# Patient Record
Sex: MALE | Race: WHITE | NOT HISPANIC OR LATINO | Employment: OTHER | URBAN - METROPOLITAN AREA
[De-identification: names, ages, dates, MRNs, and addresses within clinical notes are randomized per-mention and may not be internally consistent; named-entity substitution may affect disease eponyms.]

---

## 2017-02-09 ENCOUNTER — TRANSCRIBE ORDERS (OUTPATIENT)
Dept: PHYSICAL THERAPY | Facility: HOSPITAL | Age: 47
End: 2017-02-09

## 2017-02-09 DIAGNOSIS — I89.0 LYMPHEDEMA: Primary | ICD-10-CM

## 2017-02-14 ENCOUNTER — HOSPITAL ENCOUNTER (OUTPATIENT)
Dept: OCCUPATIONAL THERAPY | Facility: HOSPITAL | Age: 47
Setting detail: THERAPIES SERIES
Discharge: HOME | End: 2017-02-14

## 2017-02-14 DIAGNOSIS — I89.0 LYMPHEDEMA OF BOTH LOWER EXTREMITIES: Primary | ICD-10-CM

## 2017-02-14 PROCEDURE — 97165 OT EVAL LOW COMPLEX 30 MIN: CPT

## 2017-02-14 PROCEDURE — 97140 MANUAL THERAPY 1/> REGIONS: CPT

## 2017-02-14 NOTE — PROGRESS NOTES
Outpatient Occupational Therapy Lymphedema Initial Evaluation  [facility]     Patient Name: Kameron Melendez  : 1970  MRN: [id number]  Today's Date: 2017      Visit Date: 2017    There is no problem list on file for this patient.       No past medical history on file.     No past surgical history on file.      Visit Dx:     ICD-10-CM ICD-9-CM   1. Lymphedema of both lower extremities I89.0 457.1             Patient History       17 1700          History    Chief Complaint Other 1 (comment)   bilateral LE edema  -RE      Date Current Problem(s) Began 10/14/16  -RE      Brief Description of Current Complaint Patient has a 4 year history of swelling in the left leg and approx. 6-9 mponths in the right.    -RE      Patient/Caregiver Goals Decrease swelling;Know what to do to help the symptoms  -RE      How has patient tried to help current problem? Ace wraps  -RE      Pain     Pain Location Leg   Wound area on right leg.  -RE      Pain at Present 2  -RE      Pain at Best 1  -RE      Pain at Worst 6   6-8  -RE      Difficulties with ADL's? Unable to reach feet.  -RE      Fall Risk Assessment    Any falls in the past year: No  -RE      Services    Are you currently receiving Home Health services No  -RE      Do you plan to receive Home Health services in the near future No  -RE      Daily Activities    Primary Language English  -RE      How does patient learn best? Reading;Listening  -RE      Teaching needs identified Management of Condition;Home Exercise Program  -RE      Does patient have problems with the following? None  -RE      Barriers to learning None  -RE      Functional Status dressing;mobility issues preventing performance of daily activities  -RE      Pt Participated in POC and Goals Yes  -RE      Safety    Are you being hurt, hit, or frightened by anyone at home or in your life? No  -RE      Are you being neglected by a caregiver No  -RE        User Key  (r) = Recorded By, (t) = Taken  By, (c) = Cosigned By    Initials Name Provider Type    RE Marjorie Flaherty OTR Occupational Therapist                Lymphedema       02/14/17 1700          Subjective Comments    Subjective Comments Patient is concerned about wound on r leg.  -RE      Lymphedema Assessment    Lymphedema Classification RLE:;LLE:;stage 2 (Spontaneously Irreversible)  -RE      Infections or Cellulitis? yes  -RE      Infection/Cellulitis Treatment Hospitalized in 2012.  -RE      Subjective Pain    Able to rate subjective pain? yes  -RE      Pre-Treatment Pain Level 2  -RE      Post-Treatment Pain Level 2  -RE      Lymphedema Edema Assessment    Ptting Edema Category By grade out of 4  -RE      Pitting Edema + 3/4  -RE      Skin Changes/Observations    Location/Assessment Lower Extremity  -RE      Lower Extremity Conditions right:;weeping;left:;intact  -RE      Lower Extremity Color/Pigment bilateral:;erythema;hyperpigmented  -RE      Lower Extremity Skin Details Superficial wound on the posterior aspect of the right lower leg.  No visible drainage.   -RE      Lymphedema Measurements    Measurement Type(s) Circumferential  -RE      Circumferential Areas Lower extremities  -RE      LLE Circumferential (cm)    Measurement Location 1 10cm above knee  -RE      Left 1 84 cm  -RE      Measurement Location 2 Knee  -RE      Left 2 77 cm  -RE      Measurement Location 3 10cm below knee  -RE      Left 3 72 cm  -RE      Measurement Location 4 20cm below knee  -RE      Left 4 74.5 cm  -RE      Measurement Location 5 30cm below knee  -RE      Left 5 64.5 cm  -RE      Measurement Location 6 Ankle  -RE      Left 6 36.5 cm  -RE      Measurement Location 7 Midfoot  -RE      Left 7 26.5 cm  -RE      Measurement Location 8 Total  -RE      Left 8 435 cm  -RE      RLE Circumferential (cm)    Measurement Location 1 10cm above knee  -RE      Right 1 84 cm  -RE      Measurement Location 2 Knee  -RE      Right 2 68 cm  -RE      Measurement Location 3 10cm below  knee  -RE      Right 3 82.5 cm  -RE      Measurement Location 4 20cm below knee  -RE      Right 4 74.5 cm  -RE      Measurement Location 5 30cm below knee  -RE      Right 5 60.5 cm  -RE      Measurement Location 6 Ankle  -RE      Right 6 45.5 cm  -RE      Measurement Location 7 Midfoot  -RE      Right 7 28.5 cm  -RE      Measurement Location 8 Total  -RE      Right 8 443.5 cm  -RE      Manual Lymphatic Drainage    Manual Lymphatic Drainage initial sequence;opened regional lymph nodes;extremity treatment  -RE      Initial Sequence short neck  -RE      Opened Regional Lymph Nodes inguinal  -RE      Inguinal right  -RE      Extremity Treatment MLD to proximal limb only   due to time constraints  -RE      Compression/Skin Care    Compression/Skin Care skin care;wrapping location;bandaging  -RE      Skin Care moisturizing lotion applied;topical anti-microbial applied  -RE      Wrapping Location lower extremity  -RE      Wrapping Location LE bilateral:;foot to knee  -RE      Wrapping Comments K1, 1-10cm Artiflex, 1-15cm Artiflex, 1-8cm, 1-10cm, 2-12cm  (3 on right) Rosidal K.   -RE      Bandage Layers cotton liner;padding/fluff layer;short-stretch bandages (comment size/quantity)  -RE      Bandaging Technique circumferential/spiral;light compression  -RE      Compression/Skin Care Comments Dressed wound on right with 2-abd pads and 1 Kerlix.  -RE        User Key  (r) = Recorded By, (t) = Taken By, (c) = Cosigned By    Initials Name Provider Type    ZULMA Elizabeth Occupational Therapist                        Therapy Education       02/14/17 3422    Therapy Education    Given Edema management;Bandaging/dressing change  -RE    Program New  -RE    How Provided Verbal;Demonstration;Written  -RE    Provided to Patient;Caregiver  -RE    Level of Understanding Teach back education performed;Verbalized  -RE      User Key  (r) = Recorded By, (t) = Taken By, (c) = Cosigned By    Initials Name Provider Type    TAYLOR Flaherty  OTR Occupational Therapist                        OT Goals       02/14/17 1700          OT Short Term Goals    STG Date to Achieve 02/28/17  -RE      STG 1 Patient independent and compliant with self-wrapping techniques of compression bandages with assistance of caregiver as needed for improved self-management of condition.  -RE      STG 1 Progress New  -RE      STG 2 Patient will demonstrate proper awareness of condition and precautions for improved prevention, management, care of symptoms.  -RE      STG 2 Progress New  -RE      STG 3 Patient will demonstrate decreased net edema of bilateral lower extremities >/= 15-30cm  for decrease in edema, symptoms, decreased risk of infection and improved skin care/transition to self-care of condition.   -RE      STG 3 Progress New  -RE      Long Term Goals    LTG Date to Achieve 03/14/17  -RE      LTG 1 Patient will demonstrate decreased net edema of bilateral lower extremities >/= 31-60cm  for decrease in edema, symptoms, decreased risk of infection and improved skin care/transition to self-care of condition.   -RE      LTG 1 Progress New  -RE      LTG 2 Patient independent and compliant with use and care of compression with assistance of a caregiver as needed to promote self-care independence.   -RE      LTG 2 Progress New  -RE      LTG 3 Patient will demonstrate healing of R LE wound.  -RE      LTG 3 Progress New  -RE      Time Calculation    OT Goal Re-Cert Due Date 03/14/17  -RE        User Key  (r) = Recorded By, (t) = Taken By, (c) = Cosigned By    Initials Name Provider Type    TAYLOR Flaherty OTR Occupational Therapist                OT Assessment/Plan       02/14/17 1732          OT Assessment    Functional Limitations Impaired gait;Performance in self-care ADL  -RE      Impairments Edema;Impaired lymphatic circulation;Integumentary integrity;Pain;Impaired venous circulation  -RE      Assessment Comments Patient was originally evaluated by PT for lymphedema but  due to patient volume he had not been scheduled for treament yet.  Patient was concerned about his wound and was insistent that he be scheduled.  Therefore he was rescheduled with OT since there was an opening for treatment.  Patient presents with severe lymphedema which extends from feet to groin with 3+ pitting.  He has a superficial wound on his right lower leg which has been very slow healing.  He could benefit from Complete Decongestive Therapy to decrease edema, decrease the risk of infection, heal his wound, decrease pain and learn self care strategies.    -RE      OT Diagnosis bilateral LE lymphedema  -RE      OT Rehab Potential Fair   Fair due to Obesity  -RE      Patient/caregiver participated in establishment of treatment plan and goals Yes  -RE      Patient would benefit from skilled therapy intervention Yes  -RE      OT Plan    OT Frequency 3x/week  -RE      Predicted Duration of Therapy Intervention (days/wks) 4-6 weeks  -RE      Planned CPT's? OT EVAL: 77805;OT SELF CARE/MGMT/TRAIN 15 MIN: 92092;OT MANUAL THERAPY EA 15 MIN: 09223  -RE      Planned Therapy Interventions (Optional Details) home exercise program;manual therapy techniques;patient/family education;wound care   compression bandaging  -RE        User Key  (r) = Recorded By, (t) = Taken By, (c) = Cosigned By    Initials Name Provider Type    RE UZLMA Jameson Occupational Therapist                Time Calculation:         Therapy Charges for Today     Code Description Service Date Service Provider Modifiers Qty    [id number]  OT EVAL LOW COMPLEXITY 2 2/14/2017 ZULMA Jameson GO 1    [id number]  SANDRA MANUAL THERAPY EA 15 MIN 2/14/2017 ZULMA Jameson GO 3                    ZULMA Jameson  2/14/2017

## 2017-02-16 ENCOUNTER — HOSPITAL ENCOUNTER (OUTPATIENT)
Dept: OCCUPATIONAL THERAPY | Facility: HOSPITAL | Age: 47
Setting detail: THERAPIES SERIES
Discharge: HOME | End: 2017-02-16

## 2017-02-16 DIAGNOSIS — I89.0 LYMPHEDEMA OF BOTH LOWER EXTREMITIES: Primary | ICD-10-CM

## 2017-02-16 PROCEDURE — 97140 MANUAL THERAPY 1/> REGIONS: CPT

## 2017-02-16 NOTE — PROGRESS NOTES
Outpatient Occupational Therapy Lymphedema Treatment Note  [facility]     Patient Name: Kameron Melendez  : 1970  MRN: [id number]  Today's Date: 2017      Visit Date: 2017    There is no problem list on file for this patient.       No past medical history on file.     No past surgical history on file.      Visit Dx:      ICD-10-CM ICD-9-CM   1. Lymphedema of both lower extremities I89.0 457.1             Lymphedema       17 1500          Subjective Comments    Subjective Comments Reports increased urination, bandages slipped down.  -RE      Subjective Pain    Able to rate subjective pain? yes  -RE      Pre-Treatment Pain Level 2  -RE      Post-Treatment Pain Level 2  -RE      Manual Lymphatic Drainage    Manual Lymphatic Drainage initial sequence;opened regional lymph nodes;extremity treatment  -RE      Initial Sequence short neck  -RE      Opened Regional Lymph Nodes inguinal  -RE      Inguinal right  -RE      Extremity Treatment MLD to full limb   right  -RE      Compression/Skin Care    Compression/Skin Care skin care;wrapping location;bandaging  -RE      Skin Care moisturizing lotion applied;topical anti-microbial applied  -RE      Wrapping Location lower extremity  -RE      Wrapping Location LE bilateral:;foot to knee  -RE      Wrapping Comments K1, 1-10and 1-15 cm Artiflex, 1-8cm, 2-10cm, 3-12 cm Rosidal K.  -RE      Bandage Layers cotton liner;padding/fluff layer;short-stretch bandages (comment size/quantity)  -RE      Bandaging Technique circumferential/spiral;moderate compression  -RE      Compression/Skin Care Comments dressed wound with ABD and Idealbinde.  -RE        User Key  (r) = Recorded By, (t) = Taken By, (c) = Cosigned By    Initials Name Provider Type    ZULMA Elizabeth Occupational Therapist                        OT Assessment/Plan       17 1552 17 1732       OT Assessment    Functional Limitations  Impaired gait;Performance in self-care ADL  -RE      Impairments  Edema;Impaired lymphatic circulation;Integumentary integrity;Pain;Impaired venous circulation  -RE     Assessment Comments Edema is slightly softer indicating a decrease.  Wound appears unchanged but is draining slightly.  Shape and size of patient's leg causes bandages to slide more easily.  Added bandages and increased compression to try to keep bandages inplace.  -RE Patient was originally evaluated by PT for lymphedema but due to patient volume he had not been scheduled for treament yet.  Patient was concerned about his wound and was insistent that he be scheduled.  Therefore he was rescheduled with OT since there was an opening for treatment.  Patient presents with severe lymphedema which extends from feet to groin with 3+ pitting.  He has a superficial wound on his right lower leg which has been very slow healing.  He could benefit from Complete Decongestive Therapy to decrease edema, decrease the risk of infection, heal his wound, decrease pain and learn self care strategies.    -RE     OT Diagnosis  bilateral LE lymphedema  -RE     OT Rehab Potential  Fair   Fair due to Obesity  -RE     Patient/caregiver participated in establishment of treatment plan and goals  Yes  -RE     Patient would benefit from skilled therapy intervention  Yes  -RE     OT Plan    OT Frequency  3x/week  -RE     Predicted Duration of Therapy Intervention (days/wks)  4-6 weeks  -RE     Planned CPT's?  OT EVAL: 01650;OT SELF CARE/MGMT/TRAIN 15 MIN: 37424;OT MANUAL THERAPY EA 15 MIN: 97101  -RE     Planned Therapy Interventions (Optional Details)  home exercise program;manual therapy techniques;patient/family education;wound care   compression bandaging  -RE     OT Plan Comments Continue  -RE        User Key  (r) = Recorded By, (t) = Taken By, (c) = Cosigned By    Initials Name Provider Type    RE Marjorie Flaherty OTR Occupational Therapist                            OT Goals       02/14/17 1700          OT Short Term Goals     STG Date to Achieve 02/28/17  -RE      STG 1 Patient independent and compliant with self-wrapping techniques of compression bandages with assistance of caregiver as needed for improved self-management of condition.  -RE      STG 1 Progress New  -RE      STG 2 Patient will demonstrate proper awareness of condition and precautions for improved prevention, management, care of symptoms.  -RE      STG 2 Progress New  -RE      STG 3 Patient will demonstrate decreased net edema of bilateral lower extremities >/= 15-30cm  for decrease in edema, symptoms, decreased risk of infection and improved skin care/transition to self-care of condition.   -RE      STG 3 Progress New  -RE      Long Term Goals    LTG Date to Achieve 03/14/17  -RE      LTG 1 Patient will demonstrate decreased net edema of bilateral lower extremities >/= 31-60cm  for decrease in edema, symptoms, decreased risk of infection and improved skin care/transition to self-care of condition.   -RE      LTG 1 Progress New  -RE      LTG 2 Patient independent and compliant with use and care of compression with assistance of a caregiver as needed to promote self-care independence.   -RE      LTG 2 Progress New  -RE      LTG 3 Patient will demonstrate healing of R LE wound.  -RE      LTG 3 Progress New  -RE      Time Calculation    OT Goal Re-Cert Due Date 03/14/17  -RE        User Key  (r) = Recorded By, (t) = Taken By, (c) = Cosigned By    Initials Name Provider Type    ZULMA Elizabeth Occupational Therapist                Therapy Education       02/16/17 1557    Therapy Education    Given Symptoms/condition management;Edema management;Bandaging/dressing change  -RE    Program Reinforced  -RE    How Provided Verbal  -RE    Provided to Patient  -RE    Level of Understanding Teach back education performed;Verbalized  -RE      02/14/17 1741    Therapy Education    Given Edema management;Bandaging/dressing change  -RE    Program New  -RE    How Provided  Verbal;Demonstration;Written  -RE    Provided to Patient;Caregiver  -RE    Level of Understanding Teach back education performed;Verbalized  -RE      User Key  (r) = Recorded By, (t) = Taken By, (c) = Cosigned By    Initials Name Provider Type    RE ZULMA Jameson Occupational Therapist                  Time Calculation:   OT Start Time: 0845  OT Stop Time: 0945  OT Time Calculation (min): 60 min       Therapy Charges for Today     Code Description Service Date Service Provider Modifiers Qty    [id number]  OT MANUAL THERAPY EA 15 MIN 2/16/2017 ZULMA Jameson GO 4                      ZULMA Jameson  2/16/2017

## 2017-02-17 ENCOUNTER — HOSPITAL ENCOUNTER (OUTPATIENT)
Dept: OCCUPATIONAL THERAPY | Facility: HOSPITAL | Age: 47
Setting detail: THERAPIES SERIES
Discharge: HOME | End: 2017-02-17

## 2017-02-17 DIAGNOSIS — I89.0 LYMPHEDEMA OF BOTH LOWER EXTREMITIES: Primary | ICD-10-CM

## 2017-02-17 PROCEDURE — 97140 MANUAL THERAPY 1/> REGIONS: CPT

## 2017-02-17 NOTE — PROGRESS NOTES
Outpatient Occupational Therapy Lymphedema Treatment Note  [facility]     Patient Name: Kameron Melendez  : 1970  MRN: [id number]  Today's Date: 2017      Visit Date: 2017    There is no problem list on file for this patient.       No past medical history on file.     No past surgical history on file.      Visit Dx:      ICD-10-CM ICD-9-CM   1. Lymphedema of both lower extremities I89.0 457.1             Lymphedema       17 1400 17 1500       Subjective Comments    Subjective Comments No new complaints.  Bandages stayed up.  -RE Reports increased urination, bandages slipped down.  -RE     Subjective Pain    Able to rate subjective pain? yes  -RE yes  -RE     Pre-Treatment Pain Level 2  -RE 2  -RE     Post-Treatment Pain Level 2  -RE 2  -RE     Manual Lymphatic Drainage    Manual Lymphatic Drainage initial sequence;opened regional lymph nodes;extremity treatment  -RE initial sequence;opened regional lymph nodes;extremity treatment  -RE     Initial Sequence short neck  -RE short neck  -RE     Opened Regional Lymph Nodes inguinal  -RE inguinal  -RE     Inguinal right  -RE right  -RE     Extremity Treatment MLD to full limb  -RE MLD to full limb   right  -RE     MLD to Full Limb --   right  -RE      Compression/Skin Care    Compression/Skin Care skin care;wrapping location;bandaging  -RE skin care;wrapping location;bandaging  -RE     Skin Care moisturizing lotion applied;topical anti-microbial applied  -RE moisturizing lotion applied;topical anti-microbial applied  -RE     Wrapping Location  lower extremity  -RE     Wrapping Location LE bilateral:;foot to knee  -RE bilateral:;foot to knee  -RE     Wrapping Comments K1, 1-10and 1-15 cm Artiflex, 1-8cm, 2-10cm, 3-12 cm Rosidal K.  -RE K1, 1-10and 1-15 cm Artiflex, 1-8cm, 2-10cm, 3-12 cm Rosidal K.  -RE     Bandage Layers cotton liner;padding/fluff layer;short-stretch bandages (comment size/quantity)  -RE cotton liner;padding/fluff  layer;short-stretch bandages (comment size/quantity)  -RE     Bandaging Technique circumferential/spiral;moderate compression  -RE circumferential/spiral;moderate compression  -RE     Compression/Skin Care Comments dressed wound with ABD and Idealbinde.  -RE dressed wound with ABD and Idealbinde.  -RE       User Key  (r) = Recorded By, (t) = Taken By, (c) = Cosigned By    Initials Name Provider Type    RE ZULMA Jameson Occupational Therapist                        OT Assessment/Plan       02/17/17 1427 02/16/17 1552 02/14/17 1732    OT Assessment    Functional Limitations   Impaired gait;Performance in self-care ADL  -RE    Impairments   Edema;Impaired lymphatic circulation;Integumentary integrity;Pain;Impaired venous circulation  -RE    Assessment Comments Wound area appears unchanged. Still  has slight drainage. Edema softened very slightly with MLD.  Tolerating bandages well.    -RE Edema is slightly softer indicating a decrease.  Wound appears unchanged but is draining slightly.  Shape and size of patient's leg causes bandages to slide more easily.  Added bandages and increased compression to try to keep bandages inplace.  -RE Patient was originally evaluated by PT for lymphedema but due to patient volume he had not been scheduled for treament yet.  Patient was concerned about his wound and was insistent that he be scheduled.  Therefore he was rescheduled with OT since there was an opening for treatment.  Patient presents with severe lymphedema which extends from feet to groin with 3+ pitting.  He has a superficial wound on his right lower leg which has been very slow healing.  He could benefit from Complete Decongestive Therapy to decrease edema, decrease the risk of infection, heal his wound, decrease pain and learn self care strategies.    -RE    OT Diagnosis   bilateral LE lymphedema  -RE    OT Rehab Potential   Fair   Fair due to Obesity  -RE    Patient/caregiver participated in establishment of  treatment plan and goals   Yes  -RE    Patient would benefit from skilled therapy intervention   Yes  -RE    OT Plan    OT Frequency   3x/week  -RE    Predicted Duration of Therapy Intervention (days/wks)   4-6 weeks  -RE    Planned CPT's?   OT EVAL: 45291;OT SELF CARE/MGMT/TRAIN 15 MIN: 15965;OT MANUAL THERAPY EA 15 MIN: 64876  -RE    Planned Therapy Interventions (Optional Details)   home exercise program;manual therapy techniques;patient/family education;wound care   compression bandaging  -RE    OT Plan Comments Pt and wife to bandage this weekend.  -RE Continue  -RE       User Key  (r) = Recorded By, (t) = Taken By, (c) = Cosigned By    Initials Name Provider Type    RE Marjorie Flaherty OTR Occupational Therapist                            OT Goals       02/14/17 1700          OT Short Term Goals    STG Date to Achieve 02/28/17  -RE      STG 1 Patient independent and compliant with self-wrapping techniques of compression bandages with assistance of caregiver as needed for improved self-management of condition.  -RE      STG 1 Progress New  -RE      STG 2 Patient will demonstrate proper awareness of condition and precautions for improved prevention, management, care of symptoms.  -RE      STG 2 Progress New  -RE      STG 3 Patient will demonstrate decreased net edema of bilateral lower extremities >/= 15-30cm  for decrease in edema, symptoms, decreased risk of infection and improved skin care/transition to self-care of condition.   -RE      STG 3 Progress New  -RE      Long Term Goals    LTG Date to Achieve 03/14/17  -RE      LTG 1 Patient will demonstrate decreased net edema of bilateral lower extremities >/= 31-60cm  for decrease in edema, symptoms, decreased risk of infection and improved skin care/transition to self-care of condition.   -RE      LTG 1 Progress New  -RE      LTG 2 Patient independent and compliant with use and care of compression with assistance of a caregiver as needed to promote self-care  independence.   -RE      LTG 2 Progress New  -RE      LTG 3 Patient will demonstrate healing of R LE wound.  -RE      LTG 3 Progress New  -RE      Time Calculation    OT Goal Re-Cert Due Date 03/14/17  -RE        User Key  (r) = Recorded By, (t) = Taken By, (c) = Cosigned By    Initials Name Provider Type    ZULMA Elizabeth Occupational Therapist                Therapy Education       02/17/17 1430    Therapy Education    Given Bandaging/dressing change   Change bandages daily.   -RE    Program Reinforced  -RE    How Provided Verbal  -RE    Provided to Patient  -RE    Level of Understanding Teach back education performed;Verbalized  -RE      02/16/17 1557    Therapy Education    Given Symptoms/condition management;Edema management;Bandaging/dressing change  -RE    Program Reinforced  -RE    How Provided Verbal  -RE    Provided to Patient  -RE    Level of Understanding Teach back education performed;Verbalized  -RE      02/14/17 1741    Therapy Education    Given Edema management;Bandaging/dressing change  -RE    Program New  -RE    How Provided Verbal;Demonstration;Written  -RE    Provided to Patient;Caregiver  -RE    Level of Understanding Teach back education performed;Verbalized  -RE      User Key  (r) = Recorded By, (t) = Taken By, (c) = Cosigned By    Initials Name Provider Type    ZULMA Elizabeth Occupational Therapist                  Time Calculation:   OT Start Time: 0845  OT Stop Time: 0945  OT Time Calculation (min): 60 min       Therapy Charges for Today     Code Description Service Date Service Provider Modifiers Qty    [id number]  OT MANUAL THERAPY EA 15 MIN 2/17/2017 ZULMA Jameson GO 4                      ZULMA Jameson  2/17/2017

## 2017-02-21 ENCOUNTER — HOSPITAL ENCOUNTER (OUTPATIENT)
Dept: OCCUPATIONAL THERAPY | Facility: HOSPITAL | Age: 47
Setting detail: THERAPIES SERIES
Discharge: HOME | End: 2017-02-21

## 2017-02-21 DIAGNOSIS — I89.0 LYMPHEDEMA OF BOTH LOWER EXTREMITIES: Primary | ICD-10-CM

## 2017-02-21 PROCEDURE — 97140 MANUAL THERAPY 1/> REGIONS: CPT

## 2017-02-23 ENCOUNTER — HOSPITAL ENCOUNTER (OUTPATIENT)
Dept: OCCUPATIONAL THERAPY | Facility: HOSPITAL | Age: 47
Setting detail: THERAPIES SERIES
Discharge: HOME | End: 2017-02-23

## 2017-02-23 DIAGNOSIS — I89.0 LYMPHEDEMA OF BOTH LOWER EXTREMITIES: Primary | ICD-10-CM

## 2017-02-23 PROCEDURE — 97140 MANUAL THERAPY 1/> REGIONS: CPT

## 2017-02-23 NOTE — PROGRESS NOTES
Outpatient Occupational Therapy Lymphedema Treatment Note  [facility]     Patient Name: Kameron Melendez  : 1970  MRN: [id number]  Today's Date: 2017      Visit Date: 2017    There is no problem list on file for this patient.       No past medical history on file.     No past surgical history on file.      Visit Dx:      ICD-10-CM ICD-9-CM   1. Lymphedema of both lower extremities I89.0 457.1             Lymphedema       17 1500          Subjective Comments    Subjective Comments No new complaints.  -RE      Subjective Pain    Able to rate subjective pain? yes  -RE      Pre-Treatment Pain Level 1  -RE      Post-Treatment Pain Level 1  -RE      Lymphedema Edema Assessment    Ptting Edema Category By grade out of 4  -RE      Pitting Edema + 3/4  -RE      Skin Changes/Observations    Location/Assessment Lower Extremity  -RE      Lower Extremity Conditions right:;inflamed  -RE      Lower Extremity Color/Pigment bilateral:;erythema  -RE      Lymphedema Measurements    Measurement Type(s) Circumferential  -RE      Circumferential Areas Lower extremities  -RE      LLE Circumferential (cm)    Measurement Location 1 10cm above knee  -RE      Left 1 85 cm  -RE      Measurement Location 2 Knee  -RE      Left 2 62 cm  -RE      Measurement Location 3 10cm below knee  -RE      Left 3 66 cm  -RE      Measurement Location 4 20cm below knee  -RE      Left 4 63 cm  -RE      Measurement Location 5 30cm below knee  -RE      Left 5 54 cm  -RE      Measurement Location 6 Ankle  -RE      Left 6 34 cm  -RE      Measurement Location 7 Midfoot  -RE      Left 7 26 cm  -RE      Measurement Location 8 Total  -RE      Left 8 390 cm  -RE      Measurement Location 9 Decrease since IE  -RE      Left 9 45 cm  -RE      RLE Circumferential (cm)    Measurement Location 1 10cm above knee  -RE      Right 1 83 cm  -RE      Measurement Location 2 Knee  -RE      Right 2 65 cm  -RE      Measurement Location 3 10cm below knee  -RE       Right 3 77.5 cm  -RE      Measurement Location 4 20cm below knee  -RE      Right 4 71.5 cm  -RE      Measurement Location 5 30cm below knee  -RE      Right 5 58 cm  -RE      Measurement Location 6 Ankle  -RE      Right 6 43.5 cm  -RE      Measurement Location 7 Midfoot  -RE      Right 7 27.5 cm  -RE      Measurement Location 8 Total  -RE      Right 8 426 cm  -RE      Measurement Location 9 Decrease since IE  -RE      Right 9 17.5 cm  -RE      Manual Lymphatic Drainage    Manual Lymphatic Drainage initial sequence;opened regional lymph nodes;extremity treatment  -RE      Initial Sequence short neck  -RE      Opened Regional Lymph Nodes inguinal  -RE      Inguinal right  -RE      Extremity Treatment MLD to full limb  -RE      Compression/Skin Care    Compression/Skin Care skin care;wrapping location;bandaging  -RE      Skin Care moisturizing lotion applied;topical anti-inflammatory applied;topical anti-microbial applied;other (comment)   zinc oxide  -RE      Wrapping Location lower extremity  -RE      Wrapping Location LE bilateral:;foot to knee  -RE      Wrapping Comments K1, 1-10and 1-15 cm Artiflex, 1-8cm, 2-10cm, 3-12 cm Rosidal K (4 -12 cm on right)  -RE      Bandage Layers cotton liner;padding/fluff layer;short-stretch bandages (comment size/quantity)  -RE      Bandaging Technique circumferential/spiral;moderate compression;strong compression  -RE      Compression/Skin Care Comments Dressed with abd pads and kerlix  -RE        User Key  (r) = Recorded By, (t) = Taken By, (c) = Cosigned By    Initials Name Provider Type    RE ZULMA Jameson Occupational Therapist                        OT Assessment/Plan       02/23/17 7593       OT Assessment    Assessment Comments Wound is stable. Edema has decreased bilaterally but significantly more on the left side.  Both legs are red but the color improves somewhat in supine.  Progressing.  -RE     OT Plan    OT Plan Comments Continue  -RE       User Key  (r) =  Recorded By, (t) = Taken By, (c) = Cosigned By    Initials Name Provider Type    ZULMA Elizabeth Occupational Therapist                                Therapy Education       02/23/17 0963          Therapy Education    Given Symptoms/condition management;Edema management;Bandaging/dressing change  -RE      Program Reinforced  -RE      How Provided Verbal  -RE      Provided to Patient  -RE      Level of Understanding Teach back education performed;Verbalized  -RE        User Key  (r) = Recorded By, (t) = Taken By, (c) = Cosigned By    Initials Name Provider Type    ZULMA Elizabeth Occupational Therapist                  Time Calculation:   OT Start Time: 0845  OT Stop Time: 0945  OT Time Calculation (min): 60 min       Therapy Charges for Today     Code Description Service Date Service Provider Modifiers Qty    [id number]  OT MANUAL THERAPY EA 15 MIN 2/23/2017 ZULMA Jameson GO 4                      ZULMA Jameson  2/23/2017

## 2017-02-24 ENCOUNTER — HOSPITAL ENCOUNTER (OUTPATIENT)
Dept: OCCUPATIONAL THERAPY | Facility: HOSPITAL | Age: 47
Setting detail: THERAPIES SERIES
Discharge: HOME | End: 2017-02-24

## 2017-02-24 DIAGNOSIS — I89.0 LYMPHEDEMA OF BOTH LOWER EXTREMITIES: Primary | ICD-10-CM

## 2017-02-24 PROCEDURE — 97140 MANUAL THERAPY 1/> REGIONS: CPT

## 2017-02-24 NOTE — PROGRESS NOTES
Outpatient Occupational Therapy Lymphedema Treatment Note  [facility]     Patient Name: Kameron Melendez  : 1970  MRN: [id number]  Today's Date: 2017      Visit Date: 2017    There is no problem list on file for this patient.       No past medical history on file.     No past surgical history on file.      Visit Dx:      ICD-10-CM ICD-9-CM   1. Lymphedema of both lower extremities I89.0 457.1             Lymphedema       17 1200 17 1500       Subjective Comments    Subjective Comments Says wound no longer is painful.  -RE No new complaints.  -RE     Subjective Pain    Able to rate subjective pain? yes  -RE yes  -RE     Pre-Treatment Pain Level 0  -RE 1  -RE     Post-Treatment Pain Level 0  -RE 1  -RE     Lymphedema Edema Assessment    Ptting Edema Category  By grade out of 4  -RE     Pitting Edema  + 3/4  -RE     Skin Changes/Observations    Location/Assessment  Lower Extremity  -RE     Lower Extremity Conditions  right:;inflamed  -RE     Lower Extremity Color/Pigment  bilateral:;erythema  -RE     Lymphedema Measurements    Measurement Type(s)  Circumferential  -RE     Circumferential Areas  Lower extremities  -RE     LLE Circumferential (cm)    Measurement Location 1  10cm above knee  -RE     Left 1  85 cm  -RE     Measurement Location 2  Knee  -RE     Left 2  62 cm  -RE     Measurement Location 3  10cm below knee  -RE     Left 3  66 cm  -RE     Measurement Location 4  20cm below knee  -RE     Left 4  63 cm  -RE     Measurement Location 5  30cm below knee  -RE     Left 5  54 cm  -RE     Measurement Location 6  Ankle  -RE     Left 6  34 cm  -RE     Measurement Location 7  Midfoot  -RE     Left 7  26 cm  -RE     Measurement Location 8  Total  -RE     Left 8  390 cm  -RE     Measurement Location 9  Decrease since IE  -RE     Left 9  45 cm  -RE     RLE Circumferential (cm)    Measurement Location 1  10cm above knee  -RE     Right 1  83 cm  -RE     Measurement Location 2  Knee  -RE      Right 2  65 cm  -RE     Measurement Location 3  10cm below knee  -RE     Right 3  77.5 cm  -RE     Measurement Location 4  20cm below knee  -RE     Right 4  71.5 cm  -RE     Measurement Location 5  30cm below knee  -RE     Right 5  58 cm  -RE     Measurement Location 6  Ankle  -RE     Right 6  43.5 cm  -RE     Measurement Location 7  Midfoot  -RE     Right 7  27.5 cm  -RE     Measurement Location 8  Total  -RE     Right 8  426 cm  -RE     Measurement Location 9  Decrease since IE  -RE     Right 9  17.5 cm  -RE     Manual Lymphatic Drainage    Manual Lymphatic Drainage initial sequence;opened regional lymph nodes;extremity treatment  -RE initial sequence;opened regional lymph nodes;extremity treatment  -RE     Initial Sequence short neck  -RE short neck  -RE     Opened Regional Lymph Nodes inguinal  -RE inguinal  -RE     Inguinal right  -RE right  -RE     Extremity Treatment MLD to full limb  -RE MLD to full limb  -RE     Compression/Skin Care    Compression/Skin Care skin care;wrapping location;bandaging  -RE skin care;wrapping location;bandaging  -RE     Skin Care moisturizing lotion applied;topical anti-microbial applied  -RE moisturizing lotion applied;topical anti-inflammatory applied;topical anti-microbial applied;other (comment)   zinc oxide  -RE     Wrapping Location lower extremity  -RE lower extremity  -RE     Wrapping Location LE bilateral:;foot to knee  -RE bilateral:;foot to knee  -RE     Wrapping Comments Changed to silver stockingette to decrease itching and irritation.   -RE K1, 1-10and 1-15 cm Artiflex, 1-8cm, 2-10cm, 3-12 cm Rosidal K (4 -12 cm on right)  -RE     Bandage Layers cotton liner;padding/fluff layer;short-stretch bandages (comment size/quantity)  -RE cotton liner;padding/fluff layer;short-stretch bandages (comment size/quantity)  -RE     Bandaging Technique circumferential/spiral;strong compression  -RE circumferential/spiral;moderate compression;strong compression  -RE      "Compression/Skin Care Comments Dressed wound area with an abd pad and Kerlix.   -RE Dressed with abd pads and kerlix  -RE     Bandaging Comments 6\" silver stockingette, 1-10and 1-15 cm Artiflex, 1-8cm, 2-10cm, 3-12 cm Rosidal K (4 -12 cm on right)  -RE        User Key  (r) = Recorded By, (t) = Taken By, (c) = Cosigned By    Initials Name Provider Type    ZULMA Elizabeth Occupational Therapist                        OT Assessment/Plan       02/24/17 1239       OT Assessment    Assessment Comments Wound is feeling better.  Skin color is normal today.  Good progress today.    -RE     OT Plan    OT Plan Comments Continue  -RE       User Key  (r) = Recorded By, (t) = Taken By, (c) = Cosigned By    Initials Name Provider Type    ZULMA Elizabeth Occupational Therapist                                Therapy Education       02/24/17 1240          Therapy Education    Given Edema management   Elevate legs when possible  -RE      Program Reinforced  -RE      How Provided Verbal  -RE      Provided to Patient  -RE      Level of Understanding Teach back education performed;Verbalized  -RE        User Key  (r) = Recorded By, (t) = Taken By, (c) = Cosigned By    Initials Name Provider Type    ZULMA Elizabeth Occupational Therapist                  Time Calculation:   OT Start Time: 0845  OT Stop Time: 0940  OT Time Calculation (min): 55 min       Therapy Charges for Today     Code Description Service Date Service Provider Modifiers Qty    [id number] HC OT MANUAL THERAPY EA 15 MIN 2/24/2017 ZULMA Jameson GO 4                      ZULMA Jameson  2/24/2017  "

## 2017-02-28 ENCOUNTER — HOSPITAL ENCOUNTER (OUTPATIENT)
Dept: OCCUPATIONAL THERAPY | Facility: HOSPITAL | Age: 47
Setting detail: THERAPIES SERIES
Discharge: HOME | End: 2017-02-28

## 2017-02-28 DIAGNOSIS — I89.0 LYMPHEDEMA OF BOTH LOWER EXTREMITIES: Primary | ICD-10-CM

## 2017-02-28 PROCEDURE — 97140 MANUAL THERAPY 1/> REGIONS: CPT

## 2017-03-02 ENCOUNTER — HOSPITAL ENCOUNTER (OUTPATIENT)
Dept: OCCUPATIONAL THERAPY | Facility: HOSPITAL | Age: 47
Setting detail: THERAPIES SERIES
Discharge: HOME | End: 2017-03-02

## 2017-03-02 DIAGNOSIS — I89.0 LYMPHEDEMA OF BOTH LOWER EXTREMITIES: Primary | ICD-10-CM

## 2017-03-02 PROCEDURE — 97140 MANUAL THERAPY 1/> REGIONS: CPT

## 2017-03-02 NOTE — PROGRESS NOTES
Outpatient Occupational Therapy Lymphedema Progress Note  [facility]     Patient Name: Kameron Melendez  : 1970  MRN: [id number]  Today's Date: 3/2/2017      Visit Date: 2017    There is no problem list on file for this patient.       No past medical history on file.     No past surgical history on file.      Visit Dx:      ICD-10-CM ICD-9-CM   1. Lymphedema of both lower extremities I89.0 457.1             Lymphedema       17 1600          Subjective Comments    Subjective Comments had some issues with bandages slipping and some itching.  Took bandages off  at  11pm.  -RE      Subjective Pain    Able to rate subjective pain? yes  -RE      Pre-Treatment Pain Level 0  -RE      Post-Treatment Pain Level 0  -RE      Skin Changes/Observations    Location/Assessment Lower Extremity  -RE      Lower Extremity Conditions left:;weeping;bilateral:;inflamed;other (comment)   scratches on both legs L>R.  -RE      Lymphedema Measurements    Measurement Type(s) Circumferential  -RE      Circumferential Areas Lower extremities  -RE      LLE Circumferential (cm)    Measurement Location 1 10cm above knee  -RE      Left 1 85 cm  -RE      Measurement Location 2 Knee  -RE      Left 2 59 cm  -RE      Measurement Location 3 10cm below knee  -RE      Left 3 66 cm  -RE      Measurement Location 4 20cm below knee  -RE      Left 4 62.5 cm  -RE      Measurement Location 5 30cm below knee  -RE      Left 5 53 cm  -RE      Measurement Location 6 Ankle  -RE      Left 6 33.5 cm  -RE      Measurement Location 7 Midfoot  -RE      Left 7 27 cm  -RE      Measurement Location 8 Total  -RE      Left 8 386 cm  -RE      Measurement Location 9 Decrease since IE  -RE      Left 9 49 cm  -RE      RLE Circumferential (cm)    Measurement Location 1 10cm above knee  -RE      Right 1 82 cm  -RE      Measurement Location 2 Knee  -RE      Right 2 63.5 cm  -RE      Measurement Location 3 10cm below knee  -RE      Right 3 75.5 cm  -RE       Measurement Location 4 20cm below knee  -RE      Right 4 69.5 cm  -RE      Measurement Location 5 30cm below knee  -RE      Right 5 58 cm  -RE      Measurement Location 6 Ankle  -RE      Right 6 42 cm  -RE      Measurement Location 7 Midfoot  -RE      Right 7 27.8 cm  -RE      Measurement Location 8 Total  -RE      Right 8 418.3 cm  -RE      Measurement Location 9 Decrease since IE  -RE      Right 9 25.2 cm  -RE      Manual Lymphatic Drainage    Manual Lymphatic Drainage initial sequence;opened regional lymph nodes;extremity treatment  -RE      Initial Sequence short neck  -RE      Opened Regional Lymph Nodes inguinal  -RE      Inguinal left  -RE      Extremity Treatment MLD to proximal limb only   left  -RE      Compression/Skin Care    Compression/Skin Care skin care;wrapping location;bandaging  -RE      Skin Care moisturizing lotion applied;topical anti-microbial applied   Zinc oxide for skin protection  -RE      Wrapping Location lower extremity  -RE      Wrapping Location LE bilateral:;foot to knee  -RE      Bandage Layers cotton liner;padding/fluff layer;short-stretch bandages (comment size/quantity)  -RE      Bandaging Technique circumferential/spiral;strong compression  -RE      Compression/Skin Care Comments Wound dressed with ABD pad and Kerlix.  -RE      Bandaging Comments K1 stockingette, 1-10and 1-15 cm Artiflex, 1-8cm, 2-10cm, 3-12 cm Rosidal K (4 -12 cm on right)   Did not bring a clean silver stockingette.  -RE        User Key  (r) = Recorded By, (t) = Taken By, (c) = Cosigned By    Initials Name Provider Type    RE ZULMA Jameson Occupational Therapist                        OT Assessment/Plan       03/02/17 1607       OT Assessment    Assessment Comments Measurements taken and goals updated.Progressing well despite some problems with bandaging at home.  Patient did sctratch his legs to the point of bleeding.      -RE     OT Plan    OT Plan Comments Continue  -RE       User Key  (r) = Recorded  By, (t) = Taken By, (c) = Cosigned By    Initials Name Provider Type    ZULMA Elizabeth Occupational Therapist                            OT Goals       03/02/17 1600       OT Short Term Goals    STG Date to Achieve 03/16/17  -RE     STG 1 Patient independent and compliant with self-wrapping techniques of compression bandages with assistance of caregiver as needed for improved self-management of condition.  -RE     STG 1 Progress Partially Met;Ongoing  -RE     STG 1 Progress Comments Wife is bandaging but is still having a few problems with technique.  -RE     STG 2 Patient will demonstrate proper awareness of condition and precautions for improved prevention, management, care of symptoms.  -RE     STG 2 Progress Met  -RE     STG 3 Patient will demonstrate decreased net edema of bilateral lower extremities >/= 26-60cm  for decrease in edema, symptoms, decreased risk of infection and improved skin care/transition to self-care of condition.   -RE     STG 3 Progress Met;Goal Revised  -RE     Long Term Goals    LTG Date to Achieve 04/02/17  -RE     LTG 1 Patient will demonstrate decreased net edema of bilateral lower extremities >/= 61-80cm  for decrease in edema, symptoms, decreased risk of infection and improved skin care/transition to self-care of condition.   -RE     LTG 1 Progress Partially Met;Goal Revised  -RE     LTG 2 Patient independent and compliant with use and care of compression with assistance of a caregiver as needed to promote self-care independence.   -RE     LTG 2 Progress Ongoing  -RE     LTG 3 Patient will demonstrate healing of R LE wound.  -RE     LTG 3 Progress Not Met;Ongoing  -RE     Time Calculation    OT Goal Re-Cert Due Date 03/14/17  -RE       User Key  (r) = Recorded By, (t) = Taken By, (c) = Cosigned By    Initials Name Provider Type    ZULMA Elizabeth Occupational Therapist                Therapy Education       03/02/17 1649          Therapy Education    Given  Bandaging/dressing change   Reviewed bandaging and gave patient tips that will help wife with bandaging at home.  -RE      Program Reinforced  -RE      How Provided Verbal;Demonstration  -RE      Provided to Patient  -RE      Level of Understanding Teach back education performed;Verbalized  -RE        User Key  (r) = Recorded By, (t) = Taken By, (c) = Cosigned By    Initials Name Provider Type    RE ZULMA Jameson Occupational Therapist                  Time Calculation:   OT Start Time: 0845  OT Stop Time: 0945  OT Time Calculation (min): 60 min       Therapy Charges for Today     Code Description Service Date Service Provider Modifiers Qty    [id number]  OT MANUAL THERAPY EA 15 MIN 3/2/2017 ZULMA Jameson GO 4                      ZULMA Jameson  3/2/2017

## 2017-03-03 ENCOUNTER — HOSPITAL ENCOUNTER (OUTPATIENT)
Dept: OCCUPATIONAL THERAPY | Facility: HOSPITAL | Age: 47
Setting detail: THERAPIES SERIES
Discharge: HOME | End: 2017-03-03

## 2017-03-03 DIAGNOSIS — I89.0 LYMPHEDEMA OF BOTH LOWER EXTREMITIES: Primary | ICD-10-CM

## 2017-03-03 PROCEDURE — 97535 SELF CARE MNGMENT TRAINING: CPT

## 2017-03-03 PROCEDURE — 97140 MANUAL THERAPY 1/> REGIONS: CPT

## 2017-03-03 NOTE — PROGRESS NOTES
Outpatient Occupational Therapy Lymphedema Treatment Note  [facility]     Patient Name: Kameron Melendez  : 1970  MRN: [id number]  Today's Date: 3/3/2017      Visit Date: 2017    There is no problem list on file for this patient.       No past medical history on file.     No past surgical history on file.      Visit Dx:      ICD-10-CM ICD-9-CM   1. Lymphedema of both lower extremities I89.0 457.1             Lymphedema       17 1400 17 1600       Subjective Comments    Subjective Comments Bandages are rubbing on medial popliteal crease  -RE had some issues with bandages slipping and some itching.  Took bandages off  at  11pm.  -RE     Subjective Pain    Able to rate subjective pain? yes  -RE yes  -RE     Pre-Treatment Pain Level 0  -RE 0  -RE     Post-Treatment Pain Level 0  -RE 0  -RE     Skin Changes/Observations    Location/Assessment  Lower Extremity  -RE     Lower Extremity Conditions  left:;weeping;bilateral:;inflamed;other (comment)   scratches on both legs L>R.  -RE     Lymphedema Measurements    Measurement Type(s)  Circumferential  -RE     Circumferential Areas  Lower extremities  -RE     LLE Circumferential (cm)    Measurement Location 1  10cm above knee  -RE     Left 1  85 cm  -RE     Measurement Location 2  Knee  -RE     Left 2  59 cm  -RE     Measurement Location 3  10cm below knee  -RE     Left 3  66 cm  -RE     Measurement Location 4  20cm below knee  -RE     Left 4  62.5 cm  -RE     Measurement Location 5  30cm below knee  -RE     Left 5  53 cm  -RE     Measurement Location 6  Ankle  -RE     Left 6  33.5 cm  -RE     Measurement Location 7  Midfoot  -RE     Left 7  27 cm  -RE     Measurement Location 8  Total  -RE     Left 8  386 cm  -RE     Measurement Location 9  Decrease since IE  -RE     Left 9  49 cm  -RE     RLE Circumferential (cm)    Measurement Location 1  10cm above knee  -RE     Right 1  82 cm  -RE     Measurement Location 2  Knee  -RE     Right 2  63.5 cm  -RE      Measurement Location 3  10cm below knee  -RE     Right 3  75.5 cm  -RE     Measurement Location 4  20cm below knee  -RE     Right 4  69.5 cm  -RE     Measurement Location 5  30cm below knee  -RE     Right 5  58 cm  -RE     Measurement Location 6  Ankle  -RE     Right 6  42 cm  -RE     Measurement Location 7  Midfoot  -RE     Right 7  27.8 cm  -RE     Measurement Location 8  Total  -RE     Right 8  418.3 cm  -RE     Measurement Location 9  Decrease since IE  -RE     Right 9  25.2 cm  -RE     Manual Lymphatic Drainage    Manual Lymphatic Drainage initial sequence;opened regional lymph nodes;extremity treatment  -RE initial sequence;opened regional lymph nodes;extremity treatment  -RE     Initial Sequence short neck  -RE short neck  -RE     Opened Regional Lymph Nodes inguinal  -RE inguinal  -RE     Inguinal left  -RE left  -RE     Extremity Treatment MLD to proximal limb only  -RE MLD to proximal limb only   left  -RE     MLD to Proximal Limb Only Addressing firm edema in medial thigh area.  -RE      Compression/Skin Care    Compression/Skin Care skin care;wrapping location;bandaging  -RE skin care;wrapping location;bandaging  -RE     Skin Care moisturizing lotion applied;topical anti-microbial applied   Zinc oxide for skin protection  -RE moisturizing lotion applied;topical anti-microbial applied   Zinc oxide for skin protection  -RE     Wrapping Location lower extremity  -RE lower extremity  -RE     Wrapping Location LE bilateral:;foot to knee  -RE bilateral:;foot to knee  -RE     Bandage Layers cotton liner;padding/fluff layer;short-stretch bandages (comment size/quantity)  -RE cotton liner;padding/fluff layer;short-stretch bandages (comment size/quantity)  -RE     Bandaging Technique circumferential/spiral;strong compression  -RE circumferential/spiral;strong compression  -RE     Compression/Skin Care Comments Wound dressed with ABD pad and Kerlix.  -RE Wound dressed with ABD pad and Kerlix.  -RE     Bandaging  "Comments 6\" silver stockingette, 1-10and 1-15 cm Artiflex, 1-8cm, 2-10cm, 3-12 cm Rosidal K (4 -12 cm on right)  -RE K1 stockingette, 1-10and 1-15 cm Artiflex, 1-8cm, 2-10cm, 3-12 cm Rosidal K (4 -12 cm on right)   Did not bring a clean silver stockingette.  -RE       User Key  (r) = Recorded By, (t) = Taken By, (c) = Cosigned By    Initials Name Provider Type    ZULMA Elizabeth Occupational Therapist                        OT Assessment/Plan       03/03/17 1504 03/02/17 1647    OT Assessment    Assessment Comments Noted some redness on the medial popliteal crease area. Bandages are rubbing.  Tried to fix the problem with extra padding. Noted some softening inthe left thigh with MLD indicating a decrease in edema.  -RE Measurements taken and goals updated.Progressing well despite some problems with bandaging at home.  Patient did sctratch his legs to the point of bleeding.      -RE    OT Plan    OT Plan Comments Continue  -RE Continue  -RE      User Key  (r) = Recorded By, (t) = Taken By, (c) = Cosigned By    Initials Name Provider Type    ZULMA Elizabeth Occupational Therapist                            OT Goals       03/02/17 1600       OT Short Term Goals    STG Date to Achieve 03/16/17  -RE     STG 1 Patient independent and compliant with self-wrapping techniques of compression bandages with assistance of caregiver as needed for improved self-management of condition.  -RE     STG 1 Progress Partially Met;Ongoing  -RE     STG 1 Progress Comments Wife is bandaging but is still having a few problems with technique.  -RE     STG 2 Patient will demonstrate proper awareness of condition and precautions for improved prevention, management, care of symptoms.  -RE     STG 2 Progress Met  -RE     STG 3 Patient will demonstrate decreased net edema of bilateral lower extremities >/= 26-60cm  for decrease in edema, symptoms, decreased risk of infection and improved skin care/transition to self-care of condition.   " -RE     STG 3 Progress Met;Goal Revised  -RE     Long Term Goals    LTG Date to Achieve 04/02/17  -RE     LTG 1 Patient will demonstrate decreased net edema of bilateral lower extremities >/= 61-80cm  for decrease in edema, symptoms, decreased risk of infection and improved skin care/transition to self-care of condition.   -RE     LTG 1 Progress Partially Met;Goal Revised  -RE     LTG 2 Patient independent and compliant with use and care of compression with assistance of a caregiver as needed to promote self-care independence.   -RE     LTG 2 Progress Ongoing  -RE     LTG 3 Patient will demonstrate healing of R LE wound.  -RE     LTG 3 Progress Not Met;Ongoing  -RE     Time Calculation    OT Goal Re-Cert Due Date 03/14/17  -RE       User Key  (r) = Recorded By, (t) = Taken By, (c) = Cosigned By    Initials Name Provider Type    ZULMA Elizabeth Occupational Therapist                Therapy Education       03/03/17 1506 03/02/17 1649       Therapy Education    Given Bandaging/dressing change   Reviewed bandaging withpatient.  Addresssed  knee area padding and spacing of bandages.  -RE Bandaging/dressing change   Reviewed bandaging and gave patient tips that will help wife with bandaging at home.  -RE     Program Reinforced  -RE Reinforced  -RE     How Provided Verbal;Demonstration  -RE Verbal;Demonstration  -RE     Provided to Patient  -RE Patient  -RE     Level of Understanding Teach back education performed;Verbalized  -RE Teach back education performed;Verbalized  -RE       User Key  (r) = Recorded By, (t) = Taken By, (c) = Cosigned By    Initials Name Provider Type    ZULMA Elizabeth Occupational Therapist                  Time Calculation:   OT Start Time: 0845  OT Stop Time: 1000  OT Time Calculation (min): 75 min       Therapy Charges for Today     Code Description Service Date Service Provider Modifiers Qty    64437310042  OT SELF CARE/MGMT/TRAIN EA 15 MIN 3/3/2017 ZULMA Jameson GO 1     [id number]  OT MANUAL THERAPY EA 15 MIN 3/3/2017 ZULMA Jameson GO 4                      ZULMA Jameson  3/3/2017

## 2017-03-07 ENCOUNTER — HOSPITAL ENCOUNTER (OUTPATIENT)
Dept: OCCUPATIONAL THERAPY | Facility: HOSPITAL | Age: 47
Setting detail: THERAPIES SERIES
Discharge: HOME | End: 2017-03-07

## 2017-03-07 DIAGNOSIS — I89.0 LYMPHEDEMA OF BOTH LOWER EXTREMITIES: Primary | ICD-10-CM

## 2017-03-07 PROCEDURE — 97140 MANUAL THERAPY 1/> REGIONS: CPT

## 2017-03-07 NOTE — PROGRESS NOTES
Outpatient Occupational Therapy Lymphedema Treatment Note  [facility]     Patient Name: Kameron Melendez  : 1970  MRN: [id number]  Today's Date: 3/7/2017      Visit Date: 2017    There is no problem list on file for this patient.       No past medical history on file.     No past surgical history on file.      Visit Dx:      ICD-10-CM ICD-9-CM   1. Lymphedema of both lower extremities I89.0 457.1             Lymphedema       17 1000          Subjective Comments    Subjective Comments No problems with bandaging. some itching around wound area.  -RE      Subjective Pain    Able to rate subjective pain? yes  -RE      Pre-Treatment Pain Level 0  -RE      Post-Treatment Pain Level 0  -RE      Manual Lymphatic Drainage    Manual Lymphatic Drainage initial sequence;opened regional lymph nodes;extremity treatment  -RE      Initial Sequence short neck  -RE      Opened Regional Lymph Nodes inguinal  -RE      Inguinal right  -RE      Extremity Treatment MLD to full limb  -RE      MLD to Full Limb right  -RE      Compression/Skin Care    Compression/Skin Care skin care;wrapping location;bandaging  -RE      Skin Care moisturizing lotion applied;topical anti-microbial applied  -RE      Wrapping Location lower extremity  -RE      Wrapping Location LE bilateral:;foot to knee  -RE      Wrapping Comments No silver stockingette. Patient is washing it.  -RE      Bandage Layers cotton liner;padding/fluff layer;short-stretch bandages (comment size/quantity)  -RE      Bandaging Technique circumferential/spiral;strong compression  -RE      Compression/Skin Care Comments Wound dressed with ABD pad and Kerlix.  -RE      Bandaging Comments 1 stockingette, 1-10and 1-15 cm Artiflex, 1-8cm, 2-10cm, 3-12 cm Rosidal K (4 -12 cm on right)  -RE        User Key  (r) = Recorded By, (t) = Taken By, (c) = Cosigned By    Initials Name Provider Type    ZULMA Elizabeth Occupational Therapist                        OT  Assessment/Plan       03/07/17 1039       OT Assessment    Assessment Comments Redness continues toimprove.  Wound is closing but very slowly.  R ankle is softer for the first time indicating a decrease in edema.  -RE     OT Plan    OT Plan Comments Continue  -RE       User Key  (r) = Recorded By, (t) = Taken By, (c) = Cosigned By    Initials Name Provider Type    ZULMA Eliazbeth Occupational Therapist                                Therapy Education       03/07/17 1040          Therapy Education    Given Bandaging/dressing change  -RE      Program Reinforced  -RE      How Provided Verbal  -RE      Provided to Patient  -RE      Level of Understanding Teach back education performed;Verbalized  -RE        User Key  (r) = Recorded By, (t) = Taken By, (c) = Cosigned By    Initials Name Provider Type    ZULMA Elizabeth Occupational Therapist                  Time Calculation:   OT Start Time: 0845  OT Stop Time: 0945  OT Time Calculation (min): 60 min       Therapy Charges for Today     Code Description Service Date Service Provider Modifiers Qty    46250140635 HC OT MANUAL THERAPY EA 15 MIN 3/7/2017 ZULMA Jameson GO 4                      ZULMA Jameson  3/7/2017

## 2017-03-09 ENCOUNTER — HOSPITAL ENCOUNTER (OUTPATIENT)
Dept: OCCUPATIONAL THERAPY | Facility: HOSPITAL | Age: 47
Setting detail: THERAPIES SERIES
Discharge: HOME | End: 2017-03-09

## 2017-03-09 DIAGNOSIS — I89.0 LYMPHEDEMA OF BOTH LOWER EXTREMITIES: Primary | ICD-10-CM

## 2017-03-09 PROCEDURE — 97140 MANUAL THERAPY 1/> REGIONS: CPT

## 2017-03-09 NOTE — PROGRESS NOTES
Outpatient Occupational Therapy Lymphedema Treatment Note  [facility]     Patient Name: Kameron Melendez  : 1970  MRN: [id number]  Today's Date: 3/9/2017      Visit Date: 2017    There is no problem list on file for this patient.       No past medical history on file.     No past surgical history on file.      Visit Dx:      ICD-10-CM ICD-9-CM   1. Lymphedema of both lower extremities I89.0 457.1             Lymphedema       17 1500          Subjective Comments    Subjective Comments No new complaints.  Did fall asleep with R leg off of the edge of the couch. He notes more swelling today.  -RE      Subjective Pain    Able to rate subjective pain? yes  -RE      Pre-Treatment Pain Level 0  -RE      Post-Treatment Pain Level 0  -RE      Lymphedema Measurements    Measurement Type(s) Circumferential  -RE      Circumferential Areas Lower extremities  -RE      LLE Circumferential (cm)    Measurement Location 1 10cm above knee  -RE      Left 1 85 cm  -RE      Measurement Location 2 Knee  -RE      Left 2 61 cm  -RE      Measurement Location 3 10cm below knee  -RE      Left 3 64 cm  -RE      Measurement Location 4 20cm below knee  -RE      Left 4 60.5 cm  -RE      Measurement Location 5 30cm below knee  -RE      Left 5 52.5 cm  -RE      Measurement Location 6 Ankle  -RE      Left 6 32.5 cm  -RE      Measurement Location 7 Midfoot  -RE      Left 7 25.5 cm  -RE      Measurement Location 8 Total  -RE      Left 8 381 cm  -RE      Measurement Location 9 Decrease since IE  -RE      Left 9 54 cm  -RE      RLE Circumferential (cm)    Measurement Location 1 10cm above knee  -RE      Right 1 83.5 cm  -RE      Measurement Location 2 Knee  -RE      Right 2 64.5 cm  -RE      Measurement Location 3 10cm below knee  -RE      Right 3 72.5 cm  -RE      Measurement Location 4 20cm below knee  -RE      Right 4 68 cm  -RE      Measurement Location 5 30cm below knee  -RE      Right 5 55 cm  -RE      Measurement Location 6  "Ankle  -RE      Right 6 38.8 cm  -RE      Measurement Location 7 Midfoot  -RE      Right 7 27.3 cm  -RE      Measurement Location 8 Total  -RE      Right 8 409.6 cm  -RE      Measurement Location 9 Decrease since IE  -RE      Right 9 33.9 cm  -RE      Manual Lymphatic Drainage    Manual Lymphatic Drainage initial sequence;opened regional lymph nodes;extremity treatment  -RE      Initial Sequence short neck  -RE      Opened Regional Lymph Nodes inguinal  -RE      Inguinal left  -RE      Extremity Treatment MLD to proximal limb only  -RE      Compression/Skin Care    Compression/Skin Care skin care;wrapping location;bandaging  -RE      Skin Care moisturizing lotion applied;topical anti-microbial applied  -RE      Wrapping Location lower extremity  -RE      Wrapping Location LE bilateral:;foot to knee  -RE      Bandage Layers cotton liner;padding/fluff layer;short-stretch bandages (comment size/quantity)  -RE      Bandaging Technique circumferential/spiral;strong compression  -RE      Compression/Skin Care Comments Wound dressed with ABD pad and Kerlix.  -RE      Bandaging Comments 6\" silver stockingette, 1-10and 1-15 cm Artiflex, 1-8cm, 2-10cm, 3-12 cm Rosidal K (4 -12 cm on right)  -RE        User Key  (r) = Recorded By, (t) = Taken By, (c) = Cosigned By    Initials Name Provider Type    ZULMA Elizabeth Occupational Therapist                        OT Assessment/Plan       03/09/17 1600       OT Assessment    Assessment Comments Further decrease in edema in both legs.  Wound on right is draining less each day and is smaller in circumference.  Progressing well.    -RE     OT Plan    OT Plan Comments Continue  -RE       User Key  (r) = Recorded By, (t) = Taken By, (c) = Cosigned By    Initials Name Provider Type    ZULMA Elizabeth Occupational Therapist                                Therapy Education       03/09/17 1602          Therapy Education    Given Other (comment)   progress and wound healing  -RE   "    Program New  -RE      How Provided Verbal  -RE      Provided to Patient  -RE      Level of Understanding Teach back education performed;Verbalized  -RE        User Key  (r) = Recorded By, (t) = Taken By, (c) = Cosigned By    Initials Name Provider Type    RE ZULMA Jameson Occupational Therapist                  Time Calculation:   OT Start Time: 0845  OT Stop Time: 0945  OT Time Calculation (min): 60 min       Therapy Charges for Today     Code Description Service Date Service Provider Modifiers Qty    [id number]  OT MANUAL THERAPY EA 15 MIN 3/9/2017 ZULMA Jameson GO 4                      ZULMA Jameson  3/9/2017

## 2017-03-10 ENCOUNTER — HOSPITAL ENCOUNTER (OUTPATIENT)
Dept: OCCUPATIONAL THERAPY | Facility: HOSPITAL | Age: 47
Setting detail: THERAPIES SERIES
Discharge: HOME | End: 2017-03-10

## 2017-03-10 DIAGNOSIS — I89.0 LYMPHEDEMA OF BOTH LOWER EXTREMITIES: Primary | ICD-10-CM

## 2017-03-10 PROCEDURE — 97140 MANUAL THERAPY 1/> REGIONS: CPT

## 2017-03-10 NOTE — PROGRESS NOTES
"Outpatient Occupational Therapy Lymphedema Treatment Note  [facility]     Patient Name: Kameron Melendez  : 1970  MRN: [id number]  Today's Date: 3/10/2017      Visit Date: 03/10/2017    There is no problem list on file for this patient.       No past medical history on file.     No past surgical history on file.      Visit Dx:      ICD-10-CM ICD-9-CM   1. Lymphedema of both lower extremities I89.0 457.1             Lymphedema       03/10/17 1500          Subjective Comments    Subjective Comments Some itching around R ankle and r foot pain with weight bearing.  -RE      Subjective Pain    Able to rate subjective pain? yes  -RE      Pre-Treatment Pain Level 0  -RE      Post-Treatment Pain Level 0  -RE      Subjective Pain Comment Does have pain in weight bearing when wearing his shoes.  -RE      Manual Lymphatic Drainage    Manual Lymphatic Drainage initial sequence;opened regional lymph nodes;extremity treatment  -RE      Initial Sequence short neck  -RE      Opened Regional Lymph Nodes inguinal  -RE      Inguinal left  -RE      Extremity Treatment MLD to proximal limb only  -RE      MLD to Proximal Limb Only left  -RE      Compression/Skin Care    Compression/Skin Care skin care;wrapping location;bandaging  -RE      Skin Care moisturizing lotion applied;topical anti-microbial applied;topical anti-inflammatory applied   Zinc oxide  -RE      Wrapping Location lower extremity  -RE      Wrapping Location LE bilateral:;foot to knee  -RE      Bandage Layers cotton liner;padding/fluff layer;short-stretch bandages (comment size/quantity)  -RE      Bandaging Technique circumferential/spiral;strong compression  -RE      Compression/Skin Care Comments Wound dressed with 4X4 and Kerlix.  -RE      Bandaging Comments 6\" silver stockingette, 1-10and 1-15 cm Artiflex, 1-8cm, 2-10cm, 3-12 cm Rosidal K (4 -12 cm on right)  -RE        User Key  (r) = Recorded By, (t) = Taken By, (c) = Cosigned By    Initials Name Provider " Type    RE ZULMA Jameson Occupational Therapist                        OT Assessment/Plan       03/10/17 1525 03/09/17 1600    OT Assessment    Assessment Comments Patient is having some foot pain which is possibly due to wearing shoes over his bandages.  Gave velcro shoes today to try to decrease pressure on foot.  Left thigh does soften with MLD indicating a decrease in edema.  Patient and wife plan to bandage thigh this weekend when he can be less active and hopefully keep the bandage in place.    -RE Further decrease in edema in both legs.  Wound on right is draining less each day and is smaller in circumference.  Progressing well.    -RE    OT Plan    OT Plan Comments Continue  -RE Continue  -RE      User Key  (r) = Recorded By, (t) = Taken By, (c) = Cosigned By    Initials Name Provider Type    ZULMA Elizabeth Occupational Therapist                                Therapy Education       03/10/17 1532 03/09/17 1602       Therapy Education    Given HEP   Hip flexion?marching and ankle pumps or heel raises.  -RE Other (comment)   progress and wound healing  -RE     Program New  -RE New  -RE     How Provided Verbal;Demonstration  -RE Verbal  -RE     Provided to Patient  -RE Patient  -RE     Level of Understanding Teach back education performed;Verbalized  -RE Teach back education performed;Verbalized  -RE       User Key  (r) = Recorded By, (t) = Taken By, (c) = Cosigned By    Initials Name Provider Type    RE ZULMA Jameson Occupational Therapist                  Time Calculation:   OT Start Time: 0845  OT Stop Time: 0945  OT Time Calculation (min): 60 min       Therapy Charges for Today     Code Description Service Date Service Provider Modifiers Qty    [id number]  OT MANUAL THERAPY EA 15 MIN 3/10/2017 ZULMA Jameson GO 4                      ZULMA Jameson  3/10/2017

## 2017-03-14 ENCOUNTER — HOSPITAL ENCOUNTER (OUTPATIENT)
Dept: OCCUPATIONAL THERAPY | Facility: HOSPITAL | Age: 47
Setting detail: THERAPIES SERIES
Discharge: HOME | End: 2017-03-14

## 2017-03-14 DIAGNOSIS — I89.0 LYMPHEDEMA OF BOTH LOWER EXTREMITIES: Primary | ICD-10-CM

## 2017-03-14 PROCEDURE — 97140 MANUAL THERAPY 1/> REGIONS: CPT

## 2017-03-14 NOTE — PROGRESS NOTES
"Outpatient Occupational Therapy Lymphedema Progress Note  [facility]     Patient Name: Kameron Melendez  : 1970  MRN: [id number]  Today's Date: 3/14/2017      Visit Date: 2017    There is no problem list on file for this patient.       No past medical history on file.     No past surgical history on file.      Visit Dx:      ICD-10-CM ICD-9-CM   1. Lymphedema of both lower extremities I89.0 457.1             Lymphedema       17 1300          Subjective Comments    Subjective Comments Wound continue to itch.  He said the dressing was only damp today.    -RE      Subjective Pain    Able to rate subjective pain? yes  -RE      Pre-Treatment Pain Level 0  -RE      Post-Treatment Pain Level 0  -RE      Manual Lymphatic Drainage    Manual Lymphatic Drainage initial sequence;opened regional lymph nodes;extremity treatment  -RE      Initial Sequence short neck  -RE      Opened Regional Lymph Nodes inguinal  -RE      Inguinal left  -RE      Extremity Treatment MLD to proximal limb only  -RE      MLD to Proximal Limb Only left  -RE      Compression/Skin Care    Compression/Skin Care skin care;wrapping location;bandaging  -RE      Skin Care moisturizing lotion applied;topical anti-microbial applied;topical anti-inflammatory applied   zinc oxide  -RE      Wrapping Location lower extremity  -RE      Wrapping Location LE bilateral:;foot to knee  -RE      Bandage Layers cotton liner;padding/fluff layer;short-stretch bandages (comment size/quantity)  -RE      Bandaging Technique circumferential/spiral;strong compression  -RE      Compression/Skin Care Comments Wound dressed with 4X4 and Kerlix.  -RE      Bandaging Comments 6\" silver stockingette, 1-10and 1-15 cm Artiflex, 1-8cm, 2-10cm, 3-12 cm Rosidal K (4 -12 cm on right).  Added 2-double length 12 cm Rosidal K bandages from L knee to mid-thigh.    -RE        User Key  (r) = Recorded By, (t) = Taken By, (c) = Cosigned By    Initials Name Provider Type    RE " ZULMA Jameson Occupational Therapist                        OT Assessment/Plan       03/14/17 1344       OT Assessment    Functional Limitations Impaired gait;Performance in self-care ADL  -RE     Impairments Edema;Impaired venous circulation;Integumentary integrity;Impaired lymphatic circulation  -RE     Assessment Comments Wound is healing but slowly.  Edema has decreased significantly but degree of decrease is slowing.  Started to bandage L thigh today.  The right leg is not decreasing as much or as quickily as expected.  I would recommend about 2 more weeks of treatment and then reassess.  Patient will not be able to be measured for or wear stockings until his wound heals.  The degree of edema in the right leg is affecting wound healing along with other factors of body habitus and lifestyle.    -RE     OT Diagnosis bilateral LE lymphedema  -RE     OT Rehab Potential Good  -RE     Patient/caregiver participated in establishment of treatment plan and goals Yes  -RE     Patient would benefit from skilled therapy intervention Yes  -RE     OT Plan    OT Frequency 3x/week  -RE     Predicted Duration of Therapy Intervention (days/wks) 2 weeks  -RE     Planned CPT's? OT THER ACT EA 15 MIN: [id number];OT THER PROC EA 15 MIN: [id number];OT SELF CARE/MGMT/TRAIN 15 MIN: 27019;OT MANUAL THERAPY EA 15 MIN: 85507  -RE     Planned Therapy Interventions (Optional Details) home exercise program;manual therapy techniques;patient/family education;wound care;other (see comments)   skin care and compression bandaging  -RE     OT Plan Comments continue  -RE       User Key  (r) = Recorded By, (t) = Taken By, (c) = Cosigned By    Initials Name Provider Type    RE Marjorie Flaherty OTR Occupational Therapist                            OT Goals       03/14/17 1300       OT Short Term Goals    STG Date to Achieve 03/28/17  -RE     STG 1 Patient independent and compliant with self-wrapping techniques of compression bandages with assistance of  caregiver as needed for improved self-management of condition.  -RE     STG 1 Progress Met  -RE     STG 1 Progress Comments Wife bandages each weekend.  -RE     STG 2 Patient will demonstrate proper awareness of condition and precautions for improved prevention, management, care of symptoms.  -RE     STG 2 Progress Met  -RE     STG 3 Patient will demonstrate decreased net edema of bilateral lower extremities >/= 26-60cm  for decrease in edema, symptoms, decreased risk of infection and improved skin care/transition to self-care of condition.   -RE     STG 3 Progress Met  -RE     Long Term Goals    LTG Date to Achieve 04/14/17  -RE     LTG 1 Patient will demonstrate decreased net edema of bilateral lower extremities >/= 61-80cm  for decrease in edema, symptoms, decreased risk of infection and improved skin care/transition to self-care of condition.   -RE     LTG 1 Progress Partially Met;Ongoing  -RE     LTG 2 Patient independent and compliant with use and care of compression with assistance of a caregiver as needed to promote self-care independence.   -RE     LTG 2 Progress Ongoing  -RE     LTG 3 Patient will demonstrate healing of R LE wound.  -RE     LTG 3 Progress Not Met;Ongoing  -RE     Time Calculation    OT Goal Re-Cert Due Date 04/14/17  -RE       User Key  (r) = Recorded By, (t) = Taken By, (c) = Cosigned By    Initials Name Provider Type    ZULMA Elizabeth Occupational Therapist                Therapy Education       03/14/17 1432          Therapy Education    Given Symptoms/condition management;Edema management;Bandaging/dressing change   thigh bandage instructions and method  -RE      Program Reinforced  -RE      How Provided Verbal;Demonstration  -RE      Provided to Patient  -RE      Level of Understanding Teach back education performed;Verbalized  -RE        User Key  (r) = Recorded By, (t) = Taken By, (c) = Cosigned By    Initials Name Provider Type    ZULMA Elizabeth Occupational Therapist                   Time Calculation:   OT Start Time: 0845  OT Stop Time: 0945  OT Time Calculation (min): 60 min       Therapy Charges for Today     Code Description Service Date Service Provider Modifiers Qty    [id number]  OT MANUAL THERAPY EA 15 MIN 3/14/2017 ZULMA Jameson GO 4                      ZULMA Jameson  3/14/2017

## 2017-03-16 ENCOUNTER — HOSPITAL ENCOUNTER (OUTPATIENT)
Dept: OCCUPATIONAL THERAPY | Facility: HOSPITAL | Age: 47
Setting detail: THERAPIES SERIES
Discharge: HOME | End: 2017-03-16

## 2017-03-16 DIAGNOSIS — I89.0 LYMPHEDEMA OF BOTH LOWER EXTREMITIES: Primary | ICD-10-CM

## 2017-03-16 PROCEDURE — 97140 MANUAL THERAPY 1/> REGIONS: CPT

## 2017-03-16 NOTE — PROGRESS NOTES
Outpatient Occupational Therapy Lymphedema Treatment Note  [facility]     Patient Name: Kameron Melendez  : 1970  MRN: [id number]  Today's Date: 3/16/2017      Visit Date: 2017    There is no problem list on file for this patient.       No past medical history on file.     No past surgical history on file.      Visit Dx:      ICD-10-CM ICD-9-CM   1. Lymphedema of both lower extremities I89.0 457.1             Lymphedema       17 1400          Subjective Comments    Subjective Comments No new complaints. Has fot pain intermittently  -RE      Subjective Pain    Able to rate subjective pain? yes  -RE      Pre-Treatment Pain Level 0  -RE      Post-Treatment Pain Level 0  -RE      Skin Changes/Observations    Location/Assessment Lower Extremity  -RE      Lower Extremity Conditions inflamed;right:   red on posterior aspect.  Wound but not draining.  -RE      Lower Extremity Color/Pigment right:;red;hyperpigmented  -RE      Lymphedema Measurements    Measurement Type(s) Circumferential  -RE      Circumferential Areas Lower extremities  -RE      LLE Circumferential (cm)    Measurement Location 1 10cm above knee  -RE      Left 1 84 cm  -RE      Measurement Location 2 Knee  -RE      Left 2 57 cm  -RE      Measurement Location 3 10cm below knee  -RE      Left 3 63 cm  -RE      Measurement Location 4 20cm below knee  -RE      Left 4 60.5 cm  -RE      Measurement Location 5 30cm below knee  -RE      Left 5 48.5 cm  -RE      Measurement Location 6 Ankle  -RE      Left 6 32 cm  -RE      Measurement Location 7 Midfoot  -RE      Left 7 27 cm  -RE      Measurement Location 8 Total  -RE      Left 8 372 cm  -RE      Measurement Location 9 Decrease since IE  -RE      Left 9 63 cm  -RE      RLE Circumferential (cm)    Right 1 83 cm  -RE      Right 2 61 cm  -RE      Right 3 71 cm  -RE      Right 4 64.6 cm  -RE      Right 5 52.5 cm  -RE      Right 6 35.5 cm  -RE      Right 7 28 cm  -RE      Right 8 395.6 cm  -RE       "Right 9 47.9 cm  -RE      Manual Lymphatic Drainage    Manual Lymphatic Drainage initial sequence;opened regional lymph nodes;extremity treatment  -RE      Initial Sequence short neck  -RE      Opened Regional Lymph Nodes inguinal  -RE      Inguinal right  -RE      Extremity Treatment MLD to full limb  -RE      MLD to Proximal Limb Only right  -RE      Compression/Skin Care    Compression/Skin Care skin care;wrapping location;bandaging  -RE      Skin Care moisturizing lotion applied;topical anti-inflammatory applied   zinc oxide  -RE      Wrapping Location lower extremity  -RE      Wrapping Location LE bilateral:;foot to knee  -RE      Bandage Layers cotton liner;padding/fluff layer;short-stretch bandages (comment size/quantity)  -RE      Bandaging Technique circumferential/spiral;strong compression  -RE      Compression/Skin Care Comments wound dressed with 4X4 and Kerlix  -RE      Bandaging Comments 6\" silver stockingette, 1-10and 1-15 cm Artiflex, 1-8cm, 2-10cm, 3-12 cm Rosidal K (4 -12 cm on right).  Added 2-double length 12 cm Rosidal K bandages from L knee to mid-thigh.    -RE        User Key  (r) = Recorded By, (t) = Taken By, (c) = Cosigned By    Initials Name Provider Type    ZULMA Elizabeth Occupational Therapist                        OT Assessment/Plan       03/16/17 1500       OT Assessment    Assessment Comments Good progress thisweek.  Edema decreased 14cm in the R leg and 9 cm in the left leg since last week.  Wound is still present but not draining.   -RE     OT Plan    OT Plan Comments Continue  -RE       User Key  (r) = Recorded By, (t) = Taken By, (c) = Cosigned By    Initials Name Provider Type    ZULMA Elizabeth Occupational Therapist                                Therapy Education       03/16/17 1504          Therapy Education    Given Bandaging/dressing change  -RE      Program Reinforced  -RE      How Provided Verbal;Demonstration  -RE      Provided to Patient  -RE      Level of " Understanding Teach back education performed;Verbalized  -RE        User Key  (r) = Recorded By, (t) = Taken By, (c) = Cosigned By    Initials Name Provider Type    RE ZULMA Jameson Occupational Therapist                  Time Calculation:   OT Start Time: 0845  OT Stop Time: 0945  OT Time Calculation (min): 60 min       Therapy Charges for Today     Code Description Service Date Service Provider Modifiers Qty    [id number]  OT MANUAL THERAPY EA 15 MIN 3/16/2017 ZULMA Jameson GO 4                      ZULMA Jameson  3/16/2017

## 2017-03-17 ENCOUNTER — HOSPITAL ENCOUNTER (OUTPATIENT)
Dept: OCCUPATIONAL THERAPY | Facility: HOSPITAL | Age: 47
Setting detail: THERAPIES SERIES
Discharge: HOME | End: 2017-03-17

## 2017-03-17 DIAGNOSIS — I89.0 LYMPHEDEMA OF BOTH LOWER EXTREMITIES: Primary | ICD-10-CM

## 2017-03-17 PROCEDURE — 97140 MANUAL THERAPY 1/> REGIONS: CPT

## 2017-03-17 NOTE — PROGRESS NOTES
"Outpatient Occupational Therapy Lymphedema Treatment Note  [facility]     Patient Name: Kameron Melendez  : 1970  MRN: [id number]  Today's Date: 3/17/2017      Visit Date: 2017    There is no problem list on file for this patient.       No past medical history on file.     No past surgical history on file.      Visit Dx:      ICD-10-CM ICD-9-CM   1. Lymphedema of both lower extremities I89.0 457.1             Lymphedema       17 1400          Subjective Comments    Subjective Comments No new complaints.  -RE      Subjective Pain    Able to rate subjective pain? yes  -RE      Pre-Treatment Pain Level 0  -RE      Post-Treatment Pain Level 0  -RE      Subjective Pain Comment 5/10 in standing (r foot)  -RE      Manual Lymphatic Drainage    Manual Lymphatic Drainage initial sequence;opened regional lymph nodes;extremity treatment  -RE      Initial Sequence short neck  -RE      Opened Regional Lymph Nodes inguinal  -RE      Inguinal right  -RE      Extremity Treatment extremity treatment focus on   wound area on calf  -RE      MLD to Proximal Limb Only right  -RE      Compression/Skin Care    Compression/Skin Care skin care;wrapping location;bandaging  -RE      Skin Care moisturizing lotion applied;topical anti-inflammatory applied   zinc oxide  -RE      Wrapping Location lower extremity  -RE      Wrapping Location LE bilateral:;foot to knee  -RE      Bandage Layers cotton liner;padding/fluff layer;short-stretch bandages (comment size/quantity)  -RE      Bandaging Technique circumferential/spiral;strong compression  -RE      Bandaging Comments 6\" silver stockingette, 1-10and 1-15 cm Artiflex, 1-8cm, 2-10cm, 3-12 cm Rosidal K (4 -12 cm on right).  Added 2-double length 12 cm Rosidal K bandages from L knee to mid-thigh.    -RE        User Key  (r) = Recorded By, (t) = Taken By, (c) = Cosigned By    Initials Name Provider Type    RE ZULMA Jameson Occupational Therapist                        OT " Assessment/Plan       03/17/17 1417 03/16/17 1500    OT Assessment    Assessment Comments Wound is almost healed. Edema in left thigh responded well to the bandge and is much softer today.    -RE Good progress thisweek.  Edema decreased 14cm in the R leg and 9 cm in the left leg since last week.  Wound is still present but not draining.   -RE    OT Plan    OT Plan Comments Continue  -RE Continue  -RE      User Key  (r) = Recorded By, (t) = Taken By, (c) = Cosigned By    Initials Name Provider Type    RE ZULMA Jameson Occupational Therapist                                Therapy Education       03/17/17 1418 03/16/17 1504       Therapy Education    Given Bandaging/dressing change  -RE Bandaging/dressing change  -RE     Program Reinforced  -RE Reinforced  -RE     How Provided Verbal;Demonstration  -RE Verbal;Demonstration  -RE     Provided to Patient  -RE Patient  -RE     Level of Understanding Teach back education performed;Verbalized  -RE Teach back education performed;Verbalized  -RE       User Key  (r) = Recorded By, (t) = Taken By, (c) = Cosigned By    Initials Name Provider Type    RE ZULMA Jameson Occupational Therapist                  Time Calculation:   OT Start Time: 0845  OT Stop Time: 0945  OT Time Calculation (min): 60 min       Therapy Charges for Today     Code Description Service Date Service Provider Modifiers Qty    [id number]  OT MANUAL THERAPY EA 15 MIN 3/17/2017 ZULMA Jameson GO 4                      ZULMA Jameson  3/17/2017

## 2017-03-21 ENCOUNTER — HOSPITAL ENCOUNTER (OUTPATIENT)
Dept: OCCUPATIONAL THERAPY | Facility: HOSPITAL | Age: 47
Setting detail: THERAPIES SERIES
Discharge: HOME | End: 2017-03-21

## 2017-03-21 DIAGNOSIS — I89.0 LYMPHEDEMA OF BOTH LOWER EXTREMITIES: Primary | ICD-10-CM

## 2017-03-21 PROCEDURE — 97140 MANUAL THERAPY 1/> REGIONS: CPT

## 2017-03-21 NOTE — PROGRESS NOTES
"Outpatient Occupational Therapy Lymphedema Treatment Note  [facility]     Patient Name: Kameron Melendez  : 1970  MRN: [id number]  Today's Date: 3/21/2017      Visit Date: 2017    There is no problem list on file for this patient.       No past medical history on file.     No past surgical history on file.      Visit Dx:      ICD-10-CM ICD-9-CM   1. Lymphedema of both lower extremities I89.0 457.1             Lymphedema       17 1600          Subjective Comments    Subjective Comments Did not bandage thigh this weekend.  -RE      Subjective Pain    Able to rate subjective pain? yes  -RE      Pre-Treatment Pain Level 0  -RE      Post-Treatment Pain Level 0  -RE      Manual Lymphatic Drainage    Manual Lymphatic Drainage initial sequence;opened regional lymph nodes;extremity treatment  -RE      Initial Sequence short neck  -RE      Opened Regional Lymph Nodes inguinal  -RE      Inguinal right  -RE      Extremity Treatment extremity treatment focus on   wound area on calf  -RE      MLD to Proximal Limb Only right  -RE      Compression/Skin Care    Compression/Skin Care skin care;wrapping location;bandaging  -RE      Skin Care moisturizing lotion applied;topical anti-microbial applied  -RE      Wrapping Location lower extremity  -RE      Wrapping Location LE bilateral:;foot to knee  -RE      Bandage Layers cotton liner;padding/fluff layer;short-stretch bandages (comment size/quantity)  -RE      Bandaging Technique circumferential/spiral;strong compression  -RE      Compression/Skin Care Comments Dressed wound with 4X4.  -RE      Bandaging Comments 6\" silver stockingette, 1-10and 1-15 cm Artiflex, 1-8cm, 2-10cm, 3-12 cm Rosidal K (4 -12 cm on right).  Added 2-double length 12 cm Rosidal K bandages from L knee to mid-thigh.  Added Rosidal Soft to R lower leg instead of Artifles to try to futher decrease edema.   -RE        User Key  (r) = Recorded By, (t) = Taken By, (c) = Cosigned By    Initials Name " Provider Type    RE ZULMA Jameson Occupational Therapist                        OT Assessment/Plan       03/21/17 1655       OT Assessment    Assessment Comments Wound continues to heal but is still present.  Edema on the R posterior calf arrea is very firm and softens very minimally.  R thigh is more firm today that is was last week.  -RE     OT Plan    OT Plan Comments Continue  -RE       User Key  (r) = Recorded By, (t) = Taken By, (c) = Cosigned By    Initials Name Provider Type    ZULMA Elizabeth Occupational Therapist                                Therapy Education       03/21/17 6577          Therapy Education    Given Edema management;Bandaging/dressing change  -RE      Program Reinforced  -RE      How Provided Verbal  -RE      Provided to Patient  -RE      Level of Understanding Teach back education performed;Verbalized  -RE        User Key  (r) = Recorded By, (t) = Taken By, (c) = Cosigned By    Initials Name Provider Type    ZULMA Elizabeth Occupational Therapist                  Time Calculation:   OT Start Time: 0845  OT Stop Time: 0945  OT Time Calculation (min): 60 min       Therapy Charges for Today     Code Description Service Date Service Provider Modifiers Qty    [id number]  OT MANUAL THERAPY EA 15 MIN 3/21/2017 ZULMA Jameson GO 4                      ZULMA Jameson  3/21/2017

## 2017-03-23 ENCOUNTER — HOSPITAL ENCOUNTER (OUTPATIENT)
Dept: OCCUPATIONAL THERAPY | Facility: HOSPITAL | Age: 47
Setting detail: THERAPIES SERIES
Discharge: HOME | End: 2017-03-23

## 2017-03-23 DIAGNOSIS — I89.0 LYMPHEDEMA OF BOTH LOWER EXTREMITIES: Primary | ICD-10-CM

## 2017-03-23 PROCEDURE — 97140 MANUAL THERAPY 1/> REGIONS: CPT

## 2017-03-23 NOTE — PROGRESS NOTES
Outpatient Occupational Therapy Lymphedema Treatment Note  [facility]     Patient Name: Kameron Melendez  : 1970  MRN: [id number]  Today's Date: 3/23/2017      Visit Date: 2017    There is no problem list on file for this patient.       No past medical history on file.     No past surgical history on file.      Visit Dx:      ICD-10-CM ICD-9-CM   1. Lymphedema of both lower extremities I89.0 457.1             Lymphedema       17 1800          Subjective Comments    Subjective Comments No new complaints.  -RE      Subjective Pain    Able to rate subjective pain? yes  -RE      Pre-Treatment Pain Level 0  -RE      Post-Treatment Pain Level 0  -RE      Lymphedema Measurements    Measurement Type(s) Circumferential  -RE      Circumferential Areas Lower extremities  -RE      LLE Circumferential (cm)    Measurement Location 1 10cm above knee  -RE      Left 1 83 cm  -RE      Measurement Location 2 Knee  -RE      Left 2 59 cm  -RE      Measurement Location 3 10cm below knee  -RE      Left 3 62.5 cm  -RE      Measurement Location 4 20cm below knee  -RE      Left 4 58 cm  -RE      Measurement Location 5 30cm below knee  -RE      Left 5 47.5 cm  -RE      Measurement Location 6 Ankle  -RE      Left 6 31.5 cm  -RE      Measurement Location 7 Midfoot  -RE      Left 7 25.8 cm  -RE      Measurement Location 8 Total  -RE      Left 8 367.3 cm  -RE      Measurement Location 9 Decrease since IE  -RE      Left 9 67.7 cm  -RE      RLE Circumferential (cm)    Measurement Location 1 10cm above knee  -RE      Right 1 82 cm  -RE      Measurement Location 2 Knee  -RE      Right 2 61 cm  -RE      Measurement Location 3 10cm below knee  -RE      Right 3 65.5 cm  -RE      Measurement Location 4 20cm below knee  -RE      Right 4 60.8 cm  -RE      Measurement Location 5 30cm below knee  -RE      Right 5 51.5 cm  -RE      Measurement Location 6 Ankle  -RE      Right 6 34 cm  -RE      Measurement Location 7 Midfoot  -RE      Right  "7 27 cm  -RE      Measurement Location 8 Total  -RE      Right 8 381.8 cm  -RE      Measurement Location 9 Decrease since IE  -RE      Right 9 61.7 cm  -RE      Manual Lymphatic Drainage    Manual Lymphatic Drainage initial sequence;opened regional lymph nodes;extremity treatment  -RE      Initial Sequence short neck  -RE      Opened Regional Lymph Nodes inguinal  -RE      Inguinal right  -RE      Extremity Treatment extremity treatment focus on   wound area on calf  -RE      MLD to Proximal Limb Only right  -RE      Compression/Skin Care    Compression/Skin Care skin care;wrapping location;bandaging  -RE      Skin Care moisturizing lotion applied  -RE      Wrapping Location lower extremity  -RE      Wrapping Location LE bilateral:;foot to knee  -RE      Bandage Layers cotton liner;padding/fluff layer;short-stretch bandages (comment size/quantity)  -RE      Bandaging Technique circumferential/spiral;strong compression  -RE      Bandaging Comments 6\" silver stockingette, 1-10and 1-15 cm Artiflex, 1-8cm, 2-10cm, 3-12 cm Rosidal K (4 -12 cm on right).  Added 2-double length 12 cm Rosidal K bandages from L knee to mid-thigh.  Added Rosidal Soft to R lower leg. i  -RE        User Key  (r) = Recorded By, (t) = Taken By, (c) = Cosigned By    Initials Name Provider Type    ZULMA Elizabeth Occupational Therapist                        OT Assessment/Plan       03/23/17 1822       OT Assessment    Assessment Comments Wound is not draining and is scabbed over.  The Right leg is continuing to decrease significantly.  Good progress.  -RE     OT Plan    OT Plan Comments continue  -RE       User Key  (r) = Recorded By, (t) = Taken By, (c) = Cosigned By    Initials Name Provider Type    ZULMA Elizabeth Occupational Therapist                                Therapy Education       03/23/17 1823          Therapy Education    Given Bandaging/dressing change  -RE      Program Reinforced  -RE      How Provided " Verbal;Demonstration  -RE      Provided to Patient  -RE      Level of Understanding Teach back education performed;Verbalized  -RE        User Key  (r) = Recorded By, (t) = Taken By, (c) = Cosigned By    Initials Name Provider Type    RE ZULMA Jameson Occupational Therapist                  Time Calculation:   OT Start Time: 0845  OT Stop Time: 0945  OT Time Calculation (min): 60 min       Therapy Charges for Today     Code Description Service Date Service Provider Modifiers Qty    [id number]  OT MANUAL THERAPY EA 15 MIN 3/23/2017 ZULMA Jameson GO 4                      ZULMA Jameson  3/23/2017

## 2017-03-24 ENCOUNTER — HOSPITAL ENCOUNTER (OUTPATIENT)
Dept: OCCUPATIONAL THERAPY | Facility: HOSPITAL | Age: 47
Setting detail: THERAPIES SERIES
Discharge: HOME | End: 2017-03-24

## 2017-03-24 DIAGNOSIS — I89.0 LYMPHEDEMA OF BOTH LOWER EXTREMITIES: Primary | ICD-10-CM

## 2017-03-24 PROCEDURE — 97140 MANUAL THERAPY 1/> REGIONS: CPT

## 2017-03-24 NOTE — PROGRESS NOTES
Outpatient Occupational Therapy Lymphedema Treatment Note  [facility]     Patient Name: Kameron Melendez  : 1970  MRN: [id number]  Today's Date: 3/24/2017      Visit Date: 2017    There is no problem list on file for this patient.       No past medical history on file.     No past surgical history on file.      Visit Dx:      ICD-10-CM ICD-9-CM   1. Lymphedema of both lower extremities I89.0 457.1             Lymphedema       17 1200 17 1800       Subjective Comments    Subjective Comments Thigh bandage slipped by evening.  -RE No new complaints.  -RE     Subjective Pain    Able to rate subjective pain? yes  -RE yes  -RE     Pre-Treatment Pain Level 0  -RE 0  -RE     Post-Treatment Pain Level 0  -RE 0  -RE     Lymphedema Measurements    Measurement Type(s)  Circumferential  -RE     Circumferential Areas  Lower extremities  -RE     LLE Circumferential (cm)    Measurement Location 1  10cm above knee  -RE     Left 1  83 cm  -RE     Measurement Location 2  Knee  -RE     Left 2  59 cm  -RE     Measurement Location 3  10cm below knee  -RE     Left 3  62.5 cm  -RE     Measurement Location 4  20cm below knee  -RE     Left 4  58 cm  -RE     Measurement Location 5  30cm below knee  -RE     Left 5  47.5 cm  -RE     Measurement Location 6  Ankle  -RE     Left 6  31.5 cm  -RE     Measurement Location 7  Midfoot  -RE     Left 7  25.8 cm  -RE     Measurement Location 8  Total  -RE     Left 8  367.3 cm  -RE     Measurement Location 9  Decrease since IE  -RE     Left 9  67.7 cm  -RE     RLE Circumferential (cm)    Measurement Location 1  10cm above knee  -RE     Right 1  82 cm  -RE     Measurement Location 2  Knee  -RE     Right 2  61 cm  -RE     Measurement Location 3  10cm below knee  -RE     Right 3  65.5 cm  -RE     Measurement Location 4  20cm below knee  -RE     Right 4  60.8 cm  -RE     Measurement Location 5  30cm below knee  -RE     Right 5  51.5 cm  -RE     Measurement Location 6  Ankle  -RE      "Right 6  34 cm  -RE     Measurement Location 7  Midfoot  -RE     Right 7  27 cm  -RE     Measurement Location 8  Total  -RE     Right 8  381.8 cm  -RE     Measurement Location 9  Decrease since IE  -RE     Right 9  61.7 cm  -RE     Manual Lymphatic Drainage    Manual Lymphatic Drainage initial sequence;opened regional lymph nodes;extremity treatment  -RE initial sequence;opened regional lymph nodes;extremity treatment  -RE     Initial Sequence short neck  -RE short neck  -RE     Opened Regional Lymph Nodes inguinal  -RE inguinal  -RE     Inguinal right  -RE right  -RE     Extremity Treatment MLD to full limb  -RE extremity treatment focus on   wound area on calf  -RE     MLD to Proximal Limb Only right  -RE right  -RE     Compression/Skin Care    Compression/Skin Care skin care;wrapping location;bandaging  -RE skin care;wrapping location;bandaging  -RE     Skin Care moisturizing lotion applied  -RE moisturizing lotion applied  -RE     Wrapping Location lower extremity  -RE lower extremity  -RE     Wrapping Location LE right:;foot to knee;left:;foot;ankle;calf;knee;thigh  -RE bilateral:;foot to knee  -RE     Bandage Layers cotton liner;padding/fluff layer;short-stretch bandages (comment size/quantity)  -RE cotton liner;padding/fluff layer;short-stretch bandages (comment size/quantity)  -RE     Bandaging Technique circumferential/spiral;strong compression  -RE circumferential/spiral;strong compression  -RE     Bandaging Comments 6\" silver stockingette, Artiflex to feet, Rosidal soft, 1-8cm, 2-10cm, 3-12 cm Rosidal K (4 -12 cm on right).   2-double length 12 cm Rosidal K bandages from L knee to mid-thigh.     -RE 6\" silver stockingette, 1-10and 1-15 cm Artiflex, 1-8cm, 2-10cm, 3-12 cm Rosidal K (4 -12 cm on right).  Added 2-double length 12 cm Rosidal K bandages from L knee to mid-thigh.  Added Rosidal Soft to R lower leg. i  -RE       User Key  (r) = Recorded By, (t) = Taken By, (c) = Cosigned By    Initials Name " Provider Type    RE ZULMA Jameson Occupational Therapist                        OT Assessment/Plan       03/24/17 1252 03/23/17 1822    OT Assessment    Assessment Comments Wound continues with a scab.  The skin color on the right leg is much less red on the posterior aspect. Edema continues to soften.    -RE Wound is not draining and is scabbed over.  The Right leg is continuing to decrease significantly.  Good progress.  -RE    OT Plan    OT Plan Comments Continue  -RE continue  -RE      User Key  (r) = Recorded By, (t) = Taken By, (c) = Cosigned By    Initials Name Provider Type    RE ZULMA Jameson Occupational Therapist                                Therapy Education       03/24/17 1257 03/23/17 1823       Therapy Education    Given Other (comment)   discussed comprression garments  -RE Bandaging/dressing change  -RE     Program Reinforced  -RE Reinforced  -RE     How Provided Verbal  -RE Verbal;Demonstration  -RE     Provided to Patient  -RE Patient  -RE     Level of Understanding Teach back education performed;Verbalized  -RE Teach back education performed;Verbalized  -RE       User Key  (r) = Recorded By, (t) = Taken By, (c) = Cosigned By    Initials Name Provider Type    RE ZULMA Jameson Occupational Therapist                  Time Calculation:   OT Start Time: 0845  OT Stop Time: 0945  OT Time Calculation (min): 60 min       Therapy Charges for Today     Code Description Service Date Service Provider Modifiers Qty    [id number]  OT MANUAL THERAPY EA 15 MIN 3/24/2017 ZULMA Jameson GO 4                      ZULMA Jameson  3/24/2017

## 2017-03-28 ENCOUNTER — HOSPITAL ENCOUNTER (OUTPATIENT)
Dept: OCCUPATIONAL THERAPY | Facility: HOSPITAL | Age: 47
Setting detail: THERAPIES SERIES
Discharge: HOME | End: 2017-03-28

## 2017-03-28 PROCEDURE — 97140 MANUAL THERAPY 1/> REGIONS: CPT

## 2017-03-28 NOTE — PROGRESS NOTES
"Outpatient Occupational Therapy Lymphedema Progress Note  [facility]     Patient Name: Kameron Melendez  : 1970  MRN: [id number]  Today's Date: 3/28/2017      Visit Date: 2017    There is no problem list on file for this patient.       No past medical history on file.     No past surgical history on file.      Visit Dx:    No diagnosis found.          Lymphedema       17 1100          Subjective Comments    Subjective Comments No new complaints.    -RE      Subjective Pain    Able to rate subjective pain? yes  -RE      Pre-Treatment Pain Level 0  -RE      Post-Treatment Pain Level 0  -RE      Manual Lymphatic Drainage    Manual Lymphatic Drainage initial sequence;opened regional lymph nodes;extremity treatment  -RE      Initial Sequence short neck  -RE      Opened Regional Lymph Nodes inguinal  -RE      Inguinal left  -RE      Extremity Treatment MLD to full limb  -RE      MLD to Full Limb left  -RE      Compression/Skin Care    Compression/Skin Care skin care;wrapping location;bandaging  -RE      Skin Care moisturizing lotion applied  -RE      Wrapping Location lower extremity  -RE      Wrapping Location LE right:;foot to knee;left:;foot;ankle;calf;knee;thigh  -RE      Wrapping Comments Added a nonslip liner under the thigh bandage on the left leg to try to keep it in place longer.    -RE      Bandage Layers cotton liner;padding/fluff layer;short-stretch bandages (comment size/quantity)  -RE      Bandaging Technique circumferential/spiral;strong compression  -RE      Bandaging Comments 6\" silver stockingette, Artiflex to feet, Rosidal soft, 1-8cm, 2-10cm, 3-12 cm Rosidal K (4 -12 cm on right).   2-double length 12 cm Rosidal K bandages from L knee to mid-thigh.     -RE        User Key  (r) = Recorded By, (t) = Taken By, (c) = Cosigned By    Initials Name Provider Type    RE ZULMA Jameson Occupational Therapist                        OT Assessment/Plan       17 1145       OT Assessment "    Assessment Comments Wound is healed with a small scab still present.  Left leg is soft with hanging skin folds.  Continues to progress as expected.  Pt will need compression garments soon but he has no insurance coverage for them.  Due to the size of his legs off the shelf garments are not an option.  He would need custom garments.  Ideally they would be knee highs and ulysses pants, so the lower legs and thighs can be addressed while still allowing for freedom of movement and toileting.     -RE     OT Plan    OT Plan Comments Continue.   -RE       User Key  (r) = Recorded By, (t) = Taken By, (c) = Cosigned By    Initials Name Provider Type    RE Marjorie Flaherty, OTR Occupational Therapist                            OT Goals       03/28/17 1100       OT Short Term Goals    STG Date to Achieve 04/11/17  -RE     STG 1 Patient independent and compliant with self-wrapping techniques of compression bandages with assistance of caregiver as needed for improved self-management of condition.  -RE     STG 1 Progress Met  -RE     STG 2 Patient will demonstrate proper awareness of condition and precautions for improved prevention, management, care of symptoms.  -RE     STG 2 Progress Met  -RE     STG 3 Patient will demonstrate decreased net edema of bilateral lower extremities >/= 26-60cm  for decrease in edema, symptoms, decreased risk of infection and improved skin care/transition to self-care of condition.   -RE     STG 3 Progress Met  -RE     Long Term Goals    LTG Date to Achieve 04/14/17  -RE     LTG 1 Patient will demonstrate decreased net edema of bilateral lower extremities >/= 61-80cm  for decrease in edema, symptoms, decreased risk of infection and improved skin care/transition to self-care of condition.   -RE     LTG 1 Progress Partially Met;Ongoing  -RE     LTG 1 Progress Comments Patient has lost over 60 cm in both legs.  -RE     LTG 2 Patient independent and compliant with use and care of compression with  assistance of a caregiver as needed to promote self-care independence.   -RE     LTG 2 Progress Met;Ongoing  -RE     LTG 3 Patient will demonstrate healing of R LE wound.  -RE     LTG 3 Progress Met  -RE     LTG 3 Progress Comments Wound is healed and scabbed over.  No drainage.  -RE     LTG 4 Patient independent and compliant with use and care of compression garments with assistance of a caregiver as needed to promote self-care independence.   -RE     LTG 4 Progress New  -RE     Time Calculation    OT Goal Re-Cert Due Date 04/14/17  -RE       User Key  (r) = Recorded By, (t) = Taken By, (c) = Cosigned By    Initials Name Provider Type    ZULMA Elizabeth Occupational Therapist                Therapy Education       03/28/17 1150          Therapy Education    Given Bandaging/dressing change  -RE      Program Progressed  -RE      How Provided Verbal;Demonstration  -RE      Provided to Patient  -RE      Level of Understanding Teach back education performed;Verbalized  -RE        User Key  (r) = Recorded By, (t) = Taken By, (c) = Cosigned By    Initials Name Provider Type    ZULMA Elizabeth Occupational Therapist                  Time Calculation:   OT Start Time: 0850  OT Stop Time: 0945  OT Time Calculation (min): 55 min       Therapy Charges for Today     Code Description Service Date Service Provider Modifiers Qty    [id number]  OT MANUAL THERAPY EA 15 MIN 3/28/2017 ZULMA Jameson GO 4                      ZULMA Jameson  3/28/2017

## 2017-03-30 ENCOUNTER — HOSPITAL ENCOUNTER (OUTPATIENT)
Dept: OCCUPATIONAL THERAPY | Facility: HOSPITAL | Age: 47
Setting detail: THERAPIES SERIES
Discharge: HOME | End: 2017-03-30

## 2017-03-30 DIAGNOSIS — I89.0 LYMPHEDEMA OF BOTH LOWER EXTREMITIES: Primary | ICD-10-CM

## 2017-03-30 PROCEDURE — 97140 MANUAL THERAPY 1/> REGIONS: CPT

## 2017-03-30 NOTE — PROGRESS NOTES
Outpatient Occupational Therapy Lymphedema Treatment Note  [facility]     Patient Name: Kameron Melendez  : 1970  MRN: [id number]  Today's Date: 3/30/2017      Visit Date: 2017    There is no problem list on file for this patient.       No past medical history on file.     No past surgical history on file.      Visit Dx:      ICD-10-CM ICD-9-CM   1. Lymphedema of both lower extremities I89.0 457.1             Lymphedema       17 1100          Subjective Comments    Subjective Comments c/o legs feeling restless and R leg cramping  -RE      Subjective Pain    Able to rate subjective pain? yes  -RE      Pre-Treatment Pain Level 0  -RE      Post-Treatment Pain Level 0  -RE      Skin Changes/Observations    Location/Assessment Lower Extremity  -RE      Lower Extremity Conditions left:;inflamed  -RE      Lower Extremity Color/Pigment left:;red   thigh is red  -RE      Lymphedema Measurements    Measurement Type(s) Circumferential  -RE      Circumferential Areas Lower extremities  -RE      LLE Circumferential (cm)    Measurement Location 1 10cm above knee  -RE      Left 1 79 cm  -RE      Measurement Location 2 Knee  -RE      Left 2 61 cm  -RE      Measurement Location 3 10cm below knee  -RE      Left 3 63 cm  -RE      Measurement Location 4 20cm below knee  -RE      Left 4 58 cm  -RE      Measurement Location 5 30cm below knee  -RE      Left 5 46.5 cm  -RE      Measurement Location 6 Ankle  -RE      Left 6 32 cm  -RE      Measurement Location 7 Midfoot  -RE      Left 7 26.2 cm  -RE      Measurement Location 8 Total  -RE      Left 8 365.7 cm  -RE      Measurement Location 9 Decrease since IE  -RE      Left 9 69.3 cm  -RE      RLE Circumferential (cm)    Measurement Location 1 10cm above knee  -RE      Right 1 80 cm  -RE      Measurement Location 2 Knee  -RE      Right 2 60 cm  -RE      Measurement Location 3 10cm below knee  -RE      Right 3 67 cm  -RE      Measurement Location 4 20cm below knee  -RE    "   Right 4 62 cm  -RE      Measurement Location 5 30cm below knee  -RE      Right 5 50 cm  -RE      Measurement Location 6 Ankle  -RE      Right 6 35 cm  -RE      Measurement Location 7 Midfoot  -RE      Right 7 27.5 cm  -RE      Measurement Location 8 Total  -RE      Right 8 381.5 cm  -RE      Measurement Location 9 Decrease since IE  -RE      Right 9 62 cm  -RE      Manual Lymphatic Drainage    Manual Lymphatic Drainage initial sequence;opened regional lymph nodes;extremity treatment  -RE      Initial Sequence short neck  -RE      Opened Regional Lymph Nodes inguinal  -RE      Inguinal right  -RE      Extremity Treatment MLD to full limb  -RE      MLD to Full Limb right  -RE      Compression/Skin Care    Compression/Skin Care skin care;wrapping location;bandaging  -RE      Skin Care moisturizing lotion applied  -RE      Wrapping Location lower extremity  -RE      Wrapping Location LE right:;foot to knee;left:;foot;ankle;calf;knee;thigh  -RE      Bandage Layers cotton liner;padding/fluff layer;short-stretch bandages (comment size/quantity)  -RE      Bandaging Technique circumferential/spiral;strong compression  -RE      Bandaging Comments 6\" silver stockingette, Artiflex to feet, Rosidal soft, 1-8cm, 2-10cm, 3-12 cm Rosidal K (4 -12 cm on right).   2-double length 12 cm Rosidal K bandages from L knee to mid-thigh.     -RE        User Key  (r) = Recorded By, (t) = Taken By, (c) = Cosigned By    Initials Name Provider Type    RE Marjorie Flaherty OTR Occupational Therapist                        OT Assessment/Plan       03/30/17 1154       OT Assessment    Assessment Comments Reductions are slowing. Pt is ready for maintanence compression.  His calf circumference is too great for over the counter garments.  Will need custom and velcro leggings for night use and/or for additional lower leg compression when wearing stockings.  -RE     OT Plan    OT Plan Comments Continue. Will need appeal letter to insurance. At this point " he has no coverage for compresion.     -RE       User Key  (r) = Recorded By, (t) = Taken By, (c) = Cosigned By    Initials Name Provider Type    ZULMA Elizabeth Occupational Therapist                                Therapy Education       03/30/17 1157          Therapy Education    Given Other (comment)   compression products  -RE      Program New  -RE      How Provided Verbal  -RE      Provided to Patient  -RE      Level of Understanding Teach back education performed;Verbalized  -RE        User Key  (r) = Recorded By, (t) = Taken By, (c) = Cosigned By    Initials Name Provider Type    ZULMA Elizabeth Occupational Therapist                  Time Calculation:   OT Start Time: 0845  OT Stop Time: 0952  OT Time Calculation (min): 67 min       Therapy Charges for Today     Code Description Service Date Service Provider Modifiers Qty    [id number]  OT MANUAL THERAPY EA 15 MIN 3/30/2017 ZULMA Jameson GO 4                      ZULMA Jameson  3/30/2017

## 2017-03-31 ENCOUNTER — HOSPITAL ENCOUNTER (OUTPATIENT)
Dept: OCCUPATIONAL THERAPY | Facility: HOSPITAL | Age: 47
Setting detail: THERAPIES SERIES
Discharge: HOME | End: 2017-03-31

## 2017-03-31 DIAGNOSIS — I89.0 LYMPHEDEMA OF BOTH LOWER EXTREMITIES: Primary | ICD-10-CM

## 2017-03-31 PROCEDURE — 97140 MANUAL THERAPY 1/> REGIONS: CPT

## 2017-06-23 ENCOUNTER — DOCUMENTATION (OUTPATIENT)
Dept: OCCUPATIONAL THERAPY | Facility: HOSPITAL | Age: 47
End: 2017-06-23

## 2017-06-23 DIAGNOSIS — I89.0 LYMPHEDEMA OF BOTH LOWER EXTREMITIES: Primary | ICD-10-CM

## 2017-06-23 NOTE — THERAPY DISCHARGE NOTE
Outpatient Occupational Therapy Lymphedema Treatment Note/Discharge Summary       Patient Name: Kameron Melendez  : 1970  MRN: [id number]  Today's Date: 2017      Visit Date: 2017    There is no problem list on file for this patient.       No past medical history on file.     No past surgical history on file.      Visit Dx:      ICD-10-CM ICD-9-CM   1. Lymphedema of both lower extremities I89.0 457.1                                         OT Goals       17 1100       OT Short Term Goals    STG 1 Patient independent and compliant with self-wrapping techniques of compression bandages with assistance of caregiver as needed for improved self-management of condition.  -RE     STG 1 Progress Met  -RE     STG 2 Patient will demonstrate proper awareness of condition and precautions for improved prevention, management, care of symptoms.  -RE     STG 2 Progress Met  -RE     STG 3 Patient will demonstrate decreased net edema of bilateral lower extremities >/= 26-60cm  for decrease in edema, symptoms, decreased risk of infection and improved skin care/transition to self-care of condition.   -RE     STG 3 Progress Met  -RE     Long Term Goals    LTG 1 Patient will demonstrate decreased net edema of bilateral lower extremities >/= 61-80cm  for decrease in edema, symptoms, decreased risk of infection and improved skin care/transition to self-care of condition.   -RE     LTG 1 Progress Met  -RE     LTG 2 Patient independent and compliant with use and care of compression with assistance of a caregiver as needed to promote self-care independence.   -RE     LTG 2 Progress Met  -RE     LTG 3 Patient will demonstrate healing of R LE wound.  -RE     LTG 3 Progress Met  -RE     LTG 4 Patient independent and compliant with use and care of compression garments with assistance of a caregiver as needed to promote self-care independence.   -RE     LTG 4 Progress Not Met  -RE     LTG 4 Progress Comments Due to financial  concerns (no insurance coverage) patient never purchased his garments.  -RE       User Key  (r) = Recorded By, (t) = Taken By, (c) = Cosigned By    Initials Name Provider Type    TAYLOR Flaherty OTR Occupational Therapist                      Time Calculation:                       OP OT Discharge Summary  Date of Discharge: 06/23/17  Reason for Discharge: Maximum functional potential achieved  Outcomes Achieved: Patient able to partially acheive established goals  Discharge Destination: Home with home program, Home with caregiver assist  Discharge Instructions: Advised pt of compression options.  All were somewhat costly.        ZULMA Jameson  6/23/2017

## 2017-07-17 ENCOUNTER — APPOINTMENT (OUTPATIENT)
Dept: CARDIOLOGY | Facility: HOSPITAL | Age: 47
End: 2017-07-17
Attending: INTERNAL MEDICINE

## 2017-07-17 ENCOUNTER — APPOINTMENT (OUTPATIENT)
Dept: CT IMAGING | Facility: HOSPITAL | Age: 47
End: 2017-07-17

## 2017-07-17 ENCOUNTER — APPOINTMENT (OUTPATIENT)
Dept: GENERAL RADIOLOGY | Facility: HOSPITAL | Age: 47
End: 2017-07-17

## 2017-07-17 ENCOUNTER — HOSPITAL ENCOUNTER (INPATIENT)
Facility: HOSPITAL | Age: 47
LOS: 13 days | Discharge: HOME | End: 2017-07-30
Attending: EMERGENCY MEDICINE | Admitting: INTERNAL MEDICINE

## 2017-07-17 DIAGNOSIS — I26.99 BILATERAL PULMONARY EMBOLISM (HCC): Primary | ICD-10-CM

## 2017-07-17 LAB
ALBUMIN SERPL-MCNC: 4.1 G/DL (ref 3.5–5.2)
ALBUMIN/GLOB SERPL: 1.2 G/DL
ALP SERPL-CCNC: 116 U/L (ref 39–117)
ALT SERPL W P-5'-P-CCNC: 30 U/L (ref 1–41)
ANION GAP SERPL CALCULATED.3IONS-SCNC: 14.7 MMOL/L
APTT PPP: 32.9 SECONDS (ref 22.7–35.4)
APTT PPP: 59.7 SECONDS (ref 22.7–35.4)
ASCENDING AORTA: 3.7 CM
AST SERPL-CCNC: 20 U/L (ref 1–40)
BASOPHILS # BLD AUTO: 0.04 10*3/MM3 (ref 0–0.2)
BASOPHILS NFR BLD AUTO: 0.3 % (ref 0–1.5)
BH CV ECHO MEAS - ACS: 2.6 CM
BH CV ECHO MEAS - AO MAX PG (FULL): 3.7 MMHG
BH CV ECHO MEAS - AO MAX PG: 8.2 MMHG
BH CV ECHO MEAS - AO MEAN PG (FULL): 3 MMHG
BH CV ECHO MEAS - AO MEAN PG: 5 MMHG
BH CV ECHO MEAS - AO ROOT AREA (BSA CORRECTED): 1
BH CV ECHO MEAS - AO ROOT AREA: 9.1 CM^2
BH CV ECHO MEAS - AO ROOT DIAM: 3.4 CM
BH CV ECHO MEAS - AO V2 MAX: 143 CM/SEC
BH CV ECHO MEAS - AO V2 MEAN: 99.6 CM/SEC
BH CV ECHO MEAS - AO V2 VTI: 28.7 CM
BH CV ECHO MEAS - ASC AORTA: 3.7 CM
BH CV ECHO MEAS - AVA(I,A): 3.2 CM^2
BH CV ECHO MEAS - AVA(I,D): 3.2 CM^2
BH CV ECHO MEAS - AVA(V,A): 3.4 CM^2
BH CV ECHO MEAS - AVA(V,D): 3.4 CM^2
BH CV ECHO MEAS - BSA(HAYCOCK): 3.7 M^2
BH CV ECHO MEAS - BSA: 3.3 M^2
BH CV ECHO MEAS - BZI_BMI: 70.1 KILOGRAMS/M^2
BH CV ECHO MEAS - BZI_METRIC_HEIGHT: 188 CM
BH CV ECHO MEAS - BZI_METRIC_WEIGHT: 247.7 KG
BH CV ECHO MEAS - CONTRAST EF (2CH): 63.5 ML/M^2
BH CV ECHO MEAS - CONTRAST EF 4CH: 68.6 ML/M^2
BH CV ECHO MEAS - EDV(CUBED): 132.7 ML
BH CV ECHO MEAS - EDV(MOD-SP2): 96 ML
BH CV ECHO MEAS - EDV(MOD-SP4): 102 ML
BH CV ECHO MEAS - EDV(TEICH): 123.8 ML
BH CV ECHO MEAS - EF(CUBED): 67.7 %
BH CV ECHO MEAS - EF(MOD-SP2): 63.5 %
BH CV ECHO MEAS - EF(MOD-SP4): 68.6 %
BH CV ECHO MEAS - EF(TEICH): 58.9 %
BH CV ECHO MEAS - ESV(CUBED): 42.9 ML
BH CV ECHO MEAS - ESV(MOD-SP2): 35 ML
BH CV ECHO MEAS - ESV(MOD-SP4): 32 ML
BH CV ECHO MEAS - ESV(TEICH): 50.9 ML
BH CV ECHO MEAS - FS: 31.4 %
BH CV ECHO MEAS - IVS/LVPW: 0.93
BH CV ECHO MEAS - IVSD: 1.3 CM
BH CV ECHO MEAS - LA DIMENSION(2D): 22 CM
BH CV ECHO MEAS - LA DIMENSION: 6 CM
BH CV ECHO MEAS - LAT PEAK E' VEL: 15 CM/SEC
BH CV ECHO MEAS - LV DIASTOLIC VOL/BSA (35-75): 30.6 ML/M^2
BH CV ECHO MEAS - LV MASS(C)D: 285.1 GRAMS
BH CV ECHO MEAS - LV MASS(C)DI: 85.6 GRAMS/M^2
BH CV ECHO MEAS - LV MAX PG: 4.5 MMHG
BH CV ECHO MEAS - LV MEAN PG: 2 MMHG
BH CV ECHO MEAS - LV SYSTOLIC VOL/BSA (12-30): 9.6 ML/M^2
BH CV ECHO MEAS - LV V1 MAX: 106 CM/SEC
BH CV ECHO MEAS - LV V1 MEAN: 62.9 CM/SEC
BH CV ECHO MEAS - LV V1 VTI: 20.1 CM
BH CV ECHO MEAS - LVIDD: 5.1 CM
BH CV ECHO MEAS - LVIDS: 3.5 CM
BH CV ECHO MEAS - LVLD AP2: 8.5 CM
BH CV ECHO MEAS - LVLD AP4: 8.8 CM
BH CV ECHO MEAS - LVLS AP2: 7.4 CM
BH CV ECHO MEAS - LVLS AP4: 6.8 CM
BH CV ECHO MEAS - LVOT AREA (M): 4.5 CM^2
BH CV ECHO MEAS - LVOT AREA: 4.5 CM^2
BH CV ECHO MEAS - LVOT DIAM: 2.4 CM
BH CV ECHO MEAS - LVPWD: 1.4 CM
BH CV ECHO MEAS - MED PEAK E' VEL: 11 CM/SEC
BH CV ECHO MEAS - MV A DUR: 0.13 SEC
BH CV ECHO MEAS - MV A MAX VEL: 91.2 CM/SEC
BH CV ECHO MEAS - MV DEC SLOPE: 352 CM/SEC^2
BH CV ECHO MEAS - MV DEC TIME: 0.19 SEC
BH CV ECHO MEAS - MV E MAX VEL: 49.5 CM/SEC
BH CV ECHO MEAS - MV E/A: 0.54
BH CV ECHO MEAS - MV MAX PG: 4 MMHG
BH CV ECHO MEAS - MV MEAN PG: 2 MMHG
BH CV ECHO MEAS - MV P1/2T MAX VEL: 68.8 CM/SEC
BH CV ECHO MEAS - MV P1/2T: 57.2 MSEC
BH CV ECHO MEAS - MV V2 MAX: 100 CM/SEC
BH CV ECHO MEAS - MV V2 MEAN: 60.3 CM/SEC
BH CV ECHO MEAS - MV V2 VTI: 16 CM
BH CV ECHO MEAS - MVA P1/2T LCG: 3.2 CM^2
BH CV ECHO MEAS - MVA(P1/2T): 3.8 CM^2
BH CV ECHO MEAS - MVA(VTI): 5.7 CM^2
BH CV ECHO MEAS - PA ACC TIME: 0.12 SEC
BH CV ECHO MEAS - PA MAX PG (FULL): 3.3 MMHG
BH CV ECHO MEAS - PA MAX PG: 5.6 MMHG
BH CV ECHO MEAS - PA PR(ACCEL): 26.8 MMHG
BH CV ECHO MEAS - PA V2 MAX: 118 CM/SEC
BH CV ECHO MEAS - PVA(V,A): 2.7 CM^2
BH CV ECHO MEAS - PVA(V,D): 2.7 CM^2
BH CV ECHO MEAS - QP/QS: 0.67
BH CV ECHO MEAS - RV MAX PG: 2.3 MMHG
BH CV ECHO MEAS - RV MEAN PG: 1 MMHG
BH CV ECHO MEAS - RV V1 MAX: 75.4 CM/SEC
BH CV ECHO MEAS - RV V1 MEAN: 41.8 CM/SEC
BH CV ECHO MEAS - RV V1 VTI: 14.6 CM
BH CV ECHO MEAS - RVOT AREA: 4.2 CM^2
BH CV ECHO MEAS - RVOT DIAM: 2.3 CM
BH CV ECHO MEAS - SI(AO): 78.2 ML/M^2
BH CV ECHO MEAS - SI(CUBED): 27 ML/M^2
BH CV ECHO MEAS - SI(LVOT): 27.3 ML/M^2
BH CV ECHO MEAS - SI(MOD-SP2): 18.3 ML/M^2
BH CV ECHO MEAS - SI(MOD-SP4): 21 ML/M^2
BH CV ECHO MEAS - SI(TEICH): 21.9 ML/M^2
BH CV ECHO MEAS - SV(AO): 260.6 ML
BH CV ECHO MEAS - SV(CUBED): 89.8 ML
BH CV ECHO MEAS - SV(LVOT): 90.9 ML
BH CV ECHO MEAS - SV(MOD-SP2): 61 ML
BH CV ECHO MEAS - SV(MOD-SP4): 70 ML
BH CV ECHO MEAS - SV(RVOT): 60.7 ML
BH CV ECHO MEAS - SV(TEICH): 72.9 ML
BH CV ECHO MEAS - TAPSE (>1.6): 2.6 CM2
BH CV ECHO MEAS - TR MAX VEL: 344 CM/SEC
BH CV VAS BP RIGHT ARM: NORMAL MMHG
BH CV XLRA - RV BASE: 5.2 CM
BH CV XLRA - RV LENGTH: 6.95 CM
BH CV XLRA - RV MID: 4.7 CM
BH CV XLRA - TDI S': 14 CM/SEC
BILIRUB SERPL-MCNC: 0.7 MG/DL (ref 0.1–1.2)
BUN BLD-MCNC: 12 MG/DL (ref 6–20)
BUN/CREAT SERPL: 15 (ref 7–25)
CALCIUM SPEC-SCNC: 9.8 MG/DL (ref 8.6–10.5)
CHLORIDE SERPL-SCNC: 104 MMOL/L (ref 98–107)
CO2 SERPL-SCNC: 21.3 MMOL/L (ref 22–29)
CREAT BLD-MCNC: 0.8 MG/DL (ref 0.76–1.27)
D DIMER PPP FEU-MCNC: 3.64 MCGFEU/ML (ref 0–0.49)
DEPRECATED RDW RBC AUTO: 45.9 FL (ref 37–54)
E/E' RATIO: 4
EOSINOPHIL # BLD AUTO: 0.09 10*3/MM3 (ref 0–0.7)
EOSINOPHIL NFR BLD AUTO: 0.8 % (ref 0.3–6.2)
ERYTHROCYTE [DISTWIDTH] IN BLOOD BY AUTOMATED COUNT: 13.4 % (ref 11.5–14.5)
GFR SERPL CREATININE-BSD FRML MDRD: 104 ML/MIN/1.73
GLOBULIN UR ELPH-MCNC: 3.3 GM/DL
GLUCOSE BLD-MCNC: 160 MG/DL (ref 65–99)
GLUCOSE BLDC GLUCOMTR-MCNC: 101 MG/DL (ref 70–130)
GLUCOSE BLDC GLUCOMTR-MCNC: 110 MG/DL (ref 70–130)
HCT VFR BLD AUTO: 47.5 % (ref 40.4–52.2)
HGB BLD-MCNC: 16.4 G/DL (ref 13.7–17.6)
HOLD SPECIMEN: NORMAL
HOLD SPECIMEN: NORMAL
IMM GRANULOCYTES # BLD: 0.05 10*3/MM3 (ref 0–0.03)
IMM GRANULOCYTES NFR BLD: 0.4 % (ref 0–0.5)
INR PPP: 0.98 (ref 0.9–1.1)
LEFT ATRIUM VOLUME INDEX: 21 ML/M2
LEFT ATRIUM VOLUME: 70 CM3
LYMPHOCYTES # BLD AUTO: 2.14 10*3/MM3 (ref 0.9–4.8)
LYMPHOCYTES NFR BLD AUTO: 18.7 % (ref 19.6–45.3)
MCH RBC QN AUTO: 32.6 PG (ref 27–32.7)
MCHC RBC AUTO-ENTMCNC: 34.5 G/DL (ref 32.6–36.4)
MCV RBC AUTO: 94.4 FL (ref 79.8–96.2)
MONOCYTES # BLD AUTO: 0.87 10*3/MM3 (ref 0.2–1.2)
MONOCYTES NFR BLD AUTO: 7.6 % (ref 5–12)
NEUTROPHILS # BLD AUTO: 8.25 10*3/MM3 (ref 1.9–8.1)
NEUTROPHILS NFR BLD AUTO: 72.2 % (ref 42.7–76)
NT-PROBNP SERPL-MCNC: 1237 PG/ML (ref 0–450)
PLATELET # BLD AUTO: 217 10*3/MM3 (ref 140–500)
PMV BLD AUTO: 10.6 FL (ref 6–12)
POTASSIUM BLD-SCNC: 4.3 MMOL/L (ref 3.5–5.2)
PROT SERPL-MCNC: 7.4 G/DL (ref 6–8.5)
PROTHROMBIN TIME: 12.6 SECONDS (ref 11.7–14.2)
RBC # BLD AUTO: 5.03 10*6/MM3 (ref 4.6–6)
SODIUM BLD-SCNC: 140 MMOL/L (ref 136–145)
TROPONIN T SERPL-MCNC: <0.01 NG/ML (ref 0–0.03)
WBC NRBC COR # BLD: 11.44 10*3/MM3 (ref 4.5–10.7)
WHOLE BLOOD HOLD SPECIMEN: NORMAL
WHOLE BLOOD HOLD SPECIMEN: NORMAL

## 2017-07-17 PROCEDURE — 99252 IP/OBS CONSLTJ NEW/EST SF 35: CPT | Performed by: INTERNAL MEDICINE

## 2017-07-17 PROCEDURE — 93010 ELECTROCARDIOGRAM REPORT: CPT | Performed by: INTERNAL MEDICINE

## 2017-07-17 PROCEDURE — 93005 ELECTROCARDIOGRAM TRACING: CPT

## 2017-07-17 PROCEDURE — 71275 CT ANGIOGRAPHY CHEST: CPT

## 2017-07-17 PROCEDURE — 93306 TTE W/DOPPLER COMPLETE: CPT

## 2017-07-17 PROCEDURE — 25010000002 HEPARIN (PORCINE) PER 1000 UNITS: Performed by: EMERGENCY MEDICINE

## 2017-07-17 PROCEDURE — 80053 COMPREHEN METABOLIC PANEL: CPT | Performed by: EMERGENCY MEDICINE

## 2017-07-17 PROCEDURE — 71020 HC CHEST PA AND LATERAL: CPT

## 2017-07-17 PROCEDURE — 0 IOPAMIDOL PER 1 ML: Performed by: EMERGENCY MEDICINE

## 2017-07-17 PROCEDURE — 85610 PROTHROMBIN TIME: CPT | Performed by: EMERGENCY MEDICINE

## 2017-07-17 PROCEDURE — 85025 COMPLETE CBC W/AUTO DIFF WBC: CPT | Performed by: EMERGENCY MEDICINE

## 2017-07-17 PROCEDURE — 85730 THROMBOPLASTIN TIME PARTIAL: CPT | Performed by: EMERGENCY MEDICINE

## 2017-07-17 PROCEDURE — 83880 ASSAY OF NATRIURETIC PEPTIDE: CPT | Performed by: EMERGENCY MEDICINE

## 2017-07-17 PROCEDURE — 99291 CRITICAL CARE FIRST HOUR: CPT

## 2017-07-17 PROCEDURE — 36415 COLL VENOUS BLD VENIPUNCTURE: CPT | Performed by: EMERGENCY MEDICINE

## 2017-07-17 PROCEDURE — 85730 THROMBOPLASTIN TIME PARTIAL: CPT | Performed by: INTERNAL MEDICINE

## 2017-07-17 PROCEDURE — 85379 FIBRIN DEGRADATION QUANT: CPT | Performed by: PHYSICIAN ASSISTANT

## 2017-07-17 PROCEDURE — 82962 GLUCOSE BLOOD TEST: CPT

## 2017-07-17 PROCEDURE — 93970 EXTREMITY STUDY: CPT

## 2017-07-17 PROCEDURE — 93306 TTE W/DOPPLER COMPLETE: CPT | Performed by: INTERNAL MEDICINE

## 2017-07-17 PROCEDURE — 84484 ASSAY OF TROPONIN QUANT: CPT | Performed by: EMERGENCY MEDICINE

## 2017-07-17 RX ORDER — FUROSEMIDE 40 MG/1
80 TABLET ORAL DAILY
COMMUNITY
Start: 2016-06-14 | End: 2017-07-17 | Stop reason: SDUPTHER

## 2017-07-17 RX ORDER — HEPARIN SODIUM 5000 [USP'U]/ML
62.9 INJECTION, SOLUTION INTRAVENOUS; SUBCUTANEOUS ONCE
Status: COMPLETED | OUTPATIENT
Start: 2017-07-17 | End: 2017-07-17

## 2017-07-17 RX ORDER — FUROSEMIDE 40 MG/1
40 TABLET ORAL 3 TIMES DAILY
COMMUNITY
End: 2017-07-30 | Stop reason: HOSPADM

## 2017-07-17 RX ORDER — POTASSIUM CHLORIDE 750 MG/1
10 TABLET, EXTENDED RELEASE ORAL 2 TIMES DAILY
COMMUNITY
Start: 2016-06-14 | End: 2017-07-17 | Stop reason: SDUPTHER

## 2017-07-17 RX ORDER — POTASSIUM CHLORIDE 750 MG/1
10 CAPSULE, EXTENDED RELEASE ORAL 2 TIMES DAILY
COMMUNITY
End: 2019-01-23 | Stop reason: SDDI

## 2017-07-17 RX ORDER — SODIUM CHLORIDE 0.9 % (FLUSH) 0.9 %
10 SYRINGE (ML) INJECTION AS NEEDED
Status: DISCONTINUED | OUTPATIENT
Start: 2017-07-17 | End: 2017-07-30 | Stop reason: HOSPADM

## 2017-07-17 RX ORDER — ATORVASTATIN CALCIUM 20 MG/1
10 TABLET, FILM COATED ORAL DAILY
COMMUNITY
Start: 2016-06-14 | End: 2017-07-17 | Stop reason: SDUPTHER

## 2017-07-17 RX ORDER — HEPARIN SODIUM 5000 [USP'U]/ML
40.3-62.9 INJECTION, SOLUTION INTRAVENOUS; SUBCUTANEOUS EVERY 6 HOURS PRN
Status: DISCONTINUED | OUTPATIENT
Start: 2017-07-17 | End: 2017-07-23

## 2017-07-17 RX ORDER — ALLOPURINOL 100 MG/1
100 TABLET ORAL DAILY
COMMUNITY
Start: 2016-06-14 | End: 2017-07-17 | Stop reason: SDUPTHER

## 2017-07-17 RX ORDER — ALLOPURINOL 100 MG/1
100 TABLET ORAL DAILY
COMMUNITY
End: 2017-07-30 | Stop reason: HOSPADM

## 2017-07-17 RX ORDER — FUROSEMIDE 40 MG/1
40 TABLET ORAL NIGHTLY
COMMUNITY
End: 2017-07-17 | Stop reason: SDUPTHER

## 2017-07-17 RX ORDER — ATORVASTATIN CALCIUM 20 MG/1
20 TABLET, FILM COATED ORAL DAILY
COMMUNITY
End: 2019-01-23 | Stop reason: SDDI

## 2017-07-17 RX ORDER — FUROSEMIDE 40 MG/1
40 TABLET ORAL DAILY
Status: DISCONTINUED | OUTPATIENT
Start: 2017-07-17 | End: 2017-07-18

## 2017-07-17 RX ORDER — SODIUM CHLORIDE 0.9 % (FLUSH) 0.9 %
10 SYRINGE (ML) INJECTION AS NEEDED
Status: DISCONTINUED | OUTPATIENT
Start: 2017-07-17 | End: 2017-07-20 | Stop reason: SDUPTHER

## 2017-07-17 RX ADMIN — HEPARIN SODIUM 10000 UNITS: 5000 INJECTION, SOLUTION INTRAVENOUS; SUBCUTANEOUS at 13:40

## 2017-07-17 RX ADMIN — HEPARIN SODIUM 9.43 UNITS/KG/HR: 10000 INJECTION, SOLUTION INTRAVENOUS at 13:40

## 2017-07-17 RX ADMIN — IOPAMIDOL 98 ML: 755 INJECTION, SOLUTION INTRAVENOUS at 13:28

## 2017-07-18 ENCOUNTER — RESEARCH ENCOUNTER (OUTPATIENT)
Dept: CARDIOLOGY | Facility: CLINIC | Age: 47
End: 2017-07-18

## 2017-07-18 LAB
ACT BLD: 164 SECONDS (ref 82–152)
APTT PPP: 105.2 SECONDS (ref 22.7–35.4)
APTT PPP: 130.8 SECONDS (ref 22.7–35.4)
APTT PPP: 47.7 SECONDS (ref 22.7–35.4)
BASOPHILS # BLD AUTO: 0.03 10*3/MM3 (ref 0–0.2)
BASOPHILS NFR BLD AUTO: 0.3 % (ref 0–1.5)
BH CV LOWER VASCULAR LEFT COMMON FEMORAL AUGMENT: NORMAL
BH CV LOWER VASCULAR LEFT COMMON FEMORAL COMPETENT: NORMAL
BH CV LOWER VASCULAR LEFT COMMON FEMORAL COMPRESS: NORMAL
BH CV LOWER VASCULAR LEFT COMMON FEMORAL PHASIC: NORMAL
BH CV LOWER VASCULAR LEFT COMMON FEMORAL SPONT: NORMAL
BH CV LOWER VASCULAR LEFT DISTAL FEMORAL COMPRESS: NORMAL
BH CV LOWER VASCULAR LEFT GASTRONEMIUS COMPRESS: NORMAL
BH CV LOWER VASCULAR LEFT GREATER SAPH AK COMPRESS: NORMAL
BH CV LOWER VASCULAR LEFT GREATER SAPH BK COMPRESS: NORMAL
BH CV LOWER VASCULAR LEFT MID FEMORAL AUGMENT: NORMAL
BH CV LOWER VASCULAR LEFT MID FEMORAL COMPETENT: NORMAL
BH CV LOWER VASCULAR LEFT MID FEMORAL COMPRESS: NORMAL
BH CV LOWER VASCULAR LEFT MID FEMORAL PHASIC: NORMAL
BH CV LOWER VASCULAR LEFT MID FEMORAL SPONT: NORMAL
BH CV LOWER VASCULAR LEFT PERONEAL COMPRESS: NORMAL
BH CV LOWER VASCULAR LEFT POPLITEAL AUGMENT: NORMAL
BH CV LOWER VASCULAR LEFT POPLITEAL COMPETENT: NORMAL
BH CV LOWER VASCULAR LEFT POPLITEAL COMPRESS: NORMAL
BH CV LOWER VASCULAR LEFT POPLITEAL PHASIC: NORMAL
BH CV LOWER VASCULAR LEFT POPLITEAL SPONT: NORMAL
BH CV LOWER VASCULAR LEFT POSTERIOR TIBIAL COMPRESS: NORMAL
BH CV LOWER VASCULAR LEFT PROXIMAL FEMORAL COMPRESS: NORMAL
BH CV LOWER VASCULAR LEFT SAPHENOFEMORAL JUNCTION COMPRESS: NORMAL
BH CV LOWER VASCULAR LEFT SAPHENOFEMORAL JUNCTION PHASIC: NORMAL
BH CV LOWER VASCULAR LEFT SAPHENOFEMORAL JUNCTION SPONT: NORMAL
BH CV LOWER VASCULAR RIGHT COMMON FEMORAL AUGMENT: NORMAL
BH CV LOWER VASCULAR RIGHT COMMON FEMORAL COMPETENT: NORMAL
BH CV LOWER VASCULAR RIGHT COMMON FEMORAL COMPRESS: NORMAL
BH CV LOWER VASCULAR RIGHT COMMON FEMORAL PHASIC: NORMAL
BH CV LOWER VASCULAR RIGHT COMMON FEMORAL SPONT: NORMAL
BH CV LOWER VASCULAR RIGHT DISTAL FEMORAL COMPRESS: NORMAL
BH CV LOWER VASCULAR RIGHT GASTRONEMIUS COMPRESS: NORMAL
BH CV LOWER VASCULAR RIGHT GREATER SAPH AK COMPRESS: NORMAL
BH CV LOWER VASCULAR RIGHT GREATER SAPH BK COMPRESS: NORMAL
BH CV LOWER VASCULAR RIGHT MID FEMORAL AUGMENT: NORMAL
BH CV LOWER VASCULAR RIGHT MID FEMORAL COMPETENT: NORMAL
BH CV LOWER VASCULAR RIGHT MID FEMORAL COMPRESS: NORMAL
BH CV LOWER VASCULAR RIGHT MID FEMORAL PHASIC: NORMAL
BH CV LOWER VASCULAR RIGHT MID FEMORAL SPONT: NORMAL
BH CV LOWER VASCULAR RIGHT PERONEAL COMPRESS: NORMAL
BH CV LOWER VASCULAR RIGHT POPLITEAL AUGMENT: NORMAL
BH CV LOWER VASCULAR RIGHT POPLITEAL COMPETENT: NORMAL
BH CV LOWER VASCULAR RIGHT POPLITEAL COMPRESS: NORMAL
BH CV LOWER VASCULAR RIGHT POPLITEAL PHASIC: NORMAL
BH CV LOWER VASCULAR RIGHT POPLITEAL SPONT: NORMAL
BH CV LOWER VASCULAR RIGHT POSTERIOR TIBIAL COMPRESS: NORMAL
BH CV LOWER VASCULAR RIGHT PROXIMAL FEMORAL COMPRESS: NORMAL
BH CV LOWER VASCULAR RIGHT SAPHENOFEMORAL JUNCTION COMPRESS: NORMAL
BH CV LOWER VASCULAR RIGHT SAPHENOFEMORAL JUNCTION PHASIC: NORMAL
BH CV LOWER VASCULAR RIGHT SAPHENOFEMORAL JUNCTION SPONT: NORMAL
DEPRECATED RDW RBC AUTO: 47 FL (ref 37–54)
EOSINOPHIL # BLD AUTO: 0.14 10*3/MM3 (ref 0–0.7)
EOSINOPHIL NFR BLD AUTO: 1.4 % (ref 0.3–6.2)
ERYTHROCYTE [DISTWIDTH] IN BLOOD BY AUTOMATED COUNT: 13.7 % (ref 11.5–14.5)
GLUCOSE BLDC GLUCOMTR-MCNC: 103 MG/DL (ref 70–130)
GLUCOSE BLDC GLUCOMTR-MCNC: 115 MG/DL (ref 70–130)
GLUCOSE BLDC GLUCOMTR-MCNC: 125 MG/DL (ref 70–130)
GLUCOSE BLDC GLUCOMTR-MCNC: 140 MG/DL (ref 70–130)
GLUCOSE BLDC GLUCOMTR-MCNC: 143 MG/DL (ref 70–130)
HCT VFR BLD AUTO: 43.8 % (ref 40.4–52.2)
HCT VFR BLDA CALC: 41 % (ref 38–51)
HGB BLD-MCNC: 14.9 G/DL (ref 13.7–17.6)
HGB BLDA-MCNC: 13.9 G/DL (ref 12–17)
IMM GRANULOCYTES # BLD: 0.05 10*3/MM3 (ref 0–0.03)
IMM GRANULOCYTES NFR BLD: 0.5 % (ref 0–0.5)
LYMPHOCYTES # BLD AUTO: 2.23 10*3/MM3 (ref 0.9–4.8)
LYMPHOCYTES NFR BLD AUTO: 22.8 % (ref 19.6–45.3)
MCH RBC QN AUTO: 32.5 PG (ref 27–32.7)
MCHC RBC AUTO-ENTMCNC: 34 G/DL (ref 32.6–36.4)
MCV RBC AUTO: 95.6 FL (ref 79.8–96.2)
MONOCYTES # BLD AUTO: 0.86 10*3/MM3 (ref 0.2–1.2)
MONOCYTES NFR BLD AUTO: 8.8 % (ref 5–12)
NEUTROPHILS # BLD AUTO: 6.48 10*3/MM3 (ref 1.9–8.1)
NEUTROPHILS NFR BLD AUTO: 66.2 % (ref 42.7–76)
PLATELET # BLD AUTO: 193 10*3/MM3 (ref 140–500)
PMV BLD AUTO: 10.8 FL (ref 6–12)
RBC # BLD AUTO: 4.58 10*6/MM3 (ref 4.6–6)
SAO2 % BLDA: 73 % (ref 95–98)
WBC NRBC COR # BLD: 9.79 10*3/MM3 (ref 4.5–10.7)

## 2017-07-18 PROCEDURE — B31T1ZZ FLUOROSCOPY OF LEFT PULMONARY ARTERY USING LOW OSMOLAR CONTRAST: ICD-10-PCS | Performed by: INTERNAL MEDICINE

## 2017-07-18 PROCEDURE — 99152 MOD SED SAME PHYS/QHP 5/>YRS: CPT | Performed by: INTERNAL MEDICINE

## 2017-07-18 PROCEDURE — B31S1ZZ FLUOROSCOPY OF RIGHT PULMONARY ARTERY USING LOW OSMOLAR CONTRAST: ICD-10-PCS | Performed by: INTERNAL MEDICINE

## 2017-07-18 PROCEDURE — C1894 INTRO/SHEATH, NON-LASER: HCPCS | Performed by: INTERNAL MEDICINE

## 2017-07-18 PROCEDURE — 25010000002 ALTEPLASE 2 MG RECONSTITUTED SOLUTION: Performed by: INTERNAL MEDICINE

## 2017-07-18 PROCEDURE — 93568 NJX CAR CTH NSLC P-ART ANGRP: CPT | Performed by: INTERNAL MEDICINE

## 2017-07-18 PROCEDURE — 85730 THROMBOPLASTIN TIME PARTIAL: CPT | Performed by: INTERNAL MEDICINE

## 2017-07-18 PROCEDURE — 37184 PRIM ART M-THRMBC 1ST VSL: CPT | Performed by: INTERNAL MEDICINE

## 2017-07-18 PROCEDURE — 25010000002 FENTANYL CITRATE (PF) 100 MCG/2ML SOLUTION: Performed by: INTERNAL MEDICINE

## 2017-07-18 PROCEDURE — 25010000002 MIDAZOLAM PER 1 MG: Performed by: INTERNAL MEDICINE

## 2017-07-18 PROCEDURE — 99232 SBSQ HOSP IP/OBS MODERATE 35: CPT | Performed by: INTERNAL MEDICINE

## 2017-07-18 PROCEDURE — C1757 CATH, THROMBECTOMY/EMBOLECT: HCPCS | Performed by: INTERNAL MEDICINE

## 2017-07-18 PROCEDURE — 85347 COAGULATION TIME ACTIVATED: CPT

## 2017-07-18 PROCEDURE — 0 IOPAMIDOL PER 1 ML: Performed by: INTERNAL MEDICINE

## 2017-07-18 PROCEDURE — 25010000002 HEPARIN (PORCINE) PER 1000 UNITS: Performed by: INTERNAL MEDICINE

## 2017-07-18 PROCEDURE — C1769 GUIDE WIRE: HCPCS | Performed by: INTERNAL MEDICINE

## 2017-07-18 PROCEDURE — 85014 HEMATOCRIT: CPT

## 2017-07-18 PROCEDURE — 82962 GLUCOSE BLOOD TEST: CPT

## 2017-07-18 PROCEDURE — 93451 RIGHT HEART CATH: CPT | Performed by: INTERNAL MEDICINE

## 2017-07-18 PROCEDURE — 85018 HEMOGLOBIN: CPT

## 2017-07-18 PROCEDURE — 85730 THROMBOPLASTIN TIME PARTIAL: CPT | Performed by: EMERGENCY MEDICINE

## 2017-07-18 PROCEDURE — 25010000002 HEPARIN (PORCINE) PER 1000 UNITS: Performed by: EMERGENCY MEDICINE

## 2017-07-18 PROCEDURE — 4A023N6 MEASUREMENT OF CARDIAC SAMPLING AND PRESSURE, RIGHT HEART, PERCUTANEOUS APPROACH: ICD-10-PCS | Performed by: INTERNAL MEDICINE

## 2017-07-18 PROCEDURE — 99153 MOD SED SAME PHYS/QHP EA: CPT | Performed by: INTERNAL MEDICINE

## 2017-07-18 PROCEDURE — C1893 INTRO/SHEATH, FIXED,NON-PEEL: HCPCS | Performed by: INTERNAL MEDICINE

## 2017-07-18 PROCEDURE — 3E06317 INTRODUCTION OF OTHER THROMBOLYTIC INTO CENTRAL ARTERY, PERCUTANEOUS APPROACH: ICD-10-PCS | Performed by: INTERNAL MEDICINE

## 2017-07-18 PROCEDURE — 37185 PRIM ART M-THRMBC SBSQ VSL: CPT | Performed by: INTERNAL MEDICINE

## 2017-07-18 PROCEDURE — 02CR3ZZ EXTIRPATION OF MATTER FROM LEFT PULMONARY ARTERY, PERCUTANEOUS APPROACH: ICD-10-PCS | Performed by: INTERNAL MEDICINE

## 2017-07-18 PROCEDURE — C1887 CATHETER, GUIDING: HCPCS | Performed by: INTERNAL MEDICINE

## 2017-07-18 PROCEDURE — 85025 COMPLETE CBC W/AUTO DIFF WBC: CPT | Performed by: EMERGENCY MEDICINE

## 2017-07-18 RX ORDER — HEPARIN SODIUM 1000 [USP'U]/ML
INJECTION, SOLUTION INTRAVENOUS; SUBCUTANEOUS AS NEEDED
Status: DISCONTINUED | OUTPATIENT
Start: 2017-07-18 | End: 2017-07-18 | Stop reason: HOSPADM

## 2017-07-18 RX ORDER — FUROSEMIDE 80 MG
80 TABLET ORAL DAILY
Status: DISCONTINUED | OUTPATIENT
Start: 2017-07-18 | End: 2017-07-30 | Stop reason: HOSPADM

## 2017-07-18 RX ORDER — SODIUM CHLORIDE 9 MG/ML
100 INJECTION, SOLUTION INTRAVENOUS CONTINUOUS
Status: ACTIVE | OUTPATIENT
Start: 2017-07-18 | End: 2017-07-19

## 2017-07-18 RX ORDER — FENTANYL CITRATE 50 UG/ML
INJECTION, SOLUTION INTRAMUSCULAR; INTRAVENOUS AS NEEDED
Status: DISCONTINUED | OUTPATIENT
Start: 2017-07-18 | End: 2017-07-18 | Stop reason: HOSPADM

## 2017-07-18 RX ORDER — SODIUM CHLORIDE 9 MG/ML
INJECTION, SOLUTION INTRAVENOUS CONTINUOUS PRN
Status: DISCONTINUED | OUTPATIENT
Start: 2017-07-18 | End: 2017-07-18 | Stop reason: HOSPADM

## 2017-07-18 RX ORDER — MIDAZOLAM HYDROCHLORIDE 1 MG/ML
INJECTION INTRAMUSCULAR; INTRAVENOUS AS NEEDED
Status: DISCONTINUED | OUTPATIENT
Start: 2017-07-18 | End: 2017-07-18 | Stop reason: HOSPADM

## 2017-07-18 RX ORDER — ECHINACEA PURPUREA EXTRACT 125 MG
1 TABLET ORAL AS NEEDED
Status: DISCONTINUED | OUTPATIENT
Start: 2017-07-18 | End: 2017-07-30 | Stop reason: HOSPADM

## 2017-07-18 RX ORDER — LIDOCAINE HYDROCHLORIDE 20 MG/ML
INJECTION, SOLUTION INFILTRATION; PERINEURAL AS NEEDED
Status: DISCONTINUED | OUTPATIENT
Start: 2017-07-18 | End: 2017-07-18 | Stop reason: HOSPADM

## 2017-07-18 RX ADMIN — FUROSEMIDE 80 MG: 80 TABLET ORAL at 08:40

## 2017-07-18 RX ADMIN — SODIUM CHLORIDE 100 ML/HR: 9 INJECTION, SOLUTION INTRAVENOUS at 18:35

## 2017-07-18 RX ADMIN — HEPARIN SODIUM 10000 UNITS: 5000 INJECTION, SOLUTION INTRAVENOUS; SUBCUTANEOUS at 05:28

## 2017-07-18 RX ADMIN — HEPARIN SODIUM 12.43 UNITS/KG/HR: 10000 INJECTION, SOLUTION INTRAVENOUS at 20:16

## 2017-07-18 RX ADMIN — HEPARIN SODIUM 15.43 UNITS/KG/HR: 10000 INJECTION, SOLUTION INTRAVENOUS at 05:24

## 2017-07-19 LAB
ANION GAP SERPL CALCULATED.3IONS-SCNC: 12 MMOL/L
APTT PPP: 46.1 SECONDS (ref 22.7–35.4)
APTT PPP: 74.7 SECONDS (ref 22.7–35.4)
BASOPHILS # BLD AUTO: 0.01 10*3/MM3 (ref 0–0.2)
BASOPHILS NFR BLD AUTO: 0.1 % (ref 0–1.5)
BUN BLD-MCNC: 8 MG/DL (ref 6–20)
BUN/CREAT SERPL: 11.3 (ref 7–25)
CALCIUM SPEC-SCNC: 8.8 MG/DL (ref 8.6–10.5)
CHLORIDE SERPL-SCNC: 101 MMOL/L (ref 98–107)
CO2 SERPL-SCNC: 25 MMOL/L (ref 22–29)
CREAT BLD-MCNC: 0.71 MG/DL (ref 0.76–1.27)
DEPRECATED RDW RBC AUTO: 46.2 FL (ref 37–54)
EOSINOPHIL # BLD AUTO: 0.18 10*3/MM3 (ref 0–0.7)
EOSINOPHIL NFR BLD AUTO: 2 % (ref 0.3–6.2)
ERYTHROCYTE [DISTWIDTH] IN BLOOD BY AUTOMATED COUNT: 13.5 % (ref 11.5–14.5)
GFR SERPL CREATININE-BSD FRML MDRD: 119 ML/MIN/1.73
GLUCOSE BLD-MCNC: 129 MG/DL (ref 65–99)
GLUCOSE BLDC GLUCOMTR-MCNC: 114 MG/DL (ref 70–130)
GLUCOSE BLDC GLUCOMTR-MCNC: 117 MG/DL (ref 70–130)
GLUCOSE BLDC GLUCOMTR-MCNC: 118 MG/DL (ref 70–130)
GLUCOSE BLDC GLUCOMTR-MCNC: 123 MG/DL (ref 70–130)
HCT VFR BLD AUTO: 40.6 % (ref 40.4–52.2)
HGB BLD-MCNC: 13.9 G/DL (ref 13.7–17.6)
IMM GRANULOCYTES # BLD: 0.04 10*3/MM3 (ref 0–0.03)
IMM GRANULOCYTES NFR BLD: 0.4 % (ref 0–0.5)
INR PPP: 1.05 (ref 0.9–1.1)
LYMPHOCYTES # BLD AUTO: 1.84 10*3/MM3 (ref 0.9–4.8)
LYMPHOCYTES NFR BLD AUTO: 20.7 % (ref 19.6–45.3)
MCH RBC QN AUTO: 32.2 PG (ref 27–32.7)
MCHC RBC AUTO-ENTMCNC: 34.2 G/DL (ref 32.6–36.4)
MCV RBC AUTO: 94 FL (ref 79.8–96.2)
MONOCYTES # BLD AUTO: 0.87 10*3/MM3 (ref 0.2–1.2)
MONOCYTES NFR BLD AUTO: 9.8 % (ref 5–12)
NEUTROPHILS # BLD AUTO: 5.97 10*3/MM3 (ref 1.9–8.1)
NEUTROPHILS NFR BLD AUTO: 67 % (ref 42.7–76)
PLATELET # BLD AUTO: 186 10*3/MM3 (ref 140–500)
PMV BLD AUTO: 10.5 FL (ref 6–12)
POTASSIUM BLD-SCNC: 3.9 MMOL/L (ref 3.5–5.2)
PROTHROMBIN TIME: 13.3 SECONDS (ref 11.7–14.2)
RBC # BLD AUTO: 4.32 10*6/MM3 (ref 4.6–6)
SODIUM BLD-SCNC: 138 MMOL/L (ref 136–145)
WBC NRBC COR # BLD: 8.91 10*3/MM3 (ref 4.5–10.7)

## 2017-07-19 PROCEDURE — 99232 SBSQ HOSP IP/OBS MODERATE 35: CPT | Performed by: INTERNAL MEDICINE

## 2017-07-19 PROCEDURE — 80048 BASIC METABOLIC PNL TOTAL CA: CPT | Performed by: INTERNAL MEDICINE

## 2017-07-19 PROCEDURE — 85025 COMPLETE CBC W/AUTO DIFF WBC: CPT | Performed by: EMERGENCY MEDICINE

## 2017-07-19 PROCEDURE — 85730 THROMBOPLASTIN TIME PARTIAL: CPT | Performed by: INTERNAL MEDICINE

## 2017-07-19 PROCEDURE — 85730 THROMBOPLASTIN TIME PARTIAL: CPT | Performed by: EMERGENCY MEDICINE

## 2017-07-19 PROCEDURE — 25010000002 HEPARIN (PORCINE) PER 1000 UNITS: Performed by: EMERGENCY MEDICINE

## 2017-07-19 PROCEDURE — 93005 ELECTROCARDIOGRAM TRACING: CPT | Performed by: INTERNAL MEDICINE

## 2017-07-19 PROCEDURE — 82962 GLUCOSE BLOOD TEST: CPT

## 2017-07-19 PROCEDURE — 85610 PROTHROMBIN TIME: CPT | Performed by: INTERNAL MEDICINE

## 2017-07-19 PROCEDURE — 93010 ELECTROCARDIOGRAM REPORT: CPT | Performed by: INTERNAL MEDICINE

## 2017-07-19 RX ORDER — WARFARIN SODIUM 5 MG/1
5 TABLET ORAL
Status: COMPLETED | OUTPATIENT
Start: 2017-07-19 | End: 2017-07-19

## 2017-07-19 RX ADMIN — WARFARIN SODIUM 5 MG: 5 TABLET ORAL at 17:18

## 2017-07-19 RX ADMIN — HEPARIN SODIUM 10.43 UNITS/KG/HR: 10000 INJECTION, SOLUTION INTRAVENOUS at 11:35

## 2017-07-19 RX ADMIN — FUROSEMIDE 80 MG: 80 TABLET ORAL at 09:48

## 2017-07-19 RX ADMIN — HEPARIN SODIUM 10000 UNITS: 5000 INJECTION, SOLUTION INTRAVENOUS; SUBCUTANEOUS at 11:39

## 2017-07-19 NOTE — RESEARCH
[facility]  [address]  [place] [address]  [phone]    Research Note:   Flare Study  Patient Information:  Kameron Melendez  :  1970  Study ID#:  [id number]  Study Initials:  SADIN SANCHEZ presented to the emergency department on 17 with exertional dyspnea and chest tightness.  Per assessment and testing by the ED, he was found to have a PE.  Dr. Coulter evaluated the patient and determined that he meets all of the inclusion criteria and none of the exclusion criteria for participation in the FLARE study.  Dr. Coulter explained the procedure to DANIEL and obtained informed consent.  I met with DANIEL this morning and reviewed the study schedule, including data collection, confidentiality, voluntary participation and his option to withdraw from the study at any time.  DANIEL verbalized understanding and had no questions at this time.  I provided him with a copy of his signed consent form for his records.  Procedural data was collected and recorded as per the study protocol and entered into the database.   We will follow up with DANIEL. at his 48 hour study visit.            Alina Hopson RN  Research RN Coordinator

## 2017-07-20 ENCOUNTER — APPOINTMENT (OUTPATIENT)
Dept: CT IMAGING | Facility: HOSPITAL | Age: 47
End: 2017-07-20

## 2017-07-20 ENCOUNTER — RESEARCH ENCOUNTER (OUTPATIENT)
Dept: CARDIOLOGY | Facility: CLINIC | Age: 47
End: 2017-07-20

## 2017-07-20 LAB
APTT PPP: 77.1 SECONDS (ref 22.7–35.4)
BASOPHILS # BLD AUTO: 0.04 10*3/MM3 (ref 0–0.2)
BASOPHILS NFR BLD AUTO: 0.4 % (ref 0–1.5)
DEPRECATED RDW RBC AUTO: 46.6 FL (ref 37–54)
EOSINOPHIL # BLD AUTO: 0.39 10*3/MM3 (ref 0–0.7)
EOSINOPHIL NFR BLD AUTO: 4 % (ref 0.3–6.2)
ERYTHROCYTE [DISTWIDTH] IN BLOOD BY AUTOMATED COUNT: 13.5 % (ref 11.5–14.5)
HCT VFR BLD AUTO: 40.5 % (ref 40.4–52.2)
HGB BLD-MCNC: 13.8 G/DL (ref 13.7–17.6)
IMM GRANULOCYTES # BLD: 0.09 10*3/MM3 (ref 0–0.03)
IMM GRANULOCYTES NFR BLD: 0.9 % (ref 0–0.5)
INR PPP: 1.06 (ref 0.9–1.1)
LYMPHOCYTES # BLD AUTO: 2.53 10*3/MM3 (ref 0.9–4.8)
LYMPHOCYTES NFR BLD AUTO: 25.7 % (ref 19.6–45.3)
MCH RBC QN AUTO: 32.6 PG (ref 27–32.7)
MCHC RBC AUTO-ENTMCNC: 34.1 G/DL (ref 32.6–36.4)
MCV RBC AUTO: 95.7 FL (ref 79.8–96.2)
MONOCYTES # BLD AUTO: 1.05 10*3/MM3 (ref 0.2–1.2)
MONOCYTES NFR BLD AUTO: 10.7 % (ref 5–12)
NEUTROPHILS # BLD AUTO: 5.74 10*3/MM3 (ref 1.9–8.1)
NEUTROPHILS NFR BLD AUTO: 58.3 % (ref 42.7–76)
PLATELET # BLD AUTO: 186 10*3/MM3 (ref 140–500)
PMV BLD AUTO: 10.8 FL (ref 6–12)
PROTHROMBIN TIME: 13.4 SECONDS (ref 11.7–14.2)
RBC # BLD AUTO: 4.23 10*6/MM3 (ref 4.6–6)
WBC NRBC COR # BLD: 9.84 10*3/MM3 (ref 4.5–10.7)

## 2017-07-20 PROCEDURE — 85730 THROMBOPLASTIN TIME PARTIAL: CPT | Performed by: EMERGENCY MEDICINE

## 2017-07-20 PROCEDURE — 99232 SBSQ HOSP IP/OBS MODERATE 35: CPT | Performed by: INTERNAL MEDICINE

## 2017-07-20 PROCEDURE — 71275 CT ANGIOGRAPHY CHEST: CPT

## 2017-07-20 PROCEDURE — 0 IOPAMIDOL PER 1 ML: Performed by: INTERNAL MEDICINE

## 2017-07-20 PROCEDURE — 85025 COMPLETE CBC W/AUTO DIFF WBC: CPT | Performed by: EMERGENCY MEDICINE

## 2017-07-20 PROCEDURE — 25010000002 HEPARIN (PORCINE) PER 1000 UNITS: Performed by: EMERGENCY MEDICINE

## 2017-07-20 PROCEDURE — 85610 PROTHROMBIN TIME: CPT | Performed by: INTERNAL MEDICINE

## 2017-07-20 RX ORDER — WARFARIN SODIUM 7.5 MG/1
7.5 TABLET ORAL
Status: COMPLETED | OUTPATIENT
Start: 2017-07-20 | End: 2017-07-20

## 2017-07-20 RX ADMIN — FUROSEMIDE 80 MG: 80 TABLET ORAL at 08:46

## 2017-07-20 RX ADMIN — HEPARIN SODIUM 14.43 UNITS/KG/HR: 10000 INJECTION, SOLUTION INTRAVENOUS at 12:33

## 2017-07-20 RX ADMIN — HEPARIN SODIUM 14.43 UNITS/KG/HR: 10000 INJECTION, SOLUTION INTRAVENOUS at 00:54

## 2017-07-20 RX ADMIN — WARFARIN SODIUM 7.5 MG: 7.5 TABLET ORAL at 17:54

## 2017-07-20 RX ADMIN — IOPAMIDOL 95 ML: 755 INJECTION, SOLUTION INTRAVENOUS at 16:00

## 2017-07-21 LAB
APTT PPP: 76.2 SECONDS (ref 22.7–35.4)
BASOPHILS # BLD AUTO: 0.04 10*3/MM3 (ref 0–0.2)
BASOPHILS NFR BLD AUTO: 0.5 % (ref 0–1.5)
DEPRECATED RDW RBC AUTO: 47.1 FL (ref 37–54)
EOSINOPHIL # BLD AUTO: 0.34 10*3/MM3 (ref 0–0.7)
EOSINOPHIL NFR BLD AUTO: 4.1 % (ref 0.3–6.2)
ERYTHROCYTE [DISTWIDTH] IN BLOOD BY AUTOMATED COUNT: 13.5 % (ref 11.5–14.5)
HCT VFR BLD AUTO: 39.9 % (ref 40.4–52.2)
HGB BLD-MCNC: 12.9 G/DL (ref 13.7–17.6)
IMM GRANULOCYTES # BLD: 0.07 10*3/MM3 (ref 0–0.03)
IMM GRANULOCYTES NFR BLD: 0.8 % (ref 0–0.5)
INR PPP: 1.03 (ref 0.9–1.1)
LYMPHOCYTES # BLD AUTO: 2.24 10*3/MM3 (ref 0.9–4.8)
LYMPHOCYTES NFR BLD AUTO: 27.1 % (ref 19.6–45.3)
MCH RBC QN AUTO: 31.2 PG (ref 27–32.7)
MCHC RBC AUTO-ENTMCNC: 32.3 G/DL (ref 32.6–36.4)
MCV RBC AUTO: 96.4 FL (ref 79.8–96.2)
MONOCYTES # BLD AUTO: 0.78 10*3/MM3 (ref 0.2–1.2)
MONOCYTES NFR BLD AUTO: 9.4 % (ref 5–12)
NEUTROPHILS # BLD AUTO: 4.79 10*3/MM3 (ref 1.9–8.1)
NEUTROPHILS NFR BLD AUTO: 58.1 % (ref 42.7–76)
PLATELET # BLD AUTO: 192 10*3/MM3 (ref 140–500)
PMV BLD AUTO: 10.5 FL (ref 6–12)
PROTHROMBIN TIME: 13.1 SECONDS (ref 11.7–14.2)
RBC # BLD AUTO: 4.14 10*6/MM3 (ref 4.6–6)
WBC NRBC COR # BLD: 8.26 10*3/MM3 (ref 4.5–10.7)

## 2017-07-21 PROCEDURE — 85025 COMPLETE CBC W/AUTO DIFF WBC: CPT | Performed by: EMERGENCY MEDICINE

## 2017-07-21 PROCEDURE — 25010000002 HEPARIN (PORCINE) PER 1000 UNITS: Performed by: EMERGENCY MEDICINE

## 2017-07-21 PROCEDURE — 85610 PROTHROMBIN TIME: CPT | Performed by: INTERNAL MEDICINE

## 2017-07-21 PROCEDURE — 99232 SBSQ HOSP IP/OBS MODERATE 35: CPT | Performed by: INTERNAL MEDICINE

## 2017-07-21 PROCEDURE — 85730 THROMBOPLASTIN TIME PARTIAL: CPT | Performed by: EMERGENCY MEDICINE

## 2017-07-21 PROCEDURE — 36415 COLL VENOUS BLD VENIPUNCTURE: CPT | Performed by: EMERGENCY MEDICINE

## 2017-07-21 RX ORDER — WARFARIN SODIUM 7.5 MG/1
7.5 TABLET ORAL
Status: DISCONTINUED | OUTPATIENT
Start: 2017-07-21 | End: 2017-07-22

## 2017-07-21 RX ADMIN — HEPARIN SODIUM 14.43 UNITS/KG/HR: 10000 INJECTION, SOLUTION INTRAVENOUS at 00:37

## 2017-07-21 RX ADMIN — HEPARIN SODIUM 14.43 UNITS/KG/HR: 10000 INJECTION, SOLUTION INTRAVENOUS at 13:20

## 2017-07-21 RX ADMIN — WARFARIN SODIUM 7.5 MG: 7.5 TABLET ORAL at 20:24

## 2017-07-21 RX ADMIN — FUROSEMIDE 80 MG: 80 TABLET ORAL at 08:22

## 2017-07-22 LAB
APTT PPP: 177.6 SECONDS (ref 22.7–35.4)
APTT PPP: 25.5 SECONDS (ref 22.7–35.4)
BASOPHILS # BLD AUTO: 0.03 10*3/MM3 (ref 0–0.2)
BASOPHILS NFR BLD AUTO: 0.4 % (ref 0–1.5)
DEPRECATED RDW RBC AUTO: 47 FL (ref 37–54)
EOSINOPHIL # BLD AUTO: 0.36 10*3/MM3 (ref 0–0.7)
EOSINOPHIL NFR BLD AUTO: 4.2 % (ref 0.3–6.2)
ERYTHROCYTE [DISTWIDTH] IN BLOOD BY AUTOMATED COUNT: 13.7 % (ref 11.5–14.5)
HCT VFR BLD AUTO: 39.7 % (ref 40.4–52.2)
HGB BLD-MCNC: 13.3 G/DL (ref 13.7–17.6)
IMM GRANULOCYTES # BLD: 0.08 10*3/MM3 (ref 0–0.03)
IMM GRANULOCYTES NFR BLD: 0.9 % (ref 0–0.5)
INR PPP: 0.95 (ref 0.9–1.1)
LYMPHOCYTES # BLD AUTO: 2.41 10*3/MM3 (ref 0.9–4.8)
LYMPHOCYTES NFR BLD AUTO: 28.3 % (ref 19.6–45.3)
MCH RBC QN AUTO: 32 PG (ref 27–32.7)
MCHC RBC AUTO-ENTMCNC: 33.5 G/DL (ref 32.6–36.4)
MCV RBC AUTO: 95.4 FL (ref 79.8–96.2)
MONOCYTES # BLD AUTO: 0.85 10*3/MM3 (ref 0.2–1.2)
MONOCYTES NFR BLD AUTO: 10 % (ref 5–12)
NEUTROPHILS # BLD AUTO: 4.79 10*3/MM3 (ref 1.9–8.1)
NEUTROPHILS NFR BLD AUTO: 56.2 % (ref 42.7–76)
PLATELET # BLD AUTO: 202 10*3/MM3 (ref 140–500)
PMV BLD AUTO: 11.1 FL (ref 6–12)
PROTHROMBIN TIME: 12.3 SECONDS (ref 11.7–14.2)
RBC # BLD AUTO: 4.16 10*6/MM3 (ref 4.6–6)
WBC NRBC COR # BLD: 8.52 10*3/MM3 (ref 4.5–10.7)

## 2017-07-22 PROCEDURE — 94799 UNLISTED PULMONARY SVC/PX: CPT

## 2017-07-22 PROCEDURE — 85730 THROMBOPLASTIN TIME PARTIAL: CPT | Performed by: EMERGENCY MEDICINE

## 2017-07-22 PROCEDURE — 85025 COMPLETE CBC W/AUTO DIFF WBC: CPT | Performed by: EMERGENCY MEDICINE

## 2017-07-22 PROCEDURE — 36415 COLL VENOUS BLD VENIPUNCTURE: CPT | Performed by: EMERGENCY MEDICINE

## 2017-07-22 PROCEDURE — 25010000002 HEPARIN (PORCINE) PER 1000 UNITS: Performed by: EMERGENCY MEDICINE

## 2017-07-22 PROCEDURE — 99232 SBSQ HOSP IP/OBS MODERATE 35: CPT | Performed by: INTERNAL MEDICINE

## 2017-07-22 PROCEDURE — 85610 PROTHROMBIN TIME: CPT | Performed by: INTERNAL MEDICINE

## 2017-07-22 PROCEDURE — 85730 THROMBOPLASTIN TIME PARTIAL: CPT | Performed by: INTERNAL MEDICINE

## 2017-07-22 RX ORDER — WARFARIN SODIUM 7.5 MG/1
7.5 TABLET ORAL
Status: DISCONTINUED | OUTPATIENT
Start: 2017-07-23 | End: 2017-07-23

## 2017-07-22 RX ORDER — WARFARIN SODIUM 7.5 MG/1
15 TABLET ORAL
Status: COMPLETED | OUTPATIENT
Start: 2017-07-22 | End: 2017-07-22

## 2017-07-22 RX ORDER — WARFARIN SODIUM 7.5 MG/1
15 TABLET ORAL
Status: DISCONTINUED | OUTPATIENT
Start: 2017-07-22 | End: 2017-07-22

## 2017-07-22 RX ADMIN — WARFARIN SODIUM 15 MG: 7.5 TABLET ORAL at 18:08

## 2017-07-22 RX ADMIN — FUROSEMIDE 80 MG: 80 TABLET ORAL at 09:36

## 2017-07-22 RX ADMIN — HEPARIN SODIUM 14.43 UNITS/KG/HR: 10000 INJECTION, SOLUTION INTRAVENOUS at 00:57

## 2017-07-22 RX ADMIN — HEPARIN SODIUM 15.43 UNITS/KG/HR: 10000 INJECTION, SOLUTION INTRAVENOUS at 23:17

## 2017-07-22 RX ADMIN — HEPARIN SODIUM 18.43 UNITS/KG/HR: 10000 INJECTION, SOLUTION INTRAVENOUS at 11:15

## 2017-07-22 RX ADMIN — HEPARIN SODIUM 10000 UNITS: 5000 INJECTION, SOLUTION INTRAVENOUS; SUBCUTANEOUS at 10:04

## 2017-07-23 LAB
APTT PPP: 64.6 SECONDS (ref 22.7–35.4)
APTT PPP: 83.3 SECONDS (ref 22.7–35.4)
APTT PPP: >200 SECONDS (ref 22.7–35.4)
BASOPHILS # BLD AUTO: 0.04 10*3/MM3 (ref 0–0.2)
BASOPHILS NFR BLD AUTO: 0.4 % (ref 0–1.5)
DEPRECATED RDW RBC AUTO: 47.3 FL (ref 37–54)
EOSINOPHIL # BLD AUTO: 0.43 10*3/MM3 (ref 0–0.7)
EOSINOPHIL NFR BLD AUTO: 4.4 % (ref 0.3–6.2)
ERYTHROCYTE [DISTWIDTH] IN BLOOD BY AUTOMATED COUNT: 13.9 % (ref 11.5–14.5)
HCT VFR BLD AUTO: 39.7 % (ref 40.4–52.2)
HGB BLD-MCNC: 13.7 G/DL (ref 13.7–17.6)
IMM GRANULOCYTES # BLD: 0.13 10*3/MM3 (ref 0–0.03)
IMM GRANULOCYTES NFR BLD: 1.3 % (ref 0–0.5)
INR PPP: 1.13 (ref 0.9–1.1)
LYMPHOCYTES # BLD AUTO: 2.52 10*3/MM3 (ref 0.9–4.8)
LYMPHOCYTES NFR BLD AUTO: 25.9 % (ref 19.6–45.3)
MCH RBC QN AUTO: 32.8 PG (ref 27–32.7)
MCHC RBC AUTO-ENTMCNC: 34.5 G/DL (ref 32.6–36.4)
MCV RBC AUTO: 95 FL (ref 79.8–96.2)
MONOCYTES # BLD AUTO: 0.81 10*3/MM3 (ref 0.2–1.2)
MONOCYTES NFR BLD AUTO: 8.3 % (ref 5–12)
NEUTROPHILS # BLD AUTO: 5.79 10*3/MM3 (ref 1.9–8.1)
NEUTROPHILS NFR BLD AUTO: 59.7 % (ref 42.7–76)
NRBC BLD MANUAL-RTO: 0 /100 WBC (ref 0–0)
PLATELET # BLD AUTO: 213 10*3/MM3 (ref 140–500)
PMV BLD AUTO: 10.5 FL (ref 6–12)
PROTHROMBIN TIME: 14 SECONDS (ref 11.7–14.2)
RBC # BLD AUTO: 4.18 10*6/MM3 (ref 4.6–6)
WBC NRBC COR # BLD: 9.72 10*3/MM3 (ref 4.5–10.7)

## 2017-07-23 PROCEDURE — 99232 SBSQ HOSP IP/OBS MODERATE 35: CPT | Performed by: INTERNAL MEDICINE

## 2017-07-23 PROCEDURE — 85730 THROMBOPLASTIN TIME PARTIAL: CPT | Performed by: INTERNAL MEDICINE

## 2017-07-23 PROCEDURE — 25010000002 HEPARIN (PORCINE) PER 1000 UNITS: Performed by: INTERNAL MEDICINE

## 2017-07-23 PROCEDURE — 85610 PROTHROMBIN TIME: CPT | Performed by: INTERNAL MEDICINE

## 2017-07-23 PROCEDURE — 25010000002 HEPARIN (PORCINE) PER 1000 UNITS: Performed by: EMERGENCY MEDICINE

## 2017-07-23 PROCEDURE — 85025 COMPLETE CBC W/AUTO DIFF WBC: CPT | Performed by: EMERGENCY MEDICINE

## 2017-07-23 RX ORDER — WARFARIN SODIUM 10 MG/1
10 TABLET ORAL
Status: DISCONTINUED | OUTPATIENT
Start: 2017-07-23 | End: 2017-07-24

## 2017-07-23 RX ORDER — HEPARIN SODIUM 5000 [USP'U]/ML
9600-10000 INJECTION, SOLUTION INTRAVENOUS; SUBCUTANEOUS EVERY 6 HOURS PRN
Status: DISCONTINUED | OUTPATIENT
Start: 2017-07-23 | End: 2017-07-30

## 2017-07-23 RX ADMIN — HEPARIN SODIUM 17.43 UNITS/KG/HR: 10000 INJECTION, SOLUTION INTRAVENOUS at 08:34

## 2017-07-23 RX ADMIN — HEPARIN SODIUM 14.43 UNITS/KG/HR: 10000 INJECTION, SOLUTION INTRAVENOUS at 10:26

## 2017-07-23 RX ADMIN — HEPARIN SODIUM 14.43 UNITS/KG/HR: 10000 INJECTION, SOLUTION INTRAVENOUS at 22:06

## 2017-07-23 RX ADMIN — HEPARIN SODIUM 9600 UNITS: 5000 INJECTION, SOLUTION INTRAVENOUS; SUBCUTANEOUS at 01:06

## 2017-07-23 RX ADMIN — FUROSEMIDE 80 MG: 80 TABLET ORAL at 08:55

## 2017-07-23 RX ADMIN — HEPARIN SODIUM 17.43 UNITS/KG/HR: 10000 INJECTION, SOLUTION INTRAVENOUS at 00:52

## 2017-07-23 RX ADMIN — WARFARIN SODIUM 10 MG: 10 TABLET ORAL at 18:13

## 2017-07-24 LAB
APTT PPP: 92.2 SECONDS (ref 22.7–35.4)
BASOPHILS # BLD AUTO: 0.05 10*3/MM3 (ref 0–0.2)
BASOPHILS NFR BLD AUTO: 0.5 % (ref 0–1.5)
DEPRECATED RDW RBC AUTO: 48.8 FL (ref 37–54)
EOSINOPHIL # BLD AUTO: 0.4 10*3/MM3 (ref 0–0.7)
EOSINOPHIL NFR BLD AUTO: 3.7 % (ref 0.3–6.2)
ERYTHROCYTE [DISTWIDTH] IN BLOOD BY AUTOMATED COUNT: 14 % (ref 11.5–14.5)
HCT VFR BLD AUTO: 44.5 % (ref 40.4–52.2)
HGB BLD-MCNC: 14.6 G/DL (ref 13.7–17.6)
IMM GRANULOCYTES # BLD: 0.18 10*3/MM3 (ref 0–0.03)
IMM GRANULOCYTES NFR BLD: 1.7 % (ref 0–0.5)
INR PPP: 1.21 (ref 0.9–1.1)
LYMPHOCYTES # BLD AUTO: 2.88 10*3/MM3 (ref 0.9–4.8)
LYMPHOCYTES NFR BLD AUTO: 27 % (ref 19.6–45.3)
MCH RBC QN AUTO: 31.5 PG (ref 27–32.7)
MCHC RBC AUTO-ENTMCNC: 32.8 G/DL (ref 32.6–36.4)
MCV RBC AUTO: 96.1 FL (ref 79.8–96.2)
MONOCYTES # BLD AUTO: 0.77 10*3/MM3 (ref 0.2–1.2)
MONOCYTES NFR BLD AUTO: 7.2 % (ref 5–12)
NEUTROPHILS # BLD AUTO: 6.4 10*3/MM3 (ref 1.9–8.1)
NEUTROPHILS NFR BLD AUTO: 59.9 % (ref 42.7–76)
PLATELET # BLD AUTO: 250 10*3/MM3 (ref 140–500)
PMV BLD AUTO: 10.9 FL (ref 6–12)
PROTHROMBIN TIME: 14.8 SECONDS (ref 11.7–14.2)
RBC # BLD AUTO: 4.63 10*6/MM3 (ref 4.6–6)
WBC NRBC COR # BLD: 10.68 10*3/MM3 (ref 4.5–10.7)

## 2017-07-24 PROCEDURE — 25010000002 HEPARIN (PORCINE) PER 1000 UNITS: Performed by: EMERGENCY MEDICINE

## 2017-07-24 PROCEDURE — 85025 COMPLETE CBC W/AUTO DIFF WBC: CPT | Performed by: EMERGENCY MEDICINE

## 2017-07-24 PROCEDURE — 85610 PROTHROMBIN TIME: CPT | Performed by: INTERNAL MEDICINE

## 2017-07-24 PROCEDURE — 97140 MANUAL THERAPY 1/> REGIONS: CPT

## 2017-07-24 PROCEDURE — 99232 SBSQ HOSP IP/OBS MODERATE 35: CPT | Performed by: INTERNAL MEDICINE

## 2017-07-24 PROCEDURE — 97165 OT EVAL LOW COMPLEX 30 MIN: CPT

## 2017-07-24 PROCEDURE — 85730 THROMBOPLASTIN TIME PARTIAL: CPT | Performed by: EMERGENCY MEDICINE

## 2017-07-24 RX ORDER — WARFARIN SODIUM 5 MG/1
5 TABLET ORAL
Status: DISCONTINUED | OUTPATIENT
Start: 2017-07-24 | End: 2017-07-24

## 2017-07-24 RX ORDER — WARFARIN SODIUM 10 MG/1
10 TABLET ORAL
Status: COMPLETED | OUTPATIENT
Start: 2017-07-24 | End: 2017-07-24

## 2017-07-24 RX ADMIN — WARFARIN SODIUM 10 MG: 10 TABLET ORAL at 22:06

## 2017-07-24 RX ADMIN — HEPARIN SODIUM 14.4 UNITS/KG/HR: 10000 INJECTION, SOLUTION INTRAVENOUS at 09:07

## 2017-07-24 RX ADMIN — FUROSEMIDE 80 MG: 80 TABLET ORAL at 09:07

## 2017-07-24 RX ADMIN — HEPARIN SODIUM 14.43 UNITS/KG/HR: 10000 INJECTION, SOLUTION INTRAVENOUS at 22:02

## 2017-07-25 LAB
ANION GAP SERPL CALCULATED.3IONS-SCNC: 18.8 MMOL/L
APTT PPP: 101.3 SECONDS (ref 22.7–35.4)
APTT PPP: 119.9 SECONDS (ref 22.7–35.4)
APTT PPP: 67.3 SECONDS (ref 22.7–35.4)
BASOPHILS # BLD AUTO: 0.04 10*3/MM3 (ref 0–0.2)
BASOPHILS NFR BLD AUTO: 0.4 % (ref 0–1.5)
BUN BLD-MCNC: 15 MG/DL (ref 6–20)
BUN/CREAT SERPL: 17.4 (ref 7–25)
CALCIUM SPEC-SCNC: 10 MG/DL (ref 8.6–10.5)
CHLORIDE SERPL-SCNC: 96 MMOL/L (ref 98–107)
CO2 SERPL-SCNC: 23.2 MMOL/L (ref 22–29)
CREAT BLD-MCNC: 0.86 MG/DL (ref 0.76–1.27)
DEPRECATED RDW RBC AUTO: 48.8 FL (ref 37–54)
EOSINOPHIL # BLD AUTO: 0.38 10*3/MM3 (ref 0–0.7)
EOSINOPHIL NFR BLD AUTO: 3.5 % (ref 0.3–6.2)
ERYTHROCYTE [DISTWIDTH] IN BLOOD BY AUTOMATED COUNT: 14.1 % (ref 11.5–14.5)
GFR SERPL CREATININE-BSD FRML MDRD: 95 ML/MIN/1.73
GLUCOSE BLD-MCNC: 214 MG/DL (ref 65–99)
HCT VFR BLD AUTO: 44 % (ref 40.4–52.2)
HGB BLD-MCNC: 14.8 G/DL (ref 13.7–17.6)
IMM GRANULOCYTES # BLD: 0.14 10*3/MM3 (ref 0–0.03)
IMM GRANULOCYTES NFR BLD: 1.3 % (ref 0–0.5)
INR PPP: 1.27 (ref 0.9–1.1)
LYMPHOCYTES # BLD AUTO: 3 10*3/MM3 (ref 0.9–4.8)
LYMPHOCYTES NFR BLD AUTO: 27.6 % (ref 19.6–45.3)
MCH RBC QN AUTO: 32.4 PG (ref 27–32.7)
MCHC RBC AUTO-ENTMCNC: 33.6 G/DL (ref 32.6–36.4)
MCV RBC AUTO: 96.3 FL (ref 79.8–96.2)
MONOCYTES # BLD AUTO: 0.9 10*3/MM3 (ref 0.2–1.2)
MONOCYTES NFR BLD AUTO: 8.3 % (ref 5–12)
NEUTROPHILS # BLD AUTO: 6.4 10*3/MM3 (ref 1.9–8.1)
NEUTROPHILS NFR BLD AUTO: 58.9 % (ref 42.7–76)
PLATELET # BLD AUTO: 255 10*3/MM3 (ref 140–500)
PMV BLD AUTO: 10.3 FL (ref 6–12)
POTASSIUM BLD-SCNC: 4.1 MMOL/L (ref 3.5–5.2)
PROTHROMBIN TIME: 15.4 SECONDS (ref 11.7–14.2)
RBC # BLD AUTO: 4.57 10*6/MM3 (ref 4.6–6)
SODIUM BLD-SCNC: 138 MMOL/L (ref 136–145)
WBC NRBC COR # BLD: 10.86 10*3/MM3 (ref 4.5–10.7)

## 2017-07-25 PROCEDURE — 85025 COMPLETE CBC W/AUTO DIFF WBC: CPT | Performed by: EMERGENCY MEDICINE

## 2017-07-25 PROCEDURE — 36415 COLL VENOUS BLD VENIPUNCTURE: CPT | Performed by: EMERGENCY MEDICINE

## 2017-07-25 PROCEDURE — 85610 PROTHROMBIN TIME: CPT | Performed by: INTERNAL MEDICINE

## 2017-07-25 PROCEDURE — 85730 THROMBOPLASTIN TIME PARTIAL: CPT | Performed by: INTERNAL MEDICINE

## 2017-07-25 PROCEDURE — 85730 THROMBOPLASTIN TIME PARTIAL: CPT | Performed by: EMERGENCY MEDICINE

## 2017-07-25 PROCEDURE — 25010000002 HEPARIN (PORCINE) PER 1000 UNITS: Performed by: EMERGENCY MEDICINE

## 2017-07-25 PROCEDURE — 94799 UNLISTED PULMONARY SVC/PX: CPT

## 2017-07-25 PROCEDURE — 99232 SBSQ HOSP IP/OBS MODERATE 35: CPT | Performed by: INTERNAL MEDICINE

## 2017-07-25 PROCEDURE — 80048 BASIC METABOLIC PNL TOTAL CA: CPT | Performed by: INTERNAL MEDICINE

## 2017-07-25 PROCEDURE — 25010000002 HEPARIN (PORCINE) PER 1000 UNITS: Performed by: INTERNAL MEDICINE

## 2017-07-25 RX ORDER — WARFARIN SODIUM 7.5 MG/1
15 TABLET ORAL
Status: DISCONTINUED | OUTPATIENT
Start: 2017-07-25 | End: 2017-07-28

## 2017-07-25 RX ADMIN — FUROSEMIDE 80 MG: 80 TABLET ORAL at 08:12

## 2017-07-25 RX ADMIN — WARFARIN SODIUM 15 MG: 7.5 TABLET ORAL at 18:06

## 2017-07-25 RX ADMIN — HEPARIN SODIUM 14.43 UNITS/KG/HR: 10000 INJECTION, SOLUTION INTRAVENOUS at 08:12

## 2017-07-25 RX ADMIN — HEPARIN SODIUM 14.43 UNITS/KG/HR: 10000 INJECTION, SOLUTION INTRAVENOUS at 21:32

## 2017-07-25 RX ADMIN — HEPARIN SODIUM 9600 UNITS: 5000 INJECTION, SOLUTION INTRAVENOUS; SUBCUTANEOUS at 16:15

## 2017-07-25 RX ADMIN — HEPARIN SODIUM 14.43 UNITS/KG/HR: 10000 INJECTION, SOLUTION INTRAVENOUS at 16:14

## 2017-07-26 LAB
APTT PPP: 123.4 SECONDS (ref 22.7–35.4)
APTT PPP: 75 SECONDS (ref 22.7–35.4)
BASOPHILS # BLD AUTO: 0.06 10*3/MM3 (ref 0–0.2)
BASOPHILS NFR BLD AUTO: 0.6 % (ref 0–1.5)
DEPRECATED RDW RBC AUTO: 48.9 FL (ref 37–54)
EOSINOPHIL # BLD AUTO: 0.34 10*3/MM3 (ref 0–0.7)
EOSINOPHIL NFR BLD AUTO: 3.6 % (ref 0.3–6.2)
ERYTHROCYTE [DISTWIDTH] IN BLOOD BY AUTOMATED COUNT: 13.9 % (ref 11.5–14.5)
HCT VFR BLD AUTO: 42.4 % (ref 40.4–52.2)
HGB BLD-MCNC: 13.9 G/DL (ref 13.7–17.6)
IMM GRANULOCYTES # BLD: 0.12 10*3/MM3 (ref 0–0.03)
IMM GRANULOCYTES NFR BLD: 1.3 % (ref 0–0.5)
INR PPP: 1.53 (ref 0.9–1.1)
LYMPHOCYTES # BLD AUTO: 2.48 10*3/MM3 (ref 0.9–4.8)
LYMPHOCYTES NFR BLD AUTO: 26.4 % (ref 19.6–45.3)
MCH RBC QN AUTO: 31.7 PG (ref 27–32.7)
MCHC RBC AUTO-ENTMCNC: 32.8 G/DL (ref 32.6–36.4)
MCV RBC AUTO: 96.8 FL (ref 79.8–96.2)
MONOCYTES # BLD AUTO: 0.86 10*3/MM3 (ref 0.2–1.2)
MONOCYTES NFR BLD AUTO: 9.1 % (ref 5–12)
NEUTROPHILS # BLD AUTO: 5.54 10*3/MM3 (ref 1.9–8.1)
NEUTROPHILS NFR BLD AUTO: 59 % (ref 42.7–76)
PLATELET # BLD AUTO: 219 10*3/MM3 (ref 140–500)
PMV BLD AUTO: 10.3 FL (ref 6–12)
PROTHROMBIN TIME: 17.9 SECONDS (ref 11.7–14.2)
RBC # BLD AUTO: 4.38 10*6/MM3 (ref 4.6–6)
WBC NRBC COR # BLD: 9.4 10*3/MM3 (ref 4.5–10.7)

## 2017-07-26 PROCEDURE — 85610 PROTHROMBIN TIME: CPT | Performed by: INTERNAL MEDICINE

## 2017-07-26 PROCEDURE — 85025 COMPLETE CBC W/AUTO DIFF WBC: CPT | Performed by: EMERGENCY MEDICINE

## 2017-07-26 PROCEDURE — 85730 THROMBOPLASTIN TIME PARTIAL: CPT | Performed by: INTERNAL MEDICINE

## 2017-07-26 PROCEDURE — 25010000002 HEPARIN (PORCINE) PER 1000 UNITS: Performed by: EMERGENCY MEDICINE

## 2017-07-26 RX ADMIN — HEPARIN SODIUM 11.43 UNITS/KG/HR: 10000 INJECTION, SOLUTION INTRAVENOUS at 23:22

## 2017-07-26 RX ADMIN — WARFARIN SODIUM 15 MG: 7.5 TABLET ORAL at 17:44

## 2017-07-26 RX ADMIN — FUROSEMIDE 80 MG: 80 TABLET ORAL at 09:49

## 2017-07-26 RX ADMIN — HEPARIN SODIUM 11.43 UNITS/KG/HR: 10000 INJECTION, SOLUTION INTRAVENOUS at 10:00

## 2017-07-27 LAB
APTT PPP: 84.9 SECONDS (ref 22.7–35.4)
BASOPHILS # BLD AUTO: 0.04 10*3/MM3 (ref 0–0.2)
BASOPHILS NFR BLD AUTO: 0.5 % (ref 0–1.5)
DEPRECATED RDW RBC AUTO: 49.7 FL (ref 37–54)
EOSINOPHIL # BLD AUTO: 0.28 10*3/MM3 (ref 0–0.7)
EOSINOPHIL NFR BLD AUTO: 3.4 % (ref 0.3–6.2)
ERYTHROCYTE [DISTWIDTH] IN BLOOD BY AUTOMATED COUNT: 14.1 % (ref 11.5–14.5)
HCT VFR BLD AUTO: 41.9 % (ref 40.4–52.2)
HGB BLD-MCNC: 13.9 G/DL (ref 13.7–17.6)
IMM GRANULOCYTES # BLD: 0.08 10*3/MM3 (ref 0–0.03)
IMM GRANULOCYTES NFR BLD: 1 % (ref 0–0.5)
INR PPP: 1.66 (ref 0.9–1.1)
LYMPHOCYTES # BLD AUTO: 2.55 10*3/MM3 (ref 0.9–4.8)
LYMPHOCYTES NFR BLD AUTO: 30.6 % (ref 19.6–45.3)
MCH RBC QN AUTO: 32.4 PG (ref 27–32.7)
MCHC RBC AUTO-ENTMCNC: 33.2 G/DL (ref 32.6–36.4)
MCV RBC AUTO: 97.7 FL (ref 79.8–96.2)
MONOCYTES # BLD AUTO: 0.77 10*3/MM3 (ref 0.2–1.2)
MONOCYTES NFR BLD AUTO: 9.2 % (ref 5–12)
NEUTROPHILS # BLD AUTO: 4.62 10*3/MM3 (ref 1.9–8.1)
NEUTROPHILS NFR BLD AUTO: 55.3 % (ref 42.7–76)
PLATELET # BLD AUTO: 221 10*3/MM3 (ref 140–500)
PMV BLD AUTO: 10.8 FL (ref 6–12)
PROTHROMBIN TIME: 19 SECONDS (ref 11.7–14.2)
RBC # BLD AUTO: 4.29 10*6/MM3 (ref 4.6–6)
WBC NRBC COR # BLD: 8.34 10*3/MM3 (ref 4.5–10.7)

## 2017-07-27 PROCEDURE — 85610 PROTHROMBIN TIME: CPT | Performed by: INTERNAL MEDICINE

## 2017-07-27 PROCEDURE — 85730 THROMBOPLASTIN TIME PARTIAL: CPT | Performed by: EMERGENCY MEDICINE

## 2017-07-27 PROCEDURE — 85025 COMPLETE CBC W/AUTO DIFF WBC: CPT | Performed by: EMERGENCY MEDICINE

## 2017-07-27 PROCEDURE — 25010000002 HEPARIN (PORCINE) PER 1000 UNITS: Performed by: EMERGENCY MEDICINE

## 2017-07-27 RX ADMIN — WARFARIN SODIUM 15 MG: 7.5 TABLET ORAL at 18:00

## 2017-07-27 RX ADMIN — HEPARIN SODIUM 11.43 UNITS/KG/HR: 10000 INJECTION, SOLUTION INTRAVENOUS at 14:40

## 2017-07-27 RX ADMIN — FUROSEMIDE 80 MG: 80 TABLET ORAL at 09:16

## 2017-07-28 LAB
APTT PPP: 73.4 SECONDS (ref 22.7–35.4)
BASOPHILS # BLD AUTO: 0.03 10*3/MM3 (ref 0–0.2)
BASOPHILS NFR BLD AUTO: 0.3 % (ref 0–1.5)
DEPRECATED RDW RBC AUTO: 49.5 FL (ref 37–54)
EOSINOPHIL # BLD AUTO: 0.23 10*3/MM3 (ref 0–0.7)
EOSINOPHIL NFR BLD AUTO: 2.7 % (ref 0.3–6.2)
ERYTHROCYTE [DISTWIDTH] IN BLOOD BY AUTOMATED COUNT: 13.9 % (ref 11.5–14.5)
HCT VFR BLD AUTO: 45 % (ref 40.4–52.2)
HGB BLD-MCNC: 14.8 G/DL (ref 13.7–17.6)
IMM GRANULOCYTES # BLD: 0.1 10*3/MM3 (ref 0–0.03)
IMM GRANULOCYTES NFR BLD: 1.2 % (ref 0–0.5)
INR PPP: 1.78 (ref 0.9–1.1)
LYMPHOCYTES # BLD AUTO: 2.74 10*3/MM3 (ref 0.9–4.8)
LYMPHOCYTES NFR BLD AUTO: 31.7 % (ref 19.6–45.3)
MCH RBC QN AUTO: 31.8 PG (ref 27–32.7)
MCHC RBC AUTO-ENTMCNC: 32.9 G/DL (ref 32.6–36.4)
MCV RBC AUTO: 96.8 FL (ref 79.8–96.2)
MONOCYTES # BLD AUTO: 0.79 10*3/MM3 (ref 0.2–1.2)
MONOCYTES NFR BLD AUTO: 9.1 % (ref 5–12)
NEUTROPHILS # BLD AUTO: 4.76 10*3/MM3 (ref 1.9–8.1)
NEUTROPHILS NFR BLD AUTO: 55 % (ref 42.7–76)
PLATELET # BLD AUTO: 267 10*3/MM3 (ref 140–500)
PMV BLD AUTO: 10.7 FL (ref 6–12)
PROTHROMBIN TIME: 20.1 SECONDS (ref 11.7–14.2)
RBC # BLD AUTO: 4.65 10*6/MM3 (ref 4.6–6)
WBC NRBC COR # BLD: 8.65 10*3/MM3 (ref 4.5–10.7)

## 2017-07-28 PROCEDURE — 85025 COMPLETE CBC W/AUTO DIFF WBC: CPT | Performed by: EMERGENCY MEDICINE

## 2017-07-28 PROCEDURE — 85610 PROTHROMBIN TIME: CPT | Performed by: INTERNAL MEDICINE

## 2017-07-28 PROCEDURE — 25010000002 HEPARIN (PORCINE) PER 1000 UNITS: Performed by: EMERGENCY MEDICINE

## 2017-07-28 PROCEDURE — 36415 COLL VENOUS BLD VENIPUNCTURE: CPT | Performed by: EMERGENCY MEDICINE

## 2017-07-28 PROCEDURE — 85730 THROMBOPLASTIN TIME PARTIAL: CPT | Performed by: EMERGENCY MEDICINE

## 2017-07-28 RX ORDER — WARFARIN SODIUM 10 MG/1
20 TABLET ORAL
Status: DISCONTINUED | OUTPATIENT
Start: 2017-07-28 | End: 2017-07-29

## 2017-07-28 RX ADMIN — WARFARIN SODIUM 20 MG: 10 TABLET ORAL at 18:01

## 2017-07-28 RX ADMIN — FUROSEMIDE 80 MG: 80 TABLET ORAL at 09:59

## 2017-07-28 RX ADMIN — HEPARIN SODIUM 11.43 UNITS/KG/HR: 10000 INJECTION, SOLUTION INTRAVENOUS at 03:30

## 2017-07-28 RX ADMIN — HEPARIN SODIUM 11.43 UNITS/KG/HR: 10000 INJECTION, SOLUTION INTRAVENOUS at 18:14

## 2017-07-29 LAB
APTT PPP: 105.8 SECONDS (ref 22.7–35.4)
APTT PPP: 70.6 SECONDS (ref 22.7–35.4)
BASOPHILS # BLD AUTO: 0.04 10*3/MM3 (ref 0–0.2)
BASOPHILS NFR BLD AUTO: 0.5 % (ref 0–1.5)
DEPRECATED RDW RBC AUTO: 48.5 FL (ref 37–54)
EOSINOPHIL # BLD AUTO: 0.2 10*3/MM3 (ref 0–0.7)
EOSINOPHIL NFR BLD AUTO: 2.4 % (ref 0.3–6.2)
ERYTHROCYTE [DISTWIDTH] IN BLOOD BY AUTOMATED COUNT: 13.8 % (ref 11.5–14.5)
HCT VFR BLD AUTO: 44.2 % (ref 40.4–52.2)
HGB BLD-MCNC: 14.6 G/DL (ref 13.7–17.6)
IMM GRANULOCYTES # BLD: 0.09 10*3/MM3 (ref 0–0.03)
IMM GRANULOCYTES NFR BLD: 1.1 % (ref 0–0.5)
INR PPP: 2.22 (ref 0.9–1.1)
INR PPP: 2.27 (ref 0.9–1.1)
LYMPHOCYTES # BLD AUTO: 2.39 10*3/MM3 (ref 0.9–4.8)
LYMPHOCYTES NFR BLD AUTO: 29 % (ref 19.6–45.3)
MCH RBC QN AUTO: 31.9 PG (ref 27–32.7)
MCHC RBC AUTO-ENTMCNC: 33 G/DL (ref 32.6–36.4)
MCV RBC AUTO: 96.7 FL (ref 79.8–96.2)
MONOCYTES # BLD AUTO: 0.72 10*3/MM3 (ref 0.2–1.2)
MONOCYTES NFR BLD AUTO: 8.7 % (ref 5–12)
NEUTROPHILS # BLD AUTO: 4.8 10*3/MM3 (ref 1.9–8.1)
NEUTROPHILS NFR BLD AUTO: 58.3 % (ref 42.7–76)
PLATELET # BLD AUTO: 246 10*3/MM3 (ref 140–500)
PMV BLD AUTO: 10.3 FL (ref 6–12)
PROTHROMBIN TIME: 23.9 SECONDS (ref 11.7–14.2)
PROTHROMBIN TIME: 24.3 SECONDS (ref 11.7–14.2)
RBC # BLD AUTO: 4.57 10*6/MM3 (ref 4.6–6)
WBC NRBC COR # BLD: 8.24 10*3/MM3 (ref 4.5–10.7)

## 2017-07-29 PROCEDURE — 85730 THROMBOPLASTIN TIME PARTIAL: CPT | Performed by: EMERGENCY MEDICINE

## 2017-07-29 PROCEDURE — 85730 THROMBOPLASTIN TIME PARTIAL: CPT | Performed by: INTERNAL MEDICINE

## 2017-07-29 PROCEDURE — 85610 PROTHROMBIN TIME: CPT | Performed by: INTERNAL MEDICINE

## 2017-07-29 PROCEDURE — 85025 COMPLETE CBC W/AUTO DIFF WBC: CPT | Performed by: EMERGENCY MEDICINE

## 2017-07-29 PROCEDURE — 25010000002 HEPARIN (PORCINE) PER 1000 UNITS: Performed by: EMERGENCY MEDICINE

## 2017-07-29 RX ORDER — WARFARIN SODIUM 7.5 MG/1
15 TABLET ORAL
Status: DISCONTINUED | OUTPATIENT
Start: 2017-07-29 | End: 2017-07-30 | Stop reason: HOSPADM

## 2017-07-29 RX ADMIN — FUROSEMIDE 80 MG: 80 TABLET ORAL at 10:11

## 2017-07-29 RX ADMIN — HEPARIN SODIUM 11.43 UNITS/KG/HR: 10000 INJECTION, SOLUTION INTRAVENOUS at 05:54

## 2017-07-29 RX ADMIN — WARFARIN SODIUM 15 MG: 7.5 TABLET ORAL at 18:09

## 2017-07-30 VITALS
WEIGHT: 315 LBS | HEART RATE: 83 BPM | OXYGEN SATURATION: 93 % | TEMPERATURE: 97.5 F | RESPIRATION RATE: 16 BRPM | SYSTOLIC BLOOD PRESSURE: 145 MMHG | HEIGHT: 74 IN | DIASTOLIC BLOOD PRESSURE: 68 MMHG | BODY MASS INDEX: 40.43 KG/M2

## 2017-07-30 LAB
BASOPHILS # BLD AUTO: 0.03 10*3/MM3 (ref 0–0.2)
BASOPHILS NFR BLD AUTO: 0.3 % (ref 0–1.5)
DEPRECATED RDW RBC AUTO: 49.4 FL (ref 37–54)
EOSINOPHIL # BLD AUTO: 0.21 10*3/MM3 (ref 0–0.7)
EOSINOPHIL NFR BLD AUTO: 2.3 % (ref 0.3–6.2)
ERYTHROCYTE [DISTWIDTH] IN BLOOD BY AUTOMATED COUNT: 13.9 % (ref 11.5–14.5)
HCT VFR BLD AUTO: 44.7 % (ref 40.4–52.2)
HGB BLD-MCNC: 14.7 G/DL (ref 13.7–17.6)
IMM GRANULOCYTES # BLD: 0.11 10*3/MM3 (ref 0–0.03)
IMM GRANULOCYTES NFR BLD: 1.2 % (ref 0–0.5)
INR PPP: 2.53 (ref 0.9–1.1)
LYMPHOCYTES # BLD AUTO: 2.57 10*3/MM3 (ref 0.9–4.8)
LYMPHOCYTES NFR BLD AUTO: 28.6 % (ref 19.6–45.3)
MCH RBC QN AUTO: 31.9 PG (ref 27–32.7)
MCHC RBC AUTO-ENTMCNC: 32.9 G/DL (ref 32.6–36.4)
MCV RBC AUTO: 97 FL (ref 79.8–96.2)
MONOCYTES # BLD AUTO: 0.84 10*3/MM3 (ref 0.2–1.2)
MONOCYTES NFR BLD AUTO: 9.4 % (ref 5–12)
NEUTROPHILS # BLD AUTO: 5.22 10*3/MM3 (ref 1.9–8.1)
NEUTROPHILS NFR BLD AUTO: 58.2 % (ref 42.7–76)
PLATELET # BLD AUTO: 246 10*3/MM3 (ref 140–500)
PMV BLD AUTO: 10.4 FL (ref 6–12)
PROTHROMBIN TIME: 26.4 SECONDS (ref 11.7–14.2)
RBC # BLD AUTO: 4.61 10*6/MM3 (ref 4.6–6)
WBC NRBC COR # BLD: 8.98 10*3/MM3 (ref 4.5–10.7)

## 2017-07-30 PROCEDURE — 85610 PROTHROMBIN TIME: CPT | Performed by: INTERNAL MEDICINE

## 2017-07-30 PROCEDURE — 85025 COMPLETE CBC W/AUTO DIFF WBC: CPT | Performed by: EMERGENCY MEDICINE

## 2017-07-30 RX ORDER — FUROSEMIDE 80 MG
80 TABLET ORAL DAILY
Qty: 30 TABLET | Refills: 0 | Status: SHIPPED | OUTPATIENT
Start: 2017-07-30 | End: 2019-01-23

## 2017-07-30 RX ORDER — WARFARIN SODIUM 5 MG/1
15 TABLET ORAL EVERY OTHER DAY
Qty: 90 TABLET | Refills: 1 | Status: SHIPPED | OUTPATIENT
Start: 2017-07-30 | End: 2017-10-03 | Stop reason: SDUPTHER

## 2017-07-30 RX ORDER — WARFARIN SODIUM 10 MG/1
20 TABLET ORAL EVERY OTHER DAY
Qty: 60 TABLET | Refills: 1 | Status: SHIPPED | OUTPATIENT
Start: 2017-07-30 | End: 2017-10-03 | Stop reason: SDUPTHER

## 2017-07-30 RX ADMIN — FUROSEMIDE 80 MG: 80 TABLET ORAL at 09:46

## 2017-08-01 ENCOUNTER — HOSPITAL ENCOUNTER (OUTPATIENT)
Dept: CARDIOLOGY | Facility: HOSPITAL | Age: 47
Setting detail: RECURRING SERIES
Discharge: HOME | End: 2017-08-01

## 2017-08-01 ENCOUNTER — TELEPHONE (OUTPATIENT)
Dept: CARDIOLOGY | Facility: CLINIC | Age: 47
End: 2017-08-01

## 2017-08-01 PROCEDURE — 85610 PROTHROMBIN TIME: CPT

## 2017-08-01 PROCEDURE — 36416 COLLJ CAPILLARY BLOOD SPEC: CPT

## 2017-08-01 NOTE — TELEPHONE ENCOUNTER
----- Message from Jackson Moffett MD sent at 7/30/2017 11:36 AM EDT -----  Pt needs four week f/u with Dr. Baeza at main office.    marcelle herbert

## 2017-08-08 ENCOUNTER — HOSPITAL ENCOUNTER (OUTPATIENT)
Dept: CARDIOLOGY | Facility: HOSPITAL | Age: 47
Setting detail: RECURRING SERIES
Discharge: HOME | End: 2017-08-08

## 2017-08-08 PROCEDURE — 36416 COLLJ CAPILLARY BLOOD SPEC: CPT

## 2017-08-08 PROCEDURE — 85610 PROTHROMBIN TIME: CPT

## 2017-08-15 ENCOUNTER — HOSPITAL ENCOUNTER (OUTPATIENT)
Dept: CARDIOLOGY | Facility: HOSPITAL | Age: 47
Setting detail: RECURRING SERIES
Discharge: HOME | End: 2017-08-15

## 2017-08-15 PROCEDURE — 85610 PROTHROMBIN TIME: CPT

## 2017-08-15 PROCEDURE — 36416 COLLJ CAPILLARY BLOOD SPEC: CPT

## 2017-08-16 ENCOUNTER — OFFICE VISIT (OUTPATIENT)
Dept: CARDIOLOGY | Facility: CLINIC | Age: 47
End: 2017-08-16

## 2017-08-16 ENCOUNTER — RESEARCH ENCOUNTER (OUTPATIENT)
Dept: CARDIOLOGY | Facility: CLINIC | Age: 47
End: 2017-08-16

## 2017-08-16 VITALS
HEART RATE: 69 BPM | SYSTOLIC BLOOD PRESSURE: 148 MMHG | BODY MASS INDEX: 40.43 KG/M2 | WEIGHT: 315 LBS | DIASTOLIC BLOOD PRESSURE: 84 MMHG | HEIGHT: 74 IN | OXYGEN SATURATION: 98 %

## 2017-08-16 DIAGNOSIS — E66.01 MORBID OBESITY DUE TO EXCESS CALORIES (HCC): ICD-10-CM

## 2017-08-16 DIAGNOSIS — I26.99 BILATERAL PULMONARY EMBOLISM (HCC): Primary | ICD-10-CM

## 2017-08-16 PROCEDURE — 99213 OFFICE O/P EST LOW 20 MIN: CPT | Performed by: INTERNAL MEDICINE

## 2017-08-16 RX ORDER — ALLOPURINOL 100 MG/1
100 TABLET ORAL DAILY
COMMUNITY
End: 2019-01-23 | Stop reason: SDDI

## 2017-08-16 NOTE — RESEARCH
[facility]  [address]  [place] [address]  [phone]    Research Note:   Flare Study  Patient Information:  Kameron Melendez  :  1970  Study ID#:  [id number]  Study Initials:  S-S        S-S was in the office for his scheduled appointment with Dr. Coulter.  This visit included his 30-day follow-up for the FLARE study. Per Dr. Coulter, there have been no events to report in follow-up. His vital signs were as follows:  /83; HR 69; O2 Sat 98% on room air. I collected the required information for the study and recorded it in his CRF. At this time, all study activities have been completed per protocol and  SBhavaniS is considered existed from the study. He had no additional questions or concerns at this time.  S-S will follow-up in the office as instructed per Dr. Coulter.          Alina Hopson, RN  Research RN Coordinator

## 2017-08-20 PROCEDURE — 93000 ELECTROCARDIOGRAM COMPLETE: CPT | Performed by: INTERNAL MEDICINE

## 2017-08-20 NOTE — PROGRESS NOTES
Subjective:     Encounter Date:08/16/2017      Patient ID: Kameron Melendez is a 47 y.o. male.    Chief Complaint: bilateral PE, morbid obesity.    History of Present Illness    Dear Dr. Tran,     I had the pleasure of seeing your patient in cardiac followup today.  As you well know, he is a maria g 47-year-old man with history of morbid obesity.  He was diagnosed with bilateral pulmonary emboli with right heart strain.  He underwent pulmonary thrombectomy as part of the FLARE study.  He had adjunctive thrombolytic infusion due to residual thrombus.      He returns for his 30 day followup.  Since I have last seen him, he reports doing great.  He is interested in getting back into exercise.  He has only mild residual dyspnea.  His oxygen saturation on room air is 98%.        Review of Systems   All other systems reviewed and are negative.        ECG 12 Lead  Date/Time: 8/20/2017 6:45 PM  Performed by: SHAUN GUTIERREZ  Authorized by: SHAUN GUTIERREZ   Comparison: compared with previous ECG   Similar to previous ECG  Rhythm: sinus rhythm  BPM: 69  Conduction: 1st degree  QRS axis: left                 Objective:     Physical Exam   Constitutional: He is oriented to person, place, and time. He appears well-developed and well-nourished.   HENT:   Head: Normocephalic and atraumatic.   Neck: Normal range of motion. Neck supple.   Cardiovascular: Normal rate, regular rhythm and normal heart sounds.    Pulmonary/Chest: Effort normal and breath sounds normal.   Abdominal: Soft. Bowel sounds are normal.   Musculoskeletal: Normal range of motion.   Neurological: He is alert and oriented to person, place, and time.   Skin: Skin is warm and dry.   Psychiatric: He has a normal mood and affect. His behavior is normal. Thought content normal.   Vitals reviewed.      Lab Review:       Assessment:          Diagnosis Plan   1. Bilateral pulmonary embolism     2. Morbid obesity due to excess calories            Plan:       It was a pleasure  to see your patient in cardiac follow-up today.  He is doing great after his treatment for submassive pulmonary embolism.  He remains on warfarin therapy, which I think is best given his large body habitus.      He is very motivated to lose weight.  He has been able to lose quite a bit of weight already and will continue to do so.  In three months, he will return for an echocardiogram and lower extremity Doppler to assess for resolution of his right heart strain and deep venous thrombosis.

## 2017-08-22 ENCOUNTER — HOSPITAL ENCOUNTER (OUTPATIENT)
Dept: CARDIOLOGY | Facility: HOSPITAL | Age: 47
Setting detail: RECURRING SERIES
Discharge: HOME | End: 2017-08-22

## 2017-08-22 PROCEDURE — 36416 COLLJ CAPILLARY BLOOD SPEC: CPT

## 2017-08-22 PROCEDURE — 85610 PROTHROMBIN TIME: CPT

## 2017-08-29 ENCOUNTER — HOSPITAL ENCOUNTER (OUTPATIENT)
Dept: CARDIOLOGY | Facility: HOSPITAL | Age: 47
Setting detail: RECURRING SERIES
Discharge: HOME | End: 2017-08-29

## 2017-08-29 ENCOUNTER — OFFICE VISIT (OUTPATIENT)
Dept: CARDIOLOGY | Facility: CLINIC | Age: 47
End: 2017-08-29

## 2017-08-29 VITALS
HEIGHT: 74 IN | SYSTOLIC BLOOD PRESSURE: 120 MMHG | DIASTOLIC BLOOD PRESSURE: 82 MMHG | HEART RATE: 58 BPM | BODY MASS INDEX: 40.43 KG/M2 | WEIGHT: 315 LBS

## 2017-08-29 DIAGNOSIS — I26.99 BILATERAL PULMONARY EMBOLISM (HCC): Primary | ICD-10-CM

## 2017-08-29 DIAGNOSIS — I89.0 LYMPHEDEMA OF BOTH LOWER EXTREMITIES: ICD-10-CM

## 2017-08-29 DIAGNOSIS — I51.7 RIGHT VENTRICULAR ENLARGEMENT: ICD-10-CM

## 2017-08-29 DIAGNOSIS — I27.20 PULMONARY HYPERTENSION (HCC): ICD-10-CM

## 2017-08-29 PROBLEM — E66.01 MORBID OBESITY DUE TO EXCESS CALORIES (HCC): Status: ACTIVE | Noted: 2017-08-29

## 2017-08-29 PROCEDURE — 99212 OFFICE O/P EST SF 10 MIN: CPT | Performed by: INTERNAL MEDICINE

## 2017-08-29 PROCEDURE — 36416 COLLJ CAPILLARY BLOOD SPEC: CPT

## 2017-08-29 PROCEDURE — 85610 PROTHROMBIN TIME: CPT

## 2017-08-29 PROCEDURE — 93000 ELECTROCARDIOGRAM COMPLETE: CPT | Performed by: INTERNAL MEDICINE

## 2017-08-29 NOTE — PROGRESS NOTES
Subjective:     Encounter Date:08/29/2017      Patient ID: Kameron Melendez is a 47 y.o. male.    Chief Complaint:  History of Present Illness    This is a 47-year-old male with a history of chronic lymphedema, dyslipidemia, morbid obesity, recent bilateral pulmonary emboli with right heart strain status post pulmonary thrombectomy and localized thrombolytic infusion who presents for follow-up.  As the patient initially when he was admitted on 7/17/2017 following complaints of exertional dyspnea and chest tightness.  In the emergency room he underwent a CT angiogram that showed bilateral pulmonary emboli and right ventricular enlargement.  His BNP was elevated although his troponin was negative.  He did have an echocardiogram that confirmed a severely enlarged right ventricle with decreased function and mild to moderate pulmonary hypertension.  He was started on a heparin drip on admission.  Based on the evidence of right heart strain and the significant thrombus burden I discussed proceeding with catheter-based therapy.  Decided to proceed with thrombectomy the following day.  This was performed by Dr. Coulter on 7/18/2017.  He underwent left pulmonary artery thrombectomy and local infusion of 20 mg of TPA.  His pre-thrombectomy hemodynamics showed a pulmonary artery systolic pressure of 71 mmHg.  His post-thrombectomy pulmonary artery systolic pressure was 46 mmHg.  A repeat CT scan was performed on 7/20/2017 showing removal of saddle embolus on the right, diminished clot in the right inferior pulmonary artery, with residual clot noted in the left side.  His right ventricular to left ventricular ratio also had improved.  Following the thrombectomy the patient was kept in the hospital while awaiting for him to be therapeutic on warfarin therapy.  Warfarin was chosen due to the patient's body habitus.    Following his discharge she followed up with Dr. Coulter on 8/16/2017 at which time he was feeling well overall.  He was  cleared to go back to exercising.  Today percent for follow-up with me.  He denies any chest pain, PND or orthopnea, presyncope or syncope, or palpitations.  He is back to doing some walking and does report some shortness of breath but feels that this is due to some deconditioning.  This has been improving as he continues to exercise.  He is getting his warfarin followed through our anticoagulation clinic.      Review of Systems   Constitution: Negative for weakness and malaise/fatigue.   HENT: Negative for headaches, hearing loss, hoarse voice, nosebleeds and sore throat.    Eyes: Negative for pain.   Cardiovascular: Positive for dyspnea on exertion and leg swelling. Negative for chest pain, claudication, cyanosis, irregular heartbeat, near-syncope, orthopnea, palpitations, paroxysmal nocturnal dyspnea and syncope.   Respiratory: Negative for shortness of breath and snoring.    Endocrine: Negative for cold intolerance, heat intolerance, polydipsia, polyphagia and polyuria.   Skin: Negative for itching and rash.   Musculoskeletal: Negative for arthritis, falls, joint pain, joint swelling, muscle cramps, muscle weakness and myalgias.   Gastrointestinal: Negative for constipation, diarrhea, dysphagia, heartburn, hematemesis, hematochezia, melena, nausea and vomiting.   Genitourinary: Negative for frequency, hematuria and hesitancy.   Neurological: Negative for excessive daytime sleepiness, dizziness, light-headedness and numbness.   Psychiatric/Behavioral: Negative for depression. The patient is not nervous/anxious.           Current Outpatient Prescriptions:   •  allopurinol (ZYLOPRIM) 100 MG tablet, Take 100 mg by mouth Daily., Disp: , Rfl:   •  atorvastatin (LIPITOR) 20 MG tablet, Take 20 mg by mouth Daily., Disp: , Rfl:   •  furosemide (LASIX) 80 MG tablet, Take 1 tablet by mouth Daily. (Patient taking differently: TAKES 80MG IN AM & 40MG IN PM), Disp: 30 tablet, Rfl: 0  •  potassium chloride (MICRO-K) 10 MEQ CR  "capsule, Take 10 mEq by mouth 2 (Two) Times a Day., Disp: , Rfl:   •  warfarin (COUMADIN) 10 MG tablet, Take 2 tablets by mouth Every Other Day. Alternate with the 15mg dose, Disp: 60 tablet, Rfl: 1  •  warfarin (COUMADIN) 5 MG tablet, Take 3 tablets by mouth Every Other Day. Alternate with the 20mg dose, Disp: 90 tablet, Rfl: 1    Past Medical History:   Diagnosis Date   • Lymphedema    • Lymphedema of left lower extremity    • Obesity    • ARNOLDO (obstructive sleep apnea)    • Pulmonary embolism    • Pulmonary hypertension    • Right ventricular enlargement      Past Surgical History:   Procedure Laterality Date   • CARDIAC CATHETERIZATION Bilateral 7/18/2017    Procedure: Pulmonary angiography- In[facility] ;  Surgeon: Cedric Coulter MD;  Location:  [facility];  Service:    • CARDIAC CATHETERIZATION N/A 7/18/2017    Procedure: Right Heart Cath;  Surgeon: Cedric Coulter MD;  Location:  [facility];  Service:      History reviewed. No pertinent family history.  Social History   Substance Use Topics   • Smoking status: Never Smoker   • Smokeless tobacco: None   • Alcohol use No           ECG 12 Lead  Date/Time: 8/29/2017 4:46 PM  Performed by: DIMITRY SCHNEIDER  Authorized by: DIMITRY SCHNEIDER   Comparison: compared with previous ECG   Rhythm: sinus rhythm  Conduction: 1st degree  QRS axis: left                 Objective:         Visit Vitals   • /82 (BP Location: Left arm, Patient Position: Sitting)   • Pulse 58   • Ht 74\" (188 cm)   • Wt (!) 525 lb (238 kg)   • BMI 67.41 kg/m2          Physical Exam   Constitutional: He is oriented to person, place, and time. He appears well-developed and well-nourished.   HENT:   Head: Normocephalic and atraumatic.   Eyes: Conjunctivae, EOM and lids are normal. Pupils are equal, round, and reactive to light.   Neck: Normal range of motion and full passive range of motion without pain. Neck supple. No JVD present. Carotid bruit is not present.   Cardiovascular: " Normal rate, regular rhythm, S1 normal and S2 normal.  Exam reveals no gallop.    No murmur heard.  Pulses:       Radial pulses are 2+ on the right side, and 2+ on the left side.   Chronic bilateral lower extremity edema   Pulmonary/Chest: Effort normal and breath sounds normal.   Abdominal: Soft. Normal appearance.   Lymphadenopathy:     He has no cervical adenopathy.   Neurological: He is alert and oriented to person, place, and time.   Skin: Skin is warm, dry and intact.   Psychiatric: He has a normal mood and affect.       Lab Review:       Assessment:          Diagnosis Plan   1. Bilateral pulmonary embolism     2. Right ventricular enlargement     3. Pulmonary hypertension     4. Lymphedema of both lower extremities            Plan:         1.  Bilateral pulmonary emboli.  A status post pulmonary artery thrombectomy.  He continues to improve from this standpoint.  He remains on warfarin therapy which is followed through our anticoagulation clinic.  He already has follow-up scheduled with Dr. Coulter with a repeat echocardiogram in November.  2.  Pulmonary hypertension.  We'll follow-up on this and his repeat echocardiogram.  3.  Right ventricular enlargement. Due to #1.  4.  Bilateral lower extremity lymphedema    Will follow up as per Dr. Coulter's recommendations after their next follow up.

## 2017-09-05 ENCOUNTER — HOSPITAL ENCOUNTER (OUTPATIENT)
Dept: CARDIOLOGY | Facility: HOSPITAL | Age: 47
Setting detail: RECURRING SERIES
Discharge: HOME | End: 2017-09-05

## 2017-09-05 PROCEDURE — 85610 PROTHROMBIN TIME: CPT

## 2017-09-05 PROCEDURE — 36416 COLLJ CAPILLARY BLOOD SPEC: CPT

## 2017-09-19 ENCOUNTER — HOSPITAL ENCOUNTER (OUTPATIENT)
Dept: CARDIOLOGY | Facility: HOSPITAL | Age: 47
Setting detail: RECURRING SERIES
Discharge: HOME | End: 2017-09-19

## 2017-09-19 PROCEDURE — 85610 PROTHROMBIN TIME: CPT

## 2017-09-19 PROCEDURE — 36416 COLLJ CAPILLARY BLOOD SPEC: CPT

## 2017-10-03 ENCOUNTER — HOSPITAL ENCOUNTER (OUTPATIENT)
Dept: CARDIOLOGY | Facility: HOSPITAL | Age: 47
Setting detail: RECURRING SERIES
Discharge: HOME | End: 2017-10-03

## 2017-10-03 PROCEDURE — 36416 COLLJ CAPILLARY BLOOD SPEC: CPT

## 2017-10-03 PROCEDURE — 85610 PROTHROMBIN TIME: CPT

## 2017-10-03 RX ORDER — WARFARIN SODIUM 5 MG/1
15 TABLET ORAL EVERY OTHER DAY
Qty: 240 TABLET | Refills: 0 | Status: SHIPPED | OUTPATIENT
Start: 2017-10-03 | End: 2018-07-22 | Stop reason: SDUPTHER

## 2017-10-03 RX ORDER — WARFARIN SODIUM 10 MG/1
20 TABLET ORAL EVERY OTHER DAY
Qty: 180 TABLET | Refills: 0 | Status: SHIPPED | OUTPATIENT
Start: 2017-10-03 | End: 2018-04-21 | Stop reason: SDUPTHER

## 2017-10-31 ENCOUNTER — HOSPITAL ENCOUNTER (OUTPATIENT)
Dept: CARDIOLOGY | Facility: HOSPITAL | Age: 47
Setting detail: RECURRING SERIES
Discharge: HOME | End: 2017-10-31

## 2017-10-31 PROCEDURE — 85610 PROTHROMBIN TIME: CPT

## 2017-10-31 PROCEDURE — 36416 COLLJ CAPILLARY BLOOD SPEC: CPT

## 2017-11-06 ENCOUNTER — OFFICE VISIT (OUTPATIENT)
Dept: CARDIOLOGY | Facility: CLINIC | Age: 47
End: 2017-11-06

## 2017-11-06 VITALS
SYSTOLIC BLOOD PRESSURE: 142 MMHG | HEART RATE: 67 BPM | BODY MASS INDEX: 40.43 KG/M2 | DIASTOLIC BLOOD PRESSURE: 80 MMHG | HEIGHT: 74 IN | WEIGHT: 315 LBS

## 2017-11-06 DIAGNOSIS — I27.20 PULMONARY HYPERTENSION (HCC): ICD-10-CM

## 2017-11-06 DIAGNOSIS — E66.01 MORBID OBESITY DUE TO EXCESS CALORIES (HCC): ICD-10-CM

## 2017-11-06 DIAGNOSIS — I26.99 BILATERAL PULMONARY EMBOLISM (HCC): Primary | ICD-10-CM

## 2017-11-06 DIAGNOSIS — I82.419 DVT OF DEEP FEMORAL VEIN, UNSPECIFIED LATERALITY (HCC): ICD-10-CM

## 2017-11-06 PROCEDURE — 99213 OFFICE O/P EST LOW 20 MIN: CPT | Performed by: INTERNAL MEDICINE

## 2017-11-06 PROCEDURE — 93000 ELECTROCARDIOGRAM COMPLETE: CPT | Performed by: INTERNAL MEDICINE

## 2017-11-06 NOTE — PROGRESS NOTES
Subjective:     Encounter Date:11/06/2017      Patient ID: Kameron Melendez is a 47 y.o. male.    Chief Complaint: PE, DVT    History of Present Illness    Dear Dr. Tran:    I had the pleasure of seeing the patient in cardiac follow-up today.  As you well know, he is a maria g, 47-year-old man with history of morbid obesity.  He presented to the hospital with bilateral pulmonary emboli with right heart strain after a trip to [place].      He was treated with pulmonary thrombectomy as well as adjunctive thrombolysis.  He was placed on warfarin therapy due to large body size.     Since I have last seen him three months ago, he reports doing much better.  He has no complaints of dyspnea.  He is able to get around without much limitation.  He is planning to go back to [place] as well as [place] by car.        Review of Systems   All other systems reviewed and are negative.        ECG 12 Lead  Date/Time: 11/6/2017 12:44 PM  Performed by: SHAUN GUTIERREZ  Authorized by: SHAUN GUTIERREZ   Comparison: compared with previous ECG   Similar to previous ECG  Rhythm: sinus rhythm  BPM: 67  Conduction: 1st degree  QRS axis: left  Other findings: PRWP               Objective:     Physical Exam   Constitutional: He is oriented to person, place, and time. He appears well-developed and well-nourished.   HENT:   Head: Normocephalic and atraumatic.   Neck: Normal range of motion. Neck supple.   Cardiovascular: Normal rate, regular rhythm and normal heart sounds.    Pulmonary/Chest: Effort normal and breath sounds normal.   Abdominal: Soft. Bowel sounds are normal.   Musculoskeletal: Normal range of motion.   Neurological: He is alert and oriented to person, place, and time.   Skin: Skin is warm and dry.   Psychiatric: He has a normal mood and affect. His behavior is normal. Thought content normal.   Vitals reviewed.      Lab Review:       Assessment:          Diagnosis Plan   1. Bilateral pulmonary embolism  Adult Transthoracic Echo  Complete W/ Cont if Necessary Per Protocol   2. Pulmonary hypertension  Adult Transthoracic Echo Complete W/ Cont if Necessary Per Protocol   3. Morbid obesity due to excess calories  Adult Transthoracic Echo Complete W/ Cont if Necessary Per Protocol   4. DVT of deep femoral vein, unspecified laterality  Duplex Venous Lower Extremity - Bilateral CAR          Plan:       It was a pleasure to see your patient in cardiac follow-up today.  He is doing well from the cardiac standpoint without any complaints of dyspnea.  I will check an echocardiogram to assess recovery of his right ventricular function after treatment.  He will get lower extremity Dopplers to assess for resolution of his deep venous thrombosis.    If his symptoms have resolved completely and there is no residual clot, I would consider stopping his anticoagulation after a total of one year of therapy.  If there is any residual clot or suspicion that he has continued risk then we can recommend lifelong therapy.    I will see him again in six months or sooner if symptoms warrant.

## 2017-11-14 ENCOUNTER — HOSPITAL ENCOUNTER (OUTPATIENT)
Dept: CARDIOLOGY | Facility: HOSPITAL | Age: 47
Discharge: HOME | End: 2017-11-14
Attending: INTERNAL MEDICINE

## 2017-11-14 ENCOUNTER — HOSPITAL ENCOUNTER (OUTPATIENT)
Dept: CARDIOLOGY | Facility: HOSPITAL | Age: 47
Discharge: HOME | End: 2017-11-14
Attending: INTERNAL MEDICINE | Admitting: INTERNAL MEDICINE

## 2017-11-14 VITALS — WEIGHT: 315 LBS | HEIGHT: 74 IN | BODY MASS INDEX: 40.43 KG/M2

## 2017-11-14 DIAGNOSIS — E66.01 MORBID OBESITY DUE TO EXCESS CALORIES (HCC): ICD-10-CM

## 2017-11-14 DIAGNOSIS — I82.419 DVT OF DEEP FEMORAL VEIN, UNSPECIFIED LATERALITY (HCC): ICD-10-CM

## 2017-11-14 DIAGNOSIS — I27.20 PULMONARY HYPERTENSION (HCC): ICD-10-CM

## 2017-11-14 DIAGNOSIS — I26.99 BILATERAL PULMONARY EMBOLISM (HCC): ICD-10-CM

## 2017-11-14 LAB
BH CV ECHO MEAS - ACS: 2.4 CM
BH CV ECHO MEAS - AO MAX PG (FULL): 2.8 MMHG
BH CV ECHO MEAS - AO MAX PG: 6.9 MMHG
BH CV ECHO MEAS - AO MEAN PG (FULL): 2.5 MMHG
BH CV ECHO MEAS - AO MEAN PG: 4.9 MMHG
BH CV ECHO MEAS - AO ROOT AREA (BSA CORRECTED): 1.1
BH CV ECHO MEAS - AO ROOT AREA: 10.2 CM^2
BH CV ECHO MEAS - AO ROOT DIAM: 3.6 CM
BH CV ECHO MEAS - AO V2 MAX: 131.7 CM/SEC
BH CV ECHO MEAS - AO V2 MEAN: 105.2 CM/SEC
BH CV ECHO MEAS - AO V2 VTI: 29.6 CM
BH CV ECHO MEAS - AVA(I,A): 3.4 CM^2
BH CV ECHO MEAS - AVA(I,D): 3.4 CM^2
BH CV ECHO MEAS - AVA(V,A): 3.7 CM^2
BH CV ECHO MEAS - AVA(V,D): 3.7 CM^2
BH CV ECHO MEAS - BSA(HAYCOCK): 3.7 M^2
BH CV ECHO MEAS - BSA: 3.3 M^2
BH CV ECHO MEAS - BZI_BMI: 67.4 KILOGRAMS/M^2
BH CV ECHO MEAS - BZI_METRIC_HEIGHT: 188 CM
BH CV ECHO MEAS - BZI_METRIC_WEIGHT: 238.1 KG
BH CV ECHO MEAS - CONTRAST EF (2CH): 67 ML/M^2
BH CV ECHO MEAS - CONTRAST EF 4CH: 66.4 ML/M^2
BH CV ECHO MEAS - EDV(MOD-SP2): 115 ML
BH CV ECHO MEAS - EDV(MOD-SP4): 113 ML
BH CV ECHO MEAS - EDV(TEICH): 259.1 ML
BH CV ECHO MEAS - EF(CUBED): 75.3 %
BH CV ECHO MEAS - EF(MOD-SP2): 67 %
BH CV ECHO MEAS - EF(MOD-SP4): 66.4 %
BH CV ECHO MEAS - EF(TEICH): 65.8 %
BH CV ECHO MEAS - ESV(MOD-SP2): 38 ML
BH CV ECHO MEAS - ESV(MOD-SP4): 38 ML
BH CV ECHO MEAS - ESV(TEICH): 88.7 ML
BH CV ECHO MEAS - FS: 37.2 %
BH CV ECHO MEAS - IVS/LVPW: 1
BH CV ECHO MEAS - IVSD: 1.3 CM
BH CV ECHO MEAS - LAT PEAK E' VEL: 11 CM/SEC
BH CV ECHO MEAS - LV DIASTOLIC VOL/BSA (35-75): 34.5 ML/M^2
BH CV ECHO MEAS - LV MASS(C)D: 450.3 GRAMS
BH CV ECHO MEAS - LV MASS(C)DI: 137.5 GRAMS/M^2
BH CV ECHO MEAS - LV MAX PG: 4.1 MMHG
BH CV ECHO MEAS - LV MEAN PG: 2.4 MMHG
BH CV ECHO MEAS - LV SYSTOLIC VOL/BSA (12-30): 11.6 ML/M^2
BH CV ECHO MEAS - LV V1 MAX: 101.4 CM/SEC
BH CV ECHO MEAS - LV V1 MEAN: 71.9 CM/SEC
BH CV ECHO MEAS - LV V1 VTI: 21 CM
BH CV ECHO MEAS - LVIDD: 7 CM
BH CV ECHO MEAS - LVIDS: 4.4 CM
BH CV ECHO MEAS - LVLD AP2: 8.6 CM
BH CV ECHO MEAS - LVLD AP4: 8 CM
BH CV ECHO MEAS - LVLS AP2: 6.4 CM
BH CV ECHO MEAS - LVLS AP4: 6.3 CM
BH CV ECHO MEAS - LVOT AREA (M): 4.9 CM^2
BH CV ECHO MEAS - LVOT AREA: 4.8 CM^2
BH CV ECHO MEAS - LVOT DIAM: 2.5 CM
BH CV ECHO MEAS - LVPWD: 1.3 CM
BH CV ECHO MEAS - MED PEAK E' VEL: 8 CM/SEC
BH CV ECHO MEAS - MV A DUR: 0.14 SEC
BH CV ECHO MEAS - MV A MAX VEL: 95.1 CM/SEC
BH CV ECHO MEAS - MV DEC SLOPE: 233.7 CM/SEC^2
BH CV ECHO MEAS - MV DEC TIME: 0.31 SEC
BH CV ECHO MEAS - MV E MAX VEL: 73.7 CM/SEC
BH CV ECHO MEAS - MV E/A: 0.78
BH CV ECHO MEAS - MV MAX PG: 3.3 MMHG
BH CV ECHO MEAS - MV MEAN PG: 2.1 MMHG
BH CV ECHO MEAS - MV P1/2T MAX VEL: 73.7 CM/SEC
BH CV ECHO MEAS - MV P1/2T: 92.4 MSEC
BH CV ECHO MEAS - MV V2 MAX: 91.2 CM/SEC
BH CV ECHO MEAS - MV V2 MEAN: 70.1 CM/SEC
BH CV ECHO MEAS - MV V2 VTI: 26.9 CM
BH CV ECHO MEAS - MVA P1/2T LCG: 3 CM^2
BH CV ECHO MEAS - MVA(P1/2T): 2.4 CM^2
BH CV ECHO MEAS - MVA(VTI): 3.8 CM^2
BH CV ECHO MEAS - PA MAX PG (FULL): 6.2 MMHG
BH CV ECHO MEAS - PA MAX PG: 10.6 MMHG
BH CV ECHO MEAS - PA V2 MAX: 162.9 CM/SEC
BH CV ECHO MEAS - PULM A REVS DUR: 0.11 SEC
BH CV ECHO MEAS - PULM A REVS VEL: 30.1 CM/SEC
BH CV ECHO MEAS - PULM DIAS VEL: 48.2 CM/SEC
BH CV ECHO MEAS - PULM S/D: 1.3
BH CV ECHO MEAS - PULM SYS VEL: 63.9 CM/SEC
BH CV ECHO MEAS - PVA(V,A): 2.5 CM^2
BH CV ECHO MEAS - PVA(V,D): 2.5 CM^2
BH CV ECHO MEAS - QP/QS: 0.68
BH CV ECHO MEAS - RV MAX PG: 4.4 MMHG
BH CV ECHO MEAS - RV MEAN PG: 2.1 MMHG
BH CV ECHO MEAS - RV V1 MAX: 104.8 CM/SEC
BH CV ECHO MEAS - RV V1 MEAN: 65.2 CM/SEC
BH CV ECHO MEAS - RV V1 VTI: 17.7 CM
BH CV ECHO MEAS - RVOT AREA: 3.9 CM^2
BH CV ECHO MEAS - RVOT DIAM: 2.2 CM
BH CV ECHO MEAS - SI(AO): 91.9 ML/M^2
BH CV ECHO MEAS - SI(CUBED): 80.3 ML/M^2
BH CV ECHO MEAS - SI(LVOT): 30.9 ML/M^2
BH CV ECHO MEAS - SI(MOD-SP2): 23.5 ML/M^2
BH CV ECHO MEAS - SI(MOD-SP4): 22.9 ML/M^2
BH CV ECHO MEAS - SI(TEICH): 52 ML/M^2
BH CV ECHO MEAS - SUP REN AO DIAM: 2 CM
BH CV ECHO MEAS - SV(AO): 300.8 ML
BH CV ECHO MEAS - SV(CUBED): 263.1 ML
BH CV ECHO MEAS - SV(LVOT): 101.2 ML
BH CV ECHO MEAS - SV(MOD-SP2): 77 ML
BH CV ECHO MEAS - SV(MOD-SP4): 75 ML
BH CV ECHO MEAS - SV(RVOT): 68.4 ML
BH CV ECHO MEAS - SV(TEICH): 170.4 ML
BH CV ECHO MEAS - TAPSE (>1.6): 3.2 CM2
BH CV LOWER VASCULAR LEFT COMMON FEMORAL AUGMENT: NORMAL
BH CV LOWER VASCULAR LEFT COMMON FEMORAL COMPETENT: NORMAL
BH CV LOWER VASCULAR LEFT COMMON FEMORAL COMPRESS: NORMAL
BH CV LOWER VASCULAR LEFT COMMON FEMORAL PHASIC: NORMAL
BH CV LOWER VASCULAR LEFT COMMON FEMORAL SPONT: NORMAL
BH CV LOWER VASCULAR LEFT MID FEMORAL AUGMENT: NORMAL
BH CV LOWER VASCULAR LEFT MID FEMORAL COMPETENT: NORMAL
BH CV LOWER VASCULAR LEFT MID FEMORAL COMPRESS: NORMAL
BH CV LOWER VASCULAR LEFT MID FEMORAL PHASIC: NORMAL
BH CV LOWER VASCULAR LEFT MID FEMORAL SPONT: NORMAL
BH CV LOWER VASCULAR LEFT POPLITEAL AUGMENT: NORMAL
BH CV LOWER VASCULAR LEFT POPLITEAL COMPETENT: NORMAL
BH CV LOWER VASCULAR LEFT POPLITEAL COMPRESS: NORMAL
BH CV LOWER VASCULAR LEFT POPLITEAL PHASIC: NORMAL
BH CV LOWER VASCULAR LEFT POPLITEAL SPONT: NORMAL
BH CV LOWER VASCULAR LEFT POSTERIOR TIBIAL COMPRESS: NORMAL
BH CV LOWER VASCULAR LEFT POSTERIOR TIBIAL PHASIC: NORMAL
BH CV LOWER VASCULAR LEFT POSTERIOR TIBIAL SPONT: NORMAL
BH CV LOWER VASCULAR LEFT PROFUNDA FEMORAL AUGMENT: NORMAL
BH CV LOWER VASCULAR LEFT PROFUNDA FEMORAL COMPETENT: NORMAL
BH CV LOWER VASCULAR LEFT PROFUNDA FEMORAL COMPRESS: NORMAL
BH CV LOWER VASCULAR LEFT PROFUNDA FEMORAL PHASIC: NORMAL
BH CV LOWER VASCULAR LEFT PROFUNDA FEMORAL SPONT: NORMAL
BH CV LOWER VASCULAR LEFT PROXIMAL FEMORAL AUGMENT: NORMAL
BH CV LOWER VASCULAR LEFT PROXIMAL FEMORAL COMPETENT: NORMAL
BH CV LOWER VASCULAR LEFT PROXIMAL FEMORAL COMPRESS: NORMAL
BH CV LOWER VASCULAR LEFT PROXIMAL FEMORAL PHASIC: NORMAL
BH CV LOWER VASCULAR LEFT PROXIMAL FEMORAL SPONT: NORMAL
BH CV LOWER VASCULAR LEFT SAPHENOFEMORAL JUNCTION AUGMENT: NORMAL
BH CV LOWER VASCULAR LEFT SAPHENOFEMORAL JUNCTION COMPETENT: NORMAL
BH CV LOWER VASCULAR LEFT SAPHENOFEMORAL JUNCTION COMPRESS: NORMAL
BH CV LOWER VASCULAR LEFT SAPHENOFEMORAL JUNCTION PHASIC: NORMAL
BH CV LOWER VASCULAR LEFT SAPHENOFEMORAL JUNCTION SPONT: NORMAL
BH CV LOWER VASCULAR RIGHT COMMON FEMORAL AUGMENT: NORMAL
BH CV LOWER VASCULAR RIGHT COMMON FEMORAL COMPETENT: NORMAL
BH CV LOWER VASCULAR RIGHT COMMON FEMORAL COMPRESS: NORMAL
BH CV LOWER VASCULAR RIGHT COMMON FEMORAL PHASIC: NORMAL
BH CV LOWER VASCULAR RIGHT COMMON FEMORAL SPONT: NORMAL
BH CV LOWER VASCULAR RIGHT MID FEMORAL AUGMENT: NORMAL
BH CV LOWER VASCULAR RIGHT MID FEMORAL COMPETENT: NORMAL
BH CV LOWER VASCULAR RIGHT MID FEMORAL COMPRESS: NORMAL
BH CV LOWER VASCULAR RIGHT MID FEMORAL PHASIC: NORMAL
BH CV LOWER VASCULAR RIGHT MID FEMORAL SPONT: NORMAL
BH CV LOWER VASCULAR RIGHT POPLITEAL AUGMENT: NORMAL
BH CV LOWER VASCULAR RIGHT POPLITEAL COMPETENT: NORMAL
BH CV LOWER VASCULAR RIGHT POPLITEAL COMPRESS: NORMAL
BH CV LOWER VASCULAR RIGHT POPLITEAL PHASIC: NORMAL
BH CV LOWER VASCULAR RIGHT POPLITEAL SPONT: NORMAL
BH CV LOWER VASCULAR RIGHT POSTERIOR TIBIAL COMPRESS: NORMAL
BH CV LOWER VASCULAR RIGHT POSTERIOR TIBIAL PHASIC: NORMAL
BH CV LOWER VASCULAR RIGHT POSTERIOR TIBIAL SPONT: NORMAL
BH CV LOWER VASCULAR RIGHT PROFUNDA FEMORAL COMPRESS: NORMAL
BH CV LOWER VASCULAR RIGHT PROXIMAL FEMORAL AUGMENT: NORMAL
BH CV LOWER VASCULAR RIGHT PROXIMAL FEMORAL COMPETENT: NORMAL
BH CV LOWER VASCULAR RIGHT PROXIMAL FEMORAL COMPRESS: NORMAL
BH CV LOWER VASCULAR RIGHT PROXIMAL FEMORAL PHASIC: NORMAL
BH CV LOWER VASCULAR RIGHT PROXIMAL FEMORAL SPONT: NORMAL
BH CV LOWER VASCULAR RIGHT SAPHENOFEMORAL JUNCTION AUGMENT: NORMAL
BH CV LOWER VASCULAR RIGHT SAPHENOFEMORAL JUNCTION COMPETENT: NORMAL
BH CV LOWER VASCULAR RIGHT SAPHENOFEMORAL JUNCTION COMPRESS: NORMAL
BH CV LOWER VASCULAR RIGHT SAPHENOFEMORAL JUNCTION PHASIC: NORMAL
BH CV LOWER VASCULAR RIGHT SAPHENOFEMORAL JUNCTION SPONT: NORMAL
BH CV XLRA - RV BASE: 3.6 CM
BH CV XLRA - TDI S': 18 CM/SEC
E/E' RATIO: 8
LEFT ATRIUM VOLUME INDEX: 20 ML/M2
LEFT ATRIUM VOLUME: 67 CM3
MAXIMAL PREDICTED HEART RATE: 173 BPM
STRESS TARGET HR: 147 BPM

## 2017-11-14 PROCEDURE — 93306 TTE W/DOPPLER COMPLETE: CPT | Performed by: INTERNAL MEDICINE

## 2017-11-14 PROCEDURE — 93306 TTE W/DOPPLER COMPLETE: CPT

## 2017-11-14 PROCEDURE — 93970 EXTREMITY STUDY: CPT

## 2017-11-14 PROCEDURE — 93970 EXTREMITY STUDY: CPT | Performed by: INTERNAL MEDICINE

## 2017-11-28 ENCOUNTER — HOSPITAL ENCOUNTER (OUTPATIENT)
Dept: CARDIOLOGY | Facility: HOSPITAL | Age: 47
Setting detail: RECURRING SERIES
Discharge: HOME | End: 2017-11-28

## 2017-11-28 PROCEDURE — 85610 PROTHROMBIN TIME: CPT

## 2017-11-28 PROCEDURE — 36416 COLLJ CAPILLARY BLOOD SPEC: CPT

## 2018-01-03 ENCOUNTER — HOSPITAL ENCOUNTER (OUTPATIENT)
Dept: CARDIOLOGY | Facility: HOSPITAL | Age: 48
Setting detail: RECURRING SERIES
Discharge: HOME | End: 2018-01-03

## 2018-01-03 PROCEDURE — 36416 COLLJ CAPILLARY BLOOD SPEC: CPT

## 2018-01-03 PROCEDURE — 85610 PROTHROMBIN TIME: CPT

## 2018-01-30 ENCOUNTER — HOSPITAL ENCOUNTER (OUTPATIENT)
Dept: CARDIOLOGY | Facility: HOSPITAL | Age: 48
Setting detail: RECURRING SERIES
Discharge: HOME | End: 2018-01-30

## 2018-01-30 PROCEDURE — 36416 COLLJ CAPILLARY BLOOD SPEC: CPT

## 2018-01-30 PROCEDURE — 85610 PROTHROMBIN TIME: CPT

## 2018-02-27 ENCOUNTER — HOSPITAL ENCOUNTER (OUTPATIENT)
Dept: CARDIOLOGY | Facility: HOSPITAL | Age: 48
Setting detail: RECURRING SERIES
Discharge: HOME | End: 2018-02-27

## 2018-02-27 PROCEDURE — 85610 PROTHROMBIN TIME: CPT

## 2018-02-27 PROCEDURE — 36416 COLLJ CAPILLARY BLOOD SPEC: CPT

## 2018-03-27 ENCOUNTER — HOSPITAL ENCOUNTER (OUTPATIENT)
Dept: CARDIOLOGY | Facility: HOSPITAL | Age: 48
Setting detail: RECURRING SERIES
Discharge: HOME | End: 2018-03-27

## 2018-03-27 PROCEDURE — 36416 COLLJ CAPILLARY BLOOD SPEC: CPT

## 2018-03-27 PROCEDURE — 85610 PROTHROMBIN TIME: CPT

## 2018-04-24 RX ORDER — WARFARIN SODIUM 10 MG/1
20 TABLET ORAL EVERY OTHER DAY
Qty: 180 TABLET | Refills: 0 | Status: SHIPPED | OUTPATIENT
Start: 2018-04-24 | End: 2018-07-22 | Stop reason: SDUPTHER

## 2018-07-23 RX ORDER — WARFARIN SODIUM 5 MG/1
15 TABLET ORAL EVERY OTHER DAY
Qty: 240 TABLET | Refills: 0 | Status: SHIPPED | OUTPATIENT
Start: 2018-07-23 | End: 2018-10-30

## 2018-07-23 RX ORDER — WARFARIN SODIUM 10 MG/1
20 TABLET ORAL EVERY OTHER DAY
Qty: 180 TABLET | Refills: 0 | Status: SHIPPED | OUTPATIENT
Start: 2018-07-23 | End: 2018-10-30

## 2018-08-13 ENCOUNTER — OFFICE VISIT (OUTPATIENT)
Dept: CARDIOLOGY | Facility: CLINIC | Age: 48
End: 2018-08-13

## 2018-08-13 VITALS
DIASTOLIC BLOOD PRESSURE: 100 MMHG | WEIGHT: 315 LBS | BODY MASS INDEX: 40.43 KG/M2 | OXYGEN SATURATION: 99 % | HEIGHT: 74 IN | HEART RATE: 86 BPM | SYSTOLIC BLOOD PRESSURE: 148 MMHG

## 2018-08-13 DIAGNOSIS — I26.99 BILATERAL PULMONARY EMBOLISM (HCC): Primary | ICD-10-CM

## 2018-08-13 PROBLEM — E79.0 HYPERURICEMIA: Status: ACTIVE | Noted: 2018-06-10

## 2018-08-13 PROBLEM — E78.5 DYSLIPIDEMIA: Status: ACTIVE | Noted: 2018-06-10

## 2018-08-13 PROCEDURE — 93000 ELECTROCARDIOGRAM COMPLETE: CPT | Performed by: PHYSICIAN ASSISTANT

## 2018-08-13 PROCEDURE — 99213 OFFICE O/P EST LOW 20 MIN: CPT | Performed by: PHYSICIAN ASSISTANT

## 2018-08-13 RX ORDER — FUROSEMIDE 40 MG/1
40 TABLET ORAL
COMMUNITY
Start: 2017-08-10 | End: 2019-01-23 | Stop reason: SDUPTHER

## 2018-08-13 RX ORDER — POTASSIUM CHLORIDE 750 MG/1
10 TABLET, EXTENDED RELEASE ORAL
COMMUNITY
Start: 2017-08-10 | End: 2019-01-23 | Stop reason: SDUPTHER

## 2018-08-13 NOTE — PROGRESS NOTES
Date of Office Visit: 2018  Encounter Provider: JESSIE Gerard  Place of Service: [facility] CARDIOLOGY  Patient Name: Kameron Melendez  :1970    Chief Complaint   Patient presents with   • Pulmonary Embolism     6 month follow up   :     HPI: Kameron Melendez is a 48 y.o. male, new to me, who presents today for follow-up.  Old records have been obtained and reviewed by me.  He is a patient of Dr. Baeza's with a past cardiac history significant for bilateral pulmonary emboli after a trip to [place].  He was treated with pulmonary thrombectomy as well as adjunctive thrombolysis.  He was placed on warfarin therapy.  He was last in our office to see Dr. Coulter on 2017.  At that visit he was doing well.  Dr. Coulter checked an echocardiogram as well as a bilateral lower extremity venous Doppler.  This showed no residual clot in either of his lower extremities.  His echocardiogram showed a moderately dilated LV, mildly dilated RV, normal LV function with an EF of 66%, and no significant valvular abnormalities.  It was felt that if there is no evidence of residual clot, he would remain on warfarin for a total of 1 year from the time of his embolectomy.  He's here today for follow-up.   Since he was last in our office he's been doing well.  He feels like his breathing is back to baseline.  He does not have any pain in his legs, he does have swelling from his chronic lymphedema.  He denies any chest pain, palpitations, dizziness, syncope, orthopnea, or PND.  Evidently he was instructed by the pulmonary service at the time of his PE to be compliant with bi-Pap, he has chosen not to use one.  When asked how his activity level was, he states that he is currently looking for a new job so this has him pretty active.    Past Medical History:   Diagnosis Date   • Lymphedema    • Lymphedema of left lower extremity    • Obesity    • ARNOLDO (obstructive sleep apnea)    • Pulmonary embolism  (CMS/[facility])    • Pulmonary hypertension    • Right ventricular enlargement        Past Surgical History:   Procedure Laterality Date   • CARDIAC CATHETERIZATION Bilateral 7/18/2017    Procedure: Pulmonary angiography- Inari ;  Surgeon: Cedric Coulter MD;  Location:  [facility];  Service:    • CARDIAC CATHETERIZATION N/A 7/18/2017    Procedure: Right Heart Cath;  Surgeon: Cedric Coulter MD;  Location:  [facility];  Service:        Social History     Social History   • Marital status:      Spouse name: N/A   • Number of children: N/A   • Years of education: N/A     Occupational History   • Not on file.     Social History Main Topics   • Smoking status: Never Smoker   • Smokeless tobacco: Never Used   • Alcohol use No   • Drug use: No   • Sexual activity: Defer     Other Topics Concern   • Not on file     Social History Narrative   • No narrative on file       Family History   Problem Relation Age of Onset   • Heart disease Mother    • Heart failure Mother    • Bradycardia Mother    • No Known Problems Father    • No Known Problems Maternal Grandmother    • No Known Problems Maternal Grandfather    • No Known Problems Paternal Grandmother    • No Known Problems Paternal Grandfather        Review of Systems   Constitution: Negative for chills, fever and malaise/fatigue.   Cardiovascular: Positive for leg swelling. Negative for chest pain, dyspnea on exertion, near-syncope, orthopnea, palpitations, paroxysmal nocturnal dyspnea and syncope.   Respiratory: Negative for cough and shortness of breath.    Musculoskeletal: Negative for joint pain, joint swelling and myalgias.   Gastrointestinal: Negative for abdominal pain, diarrhea, melena, nausea and vomiting.   Genitourinary: Negative for frequency and hematuria.   Neurological: Negative for light-headedness, numbness, paresthesias and seizures.   Allergic/Immunologic: Negative.    All other systems reviewed and are negative.      No Known  "Allergies      Current Outpatient Prescriptions:   •  allopurinol (ZYLOPRIM) 100 MG tablet, Take 100 mg by mouth Daily., Disp: , Rfl:   •  atorvastatin (LIPITOR) 20 MG tablet, Take 20 mg by mouth Daily., Disp: , Rfl:   •  furosemide (LASIX) 40 MG tablet, Take 40 mg by mouth., Disp: , Rfl:   •  furosemide (LASIX) 80 MG tablet, Take 1 tablet by mouth Daily. (Patient taking differently: TAKES 80MG IN AM & 40MG IN PM), Disp: 30 tablet, Rfl: 0  •  potassium chloride (K-DUR,KLOR-CON) 10 MEQ CR tablet, Take 10 mEq by mouth., Disp: , Rfl:   •  potassium chloride (MICRO-K) 10 MEQ CR capsule, Take 10 mEq by mouth 2 (Two) Times a Day., Disp: , Rfl:   •  warfarin (COUMADIN) 10 MG tablet, TAKE 2 TABLETS BY MOUTH EVERY OTHER DAY. ALTERNATE WITH THE 15MG DOSE, Disp: 180 tablet, Rfl: 0  •  warfarin (COUMADIN) 5 MG tablet, TAKE 3 TABLETS BY MOUTH EVERY OTHER DAY. ALTERNATE WITH THE 20MG DOSE, Disp: 240 tablet, Rfl: 0      Objective:     Vitals:    08/13/18 1455 08/13/18 1459   BP: 138/88 148/100   BP Location: Right arm Left arm   Pulse: 86    SpO2: 99%    Weight: (!) 245 kg (540 lb)    Height: 188 cm (74\")      Body mass index is 69.33 kg/m².    PHYSICAL EXAM:    Physical Exam   Constitutional: He is oriented to person, place, and time. He appears well-developed and well-nourished. No distress.   HENT:   Head: Normocephalic and atraumatic.   Mouth/Throat: Dental caries present.   Eyes: Pupils are equal, round, and reactive to light.   Neck: No JVD present. No thyromegaly present.   Cardiovascular: Normal rate, regular rhythm, normal heart sounds and intact distal pulses.    No murmur heard.  Pulmonary/Chest: Effort normal and breath sounds normal. No respiratory distress.   Abdominal: Soft. Bowel sounds are normal. He exhibits no distension. There is no splenomegaly or hepatomegaly. There is no tenderness.   Musculoskeletal: Normal range of motion. He exhibits no edema.   Neurological: He is alert and oriented to person, place, and " time.   Skin: Skin is warm and dry. He is not diaphoretic. No erythema.   Psychiatric: He has a normal mood and affect. His behavior is normal. Judgment normal.         ECG 12 Lead  Date/Time: 8/13/2018 3:15 PM  Performed by: EFE ORDONEZ  Authorized by: EFE ORDONEZ   Comparison: compared with previous ECG from 11/6/2017  Similar to previous ECG  Rhythm: sinus rhythm  BPM: 87  Conduction: 1st degree  Clinical impression: abnormal ECG  Comments: Indication: Pulmonary embolus              Assessment:       Diagnosis Plan   1. Bilateral pulmonary embolism (CMS/HCC)  ECG 12 Lead     Orders Placed This Encounter   Procedures   • ECG 12 Lead     This order was created via procedure documentation          Plan:       Overall I think he's stable.  He is morbidly obese.  I do think that this, coupled with his lower extremity lymphedema, does place him a little bit higher risk of developing another blood clot and subsequent PE.  However, it has been one year from his provoked pulmonary embolus, and technically he can discontinue the warfarin.  I've advised him that weight loss, being compliant with his BiPAP, and increased physical activity is going to be good for him for a multitude of reasons.  Ultimately I lifted up to him as to whether or not he wanted to discontinue the warfarin.  He has chosen to discontinue this.  He will follow-up with Dr. Baeza in 6 months or sooner if needed.    As always, it has been a pleasure to participate in your patient's care.      Sincerely,         Efe Ordonez PA-C

## 2018-10-30 ENCOUNTER — HOSPITAL ENCOUNTER (EMERGENCY)
Facility: HOSPITAL | Age: 48
Discharge: HOME | End: 2018-10-30
Attending: EMERGENCY MEDICINE | Admitting: EMERGENCY MEDICINE

## 2018-10-30 VITALS
SYSTOLIC BLOOD PRESSURE: 175 MMHG | TEMPERATURE: 98.6 F | HEART RATE: 95 BPM | HEIGHT: 74 IN | RESPIRATION RATE: 22 BRPM | OXYGEN SATURATION: 94 % | DIASTOLIC BLOOD PRESSURE: 101 MMHG

## 2018-10-30 DIAGNOSIS — M54.50 LOW BACK PAIN RADIATING TO LEFT LEG: Primary | ICD-10-CM

## 2018-10-30 DIAGNOSIS — M79.605 LOW BACK PAIN RADIATING TO LEFT LEG: Primary | ICD-10-CM

## 2018-10-30 PROCEDURE — 25010000002 KETOROLAC TROMETHAMINE PER 15 MG: Performed by: PHYSICIAN ASSISTANT

## 2018-10-30 PROCEDURE — 99282 EMERGENCY DEPT VISIT SF MDM: CPT

## 2018-10-30 PROCEDURE — 96372 THER/PROPH/DIAG INJ SC/IM: CPT

## 2018-10-30 RX ORDER — KETOROLAC TROMETHAMINE 30 MG/ML
60 INJECTION, SOLUTION INTRAMUSCULAR; INTRAVENOUS ONCE
Status: COMPLETED | OUTPATIENT
Start: 2018-10-30 | End: 2018-10-30

## 2018-10-30 RX ORDER — PREDNISONE 20 MG/1
40 TABLET ORAL DAILY
Qty: 10 TABLET | Refills: 0 | Status: SHIPPED | OUTPATIENT
Start: 2018-10-30 | End: 2018-11-04

## 2018-10-30 RX ORDER — CYCLOBENZAPRINE HCL 10 MG
10 TABLET ORAL 3 TIMES DAILY
Qty: 21 TABLET | Refills: 0 | Status: SHIPPED | OUTPATIENT
Start: 2018-10-30 | End: 2019-01-23

## 2018-10-30 RX ORDER — DICLOFENAC SODIUM 75 MG/1
75 TABLET, DELAYED RELEASE ORAL 2 TIMES DAILY
Qty: 20 TABLET | Refills: 0 | Status: SHIPPED | OUTPATIENT
Start: 2018-10-30 | End: 2019-01-23

## 2018-10-30 RX ADMIN — KETOROLAC TROMETHAMINE 60 MG: 30 INJECTION, SOLUTION INTRAMUSCULAR at 11:06

## 2019-01-23 ENCOUNTER — APPOINTMENT (OUTPATIENT)
Dept: CT IMAGING | Facility: HOSPITAL | Age: 49
End: 2019-01-23

## 2019-01-23 ENCOUNTER — HOSPITAL ENCOUNTER (INPATIENT)
Facility: HOSPITAL | Age: 49
LOS: 9 days | Discharge: HOME | End: 2019-02-01
Attending: EMERGENCY MEDICINE | Admitting: INTERNAL MEDICINE

## 2019-01-23 DIAGNOSIS — I26.99 OTHER ACUTE PULMONARY EMBOLISM WITHOUT ACUTE COR PULMONALE (HCC): ICD-10-CM

## 2019-01-23 DIAGNOSIS — I26.09 OTHER ACUTE PULMONARY EMBOLISM WITH ACUTE COR PULMONALE (HCC): Primary | ICD-10-CM

## 2019-01-23 LAB
ALBUMIN SERPL-MCNC: 3.9 G/DL (ref 3.5–5.2)
ALBUMIN/GLOB SERPL: 1.3 G/DL
ALP SERPL-CCNC: 119 U/L (ref 39–117)
ALT SERPL W P-5'-P-CCNC: 22 U/L (ref 1–41)
ANION GAP SERPL CALCULATED.3IONS-SCNC: 20.1 MMOL/L
APTT PPP: 32.1 SECONDS (ref 22.7–35.4)
AST SERPL-CCNC: 20 U/L (ref 1–40)
BASOPHILS # BLD AUTO: 0.03 10*3/MM3 (ref 0–0.2)
BASOPHILS NFR BLD AUTO: 0.2 % (ref 0–1.5)
BILIRUB SERPL-MCNC: 0.8 MG/DL (ref 0.1–1.2)
BUN BLD-MCNC: 13 MG/DL (ref 6–20)
BUN/CREAT SERPL: 17.1 (ref 7–25)
CALCIUM SPEC-SCNC: 9.4 MG/DL (ref 8.6–10.5)
CHLORIDE SERPL-SCNC: 103 MMOL/L (ref 98–107)
CO2 SERPL-SCNC: 16.9 MMOL/L (ref 22–29)
CREAT BLD-MCNC: 0.76 MG/DL (ref 0.76–1.27)
DEPRECATED RDW RBC AUTO: 44.7 FL (ref 37–54)
EOSINOPHIL # BLD AUTO: 0.05 10*3/MM3 (ref 0–0.7)
EOSINOPHIL NFR BLD AUTO: 0.4 % (ref 0.3–6.2)
ERYTHROCYTE [DISTWIDTH] IN BLOOD BY AUTOMATED COUNT: 13.4 % (ref 11.5–14.5)
GFR SERPL CREATININE-BSD FRML MDRD: 109 ML/MIN/1.73
GLOBULIN UR ELPH-MCNC: 3.1 GM/DL
GLUCOSE BLD-MCNC: 111 MG/DL (ref 65–99)
GLUCOSE BLDC GLUCOMTR-MCNC: 110 MG/DL (ref 70–130)
HCT VFR BLD AUTO: 47.9 % (ref 40.4–52.2)
HGB BLD-MCNC: 16.6 G/DL (ref 13.7–17.6)
IMM GRANULOCYTES # BLD AUTO: 0.06 10*3/MM3 (ref 0–0.03)
IMM GRANULOCYTES NFR BLD AUTO: 0.5 % (ref 0–0.5)
INR PPP: 1.02 (ref 0.9–1.1)
LYMPHOCYTES # BLD AUTO: 1.7 10*3/MM3 (ref 0.9–4.8)
LYMPHOCYTES NFR BLD AUTO: 13.7 % (ref 19.6–45.3)
MCH RBC QN AUTO: 31.9 PG (ref 27–32.7)
MCHC RBC AUTO-ENTMCNC: 34.7 G/DL (ref 32.6–36.4)
MCV RBC AUTO: 92.1 FL (ref 79.8–96.2)
MONOCYTES # BLD AUTO: 1.01 10*3/MM3 (ref 0.2–1.2)
MONOCYTES NFR BLD AUTO: 8.1 % (ref 5–12)
NEUTROPHILS # BLD AUTO: 9.66 10*3/MM3 (ref 1.9–8.1)
NEUTROPHILS NFR BLD AUTO: 77.6 % (ref 42.7–76)
NT-PROBNP SERPL-MCNC: 1692 PG/ML (ref 5–450)
PLATELET # BLD AUTO: 226 10*3/MM3 (ref 140–500)
PMV BLD AUTO: 10.7 FL (ref 6–12)
POTASSIUM BLD-SCNC: 4.3 MMOL/L (ref 3.5–5.2)
PROT SERPL-MCNC: 7 G/DL (ref 6–8.5)
PROTHROMBIN TIME: 13.2 SECONDS (ref 11.7–14.2)
RBC # BLD AUTO: 5.2 10*6/MM3 (ref 4.6–6)
SODIUM BLD-SCNC: 140 MMOL/L (ref 136–145)
TROPONIN T SERPL-MCNC: <0.01 NG/ML (ref 0–0.03)
WBC NRBC COR # BLD: 12.45 10*3/MM3 (ref 4.5–10.7)

## 2019-01-23 PROCEDURE — 93005 ELECTROCARDIOGRAM TRACING: CPT | Performed by: EMERGENCY MEDICINE

## 2019-01-23 PROCEDURE — 83520 IMMUNOASSAY QUANT NOS NONAB: CPT | Performed by: INTERNAL MEDICINE

## 2019-01-23 PROCEDURE — 85025 COMPLETE CBC W/AUTO DIFF WBC: CPT | Performed by: EMERGENCY MEDICINE

## 2019-01-23 PROCEDURE — 81241 F5 GENE: CPT | Performed by: INTERNAL MEDICINE

## 2019-01-23 PROCEDURE — 85730 THROMBOPLASTIN TIME PARTIAL: CPT | Performed by: EMERGENCY MEDICINE

## 2019-01-23 PROCEDURE — 85302 CLOT INHIBIT PROT C ANTIGEN: CPT | Performed by: INTERNAL MEDICINE

## 2019-01-23 PROCEDURE — 82962 GLUCOSE BLOOD TEST: CPT

## 2019-01-23 PROCEDURE — 81240 F2 GENE: CPT | Performed by: INTERNAL MEDICINE

## 2019-01-23 PROCEDURE — 25010000002 HEPARIN (PORCINE) PER 1000 UNITS: Performed by: EMERGENCY MEDICINE

## 2019-01-23 PROCEDURE — 0 IOPAMIDOL PER 1 ML: Performed by: EMERGENCY MEDICINE

## 2019-01-23 PROCEDURE — 36415 COLL VENOUS BLD VENIPUNCTURE: CPT | Performed by: EMERGENCY MEDICINE

## 2019-01-23 PROCEDURE — 83090 ASSAY OF HOMOCYSTEINE: CPT | Performed by: INTERNAL MEDICINE

## 2019-01-23 PROCEDURE — 84484 ASSAY OF TROPONIN QUANT: CPT | Performed by: EMERGENCY MEDICINE

## 2019-01-23 PROCEDURE — 99285 EMERGENCY DEPT VISIT HI MDM: CPT

## 2019-01-23 PROCEDURE — 86147 CARDIOLIPIN ANTIBODY EA IG: CPT | Performed by: INTERNAL MEDICINE

## 2019-01-23 PROCEDURE — 85306 CLOT INHIBIT PROT S FREE: CPT | Performed by: INTERNAL MEDICINE

## 2019-01-23 PROCEDURE — 80048 BASIC METABOLIC PNL TOTAL CA: CPT | Performed by: INTERNAL MEDICINE

## 2019-01-23 PROCEDURE — 83880 ASSAY OF NATRIURETIC PEPTIDE: CPT | Performed by: EMERGENCY MEDICINE

## 2019-01-23 PROCEDURE — 85300 ANTITHROMBIN III ACTIVITY: CPT | Performed by: INTERNAL MEDICINE

## 2019-01-23 PROCEDURE — 93010 ELECTROCARDIOGRAM REPORT: CPT | Performed by: INTERNAL MEDICINE

## 2019-01-23 PROCEDURE — 80053 COMPREHEN METABOLIC PANEL: CPT | Performed by: EMERGENCY MEDICINE

## 2019-01-23 PROCEDURE — 71275 CT ANGIOGRAPHY CHEST: CPT

## 2019-01-23 PROCEDURE — 85610 PROTHROMBIN TIME: CPT | Performed by: EMERGENCY MEDICINE

## 2019-01-23 PROCEDURE — 85025 COMPLETE CBC W/AUTO DIFF WBC: CPT | Performed by: INTERNAL MEDICINE

## 2019-01-23 PROCEDURE — 86038 ANTINUCLEAR ANTIBODIES: CPT | Performed by: INTERNAL MEDICINE

## 2019-01-23 RX ORDER — HEPARIN SODIUM 5000 [USP'U]/ML
10000 INJECTION, SOLUTION INTRAVENOUS; SUBCUTANEOUS ONCE
Status: COMPLETED | OUTPATIENT
Start: 2019-01-23 | End: 2019-01-23

## 2019-01-23 RX ORDER — SODIUM CHLORIDE 0.9 % (FLUSH) 0.9 %
3 SYRINGE (ML) INJECTION EVERY 12 HOURS SCHEDULED
Status: DISCONTINUED | OUTPATIENT
Start: 2019-01-23 | End: 2019-02-01 | Stop reason: HOSPADM

## 2019-01-23 RX ORDER — HEPARIN SODIUM 10000 [USP'U]/100ML
6.05 INJECTION, SOLUTION INTRAVENOUS
Status: DISCONTINUED | OUTPATIENT
Start: 2019-01-23 | End: 2019-02-01 | Stop reason: HOSPADM

## 2019-01-23 RX ORDER — SODIUM CHLORIDE 0.9 % (FLUSH) 0.9 %
3-10 SYRINGE (ML) INJECTION AS NEEDED
Status: DISCONTINUED | OUTPATIENT
Start: 2019-01-23 | End: 2019-02-01 | Stop reason: HOSPADM

## 2019-01-23 RX ORDER — HEPARIN SODIUM 5000 [USP'U]/ML
9900-10000 INJECTION, SOLUTION INTRAVENOUS; SUBCUTANEOUS EVERY 6 HOURS PRN
Status: DISCONTINUED | OUTPATIENT
Start: 2019-01-23 | End: 2019-02-01 | Stop reason: HOSPADM

## 2019-01-23 RX ORDER — ACETAMINOPHEN 500 MG
500 TABLET ORAL EVERY 6 HOURS PRN
COMMUNITY

## 2019-01-23 RX ORDER — SODIUM CHLORIDE, SODIUM LACTATE, POTASSIUM CHLORIDE, CALCIUM CHLORIDE 600; 310; 30; 20 MG/100ML; MG/100ML; MG/100ML; MG/100ML
100 INJECTION, SOLUTION INTRAVENOUS CONTINUOUS
Status: DISCONTINUED | OUTPATIENT
Start: 2019-01-23 | End: 2019-01-26

## 2019-01-23 RX ORDER — ACETAMINOPHEN 325 MG/1
650 TABLET ORAL EVERY 4 HOURS PRN
Status: DISCONTINUED | OUTPATIENT
Start: 2019-01-23 | End: 2019-02-01 | Stop reason: HOSPADM

## 2019-01-23 RX ORDER — ACETAMINOPHEN 650 MG/1
650 SUPPOSITORY RECTAL EVERY 4 HOURS PRN
Status: DISCONTINUED | OUTPATIENT
Start: 2019-01-23 | End: 2019-02-01 | Stop reason: HOSPADM

## 2019-01-23 RX ORDER — IBUPROFEN 200 MG
400 TABLET ORAL EVERY 6 HOURS PRN
COMMUNITY
End: 2019-02-01 | Stop reason: HOSPADM

## 2019-01-23 RX ADMIN — HEPARIN SODIUM 6.05 UNITS/KG/HR: 10000 INJECTION, SOLUTION INTRAVENOUS at 19:52

## 2019-01-23 RX ADMIN — HEPARIN SODIUM 10000 UNITS: 5000 INJECTION INTRAVENOUS; SUBCUTANEOUS at 19:51

## 2019-01-23 RX ADMIN — HEPARIN SODIUM 10000 UNITS: 5000 INJECTION INTRAVENOUS; SUBCUTANEOUS at 23:11

## 2019-01-23 RX ADMIN — SODIUM CHLORIDE, POTASSIUM CHLORIDE, SODIUM LACTATE AND CALCIUM CHLORIDE 100 ML/HR: 600; 310; 30; 20 INJECTION, SOLUTION INTRAVENOUS at 20:58

## 2019-01-23 RX ADMIN — SODIUM CHLORIDE, PRESERVATIVE FREE 3 ML: 5 INJECTION INTRAVENOUS at 20:58

## 2019-01-23 RX ADMIN — IOPAMIDOL 95 ML: 755 INJECTION, SOLUTION INTRAVENOUS at 18:31

## 2019-01-24 ENCOUNTER — APPOINTMENT (OUTPATIENT)
Dept: CARDIOLOGY | Facility: HOSPITAL | Age: 49
End: 2019-01-24
Attending: INTERNAL MEDICINE

## 2019-01-24 LAB
ANION GAP SERPL CALCULATED.3IONS-SCNC: 15.3 MMOL/L
ANION GAP SERPL CALCULATED.3IONS-SCNC: 16.3 MMOL/L
AORTIC DIMENSIONLESS INDEX: 0.8 (DI)
APTT PPP: 42.4 SECONDS (ref 22.7–35.4)
APTT PPP: >200 SECONDS (ref 22.7–35.4)
APTT PPP: >200 SECONDS (ref 22.7–35.4)
BASOPHILS # BLD AUTO: 0.02 10*3/MM3 (ref 0–0.2)
BASOPHILS # BLD AUTO: 0.03 10*3/MM3 (ref 0–0.2)
BASOPHILS NFR BLD AUTO: 0.1 % (ref 0–1.5)
BASOPHILS NFR BLD AUTO: 0.2 % (ref 0–1.5)
BH CV ECHO MEAS - ACS: 2.4 CM
BH CV ECHO MEAS - AO MAX PG (FULL): 4.4 MMHG
BH CV ECHO MEAS - AO MAX PG: 11.3 MMHG
BH CV ECHO MEAS - AO MEAN PG (FULL): 3 MMHG
BH CV ECHO MEAS - AO MEAN PG: 6 MMHG
BH CV ECHO MEAS - AO ROOT AREA (BSA CORRECTED): 1.2
BH CV ECHO MEAS - AO ROOT AREA: 12.6 CM^2
BH CV ECHO MEAS - AO ROOT DIAM: 4 CM
BH CV ECHO MEAS - AO V2 MAX: 168 CM/SEC
BH CV ECHO MEAS - AO V2 MEAN: 112 CM/SEC
BH CV ECHO MEAS - AO V2 VTI: 29.2 CM
BH CV ECHO MEAS - AVA(I,A): 3.5 CM^2
BH CV ECHO MEAS - AVA(I,D): 3.5 CM^2
BH CV ECHO MEAS - AVA(V,A): 3.5 CM^2
BH CV ECHO MEAS - AVA(V,D): 3.5 CM^2
BH CV ECHO MEAS - BSA(HAYCOCK): 3.9 M^2
BH CV ECHO MEAS - BSA: 3.5 M^2
BH CV ECHO MEAS - BZI_BMI: 76.4 KILOGRAMS/M^2
BH CV ECHO MEAS - BZI_METRIC_HEIGHT: 188 CM
BH CV ECHO MEAS - BZI_METRIC_WEIGHT: 269.9 KG
BH CV ECHO MEAS - EDV(CUBED): 185.2 ML
BH CV ECHO MEAS - EDV(MOD-SP2): 128 ML
BH CV ECHO MEAS - EDV(MOD-SP4): 101 ML
BH CV ECHO MEAS - EDV(TEICH): 160 ML
BH CV ECHO MEAS - EF(CUBED): 70.4 %
BH CV ECHO MEAS - EF(MOD-BP): 60 %
BH CV ECHO MEAS - EF(MOD-SP2): 61.7 %
BH CV ECHO MEAS - EF(MOD-SP4): 55.4 %
BH CV ECHO MEAS - EF(TEICH): 61.3 %
BH CV ECHO MEAS - ESV(CUBED): 54.9 ML
BH CV ECHO MEAS - ESV(MOD-SP2): 49 ML
BH CV ECHO MEAS - ESV(MOD-SP4): 45 ML
BH CV ECHO MEAS - ESV(TEICH): 62 ML
BH CV ECHO MEAS - FS: 33.3 %
BH CV ECHO MEAS - IVS/LVPW: 1
BH CV ECHO MEAS - IVSD: 1.2 CM
BH CV ECHO MEAS - LAT PEAK E' VEL: 16 CM/SEC
BH CV ECHO MEAS - LV DIASTOLIC VOL/BSA (35-75): 29.2 ML/M^2
BH CV ECHO MEAS - LV MASS(C)D: 288.7 GRAMS
BH CV ECHO MEAS - LV MASS(C)DI: 83.6 GRAMS/M^2
BH CV ECHO MEAS - LV MAX PG: 6.9 MMHG
BH CV ECHO MEAS - LV MEAN PG: 3 MMHG
BH CV ECHO MEAS - LV SYSTOLIC VOL/BSA (12-30): 13 ML/M^2
BH CV ECHO MEAS - LV V1 MAX: 131 CM/SEC
BH CV ECHO MEAS - LV V1 MEAN: 83.2 CM/SEC
BH CV ECHO MEAS - LV V1 VTI: 22.5 CM
BH CV ECHO MEAS - LVIDD: 5.7 CM
BH CV ECHO MEAS - LVIDS: 3.8 CM
BH CV ECHO MEAS - LVLD AP2: 10.1 CM
BH CV ECHO MEAS - LVLD AP4: 9.6 CM
BH CV ECHO MEAS - LVLS AP2: 8.3 CM
BH CV ECHO MEAS - LVLS AP4: 8.1 CM
BH CV ECHO MEAS - LVOT AREA (M): 4.5 CM^2
BH CV ECHO MEAS - LVOT AREA: 4.5 CM^2
BH CV ECHO MEAS - LVOT DIAM: 2.4 CM
BH CV ECHO MEAS - LVPWD: 1.2 CM
BH CV ECHO MEAS - MED PEAK E' VEL: 11 CM/SEC
BH CV ECHO MEAS - MV A DUR: 0.22 SEC
BH CV ECHO MEAS - MV A MAX VEL: 123 CM/SEC
BH CV ECHO MEAS - MV DEC SLOPE: 573 CM/SEC^2
BH CV ECHO MEAS - MV DEC TIME: 0.19 SEC
BH CV ECHO MEAS - MV E MAX VEL: 85.9 CM/SEC
BH CV ECHO MEAS - MV E/A: 0.7
BH CV ECHO MEAS - MV MAX PG: 6.2 MMHG
BH CV ECHO MEAS - MV MEAN PG: 2 MMHG
BH CV ECHO MEAS - MV P1/2T MAX VEL: 83.3 CM/SEC
BH CV ECHO MEAS - MV P1/2T: 42.6 MSEC
BH CV ECHO MEAS - MV V2 MAX: 124 CM/SEC
BH CV ECHO MEAS - MV V2 MEAN: 69.4 CM/SEC
BH CV ECHO MEAS - MV V2 VTI: 27.1 CM
BH CV ECHO MEAS - MVA P1/2T LCG: 2.6 CM^2
BH CV ECHO MEAS - MVA(P1/2T): 5.2 CM^2
BH CV ECHO MEAS - MVA(VTI): 3.8 CM^2
BH CV ECHO MEAS - PA ACC TIME: 0.07 SEC
BH CV ECHO MEAS - PA MAX PG (FULL): 5.3 MMHG
BH CV ECHO MEAS - PA MAX PG: 6.3 MMHG
BH CV ECHO MEAS - PA PR(ACCEL): 48.9 MMHG
BH CV ECHO MEAS - PA V2 MAX: 125 CM/SEC
BH CV ECHO MEAS - PULM A REVS DUR: 0.16 SEC
BH CV ECHO MEAS - PULM A REVS VEL: 39 CM/SEC
BH CV ECHO MEAS - PULM DIAS VEL: 52.7 CM/SEC
BH CV ECHO MEAS - PULM S/D: 1.5
BH CV ECHO MEAS - PULM SYS VEL: 81 CM/SEC
BH CV ECHO MEAS - PVA(V,A): 1.6 CM^2
BH CV ECHO MEAS - PVA(V,D): 1.6 CM^2
BH CV ECHO MEAS - QP/QS: 0.29
BH CV ECHO MEAS - RAP SYSTOLE: 8 MMHG
BH CV ECHO MEAS - RV MAX PG: 0.92 MMHG
BH CV ECHO MEAS - RV MEAN PG: 0 MMHG
BH CV ECHO MEAS - RV V1 MAX: 48 CM/SEC
BH CV ECHO MEAS - RV V1 MEAN: 31.3 CM/SEC
BH CV ECHO MEAS - RV V1 VTI: 7 CM
BH CV ECHO MEAS - RVOT AREA: 4.2 CM^2
BH CV ECHO MEAS - RVOT DIAM: 2.3 CM
BH CV ECHO MEAS - RVSP: 48.7 MMHG
BH CV ECHO MEAS - SI(AO): 106.2 ML/M^2
BH CV ECHO MEAS - SI(CUBED): 37.7 ML/M^2
BH CV ECHO MEAS - SI(LVOT): 29.5 ML/M^2
BH CV ECHO MEAS - SI(MOD-SP2): 22.9 ML/M^2
BH CV ECHO MEAS - SI(MOD-SP4): 16.2 ML/M^2
BH CV ECHO MEAS - SI(TEICH): 28.4 ML/M^2
BH CV ECHO MEAS - SV(AO): 366.9 ML
BH CV ECHO MEAS - SV(CUBED): 130.3 ML
BH CV ECHO MEAS - SV(LVOT): 101.8 ML
BH CV ECHO MEAS - SV(MOD-SP2): 79 ML
BH CV ECHO MEAS - SV(MOD-SP4): 56 ML
BH CV ECHO MEAS - SV(RVOT): 29 ML
BH CV ECHO MEAS - SV(TEICH): 98.1 ML
BH CV ECHO MEAS - TAPSE (>1.6): 3.2 CM2
BH CV ECHO MEAS - TR MAX VEL: 319 CM/SEC
BH CV ECHO MEASUREMENTS AVERAGE E/E' RATIO: 6.36
BH CV LOW VAS RIGHT GASTRONEMIUS VESSEL: 1
BH CV LOW VAS RIGHT GREATER SAPH AK VESSEL: 1
BH CV LOW VAS RIGHT LESSER SAPH VESSEL: 1
BH CV LOW VAS RIGHT POPLITEAL SPONT: 1
BH CV LOWER VASCULAR LEFT COMMON FEMORAL AUGMENT: NORMAL
BH CV LOWER VASCULAR LEFT COMMON FEMORAL COMPETENT: NORMAL
BH CV LOWER VASCULAR LEFT COMMON FEMORAL COMPRESS: NORMAL
BH CV LOWER VASCULAR LEFT COMMON FEMORAL PHASIC: NORMAL
BH CV LOWER VASCULAR LEFT COMMON FEMORAL SPONT: NORMAL
BH CV LOWER VASCULAR LEFT DISTAL FEMORAL COMPRESS: NORMAL
BH CV LOWER VASCULAR LEFT GASTRONEMIUS COMPRESS: NORMAL
BH CV LOWER VASCULAR LEFT GREATER SAPH AK COMPRESS: NORMAL
BH CV LOWER VASCULAR LEFT GREATER SAPH BK COMPRESS: NORMAL
BH CV LOWER VASCULAR LEFT LESSER SAPH COMPRESS: NORMAL
BH CV LOWER VASCULAR LEFT MID FEMORAL AUGMENT: NORMAL
BH CV LOWER VASCULAR LEFT MID FEMORAL COMPETENT: NORMAL
BH CV LOWER VASCULAR LEFT MID FEMORAL COMPRESS: NORMAL
BH CV LOWER VASCULAR LEFT MID FEMORAL PHASIC: NORMAL
BH CV LOWER VASCULAR LEFT MID FEMORAL SPONT: NORMAL
BH CV LOWER VASCULAR LEFT PERONEAL COMPRESS: NORMAL
BH CV LOWER VASCULAR LEFT POPLITEAL AUGMENT: NORMAL
BH CV LOWER VASCULAR LEFT POPLITEAL COMPETENT: NORMAL
BH CV LOWER VASCULAR LEFT POPLITEAL COMPRESS: NORMAL
BH CV LOWER VASCULAR LEFT POPLITEAL PHASIC: NORMAL
BH CV LOWER VASCULAR LEFT POPLITEAL SPONT: NORMAL
BH CV LOWER VASCULAR LEFT POSTERIOR TIBIAL COMPRESS: NORMAL
BH CV LOWER VASCULAR LEFT PROXIMAL FEMORAL COMPRESS: NORMAL
BH CV LOWER VASCULAR LEFT SAPHENOFEMORAL JUNCTION COMPRESS: NORMAL
BH CV LOWER VASCULAR LEFT SAPHENOFEMORAL JUNCTION PHASIC: NORMAL
BH CV LOWER VASCULAR LEFT SAPHENOFEMORAL JUNCTION SPONT: NORMAL
BH CV LOWER VASCULAR RIGHT COMMON FEMORAL AUGMENT: NORMAL
BH CV LOWER VASCULAR RIGHT COMMON FEMORAL COMPETENT: NORMAL
BH CV LOWER VASCULAR RIGHT COMMON FEMORAL COMPRESS: NORMAL
BH CV LOWER VASCULAR RIGHT COMMON FEMORAL PHASIC: NORMAL
BH CV LOWER VASCULAR RIGHT COMMON FEMORAL SPONT: NORMAL
BH CV LOWER VASCULAR RIGHT DISTAL FEMORAL COMPRESS: NORMAL
BH CV LOWER VASCULAR RIGHT GASTRONEMIUS COMPRESS: NORMAL
BH CV LOWER VASCULAR RIGHT GASTRONEMIUS THROMBUS: NORMAL
BH CV LOWER VASCULAR RIGHT GREATER SAPH AK COMPRESS: NORMAL
BH CV LOWER VASCULAR RIGHT GREATER SAPH AK THROMBUS: NORMAL
BH CV LOWER VASCULAR RIGHT GREATER SAPH BK COMPRESS: NORMAL
BH CV LOWER VASCULAR RIGHT LESSER SAPH COMPRESS: NORMAL
BH CV LOWER VASCULAR RIGHT LESSER SAPH THROMBUS: NORMAL
BH CV LOWER VASCULAR RIGHT MID FEMORAL AUGMENT: NORMAL
BH CV LOWER VASCULAR RIGHT MID FEMORAL COMPETENT: NORMAL
BH CV LOWER VASCULAR RIGHT MID FEMORAL COMPRESS: NORMAL
BH CV LOWER VASCULAR RIGHT MID FEMORAL PHASIC: NORMAL
BH CV LOWER VASCULAR RIGHT MID FEMORAL SPONT: NORMAL
BH CV LOWER VASCULAR RIGHT PERONEAL COMPRESS: NORMAL
BH CV LOWER VASCULAR RIGHT POPLITEAL AUGMENT: NORMAL
BH CV LOWER VASCULAR RIGHT POPLITEAL COMPETENT: NORMAL
BH CV LOWER VASCULAR RIGHT POPLITEAL COMPRESS: NORMAL
BH CV LOWER VASCULAR RIGHT POPLITEAL PHASIC: NORMAL
BH CV LOWER VASCULAR RIGHT POPLITEAL SPONT: NORMAL
BH CV LOWER VASCULAR RIGHT POPLITEAL THROMBUS: NORMAL
BH CV LOWER VASCULAR RIGHT POSTERIOR TIBIAL COMPRESS: NORMAL
BH CV LOWER VASCULAR RIGHT PROXIMAL FEMORAL COMPRESS: NORMAL
BH CV LOWER VASCULAR RIGHT SAPHENOFEMORAL JUNCTION COMPRESS: NORMAL
BH CV LOWER VASCULAR RIGHT SAPHENOFEMORAL JUNCTION PHASIC: NORMAL
BH CV LOWER VASCULAR RIGHT SAPHENOFEMORAL JUNCTION SPONT: NORMAL
BH CV VAS BP RIGHT ARM: NORMAL MMHG
BH CV XLRA - RV BASE: 5.2 CM
BH CV XLRA - TDI S': 19 CM/SEC
BUN BLD-MCNC: 11 MG/DL (ref 6–20)
BUN BLD-MCNC: 11 MG/DL (ref 6–20)
BUN/CREAT SERPL: 15.1 (ref 7–25)
BUN/CREAT SERPL: 15.5 (ref 7–25)
CALCIUM SPEC-SCNC: 9 MG/DL (ref 8.6–10.5)
CALCIUM SPEC-SCNC: 9 MG/DL (ref 8.6–10.5)
CHLORIDE SERPL-SCNC: 100 MMOL/L (ref 98–107)
CHLORIDE SERPL-SCNC: 102 MMOL/L (ref 98–107)
CO2 SERPL-SCNC: 20.7 MMOL/L (ref 22–29)
CO2 SERPL-SCNC: 21.7 MMOL/L (ref 22–29)
CREAT BLD-MCNC: 0.71 MG/DL (ref 0.76–1.27)
CREAT BLD-MCNC: 0.73 MG/DL (ref 0.76–1.27)
DEPRECATED RDW RBC AUTO: 44.2 FL (ref 37–54)
DEPRECATED RDW RBC AUTO: 45.5 FL (ref 37–54)
EOSINOPHIL # BLD AUTO: 0.04 10*3/MM3 (ref 0–0.7)
EOSINOPHIL # BLD AUTO: 0.07 10*3/MM3 (ref 0–0.7)
EOSINOPHIL NFR BLD AUTO: 0.3 % (ref 0.3–6.2)
EOSINOPHIL NFR BLD AUTO: 0.5 % (ref 0.3–6.2)
ERYTHROCYTE [DISTWIDTH] IN BLOOD BY AUTOMATED COUNT: 13.3 % (ref 11.5–14.5)
ERYTHROCYTE [DISTWIDTH] IN BLOOD BY AUTOMATED COUNT: 13.5 % (ref 11.5–14.5)
F5 GENE MUT ANL BLD/T: ABNORMAL
FACTOR II, DNA ANALYSIS: NORMAL
GFR SERPL CREATININE-BSD FRML MDRD: 114 ML/MIN/1.73
GFR SERPL CREATININE-BSD FRML MDRD: 118 ML/MIN/1.73
GLUCOSE BLD-MCNC: 122 MG/DL (ref 65–99)
GLUCOSE BLD-MCNC: 132 MG/DL (ref 65–99)
GLUCOSE BLDC GLUCOMTR-MCNC: 107 MG/DL (ref 70–130)
GLUCOSE BLDC GLUCOMTR-MCNC: 130 MG/DL (ref 70–130)
HCT VFR BLD AUTO: 44 % (ref 40.4–52.2)
HCT VFR BLD AUTO: 44.1 % (ref 40.4–52.2)
HGB BLD-MCNC: 14.7 G/DL (ref 13.7–17.6)
HGB BLD-MCNC: 15.4 G/DL (ref 13.7–17.6)
IMM GRANULOCYTES # BLD AUTO: 0.04 10*3/MM3 (ref 0–0.03)
IMM GRANULOCYTES # BLD AUTO: 0.04 10*3/MM3 (ref 0–0.03)
IMM GRANULOCYTES NFR BLD AUTO: 0.3 % (ref 0–0.5)
IMM GRANULOCYTES NFR BLD AUTO: 0.3 % (ref 0–0.5)
LEFT ATRIUM VOLUME INDEX: 20 ML/M2
LEFT ATRIUM VOLUME: 63 CM3
LV EF 2D ECHO EST: 60 %
LYMPHOCYTES # BLD AUTO: 1.65 10*3/MM3 (ref 0.9–4.8)
LYMPHOCYTES # BLD AUTO: 2 10*3/MM3 (ref 0.9–4.8)
LYMPHOCYTES NFR BLD AUTO: 11.5 % (ref 19.6–45.3)
LYMPHOCYTES NFR BLD AUTO: 14.3 % (ref 19.6–45.3)
MAXIMAL PREDICTED HEART RATE: 171 BPM
MCH RBC QN AUTO: 31.5 PG (ref 27–32.7)
MCH RBC QN AUTO: 32 PG (ref 27–32.7)
MCHC RBC AUTO-ENTMCNC: 33.4 G/DL (ref 32.6–36.4)
MCHC RBC AUTO-ENTMCNC: 34.9 G/DL (ref 32.6–36.4)
MCV RBC AUTO: 91.5 FL (ref 79.8–96.2)
MCV RBC AUTO: 94.2 FL (ref 79.8–96.2)
MONOCYTES # BLD AUTO: 1.08 10*3/MM3 (ref 0.2–1.2)
MONOCYTES # BLD AUTO: 1.28 10*3/MM3 (ref 0.2–1.2)
MONOCYTES NFR BLD AUTO: 7.7 % (ref 5–12)
MONOCYTES NFR BLD AUTO: 8.9 % (ref 5–12)
NEUTROPHILS # BLD AUTO: 10.82 10*3/MM3 (ref 1.9–8.1)
NEUTROPHILS # BLD AUTO: 11.36 10*3/MM3 (ref 1.9–8.1)
NEUTROPHILS NFR BLD AUTO: 77.3 % (ref 42.7–76)
NEUTROPHILS NFR BLD AUTO: 78.9 % (ref 42.7–76)
PLATELET # BLD AUTO: 199 10*3/MM3 (ref 140–500)
PLATELET # BLD AUTO: 214 10*3/MM3 (ref 140–500)
PMV BLD AUTO: 10.8 FL (ref 6–12)
PMV BLD AUTO: 10.9 FL (ref 6–12)
POTASSIUM BLD-SCNC: 3.9 MMOL/L (ref 3.5–5.2)
POTASSIUM BLD-SCNC: 3.9 MMOL/L (ref 3.5–5.2)
RBC # BLD AUTO: 4.67 10*6/MM3 (ref 4.6–6)
RBC # BLD AUTO: 4.82 10*6/MM3 (ref 4.6–6)
SODIUM BLD-SCNC: 137 MMOL/L (ref 136–145)
SODIUM BLD-SCNC: 139 MMOL/L (ref 136–145)
STRESS TARGET HR: 145 BPM
TSH SERPL DL<=0.05 MIU/L-ACNC: 2.48 MIU/ML (ref 0.27–4.2)
WBC NRBC COR # BLD: 14 10*3/MM3 (ref 4.5–10.7)
WBC NRBC COR # BLD: 14.39 10*3/MM3 (ref 4.5–10.7)

## 2019-01-24 PROCEDURE — 84443 ASSAY THYROID STIM HORMONE: CPT | Performed by: INTERNAL MEDICINE

## 2019-01-24 PROCEDURE — 85670 THROMBIN TIME PLASMA: CPT | Performed by: INTERNAL MEDICINE

## 2019-01-24 PROCEDURE — 93306 TTE W/DOPPLER COMPLETE: CPT | Performed by: INTERNAL MEDICINE

## 2019-01-24 PROCEDURE — 82962 GLUCOSE BLOOD TEST: CPT

## 2019-01-24 PROCEDURE — 25010000002 HEPARIN (PORCINE) PER 1000 UNITS: Performed by: EMERGENCY MEDICINE

## 2019-01-24 PROCEDURE — 85730 THROMBOPLASTIN TIME PARTIAL: CPT | Performed by: INTERNAL MEDICINE

## 2019-01-24 PROCEDURE — 99254 IP/OBS CNSLTJ NEW/EST MOD 60: CPT | Performed by: INTERNAL MEDICINE

## 2019-01-24 PROCEDURE — 85613 RUSSELL VIPER VENOM DILUTED: CPT | Performed by: INTERNAL MEDICINE

## 2019-01-24 PROCEDURE — 93970 EXTREMITY STUDY: CPT

## 2019-01-24 PROCEDURE — 93306 TTE W/DOPPLER COMPLETE: CPT

## 2019-01-24 PROCEDURE — 85705 THROMBOPLASTIN INHIBITION: CPT | Performed by: INTERNAL MEDICINE

## 2019-01-24 PROCEDURE — 80048 BASIC METABOLIC PNL TOTAL CA: CPT | Performed by: INTERNAL MEDICINE

## 2019-01-24 PROCEDURE — 85732 THROMBOPLASTIN TIME PARTIAL: CPT | Performed by: INTERNAL MEDICINE

## 2019-01-24 PROCEDURE — 85598 HEXAGNAL PHOSPH PLTLT NEUTRL: CPT | Performed by: INTERNAL MEDICINE

## 2019-01-24 PROCEDURE — 85025 COMPLETE CBC W/AUTO DIFF WBC: CPT | Performed by: EMERGENCY MEDICINE

## 2019-01-24 RX ORDER — CODEINE PHOSPHATE AND GUAIFENESIN 10; 100 MG/5ML; MG/5ML
5 SOLUTION ORAL EVERY 6 HOURS PRN
Status: DISCONTINUED | OUTPATIENT
Start: 2019-01-24 | End: 2019-02-01 | Stop reason: HOSPADM

## 2019-01-24 RX ADMIN — SODIUM CHLORIDE, POTASSIUM CHLORIDE, SODIUM LACTATE AND CALCIUM CHLORIDE 100 ML/HR: 600; 310; 30; 20 INJECTION, SOLUTION INTRAVENOUS at 18:14

## 2019-01-24 RX ADMIN — Medication 5 ML: at 21:06

## 2019-01-24 RX ADMIN — SODIUM CHLORIDE, POTASSIUM CHLORIDE, SODIUM LACTATE AND CALCIUM CHLORIDE 100 ML/HR: 600; 310; 30; 20 INJECTION, SOLUTION INTRAVENOUS at 06:37

## 2019-01-24 RX ADMIN — SODIUM CHLORIDE, PRESERVATIVE FREE 3 ML: 5 INJECTION INTRAVENOUS at 09:12

## 2019-01-24 RX ADMIN — HEPARIN SODIUM 14.05 UNITS/KG/HR: 10000 INJECTION, SOLUTION INTRAVENOUS at 07:20

## 2019-01-24 RX ADMIN — SODIUM CHLORIDE, PRESERVATIVE FREE 3 ML: 5 INJECTION INTRAVENOUS at 20:49

## 2019-01-24 RX ADMIN — HEPARIN SODIUM 10000 UNITS: 5000 INJECTION INTRAVENOUS; SUBCUTANEOUS at 06:36

## 2019-01-25 LAB
ANA SER QL: NEGATIVE
ANION GAP SERPL CALCULATED.3IONS-SCNC: 11.4 MMOL/L
APTT PPP: 124.5 SECONDS (ref 22.7–35.4)
APTT PPP: 55.1 SECONDS (ref 22.7–35.4)
AT III PPP CHRO-ACNC: 102 % (ref 90–134)
BASOPHILS # BLD AUTO: 0.03 10*3/MM3 (ref 0–0.2)
BASOPHILS NFR BLD AUTO: 0.4 % (ref 0–1.5)
BUN BLD-MCNC: 11 MG/DL (ref 6–20)
BUN/CREAT SERPL: 14.5 (ref 7–25)
CALCIUM SPEC-SCNC: 8.7 MG/DL (ref 8.6–10.5)
CARDIOLIPIN IGG SER IA-ACNC: <9 GPL U/ML (ref 0–14)
CARDIOLIPIN IGM SER IA-ACNC: <9 MPL U/ML (ref 0–12)
CHLORIDE SERPL-SCNC: 102 MMOL/L (ref 98–107)
CO2 SERPL-SCNC: 23.6 MMOL/L (ref 22–29)
CREAT BLD-MCNC: 0.76 MG/DL (ref 0.76–1.27)
DEPRECATED RDW RBC AUTO: 46.7 FL (ref 37–54)
EOSINOPHIL # BLD AUTO: 0.26 10*3/MM3 (ref 0–0.7)
EOSINOPHIL NFR BLD AUTO: 3.1 % (ref 0.3–6.2)
ERYTHROCYTE [DISTWIDTH] IN BLOOD BY AUTOMATED COUNT: 13.5 % (ref 11.5–14.5)
GFR SERPL CREATININE-BSD FRML MDRD: 109 ML/MIN/1.73
GLUCOSE BLD-MCNC: 121 MG/DL (ref 65–99)
HCT VFR BLD AUTO: 42 % (ref 40.4–52.2)
HCYS SERPL-SCNC: 14.1 UMOL/L (ref 0–15)
HGB BLD-MCNC: 13.7 G/DL (ref 13.7–17.6)
IMM GRANULOCYTES # BLD AUTO: 0.04 10*3/MM3 (ref 0–0.03)
IMM GRANULOCYTES NFR BLD AUTO: 0.5 % (ref 0–0.5)
LYMPHOCYTES # BLD AUTO: 2.29 10*3/MM3 (ref 0.9–4.8)
LYMPHOCYTES NFR BLD AUTO: 27.3 % (ref 19.6–45.3)
MCH RBC QN AUTO: 31.2 PG (ref 27–32.7)
MCHC RBC AUTO-ENTMCNC: 32.6 G/DL (ref 32.6–36.4)
MCV RBC AUTO: 95.7 FL (ref 79.8–96.2)
MONOCYTES # BLD AUTO: 0.99 10*3/MM3 (ref 0.2–1.2)
MONOCYTES NFR BLD AUTO: 11.8 % (ref 5–12)
NEUTROPHILS # BLD AUTO: 4.77 10*3/MM3 (ref 1.9–8.1)
NEUTROPHILS NFR BLD AUTO: 56.9 % (ref 42.7–76)
PLATELET # BLD AUTO: 180 10*3/MM3 (ref 140–500)
PMV BLD AUTO: 10.5 FL (ref 6–12)
POTASSIUM BLD-SCNC: 4.2 MMOL/L (ref 3.5–5.2)
PROT S FREE PPP-ACNC: 142 % (ref 49–138)
RBC # BLD AUTO: 4.39 10*6/MM3 (ref 4.6–6)
SODIUM BLD-SCNC: 137 MMOL/L (ref 136–145)
WBC NRBC COR # BLD: 8.38 10*3/MM3 (ref 4.5–10.7)

## 2019-01-25 PROCEDURE — 85025 COMPLETE CBC W/AUTO DIFF WBC: CPT | Performed by: EMERGENCY MEDICINE

## 2019-01-25 PROCEDURE — 99232 SBSQ HOSP IP/OBS MODERATE 35: CPT | Performed by: INTERNAL MEDICINE

## 2019-01-25 PROCEDURE — 85730 THROMBOPLASTIN TIME PARTIAL: CPT | Performed by: INTERNAL MEDICINE

## 2019-01-25 PROCEDURE — 25010000002 HEPARIN (PORCINE) PER 1000 UNITS: Performed by: EMERGENCY MEDICINE

## 2019-01-25 PROCEDURE — 85303 CLOT INHIBIT PROT C ACTIVITY: CPT | Performed by: INTERNAL MEDICINE

## 2019-01-25 PROCEDURE — 99254 IP/OBS CNSLTJ NEW/EST MOD 60: CPT | Performed by: INTERNAL MEDICINE

## 2019-01-25 PROCEDURE — 85306 CLOT INHIBIT PROT S FREE: CPT | Performed by: INTERNAL MEDICINE

## 2019-01-25 PROCEDURE — 86146 BETA-2 GLYCOPROTEIN ANTIBODY: CPT | Performed by: INTERNAL MEDICINE

## 2019-01-25 PROCEDURE — 80048 BASIC METABOLIC PNL TOTAL CA: CPT | Performed by: INTERNAL MEDICINE

## 2019-01-25 PROCEDURE — 36415 COLL VENOUS BLD VENIPUNCTURE: CPT | Performed by: INTERNAL MEDICINE

## 2019-01-25 RX ORDER — WARFARIN SODIUM 10 MG/1
10 TABLET ORAL
Status: COMPLETED | OUTPATIENT
Start: 2019-01-25 | End: 2019-01-25

## 2019-01-25 RX ORDER — ECHINACEA PURPUREA EXTRACT 125 MG
2 TABLET ORAL AS NEEDED
Status: DISCONTINUED | OUTPATIENT
Start: 2019-01-25 | End: 2019-02-01 | Stop reason: HOSPADM

## 2019-01-25 RX ADMIN — HEPARIN SODIUM 8.05 UNITS/KG/HR: 10000 INJECTION, SOLUTION INTRAVENOUS at 05:04

## 2019-01-25 RX ADMIN — HEPARIN SODIUM 9.05 UNITS/KG/HR: 10000 INJECTION, SOLUTION INTRAVENOUS at 22:56

## 2019-01-25 RX ADMIN — WARFARIN SODIUM 10 MG: 10 TABLET ORAL at 17:33

## 2019-01-25 RX ADMIN — SALINE NASAL SPRAY 2 SPRAY: 1.5 SOLUTION NASAL at 22:56

## 2019-01-25 RX ADMIN — SODIUM CHLORIDE, POTASSIUM CHLORIDE, SODIUM LACTATE AND CALCIUM CHLORIDE 100 ML/HR: 600; 310; 30; 20 INJECTION, SOLUTION INTRAVENOUS at 22:56

## 2019-01-25 RX ADMIN — SODIUM CHLORIDE, POTASSIUM CHLORIDE, SODIUM LACTATE AND CALCIUM CHLORIDE 100 ML/HR: 600; 310; 30; 20 INJECTION, SOLUTION INTRAVENOUS at 14:03

## 2019-01-25 RX ADMIN — HEPARIN SODIUM 5.05 UNITS/KG/HR: 10000 INJECTION, SOLUTION INTRAVENOUS at 07:50

## 2019-01-25 RX ADMIN — HEPARIN SODIUM 6100 UNITS: 5000 INJECTION INTRAVENOUS; SUBCUTANEOUS at 17:31

## 2019-01-25 RX ADMIN — Medication 5 ML: at 22:56

## 2019-01-25 RX ADMIN — SODIUM CHLORIDE, POTASSIUM CHLORIDE, SODIUM LACTATE AND CALCIUM CHLORIDE 100 ML/HR: 600; 310; 30; 20 INJECTION, SOLUTION INTRAVENOUS at 05:04

## 2019-01-25 RX ADMIN — SODIUM CHLORIDE, PRESERVATIVE FREE 3 ML: 5 INJECTION INTRAVENOUS at 22:10

## 2019-01-25 RX ADMIN — SODIUM CHLORIDE, PRESERVATIVE FREE 3 ML: 5 INJECTION INTRAVENOUS at 09:00

## 2019-01-26 LAB
ANION GAP SERPL CALCULATED.3IONS-SCNC: 12.5 MMOL/L
APTT PPP: 102.7 SECONDS (ref 22.7–35.4)
APTT PPP: 110.6 SECONDS (ref 22.7–35.4)
APTT PPP: 61.9 SECONDS (ref 22.7–35.4)
BASOPHILS # BLD AUTO: 0.03 10*3/MM3 (ref 0–0.2)
BASOPHILS NFR BLD AUTO: 0.4 % (ref 0–1.5)
BUN BLD-MCNC: 11 MG/DL (ref 6–20)
BUN/CREAT SERPL: 16.7 (ref 7–25)
CALCIUM SPEC-SCNC: 8.8 MG/DL (ref 8.6–10.5)
CHLORIDE SERPL-SCNC: 101 MMOL/L (ref 98–107)
CO2 SERPL-SCNC: 23.5 MMOL/L (ref 22–29)
CREAT BLD-MCNC: 0.66 MG/DL (ref 0.76–1.27)
DEPRECATED RDW RBC AUTO: 45.1 FL (ref 37–54)
EOSINOPHIL # BLD AUTO: 0.3 10*3/MM3 (ref 0–0.7)
EOSINOPHIL NFR BLD AUTO: 3.6 % (ref 0.3–6.2)
ERYTHROCYTE [DISTWIDTH] IN BLOOD BY AUTOMATED COUNT: 13.1 % (ref 11.5–14.5)
GFR SERPL CREATININE-BSD FRML MDRD: 128 ML/MIN/1.73
GLUCOSE BLD-MCNC: 114 MG/DL (ref 65–99)
HCT VFR BLD AUTO: 42.1 % (ref 40.4–52.2)
HGB BLD-MCNC: 13.9 G/DL (ref 13.7–17.6)
IMM GRANULOCYTES # BLD AUTO: 0.04 10*3/MM3 (ref 0–0.03)
IMM GRANULOCYTES NFR BLD AUTO: 0.5 % (ref 0–0.5)
INR PPP: 1.05 (ref 0.9–1.1)
LYMPHOCYTES # BLD AUTO: 2.26 10*3/MM3 (ref 0.9–4.8)
LYMPHOCYTES NFR BLD AUTO: 26.7 % (ref 19.6–45.3)
MCH RBC QN AUTO: 31.2 PG (ref 27–32.7)
MCHC RBC AUTO-ENTMCNC: 33 G/DL (ref 32.6–36.4)
MCV RBC AUTO: 94.6 FL (ref 79.8–96.2)
MONOCYTES # BLD AUTO: 0.93 10*3/MM3 (ref 0.2–1.2)
MONOCYTES NFR BLD AUTO: 11 % (ref 5–12)
NEUTROPHILS # BLD AUTO: 4.89 10*3/MM3 (ref 1.9–8.1)
NEUTROPHILS NFR BLD AUTO: 57.8 % (ref 42.7–76)
PLATELET # BLD AUTO: 192 10*3/MM3 (ref 140–500)
PMV BLD AUTO: 10.4 FL (ref 6–12)
POTASSIUM BLD-SCNC: 4.1 MMOL/L (ref 3.5–5.2)
PROT C AG PPP IA-ACNC: 97 % (ref 60–150)
PROTHROMBIN TIME: 13.5 SECONDS (ref 11.7–14.2)
RBC # BLD AUTO: 4.45 10*6/MM3 (ref 4.6–6)
SODIUM BLD-SCNC: 137 MMOL/L (ref 136–145)
WBC NRBC COR # BLD: 8.45 10*3/MM3 (ref 4.5–10.7)

## 2019-01-26 PROCEDURE — 85730 THROMBOPLASTIN TIME PARTIAL: CPT | Performed by: INTERNAL MEDICINE

## 2019-01-26 PROCEDURE — 25010000002 HEPARIN (PORCINE) PER 1000 UNITS: Performed by: EMERGENCY MEDICINE

## 2019-01-26 PROCEDURE — 85025 COMPLETE CBC W/AUTO DIFF WBC: CPT | Performed by: EMERGENCY MEDICINE

## 2019-01-26 PROCEDURE — 99232 SBSQ HOSP IP/OBS MODERATE 35: CPT | Performed by: NURSE PRACTITIONER

## 2019-01-26 PROCEDURE — 94799 UNLISTED PULMONARY SVC/PX: CPT

## 2019-01-26 PROCEDURE — 99231 SBSQ HOSP IP/OBS SF/LOW 25: CPT | Performed by: INTERNAL MEDICINE

## 2019-01-26 PROCEDURE — 36415 COLL VENOUS BLD VENIPUNCTURE: CPT | Performed by: INTERNAL MEDICINE

## 2019-01-26 PROCEDURE — 85610 PROTHROMBIN TIME: CPT | Performed by: INTERNAL MEDICINE

## 2019-01-26 PROCEDURE — 80048 BASIC METABOLIC PNL TOTAL CA: CPT | Performed by: INTERNAL MEDICINE

## 2019-01-26 RX ORDER — WARFARIN SODIUM 10 MG/1
10 TABLET ORAL
Status: DISCONTINUED | OUTPATIENT
Start: 2019-01-26 | End: 2019-01-27 | Stop reason: SDUPTHER

## 2019-01-26 RX ADMIN — SODIUM CHLORIDE, POTASSIUM CHLORIDE, SODIUM LACTATE AND CALCIUM CHLORIDE 100 ML/HR: 600; 310; 30; 20 INJECTION, SOLUTION INTRAVENOUS at 09:06

## 2019-01-26 RX ADMIN — WARFARIN SODIUM 10 MG: 10 TABLET ORAL at 20:24

## 2019-01-26 RX ADMIN — SODIUM CHLORIDE, PRESERVATIVE FREE 3 ML: 5 INJECTION INTRAVENOUS at 12:28

## 2019-01-26 RX ADMIN — HEPARIN SODIUM 10000 UNITS: 5000 INJECTION INTRAVENOUS; SUBCUTANEOUS at 09:06

## 2019-01-26 RX ADMIN — SODIUM CHLORIDE, PRESERVATIVE FREE 3 ML: 5 INJECTION INTRAVENOUS at 20:24

## 2019-01-26 RX ADMIN — HEPARIN SODIUM 7.05 UNITS/KG/HR: 10000 INJECTION, SOLUTION INTRAVENOUS at 01:05

## 2019-01-26 RX ADMIN — HEPARIN SODIUM 9.05 UNITS/KG/HR: 10000 INJECTION, SOLUTION INTRAVENOUS at 12:23

## 2019-01-26 RX ADMIN — Medication 5 ML: at 22:02

## 2019-01-27 LAB
ANION GAP SERPL CALCULATED.3IONS-SCNC: 11.1 MMOL/L
APTT PPP: 120.8 SECONDS (ref 22.7–35.4)
APTT PPP: 51.8 SECONDS (ref 22.7–35.4)
APTT PPP: 58.4 SECONDS (ref 22.7–35.4)
APTT PPP: 92.8 SECONDS (ref 22.7–35.4)
BASOPHILS # BLD AUTO: 0.03 10*3/MM3 (ref 0–0.2)
BASOPHILS NFR BLD AUTO: 0.4 % (ref 0–1.5)
BUN BLD-MCNC: 10 MG/DL (ref 6–20)
BUN/CREAT SERPL: 12.7 (ref 7–25)
CALCIUM SPEC-SCNC: 8.9 MG/DL (ref 8.6–10.5)
CHLORIDE SERPL-SCNC: 102 MMOL/L (ref 98–107)
CO2 SERPL-SCNC: 22.9 MMOL/L (ref 22–29)
CREAT BLD-MCNC: 0.79 MG/DL (ref 0.76–1.27)
DEPRECATED RDW RBC AUTO: 43.8 FL (ref 37–54)
EOSINOPHIL # BLD AUTO: 0.33 10*3/MM3 (ref 0–0.7)
EOSINOPHIL NFR BLD AUTO: 4 % (ref 0.3–6.2)
ERYTHROCYTE [DISTWIDTH] IN BLOOD BY AUTOMATED COUNT: 13.3 % (ref 11.5–14.5)
GFR SERPL CREATININE-BSD FRML MDRD: 104 ML/MIN/1.73
GLUCOSE BLD-MCNC: 109 MG/DL (ref 65–99)
HCT VFR BLD AUTO: 39.9 % (ref 40.4–52.2)
HGB BLD-MCNC: 13.7 G/DL (ref 13.7–17.6)
IMM GRANULOCYTES # BLD AUTO: 0.04 10*3/MM3 (ref 0–0.03)
IMM GRANULOCYTES NFR BLD AUTO: 0.5 % (ref 0–0.5)
INR PPP: 1.08 (ref 0.9–1.1)
LYMPHOCYTES # BLD AUTO: 2.51 10*3/MM3 (ref 0.9–4.8)
LYMPHOCYTES NFR BLD AUTO: 30.6 % (ref 19.6–45.3)
MCH RBC QN AUTO: 31.4 PG (ref 27–32.7)
MCHC RBC AUTO-ENTMCNC: 34.3 G/DL (ref 32.6–36.4)
MCV RBC AUTO: 91.5 FL (ref 79.8–96.2)
MONOCYTES # BLD AUTO: 0.73 10*3/MM3 (ref 0.2–1.2)
MONOCYTES NFR BLD AUTO: 8.9 % (ref 5–12)
NEUTROPHILS # BLD AUTO: 4.59 10*3/MM3 (ref 1.9–8.1)
NEUTROPHILS NFR BLD AUTO: 56.1 % (ref 42.7–76)
NRBC BLD AUTO-RTO: 0 /100 WBC (ref 0–0)
PLATELET # BLD AUTO: 201 10*3/MM3 (ref 140–500)
PMV BLD AUTO: 10.5 FL (ref 6–12)
POTASSIUM BLD-SCNC: 4.1 MMOL/L (ref 3.5–5.2)
PROTHROMBIN TIME: 13.8 SECONDS (ref 11.7–14.2)
RBC # BLD AUTO: 4.36 10*6/MM3 (ref 4.6–6)
SODIUM BLD-SCNC: 136 MMOL/L (ref 136–145)
WBC NRBC COR # BLD: 8.19 10*3/MM3 (ref 4.5–10.7)

## 2019-01-27 PROCEDURE — 85025 COMPLETE CBC W/AUTO DIFF WBC: CPT | Performed by: EMERGENCY MEDICINE

## 2019-01-27 PROCEDURE — 85730 THROMBOPLASTIN TIME PARTIAL: CPT | Performed by: INTERNAL MEDICINE

## 2019-01-27 PROCEDURE — 36415 COLL VENOUS BLD VENIPUNCTURE: CPT | Performed by: INTERNAL MEDICINE

## 2019-01-27 PROCEDURE — 99232 SBSQ HOSP IP/OBS MODERATE 35: CPT | Performed by: NURSE PRACTITIONER

## 2019-01-27 PROCEDURE — 25010000002 HEPARIN (PORCINE) PER 1000 UNITS: Performed by: EMERGENCY MEDICINE

## 2019-01-27 PROCEDURE — 85610 PROTHROMBIN TIME: CPT | Performed by: INTERNAL MEDICINE

## 2019-01-27 PROCEDURE — 80048 BASIC METABOLIC PNL TOTAL CA: CPT | Performed by: INTERNAL MEDICINE

## 2019-01-27 RX ORDER — BENZONATATE 100 MG/1
200 CAPSULE ORAL 3 TIMES DAILY PRN
Status: DISCONTINUED | OUTPATIENT
Start: 2019-01-27 | End: 2019-02-01 | Stop reason: HOSPADM

## 2019-01-27 RX ORDER — WARFARIN SODIUM 5 MG/1
5 TABLET ORAL
Status: DISCONTINUED | OUTPATIENT
Start: 2019-01-27 | End: 2019-01-27 | Stop reason: SDUPTHER

## 2019-01-27 RX ORDER — WARFARIN SODIUM 10 MG/1
10 TABLET ORAL
Status: DISCONTINUED | OUTPATIENT
Start: 2019-01-27 | End: 2019-01-28

## 2019-01-27 RX ORDER — WARFARIN SODIUM 5 MG/1
5 TABLET ORAL
Status: DISCONTINUED | OUTPATIENT
Start: 2019-01-29 | End: 2019-01-28

## 2019-01-27 RX ADMIN — SODIUM CHLORIDE, PRESERVATIVE FREE 3 ML: 5 INJECTION INTRAVENOUS at 20:48

## 2019-01-27 RX ADMIN — WARFARIN SODIUM 10 MG: 10 TABLET ORAL at 19:30

## 2019-01-27 RX ADMIN — SODIUM CHLORIDE, PRESERVATIVE FREE 3 ML: 5 INJECTION INTRAVENOUS at 08:10

## 2019-01-27 RX ADMIN — WARFARIN SODIUM 10 MG: 10 TABLET ORAL at 19:20

## 2019-01-27 RX ADMIN — WARFARIN SODIUM 5 MG: 5 TABLET ORAL at 17:57

## 2019-01-27 RX ADMIN — BENZONATATE 200 MG: 100 CAPSULE ORAL at 20:50

## 2019-01-27 RX ADMIN — HEPARIN SODIUM 10000 UNITS: 5000 INJECTION INTRAVENOUS; SUBCUTANEOUS at 01:15

## 2019-01-27 RX ADMIN — HEPARIN SODIUM 11.05 UNITS/KG/HR: 10000 INJECTION, SOLUTION INTRAVENOUS at 02:22

## 2019-01-27 RX ADMIN — HEPARIN SODIUM 11.05 UNITS/KG/HR: 10000 INJECTION, SOLUTION INTRAVENOUS at 12:19

## 2019-01-28 LAB
ANION GAP SERPL CALCULATED.3IONS-SCNC: 13.1 MMOL/L
APTT HEX PL PPP: 11 SEC (ref 0–11)
APTT PPP: 137.2 SECONDS (ref 22.7–35.4)
APTT PPP: 61.4 SECONDS (ref 22.7–35.4)
BASOPHILS # BLD AUTO: 0.04 10*3/MM3 (ref 0–0.2)
BASOPHILS NFR BLD AUTO: 0.5 % (ref 0–1.5)
BUN BLD-MCNC: 9 MG/DL (ref 6–20)
BUN/CREAT SERPL: 11.1 (ref 7–25)
CALCIUM SPEC-SCNC: 9.4 MG/DL (ref 8.6–10.5)
CHLORIDE SERPL-SCNC: 102 MMOL/L (ref 98–107)
CO2 SERPL-SCNC: 22.9 MMOL/L (ref 22–29)
CREAT BLD-MCNC: 0.81 MG/DL (ref 0.76–1.27)
DEPRECATED RDW RBC AUTO: 44 FL (ref 37–54)
EOSINOPHIL # BLD AUTO: 0.32 10*3/MM3 (ref 0–0.7)
EOSINOPHIL NFR BLD AUTO: 4.1 % (ref 0.3–6.2)
ERYTHROCYTE [DISTWIDTH] IN BLOOD BY AUTOMATED COUNT: 13.5 % (ref 11.5–14.5)
GFR SERPL CREATININE-BSD FRML MDRD: 101 ML/MIN/1.73
GLUCOSE BLD-MCNC: 109 MG/DL (ref 65–99)
HCT VFR BLD AUTO: 40.7 % (ref 40.4–52.2)
HGB BLD-MCNC: 14.2 G/DL (ref 13.7–17.6)
IMM GRANULOCYTES # BLD AUTO: 0.05 10*3/MM3 (ref 0–0.03)
IMM GRANULOCYTES NFR BLD AUTO: 0.6 % (ref 0–0.5)
INR PPP: 1.11 (ref 0.9–1.1)
LA NT DPL PPP: 47.3 SEC (ref 0–55)
LA NT DPL/LA NT HPL PPP-RTO: 0.82 RATIO (ref 0–1.4)
LA NT PLATELET PPP: 76.9 SEC (ref 0–51.9)
LUPUS ANTICOAGULANT REFLEX: ABNORMAL
LYMPHOCYTES # BLD AUTO: 2.75 10*3/MM3 (ref 0.9–4.8)
LYMPHOCYTES NFR BLD AUTO: 35.3 % (ref 19.6–45.3)
MCH RBC QN AUTO: 31.6 PG (ref 27–32.7)
MCHC RBC AUTO-ENTMCNC: 34.9 G/DL (ref 32.6–36.4)
MCV RBC AUTO: 90.6 FL (ref 79.8–96.2)
MONOCYTES # BLD AUTO: 0.71 10*3/MM3 (ref 0.2–1.2)
MONOCYTES NFR BLD AUTO: 9.1 % (ref 5–12)
NEUTROPHILS # BLD AUTO: 3.97 10*3/MM3 (ref 1.9–8.1)
NEUTROPHILS NFR BLD AUTO: 51 % (ref 42.7–76)
PLATELET # BLD AUTO: 228 10*3/MM3 (ref 140–500)
PMV BLD AUTO: 10.9 FL (ref 6–12)
POTASSIUM BLD-SCNC: 4.3 MMOL/L (ref 3.5–5.2)
PROTHROMBIN TIME: 14.1 SECONDS (ref 11.7–14.2)
PTT LA MIX: 67.2 SEC (ref 0–48.9)
RBC # BLD AUTO: 4.49 10*6/MM3 (ref 4.6–6)
SCREEN DRVVT: 41.7 SEC (ref 0–47)
SODIUM BLD-SCNC: 138 MMOL/L (ref 136–145)
THROMBIN TIME: 20.5 SEC (ref 0–23)
WBC NRBC COR # BLD: 7.79 10*3/MM3 (ref 4.5–10.7)

## 2019-01-28 PROCEDURE — 25010000002 HEPARIN (PORCINE) PER 1000 UNITS: Performed by: EMERGENCY MEDICINE

## 2019-01-28 PROCEDURE — 99231 SBSQ HOSP IP/OBS SF/LOW 25: CPT | Performed by: INTERNAL MEDICINE

## 2019-01-28 PROCEDURE — 36415 COLL VENOUS BLD VENIPUNCTURE: CPT | Performed by: INTERNAL MEDICINE

## 2019-01-28 PROCEDURE — 80048 BASIC METABOLIC PNL TOTAL CA: CPT | Performed by: INTERNAL MEDICINE

## 2019-01-28 PROCEDURE — 94799 UNLISTED PULMONARY SVC/PX: CPT

## 2019-01-28 PROCEDURE — 85025 COMPLETE CBC W/AUTO DIFF WBC: CPT | Performed by: EMERGENCY MEDICINE

## 2019-01-28 PROCEDURE — 85730 THROMBOPLASTIN TIME PARTIAL: CPT | Performed by: INTERNAL MEDICINE

## 2019-01-28 PROCEDURE — 99232 SBSQ HOSP IP/OBS MODERATE 35: CPT | Performed by: INTERNAL MEDICINE

## 2019-01-28 PROCEDURE — 85610 PROTHROMBIN TIME: CPT | Performed by: INTERNAL MEDICINE

## 2019-01-28 RX ORDER — WARFARIN SODIUM 7.5 MG/1
15 TABLET ORAL
Status: DISCONTINUED | OUTPATIENT
Start: 2019-01-28 | End: 2019-02-01 | Stop reason: HOSPADM

## 2019-01-28 RX ORDER — WARFARIN SODIUM 7.5 MG/1
15 TABLET ORAL
Status: DISCONTINUED | OUTPATIENT
Start: 2019-01-29 | End: 2019-01-28

## 2019-01-28 RX ADMIN — HEPARIN SODIUM 9.05 UNITS/KG/HR: 10000 INJECTION, SOLUTION INTRAVENOUS at 10:57

## 2019-01-28 RX ADMIN — HEPARIN SODIUM 8.05 UNITS/KG/HR: 10000 INJECTION, SOLUTION INTRAVENOUS at 00:58

## 2019-01-28 RX ADMIN — BENZONATATE 200 MG: 100 CAPSULE ORAL at 22:10

## 2019-01-28 RX ADMIN — SODIUM CHLORIDE, PRESERVATIVE FREE 3 ML: 5 INJECTION INTRAVENOUS at 22:11

## 2019-01-28 RX ADMIN — BENZONATATE 200 MG: 100 CAPSULE ORAL at 04:53

## 2019-01-28 RX ADMIN — BENZONATATE 200 MG: 100 CAPSULE ORAL at 12:29

## 2019-01-28 RX ADMIN — HEPARIN SODIUM 11.05 UNITS/KG/HR: 10000 INJECTION, SOLUTION INTRAVENOUS at 22:09

## 2019-01-28 RX ADMIN — WARFARIN SODIUM 15 MG: 7.5 TABLET ORAL at 19:05

## 2019-01-29 LAB
ANION GAP SERPL CALCULATED.3IONS-SCNC: 12.6 MMOL/L
APTT PPP: 112.4 SECONDS (ref 22.7–35.4)
APTT PPP: 89.2 SECONDS (ref 22.7–35.4)
APTT PPP: 93.8 SECONDS (ref 22.7–35.4)
B2 GLYCOPROT1 IGA SER-ACNC: <9 GPI IGA UNITS (ref 0–25)
B2 GLYCOPROT1 IGG SER-ACNC: <9 GPI IGG UNITS (ref 0–20)
B2 GLYCOPROT1 IGM SER-ACNC: <9 GPI IGM UNITS (ref 0–32)
BASOPHILS # BLD AUTO: 0.04 10*3/MM3 (ref 0–0.2)
BASOPHILS NFR BLD AUTO: 0.5 % (ref 0–1.5)
BUN BLD-MCNC: 12 MG/DL (ref 6–20)
BUN/CREAT SERPL: 16.7 (ref 7–25)
CALCIUM SPEC-SCNC: 9.1 MG/DL (ref 8.6–10.5)
CHLORIDE SERPL-SCNC: 102 MMOL/L (ref 98–107)
CO2 SERPL-SCNC: 23.4 MMOL/L (ref 22–29)
CREAT BLD-MCNC: 0.72 MG/DL (ref 0.76–1.27)
DEPRECATED RDW RBC AUTO: 45 FL (ref 37–54)
EOSINOPHIL # BLD AUTO: 0.33 10*3/MM3 (ref 0–0.7)
EOSINOPHIL NFR BLD AUTO: 4.2 % (ref 0.3–6.2)
ERYTHROCYTE [DISTWIDTH] IN BLOOD BY AUTOMATED COUNT: 13.6 % (ref 11.5–14.5)
GFR SERPL CREATININE-BSD FRML MDRD: 116 ML/MIN/1.73
GLUCOSE BLD-MCNC: 105 MG/DL (ref 65–99)
HCT VFR BLD AUTO: 40.7 % (ref 40.4–52.2)
HGB BLD-MCNC: 14.1 G/DL (ref 13.7–17.6)
IMM GRANULOCYTES # BLD AUTO: 0.08 10*3/MM3 (ref 0–0.03)
IMM GRANULOCYTES NFR BLD AUTO: 1 % (ref 0–0.5)
INR PPP: 1.32 (ref 0.9–1.1)
LYMPHOCYTES # BLD AUTO: 2.75 10*3/MM3 (ref 0.9–4.8)
LYMPHOCYTES NFR BLD AUTO: 35.3 % (ref 19.6–45.3)
MCH RBC QN AUTO: 31.8 PG (ref 27–32.7)
MCHC RBC AUTO-ENTMCNC: 34.6 G/DL (ref 32.6–36.4)
MCV RBC AUTO: 91.7 FL (ref 79.8–96.2)
MONOCYTES # BLD AUTO: 0.58 10*3/MM3 (ref 0.2–1.2)
MONOCYTES NFR BLD AUTO: 7.4 % (ref 5–12)
NEUTROPHILS # BLD AUTO: 4.09 10*3/MM3 (ref 1.9–8.1)
NEUTROPHILS NFR BLD AUTO: 52.6 % (ref 42.7–76)
PLATELET # BLD AUTO: 214 10*3/MM3 (ref 140–500)
PMV BLD AUTO: 10.8 FL (ref 6–12)
POTASSIUM BLD-SCNC: 4.3 MMOL/L (ref 3.5–5.2)
PROT C ACT/NOR PPP: 101 % (ref 86–163)
PROT S ACT/NOR PPP: 110 % (ref 70–127)
PROTHROMBIN TIME: 16.2 SECONDS (ref 11.7–14.2)
RBC # BLD AUTO: 4.44 10*6/MM3 (ref 4.6–6)
SODIUM BLD-SCNC: 138 MMOL/L (ref 136–145)
WBC NRBC COR # BLD: 7.79 10*3/MM3 (ref 4.5–10.7)

## 2019-01-29 PROCEDURE — 99232 SBSQ HOSP IP/OBS MODERATE 35: CPT | Performed by: INTERNAL MEDICINE

## 2019-01-29 PROCEDURE — 85730 THROMBOPLASTIN TIME PARTIAL: CPT | Performed by: INTERNAL MEDICINE

## 2019-01-29 PROCEDURE — 85025 COMPLETE CBC W/AUTO DIFF WBC: CPT | Performed by: EMERGENCY MEDICINE

## 2019-01-29 PROCEDURE — 80048 BASIC METABOLIC PNL TOTAL CA: CPT | Performed by: INTERNAL MEDICINE

## 2019-01-29 PROCEDURE — 25010000002 HEPARIN (PORCINE) PER 1000 UNITS: Performed by: EMERGENCY MEDICINE

## 2019-01-29 PROCEDURE — 85610 PROTHROMBIN TIME: CPT | Performed by: INTERNAL MEDICINE

## 2019-01-29 PROCEDURE — 99231 SBSQ HOSP IP/OBS SF/LOW 25: CPT | Performed by: INTERNAL MEDICINE

## 2019-01-29 RX ADMIN — HEPARIN SODIUM 9.05 UNITS/KG/HR: 10000 INJECTION, SOLUTION INTRAVENOUS at 07:50

## 2019-01-29 RX ADMIN — BENZONATATE 200 MG: 100 CAPSULE ORAL at 23:52

## 2019-01-29 RX ADMIN — HEPARIN SODIUM 9.05 UNITS/KG/HR: 10000 INJECTION, SOLUTION INTRAVENOUS at 19:13

## 2019-01-29 RX ADMIN — SODIUM CHLORIDE, PRESERVATIVE FREE 3 ML: 5 INJECTION INTRAVENOUS at 11:43

## 2019-01-29 RX ADMIN — BENZONATATE 200 MG: 100 CAPSULE ORAL at 16:00

## 2019-01-29 RX ADMIN — WARFARIN SODIUM 15 MG: 7.5 TABLET ORAL at 18:16

## 2019-01-29 RX ADMIN — SODIUM CHLORIDE, PRESERVATIVE FREE 3 ML: 5 INJECTION INTRAVENOUS at 20:47

## 2019-01-30 LAB
ANION GAP SERPL CALCULATED.3IONS-SCNC: 12.8 MMOL/L
APTT PPP: 106.7 SECONDS (ref 22.7–35.4)
APTT PPP: 147.6 SECONDS (ref 22.7–35.4)
APTT PPP: 60.1 SECONDS (ref 22.7–35.4)
BASOPHILS # BLD AUTO: 0.06 10*3/MM3 (ref 0–0.2)
BASOPHILS NFR BLD AUTO: 0.7 % (ref 0–1.5)
BUN BLD-MCNC: 14 MG/DL (ref 6–20)
BUN/CREAT SERPL: 17.5 (ref 7–25)
CALCIUM SPEC-SCNC: 9.2 MG/DL (ref 8.6–10.5)
CHLORIDE SERPL-SCNC: 101 MMOL/L (ref 98–107)
CO2 SERPL-SCNC: 23.2 MMOL/L (ref 22–29)
CREAT BLD-MCNC: 0.8 MG/DL (ref 0.76–1.27)
DEPRECATED RDW RBC AUTO: 47.2 FL (ref 37–54)
EOSINOPHIL # BLD AUTO: 0.33 10*3/MM3 (ref 0–0.7)
EOSINOPHIL NFR BLD AUTO: 4 % (ref 0.3–6.2)
ERYTHROCYTE [DISTWIDTH] IN BLOOD BY AUTOMATED COUNT: 13.7 % (ref 11.5–14.5)
GFR SERPL CREATININE-BSD FRML MDRD: 103 ML/MIN/1.73
GLUCOSE BLD-MCNC: 114 MG/DL (ref 65–99)
HCT VFR BLD AUTO: 42.2 % (ref 40.4–52.2)
HGB BLD-MCNC: 13.7 G/DL (ref 13.7–17.6)
IMM GRANULOCYTES # BLD AUTO: 0.12 10*3/MM3 (ref 0–0.03)
IMM GRANULOCYTES NFR BLD AUTO: 1.5 % (ref 0–0.5)
INR PPP: 1.47 (ref 0.9–1.1)
LYMPHOCYTES # BLD AUTO: 2.74 10*3/MM3 (ref 0.9–4.8)
LYMPHOCYTES NFR BLD AUTO: 33.2 % (ref 19.6–45.3)
MCH RBC QN AUTO: 30.9 PG (ref 27–32.7)
MCHC RBC AUTO-ENTMCNC: 32.5 G/DL (ref 32.6–36.4)
MCV RBC AUTO: 95 FL (ref 79.8–96.2)
MONOCYTES # BLD AUTO: 0.59 10*3/MM3 (ref 0.2–1.2)
MONOCYTES NFR BLD AUTO: 7.2 % (ref 5–12)
NEUTROPHILS # BLD AUTO: 4.41 10*3/MM3 (ref 1.9–8.1)
NEUTROPHILS NFR BLD AUTO: 53.4 % (ref 42.7–76)
PLATELET # BLD AUTO: 201 10*3/MM3 (ref 140–500)
PMV BLD AUTO: 10.2 FL (ref 6–12)
POTASSIUM BLD-SCNC: 4.3 MMOL/L (ref 3.5–5.2)
PROTHROMBIN TIME: 17.6 SECONDS (ref 11.7–14.2)
RBC # BLD AUTO: 4.44 10*6/MM3 (ref 4.6–6)
SODIUM BLD-SCNC: 137 MMOL/L (ref 136–145)
WBC NRBC COR # BLD: 8.25 10*3/MM3 (ref 4.5–10.7)

## 2019-01-30 PROCEDURE — 80048 BASIC METABOLIC PNL TOTAL CA: CPT | Performed by: INTERNAL MEDICINE

## 2019-01-30 PROCEDURE — 85610 PROTHROMBIN TIME: CPT | Performed by: INTERNAL MEDICINE

## 2019-01-30 PROCEDURE — 25010000002 HEPARIN (PORCINE) PER 1000 UNITS: Performed by: EMERGENCY MEDICINE

## 2019-01-30 PROCEDURE — 85025 COMPLETE CBC W/AUTO DIFF WBC: CPT | Performed by: EMERGENCY MEDICINE

## 2019-01-30 PROCEDURE — 85730 THROMBOPLASTIN TIME PARTIAL: CPT | Performed by: INTERNAL MEDICINE

## 2019-01-30 PROCEDURE — 36415 COLL VENOUS BLD VENIPUNCTURE: CPT | Performed by: INTERNAL MEDICINE

## 2019-01-30 PROCEDURE — 99232 SBSQ HOSP IP/OBS MODERATE 35: CPT | Performed by: INTERNAL MEDICINE

## 2019-01-30 RX ORDER — WARFARIN SODIUM 5 MG/1
5 TABLET ORAL
Status: COMPLETED | OUTPATIENT
Start: 2019-01-30 | End: 2019-01-30

## 2019-01-30 RX ADMIN — WARFARIN SODIUM 15 MG: 7.5 TABLET ORAL at 17:58

## 2019-01-30 RX ADMIN — HEPARIN SODIUM 9.05 UNITS/KG/HR: 10000 INJECTION, SOLUTION INTRAVENOUS at 19:12

## 2019-01-30 RX ADMIN — WARFARIN SODIUM 5 MG: 5 TABLET ORAL at 17:58

## 2019-01-30 RX ADMIN — SODIUM CHLORIDE, PRESERVATIVE FREE 10 ML: 5 INJECTION INTRAVENOUS at 14:24

## 2019-01-30 RX ADMIN — BENZONATATE 200 MG: 100 CAPSULE ORAL at 21:12

## 2019-01-30 RX ADMIN — SODIUM CHLORIDE, PRESERVATIVE FREE 3 ML: 5 INJECTION INTRAVENOUS at 08:44

## 2019-01-30 RX ADMIN — SODIUM CHLORIDE, PRESERVATIVE FREE 3 ML: 5 INJECTION INTRAVENOUS at 21:13

## 2019-01-30 RX ADMIN — HEPARIN SODIUM 10000 UNITS: 5000 INJECTION INTRAVENOUS; SUBCUTANEOUS at 14:23

## 2019-01-30 RX ADMIN — BENZONATATE 200 MG: 100 CAPSULE ORAL at 12:18

## 2019-01-31 LAB
APTT PPP: 63.3 SECONDS (ref 22.7–35.4)
APTT PPP: 83.6 SECONDS (ref 22.7–35.4)
APTT PPP: 95.8 SECONDS (ref 22.7–35.4)
BASOPHILS # BLD AUTO: 0.05 10*3/MM3 (ref 0–0.2)
BASOPHILS NFR BLD AUTO: 0.6 % (ref 0–1.5)
DEPRECATED RDW RBC AUTO: 47.6 FL (ref 37–54)
EOSINOPHIL # BLD AUTO: 0.32 10*3/MM3 (ref 0–0.7)
EOSINOPHIL NFR BLD AUTO: 3.6 % (ref 0.3–6.2)
ERYTHROCYTE [DISTWIDTH] IN BLOOD BY AUTOMATED COUNT: 13.9 % (ref 11.5–14.5)
HCT VFR BLD AUTO: 43 % (ref 40.4–52.2)
HGB BLD-MCNC: 14.1 G/DL (ref 13.7–17.6)
IMM GRANULOCYTES # BLD AUTO: 0.1 10*3/MM3 (ref 0–0.03)
IMM GRANULOCYTES NFR BLD AUTO: 1.1 % (ref 0–0.5)
INR PPP: 1.78 (ref 0.9–1.1)
LYMPHOCYTES # BLD AUTO: 2.79 10*3/MM3 (ref 0.9–4.8)
LYMPHOCYTES NFR BLD AUTO: 31.7 % (ref 19.6–45.3)
MCH RBC QN AUTO: 31.3 PG (ref 27–32.7)
MCHC RBC AUTO-ENTMCNC: 32.8 G/DL (ref 32.6–36.4)
MCV RBC AUTO: 95.6 FL (ref 79.8–96.2)
MONOCYTES # BLD AUTO: 0.77 10*3/MM3 (ref 0.2–1.2)
MONOCYTES NFR BLD AUTO: 8.8 % (ref 5–12)
NEUTROPHILS # BLD AUTO: 4.77 10*3/MM3 (ref 1.9–8.1)
NEUTROPHILS NFR BLD AUTO: 54.2 % (ref 42.7–76)
PLATELET # BLD AUTO: 227 10*3/MM3 (ref 140–500)
PMV BLD AUTO: 10.8 FL (ref 6–12)
PROTHROMBIN TIME: 20.4 SECONDS (ref 11.7–14.2)
RBC # BLD AUTO: 4.5 10*6/MM3 (ref 4.6–6)
WBC NRBC COR # BLD: 8.8 10*3/MM3 (ref 4.5–10.7)

## 2019-01-31 PROCEDURE — 85730 THROMBOPLASTIN TIME PARTIAL: CPT | Performed by: INTERNAL MEDICINE

## 2019-01-31 PROCEDURE — 99232 SBSQ HOSP IP/OBS MODERATE 35: CPT | Performed by: INTERNAL MEDICINE

## 2019-01-31 PROCEDURE — 90661 CCIIV3 VAC ABX FR 0.5 ML IM: CPT | Performed by: INTERNAL MEDICINE

## 2019-01-31 PROCEDURE — 85610 PROTHROMBIN TIME: CPT | Performed by: INTERNAL MEDICINE

## 2019-01-31 PROCEDURE — 25010000002 HEPARIN (PORCINE) PER 1000 UNITS: Performed by: EMERGENCY MEDICINE

## 2019-01-31 PROCEDURE — G0008 ADMIN INFLUENZA VIRUS VAC: HCPCS | Performed by: INTERNAL MEDICINE

## 2019-01-31 PROCEDURE — 25010000002 INFLUENZA VAC SUBUNIT QUAD 0.5 ML SUSPENSION PREFILLED SYRINGE: Performed by: INTERNAL MEDICINE

## 2019-01-31 PROCEDURE — 85025 COMPLETE CBC W/AUTO DIFF WBC: CPT | Performed by: EMERGENCY MEDICINE

## 2019-01-31 RX ORDER — WARFARIN SODIUM 5 MG/1
5 TABLET ORAL
Status: COMPLETED | OUTPATIENT
Start: 2019-01-31 | End: 2019-01-31

## 2019-01-31 RX ADMIN — SODIUM CHLORIDE, PRESERVATIVE FREE 3 ML: 5 INJECTION INTRAVENOUS at 09:43

## 2019-01-31 RX ADMIN — WARFARIN SODIUM 5 MG: 5 TABLET ORAL at 18:36

## 2019-01-31 RX ADMIN — HEPARIN SODIUM 8.05 UNITS/KG/HR: 10000 INJECTION, SOLUTION INTRAVENOUS at 11:00

## 2019-01-31 RX ADMIN — WARFARIN SODIUM 15 MG: 7.5 TABLET ORAL at 18:36

## 2019-01-31 RX ADMIN — INFLUENZA A VIRUS A/SINGAPORE/GP1908/2015 IVR-180 (H1N1) ANTIGEN (MDCK CELL DERIVED, PROPIOLACTONE INACTIVATED), INFLUENZA A VIRUS A/NORTH CAROLINA/04/2016 (H3N2) HEMAGGLUTININ ANTIGEN (MDCK CELL DERIVED, PROPIOLACTONE INACTIVATED), INFLUENZA B VIRUS B/IOWA/06/2017 HEMAGGLUTININ ANTIGEN (MDCK CELL DERIVED, PROPIOLACTONE INACTIVATED), INFLUENZA B VIRUS B/SINGAPORE/INFTT-16-0610/2016 HEMAGGLUTININ ANTIGEN (MDCK CELL DERIVED, PROPIOLACTONE INACTIVATED) 0.5 ML: 15; 15; 15; 15 INJECTION, SUSPENSION INTRAMUSCULAR at 12:48

## 2019-01-31 RX ADMIN — BENZONATATE 200 MG: 100 CAPSULE ORAL at 15:38

## 2019-01-31 RX ADMIN — SODIUM CHLORIDE, PRESERVATIVE FREE 3 ML: 5 INJECTION INTRAVENOUS at 20:18

## 2019-01-31 RX ADMIN — HEPARIN SODIUM 8.05 UNITS/KG/HR: 10000 INJECTION, SOLUTION INTRAVENOUS at 07:37

## 2019-02-01 VITALS
TEMPERATURE: 98 F | OXYGEN SATURATION: 94 % | WEIGHT: 315 LBS | HEIGHT: 74 IN | DIASTOLIC BLOOD PRESSURE: 87 MMHG | SYSTOLIC BLOOD PRESSURE: 112 MMHG | HEART RATE: 74 BPM | BODY MASS INDEX: 40.43 KG/M2 | RESPIRATION RATE: 18 BRPM

## 2019-02-01 PROBLEM — I26.99 OTHER ACUTE PULMONARY EMBOLISM WITHOUT ACUTE COR PULMONALE: Status: ACTIVE | Noted: 2019-02-01

## 2019-02-01 LAB
ANTI-PHOSPHATIDIC ACID: NORMAL
ANTI-PHOSPHATIDIC,IGA: 7.1 U/ML
ANTI-PHOSPHATIDIC,IGG: 3.9 U/ML
ANTI-PHOSPHATIDIC,IGM: 2.6 U/ML
ANTI-PHOSPHATIDYL GLYCEROL, IGA: 4.8 U/ML
ANTI-PHOSPHATIDYL GLYCEROL, IGG: 2.1 U/ML
ANTI-PHOSPHATIDYL GLYCEROL, IGM: 3.2 U/ML
ANTI-PHOSPHATIDYL GLYCEROL: NORMAL
ANTI-PHOSPHATIDYL INOSITOL: NORMAL
ANTI-PHOSPHATIDYL,IGA: 6.1 U/ML
ANTI-PHOSPHATIDYL,IGG: 2.9 U/ML
ANTI-PHOSPHATIDYL,IGM: 2.9 U/ML
ANTI-PHOSPHATIDYLETHANOLAMINE, IGA: 5.5 U/ML
ANTI-PHOSPHATIDYLETHANOLAMINE, IGG: 1.8 U/ML
ANTI-PHOSPHATIDYLETHANOLAMINE, IGM: 8.8 U/ML
ANTI-PHOSPHATIDYLETHANOLAMINE: NORMAL
APTT PPP: 106 SECONDS (ref 22.7–35.4)
BASOPHILS # BLD AUTO: 0.03 10*3/MM3 (ref 0–0.2)
BASOPHILS NFR BLD AUTO: 0.4 % (ref 0–1.5)
DEPRECATED RDW RBC AUTO: 49.2 FL (ref 37–54)
EOSINOPHIL # BLD AUTO: 0.27 10*3/MM3 (ref 0–0.7)
EOSINOPHIL NFR BLD AUTO: 3.5 % (ref 0.3–6.2)
ERYTHROCYTE [DISTWIDTH] IN BLOOD BY AUTOMATED COUNT: 14.1 % (ref 11.5–14.5)
HCT VFR BLD AUTO: 42.4 % (ref 40.4–52.2)
HGB BLD-MCNC: 14 G/DL (ref 13.7–17.6)
IMM GRANULOCYTES # BLD AUTO: 0.09 10*3/MM3 (ref 0–0.03)
IMM GRANULOCYTES NFR BLD AUTO: 1.2 % (ref 0–0.5)
INR PPP: 2.23 (ref 0.9–1.1)
LYMPHOCYTES # BLD AUTO: 2.52 10*3/MM3 (ref 0.9–4.8)
LYMPHOCYTES NFR BLD AUTO: 32.2 % (ref 19.6–45.3)
MCH RBC QN AUTO: 31.7 PG (ref 27–32.7)
MCHC RBC AUTO-ENTMCNC: 33 G/DL (ref 32.6–36.4)
MCV RBC AUTO: 96.1 FL (ref 79.8–96.2)
MONOCYTES # BLD AUTO: 0.71 10*3/MM3 (ref 0.2–1.2)
MONOCYTES NFR BLD AUTO: 9.1 % (ref 5–12)
NEUTROPHILS # BLD AUTO: 4.2 10*3/MM3 (ref 1.9–8.1)
NEUTROPHILS NFR BLD AUTO: 53.6 % (ref 42.7–76)
PLATELET # BLD AUTO: 209 10*3/MM3 (ref 140–500)
PMV BLD AUTO: 10.2 FL (ref 6–12)
PROTHROMBIN TIME: 24.3 SECONDS (ref 11.7–14.2)
RBC # BLD AUTO: 4.41 10*6/MM3 (ref 4.6–6)
WBC NRBC COR # BLD: 7.82 10*3/MM3 (ref 4.5–10.7)

## 2019-02-01 PROCEDURE — 85610 PROTHROMBIN TIME: CPT | Performed by: INTERNAL MEDICINE

## 2019-02-01 PROCEDURE — 25010000002 HEPARIN (PORCINE) PER 1000 UNITS: Performed by: EMERGENCY MEDICINE

## 2019-02-01 PROCEDURE — 85730 THROMBOPLASTIN TIME PARTIAL: CPT | Performed by: INTERNAL MEDICINE

## 2019-02-01 PROCEDURE — 85025 COMPLETE CBC W/AUTO DIFF WBC: CPT | Performed by: EMERGENCY MEDICINE

## 2019-02-01 PROCEDURE — 99232 SBSQ HOSP IP/OBS MODERATE 35: CPT | Performed by: NURSE PRACTITIONER

## 2019-02-01 RX ORDER — WARFARIN SODIUM 7.5 MG/1
TABLET ORAL
Qty: 30 TABLET | Refills: 3 | Status: SHIPPED | OUTPATIENT
Start: 2019-02-01 | End: 2019-05-22 | Stop reason: SDUPTHER

## 2019-02-01 RX ADMIN — HEPARIN SODIUM 8.05 UNITS/KG/HR: 10000 INJECTION, SOLUTION INTRAVENOUS at 00:37

## 2019-02-01 RX ADMIN — WARFARIN SODIUM 15 MG: 7.5 TABLET ORAL at 15:53

## 2019-02-01 RX ADMIN — SODIUM CHLORIDE, PRESERVATIVE FREE 10 ML: 5 INJECTION INTRAVENOUS at 15:53

## 2019-02-01 NOTE — DISCHARGE SUMMARY
Patient Identification:  Name: Kameron Melendez  Age: 49 y.o.  Sex: male  :  1970  MRN: [id number]  Primary Care Physician: Timi Tran MD    Admit date: 2019  Discharge date and time: 19   Discharged Condition: good    Discharge Diagnoses:  Pulmonary embolus (CMS/HCC)  Obstructive sleep apnea  Nocturnal hypoxemia  Acute hypoxia, resolved  Morbid obesity complicating all aspects of care  Witnessed nocturnal apnea and hypoxia.        Hospital Course: Kameron Melendez presented to [facility]  with shortness of breath, hypoxia and was diagnosed with pulmonary embolism.  He received intravenous heparin.  We started Coumadin because of his morbid obesity.  He is not a good candidate for novel oral anticoagulant agents for pulmonary embolism.  He had to remain in the hospital for several days until his INR became therapeutic on Coumadin.  He has been weaned off oxygen during daytime.      The patient was witnessed to have apneic spells during sleep at night as well as nocturnal hypoxemia.  He was advised to follow-up with us in the office for a formal sleep evaluation.  He was diagnosed with obstructive sleep apnea many years ago but was intolerant to CPAP.  He wants to resume CPAP.  He will see one of our sleep practitioners in the office.    His INR today is therapeutic and I discussed the case with cardiology.  They will follow him up in the office for Coumadin monitoring.  He says he wants to change his primary care physician because he finds it very difficult to obtain an appointment with them whenever he has a problem.      Consults:   IP CONSULT TO INTERVENTIONAL CARDIOLOGY  IP CONSULT TO PULMONOLOGY  IP CONSULT TO CARDIOLOGY  IP CONSULT TO HEMATOLOGY AND ONCOLOGY  IP CONSULT TO NUTRITION SERVICES    Significant Diagnostic Studies:   Imaging Results (most recent)     Procedure Component Value Units Date/Time    CT Angiogram Chest With Contrast [[id number]] Collected:   01/23/19 2058     Updated:  01/24/19 0735    Narrative:       EXAMINATION: CT ANGIOGRAM OF THE CHEST WITH CONTRAST     HISTORY: 49-year-old male with a history of acute chest pain and  suspected pulmonary emboli     TECHNIQUE: Contiguous axial images were obtained through the chest  following bolus intravenous administration of nonionic contrast. Three-D  reformatted images were produced and presented for interpretation.     COMPARISON: CT angiogram of the chest with contrast, 07/20/2017     FINDINGS: There are large filling defects seen within the right lower  lobe, right middle lobe and left lower lobe segmental and subsegmental  branches of the pulmonary arteries. No evidence for a filling defect  within the right upper lobe or left upper lobe branches is appreciated.  The right ventricular to left ventricular ratio is assessed as 1.3. This  is consistent with evidence of right heart strain. No evidence for  mediastinal, hilar or axillary adenopathy is appreciated. There is a 1.3  x 0.5 cm nonobstructing calyceal calculus at the superior pole of the  right kidney. The lungs are clear consolidation. Moderate multilevel  osteophytic change is seen within the thoracic spine.       Impression:       1. There are acute pulmonary emboli within the bilateral lower lobe and  right middle lobe proximal segmental pulmonary arterial branches as well  as subsegmental branches. Evidence of right ventricular strain is also  noted.     This was communicated to the ER physician caring for the patient, Dr. Dominic June, on 01/23/2019 at 6:45 PM.           Radiation dose reduction techniques were utilized, including automated  exposure control and exposure modulation based on body size.     This report was finalized on 1/24/2019 7:32 AM by Dr. Bigg Gamez M.D.                   TEST  RESULTS PENDING AT DISCHARGE   Order Current Status    Antiphosphotidyl Antibodies Panel II In process          Discharge Exam:  Alert and  oriented x 4, in NAD  Supple neck, midline trach  RRR, no m/r/g, no edema  Clear bilaterally, no wheezing, nonlabored  No clubbing or cyanosis     Disposition:  Home    Patient Instructions:      Discharge Medications      New Medications      Instructions Start Date   warfarin 7.5 MG tablet  Commonly known as:  COUMADIN   Take one daily. INR needs to stay within 2 and 3         Continue These Medications      Instructions Start Date   acetaminophen 500 MG tablet  Commonly known as:  TYLENOL   500 mg, Oral, Every 6 Hours PRN         Stop These Medications    ibuprofen 200 MG tablet  Commonly known as:  ADVIL,MOTRIN             Your medication list      START taking these medications      Instructions Last Dose Given Next Dose Due   warfarin 7.5 MG tablet  Commonly known as:  COUMADIN      Take one daily. INR needs to stay within 2 and 3          CONTINUE taking these medications      Instructions Last Dose Given Next Dose Due   acetaminophen 500 MG tablet  Commonly known as:  TYLENOL      Take 500 mg by mouth Every 6 (Six) Hours As Needed for Mild Pain .          STOP taking these medications    ibuprofen 200 MG tablet  Commonly known as:  ADVIL,MOTRIN              Where to Get Your Medications      These medications were sent to [facility] [id number] [facility] [employer] - [address] [phone] [facility] [phone] [address] [address]    Phone:  [phone]   · warfarin 7.5 MG tablet             Medication Reconciliation: Please see electronically completed Med Rec.    Total time spent discharging patient including evaluation, medication reconciliation, arranging follow up, and post hospitalization instructions and education to patient and family total time exceeds 30 minutes.    Signed:  Omi Sharpe MD  2/1/2019  10:32 AM

## 2019-02-01 NOTE — PLAN OF CARE
Problem: Patient Care Overview  Goal: Plan of Care Review  Outcome: Ongoing (interventions implemented as appropriate)   02/01/19 0418   Coping/Psychosocial   Plan of Care Reviewed With patient   Plan of Care Review   Progress improving   OTHER   Outcome Summary Patient continue Heparin drip. Last PTT was 83.6 at 1930. No rate change. Due repeat this morning. Denies any pain. Resting at this time.   Coping/Psychosocial   Patient Agreement with Plan of Care agrees     Goal: Individualization and Mutuality  Outcome: Ongoing (interventions implemented as appropriate)    Goal: Interprofessional Rounds/Family Conf  Outcome: Ongoing (interventions implemented as appropriate)      Problem: Skin Injury Risk (Adult)  Goal: Skin Health and Integrity  Outcome: Ongoing (interventions implemented as appropriate)      Problem: VTE, DVT and PE (Adult)  Goal: Signs and Symptoms of Listed Potential Problems Will be Absent, Minimized or Managed (VTE, DVT and PE)  Outcome: Ongoing (interventions implemented as appropriate)

## 2019-02-01 NOTE — PROGRESS NOTES
"    Patient Name: Kameron Melendez  :1970  49 y.o.      Patient Care Team:  Timi Tran MD as PCP - General (Family Medicine)    Chief Complaint: follow up pulmonary embolus     Interval History: He is sitting up in the chair and feels great. He had 6 sec pause overnight while asleep. HR also noted to drop into the 30's , also while asleep.        Objective   Vital Signs  Temp:  [97.9 °F (36.6 °C)-98.1 °F (36.7 °C)] 98 °F (36.7 °C)  Heart Rate:  [68-83] 68  Resp:  [18-20] 18  BP: (126-149)/(71-99) 126/81    Intake/Output Summary (Last 24 hours) at 2019 1235  Last data filed at 2019 0744  Gross per 24 hour   Intake 360 ml   Output --   Net 360 ml     Flowsheet Rows      First Filed Value   Admission Height  188 cm (74\") Documented at 2019 1400   Admission Weight  248 kg (546 lb 1.6 oz)  (Abnormal)  Documented at 2019 1400          Physical Exam:   General Appearance:    Alert, cooperative, in no acute distress   Lungs:     Clear to auscultation.  Normal respiratory effort and rate.      Heart:    Regular rhythm and normal rate, normal S1 and S2, no murmurs, gallops or rubs.     Chest Wall:    No abnormalities observed   Abdomen:     Soft, nontender, positive bowel sounds.     Extremities:   no cyanosis, clubbing or edema.  No marked joint deformities.  Adequate musculoskeletal strength.       Results Review:    Results from last 7 days   Lab Units 19  0454   SODIUM mmol/L 137   POTASSIUM mmol/L 4.3   CHLORIDE mmol/L 101   CO2 mmol/L 23.2   BUN mg/dL 14   CREATININE mg/dL 0.80   GLUCOSE mg/dL 114*   CALCIUM mg/dL 9.2         Results from last 7 days   Lab Units 19  0513   WBC 10*3/mm3 7.82   HEMOGLOBIN g/dL 14.0   HEMATOCRIT % 42.4   PLATELETS 10*3/mm3 209     Results from last 7 days   Lab Units 19  0513 19  1925 19  1326 19  0427  19  0454   INR  2.23*  --   --  1.78*  --  1.47*   APTT seconds 106.0* 83.6* 95.8* 63.3*   < > 106.7*    < > = " values in this interval not displayed.                       Medication Review:     sodium chloride 3 mL Intravenous Q12H   warfarin 15 mg Oral Daily          heparin (porcine) 6.05 Units/kg/hr Last Rate: 8.05 Units/kg/hr (02/01/19 0037)         Assessment/Plan   1. Pulmonary embolism. Does not appear to have a large thrombus burden this time around.  Although RV strain was noted on CT scan his echocardiogram does not show any significant RV dilatation compared to baseline (appears to have baseline mild RV dilatation likely secondary to untreated ARNOLDO).  clinically improving  2. Right ventricular enlargement.  Has baseline mild right ventricular enlargement likely from untreated sleep apnea.  Does not seem much larger on echo this admission.   3. Right lower extremity acute DVT  4. Factor V Leiden heterozygosity. This in addition to morbid obesity are precipitating factor  5. Pulmonary hypertension.  Mild on echo this admit.  Likely due to pulmonary embolism and ARNOLDO.  6. Chronic lower extremity lymphedema  7. ARNOLDO.  Untreated.  Intolerant to CPAP.  8. Morbid obesity effecting all aspects of care    - Continue warfarin at 15mg daily. Repeat INR Tuesday. A referral has been sent to [facility] Anticoagulation management clinic.   - Pause and bradycardia - due to untreated sleep apnea. No further work up or treatment indicated. Discussed with FADY Milian  [facility]  02/01/19  12:35 PM

## 2019-02-02 ENCOUNTER — READMISSION MANAGEMENT (OUTPATIENT)
Dept: CALL CENTER | Facility: HOSPITAL | Age: 49
End: 2019-02-02

## 2019-02-03 ENCOUNTER — READMISSION MANAGEMENT (OUTPATIENT)
Dept: CALL CENTER | Facility: HOSPITAL | Age: 49
End: 2019-02-03

## 2019-02-03 NOTE — OUTREACH NOTE
Prep Survey      Responses   Facility patient discharged from?  [place]   Is patient eligible?  Yes   Discharge diagnosis  Pulmonary embolus   Does the patient have one of the following disease processes/diagnoses(primary or secondary)?  Other   Does the patient have Home health ordered?  No   Is there a DME ordered?  No   Prep survey completed?  Yes          Heidy Montez RN

## 2019-02-03 NOTE — OUTREACH NOTE
Medical Week 1 Survey      Responses   Facility patient discharged from?  [place]   Does the patient have one of the following disease processes/diagnoses(primary or secondary)?  Other   Is there a successful TCM telephone encounter documented?  No   Week 1 attempt successful?  Yes   Call start time  1446   Call end time  1449   Is patient permission given to speak with other caregiver?  No   Meds reviewed with patient/caregiver?  Yes   Is the patient having any side effects they believe may be caused by any medication additions or changes?  No   Does the patient have all medications ordered at discharge?  Yes   Is the patient taking all medications as directed (includes completed medication regime)?  Yes   Comments regarding appointments  will be calling adn getting his doctor made   Does the patient have a primary care provider?   Yes   Does the patient have an appointment with their PCP within 7 days of discharge?  N/A   Has the patient kept scheduled appointments due by today?  N/A   Has home health visited the patient within 72 hours of discharge?  N/A   Did the patient receive a copy of their discharge instructions?  Yes   Nursing interventions  Reviewed instructions with patient   What is the patient's perception of their health status since discharge?  Improving   Is the patient/caregiver able to teach back signs and symptoms related to disease process for when to call PCP?  Yes   Is the patient/caregiver able to teach back signs and symptoms related to disease process for when to call 911?  Yes   Is the patient/caregiver able to teach back the hierarchy of who to call/visit for symptoms/problems? PCP, Specialist, Home health nurse, Urgent Care, ED, 911  Yes   Week 1 call completed?  Yes   Wrap up additional comments  doing well and having no problems          Anne Steele RN

## 2019-02-04 ENCOUNTER — ANTICOAGULATION VISIT (OUTPATIENT)
Dept: PHARMACY | Facility: HOSPITAL | Age: 49
End: 2019-02-04

## 2019-02-04 DIAGNOSIS — I26.99 OTHER ACUTE PULMONARY EMBOLISM WITHOUT ACUTE COR PULMONALE (HCC): ICD-10-CM

## 2019-02-04 DIAGNOSIS — I26.99 BILATERAL PULMONARY EMBOLISM (HCC): ICD-10-CM

## 2019-02-04 LAB
INR PPP: 3 (ref 0.91–1.09)
PROTHROMBIN TIME: 35.4 SECONDS (ref 10–13.8)

## 2019-02-04 PROCEDURE — 36416 COLLJ CAPILLARY BLOOD SPEC: CPT

## 2019-02-04 PROCEDURE — G0463 HOSPITAL OUTPT CLINIC VISIT: HCPCS

## 2019-02-04 PROCEDURE — 85610 PROTHROMBIN TIME: CPT

## 2019-02-04 NOTE — PROGRESS NOTES
Anticoagulation Clinic Progress Note  Anticoagulation Summary  As of 2019    INR goal:   2.0-3.0   TTR:   --   INR used for dosing:   3.0 (2019)   Warfarin maintenance plan:   10 mg on Mon, Wed, Fri; 15 mg all other days   Weekly warfarin total:   90 mg   Plan last modified:   Abelardo Galvez RPH (2019)   Next INR check:   2019   Priority:   Maintenance   Target end date:       Indications    Other acute pulmonary embolism without acute cor pulmonale (CMS/HCC) [I26.99]             Anticoagulation Episode Summary     INR check location:       Preferred lab:       Send INR reminders to:   [facility]    Comments:         Anticoagulation Care Providers     Provider Role Specialty Phone number    Torri Colmenares, APRN Referring Cardiology [phone]          Clinic Interview:  Patient Findings     Negatives:   Signs/symptoms of thrombosis, Signs/symptoms of bleeding,   Laboratory test error suspected, Change in health, Change in alcohol use,   Change in activity, Upcoming invasive procedure, Emergency department   visit, Upcoming dental procedure, Missed doses, Extra doses, Change in   medications, Change in diet/appetite, Hospital admission, Bruising, Other   complaints      Clinical Outcomes     Negatives:   Major bleeding event, Thromboembolic event,   Anticoagulation-related hospital admission, Anticoagulation-related ED   visit, Anticoagulation-related fatality        Education:  Kameron Melendez is a new start in the Medication Management Clinic. We discussed the followin) Warfarin's indication, mechanism, and dosing  2) Enforced the importance of taking warfarin as instructed and at the same time every day, preferably in the evening so that we can make dose adjustments more easily following subsequent clinic visits  3) What he should do about a missed dose; pts can take missed doses within about 12 hours of their usual scheduled dose, but he was instructed on the  importance of not doubling up on doses unless told to do so by the Medication Management Clinic  4) Explained possible side effects of warfarin therapy, including increased risk of bleeding, s/sx of bleeding and s/sx of any additional clots/PE/CVA.   5) Discussed monitoring of warfarin, the INR, goal INR range, and the frequency of monitoring  6) Reviewed drug/food/tobacco/EtOH interactions and provided written information covering these topics in more detail, explaining that green, leafy vegetables interact most heavily with warfarin  7) Instructed the pt not to take or discontinue any medications without informing his physician/pharmacist and reminded him to inform us of any dietary changes, as well  8) Explained that he would be coming into the clinic more frequently in these first few weeks of therapy as we try to adjust his dose and achieve a therapeutic INR x 2 consecutive readings. Once that is achieved, patient will follow up in clinic every 4 weeks, on average.    He stated no problems with transportation or scheduling clinic appts in this manner. he expressed understanding of the information provided and has no additional questions at this time.    Kameron Melendez was presented with a copy of the Patients Rights and Responsibilities. he expressed verbal consent and agreement to receive care in the Medication Management Clinic under the current collaborative care agreement with [facility].       INR History:  Previous INR data from [facility] is recorded in [facility] and scanned into the patient's chart in [employer]. These results have been analyzed and reviewed.      Plan:  1. INR is Therapeutic today- see above in Anticoagulation Summary.   Will instruct Kameron Melendez to Change their warfarin regimen- see above in Anticoagulation Summary.  2. Follow up in 4 days  3. Patient declines warfarin refills.  4. Verbal and written information provided. Patient expresses understanding and has  no further questions at this time.    Abelardo Galvez [facility]

## 2019-02-04 NOTE — PROGRESS NOTES
Case Management Discharge Note    Final Note: Home    Destination      No service has been selected for the patient.      Durable Medical Equipment      No service has been selected for the patient.      Dialysis/Infusion      No service has been selected for the patient.      Home Medical Care      No service has been selected for the patient.      Community Resources      No service has been selected for the patient.             Final Discharge Disposition Code: 01 - home or self-care

## 2019-02-04 NOTE — PAYOR COMM NOTE
"DISCHARGED        MarciKameron (49 y.o. Male)     Date of Birth Social Security Number Address Home Phone MRN    1970  [id number]  [facility] [address] [phone] [id number]    Christian Marital Status          Shinto        Admission Date Admission Type Admitting Provider Attending Provider Department, Room/Bed    1/23/19 Emergency Myron Ramos MD  [address], [address]/1    Discharge Date Discharge Disposition Discharge Destination        2/1/2019 Home or Self Care              Attending Provider:  (none)   Allergies:  No Known Allergies    Isolation:  None   Infection:  None   Code Status:  Prior    Ht:  188 cm (74\")   Wt:  240 kg (528 lb 8 oz)    Admission Cmt:  None   Principal Problem:  None                Active Insurance as of 1/23/2019     Primary Coverage     Payor Plan Insurance Group Employer/Plan Group    [employer] 2[employer] BLUE [employer] PPO [id number]     Payor Plan Address Payor Plan Phone Number Payor Plan Fax Number Effective Dates    [address] [phone]  1/1/2019 - None Entered    [place] [address]       Subscriber Name Subscriber Birth Date Member ID       MARCIYAYA MARIUSZ 1970 [id number]                 Emergency Contacts      (Rel.) Home Phone Work Phone Mobile Phone    Yaya Melendez (Spouse) [phone] -- [phone]              "

## 2019-02-08 ENCOUNTER — ANTICOAGULATION VISIT (OUTPATIENT)
Dept: PHARMACY | Facility: HOSPITAL | Age: 49
End: 2019-02-08

## 2019-02-08 DIAGNOSIS — I26.99 OTHER ACUTE PULMONARY EMBOLISM WITHOUT ACUTE COR PULMONALE (HCC): ICD-10-CM

## 2019-02-08 DIAGNOSIS — I26.99 BILATERAL PULMONARY EMBOLISM (HCC): ICD-10-CM

## 2019-02-08 LAB
INR PPP: 3.3 (ref 0.91–1.09)
PROTHROMBIN TIME: 40 SECONDS (ref 10–13.8)

## 2019-02-08 PROCEDURE — G0463 HOSPITAL OUTPT CLINIC VISIT: HCPCS

## 2019-02-08 PROCEDURE — 85610 PROTHROMBIN TIME: CPT

## 2019-02-08 PROCEDURE — 36416 COLLJ CAPILLARY BLOOD SPEC: CPT

## 2019-02-08 NOTE — PROGRESS NOTES
Anticoagulation Clinic Progress Note    Anticoagulation Summary  As of 2/8/2019    INR goal:   2.0-3.0   TTR:   --   INR used for dosing:   3.3! (2/8/2019)   Warfarin maintenance plan:   15 mg on Sun, Tue, Thu; 10 mg all other days   Weekly warfarin total:   85 mg   Plan last modified:   Abelardo Galvez RPH (2/8/2019)   Next INR check:   2/15/2019   Priority:   Maintenance   Target end date:   Indefinite    Indications    Other acute pulmonary embolism without acute cor pulmonale (CMS/HCC) [I26.99]  Bilateral pulmonary embolism (CMS/HCC) [I26.99]             Anticoagulation Episode Summary     INR check location:       Preferred lab:       Send INR reminders to:   [facility]INICAL POOL    Comments:         Anticoagulation Care Providers     Provider Role Specialty Phone number    Torri Colmenares, FADY Referring Cardiology [phone]    Elsy Baeza MD Responsible Cardiology [phone]          Clinic Interview:  Patient Findings     Negatives:   Signs/symptoms of thrombosis, Signs/symptoms of bleeding,   Laboratory test error suspected, Change in health, Change in alcohol use,   Change in activity, Upcoming invasive procedure, Emergency department   visit, Upcoming dental procedure, Missed doses, Extra doses, Change in   medications, Change in diet/appetite, Hospital admission, Bruising, Other   complaints      Clinical Outcomes     Negatives:   Major bleeding event, Thromboembolic event,   Anticoagulation-related hospital admission, Anticoagulation-related ED   visit, Anticoagulation-related fatality        INR History:  Anticoagulation Monitoring 2/4/2019 2/8/2019   INR 3.0 3.3   INR Date 2/4/2019 2/8/2019   INR Goal 2.0-3.0 2.0-3.0   Trend - Down   Last Week Total 105 mg 95 mg   Next Week Total 90 mg 82.5 mg   Sun - 15 mg   Mon 10 mg 10 mg   Tue 15 mg 15 mg   Wed 10 mg 10 mg   Thu 15 mg 15 mg   Fri - 7.5 mg (2/8)   Sat - 10 mg   Visit Report - -       Plan:  1. INR is therapeutic today- see  above in Anticoagulation Summary.   Will instruct Kameron Melendez to continue their warfarin regimen- see above in Anticoagulation Summary.  2. Follow up in 1 weeks.  3. Patient declines warfarin refills.  4. Verbal and written information provided. Patient expresses understanding and has no further questions at this time.    Iris Gonzalez

## 2019-02-12 ENCOUNTER — READMISSION MANAGEMENT (OUTPATIENT)
Dept: CALL CENTER | Facility: HOSPITAL | Age: 49
End: 2019-02-12

## 2019-02-12 NOTE — OUTREACH NOTE
Medical Week 2 Survey      Responses   Facility patient discharged from?  [place]   Does the patient have one of the following disease processes/diagnoses(primary or secondary)?  Other   Week 2 attempt successful?  Yes   Call start time  1249   Discharge diagnosis  Pulmonary embolus   Call end time  1252   Meds reviewed with patient/caregiver?  Yes   Is the patient having any side effects they believe may be caused by any medication additions or changes?  No   Does the patient have all medications ordered at discharge?  Yes   Is the patient taking all medications as directed (includes completed medication regime)?  Yes   Does the patient have a primary care provider?   Yes   Does the patient have an appointment with their PCP within 7 days of discharge?  Greater than 7 days   What is preventing the patient from scheduling follow up appointments within 7 days of discharge?  Earlier appointment not available   Nursing Interventions  Verified appointment date/time/provider   Has the patient kept scheduled appointments due by today?  N/A   Has home health visited the patient within 72 hours of discharge?  N/A   Psychosocial issues?  No   Did the patient receive a copy of their discharge instructions?  Yes   Nursing interventions  Reviewed instructions with patient   What is the patient's perception of their health status since discharge?  Improving   Is the patient/caregiver able to teach back signs and symptoms related to disease process for when to call PCP?  Yes   Is the patient/caregiver able to teach back signs and symptoms related to disease process for when to call 911?  Yes   Is the patient/caregiver able to teach back the hierarchy of who to call/visit for symptoms/problems? PCP, Specialist, Home health nurse, Urgent Care, ED, 911  Yes   Week 2 Call Completed?  Yes          Ivy Thapa RN

## 2019-02-13 ENCOUNTER — OFFICE VISIT (OUTPATIENT)
Dept: CARDIOLOGY | Facility: CLINIC | Age: 49
End: 2019-02-13

## 2019-02-13 VITALS
DIASTOLIC BLOOD PRESSURE: 75 MMHG | WEIGHT: 315 LBS | SYSTOLIC BLOOD PRESSURE: 136 MMHG | BODY MASS INDEX: 40.43 KG/M2 | HEART RATE: 73 BPM | HEIGHT: 74 IN

## 2019-02-13 DIAGNOSIS — E66.01 MORBID OBESITY DUE TO EXCESS CALORIES (HCC): ICD-10-CM

## 2019-02-13 DIAGNOSIS — I26.99 BILATERAL PULMONARY EMBOLISM (HCC): Primary | ICD-10-CM

## 2019-02-13 DIAGNOSIS — I51.7 RIGHT VENTRICULAR ENLARGEMENT: ICD-10-CM

## 2019-02-13 DIAGNOSIS — G47.33 OSA (OBSTRUCTIVE SLEEP APNEA): ICD-10-CM

## 2019-02-13 PROCEDURE — 99213 OFFICE O/P EST LOW 20 MIN: CPT | Performed by: INTERNAL MEDICINE

## 2019-02-13 PROCEDURE — 93000 ELECTROCARDIOGRAM COMPLETE: CPT | Performed by: INTERNAL MEDICINE

## 2019-02-13 NOTE — PROGRESS NOTES
Subjective:     Encounter Date:02/13/2019      Patient ID: Kameron Melendez is a 49 y.o. male.    Chief Complaint:  History of Present Illness    This is a 49-year-old with a history of bilateral pulmonary embolism status post thrombectomy in 7/2017, pulmonary hypertension, obesity, hyperlipidemia, obstructive sleep apnea, recurrent pulmonary embolism in 1/2019, who presents for hospital follow-up.    I initially saw the patient in 7/2017 when he was admitted and 7/2017 with bilateral pulmonary embolism associated with significant right ventricular strain.  The patient reported that he had been on a long car trip prior to developing his symptoms.  His findings included a normal troponin and an elevated BNP.  His echocardiogram however showed a severely dilated right ventricle with severe dysfunction.  During that admission he was taken to the cardiac catheterization laboratory by Dr. Coulter and underwent successful left pulmonary artery thrombectomy and infusion of TPA.  Following the procedure he was placed on warfarin (due to his high BMI) with a heparin bridge.  And follow-up he was seen by Dr. Coulter on 11/6/2017 at which time he was doing well.  A repeat echocardiogram and bilateral lower extremity venous Dopplers were performed.  The lower extremity ultrasound showed no residual clot.  His echocardiogram showed a moderately dilated left ventricle with mildly dilated right ventricle, normal left ventricular function with an EF of 66%, and no significant valvular abnormalities.  At that time was suggested that since this was a provoked venous thromboembolism he could stop anticoagulation after a year.  He returned to the office for routine follow-up in 8/2018 with JESSIE Sanchez which time he was continuing to do well and was recommended that he could stop his anticoagulation.  He ended up stopping this in October.    He presented back to the hospital on 1/23/2019 with progressively worsening dyspnea.  His workup in  the emergency room room revealed an elevated BNP and a normal troponin.  A CT angiogram of the chest showed acute bilateral pulmonary embolism involving the lower lobes and the proximal branch of the right middle lobe with evidence of right ventricular strain.  He was started on heparin drip following his admission.  An echocardiogram was performed during that admission that showed normal left ventricular systolic function and wall motion, EF 60%, grade 1 diastolic dysfunction, mild tricuspid regurgitation, normal right ventricular size and function, and right ventricular systolic pressure of 48.7 mmHg.  A lower extremity in the venous Doppler ultrasound showed right lower extremity DVTs of the popliteal and gastrocnemius/soleal veins.  Due to the lack of right ventricular strain and his relatively low thrombus burden I recommended that we conservatively manage him at this time around with anticoagulation.  Deferred any invasive treatment as long as he continued to improve.  Fortunately the patient did so and we ended up starting him back on warfarin.  He was evaluated by hematology during this admission and his workup did reveal evidence of heterozygous mutation for factor V Leiden.  He was finally discharged on 2/1/2019 in good condition off of any oxygen during the day.  He did have evidence of obstructive sleep apnea and the patient was to follow-up with a sleep physician as an outpatient.    Today he presents for routine hospital follow-up.  He has felt well since his discharge.  He denies any chest pain, shortness of breath, PND orthopnea, near-syncope or syncope, palpitations.  He reports improvement in his chronic lower extremity edema which is been secondary to lymphedema in the past.  His protimes have been followed in the medication management clinic.    Review of Systems   Constitution: Negative for weakness and malaise/fatigue.   HENT: Negative for hearing loss, hoarse voice, nosebleeds and sore throat.     Eyes: Negative for pain.   Cardiovascular: Negative for chest pain, claudication, cyanosis, dyspnea on exertion, irregular heartbeat, leg swelling, near-syncope, orthopnea, palpitations, paroxysmal nocturnal dyspnea and syncope.   Respiratory: Negative for shortness of breath and snoring.    Endocrine: Negative for cold intolerance, heat intolerance, polydipsia, polyphagia and polyuria.   Skin: Negative for itching and rash.   Musculoskeletal: Negative for arthritis, falls, joint pain, joint swelling, muscle cramps, muscle weakness and myalgias.   Gastrointestinal: Negative for constipation, diarrhea, dysphagia, heartburn, hematemesis, hematochezia, melena, nausea and vomiting.   Genitourinary: Negative for frequency, hematuria and hesitancy.   Neurological: Negative for excessive daytime sleepiness, dizziness, headaches, light-headedness and numbness.   Psychiatric/Behavioral: Negative for depression. The patient is not nervous/anxious.           Current Outpatient Medications:   •  acetaminophen (TYLENOL) 500 MG tablet, Take 500 mg by mouth Every 6 (Six) Hours As Needed for Mild Pain ., Disp: , Rfl:   •  warfarin (COUMADIN) 7.5 MG tablet, Take one daily. INR needs to stay within 2 and 3, Disp: 30 tablet, Rfl: 3    Past Medical History:   Diagnosis Date   • Lymphedema    • Lymphedema of left lower extremity    • Obesity    • ARNOLDO (obstructive sleep apnea)    • Pulmonary embolism (CMS/HCC)    • Pulmonary hypertension (CMS/HCC)    • Right ventricular enlargement      Past Surgical History:   Procedure Laterality Date   • CARDIAC CATHETERIZATION Bilateral 7/18/2017    Procedure: Pulmonary angiography- [facility] ;  Surgeon: Cedric Coulter MD;  Location: [facility] CATH INVASIVE LOCATION;  Service:    • CARDIAC CATHETERIZATION N/A 7/18/2017    Procedure: Right Heart Cath;  Surgeon: Cedric Coulter MD;  Location:  [facility] CATH INVASIVE LOCATION;  Service:      Family History   Problem Relation Age of Onset   • Heart disease Mother    •  "Heart failure Mother    • Bradycardia Mother    • No Known Problems Father    • No Known Problems Maternal Grandmother    • No Known Problems Maternal Grandfather    • No Known Problems Paternal Grandmother    • No Known Problems Paternal Grandfather      Social History     Tobacco Use   • Smoking status: Never Smoker   • Smokeless tobacco: Never Used   Substance Use Topics   • Alcohol use: No   • Drug use: No             ECG 12 Lead  Date/Time: 2/13/2019 12:12 PM  Performed by: Elsy Baeza MD  Authorized by: Elsy Baeza MD   Comparison: compared with previous ECG   Similar to previous ECG  Rhythm: sinus rhythm  Conduction: left anterior fascicular block and 1st degree AV block               Objective:         Visit Vitals  /75   Pulse 73   Ht 188 cm (74\")   Wt (!) 239 kg (528 lb)   BMI 67.79 kg/m²          Physical Exam   Constitutional: He is oriented to person, place, and time. He appears well-developed and well-nourished.   HENT:   Head: Normocephalic and atraumatic.   Neck: No JVD present. Carotid bruit is not present.   Cardiovascular: Normal rate, regular rhythm, S1 normal and S2 normal. Exam reveals no gallop.   No murmur heard.  Pulses:       Radial pulses are 2+ on the right side, and 2+ on the left side.   No bilateral lower extremity edema   Pulmonary/Chest: Effort normal and breath sounds normal.   Abdominal: Soft. Normal appearance.   Neurological: He is alert and oriented to person, place, and time.   Skin: Skin is warm, dry and intact.   Psychiatric: He has a normal mood and affect.       Lab Review:       Assessment:          Diagnosis Plan   1. Bilateral pulmonary embolism (CMS/HCC)     2. Right ventricular enlargement     3. ARNOLDO (obstructive sleep apnea)     4. Morbid obesity due to excess calories (CMS/HCC)            Plan:       1.  Recurrent bilateral pulmonary embolism.  Continue treatment with warfarin.  He will remain on this indefinitely.  He is monitored in the medication " management clinic.  2.  Factor V Leiden heterozygosity.  3.  Obstructive sleep apnea  4.  Morbid obesity    We will plan on seeing the patient back again in 6 months.

## 2019-02-15 ENCOUNTER — ANTICOAGULATION VISIT (OUTPATIENT)
Dept: PHARMACY | Facility: HOSPITAL | Age: 49
End: 2019-02-15

## 2019-02-15 DIAGNOSIS — I26.99 BILATERAL PULMONARY EMBOLISM (HCC): ICD-10-CM

## 2019-02-15 DIAGNOSIS — I26.99 OTHER ACUTE PULMONARY EMBOLISM WITHOUT ACUTE COR PULMONALE (HCC): ICD-10-CM

## 2019-02-15 LAB
INR PPP: 2.8 (ref 0.91–1.09)
PROTHROMBIN TIME: 33.6 SECONDS (ref 10–13.8)

## 2019-02-15 PROCEDURE — 85610 PROTHROMBIN TIME: CPT

## 2019-02-15 PROCEDURE — 36416 COLLJ CAPILLARY BLOOD SPEC: CPT

## 2019-02-15 NOTE — PROGRESS NOTES
Anticoagulation Clinic Progress Note    Anticoagulation Summary  As of 2/15/2019    INR goal:   2.0-3.0   TTR:   64.1 % (4 d)   INR used for dosin.8 (2/15/2019)   Warfarin maintenance plan:   15 mg on Sun, Tue, Thu; 10 mg all other days   Weekly warfarin total:   85 mg   No change documented:   Iris Gonzalez   Plan last modified:   Abelardo Galvez RPH (2019)   Next INR check:   3/1/2019   Priority:   High   Target end date:   Indefinite    Indications    Other acute pulmonary embolism without acute cor pulmonale (CMS/HCC) [I26.99]  Bilateral pulmonary embolism (CMS/HCC) [I26.99]             Anticoagulation Episode Summary     INR check location:       Preferred lab:       Send INR reminders to:   [facility]    Comments:         Anticoagulation Care Providers     Provider Role Specialty Phone number    Torri Colmenares APRN Referring Cardiology [phone]    Elsy Baeza MD Responsible Cardiology [phone]          Clinic Interview:  Patient Findings     Negatives:   Signs/symptoms of thrombosis, Signs/symptoms of bleeding,   Laboratory test error suspected, Change in health, Change in alcohol use,   Change in activity, Upcoming invasive procedure, Emergency department   visit, Upcoming dental procedure, Missed doses, Extra doses, Change in   medications, Change in diet/appetite, Hospital admission, Bruising, Other   complaints      Clinical Outcomes     Negatives:   Major bleeding event, Thromboembolic event,   Anticoagulation-related hospital admission, Anticoagulation-related ED   visit, Anticoagulation-related fatality        INR History:  Anticoagulation Monitoring 2019 2019 2/15/2019   INR 3.0 3.3 2.8   INR Date 2019 2019 2/15/2019   INR Goal 2.0-3.0 2.0-3.0 2.0-3.0   Trend - Down Same   Last Week Total 105 mg 95 mg 82.5 mg   Next Week Total 90 mg 82.5 mg 85 mg   Sun - 15 mg 15 mg   Mon 10 mg 10 mg 10 mg   Tue 15 mg 15 mg 15 mg   Wed 10 mg 10 mg  10 mg   Thu 15 mg 15 mg 15 mg   Fri - 7.5 mg (2/8) 10 mg   Sat - 10 mg 10 mg   Visit Report - - -   Some recent data might be hidden       Plan:  1. INR is therapeutic today- see above in Anticoagulation Summary.   Will instruct Kameron Melendez to continue their warfarin regimen- see above in Anticoagulation Summary.  2. Follow up in 2 weeks.  3. Patient declines warfarin refills.  4. Verbal and written information provided. Patient expresses understanding and has no further questions at this time.    Iris Gonzalez

## 2019-02-19 ENCOUNTER — READMISSION MANAGEMENT (OUTPATIENT)
Dept: CALL CENTER | Facility: HOSPITAL | Age: 49
End: 2019-02-19

## 2019-02-19 NOTE — OUTREACH NOTE
Medical Week 3 Survey      Responses   Facility patient discharged from?  [facility]   Does the patient have one of the following disease processes/diagnoses(primary or secondary)?  Other   Week 3 attempt successful?  No   Unsuccessful attempts  Attempt 1          Comfort Hare RN

## 2019-02-22 ENCOUNTER — READMISSION MANAGEMENT (OUTPATIENT)
Dept: CALL CENTER | Facility: HOSPITAL | Age: 49
End: 2019-02-22

## 2019-02-22 NOTE — OUTREACH NOTE
Medical Week 4 Survey      Responses   Facility patient discharged from?  [facility]   Does the patient have one of the following disease processes/diagnoses(primary or secondary)?  Other   Week 4 attempt successful?  No          John Akins RN

## 2019-03-01 ENCOUNTER — ANTICOAGULATION VISIT (OUTPATIENT)
Dept: PHARMACY | Facility: HOSPITAL | Age: 49
End: 2019-03-01

## 2019-03-01 DIAGNOSIS — I26.99 BILATERAL PULMONARY EMBOLISM (HCC): ICD-10-CM

## 2019-03-01 DIAGNOSIS — I26.99 OTHER ACUTE PULMONARY EMBOLISM WITHOUT ACUTE COR PULMONALE (HCC): ICD-10-CM

## 2019-03-01 LAB
INR PPP: 3.1 (ref 0.91–1.09)
PROTHROMBIN TIME: 36.8 SECONDS (ref 10–13.8)

## 2019-03-01 PROCEDURE — G0463 HOSPITAL OUTPT CLINIC VISIT: HCPCS

## 2019-03-01 PROCEDURE — 36416 COLLJ CAPILLARY BLOOD SPEC: CPT

## 2019-03-01 PROCEDURE — 85610 PROTHROMBIN TIME: CPT

## 2019-03-01 NOTE — PROGRESS NOTES
Anticoagulation Clinic Progress Note    Anticoagulation Summary  As of 3/1/2019    INR goal:   2.0-3.0   TTR:   66.1 % (2.6 wk)   INR used for dosing:   3.1! (3/1/2019)   Warfarin maintenance plan:   15 mg on Sun, Thu; 10 mg all other days   Weekly warfarin total:   80 mg   Plan last modified:   Abelardo Galvez RPH (3/1/2019)   Next INR check:   3/15/2019   Priority:   High   Target end date:   Indefinite    Indications    Other acute pulmonary embolism without acute cor pulmonale (CMS/HCC) [I26.99]  Bilateral pulmonary embolism (CMS/HCC) [I26.99]             Anticoagulation Episode Summary     INR check location:       Preferred lab:       Send INR reminders to:   [facility]    Comments:         Anticoagulation Care Providers     Provider Role Specialty Phone number    Torri Colmenares, APRN Referring Cardiology [phone]    Elsy Baeza MD Responsible Cardiology [phone]          Clinic Interview:  Patient Findings     Negatives:   Signs/symptoms of thrombosis, Signs/symptoms of bleeding,   Laboratory test error suspected, Change in health, Change in alcohol use,   Change in activity, Upcoming invasive procedure, Emergency department   visit, Upcoming dental procedure, Missed doses, Extra doses, Change in   medications, Change in diet/appetite, Hospital admission, Bruising, Other   complaints      Clinical Outcomes     Negatives:   Major bleeding event, Thromboembolic event,   Anticoagulation-related hospital admission, Anticoagulation-related ED   visit, Anticoagulation-related fatality        INR History:  Anticoagulation Monitoring 2/8/2019 2/15/2019 3/1/2019   INR 3.3 2.8 3.1   INR Date 2/8/2019 2/15/2019 3/1/2019   INR Goal 2.0-3.0 2.0-3.0 2.0-3.0   Trend Down Same Down   Last Week Total 95 mg 82.5 mg 85 mg   Next Week Total 82.5 mg 85 mg 80 mg   Sun 15 mg 15 mg 15 mg   Mon 10 mg 10 mg 10 mg   Tue 15 mg 15 mg 10 mg   Wed 10 mg 10 mg 10 mg   Thu 15 mg 15 mg 15 mg   Fri 7.5 mg  (2/8) 10 mg 10 mg   Sat 10 mg 10 mg 10 mg   Visit Report - - -   Some recent data might be hidden       Plan:  1. INR is Supratherapeutic today- see above in Anticoagulation Summary.  Will instruct Kameron Melendez to Change their warfarin regimen- see above in Anticoagulation Summary.  2. Follow up in 2 weeks  3. Patient declines warfarin refills.  4. Verbal and written information provided. Patient expresses understanding and has no further questions at this time.    Abelardo Galvez [facility]

## 2019-03-07 ENCOUNTER — TELEPHONE (OUTPATIENT)
Dept: CARDIOLOGY | Facility: CLINIC | Age: 49
End: 2019-03-07

## 2019-03-07 NOTE — TELEPHONE ENCOUNTER
Natalya called stating pt insurance will only pay for [facility] if pt is checked every week, Is this ok? Please advise. Thanks, Sharon

## 2019-03-15 ENCOUNTER — ANTICOAGULATION VISIT (OUTPATIENT)
Dept: PHARMACY | Facility: HOSPITAL | Age: 49
End: 2019-03-15

## 2019-03-15 DIAGNOSIS — I26.99 OTHER ACUTE PULMONARY EMBOLISM WITHOUT ACUTE COR PULMONALE (HCC): ICD-10-CM

## 2019-03-15 DIAGNOSIS — I26.99 BILATERAL PULMONARY EMBOLISM (HCC): ICD-10-CM

## 2019-03-15 LAB
INR PPP: 1.9 (ref 0.91–1.09)
PROTHROMBIN TIME: 23.1 SECONDS (ref 10–13.8)

## 2019-03-15 PROCEDURE — 36416 COLLJ CAPILLARY BLOOD SPEC: CPT

## 2019-03-15 PROCEDURE — 85610 PROTHROMBIN TIME: CPT

## 2019-03-15 PROCEDURE — G0463 HOSPITAL OUTPT CLINIC VISIT: HCPCS

## 2019-03-15 NOTE — PROGRESS NOTES
Anticoagulation Clinic Progress Note    Anticoagulation Summary  As of 3/15/2019    INR goal:   2.0-3.0   TTR:   73.5 % (1.1 mo)   INR used for dosin.9! (3/15/2019)   Warfarin maintenance plan:   15 mg on Mon, Wed, Fri; 10 mg all other days   Weekly warfarin total:   85 mg   Plan last modified:   Ramo Morocho RPH (3/15/2019)   Next INR check:   3/29/2019   Priority:   High   Target end date:   Indefinite    Indications    Other acute pulmonary embolism without acute cor pulmonale (CMS/HCC) [I26.99]  Bilateral pulmonary embolism (CMS/HCC) [I26.99]             Anticoagulation Episode Summary     INR check location:       Preferred lab:       Send INR reminders to:   [facility]    Comments:         Anticoagulation Care Providers     Provider Role Specialty Phone number    Torri Colmenares, FADY Referring Cardiology [phone]    Elsy Baeza MD Responsible Cardiology [phone]          Clinic Interview:  Patient Findings     Negatives:   Signs/symptoms of thrombosis, Signs/symptoms of bleeding,   Laboratory test error suspected, Change in health, Change in alcohol use,   Change in activity, Upcoming invasive procedure, Emergency department   visit, Upcoming dental procedure, Missed doses, Extra doses, Change in   medications, Change in diet/appetite, Hospital admission, Bruising, Other   complaints      Clinical Outcomes     Negatives:   Major bleeding event, Thromboembolic event,   Anticoagulation-related hospital admission, Anticoagulation-related ED   visit, Anticoagulation-related fatality        INR History:  Anticoagulation Monitoring 2/15/2019 3/1/2019 3/15/2019   INR 2.8 3.1 1.9   INR Date 2/15/2019 3/1/2019 3/15/2019   INR Goal 2.0-3.0 2.0-3.0 2.0-3.0   Trend Same Down Up   Last Week Total 82.5 mg 85 mg 80 mg   Next Week Total 85 mg 80 mg 85 mg   Sun 15 mg 15 mg 10 mg   Mon 10 mg 10 mg 15 mg   Tue 15 mg 10 mg 10 mg   Wed 10 mg 10 mg 15 mg   Thu 15 mg 15 mg 10 mg   Fri 10 mg  10 mg 15 mg   Sat 10 mg 10 mg 10 mg   Visit Report - - -   Some recent data might be hidden       Plan:  1. INR is Subtherapeutic today- see above in Anticoagulation Summary.  Will instruct Kameron Rochamaria guadalupe to Increase their warfarin regimen- see above in Anticoagulation Summary.  2. Follow up in 2 weeks  3. Patient declines warfarin refills.  4. Verbal and written information provided. Patient expresses understanding and has no further questions at this time.    Ramo Morocho RP

## 2019-03-29 ENCOUNTER — APPOINTMENT (OUTPATIENT)
Dept: PHARMACY | Facility: HOSPITAL | Age: 49
End: 2019-03-29

## 2019-03-29 ENCOUNTER — ANTICOAGULATION VISIT (OUTPATIENT)
Dept: PHARMACY | Facility: HOSPITAL | Age: 49
End: 2019-03-29

## 2019-03-29 DIAGNOSIS — I26.99 BILATERAL PULMONARY EMBOLISM (HCC): ICD-10-CM

## 2019-03-29 DIAGNOSIS — I26.99 OTHER ACUTE PULMONARY EMBOLISM WITHOUT ACUTE COR PULMONALE (HCC): ICD-10-CM

## 2019-03-29 LAB — INR PPP: 2.2

## 2019-03-29 NOTE — PROGRESS NOTES
Anticoagulation Clinic Progress Note    Anticoagulation Summary  As of 3/29/2019    INR goal:   2.0-3.0   TTR:   71.5 % (1.5 mo)   INR used for dosin.20 (3/29/2019)   Warfarin maintenance plan:   15 mg every Mon, Wed, Fri; 10 mg all other days   Weekly warfarin total:   85 mg   Plan last modified:   Ramo Morocho RPH (3/15/2019)   Next INR check:   2019   Priority:   High   Target end date:   Indefinite    Indications    Other acute pulmonary embolism without acute cor pulmonale (CMS/HCC) [I26.99]  Bilateral pulmonary embolism (CMS/HCC) [I26.99]             Anticoagulation Episode Summary     INR check location:       Preferred lab:       Send INR reminders to:    [facility]    Comments:   new [employer] [facility] frankie 2019      Anticoagulation Care Providers     Provider Role Specialty Phone number    Torri Colmenares APRN Referring Cardiology [phone]    Elsy Baeza MD Responsible Cardiology [phone]          Clinic Interview:  No pertinent clinical findings have been reported.    INR History:  Anticoagulation Monitoring 3/1/2019 3/15/2019 3/29/2019   INR 3.1 1.9 2.20   INR Date 3/1/2019 3/15/2019 3/29/2019   INR Goal 2.0-3.0 2.0-3.0 2.0-3.0   Trend Down Up Same   Last Week Total 85 mg 80 mg 85 mg   Next Week Total 80 mg 85 mg 85 mg   Sun 15 mg 10 mg 10 mg   Mon 10 mg 15 mg 15 mg   Tue 10 mg 10 mg 10 mg   Wed 10 mg 15 mg 15 mg   Thu 15 mg 10 mg 10 mg   Fri 10 mg 15 mg 15 mg   Sat 10 mg 10 mg 10 mg   Visit Report - - -   Some recent data might be hidden       Plan:  1. INR is therapeutic today- see above in Anticoagulation Summary.    Kameron Melendez to continue their warfarin regimen- see above in Anticoagulation Summary.  2. Follow up in 1 week--per home testing contract for now.  3. Pt has agreed to only be called if INR out of range. They have been instructed to call if any changes in medications, doses, concerns, etc. Patient expresses understanding and has no further  questions at this time.  Called patient and discussed results today.    Kim Sotelo [facility]

## 2019-04-05 ENCOUNTER — ANTICOAGULATION VISIT (OUTPATIENT)
Dept: PHARMACY | Facility: HOSPITAL | Age: 49
End: 2019-04-05

## 2019-04-05 DIAGNOSIS — I26.99 BILATERAL PULMONARY EMBOLISM (HCC): ICD-10-CM

## 2019-04-05 DIAGNOSIS — I26.99 OTHER ACUTE PULMONARY EMBOLISM WITHOUT ACUTE COR PULMONALE (HCC): ICD-10-CM

## 2019-04-05 LAB — INR PPP: 2.2

## 2019-04-05 NOTE — PROGRESS NOTES
Anticoagulation Clinic Progress Note    Anticoagulation Summary  As of 2019    INR goal:   2.0-3.0   TTR:   75.3 % (1.7 mo)   INR used for dosin.20 (2019)   Warfarin maintenance plan:   15 mg every Mon, Wed, Fri; 10 mg all other days   Weekly warfarin total:   85 mg   No change documented:   Alexandra Elias   Plan last modified:   Ramo Morocho RPH (3/15/2019)   Next INR check:   2019   Priority:   High   Target end date:   Indefinite    Indications    Other acute pulmonary embolism without acute cor pulmonale (CMS/HCC) [I26.99]  Bilateral pulmonary embolism (CMS/HCC) [I26.99]             Anticoagulation Episode Summary     INR check location:       Preferred lab:       Send INR reminders to:    [facility]    Comments:   new [employer] home frankie 2019      Anticoagulation Care Providers     Provider Role Specialty Phone number    Torri Colmenares APRN Referring Cardiology [phone]    Elsy Baeza MD Responsible Cardiology [phone]          Clinic Interview:  No pertinent clinical findings have been reported.    INR History:  Anticoagulation Monitoring 3/15/2019 3/29/2019 2019   INR 1.9 2.20 2.20   INR Date 3/15/2019 3/29/2019 2019   INR Goal 2.0-3.0 2.0-3.0 2.0-3.0   Trend Up Same Same   Last Week Total 80 mg 85 mg 85 mg   Next Week Total 85 mg 85 mg 85 mg   Sun 10 mg 10 mg 10 mg   Mon 15 mg 15 mg 15 mg   Tue 10 mg 10 mg 10 mg   Wed 15 mg 15 mg 15 mg   Thu 10 mg 10 mg 10 mg   Fri 15 mg 15 mg 15 mg   Sat 10 mg 10 mg 10 mg   Visit Report - - -   Some recent data might be hidden       Plan:  1. INR is therapeutic today- see above in Anticoagulation Summary.    Kameron Melendez to continue their warfarin regimen- see above in Anticoagulation Summary.  2. Follow up in 2 weeks  3. Pt has agreed to only be called if INR out of range. They have been instructed to call if any changes in medications, doses, concerns, etc. Patient expresses understanding and has no  further questions at this time.    Alexandra Elias

## 2019-04-12 ENCOUNTER — ANTICOAGULATION VISIT (OUTPATIENT)
Dept: PHARMACY | Facility: HOSPITAL | Age: 49
End: 2019-04-12

## 2019-04-12 DIAGNOSIS — I26.99 BILATERAL PULMONARY EMBOLISM (HCC): ICD-10-CM

## 2019-04-12 DIAGNOSIS — I26.99 OTHER ACUTE PULMONARY EMBOLISM WITHOUT ACUTE COR PULMONALE (HCC): ICD-10-CM

## 2019-04-12 LAB — INR PPP: 2

## 2019-04-12 NOTE — PROGRESS NOTES
Anticoagulation Clinic Progress Note    Anticoagulation Summary  As of 2019    INR goal:   2.0-3.0   TTR:   78.1 % (2 mo)   INR used for dosin.00 (2019)   Warfarin maintenance plan:   15 mg every Mon, Wed, Fri; 10 mg all other days   Weekly warfarin total:   85 mg   Plan last modified:   Ramo Morocho RPH (3/15/2019)   Next INR check:   2019   Priority:   High   Target end date:   Indefinite    Indications    Other acute pulmonary embolism without acute cor pulmonale (CMS/HCC) [I26.99]  Bilateral pulmonary embolism (CMS/HCC) [I26.99]             Anticoagulation Episode Summary     INR check location:       Preferred lab:       Send INR reminders to:   [facility]    Comments:   new [employer] [facility] frankie 2019      Anticoagulation Care Providers     Provider Role Specialty Phone number    Torri Colmenares APRN Referring Cardiology [phone]    Elsy Baeza MD Responsible Cardiology [phone]          Clinic Interview:  No pertinent clinical findings have been reported.    INR History:  Anticoagulation Monitoring 3/29/2019 2019 2019   INR 2.20 2.20 2.00   INR Date 3/29/2019 2019 2019   INR Goal 2.0-3.0 2.0-3.0 2.0-3.0   Trend Same Same Same   Last Week Total 85 mg 85 mg 85 mg   Next Week Total 85 mg 85 mg 85 mg   Sun 10 mg 10 mg 10 mg   Mon 15 mg 15 mg 15 mg   Tue 10 mg 10 mg 10 mg   Wed 15 mg 15 mg 15 mg   Thu 10 mg 10 mg 10 mg   Fri 15 mg 15 mg 15 mg   Sat 10 mg 10 mg 10 mg   Visit Report - - -   Some recent data might be hidden       Plan:  1. INR is therapeutic today- see above in Anticoagulation Summary.    Kameron Melendez to continue their warfarin regimen- see above in Anticoagulation Summary.  2. Follow up in 1 week--left message today.  3. Pt has agreed to only be called if INR out of range. They have been instructed to call if any changes in medications, doses, concerns, etc. Patient expresses understanding and has no further questions at  this time.    Kim Sotelo RP

## 2019-04-19 ENCOUNTER — ANTICOAGULATION VISIT (OUTPATIENT)
Dept: PHARMACY | Facility: HOSPITAL | Age: 49
End: 2019-04-19

## 2019-04-19 DIAGNOSIS — I26.99 BILATERAL PULMONARY EMBOLISM (HCC): ICD-10-CM

## 2019-04-19 DIAGNOSIS — I26.99 OTHER ACUTE PULMONARY EMBOLISM WITHOUT ACUTE COR PULMONALE (HCC): ICD-10-CM

## 2019-04-19 LAB — INR PPP: 2

## 2019-04-19 NOTE — PROGRESS NOTES
Anticoagulation Clinic Progress Note    Anticoagulation Summary  As of 2019    INR goal:   2.0-3.0   TTR:   80.4 % (2.2 mo)   INR used for dosin.00 (2019)   Warfarin maintenance plan:   15 mg every Mon, Wed, Fri; 10 mg all other days   Weekly warfarin total:   85 mg   Plan last modified:   Ramo Morocho RPH (2019)   Next INR check:   2019   Priority:   High   Target end date:   Indefinite    Indications    Other acute pulmonary embolism without acute cor pulmonale (CMS/HCC) [I26.99]  Bilateral pulmonary embolism (CMS/HCC) [I26.99]             Anticoagulation Episode Summary     INR check location:       Preferred lab:       Send INR reminders to:    [facility]    Comments:   new [employer] home frankie 2019      Anticoagulation Care Providers     Provider Role Specialty Phone number    Torri Colmenares, APRN Referring Cardiology [phone]    Elsy Baeza MD Responsible Cardiology [phone]                INR History:  Anticoagulation Monitoring 2019   INR 2.20 2.00 2.00   INR Date 2019   INR Goal 2.0-3.0 2.0-3.0 2.0-3.0   Trend Same Same Same   Last Week Total 85 mg 85 mg 85 mg   Next Week Total 85 mg 85 mg 85 mg   Sun 10 mg 10 mg 10 mg   Mon 15 mg 15 mg 15 mg   Tue 10 mg 10 mg 10 mg   Wed 15 mg 15 mg 15 mg   Thu 10 mg 10 mg 10 mg   Fri 15 mg 15 mg 15 mg   Sat 10 mg 10 mg 10 mg ()   Visit Report - - -   Some recent data might be hidden       Plan:  1. INR is Therapeutic today- see above in Anticoagulation Summary.   Will instruct Kameron Melendez to Continue their warfarin regimen- see above in Anticoagulation Summary.  2. Follow up in 1 week  3. Pt voiced understanding and is agreeable with plan.    Ramo Morocho RPH

## 2019-04-26 ENCOUNTER — ANTICOAGULATION VISIT (OUTPATIENT)
Dept: PHARMACY | Facility: HOSPITAL | Age: 49
End: 2019-04-26

## 2019-04-26 DIAGNOSIS — I26.99 BILATERAL PULMONARY EMBOLISM (HCC): ICD-10-CM

## 2019-04-26 DIAGNOSIS — I26.99 OTHER ACUTE PULMONARY EMBOLISM WITHOUT ACUTE COR PULMONALE (HCC): ICD-10-CM

## 2019-04-26 LAB — INR PPP: 2.5

## 2019-04-26 NOTE — PROGRESS NOTES
Anticoagulation Clinic Progress Note    Anticoagulation Summary  As of 2019    INR goal:   2.0-3.0   TTR:   82.3 % (2.4 mo)   INR used for dosin.50 (2019)   Warfarin maintenance plan:   15 mg every Mon, Wed, Fri; 10 mg all other days   Weekly warfarin total:   85 mg   No change documented:   Catrachita Perez   Plan last modified:   Ramo Morocho RPH (2019)   Next INR check:   5/3/2019   Priority:   High   Target end date:   Indefinite    Indications    Other acute pulmonary embolism without acute cor pulmonale (CMS/HCC) [I26.99]  Bilateral pulmonary embolism (CMS/HCC) [I26.99]             Anticoagulation Episode Summary     INR check location:       Preferred lab:       Send INR reminders to:    [facility]  POOL    Comments:   new [employer] home frankie 2019      Anticoagulation Care Providers     Provider Role Specialty Phone number    Torri Colmenares APRN Referring Cardiology [phone]    Elsy Baeza MD Responsible Cardiology [phone]          Clinic Interview:  No pertinent clinical findings have been reported.    INR History:  Anticoagulation Monitoring 2019   INR 2.00 2.00 2.50   INR Date 2019   INR Goal 2.0-3.0 2.0-3.0 2.0-3.0   Trend Same Same Same   Last Week Total 85 mg 85 mg 85 mg   Next Week Total 85 mg 85 mg 85 mg   Sun 10 mg 10 mg 10 mg   Mon 15 mg 15 mg 15 mg   Tue 10 mg 10 mg 10 mg   Wed 15 mg 15 mg 15 mg   Thu 10 mg 10 mg 10 mg   Fri 15 mg 15 mg 15 mg   Sat 10 mg 10 mg () 10 mg   Visit Report - - -   Some recent data might be hidden       Plan:  1. INR is therapeutic today- see above in Anticoagulation Summary.    Kameron Melendez to continue their warfarin regimen- see above in Anticoagulation Summary.  2. Follow up in 1 week.  3. Pt has agreed to only be called if INR out of range. They have been instructed to call if any changes in medications, doses, concerns, etc. Patient expresses understanding  and has no further questions at this time.    Catrachita Perez

## 2019-05-06 ENCOUNTER — ANTICOAGULATION VISIT (OUTPATIENT)
Dept: PHARMACY | Facility: HOSPITAL | Age: 49
End: 2019-05-06

## 2019-05-06 DIAGNOSIS — I26.99 BILATERAL PULMONARY EMBOLISM (HCC): ICD-10-CM

## 2019-05-06 DIAGNOSIS — I26.99 OTHER ACUTE PULMONARY EMBOLISM WITHOUT ACUTE COR PULMONALE (HCC): ICD-10-CM

## 2019-05-06 LAB — INR PPP: 2.1

## 2019-05-06 NOTE — PROGRESS NOTES
Anticoagulation Clinic Progress Note    Anticoagulation Summary  As of 2019    INR goal:   2.0-3.0   TTR:   84.0 % (2.7 mo)   INR used for dosin.10 (2019)   Warfarin maintenance plan:   15 mg every Mon, Wed, Fri; 10 mg all other days   Weekly warfarin total:   85 mg   No change documented:   Alexandra Elias   Plan last modified:   Ramo Morocho [facility] (2019)   Next INR check:   2019   Priority:   High   Target end date:   Indefinite    Indications    Other acute pulmonary embolism without acute cor pulmonale (CMS/HCC) [I26.99]  Bilateral pulmonary embolism (CMS/HCC) [I26.99]             Anticoagulation Episode Summary     INR check location:       Preferred lab:       Send INR reminders to:    [facility]  POOL    Comments:   new [employer] home frankie 2019      Anticoagulation Care Providers     Provider Role Specialty Phone number    Torri Colmenares APRN Referring Cardiology [phone]    Elsy Baeza MD Responsible Cardiology [phone]          Clinic Interview:  No pertinent clinical findings have been reported.    INR History:  Anticoagulation Monitoring 2019   INR 2.00 2.50 2.10   INR Date 2019   INR Goal 2.0-3.0 2.0-3.0 2.0-3.0   Trend Same Same Same   Last Week Total 85 mg 85 mg 85 mg   Next Week Total 85 mg 85 mg 85 mg   Sun 10 mg 10 mg 10 mg   Mon 15 mg 15 mg 15 mg   Tue 10 mg 10 mg 10 mg   Wed 15 mg 15 mg 15 mg   Thu 10 mg 10 mg 10 mg   Fri 15 mg 15 mg 15 mg   Sat 10 mg () 10 mg 10 mg   Visit Report - - -   Some recent data might be hidden       Plan:  1. INR is therapeutic today- see above in Anticoagulation Summary.    Kameron Melendez to continue their warfarin regimen- see above in Anticoagulation Summary.  2. Follow up in 2 weeks  3. Pt has agreed to only be called if INR out of range. They have been instructed to call if any changes in medications, doses, concerns, etc. Patient expresses understanding  and has no further questions at this time.    Alexandra Elias

## 2019-05-06 NOTE — PATIENT INSTRUCTIONS
Advised extra 5 mg tomorrow (15 mg total; already due for 15 mg today) to offset missed dose yesterday following INR 2.1 2 days ago.

## 2019-05-11 LAB — INR PPP: 2.5

## 2019-05-13 ENCOUNTER — ANTICOAGULATION VISIT (OUTPATIENT)
Dept: PHARMACY | Facility: HOSPITAL | Age: 49
End: 2019-05-13

## 2019-05-13 DIAGNOSIS — I26.99 OTHER ACUTE PULMONARY EMBOLISM WITHOUT ACUTE COR PULMONALE (HCC): ICD-10-CM

## 2019-05-13 DIAGNOSIS — I26.99 BILATERAL PULMONARY EMBOLISM (HCC): ICD-10-CM

## 2019-05-13 NOTE — PROGRESS NOTES
Anticoagulation Clinic Progress Note    Anticoagulation Summary  As of 2019    INR goal:   2.0-3.0   TTR:   85.3 % (2.9 mo)   INR used for dosin.50 (2019)   Warfarin maintenance plan:   15 mg every Mon, Wed, Fri; 10 mg all other days   Weekly warfarin total:   85 mg   No change documented:   Ramo Morocho RPH   Plan last modified:   Ramo Morocho RPH (2019)   Next INR check:   2019   Priority:   High   Target end date:   Indefinite    Indications    Other acute pulmonary embolism without acute cor pulmonale (CMS/HCC) [I26.99]  Bilateral pulmonary embolism (CMS/HCC) [I26.99]             Anticoagulation Episode Summary     INR check location:       Preferred lab:       Send INR reminders to:    [facility]  POOL    Comments:   new [employer] home frankie 2019      Anticoagulation Care Providers     Provider Role Specialty Phone number    Torri Colmenares APRN Referring Cardiology [phone]    Elsy Baeza MD Responsible Cardiology [phone]          Clinic Interview:  No pertinent clinical findings have been reported.    INR History:  Anticoagulation Monitoring 2019   INR 2.50 2.10 2.50   INR Date 2019   INR Goal 2.0-3.0 2.0-3.0 2.0-3.0   Trend Same Same Same   Last Week Total 85 mg 75 mg 90 mg   Next Week Total 85 mg 90 mg 85 mg   Sun 10 mg - 10 mg   Mon 15 mg 15 mg 15 mg   Tue 10 mg 15 mg () 10 mg   Wed 15 mg 15 mg 15 mg   Thu 10 mg 10 mg 10 mg   Fri 15 mg - 15 mg   Sat 10 mg - 10 mg   Visit Report - - -   Some recent data might be hidden       Plan:  1. INR is therapeutic today- see above in Anticoagulation Summary.    Kameron Melendez to continue their warfarin regimen- see above in Anticoagulation Summary.  2. Follow up in 2 weeks  3. Pt has agreed to only be called if INR out of range. They have been instructed to call if any changes in medications, doses, concerns, etc. Patient expresses understanding and has no  further questions at this time.    Ramo Morocho RPH

## 2019-05-22 RX ORDER — WARFARIN SODIUM 5 MG/1
TABLET ORAL
Qty: 40 TABLET | Refills: 0 | Status: SHIPPED | OUTPATIENT
Start: 2019-05-22 | End: 2019-06-18 | Stop reason: SDUPTHER

## 2019-05-22 RX ORDER — WARFARIN SODIUM 10 MG/1
TABLET ORAL
Qty: 90 TABLET | Refills: 0 | Status: SHIPPED | OUTPATIENT
Start: 2019-05-22 | End: 2019-08-15 | Stop reason: SDUPTHER

## 2019-05-22 NOTE — TELEPHONE ENCOUNTER
Refill for warfarin 5 mg and 10 mg tablets sent to [facility] on [address] in [place], [place], per pt request.

## 2019-05-24 ENCOUNTER — ANTICOAGULATION VISIT (OUTPATIENT)
Dept: PHARMACY | Facility: HOSPITAL | Age: 49
End: 2019-05-24

## 2019-05-24 DIAGNOSIS — I26.99 OTHER ACUTE PULMONARY EMBOLISM WITHOUT ACUTE COR PULMONALE (HCC): ICD-10-CM

## 2019-05-24 DIAGNOSIS — I26.99 BILATERAL PULMONARY EMBOLISM (HCC): ICD-10-CM

## 2019-05-24 LAB — INR PPP: 2.6

## 2019-05-24 NOTE — PROGRESS NOTES
Anticoagulation Clinic Progress Note    Anticoagulation Summary  As of 2019    INR goal:   2.0-3.0   TTR:   87.1 % (3.4 mo)   INR used for dosin.60 (2019)   Warfarin maintenance plan:   15 mg every Mon, Wed, Fri; 10 mg all other days   Weekly warfarin total:   85 mg   No change documented:   Iris Gonzalez   Plan last modified:   Ramo Morocho RP (2019)   Next INR check:   2019   Priority:   High   Target end date:   Indefinite    Indications    Other acute pulmonary embolism without acute cor pulmonale (CMS/HCC) [I26.99]  Bilateral pulmonary embolism (CMS/HCC) [I26.99]             Anticoagulation Episode Summary     INR check location:       Preferred lab:       Send INR reminders to:    [facility]  POOL    Comments:   new [employer] home frankie 2019      Anticoagulation Care Providers     Provider Role Specialty Phone number    Torri Colmenares APRN Referring Cardiology [phone]    Elsy Baeza MD Responsible Cardiology [phone]          Clinic Interview:  No pertinent clinical findings have been reported.    INR History:  Anticoagulation Monitoring 2019   INR 2.10 2.50 2.60   INR Date 2019   INR Goal 2.0-3.0 2.0-3.0 2.0-3.0   Trend Same Same Same   Last Week Total 75 mg 90 mg 85 mg   Next Week Total 90 mg 85 mg 85 mg   Sun - 10 mg 10 mg   Mon 15 mg 15 mg 15 mg   Tue 15 mg () 10 mg 10 mg   Wed 15 mg 15 mg 15 mg   Thu 10 mg 10 mg 10 mg   Fri - 15 mg 15 mg   Sat - 10 mg 10 mg   Visit Report - - -   Some recent data might be hidden       Plan:  1. INR is therapeutic today- see above in Anticoagulation Summary.    Kameron Melendez to continue their warfarin regimen- see above in Anticoagulation Summary.  2. Follow up in 2 weeks  3. Pt has agreed to only be called if INR out of range. They have been instructed to call if any changes in medications, doses, concerns, etc. Patient expresses understanding and  has no further questions at this time.    Iris Gonzalez

## 2019-06-07 ENCOUNTER — ANTICOAGULATION VISIT (OUTPATIENT)
Dept: PHARMACY | Facility: HOSPITAL | Age: 49
End: 2019-06-07

## 2019-06-07 DIAGNOSIS — I26.99 BILATERAL PULMONARY EMBOLISM (HCC): ICD-10-CM

## 2019-06-07 DIAGNOSIS — I26.99 OTHER ACUTE PULMONARY EMBOLISM WITHOUT ACUTE COR PULMONALE (HCC): ICD-10-CM

## 2019-06-07 LAB — INR PPP: 2.2

## 2019-06-07 NOTE — PROGRESS NOTES
Anticoagulation Clinic Progress Note    Anticoagulation Summary  As of 2019    INR goal:   2.0-3.0   TTR:   88.7 % (3.9 mo)   INR used for dosin.20 (2019)   Warfarin maintenance plan:   15 mg every Mon, Wed, Fri; 10 mg all other days   Weekly warfarin total:   85 mg   No change documented:   Catrachita Perez   Plan last modified:   Ramo Morocho RPH (2019)   Next INR check:   2019   Priority:   High   Target end date:   Indefinite    Indications    Other acute pulmonary embolism without acute cor pulmonale (CMS/HCC) [I26.99]  Bilateral pulmonary embolism (CMS/HCC) [I26.99]             Anticoagulation Episode Summary     INR check location:       Preferred lab:       Send INR reminders to:    [facility]  POOL    Comments:   new [employer] home frankie 2019      Anticoagulation Care Providers     Provider Role Specialty Phone number    Torri Colmenares APRN Referring Cardiology [phone]    Elsy Baeza MD Responsible Cardiology [phone]          Clinic Interview:  No pertinent clinical findings have been reported.    INR History:  Anticoagulation Monitoring 2019   INR 2.50 2.60 2.20   INR Date 2019   INR Goal 2.0-3.0 2.0-3.0 2.0-3.0   Trend Same Same Same   Last Week Total 90 mg 85 mg 85 mg   Next Week Total 85 mg 85 mg 85 mg   Sun 10 mg 10 mg 10 mg   Mon 15 mg 15 mg 15 mg   Tue 10 mg 10 mg 10 mg   Wed 15 mg 15 mg 15 mg   Thu 10 mg 10 mg 10 mg   Fri 15 mg 15 mg 15 mg   Sat 10 mg 10 mg 10 mg   Visit Report - - -   Some recent data might be hidden       Plan:  1. INR is therapeutic today- see above in Anticoagulation Summary.    Kameron Melendez to continue their warfarin regimen- see above in Anticoagulation Summary.  2. Follow up in 2 weeks  3. Pt has agreed to only be called if INR out of range. They have been instructed to call if any changes in medications, doses, concerns, etc. Patient expresses understanding and has  no further questions at this time.    Catrachita Perez

## 2019-06-18 RX ORDER — WARFARIN SODIUM 5 MG/1
TABLET ORAL
Qty: 40 TABLET | Refills: 0 | Status: SHIPPED | OUTPATIENT
Start: 2019-06-18 | End: 2019-07-24 | Stop reason: SDUPTHER

## 2019-06-21 ENCOUNTER — ANTICOAGULATION VISIT (OUTPATIENT)
Dept: PHARMACY | Facility: HOSPITAL | Age: 49
End: 2019-06-21

## 2019-06-21 DIAGNOSIS — I26.99 OTHER ACUTE PULMONARY EMBOLISM WITHOUT ACUTE COR PULMONALE (HCC): ICD-10-CM

## 2019-06-21 DIAGNOSIS — I26.99 BILATERAL PULMONARY EMBOLISM (HCC): ICD-10-CM

## 2019-06-21 LAB — INR PPP: 2.3

## 2019-06-21 NOTE — PROGRESS NOTES
Anticoagulation Clinic Progress Note    Anticoagulation Summary  As of 2019    INR goal:   2.0-3.0   TTR:   89.9 % (4.3 mo)   INR used for dosin.30 (2019)   Warfarin maintenance plan:   15 mg every Mon, Wed, Fri; 10 mg all other days   Weekly warfarin total:   85 mg   Plan last modified:   Ramo Morocho [facility] (2019)   Next INR check:   2019   Priority:   High   Target end date:   Indefinite    Indications    Other acute pulmonary embolism without acute cor pulmonale (CMS/HCC) [I26.99]  Bilateral pulmonary embolism (CMS/HCC) [I26.99]             Anticoagulation Episode Summary     INR check location:       Preferred lab:       Send INR reminders to:    [facility]  POOL    Comments:   new [employer] home frankie 2019      Anticoagulation Care Providers     Provider Role Specialty Phone number    Torri Colmenares APRN Referring Cardiology [phone]    Elsy Baeza MD Responsible Cardiology [phone]          Clinic Interview:  No pertinent clinical findings have been reported.    INR History:  Anticoagulation Monitoring 2019   INR 2.60 2.20 2.30   INR Date 2019   INR Goal 2.0-3.0 2.0-3.0 2.0-3.0   Trend Same Same Same   Last Week Total 85 mg 85 mg 85 mg   Next Week Total 85 mg 85 mg 85 mg   Sun 10 mg 10 mg 10 mg   Mon 15 mg 15 mg 15 mg   Tue 10 mg 10 mg 10 mg   Wed 15 mg 15 mg 15 mg   Thu 10 mg 10 mg 10 mg   Fri 15 mg 15 mg 15 mg   Sat 10 mg 10 mg 10 mg   Visit Report - - -   Some recent data might be hidden       Plan:  1. INR is therapeutic today- see above in Anticoagulation Summary. Left HIPPA appropriate message for Kameron Melendez to continue their warfarin regimen- see above in Anticoagulation Summary.  2. Follow up in 2 weeks  3. Pt has agreed to only be called if INR out of range. They have been instructed to call if any changes in medications, doses, concerns, etc. .    Alem Oscar [facility]

## 2019-07-05 ENCOUNTER — ANTICOAGULATION VISIT (OUTPATIENT)
Dept: PHARMACY | Facility: HOSPITAL | Age: 49
End: 2019-07-05

## 2019-07-05 DIAGNOSIS — I26.99 OTHER ACUTE PULMONARY EMBOLISM WITHOUT ACUTE COR PULMONALE (HCC): ICD-10-CM

## 2019-07-05 DIAGNOSIS — I26.99 BILATERAL PULMONARY EMBOLISM (HCC): ICD-10-CM

## 2019-07-05 LAB — INR PPP: 2.1

## 2019-07-05 NOTE — PROGRESS NOTES
Anticoagulation Clinic Progress Note    Anticoagulation Summary  As of 2019    INR goal:   2.0-3.0   TTR:   90.9 % (4.8 mo)   INR used for dosin.10 (2019)   Warfarin maintenance plan:   15 mg every Mon, Wed, Fri; 10 mg all other days   Weekly warfarin total:   85 mg   No change documented:   Iris Gonzalez   Plan last modified:   Ramo Morocho RPH (2019)   Next INR check:   2019   Priority:   High   Target end date:   Indefinite    Indications    Other acute pulmonary embolism without acute cor pulmonale (CMS/HCC) [I26.99]  Bilateral pulmonary embolism (CMS/HCC) [I26.99]             Anticoagulation Episode Summary     INR check location:       Preferred lab:       Send INR reminders to:    [facility]  POOL    Comments:   new [employer] home frankie 2019      Anticoagulation Care Providers     Provider Role Specialty Phone number    Torri Colmenares APRN Referring Cardiology [phone]    Elsy Baeza MD Responsible Cardiology [phone]          Clinic Interview:  No pertinent clinical findings have been reported.    INR History:  Anticoagulation Monitoring 2019   INR 2.20 2.30 2.10   INR Date 2019   INR Goal 2.0-3.0 2.0-3.0 2.0-3.0   Trend Same Same Same   Last Week Total 85 mg 85 mg 85 mg   Next Week Total 85 mg 85 mg 85 mg   Sun 10 mg 10 mg 10 mg   Mon 15 mg 15 mg 15 mg   Tue 10 mg 10 mg 10 mg   Wed 15 mg 15 mg 15 mg   Thu 10 mg 10 mg 10 mg   Fri 15 mg 15 mg 15 mg   Sat 10 mg 10 mg 10 mg   Visit Report - - -   Some recent data might be hidden       Plan:  1. INR is therapeutic today- see above in Anticoagulation Summary.    Kameron Melendez to continue their warfarin regimen- see above in Anticoagulation Summary.  2. Follow up in 2 weeks  3. Pt has agreed to only be called if INR out of range. They have been instructed to call if any changes in medications, doses, concerns, etc. Patient expresses understanding and  has no further questions at this time.    Iris Gonzalez

## 2019-07-19 ENCOUNTER — ANTICOAGULATION VISIT (OUTPATIENT)
Dept: PHARMACY | Facility: HOSPITAL | Age: 49
End: 2019-07-19

## 2019-07-19 DIAGNOSIS — I26.99 BILATERAL PULMONARY EMBOLISM (HCC): ICD-10-CM

## 2019-07-19 DIAGNOSIS — I26.99 OTHER ACUTE PULMONARY EMBOLISM WITHOUT ACUTE COR PULMONALE (HCC): ICD-10-CM

## 2019-07-19 LAB — INR PPP: 2.6

## 2019-07-19 NOTE — PROGRESS NOTES
Anticoagulation Clinic Progress Note    Anticoagulation Summary  As of 2019    INR goal:   2.0-3.0   TTR:   91.7 % (5.3 mo)   INR used for dosin.60 (2019)   Warfarin maintenance plan:   15 mg every Mon, Wed, Fri; 10 mg all other days   Weekly warfarin total:   85 mg   No change documented:   Alexandra Elias   Plan last modified:   Ramo Morocho [facility] (2019)   Next INR check:   2019   Priority:   High   Target end date:   Indefinite    Indications    Other acute pulmonary embolism without acute cor pulmonale (CMS/HCC) [I26.99]  Bilateral pulmonary embolism (CMS/HCC) [I26.99]             Anticoagulation Episode Summary     INR check location:       Preferred lab:       Send INR reminders to:    [facility]  POOL    Comments:   new [employer] [facility] frankie 2019      Anticoagulation Care Providers     Provider Role Specialty Phone number    Torri Colmenares APRN Referring Cardiology [phone]    Elsy Baeza MD Responsible Cardiology [phone]          Clinic Interview:  No pertinent clinical findings have been reported.    INR History:  Anticoagulation Monitoring 2019   INR 2.30 2.10 2.60   INR Date 2019   INR Goal 2.0-3.0 2.0-3.0 2.0-3.0   Trend Same Same Same   Last Week Total 85 mg 85 mg 85 mg   Next Week Total 85 mg 85 mg 85 mg   Sun 10 mg 10 mg 10 mg   Mon 15 mg 15 mg 15 mg   Tue 10 mg 10 mg 10 mg   Wed 15 mg 15 mg 15 mg   Thu 10 mg 10 mg 10 mg   Fri 15 mg 15 mg 15 mg   Sat 10 mg 10 mg 10 mg   Visit Report - - -   Some recent data might be hidden       Plan:  1. INR is therapeutic today- see above in Anticoagulation Summary.    Kameron Melendez to continue their warfarin regimen- see above in Anticoagulation Summary.  2. Follow up in 2 weeks  3. Pt has agreed to only be called if INR out of range. They have been instructed to call if any changes in medications, doses, concerns, etc. Patient expresses understanding and has  no further questions at this time.    Alexandra Elias

## 2019-07-25 RX ORDER — WARFARIN SODIUM 5 MG/1
TABLET ORAL
Qty: 40 TABLET | Refills: 0 | Status: SHIPPED | OUTPATIENT
Start: 2019-07-25 | End: 2019-08-15 | Stop reason: SDUPTHER

## 2019-07-27 LAB — INR PPP: 3.1

## 2019-07-29 ENCOUNTER — ANTICOAGULATION VISIT (OUTPATIENT)
Dept: PHARMACY | Facility: HOSPITAL | Age: 49
End: 2019-07-29

## 2019-07-29 DIAGNOSIS — I26.99 OTHER ACUTE PULMONARY EMBOLISM WITHOUT ACUTE COR PULMONALE (HCC): ICD-10-CM

## 2019-07-29 DIAGNOSIS — I26.99 BILATERAL PULMONARY EMBOLISM (HCC): ICD-10-CM

## 2019-07-29 NOTE — PROGRESS NOTES
Anticoagulation Clinic Progress Note    Anticoagulation Summary  As of 7/29/2019    INR goal:   2.0-3.0   TTR:   91.1 % (5.5 mo)   INR used for dosing:   3.10! (7/27/2019)   Warfarin maintenance plan:   15 mg every Mon, Wed, Fri; 10 mg all other days   Weekly warfarin total:   85 mg   No change documented:   Ramo Morocho RPH   Plan last modified:   Ramo Morocho RPH (4/26/2019)   Next INR check:   8/9/2019   Priority:   High   Target end date:   Indefinite    Indications    Other acute pulmonary embolism without acute cor pulmonale (CMS/HCC) [I26.99]  Bilateral pulmonary embolism (CMS/HCC) [I26.99]             Anticoagulation Episode Summary     INR check location:       Preferred lab:       Send INR reminders to:    [facility]  POOL    Comments:   new [employer] [facility] frankie March 2019      Anticoagulation Care Providers     Provider Role Specialty Phone number    Torri Colmenares APRN Referring Cardiology [phone]    Elsy Baeza MD Responsible Cardiology [phone]              INR History:  Anticoagulation Monitoring 7/5/2019 7/19/2019 7/29/2019   INR 2.10 2.60 3.10   INR Date 7/5/2019 7/19/2019 7/27/2019   INR Goal 2.0-3.0 2.0-3.0 2.0-3.0   Trend Same Same Same   Last Week Total 85 mg 85 mg 85 mg   Next Week Total 85 mg 85 mg 85 mg   Sun 10 mg 10 mg 10 mg   Mon 15 mg 15 mg 15 mg   Tue 10 mg 10 mg 10 mg   Wed 15 mg 15 mg 15 mg   Thu 10 mg 10 mg 10 mg   Fri 15 mg 15 mg 15 mg   Sat 10 mg 10 mg 10 mg   Visit Report - - -   Some recent data might be hidden       Plan:  1. INR is Supratherapeutic today- see above in Anticoagulation Summary.   Will instruct Kameron Melendez to Continue their warfarin regimen- see above in Anticoagulation Summary.  2. Follow up in 2 weeks  3. Unable to reach. Left secure voicemail with instructions and invitation to contact clinic with any questions, updates, or concerns.     Ramo Morocho RPH

## 2019-08-02 ENCOUNTER — ANTICOAGULATION VISIT (OUTPATIENT)
Dept: PHARMACY | Facility: HOSPITAL | Age: 49
End: 2019-08-02

## 2019-08-02 DIAGNOSIS — I26.99 OTHER ACUTE PULMONARY EMBOLISM WITHOUT ACUTE COR PULMONALE (HCC): ICD-10-CM

## 2019-08-02 DIAGNOSIS — I26.99 BILATERAL PULMONARY EMBOLISM (HCC): ICD-10-CM

## 2019-08-02 LAB — INR PPP: 2.6

## 2019-08-02 NOTE — PROGRESS NOTES
Anticoagulation Clinic Progress Note    Anticoagulation Summary  As of 2019    INR goal:   2.0-3.0   TTR:   90.7 % (5.7 mo)   INR used for dosin.60 (2019)   Warfarin maintenance plan:   15 mg every Mon, Wed, Fri; 10 mg all other days   Weekly warfarin total:   85 mg   No change documented:   Ramo Morocho RPH   Plan last modified:   Ramo Morocho RPH (2019)   Next INR check:   2019   Priority:   High   Target end date:   Indefinite    Indications    Other acute pulmonary embolism without acute cor pulmonale (CMS/HCC) [I26.99]  Bilateral pulmonary embolism (CMS/HCC) [I26.99]             Anticoagulation Episode Summary     INR check location:       Preferred lab:       Send INR reminders to:    [facility]  POOL    Comments:   new [employer] [facility] frankie 2019      Anticoagulation Care Providers     Provider Role Specialty Phone number    Torri Colmenares APRN Referring Cardiology [phone]    Elsy Baeza MD Responsible Cardiology [phone]          Clinic Interview:  No pertinent clinical findings have been reported.    INR History:  Anticoagulation Monitoring 2019   INR 2.60 3.10 2.60   INR Date 2019   INR Goal 2.0-3.0 2.0-3.0 2.0-3.0   Trend Same Same Same   Last Week Total 85 mg 85 mg 85 mg   Next Week Total 85 mg 85 mg 85 mg   Sun 10 mg 10 mg 10 mg   Mon 15 mg 15 mg 15 mg   Tue 10 mg 10 mg 10 mg   Wed 15 mg 15 mg 15 mg   Thu 10 mg 10 mg 10 mg   Fri 15 mg 15 mg 15 mg   Sat 10 mg 10 mg 10 mg   Visit Report - - -   Some recent data might be hidden       Plan:  1. INR is therapeutic today- see above in Anticoagulation Summary.    Kameron Melendez to continue their warfarin regimen- see above in Anticoagulation Summary.  2. Follow up in 1 week  3. Pt has agreed to only be called if INR out of range. They have been instructed to call if any changes in medications, doses, concerns, etc. Patient expresses understanding and has no  further questions at this time.    Ramo Morocho RPH

## 2019-08-09 ENCOUNTER — ANTICOAGULATION VISIT (OUTPATIENT)
Dept: PHARMACY | Facility: HOSPITAL | Age: 49
End: 2019-08-09

## 2019-08-09 DIAGNOSIS — I26.99 OTHER ACUTE PULMONARY EMBOLISM WITHOUT ACUTE COR PULMONALE (HCC): ICD-10-CM

## 2019-08-09 DIAGNOSIS — I26.99 BILATERAL PULMONARY EMBOLISM (HCC): ICD-10-CM

## 2019-08-09 LAB — INR PPP: 2.9

## 2019-08-09 NOTE — PROGRESS NOTES
Anticoagulation Clinic Progress Note    Anticoagulation Summary  As of 2019    INR goal:   2.0-3.0   TTR:   91.1 % (6 mo)   INR used for dosin.90 (2019)   Warfarin maintenance plan:   15 mg every Mon, Wed, Fri; 10 mg all other days   Weekly warfarin total:   85 mg   No change documented:   Alexandra Elias   Plan last modified:   Rmao Morocho RPH (2019)   Next INR check:   2019   Priority:   High   Target end date:   Indefinite    Indications    Other acute pulmonary embolism without acute cor pulmonale (CMS/HCC) [I26.99]  Bilateral pulmonary embolism (CMS/HCC) [I26.99]             Anticoagulation Episode Summary     INR check location:       Preferred lab:       [facility]    Comments:   new [employer] [facility] frankie 2019      Anticoagulation Care Providers     Provider Role Specialty Phone number    Torri Colmenraes APRN Referring Cardiology [phone]    Elsy Baeza MD Responsible Cardiology [phone]          Clinic Interview:  No pertinent clinical findings have been reported.    INR History:  Anticoagulation Monitoring 2019   INR 3.10 2.60 2.90   INR Date 2019   INR Goal 2.0-3.0 2.0-3.0 2.0-3.0   Trend Same Same Same   Last Week Total 85 mg 85 mg 85 mg   Next Week Total 85 mg 85 mg 85 mg   Sun 10 mg 10 mg 10 mg   Mon 15 mg 15 mg 15 mg   Tue 10 mg 10 mg 10 mg   Wed 15 mg 15 mg 15 mg   Thu 10 mg 10 mg 10 mg   Fri 15 mg 15 mg 15 mg   Sat 10 mg 10 mg 10 mg   Visit Report - - -   Some recent data might be hidden       Plan:  1. INR is therapeutic today- see above in Anticoagulation Summary.    Kameron Melendez to continue their warfarin regimen- see above in Anticoagulation Summary.  2. Follow up in 1 week  3. Pt has agreed to only be called if INR out of range. They have been instructed to call if any changes in medications, doses, concerns, etc. Patient expresses understanding and has no  further questions at this time.    Alexandra Elias

## 2019-08-15 ENCOUNTER — OFFICE VISIT (OUTPATIENT)
Dept: CARDIOLOGY | Facility: CLINIC | Age: 49
End: 2019-08-15

## 2019-08-15 VITALS
HEART RATE: 50 BPM | HEIGHT: 74 IN | DIASTOLIC BLOOD PRESSURE: 84 MMHG | BODY MASS INDEX: 40.43 KG/M2 | SYSTOLIC BLOOD PRESSURE: 162 MMHG | WEIGHT: 315 LBS

## 2019-08-15 DIAGNOSIS — I26.99 BILATERAL PULMONARY EMBOLISM (HCC): Primary | ICD-10-CM

## 2019-08-15 DIAGNOSIS — I51.7 RIGHT VENTRICULAR ENLARGEMENT: ICD-10-CM

## 2019-08-15 DIAGNOSIS — E66.01 MORBID OBESITY DUE TO EXCESS CALORIES (HCC): ICD-10-CM

## 2019-08-15 DIAGNOSIS — I27.20 PULMONARY HYPERTENSION (HCC): ICD-10-CM

## 2019-08-15 DIAGNOSIS — G47.33 OSA (OBSTRUCTIVE SLEEP APNEA): ICD-10-CM

## 2019-08-15 PROCEDURE — 99214 OFFICE O/P EST MOD 30 MIN: CPT | Performed by: INTERNAL MEDICINE

## 2019-08-15 PROCEDURE — 93000 ELECTROCARDIOGRAM COMPLETE: CPT | Performed by: INTERNAL MEDICINE

## 2019-08-15 RX ORDER — WARFARIN SODIUM 10 MG/1
TABLET ORAL
Qty: 90 TABLET | Refills: 0 | Status: SHIPPED | OUTPATIENT
Start: 2019-08-15 | End: 2019-10-28 | Stop reason: SDUPTHER

## 2019-08-15 RX ORDER — WARFARIN SODIUM 5 MG/1
TABLET ORAL
Qty: 40 TABLET | Refills: 0 | Status: SHIPPED | OUTPATIENT
Start: 2019-08-15 | End: 2019-10-29 | Stop reason: SDUPTHER

## 2019-08-15 NOTE — PROGRESS NOTES
Subjective:     Encounter Date:08/15/2019      Patient ID: Kameron Melendez is a 49 y.o. male.    Chief Complaint:  History of Present Illness    This is a 49-year-old with a history of bilateral pulmonary embolism status post thrombectomy in 7/2017, pulmonary hypertension, morbid obesity, hyperlipidemia, obstructive sleep apnea, recurrent pulmonary embolism in 1/2019, who presents for hospital follow-up.     I initially saw the patient in 7/2017 when he was admitted and 7/2017 with bilateral pulmonary embolism associated with significant right ventricular strain.  The patient reported that he had been on a long car trip prior to developing his symptoms.  His findings included a normal troponin and an elevated BNP.  His echocardiogram however showed a severely dilated right ventricle with severe dysfunction.  During that admission he was taken to the cardiac catheterization laboratory by Dr. Coulter and underwent successful left pulmonary artery thrombectomy and infusion of TPA.  Following the procedure he was placed on warfarin (due to his high BMI) with a heparin bridge.  And follow-up he was seen by Dr. Coulter on 11/6/2017 at which time he was doing well.  A repeat echocardiogram and bilateral lower extremity venous Dopplers were performed.  The lower extremity ultrasound showed no residual clot.  His echocardiogram showed a moderately dilated left ventricle with mildly dilated right ventricle, normal left ventricular function with an EF of 66%, and no significant valvular abnormalities.  At that time was suggested that since this was a provoked venous thromboembolism he could stop anticoagulation after a year.  He returned to the office for routine follow-up in 8/2018 with JESSIE Sanchez which time he was continuing to do well and was recommended that he could stop his anticoagulation.  He ended up stopping this in October.     He presented back to the hospital on 1/23/2019 with progressively worsening dyspnea.  His  workup in the emergency room room revealed an elevated BNP and a normal troponin.  A CT angiogram of the chest showed acute bilateral pulmonary embolism involving the lower lobes and the proximal branch of the right middle lobe with evidence of right ventricular strain.  He was started on heparin drip following his admission.  An echocardiogram was performed during that admission that showed normal left ventricular systolic function and wall motion, EF 60%, grade 1 diastolic dysfunction, mild tricuspid regurgitation, normal right ventricular size and function, and right ventricular systolic pressure of 48.7 mmHg.  A lower extremity in the venous Doppler ultrasound showed right lower extremity DVTs of the popliteal and gastrocnemius/soleal veins.  Due to the lack of right ventricular strain and his relatively low thrombus burden I recommended that we conservatively manage him at this time around with anticoagulation.  Deferred any invasive treatment as long as he continued to improve.  Fortunately the patient did so and we ended up starting him back on warfarin.  He was evaluated by hematology during this admission and his workup did reveal evidence of heterozygous mutation for factor V Leiden.  He was finally discharged on 2/1/2019 in good condition off of any oxygen during the day.  He did have evidence of obstructive sleep apnea and the patient was to follow-up with a sleep physician as an outpatient.     Today he presents for routine six-month follow-up.  Since I saw him last his main issue recently has been lower back pain primarily in the right lower back.  He has been trying to increase his activity but is been limited by this pain.  He is also trying a standing desk at work to see if this will help.  He has not had this pain evaluated since he currently is without a primary care physician.  He denies any dyspnea on exertion of the ordinary.  Denies any chest pain.  He has short episodes of intermittent  palpitations that is chronic and unchanged.  He has chronic lower extremity edema that is been stable.  He denies any PND or orthopnea, near-syncope or syncope.  He reports compliance with his medications including his warfarin.  He checks his INRs at home with his own monitor and is being managed by the medication management clinic.  He reports that his insurance company is now requiring him to have a checked once a week.  He showed me his INR is and they have been largely therapeutic.    Review of Systems   Constitution: Positive for malaise/fatigue. Negative for weakness.   HENT: Negative for hearing loss, hoarse voice, nosebleeds and sore throat.    Eyes: Negative for pain.   Cardiovascular: Positive for leg swelling and palpitations. Negative for chest pain, claudication, cyanosis, dyspnea on exertion, irregular heartbeat, near-syncope, orthopnea, paroxysmal nocturnal dyspnea and syncope.   Respiratory: Negative for shortness of breath and snoring.    Endocrine: Negative for cold intolerance, heat intolerance, polydipsia, polyphagia and polyuria.   Skin: Negative for itching and rash.   Musculoskeletal: Positive for back pain. Negative for arthritis, falls, joint pain, joint swelling, muscle cramps, muscle weakness and myalgias.   Gastrointestinal: Negative for constipation, diarrhea, dysphagia, heartburn, hematemesis, hematochezia, melena, nausea and vomiting.   Genitourinary: Negative for frequency, hematuria and hesitancy.   Neurological: Negative for excessive daytime sleepiness, dizziness, headaches, light-headedness and numbness.   Psychiatric/Behavioral: Negative for depression. The patient is not nervous/anxious.           Current Outpatient Medications:   •  acetaminophen (TYLENOL) 500 MG tablet, Take 500 mg by mouth Every 6 (Six) Hours As Needed for Mild Pain ., Disp: , Rfl:   •  warfarin (COUMADIN) 10 MG tablet, Take 1 tablet by mouth daily or as directed., Disp: 90 tablet, Rfl: 0  •  warfarin  "(COUMADIN) 5 MG tablet, TAKE 1 TABLET BY MOUTH BY MOUTH ALONG WITH 10MG TABLET ON MONDAY, WEDNESDAY AND FRIDAY OR AS DIRECTED BY MEDICATION MANAGEMENT CLINIC, Disp: 40 tablet, Rfl: 0    Past Medical History:   Diagnosis Date   • Lymphedema    • Lymphedema of left lower extremity    • Obesity    • ARNOLDO (obstructive sleep apnea)    • Pulmonary embolism (CMS/HCC)    • Pulmonary hypertension (CMS/HCC)    • Right ventricular enlargement      Past Surgical History:   Procedure Laterality Date   • CARDIAC CATHETERIZATION Bilateral 7/18/2017    Procedure: Pulmonary angiography- [facility] ;  Surgeon: Cedric Coulter MD;  Location:  [facility];  Service:    • CARDIAC CATHETERIZATION N/A 7/18/2017    Procedure: Right Heart Cath;  Surgeon: Cedric Coulter MD;  Location:  [facility];  Service:      Family History   Problem Relation Age of Onset   • Heart disease Mother    • Heart failure Mother    • Bradycardia Mother    • No Known Problems Father    • No Known Problems Maternal Grandmother    • No Known Problems Maternal Grandfather    • No Known Problems Paternal Grandmother    • No Known Problems Paternal Grandfather      Social History     Tobacco Use   • Smoking status: Never Smoker   • Smokeless tobacco: Never Used   Substance Use Topics   • Alcohol use: No   • Drug use: No           ECG 12 Lead  Date/Time: 8/15/2019 11:57 AM  Performed by: Elsy Baeza MD  Authorized by: Elsy Baeza MD   Comparison: compared with previous ECG   Similar to previous ECG  Rhythm: sinus rhythm  Conduction: 1st degree AV block               Objective:         Visit Vitals  /84   Pulse 50   Ht 188 cm (74\")   Wt (!) 239 kg (528 lb)   BMI 67.79 kg/m²          Physical Exam   Constitutional: He is oriented to person, place, and time. He appears well-developed and well-nourished.   HENT:   Head: Normocephalic and atraumatic.   Neck: No JVD present. Carotid bruit is not present.   Cardiovascular: Normal rate, " regular rhythm, S1 normal and S2 normal. Exam reveals no gallop.   No murmur heard.  Pulses:       Radial pulses are 2+ on the right side, and 2+ on the left side.   Chronic bilateral lower extremity edema   Pulmonary/Chest: Effort normal and breath sounds normal.   Abdominal: Soft. Normal appearance.   Neurological: He is alert and oriented to person, place, and time.   Skin: Skin is warm, dry and intact.   Psychiatric: He has a normal mood and affect.       Lab Review:       Assessment:          Diagnosis Plan   1. Bilateral pulmonary embolism (CMS/HCC)  Ambulatory Referral to Internal Medicine   2. Pulmonary hypertension (CMS/HCC)     3. Right ventricular enlargement     4. ARNOLDO (obstructive sleep apnea)  Ambulatory Referral to Internal Medicine   5. Morbid obesity due to excess calories (CMS/HCC)  Ambulatory Referral to Internal Medicine          Plan:       1.  Recurrent bilateral pulmonary embolism.  On warfarin which he checks himself with a home monitor and is managed by the medication management clinic.  He remains at risk for recurrent venous thromboembolisms and he will need to remain on anticoagulation indefinitely.  2.  Elevated blood pressures.  Normally well controlled.  I asked him to monitor his blood pressures intermittently at home and to notify me if they appear elevated.  3.  Factor V Leiden heterozygosity  4.  Obstructive sleep apnea  5.  Morbid obesity  6.  Right lower back pain.  Suspect sciatica.  Encourage the patient to continue with increasing his activity and have given a note for him to continue using a standing desk at work.  We will also make a referral to internal medicine because he will need to establish care with a primary care physician.    We will see the patient back again in 6 months.

## 2019-08-19 ENCOUNTER — ANTICOAGULATION VISIT (OUTPATIENT)
Dept: PHARMACY | Facility: HOSPITAL | Age: 49
End: 2019-08-19

## 2019-08-19 DIAGNOSIS — I26.99 BILATERAL PULMONARY EMBOLISM (HCC): ICD-10-CM

## 2019-08-19 DIAGNOSIS — I26.99 OTHER ACUTE PULMONARY EMBOLISM WITHOUT ACUTE COR PULMONALE (HCC): ICD-10-CM

## 2019-08-19 LAB — INR PPP: 2.7

## 2019-08-19 NOTE — PROGRESS NOTES
Anticoagulation Clinic Progress Note    Anticoagulation Summary  As of 2019    INR goal:   2.0-3.0   TTR:   91.4 % (6.2 mo)   INR used for dosin.70 (2019)   Warfarin maintenance plan:   15 mg every Mon, Wed, Fri; 10 mg all other days   Weekly warfarin total:   85 mg   No change documented:   Alexadnra Elias   Plan last modified:   Ramo Morocho [facility] (2019)   Next INR check:   2019   Priority:   High   Target end date:   Indefinite    Indications    Other acute pulmonary embolism without acute cor pulmonale (CMS/HCC) [I26.99]  History of bilateral pulmonary embolism (CMS/HCC) [I26.99]             Anticoagulation Episode Summary     INR check location:       Preferred lab:       Send INR reminders to:    [facility]    Comments:   new [employer] home frankie 2019      Anticoagulation Care Providers     Provider Role Specialty Phone number    Torri Colmenares APRN Referring Cardiology [phone]    Elsy Baeza MD Responsible Cardiology [phone]          Clinic Interview:  No pertinent clinical findings have been reported.    INR History:  Anticoagulation Monitoring 2019   INR 2.60 2.90 2.70   INR Date 2019   INR Goal 2.0-3.0 2.0-3.0 2.0-3.0   Trend Same Same Same   Last Week Total 85 mg 85 mg 85 mg   Next Week Total 85 mg 85 mg 85 mg   Sun 10 mg 10 mg -   Mon 15 mg 15 mg 15 mg   Tue 10 mg 10 mg 10 mg   Wed 15 mg 15 mg 15 mg   Thu 10 mg 10 mg 10 mg   Fri 15 mg 15 mg -   Sat 10 mg 10 mg -   Visit Report - - -   Some recent data might be hidden       Plan:  1. INR is therapeutic today- see above in Anticoagulation Summary.    Kameron Melendez to continue their warfarin regimen- see above in Anticoagulation Summary.  2. Follow up in 1 week  3. Pt has agreed to only be called if INR out of range. They have been instructed to call if any changes in medications, doses, concerns, etc. Patient expresses understanding and has no  further questions at this time.    Alexandra Elias

## 2019-08-23 ENCOUNTER — ANTICOAGULATION VISIT (OUTPATIENT)
Dept: PHARMACY | Facility: HOSPITAL | Age: 49
End: 2019-08-23

## 2019-08-23 DIAGNOSIS — I26.99 BILATERAL PULMONARY EMBOLISM (HCC): ICD-10-CM

## 2019-08-23 DIAGNOSIS — I26.99 OTHER ACUTE PULMONARY EMBOLISM WITHOUT ACUTE COR PULMONALE (HCC): ICD-10-CM

## 2019-08-23 LAB — INR PPP: 2.8

## 2019-08-30 ENCOUNTER — ANTICOAGULATION VISIT (OUTPATIENT)
Dept: PHARMACY | Facility: HOSPITAL | Age: 49
End: 2019-08-30

## 2019-08-30 DIAGNOSIS — I26.99 OTHER ACUTE PULMONARY EMBOLISM WITHOUT ACUTE COR PULMONALE (HCC): ICD-10-CM

## 2019-08-30 DIAGNOSIS — I26.99 BILATERAL PULMONARY EMBOLISM (HCC): ICD-10-CM

## 2019-08-30 LAB — INR PPP: 2.5

## 2019-08-30 NOTE — PROGRESS NOTES
Anticoagulation Clinic Progress Note    Anticoagulation Summary  As of 2019    INR goal:   2.0-3.0   TTR:   92.0 % (6.7 mo)   INR used for dosin.50 (2019)   Warfarin maintenance plan:   15 mg every Mon, Wed, Fri; 10 mg all other days   Weekly warfarin total:   85 mg   No change documented:   Alexandra Elias   Plan last modified:   Ramo Morocho [facility] (2019)   Next INR check:   2019   Priority:   High   Target end date:   Indefinite    Indications    Other acute pulmonary embolism without acute cor pulmonale (CMS/HCC) [I26.99]  History of bilateral pulmonary embolism (CMS/HCC) [I26.99]             Anticoagulation Episode Summary     INR check location:       Preferred lab:       Send INR reminders to:    [facility]  [facility]    Comments:   new [employer] home frankie 2019      Anticoagulation Care Providers     Provider Role Specialty Phone number    Torri Colmenares APRN Referring Cardiology [phone]    Elsy Baeza MD Responsible Cardiology [phone]          Clinic Interview:  No pertinent clinical findings have been reported.    INR History:  Anticoagulation Monitoring 2019   INR 2.70 2.80 2.50   INR Date 2019   INR Goal 2.0-3.0 2.0-3.0 2.0-3.0   Trend Same Same Same   Last Week Total 85 mg 85 mg 85 mg   Next Week Total 85 mg 85 mg 85 mg   Sun - 10 mg 10 mg   Mon 15 mg 15 mg 15 mg   Tue 10 mg 10 mg 10 mg   Wed 15 mg 15 mg 15 mg   Thu 10 mg 10 mg 10 mg   Fri - 15 mg 15 mg   Sat - 10 mg 10 mg   Visit Report - - -   Some recent data might be hidden       Plan:  1. INR is therapeutic today- see above in Anticoagulation Summary.    Kameron Melendez to continue their warfarin regimen- see above in Anticoagulation Summary.  2. Follow up in 1 week  3. Pt has agreed to only be called if INR out of range. They have been instructed to call if any changes in medications, doses, concerns, etc. Patient expresses understanding and has  no further questions at this time.    Alexandra Elias

## 2019-09-06 ENCOUNTER — ANTICOAGULATION VISIT (OUTPATIENT)
Dept: PHARMACY | Facility: HOSPITAL | Age: 49
End: 2019-09-06

## 2019-09-06 DIAGNOSIS — I26.99 OTHER ACUTE PULMONARY EMBOLISM WITHOUT ACUTE COR PULMONALE (HCC): ICD-10-CM

## 2019-09-06 DIAGNOSIS — I26.99 BILATERAL PULMONARY EMBOLISM (HCC): ICD-10-CM

## 2019-09-06 LAB — INR PPP: 2.6

## 2019-09-06 NOTE — PROGRESS NOTES
Anticoagulation Clinic Progress Note    Anticoagulation Summary  As of 2019    INR goal:   2.0-3.0   TTR:   92.3 % (6.9 mo)   INR used for dosin.60 (2019)   Warfarin maintenance plan:   15 mg every Mon, Wed, Fri; 10 mg all other days   Weekly warfarin total:   85 mg   No change documented:   Catrachita Perez   Plan last modified:   Ramo Morocho RPH (2019)   Next INR check:   2019   Priority:   High   Target end date:   Indefinite    Indications    Other acute pulmonary embolism without acute cor pulmonale (CMS/HCC) [I26.99]  History of bilateral pulmonary embolism (CMS/HCC) [I26.99]             Anticoagulation Episode Summary     INR check location:       Preferred lab:       Send INR reminders to:    [facility]    Comments:   new [employer] [facility] frankie 2019      Anticoagulation Care Providers     Provider Role Specialty Phone number    Torri Colmenares APRN Referring Cardiology [phone]    Elsy Baeza MD Responsible Cardiology [phone]          Clinic Interview:  No pertinent clinical findings have been reported.    INR History:  Anticoagulation Monitoring 2019   INR 2.80 2.50 2.60   INR Date 2019   INR Goal 2.0-3.0 2.0-3.0 2.0-3.0   Trend Same Same Same   Last Week Total 85 mg 85 mg 85 mg   Next Week Total 85 mg 85 mg 85 mg   Sun 10 mg 10 mg 10 mg   Mon 15 mg 15 mg 15 mg   Tue 10 mg 10 mg 10 mg   Wed 15 mg 15 mg 15 mg   Thu 10 mg 10 mg 10 mg   Fri 15 mg 15 mg 15 mg   Sat 10 mg 10 mg 10 mg   Visit Report - - -   Some recent data might be hidden       Plan:  1. INR is therapeutic today- see above in Anticoagulation Summary.    Kameron Melendez to continue their warfarin regimen- see above in Anticoagulation Summary.  2. Follow up in 1 week  3. Pt has agreed to only be called if INR out of range. They have been instructed to call if any changes in medications, doses, concerns, etc. Patient expresses understanding  and has no further questions at this time.    Catrachita Perez

## 2019-09-13 ENCOUNTER — ANTICOAGULATION VISIT (OUTPATIENT)
Dept: PHARMACY | Facility: HOSPITAL | Age: 49
End: 2019-09-13

## 2019-09-13 DIAGNOSIS — I26.99 BILATERAL PULMONARY EMBOLISM (HCC): ICD-10-CM

## 2019-09-13 DIAGNOSIS — I26.99 OTHER ACUTE PULMONARY EMBOLISM WITHOUT ACUTE COR PULMONALE (HCC): ICD-10-CM

## 2019-09-13 LAB — INR PPP: 2.4

## 2019-09-13 NOTE — PROGRESS NOTES
Anticoagulation Clinic Progress Note    Anticoagulation Summary  As of 2019    INR goal:   2.0-3.0   TTR:   92.6 % (7.1 mo)   INR used for dosin.40 (2019)   Warfarin maintenance plan:   15 mg every Mon, Wed, Fri; 10 mg all other days   Weekly warfarin total:   85 mg   No change documented:   Alexandra Elias   Plan last modified:   Ramo Morocho [facility] (2019)   Next INR check:   2019   Priority:   High   Target end date:   Indefinite    Indications    Other acute pulmonary embolism without acute cor pulmonale (CMS/HCC) [I26.99]  History of bilateral pulmonary embolism (CMS/HCC) [I26.99]             Anticoagulation Episode Summary     INR check location:       Preferred lab:       Send INR reminders to:    [facility]    Comments:   new [employer] home frankie 2019      Anticoagulation Care Providers     Provider Role Specialty Phone number    Torri Colmenares APRN Referring Cardiology [phone]    Elsy Baeza MD Responsible Cardiology [phone]          Clinic Interview:  No pertinent clinical findings have been reported.    INR History:  Anticoagulation Monitoring 2019   INR 2.50 2.60 2.40   INR Date 2019   INR Goal 2.0-3.0 2.0-3.0 2.0-3.0   Trend Same Same Same   Last Week Total 85 mg 85 mg 85 mg   Next Week Total 85 mg 85 mg 85 mg   Sun 10 mg 10 mg 10 mg   Mon 15 mg 15 mg 15 mg   Tue 10 mg 10 mg 10 mg   Wed 15 mg 15 mg 15 mg   Thu 10 mg 10 mg 10 mg   Fri 15 mg 15 mg 15 mg   Sat 10 mg 10 mg 10 mg   Visit Report - - -   Some recent data might be hidden       Plan:  1. INR is therapeutic today- see above in Anticoagulation Summary.    Kameron Melendez to continue their warfarin regimen- see above in Anticoagulation Summary.  2. Follow up in 2 weeks  3. Pt has agreed to only be called if INR out of range. They have been instructed to call if any changes in medications, doses, concerns, etc. Patient expresses  understanding and has no further questions at this time.    Alexandra Elias

## 2019-09-20 LAB — INR PPP: 3.3

## 2019-09-23 ENCOUNTER — ANTICOAGULATION VISIT (OUTPATIENT)
Dept: PHARMACY | Facility: HOSPITAL | Age: 49
End: 2019-09-23

## 2019-09-23 DIAGNOSIS — I26.99 OTHER ACUTE PULMONARY EMBOLISM WITHOUT ACUTE COR PULMONALE (HCC): ICD-10-CM

## 2019-09-23 DIAGNOSIS — I26.99 BILATERAL PULMONARY EMBOLISM (HCC): ICD-10-CM

## 2019-09-23 NOTE — PROGRESS NOTES
Anticoagulation Clinic Progress Note    Anticoagulation Summary  As of 9/23/2019    INR goal:   2.0-3.0   TTR:   91.7 % (7.4 mo)   INR used for dosing:   3.30! (9/20/2019)   Warfarin maintenance plan:   15 mg every Mon, Wed, Fri; 10 mg all other days   Weekly warfarin total:   85 mg   No change documented:   Kim Sotelo RPH   Plan last modified:   Ramo Morocho RPH (4/26/2019)   Next INR check:   9/27/2019   Priority:   High   Target end date:   Indefinite    Indications    Other acute pulmonary embolism without acute cor pulmonale (CMS/HCC) [I26.99]  History of bilateral pulmonary embolism (CMS/HCC) [I26.99]             Anticoagulation Episode Summary     INR check location:       Preferred lab:       Send INR reminders to:    [facility]    Comments:   new [employer] home frankie March 2019      Anticoagulation Care Providers     Provider Role Specialty Phone number    Torri Colmenares, APRN Referring Cardiology [phone]    Elsy Baeza MD Responsible Cardiology [phone]          Clinic Interview:      INR History:  Anticoagulation Monitoring 9/6/2019 9/13/2019 9/23/2019   INR 2.60 2.40 3.30   INR Date 9/6/2019 9/13/2019 9/20/2019   INR Goal 2.0-3.0 2.0-3.0 2.0-3.0   Trend Same Same Same   Last Week Total 85 mg 85 mg 85 mg   Next Week Total 85 mg 85 mg 85 mg   Sun 10 mg 10 mg -   Mon 15 mg 15 mg 15 mg   Tue 10 mg 10 mg 10 mg   Wed 15 mg 15 mg 15 mg   Thu 10 mg 10 mg 10 mg   Fri 15 mg 15 mg -   Sat 10 mg 10 mg -   Visit Report - - -   Some recent data might be hidden       Plan:  1. INR is Supratherapeutic from 9/20- see above in Anticoagulation Summary.  Will instruct Kameron Melendez to Continue their warfarin regimen- see above in Anticoagulation Summary.  2. Follow up in 1 week  3. Left VM with instructions.  Kim Sotelo RPH

## 2019-09-27 ENCOUNTER — ANTICOAGULATION VISIT (OUTPATIENT)
Dept: PHARMACY | Facility: HOSPITAL | Age: 49
End: 2019-09-27

## 2019-09-27 DIAGNOSIS — I26.99 OTHER ACUTE PULMONARY EMBOLISM WITHOUT ACUTE COR PULMONALE (HCC): ICD-10-CM

## 2019-09-27 DIAGNOSIS — I26.99 BILATERAL PULMONARY EMBOLISM (HCC): ICD-10-CM

## 2019-09-27 LAB — INR PPP: 2.6

## 2019-09-30 NOTE — PROGRESS NOTES
Anticoagulation Clinic Progress Note    Anticoagulation Summary  As of 2019    INR goal:   2.0-3.0   TTR:   90.7 % (7.6 mo)   INR used for dosin.60 (2019)   Warfarin maintenance plan:   15 mg every Mon, Wed, Fri; 10 mg all other days   Weekly warfarin total:   85 mg   Plan last modified:   Ramo Morocho RPH (2019)   Next INR check:      Priority:   High   Target end date:   Indefinite    Indications    Other acute pulmonary embolism without acute cor pulmonale (CMS/HCC) [I26.99]  History of bilateral pulmonary embolism (CMS/HCC) [I26.99]             Anticoagulation Episode Summary     INR check location:       Preferred lab:       Send INR reminders to:   [facility]    Comments:   new [employer] home frankie 2019      Anticoagulation Care Providers     Provider Role Specialty Phone number    Torri Colmenares APRN Referring Cardiology [phone]    Elsy Baeza MD Responsible Cardiology [phone]          Clinic Interview:  No pertinent clinical findings have been reported.    INR History:  Anticoagulation Monitoring 2019   INR 2.40 3.30 2.60   INR Date 2019   INR Goal 2.0-3.0 2.0-3.0 2.0-3.0   Trend Same Same Same   Last Week Total 85 mg 85 mg 85 mg   Next Week Total 85 mg 85 mg 85 mg   Sun 10 mg - 10 mg   Mon 15 mg 15 mg 15 mg   Tue 10 mg 10 mg 10 mg   Wed 15 mg 15 mg 15 mg   Thu 10 mg 10 mg 10 mg   Fri 15 mg - 15 mg   Sat 10 mg - 10 mg   Visit Report - - -   Some recent data might be hidden       Plan:  1. INR is therapeutic today- see above in Anticoagulation Summary.    Kameron Melendez to continue their warfarin regimen- see above in Anticoagulation Summary.  2. Follow up in 2 weeks  3. Pt has agreed to only be called if INR out of range. They have been instructed to call if any changes in medications, doses, concerns, etc. Patient expresses understanding and has no further questions at this time.    Gregg DELCID  Bao, RPH

## 2019-10-04 ENCOUNTER — ANTICOAGULATION VISIT (OUTPATIENT)
Dept: PHARMACY | Facility: HOSPITAL | Age: 49
End: 2019-10-04

## 2019-10-04 DIAGNOSIS — I26.99 OTHER ACUTE PULMONARY EMBOLISM WITHOUT ACUTE COR PULMONALE (HCC): ICD-10-CM

## 2019-10-04 DIAGNOSIS — I26.99 BILATERAL PULMONARY EMBOLISM (HCC): ICD-10-CM

## 2019-10-04 LAB — INR PPP: 2.2

## 2019-10-04 NOTE — PROGRESS NOTES
Anticoagulation Clinic Progress Note    Anticoagulation Summary  As of 10/4/2019    INR goal:   2.0-3.0   TTR:   91.0 % (7.8 mo)   INR used for dosin.20 (10/4/2019)   Warfarin maintenance plan:   15 mg every Mon, Wed, Fri; 10 mg all other days   Weekly warfarin total:   85 mg   No change documented:   Catrachita Perez   Plan last modified:   Ramo Morocho RPH (2019)   Next INR check:   10/11/2019   Priority:   High   Target end date:   Indefinite    Indications    Other acute pulmonary embolism without acute cor pulmonale (CMS/HCC) [I26.99]  History of bilateral pulmonary embolism (CMS/HCC) [I26.99]             Anticoagulation Episode Summary     INR check location:       Preferred lab:       Send INR reminders to:    [facility]  [facility]    Comments:   new [employer] [facility] frankie 2019      Anticoagulation Care Providers     Provider Role Specialty Phone number    Torri Colmenares APRN Referring Cardiology [phone]    Elsy Baeza MD Responsible Cardiology [phone]          Clinic Interview:  No pertinent clinical findings have been reported.    INR History:  Anticoagulation Monitoring 2019 2019 10/4/2019   INR 3.30 2.60 2.20   INR Date 2019 2019 10/4/2019   INR Goal 2.0-3.0 2.0-3.0 2.0-3.0   Trend Same Same Same   Last Week Total 85 mg 85 mg 85 mg   Next Week Total 85 mg 85 mg 85 mg   Sun - 10 mg 10 mg   Mon 15 mg 15 mg 15 mg   Tue 10 mg 10 mg 10 mg   Wed 15 mg 15 mg 15 mg   Thu 10 mg 10 mg 10 mg   Fri - 15 mg 15 mg   Sat - 10 mg 10 mg   Visit Report - - -   Some recent data might be hidden       Plan:  1. INR is therapeutic today- see above in Anticoagulation Summary.    Kameron Melendez to continue their warfarin regimen- see above in Anticoagulation Summary.  2. Follow up in 1 weeks  3. Pt has agreed to only be called if INR out of range. They have been instructed to call if any changes in medications, doses, concerns, etc. Patient expresses understanding and  has no further questions at this time.    Catrachita Perez

## 2019-10-11 ENCOUNTER — ANTICOAGULATION VISIT (OUTPATIENT)
Dept: PHARMACY | Facility: HOSPITAL | Age: 49
End: 2019-10-11

## 2019-10-11 DIAGNOSIS — I26.99 OTHER ACUTE PULMONARY EMBOLISM WITHOUT ACUTE COR PULMONALE (HCC): ICD-10-CM

## 2019-10-11 DIAGNOSIS — I26.99 BILATERAL PULMONARY EMBOLISM (HCC): ICD-10-CM

## 2019-10-11 LAB — INR PPP: 1.8

## 2019-10-11 NOTE — PROGRESS NOTES
Anticoagulation Clinic Progress Note    Anticoagulation Summary  As of 10/11/2019    INR goal:   2.0-3.0   TTR:   89.8 % (8.1 mo)   INR used for dosin.80! (10/11/2019)   Warfarin maintenance plan:   15 mg every Mon, Wed, Fri; 10 mg all other days   Weekly warfarin total:   85 mg   No change documented:   Ramo Morocho RPH   Plan last modified:   Ramo Morocho RPH (2019)   Next INR check:   10/18/2019   Priority:   High   Target end date:   Indefinite    Indications    Other acute pulmonary embolism without acute cor pulmonale (CMS/HCC) [I26.99]  History of bilateral pulmonary embolism (CMS/HCC) [I26.99]             Anticoagulation Episode Summary     INR check location:       Preferred lab:       Send INR reminders to:    SMOOTH RESTREPO  POOL    Comments:   new [employer] home frankie 2019      Anticoagulation Care Providers     Provider Role Specialty Phone number    Torri Colmenares, APRN Referring Cardiology [phone]    Elsy Baeza MD Responsible Cardiology [phone]            Clinic Interview:  Patient Findings     Positives:   Other complaints    Negatives:   Signs/symptoms of thrombosis, Signs/symptoms of bleeding,   Laboratory test error suspected, Change in health, Change in alcohol use,   Change in activity, Upcoming invasive procedure, Emergency department   visit, Upcoming dental procedure, Missed doses, Extra doses, Change in   medications, Change in diet/appetite, Hospital admission, Bruising    Comments:   Busy work week.       Clinical Outcomes     Negatives:   Major bleeding event, Thromboembolic event,   Anticoagulation-related hospital admission, Anticoagulation-related ED   visit, Anticoagulation-related fatality    Comments:   Busy work week.         INR History:  Anticoagulation Monitoring 2019 10/4/2019 10/11/2019   INR 2.60 2.20 1.80   INR Date 2019 10/4/2019 10/11/2019   INR Goal 2.0-3.0 2.0-3.0 2.0-3.0   Trend Same Same Same   Last Week Total 85  mg 85 mg 85 mg   Next Week Total 85 mg 85 mg 85 mg   Sun 10 mg 10 mg 10 mg   Mon 15 mg 15 mg 15 mg   Tue 10 mg 10 mg 10 mg   Wed 15 mg 15 mg 15 mg   Thu 10 mg 10 mg 10 mg   Fri 15 mg 15 mg 15 mg   Sat 10 mg 10 mg 10 mg   Visit Report - - -   Some recent data might be hidden       Plan:  1. INR is Subtherapeutic today- see above in Anticoagulation Summary.   Will instruct Kameron Al to Continue their warfarin regimen- see above in Anticoagulation Summary.  2. Follow up in 2 weeks  3. Pt has agreed to only be called if INR out of range. They have been instructed to call if any changes in medications, doses, concerns, etc. Patient expresses understanding and has no further questions at this time.    Ramo Morocho [facility]

## 2019-10-18 ENCOUNTER — ANTICOAGULATION VISIT (OUTPATIENT)
Dept: PHARMACY | Facility: HOSPITAL | Age: 49
End: 2019-10-18

## 2019-10-18 DIAGNOSIS — I26.99 OTHER ACUTE PULMONARY EMBOLISM WITHOUT ACUTE COR PULMONALE (HCC): ICD-10-CM

## 2019-10-18 DIAGNOSIS — I26.99 BILATERAL PULMONARY EMBOLISM (HCC): ICD-10-CM

## 2019-10-18 LAB — INR PPP: 2.6

## 2019-10-18 NOTE — PROGRESS NOTES
Anticoagulation Clinic Progress Note    Anticoagulation Summary  As of 10/18/2019    INR goal:   2.0-3.0   TTR:   89.4 % (8.3 mo)   INR used for dosin.60 (10/18/2019)   Warfarin maintenance plan:   15 mg every Mon, Wed, Fri; 10 mg all other days   Weekly warfarin total:   85 mg   Plan last modified:   Ramo Morocho RPH (2019)   Next INR check:      Priority:   High   Target end date:   Indefinite    Indications    Other acute pulmonary embolism without acute cor pulmonale (CMS/HCC) [I26.99]  History of bilateral pulmonary embolism (CMS/HCC) [I26.99]             Anticoagulation Episode Summary     INR check location:       Preferred lab:       Send INR reminders to:    S[employer] [employer]  POOL    Comments:   new [employer] home frankie 2019      Anticoagulation Care Providers     Provider Role Specialty Phone number    Torri Colmenares APRN Referring Cardiology [phone]    Elsy Baeza MD Responsible Cardiology [phone]          Drug interactions: has remained unchanged.  Diet: has remained unchanged.    Clinic Interview:      INR History:  Anticoagulation Monitoring 10/4/2019 10/11/2019 10/18/2019   INR 2.20 1.80 2.60   INR Date 10/4/2019 10/11/2019 10/18/2019   INR Goal 2.0-3.0 2.0-3.0 2.0-3.0   Trend Same Same Same   Last Week Total 85 mg 85 mg 85 mg   Next Week Total 85 mg 85 mg 85 mg   Sun 10 mg 10 mg 10 mg   Mon 15 mg 15 mg 15 mg   Tue 10 mg 10 mg 10 mg   Wed 15 mg 15 mg 15 mg   Thu 10 mg 10 mg 10 mg   Fri 15 mg 15 mg 15 mg   Sat 10 mg 10 mg 10 mg   Visit Report - - -   Some recent data might be hidden       Plan:  1. INR is Therapeutic today- see above in Anticoagulation Summary.   Will instruct Kameron Melendez to Continue their warfarin regimen- see above in Anticoagulation Summary.  Called and left detailed voicemail with dosing instructions.  2. Follow up in 2 weeks  3. Pt has agreed to only be called if INR out of range. They have been instructed to call if any changes in  medications, doses, concerns, etc. Patient expresses understanding and has no further questions at this time.    Zan Arreguin III, [facility]

## 2019-10-25 ENCOUNTER — ANTICOAGULATION VISIT (OUTPATIENT)
Dept: PHARMACY | Facility: HOSPITAL | Age: 49
End: 2019-10-25

## 2019-10-25 DIAGNOSIS — I26.99 BILATERAL PULMONARY EMBOLISM (HCC): ICD-10-CM

## 2019-10-25 DIAGNOSIS — I26.99 OTHER ACUTE PULMONARY EMBOLISM WITHOUT ACUTE COR PULMONALE (HCC): ICD-10-CM

## 2019-10-25 LAB — INR PPP: 2.6

## 2019-10-25 NOTE — PROGRESS NOTES
Anticoagulation Clinic Progress Note    Anticoagulation Summary  As of 10/25/2019    INR goal:   2.0-3.0   TTR:   89.7 % (8.5 mo)   INR used for dosin.60 (10/25/2019)   Warfarin maintenance plan:   15 mg every Mon, Wed, Fri; 10 mg all other days   Weekly warfarin total:   85 mg   Plan last modified:   Ramo Morocho RPH (2019)   Next INR check:   2019   Priority:   High   Target end date:   Indefinite    Indications    Other acute pulmonary embolism without acute cor pulmonale (CMS/HCC) [I26.99]  History of bilateral pulmonary embolism (CMS/HCC) [I26.99]             Anticoagulation Episode Summary     INR check location:       Preferred lab:       Send INR reminders to:    S[employer]  POOL    Comments:   new [employer] home frankie 2019      Anticoagulation Care Providers     Provider Role Specialty Phone number    Torri Colmenares APRN Referring Cardiology [phone]    Elsy Baeza MD Responsible Cardiology [phone]          Drug interactions: has remained unchanged.  Diet: has remained unchanged.    Clinic Interview:      INR History:  Anticoagulation Monitoring 10/11/2019 10/18/2019 10/25/2019   INR 1.80 2.60 2.60   INR Date 10/11/2019 10/18/2019 10/25/2019   INR Goal 2.0-3.0 2.0-3.0 2.0-3.0   Trend Same Same Same   Last Week Total 85 mg 85 mg 85 mg   Next Week Total 85 mg 85 mg 85 mg   Sun 10 mg 10 mg 10 mg   Mon 15 mg 15 mg 15 mg   Tue 10 mg 10 mg 10 mg   Wed 15 mg 15 mg 15 mg   Thu 10 mg 10 mg 10 mg   Fri 15 mg 15 mg 15 mg   Sat 10 mg 10 mg 10 mg   Visit Report - - -   Some recent data might be hidden       Plan:  1. INR is Therapeutic today- see above in Anticoagulation Summary.   Will instruct Kameron Rochamaria guadalupe to Continue their warfarin regimen- see above in Anticoagulation Summary.  2. Follow up in 2 weeks  3. Pt has agreed to only be called if INR out of range. They have been instructed to call if any changes in medications, doses, concerns, etc. Patient expresses  understanding and has no further questions at this time.    Osman Ascencio, PharmD

## 2019-10-29 RX ORDER — WARFARIN SODIUM 10 MG/1
TABLET ORAL
Qty: 110 TABLET | Refills: 0 | Status: SHIPPED | OUTPATIENT
Start: 2019-10-29 | End: 2020-02-21

## 2019-11-01 ENCOUNTER — ANTICOAGULATION VISIT (OUTPATIENT)
Dept: PHARMACY | Facility: HOSPITAL | Age: 49
End: 2019-11-01

## 2019-11-01 DIAGNOSIS — I26.99 OTHER ACUTE PULMONARY EMBOLISM WITHOUT ACUTE COR PULMONALE (HCC): ICD-10-CM

## 2019-11-01 DIAGNOSIS — I26.99 BILATERAL PULMONARY EMBOLISM (HCC): ICD-10-CM

## 2019-11-01 LAB — INR PPP: 1.8

## 2019-11-01 NOTE — PROGRESS NOTES
Anticoagulation Clinic Progress Note    Anticoagulation Summary  As of 2019    INR goal:   2.0-3.0   TTR:   89.3 % (8.8 mo)   INR used for dosin.80! (2019)   Warfarin maintenance plan:   15 mg every Mon, Wed, Fri; 10 mg all other days   Weekly warfarin total:   85 mg   No change documented:   Ramo Morocho RPH   Plan last modified:   Ramo Morocho RPH (2019)   Next INR check:   2019   Priority:   High   Target end date:   Indefinite    Indications    Other acute pulmonary embolism without acute cor pulmonale (CMS/HCC) [I26.99]  History of bilateral pulmonary embolism (CMS/HCC) [I26.99]             Anticoagulation Episode Summary     INR check location:       Preferred lab:       Send INR reminders to:    S[employer]RE[employer]  POOL    Comments:   new [employer] home frankie 2019      Anticoagulation Care Providers     Provider Role Specialty Phone number    Torri Colmenares, APRN Referring Cardiology [phone]    Elsy Baeza MD Responsible Cardiology [phone]            Clinic Interview:  Patient Findings     Positives:   Change in activity, Other complaints    Negatives:   Signs/symptoms of thrombosis, Signs/symptoms of bleeding,   Laboratory test error suspected, Change in health, Change in alcohol use,   Upcoming invasive procedure, Emergency department visit, Upcoming dental   procedure, Missed doses, Extra doses, Change in medications, Change in   diet/appetite, Hospital admission, Bruising    Comments:   Increased activity yesterday, decreased hydration.       Clinical Outcomes     Negatives:   Major bleeding event, Thromboembolic event,   Anticoagulation-related hospital admission, Anticoagulation-related ED   visit, Anticoagulation-related fatality    Comments:   Increased activity yesterday, decreased hydration.         INR History:  Anticoagulation Monitoring 10/18/2019 10/25/2019 2019   INR 2.60 2.60 1.80   INR Date 10/18/2019 10/25/2019 2019   INR Goal  2.0-3.0 2.0-3.0 2.0-3.0   Trend Same Same Same   Last Week Total 85 mg 85 mg 85 mg   Next Week Total 85 mg 85 mg 85 mg   Sun 10 mg 10 mg 10 mg   Mon 15 mg 15 mg 15 mg   Tue 10 mg 10 mg 10 mg   Wed 15 mg 15 mg 15 mg   Thu 10 mg 10 mg 10 mg   Fri 15 mg 15 mg 15 mg   Sat 10 mg 10 mg 10 mg   Visit Report - - -   Some recent data might be hidden       Plan:  1. INR is Subtherapeutic today- see above in Anticoagulation Summary.   Will instruct Kameron Rochamaria guadalupe to Continue their warfarin regimen- see above in Anticoagulation Summary.  2. Follow up in 1 week  3. They have been instructed to call if any changes in medications, doses, concerns, etc. Patient expresses understanding and has no further questions at this time.    Ramo Morocho [facility]

## 2019-11-08 ENCOUNTER — ANTICOAGULATION VISIT (OUTPATIENT)
Dept: PHARMACY | Facility: HOSPITAL | Age: 49
End: 2019-11-08

## 2019-11-08 DIAGNOSIS — I26.99 BILATERAL PULMONARY EMBOLISM (HCC): ICD-10-CM

## 2019-11-08 DIAGNOSIS — I26.99 OTHER ACUTE PULMONARY EMBOLISM WITHOUT ACUTE COR PULMONALE (HCC): ICD-10-CM

## 2019-11-08 LAB — INR PPP: 2.6

## 2019-11-08 NOTE — PROGRESS NOTES
Anticoagulation Clinic Progress Note    Anticoagulation Summary  As of 2019    INR goal:   2.0-3.0   TTR:   88.9 % (9 mo)   INR used for dosin.60 (2019)   Warfarin maintenance plan:   15 mg every Mon, Wed, Fri; 10 mg all other days   Weekly warfarin total:   85 mg   Plan last modified:   Ramo Morocho RPH (2019)   Next INR check:   11/15/2019   Priority:   High   Target end date:   Indefinite    Indications    Other acute pulmonary embolism without acute cor pulmonale (CMS/HCC) [I26.99]  History of bilateral pulmonary embolism (CMS/HCC) [I26.99]             Anticoagulation Episode Summary     INR check location:       Preferred lab:       Send INR reminders to:    SMOOTH RESTREPO  POOL    Comments:   new [employer] home frankie 2019      Anticoagulation Care Providers     Provider Role Specialty Phone number    Torri Colmenares APRN Referring Cardiology [phone]    Elsy Baeza MD Responsible Cardiology [phone]          Clinic Interview:  No pertinent clinical findings have been reported.    INR History:  Anticoagulation Monitoring 10/25/2019 2019 2019   INR 2.60 1.80 2.60   INR Date 10/25/2019 2019 2019   INR Goal 2.0-3.0 2.0-3.0 2.0-3.0   Trend Same Same Same   Last Week Total 85 mg 85 mg 85 mg   Next Week Total 85 mg 85 mg 85 mg   Sun 10 mg 10 mg 10 mg   Mon 15 mg 15 mg 15 mg   Tue 10 mg 10 mg 10 mg   Wed 15 mg 15 mg 15 mg   Thu 10 mg 10 mg 10 mg   Fri 15 mg 15 mg 15 mg   Sat 10 mg 10 mg 10 mg   Visit Report - - -   Some recent data might be hidden       Plan:  1. INR is therapeutic today- see above in Anticoagulation Summary.    Kameron Melendez to continue their warfarin regimen- see above in Anticoagulation Summary.  2. Follow up in 1 week  3. Pt has agreed to only be called if INR out of range. They have been instructed to call if any changes in medications, doses, concerns, etc. Patient expresses understanding and has no further questions at this  time.    Kim Sotelo [facility]

## 2019-11-11 ENCOUNTER — HOSPITAL ENCOUNTER (EMERGENCY)
Facility: HOSPITAL | Age: 49
Discharge: HOME | End: 2019-11-11
Attending: EMERGENCY MEDICINE | Admitting: EMERGENCY MEDICINE

## 2019-11-11 VITALS
SYSTOLIC BLOOD PRESSURE: 164 MMHG | HEIGHT: 74 IN | TEMPERATURE: 97.2 F | DIASTOLIC BLOOD PRESSURE: 93 MMHG | WEIGHT: 315 LBS | OXYGEN SATURATION: 96 % | RESPIRATION RATE: 18 BRPM | BODY MASS INDEX: 40.43 KG/M2 | HEART RATE: 82 BPM

## 2019-11-11 DIAGNOSIS — M54.32 LEFT SIDED SCIATICA: Primary | ICD-10-CM

## 2019-11-11 PROCEDURE — 63710000001 PREDNISONE PER 1 MG: Performed by: NURSE PRACTITIONER

## 2019-11-11 PROCEDURE — 99283 EMERGENCY DEPT VISIT LOW MDM: CPT

## 2019-11-11 RX ORDER — METHYLPREDNISOLONE 4 MG/1
TABLET ORAL
Qty: 21 EACH | Refills: 0 | Status: SHIPPED | OUTPATIENT
Start: 2019-11-11 | End: 2019-11-25

## 2019-11-11 RX ORDER — HYDROCODONE BITARTRATE AND ACETAMINOPHEN 5; 325 MG/1; MG/1
1 TABLET ORAL EVERY 4 HOURS PRN
Qty: 12 TABLET | Refills: 0 | Status: SHIPPED | OUTPATIENT
Start: 2019-11-11 | End: 2019-11-25

## 2019-11-11 RX ORDER — HYDROCODONE BITARTRATE AND ACETAMINOPHEN 7.5; 325 MG/1; MG/1
1 TABLET ORAL ONCE
Status: COMPLETED | OUTPATIENT
Start: 2019-11-11 | End: 2019-11-11

## 2019-11-11 RX ORDER — CYCLOBENZAPRINE HCL 10 MG
10 TABLET ORAL 3 TIMES DAILY PRN
Qty: 15 TABLET | Refills: 0 | Status: SHIPPED | OUTPATIENT
Start: 2019-11-11 | End: 2019-11-25

## 2019-11-11 RX ORDER — PREDNISONE 20 MG/1
60 TABLET ORAL ONCE
Status: COMPLETED | OUTPATIENT
Start: 2019-11-11 | End: 2019-11-11

## 2019-11-11 RX ORDER — CYCLOBENZAPRINE HCL 10 MG
10 TABLET ORAL ONCE
Status: COMPLETED | OUTPATIENT
Start: 2019-11-11 | End: 2019-11-11

## 2019-11-11 RX ADMIN — PREDNISONE 60 MG: 20 TABLET ORAL at 17:06

## 2019-11-11 RX ADMIN — HYDROCODONE BITARTRATE AND ACETAMINOPHEN 1 TABLET: 7.5; 325 TABLET ORAL at 17:06

## 2019-11-11 RX ADMIN — CYCLOBENZAPRINE 10 MG: 10 TABLET, FILM COATED ORAL at 17:06

## 2019-11-11 NOTE — ED TRIAGE NOTES
Patient states that he has back pain that started 2 days ago, that is now radiating down the left leg. Patient states that he did not fall. Patient denies bowel or bladder loss.

## 2019-11-11 NOTE — ED PROVIDER NOTES
EMERGENCY DEPARTMENT ENCOUNTER    CHIEF COMPLAINT  Chief Complaint: back pain  History given by: patient  History limited by: nothing  Time Seen: 1636  Room Number: 29/29  PMD: Timi Tran MD      HPI:  Pt is a 49 y.o. male who presents with left-sided lumbar back pain that began three days ago. The patient reports he developed radiation of the pain down his left lower extremity two days ago. The patient reports the back pain is not exacerbated by any particular position. Patient denies fever, urinary/fecal incontinence, numbness/tingling, or weakness. The patient reports he was seen at  earlier today for similar complaints at which time the provider expressed concern about the swelling of his bilateral lower extremities and referred the patient to the ER for further evaluation. However, the patient denies worsening of chronic pedal edema. Additionally, the patient reports he took Aleve prior to arrival without relief of his pain. The patient reports he had similar pain about one year ago. The patient reports he is currently anticoagulated with Coumadin d/t hx of pulmonary embolism/deep vein thrombosis. The patient denies hx of diabetes mellitus or hypertension. The patient reports he is not currently on any diuretics. There are no other complaints at this time. Pt's family at bedside.    Past Medical History of PE, pulmonary hypertension, and lymphedema of lower extremities    Duration: three days  Timing: constant  Location: left side of lumbar back  Radiation: left lower extremity  Quality: pain  Intensity/Severity: moderate  Progression: gradually worsened  Associated Symptoms: none specified  Aggravating Factors: The patient reports the back pain is not exacerbated by any particular position.  Alleviating Factors: none specified  Previous Episodes: The patient reports he had similar pain about one year ago.  Treatment before arrival: The patient reports he took Aleve prior to arrival without  relief of his pain. The patient reports he was seen at  earlier today for similar complaints at which time the provider expressed concern about the swelling of his bilateral lower extremities and referred the patient to the ER for further evaluation.     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   • Knee arthropathy 12/11/2015   • Morbid obesity with body mass index of 60.0-69.9 in adult (CMS/HCC) 12/11/2015   • ARNOLDO (obstructive sleep apnea) 12/11/2015   • Severe edema 12/11/2015   • Pulmonary embolus (CMS/HCC) 01/23/2019   • Other acute pulmonary embolism without acute cor pulmonale (CMS/HCC) 02/01/2019     Resolved Ambulatory Problems     Diagnosis Date Noted   • No Resolved Ambulatory Problems     Past Medical History:   Diagnosis Date   • Lymphedema    • Lymphedema of left lower extremity    • Obesity    • ARNOLDO (obstructive sleep apnea)    • Pulmonary embolism (CMS/HCC)    • Pulmonary hypertension (CMS/HCC)    • Right ventricular enlargement        PAST SURGICAL HISTORY  Past Surgical History:   Procedure Laterality Date   • CARDIAC CATHETERIZATION Bilateral 7/18/2017    Procedure: Pulmonary angiography- Inari ;  Surgeon: Cedric Coulter MD;  Location: [facility] INVASIVE LOCATION;  Service:    • CARDIAC CATHETERIZATION N/A 7/18/2017    Procedure: Right Heart Cath;  Surgeon: Cedric Coulter MD;  Location: [facility] INVASIVE LOCATION;  Service:        FAMILY HISTORY  Family History   Problem Relation Age of Onset   • Heart disease Mother    • Heart failure Mother    • Bradycardia Mother    • No Known Problems Father    • No Known  Problems Maternal Grandmother    • No Known Problems Maternal Grandfather    • No Known Problems Paternal Grandmother    • No Known Problems Paternal Grandfather        SOCIAL HISTORY  Social History     Socioeconomic History   • Marital status:      Spouse name: Not on file   • Number of children: Not on file   • Years of education: Not on file   • Highest education level: Not on file   Tobacco Use   • Smoking status: Never Smoker   • Smokeless tobacco: Never Used   Substance and Sexual Activity   • Alcohol use: No   • Drug use: No   • Sexual activity: Defer         ALLERGIES  Patient has no known allergies.    REVIEW OF SYSTEMS  Review of Systems   Constitutional: Negative for chills and fever.   HENT: Negative for sore throat.    Respiratory: Negative for shortness of breath.    Cardiovascular: Negative for chest pain and leg swelling (worsening of chronic).   Gastrointestinal: Negative for nausea and vomiting.        Negative for fecal incontinence   Genitourinary: Negative for dysuria.        Negative for urinary incontinence   Musculoskeletal: Positive for back pain (left-sided lumbar back pain with radiation down his left lower extremity).   Skin: Negative for rash.   Neurological: Negative for dizziness, weakness and numbness (or tingling).   Psychiatric/Behavioral: The patient is not nervous/anxious.        PHYSICAL EXAM  ED Triage Vitals   Temp Heart Rate Resp BP SpO2   11/11/19 1515 11/11/19 1515 11/11/19 1515 11/11/19 1612 11/11/19 1515   97.2 °F (36.2 °C) 90 18 (!) 154/102 96 %       Physical Exam   Constitutional: He is well-developed, well-nourished, and in no distress. No distress.   HENT:   Head: Atraumatic.   Mouth/Throat: Mucous membranes are normal.   Eyes: No scleral icterus.   Neck: Normal range of motion.   Cardiovascular: Normal rate, regular rhythm and normal heart sounds.   Pulses:       Dorsalis pedis pulses are 2+ on the right side, and 2+ on the left side.   Pulmonary/Chest:  Effort normal and breath sounds normal.   Abdominal:   He is morbidly obese.   Musculoskeletal: Normal range of motion.        Cervical back: He exhibits no bony tenderness.        Thoracic back: He exhibits no bony tenderness.        Lumbar back: He exhibits no bony tenderness.   There is tenderness over the left SI joint.   Neurological: He is alert. He has normal sensation and normal strength.   Skin: Skin is warm and dry.   Psychiatric: Mood and affect normal.   Nursing note and vitals reviewed.      PROGRESS AND CONSULTS  1644 Initial encounter. Patient is stable. BP- (!) 154/102 HR- 82 Temp- 97.2 °F (36.2 °C) (Tympanic) O2 sat- 96% on RA. Informed the patient that I believe his pain is related to the sciatic nerve and that imaging is not warranted at this time. Discussed the plan to discharge the patient home with a prescription for Norco, Flexeril, and a Medrol Garth and instructions to f/u with his PMD for further evaluation and management. Strict RTER warnings given. Pt understands and agrees with the plan, all questions answered.    1648 Reviewed pt's history and workup with Dr. Ontiveros.  At bedside evaluation, they agree with the plan of care.    1650 Ordered Norco for pain management, Flexeril as a muscle relaxer, and Prednisone to treat inflammation.    Reviewed implications of results, diagnosis, meds, responsibility to follow up, warning signs and symptoms of possible worsening, potential complications and reasons to return to ER with patient.  Discussed all results and noted any abnormalities with patient.  Discussed absolute need to recheck abnormalities with his PMD.    Discussed plan for discharge, as there is no emergent indication for admission.  Pt is agreeable and understands need for follow up and repeat testing.  Pt is aware that discharge does not mean that nothing is wrong but it indicates no emergency is present.  Pt is discharged with instructions to follow up with primary care doctor to  "have their blood pressure rechecked.       DIAGNOSIS  Final diagnoses:   Left sided sciatica       FOLLOW UP   Timi Tran MD  [address]  [place] [address]  [phone]    Schedule an appointment as soon as possible for a visit   As needed      RX     Medication List      New Prescriptions    cyclobenzaprine 10 MG tablet  Commonly known as:  FLEXERIL  Take 1 tablet by mouth 3 (Three) Times a Day As Needed for Muscle Spasms.     HYDROcodone-acetaminophen 5-325 MG per tablet  Commonly known as:  NORCO  Take 1 tablet by mouth Every 4 (Four) Hours As Needed for Moderate Pain .     methylPREDNISolone 4 MG tablet  Commonly known as:  MEDROL (STANISLAV)  Take as directed on package instructions.          Herson report [id number] reviewed.  Risks, benefits, alternatives discussed with patient.  Pt consents to treatment and agrees to follow up with PMD tomorrow for further care and any other prescriptions.       COURSE & MEDICAL DECISION MAKING  Results were reviewed/discussed with the patient and they were also made aware of online assess.     MEDICATIONS GIVEN IN ER  Medications   HYDROcodone-acetaminophen (NORCO) 7.5-325 MG per tablet 1 tablet (not administered)   cyclobenzaprine (FLEXERIL) tablet 10 mg (not administered)   predniSONE (DELTASONE) tablet 60 mg (not administered)       BP (!) 154/102 (BP Location: Right arm, Patient Position: Sitting)   Pulse 82   Temp 97.2 °F (36.2 °C) (Tympanic)   Resp 18   Ht 188 cm (74\")   Wt (!) 238 kg (525 lb)   SpO2 96%   BMI 67.41 kg/m²       I personally reviewed the past medical history, past surgical history, social history, family history, current medications and allergies as they appear in this chart.  The scribe's note accurately reflects the work and decisions made by me.     Documentation assistance provided by cecilio Pederson for ESTHER Holliday on 11/11/2019 at 4:34 PM. Information recorded by the scribe was done at my direction " and has been verified and validated by me.     Susan Pederson  11/11/19 1734       Leti Horvath APRN  11/11/19 [id number]

## 2019-11-11 NOTE — DISCHARGE INSTRUCTIONS
Medications as ordered  Heat or ice to back  Activity as tolerated  Gentle stretching  Follow up with pmd in 5-7 days if symptoms not improving  Return to er for numbness/tingling to legs, loss of bowel/bladder function, increased pain or any new or worsening symptoms

## 2019-11-12 NOTE — ED PROVIDER NOTES
MD ATTESTATION NOTE    The AMANDA and I have discussed this patient's history, physical exam, and treatment plan.  I have reviewed the documentation and personally had a face to face interaction with the patient. I affirm the documentation and agree with the treatment and plan.  The attached note describes my personal findings.      History  48-year-old male presents with left lower back pain that radiates to the left leg.  He has had similar symptoms in the past that resolved with conservative measures.  He reports no recent injury.  He denies significant weakness, numbness or incontinence.    Physical Exam  Vital Signs reviewed  Alert, Well Appearing in NAD  Strength, sensation and pulses are grossly intact bilateral lower extremities    Disposition  We will treat conservatively with pain medication, anti-inflammatories and steroids.  Will give primary care provider referral for follow-up.     Aguila Ontiveros MD  11/11/19 [id number]

## 2019-11-14 ENCOUNTER — ANTICOAGULATION VISIT (OUTPATIENT)
Dept: PHARMACY | Facility: HOSPITAL | Age: 49
End: 2019-11-14

## 2019-11-14 DIAGNOSIS — I26.99 OTHER ACUTE PULMONARY EMBOLISM WITHOUT ACUTE COR PULMONALE (HCC): ICD-10-CM

## 2019-11-14 DIAGNOSIS — I26.99 BILATERAL PULMONARY EMBOLISM (HCC): ICD-10-CM

## 2019-11-14 LAB — INR PPP: 2.2

## 2019-11-14 NOTE — PROGRESS NOTES
Anticoagulation Clinic Progress Note    Anticoagulation Summary  As of 2019    INR goal:   2.0-3.0   TTR:   89.1 % (9.2 mo)   INR used for dosin.20 (2019)   Warfarin maintenance plan:   15 mg every Mon, Wed, Fri; 10 mg all other days   Weekly warfarin total:   85 mg   No change documented:   Ramo Morocho RPH   Plan last modified:   Ramo Morocho RPH (2019)   Next INR check:   2019   Priority:   High   Target end date:   Indefinite    Indications    Other acute pulmonary embolism without acute cor pulmonale (CMS/HCC) [I26.99]  History of bilateral pulmonary embolism (CMS/HCC) [I26.99]             Anticoagulation Episode Summary     INR check location:       Preferred lab:       Send INR reminders to:    SMO[facility] RESTRE[facility]  POOL    Comments:   new [employer] home frankie 2019      Anticoagulation Care Providers     Provider Role Specialty Phone number    Torri Colmenares APRN Referring Cardiology [phone]    Elsy Baeza MD Responsible Cardiology [phone]            Clinic Interview:  Patient Findings     Positives:   Emergency department visit, Change in medications    Negatives:   Signs/symptoms of thrombosis, Signs/symptoms of bleeding,   Laboratory test error suspected, Change in health, Change in alcohol use,   Change in activity, Upcoming invasive procedure, Upcoming dental   procedure, Missed doses, Extra doses, Change in diet/appetite, Hospital   admission, Bruising, Other complaints    Comments:   ED visit  for pack pain radiating to leg; rx'd   hydrocodone/APAP, cyclobenzaprine, methylpred dose kathy.      Clinical Outcomes     Negatives:   Major bleeding event, Thromboembolic event,   Anticoagulation-related hospital admission, Anticoagulation-related ED   visit, Anticoagulation-related fatality    Comments:   ED visit  for pack pain radiating to leg; rx'd   hydrocodone/APAP, cyclobenzaprine, methylpred dose kathy.        INR History:  Anticoagulation  Monitoring 11/1/2019 11/8/2019 11/14/2019   INR 1.80 2.60 2.20   INR Date 11/1/2019 11/8/2019 11/14/2019   INR Goal 2.0-3.0 2.0-3.0 2.0-3.0   Trend Same Same Same   Last Week Total 85 mg 85 mg 85 mg   Next Week Total 85 mg 85 mg 85 mg   Sun 10 mg 10 mg 10 mg   Mon 15 mg 15 mg -   Tue 10 mg 10 mg -   Wed 15 mg 15 mg -   Thu 10 mg 10 mg 10 mg   Fri 15 mg 15 mg 15 mg   Sat 10 mg 10 mg 10 mg   Visit Report - - -   Some recent data might be hidden       Plan:  1. INR is Therapeutic today- see above in Anticoagulation Summary.   Will instruct Kameron Melendez to Continue their warfarin regimen- see above in Anticoagulation Summary.  2. Follow up in 4 days  3. They have been instructed to call if any changes in medications, doses, concerns, etc. Patient expresses understanding and has no further questions at this time.    Ramo Morocho [facility]

## 2019-11-18 ENCOUNTER — ANTICOAGULATION VISIT (OUTPATIENT)
Dept: PHARMACY | Facility: HOSPITAL | Age: 49
End: 2019-11-18

## 2019-11-18 DIAGNOSIS — I26.99 OTHER ACUTE PULMONARY EMBOLISM WITHOUT ACUTE COR PULMONALE (HCC): ICD-10-CM

## 2019-11-18 DIAGNOSIS — I26.99 BILATERAL PULMONARY EMBOLISM (HCC): ICD-10-CM

## 2019-11-18 LAB — INR PPP: 2.2

## 2019-11-19 ENCOUNTER — HOSPITAL ENCOUNTER (EMERGENCY)
Facility: HOSPITAL | Age: 49
Discharge: HOME | End: 2019-11-19
Attending: EMERGENCY MEDICINE | Admitting: EMERGENCY MEDICINE

## 2019-11-19 ENCOUNTER — APPOINTMENT (OUTPATIENT)
Dept: GENERAL RADIOLOGY | Facility: HOSPITAL | Age: 49
End: 2019-11-19

## 2019-11-19 VITALS
DIASTOLIC BLOOD PRESSURE: 96 MMHG | HEIGHT: 74 IN | SYSTOLIC BLOOD PRESSURE: 150 MMHG | RESPIRATION RATE: 18 BRPM | TEMPERATURE: 98.2 F | BODY MASS INDEX: 40.43 KG/M2 | HEART RATE: 68 BPM | WEIGHT: 315 LBS | OXYGEN SATURATION: 99 %

## 2019-11-19 DIAGNOSIS — G95.19 NEUROGENIC CLAUDICATION: ICD-10-CM

## 2019-11-19 DIAGNOSIS — S93.402A SPRAIN OF LEFT ANKLE, UNSPECIFIED LIGAMENT, INITIAL ENCOUNTER: Primary | ICD-10-CM

## 2019-11-19 PROCEDURE — 99283 EMERGENCY DEPT VISIT LOW MDM: CPT

## 2019-11-19 PROCEDURE — 73610 X-RAY EXAM OF ANKLE: CPT

## 2019-11-19 PROCEDURE — 73630 X-RAY EXAM OF FOOT: CPT

## 2019-11-19 PROCEDURE — 99282 EMERGENCY DEPT VISIT SF MDM: CPT

## 2019-11-19 NOTE — ED NOTES
Patient to er with c/o he fell while getting into his vehicle today. Patient c/o pain in left lower leg.      Trever Lopez RN  11/19/19 0163

## 2019-11-19 NOTE — PROGRESS NOTES
Anticoagulation Clinic Progress Note    Anticoagulation Summary  As of 2019    INR goal:   2.0-3.0   TTR:   89.3 % (9.3 mo)   INR used for dosin.20 (2019)   Warfarin maintenance plan:   15 mg every Mon, Wed, Fri; 10 mg all other days   Weekly warfarin total:   85 mg   No change documented:   Ramo Morocho RPH   Plan last modified:   Ramo Morocho RPH (2019)   Next INR check:   2019   Priority:   High   Target end date:   Indefinite    Indications    Other acute pulmonary embolism without acute cor pulmonale (CMS/HCC) [I26.99]  History of bilateral pulmonary embolism (CMS/HCC) [I26.99]             Anticoagulation Episode Summary     INR check location:       Preferred lab:       Send INR reminders to:    S[employer]  POOL    Comments:   new [employer] home frankie 2019      Anticoagulation Care Providers     Provider Role Specialty Phone number    Torri Colmenares, APRN Referring Cardiology [phone]    Elsy Baeza MD Responsible Cardiology [phone]            Clinic Interview:  Patient Findings     Positives:   Change in medications    Negatives:   Signs/symptoms of thrombosis, Signs/symptoms of bleeding,   Laboratory test error suspected, Change in health, Change in alcohol use,   Change in activity, Upcoming invasive procedure, Emergency department   visit, Upcoming dental procedure, Missed doses, Extra doses, Change in   diet/appetite, Hospital admission, Bruising, Other complaints    Comments:   Finished prednisone yesterday.      Clinical Outcomes     Negatives:   Major bleeding event, Thromboembolic event,   Anticoagulation-related hospital admission, Anticoagulation-related ED   visit, Anticoagulation-related fatality    Comments:   Finished prednisone yesterday.        INR History:  Anticoagulation Monitoring 2019   INR 2.60 2.20 2.20   INR Date 2019   INR Goal 2.0-3.0 2.0-3.0 2.0-3.0   Trend Same  Same Same   Last Week Total 85 mg 85 mg 85 mg   Next Week Total 85 mg 85 mg 85 mg   Sun 10 mg 10 mg 10 mg   Mon 15 mg - 15 mg   Tue 10 mg - 10 mg   Wed 15 mg - 15 mg   Thu 10 mg 10 mg 10 mg   Fri 15 mg 15 mg 15 mg   Sat 10 mg 10 mg 10 mg   Visit Report - - -   Some recent data might be hidden       Plan:  1. INR is Therapeutic today- see above in Anticoagulation Summary.   Will instruct Kameron Rochamaria guadalupe to Continue their warfarin regimen- see above in Anticoagulation Summary.  2. Follow up in 1 week  3. Pt has agreed to only be called if INR out of range. HIPAA-friendly  with instructions. They have been instructed to call if any changes in medications, doses, concerns, etc. Patient expresses understanding and has no further questions at this time.    Ramo Morocho [facility]

## 2019-11-19 NOTE — ED PROVIDER NOTES
" EMERGENCY DEPARTMENT ENCOUNTER    CHIEF COMPLAINT  Chief Complaint: L ankle and foot pain   History given by: patient   History limited by: nothing   Room Number: 06/06  PMD: Provider, No Known      HPI:  Pt is a 49 y.o. male who presents complaining of L ankle and foot pain that has been ongoing for the past few days. Pt states \"I can't control my left leg\". He states \"It just gives out on me\". Pt reports he was seen here in the ER last Monday and was dx with L-sided sciatica. Pt confirms tingling in LLE that began today. Pt denies groin pain or any urinary/fecal incontinence. Pt states he has been falling frequently over the past week. He states he fell Monday morning, Wednesday at work and at home, and Friday he fell on furniture at home. He states he has been using a walker for safety. Pt has been sleeping on the couch because he does not want to go up and down the stairs. He confirms falling twice today. Pt states \"the pain is tolerable\". Hx of lymphedema, pulmonary HTN, and pulmonary embolism. There are no other complaints at this time.     Duration: days   Onset: gradual  Timing: constant   Location: L ankle and foot  Radiation: none mentioned   Quality: \"I cant control my left leg\"   Intensity/Severity: moderate   Progression: unchanged   Associated Symptoms: pt confirms tingling that began today   Aggravating Factors: ambulating   Alleviating Factors: none mentioned   Previous Episodes: hx of lymphedema, pulmonary HTN, and pulmonary embolism  Treatment before arrival: none     PAST MEDICAL HISTORY  Active Ambulatory Problems     Diagnosis Date Noted   • History of bilateral pulmonary embolism (CMS/HCC) 07/17/2017   • Right ventricular enlargement 08/29/2017   • Pulmonary hypertension (CMS/HCC) 08/29/2017   • Lymphedema of both lower extremities 08/29/2017   • Morbid obesity due to excess calories (CMS/HCC) 08/29/2017   • Dyslipidemia 06/10/2018   • Hyperuricemia 06/10/2018   • Hypogonadal obesity " 03/11/2016   • Knee arthropathy 12/11/2015   • Morbid obesity with body mass index of 60.0-69.9 in adult (CMS/[facility]) 12/11/2015   • ARNOLDO (obstructive sleep apnea) 12/11/2015   • Severe edema 12/11/2015   • Pulmonary embolus (CMS/[facility]) 01/23/2019   • Other acute pulmonary embolism without acute cor pulmonale (CMS/[facility]) 02/01/2019     Resolved Ambulatory Problems     Diagnosis Date Noted   • No Resolved Ambulatory Problems     Past Medical History:   Diagnosis Date   • Lymphedema    • Lymphedema of left lower extremity    • Obesity    • ARNOLDO (obstructive sleep apnea)    • Pulmonary embolism (CMS/[facility])    • Pulmonary hypertension (CMS/[facility])    • Right ventricular enlargement        PAST SURGICAL HISTORY  Past Surgical History:   Procedure Laterality Date   • CARDIAC CATHETERIZATION Bilateral 7/18/2017    Procedure: Pulmonary angiography- Inari ;  Surgeon: Cedric Coulter MD;  [facility];  Service:    • CARDIAC CATHETERIZATION N/A 7/18/2017    Procedure: Right Heart Cath;  Surgeon: Cedric Coulter MD;  Location: [facility] CATH INVASIVE LOCATION;  Service:        FAMILY HISTORY  Family History   Problem Relation Age of Onset   • Heart disease Mother    • Heart failure Mother    • Bradycardia Mother    • No Known Problems Father    • No Known Problems Maternal Grandmother    • No Known Problems Maternal Grandfather    • No Known Problems Paternal Grandmother    • No Known Problems Paternal Grandfather        SOCIAL HISTORY  Social History     Socioeconomic History   • Marital status:      Spouse name: Not on file   • Number of children: Not on file   • Years of education: Not on file   • Highest education level: Not on file   Tobacco Use   • Smoking status: Never Smoker   • Smokeless tobacco: Never Used   Substance and Sexual Activity   • Alcohol use: No   • Drug use: No   • Sexual activity: Defer       ALLERGIES  Patient has no known allergies.    REVIEW OF SYSTEMS  Review of Systems   Constitutional:  Negative for activity change, appetite change and fever.   HENT: Negative for congestion and sore throat.    Eyes: Negative.    Respiratory: Negative for cough and shortness of breath.    Cardiovascular: Negative for chest pain and leg swelling.   Gastrointestinal: Negative for abdominal pain, diarrhea and vomiting.   Endocrine: Negative.    Genitourinary: Negative for decreased urine volume and dysuria.   Musculoskeletal: Positive for arthralgias (L ankle and foot). Negative for neck pain.   Skin: Negative for rash and wound.   Allergic/Immunologic: Negative.    Neurological: Negative for weakness, numbness and headaches.        Tingling in LLE   Hematological: Negative.    Psychiatric/Behavioral: Negative.    All other systems reviewed and are negative.      PHYSICAL EXAM  ED Triage Vitals [11/19/19 1719]   Temp Heart Rate Resp BP SpO2   -- 68 -- -- 99 %      Temp src Heart Rate Source Patient Position BP Location FiO2 (%)   -- -- -- -- --       Physical Exam   Constitutional: He is oriented to person, place, and time. No distress.   Pt is morbidly obese.   HENT:   Head: Normocephalic and atraumatic.   Eyes: EOM are normal. Pupils are equal, round, and reactive to light.   Neck: Normal range of motion. Neck supple.   Cardiovascular: Normal rate, regular rhythm and normal heart sounds.   Pulses are easily palpable bilaterally.   Pulmonary/Chest: Effort normal and breath sounds normal. No respiratory distress.   Abdominal: Soft. There is no tenderness. There is no rebound and no guarding.   Musculoskeletal: Normal range of motion. He exhibits no edema.        Left knee: No tenderness found.        Left ankle: Tenderness.   There is lymphedema bilaterally.  There is tenderness to L ankle and L foot, although it is stable.  No deformities.   Neurological: He is alert and oriented to person, place, and time. He has normal sensation and normal strength.   RLE is normal.  There is diminished sensation to LLE over medial  lower leg.  Weak hip flexion to the L when compared to the R.   Skin: Skin is warm and dry.   Psychiatric: Mood and affect normal.   Nursing note and vitals reviewed.        RADIOLOGY  XR Ankle 3+ View Left   Final Result       No acute fracture is identified in the left foot or ankle.   Follow-up/further evaluation can be obtained as indicated.       This report was finalized on 11/19/2019 6:19 PM by Dr. Fuad Kaiser M.D.          XR Foot 3+ View Left   Final Result       No acute fracture is identified in the left foot or ankle.   Follow-up/further evaluation can be obtained as indicated.       This report was finalized on 11/19/2019 6:19 PM by Dr. Fuad Kaiser M.D.               I ordered the above noted radiological studies. Interpreted by radiologist. Reviewed by me in PACS.       PROCEDURES  Procedures      PROGRESS AND CONSULTS       1733  Ordered L ankle XR and L foot XR for further evaluation.     1834  Rechecked pt. Pt is resting comfortably. Notified pt of XR results which revealed no acute fractures or dislocations. Informed pt I have ordered a large walker for [employer] to deliver. I referred pt to Dr. Lozano regarding neurogenic claudication. Advised pt to RTER if any new/worsening symptoms develop. Discussed the plan to discharge with walking boot for stabilty. Pt understands and agrees with the plan, all questions answered.      MEDICAL DECISION MAKING  Results were reviewed/discussed with the patient and they were also made aware of online access. Pt also made aware that some labs, such as cultures, will not be resulted during ER visit and follow up with PMD is necessary.     MDM  Number of Diagnoses or Management Options  Neurogenic claudication:   Sprain of left ankle, unspecified ligament, initial encounter:      Amount and/or Complexity of Data Reviewed  Tests in the radiology section of CPT®: reviewed and ordered (Negative imaging. No acute fractures or dislocations.)  Decide  to obtain previous medical records or to obtain history from someone other than the patient: yes  Review and summarize past medical records: yes (Pt's previous medical records show pt was seen here in the ER on 11/11/2019 and dx with L sided sciatica.)  Independent visualization of images, tracings, or specimens: yes           DIAGNOSIS  Final diagnoses:   Sprain of left ankle, unspecified ligament, initial encounter   Neurogenic claudication       DISPOSITION  DISCHARGE    Patient discharged in stable condition.    Reviewed implications of results, diagnosis, meds, responsibility to follow up, warning signs and symptoms of possible worsening, potential complications and reasons to return to ER.    Patient/Family voiced understanding of above instructions.    Discussed plan for discharge, as there is no emergent indication for admission. Patient referred to primary care provider for BP management due to today's BP. Pt/family is agreeable and understands need for follow up and repeat testing.  Pt is aware that discharge does not mean that nothing is wrong but it indicates no emergency is present that requires admission and they must continue care with follow-up as given below or physician of their choice.     FOLLOW-UP  Moo Lozano MD  [address] [id number] [phone]    Schedule an appointment as soon as possible for a visit   for your back    Maged Du MD  [address] [id number] [phone]    Schedule an appointment as soon as possible for a visit   for your ankle    [facility] Emergency Department  [address] [address] [phone]    As needed, If symptoms worsen         Medication List      No changes were made to your prescriptions during this visit.         Latest Documented Vital Signs:  As of 6:36 PM  BP- 150/96 HR- 68 Temp- 98.2 °F (36.8 °C) (Oral) O2 sat- 99%    --  Documentation assistance provided by  cecilio Reid for Dr. June.  Information recorded by the scribe was done at my direction and has been verified and validated by me.            Monica Reid  11/19/19 1844       Dominic June MD  11/19/19 2038

## 2019-11-20 NOTE — PROGRESS NOTES
Spoke with Dori at [facility]. Information faxed per request re bariatric walker to be delivered to home on 11-20-19. Face sheet verified. Charlotte Robb RN

## 2019-11-22 ENCOUNTER — ANTICOAGULATION VISIT (OUTPATIENT)
Dept: PHARMACY | Facility: HOSPITAL | Age: 49
End: 2019-11-22

## 2019-11-22 DIAGNOSIS — I26.99 OTHER ACUTE PULMONARY EMBOLISM WITHOUT ACUTE COR PULMONALE (HCC): ICD-10-CM

## 2019-11-22 DIAGNOSIS — I26.99 BILATERAL PULMONARY EMBOLISM (HCC): ICD-10-CM

## 2019-11-22 LAB — INR PPP: 2.8

## 2019-11-22 NOTE — PROGRESS NOTES
Anticoagulation Clinic Progress Note    Anticoagulation Summary  As of 2019    INR goal:   2.0-3.0   TTR:   89.4 % (9.5 mo)   INR used for dosin.80 (2019)   Warfarin maintenance plan:   15 mg every Mon, Wed, Fri; 10 mg all other days   Weekly warfarin total:   85 mg   Plan last modified:   Ramo Morocho RPH (2019)   Next INR check:   2019   Priority:   High   Target end date:   Indefinite    Indications    Other acute pulmonary embolism without acute cor pulmonale (CMS/HCC) [I26.99]  History of bilateral pulmonary embolism (CMS/HCC) [I26.99]             Anticoagulation Episode Summary     INR check location:       Preferred lab:       Send INR reminders to:    S[employer] RESTREPO  POOL    Comments:   new [employer] home frankie 2019      Anticoagulation Care Providers     Provider Role Specialty Phone number    Torri Colmenares APRN Referring Cardiology [phone]    Elsy Baeza MD Responsible Cardiology [phone]          Drug interactions: has remained unchanged.  Diet: has remained unchanged.    Clinic Interview:  Patient Findings     Negatives:   Signs/symptoms of thrombosis, Signs/symptoms of bleeding,   Laboratory test error suspected, Change in health, Change in alcohol use,   Change in activity, Upcoming invasive procedure, Emergency department   visit, Upcoming dental procedure, Missed doses, Extra doses, Change in   medications, Change in diet/appetite, Hospital admission, Bruising, Other   complaints      Clinical Outcomes     Negatives:   Major bleeding event, Thromboembolic event,   Anticoagulation-related hospital admission, Anticoagulation-related ED   visit, Anticoagulation-related fatality        INR History:  Anticoagulation Monitoring 2019   INR 2.20 2.20 2.80   INR Date 2019   INR Goal 2.0-3.0 2.0-3.0 2.0-3.0   Trend Same Same Same   Last Week Total 85 mg 85 mg 85 mg   Next Week Total 85 mg 85 mg  85 mg   Sun 10 mg 10 mg 10 mg   Mon - 15 mg 15 mg   Tue - 10 mg 10 mg   Wed - 15 mg 15 mg   Thu 10 mg 10 mg 10 mg   Fri 15 mg 15 mg 15 mg   Sat 10 mg 10 mg 10 mg   Visit Report - - -   Some recent data might be hidden       Plan:  1. INR is Therapeutic today- see above in Anticoagulation Summary.   Will instruct Kameron Melendez to Continue their warfarin regimen- see above in Anticoagulation Summary.  2. Follow up in 1 week  3. They have been instructed to call if any changes in medications, doses, concerns, etc. Patient expresses understanding and has no further questions at this time.    Sarah Coy, [facility]

## 2019-11-25 ENCOUNTER — HOSPITAL ENCOUNTER (OUTPATIENT)
Facility: HOSPITAL | Age: 49
Setting detail: OBSERVATION
Discharge: HOME | End: 2019-12-01
Attending: EMERGENCY MEDICINE | Admitting: HOSPITALIST

## 2019-11-25 ENCOUNTER — APPOINTMENT (OUTPATIENT)
Dept: CT IMAGING | Facility: HOSPITAL | Age: 49
End: 2019-11-25

## 2019-11-25 DIAGNOSIS — R29.6 FREQUENT FALLS: ICD-10-CM

## 2019-11-25 DIAGNOSIS — E66.01 MORBID OBESITY WITH BODY MASS INDEX OF 60.0-69.9 IN ADULT (HCC): ICD-10-CM

## 2019-11-25 DIAGNOSIS — M54.9 INTRACTABLE BACK PAIN: Primary | ICD-10-CM

## 2019-11-25 DIAGNOSIS — R29.898 LEFT LEG WEAKNESS: ICD-10-CM

## 2019-11-25 DIAGNOSIS — D68.51 HETEROZYGOUS FACTOR V LEIDEN MUTATION (HCC): ICD-10-CM

## 2019-11-25 DIAGNOSIS — G47.33 OSA (OBSTRUCTIVE SLEEP APNEA): ICD-10-CM

## 2019-11-25 PROBLEM — Z86.711 HISTORY OF PULMONARY EMBOLISM: Status: ACTIVE | Noted: 2019-01-23

## 2019-11-25 LAB
ALBUMIN SERPL-MCNC: 4 G/DL (ref 3.5–5.2)
ALBUMIN/GLOB SERPL: 1.4 G/DL
ALP SERPL-CCNC: 92 U/L (ref 39–117)
ALT SERPL W P-5'-P-CCNC: 21 U/L (ref 1–41)
ANION GAP SERPL CALCULATED.3IONS-SCNC: 12.5 MMOL/L (ref 5–15)
AST SERPL-CCNC: 14 U/L (ref 1–40)
BASOPHILS # BLD AUTO: 0.07 10*3/MM3 (ref 0–0.2)
BASOPHILS NFR BLD AUTO: 0.5 % (ref 0–1.5)
BILIRUB SERPL-MCNC: 0.6 MG/DL (ref 0.2–1.2)
BUN BLD-MCNC: 16 MG/DL (ref 6–20)
BUN/CREAT SERPL: 18.8 (ref 7–25)
CALCIUM SPEC-SCNC: 9.2 MG/DL (ref 8.6–10.5)
CHLORIDE SERPL-SCNC: 103 MMOL/L (ref 98–107)
CO2 SERPL-SCNC: 26.5 MMOL/L (ref 22–29)
CREAT BLD-MCNC: 0.85 MG/DL (ref 0.76–1.27)
DEPRECATED RDW RBC AUTO: 46.4 FL (ref 37–54)
EOSINOPHIL # BLD AUTO: 0.06 10*3/MM3 (ref 0–0.4)
EOSINOPHIL NFR BLD AUTO: 0.5 % (ref 0.3–6.2)
ERYTHROCYTE [DISTWIDTH] IN BLOOD BY AUTOMATED COUNT: 14 % (ref 12.3–15.4)
GFR SERPL CREATININE-BSD FRML MDRD: 96 ML/MIN/1.73
GLOBULIN UR ELPH-MCNC: 2.9 GM/DL
GLUCOSE BLD-MCNC: 100 MG/DL (ref 65–99)
HCT VFR BLD AUTO: 45.9 % (ref 37.5–51)
HGB BLD-MCNC: 15.2 G/DL (ref 13–17.7)
IMM GRANULOCYTES # BLD AUTO: 0.07 10*3/MM3 (ref 0–0.05)
IMM GRANULOCYTES NFR BLD AUTO: 0.5 % (ref 0–0.5)
INR PPP: 2.15 (ref 0.9–1.1)
LYMPHOCYTES # BLD AUTO: 2.01 10*3/MM3 (ref 0.7–3.1)
LYMPHOCYTES NFR BLD AUTO: 15.1 % (ref 19.6–45.3)
MCH RBC QN AUTO: 30 PG (ref 26.6–33)
MCHC RBC AUTO-ENTMCNC: 33.1 G/DL (ref 31.5–35.7)
MCV RBC AUTO: 90.5 FL (ref 79–97)
MONOCYTES # BLD AUTO: 0.89 10*3/MM3 (ref 0.1–0.9)
MONOCYTES NFR BLD AUTO: 6.7 % (ref 5–12)
NEUTROPHILS # BLD AUTO: 10.22 10*3/MM3 (ref 1.7–7)
NEUTROPHILS NFR BLD AUTO: 76.7 % (ref 42.7–76)
NRBC BLD AUTO-RTO: 0 /100 WBC (ref 0–0.2)
PLATELET # BLD AUTO: 249 10*3/MM3 (ref 140–450)
PMV BLD AUTO: 10.3 FL (ref 6–12)
POTASSIUM BLD-SCNC: 4.6 MMOL/L (ref 3.5–5.2)
PROT SERPL-MCNC: 6.9 G/DL (ref 6–8.5)
PROTHROMBIN TIME: 23.7 SECONDS (ref 11.7–14.2)
RBC # BLD AUTO: 5.07 10*6/MM3 (ref 4.14–5.8)
SODIUM BLD-SCNC: 142 MMOL/L (ref 136–145)
WBC NRBC COR # BLD: 13.32 10*3/MM3 (ref 3.4–10.8)

## 2019-11-25 PROCEDURE — 80053 COMPREHEN METABOLIC PANEL: CPT | Performed by: EMERGENCY MEDICINE

## 2019-11-25 PROCEDURE — G0378 HOSPITAL OBSERVATION PER HR: HCPCS

## 2019-11-25 PROCEDURE — 96376 TX/PRO/DX INJ SAME DRUG ADON: CPT

## 2019-11-25 PROCEDURE — 85025 COMPLETE CBC W/AUTO DIFF WBC: CPT | Performed by: EMERGENCY MEDICINE

## 2019-11-25 PROCEDURE — 96375 TX/PRO/DX INJ NEW DRUG ADDON: CPT

## 2019-11-25 PROCEDURE — 25010000002 ONDANSETRON PER 1 MG: Performed by: EMERGENCY MEDICINE

## 2019-11-25 PROCEDURE — 36415 COLL VENOUS BLD VENIPUNCTURE: CPT

## 2019-11-25 PROCEDURE — 25010000002 HYDROMORPHONE PER 4 MG: Performed by: EMERGENCY MEDICINE

## 2019-11-25 PROCEDURE — 72131 CT LUMBAR SPINE W/O DYE: CPT

## 2019-11-25 PROCEDURE — 85610 PROTHROMBIN TIME: CPT | Performed by: EMERGENCY MEDICINE

## 2019-11-25 PROCEDURE — 99284 EMERGENCY DEPT VISIT MOD MDM: CPT

## 2019-11-25 PROCEDURE — 25010000002 DEXAMETHASONE SODIUM PHOSPHATE 20 MG/5ML SOLUTION: Performed by: EMERGENCY MEDICINE

## 2019-11-25 RX ORDER — ACETAMINOPHEN 650 MG/1
650 SUPPOSITORY RECTAL EVERY 4 HOURS PRN
Status: DISCONTINUED | OUTPATIENT
Start: 2019-11-25 | End: 2019-12-01 | Stop reason: HOSPADM

## 2019-11-25 RX ORDER — DEXAMETHASONE SODIUM PHOSPHATE 4 MG/ML
4 INJECTION, SOLUTION INTRA-ARTICULAR; INTRALESIONAL; INTRAMUSCULAR; INTRAVENOUS; SOFT TISSUE EVERY 8 HOURS
Status: DISCONTINUED | OUTPATIENT
Start: 2019-11-26 | End: 2019-11-27

## 2019-11-25 RX ORDER — NITROGLYCERIN 0.4 MG/1
0.4 TABLET SUBLINGUAL
Status: DISCONTINUED | OUTPATIENT
Start: 2019-11-25 | End: 2019-12-01 | Stop reason: HOSPADM

## 2019-11-25 RX ORDER — ONDANSETRON 4 MG/1
4 TABLET, FILM COATED ORAL EVERY 6 HOURS PRN
Status: DISCONTINUED | OUTPATIENT
Start: 2019-11-25 | End: 2019-12-01 | Stop reason: HOSPADM

## 2019-11-25 RX ORDER — ACETAMINOPHEN 325 MG/1
650 TABLET ORAL EVERY 4 HOURS PRN
Status: DISCONTINUED | OUTPATIENT
Start: 2019-11-25 | End: 2019-12-01 | Stop reason: HOSPADM

## 2019-11-25 RX ORDER — HYDROMORPHONE HYDROCHLORIDE 1 MG/ML
0.5 INJECTION, SOLUTION INTRAMUSCULAR; INTRAVENOUS; SUBCUTANEOUS ONCE
Status: COMPLETED | OUTPATIENT
Start: 2019-11-25 | End: 2019-11-25

## 2019-11-25 RX ORDER — CHOLECALCIFEROL (VITAMIN D3) 125 MCG
5 CAPSULE ORAL NIGHTLY PRN
Status: DISCONTINUED | OUTPATIENT
Start: 2019-11-25 | End: 2019-12-01 | Stop reason: HOSPADM

## 2019-11-25 RX ORDER — SODIUM CHLORIDE 0.9 % (FLUSH) 0.9 %
10 SYRINGE (ML) INJECTION AS NEEDED
Status: DISCONTINUED | OUTPATIENT
Start: 2019-11-25 | End: 2019-12-01 | Stop reason: HOSPADM

## 2019-11-25 RX ORDER — FAMOTIDINE 40 MG/1
40 TABLET, FILM COATED ORAL
Status: DISCONTINUED | OUTPATIENT
Start: 2019-11-26 | End: 2019-12-01 | Stop reason: HOSPADM

## 2019-11-25 RX ORDER — ACETAMINOPHEN 160 MG/5ML
650 SOLUTION ORAL EVERY 4 HOURS PRN
Status: DISCONTINUED | OUTPATIENT
Start: 2019-11-25 | End: 2019-12-01 | Stop reason: HOSPADM

## 2019-11-25 RX ORDER — IBUPROFEN 200 MG
200 TABLET ORAL EVERY 6 HOURS PRN
COMMUNITY
End: 2019-12-01 | Stop reason: HOSPADM

## 2019-11-25 RX ORDER — ONDANSETRON 2 MG/ML
4 INJECTION INTRAMUSCULAR; INTRAVENOUS ONCE
Status: COMPLETED | OUTPATIENT
Start: 2019-11-25 | End: 2019-11-25

## 2019-11-25 RX ORDER — HYDROMORPHONE HYDROCHLORIDE 1 MG/ML
0.5 INJECTION, SOLUTION INTRAMUSCULAR; INTRAVENOUS; SUBCUTANEOUS
Status: DISCONTINUED | OUTPATIENT
Start: 2019-11-25 | End: 2019-11-27

## 2019-11-25 RX ORDER — BISACODYL 5 MG/1
5 TABLET, DELAYED RELEASE ORAL DAILY PRN
Status: DISCONTINUED | OUTPATIENT
Start: 2019-11-25 | End: 2019-12-01 | Stop reason: HOSPADM

## 2019-11-25 RX ORDER — SODIUM CHLORIDE 0.9 % (FLUSH) 0.9 %
10 SYRINGE (ML) INJECTION EVERY 12 HOURS SCHEDULED
Status: DISCONTINUED | OUTPATIENT
Start: 2019-11-25 | End: 2019-12-01 | Stop reason: HOSPADM

## 2019-11-25 RX ORDER — BACLOFEN 10 MG/1
10 TABLET ORAL 3 TIMES DAILY PRN
Status: DISCONTINUED | OUTPATIENT
Start: 2019-11-25 | End: 2019-12-01 | Stop reason: HOSPADM

## 2019-11-25 RX ORDER — ONDANSETRON 2 MG/ML
4 INJECTION INTRAMUSCULAR; INTRAVENOUS EVERY 6 HOURS PRN
Status: DISCONTINUED | OUTPATIENT
Start: 2019-11-25 | End: 2019-12-01 | Stop reason: HOSPADM

## 2019-11-25 RX ORDER — DEXAMETHASONE SODIUM PHOSPHATE 4 MG/ML
8 INJECTION, SOLUTION INTRA-ARTICULAR; INTRALESIONAL; INTRAMUSCULAR; INTRAVENOUS; SOFT TISSUE ONCE
Status: COMPLETED | OUTPATIENT
Start: 2019-11-25 | End: 2019-11-25

## 2019-11-25 RX ORDER — NALOXONE HCL 0.4 MG/ML
0.4 VIAL (ML) INJECTION
Status: DISCONTINUED | OUTPATIENT
Start: 2019-11-25 | End: 2019-12-01 | Stop reason: HOSPADM

## 2019-11-25 RX ORDER — CALCIUM CARBONATE 200(500)MG
2 TABLET,CHEWABLE ORAL 2 TIMES DAILY PRN
Status: DISCONTINUED | OUTPATIENT
Start: 2019-11-25 | End: 2019-12-01 | Stop reason: HOSPADM

## 2019-11-25 RX ORDER — HYDROCODONE BITARTRATE AND ACETAMINOPHEN 5; 325 MG/1; MG/1
1 TABLET ORAL EVERY 4 HOURS PRN
Status: DISCONTINUED | OUTPATIENT
Start: 2019-11-25 | End: 2019-11-27

## 2019-11-25 RX ADMIN — ONDANSETRON 4 MG: 2 INJECTION INTRAMUSCULAR; INTRAVENOUS at 17:04

## 2019-11-25 RX ADMIN — DEXAMETHASONE SODIUM PHOSPHATE 8 MG: 4 INJECTION, SOLUTION INTRA-ARTICULAR; INTRALESIONAL; INTRAMUSCULAR; INTRAVENOUS; SOFT TISSUE at 19:26

## 2019-11-25 RX ADMIN — ACETAMINOPHEN 650 MG: 325 TABLET, FILM COATED ORAL at 23:24

## 2019-11-25 RX ADMIN — HYDROMORPHONE HYDROCHLORIDE 0.5 MG: 1 INJECTION, SOLUTION INTRAMUSCULAR; INTRAVENOUS; SUBCUTANEOUS at 17:04

## 2019-11-25 RX ADMIN — HYDROMORPHONE HYDROCHLORIDE 0.5 MG: 1 INJECTION, SOLUTION INTRAMUSCULAR; INTRAVENOUS; SUBCUTANEOUS at 19:00

## 2019-11-26 ENCOUNTER — APPOINTMENT (OUTPATIENT)
Dept: CT IMAGING | Facility: HOSPITAL | Age: 49
End: 2019-11-26

## 2019-11-26 LAB
ANION GAP SERPL CALCULATED.3IONS-SCNC: 12 MMOL/L (ref 5–15)
ARTERIAL PATENCY WRIST A: POSITIVE
ATMOSPHERIC PRESS: 740.2 MMHG
BASE EXCESS BLDA CALC-SCNC: 0 MMOL/L (ref 0–2)
BASOPHILS # BLD AUTO: 0.02 10*3/MM3 (ref 0–0.2)
BASOPHILS NFR BLD AUTO: 0.3 % (ref 0–1.5)
BDY SITE: ABNORMAL
BUN BLD-MCNC: 14 MG/DL (ref 6–20)
BUN/CREAT SERPL: 17.7 (ref 7–25)
CALCIUM SPEC-SCNC: 9.2 MG/DL (ref 8.6–10.5)
CHLORIDE SERPL-SCNC: 102 MMOL/L (ref 98–107)
CO2 SERPL-SCNC: 23 MMOL/L (ref 22–29)
CREAT BLD-MCNC: 0.79 MG/DL (ref 0.76–1.27)
DEPRECATED RDW RBC AUTO: 44.4 FL (ref 37–54)
EOSINOPHIL # BLD AUTO: 0 10*3/MM3 (ref 0–0.4)
EOSINOPHIL NFR BLD AUTO: 0 % (ref 0.3–6.2)
ERYTHROCYTE [DISTWIDTH] IN BLOOD BY AUTOMATED COUNT: 13.5 % (ref 12.3–15.4)
GAS FLOW AIRWAY: 10 LPM
GFR SERPL CREATININE-BSD FRML MDRD: 104 ML/MIN/1.73
GLUCOSE BLD-MCNC: 170 MG/DL (ref 65–99)
HCO3 BLDA-SCNC: 24.7 MMOL/L (ref 22–28)
HCT VFR BLD AUTO: 45.3 % (ref 37.5–51)
HGB BLD-MCNC: 15.3 G/DL (ref 13–17.7)
IMM GRANULOCYTES # BLD AUTO: 0.03 10*3/MM3 (ref 0–0.05)
IMM GRANULOCYTES NFR BLD AUTO: 0.4 % (ref 0–0.5)
INR PPP: 2.15 (ref 0.9–1.1)
LYMPHOCYTES # BLD AUTO: 0.98 10*3/MM3 (ref 0.7–3.1)
LYMPHOCYTES NFR BLD AUTO: 12.3 % (ref 19.6–45.3)
MCH RBC QN AUTO: 29.9 PG (ref 26.6–33)
MCHC RBC AUTO-ENTMCNC: 33.8 G/DL (ref 31.5–35.7)
MCV RBC AUTO: 88.6 FL (ref 79–97)
MODALITY: ABNORMAL
MONOCYTES # BLD AUTO: 0.16 10*3/MM3 (ref 0.1–0.9)
MONOCYTES NFR BLD AUTO: 2 % (ref 5–12)
NEUTROPHILS # BLD AUTO: 6.8 10*3/MM3 (ref 1.7–7)
NEUTROPHILS NFR BLD AUTO: 85 % (ref 42.7–76)
NRBC BLD AUTO-RTO: 0 /100 WBC (ref 0–0.2)
PCO2 BLDA: 39.6 MM HG (ref 35–45)
PH BLDA: 7.4 PH UNITS (ref 7.35–7.45)
PLATELET # BLD AUTO: 249 10*3/MM3 (ref 140–450)
PMV BLD AUTO: 10.5 FL (ref 6–12)
PO2 BLDA: 110 MM HG (ref 80–100)
POTASSIUM BLD-SCNC: 4.5 MMOL/L (ref 3.5–5.2)
PROTHROMBIN TIME: 23.7 SECONDS (ref 11.7–14.2)
RBC # BLD AUTO: 5.11 10*6/MM3 (ref 4.14–5.8)
SAO2 % BLDCOA: 98.3 % (ref 92–99)
SODIUM BLD-SCNC: 137 MMOL/L (ref 136–145)
TOTAL RATE: 24 BREATHS/MINUTE
WBC NRBC COR # BLD: 7.99 10*3/MM3 (ref 3.4–10.8)

## 2019-11-26 PROCEDURE — 85025 COMPLETE CBC W/AUTO DIFF WBC: CPT | Performed by: INTERNAL MEDICINE

## 2019-11-26 PROCEDURE — 36600 WITHDRAWAL OF ARTERIAL BLOOD: CPT

## 2019-11-26 PROCEDURE — 74177 CT ABD & PELVIS W/CONTRAST: CPT

## 2019-11-26 PROCEDURE — G0378 HOSPITAL OBSERVATION PER HR: HCPCS

## 2019-11-26 PROCEDURE — 96376 TX/PRO/DX INJ SAME DRUG ADON: CPT

## 2019-11-26 PROCEDURE — 85610 PROTHROMBIN TIME: CPT | Performed by: INTERNAL MEDICINE

## 2019-11-26 PROCEDURE — 25010000002 IOPAMIDOL 61 % SOLUTION: Performed by: INTERNAL MEDICINE

## 2019-11-26 PROCEDURE — 25010000002 DEXAMETHASONE PER 1 MG: Performed by: INTERNAL MEDICINE

## 2019-11-26 PROCEDURE — 80048 BASIC METABOLIC PNL TOTAL CA: CPT | Performed by: INTERNAL MEDICINE

## 2019-11-26 PROCEDURE — 99244 OFF/OP CNSLTJ NEW/EST MOD 40: CPT | Performed by: NURSE PRACTITIONER

## 2019-11-26 PROCEDURE — 82803 BLOOD GASES ANY COMBINATION: CPT

## 2019-11-26 RX ORDER — WARFARIN SODIUM 10 MG/1
10 TABLET ORAL
Status: DISCONTINUED | OUTPATIENT
Start: 2019-11-28 | End: 2019-11-30

## 2019-11-26 RX ORDER — WARFARIN SODIUM 7.5 MG/1
15 TABLET ORAL
Status: DISCONTINUED | OUTPATIENT
Start: 2019-11-27 | End: 2019-11-30

## 2019-11-26 RX ORDER — NYSTATIN 100000 U/G
CREAM TOPICAL EVERY 12 HOURS SCHEDULED
Status: DISCONTINUED | OUTPATIENT
Start: 2019-11-26 | End: 2019-12-01 | Stop reason: HOSPADM

## 2019-11-26 RX ADMIN — FAMOTIDINE 40 MG: 40 TABLET, FILM COATED ORAL at 08:35

## 2019-11-26 RX ADMIN — HYDROCODONE BITARTRATE AND ACETAMINOPHEN 1 TABLET: 5; 325 TABLET ORAL at 21:08

## 2019-11-26 RX ADMIN — DEXAMETHASONE SODIUM PHOSPHATE 4 MG: 4 INJECTION, SOLUTION INTRAMUSCULAR; INTRAVENOUS at 08:35

## 2019-11-26 RX ADMIN — SODIUM CHLORIDE, PRESERVATIVE FREE 10 ML: 5 INJECTION INTRAVENOUS at 20:00

## 2019-11-26 RX ADMIN — NYSTATIN: 100000 CREAM TOPICAL at 22:22

## 2019-11-26 RX ADMIN — FAMOTIDINE 40 MG: 40 TABLET, FILM COATED ORAL at 16:07

## 2019-11-26 RX ADMIN — BACLOFEN 10 MG: 10 TABLET ORAL at 04:01

## 2019-11-26 RX ADMIN — HYDROCODONE BITARTRATE AND ACETAMINOPHEN 1 TABLET: 5; 325 TABLET ORAL at 04:01

## 2019-11-26 RX ADMIN — ACETAMINOPHEN 650 MG: 325 TABLET, FILM COATED ORAL at 16:08

## 2019-11-26 RX ADMIN — SODIUM CHLORIDE, PRESERVATIVE FREE 10 ML: 5 INJECTION INTRAVENOUS at 08:36

## 2019-11-26 RX ADMIN — IOPAMIDOL 100 ML: 612 INJECTION, SOLUTION INTRAVENOUS at 11:22

## 2019-11-26 RX ADMIN — DEXAMETHASONE SODIUM PHOSPHATE 4 MG: 4 INJECTION, SOLUTION INTRAMUSCULAR; INTRAVENOUS at 16:07

## 2019-11-27 LAB
ANION GAP SERPL CALCULATED.3IONS-SCNC: 11.8 MMOL/L (ref 5–15)
BASOPHILS # BLD AUTO: 0.02 10*3/MM3 (ref 0–0.2)
BASOPHILS NFR BLD AUTO: 0.2 % (ref 0–1.5)
BUN BLD-MCNC: 13 MG/DL (ref 6–20)
BUN/CREAT SERPL: 15.9 (ref 7–25)
CALCIUM SPEC-SCNC: 8.8 MG/DL (ref 8.6–10.5)
CHLORIDE SERPL-SCNC: 99 MMOL/L (ref 98–107)
CO2 SERPL-SCNC: 22.2 MMOL/L (ref 22–29)
CREAT BLD-MCNC: 0.82 MG/DL (ref 0.76–1.27)
DEPRECATED RDW RBC AUTO: 44.6 FL (ref 37–54)
EOSINOPHIL # BLD AUTO: 0 10*3/MM3 (ref 0–0.4)
EOSINOPHIL NFR BLD AUTO: 0 % (ref 0.3–6.2)
ERYTHROCYTE [DISTWIDTH] IN BLOOD BY AUTOMATED COUNT: 13.8 % (ref 12.3–15.4)
GFR SERPL CREATININE-BSD FRML MDRD: 100 ML/MIN/1.73
GLUCOSE BLD-MCNC: 172 MG/DL (ref 65–99)
HCT VFR BLD AUTO: 44.4 % (ref 37.5–51)
HGB BLD-MCNC: 14.9 G/DL (ref 13–17.7)
IMM GRANULOCYTES # BLD AUTO: 0.09 10*3/MM3 (ref 0–0.05)
IMM GRANULOCYTES NFR BLD AUTO: 0.9 % (ref 0–0.5)
INR PPP: 1.82 (ref 0.9–1.1)
LYMPHOCYTES # BLD AUTO: 1.18 10*3/MM3 (ref 0.7–3.1)
LYMPHOCYTES NFR BLD AUTO: 11.3 % (ref 19.6–45.3)
MCH RBC QN AUTO: 30 PG (ref 26.6–33)
MCHC RBC AUTO-ENTMCNC: 33.6 G/DL (ref 31.5–35.7)
MCV RBC AUTO: 89.5 FL (ref 79–97)
MONOCYTES # BLD AUTO: 0.47 10*3/MM3 (ref 0.1–0.9)
MONOCYTES NFR BLD AUTO: 4.5 % (ref 5–12)
NEUTROPHILS # BLD AUTO: 8.68 10*3/MM3 (ref 1.7–7)
NEUTROPHILS NFR BLD AUTO: 83.1 % (ref 42.7–76)
NRBC BLD AUTO-RTO: 0 /100 WBC (ref 0–0.2)
PLATELET # BLD AUTO: 268 10*3/MM3 (ref 140–450)
PMV BLD AUTO: 10.8 FL (ref 6–12)
POTASSIUM BLD-SCNC: 4.2 MMOL/L (ref 3.5–5.2)
PROTHROMBIN TIME: 20.7 SECONDS (ref 11.7–14.2)
RBC # BLD AUTO: 4.96 10*6/MM3 (ref 4.14–5.8)
SODIUM BLD-SCNC: 133 MMOL/L (ref 136–145)
WBC NRBC COR # BLD: 10.44 10*3/MM3 (ref 3.4–10.8)

## 2019-11-27 PROCEDURE — 97535 SELF CARE MNGMENT TRAINING: CPT | Performed by: OCCUPATIONAL THERAPIST

## 2019-11-27 PROCEDURE — 99213 OFFICE O/P EST LOW 20 MIN: CPT | Performed by: NURSE PRACTITIONER

## 2019-11-27 PROCEDURE — 97165 OT EVAL LOW COMPLEX 30 MIN: CPT | Performed by: OCCUPATIONAL THERAPIST

## 2019-11-27 PROCEDURE — 97110 THERAPEUTIC EXERCISES: CPT

## 2019-11-27 PROCEDURE — 85610 PROTHROMBIN TIME: CPT | Performed by: INTERNAL MEDICINE

## 2019-11-27 PROCEDURE — 25010000002 DEXAMETHASONE PER 1 MG: Performed by: INTERNAL MEDICINE

## 2019-11-27 PROCEDURE — 96376 TX/PRO/DX INJ SAME DRUG ADON: CPT

## 2019-11-27 PROCEDURE — G0378 HOSPITAL OBSERVATION PER HR: HCPCS

## 2019-11-27 PROCEDURE — 97162 PT EVAL MOD COMPLEX 30 MIN: CPT

## 2019-11-27 PROCEDURE — 80048 BASIC METABOLIC PNL TOTAL CA: CPT | Performed by: INTERNAL MEDICINE

## 2019-11-27 PROCEDURE — 97110 THERAPEUTIC EXERCISES: CPT | Performed by: OCCUPATIONAL THERAPIST

## 2019-11-27 PROCEDURE — 85025 COMPLETE CBC W/AUTO DIFF WBC: CPT | Performed by: INTERNAL MEDICINE

## 2019-11-27 PROCEDURE — 63710000001 METHYLPREDNISOLONE 4 MG TABLET THERAPY PACK 21 EACH DISP PACK: Performed by: NURSE PRACTITIONER

## 2019-11-27 RX ORDER — METHYLPREDNISOLONE 4 MG/1
4 TABLET ORAL
Status: COMPLETED | OUTPATIENT
Start: 2019-11-27 | End: 2019-11-27

## 2019-11-27 RX ORDER — METHYLPREDNISOLONE 4 MG/1
8 TABLET ORAL
Status: COMPLETED | OUTPATIENT
Start: 2019-11-27 | End: 2019-11-27

## 2019-11-27 RX ORDER — METHYLPREDNISOLONE 4 MG/1
4 TABLET ORAL
Status: DISCONTINUED | OUTPATIENT
Start: 2019-11-27 | End: 2019-11-27

## 2019-11-27 RX ORDER — HYDROCODONE BITARTRATE AND ACETAMINOPHEN 5; 325 MG/1; MG/1
1 TABLET ORAL EVERY 6 HOURS PRN
Status: DISCONTINUED | OUTPATIENT
Start: 2019-11-27 | End: 2019-12-01 | Stop reason: HOSPADM

## 2019-11-27 RX ORDER — METHYLPREDNISOLONE 4 MG/1
8 TABLET ORAL
Status: COMPLETED | OUTPATIENT
Start: 2019-11-28 | End: 2019-11-28

## 2019-11-27 RX ORDER — METHYLPREDNISOLONE 4 MG/1
4 TABLET ORAL
Status: COMPLETED | OUTPATIENT
Start: 2019-11-30 | End: 2019-11-30

## 2019-11-27 RX ORDER — WARFARIN SODIUM 5 MG/1
5 TABLET ORAL
Status: COMPLETED | OUTPATIENT
Start: 2019-11-27 | End: 2019-11-27

## 2019-11-27 RX ORDER — METHYLPREDNISOLONE 4 MG/1
8 TABLET ORAL
Status: DISCONTINUED | OUTPATIENT
Start: 2019-11-27 | End: 2019-11-27

## 2019-11-27 RX ORDER — METHYLPREDNISOLONE 4 MG/1
4 TABLET ORAL
Status: COMPLETED | OUTPATIENT
Start: 2019-12-01 | End: 2019-12-01

## 2019-11-27 RX ORDER — METHYLPREDNISOLONE 4 MG/1
4 TABLET ORAL
Status: DISCONTINUED | OUTPATIENT
Start: 2019-12-01 | End: 2019-12-01 | Stop reason: HOSPADM

## 2019-11-27 RX ORDER — METHYLPREDNISOLONE 4 MG/1
4 TABLET ORAL
Status: COMPLETED | OUTPATIENT
Start: 2019-11-29 | End: 2019-11-29

## 2019-11-27 RX ORDER — METHYLPREDNISOLONE 4 MG/1
4 TABLET ORAL
Status: COMPLETED | OUTPATIENT
Start: 2019-12-02 | End: 2019-11-28

## 2019-11-27 RX ORDER — METHYLPREDNISOLONE 4 MG/1
4 TABLET ORAL
Status: ACTIVE | OUTPATIENT
Start: 2019-11-28 | End: 2019-11-29

## 2019-11-27 RX ADMIN — DEXAMETHASONE SODIUM PHOSPHATE 4 MG: 4 INJECTION, SOLUTION INTRAMUSCULAR; INTRAVENOUS at 08:23

## 2019-11-27 RX ADMIN — SODIUM CHLORIDE, PRESERVATIVE FREE 10 ML: 5 INJECTION INTRAVENOUS at 21:13

## 2019-11-27 RX ADMIN — METHYLPREDNISOLONE 8 MG: 4 TABLET ORAL at 18:22

## 2019-11-27 RX ADMIN — METHYLPREDNISOLONE 4 MG: 4 TABLET ORAL at 18:22

## 2019-11-27 RX ADMIN — HYDROCODONE BITARTRATE AND ACETAMINOPHEN 1 TABLET: 5; 325 TABLET ORAL at 01:58

## 2019-11-27 RX ADMIN — WARFARIN SODIUM 5 MG: 5 TABLET ORAL at 18:21

## 2019-11-27 RX ADMIN — FAMOTIDINE 40 MG: 40 TABLET, FILM COATED ORAL at 18:20

## 2019-11-27 RX ADMIN — DEXAMETHASONE SODIUM PHOSPHATE 4 MG: 4 INJECTION, SOLUTION INTRAMUSCULAR; INTRAVENOUS at 00:04

## 2019-11-27 RX ADMIN — ACETAMINOPHEN 650 MG: 325 TABLET, FILM COATED ORAL at 06:13

## 2019-11-27 RX ADMIN — METHYLPREDNISOLONE 8 MG: 4 TABLET ORAL at 21:13

## 2019-11-27 RX ADMIN — NYSTATIN: 100000 CREAM TOPICAL at 08:24

## 2019-11-27 RX ADMIN — ACETAMINOPHEN 650 MG: 325 TABLET, FILM COATED ORAL at 23:13

## 2019-11-27 RX ADMIN — SODIUM CHLORIDE, PRESERVATIVE FREE 10 ML: 5 INJECTION INTRAVENOUS at 08:23

## 2019-11-27 RX ADMIN — FAMOTIDINE 40 MG: 40 TABLET, FILM COATED ORAL at 07:11

## 2019-11-27 RX ADMIN — WARFARIN SODIUM 15 MG: 7.5 TABLET ORAL at 18:20

## 2019-11-27 RX ADMIN — ACETAMINOPHEN 650 MG: 325 TABLET, FILM COATED ORAL at 13:55

## 2019-11-28 LAB
ANION GAP SERPL CALCULATED.3IONS-SCNC: 14 MMOL/L (ref 5–15)
APTT PPP: 79 SECONDS (ref 22.7–35.4)
BASOPHILS # BLD AUTO: 0.03 10*3/MM3 (ref 0–0.2)
BASOPHILS NFR BLD AUTO: 0.3 % (ref 0–1.5)
BUN BLD-MCNC: 15 MG/DL (ref 6–20)
BUN/CREAT SERPL: 20.5 (ref 7–25)
CALCIUM SPEC-SCNC: 8.9 MG/DL (ref 8.6–10.5)
CHLORIDE SERPL-SCNC: 101 MMOL/L (ref 98–107)
CO2 SERPL-SCNC: 23 MMOL/L (ref 22–29)
CREAT BLD-MCNC: 0.73 MG/DL (ref 0.76–1.27)
DEPRECATED RDW RBC AUTO: 43.1 FL (ref 37–54)
EOSINOPHIL # BLD AUTO: 0 10*3/MM3 (ref 0–0.4)
EOSINOPHIL NFR BLD AUTO: 0 % (ref 0.3–6.2)
ERYTHROCYTE [DISTWIDTH] IN BLOOD BY AUTOMATED COUNT: 13.2 % (ref 12.3–15.4)
GFR SERPL CREATININE-BSD FRML MDRD: 114 ML/MIN/1.73
GLUCOSE BLD-MCNC: 147 MG/DL (ref 65–99)
HCT VFR BLD AUTO: 43.7 % (ref 37.5–51)
HGB BLD-MCNC: 15.3 G/DL (ref 13–17.7)
IMM GRANULOCYTES # BLD AUTO: 0.07 10*3/MM3 (ref 0–0.05)
IMM GRANULOCYTES NFR BLD AUTO: 0.7 % (ref 0–0.5)
INR PPP: 1.45 (ref 0.9–1.1)
LYMPHOCYTES # BLD AUTO: 1.44 10*3/MM3 (ref 0.7–3.1)
LYMPHOCYTES NFR BLD AUTO: 14 % (ref 19.6–45.3)
MCH RBC QN AUTO: 31.4 PG (ref 26.6–33)
MCHC RBC AUTO-ENTMCNC: 35 G/DL (ref 31.5–35.7)
MCV RBC AUTO: 89.7 FL (ref 79–97)
MONOCYTES # BLD AUTO: 0.67 10*3/MM3 (ref 0.1–0.9)
MONOCYTES NFR BLD AUTO: 6.5 % (ref 5–12)
NEUTROPHILS # BLD AUTO: 8.07 10*3/MM3 (ref 1.7–7)
NEUTROPHILS NFR BLD AUTO: 78.5 % (ref 42.7–76)
NRBC BLD AUTO-RTO: 0 /100 WBC (ref 0–0.2)
PLATELET # BLD AUTO: 265 10*3/MM3 (ref 140–450)
PMV BLD AUTO: 10.6 FL (ref 6–12)
POTASSIUM BLD-SCNC: 4.5 MMOL/L (ref 3.5–5.2)
PROTHROMBIN TIME: 17.3 SECONDS (ref 11.7–14.2)
RBC # BLD AUTO: 4.87 10*6/MM3 (ref 4.14–5.8)
SODIUM BLD-SCNC: 138 MMOL/L (ref 136–145)
WBC NRBC COR # BLD: 10.28 10*3/MM3 (ref 3.4–10.8)

## 2019-11-28 PROCEDURE — G0378 HOSPITAL OBSERVATION PER HR: HCPCS

## 2019-11-28 PROCEDURE — 80048 BASIC METABOLIC PNL TOTAL CA: CPT | Performed by: INTERNAL MEDICINE

## 2019-11-28 PROCEDURE — 85730 THROMBOPLASTIN TIME PARTIAL: CPT | Performed by: HOSPITALIST

## 2019-11-28 PROCEDURE — 96365 THER/PROPH/DIAG IV INF INIT: CPT

## 2019-11-28 PROCEDURE — 96366 THER/PROPH/DIAG IV INF ADDON: CPT

## 2019-11-28 PROCEDURE — 99212 OFFICE O/P EST SF 10 MIN: CPT | Performed by: NEUROLOGICAL SURGERY

## 2019-11-28 PROCEDURE — 25010000002 HEPARIN (PORCINE) PER 1000 UNITS: Performed by: HOSPITALIST

## 2019-11-28 PROCEDURE — 85610 PROTHROMBIN TIME: CPT | Performed by: INTERNAL MEDICINE

## 2019-11-28 PROCEDURE — 96376 TX/PRO/DX INJ SAME DRUG ADON: CPT

## 2019-11-28 PROCEDURE — 63710000001 METHYLPREDNISOLONE 4 MG TABLET THERAPY PACK 21 EACH DISP PACK: Performed by: NURSE PRACTITIONER

## 2019-11-28 PROCEDURE — 63710000001 METHYLPREDNISOLONE 4 MG TABLET THERAPY PACK: Performed by: NURSE PRACTITIONER

## 2019-11-28 PROCEDURE — 85025 COMPLETE CBC W/AUTO DIFF WBC: CPT | Performed by: INTERNAL MEDICINE

## 2019-11-28 RX ORDER — HEPARIN SODIUM 5000 [USP'U]/ML
42.7 INJECTION, SOLUTION INTRAVENOUS; SUBCUTANEOUS ONCE
Status: COMPLETED | OUTPATIENT
Start: 2019-11-28 | End: 2019-11-28

## 2019-11-28 RX ORDER — HEPARIN SODIUM 10000 [USP'U]/100ML
7.7 INJECTION, SOLUTION INTRAVENOUS
Status: DISCONTINUED | OUTPATIENT
Start: 2019-11-28 | End: 2019-11-30

## 2019-11-28 RX ORDER — HEPARIN SODIUM 5000 [USP'U]/ML
40-42.7 INJECTION, SOLUTION INTRAVENOUS; SUBCUTANEOUS EVERY 6 HOURS PRN
Status: DISCONTINUED | OUTPATIENT
Start: 2019-11-28 | End: 2019-11-30

## 2019-11-28 RX ORDER — WARFARIN SODIUM 10 MG/1
10 TABLET ORAL
Status: COMPLETED | OUTPATIENT
Start: 2019-11-28 | End: 2019-11-28

## 2019-11-28 RX ADMIN — WARFARIN SODIUM 10 MG: 5 TABLET ORAL at 18:53

## 2019-11-28 RX ADMIN — FAMOTIDINE 40 MG: 40 TABLET, FILM COATED ORAL at 18:55

## 2019-11-28 RX ADMIN — ACETAMINOPHEN 650 MG: 325 TABLET, FILM COATED ORAL at 17:33

## 2019-11-28 RX ADMIN — SODIUM CHLORIDE, PRESERVATIVE FREE 10 ML: 5 INJECTION INTRAVENOUS at 11:44

## 2019-11-28 RX ADMIN — METHYLPREDNISOLONE 8 MG: 4 TABLET ORAL at 21:12

## 2019-11-28 RX ADMIN — SODIUM CHLORIDE, PRESERVATIVE FREE 10 ML: 5 INJECTION INTRAVENOUS at 21:05

## 2019-11-28 RX ADMIN — ACETAMINOPHEN 650 MG: 325 TABLET, FILM COATED ORAL at 08:38

## 2019-11-28 RX ADMIN — NYSTATIN: 100000 CREAM TOPICAL at 10:53

## 2019-11-28 RX ADMIN — WARFARIN SODIUM 10 MG: 10 TABLET ORAL at 18:56

## 2019-11-28 RX ADMIN — HEPARIN SODIUM 10000 UNITS: 5000 INJECTION INTRAVENOUS; SUBCUTANEOUS at 11:42

## 2019-11-28 RX ADMIN — METHYLPREDNISOLONE 4 MG: 4 TABLET ORAL at 11:50

## 2019-11-28 RX ADMIN — NYSTATIN: 100000 CREAM TOPICAL at 21:11

## 2019-11-28 RX ADMIN — HEPARIN SODIUM 7.7 UNITS/KG/HR: 10000 INJECTION, SOLUTION INTRAVENOUS at 11:45

## 2019-11-28 RX ADMIN — BACLOFEN 10 MG: 10 TABLET ORAL at 21:12

## 2019-11-28 RX ADMIN — FAMOTIDINE 40 MG: 40 TABLET, FILM COATED ORAL at 05:43

## 2019-11-28 RX ADMIN — HYDROCODONE BITARTRATE AND ACETAMINOPHEN 1 TABLET: 5; 325 TABLET ORAL at 03:50

## 2019-11-28 RX ADMIN — METHYLPREDNISOLONE 4 MG: 4 TABLET ORAL at 18:52

## 2019-11-28 RX ADMIN — METHYLPREDNISOLONE 4 MG: 4 TABLET ORAL at 05:43

## 2019-11-29 ENCOUNTER — APPOINTMENT (OUTPATIENT)
Dept: NUCLEAR MEDICINE | Facility: HOSPITAL | Age: 49
End: 2019-11-29

## 2019-11-29 LAB
APTT PPP: 173.8 SECONDS (ref 22.7–35.4)
APTT PPP: 40.6 SECONDS (ref 22.7–35.4)
APTT PPP: 62.4 SECONDS (ref 22.7–35.4)
APTT PPP: >200 SECONDS (ref 22.7–35.4)
BASOPHILS # BLD AUTO: 0.05 10*3/MM3 (ref 0–0.2)
BASOPHILS # BLD AUTO: 0.06 10*3/MM3 (ref 0–0.2)
BASOPHILS NFR BLD AUTO: 0.5 % (ref 0–1.5)
BASOPHILS NFR BLD AUTO: 0.6 % (ref 0–1.5)
DEPRECATED RDW RBC AUTO: 44 FL (ref 37–54)
DEPRECATED RDW RBC AUTO: 44.8 FL (ref 37–54)
EOSINOPHIL # BLD AUTO: 0.06 10*3/MM3 (ref 0–0.4)
EOSINOPHIL # BLD AUTO: 0.07 10*3/MM3 (ref 0–0.4)
EOSINOPHIL NFR BLD AUTO: 0.6 % (ref 0.3–6.2)
EOSINOPHIL NFR BLD AUTO: 0.7 % (ref 0.3–6.2)
ERYTHROCYTE [DISTWIDTH] IN BLOOD BY AUTOMATED COUNT: 13.6 % (ref 12.3–15.4)
ERYTHROCYTE [DISTWIDTH] IN BLOOD BY AUTOMATED COUNT: 13.8 % (ref 12.3–15.4)
HCT VFR BLD AUTO: 44.9 % (ref 37.5–51)
HCT VFR BLD AUTO: 45.4 % (ref 37.5–51)
HGB BLD-MCNC: 15.5 G/DL (ref 13–17.7)
HGB BLD-MCNC: 15.6 G/DL (ref 13–17.7)
IMM GRANULOCYTES # BLD AUTO: 0.1 10*3/MM3 (ref 0–0.05)
IMM GRANULOCYTES # BLD AUTO: 0.1 10*3/MM3 (ref 0–0.05)
IMM GRANULOCYTES NFR BLD AUTO: 1 % (ref 0–0.5)
IMM GRANULOCYTES NFR BLD AUTO: 1 % (ref 0–0.5)
INR PPP: 1.67 (ref 0.9–1.1)
LYMPHOCYTES # BLD AUTO: 2.28 10*3/MM3 (ref 0.7–3.1)
LYMPHOCYTES # BLD AUTO: 2.3 10*3/MM3 (ref 0.7–3.1)
LYMPHOCYTES NFR BLD AUTO: 23.2 % (ref 19.6–45.3)
LYMPHOCYTES NFR BLD AUTO: 23.6 % (ref 19.6–45.3)
MCH RBC QN AUTO: 30.9 PG (ref 26.6–33)
MCH RBC QN AUTO: 31 PG (ref 26.6–33)
MCHC RBC AUTO-ENTMCNC: 34.1 G/DL (ref 31.5–35.7)
MCHC RBC AUTO-ENTMCNC: 34.7 G/DL (ref 31.5–35.7)
MCV RBC AUTO: 88.9 FL (ref 79–97)
MCV RBC AUTO: 90.8 FL (ref 79–97)
MONOCYTES # BLD AUTO: 0.91 10*3/MM3 (ref 0.1–0.9)
MONOCYTES # BLD AUTO: 0.99 10*3/MM3 (ref 0.1–0.9)
MONOCYTES NFR BLD AUTO: 10.2 % (ref 5–12)
MONOCYTES NFR BLD AUTO: 9.3 % (ref 5–12)
NEUTROPHILS # BLD AUTO: 6.24 10*3/MM3 (ref 1.7–7)
NEUTROPHILS # BLD AUTO: 6.41 10*3/MM3 (ref 1.7–7)
NEUTROPHILS NFR BLD AUTO: 64 % (ref 42.7–76)
NEUTROPHILS NFR BLD AUTO: 65.3 % (ref 42.7–76)
NRBC BLD AUTO-RTO: 0 /100 WBC (ref 0–0.2)
NRBC BLD AUTO-RTO: 0 /100 WBC (ref 0–0.2)
PLATELET # BLD AUTO: 257 10*3/MM3 (ref 140–450)
PLATELET # BLD AUTO: 263 10*3/MM3 (ref 140–450)
PMV BLD AUTO: 10.1 FL (ref 6–12)
PMV BLD AUTO: 10.4 FL (ref 6–12)
PROTHROMBIN TIME: 19.4 SECONDS (ref 11.7–14.2)
RBC # BLD AUTO: 5 10*6/MM3 (ref 4.14–5.8)
RBC # BLD AUTO: 5.05 10*6/MM3 (ref 4.14–5.8)
WBC NRBC COR # BLD: 9.75 10*3/MM3 (ref 3.4–10.8)
WBC NRBC COR # BLD: 9.82 10*3/MM3 (ref 3.4–10.8)

## 2019-11-29 PROCEDURE — 97116 GAIT TRAINING THERAPY: CPT

## 2019-11-29 PROCEDURE — 96376 TX/PRO/DX INJ SAME DRUG ADON: CPT

## 2019-11-29 PROCEDURE — G0378 HOSPITAL OBSERVATION PER HR: HCPCS

## 2019-11-29 PROCEDURE — 97535 SELF CARE MNGMENT TRAINING: CPT

## 2019-11-29 PROCEDURE — 85025 COMPLETE CBC W/AUTO DIFF WBC: CPT | Performed by: HOSPITALIST

## 2019-11-29 PROCEDURE — 96366 THER/PROPH/DIAG IV INF ADDON: CPT

## 2019-11-29 PROCEDURE — 97110 THERAPEUTIC EXERCISES: CPT

## 2019-11-29 PROCEDURE — 25010000002 HEPARIN (PORCINE) PER 1000 UNITS: Performed by: HOSPITALIST

## 2019-11-29 PROCEDURE — 63710000001 METHYLPREDNISOLONE 4 MG TABLET THERAPY PACK 21 EACH DISP PACK: Performed by: NURSE PRACTITIONER

## 2019-11-29 PROCEDURE — 85610 PROTHROMBIN TIME: CPT | Performed by: INTERNAL MEDICINE

## 2019-11-29 PROCEDURE — 99213 OFFICE O/P EST LOW 20 MIN: CPT | Performed by: NURSE PRACTITIONER

## 2019-11-29 PROCEDURE — 85730 THROMBOPLASTIN TIME PARTIAL: CPT | Performed by: HOSPITALIST

## 2019-11-29 RX ORDER — LISINOPRIL 5 MG/1
5 TABLET ORAL
Status: DISCONTINUED | OUTPATIENT
Start: 2019-11-29 | End: 2019-12-01 | Stop reason: HOSPADM

## 2019-11-29 RX ADMIN — FAMOTIDINE 40 MG: 40 TABLET, FILM COATED ORAL at 06:40

## 2019-11-29 RX ADMIN — HEPARIN SODIUM 9360 UNITS: 5000 INJECTION INTRAVENOUS; SUBCUTANEOUS at 04:01

## 2019-11-29 RX ADMIN — METHYLPREDNISOLONE 4 MG: 4 TABLET ORAL at 16:59

## 2019-11-29 RX ADMIN — HYDROCODONE BITARTRATE AND ACETAMINOPHEN 1 TABLET: 5; 325 TABLET ORAL at 22:33

## 2019-11-29 RX ADMIN — METHYLPREDNISOLONE 4 MG: 4 TABLET ORAL at 06:40

## 2019-11-29 RX ADMIN — HEPARIN SODIUM 10000 UNITS: 5000 INJECTION INTRAVENOUS; SUBCUTANEOUS at 06:10

## 2019-11-29 RX ADMIN — WARFARIN SODIUM 15 MG: 7.5 TABLET ORAL at 16:58

## 2019-11-29 RX ADMIN — FAMOTIDINE 40 MG: 40 TABLET, FILM COATED ORAL at 16:58

## 2019-11-29 RX ADMIN — HEPARIN SODIUM 12.52 UNITS/KG/HR: 10000 INJECTION, SOLUTION INTRAVENOUS at 14:42

## 2019-11-29 RX ADMIN — HYDROCODONE BITARTRATE AND ACETAMINOPHEN 1 TABLET: 5; 325 TABLET ORAL at 10:31

## 2019-11-29 RX ADMIN — BACLOFEN 10 MG: 10 TABLET ORAL at 09:13

## 2019-11-29 RX ADMIN — HEPARIN SODIUM 8.55 UNITS/KG/HR: 10000 INJECTION, SOLUTION INTRAVENOUS at 03:47

## 2019-11-29 RX ADMIN — METHYLPREDNISOLONE 4 MG: 4 TABLET ORAL at 21:35

## 2019-11-29 RX ADMIN — METHYLPREDNISOLONE 4 MG: 4 TABLET ORAL at 14:38

## 2019-11-29 RX ADMIN — NYSTATIN: 100000 CREAM TOPICAL at 21:35

## 2019-11-29 RX ADMIN — HYDROCODONE BITARTRATE AND ACETAMINOPHEN 1 TABLET: 5; 325 TABLET ORAL at 04:48

## 2019-11-30 LAB
ANION GAP SERPL CALCULATED.3IONS-SCNC: 12 MMOL/L (ref 5–15)
APTT PPP: 68.9 SECONDS (ref 22.7–35.4)
APTT PPP: 79.6 SECONDS (ref 22.7–35.4)
BASOPHILS # BLD AUTO: 0.05 10*3/MM3 (ref 0–0.2)
BASOPHILS NFR BLD AUTO: 0.5 % (ref 0–1.5)
BUN BLD-MCNC: 16 MG/DL (ref 6–20)
BUN/CREAT SERPL: 24.2 (ref 7–25)
CALCIUM SPEC-SCNC: 8.8 MG/DL (ref 8.6–10.5)
CHLORIDE SERPL-SCNC: 102 MMOL/L (ref 98–107)
CO2 SERPL-SCNC: 24 MMOL/L (ref 22–29)
CREAT BLD-MCNC: 0.66 MG/DL (ref 0.76–1.27)
DEPRECATED RDW RBC AUTO: 46.1 FL (ref 37–54)
EOSINOPHIL # BLD AUTO: 0.09 10*3/MM3 (ref 0–0.4)
EOSINOPHIL NFR BLD AUTO: 0.9 % (ref 0.3–6.2)
ERYTHROCYTE [DISTWIDTH] IN BLOOD BY AUTOMATED COUNT: 14.1 % (ref 12.3–15.4)
GFR SERPL CREATININE-BSD FRML MDRD: 128 ML/MIN/1.73
GLUCOSE BLD-MCNC: 113 MG/DL (ref 65–99)
HCT VFR BLD AUTO: 44.5 % (ref 37.5–51)
HGB BLD-MCNC: 15.7 G/DL (ref 13–17.7)
IMM GRANULOCYTES # BLD AUTO: 0.09 10*3/MM3 (ref 0–0.05)
IMM GRANULOCYTES NFR BLD AUTO: 0.9 % (ref 0–0.5)
INR PPP: 2.15 (ref 0.9–1.1)
LYMPHOCYTES # BLD AUTO: 2.54 10*3/MM3 (ref 0.7–3.1)
LYMPHOCYTES NFR BLD AUTO: 26.2 % (ref 19.6–45.3)
MCH RBC QN AUTO: 31.5 PG (ref 26.6–33)
MCHC RBC AUTO-ENTMCNC: 35.3 G/DL (ref 31.5–35.7)
MCV RBC AUTO: 89.4 FL (ref 79–97)
MONOCYTES # BLD AUTO: 0.85 10*3/MM3 (ref 0.1–0.9)
MONOCYTES NFR BLD AUTO: 8.8 % (ref 5–12)
NEUTROPHILS # BLD AUTO: 6.06 10*3/MM3 (ref 1.7–7)
NEUTROPHILS NFR BLD AUTO: 62.7 % (ref 42.7–76)
NRBC BLD AUTO-RTO: 0 /100 WBC (ref 0–0.2)
PLATELET # BLD AUTO: 243 10*3/MM3 (ref 140–450)
PMV BLD AUTO: 9.9 FL (ref 6–12)
POTASSIUM BLD-SCNC: 4.7 MMOL/L (ref 3.5–5.2)
PROTHROMBIN TIME: 23.7 SECONDS (ref 11.7–14.2)
RBC # BLD AUTO: 4.98 10*6/MM3 (ref 4.14–5.8)
SODIUM BLD-SCNC: 138 MMOL/L (ref 136–145)
WBC NRBC COR # BLD: 9.68 10*3/MM3 (ref 3.4–10.8)

## 2019-11-30 PROCEDURE — 96366 THER/PROPH/DIAG IV INF ADDON: CPT

## 2019-11-30 PROCEDURE — 80048 BASIC METABOLIC PNL TOTAL CA: CPT | Performed by: HOSPITALIST

## 2019-11-30 PROCEDURE — 63710000001 METHYLPREDNISOLONE 4 MG TABLET THERAPY PACK 21 EACH DISP PACK: Performed by: NURSE PRACTITIONER

## 2019-11-30 PROCEDURE — G0378 HOSPITAL OBSERVATION PER HR: HCPCS

## 2019-11-30 PROCEDURE — 25010000002 HEPARIN (PORCINE) PER 1000 UNITS: Performed by: HOSPITALIST

## 2019-11-30 PROCEDURE — 97110 THERAPEUTIC EXERCISES: CPT

## 2019-11-30 PROCEDURE — 85730 THROMBOPLASTIN TIME PARTIAL: CPT | Performed by: HOSPITALIST

## 2019-11-30 PROCEDURE — 85025 COMPLETE CBC W/AUTO DIFF WBC: CPT | Performed by: HOSPITALIST

## 2019-11-30 PROCEDURE — 85610 PROTHROMBIN TIME: CPT | Performed by: INTERNAL MEDICINE

## 2019-11-30 RX ORDER — WARFARIN SODIUM 7.5 MG/1
15 TABLET ORAL
Status: DISCONTINUED | OUTPATIENT
Start: 2019-12-02 | End: 2019-12-01 | Stop reason: HOSPADM

## 2019-11-30 RX ORDER — WARFARIN SODIUM 10 MG/1
10 TABLET ORAL
Status: DISCONTINUED | OUTPATIENT
Start: 2019-12-01 | End: 2019-12-01 | Stop reason: HOSPADM

## 2019-11-30 RX ADMIN — NYSTATIN: 100000 CREAM TOPICAL at 08:27

## 2019-11-30 RX ADMIN — METHYLPREDNISOLONE 4 MG: 4 TABLET ORAL at 17:37

## 2019-11-30 RX ADMIN — ACETAMINOPHEN 650 MG: 325 TABLET, FILM COATED ORAL at 13:13

## 2019-11-30 RX ADMIN — WARFARIN SODIUM 9 MG: 6 TABLET ORAL at 17:38

## 2019-11-30 RX ADMIN — METHYLPREDNISOLONE 4 MG: 4 TABLET ORAL at 06:31

## 2019-11-30 RX ADMIN — FAMOTIDINE 40 MG: 40 TABLET, FILM COATED ORAL at 17:37

## 2019-11-30 RX ADMIN — SODIUM CHLORIDE, PRESERVATIVE FREE 10 ML: 5 INJECTION INTRAVENOUS at 20:03

## 2019-11-30 RX ADMIN — HEPARIN SODIUM 6.49 UNITS/KG/HR: 10000 INJECTION, SOLUTION INTRAVENOUS at 05:38

## 2019-11-30 RX ADMIN — NYSTATIN: 100000 CREAM TOPICAL at 20:03

## 2019-11-30 RX ADMIN — LISINOPRIL 5 MG: 5 TABLET ORAL at 08:27

## 2019-11-30 RX ADMIN — METHYLPREDNISOLONE 4 MG: 4 TABLET ORAL at 13:08

## 2019-11-30 RX ADMIN — FAMOTIDINE 40 MG: 40 TABLET, FILM COATED ORAL at 06:31

## 2019-12-01 VITALS
HEART RATE: 75 BPM | WEIGHT: 315 LBS | SYSTOLIC BLOOD PRESSURE: 131 MMHG | HEIGHT: 74 IN | DIASTOLIC BLOOD PRESSURE: 82 MMHG | RESPIRATION RATE: 18 BRPM | BODY MASS INDEX: 40.43 KG/M2 | OXYGEN SATURATION: 93 % | TEMPERATURE: 97.1 F

## 2019-12-01 LAB
APTT PPP: 36.9 SECONDS (ref 22.7–35.4)
INR PPP: 2 (ref 0.9–1.1)
PROTHROMBIN TIME: 22.4 SECONDS (ref 11.7–14.2)

## 2019-12-01 PROCEDURE — 85730 THROMBOPLASTIN TIME PARTIAL: CPT | Performed by: HOSPITALIST

## 2019-12-01 PROCEDURE — 63710000001 METHYLPREDNISOLONE 4 MG TABLET THERAPY PACK 21 EACH DISP PACK: Performed by: NURSE PRACTITIONER

## 2019-12-01 PROCEDURE — G0378 HOSPITAL OBSERVATION PER HR: HCPCS

## 2019-12-01 PROCEDURE — 85610 PROTHROMBIN TIME: CPT | Performed by: INTERNAL MEDICINE

## 2019-12-01 RX ORDER — NYSTATIN 100000 U/G
CREAM TOPICAL EVERY 12 HOURS SCHEDULED
Qty: 30 G | Refills: 0 | Status: SHIPPED | OUTPATIENT
Start: 2019-12-01 | End: 2019-12-04

## 2019-12-01 RX ORDER — BACLOFEN 10 MG/1
10 TABLET ORAL 3 TIMES DAILY PRN
Qty: 60 TABLET | Refills: 0 | Status: SHIPPED | OUTPATIENT
Start: 2019-12-01 | End: 2020-04-03

## 2019-12-01 RX ORDER — FAMOTIDINE 40 MG/1
40 TABLET, FILM COATED ORAL
Qty: 60 TABLET | Refills: 0 | Status: SHIPPED | OUTPATIENT
Start: 2019-12-01 | End: 2020-04-03

## 2019-12-01 RX ORDER — METHYLPREDNISOLONE 4 MG/1
TABLET ORAL
Start: 2019-12-01 | End: 2020-04-03

## 2019-12-01 RX ORDER — HYDROCODONE BITARTRATE AND ACETAMINOPHEN 5; 325 MG/1; MG/1
1 TABLET ORAL EVERY 6 HOURS PRN
Qty: 10 TABLET | Refills: 0 | Status: SHIPPED | OUTPATIENT
Start: 2019-12-01 | End: 2020-04-03

## 2019-12-01 RX ORDER — LISINOPRIL 5 MG/1
5 TABLET ORAL
Qty: 30 TABLET | Refills: 0 | Status: SHIPPED | OUTPATIENT
Start: 2019-12-02 | End: 2021-02-09

## 2019-12-01 RX ADMIN — NYSTATIN: 100000 CREAM TOPICAL at 08:22

## 2019-12-01 RX ADMIN — HYDROCODONE BITARTRATE AND ACETAMINOPHEN 1 TABLET: 5; 325 TABLET ORAL at 03:09

## 2019-12-01 RX ADMIN — SODIUM CHLORIDE, PRESERVATIVE FREE 10 ML: 5 INJECTION INTRAVENOUS at 08:23

## 2019-12-01 RX ADMIN — LISINOPRIL 5 MG: 5 TABLET ORAL at 08:22

## 2019-12-01 RX ADMIN — METHYLPREDNISOLONE 4 MG: 4 TABLET ORAL at 06:31

## 2019-12-01 RX ADMIN — FAMOTIDINE 40 MG: 40 TABLET, FILM COATED ORAL at 06:30

## 2019-12-01 NOTE — PROGRESS NOTES
Continued Stay Note  [facility]     Patient Name: Kameron Melendez  MRN: [id number]  Today's Date: 12/1/2019    Admit Date: 11/25/2019    Discharge Plan     Row Name 12/01/19 1052       Plan    Plan  Home with [facility]    Plan Comments  Discussed with Tonja/CCP Manager and she states if patient discharged today, CCP will follow up with [facility] for PCP establishment and then follow up with [facility] and patient tomorrow once PCP is established.  Informed Dr. Cruz and patient that CCP will be following up with [facility] for PCP establishment on Monday and [facility] and CCP will be in contact with patient on Monday(tomorrow).  Plan is home with wife and [facility] and [facility]... Nevin Hernandez RN,CCP          Discharge Codes    No documentation.       Expected Discharge Date and Time     Expected Discharge Date Expected Discharge Time    Dec 1, 2019             Nevin Hernandez RN

## 2019-12-01 NOTE — PROGRESS NOTES
Continued Stay Note  [facility]     Patient Name: Kameron Melendez  MRN: [id number]  Today's Date: 12/1/2019    Admit Date: 11/25/2019    Discharge Plan     Row Name 12/01/19 1221       Plan    Plan  Home with [facility] and [facility] for PCP    Plan Comments  DME order noted for continuous oxygen 2L. Spoke with MELANIE Baca and he states patient is on room air and oxygen sats are in mid 90's and he states patient is refusing oxygen as he knows he has ARNOLDO and denies wanting to do walking oximetry.... Plan is home with family and [facility] and [facility] for PCP- CCP will follow up [facility] tomorrow for name of PCP established at [facility] and call patient at home to inform him of name and [facility] will follow up with [facility] tomorrow also per Ivory.... Nevin Hernandez RN,CCP                               Discharge Codes    No documentation.       Expected Discharge Date and Time     Expected Discharge Date Expected Discharge Time    Dec 1, 2019             Nevin Hernandez RN

## 2019-12-01 NOTE — NURSING NOTE
Patient discharged home with wife via wheelchair. Patient and wife state understanding of all AVS material. Stable at DC.

## 2019-12-01 NOTE — PROGRESS NOTES
Pharmacy Consult: Warfarin Dosing/ Monitoring    Kameron Melendez is a 49 y.o. male, estimated creatinine clearance is 273.8 mL/min (A) (by C-G formula based on SCr of 0.66 mg/dL (L)). weighing (!) 234 kg (516 lb).     has a past medical history of DVT (deep venous thrombosis) (CMS/[facility]), Factor V Leiden (CMS/[facility]), Kidney stone, Lymphedema, Lymphedema of left lower extremity, Obesity, ARNOLDO (obstructive sleep apnea), Pulmonary embolism (CMS/HCC), Pulmonary hypertension (CMS/HCC), and Right ventricular enlargement.    Social History     Tobacco Use   • Smoking status: Light Tobacco Smoker     Types: Cigars   • Smokeless tobacco: Never Used   • Tobacco comment: occasional   Substance Use Topics   • Alcohol use: No   • Drug use: No       Results from last 7 days   Lab Units 12/01/19  0426 11/30/19  0851 11/30/19 0447 11/29/19  2252 11/29/19  1446 11/29/19  0547 11/29/19 0439 11/29/19  0000  11/28/19 0459 11/27/19 0439 11/26/19 0444 11/25/19  1704   INR  2.00*  --  2.15*  --   --   --  1.67*  --   --  1.45* 1.82* 2.15* 2.15*   APTT seconds 36.9* 79.6* 68.9* 173.8* >200.0*  --  40.6* 62.4*   < >  --   --   --   --    HEMOGLOBIN g/dL  --   --  15.7  --   --  15.6 15.5  --   --  15.3 14.9 15.3 15.2   HEMATOCRIT %  --   --  44.5  --   --  44.9 45.4  --   --  43.7 44.4 45.3 45.9   PLATELETS 10*3/mm3  --   --  243  --   --  257 263  --   --  265 268 249 249    < > = values in this interval not displayed.     Results from last 7 days   Lab Units 11/30/19 0447 11/28/19 0459 11/27/19 0439 11/25/19  1704   SODIUM mmol/L 138 138 133*   < > 142   POTASSIUM mmol/L 4.7 4.5 4.2   < > 4.6   CHLORIDE mmol/L 102 101 99   < > 103   CO2 mmol/L 24.0 23.0 22.2   < > 26.5   BUN mg/dL 16 15 13   < > 16   CREATININE mg/dL 0.66* 0.73* 0.82   < > 0.85   CALCIUM mg/dL 8.8 8.9 8.8   < > 9.2   BILIRUBIN mg/dL  --   --   --   --  0.6   ALK PHOS U/L  --   --   --   --  92   ALT (SGPT) U/L  --   --   --   --  21   AST (SGOT) U/L  --   --   --   --   14   GLUCOSE mg/dL 113* 147* 172*   < > 100*    < > = values in this interval not displayed.     Anticoagulation history: Chronic warfarin therapy for history of DVT/PE and Factor V leiden  Per [facility] clinic visit note on 11/22/19 - takes warfarin 15 mg on Mon/Wed/Fri and 10 mg on all other days.      Hospital Anticoagulation:  Consulting provider: Dr Cruz  Start date: 11/27/2019  Indication: History of PE/DVT and factor V leiden  Target INR: 2-3  Expected duration: Indefinite   Bridge Therapy: Heparin drip stopped on 11/30    Date 11/27 11/28 11/29 11/30 12/1        INR 1.82 1.45 1.67 2.15 2.00        Warfarin dose 15 mg 10 mg 15 mg 9 mg 10 mg          Potential drug interactions:   Medrol dose kathy (started 11/27, ending on 12/2) - may enhance anticoagulant effect of warfarin. Onset of changes in INR is variable and may be anticipated 3-10 days after initiating steroid. Adjustment of warfarin dose should be expected.     Relevant nutrition status: reg diet    Other: BMI 66    Education complete?/ Date: not yet    Assessment/Plan:  INR 2.00 - therapeutic today. Heparin drip discontinued yesterday. Will continue home warfarin dosing regimen.   Monitor INR daily  Follow up daily    Pharmacy will continue to follow until discharge or discontinuation of warfarin.     Kristi Bermudez, PharmD, BCPS  12/01/19

## 2019-12-01 NOTE — PLAN OF CARE
Problem: Patient Care Overview  Goal: Plan of Care Review   11/30/19 2129   Plan of Care Review   Progress improving   OTHER   Outcome Summary VSS. AXO. Pt. asleep with O2 d/t sleep apnea. No c/o pain at this time. Will continue to monitor.    Coping/Psychosocial   Plan of Care Reviewed With patient       Problem: Skin Injury Risk (Adult)  Goal: Identify Related Risk Factors and Signs and Symptoms   11/30/19 2129   Skin Injury Risk (Adult)   Related Risk Factors (Skin Injury Risk) body weight extremes;moisture;mobility impaired     Goal: Skin Health and Integrity   11/30/19 2129   Skin Injury Risk (Adult)   Skin Health and Integrity making progress toward outcome       Problem: Fall Risk (Adult)  Goal: Identify Related Risk Factors and Signs and Symptoms   11/30/19 2129   Fall Risk (Adult)   Related Risk Factors (Fall Risk) fatigue/slow reaction;gait/mobility problems;environment unfamiliar   Signs and Symptoms (Fall Risk) presence of risk factors     Goal: Absence of Fall   11/30/19 2129   Fall Risk (Adult)   Absence of Fall making progress toward outcome       Problem: Pain, Acute (Adult)  Goal: Identify Related Risk Factors and Signs and Symptoms   11/30/19 2129   Pain, Acute (Adult)   Related Risk Factors (Acute Pain) disease process;patient perception;persistent pain   Signs and Symptoms (Acute Pain) BADLs/IADLs reluctance/inability to perform;verbalization of pain descriptors     Goal: Acceptable Pain Control/Comfort Level   11/30/19 2129   Pain, Acute (Adult)   Acceptable Pain Control/Comfort Level making progress toward outcome       Problem: Breathing Pattern Ineffective (Adult)  Goal: Identify Related Risk Factors and Signs and Symptoms   11/30/19 2129   Breathing Pattern Ineffective (Adult)   Related Risk Factors (Breathing Pattern Ineffective) fatigue;obesity;underlying condition;pain   Signs and Symptoms (Breathing Pattern Ineffective) activity intolerance;breathing pattern altered;snoring     Goal:  Effective Oxygenation/Ventilation   11/30/19 2129   Breathing Pattern Ineffective (Adult)   Effective Oxygenation/Ventilation making progress toward outcome     Goal: Anxiety/Fear Reduction   11/30/19 2129   Breathing Pattern Ineffective (Adult)   Anxiety/Fear Reduction making progress toward outcome

## 2019-12-01 NOTE — DISCHARGE SUMMARY
[facility]    Date of Discharge:  12/1/2019    PCP: Provider, No Known    Discharge Diagnosis:   Active Hospital Problems    Diagnosis  POA   • Intractable back pain [M54.9]  Yes   • Heterozygous factor V Leiden mutation (CMS/[facility]) [D68.51]  Yes   • History of pulmonary embolism [Z86.711]  Yes   • Lymphedema of both lower extremities [I89.0]  Yes   • Morbid obesity with body mass index of 60.0-69.9 in adult (CMS/[facility]) [E66.01, Z68.44]  Not Applicable   • ARNOLDO (obstructive sleep apnea) [G47.33]  Yes      Resolved Hospital Problems   No resolved problems to display.      Consults     Date and Time Order Name Status Description    11/27/2019 1132 Inpatient Bariatric Surgery Consult      11/26/2019 2253 Inpatient Pulmonology Consult Completed     11/26/2019 0856 Inpatient Physical Medicine Rehab Consult Completed     11/25/2019 2134 Inpatient Neurosurgery Consult Completed     11/25/2019 1915 [facility] (on-call MD unless specified) Details Completed     11/25/2019 1856 Neurosurgery (on-call MD unless specified) Completed         Hospital Course  Please see history and physical for details. Patient is a 49 y.o. male with a Body mass index is 66.25 kg/m².  He also has a history of factor V Leiden mutation, DVT and PE on warfarin, obstructive sleep apnea admitted with lower back and hip pain.  He was seen in consultation by neurosurgery, rehab, pulmonology, bariatric surgery.  No imaging options were available due to his weight.  He was started on a steroid Dosepak, physical therapy, analgesia.  He seemed to be improving with these efforts and has been able to ambulate some with physical therapy.  He did not meet criteria for any acute inpatient rehab.  Options were discussed including home with home health versus skilled nursing facility and he states today he would like to go home.  I discussed with [facility] and they will make sure that a primary care physician is obtained for  him and home health will be set up tomorrow (today being Sunday).    As of yesterday he was able to sit to stand and ambulate with a rolling walker.  He increased his maximum distance from 30 feet to 90 feet.  He continues to require moderate to minimum assist for sit to supine.  Will clear with physical therapy if they feel he is safe to go home.    He will follow-up with bariatric surgery outpatient    He has untreated sleep apnea but refuses treatments in the past even after discussing risks with pulmonology at length.    He has enlarged left pelvic lymph nodes and will need follow-up CT in 3 months    He is on warfarin and INR is therapeutic today.  Should have an INR check tomorrow.    Results from last 7 days   Lab Units 12/01/19  0426 11/30/19  0447 11/29/19  0439   INR  2.00* 2.15* 1.67*     I discussed the patient's findings and my recommendations with patient and nursing staff and CCP.    Condition on Discharge: Stable.     Temp:  [97.1 °F (36.2 °C)-98.1 °F (36.7 °C)] 97.1 °F (36.2 °C)  Heart Rate:  [75-87] 75  Resp:  [18] 18  BP: (118-143)/(78-93) 131/82  Body mass index is 66.25 kg/m².    Physical Exam   Constitutional: He is oriented to person, place, and time. No distress.   extremely obese   Cardiovascular: Normal rate and regular rhythm.   Pulmonary/Chest: Effort normal and breath sounds normal. No respiratory distress.   Abdominal: Soft. Bowel sounds are normal. There is no tenderness. There is no rebound and no guarding.   Neurological: He is alert and oriented to person, place, and time.   Skin: Skin is warm and dry.   Psychiatric: He has a normal mood and affect. His behavior is normal.   Nursing note and vitals reviewed.       Discharge Medications      New Medications      Instructions Start Date   baclofen 10 MG tablet  Commonly known as:  LIORESAL   10 mg, Oral, 3 Times Daily PRN      famotidine 40 MG tablet  Commonly known as:  PEPCID   40 mg, Oral, 2 Times Daily Before Meals       HYDROcodone-acetaminophen 5-325 MG per tablet  Commonly known as:  NORCO   1 tablet, Oral, Every 6 Hours PRN      lisinopril 5 MG tablet  Commonly known as:  PRINIVIL,ZESTRIL   5 mg, Oral, Every 24 Hours Scheduled   Start Date:  12/2/2019     methylPREDNISolone 4 MG tablet  Commonly known as:  MEDROL (STANISLAV)   Take home dose pack and finish      nystatin 247752 UNIT/GM cream  Commonly known as:  MYCOSTATIN   Topical, Every 12 Hours Scheduled         Continue These Medications      Instructions Start Date   acetaminophen 500 MG tablet  Commonly known as:  TYLENOL   500 mg, Oral, Every 6 Hours PRN      warfarin 10 MG tablet  Commonly known as:  COUMADIN   TAKE 1.5 TABLETS (15 MG) BY MOUTH ON MONDAYS, WEDNESDAYS, AND FRIDAYS AND 1 TABLET (10 MG) ALL OTHER DAYS         Stop These Medications    ibuprofen 200 MG tablet  Commonly known as:  ADVIL,MOTRIN           Diet Instructions     Diet: Regular, Consistent Carbohydrate, Cardiac      Discharge Diet:   Regular  Consistent Carbohydrate  Cardiac            Activity Instructions     Activity as Tolerated           Additional Instructions for the Follow-ups that You Need to Schedule     Call MD for problems / concerns.   As directed      Protime-INR    Dec 02, 2019 (Approximate)      Is Patient on anti-coag:  Yes         CT Abdomen Pelvis With Contrast   Mar 01, 2020      Will Oral Contrast be needed for this procedure?:  No           Follow-up Information     Zack Jose Jr., MD Follow up.    Specialty:  Bariatrics  Contact information:  [address]  [place] [address]  [phone]             Kameron Owen MD Follow up.    Specialty:  Pulmonary Disease  Contact information:  [address]  [place] [address]  [phone]             Ese Cardoza, APRN Follow up.    Specialty:  Nurse Practitioner  Contact information:  [address]  [place] [address]  [phone]                  Herrera Cruz MD  12/01/19  11:21  AM    Discharge time spent greater than 30 minutes.

## 2019-12-02 ENCOUNTER — TELEPHONE (OUTPATIENT)
Dept: SOCIAL WORK | Facility: HOSPITAL | Age: 49
End: 2019-12-02

## 2019-12-02 ENCOUNTER — READMISSION MANAGEMENT (OUTPATIENT)
Dept: CALL CENTER | Facility: HOSPITAL | Age: 49
End: 2019-12-02

## 2019-12-02 PROCEDURE — 63710000001 METHYLPREDNISOLONE 4 MG TABLET THERAPY PACK 21 EACH DISP PACK: Performed by: NURSE PRACTITIONER

## 2019-12-02 NOTE — PROGRESS NOTES
Case Management Discharge Note      Final Note: Noted per Charlotte BALDERAS with CCP- [facility] to follow up with patient at home address on Wednesday 12/4/19.  Per Alana WISDOM- spoke with patient and he confirmed that he did already receive the call from [facility] re follow up on 12/4 - and she spoke with Marta with [facility] informed of [facility] appointment on 12/4- Marta will follow up with them after appointment for orders.  Antonella NAVARRO, CCP    Provided post acute provider list?: Yes  Post Acute Provider Lists: Inpatient Rehab  Post Acuite Provider List: Delivered  Delivered To: Patient  Method of Delivery: In person    Destination      No service has been selected for the patient.      Durable Medical Equipment      No service has been selected for the patient.      Dialysis/Infusion      No service has been selected for the patient.      Home Medical Care      No service has been selected for the patient.      Therapy      No service has been selected for the patient.      Community Resources      No service has been selected for the patient.             Final Discharge Disposition Code: 01 - home or self-care

## 2019-12-02 NOTE — PROGRESS NOTES
Continued Stay Note  [facility]     Patient Name: Kameron Melendez  MRN: [id number]  Today's Date: 12/2/2019    Admit Date: 11/25/2019    Discharge Plan     Row Name 12/02/19 0959       Plan    Plan Comments   left with [facility] at [phone] regarding PCP establishment. Awaiting return call.        Discharge Codes    No documentation.       Expected Discharge Date and Time     Expected Discharge Date Expected Discharge Time    Dec 1, 2019             Charlotte Albert RN

## 2019-12-02 NOTE — PROGRESS NOTES
Continued Stay Note  [facility]     Patient Name: Kameron Melendez  MRN: [id number]  Today's Date: 12/2/2019    Admit Date: 11/25/2019    Discharge Plan     Row Name 12/02/19 1026       Plan    Plan Comments  Received call from Essie with [facility] stating they have scheduled him an appointment with Kendra SHRESTHA on Wedensday 12/4. Requested DC summary to be faxed to [phone].. Completed.     Row Name 12/02/19 3458       Plan    Plan Comments  VM left with [facility] at [phone] regarding PCP establishment. Awaiting return call.        Discharge Codes    No documentation.       Expected Discharge Date and Time     Expected Discharge Date Expected Discharge Time    Dec 1, 2019             Charlotte Albert RN

## 2019-12-02 NOTE — OUTREACH NOTE
Prep Survey      Responses   Facility patient discharged from?  [place]   Is patient eligible?  Yes   Discharge diagnosis  intractable back pain   Does the patient have one of the following disease processes/diagnoses(primary or secondary)?  Other   Does the patient have Home health ordered?  Yes   What is the Home health agency?   [facility]   Is there a DME ordered?  Yes   What DME was ordered?  O2   Comments regarding appointments  call for apmt   Prep survey completed?  Yes          Bella Solitario RN

## 2019-12-02 NOTE — TELEPHONE ENCOUNTER
12/2 Call placed to pt to inform that CCP was notified by [facility] appointment for 12/4. Call placed to pt who states they had called him this morning to inform of appointment. Call to [facility] spoke with Deepthi to also inform of apt. She will follow up to obtain orders after seen.

## 2019-12-03 ENCOUNTER — ANTICOAGULATION VISIT (OUTPATIENT)
Dept: PHARMACY | Facility: HOSPITAL | Age: 49
End: 2019-12-03

## 2019-12-03 ENCOUNTER — READMISSION MANAGEMENT (OUTPATIENT)
Dept: CALL CENTER | Facility: HOSPITAL | Age: 49
End: 2019-12-03

## 2019-12-03 DIAGNOSIS — I26.99 OTHER ACUTE PULMONARY EMBOLISM WITHOUT ACUTE COR PULMONALE (HCC): ICD-10-CM

## 2019-12-03 DIAGNOSIS — I26.99 BILATERAL PULMONARY EMBOLISM (HCC): ICD-10-CM

## 2019-12-03 LAB — INR PPP: 2.6

## 2019-12-03 NOTE — PROGRESS NOTES
Anticoagulation Clinic Progress Note    Anticoagulation Summary  As of 12/3/2019    INR goal:   2.0-3.0   TTR:   88.7 % (9.8 mo)   INR used for dosin.60 (12/3/2019)   Warfarin maintenance plan:   15 mg every Mon, Wed, Fri; 10 mg all other days   Weekly warfarin total:   85 mg   Plan last modified:   Ramo Morocho RPH (2019)   Next INR check:   12/10/2019   Priority:   High   Target end date:   Indefinite    Indications    Other acute pulmonary embolism without acute cor pulmonale (CMS/HCC) [I26.99]  History of bilateral pulmonary embolism (CMS/HCC) [I26.99]             Anticoagulation Episode Summary     INR check location:       Preferred lab:       Send INR reminders to:   [facility] RESTREPO  POOL    Comments:   new [facility] home frankie 2019      Anticoagulation Care Providers     Provider Role Specialty Phone number    Torri Colmenares APRN Referring Cardiology [phone]    Elsy Baeza MD Responsible Cardiology [phone]          Clinic Interview:  Hospitalization -    INR History:  Anticoagulation Monitoring 2019 2019 12/3/2019   INR 2.20 2.80 2.60   INR Date 2019 2019 12/3/2019   INR Goal 2.0-3.0 2.0-3.0 2.0-3.0   Trend Same Same Same   Last Week Total 85 mg 85 mg 85 mg   Next Week Total 85 mg 85 mg 85 mg   Sun 10 mg 10 mg 10 mg   Mon 15 mg 15 mg 15 mg   Tue 10 mg 10 mg 10 mg   Wed 15 mg 15 mg 15 mg   Thu 10 mg 10 mg 10 mg   Fri 15 mg 15 mg 15 mg   Sat 10 mg 10 mg 10 mg   Visit Report - - -   Some recent data might be hidden       Plan:  1. INR is therapeutic today- see above in Anticoagulation Summary. Spoke with Kameron Melendez and instructed to resume his 85mg/week warfarin dosage regimen.  2. Follow up in 1 week  3. Pt has agreed to only be called if INR out of range. They have been instructed to call if any changes in medications, doses, concerns, etc. Patient expresses understanding and has no further questions at this time.    Alem HAILE  [place], [facility]

## 2019-12-03 NOTE — OUTREACH NOTE
Medical Week 1 Survey      Responses   Facility patient discharged from?  [place]   Does the patient have one of the following disease processes/diagnoses(primary or secondary)?  Other   Is there a successful TCM telephone encounter documented?  No   Week 1 attempt successful?  Yes   Call start time  0802   Call end time  0812   Is patient permission given to speak with other caregiver?  No   Medication alerts for this patient  has gotten his medications   Meds reviewed with patient/caregiver?  Yes   Is the patient having any side effects they believe may be caused by any medication additions or changes?  No   Does the patient have all medications ordered at discharge?  Yes   Is the patient taking all medications as directed (includes completed medication regime)?  Yes   Comments regarding appointments  has got his appointments scheduled   Does the patient have a primary care provider?   Yes   Does the patient have an appointment with their PCP within 7 days of discharge?  N/A   Has the patient kept scheduled appointments due by today?  Yes   What is the Home health agency?   [facility]   Has home health visited the patient within 72 hours of discharge?  N/A   Has all DME been delivered?  Yes   Did the patient receive a copy of their discharge instructions?  Yes   Nursing interventions  Reviewed instructions with patient   What is the patient's perception of their health status since discharge?  Improving   Is the patient/caregiver able to teach back signs and symptoms related to disease process for when to call PCP?  Yes   Is the patient/caregiver able to teach back signs and symptoms related to disease process for when to call 911?  Yes   Is the patient/caregiver able to teach back the hierarchy of who to call/visit for symptoms/problems? PCP, Specialist, Home health nurse, Urgent Care, ED, 911  Yes   Week 1 call completed?  Yes   Wrap up additional comments  patient feels he is doing well           Anne Steele RN

## 2019-12-10 ENCOUNTER — READMISSION MANAGEMENT (OUTPATIENT)
Dept: CALL CENTER | Facility: HOSPITAL | Age: 49
End: 2019-12-10

## 2019-12-10 NOTE — OUTREACH NOTE
Medical Week 2 Survey      Responses   Facility patient discharged from?  [place]   Does the patient have one of the following disease processes/diagnoses(primary or secondary)?  Other   Week 2 attempt successful?  Yes   Call start time  1423   Discharge diagnosis  intractable back pain   Rescheduled  Rescheduled-pt requested   Call end time  1424          Heidy Montez RN

## 2019-12-11 ENCOUNTER — ANTICOAGULATION VISIT (OUTPATIENT)
Dept: PHARMACY | Facility: HOSPITAL | Age: 49
End: 2019-12-11

## 2019-12-11 ENCOUNTER — READMISSION MANAGEMENT (OUTPATIENT)
Dept: CALL CENTER | Facility: HOSPITAL | Age: 49
End: 2019-12-11

## 2019-12-11 DIAGNOSIS — I26.99 OTHER ACUTE PULMONARY EMBOLISM WITHOUT ACUTE COR PULMONALE (HCC): ICD-10-CM

## 2019-12-11 DIAGNOSIS — I26.99 BILATERAL PULMONARY EMBOLISM (HCC): ICD-10-CM

## 2019-12-11 LAB — INR PPP: 3.5

## 2019-12-11 NOTE — PROGRESS NOTES
Anticoagulation Clinic Progress Note    Anticoagulation Summary  As of 12/11/2019    INR goal:   2.0-3.0   TTR:   87.6 % (10.1 mo)   INR used for dosing:   3.50! (12/11/2019)   Warfarin maintenance plan:   15 mg every Mon, Wed, Fri; 10 mg all other days   Weekly warfarin total:   85 mg   Plan last modified:   Ramo Morocho RPH (4/26/2019)   Next INR check:   12/18/2019   Priority:   High   Target end date:   Indefinite    Indications    Other acute pulmonary embolism without acute cor pulmonale (CMS/HCC) [I26.99]  History of bilateral pulmonary embolism (CMS/HCC) [I26.99]             Anticoagulation Episode Summary     INR check location:       Preferred lab:       Send INR reminders to:   [facility] RESTREPO  POOL    Comments:   new [facility] home frankie March 2019      Anticoagulation Care Providers     Provider Role Specialty Phone number    Torri Colmenares APRN Referring Cardiology [phone]    Elsy Baeza MD Responsible Cardiology [phone]            INR History:  Anticoagulation Monitoring 11/22/2019 12/3/2019 12/11/2019   INR 2.80 2.60 3.50   INR Date 11/22/2019 12/3/2019 12/11/2019   INR Goal 2.0-3.0 2.0-3.0 2.0-3.0   Trend Same Same Same   Last Week Total 85 mg 85 mg 85 mg   Next Week Total 85 mg 85 mg 80 mg   Sun 10 mg 10 mg 10 mg   Mon 15 mg 15 mg 15 mg   Tue 10 mg 10 mg 10 mg   Wed 15 mg 15 mg 10 mg (12/11)   Thu 10 mg 10 mg 10 mg   Fri 15 mg 15 mg 15 mg   Sat 10 mg 10 mg 10 mg   Visit Report - - -   Some recent data might be hidden       Plan:  1. INR is Supratherapeutic today- see above in Anticoagulation Summary.   Will instruct Kameron Melendez to decrease dose today to 10mg then continue their warfarin regimen- see above in Anticoagulation Summary.  2. Follow up in 1 week  3. Left VM with instructions per pt request. They have been instructed to call if any changes in medications, doses, concerns, etc. Patient expresses understanding and has no further questions at this time.    Kim  Deepak, [facility]

## 2019-12-11 NOTE — OUTREACH NOTE
Medical Week 2 Survey      Responses   Facility patient discharged from?  [place]   Does the patient have one of the following disease processes/diagnoses(primary or secondary)?  Other   Week 2 attempt successful?  Yes   Call start time  1527   Discharge diagnosis  intractable back pain   Call end time  1535   Is the patient having any side effects they believe may be caused by any medication additions or changes?  No   Is the patient taking all medications as directed (includes completed medication regime)?  Yes   Does the patient have a primary care provider?   Yes   Has the patient kept scheduled appointments due by today?  N/A   Comments  Neurology appt pushed out, no scheduled appts with others   What is the Home health agency?   [facility]   Has home health visited the patient within 72 hours of discharge?  No   Home health comments  Has been working since d/c to get HH squared away and PT started. To start tomorrow   What DME was ordered?  .   Psychosocial issues?  No   Nursing interventions  Reviewed instructions with patient   What is the patient's perception of their health status since discharge?  Same   Week 2 Call Completed?  Yes   Wrap up additional comments  Frustrated since PT has been delayed and really not feeling that much better, wants to get back to work.          Alem Choudhury RN

## 2019-12-18 ENCOUNTER — ANTICOAGULATION VISIT (OUTPATIENT)
Dept: PHARMACY | Facility: HOSPITAL | Age: 49
End: 2019-12-18

## 2019-12-18 DIAGNOSIS — I26.99 BILATERAL PULMONARY EMBOLISM (HCC): ICD-10-CM

## 2019-12-18 DIAGNOSIS — I26.99 OTHER ACUTE PULMONARY EMBOLISM WITHOUT ACUTE COR PULMONALE (HCC): ICD-10-CM

## 2019-12-18 LAB — INR PPP: 3.8

## 2019-12-18 NOTE — PROGRESS NOTES
Anticoagulation Clinic Progress Note    Anticoagulation Summary  As of 12/18/2019    INR goal:   2.0-3.0   TTR:   85.6 % (10.3 mo)   INR used for dosing:   3.80! (12/18/2019)   Warfarin maintenance plan:   15 mg every Mon, Wed, Fri; 10 mg all other days   Weekly warfarin total:   85 mg   Plan last modified:   Alem Oscar, [facility] (12/18/2019)   Next INR check:   12/23/2019   Priority:   High   Target end date:   Indefinite    Indications    Other acute pulmonary embolism without acute cor pulmonale (CMS/HCC) [I26.99]  History of bilateral pulmonary embolism (CMS/HCC) [I26.99]             Anticoagulation Episode Summary     INR check location:       Preferred lab:       Send INR reminders to:    S[facility]  POOL    Comments:   new [employer] home frankie March 2019      Anticoagulation Care Providers     Provider Role Specialty Phone number    Torri Colmenares APRN Referring Cardiology [phone]    Elsy Baeza MD Responsible Cardiology [phone]          Clinic Interview:  Reports pain in back and hip. Starting PT this week. Is taking acetaminophen for pain     INR History:  Anticoagulation Monitoring 12/3/2019 12/11/2019 12/18/2019   INR 2.60 3.50 3.80   INR Date 12/3/2019 12/11/2019 12/18/2019   INR Goal 2.0-3.0 2.0-3.0 2.0-3.0   Trend Same Same Same   Last Week Total 85 mg 85 mg 80 mg   Next Week Total 85 mg 80 mg 75 mg   Sun 10 mg 10 mg 10 mg   Mon 15 mg 15 mg -   Tue 10 mg 10 mg -   Wed 15 mg 10 mg (12/11) 10 mg (12/18)   Thu 10 mg 10 mg 10 mg   Fri 15 mg 15 mg 10 mg (12/20)   Sat 10 mg 10 mg 10 mg   Visit Report - - -   Some recent data might be hidden       Plan:  1. INR is supratherapeutic today- see above in Anticoagulation Summary. Spoke with  Kameron Melendez and instructed to decrease today and Friday to 10mg (11% weekly decrease).  2. Follow up 11/23  3. Pt has agreed to only be called if INR out of range. They have been instructed to call if any changes in medications, doses,  concerns, etc. Patient expresses understanding and has no further questions at this time.    Alem Oscar, [facility]

## 2019-12-19 ENCOUNTER — READMISSION MANAGEMENT (OUTPATIENT)
Dept: CALL CENTER | Facility: HOSPITAL | Age: 49
End: 2019-12-19

## 2019-12-19 NOTE — OUTREACH NOTE
Medical Week 3 Survey      Responses   Facility patient discharged from?  [facility]   Does the patient have one of the following disease processes/diagnoses(primary or secondary)?  Other   Week 3 attempt successful?  Yes   Call start time  1429   Call end time  1432   Discharge diagnosis  intractable back pain   Meds reviewed with patient/caregiver?  Yes   Is the patient taking all medications as directed (includes completed medication regime)?  Yes   Has the patient kept scheduled appointments due by today?  Yes   What is the Home health agency?   [facility]   Has home health visited the patient within 72 hours of discharge?  Yes   Home health comments  PT started   What is the patient's perception of their health status since discharge?  Improving   Week 3 Call Completed?  Yes   Wrap up additional comments  PT working with him. Pain is greatly improved. Pt is relieved. Still not back to work yet but making good progress.           Pati Marinelli RN

## 2019-12-23 ENCOUNTER — ANTICOAGULATION VISIT (OUTPATIENT)
Dept: PHARMACY | Facility: HOSPITAL | Age: 49
End: 2019-12-23

## 2019-12-23 DIAGNOSIS — I26.99 BILATERAL PULMONARY EMBOLISM (HCC): ICD-10-CM

## 2019-12-23 DIAGNOSIS — I26.99 OTHER ACUTE PULMONARY EMBOLISM WITHOUT ACUTE COR PULMONALE (HCC): ICD-10-CM

## 2019-12-23 LAB — INR PPP: 4.4

## 2019-12-23 NOTE — PROGRESS NOTES
"Anticoagulation Clinic Progress Note    Anticoagulation Summary  As of 2019    INR goal:   2.0-3.0   TTR:   84.2 % (10.5 mo)   INR used for dosin.40! (2019)   Warfarin maintenance plan:   10 mg every day   Weekly warfarin total:   70 mg   Plan last modified:   Ramo Morocho RPH (2019)   Next INR check:   2019   Priority:   High   Target end date:   Indefinite    Indications    Other acute pulmonary embolism without acute cor pulmonale (CMS/HCC) [I26.99]  History of bilateral pulmonary embolism (CMS/HCC) [I26.99]             Anticoagulation Episode Summary     INR check location:       Preferred lab:       Send INR reminders to:    SMOOTH RESTREPO  POOL    Comments:   new [employer] home frankie 2019      Anticoagulation Care Providers     Provider Role Specialty Phone number    Torri Colmenares, APRN Referring Cardiology [phone]    Elsy Baeza MD Responsible Cardiology [phone]            Clinic Interview:  Patient Findings     Positives:   Change in health, Change in medications    Negatives:   Signs/symptoms of thrombosis, Signs/symptoms of bleeding,   Laboratory test error suspected, Change in alcohol use, Change in   activity, Upcoming invasive procedure, Emergency department visit,   Upcoming dental procedure, Missed doses, Extra doses, Change in   diet/appetite, Hospital admission, Bruising, Other complaints    Comments:   Pt cut out APAP since  to ensure was not affecting INR.   Pain \"not as bad\".      Clinical Outcomes     Negatives:   Major bleeding event, Thromboembolic event,   Anticoagulation-related hospital admission, Anticoagulation-related ED   visit, Anticoagulation-related fatality    Comments:   Pt cut out APAP since  to ensure was not affecting INR.   Pain \"not as bad\".        INR History:  Anticoagulation Monitoring 2019   INR 3.50 3.80 4.40   INR Date 2019   INR Goal 2.0-3.0 " 2.0-3.0 2.0-3.0   Trend Same Same Down   Last Week Total 85 mg 80 mg 75 mg   Next Week Total 80 mg 75 mg 60 mg   Sun 10 mg 10 mg -   Mon 15 mg - Hold (12/23)   Tue 10 mg - 10 mg   Wed 10 mg (12/11) 10 mg (12/18) 10 mg   Thu 10 mg 10 mg -   Fri 15 mg 10 mg (12/20) -   Sat 10 mg 10 mg -   Visit Report - - -   Some recent data might be hidden       Plan:  1. INR is Supratherapeutic today- see above in Anticoagulation Summary.   Will instruct Kameron Melendez to Change their warfarin regimen- see above in Anticoagulation Summary.  2. Follow up in 3 days  3. They have been instructed to call if any changes in medications, doses, concerns, etc. Patient expresses understanding and has no further questions at this time.    Ramo Morocho [facility]

## 2019-12-26 ENCOUNTER — ANTICOAGULATION VISIT (OUTPATIENT)
Dept: PHARMACY | Facility: HOSPITAL | Age: 49
End: 2019-12-26

## 2019-12-26 DIAGNOSIS — I26.99 OTHER ACUTE PULMONARY EMBOLISM WITHOUT ACUTE COR PULMONALE (HCC): ICD-10-CM

## 2019-12-26 DIAGNOSIS — I26.99 BILATERAL PULMONARY EMBOLISM (HCC): ICD-10-CM

## 2019-12-26 LAB — INR PPP: 1.9

## 2019-12-26 NOTE — PROGRESS NOTES
Anticoagulation Clinic Progress Note    Anticoagulation Summary  As of 2019    INR goal:   2.0-3.0   TTR:   83.8 % (10.6 mo)   INR used for dosin.90! (2019)   Warfarin maintenance plan:   10 mg every day   Weekly warfarin total:   70 mg   No change documented:   Ramo Morocho RPH   Plan last modified:   Ramo Morocho RPH (2019)   Next INR check:   2019   Priority:   High   Target end date:   Indefinite    Indications    Other acute pulmonary embolism without acute cor pulmonale (CMS/HCC) [I26.99]  History of bilateral pulmonary embolism (CMS/HCC) [I26.99]             Anticoagulation Episode Summary     INR check location:       Preferred lab:       Send INR reminders to:   M[facility]REPO  POOL    Comments:   new [employer] home frankie 2019      Anticoagulation Care Providers     Provider Role Specialty Phone number    Torri Colmenares APRN Referring Cardiology [phone]    Elsy Baeza MD Responsible Cardiology [phone]            Clinic Interview:  Patient Findings     Negatives:   Signs/symptoms of thrombosis, Signs/symptoms of bleeding,   Laboratory test error suspected, Change in health, Change in alcohol use,   Change in activity, Upcoming invasive procedure, Emergency department   visit, Upcoming dental procedure, Missed doses, Extra doses, Change in   medications, Change in diet/appetite, Hospital admission, Bruising, Other   complaints      Clinical Outcomes     Negatives:   Major bleeding event, Thromboembolic event,   Anticoagulation-related hospital admission, Anticoagulation-related ED   visit, Anticoagulation-related fatality        INR History:  Anticoagulation Monitoring 2019   INR 3.80 4.40 1.90   INR Date 2019   INR Goal 2.0-3.0 2.0-3.0 2.0-3.0   Trend Same Down Same   Last Week Total 80 mg 75 mg 60 mg   Next Week Total 75 mg 60 mg 70 mg   Sun 10 mg - 10 mg   Mon -  () -   Tue - 10  mg -   Wed 10 mg (12/18) 10 mg -   Thu 10 mg - 10 mg   Fri 10 mg (12/20) - 10 mg   Sat 10 mg - 10 mg   Visit Report - - -   Some recent data might be hidden       Plan:  1. INR is Subtherapeutic today- see above in Anticoagulation Summary.   Will instruct Kameron Rochamaria guadalupe to change their warfarin regimen to 10 mg daily - see above in Anticoagulation Summary.  2. Follow up in 4 days  3. Pt has agreed to only be called if INR out of range. They have been instructed to call if any changes in medications, doses, concerns, etc. Patient expresses understanding and has no further questions at this time.    Ramo Morocho [facility]

## 2019-12-30 ENCOUNTER — ANTICOAGULATION VISIT (OUTPATIENT)
Dept: PHARMACY | Facility: HOSPITAL | Age: 49
End: 2019-12-30

## 2019-12-30 DIAGNOSIS — I26.99 OTHER ACUTE PULMONARY EMBOLISM WITHOUT ACUTE COR PULMONALE (HCC): ICD-10-CM

## 2019-12-30 DIAGNOSIS — I26.99 BILATERAL PULMONARY EMBOLISM (HCC): ICD-10-CM

## 2019-12-30 LAB — INR PPP: 1.9

## 2019-12-30 NOTE — PROGRESS NOTES
Anticoagulation Clinic Progress Note    Anticoagulation Summary  As of 2019    INR goal:   2.0-3.0   TTR:   82.8 % (10.7 mo)   INR used for dosin.90! (2019)   Warfarin maintenance plan:   15 mg every Mon; 10 mg all other days   Weekly warfarin total:   75 mg   Plan last modified:   Ramo Morocho RPH (2019)   Next INR check:   1/3/2020   Priority:   High   Target end date:   Indefinite    Indications    Other acute pulmonary embolism without acute cor pulmonale (CMS/HCC) [I26.99]  History of bilateral pulmonary embolism (CMS/HCC) [I26.99]             Anticoagulation Episode Summary     INR check location:       Preferred lab:       Send INR reminders to:    SMOOTH RESTREPO  POOL    Comments:   new [employer] home frankie 2019      Anticoagulation Care Providers     Provider Role Specialty Phone number    Torri Colmenares APRN Referring Cardiology [phone]    Elsy Baeza MD Responsible Cardiology [phone]            Clinic Interview:  Patient Findings     Positives:   Change in health    Negatives:   Signs/symptoms of thrombosis, Signs/symptoms of bleeding,   Laboratory test error suspected, Change in alcohol use, Change in   activity, Upcoming invasive procedure, Emergency department visit,   Upcoming dental procedure, Missed doses, Extra doses, Change in   medications, Change in diet/appetite, Hospital admission, Bruising, Other   complaints    Comments:   Pain improving.      Clinical Outcomes     Negatives:   Major bleeding event, Thromboembolic event,   Anticoagulation-related hospital admission, Anticoagulation-related ED   visit, Anticoagulation-related fatality    Comments:   Pain improving.        INR History:  Anticoagulation Monitoring 2019   INR 4.40 1.90 1.90   INR Date 2019   INR Goal 2.0-3.0 2.0-3.0 2.0-3.0   Trend Down Same Up   Last Week Total 75 mg 60 mg 60 mg   Next Week Total 60 mg 70 mg 75 mg    Sun - 10 mg -   Mon Hold (12/23) - 15 mg   Tue 10 mg - 10 mg   Wed 10 mg - 10 mg   Thu - 10 mg 10 mg   Fri - 10 mg -   Sat - 10 mg -   Visit Report - - -   Some recent data might be hidden       Plan:  1. INR is Subtherapeutic today- see above in Anticoagulation Summary.   Will instruct Kameron Rochamaria guadalupe to Increase their warfarin regimen- see above in Anticoagulation Summary.  2. Follow up in 4 days  3. They have been instructed to call if any changes in medications, doses, concerns, etc. Patient expresses understanding and has no further questions at this time.    Ramo Morocho [facility]

## 2020-01-03 ENCOUNTER — ANTICOAGULATION VISIT (OUTPATIENT)
Dept: PHARMACY | Facility: HOSPITAL | Age: 50
End: 2020-01-03

## 2020-01-03 DIAGNOSIS — I26.99 OTHER ACUTE PULMONARY EMBOLISM WITHOUT ACUTE COR PULMONALE (HCC): ICD-10-CM

## 2020-01-03 DIAGNOSIS — I26.99 BILATERAL PULMONARY EMBOLISM (HCC): ICD-10-CM

## 2020-01-03 LAB — INR PPP: 2.3

## 2020-01-03 NOTE — PROGRESS NOTES
Anticoagulation Clinic Progress Note    Anticoagulation Summary  As of 1/3/2020    INR goal:   2.0-3.0   TTR:   82.7 % (10.9 mo)   INR used for dosin.30 (1/3/2020)   Warfarin maintenance plan:   15 mg every Mon; 10 mg all other days   Weekly warfarin total:   75 mg   No change documented:   Ramo Morocho RPH   Plan last modified:   Ramo Morocho RPH (2019)   Next INR check:   1/10/2020   Priority:   High   Target end date:   Indefinite    Indications    Other acute pulmonary embolism without acute cor pulmonale (CMS/HCC) [I26.99]  History of bilateral pulmonary embolism (CMS/HCC) [I26.99]             Anticoagulation Episode Summary     INR check location:       Preferred lab:       Send INR reminders to:    S[facility]OTH [facility]  POOL    Comments:   new [employer] home frankie 2019      Anticoagulation Care Providers     Provider Role Specialty Phone number    Torri Colmenares APRN Referring Cardiology [phone]    Elsy Baeza MD Responsible Cardiology [phone]          Clinic Interview:  No pertinent clinical findings have been reported.    INR History:  Anticoagulation Monitoring 2019 2019 1/3/2020   INR 1.90 1.90 2.30   INR Date 2019 2019 1/3/2020   INR Goal 2.0-3.0 2.0-3.0 2.0-3.0   Trend Same Up Same   Last Week Total 60 mg 60 mg 75 mg   Next Week Total 70 mg 75 mg 75 mg   Sun 10 mg - 10 mg   Mon - 15 mg 15 mg   Tue - 10 mg 10 mg   Wed - 10 mg 10 mg   Thu 10 mg 10 mg 10 mg   Fri 10 mg - 10 mg   Sat 10 mg - 10 mg   Visit Report - - -   Some recent data might be hidden       Plan:  1. INR is therapeutic today- see above in Anticoagulation Summary.    Kameron Melendez to continue their warfarin regimen- see above in Anticoagulation Summary.  2. Follow up in 1 week  3. They have been instructed to call if any changes in medications, doses, concerns, etc. Patient expresses understanding and has no further questions at this time.    Ramo Morocho RPH

## 2020-01-11 LAB — INR PPP: 1.5

## 2020-01-13 ENCOUNTER — ANTICOAGULATION VISIT (OUTPATIENT)
Dept: PHARMACY | Facility: HOSPITAL | Age: 50
End: 2020-01-13

## 2020-01-13 DIAGNOSIS — I26.99 BILATERAL PULMONARY EMBOLISM (HCC): ICD-10-CM

## 2020-01-13 DIAGNOSIS — I26.99 OTHER ACUTE PULMONARY EMBOLISM WITHOUT ACUTE COR PULMONALE (HCC): ICD-10-CM

## 2020-01-13 NOTE — PROGRESS NOTES
Anticoagulation Clinic Progress Note    Anticoagulation Summary  As of 2020    INR goal:   2.0-3.0   TTR:   81.6 % (11.1 mo)   INR used for dosin.50! (2020)   Warfarin maintenance plan:   15 mg every Mon; 10 mg all other days   Weekly warfarin total:   75 mg   Plan last modified:   Ramo Morocho [facility] (2019)   Next INR check:   2020   Priority:   High   Target end date:   Indefinite    Indications    Other acute pulmonary embolism without acute cor pulmonale (CMS/HCC) [I26.99]  History of bilateral pulmonary embolism (CMS/HCC) [I26.99]             Anticoagulation Episode Summary     INR check location:       Preferred lab:       Send INR reminders to:    SMOOTH RESTREPO  POOL    Comments:   new [employer] home frankie 2019      Anticoagulation Care Providers     Provider Role Specialty Phone number    Torri Colmenares APRN Referring Cardiology [phone]    Elsy Baeza MD Responsible Cardiology [phone]          Drug interactions: has remained unchanged.  Diet: has remained unchanged.    Clinic Interview:      INR History:  Anticoagulation Monitoring 2019 1/3/2020 2020   INR 1.90 2.30 1.50   INR Date 2019 1/3/2020 2020   INR Goal 2.0-3.0 2.0-3.0 2.0-3.0   Trend Up Same Same   Last Week Total 60 mg 75 mg 80 mg   Next Week Total 75 mg 75 mg 80 mg   Sun - 10 mg 10 mg   Mon 15 mg 15 mg 15 mg   Tue 10 mg 10 mg 10 mg   Wed 10 mg 10 mg 15 mg (1/15)   Thu 10 mg 10 mg 10 mg   Fri - 10 mg 10 mg   Sat - 10 mg 10 mg   Visit Report - - -   Some recent data might be hidden       Plan:  1. INR is Subtherapeutic today- see above in Anticoagulation Summary.   Will instruct Kameron Melendez to Continue their warfarin regimen- see above in Anticoagulation Summary.  He took 15 mg on Saturday because his INR was low.  I will have him also take an extra 15 mg dose on Wednesday.  May need to consider adding an extra 15 mg day going forward.  2. Follow up on Friday  3. Pt  has agreed to only be called if INR out of range. They have been instructed to call if any changes in medications, doses, concerns, etc. Patient expresses understanding and has no further questions at this time.    Zan Arreguin III, [facility]

## 2020-01-17 ENCOUNTER — ANTICOAGULATION VISIT (OUTPATIENT)
Dept: PHARMACY | Facility: HOSPITAL | Age: 50
End: 2020-01-17

## 2020-01-17 DIAGNOSIS — I26.99 OTHER ACUTE PULMONARY EMBOLISM WITHOUT ACUTE COR PULMONALE (HCC): ICD-10-CM

## 2020-01-17 DIAGNOSIS — I26.99 BILATERAL PULMONARY EMBOLISM (HCC): ICD-10-CM

## 2020-01-17 LAB — INR PPP: 2.4

## 2020-01-17 NOTE — PROGRESS NOTES
Anticoagulation Clinic Progress Note    Anticoagulation Summary  As of 2020    INR goal:   2.0-3.0   TTR:   80.9 % (11.3 mo)   INR used for dosin.40 (2020)   Warfarin maintenance plan:   15 mg every Sun, Wed; 10 mg all other days   Weekly warfarin total:   80 mg   Plan last modified:   Ramo Morocho RPH (2020)   Next INR check:   2020   Priority:   High   Target end date:   Indefinite    Indications    Other acute pulmonary embolism without acute cor pulmonale (CMS/HCC) [I26.99]  History of bilateral pulmonary embolism (CMS/HCC) [I26.99]             Anticoagulation Episode Summary     INR check location:       Preferred lab:       Send INR reminders to:    S[facility]REPO  POOL    Comments:   new [employer] home frankie 2019      Anticoagulation Care Providers     Provider Role Specialty Phone number    Torri Colmenares APRN Referring Cardiology [phone]    Elsy Baeza MD Responsible Cardiology [phone]          Clinic Interview:  Patient Findings     Negatives:   Signs/symptoms of thrombosis, Signs/symptoms of bleeding,   Laboratory test error suspected, Change in health, Change in alcohol use,   Change in activity, Upcoming invasive procedure, Emergency department   visit, Upcoming dental procedure, Missed doses, Extra doses, Change in   medications, Change in diet/appetite, Hospital admission, Bruising, Other   complaints      Clinical Outcomes     Negatives:   Major bleeding event, Thromboembolic event,   Anticoagulation-related hospital admission, Anticoagulation-related ED   visit, Anticoagulation-related fatality        INR History:  Anticoagulation Monitoring 1/3/2020 2020 2020   INR 2.30 1.50 2.40   INR Date 1/3/2020 2020 2020   INR Goal 2.0-3.0 2.0-3.0 2.0-3.0   Trend Same Same Up   Last Week Total 75 mg 80 mg 85 mg   Next Week Total 75 mg 80 mg 80 mg   Sun 10 mg 10 mg 15 mg   Mon 15 mg 15 mg 10 mg   Tue 10 mg 10 mg 10 mg   Wed 10 mg 15  mg (1/15) 15 mg   Thu 10 mg 10 mg 10 mg   Fri 10 mg 10 mg 10 mg   Sat 10 mg 10 mg 10 mg   Visit Report - - -   Some recent data might be hidden       Plan:  1. INR is Therapeutic today- see above in Anticoagulation Summary.   Will instruct Kameron Melendez to Change their warfarin regimen- see above in Anticoagulation Summary.  2. Follow up in 1 week  3. Pt has agreed to only be called if INR out of range. They have been instructed to call if any changes in medications, doses, concerns, etc. Patient expresses understanding and has no further questions at this time.    Ramo Morocho, [facility]

## 2020-01-24 ENCOUNTER — ANTICOAGULATION VISIT (OUTPATIENT)
Dept: PHARMACY | Facility: HOSPITAL | Age: 50
End: 2020-01-24

## 2020-01-24 DIAGNOSIS — I26.99 BILATERAL PULMONARY EMBOLISM (HCC): ICD-10-CM

## 2020-01-24 DIAGNOSIS — I26.99 OTHER ACUTE PULMONARY EMBOLISM WITHOUT ACUTE COR PULMONALE (HCC): ICD-10-CM

## 2020-01-24 LAB — INR PPP: 1.8

## 2020-01-24 NOTE — PROGRESS NOTES
Anticoagulation Clinic Progress Note    Anticoagulation Summary  As of 2020    INR goal:   2.0-3.0   TTR:   80.7 % (11.6 mo)   INR used for dosin.80! (2020)   Warfarin maintenance plan:   15 mg every Sun, Wed, Fri; 10 mg all other days   Weekly warfarin total:   85 mg   Plan last modified:   Kim Sotelo RPH (2020)   Next INR check:   2020   Priority:   High   Target end date:   Indefinite    Indications    Other acute pulmonary embolism without acute cor pulmonale (CMS/HCC) [I26.99]  History of bilateral pulmonary embolism (CMS/HCC) [I26.99]             Anticoagulation Episode Summary     INR check location:       Preferred lab:       Send INR reminders to:   MIR RESTREPO  POOL    Comments:   new [employer] home frankie 2019      Anticoagulation Care Providers     Provider Role Specialty Phone number    Torri Colmenares APRN Referring Cardiology [phone]    Elsy Baeza MD Responsible Cardiology [phone]          Drug interactions: has remained unchanged.  Diet: has remained unchanged.    Clinic Interview:      INR History:  Anticoagulation Monitoring 2020   INR 1.50 2.40 1.80   INR Date 2020   INR Goal 2.0-3.0 2.0-3.0 2.0-3.0   Trend Same Up Up   Last Week Total 80 mg 85 mg 80 mg   Next Week Total 80 mg 80 mg 85 mg   Sun 10 mg 15 mg 15 mg   Mon 15 mg 10 mg 10 mg   Tue 10 mg 10 mg 10 mg   Wed 15 mg (1/15) 15 mg 15 mg   Thu 10 mg 10 mg 10 mg   Fri 10 mg 10 mg 15 mg   Sat 10 mg 10 mg 10 mg   Visit Report - - -   Some recent data might be hidden       Plan:  1. INR is Subtherapeutic today- see above in Anticoagulation Summary.   Will instruct Kameron Melendez to Increase their warfarin regimen- see above in Anticoagulation Summary.  2. Follow up in 1 week  3. Spoke with patient today. They have been instructed to call if any changes in medications, doses, concerns, etc. Patient expresses understanding and has no  further questions at this time.    Kim Sotelo RPH

## 2020-02-05 ENCOUNTER — ANTICOAGULATION VISIT (OUTPATIENT)
Dept: PHARMACY | Facility: HOSPITAL | Age: 50
End: 2020-02-05

## 2020-02-05 DIAGNOSIS — I26.99 OTHER ACUTE PULMONARY EMBOLISM WITHOUT ACUTE COR PULMONALE (HCC): ICD-10-CM

## 2020-02-05 DIAGNOSIS — I26.99 BILATERAL PULMONARY EMBOLISM (HCC): ICD-10-CM

## 2020-02-05 LAB — INR PPP: 1.8

## 2020-02-05 NOTE — PROGRESS NOTES
Anticoagulation Clinic Progress Note    Anticoagulation Summary  As of 2020    INR goal:   2.0-3.0   TTR:   78.0 % (12 mo)   INR used for dosin.80! (2020)   Warfarin maintenance plan:   15 mg every Sun, Wed, Fri; 10 mg all other days   Weekly warfarin total:   85 mg   Plan last modified:   Kim Sotelo RPH (2020)   Next INR check:   2020   Priority:   High   Target end date:   Indefinite    Indications    Other acute pulmonary embolism without acute cor pulmonale (CMS/HCC) [I26.99]  History of bilateral pulmonary embolism (CMS/HCC) [I26.99]             Anticoagulation Episode Summary     INR check location:       Preferred lab:       Send INR reminders to:    SMOOTH RESTREPO  POOL    Comments:   new [employer] home frankie 2019      Anticoagulation Care Providers     Provider Role Specialty Phone number    Torri Colmenares APRN Referring Cardiology [phone]    Elsy Baeza MD Responsible Cardiology [phone]            Clinic Interview:  Patient Findings     Negatives:   Signs/symptoms of thrombosis, Signs/symptoms of bleeding,   Laboratory test error suspected, Change in health, Change in alcohol use,   Change in activity, Upcoming invasive procedure, Emergency department   visit, Upcoming dental procedure, Missed doses, Extra doses, Change in   medications, Change in diet/appetite, Hospital admission, Bruising, Other   complaints    Comments:   Unable to reach after multiple attempts; pt called back and   left VM instructing us to leave instructions on his VM.       Clinical Outcomes     Negatives:   Major bleeding event, Thromboembolic event,   Anticoagulation-related hospital admission, Anticoagulation-related ED   visit, Anticoagulation-related fatality    Comments:   Unable to reach after multiple attempts; pt called back and   left VM instructing us to leave instructions on his VM.         INR History:  Anticoagulation Monitoring 2020    INR 2.40 1.80 1.80   INR Date 1/17/2020 1/24/2020 2/5/2020   INR Goal 2.0-3.0 2.0-3.0 2.0-3.0   Trend Up Up Same   Last Week Total 85 mg 80 mg 85 mg   Next Week Total 80 mg 85 mg 90 mg   Sun 15 mg 15 mg 15 mg   Mon 10 mg 10 mg 10 mg   Tue 10 mg 10 mg 10 mg   Wed 15 mg 15 mg 15 mg   Thu 10 mg 10 mg 15 mg (2/6); Otherwise 10 mg   Fri 10 mg 15 mg 15 mg   Sat 10 mg 10 mg 10 mg   Visit Report - - -   Some recent data might be hidden       Plan:  1. INR is Subtherapeutic today- see above in Anticoagulation Summary.   Will instruct Kameron Melendez to Change their warfarin regimen- see above in Anticoagulation Summary.  2. Follow up in 2 weeks  3. Unable to reach after multiple attempts. Left VM with instructions as above per pt's permission. Invited to contact clinic with any questions/concerns.     Ramo Morocho RP   - per PCP, Dr. Telles had extensive bilteral DVT was treated as unprovoked and started of eliquis 5mg Q12H, latest imaging shows resolution of previous clot, no active clot.   -If pt does not have risk factor for recurrent clot, then IVC likely not indicated as per IR per prior notes  -IR unwilling to do IVC filter unless active clot identified.   -Hold full dose a/c given SDH per neurosurgery - per PCP, Dr. Telles had extensive bilateral DVT was treated as unprovoked and was started of eliquis 5mg Q12H, latest imaging shows resolution of previous clot, no active clot.   -If pt does not have risk factor for recurrent clot, then IVC likely not indicated as per IR per prior notes  -IR unwilling to do IVC filter unless active clot identified.   -Hold full dose a/c given SDH per neurosurgery

## 2020-02-14 ENCOUNTER — ANTICOAGULATION VISIT (OUTPATIENT)
Dept: PHARMACY | Facility: HOSPITAL | Age: 50
End: 2020-02-14

## 2020-02-14 DIAGNOSIS — I26.99 OTHER ACUTE PULMONARY EMBOLISM WITHOUT ACUTE COR PULMONALE (HCC): ICD-10-CM

## 2020-02-14 DIAGNOSIS — I26.99 BILATERAL PULMONARY EMBOLISM (HCC): ICD-10-CM

## 2020-02-14 LAB — INR PPP: 2.7

## 2020-02-14 NOTE — PROGRESS NOTES
Anticoagulation Clinic Progress Note    Anticoagulation Summary  As of 2020    INR goal:   2.0-3.0   TTR:   78.0 % (1 y)   INR used for dosin.70 (2020)   Warfarin maintenance plan:   15 mg every Sun, Wed, Fri; 10 mg all other days   Weekly warfarin total:   85 mg   Plan last modified:   Kim Sotelo RPH (2020)   Next INR check:   2020   Priority:   High   Target end date:   Indefinite    Indications    Other acute pulmonary embolism without acute cor pulmonale (CMS/HCC) [I26.99]  History of bilateral pulmonary embolism (CMS/HCC) [I26.99]             Anticoagulation Episode Summary     INR check location:       Preferred lab:       Send INR reminders to:    [facility]  [facility]OL    Comments:   new [employer] home frankie 2019      Anticoagulation Care Providers     Provider Role Specialty Phone number    Torri Colmenares APRN Referring Cardiology [phone]    Elsy Baeza MD Responsible Cardiology [phone]          Drug interactions: has remained unchanged.  Diet: has remained unchanged.    Clinic Interview:      INR History:  Anticoagulation Monitoring 2020   INR 1.80 1.80 2.70   INR Date 2020   INR Goal 2.0-3.0 2.0-3.0 2.0-3.0   Trend Up Same Same   Last Week Total 80 mg 85 mg 85 mg   Next Week Total 85 mg 90 mg 85 mg   Sun 15 mg 15 mg 15 mg   Mon 10 mg 10 mg 10 mg   Tue 10 mg 10 mg 10 mg   Wed 15 mg 15 mg 15 mg   Thu 10 mg 15 mg (); Otherwise 10 mg 10 mg   Fri 15 mg 15 mg 15 mg   Sat 10 mg 10 mg 10 mg   Visit Report - - -   Some recent data might be hidden       Plan:  1. INR is Therapeutic today- see above in Anticoagulation Summary.   Will instruct Kameron Melendez to Continue their warfarin regimen- see above in Anticoagulation Summary.  Left message advising to continue current warfarin regimen.  2. Follow up in 2 weeks  3. Pt has agreed to only be called if INR out of range. They have been instructed to call if  any changes in medications, doses, concerns, etc. Patient expresses understanding and has no further questions at this time.    Zan Arreguin III, [facility]

## 2020-02-21 RX ORDER — WARFARIN SODIUM 5 MG/1
TABLET ORAL
Qty: 40 TABLET | Refills: 1 | Status: SHIPPED | OUTPATIENT
Start: 2020-02-21 | End: 2020-08-03

## 2020-02-21 RX ORDER — WARFARIN SODIUM 10 MG/1
TABLET ORAL
Qty: 90 TABLET | Refills: 1 | Status: SHIPPED | OUTPATIENT
Start: 2020-02-21 | End: 2020-08-03

## 2020-02-24 ENCOUNTER — ANTICOAGULATION VISIT (OUTPATIENT)
Dept: PHARMACY | Facility: HOSPITAL | Age: 50
End: 2020-02-24

## 2020-02-24 DIAGNOSIS — I26.99 OTHER ACUTE PULMONARY EMBOLISM WITHOUT ACUTE COR PULMONALE (HCC): ICD-10-CM

## 2020-02-24 DIAGNOSIS — I26.99 BILATERAL PULMONARY EMBOLISM (HCC): ICD-10-CM

## 2020-02-24 LAB — INR PPP: 2.4

## 2020-02-24 NOTE — PROGRESS NOTES
Anticoagulation Clinic Progress Note    Anticoagulation Summary  As of 2020    INR goal:   2.0-3.0   TTR:   78.5 % (1 y)   INR used for dosin.40 (2020)   Warfarin maintenance plan:   15 mg every Sun, Wed, Fri; 10 mg all other days   Weekly warfarin total:   85 mg   No change documented:   Ramo Morocho RPH   Plan last modified:   Kim Sotelo RPH (2020)   Next INR check:   3/9/2020   Priority:   High   Target end date:   Indefinite    Indications    Other acute pulmonary embolism without acute cor pulmonale (CMS/HCC) [I26.99]  History of bilateral pulmonary embolism (CMS/HCC) [I26.99]             Anticoagulation Episode Summary     INR check location:       Preferred lab:       Send INR reminders to:    [facility]  POOL    Comments:   new [employer] home frankie 2019      Anticoagulation Care Providers     Provider Role Specialty Phone number    Torri Colmenares APRN Referring Cardiology [phone]    Elsy Baeza MD Responsible Cardiology [phone]          Clinic Interview:  No pertinent clinical findings have been reported.    INR History:  Anticoagulation Monitoring 2020   INR 1.80 2.70 2.40   INR Date 2020   INR Goal 2.0-3.0 2.0-3.0 2.0-3.0   Trend Same Same Same   Last Week Total 85 mg 85 mg 85 mg   Next Week Total 90 mg 85 mg 85 mg   Sun 15 mg 15 mg 15 mg   Mon 10 mg 10 mg 10 mg   Tue 10 mg 10 mg 10 mg   Wed 15 mg 15 mg 15 mg   Thu 15 mg (); Otherwise 10 mg 10 mg 10 mg   Fri 15 mg 15 mg 15 mg   Sat 10 mg 10 mg 10 mg   Visit Report - - -   Some recent data might be hidden       Plan:  1. INR is therapeutic today- see above in Anticoagulation Summary.    Kameron Melendez to continue their warfarin regimen- see above in Anticoagulation Summary.  2. Follow up in 2 weeks  3. Pt has agreed to only be called if INR out of range. They have been instructed to call if any changes in medications, doses, concerns, etc. Patient  expresses understanding and has no further questions at this time.    Ramo Morocho, RP

## 2020-03-06 ENCOUNTER — ANTICOAGULATION VISIT (OUTPATIENT)
Dept: PHARMACY | Facility: HOSPITAL | Age: 50
End: 2020-03-06

## 2020-03-06 DIAGNOSIS — I26.99 OTHER ACUTE PULMONARY EMBOLISM WITHOUT ACUTE COR PULMONALE (HCC): ICD-10-CM

## 2020-03-06 DIAGNOSIS — I26.99 BILATERAL PULMONARY EMBOLISM (HCC): ICD-10-CM

## 2020-03-06 LAB — INR PPP: 1.2

## 2020-03-06 NOTE — PROGRESS NOTES
Anticoagulation Clinic Progress Note    Anticoagulation Summary  As of 3/6/2020    INR goal:   2.0-3.0   TTR:   77.3 % (1.1 y)   INR used for dosin.20! (3/6/2020)   Warfarin maintenance plan:   15 mg every Sun, Wed, Fri; 10 mg all other days   Weekly warfarin total:   85 mg   Plan last modified:   Kim Sotelo RPH (2020)   Next INR check:   3/10/2020   Priority:   High   Target end date:   Indefinite    Indications    Other acute pulmonary embolism without acute cor pulmonale (CMS/HCC) [I26.99]  History of bilateral pulmonary embolism (CMS/HCC) [I26.99]             Anticoagulation Episode Summary     INR check location:       Preferred lab:       Send INR reminders to:   [facility]  POOL    Comments:   new [employer] home frankie 2019      Anticoagulation Care Providers     Provider Role Specialty Phone number    Torri Colmenares APRN Referring Cardiology [phone]    Elsy Baeza MD Responsible Cardiology [phone]            Clinic Interview:  Patient Findings     Positives:   Change in health, Missed doses, Extra doses, Change in   medications, Change in diet/appetite    Negatives:   Signs/symptoms of thrombosis, Signs/symptoms of bleeding,   Laboratory test error suspected, Change in alcohol use, Change in   activity, Upcoming invasive procedure, Emergency department visit,   Upcoming dental procedure, Hospital admission, Bruising, Other complaints    Comments:   Feeling poorly this week - taking some guaifenesin, APAP prn,   and a lot of Ricola cough drops that contain herbs ([place], thyme, other   herbal products). Increased vit k last weekend (broccoli). Missed dose 2   days ago, took extra 5 mg yesterday.      Clinical Outcomes     Negatives:   Major bleeding event, Thromboembolic event,   Anticoagulation-related hospital admission, Anticoagulation-related ED   visit, Anticoagulation-related fatality    Comments:   Feeling poorly this week - taking some guaifenesin, APAP  prn,   and a lot of Ricola cough drops that contain herbs ([place], thyme, other   herbal products). Increased vit k last weekend (broccoli). Missed dose 2   days ago, took extra 5 mg yesterday.        INR History:  Anticoagulation Monitoring 2/14/2020 2/24/2020 3/6/2020   INR 2.70 2.40 1.20   INR Date 2/14/2020 2/24/2020 3/6/2020   INR Goal 2.0-3.0 2.0-3.0 2.0-3.0   Trend Same Same Same   Last Week Total 85 mg 85 mg 75 mg   Next Week Total 85 mg 85 mg 90 mg   Sun 15 mg 15 mg 15 mg   Mon 10 mg 10 mg 10 mg   Tue 10 mg 10 mg -   Wed 15 mg 15 mg -   Thu 10 mg 10 mg -   Fri 15 mg 15 mg 15 mg   Sat 10 mg 10 mg 15 mg (3/7)   Visit Report - - -   Some recent data might be hidden       Plan:  1. INR is Subtherapeutic today- see above in Anticoagulation Summary.   Will instruct Kameron Melendez to Change their warfarin regimen- see above in Anticoagulation Summary.  2. Follow up in 4 days  3. They have been instructed to call if any changes in medications, doses, concerns, etc. Patient expresses understanding and has no further questions at this time.    Ramo Morocho [facility]

## 2020-03-11 ENCOUNTER — ANTICOAGULATION VISIT (OUTPATIENT)
Dept: PHARMACY | Facility: HOSPITAL | Age: 50
End: 2020-03-11

## 2020-03-11 DIAGNOSIS — I26.99 OTHER ACUTE PULMONARY EMBOLISM WITHOUT ACUTE COR PULMONALE (HCC): ICD-10-CM

## 2020-03-11 DIAGNOSIS — I26.99 BILATERAL PULMONARY EMBOLISM (HCC): ICD-10-CM

## 2020-03-11 LAB — INR PPP: 1.9

## 2020-03-11 NOTE — PROGRESS NOTES
Anticoagulation Clinic Progress Note    Anticoagulation Summary  As of 3/11/2020    INR goal:   2.0-3.0   TTR:   76.3 % (1.1 y)   INR used for dosin.90! (3/11/2020)   Warfarin maintenance plan:   15 mg every Sun, Wed, Fri; 10 mg all other days   Weekly warfarin total:   85 mg   No change documented:   Ramo Morocho RPH   Plan last modified:   Kim Sotelo RPH (2020)   Next INR check:   3/18/2020   Priority:   High   Target end date:   Indefinite    Indications    Other acute pulmonary embolism without acute cor pulmonale (CMS/HCC) [I26.99]  History of bilateral pulmonary embolism (CMS/HCC) [I26.99]             Anticoagulation Episode Summary     INR check location:       Preferred lab:       Send INR reminders to:   [facility]  POOL    Comments:   new [employer] home frankie 2019      Anticoagulation Care Providers     Provider Role Specialty Phone number    Torri Colmenares, APRN Referring Cardiology [phone]    Elsy Baeza MD Responsible Cardiology [phone]            INR History:  Anticoagulation Monitoring 2020 3/6/2020 3/11/2020   INR 2.40 1.20 1.90   INR Date 2020 3/6/2020 3/11/2020   INR Goal 2.0-3.0 2.0-3.0 2.0-3.0   Trend Same Same Same   Last Week Total 85 mg 75 mg 80 mg   Next Week Total 85 mg 90 mg 85 mg   Sun 15 mg 15 mg 15 mg   Mon 10 mg 10 mg 10 mg   Tue 10 mg - 10 mg   Wed 15 mg - 15 mg   Thu 10 mg - 10 mg   Fri 15 mg 15 mg 15 mg   Sat 10 mg 15 mg (3/7) 10 mg   Visit Report - - -   Some recent data might be hidden       Plan:  1. INR is Subtherapeutic today- see above in Anticoagulation Summary.   Will instruct Kameron Melendez to Continue their warfarin regimen- see above in Anticoagulation Summary.  2. Follow up in 1 week  3. Left secure voicemail with instructions per pt permission. He has been instructed to call if any changes in medications, doses, concerns, etc.     Ramo Morocho RPH

## 2020-03-12 ENCOUNTER — TELEPHONE (OUTPATIENT)
Dept: CARDIOLOGY | Facility: CLINIC | Age: 50
End: 2020-03-12

## 2020-03-12 NOTE — TELEPHONE ENCOUNTER
Stp and asked COVID-19 prescreening questions. Pt is doing just fine as of today and will be here for his appointment.

## 2020-03-16 ENCOUNTER — TELEPHONE (OUTPATIENT)
Dept: CARDIOLOGY | Facility: CLINIC | Age: 50
End: 2020-03-16

## 2020-03-16 ENCOUNTER — OFFICE VISIT (OUTPATIENT)
Dept: CARDIOLOGY | Facility: CLINIC | Age: 50
End: 2020-03-16

## 2020-03-16 VITALS
DIASTOLIC BLOOD PRESSURE: 92 MMHG | HEART RATE: 73 BPM | HEIGHT: 74 IN | SYSTOLIC BLOOD PRESSURE: 144 MMHG | BODY MASS INDEX: 66.25 KG/M2

## 2020-03-16 DIAGNOSIS — I26.99 BILATERAL PULMONARY EMBOLISM (HCC): ICD-10-CM

## 2020-03-16 DIAGNOSIS — I51.7 RIGHT VENTRICULAR ENLARGEMENT: ICD-10-CM

## 2020-03-16 DIAGNOSIS — E66.01 MORBID OBESITY WITH BODY MASS INDEX OF 60.0-69.9 IN ADULT (HCC): ICD-10-CM

## 2020-03-16 DIAGNOSIS — G89.29 CHRONIC LOW BACK PAIN WITH SCIATICA, SCIATICA LATERALITY UNSPECIFIED, UNSPECIFIED BACK PAIN LATERALITY: ICD-10-CM

## 2020-03-16 DIAGNOSIS — E78.5 DYSLIPIDEMIA: ICD-10-CM

## 2020-03-16 DIAGNOSIS — I89.0 LYMPHEDEMA OF BOTH LOWER EXTREMITIES: ICD-10-CM

## 2020-03-16 DIAGNOSIS — M54.40 CHRONIC LOW BACK PAIN WITH SCIATICA, SCIATICA LATERALITY UNSPECIFIED, UNSPECIFIED BACK PAIN LATERALITY: ICD-10-CM

## 2020-03-16 DIAGNOSIS — D68.51 HETEROZYGOUS FACTOR V LEIDEN MUTATION (HCC): ICD-10-CM

## 2020-03-16 DIAGNOSIS — I27.20 PULMONARY HYPERTENSION (HCC): ICD-10-CM

## 2020-03-16 DIAGNOSIS — G47.33 OSA (OBSTRUCTIVE SLEEP APNEA): Primary | ICD-10-CM

## 2020-03-16 PROCEDURE — 99214 OFFICE O/P EST MOD 30 MIN: CPT | Performed by: NURSE PRACTITIONER

## 2020-03-16 PROCEDURE — 93000 ELECTROCARDIOGRAM COMPLETE: CPT | Performed by: NURSE PRACTITIONER

## 2020-03-16 NOTE — TELEPHONE ENCOUNTER
Before the pt left the office he asked if it would be possible for him to get a handicap sticker. Please advise sanna

## 2020-03-16 NOTE — PROGRESS NOTES
Date of Office Visit: 2020  Encounter Provider: FADY Cuellar  Primary Cardiologist: Dr. Baeza  Place of Service: [facility] CARDIOLOGY  Patient Name: Kameron Melendez  :1970      Subjective:     Chief Complaint:  6-month follow-up history of PE    History of Present Illness:  Kameron Melendez is a pleasant 50 y.o. male who is new to me .  Outside records have been obtained and reviewed by me.     This is a severely morbidly obese patient with a history of bilateral pulmonary embolism status post thrombectomy in 2017 with recurrent pulmonary embolism 2019, pulmonary hypertension, hyperlipidemia, obstructive sleep apnea, heterozygous mutation for factor V Leiden.      2017 patient was admitted with bilateral pulmonary embolism associated with significant right ventricular strain.  He had had a long car ride prior to that.  He had a normal troponin BNP as part of that work-up and underwent successful left pulmonary artery thrombectomy and infusion of TPA.  He was on warfarin due to his high BMI with heparin bridge.  He had no residual clot on follow-up venous Dopplers.  Repeat echo showed moderately dilated left ventricle with mildly dilated right ventricle with normal LV function with an EF 66% no significant valvular abnormalities.  Because this was felt to be a provoked venous thromboembolism it was noted he could stop his anticoagulation after a year.      2018 he saw JESSIE Sanchez in the office and was doing well and was recommended he stop his anticoagulation.    2019 with progressively worsening dyspnea.  In the ED he had an elevated BNP and normal troponin.  CT of the chest showed acute bilateral pulmonary embolism involving the lower lobes and proximal branch of the right middle lobe with evidence of RV strain.  He started on a heparin drip.  He had an echo that showed normal LV systolic function wall motion with an EF of 60%, grade 1 diastolic  dysfunction, mild TR, normal RV size and function and RVSP of 48.7 mmHg.  He had a lower extremity venous Doppler that showed right lower extremity DVTs of the popliteal gastrocnemius/soleal veins.  Due to the lack of RV strain on echo and relatively low thrombus burden it was recommended that they proceed with conservative management with anticoagulation and defer any invasive treatment.  Hematology evaluated him and hypercoagulable work-up revealed evidence of heterozygous mutation of factor V Leiden.    2/13/2019 patient was in the office to see Dr. Baeza.  He was doing well since discharge.  He had chronic lower extremity edema secondary to lymphedema edema.  He was being followed by the medication management with monitoring of his pro times.  It was recommended that he remain on warfarin indefinitely.  Dr. Baeza reported that would plan on seeing the patient back in 6 months.    8/15/2019 patient was last in the office to see Dr. Baeza.  His main issue is lower back pain primarily in the right lower back with possible sciatica.  His activity was limited by his pain.  Chronic lower extremity edema.  He reported compliance with medications.    Nov. 2019 began having issues with worsening back pain, numbness in legs and falling.  His left leg had pain and weakness causing it to give out and causing falls..  He was given steroids and muscle relaxers but continued to fall and was admitted and seen by neurology/neurosurgery.  Initially they were going to do a myelogram which they later held off.  He participated in physical therapy and started improving.  He is not had any falls since [place].  From a cardiac standpoint he denies chest pain, pressure, tightness, shortness of breath, palpitations.  He has chronic lower extremity edema secondary to lymphedema.  He denies any dizziness, lightheadedness, syncope, near syncope.  Unfortunately he does not tolerate CPAP machine for treatment of his obstructive sleep  apnea.  He periodically measures his blood pressure at home with a wrist cuff and gets readings 120s/80s-90s similar to what we got today.  He denies any abnormal bleeding issues and is compliant with warfarin which is managed by the medication management clinic.  When he was discharged from the hospital early December 2019 he was started on lisinopril for blood pressure which he did not continue taking is not sure why.  He does not have a primary care physician as he had some issues with his insurance when he was not able to work.  He would like assistance in finding a primary care physician.  He has been sleeping in his recliner for several months due to difficulty getting up to his bed which is upstairs.      Past Medical History:   Diagnosis Date   • DVT (deep venous thrombosis) (CMS/HCC)     RLE   • Factor V Leiden (CMS/HCC)    • Kidney stone    • Lymphedema    • Lymphedema of left lower extremity    • Obesity    • ARNOLDO (obstructive sleep apnea)    • Pulmonary embolism (CMS/HCC)     bilateral   • Pulmonary hypertension (CMS/HCC)    • Right ventricular enlargement      Past Surgical History:   Procedure Laterality Date   • CARDIAC CATHETERIZATION Bilateral 7/18/2017    Procedure: Pulmonary angiography- [place] ;  Surgeon: Cedric Coulter MD;  Location: [facility] CATH INVASIVE LOCATION;  Service:    • CARDIAC CATHETERIZATION N/A 7/18/2017    Procedure: Right Heart Cath;  Surgeon: Cedric Coulter MD;  Location:  [facility] CATH INVASIVE LOCATION;  Service:    • THROMBECTOMY       Outpatient Medications Prior to Visit   Medication Sig Dispense Refill   • acetaminophen (TYLENOL) 500 MG tablet Take 500 mg by mouth Every 6 (Six) Hours As Needed for Mild Pain .     • baclofen (LIORESAL) 10 MG tablet Take 1 tablet by mouth 3 (Three) Times a Day As Needed for Muscle Spasms. 60 tablet 0   • famotidine (PEPCID) 40 MG tablet Take 1 tablet by mouth 2 (Two) Times a Day Before Meals. 60 tablet 0   • HYDROcodone-acetaminophen (NORCO) 5-325 MG per  tablet Take 1 tablet by mouth Every 6 (Six) Hours As Needed for Moderate Pain  or Severe Pain . 10 tablet 0   • lisinopril (PRINIVIL,ZESTRIL) 5 MG tablet Take 1 tablet by mouth Daily. 30 tablet 0   • methylPREDNISolone (MEDROL, STANISLAV,) 4 MG tablet Take home dose pack and finish     • warfarin (COUMADIN) 10 MG tablet TAKE ONE 10MG TABLET BY MOUTH DAILY.  TAKES ONE 5MG TABLET WITH 10MG TABLET ON SUN, WED & FRI TO MAKE 15MG DOSE OR AS DIRECTED BY MED MANAGEMENT 90 tablet 1   • warfarin (COUMADIN) 5 MG tablet Take one 5mg tablet with one 10mg tablet to make 15mg dose on Sunday, Wednesday and Friday or as directed by Med Management Clinic 40 tablet 1     No facility-administered medications prior to visit.        Allergies as of 03/16/2020   • (No Known Allergies)     Social History     Socioeconomic History   • Marital status:      Spouse name: Not on file   • Number of children: Not on file   • Years of education: Not on file   • Highest education level: Not on file   Tobacco Use   • Smoking status: Light Tobacco Smoker     Types: Cigars   • Smokeless tobacco: Never Used   • Tobacco comment: occasional   Substance and Sexual Activity   • Alcohol use: No   • Drug use: No   • Sexual activity: Defer     Family History   Problem Relation Age of Onset   • Heart disease Mother    • Heart failure Mother    • Bradycardia Mother    • No Known Problems Father    • No Known Problems Maternal Grandmother    • No Known Problems Maternal Grandfather    • No Known Problems Paternal Grandmother    • No Known Problems Paternal Grandfather      Review of Systems   Constitution: Negative for chills, fever and malaise/fatigue.   HENT: Negative for ear pain, hearing loss, nosebleeds and sore throat.    Eyes: Negative for double vision, pain, vision loss in left eye and vision loss in right eye.   Cardiovascular: Positive for leg swelling. Negative for chest pain, claudication, dyspnea on exertion, irregular heartbeat, near-syncope,  "orthopnea, palpitations, paroxysmal nocturnal dyspnea and syncope.   Respiratory: Positive for cough and snoring. Negative for shortness of breath and wheezing.    Endocrine: Negative for cold intolerance and heat intolerance.   Hematologic/Lymphatic: Negative for bleeding problem.   Skin: Positive for itching. Negative for color change, rash and unusual hair distribution.   Musculoskeletal: Negative for joint pain and joint swelling.   Gastrointestinal: Negative for abdominal pain, diarrhea, hematochezia, melena, nausea and vomiting.   Genitourinary: Negative for decreased libido, frequency, hematuria, hesitancy and incomplete emptying.   Neurological: Negative for excessive daytime sleepiness, dizziness, headaches, light-headedness, loss of balance, numbness, paresthesias and seizures.   Psychiatric/Behavioral: Negative for depression.          Objective:     Vitals:    03/16/20 1509 03/16/20 1542   BP: 122/90 144/92   BP Location: Right arm Right arm   Patient Position: Sitting    Cuff Size: Adult    Pulse: 73    Height: 188 cm (74\")      Body mass index is 66.25 kg/m².    PHYSICAL EXAM:  Physical Exam   Constitutional: He is oriented to person, place, and time. He appears well-developed and well-nourished. No distress.   Severe morbid obesity, uses walker   HENT:   Head: Normocephalic and atraumatic.   Eyes: Pupils are equal, round, and reactive to light. Conjunctivae and EOM are normal.   Wearing glasses   Neck: No JVD present. Carotid bruit is not present.   Cardiovascular: Normal rate, regular rhythm and intact distal pulses. Exam reveals distant heart sounds.   No murmur heard.  Pulses:       Radial pulses are 2+ on the right side, and 2+ on the left side.   Severe lymphedema bilaterally   Pulmonary/Chest: Effort normal. No accessory muscle usage. No tachypnea. No respiratory distress. He has decreased breath sounds. He exhibits no tenderness.   Somewhat distant breath sounds but no obvious wheezes, rhonchi, " rales   Abdominal: Soft. Bowel sounds are normal. There is no tenderness.   Severely obese abdomen   Neurological: He is alert and oriented to person, place, and time.   Skin: Skin is warm, dry and intact. He is not diaphoretic. No erythema.   Psychiatric: He has a normal mood and affect. His speech is normal and behavior is normal. Judgment and thought content normal. Cognition and memory are normal.   Nursing note and vitals reviewed.        ECG 12 Lead  Date/Time: 3/16/2020 2:15 PM  Performed by: Martha Moscoso APRN  Authorized by: Martha Moscoso APRN   Comparison: compared with previous ECG   Rhythm: sinus rhythm  Rate: normal  BPM: 73  Conduction: 1st degree AV block  QRS axis: left  Other findings: poor R wave progression    Clinical impression: abnormal EKG  Comments: Indication:Hx of PEs            Assessment:       Diagnosis Plan   1. ARNOLDO (obstructive sleep apnea)  Ambulatory Referral to Internal Medicine   2. Chronic low back pain with sciatica, sciatica laterality unspecified, unspecified back pain laterality  Ambulatory Referral to Internal Medicine   3. History of bilateral pulmonary embolism (CMS/HCC)     4. Right ventricular enlargement     5. Pulmonary hypertension (CMS/HCC)     6. Morbid obesity with body mass index of 60.0-69.9 in adult (CMS/HCC)     7. Heterozygous factor V Leiden mutation (CMS/HCC)     8. Lymphedema of both lower extremities     9. Dyslipidemia         Plan:         1.  Recurrent bilateral pulmonary embolism.:  On warfarin which he checks himself with a home monitor and is managed by the medication management clinic.  He remains at risk for recurrent venous thromboembolisms and he will need to remain on anticoagulation indefinitely.  He denies any bleeding issues.  We did review safety and bleeding precautions especially given his leg weakness and falls a few months ago.  He is not had any falls since December and he will notify me if it starts becoming an issue again  2.   Elevated blood pressures: Noted to have elevated blood pressures in epic.  He reports they are actually well controlled at home but is not his cuff checked for accuracy.  We will plan to have him bring his cuff in several weeks once we are out of the range of COVID19 crisis to make sure his cuff is checked for accuracy will have him keep a detailed log to ensure he does not need to be back on lisinopril.  3.  Factor V Leiden heterozygosity  4.  Obstructive sleep apnea: Has been intolerant to treatment with CPAP in the past.  I extensively educated the patient on the risks of untreated sleep apnea.  He reports he is very aware and is not willing to go back to sleep medicine to had to have this treated at this time.  5.  Morbid obesity: Largely affected all aspects of care.  He has had a referral to bariatric surgery and has considered this but has deferred until he got his health insurance insurance situated.  6.    Lower back pain with left leg weakness: He is followed with neurology/neurosurgery.  He participated in PT which had some improvement in his symptoms.  He is using a walker.  He needs to establish care with a PCP and I have placed a referral.    No changes.  Will have him monitor his blood pressure at home and he was given parameters in a blood pressure log and was notified to call me with average readings outside of the parameters..  We will get him set up with the PCP by placing referral to internal medicine with assistance with the [facility] referral system.    Follow up with Dr. Baeza in 6 months, unless otherwise needed sooner.  I advised the patient to contact our office with any questions or concerns.       It has been a pleasure to participate in this patient's care. Please feel free to contact me with any questions or concerns.     Martha Moscoso, APRN  03/16/2020             Your medication list           Accurate as of March 16, 2020  4:28 PM. If you have any questions, ask your nurse or doctor.                 CONTINUE taking these medications      Instructions Last Dose Given Next Dose Due   acetaminophen 500 MG tablet  Commonly known as:  TYLENOL      Take 500 mg by mouth Every 6 (Six) Hours As Needed for Mild Pain .       baclofen 10 MG tablet  Commonly known as:  LIORESAL      Take 1 tablet by mouth 3 (Three) Times a Day As Needed for Muscle Spasms.       famotidine 40 MG tablet  Commonly known as:  PEPCID      Take 1 tablet by mouth 2 (Two) Times a Day Before Meals.       HYDROcodone-acetaminophen 5-325 MG per tablet  Commonly known as:  NORCO      Take 1 tablet by mouth Every 6 (Six) Hours As Needed for Moderate Pain  or Severe Pain .       lisinopril 5 MG tablet  Commonly known as:  PRINIVIL,ZESTRIL      Take 1 tablet by mouth Daily.       methylPREDNISolone 4 MG tablet  Commonly known as:  MEDROL (STANISLAV)      Take home dose pack and finish       warfarin 5 MG tablet  Commonly known as:  COUMADIN      Take one 5mg tablet with one 10mg tablet to make 15mg dose on Sunday, Wednesday and Friday or as directed by Med Management Clinic       warfarin 10 MG tablet  Commonly known as:  COUMADIN      TAKE ONE 10MG TABLET BY MOUTH DAILY.  TAKES ONE 5MG TABLET WITH 10MG TABLET ON SUN, WED & FRI TO MAKE 15MG DOSE OR AS DIRECTED BY MED MANAGEMENT              The above medication changes may not have been made by this provider.  Medication list was updated to reflect medications patient is currently taking including medication changes and discontinuations made by other healthcare providers.     Dictated utilizing Dragon Dictation System.

## 2020-03-18 ENCOUNTER — ANTICOAGULATION VISIT (OUTPATIENT)
Dept: PHARMACY | Facility: HOSPITAL | Age: 50
End: 2020-03-18

## 2020-03-18 DIAGNOSIS — I26.99 OTHER ACUTE PULMONARY EMBOLISM WITHOUT ACUTE COR PULMONALE (HCC): ICD-10-CM

## 2020-03-18 DIAGNOSIS — I26.99 BILATERAL PULMONARY EMBOLISM (HCC): ICD-10-CM

## 2020-03-18 LAB — INR PPP: 1.8

## 2020-03-18 NOTE — PROGRESS NOTES
Anticoagulation Clinic Progress Note    Anticoagulation Summary  As of 3/18/2020    INR goal:   2.0-3.0   TTR:   74.9 % (1.1 y)   INR used for dosin.80! (3/18/2020)   Warfarin maintenance plan:   10 mg every Tue, Thu, Sat; 15 mg all other days   Weekly warfarin total:   90 mg   Plan last modified:   Ramo Morocho RPH (3/18/2020)   Next INR check:   3/25/2020   Priority:   High   Target end date:   Indefinite    Indications    Other acute pulmonary embolism without acute cor pulmonale (CMS/HCC) [I26.99]  History of bilateral pulmonary embolism (CMS/HCC) [I26.99]             Anticoagulation Episode Summary     INR check location:       Preferred lab:       Send INR reminders to:    [facility]    Comments:   new [employer] home frankie 2019      Anticoagulation Care Providers     Provider Role Specialty Phone number    Torri Colmenares APRN Referring Cardiology [phone]    Elsy Baeza MD Responsible Cardiology [phone]              INR History:  Anticoagulation Monitoring 3/6/2020 3/11/2020 3/18/2020   INR 1.20 1.90 1.80   INR Date 3/6/2020 3/11/2020 3/18/2020   INR Goal 2.0-3.0 2.0-3.0 2.0-3.0   Trend Same Same Up   Last Week Total 75 mg 80 mg 85 mg   Next Week Total 90 mg 85 mg 90 mg   Sun 15 mg 15 mg 15 mg   Mon 10 mg 10 mg 15 mg   Tue - 10 mg 10 mg   Wed - 15 mg 15 mg   Thu - 10 mg 10 mg   Fri 15 mg 15 mg 15 mg   Sat 15 mg (3/7) 10 mg 10 mg   Visit Report - - -   Some recent data might be hidden       Plan:  1. INR is Subtherapeutic today- see above in Anticoagulation Summary.   Will instruct Kameron Melendez to Increase their warfarin regimen- see above in Anticoagulation Summary.  2. Follow up in 1 week  3. Unsuccessful reaching. Pt called and left a VM asking us to leave him a VM with instructions. Left VM with instructions and invitation to contact clinic if any changes, clarification, or concerns.     Ramo Morocho RPH

## 2020-03-25 NOTE — TELEPHONE ENCOUNTER
Patient called back today and stated that he would like for us to send his handicap parking paper.

## 2020-03-26 NOTE — TELEPHONE ENCOUNTER
I mailed this yesterday to the patient and I called left a VM stating that I was sending it out yesterday. This is already taken care of.

## 2020-03-26 NOTE — TELEPHONE ENCOUNTER
yuli can you please put this pts pw for his handicap sticker in the mail, it in my in box on my desk. Thank you so much jackg

## 2020-03-27 ENCOUNTER — ANTICOAGULATION VISIT (OUTPATIENT)
Dept: PHARMACY | Facility: HOSPITAL | Age: 50
End: 2020-03-27

## 2020-03-27 DIAGNOSIS — I26.99 BILATERAL PULMONARY EMBOLISM (HCC): ICD-10-CM

## 2020-03-27 DIAGNOSIS — I26.99 OTHER ACUTE PULMONARY EMBOLISM WITHOUT ACUTE COR PULMONALE (HCC): ICD-10-CM

## 2020-03-27 LAB — INR PPP: 2.5

## 2020-03-27 NOTE — PROGRESS NOTES
Anticoagulation Clinic Progress Note    Anticoagulation Summary  As of 3/27/2020    INR goal:   2.0-3.0   TTR:   74.9 % (1.1 y)   INR used for dosin.50 (3/27/2020)   Warfarin maintenance plan:   10 mg every Tue, Thu, Sat; 15 mg all other days   Weekly warfarin total:   90 mg   No change documented:   Abelardo Galvez RPH   Plan last modified:   Ramo Morocho RPH (3/18/2020)   Next INR check:   4/3/2020   Priority:   High   Target end date:   Indefinite    Indications    Other acute pulmonary embolism without acute cor pulmonale (CMS/HCC) [I26.99]  History of bilateral pulmonary embolism (CMS/HCC) [I26.99]             Anticoagulation Episode Summary     INR check location:       Preferred lab:       Send INR reminders to:   [facility]    Comments:   new [employer] home frankie 2019      Anticoagulation Care Providers     Provider Role Specialty Phone number    Torri Colmenares APRN Referring Cardiology [phone]    Elsy Baeza MD Responsible Cardiology [phone]          Drug interactions: has remained unchanged.  Diet: has remained unchanged.    Clinic Interview:      INR History:  Anticoagulation Monitoring 3/11/2020 3/18/2020 3/27/2020   INR 1.90 1.80 2.50   INR Date 3/11/2020 3/18/2020 3/27/2020   INR Goal 2.0-3.0 2.0-3.0 2.0-3.0   Trend Same Up Same   Last Week Total 80 mg 85 mg 90 mg   Next Week Total 85 mg 90 mg 90 mg   Sun 15 mg 15 mg 15 mg   Mon 10 mg 15 mg 15 mg   Tue 10 mg 10 mg 10 mg   Wed 15 mg 15 mg 15 mg   Thu 10 mg 10 mg 10 mg   Fri 15 mg 15 mg 15 mg   Sat 10 mg 10 mg 10 mg   Visit Report - - -   Some recent data might be hidden       Plan:  1. INR is Therapeutic today- see above in Anticoagulation Summary.   Will instruct Kameron Al to Continue their warfarin regimen- see above in Anticoagulation Summary.  2. Follow up in 1 week  3. Pt has agreed to only be called if INR out of range. They have been instructed to call if any changes in medications, doses,  concerns, etc. Patient expresses understanding and has no further questions at this time.  4. Voicemail left with instructions to check INR again next week    Abelardo Galvez RPH

## 2020-04-03 ENCOUNTER — ANTICOAGULATION VISIT (OUTPATIENT)
Dept: PHARMACY | Facility: HOSPITAL | Age: 50
End: 2020-04-03

## 2020-04-03 DIAGNOSIS — I26.99 OTHER ACUTE PULMONARY EMBOLISM WITHOUT ACUTE COR PULMONALE (HCC): ICD-10-CM

## 2020-04-03 DIAGNOSIS — I26.99 BILATERAL PULMONARY EMBOLISM (HCC): ICD-10-CM

## 2020-04-03 LAB — INR PPP: 3.7

## 2020-04-03 NOTE — PROGRESS NOTES
Anticoagulation Clinic Progress Note    Anticoagulation Summary  As of 4/3/2020    INR goal:   2.0-3.0   TTR:   74.3 % (1.1 y)   INR used for dosing:   3.70! (4/3/2020)   Warfarin maintenance plan:   15 mg every Sun, Wed, Fri; 10 mg all other days   Weekly warfarin total:   85 mg   Plan last modified:   Ramo Morocho RPH (4/3/2020)   Next INR check:   4/10/2020   Priority:   High   Target end date:   Indefinite    Indications    Other acute pulmonary embolism without acute cor pulmonale (CMS/HCC) [I26.99]  History of bilateral pulmonary embolism (CMS/HCC) [I26.99]             Anticoagulation Episode Summary     INR check location:       Preferred lab:       Send INR reminders to:   [facility]    Comments:   new [employer] home frankie March 2019      Anticoagulation Care Providers     Provider Role Specialty Phone number    Torri Colmenares APRN Referring Cardiology [phone]    Elsy Baeza MD Responsible Cardiology [phone]          Clinic Interview:  Patient Findings     Negatives:   Signs/symptoms of thrombosis, Signs/symptoms of bleeding,   Laboratory test error suspected, Change in health, Change in alcohol use,   Change in activity, Upcoming invasive procedure, Emergency department   visit, Upcoming dental procedure, Missed doses, Extra doses, Change in   medications, Change in diet/appetite, Hospital admission, Bruising, Other   complaints      Clinical Outcomes     Negatives:   Major bleeding event, Thromboembolic event,   Anticoagulation-related hospital admission, Anticoagulation-related ED   visit, Anticoagulation-related fatality        INR History:  Anticoagulation Monitoring 3/18/2020 3/27/2020 4/3/2020   INR 1.80 2.50 3.70   INR Date 3/18/2020 3/27/2020 4/3/2020   INR Goal 2.0-3.0 2.0-3.0 2.0-3.0   Trend Up Same Down   Last Week Total 85 mg 90 mg 90 mg   Next Week Total 90 mg 90 mg 80 mg   Sun 15 mg 15 mg 15 mg   Mon 15 mg 15 mg 10 mg   Tue 10 mg 10 mg 10 mg   Wed 15 mg 15  mg 15 mg   Thu 10 mg 10 mg 10 mg   Fri 15 mg 15 mg 10 mg (4/3)   Sat 10 mg 10 mg 10 mg   Visit Report - - -   Some recent data might be hidden       Plan:  1. INR is Supratherapeutic today- see above in Anticoagulation Summary.   Will instruct Kameron Rochamaria guadalupe to Change their warfarin regimen- see above in Anticoagulation Summary.  2. Follow up in 1 week  3. They have been instructed to call if any changes in medications, doses, concerns, etc. Patient expresses understanding and has no further questions at this time.    Ramo Morocho, [facility]

## 2020-04-10 ENCOUNTER — ANTICOAGULATION VISIT (OUTPATIENT)
Dept: PHARMACY | Facility: HOSPITAL | Age: 50
End: 2020-04-10

## 2020-04-10 DIAGNOSIS — I26.99 OTHER ACUTE PULMONARY EMBOLISM WITHOUT ACUTE COR PULMONALE (HCC): ICD-10-CM

## 2020-04-10 DIAGNOSIS — I26.99 BILATERAL PULMONARY EMBOLISM (HCC): ICD-10-CM

## 2020-04-10 LAB — INR PPP: 1.6

## 2020-04-10 NOTE — PROGRESS NOTES
Anticoagulation Clinic Progress Note    Anticoagulation Summary  As of 4/10/2020    INR goal:   2.0-3.0   TTR:   73.9 % (1.2 y)   INR used for dosin.60! (4/10/2020)   Warfarin maintenance plan:   15 mg every Sun, Wed, Fri; 10 mg all other days   Weekly warfarin total:   85 mg   No change documented:   Ramo Morocho RPH   Plan last modified:   Ramo Morocho RPH (4/3/2020)   Next INR check:   2020   Priority:   High   Target end date:   Indefinite    Indications    Other acute pulmonary embolism without acute cor pulmonale (CMS/HCC) [I26.99]  History of bilateral pulmonary embolism (CMS/HCC) [I26.99]             Anticoagulation Episode Summary     INR check location:       Preferred lab:       Send INR reminders to:    [facility]  POOL    Comments:   new [employer] home frankie 2019      Anticoagulation Care Providers     Provider Role Specialty Phone number    Torri Colmenares, APRN Referring Cardiology [phone]    Elsy Baeza MD Responsible Cardiology [phone]          Clinic Interview:  Patient Findings     Positives:   Change in medications    Negatives:   Signs/symptoms of thrombosis, Signs/symptoms of bleeding,   Laboratory test error suspected, Change in health, Change in alcohol use,   Change in activity, Upcoming invasive procedure, Emergency department   visit, Upcoming dental procedure, Missed doses, Extra doses, Change in   diet/appetite, Hospital admission, Bruising, Other complaints    Comments:   Starting cefdinir today x 5 days for respiratory illness, sx   have lasted since 2019      Clinical Outcomes     Negatives:   Major bleeding event, Thromboembolic event,   Anticoagulation-related hospital admission, Anticoagulation-related ED   visit, Anticoagulation-related fatality    Comments:   Starting cefdinir today x 5 days for respiratory illness, sx   have lasted since 2019        INR History:  Anticoagulation Monitoring 3/27/2020 4/3/2020 4/10/2020   INR 2.50  3.70 1.60   INR Date 3/27/2020 4/3/2020 4/10/2020   INR Goal 2.0-3.0 2.0-3.0 2.0-3.0   Trend Same Down Same   Last Week Total 90 mg 90 mg 80 mg   Next Week Total 90 mg 80 mg 85 mg   Sun 15 mg 15 mg 15 mg   Mon 15 mg 10 mg 10 mg   Tue 10 mg 10 mg 10 mg   Wed 15 mg 15 mg 15 mg   Thu 10 mg 10 mg 10 mg   Fri 15 mg 10 mg (4/3) 15 mg   Sat 10 mg 10 mg 10 mg   Visit Report - - -   Some recent data might be hidden       Plan:  1. INR is Subtherapeutic today- see above in Anticoagulation Summary.   Will instruct Kameron Rochamaria guadalupe to Continue their warfarin regimen (15 mg Sun/Wed/Fri, 10 mg rest of wk) - see above in Anticoagulation Summary.  2. Follow up in 1 week  3. They have been instructed to call if any changes in medications, doses, concerns, etc. Patient expresses understanding and has no further questions at this time.    Ramo Morocho [facility]

## 2020-04-13 ENCOUNTER — ANTICOAGULATION VISIT (OUTPATIENT)
Dept: PHARMACY | Facility: HOSPITAL | Age: 50
End: 2020-04-13

## 2020-04-13 DIAGNOSIS — I26.99 OTHER ACUTE PULMONARY EMBOLISM WITHOUT ACUTE COR PULMONALE (HCC): ICD-10-CM

## 2020-04-13 DIAGNOSIS — I26.99 BILATERAL PULMONARY EMBOLISM (HCC): ICD-10-CM

## 2020-04-13 LAB — INR PPP: 4.1

## 2020-04-13 NOTE — PROGRESS NOTES
Anticoagulation Clinic Progress Note    Anticoagulation Summary  As of 2020    INR goal:   2.0-3.0   TTR:   73.6 % (1.2 y)   INR used for dosin.10! (2020)   Warfarin maintenance plan:   15 mg every Sun, Wed, Fri; 10 mg all other days   Weekly warfarin total:   85 mg   Plan last modified:   Ramo Morocho RPH (4/3/2020)   Next INR check:   4/15/2020   Priority:   High   Target end date:   Indefinite    Indications    Other acute pulmonary embolism without acute cor pulmonale (CMS/HCC) [I26.99]  History of bilateral pulmonary embolism (CMS/HCC) [I26.99]             Anticoagulation Episode Summary     INR check location:       Preferred lab:       Send INR reminders to:    [facility]  POOL    Comments:   new [employer] home frankie 2019      Anticoagulation Care Providers     Provider Role Specialty Phone number    Torri Colmenares APRN Referring Cardiology [phone]    Elsy Baeza MD Responsible Cardiology [phone]          Clinic Interview:  Patient Findings     Positives:   Change in health, Change in medications, Change in   diet/appetite, Other complaints    Negatives:   Signs/symptoms of thrombosis, Signs/symptoms of bleeding,   Laboratory test error suspected, Change in alcohol use, Change in   activity, Upcoming invasive procedure, Emergency department visit,   Upcoming dental procedure, Missed doses, Extra doses, Hospital admission,   Bruising    Comments:   Diarrhea recently. Respiratory sx worsening despite cefdinir;   significant cough. Very little appetite; feeling weak and tired. Denies   pain in chest; does not report significant SOA.      Clinical Outcomes     Negatives:   Major bleeding event, Thromboembolic event,   Anticoagulation-related hospital admission, Anticoagulation-related ED   visit, Anticoagulation-related fatality    Comments:   Diarrhea recently. Respiratory sx worsening despite cefdinir;   significant cough. Very little appetite; feeling weak  and tired. Denies   pain in chest; does not report significant SOA.        INR History:  Anticoagulation Monitoring 4/3/2020 4/10/2020 4/13/2020   INR 3.70 1.60 4.10   INR Date 4/3/2020 4/10/2020 4/13/2020   INR Goal 2.0-3.0 2.0-3.0 2.0-3.0   Trend Down Same Same   Last Week Total 90 mg 80 mg 85 mg   Next Week Total 80 mg 85 mg 75 mg   Sun 15 mg 15 mg -   Mon 10 mg 10 mg Hold (4/13)   Tue 10 mg 10 mg 10 mg   Wed 15 mg 15 mg -   Thu 10 mg 10 mg -   Fri 10 mg (4/3) 15 mg -   Sat 10 mg 10 mg -   Visit Report - - -   Some recent data might be hidden       Plan:  1. Advised to visit ED for evaluation of worsening sx.   2. INR is Supratherapeutic today- see above in Anticoagulation Summary.  Will instruct Kameron Melendez to Change their warfarin regimen- see above in Anticoagulation Summary.  3. Follow up in 2 days  4. Patient declines warfarin refills.  5. Verbal and written information provided. Patient expresses understanding and has no further questions at this time.    Ramo Morocho [facility]

## 2020-04-15 ENCOUNTER — ANTICOAGULATION VISIT (OUTPATIENT)
Dept: PHARMACY | Facility: HOSPITAL | Age: 50
End: 2020-04-15

## 2020-04-15 DIAGNOSIS — I26.99 BILATERAL PULMONARY EMBOLISM (HCC): ICD-10-CM

## 2020-04-15 DIAGNOSIS — I26.99 OTHER ACUTE PULMONARY EMBOLISM WITHOUT ACUTE COR PULMONALE (HCC): ICD-10-CM

## 2020-04-15 LAB — INR PPP: 3.8

## 2020-04-15 NOTE — PROGRESS NOTES
Anticoagulation Clinic Progress Note    Anticoagulation Summary  As of 4/15/2020    INR goal:   2.0-3.0   TTR:   73.3 % (1.2 y)   INR used for dosing:   3.80! (4/15/2020)   Warfarin maintenance plan:   15 mg every Sun, Wed, Fri; 10 mg all other days   Weekly warfarin total:   85 mg   Plan last modified:   Ramo Morocho RPH (4/3/2020)   Next INR check:   4/20/2020   Priority:   High   Target end date:   Indefinite    Indications    Other acute pulmonary embolism without acute cor pulmonale (CMS/HCC) [I26.99]  History of bilateral pulmonary embolism (CMS/HCC) [I26.99]             Anticoagulation Episode Summary     INR check location:       Preferred lab:       Send INR reminders to:    [facility]  POOL    Comments:   new [employer] home frankie March 2019      Anticoagulation Care Providers     Provider Role Specialty Phone number    Torri Colmenares APRN Referring Cardiology [phone]    Elsy Baeza MD Responsible Cardiology [phone]          Clinic Interview:      INR History:  Anticoagulation Monitoring 4/10/2020 4/13/2020 4/15/2020   INR 1.60 4.10 3.80   INR Date 4/10/2020 4/13/2020 4/15/2020   INR Goal 2.0-3.0 2.0-3.0 2.0-3.0   Trend Same Same Same   Last Week Total 80 mg 85 mg 75 mg   Next Week Total 85 mg 75 mg 75 mg   Sun 15 mg - 15 mg   Mon 10 mg Hold (4/13) -   Tue 10 mg 10 mg -   Wed 15 mg - 5 mg (4/15)   Thu 10 mg - 10 mg   Fri 15 mg - 15 mg   Sat 10 mg - 10 mg   Visit Report - - -   Some recent data might be hidden       Plan:  1. INR is Supratherapeutic today- see above in Anticoagulation Summary.   Will instruct Kameron Melendez to reduce today's warfarin to 5mg then continue their warfarin regimen- see above in Anticoagulation Summary.  2. Follow up in 5 days  3. Spoke with pt today. They have been instructed to call if any changes in medications, doses, concerns, etc. Patient expresses understanding and has no further questions at this time.    Kim Sotelo [facility]

## 2020-04-20 ENCOUNTER — ANTICOAGULATION VISIT (OUTPATIENT)
Dept: PHARMACY | Facility: HOSPITAL | Age: 50
End: 2020-04-20

## 2020-04-20 DIAGNOSIS — I26.99 OTHER ACUTE PULMONARY EMBOLISM WITHOUT ACUTE COR PULMONALE (HCC): ICD-10-CM

## 2020-04-20 DIAGNOSIS — I26.99 BILATERAL PULMONARY EMBOLISM (HCC): ICD-10-CM

## 2020-04-20 LAB — INR PPP: 4.7

## 2020-04-20 NOTE — PROGRESS NOTES
Anticoagulation Clinic Progress Note    Anticoagulation Summary  As of 2020    INR goal:   2.0-3.0   TTR:   72.4 % (1.2 y)   INR used for dosin.70! (2020)   Warfarin maintenance plan:   15 mg every Sun, Wed, Fri; 10 mg all other days   Weekly warfarin total:   85 mg   Plan last modified:   Ramo Morocho RPH (4/3/2020)   Next INR check:   2020   Priority:   High   Target end date:   Indefinite    Indications    Other acute pulmonary embolism without acute cor pulmonale (CMS/HCC) [I26.99]  History of bilateral pulmonary embolism (CMS/HCC) [I26.99]             Anticoagulation Episode Summary     INR check location:       Preferred lab:       Send INR reminders to:   [facility]  POOL    Comments:   new [employer] home frankie 2019      Anticoagulation Care Providers     Provider Role Specialty Phone number    Torri Colmenares APRN Referring Cardiology [phone]    Elsy Baeza MD Responsible Cardiology [phone]          Clinic Interview:  Patient Findings     Negatives:   Signs/symptoms of thrombosis, Signs/symptoms of bleeding,   Laboratory test error suspected, Change in health, Change in alcohol use,   Change in activity, Upcoming invasive procedure, Emergency department   visit, Upcoming dental procedure, Missed doses, Extra doses, Change in   medications, Change in diet/appetite, Hospital admission, Bruising, Other   complaints      Clinical Outcomes     Negatives:   Major bleeding event, Thromboembolic event,   Anticoagulation-related hospital admission, Anticoagulation-related ED   visit, Anticoagulation-related fatality        INR History:  Anticoagulation Monitoring 2020 4/15/2020 2020   INR 4.10 3.80 4.70   INR Date 2020 4/15/2020 2020   INR Goal 2.0-3.0 2.0-3.0 2.0-3.0   Trend Same Same Same   Last Week Total 85 mg 75 mg 65 mg   Next Week Total 75 mg 75 mg 70 mg   Sun - 15 mg -   Mon Hold () - Hold ()   Tue 10 mg - 5 mg ()   Wed  - 5 mg (4/15) -   Thu - 10 mg -   Fri - 15 mg -   Sat - 10 mg -   Visit Report - - -   Some recent data might be hidden       Plan:  1. INR is Supratherapeutic today- see above in Anticoagulation Summary.   Will instruct Kameron Melendez to Change their warfarin regimen and hold the patient's warfarin today, and only give 5 mg on 4/21 (usually receives 10 mg on this day); pt may require a dose reduction again with next INR check- see above in Anticoagulation Summary.  2. Follow up in 2 days  3.  Pt has agreed to only be called if INR out of range. They have been instructed to call if any changes in medications, doses, concerns, etc. Patient expresses understanding and has no further questions at this time.    Gregg Gore [facility]

## 2020-04-22 ENCOUNTER — ANTICOAGULATION VISIT (OUTPATIENT)
Dept: PHARMACY | Facility: HOSPITAL | Age: 50
End: 2020-04-22

## 2020-04-22 DIAGNOSIS — I26.99 OTHER ACUTE PULMONARY EMBOLISM WITHOUT ACUTE COR PULMONALE (HCC): ICD-10-CM

## 2020-04-22 DIAGNOSIS — I26.99 BILATERAL PULMONARY EMBOLISM (HCC): ICD-10-CM

## 2020-04-22 LAB — INR PPP: 3.3

## 2020-04-22 NOTE — PROGRESS NOTES
Anticoagulation Clinic Progress Note    Anticoagulation Summary  As of 4/22/2020    INR goal:   2.0-3.0   TTR:   72.1 % (1.2 y)   INR used for dosing:   3.30! (4/22/2020)   Warfarin maintenance plan:   15 mg every Sun, Wed, Fri; 10 mg all other days   Weekly warfarin total:   85 mg   No change documented:   Joleen De Souza RPH   Plan last modified:   Ramo Morocho RPH (4/3/2020)   Next INR check:   4/27/2020   Priority:   High   Target end date:   Indefinite    Indications    Other acute pulmonary embolism without acute cor pulmonale (CMS/HCC) [I26.99]  History of bilateral pulmonary embolism (CMS/HCC) [I26.99]             Anticoagulation Episode Summary     INR check location:       Preferred lab:       Send INR reminders to:    [facility]  POOL    Comments:   new [employer] home frankie March 2019      Anticoagulation Care Providers     Provider Role Specialty Phone number    Torri Colmenares, FADY Referring Cardiology [phone]    Elsy Baeza MD Responsible Cardiology [phone]          Clinic Interview:  Patient Findings     Negatives:   Signs/symptoms of thrombosis, Signs/symptoms of bleeding,   Laboratory test error suspected, Change in health, Change in alcohol use,   Change in activity, Upcoming invasive procedure, Emergency department   visit, Upcoming dental procedure, Missed doses, Extra doses, Change in   medications, Change in diet/appetite, Hospital admission, Bruising, Other   complaints      Clinical Outcomes     Negatives:   Major bleeding event, Thromboembolic event,   Anticoagulation-related hospital admission, Anticoagulation-related ED   visit, Anticoagulation-related fatality        INR History:  Anticoagulation Monitoring 4/15/2020 4/20/2020 4/22/2020   INR 3.80 4.70 3.30   INR Date 4/15/2020 4/20/2020 4/22/2020   INR Goal 2.0-3.0 2.0-3.0 2.0-3.0   Trend Same Same Same   Last Week Total 75 mg 65 mg 60 mg   Next Week Total 75 mg 70 mg 85 mg   Sun 15 mg - 15 mg   Mon - Hold  (4/20) -   Tue - 5 mg (4/21) -   Wed 5 mg (4/15) - 15 mg   Thu 10 mg - 10 mg   Fri 15 mg - 15 mg   Sat 10 mg - 10 mg   Visit Report - - -   Some recent data might be hidden       Plan:  1. INR is Supratherapeutic today- see above in Anticoagulation Summary. Anticipate further drop in INR (history of 3.7->1.6 with 10mg decrease)  Will instruct Kameron Melendez to Continue their warfarin regimen- see above in Anticoagulation Summary.  2. Follow up in 5 days  3. They have been instructed to call if any changes in medications, doses, concerns, etc. Patient expresses understanding and has no further questions at this time.    Joleen De Souza RP

## 2020-04-27 ENCOUNTER — ANTICOAGULATION VISIT (OUTPATIENT)
Dept: PHARMACY | Facility: HOSPITAL | Age: 50
End: 2020-04-27

## 2020-04-27 DIAGNOSIS — I26.99 OTHER ACUTE PULMONARY EMBOLISM WITHOUT ACUTE COR PULMONALE (HCC): ICD-10-CM

## 2020-04-27 DIAGNOSIS — I26.99 BILATERAL PULMONARY EMBOLISM (HCC): ICD-10-CM

## 2020-04-27 LAB — INR PPP: 4.4

## 2020-04-27 NOTE — PROGRESS NOTES
Anticoagulation Clinic Progress Note    Anticoagulation Summary  As of 2020    INR goal:   2.0-3.0   TTR:   71.3 % (1.2 y)   INR used for dosin.40! (2020)   Warfarin maintenance plan:   15 mg every Sun, Fri; 10 mg all other days   Weekly warfarin total:   80 mg   Plan last modified:   Ramo Morocho RPH (2020)   Next INR check:   2020   Priority:   High   Target end date:   Indefinite    Indications    Other acute pulmonary embolism without acute cor pulmonale (CMS/HCC) [I26.99]  History of bilateral pulmonary embolism (CMS/HCC) [I26.99]             Anticoagulation Episode Summary     INR check location:       Preferred lab:       Send INR reminders to:    [facility]  POOL    Comments:   new [employer] home frankie 2019      Anticoagulation Care Providers     Provider Role Specialty Phone number    Torri Colmenares APRN Referring Cardiology [phone]    Elsy Baeza MD Responsible Cardiology [phone]          Clinic Interview:  Patient Findings     Negatives:   Signs/symptoms of thrombosis, Signs/symptoms of bleeding,   Laboratory test error suspected, Change in health, Change in alcohol use,   Change in activity, Upcoming invasive procedure, Emergency department   visit, Upcoming dental procedure, Missed doses, Extra doses, Change in   medications, Change in diet/appetite, Hospital admission, Bruising, Other   complaints      Clinical Outcomes     Negatives:   Major bleeding event, Thromboembolic event,   Anticoagulation-related hospital admission, Anticoagulation-related ED   visit, Anticoagulation-related fatality        INR History:  Anticoagulation Monitoring 2020   INR 4.70 3.30 4.40   INR Date 2020   INR Goal 2.0-3.0 2.0-3.0 2.0-3.0   Trend Same Same Down   Last Week Total 65 mg 60 mg 70 mg   Next Week Total 70 mg 85 mg 65 mg   Sun - 15 mg -   Mon Hold () - Hold ()   Tue 5 mg () - 5 mg ()    Wed - 15 mg -   Thu - 10 mg -   Fri - 15 mg -   Sat - 10 mg -   Visit Report - - -   Some recent data might be hidden       Plan:  1. INR is Supratherapeutic today- see above in Anticoagulation Summary.   Will instruct Kameron Melendez to Change their warfarin regimen- see above in Anticoagulation Summary.  2. Follow up in 2 days  3. They have been instructed to call if any changes in medications, doses, concerns, etc. Patient expresses understanding and has no further questions at this time.    Ramo Morocho [facility]

## 2020-04-29 ENCOUNTER — ANTICOAGULATION VISIT (OUTPATIENT)
Dept: PHARMACY | Facility: HOSPITAL | Age: 50
End: 2020-04-29

## 2020-04-29 DIAGNOSIS — I26.99 OTHER ACUTE PULMONARY EMBOLISM WITHOUT ACUTE COR PULMONALE (HCC): ICD-10-CM

## 2020-04-29 DIAGNOSIS — I26.99 BILATERAL PULMONARY EMBOLISM (HCC): ICD-10-CM

## 2020-04-29 LAB — INR PPP: 2.9

## 2020-04-30 NOTE — PROGRESS NOTES
Anticoagulation Clinic Progress Note    Anticoagulation Summary  As of 2020    INR goal:   2.0-3.0   TTR:   71.0 % (1.2 y)   INR used for dosin.90 (2020)   Warfarin maintenance plan:   15 mg every Sun, Fri; 10 mg all other days   Weekly warfarin total:   80 mg   Plan last modified:   Ramo Morocho RP (2020)   Next INR check:   2020   Priority:   High   Target end date:   Indefinite    Indications    Other acute pulmonary embolism without acute cor pulmonale (CMS/HCC) [I26.99]  History of bilateral pulmonary embolism (CMS/HCC) [I26.99]             Anticoagulation Episode Summary     INR check location:       Preferred lab:       Send INR reminders to:    [facility]  POOL    Comments:   new [employer] home frankie 2019      Anticoagulation Care Providers     Provider Role Specialty Phone number    Torri Colmenares APRN Referring Cardiology [phone]    Elsy Baeza MD Responsible Cardiology [phone]          Clinic Interview:      INR History:  Anticoagulation Monitoring 2020   INR 3.30 4.40 2.90   INR Date 2020   INR Goal 2.0-3.0 2.0-3.0 2.0-3.0   Trend Same Down Same   Last Week Total 60 mg 70 mg 70 mg   Next Week Total 85 mg 65 mg 80 mg   Sun 15 mg - 15 mg   Mon - Hold () -   Tue - 5 mg () -   Wed 15 mg - 10 mg   Thu 10 mg - 10 mg   Fri 15 mg - 15 mg   Sat 10 mg - 10 mg   Visit Report - - -   Some recent data might be hidden       Plan:  1. INR is Therapeutic today- see above in Anticoagulation Summary.   Will instruct Kameron Melendez to Continue their warfarin regimen- see above in Anticoagulation Summary.  2. Follow up in 1 weeks--early next week  3. Left secure VM but asked him to call us to confirm plan.  They have been instructed to call if any changes in medications, doses, concerns, etc. Patient expresses understanding and has no further questions at this time.    Kim Sotelo [facility]

## 2020-05-05 ENCOUNTER — ANTICOAGULATION VISIT (OUTPATIENT)
Dept: PHARMACY | Facility: HOSPITAL | Age: 50
End: 2020-05-05

## 2020-05-05 DIAGNOSIS — I26.99 BILATERAL PULMONARY EMBOLISM (HCC): ICD-10-CM

## 2020-05-05 DIAGNOSIS — I26.99 OTHER ACUTE PULMONARY EMBOLISM WITHOUT ACUTE COR PULMONALE (HCC): ICD-10-CM

## 2020-05-05 LAB — INR PPP: 3.8

## 2020-05-05 NOTE — PROGRESS NOTES
Anticoagulation Clinic Progress Note    Anticoagulation Summary  As of 5/5/2020    INR goal:   2.0-3.0   TTR:   70.2 % (1.2 y)   INR used for dosing:   3.80! (5/5/2020)   Warfarin maintenance plan:   15 mg every Sun, Fri; 10 mg all other days   Weekly warfarin total:   80 mg   Plan last modified:   Ramo Morocho RPH (4/27/2020)   Next INR check:   5/12/2020   Priority:   High   Target end date:   Indefinite    Indications    Other acute pulmonary embolism without acute cor pulmonale (CMS/HCC) [I26.99]  History of bilateral pulmonary embolism (CMS/HCC) [I26.99]             Anticoagulation Episode Summary     INR check location:       Preferred lab:       Send INR reminders to:    [facility]  POOL    Comments:   new [employer] home frankie March 2019      Anticoagulation Care Providers     Provider Role Specialty Phone number    Torri Colmenares APRN Referring Cardiology [phone]    Elsy Baeza MD Responsible Cardiology [phone]          Clinic Interview:  Patient Findings     Positives:   Extra doses, Other complaints    Negatives:   Signs/symptoms of thrombosis, Signs/symptoms of bleeding,   Laboratory test error suspected, Change in health, Change in alcohol use,   Change in activity, Upcoming invasive procedure, Emergency department   visit, Upcoming dental procedure, Missed doses, Change in medications,   Change in diet/appetite, Hospital admission, Bruising    Comments:   Pt cannot recall what dose he took last wk; appears a VM was   left for him w/ instructions, but he cannot recall if he received. Will   assume he took 15 mg 4/29.      Clinical Outcomes     Negatives:   Major bleeding event, Thromboembolic event,   Anticoagulation-related hospital admission, Anticoagulation-related ED   visit, Anticoagulation-related fatality    Comments:   Pt cannot recall what dose he took last wk; appears a VM was   left for him w/ instructions, but he cannot recall if he received. Will   assume he  took 15 mg 4/29.        INR History:  Anticoagulation Monitoring 4/27/2020 4/29/2020 5/5/2020   INR 4.40 2.90 3.80   INR Date 4/27/2020 4/29/2020 5/5/2020   INR Goal 2.0-3.0 2.0-3.0 2.0-3.0   Trend Down Same Same   Last Week Total 70 mg 70 mg 80 mg   Next Week Total 65 mg 80 mg 75 mg   Sun - 15 mg 15 mg   Mon Hold (4/27) - 10 mg   Tue 5 mg (4/28) - 5 mg (5/5)   Wed - 10 mg 10 mg   Thu - 10 mg 10 mg   Fri - 15 mg 15 mg   Sat - 10 mg 10 mg   Visit Report - - -   Some recent data might be hidden       Plan:  1. INR is Supratherapeutic today- see above in Anticoagulation Summary.   Will instruct Kameron Al to Change their warfarin regimen- see above in Anticoagulation Summary.  2. Follow up in 1 week  3. They have been instructed to call if any changes in medications, doses, concerns, etc. Patient expresses understanding and has no further questions at this time.    Ramo Morocho [facility]

## 2020-05-12 ENCOUNTER — ANTICOAGULATION VISIT (OUTPATIENT)
Dept: PHARMACY | Facility: HOSPITAL | Age: 50
End: 2020-05-12

## 2020-05-12 DIAGNOSIS — I26.99 OTHER ACUTE PULMONARY EMBOLISM WITHOUT ACUTE COR PULMONALE (HCC): ICD-10-CM

## 2020-05-12 DIAGNOSIS — I26.99 BILATERAL PULMONARY EMBOLISM (HCC): ICD-10-CM

## 2020-05-12 LAB — INR PPP: 3

## 2020-05-12 NOTE — PROGRESS NOTES
Anticoagulation Clinic Progress Note    Anticoagulation Summary  As of 5/12/2020    INR goal:   2.0-3.0   TTR:   69.1 % (1.2 y)   INR used for dosing:   3.00 (5/12/2020)   Warfarin maintenance plan:   15 mg every Sun, Fri; 10 mg all other days   Weekly warfarin total:   80 mg   No change documented:   Ramo Morocho RPH   Plan last modified:   Ramo Morocho RPH (4/27/2020)   Next INR check:   5/19/2020   Priority:   High   Target end date:   Indefinite    Indications    Other acute pulmonary embolism without acute cor pulmonale (CMS/HCC) [I26.99]  History of bilateral pulmonary embolism (CMS/HCC) [I26.99]             Anticoagulation Episode Summary     INR check location:       Preferred lab:       Send INR reminders to:    S[facility]  POOL    Comments:   new [employer] home tristen March 2019 **WEEKLY TRISTEN**      Anticoagulation Care Providers     Provider Role Specialty Phone number    Torri Colmenares APRN Referring Cardiology [phone]    Elsy Baeza MD Responsible Cardiology [phone]          Clinic Interview:  Patient Findings     Negatives:   Signs/symptoms of thrombosis, Signs/symptoms of bleeding,   Laboratory test error suspected, Change in health, Change in alcohol use,   Change in activity, Upcoming invasive procedure, Emergency department   visit, Upcoming dental procedure, Missed doses, Extra doses, Change in   medications, Change in diet/appetite, Hospital admission, Bruising, Other   complaints      Clinical Outcomes     Negatives:   Major bleeding event, Thromboembolic event,   Anticoagulation-related hospital admission, Anticoagulation-related ED   visit, Anticoagulation-related fatality        INR History:  Anticoagulation Monitoring 4/29/2020 5/5/2020 5/12/2020   INR 2.90 3.80 3.00   INR Date 4/29/2020 5/5/2020 5/12/2020   INR Goal 2.0-3.0 2.0-3.0 2.0-3.0   Trend Same Same Same   Last Week Total 70 mg 80 mg 75 mg   Next Week Total 80 mg 75 mg 80 mg   Sun 15 mg 15 mg 15 mg    Mon - 10 mg 10 mg   Tue - 5 mg (5/5) 10 mg   Wed 10 mg 10 mg 10 mg   Thu 10 mg 10 mg 10 mg   Fri 15 mg 15 mg 15 mg   Sat 10 mg 10 mg 10 mg   Visit Report - - -   Some recent data might be hidden       Plan:  1. INR is Therapeutic today- see above in Anticoagulation Summary.   Will instruct Kameron Melendez to Continue their warfarin regimen- see above in Anticoagulation Summary.  2. Follow up in 1 week  3. They have been instructed to call if any changes in medications, doses, concerns, etc. Patient expresses understanding and has no further questions at this time.    Ramo Morocho [facility]

## 2020-05-19 ENCOUNTER — ANTICOAGULATION VISIT (OUTPATIENT)
Dept: PHARMACY | Facility: HOSPITAL | Age: 50
End: 2020-05-19

## 2020-05-19 DIAGNOSIS — I26.99 BILATERAL PULMONARY EMBOLISM (HCC): ICD-10-CM

## 2020-05-19 DIAGNOSIS — I26.99 OTHER ACUTE PULMONARY EMBOLISM WITHOUT ACUTE COR PULMONALE (HCC): ICD-10-CM

## 2020-05-19 LAB — INR PPP: 2.7

## 2020-05-19 NOTE — PROGRESS NOTES
Anticoagulation Clinic Progress Note    Anticoagulation Summary  As of 2020    INR goal:   2.0-3.0   TTR:   69.6 % (1.3 y)   INR used for dosin.70 (2020)   Warfarin maintenance plan:   15 mg every Sun, Fri; 10 mg all other days   Weekly warfarin total:   80 mg   No change documented:   Iris Gonzalez   Plan last modified:   Ramo Morocho RP (2020)   Next INR check:   2020   Priority:   High   Target end date:   Indefinite    Indications    Other acute pulmonary embolism without acute cor pulmonale (CMS/HCC) [I26.99]  History of bilateral pulmonary embolism (CMS/HCC) [I26.99]             Anticoagulation Episode Summary     INR check location:       Preferred lab:       Send INR reminders to:   [facility]  POOL    Comments:   new [employer] home tristen 2019 **WEEKLY TRISTEN**      Anticoagulation Care Providers     Provider Role Specialty Phone number    Torri Colmenares APRN Referring Cardiology [phone]    Elsy Baeza MD Responsible Cardiology [phone]          Clinic Interview:  No pertinent clinical findings have been reported.    INR History:  Anticoagulation Monitoring 2020   INR 3.80 3.00 2.70   INR Date 2020   INR Goal 2.0-3.0 2.0-3.0 2.0-3.0   Trend Same Same Same   Last Week Total 80 mg 75 mg 80 mg   Next Week Total 75 mg 80 mg 80 mg   Sun 15 mg 15 mg 15 mg   Mon 10 mg 10 mg 10 mg   Tue 5 mg () 10 mg 10 mg   Wed 10 mg 10 mg 10 mg   Thu 10 mg 10 mg 10 mg   Fri 15 mg 15 mg 15 mg   Sat 10 mg 10 mg 10 mg   Visit Report - - -   Some recent data might be hidden       Plan:  1. INR is therapeutic today- see above in Anticoagulation Summary.    Kameron Melendez to continue their warfarin regimen- see above in Anticoagulation Summary.  2. Follow up in 1 week.  3. Pt has agreed to only be called if INR out of range. They have been instructed to call if any changes in medications, doses, concerns, etc.  Patient expresses understanding and has no further questions at this time.    Iris Gonzalez

## 2020-05-26 ENCOUNTER — ANTICOAGULATION VISIT (OUTPATIENT)
Dept: PHARMACY | Facility: HOSPITAL | Age: 50
End: 2020-05-26

## 2020-05-26 DIAGNOSIS — I26.99 OTHER ACUTE PULMONARY EMBOLISM WITHOUT ACUTE COR PULMONALE (HCC): ICD-10-CM

## 2020-05-26 DIAGNOSIS — I26.99 BILATERAL PULMONARY EMBOLISM (HCC): ICD-10-CM

## 2020-05-26 LAB — INR PPP: 1.8

## 2020-05-26 NOTE — PROGRESS NOTES
Anticoagulation Clinic Progress Note    Anticoagulation Summary  As of 2020    INR goal:   2.0-3.0   TTR:   69.7 % (1.3 y)   INR used for dosin.80! (2020)   Warfarin maintenance plan:   15 mg every Sun, Wed; 10 mg all other days   Weekly warfarin total:   80 mg   Plan last modified:   Ramo Morocho RPH (2020)   Next INR check:   2020   Priority:   High   Target end date:   Indefinite    Indications    Other acute pulmonary embolism without acute cor pulmonale (CMS/HCC) [I26.99]  History of bilateral pulmonary embolism (CMS/HCC) [I26.99]             Anticoagulation Episode Summary     INR check location:       Preferred lab:       Send INR reminders to:    [facility]  POOL    Comments:   new [employer] home tristen 2019 **WEEKLY TRISTEN**      Anticoagulation Care Providers     Provider Role Specialty Phone number    Torri Colmenares, FADY Referring Cardiology [phone]    Elsy Baeza MD Responsible Cardiology [phone]          Clinic Interview:  Patient Findings     Positives:   Other complaints    Negatives:   Signs/symptoms of thrombosis, Signs/symptoms of bleeding,   Laboratory test error suspected, Change in health, Change in alcohol use,   Change in activity, Upcoming invasive procedure, Emergency department   visit, Upcoming dental procedure, Missed doses, Extra doses, Change in   medications, Change in diet/appetite, Hospital admission, Bruising    Comments:   Reports 15 mg Sun/Wed rather than Sun/Fri. No other changes   reported.      Clinical Outcomes     Negatives:   Major bleeding event, Thromboembolic event,   Anticoagulation-related hospital admission, Anticoagulation-related ED   visit, Anticoagulation-related fatality    Comments:   Reports 15 mg Sun/Wed rather than Sun/Fri. No other changes   reported.        INR History:  Anticoagulation Monitoring 2020   INR 3.00 2.70 1.80   INR Date 2020   INR Goal  2.0-3.0 2.0-3.0 2.0-3.0   Trend Same Same Same   Last Week Total 75 mg 80 mg 80 mg   Next Week Total 80 mg 80 mg 85 mg   Sun 15 mg 15 mg 15 mg   Mon 10 mg 10 mg 10 mg   Tue 10 mg 10 mg 15 mg (5/26)   Wed 10 mg 10 mg 15 mg   Thu 10 mg 10 mg 10 mg   Fri 15 mg 15 mg 10 mg   Sat 10 mg 10 mg 10 mg   Visit Report - - -   Some recent data might be hidden       Plan:  1. INR is Subtherapeutic today- see above in Anticoagulation Summary.   Will instruct Kameron Melendez to Change their warfarin regimen- see above in Anticoagulation Summary.  2. Follow up in 1 week  3. They have been instructed to call if any changes in medications, doses, concerns, etc. Patient expresses understanding and has no further questions at this time.    Ramo Morocho [facility]

## 2020-06-03 ENCOUNTER — ANTICOAGULATION VISIT (OUTPATIENT)
Dept: PHARMACY | Facility: HOSPITAL | Age: 50
End: 2020-06-03

## 2020-06-03 DIAGNOSIS — I26.99 OTHER ACUTE PULMONARY EMBOLISM WITHOUT ACUTE COR PULMONALE (HCC): ICD-10-CM

## 2020-06-03 DIAGNOSIS — I26.99 BILATERAL PULMONARY EMBOLISM (HCC): ICD-10-CM

## 2020-06-03 LAB — INR PPP: 2

## 2020-06-10 ENCOUNTER — ANTICOAGULATION VISIT (OUTPATIENT)
Dept: PHARMACY | Facility: HOSPITAL | Age: 50
End: 2020-06-10

## 2020-06-10 DIAGNOSIS — I26.99 BILATERAL PULMONARY EMBOLISM (HCC): ICD-10-CM

## 2020-06-10 DIAGNOSIS — I26.99 OTHER ACUTE PULMONARY EMBOLISM WITHOUT ACUTE COR PULMONALE (HCC): ICD-10-CM

## 2020-06-10 LAB — INR PPP: 1.9

## 2020-06-10 NOTE — PROGRESS NOTES
Anticoagulation Clinic Progress Note    Anticoagulation Summary  As of 6/10/2020    INR goal:   2.0-3.0   TTR:   67.6 % (1.3 y)   INR used for dosin.90! (6/10/2020)   Warfarin maintenance plan:   15 mg every Sun, Wed, Fri; 10 mg all other days   Weekly warfarin total:   85 mg   No change documented:   Ramo Morocho RPH   Plan last modified:   Ramo Morocho RPH (6/3/2020)   Next INR check:   2020   Priority:   High   Target end date:   Indefinite    Indications    Other acute pulmonary embolism without acute cor pulmonale (CMS/HCC) [I26.99]  History of bilateral pulmonary embolism (CMS/HCC) [I26.99]             Anticoagulation Episode Summary     INR check location:       Preferred lab:       Send INR reminders to:    [facility]  POOL    Comments:   new [employer] home tristen 2019 **WEEKLY TRISTEN**      Anticoagulation Care Providers     Provider Role Specialty Phone number    Torri Colmenares, APRN Referring Cardiology [phone]    Elsy Baeza MD Responsible Cardiology [phone]          Clinic Interview:  Patient Findings     Positives:   Missed doses, Extra doses, Change in diet/appetite    Negatives:   Signs/symptoms of thrombosis, Signs/symptoms of bleeding,   Laboratory test error suspected, Change in health, Change in alcohol use,   Change in activity, Upcoming invasive procedure, Emergency department   visit, Upcoming dental procedure, Change in medications, Hospital   admission, Bruising, Other complaints    Comments:   Reports missed dose 6/6; took extra 5 mg 6/8. Started   drinking chocolate Premier protein drink daily for lunch ~1 wk ago (30 mcg   vit k).      Clinical Outcomes     Negatives:   Major bleeding event, Thromboembolic event,   Anticoagulation-related hospital admission, Anticoagulation-related ED   visit, Anticoagulation-related fatality    Comments:   Reports missed dose 6/6; took extra 5 mg 6/8. Started   drinking chocolate Premier protein drink daily  for lunch ~1 wk ago (30 mcg   vit k).        INR History:  Anticoagulation Monitoring 5/26/2020 6/3/2020 6/10/2020   INR 1.80 2.00 1.90   INR Date 5/26/2020 6/3/2020 6/10/2020   INR Goal 2.0-3.0 2.0-3.0 2.0-3.0   Trend Same Up Same   Last Week Total 80 mg 80 mg 80 mg   Next Week Total 85 mg 85 mg 85 mg   Sun 15 mg 15 mg 15 mg   Mon 10 mg 10 mg 10 mg   Tue 15 mg (5/26) 10 mg 10 mg   Wed 15 mg 15 mg 15 mg   Thu 10 mg 10 mg 10 mg   Fri 10 mg 15 mg 15 mg   Sat 10 mg 10 mg 10 mg   Visit Report - - -   Some recent data might be hidden       Plan:  1. INR is Subtherapeutic today- see above in Anticoagulation Summary.   Will instruct Kameron Melendez to Continue their warfarin regimen- see above in Anticoagulation Summary.  2. Follow up in 1 week  3. They have been instructed to call if any changes in medications, doses, concerns, etc. Patient expresses understanding and has no further questions at this time.    Ramo Morocho [facility]

## 2020-06-17 ENCOUNTER — ANTICOAGULATION VISIT (OUTPATIENT)
Dept: PHARMACY | Facility: HOSPITAL | Age: 50
End: 2020-06-17

## 2020-06-17 DIAGNOSIS — I26.99 BILATERAL PULMONARY EMBOLISM (HCC): ICD-10-CM

## 2020-06-17 DIAGNOSIS — I26.99 OTHER ACUTE PULMONARY EMBOLISM WITHOUT ACUTE COR PULMONALE (HCC): ICD-10-CM

## 2020-06-17 LAB — INR PPP: 2.1

## 2020-06-17 NOTE — PROGRESS NOTES
Anticoagulation Clinic Progress Note    Anticoagulation Summary  As of 2020    INR goal:   2.0-3.0   TTR:   67.3 % (1.3 y)   INR used for dosin.10 (2020)   Warfarin maintenance plan:   15 mg every Sun, Wed, Fri; 10 mg all other days   Weekly warfarin total:   85 mg   No change documented:   Ramo Morocho RPH   Plan last modified:   Ramo Morocho RPH (6/3/2020)   Next INR check:   2020   Priority:   High   Target end date:   Indefinite    Indications    Other acute pulmonary embolism without acute cor pulmonale (CMS/HCC) [I26.99]  History of bilateral pulmonary embolism (CMS/HCC) [I26.99]             Anticoagulation Episode Summary     INR check location:       Preferred lab:       Send INR reminders to:   [facility]  POOL    Comments:   new [employer] home tristen 2019 **WEEKLY TRISTEN**      Anticoagulation Care Providers     Provider Role Specialty Phone number    Torri Colmenares APRN Referring Cardiology [phone]    Elsy Baeza MD Responsible Cardiology [phone]          Clinic Interview:  No pertinent clinical findings have been reported.    INR History:  Anticoagulation Monitoring 6/3/2020 6/10/2020 2020   INR 2.00 1.90 2.10   INR Date 6/3/2020 6/10/2020 2020   INR Goal 2.0-3.0 2.0-3.0 2.0-3.0   Trend Up Same Same   Last Week Total 80 mg 80 mg 85 mg   Next Week Total 85 mg 85 mg 85 mg   Sun 15 mg 15 mg 15 mg   Mon 10 mg 10 mg 10 mg   Tue 10 mg 10 mg 10 mg   Wed 15 mg 15 mg 15 mg   Thu 10 mg 10 mg 10 mg   Fri 15 mg 15 mg 15 mg   Sat 10 mg 10 mg 10 mg   Visit Report - - -   Some recent data might be hidden       Plan:  1. INR is therapeutic today- see above in Anticoagulation Summary.    Kameron Melendez to continue their warfarin regimen- see above in Anticoagulation Summary.  2. Follow up in 1 week  3. Pt has agreed to only be called if INR out of range. Left secure VM w/ instructions. They have been instructed to call if any changes in medications,  doses, concerns, etc.     Ramo Morocho [facility]

## 2020-06-30 ENCOUNTER — ANTICOAGULATION VISIT (OUTPATIENT)
Dept: PHARMACY | Facility: HOSPITAL | Age: 50
End: 2020-06-30

## 2020-06-30 DIAGNOSIS — I26.99 BILATERAL PULMONARY EMBOLISM (HCC): ICD-10-CM

## 2020-06-30 DIAGNOSIS — I26.99 OTHER ACUTE PULMONARY EMBOLISM WITHOUT ACUTE COR PULMONALE (HCC): ICD-10-CM

## 2020-06-30 LAB — INR PPP: 2.1

## 2020-06-30 NOTE — PROGRESS NOTES
Anticoagulation Clinic Progress Note    Anticoagulation Summary  As of 2020    INR goal:   2.0-3.0   TTR:   68.1 % (1.4 y)   INR used for dosin.10 (2020)   Warfarin maintenance plan:   15 mg every Sun, Wed, Fri; 10 mg all other days   Weekly warfarin total:   85 mg   No change documented:   Alexandra Elias   Plan last modified:   Ramo Morocho RPH (6/3/2020)   Next INR check:   2020   Priority:   High   Target end date:   Indefinite    Indications    Other acute pulmonary embolism without acute cor pulmonale (CMS/HCC) [I26.99]  History of bilateral pulmonary embolism (CMS/HCC) [I26.99]             Anticoagulation Episode Summary     INR check location:       Preferred lab:       Send INR reminders to:    [facility] RESTREPO  POOL    Comments:   new [employer] home tristen 2019 **WEEKLY TRISTEN**      Anticoagulation Care Providers     Provider Role Specialty Phone number    Torri Colmenares APRN Referring Cardiology [phone]    Elsy Baeza MD Responsible Cardiology [phone]          Clinic Interview:  No pertinent clinical findings have been reported.    INR History:  Anticoagulation Monitoring 6/10/2020 2020 2020   INR 1.90 2.10 2.10   INR Date 6/10/2020 2020 2020   INR Goal 2.0-3.0 2.0-3.0 2.0-3.0   Trend Same Same Same   Last Week Total 80 mg 85 mg 85 mg   Next Week Total 85 mg 85 mg 85 mg   Sun 15 mg 15 mg 15 mg   Mon 10 mg 10 mg 10 mg   Tue 10 mg 10 mg 10 mg   Wed 15 mg 15 mg 15 mg   Thu 10 mg 10 mg 10 mg   Fri 15 mg 15 mg 15 mg   Sat 10 mg 10 mg 10 mg   Visit Report - - -   Some recent data might be hidden       Plan:  1. INR is therapeutic today- see above in Anticoagulation Summary.    Kameron Melendez to continue their warfarin regimen- see above in Anticoagulation Summary.  2. Follow up in 1 week  3. Pt has agreed to only be called if INR out of range. They have been instructed to call if any changes in medications, doses, concerns, etc. Patient  expresses understanding and has no further questions at this time.    Alexandra Elias

## 2020-07-08 ENCOUNTER — ANTICOAGULATION VISIT (OUTPATIENT)
Dept: PHARMACY | Facility: HOSPITAL | Age: 50
End: 2020-07-08

## 2020-07-08 DIAGNOSIS — I26.99 BILATERAL PULMONARY EMBOLISM (HCC): ICD-10-CM

## 2020-07-08 DIAGNOSIS — I26.99 OTHER ACUTE PULMONARY EMBOLISM WITHOUT ACUTE COR PULMONALE (HCC): ICD-10-CM

## 2020-07-08 LAB — INR PPP: 1.8

## 2020-07-08 NOTE — PROGRESS NOTES
Anticoagulation Clinic Progress Note    Anticoagulation Summary  As of 2020    INR goal:   2.0-3.0   TTR:   67.6 % (1.4 y)   INR used for dosin.80! (2020)   Warfarin maintenance plan:   10 mg every Tue, Thu, Sat; 15 mg all other days   Weekly warfarin total:   90 mg   Plan last modified:   Ramo Morocho RPH (2020)   Next INR check:   7/15/2020   Priority:   High   Target end date:   Indefinite    Indications    Other acute pulmonary embolism without acute cor pulmonale (CMS/HCC) [I26.99]  History of bilateral pulmonary embolism (CMS/HCC) [I26.99]             Anticoagulation Episode Summary     INR check location:       Preferred lab:       Send INR reminders to:    S[facility] RESTREPO  POOL    Comments:   new [employer] home tristen 2019 **WEEKLY TRISTEN**      Anticoagulation Care Providers     Provider Role Specialty Phone number    Torri Colmenares APRN Referring Cardiology [phone]    Elsy Baeza MD Responsible Cardiology [phone]          Clinic Interview:  Patient Findings     Negatives:   Signs/symptoms of thrombosis, Signs/symptoms of bleeding,   Laboratory test error suspected, Change in health, Change in alcohol use,   Change in activity, Upcoming invasive procedure, Emergency department   visit, Upcoming dental procedure, Missed doses, Extra doses, Change in   medications, Change in diet/appetite, Hospital admission, Bruising, Other   complaints      Clinical Outcomes     Negatives:   Major bleeding event, Thromboembolic event,   Anticoagulation-related hospital admission, Anticoagulation-related ED   visit, Anticoagulation-related fatality        INR History:  Anticoagulation Monitoring 2020   INR 2.10 2.10 1.80   INR Date 2020   INR Goal 2.0-3.0 2.0-3.0 2.0-3.0   Trend Same Same Up   Last Week Total 85 mg 85 mg 85 mg   Next Week Total 85 mg 85 mg 90 mg   Sun 15 mg 15 mg 15 mg   Mon 10 mg 10 mg 15 mg   Tue 10 mg 10 mg 10  mg   Wed 15 mg 15 mg 15 mg   Thu 10 mg 10 mg 10 mg   Fri 15 mg 15 mg 15 mg   Sat 10 mg 10 mg 10 mg   Visit Report - - -   Some recent data might be hidden       Plan:  1. INR is Subtherapeutic today- see above in Anticoagulation Summary.   Will instruct Kameron Melendez to Increase their warfarin regimen- see above in Anticoagulation Summary.  2. Follow up in 1 week  3. They have been instructed to call if any changes in medications, doses, concerns, etc. Patient expresses understanding and has no further questions at this time.    Ramo Morocho, [facility]

## 2020-07-17 ENCOUNTER — ANTICOAGULATION VISIT (OUTPATIENT)
Dept: PHARMACY | Facility: HOSPITAL | Age: 50
End: 2020-07-17

## 2020-07-17 DIAGNOSIS — I26.99 BILATERAL PULMONARY EMBOLISM (HCC): ICD-10-CM

## 2020-07-17 DIAGNOSIS — I26.99 OTHER ACUTE PULMONARY EMBOLISM WITHOUT ACUTE COR PULMONALE (HCC): ICD-10-CM

## 2020-07-17 LAB — INR PPP: 2.2

## 2020-07-17 NOTE — PROGRESS NOTES
Anticoagulation Clinic Progress Note    Anticoagulation Summary  As of 2020    INR goal:   2.0-3.0   TTR:   67.3 % (1.4 y)   INR used for dosin.20 (2020)   Warfarin maintenance plan:   10 mg every Tue, Thu, Sat; 15 mg all other days   Weekly warfarin total:   90 mg   No change documented:   Ramo Morocho RPH   Plan last modified:   Ramo Morocho RPH (2020)   Next INR check:   2020   Priority:   High   Target end date:   Indefinite    Indications    Other acute pulmonary embolism without acute cor pulmonale (CMS/HCC) [I26.99]  History of bilateral pulmonary embolism (CMS/HCC) [I26.99]             Anticoagulation Episode Summary     INR check location:       Preferred lab:       Send INR reminders to:   [facility]REPO  POOL    Comments:   new [employer] home tristen 2019 **WEEKLY TRISTEN**      Anticoagulation Care Providers     Provider Role Specialty Phone number    Torri Colmenares, APRN Referring Cardiology [phone]    Elsy Baeza MD Responsible Cardiology [phone]            INR History:  Anticoagulation Monitoring 2020   INR 2.10 1.80 2.20   INR Date 2020   INR Goal 2.0-3.0 2.0-3.0 2.0-3.0   Trend Same Up Same   Last Week Total 85 mg 85 mg 90 mg   Next Week Total 85 mg 90 mg 90 mg   Sun 15 mg 15 mg 15 mg   Mon 10 mg 15 mg 15 mg   Tue 10 mg 10 mg 10 mg   Wed 15 mg 15 mg 15 mg   Thu 10 mg 10 mg 10 mg   Fri 15 mg 15 mg 15 mg   Sat 10 mg 10 mg 10 mg   Visit Report - - -   Some recent data might be hidden       Plan:  1. INR is Therapeutic today- see above in Anticoagulation Summary.   Will instruct Kameron Melendez to Continue their warfarin regimen- see above in Anticoagulation Summary.  2. Follow up in 1 week  3. Pt has agreed to only be called if INR out of range. Left secure VM w/ instructions. They have been instructed to call if any changes in medications, doses, concerns, etc.     Ramo Morocho RPH

## 2020-07-24 ENCOUNTER — ANTICOAGULATION VISIT (OUTPATIENT)
Dept: PHARMACY | Facility: HOSPITAL | Age: 50
End: 2020-07-24

## 2020-07-24 DIAGNOSIS — I26.99 OTHER ACUTE PULMONARY EMBOLISM WITHOUT ACUTE COR PULMONALE (HCC): ICD-10-CM

## 2020-07-24 DIAGNOSIS — I26.99 BILATERAL PULMONARY EMBOLISM (HCC): ICD-10-CM

## 2020-07-24 LAB — INR PPP: 2.7

## 2020-07-24 NOTE — PROGRESS NOTES
Anticoagulation Clinic Progress Note    Anticoagulation Summary  As of 2020    INR goal:   2.0-3.0   TTR:   67.7 % (1.4 y)   INR used for dosin.70 (2020)   Warfarin maintenance plan:   10 mg every Tue, Thu, Sat; 15 mg all other days   Weekly warfarin total:   90 mg   No change documented:   Iris Gonzalez   Plan last modified:   Ramo Morocho RPH (2020)   Next INR check:   2020   Priority:   High   Target end date:   Indefinite    Indications    Other acute pulmonary embolism without acute cor pulmonale (CMS/HCC) [I26.99]  History of bilateral pulmonary embolism (CMS/HCC) [I26.99]             Anticoagulation Episode Summary     INR check location:       Preferred lab:       Send INR reminders to:    S[facility] RESTREPO  POOL    Comments:   new [employer] home tristen 2019 **WEEKLY TRISTEN**      Anticoagulation Care Providers     Provider Role Specialty Phone number    Torri Colmenares, APRN Referring Cardiology [phone]    Elsy Baeza MD Responsible Cardiology [phone]          Clinic Interview:  No pertinent clinical findings have been reported.    INR History:  Anticoagulation Monitoring 2020   INR 1.80 2.20 2.70   INR Date 2020   INR Goal 2.0-3.0 2.0-3.0 2.0-3.0   Trend Up Same Same   Last Week Total 85 mg 90 mg 90 mg   Next Week Total 90 mg 90 mg 90 mg   Sun 15 mg 15 mg 15 mg   Mon 15 mg 15 mg 15 mg   Tue 10 mg 10 mg 10 mg   Wed 15 mg 15 mg 15 mg   Thu 10 mg 10 mg 10 mg   Fri 15 mg 15 mg 15 mg   Sat 10 mg 10 mg 10 mg   Visit Report - - -   Some recent data might be hidden       Plan:  1. INR is therapeutic today- see above in Anticoagulation Summary.    Kameron Melendez to continue their warfarin regimen- see above in Anticoagulation Summary.  2. Follow up in 1 week.  3. Pt has agreed to only be called if INR out of range. They have been instructed to call if any changes in medications, doses, concerns, etc.  Patient expresses understanding and has no further questions at this time.    Iris Gonzalez

## 2020-07-31 ENCOUNTER — ANTICOAGULATION VISIT (OUTPATIENT)
Dept: PHARMACY | Facility: HOSPITAL | Age: 50
End: 2020-07-31

## 2020-07-31 DIAGNOSIS — I26.99 BILATERAL PULMONARY EMBOLISM (HCC): ICD-10-CM

## 2020-07-31 DIAGNOSIS — I26.99 OTHER ACUTE PULMONARY EMBOLISM WITHOUT ACUTE COR PULMONALE (HCC): ICD-10-CM

## 2020-07-31 LAB — INR PPP: 2

## 2020-07-31 NOTE — PROGRESS NOTES
Anticoagulation Clinic Progress Note    Anticoagulation Summary  As of 2020    INR goal:   2.0-3.0   TTR:   68.2 % (1.5 y)   INR used for dosin.00 (2020)   Warfarin maintenance plan:   10 mg every Tue, Thu, Sat; 15 mg all other days   Weekly warfarin total:   90 mg   No change documented:   Alexandra Elias   Plan last modified:   Ramo Morocho RP (2020)   Next INR check:   2020   Priority:   High   Target end date:   Indefinite    Indications    Other acute pulmonary embolism without acute cor pulmonale (CMS/HCC) [I26.99]  History of bilateral pulmonary embolism (CMS/HCC) [I26.99]             Anticoagulation Episode Summary     INR check location:       Preferred lab:       Send INR reminders to:    S[facility] RESTREPO  POOL    Comments:   new [employer] home tristen 2019 **WEEKLY TRISTEN**      Anticoagulation Care Providers     Provider Role Specialty Phone number    Torri Colmenares, APRN Referring Cardiology [phone]    Elsy Baeza MD Responsible Cardiology [phone]          Clinic Interview:  No pertinent clinical findings have been reported.    INR History:  Anticoagulation Monitoring 2020   INR 2.20 2.70 2.00   INR Date 2020   INR Goal 2.0-3.0 2.0-3.0 2.0-3.0   Trend Same Same Same   Last Week Total 90 mg 90 mg 90 mg   Next Week Total 90 mg 90 mg 90 mg   Sun 15 mg 15 mg 15 mg   Mon 15 mg 15 mg 15 mg   Tue 10 mg 10 mg 10 mg   Wed 15 mg 15 mg 15 mg   Thu 10 mg 10 mg 10 mg   Fri 15 mg 15 mg 15 mg   Sat 10 mg 10 mg 10 mg   Visit Report - - -   Some recent data might be hidden       Plan:  1. INR is therapeutic today- see above in Anticoagulation Summary.    Kameron Melendez to continue their warfarin regimen- see above in Anticoagulation Summary.  2. Follow up in 1 week  3. Pt has agreed to only be called if INR out of range. They have been instructed to call if any changes in medications, doses, concerns, etc. Patient  expresses understanding and has no further questions at this time.    Alexandra Elias

## 2020-08-03 RX ORDER — WARFARIN SODIUM 5 MG/1
TABLET ORAL
Qty: 55 TABLET | Refills: 1 | Status: SHIPPED | OUTPATIENT
Start: 2020-08-03 | End: 2021-01-07

## 2020-08-03 RX ORDER — WARFARIN SODIUM 10 MG/1
TABLET ORAL
Qty: 90 TABLET | Refills: 1 | Status: SHIPPED | OUTPATIENT
Start: 2020-08-03 | End: 2021-03-16

## 2020-08-07 ENCOUNTER — ANTICOAGULATION VISIT (OUTPATIENT)
Dept: PHARMACY | Facility: HOSPITAL | Age: 50
End: 2020-08-07

## 2020-08-07 DIAGNOSIS — I26.99 OTHER ACUTE PULMONARY EMBOLISM WITHOUT ACUTE COR PULMONALE (HCC): ICD-10-CM

## 2020-08-07 DIAGNOSIS — I26.99 BILATERAL PULMONARY EMBOLISM (HCC): ICD-10-CM

## 2020-08-07 LAB — INR PPP: 2.3

## 2020-08-07 NOTE — PROGRESS NOTES
Anticoagulation Clinic Progress Note    Anticoagulation Summary  As of 2020    INR goal:   2.0-3.0   TTR:   68.6 % (1.5 y)   INR used for dosin.30 (2020)   Warfarin maintenance plan:   10 mg every Tue, Thu, Sat; 15 mg all other days   Weekly warfarin total:   90 mg   No change documented:   Iris Gonzalez   Plan last modified:   Ramo Morocho RPH (2020)   Next INR check:   2020   Priority:   High   Target end date:   Indefinite    Indications    Other acute pulmonary embolism without acute cor pulmonale (CMS/HCC) [I26.99]  History of bilateral pulmonary embolism (CMS/HCC) [I26.99]             Anticoagulation Episode Summary     INR check location:       Preferred lab:       Send INR reminders to:    S[facility] RESTREPO  POOL    Comments:   new [employer] home tristen 2019 **WEEKLY TRISTEN**      Anticoagulation Care Providers     Provider Role Specialty Phone number    Torri Colmenares, APRN Referring Cardiology [phone]    Elsy Baeza MD Responsible Cardiology [phone]          Clinic Interview:  No pertinent clinical findings have been reported.    INR History:  Anticoagulation Monitoring 2020   INR 2.70 2.00 2.30   INR Date 2020   INR Goal 2.0-3.0 2.0-3.0 2.0-3.0   Trend Same Same Same   Last Week Total 90 mg 90 mg 90 mg   Next Week Total 90 mg 90 mg 90 mg   Sun 15 mg 15 mg 15 mg   Mon 15 mg 15 mg 15 mg   Tue 10 mg 10 mg 10 mg   Wed 15 mg 15 mg 15 mg   Thu 10 mg 10 mg 10 mg   Fri 15 mg 15 mg 15 mg   Sat 10 mg 10 mg 10 mg   Visit Report - - -   Some recent data might be hidden       Plan:  1. INR is therapeutic today- see above in Anticoagulation Summary.    Kameron Melendez to continue their warfarin regimen- see above in Anticoagulation Summary.  2. Follow up in 1 week.  3. Pt has agreed to only be called if INR out of range. They have been instructed to call if any changes in medications, doses, concerns, etc.  Patient expresses understanding and has no further questions at this time.    Iris Gonzalez

## 2020-08-18 ENCOUNTER — ANTICOAGULATION VISIT (OUTPATIENT)
Dept: PHARMACY | Facility: HOSPITAL | Age: 50
End: 2020-08-18

## 2020-08-18 DIAGNOSIS — I26.99 OTHER ACUTE PULMONARY EMBOLISM WITHOUT ACUTE COR PULMONALE (HCC): ICD-10-CM

## 2020-08-18 DIAGNOSIS — I26.99 BILATERAL PULMONARY EMBOLISM (HCC): ICD-10-CM

## 2020-08-18 LAB — INR PPP: 2.5

## 2020-08-18 NOTE — PROGRESS NOTES
Anticoagulation Clinic Progress Note    Anticoagulation Summary  As of 2020    INR goal:   2.0-3.0   TTR:   69.2 % (1.5 y)   INR used for dosin.50 (2020)   Warfarin maintenance plan:   10 mg every Tue, Thu, Sat; 15 mg all other days   Weekly warfarin total:   90 mg   No change documented:   Iris Gonzalez   Plan last modified:   Ramo Morocho RPH (2020)   Next INR check:   2020   Priority:   High   Target end date:   Indefinite    Indications    Other acute pulmonary embolism without acute cor pulmonale (CMS/HCC) [I26.99]  History of bilateral pulmonary embolism (CMS/HCC) [I26.99]             Anticoagulation Episode Summary     INR check location:       Preferred lab:       Send INR reminders to:    S[facility] RESTREPO  POOL    Comments:   new [employer] home tristen 2019 **WEEKLY TRISTEN**      Anticoagulation Care Providers     Provider Role Specialty Phone number    Torri Colmenares, APRN Referring Cardiology [phone]    Elsy Baeza MD Responsible Cardiology [phone]          Clinic Interview:  No pertinent clinical findings have been reported.    INR History:  Anticoagulation Monitoring 2020   INR 2.00 2.30 2.50   INR Date 2020   INR Goal 2.0-3.0 2.0-3.0 2.0-3.0   Trend Same Same Same   Last Week Total 90 mg 90 mg 90 mg   Next Week Total 90 mg 90 mg 90 mg   Sun 15 mg 15 mg 15 mg   Mon 15 mg 15 mg 15 mg   Tue 10 mg 10 mg 10 mg   Wed 15 mg 15 mg 15 mg   Thu 10 mg 10 mg 10 mg   Fri 15 mg 15 mg 15 mg   Sat 10 mg 10 mg 10 mg   Visit Report - - -   Some recent data might be hidden       Plan:  1. INR is therapeutic today- see above in Anticoagulation Summary.    Kameron Melendez to continue their warfarin regimen- see above in Anticoagulation Summary.  2. Follow up in 1 week.  3. Pt has agreed to only be called if INR out of range. They have been instructed to call if any changes in medications, doses, concerns, etc.  Patient expresses understanding and has no further questions at this time.    Iris Gonzalez

## 2020-08-28 ENCOUNTER — ANTICOAGULATION VISIT (OUTPATIENT)
Dept: PHARMACY | Facility: HOSPITAL | Age: 50
End: 2020-08-28

## 2020-08-28 DIAGNOSIS — I26.99 OTHER ACUTE PULMONARY EMBOLISM WITHOUT ACUTE COR PULMONALE (HCC): ICD-10-CM

## 2020-08-28 DIAGNOSIS — I26.99 BILATERAL PULMONARY EMBOLISM (HCC): ICD-10-CM

## 2020-08-28 LAB — INR PPP: 1.8

## 2020-08-28 NOTE — PROGRESS NOTES
Anticoagulation Clinic Progress Note    Anticoagulation Summary  As of 2020    INR goal:   2.0-3.0   TTR:   69.2 % (1.5 y)   INR used for dosin.80! (2020)   Warfarin maintenance plan:   10 mg every Tue, Thu, Sat; 15 mg all other days   Weekly warfarin total:   90 mg   No change documented:   Ramo Morocho RPH   Plan last modified:   Ramo Morocho RPH (2020)   Next INR check:   2020   Priority:   High   Target end date:   Indefinite    Indications    Other acute pulmonary embolism without acute cor pulmonale (CMS/HCC) [I26.99]  History of bilateral pulmonary embolism (CMS/HCC) [I26.99]             Anticoagulation Episode Summary     INR check location:       Preferred lab:       Send INR reminders to:   [facility]  POOL    Comments:   new [employer] home tristen 2019 **WEEKLY TRISTEN**      Anticoagulation Care Providers     Provider Role Specialty Phone number    Torri Colmenares, APRN Referring Cardiology [phone]    Elsy Baeza MD Responsible Cardiology [phone]          INR History:  Anticoagulation Monitoring 2020   INR 2.30 2.50 1.80   INR Date 2020   INR Goal 2.0-3.0 2.0-3.0 2.0-3.0   Trend Same Same Same   Last Week Total 90 mg 90 mg 90 mg   Next Week Total 90 mg 90 mg 90 mg   Sun 15 mg 15 mg 15 mg   Mon 15 mg 15 mg 15 mg   Tue 10 mg 10 mg 10 mg   Wed 15 mg 15 mg 15 mg   Thu 10 mg 10 mg 10 mg   Fri 15 mg 15 mg 15 mg   Sat 10 mg 10 mg 10 mg   Visit Report - - -   Some recent data might be hidden       Plan:  1. INR is Subtherapeutic today- see above in Anticoagulation Summary.   Will instruct Kameron Melendez to Continue their warfarin regimen (due for higher 15 mg dose today) - see above in Anticoagulation Summary.  2. Follow up in 1 week  3. Unable to reach. Left secure VM w/ instructions and request for return call. They have been instructed to call if any changes in medications, doses, concerns, etc.     Ramo  Rashawn, [facility]

## 2020-09-08 ENCOUNTER — ANTICOAGULATION VISIT (OUTPATIENT)
Dept: PHARMACY | Facility: HOSPITAL | Age: 50
End: 2020-09-08

## 2020-09-08 DIAGNOSIS — I26.99 OTHER ACUTE PULMONARY EMBOLISM WITHOUT ACUTE COR PULMONALE (HCC): ICD-10-CM

## 2020-09-08 DIAGNOSIS — I26.99 BILATERAL PULMONARY EMBOLISM (HCC): ICD-10-CM

## 2020-09-08 LAB — INR PPP: 2.6

## 2020-09-08 NOTE — PROGRESS NOTES
Anticoagulation Clinic Progress Note    Anticoagulation Summary  As of 2020    INR goal:   2.0-3.0   TTR:   69.3 % (1.6 y)   INR used for dosin.60 (2020)   Warfarin maintenance plan:   10 mg every Tue, Thu, Sat; 15 mg all other days   Weekly warfarin total:   90 mg   Plan last modified:   Ramo Morocho RPH (2020)   Next INR check:   9/15/2020   Priority:   High   Target end date:   Indefinite    Indications    Other acute pulmonary embolism without acute cor pulmonale (CMS/HCC) [I26.99]  History of bilateral pulmonary embolism (CMS/HCC) [I26.99]             Anticoagulation Episode Summary     INR check location:       Preferred lab:       Send INR reminders to:    [facility]  POOL    Comments:   new [employer] home tristen 2019 **WEEKLY TRISTEN**      Anticoagulation Care Providers     Provider Role Specialty Phone number    Torri Colmenares APRN Referring Cardiology [phone]    Elsy Baeza MD Responsible Cardiology [phone]          Clinic Interview:  No pertinent clinical findings have been reported.    INR History:  Anticoagulation Monitoring 2020   INR 2.50 1.80 2.60   INR Date 2020   INR Goal 2.0-3.0 2.0-3.0 2.0-3.0   Trend Same Same Same   Last Week Total 90 mg 90 mg 90 mg   Next Week Total 90 mg 90 mg 90 mg   Sun 15 mg 15 mg 15 mg   Mon 15 mg 15 mg 15 mg   Tue 10 mg 10 mg 10 mg   Wed 15 mg 15 mg 15 mg   Thu 10 mg 10 mg 10 mg   Fri 15 mg 15 mg 15 mg   Sat 10 mg 10 mg 10 mg   Visit Report - - -   Some recent data might be hidden       Plan:  1. INR is therapeutic today- see above in Anticoagulation Summary.    Kameron Melendez to continue their warfarin regimen- see above in Anticoagulation Summary.  2. Follow up in 1 week  3. Pt has agreed to only be called if INR out of range. They have been instructed to call if any changes in medications, doses, concerns, etc. Patient expresses understanding and has no further  questions at this time.    Roslyn Jett, [facility]

## 2020-09-18 ENCOUNTER — ANTICOAGULATION VISIT (OUTPATIENT)
Dept: PHARMACY | Facility: HOSPITAL | Age: 50
End: 2020-09-18

## 2020-09-18 DIAGNOSIS — I26.99 BILATERAL PULMONARY EMBOLISM (HCC): ICD-10-CM

## 2020-09-18 DIAGNOSIS — I26.99 OTHER ACUTE PULMONARY EMBOLISM WITHOUT ACUTE COR PULMONALE (HCC): ICD-10-CM

## 2020-09-18 LAB — INR PPP: 2.8

## 2020-09-18 NOTE — PROGRESS NOTES
Anticoagulation Clinic Progress Note    Anticoagulation Summary  As of 2020    INR goal:  2.0-3.0   TTR:  69.9 % (1.6 y)   INR used for dosin.80 (2020)   Warfarin maintenance plan:  10 mg every Tue, Thu, Sat; 15 mg all other days   Weekly warfarin total:  90 mg   No change documented:  Alexandra Elias   Plan last modified:  Ramo Morocho RP (2020)   Next INR check:  2020   Priority:  High   Target end date:  Indefinite    Indications    Other acute pulmonary embolism without acute cor pulmonale (CMS/HCC) [I26.99]  History of bilateral pulmonary embolism (CMS/HCC) [I26.99]             Anticoagulation Episode Summary     INR check location:      Preferred lab:      Send INR reminders to:   [facility]  POOL    Comments:  new [employer] home tristen 2019 **WEEKLY TRISTEN**      Anticoagulation Care Providers     Provider Role Specialty Phone number    Torri Colmenares, APRN Referring Cardiology [phone]    Elsy Baeza MD Responsible Cardiology [phone]          Clinic Interview:  No pertinent clinical findings have been reported.    INR History:  Anticoagulation Monitoring 2020   INR 1.80 2.60 2.80   INR Date 2020   INR Goal 2.0-3.0 2.0-3.0 2.0-3.0   Trend Same Same Same   Last Week Total 90 mg 90 mg 90 mg   Next Week Total 90 mg 90 mg 90 mg   Sun 15 mg 15 mg 15 mg   Mon 15 mg 15 mg 15 mg   Tue 10 mg 10 mg 10 mg   Wed 15 mg 15 mg 15 mg   Thu 10 mg 10 mg 10 mg   Fri 15 mg 15 mg 15 mg   Sat 10 mg 10 mg 10 mg   Visit Report - - -   Some recent data might be hidden       Plan:  1. INR is therapeutic today- see above in Anticoagulation Summary.    Kameron Melendez to continue their warfarin regimen- see above in Anticoagulation Summary.  2. Follow up in 1 week  3. Pt has agreed to only be called if INR out of range. They have been instructed to call if any changes in medications, doses, concerns, etc. Patient expresses  understanding and has no further questions at this time.    Alexandra Elias

## 2020-09-25 ENCOUNTER — ANTICOAGULATION VISIT (OUTPATIENT)
Dept: PHARMACY | Facility: HOSPITAL | Age: 50
End: 2020-09-25

## 2020-09-25 DIAGNOSIS — I26.99 BILATERAL PULMONARY EMBOLISM (HCC): ICD-10-CM

## 2020-09-25 DIAGNOSIS — I26.99 OTHER ACUTE PULMONARY EMBOLISM WITHOUT ACUTE COR PULMONALE (HCC): ICD-10-CM

## 2020-09-25 LAB — INR PPP: 2.2

## 2020-09-25 NOTE — PROGRESS NOTES
Anticoagulation Clinic Progress Note    Anticoagulation Summary  As of 2020    INR goal:  2.0-3.0   TTR:  70.2 % (1.6 y)   INR used for dosin.20 (2020)   Warfarin maintenance plan:  10 mg every Tue, Thu, Sat; 15 mg all other days   Weekly warfarin total:  90 mg   No change documented:  Alexandra Elias   Plan last modified:  Ramo Morocho RP (2020)   Next INR check:  10/2/2020   Priority:  High   Target end date:  Indefinite    Indications    Other acute pulmonary embolism without acute cor pulmonale (CMS/HCC) [I26.99]  History of bilateral pulmonary embolism (CMS/HCC) [I26.99]             Anticoagulation Episode Summary     INR check location:      Preferred lab:      Send INR reminders to:   [facility]  POOL    Comments:  new [employer] home tristen 2019 **WEEKLY TRISTEN**      Anticoagulation Care Providers     Provider Role Specialty Phone number    Torri Colmenares, APRN Referring Cardiology [phone]    Elsy Baeza MD Responsible Cardiology [phone]          Clinic Interview:  No pertinent clinical findings have been reported.    INR History:  Anticoagulation Monitoring 2020   INR 2.60 2.80 2.20   INR Date 2020   INR Goal 2.0-3.0 2.0-3.0 2.0-3.0   Trend Same Same Same   Last Week Total 90 mg 90 mg 90 mg   Next Week Total 90 mg 90 mg 90 mg   Sun 15 mg 15 mg 15 mg   Mon 15 mg 15 mg 15 mg   Tue 10 mg 10 mg 10 mg   Wed 15 mg 15 mg 15 mg   Thu 10 mg 10 mg 10 mg   Fri 15 mg 15 mg 15 mg   Sat 10 mg 10 mg 10 mg   Visit Report - - -   Some recent data might be hidden       Plan:  1. INR is therapeutic today- see above in Anticoagulation Summary.    Kameron Melendez to continue their warfarin regimen- see above in Anticoagulation Summary.  2. Follow up in 1 week  3. Pt has agreed to only be called if INR out of range. They have been instructed to call if any changes in medications, doses, concerns, etc. Patient expresses  understanding and has no further questions at this time.    Alexandra Elias

## 2020-10-02 ENCOUNTER — ANTICOAGULATION VISIT (OUTPATIENT)
Dept: PHARMACY | Facility: HOSPITAL | Age: 50
End: 2020-10-02

## 2020-10-02 DIAGNOSIS — I26.99 BILATERAL PULMONARY EMBOLISM (HCC): ICD-10-CM

## 2020-10-02 DIAGNOSIS — I26.99 OTHER ACUTE PULMONARY EMBOLISM WITHOUT ACUTE COR PULMONALE (HCC): ICD-10-CM

## 2020-10-02 LAB — INR PPP: 2.1

## 2020-10-02 NOTE — PROGRESS NOTES
Anticoagulation Clinic Progress Note    Anticoagulation Summary  As of 10/2/2020    INR goal:  2.0-3.0   TTR:  70.6 % (1.6 y)   INR used for dosin.10 (10/2/2020)   Warfarin maintenance plan:  10 mg every Tue, Thu, Sat; 15 mg all other days   Weekly warfarin total:  90 mg   No change documented:  Iris Gonzalez   Plan last modified:  Ramo Morocho RPH (2020)   Next INR check:  10/9/2020   Priority:  High   Target end date:  Indefinite    Indications    Other acute pulmonary embolism without acute cor pulmonale (CMS/HCC) [I26.99]  History of bilateral pulmonary embolism (CMS/HCC) [I26.99]             Anticoagulation Episode Summary     INR check location:      Preferred lab:      Send INR reminders to:   [facility]  POOL    Comments:  new [employer] home tristen 2019 **WEEKLY TRISTEN**      Anticoagulation Care Providers     Provider Role Specialty Phone number    Torri Colmenares, APRN Referring Cardiology [phone]    Elsy Baeza MD Responsible Cardiology [phone]          Clinic Interview:  No pertinent clinical findings have been reported.    INR History:  Anticoagulation Monitoring 2020 2020 10/2/2020   INR 2.80 2.20 2.10   INR Date 2020 2020 10/2/2020   INR Goal 2.0-3.0 2.0-3.0 2.0-3.0   Trend Same Same Same   Last Week Total 90 mg 90 mg 90 mg   Next Week Total 90 mg 90 mg 90 mg   Sun 15 mg 15 mg 15 mg   Mon 15 mg 15 mg 15 mg   Tue 10 mg 10 mg 10 mg   Wed 15 mg 15 mg 15 mg   Thu 10 mg 10 mg 10 mg   Fri 15 mg 15 mg 15 mg   Sat 10 mg 10 mg 10 mg   Visit Report - - -   Some recent data might be hidden       Plan:  1. INR is therapeutic today- see above in Anticoagulation Summary.    Kameron Melendez to continue their warfarin regimen- see above in Anticoagulation Summary.  2. Follow up in 1 week.  3. Pt has agreed to only be called if INR out of range. They have been instructed to call if any changes in medications, doses, concerns, etc. Patient  expresses understanding and has no further questions at this time.    Iris Gonzalez

## 2020-10-09 ENCOUNTER — ANTICOAGULATION VISIT (OUTPATIENT)
Dept: PHARMACY | Facility: HOSPITAL | Age: 50
End: 2020-10-09

## 2020-10-09 DIAGNOSIS — I26.99 BILATERAL PULMONARY EMBOLISM (HCC): ICD-10-CM

## 2020-10-09 DIAGNOSIS — I26.99 OTHER ACUTE PULMONARY EMBOLISM WITHOUT ACUTE COR PULMONALE (HCC): ICD-10-CM

## 2020-10-09 LAB — INR PPP: 2.7

## 2020-10-09 NOTE — PROGRESS NOTES
Anticoagulation Clinic Progress Note    Anticoagulation Summary  As of 10/9/2020    INR goal:  2.0-3.0   TTR:  70.9 % (1.7 y)   INR used for dosin.70 (10/9/2020)   Warfarin maintenance plan:  10 mg every Tue, Thu, Sat; 15 mg all other days   Weekly warfarin total:  90 mg   No change documented:  Catrachita Perez   Plan last modified:  Ramo Morocho RPH (2020)   Next INR check:  10/16/2020   Priority:  High   Target end date:  Indefinite    Indications    Other acute pulmonary embolism without acute cor pulmonale (CMS/HCC) [I26.99]  History of bilateral pulmonary embolism (CMS/HCC) [I26.99]             Anticoagulation Episode Summary     INR check location:      Preferred lab:      Send INR reminders to:   [facility]  POOL    Comments:  new [employer] home tristen 2019 **WEEKLY TRISTEN**      Anticoagulation Care Providers     Provider Role Specialty Phone number    Torri Colmenares APRN Referring Cardiology [phone]    Elsy Baeza MD Responsible Cardiology [phone]          Clinic Interview:  No pertinent clinical findings have been reported.    INR History:  Anticoagulation Monitoring 2020 10/2/2020 10/9/2020   INR 2.20 2.10 2.70   INR Date 2020 10/2/2020 10/9/2020   INR Goal 2.0-3.0 2.0-3.0 2.0-3.0   Trend Same Same Same   Last Week Total 90 mg 90 mg 90 mg   Next Week Total 90 mg 90 mg 90 mg   Sun 15 mg 15 mg 15 mg   Mon 15 mg 15 mg 15 mg   Tue 10 mg 10 mg 10 mg   Wed 15 mg 15 mg 15 mg   Thu 10 mg 10 mg 10 mg   Fri 15 mg 15 mg 15 mg   Sat 10 mg 10 mg 10 mg   Visit Report - - -   Some recent data might be hidden       Plan:  1. INR is therapeutic today- see above in Anticoagulation Summary.    Kameron Melendez to continue their warfarin regimen- see above in Anticoagulation Summary.  2. Follow up in 1 week  3. Pt has agreed to only be called if INR out of range. They have been instructed to call if any changes in medications, doses, concerns, etc. Patient expresses  understanding and has no further questions at this time.    Catrachita Perez

## 2020-10-23 ENCOUNTER — ANTICOAGULATION VISIT (OUTPATIENT)
Dept: PHARMACY | Facility: HOSPITAL | Age: 50
End: 2020-10-23

## 2020-10-23 DIAGNOSIS — I26.99 OTHER ACUTE PULMONARY EMBOLISM WITHOUT ACUTE COR PULMONALE (HCC): ICD-10-CM

## 2020-10-23 DIAGNOSIS — I26.99 BILATERAL PULMONARY EMBOLISM (HCC): ICD-10-CM

## 2020-10-23 LAB — INR PPP: 1.6

## 2020-10-23 NOTE — PROGRESS NOTES
Anticoagulation Clinic Progress Note    Anticoagulation Summary  As of 10/23/2020    INR goal:  2.0-3.0   TTR:  70.7 % (1.7 y)   INR used for dosin.60 (10/23/2020)   Warfarin maintenance plan:  10 mg every Tue, Thu, Sat; 15 mg all other days   Weekly warfarin total:  90 mg   Plan last modified:  Ramo Morocho RPH (2020)   Next INR check:  10/30/2020   Priority:  High   Target end date:  Indefinite    Indications    Other acute pulmonary embolism without acute cor pulmonale (CMS/HCC) [I26.99]  History of bilateral pulmonary embolism (CMS/HCC) [I26.99]             Anticoagulation Episode Summary     INR check location:      Preferred lab:      Send INR reminders to:   [facility]  POOL    Comments:  new [employer] home tristen 2019 **WEEKLY TRISTEN**      Anticoagulation Care Providers     Provider Role Specialty Phone number    Torri Colmenares APRN Referring Cardiology [phone]    Elsy Baeza MD Responsible Cardiology [phone]            INR History:  Anticoagulation Monitoring 10/2/2020 10/9/2020 10/23/2020   INR 2.10 2.70 1.60   INR Date 10/2/2020 10/9/2020 10/23/2020   INR Goal 2.0-3.0 2.0-3.0 2.0-3.0   Trend Same Same Same   Last Week Total 90 mg 90 mg 90 mg   Next Week Total 90 mg 90 mg 95 mg   Sun 15 mg 15 mg 15 mg   Mon 15 mg 15 mg 15 mg   Tue 10 mg 10 mg 10 mg   Wed 15 mg 15 mg 15 mg   Thu 10 mg 10 mg 10 mg   Fri 15 mg 15 mg 15 mg   Sat 10 mg 10 mg 15 mg (10/24)   Visit Report - - -   Some recent data might be hidden       Plan:  1. INR is Subtherapeutic today- see above in Anticoagulation Summary.   Will instruct Kameron Melendez to Change their warfarin regimen- see above in Anticoagulation Summary.  2. Follow up in 1 week  3. Left VM w/ instructions per pt's previous request. They have been instructed to call if any changes in medications, doses, concerns, etc, or if he has any explanation for why the INR is lower this week than what we have recently observed.     Ramo  Rashawn, [facility]

## 2020-10-30 ENCOUNTER — ANTICOAGULATION VISIT (OUTPATIENT)
Dept: PHARMACY | Facility: HOSPITAL | Age: 50
End: 2020-10-30

## 2020-10-30 DIAGNOSIS — I26.99 BILATERAL PULMONARY EMBOLISM (HCC): ICD-10-CM

## 2020-10-30 DIAGNOSIS — I26.99 OTHER ACUTE PULMONARY EMBOLISM WITHOUT ACUTE COR PULMONALE (HCC): ICD-10-CM

## 2020-10-30 LAB — INR PPP: 2.6

## 2020-10-30 NOTE — PROGRESS NOTES
Anticoagulation Clinic Progress Note    Anticoagulation Summary  As of 10/30/2020    INR goal:  2.0-3.0   TTR:  70.6 % (1.7 y)   INR used for dosin.60 (10/30/2020)   Warfarin maintenance plan:  10 mg every Tue, Thu, Sat; 15 mg all other days   Weekly warfarin total:  90 mg   No change documented:  Ramo Morocho RPH   Plan last modified:  Ramo Morocho RPH (2020)   Next INR check:  2020   Priority:  High   Target end date:  Indefinite    Indications    Other acute pulmonary embolism without acute cor pulmonale (CMS/HCC) [I26.99]  History of bilateral pulmonary embolism (CMS/HCC) [I26.99]             Anticoagulation Episode Summary     INR check location:      Preferred lab:      Send INR reminders to:   [facility]  POOL    Comments:  new [employer] home tristen 2019 **WEEKLY TRISTEN**      Anticoagulation Care Providers     Provider Role Specialty Phone number    Torri Colmenares, APRN Referring Cardiology [phone]    Elsy Baeza MD Responsible Cardiology [phone]          Clinic Interview:  Patient Findings     Negatives:  Signs/symptoms of thrombosis, Signs/symptoms of bleeding,   Laboratory test error suspected, Change in health, Change in alcohol use,   Change in activity, Upcoming invasive procedure, Emergency department   visit, Upcoming dental procedure, Missed doses, Extra doses, Change in   medications, Change in diet/appetite, Hospital admission, Bruising, Other   complaints      Clinical Outcomes     Negatives:  Major bleeding event, Thromboembolic event,   Anticoagulation-related hospital admission, Anticoagulation-related ED   visit, Anticoagulation-related fatality        INR History:  Anticoagulation Monitoring 10/9/2020 10/23/2020 10/30/2020   INR 2.70 1.60 2.60   INR Date 10/9/2020 10/23/2020 10/30/2020   INR Goal 2.0-3.0 2.0-3.0 2.0-3.0   Trend Same Same Same   Last Week Total 90 mg 90 mg 95 mg   Next Week Total 90 mg 95 mg 90 mg   Sun 15 mg 15 mg 15 mg   Mon  15 mg 15 mg 15 mg   Tue 10 mg 10 mg 10 mg   Wed 15 mg 15 mg 15 mg   Thu 10 mg 10 mg 10 mg   Fri 15 mg 15 mg 15 mg   Sat 10 mg 15 mg (10/24) 10 mg   Visit Report - - -   Some recent data might be hidden       Plan:  1. INR is Therapeutic today- see above in Anticoagulation Summary.   Will instruct Kameron Melendez to Continue their warfarin regimen- see above in Anticoagulation Summary.  2. Follow up in 1 week  3. They have been instructed to call if any changes in medications, doses, concerns, etc. Patient expresses understanding and has no further questions at this time.    Ramo Morocho [facility]

## 2020-11-10 ENCOUNTER — ANTICOAGULATION VISIT (OUTPATIENT)
Dept: PHARMACY | Facility: HOSPITAL | Age: 50
End: 2020-11-10

## 2020-11-10 DIAGNOSIS — I26.99 BILATERAL PULMONARY EMBOLISM (HCC): ICD-10-CM

## 2020-11-10 DIAGNOSIS — I26.99 OTHER ACUTE PULMONARY EMBOLISM WITHOUT ACUTE COR PULMONALE (HCC): ICD-10-CM

## 2020-11-10 LAB — INR PPP: 2.4

## 2020-11-10 NOTE — PROGRESS NOTES
Anticoagulation Clinic Progress Note    Anticoagulation Summary  As of 11/10/2020    INR goal:  2.0-3.0   TTR:  71.1 % (1.7 y)   INR used for dosin.40 (11/10/2020)   Warfarin maintenance plan:  10 mg every Tue, Thu, Sat; 15 mg all other days   Weekly warfarin total:  90 mg   No change documented:  Catrachita Perez   Plan last modified:  Ramo Morocho RP (2020)   Next INR check:  2020   Priority:  High   Target end date:  Indefinite    Indications    Other acute pulmonary embolism without acute cor pulmonale (CMS/HCC) [I26.99]  History of bilateral pulmonary embolism (CMS/HCC) [I26.99]             Anticoagulation Episode Summary     INR check location:      Preferred lab:      Send INR reminders to:   [facility]  POOL    Comments:  new [employer] home tristen 2019 **WEEKLY TRISTEN**      Anticoagulation Care Providers     Provider Role Specialty Phone number    Torri Colmenares APRN Referring Cardiology [phone]    Elsy Baeza MD Responsible Cardiology [phone]          Clinic Interview:  No pertinent clinical findings have been reported.    INR History:  Anticoagulation Monitoring 10/23/2020 10/30/2020 11/10/2020   INR 1.60 2.60 2.40   INR Date 10/23/2020 10/30/2020 11/10/2020   INR Goal 2.0-3.0 2.0-3.0 2.0-3.0   Trend Same Same Same   Last Week Total 90 mg 95 mg 90 mg   Next Week Total 95 mg 90 mg 90 mg   Sun 15 mg 15 mg 15 mg   Mon 15 mg 15 mg 15 mg   Tue 10 mg 10 mg 10 mg   Wed 15 mg 15 mg 15 mg   Thu 10 mg 10 mg 10 mg   Fri 15 mg 15 mg 15 mg   Sat 15 mg (10/24) 10 mg 10 mg   Visit Report - - -   Some recent data might be hidden       Plan:  1. INR is therapeutic today- see above in Anticoagulation Summary.    Kameron Melendez to continue their warfarin regimen- see above in Anticoagulation Summary.  2. Follow up in 1 week  3. Pt has agreed to only be called if INR out of range. They have been instructed to call if any changes in medications, doses, concerns, etc. Patient  expresses understanding and has no further questions at this time.    Catrachita Perez

## 2020-11-20 ENCOUNTER — ANTICOAGULATION VISIT (OUTPATIENT)
Dept: PHARMACY | Facility: HOSPITAL | Age: 50
End: 2020-11-20

## 2020-11-20 DIAGNOSIS — I26.99 BILATERAL PULMONARY EMBOLISM (HCC): ICD-10-CM

## 2020-11-20 DIAGNOSIS — I26.99 OTHER ACUTE PULMONARY EMBOLISM WITHOUT ACUTE COR PULMONALE (HCC): ICD-10-CM

## 2020-11-20 LAB — INR PPP: 2.4

## 2020-11-20 NOTE — PROGRESS NOTES
Anticoagulation Clinic Progress Note    Anticoagulation Summary  As of 2020    INR goal:  2.0-3.0   TTR:  71.6 % (1.8 y)   INR used for dosin.40 (2020)   Warfarin maintenance plan:  10 mg every Tue, Thu, Sat; 15 mg all other days   Weekly warfarin total:  90 mg   No change documented:  Alexandra Elias   Plan last modified:  Ramo Morocho [facility] (2020)   Next INR check:  2020   Priority:  High   Target end date:  Indefinite    Indications    Other acute pulmonary embolism without acute cor pulmonale (CMS/HCC) [I26.99]  History of bilateral pulmonary embolism (CMS/HCC) [I26.99]             Anticoagulation Episode Summary     INR check location:      Preferred lab:      Send INR reminders to:   [facility]  POOL    Comments:  new  tristen 2019 **WEEKLY TRISTEN**      Anticoagulation Care Providers     Provider Role Specialty Phone number    Torri Colmenares APRN Referring Cardiology [phone]    Elsy Baeza MD Responsible Cardiology [phone]          Clinic Interview:  No pertinent clinical findings have been reported.    INR History:  Anticoagulation Monitoring 10/30/2020 11/10/2020 2020   INR 2.60 2.40 2.40   INR Date 10/30/2020 11/10/2020 2020   INR Goal 2.0-3.0 2.0-3.0 2.0-3.0   Trend Same Same Same   Last Week Total 95 mg 90 mg 90 mg   Next Week Total 90 mg 90 mg 90 mg   Sun 15 mg 15 mg 15 mg   Mon 15 mg 15 mg 15 mg   Tue 10 mg 10 mg 10 mg   Wed 15 mg 15 mg 15 mg   Thu 10 mg 10 mg 10 mg   Fri 15 mg 15 mg 15 mg   Sat 10 mg 10 mg 10 mg   Visit Report - - -   Some recent data might be hidden       Plan:  1. INR is therapeutic today- see above in Anticoagulation Summary.    Kameron Melendez to continue their warfarin regimen- see above in Anticoagulation Summary.  2. Follow up in 1 week  3. Pt has agreed to only be called if INR out of range. They have been instructed to call if any changes in medications, doses, concerns, etc. Patient expresses  understanding and has no further questions at this time.    Alexandra Elias

## 2020-12-04 ENCOUNTER — ANTICOAGULATION VISIT (OUTPATIENT)
Dept: PHARMACY | Facility: HOSPITAL | Age: 50
End: 2020-12-04

## 2020-12-04 DIAGNOSIS — I26.99 OTHER ACUTE PULMONARY EMBOLISM WITHOUT ACUTE COR PULMONALE (HCC): ICD-10-CM

## 2020-12-04 DIAGNOSIS — I26.99 BILATERAL PULMONARY EMBOLISM (HCC): ICD-10-CM

## 2020-12-04 LAB — INR PPP: 2.4

## 2020-12-04 NOTE — PROGRESS NOTES
Anticoagulation Clinic Progress Note    Anticoagulation Summary  As of 2020    INR goal:  2.0-3.0   TTR:  72.2 % (1.8 y)   INR used for dosin.40 (2020)   Warfarin maintenance plan:  10 mg every Tue, Thu, Sat; 15 mg all other days   Weekly warfarin total:  90 mg   No change documented:  Catrachita Perez   Plan last modified:  Rmao Morocho RPH (2020)   Next INR check:  2020   Priority:  High   Target end date:  Indefinite    Indications    Other acute pulmonary embolism without acute cor pulmonale (CMS/HCC) [I26.99]  History of bilateral pulmonary embolism (CMS/HCC) [I26.99]             Anticoagulation Episode Summary     INR check location:      Preferred lab:      Send INR reminders to:   [facility]RE[facility]  POOL    Comments:  new  tristen 2019 **WEEKLY TRISTEN**      Anticoagulation Care Providers     Provider Role Specialty Phone number    Torri Colmenares APRN Referring Cardiology [phone]    Elsy Baeza MD Responsible Cardiology [phone]          Clinic Interview:  No pertinent clinical findings have been reported.    INR History:  Anticoagulation Monitoring 11/10/2020 2020 2020   INR 2.40 2.40 2.40   INR Date 11/10/2020 2020 2020   INR Goal 2.0-3.0 2.0-3.0 2.0-3.0   Trend Same Same Same   Last Week Total 90 mg 90 mg 90 mg   Next Week Total 90 mg 90 mg 90 mg   Sun 15 mg 15 mg 15 mg   Mon 15 mg 15 mg 15 mg   Tue 10 mg 10 mg 10 mg   Wed 15 mg 15 mg 15 mg   Thu 10 mg 10 mg 10 mg   Fri 15 mg 15 mg 15 mg   Sat 10 mg 10 mg 10 mg   Visit Report - - -   Some recent data might be hidden       Plan:  1. INR is therapeutic today- see above in Anticoagulation Summary.    Kameron Melendez to continue their warfarin regimen- see above in Anticoagulation Summary.  2. Follow up in 1 WEEK.  3. Pt has agreed to only be called if INR out of range. They have been instructed to call if any changes in medications, doses, concerns, etc. Patient expresses  understanding and has no further questions at this time.    Catrachita Perez

## 2020-12-11 ENCOUNTER — ANTICOAGULATION VISIT (OUTPATIENT)
Dept: PHARMACY | Facility: HOSPITAL | Age: 50
End: 2020-12-11

## 2020-12-11 DIAGNOSIS — I26.99 BILATERAL PULMONARY EMBOLISM (HCC): ICD-10-CM

## 2020-12-11 DIAGNOSIS — I26.99 OTHER ACUTE PULMONARY EMBOLISM WITHOUT ACUTE COR PULMONALE (HCC): ICD-10-CM

## 2020-12-11 LAB — INR PPP: 2.5

## 2020-12-11 NOTE — PROGRESS NOTES
Anticoagulation Clinic Progress Note    Anticoagulation Summary  As of 2020    INR goal:  2.0-3.0   TTR:  72.5 % (1.8 y)   INR used for dosin.50 (2020)   Warfarin maintenance plan:  10 mg every Tue, Thu, Sat; 15 mg all other days   Weekly warfarin total:  90 mg   No change documented:  Alexandra Elias   Plan last modified:  Ramo Morocho [facility] (2020)   Next INR check:  2020   Priority:  High   Target end date:  Indefinite    Indications    Other acute pulmonary embolism without acute cor pulmonale (CMS/HCC) [I26.99]  History of bilateral pulmonary embolism (CMS/HCC) [I26.99]             Anticoagulation Episode Summary     INR check location:      Preferred lab:      Send INR reminders to:   [facility]  [facility]    Comments:  new  tristen 2019 **WEEKLY TRISTEN**      Anticoagulation Care Providers     Provider Role Specialty Phone number    Torri Colmenares APRN Referring Cardiology [phone]    Elsy Baeza MD Responsible Cardiology [phone]          Clinic Interview:  No pertinent clinical findings have been reported.    INR History:  Anticoagulation Monitoring 2020   INR 2.40 2.40 2.50   INR Date 2020   INR Goal 2.0-3.0 2.0-3.0 2.0-3.0   Trend Same Same Same   Last Week Total 90 mg 90 mg 90 mg   Next Week Total 90 mg 90 mg 90 mg   Sun 15 mg 15 mg 15 mg   Mon 15 mg 15 mg 15 mg   Tue 10 mg 10 mg 10 mg   Wed 15 mg 15 mg 15 mg   Thu 10 mg 10 mg 10 mg   Fri 15 mg 15 mg 15 mg   Sat 10 mg 10 mg 10 mg   Visit Report - - -   Some recent data might be hidden       Plan:  1. INR is therapeutic today- see above in Anticoagulation Summary.    Kameron Melednez to continue their warfarin regimen- see above in Anticoagulation Summary.  2. Follow up in 1 week  3. Pt has agreed to only be called if INR out of range. They have been instructed to call if any changes in medications, doses, concerns, etc. Patient expresses  understanding and has no further questions at this time.    Alexandra Elias

## 2020-12-18 ENCOUNTER — ANTICOAGULATION VISIT (OUTPATIENT)
Dept: PHARMACY | Facility: HOSPITAL | Age: 50
End: 2020-12-18

## 2020-12-18 DIAGNOSIS — I26.99 OTHER ACUTE PULMONARY EMBOLISM WITHOUT ACUTE COR PULMONALE (HCC): ICD-10-CM

## 2020-12-18 DIAGNOSIS — I26.99 BILATERAL PULMONARY EMBOLISM (HCC): ICD-10-CM

## 2020-12-18 LAB — INR PPP: 2.5

## 2020-12-18 NOTE — PROGRESS NOTES
Anticoagulation Clinic Progress Note    Anticoagulation Summary  As of 2020    INR goal:  2.0-3.0   TTR:  72.8 % (1.9 y)   INR used for dosin.50 (2020)   Warfarin maintenance plan:  10 mg every Tue, Thu, Sat; 15 mg all other days   Weekly warfarin total:  90 mg   No change documented:  Iris Gonzalez   Plan last modified:  Ramo Morocho RPH (2020)   Next INR check:  2020   Priority:  High   Target end date:  Indefinite    Indications    Other acute pulmonary embolism without acute cor pulmonale (CMS/HCC) [I26.99]  History of bilateral pulmonary embolism (CMS/HCC) [I26.99]             Anticoagulation Episode Summary     INR check location:      Preferred lab:      Send INR reminders to:   [facility]  [facility]    Comments:  new  tristen 2019 **WEEKLY TRISTEN**      Anticoagulation Care Providers     Provider Role Specialty Phone number    Torri Colmenares APRN Referring Cardiology [phone]    Elsy Baeza MD Responsible Cardiology [phone]          Clinic Interview:  No pertinent clinical findings have been reported.    INR History:  Anticoagulation Monitoring 2020   INR 2.40 2.50 2.50   INR Date 2020   INR Goal 2.0-3.0 2.0-3.0 2.0-3.0   Trend Same Same Same   Last Week Total 90 mg 90 mg 90 mg   Next Week Total 90 mg 90 mg 90 mg   Sun 15 mg 15 mg 15 mg   Mon 15 mg 15 mg 15 mg   Tue 10 mg 10 mg 10 mg   Wed 15 mg 15 mg 15 mg   Thu 10 mg 10 mg 10 mg   Fri 15 mg 15 mg 15 mg   Sat 10 mg 10 mg 10 mg   Visit Report - - -   Some recent data might be hidden       Plan:  1. INR is therapeutic today- see above in Anticoagulation Summary.    Kameron Melendez to continue their warfarin regimen- see above in Anticoagulation Summary.  2. Follow up in 1 week.  3. Pt has agreed to only be called if INR out of range. They have been instructed to call if any changes in medications, doses, concerns, etc. Patient expresses  understanding and has no further questions at this time.    Iris Gonzalez

## 2020-12-31 ENCOUNTER — ANTICOAGULATION VISIT (OUTPATIENT)
Dept: PHARMACY | Facility: HOSPITAL | Age: 50
End: 2020-12-31

## 2020-12-31 DIAGNOSIS — I26.99 OTHER ACUTE PULMONARY EMBOLISM WITHOUT ACUTE COR PULMONALE (HCC): ICD-10-CM

## 2020-12-31 DIAGNOSIS — I26.99 BILATERAL PULMONARY EMBOLISM (HCC): ICD-10-CM

## 2020-12-31 LAB — INR PPP: 3.5

## 2020-12-31 NOTE — PROGRESS NOTES
Anticoagulation Clinic Progress Note    Anticoagulation Summary  As of 12/31/2020    INR goal:  2.0-3.0   TTR:  72.3 % (1.9 y)   INR used for dosing:  3.50 (12/31/2020)   Warfarin maintenance plan:  10 mg every Tue, Thu, Sat; 15 mg all other days   Weekly warfarin total:  90 mg   Plan last modified:  Ramo Morocho RP (7/8/2020)   Next INR check:  1/7/2021   Priority:  High   Target end date:  Indefinite    Indications    Other acute pulmonary embolism without acute cor pulmonale (CMS/HCC) [I26.99]  History of bilateral pulmonary embolism (CMS/HCC) [I26.99]             Anticoagulation Episode Summary     INR check location:      Preferred lab:      [facility]    Comments:  new  tristen March 2019 **WEEKLY TRISTEN**      Anticoagulation Care Providers     Provider Role Specialty Phone number    Torri Colmenares APRN Referring Cardiology [phone]    Elsy Baeza MD Responsible Cardiology [phone]          Clinic Interview:      INR History:  Anticoagulation Monitoring 12/11/2020 12/18/2020 12/31/2020   INR 2.50 2.50 3.50   INR Date 12/11/2020 12/18/2020 12/31/2020   INR Goal 2.0-3.0 2.0-3.0 2.0-3.0   Trend Same Same Same   Last Week Total 90 mg 90 mg 90 mg   Next Week Total 90 mg 90 mg 85 mg   Sun 15 mg 15 mg 15 mg   Mon 15 mg 15 mg 15 mg   Tue 10 mg 10 mg 10 mg   Wed 15 mg 15 mg 15 mg   Thu 10 mg 10 mg 5 mg (12/31)   Fri 15 mg 15 mg 15 mg   Sat 10 mg 10 mg 10 mg   Visit Report - - -   Some recent data might be hidden       Plan:  1. INR is Supratherapeutic today- see above in Anticoagulation Summary.  Will instruct Kameron Melendez to reduce one dose only then continue their warfarin regimen- see above in Anticoagulation Summary.  2. Follow up in 1 week  3. Left VM with instructions or to call us back with updates.    Kim Sotelo [facility]

## 2021-01-07 RX ORDER — WARFARIN SODIUM 5 MG/1
TABLET ORAL
Qty: 55 TABLET | Refills: 3 | Status: SHIPPED | OUTPATIENT
Start: 2021-01-07 | End: 2021-10-29

## 2021-01-11 ENCOUNTER — ANTICOAGULATION VISIT (OUTPATIENT)
Dept: PHARMACY | Facility: HOSPITAL | Age: 51
End: 2021-01-11

## 2021-01-11 DIAGNOSIS — I26.99 OTHER ACUTE PULMONARY EMBOLISM WITHOUT ACUTE COR PULMONALE (HCC): ICD-10-CM

## 2021-01-11 DIAGNOSIS — I26.99 BILATERAL PULMONARY EMBOLISM (HCC): ICD-10-CM

## 2021-01-11 LAB — INR PPP: 2.4

## 2021-01-11 NOTE — PROGRESS NOTES
Anticoagulation Clinic Progress Note    Anticoagulation Summary  As of 2021    INR goal:  2.0-3.0   TTR:  72.0 % (1.9 y)   INR used for dosin.40 (2021)   Warfarin maintenance plan:  10 mg every Tue, Thu, Sat; 15 mg all other days   Weekly warfarin total:  90 mg   No change documented:  [place] [facility]   Plan last modified:  Ramo Morocho [facility] (2020)   Next INR check:  2021   Priority:  High   Target end date:  Indefinite    Indications    Other acute pulmonary embolism without acute cor pulmonale (CMS/HCC) [I26.99]  History of bilateral pulmonary embolism (CMS/HCC) [I26.99]             Anticoagulation Episode Summary     INR check location:      Preferred lab:      Send INR reminders to:  [facility]  POOL    Comments:  new  tristen 2019 **WEEKLY TRISTEN**      Anticoagulation Care Providers     Provider Role Specialty Phone number    Torri Colmenares, APRN Referring Cardiology [phone]    Elsy Baeza MD Responsible Cardiology [phone]          Clinic Interview:  Patient Findings     Negatives:  Signs/symptoms of thrombosis, Signs/symptoms of bleeding,   Laboratory test error suspected, Change in health, Change in alcohol use,   Change in activity, Upcoming invasive procedure, Emergency department   visit, Upcoming dental procedure, Missed doses, Extra doses, Change in   medications, Change in diet/appetite, Hospital admission, Bruising, Other   complaints    Comments:  Took one 5mg  then resumed normal dose schedule.       Clinical Outcomes     Negatives:  Major bleeding event, Thromboembolic event,   Anticoagulation-related hospital admission, Anticoagulation-related ED   visit, Anticoagulation-related fatality    Comments:  Took one 5mg  then resumed normal dose schedule.         INR History:  Anticoagulation Monitoring 2020   INR 2.50 3.50 2.40   INR Date 2020   INR Goal 2.0-3.0 2.0-3.0  2.0-3.0   Trend Same Same Same   Last Week Total 90 mg 90 mg 90 mg   Next Week Total 90 mg 85 mg 90 mg   Sun 15 mg 15 mg 15 mg   Mon 15 mg 15 mg 15 mg   Tue 10 mg 10 mg 10 mg   Wed 15 mg 15 mg 15 mg   Thu 10 mg 5 mg (12/31) 10 mg   Fri 15 mg 15 mg 15 mg   Sat 10 mg 10 mg 10 mg   Visit Report - - -   Some recent data might be hidden       Plan:  1. INR is Therapeutic today- see above in Anticoagulation Summary.   Will instruct Kameron Melendez to Continue their warfarin regimen- see above in Anticoagulation Summary.  2. Follow up in 1 week  3. Pt has agreed to only be called if INR out of range. They have been instructed to call if any changes in medications, doses, concerns, etc. Patient expresses understanding and has no further questions at this time.    [place]

## 2021-01-15 ENCOUNTER — ANTICOAGULATION VISIT (OUTPATIENT)
Dept: PHARMACY | Facility: HOSPITAL | Age: 51
End: 2021-01-15

## 2021-01-15 DIAGNOSIS — I26.99 BILATERAL PULMONARY EMBOLISM (HCC): ICD-10-CM

## 2021-01-15 DIAGNOSIS — I26.99 OTHER ACUTE PULMONARY EMBOLISM WITHOUT ACUTE COR PULMONALE (HCC): ICD-10-CM

## 2021-01-15 LAB — INR PPP: 2.9

## 2021-01-15 NOTE — PROGRESS NOTES
Anticoagulation Clinic Progress Note    Anticoagulation Summary  As of 1/15/2021    INR goal:  2.0-3.0   TTR:  72.2 % (1.9 y)   INR used for dosin.90 (1/15/2021)   Warfarin maintenance plan:  10 mg every Tue, Thu, Sat; 15 mg all other days   Weekly warfarin total:  90 mg   No change documented:  Catrachita Perez   Plan last modified:  Ramo Morocho [facility] (2020)   Next INR check:  2021   Priority:  High   Target end date:  Indefinite    Indications    Other acute pulmonary embolism without acute cor pulmonale (CMS/HCC) [I26.99]  History of bilateral pulmonary embolism (CMS/HCC) [I26.99]             Anticoagulation Episode Summary     INR check location:      Preferred lab:      Send INR reminders to:   [facility]  [facility]    Comments:  new  tristen 2019 **WEEKLY TRISTEN**      Anticoagulation Care Providers     Provider Role Specialty Phone number    Torri Colmenares APRN Referring Cardiology [phone]    Elsy Baeza MD Responsible Cardiology [phone]          Clinic Interview:  No pertinent clinical findings have been reported.    INR History:  Anticoagulation Monitoring 2020 2021 1/15/2021   INR 3.50 2.40 2.90   INR Date 2020 2021 1/15/2021   INR Goal 2.0-3.0 2.0-3.0 2.0-3.0   Trend Same Same Same   Last Week Total 90 mg 90 mg 90 mg   Next Week Total 85 mg 90 mg 90 mg   Sun 15 mg 15 mg 15 mg   Mon 15 mg 15 mg 15 mg   Tue 10 mg 10 mg 10 mg   Wed 15 mg 15 mg 15 mg   Thu 5 mg () 10 mg 10 mg   Fri 15 mg 15 mg 15 mg   Sat 10 mg 10 mg 10 mg   Visit Report - - -   Some recent data might be hidden       Plan:  1. INR is therapeutic today- see above in Anticoagulation Summary.    Kameron Melendez to continue their warfarin regimen- see above in Anticoagulation Summary.  2. Follow up in 1 weeks  3. Pt has agreed to only be called if INR out of range. They have been instructed to call if any changes in medications, doses, concerns, etc. Patient expresses  understanding and has no further questions at this time.    Catrachita Perez

## 2021-01-29 ENCOUNTER — TELEPHONE (OUTPATIENT)
Dept: PHARMACY | Facility: HOSPITAL | Age: 51
End: 2021-01-29

## 2021-01-29 ENCOUNTER — ANTICOAGULATION VISIT (OUTPATIENT)
Dept: PHARMACY | Facility: HOSPITAL | Age: 51
End: 2021-01-29

## 2021-01-29 DIAGNOSIS — I26.99 OTHER ACUTE PULMONARY EMBOLISM WITHOUT ACUTE COR PULMONALE (HCC): ICD-10-CM

## 2021-01-29 DIAGNOSIS — I26.99 BILATERAL PULMONARY EMBOLISM (HCC): ICD-10-CM

## 2021-01-29 LAB — INR PPP: 2.6

## 2021-01-29 NOTE — PROGRESS NOTES
Anticoagulation Clinic Progress Note    Anticoagulation Summary  As of 2021    INR goal:  2.0-3.0   TTR:  72.7 % (2 y)   INR used for dosin.60 (2021)   Warfarin maintenance plan:  10 mg every Tue, Thu, Sat; 15 mg all other days   Weekly warfarin total:  90 mg   No change documented:  Alexandra Elias   Plan last modified:  Ramo Morocho [facility] (2020)   Next INR check:  2021   Priority:  High   Target end date:  Indefinite    Indications    Other acute pulmonary embolism without acute cor pulmonale (CMS/HCC) [I26.99]  History of bilateral pulmonary embolism (CMS/HCC) [I26.99]             Anticoagulation Episode Summary     INR check location:      Preferred lab:      Send INR reminders to:   [facility]  POOL    Comments:  new  tristen 2019 **WEEKLY TRISTEN**      Anticoagulation Care Providers     Provider Role Specialty Phone number    Torri Colmenares APRN Referring Cardiology [phone]    Elsy Baeza MD Responsible Cardiology [phone]          Clinic Interview:  No pertinent clinical findings have been reported.    INR History:  Anticoagulation Monitoring 2021 1/15/2021 2021   INR 2.40 2.90 2.60   INR Date 2021 1/15/2021 2021   INR Goal 2.0-3.0 2.0-3.0 2.0-3.0   Trend Same Same Same   Last Week Total 90 mg 90 mg 90 mg   Next Week Total 90 mg 90 mg 90 mg   Sun 15 mg 15 mg 15 mg   Mon 15 mg 15 mg 15 mg   Tue 10 mg 10 mg 10 mg   Wed 15 mg 15 mg 15 mg   Thu 10 mg 10 mg 10 mg   Fri 15 mg 15 mg 15 mg   Sat 10 mg 10 mg 10 mg   Visit Report - - -   Some recent data might be hidden       Plan:  1. INR is therapeutic today- see above in Anticoagulation Summary.    Kameron Melendez to continue their warfarin regimen- see above in Anticoagulation Summary.  2. Follow up in 1 week  3. Pt has agreed to only be called if INR out of range. They have been instructed to call if any changes in medications, doses, concerns, etc. Patient expresses understanding and  has no further questions at this time.    Alexandra Elias

## 2021-02-05 ENCOUNTER — ANTICOAGULATION VISIT (OUTPATIENT)
Dept: PHARMACY | Facility: HOSPITAL | Age: 51
End: 2021-02-05

## 2021-02-05 DIAGNOSIS — I26.99 BILATERAL PULMONARY EMBOLISM (HCC): ICD-10-CM

## 2021-02-05 DIAGNOSIS — I26.99 OTHER ACUTE PULMONARY EMBOLISM WITHOUT ACUTE COR PULMONALE (HCC): ICD-10-CM

## 2021-02-05 LAB — INR PPP: 2.8

## 2021-02-05 NOTE — PROGRESS NOTES
Anticoagulation Clinic Progress Note    Anticoagulation Summary  As of 2021    INR goal:  2.0-3.0   TTR:  73.0 % (2 y)   INR used for dosin.80 (2021)   Warfarin maintenance plan:  10 mg every Tue, Thu, Sat; 15 mg all other days   Weekly warfarin total:  90 mg   No change documented:  Iris Gonzalez   Plan last modified:  Ramo Morocho [facility] (2020)   Next INR check:  2021   Priority:  High   Target end date:  Indefinite    Indications    Other acute pulmonary embolism without acute cor pulmonale (CMS/HCC) [I26.99]  History of bilateral pulmonary embolism (CMS/HCC) [I26.99]             Anticoagulation Episode Summary     INR check location:      Preferred lab:      Send INR reminders to:   [facility]  [facility]    Comments:  new  tristen 2019 **WEEKLY TRISTEN**      Anticoagulation Care Providers     Provider Role Specialty Phone number    Torri Colmenares APRN Referring Cardiology [phone]    Elsy Baeza MD Responsible Cardiology [phone]          Clinic Interview:  No pertinent clinical findings have been reported.    INR History:  Anticoagulation Monitoring 1/15/2021 2021 2021   INR 2.90 2.60 2.80   INR Date 1/15/2021 2021 2021   INR Goal 2.0-3.0 2.0-3.0 2.0-3.0   Trend Same Same Same   Last Week Total 90 mg 90 mg 90 mg   Next Week Total 90 mg 90 mg 90 mg   Sun 15 mg 15 mg 15 mg   Mon 15 mg 15 mg 15 mg   Tue 10 mg 10 mg 10 mg   Wed 15 mg 15 mg 15 mg   Thu 10 mg 10 mg 10 mg   Fri 15 mg 15 mg 15 mg   Sat 10 mg 10 mg 10 mg   Visit Report - - -   Some recent data might be hidden       Plan:  1. INR is therapeutic today- see above in Anticoagulation Summary.    Kameron Melendez to continue their warfarin regimen- see above in Anticoagulation Summary.  2. Follow up in 2 weeks  3. Pt has agreed to only be called if INR out of range. They have been instructed to call if any changes in medications, doses, concerns, etc. Patient expresses understanding  and has no further questions at this time.    Iris Gonzalez

## 2021-02-09 ENCOUNTER — OFFICE VISIT (OUTPATIENT)
Dept: FAMILY MEDICINE CLINIC | Facility: CLINIC | Age: 51
End: 2021-02-09

## 2021-02-09 VITALS
HEART RATE: 77 BPM | SYSTOLIC BLOOD PRESSURE: 124 MMHG | OXYGEN SATURATION: 97 % | HEIGHT: 74 IN | BODY MASS INDEX: 66.25 KG/M2 | DIASTOLIC BLOOD PRESSURE: 68 MMHG

## 2021-02-09 DIAGNOSIS — I89.0 LYMPHEDEMA OF BOTH LOWER EXTREMITIES: ICD-10-CM

## 2021-02-09 DIAGNOSIS — I87.2 VENOUS STASIS DERMATITIS OF LEFT LOWER EXTREMITY: ICD-10-CM

## 2021-02-09 DIAGNOSIS — Z86.711 HISTORY OF PULMONARY EMBOLISM: ICD-10-CM

## 2021-02-09 DIAGNOSIS — E66.01 MORBID (SEVERE) OBESITY DUE TO EXCESS CALORIES (HCC): ICD-10-CM

## 2021-02-09 DIAGNOSIS — G47.33 OSA (OBSTRUCTIVE SLEEP APNEA): ICD-10-CM

## 2021-02-09 DIAGNOSIS — T14.8XXA NONHEALING NONSURGICAL WOUND: ICD-10-CM

## 2021-02-09 DIAGNOSIS — D68.51 HETEROZYGOUS FACTOR V LEIDEN MUTATION (HCC): Primary | ICD-10-CM

## 2021-02-09 PROCEDURE — 99204 OFFICE O/P NEW MOD 45 MIN: CPT | Performed by: FAMILY MEDICINE

## 2021-02-09 PROCEDURE — 29580 STRAPPING UNNA BOOT: CPT | Performed by: FAMILY MEDICINE

## 2021-02-09 RX ORDER — FUROSEMIDE 80 MG
80 TABLET ORAL DAILY
Qty: 30 TABLET | Refills: 2 | Status: SHIPPED | OUTPATIENT
Start: 2021-02-09 | End: 2021-05-13

## 2021-02-09 RX ORDER — POTASSIUM CHLORIDE 20 MEQ/1
20 TABLET, EXTENDED RELEASE ORAL DAILY
Qty: 30 TABLET | Refills: 2 | Status: SHIPPED | OUTPATIENT
Start: 2021-02-09 | End: 2022-05-12 | Stop reason: HOSPADM

## 2021-02-09 NOTE — PROGRESS NOTES
Subjective   Kameron Melendez is a 51 y.o. male. Presents today for   Chief Complaint   Patient presents with   • Leg Pain     left  - 1/11/21 - cellulitis - Needs FMLA Paperwork    • Wound Check     left leg   • Sleep Apnea   • Anticoagulation       New patient here to establish care    Leg Pain   The incident occurred more than 1 week ago (Had cellulitis Jan 11-13;  Saw telehealth that mgmt). The incident occurred at home. The quality of the pain is described as aching. Pertinent negatives include no numbness or tingling. Associated symptoms comments: Redness, swelling and pain improved after abx. Treatments tried: started abx by telehealth doc. The treatment provided moderate relief.   Wound Check  Treated in ED: Has had wound even prior to cellulitis and present since 10/2020. Previous treatment included oral antibiotics. There has been clear and colored (improved after abx and is smaller after oral abx) discharge from the wound. The redness has improved. The swelling has improved. The pain has improved. Difficulty Moving Extremity/Digit: has tried neosporin, silvadene, aquaphor with no relief.   Sleep Apnea  This is a chronic problem. Episode onset: last sleep study over 10 years ago. The problem occurs constantly. The problem has been unchanged. Associated symptoms include a rash. Pertinent negatives include no abdominal pain, chest pain, chills, fever, nausea, numbness or vomiting. Treatments tried: intolerant of cpap;     Anticoagulation  This is a chronic problem. The current episode started more than 1 year ago. Associated symptoms include a rash. Pertinent negatives include no abdominal pain, chest pain, chills, fever, nausea, numbness or vomiting. Treatments tried: Mgmt per Dr. [facility].       Review of Systems   Constitutional: Negative for chills and fever.   Respiratory: Negative for shortness of breath.    Cardiovascular: Positive for leg swelling. Negative for chest pain and  palpitations.   Gastrointestinal: Negative for abdominal pain, nausea and vomiting.   Skin: Positive for rash and wound.   Neurological: Negative for tingling and numbness.       Patient Active Problem List   Diagnosis   • History of bilateral pulmonary embolism (CMS/HCC)   • Pulmonary hypertension (CMS/HCC)   • Lymphedema of both lower extremities   • Morbid obesity due to excess calories (CMS/HCC)   • Dyslipidemia   • Hyperuricemia   • Hypogonadal obesity   • Knee arthropathy   • Morbid obesity with body mass index of 60.0-69.9 in adult (CMS/HCC)   • ARNOLDO (obstructive sleep apnea)   • Severe edema   • History of pulmonary embolism   • Other acute pulmonary embolism without acute cor pulmonale (CMS/HCC)   • Intractable back pain   • Heterozygous factor V Leiden mutation (CMS/HCC)     Past Medical History:   Diagnosis Date   • DVT (deep venous thrombosis) (CMS/HCC)     RLE   • Factor V Leiden (CMS/HCC)    • Hypertension    • Kidney stone    • Lymphedema    • Lymphedema of left lower extremity    • Obesity    • ARNOLDO (obstructive sleep apnea)    • Pulmonary embolism (CMS/HCC)     bilateral   • Pulmonary hypertension (CMS/HCC)    • Right ventricular enlargement      Past Surgical History:   Procedure Laterality Date   • CARDIAC CATHETERIZATION Bilateral 7/18/2017    Procedure: Pulmonary angiography- Inari ;  Surgeon: Cedric Coulter MD;  Location: [facility] INVASIVE LOCATION;  Service:    • CARDIAC CATHETERIZATION N/A 7/18/2017    Procedure: Right Heart Cath;  Surgeon: Cedric Coulter MD;  [facility];  Service:    • THROMBECTOMY       Family History   Problem Relation Age of Onset   • Heart disease Mother    • Heart failure Mother    • Bradycardia Mother    • No Known Problems Father    • No Known Problems Maternal Grandmother    • No Known Problems Maternal Grandfather    • No Known Problems Paternal Grandmother    • No Known Problems Paternal Grandfather      Social History     Socioeconomic  "History   • Marital status:      Spouse name: Not on file   • Number of children: Not on file   • Years of education: Not on file   • Highest education level: Not on file   Tobacco Use   • Smoking status: Light Tobacco Smoker     Types: Cigars, Pipe     Start date: 1/1/2006   • Smokeless tobacco: Never Used   • Tobacco comment: occasional - < 1 a month   Substance and Sexual Activity   • Alcohol use: No   • Drug use: No   • Sexual activity: Defer       No Known Allergies    Current Outpatient Medications on File Prior to Visit   Medication Sig Dispense Refill   • acetaminophen (TYLENOL) 500 MG tablet Take 500 mg by mouth Every 6 (Six) Hours As Needed for Mild Pain .     • warfarin (COUMADIN) 10 MG tablet Take one tablet by mouth daily or as directed. 90 tablet 1   • warfarin (COUMADIN) 5 MG tablet TAKE 1 TABLET ON SUNDAY, MONDAY, WEDNESDAY, AND FRIDAY OR AS DIRECTED. TAKE IN ADDITION TO ONE 10 MG TABLET FOR TOTAL DOSE OF 15 MG 55 tablet 3     No current facility-administered medications on file prior to visit.        Objective   Vitals:    02/09/21 1302   BP: 124/68   Pulse: 77   SpO2: 97%   Weight: Comment: pt exceeds scale weight   Height: 188 cm (74\")     Body mass index is 66.25 kg/m².  Physical Exam  Vitals signs and nursing note reviewed.   Constitutional:       Appearance: He is well-developed.   HENT:      Head: Normocephalic and atraumatic.   Neck:      Musculoskeletal: Neck supple.      Thyroid: No thyromegaly.      Vascular: No JVD.   Cardiovascular:      Rate and Rhythm: Normal rate and regular rhythm.      Heart sounds: Normal heart sounds. No murmur. No friction rub. No gallop.    Pulmonary:      Effort: Pulmonary effort is normal. No respiratory distress.      Breath sounds: Normal breath sounds. No wheezing or rales.   Abdominal:      General: Bowel sounds are normal. There is no distension.      Palpations: Abdomen is soft.      Tenderness: There is no abdominal tenderness. There is no " guarding or rebound.   Musculoskeletal:      Right lower leg: Edema present.      Left lower leg: Edema (elephantitis with thickening, skin breaking down, plaque formatoin) present.   Skin:     General: Skin is warm and dry.   Neurological:      Mental Status: He is alert.   Psychiatric:         Behavior: Behavior normal.       Patient Information    Patient Name   Kameron Melendez MRN   [id number] Sex   Male  (Age)   1970 (51 y.o.)   PACS Images     Show images for Adult Transthoracic Echo Complete W/ Cont if Necessary Per Protocol    Sedation Narrator Report    Sedation Narrator Report      Interpretation Summary    · Left ventricular wall thickness is consistent with mild concentric hypertrophy.  · Estimated EF = 60%.  · Left ventricular systolic function is normal.  · Left ventricular diastolic dysfunction (grade I) consistent with impaired relaxation.  · Mild tricuspid valve regurgitation is present.  · Calculated right ventricular systolic pressure from tricuspid regurgitation is 48.7 mmHg.     Clinical Indication 19    rule out DVT   Comments:    Interpretation Summary    · Normal left lower extremity venous duplex scan.  · Acute right lower extremity deep vein thrombosis noted in the popliteal and gastrocnemius/soleal.  · Acute right lower extremity superficial thrombophlebitis noted in the greater saphenous (above knee) and small saphenous.     Procedure:  Unna boot (compression wrap(s))  Dx  Lymphedema of both lower extremities  -venouous stasis dermatitis of left lower extremity   Nonhealing nonsurgical wound    Risks - circulation, increased pain; loosen if cyanotic toes or increased pain  Location: left lower ext Wrapped with Unna-Z, sterile kerlix and ace wrap in typical fashion  Patient tolerated well with no immediate complications.  Toe(s) cap ref post-wrapping < 3sec and sensation intact.      Assessment/Plan   Diagnoses and all orders for this visit:    1. Heterozygous factor V Leiden  mutation (CMS/[facility]) (Primary)    2. Lymphedema of both lower extremities  -     Comprehensive Metabolic Panel  -     CBC & Differential    3. Venous stasis dermatitis of left lower extremity  -     Comprehensive Metabolic Panel  -     CBC & Differential    4. ARNOLDO (obstructive sleep apnea)    5. Nonhealing nonsurgical wound  -     Comprehensive Metabolic Panel  -     CBC & Differential    6. Morbid (severe) obesity due to excess calories (CMS/[facility])  -     Lipid Panel    7. History of pulmonary embolism    Other orders  -     furosemide (Lasix) 80 MG tablet; Take 1 tablet by mouth Daily.  Dispense: 30 tablet; Refill: 2  -     potassium chloride (K-DUR,KLOR-CON) 20 MEQ CR tablet; Take 1 tablet by mouth Daily. Take with furosemide  Dispense: 30 tablet; Refill: 2      -highly recommend address sleep apnea given associated risks, but patient declines at this time.    -I discusse haylee patient very high risk for recurrent vte and would recommend anticoagulation, he decliens and will contemplate  -nonhealing leg wound - severe lymphedema;  Placed unna wrap and gave supplies to have partner do for him weekly until see back in 4 weeks;  Unwrap if too tight, pain or toes/leg goes numb;  Decline lymphedema clinic as seen in past;  Once heals, would be candidate for lympedema pump, will address at follow-up again  Will also initiate furosemide to see if can decerase swelling.  -counseled at length on diet and exercise;  D/w medication and will consider;       FMLA - out early 1/11/21,  Seen 1/13/21  via telehealth reviewed notes on patient phone, dx cellulitis left lower ext Rx keflex, tylenol for pain and out for 1/11/21 - 1/15/21, returned to work 1/18/21.      -Follow up: 4 weeks and prn

## 2021-02-10 LAB
ALBUMIN SERPL-MCNC: 3.9 G/DL (ref 3.8–4.9)
ALBUMIN/GLOB SERPL: 1.5 {RATIO} (ref 1.2–2.2)
ALP SERPL-CCNC: 111 IU/L (ref 39–117)
ALT SERPL-CCNC: 14 IU/L (ref 0–44)
AST SERPL-CCNC: 13 IU/L (ref 0–40)
BASOPHILS # BLD AUTO: 0.1 X10E3/UL (ref 0–0.2)
BASOPHILS NFR BLD AUTO: 1 %
BILIRUB SERPL-MCNC: 0.6 MG/DL (ref 0–1.2)
BUN SERPL-MCNC: 14 MG/DL (ref 6–24)
BUN/CREAT SERPL: 19 (ref 9–20)
CALCIUM SERPL-MCNC: 9 MG/DL (ref 8.7–10.2)
CHLORIDE SERPL-SCNC: 104 MMOL/L (ref 96–106)
CHOLEST SERPL-MCNC: 171 MG/DL (ref 100–199)
CO2 SERPL-SCNC: 20 MMOL/L (ref 20–29)
CREAT SERPL-MCNC: 0.75 MG/DL (ref 0.76–1.27)
EOSINOPHIL # BLD AUTO: 0.2 X10E3/UL (ref 0–0.4)
EOSINOPHIL NFR BLD AUTO: 3 %
ERYTHROCYTE [DISTWIDTH] IN BLOOD BY AUTOMATED COUNT: 13.9 % (ref 11.6–15.4)
GLOBULIN SER CALC-MCNC: 2.6 G/DL (ref 1.5–4.5)
GLUCOSE SERPL-MCNC: 97 MG/DL (ref 65–99)
HCT VFR BLD AUTO: 41.4 % (ref 37.5–51)
HDLC SERPL-MCNC: 41 MG/DL
HGB BLD-MCNC: 14 G/DL (ref 13–17.7)
IMM GRANULOCYTES # BLD AUTO: 0 X10E3/UL (ref 0–0.1)
IMM GRANULOCYTES NFR BLD AUTO: 0 %
LDLC SERPL CALC-MCNC: 111 MG/DL (ref 0–99)
LYMPHOCYTES # BLD AUTO: 2.2 X10E3/UL (ref 0.7–3.1)
LYMPHOCYTES NFR BLD AUTO: 30 %
MCH RBC QN AUTO: 29.9 PG (ref 26.6–33)
MCHC RBC AUTO-ENTMCNC: 33.8 G/DL (ref 31.5–35.7)
MCV RBC AUTO: 88 FL (ref 79–97)
MONOCYTES # BLD AUTO: 0.7 X10E3/UL (ref 0.1–0.9)
MONOCYTES NFR BLD AUTO: 10 %
NEUTROPHILS # BLD AUTO: 4.2 X10E3/UL (ref 1.4–7)
NEUTROPHILS NFR BLD AUTO: 56 %
PLATELET # BLD AUTO: 234 X10E3/UL (ref 150–450)
POTASSIUM SERPL-SCNC: 4.2 MMOL/L (ref 3.5–5.2)
PROT SERPL-MCNC: 6.5 G/DL (ref 6–8.5)
RBC # BLD AUTO: 4.69 X10E6/UL (ref 4.14–5.8)
SODIUM SERPL-SCNC: 140 MMOL/L (ref 134–144)
TRIGL SERPL-MCNC: 101 MG/DL (ref 0–149)
VLDLC SERPL CALC-MCNC: 19 MG/DL (ref 5–40)
WBC # BLD AUTO: 7.5 X10E3/UL (ref 3.4–10.8)

## 2021-02-12 ENCOUNTER — ANTICOAGULATION VISIT (OUTPATIENT)
Dept: PHARMACY | Facility: HOSPITAL | Age: 51
End: 2021-02-12

## 2021-02-12 DIAGNOSIS — I26.99 BILATERAL PULMONARY EMBOLISM (HCC): ICD-10-CM

## 2021-02-12 DIAGNOSIS — I26.99 OTHER ACUTE PULMONARY EMBOLISM WITHOUT ACUTE COR PULMONALE (HCC): ICD-10-CM

## 2021-02-12 LAB — INR PPP: 2.3

## 2021-02-12 NOTE — PROGRESS NOTES
Anticoagulation Clinic Progress Note    Anticoagulation Summary  As of 2021    INR goal:  2.0-3.0   TTR:  73.3 % (2 y)   INR used for dosin.30 (2021)   Warfarin maintenance plan:  10 mg every Tue, Thu, Sat; 15 mg all other days   Weekly warfarin total:  90 mg   No change documented:  Catrachita Perez   Plan last modified:  Ramo Morocho [facility] (2020)   Next INR check:  2021   Priority:  High   Target end date:  Indefinite    Indications    Other acute pulmonary embolism without acute cor pulmonale (CMS/HCC) [I26.99]  History of bilateral pulmonary embolism (CMS/HCC) [I26.99]             Anticoagulation Episode Summary     INR check location:      Preferred lab:      [facility]    Comments:  new  tristen 2019 **WEEKLY TRISTEN**      Anticoagulation Care Providers     Provider Role Specialty Phone number    Torri Colmenares APRN Referring Cardiology [phone]    Elsy Baeza MD Responsible Cardiology [phone]          Clinic Interview:  No pertinent clinical findings have been reported.    INR History:  Anticoagulation Monitoring 2021   INR 2.60 2.80 2.30   INR Date 2021   INR Goal 2.0-3.0 2.0-3.0 2.0-3.0   Trend Same Same Same   Last Week Total 90 mg 90 mg 90 mg   Next Week Total 90 mg 90 mg 90 mg   Sun 15 mg 15 mg 15 mg   Mon 15 mg 15 mg 15 mg   Tue 10 mg 10 mg 10 mg   Wed 15 mg 15 mg 15 mg   Thu 10 mg 10 mg 10 mg   Fri 15 mg 15 mg 15 mg   Sat 10 mg 10 mg 10 mg   Visit Report - - -   Some recent data might be hidden       Plan:  1. INR is therapeutic today- see above in Anticoagulation Summary.    Kameron Melendez to continue their warfarin regimen- see above in Anticoagulation Summary.  2. Follow up in 1 week  3. Pt has agreed to only be called if INR out of range. They have been instructed to call if any changes in medications, doses, concerns, etc. Patient expresses understanding and has  no further questions at this time.    Catrachita Perez

## 2021-02-18 ENCOUNTER — OFFICE VISIT (OUTPATIENT)
Dept: CARDIOLOGY | Facility: CLINIC | Age: 51
End: 2021-02-18

## 2021-02-18 VITALS
BODY MASS INDEX: 66.25 KG/M2 | HEIGHT: 74 IN | SYSTOLIC BLOOD PRESSURE: 128 MMHG | HEART RATE: 69 BPM | DIASTOLIC BLOOD PRESSURE: 88 MMHG

## 2021-02-18 DIAGNOSIS — E66.01 MORBID OBESITY DUE TO EXCESS CALORIES (HCC): ICD-10-CM

## 2021-02-18 DIAGNOSIS — Z86.711 HISTORY OF PULMONARY EMBOLISM: Primary | ICD-10-CM

## 2021-02-18 DIAGNOSIS — E78.5 DYSLIPIDEMIA: ICD-10-CM

## 2021-02-18 DIAGNOSIS — G47.33 OSA (OBSTRUCTIVE SLEEP APNEA): ICD-10-CM

## 2021-02-18 PROBLEM — I51.7 RIGHT VENTRICULAR ENLARGEMENT: Status: RESOLVED | Noted: 2017-08-29 | Resolved: 2021-02-18

## 2021-02-18 PROCEDURE — 93000 ELECTROCARDIOGRAM COMPLETE: CPT | Performed by: INTERNAL MEDICINE

## 2021-02-18 PROCEDURE — 99214 OFFICE O/P EST MOD 30 MIN: CPT | Performed by: INTERNAL MEDICINE

## 2021-02-18 NOTE — PROGRESS NOTES
Subjective:     Encounter Date:02/18/2021      Patient ID: Kameron Melendez is a 51 y.o. male.    Chief Complaint:  History of Present Illness    This is a 51-year-old with a history of bilateral pulmonary embolism status post thrombectomy in 7/2017, pulmonary hypertension, morbid obesity, hyperlipidemia, obstructive sleep apnea, recurrent pulmonary embolism in 1/2019, who presents for hospital follow-up.     He presents today for routine follow-up.  Fortunately since I saw him last establish care with Dr. Patel.  He saw Dr. Patel initially on 2/9/2021.  He was prescribed furosemide and a wrap for his legs for his chronic lower extremity edema and lymphedema.  It appears that he has not undergone a sleep evaluation and this was discussed with Dr. Patel again and the patient declined.  He continues to decline agreeable assessment of his sleep apnea since he had issues tolerating CPAP in the past.    Over the last year he feels like his weight has been fairly stable.  He is unable to weigh himself but does not feel like there is been significant change.  He denies any worsening shortness of breath.  Denies any chest pain, palpitations, orthopnea, near-syncope or syncope.  He has been compliant with his warfarin and which is managed by the medication management clinic.    Prior History:  I initially saw the patient in 7/2017 when he was admitted and 7/2017 with bilateral pulmonary embolism associated with significant right ventricular strain.  The patient reported that he had been on a long car trip prior to developing his symptoms.  His findings included a normal troponin and an elevated BNP.  His echocardiogram however showed a severely dilated right ventricle with severe dysfunction.  During that admission he was taken to the cardiac catheterization laboratory by Dr. Coulter and underwent successful left pulmonary artery thrombectomy and infusion of TPA.  Following the procedure he was placed on warfarin (due to  his high BMI) with a heparin bridge.  And follow-up he was seen by Dr. Coulter on 11/6/2017 at which time he was doing well.  A repeat echocardiogram and bilateral lower extremity venous Dopplers were performed.  The lower extremity ultrasound showed no residual clot.  His echocardiogram showed a moderately dilated left ventricle with mildly dilated right ventricle, normal left ventricular function with an EF of 66%, and no significant valvular abnormalities.  At that time was suggested that since this was a provoked venous thromboembolism he could stop anticoagulation after a year.  He returned to the office for routine follow-up in 8/2018 with JESSIE Sanchez which time he was continuing to do well and was recommended that he could stop his anticoagulation.  He ended up stopping this in October.     He presented back to the hospital on 1/23/2019 with progressively worsening dyspnea.  His workup in the emergency room room revealed an elevated BNP and a normal troponin.  A CT angiogram of the chest showed acute bilateral pulmonary embolism involving the lower lobes and the proximal branch of the right middle lobe with evidence of right ventricular strain.  He was started on heparin drip following his admission.  An echocardiogram was performed during that admission that showed normal left ventricular systolic function and wall motion, EF 60%, grade 1 diastolic dysfunction, mild tricuspid regurgitation, normal right ventricular size and function, and right ventricular systolic pressure of 48.7 mmHg.  A lower extremity in the venous Doppler ultrasound showed right lower extremity DVTs of the popliteal and gastrocnemius/soleal veins.  Due to the lack of right ventricular strain and his relatively low thrombus burden I recommended that we conservatively manage him at this time around with anticoagulation.  Deferred any invasive treatment as long as he continued to improve.  Fortunately the patient did so and we ended up  starting him back on warfarin.  He was evaluated by hematology during this admission and his workup did reveal evidence of heterozygous mutation for factor V Leiden.  He was finally discharged on 2/1/2019 in good condition off of any oxygen during the day.  He did have evidence of obstructive sleep apnea and the patient was to follow-up with a sleep physician as an outpatient.     His last seen in the office by FADY Quintana in 3/2020 for routine follow-up.  At that time he was not having any new significant issues.  It was recommended that he establish care with a primary care physician.    Review of Systems   Constitution: Negative for malaise/fatigue.   HENT: Negative for hearing loss, hoarse voice, nosebleeds and sore throat.    Eyes: Negative for pain.   Cardiovascular: Negative for chest pain, claudication, cyanosis, dyspnea on exertion, irregular heartbeat, leg swelling, near-syncope, orthopnea, palpitations, paroxysmal nocturnal dyspnea and syncope.   Respiratory: Negative for shortness of breath and snoring.    Endocrine: Negative for cold intolerance, heat intolerance, polydipsia, polyphagia and polyuria.   Skin: Negative for itching and rash.   Musculoskeletal: Negative for arthritis, falls, joint pain, joint swelling, muscle cramps, muscle weakness and myalgias.   Gastrointestinal: Negative for constipation, diarrhea, dysphagia, heartburn, hematemesis, hematochezia, melena, nausea and vomiting.   Genitourinary: Negative for frequency, hematuria and hesitancy.   Neurological: Negative for excessive daytime sleepiness, dizziness, headaches, light-headedness, numbness and weakness.   Psychiatric/Behavioral: Negative for depression. The patient is not nervous/anxious.          Current Outpatient Medications:   •  acetaminophen (TYLENOL) 500 MG tablet, Take 500 mg by mouth Every 6 (Six) Hours As Needed for Mild Pain ., Disp: , Rfl:   •  furosemide (Lasix) 80 MG tablet, Take 1 tablet by mouth Daily.,  Disp: 30 tablet, Rfl: 2  •  potassium chloride (K-DUR,KLOR-CON) 20 MEQ CR tablet, Take 1 tablet by mouth Daily. Take with furosemide, Disp: 30 tablet, Rfl: 2  •  warfarin (COUMADIN) 10 MG tablet, Take one tablet by mouth daily or as directed., Disp: 90 tablet, Rfl: 1  •  warfarin (COUMADIN) 5 MG tablet, TAKE 1 TABLET ON SUNDAY, MONDAY, WEDNESDAY, AND FRIDAY OR AS DIRECTED. TAKE IN ADDITION TO ONE 10 MG TABLET FOR TOTAL DOSE OF 15 MG, Disp: 55 tablet, Rfl: 3    Past Medical History:   Diagnosis Date   • DVT (deep venous thrombosis) (CMS/HCC)     RLE   • Factor V Leiden (CMS/HCC)    • Hypertension    • Kidney stone    • Lymphedema    • Lymphedema of left lower extremity    • Obesity    • ARNOLDO (obstructive sleep apnea)    • Pulmonary embolism (CMS/HCC)     bilateral   • Pulmonary hypertension (CMS/HCC)    • Right ventricular enlargement        Past Surgical History:   Procedure Laterality Date   • CARDIAC CATHETERIZATION Bilateral 7/18/2017    Procedure: Pulmonary angiography- Inari ;  Surgeon: Cedric Coulter MD;  Location:  [facility] CATH INVASIVE LOCATION;  Service:    • CARDIAC CATHETERIZATION N/A 7/18/2017    Procedure: Right Heart Cath;  Surgeon: Cedric Coulter MD;  Location:  [facility] CATH INVASIVE LOCATION;  Service:    • THROMBECTOMY         Family History   Problem Relation Age of Onset   • Heart disease Mother    • Heart failure Mother    • Bradycardia Mother    • No Known Problems Father    • No Known Problems Maternal Grandmother    • No Known Problems Maternal Grandfather    • No Known Problems Paternal Grandmother    • No Known Problems Paternal Grandfather        Social History     Tobacco Use   • Smoking status: Light Tobacco Smoker     Types: Cigars, Pipe     Start date: 1/1/2006   • Smokeless tobacco: Never Used   • Tobacco comment: occasional - < 1 a month   Substance Use Topics   • Alcohol use: No   • Drug use: No         ECG 12 Lead    Date/Time: 2/18/2021 9:01 AM  Performed by: Elsy Baeza MD  Authorized by:  "Elsy Baeza MD   Comparison: compared with previous ECG   Similar to previous ECG  Rhythm: sinus rhythm  Conduction: 1st degree AV block               Objective:     Visit Vitals  /88   Pulse 69   Ht 188 cm (74\")   BMI 66.25 kg/m²         Constitutional:       Appearance: Normal appearance. Well-developed.   HENT:      Head: Normocephalic and atraumatic.   Neck:      Vascular: No carotid bruit or JVD.   Pulmonary:      Effort: Pulmonary effort is normal.      Breath sounds: Normal breath sounds.   Cardiovascular:      Normal rate. Regular rhythm.      No gallop.   Pulses:     Radial: 2+ bilaterally.  Edema:     Peripheral edema absent.   Abdominal:      Palpations: Abdomen is soft.   Skin:     General: Skin is warm and dry.   Neurological:      Mental Status: Alert and oriented to person, place, and time.           Assessment:          Diagnosis Plan   1. History of pulmonary embolism     2. Dyslipidemia     3. ARNOLDO (obstructive sleep apnea)     4. Morbid obesity due to excess calories (CMS/HCC)            Plan:       1.  History of recurrent bilateral pulmonary embolism.  Likely due to azygous factor V Leiden mutation and morbid obesity.  On chronic anticoagulation with warfarin which she will continued indefinitely.  2.  Chronic bilateral lower extremity edema/lymphedema.  Agree with management per Dr. Patel.  3.  Obstructive sleep apnea.  The patient continues to decline further assessment.  He has been unable to tolerate CPAP in the past.  4.  Morbid obesity  5.  Hyperlipidemia    We will plan on seeing the patient back again in 1 year or sooner if further issues arise.         "

## 2021-02-20 NOTE — PROGRESS NOTES
Call and mail copy of results to patient.  Kidney and liver function normal  Blood counts normal  Cholesterol ok range  Blood sugar normal

## 2021-02-25 ENCOUNTER — OFFICE VISIT (OUTPATIENT)
Dept: FAMILY MEDICINE CLINIC | Facility: CLINIC | Age: 51
End: 2021-02-25

## 2021-02-25 VITALS
OXYGEN SATURATION: 97 % | HEIGHT: 74 IN | BODY MASS INDEX: 66.25 KG/M2 | SYSTOLIC BLOOD PRESSURE: 116 MMHG | HEART RATE: 109 BPM | DIASTOLIC BLOOD PRESSURE: 66 MMHG

## 2021-02-25 DIAGNOSIS — I89.0 LYMPHEDEMA: ICD-10-CM

## 2021-02-25 DIAGNOSIS — I87.2 VENOUS STASIS DERMATITIS OF LEFT LOWER EXTREMITY: ICD-10-CM

## 2021-02-25 DIAGNOSIS — L03.116 CELLULITIS OF LEFT LOWER EXTREMITY: Primary | ICD-10-CM

## 2021-02-25 PROCEDURE — 29580 STRAPPING UNNA BOOT: CPT | Performed by: FAMILY MEDICINE

## 2021-02-25 PROCEDURE — 99214 OFFICE O/P EST MOD 30 MIN: CPT | Performed by: FAMILY MEDICINE

## 2021-02-25 RX ORDER — DOXYCYCLINE HYCLATE 100 MG/1
100 CAPSULE ORAL 2 TIMES DAILY
Qty: 20 CAPSULE | Refills: 0 | Status: SHIPPED | OUTPATIENT
Start: 2021-02-25 | End: 2021-03-09

## 2021-02-26 ENCOUNTER — ANTICOAGULATION VISIT (OUTPATIENT)
Dept: PHARMACY | Facility: HOSPITAL | Age: 51
End: 2021-02-26

## 2021-02-26 DIAGNOSIS — I26.99 OTHER ACUTE PULMONARY EMBOLISM WITHOUT ACUTE COR PULMONALE (HCC): ICD-10-CM

## 2021-02-26 DIAGNOSIS — I26.99 BILATERAL PULMONARY EMBOLISM (HCC): ICD-10-CM

## 2021-02-26 LAB — INR PPP: 3.6

## 2021-02-26 NOTE — PROGRESS NOTES
Anticoagulation Clinic Progress Note    Anticoagulation Summary  As of 2/26/2021    INR goal:  2.0-3.0   TTR:  72.9 % (2 y)   INR used for dosing:  3.60 (2/26/2021)   Warfarin maintenance plan:  10 mg every Tue, Thu, Sat; 15 mg all other days   Weekly warfarin total:  90 mg   Plan last modified:  Ramo Morocho RPH (7/8/2020)   Next INR check:  3/1/2021   Priority:  High   Target end date:  Indefinite    Indications    Other acute pulmonary embolism without acute cor pulmonale (CMS/HCC) [I26.99]  History of bilateral pulmonary embolism (CMS/HCC) [I26.99]             Anticoagulation Episode Summary     INR check location:      Preferred lab:      Send INR reminders to:   [facility]  POOL    Comments:  new  tristen March 2019 **WEEKLY TRISTEN**      Anticoagulation Care Providers     Provider Role Specialty Phone number    Torri Colmenares APRN Referring Cardiology [phone]    Elsy Baeza MD Responsible Cardiology [phone]          Clinic Interview:  Patient Findings     Positives:  Change in health, Change in medications    Comments:  Doxycycline x 10 days to start today for cellulitis      INR History:  Anticoagulation Monitoring 2/5/2021 2/12/2021 2/26/2021   INR 2.80 2.30 3.60   INR Date 2/5/2021 2/12/2021 2/26/2021   INR Goal 2.0-3.0 2.0-3.0 2.0-3.0   Trend Same Same Same   Last Week Total 90 mg 90 mg 90 mg   Next Week Total 90 mg 90 mg 80 mg   Sun 15 mg 15 mg 15 mg   Mon 15 mg 15 mg -   Tue 10 mg 10 mg -   Wed 15 mg 15 mg -   Thu 10 mg 10 mg -   Fri 15 mg 15 mg 5 mg (2/26)   Sat 10 mg 10 mg 10 mg   Visit Report - - -   Some recent data might be hidden       Plan:  1. INR is Supratherapeutic today- see above in Anticoagulation Summary.   Will instruct Kameron Melendez to reduce warfarin dose today, then continue their warfarin regimen- see above in Anticoagulation Summary.  2. Follow up in 3 days since will be starting doxycycline today  3.Spoke with pt today. They have been instructed to  call if any changes in medications, doses, concerns, etc. Patient expresses understanding and has no further questions at this time.    Kim Sotelo [facility]

## 2021-03-05 ENCOUNTER — ANTICOAGULATION VISIT (OUTPATIENT)
Dept: PHARMACY | Facility: HOSPITAL | Age: 51
End: 2021-03-05

## 2021-03-05 DIAGNOSIS — I26.99 BILATERAL PULMONARY EMBOLISM (HCC): ICD-10-CM

## 2021-03-05 DIAGNOSIS — I26.99 OTHER ACUTE PULMONARY EMBOLISM WITHOUT ACUTE COR PULMONALE (HCC): ICD-10-CM

## 2021-03-05 LAB — INR PPP: 4

## 2021-03-05 NOTE — PROGRESS NOTES
Anticoagulation Clinic Progress Note    Anticoagulation Summary  As of 3/5/2021    INR goal:  2.0-3.0   TTR:  72.2 % (2.1 y)   INR used for dosin.00 (3/5/2021)   Warfarin maintenance plan:  10 mg every Tue, Thu, Sat; 15 mg all other days   Weekly warfarin total:  90 mg   Plan last modified:  Ramo Morocho RP (2020)   Next INR check:  3/12/2021   Priority:  High   Target end date:  Indefinite    Indications    Other acute pulmonary embolism without acute cor pulmonale (CMS/HCC) [I26.99]  History of bilateral pulmonary embolism (CMS/HCC) [I26.99]             Anticoagulation Episode Summary     INR check location:      Preferred lab:      Send INR reminders to:  [facility]  POOL    Comments:  new  tristen 2019 **WEEKLY TRISTEN**      Anticoagulation Care Providers     Provider Role Specialty Phone number    Torri Colmenares, FADY Referring Cardiology [phone]    Elsy Baeza MD Responsible Cardiology [phone]          INR History:  Anticoagulation Monitoring 2021 2021 3/5/2021   INR 2.30 3.60 4.00   INR Date 2021 2021 3/5/2021   INR Goal 2.0-3.0 2.0-3.0 2.0-3.0   Trend Same Same Same   Last Week Total 90 mg 90 mg 80 mg   Next Week Total 90 mg 80 mg 75 mg   Sun 15 mg 15 mg 15 mg   Mon 15 mg - 15 mg   Tue 10 mg - 10 mg   Wed 15 mg - 15 mg   Thu 10 mg - 10 mg   Fri 15 mg 5 mg () Hold (3/5)   Sat 10 mg 10 mg 10 mg   Visit Report - - -   Some recent data might be hidden       Plan:  1. INR is Supratherapeutic today- see above in Anticoagulation Summary.   Will instruct Kameron Melendez to HOLD warfarin today.  Continue their warfarin regimen- see above in Anticoagulation Summary.  2. Follow up in 1 week  3. Left VM to update us on anything new--should be on day 7/10 of doxycycline. They have been instructed to call if any changes in medications, doses, concerns, etc.   Kim Sotelo [facility]

## 2021-03-09 ENCOUNTER — OFFICE VISIT (OUTPATIENT)
Dept: FAMILY MEDICINE CLINIC | Facility: CLINIC | Age: 51
End: 2021-03-09

## 2021-03-09 VITALS
SYSTOLIC BLOOD PRESSURE: 136 MMHG | HEART RATE: 85 BPM | BODY MASS INDEX: 66.25 KG/M2 | OXYGEN SATURATION: 98 % | HEIGHT: 74 IN | DIASTOLIC BLOOD PRESSURE: 88 MMHG

## 2021-03-09 DIAGNOSIS — I87.2 VENOUS STASIS DERMATITIS OF BOTH LOWER EXTREMITIES: ICD-10-CM

## 2021-03-09 DIAGNOSIS — I89.0 LYMPHEDEMA OF BOTH LOWER EXTREMITIES: Primary | ICD-10-CM

## 2021-03-09 DIAGNOSIS — E66.01 MORBID (SEVERE) OBESITY DUE TO EXCESS CALORIES (HCC): ICD-10-CM

## 2021-03-09 DIAGNOSIS — R53.83 OTHER FATIGUE: ICD-10-CM

## 2021-03-09 PROCEDURE — 99214 OFFICE O/P EST MOD 30 MIN: CPT | Performed by: FAMILY MEDICINE

## 2021-03-09 PROCEDURE — 29580 STRAPPING UNNA BOOT: CPT | Performed by: FAMILY MEDICINE

## 2021-03-09 RX ORDER — PHENTERMINE HYDROCHLORIDE 37.5 MG/1
37.5 TABLET ORAL
Qty: 30 TABLET | Refills: 2 | Status: SHIPPED | OUTPATIENT
Start: 2021-03-09 | End: 2021-04-16 | Stop reason: SDUPTHER

## 2021-03-09 RX ORDER — TOPIRAMATE 25 MG/1
25 TABLET ORAL DAILY
Qty: 30 TABLET | Refills: 2 | Status: SHIPPED | OUTPATIENT
Start: 2021-03-09 | End: 2021-04-16 | Stop reason: SDUPTHER

## 2021-03-09 NOTE — PATIENT INSTRUCTIONS
Calorie Counting for Weight Loss  Calories are units of energy. Your body needs a certain amount of calories from food to keep you going throughout the day. When you eat more calories than your body needs, your body stores the extra calories as fat. When you eat fewer calories than your body needs, your body burns fat to get the energy it needs.  Calorie counting means keeping track of how many calories you eat and drink each day. Calorie counting can be helpful if you need to lose weight. If you make sure to eat fewer calories than your body needs, you should lose weight. Ask your health care provider what a healthy weight is for you.  For calorie counting to work, you will need to eat the right number of calories in a day in order to lose a healthy amount of weight per week. A dietitian can help you determine how many calories you need in a day and will give you suggestions on how to reach your calorie goal.  · A healthy amount of weight to lose per week is usually 1-2 lb (0.5-0.9 kg). This usually means that your daily calorie intake should be reduced by 500-750 calories.  · Eating 1,200 - 1,500 calories per day can help most women lose weight.  · Eating 1,500 - 1,800 calories per day can help most men lose weight.  What is my plan?  My goal is to have __________ calories per day.  If I have this many calories per day, I should lose around __________ pounds per week.  What do I need to know about calorie counting?  In order to meet your daily calorie goal, you will need to:  · Find out how many calories are in each food you would like to eat. Try to do this before you eat.  · Decide how much of the food you plan to eat.  · Write down what you ate and how many calories it had. Doing this is called keeping a food log.  To successfully lose weight, it is important to balance calorie counting with a healthy lifestyle that includes regular activity. Aim for 150 minutes of moderate exercise (such as walking) or 75  minutes of vigorous exercise (such as running) each week.  Where do I find calorie information?    The number of calories in a food can be found on a Nutrition Facts label. If a food does not have a Nutrition Facts label, try to look up the calories online or ask your dietitian for help.  Remember that calories are listed per serving. If you choose to have more than one serving of a food, you will have to multiply the calories per serving by the amount of servings you plan to eat. For example, the label on a package of bread might say that a serving size is 1 slice and that there are 90 calories in a serving. If you eat 1 slice, you will have eaten 90 calories. If you eat 2 slices, you will have eaten 180 calories.  How do I keep a food log?  Immediately after each meal, record the following information in your food log:  · What you ate. Don't forget to include toppings, sauces, and other extras on the food.  · How much you ate. This can be measured in cups, ounces, or number of items.  · How many calories each food and drink had.  · The total number of calories in the meal.  Keep your food log near you, such as in a small notebook in your pocket, or use a mobile sandra or website. Some programs will calculate calories for you and show you how many calories you have left for the day to meet your goal.  What are some calorie counting tips?    · Use your calories on foods and drinks that will fill you up and not leave you hungry:  ? Some examples of foods that fill you up are nuts and nut butters, vegetables, lean proteins, and high-fiber foods like whole grains. High-fiber foods are foods with more than 5 g fiber per serving.  ? Drinks such as sodas, specialty coffee drinks, alcohol, and juices have a lot of calories, yet do not fill you up.  · Eat nutritious foods and avoid empty calories. Empty calories are calories you get from foods or beverages that do not have many vitamins or protein, such as candy, sweets, and  "soda. It is better to have a nutritious high-calorie food (such as an avocado) than a food with few nutrients (such as a bag of chips).  · Know how many calories are in the foods you eat most often. This will help you calculate calorie counts faster.  · Pay attention to calories in drinks. Low-calorie drinks include water and unsweetened drinks.  · Pay attention to nutrition labels for \"low fat\" or \"fat free\" foods. These foods sometimes have the same amount of calories or more calories than the full fat versions. They also often have added sugar, starch, or salt, to make up for flavor that was removed with the fat.  · Find a way of tracking calories that works for you. Get creative. Try different apps or programs if writing down calories does not work for you.  What are some portion control tips?  · Know how many calories are in a serving. This will help you know how many servings of a certain food you can have.  · Use a measuring cup to measure serving sizes. You could also try weighing out portions on a kitchen scale. With time, you will be able to estimate serving sizes for some foods.  · Take some time to put servings of different foods on your favorite plates, bowls, and cups so you know what a serving looks like.  · Try not to eat straight from a bag or box. Doing this can lead to overeating. Put the amount you would like to eat in a cup or on a plate to make sure you are eating the right portion.  · Use smaller plates, glasses, and bowls to prevent overeating.  · Try not to multitask (for example, watch TV or use your computer) while eating. If it is time to eat, sit down at a table and enjoy your food. This will help you to know when you are full. It will also help you to be aware of what you are eating and how much you are eating.  What are tips for following this plan?  Reading food labels  · Check the calorie count compared to the serving size. The serving size may be smaller than what you are used to " "eating.  · Check the source of the calories. Make sure the food you are eating is high in vitamins and protein and low in saturated and trans fats.  Shopping  · Read nutrition labels while you shop. This will help you make healthy decisions before you decide to purchase your food.  · Make a grocery list and stick to it.  Cooking  · Try to cook your favorite foods in a healthier way. For example, try baking instead of frying.  · Use low-fat dairy products.  Meal planning  · Use more fruits and vegetables. Half of your plate should be fruits and vegetables.  · Include lean proteins like poultry and fish.  How do I count calories when eating out?  · Ask for smaller portion sizes.  · Consider sharing an entree and sides instead of getting your own entree.  · If you get your own entree, eat only half. Ask for a box at the beginning of your meal and put the rest of your entree in it so you are not tempted to eat it.  · If calories are listed on the menu, choose the lower calorie options.  · Choose dishes that include vegetables, fruits, whole grains, low-fat dairy products, and lean protein.  · Choose items that are boiled, broiled, grilled, or steamed. Stay away from items that are buttered, battered, fried, or served with cream sauce. Items labeled \"crispy\" are usually fried, unless stated otherwise.  · Choose water, low-fat milk, unsweetened iced tea, or other drinks without added sugar. If you want an alcoholic beverage, choose a lower calorie option such as a glass of wine or light beer.  · Ask for dressings, sauces, and syrups on the side. These are usually high in calories, so you should limit the amount you eat.  · If you want a salad, choose a garden salad and ask for grilled meats. Avoid extra toppings like jasso, cheese, or fried items. Ask for the dressing on the side, or ask for olive oil and vinegar or lemon to use as dressing.  · Estimate how many servings of a food you are given. For example, a serving of " cooked rice is ½ cup or about the size of half a baseball. Knowing serving sizes will help you be aware of how much food you are eating at restaurants. The list below tells you how big or small some common portion sizes are based on everyday objects:  ? 1 oz--4 stacked dice.  ? 3 oz--1 deck of cards.  ? 1 tsp--1 die.  ? 1 Tbsp--½ a ping-pong ball.  ? 2 Tbsp--1 ping-pong ball.  ? ½ cup--½ baseball.  ? 1 cup--1 baseball.  Summary  · Calorie counting means keeping track of how many calories you eat and drink each day. If you eat fewer calories than your body needs, you should lose weight.  · A healthy amount of weight to lose per week is usually 1-2 lb (0.5-0.9 kg). This usually means reducing your daily calorie intake by 500-750 calories.  · The number of calories in a food can be found on a Nutrition Facts label. If a food does not have a Nutrition Facts label, try to look up the calories online or ask your dietitian for help.  · Use your calories on foods and drinks that will fill you up, and not on foods and drinks that will leave you hungry.  · Use smaller plates, glasses, and bowls to prevent overeating.  This information is not intended to replace advice given to you by your health care provider. Make sure you discuss any questions you have with your health care provider.  Document Revised: 09/06/2019 Document Reviewed: 11/17/2017  [employer] Patient Education © 2020 [employer]      Exercising to Lose Weight  Exercise is structured, repetitive physical activity to improve fitness and health. Getting regular exercise is important for everyone. It is especially important if you are overweight. Being overweight increases your risk of heart disease, stroke, diabetes, high blood pressure, and several types of cancer. Reducing your calorie intake and exercising can help you lose weight.  Exercise is usually categorized as moderate or vigorous intensity. To lose weight, most people need to do a certain amount of  moderate-intensity or vigorous-intensity exercise each week.  Moderate-intensity exercise    Moderate-intensity exercise is any activity that gets you moving enough to burn at least three times more energy (calories) than if you were sitting.  Examples of moderate exercise include:  · Walking a mile in 15 minutes.  · Doing light yard work.  · Biking at an easy pace.  Most people should get at least 150 minutes (2 hours and 30 minutes) a week of moderate-intensity exercise to maintain their body weight.  Vigorous-intensity exercise  Vigorous-intensity exercise is any activity that gets you moving enough to burn at least six times more calories than if you were sitting. When you exercise at this intensity, you should be working hard enough that you are not able to carry on a conversation.  Examples of vigorous exercise include:  · Running.  · Playing a team sport, such as football, basketball, and soccer.  · Jumping rope.  Most people should get at least 75 minutes (1 hour and 15 minutes) a week of vigorous-intensity exercise to maintain their body weight.  How can exercise affect me?  When you exercise enough to burn more calories than you eat, you lose weight. Exercise also reduces body fat and builds muscle. The more muscle you have, the more calories you burn. Exercise also:  · Improves mood.  · Reduces stress and tension.  · Improves your overall fitness, flexibility, and endurance.  · Increases bone strength.  The amount of exercise you need to lose weight depends on:  · Your age.  · The type of exercise.  · Any health conditions you have.  · Your overall physical ability.  Talk to your health care provider about how much exercise you need and what types of activities are safe for you.  What actions can I take to lose weight?  Nutrition    · Make changes to your diet as told by your health care provider or diet and nutrition specialist (dietitian). This may include:  ? Eating fewer calories.  ? Eating more  protein.  ? Eating less unhealthy fats.  ? Eating a diet that includes fresh fruits and vegetables, whole grains, low-fat dairy products, and lean protein.  ? Avoiding foods with added fat, salt, and sugar.  · Drink plenty of water while you exercise to prevent dehydration or heat stroke.  Activity  · Choose an activity that you enjoy and set realistic goals. Your health care provider can help you make an exercise plan that works for you.  · Exercise at a moderate or vigorous intensity most days of the week.  ? The intensity of exercise may vary from person to person. You can tell how intense a workout is for you by paying attention to your breathing and heartbeat. Most people will notice their breathing and heartbeat get faster with more intense exercise.  · Do resistance training twice each week, such as:  ? Push-ups.  ? Sit-ups.  ? Lifting weights.  ? Using resistance bands.  · Getting short amounts of exercise can be just as helpful as long structured periods of exercise. If you have trouble finding time to exercise, try to include exercise in your daily routine.  ? Get up, stretch, and walk around every 30 minutes throughout the day.  ? Go for a walk during your lunch break.  ? Park your car farther away from your destination.  ? If you take public transportation, get off one stop early and walk the rest of the way.  ? Make phone calls while standing up and walking around.  ? Take the stairs instead of elevators or escalators.  · Wear comfortable clothes and shoes with good support.  · Do not exercise so much that you hurt yourself, feel dizzy, or get very short of breath.  Where to find more information  · [place] Department of Health and Human Services: www.hhs.gov  · Centers for Disease Control and Prevention (CDC): www.cdc.gov  Contact a health care provider:  · Before starting a new exercise program.  · If you have questions or concerns about your weight.  · If you have a medical problem that keeps you from  exercising.  Get help right away if you have any of the following while exercising:  · Injury.  · Dizziness.  · Difficulty breathing or shortness of breath that does not go away when you stop exercising.  · Chest pain.  · Rapid heartbeat.  Summary  · Being overweight increases your risk of heart disease, stroke, diabetes, high blood pressure, and several types of cancer.  · Losing weight happens when you burn more calories than you eat.  · Reducing the amount of calories you eat in addition to getting regular moderate or vigorous exercise each week helps you lose weight.  This information is not intended to replace advice given to you by your health care provider. Make sure you discuss any questions you have with your health care provider.  Document Revised: 12/31/2018 Document Reviewed: 12/31/2018  [employer] Patient Education © 2020 [employer]

## 2021-03-09 NOTE — PROGRESS NOTES
Subjective   Kameron Melendez is a 51 y.o. male. Presents today for   Chief Complaint   Patient presents with   • Edema   • Cellulitis       Cellulitis  This is a recurrent problem. The current episode started 1 to 4 weeks ago. The problem occurs constantly. The problem has been gradually improving. Associated symptoms include fatigue. Pertinent negatives include no chills or fever. Treatments tried: completed abx.   Obesity  This is a chronic problem. The current episode started more than 1 year ago. The problem occurs constantly. The problem has been gradually worsening. Associated symptoms include fatigue. Pertinent negatives include no chills or fever. Treatments tried: has had consults to bariatrics but leary still of surgery;  not tried medicatoins.   Leg Swelling  This is a chronic problem. The current episode started more than 1 year ago. The problem occurs constantly. Associated symptoms include fatigue. Pertinent negatives include no chills or fever. Treatments tried: here for unna boot as skin breakdown, no longer weeping and doing better;  has tried compression hose, wraps and has had lymphedema for years.         Review of Systems   Constitutional: Positive for fatigue. Negative for chills and fever.       Patient Active Problem List   Diagnosis   • History of bilateral pulmonary embolism (CMS/HCC)   • Pulmonary hypertension (CMS/HCC)   • Lymphedema of both lower extremities   • Morbid obesity due to excess calories (CMS/HCC)   • Dyslipidemia   • Hyperuricemia   • Hypogonadal obesity   • Knee arthropathy   • Morbid obesity with body mass index of 60.0-69.9 in adult (CMS/HCC)   • ARNOLDO (obstructive sleep apnea)   • Severe edema   • History of pulmonary embolism   • Other acute pulmonary embolism without acute cor pulmonale (CMS/HCC)   • Intractable back pain   • Heterozygous factor V Leiden mutation (CMS/HCC)       Social History     Socioeconomic History   • Marital status:      Spouse name: Not on  "file   • Number of children: Not on file   • Years of education: Not on file   • Highest education level: Not on file   Tobacco Use   • Smoking status: Light Tobacco Smoker     Types: Cigars, Pipe     Start date: 1/1/2006   • Smokeless tobacco: Never Used   • Tobacco comment: occasional - < 1 a month   Substance and Sexual Activity   • Alcohol use: No   • Drug use: No   • Sexual activity: Defer       No Known Allergies    Current Outpatient Medications on File Prior to Visit   Medication Sig Dispense Refill   • acetaminophen (TYLENOL) 500 MG tablet Take 500 mg by mouth Every 6 (Six) Hours As Needed for Mild Pain .     • furosemide (Lasix) 80 MG tablet Take 1 tablet by mouth Daily. 30 tablet 2   • potassium chloride (K-DUR,KLOR-CON) 20 MEQ CR tablet Take 1 tablet by mouth Daily. Take with furosemide 30 tablet 2   • warfarin (COUMADIN) 10 MG tablet Take one tablet by mouth daily or as directed. 90 tablet 1   • warfarin (COUMADIN) 5 MG tablet TAKE 1 TABLET ON SUNDAY, MONDAY, WEDNESDAY, AND FRIDAY OR AS DIRECTED. TAKE IN ADDITION TO ONE 10 MG TABLET FOR TOTAL DOSE OF 15 MG 55 tablet 3   • [DISCONTINUED] doxycycline (VIBRAMYCIN) 100 MG capsule Take 1 capsule by mouth 2 (Two) Times a Day. 20 capsule 0     No current facility-administered medications on file prior to visit.       Objective   Vitals:    03/09/21 1510   BP: 136/88   Pulse: 85   SpO2: 98%   Weight: Comment: pt exceeds scale limit   Height: 188 cm (74\")     Body mass index is 66.25 kg/m².    Physical Exam  Vitals and nursing note reviewed.   Constitutional:       Appearance: He is well-developed.   Musculoskeletal:      Cervical back: Neck supple.      Right lower leg: Edema (+severe lymphedema) present.      Left lower leg: Edema (severe lymphedema;  thickened skin, venous stasi dermatitis;  ulceration resolved;  erythema but warmth resolved) present.   Skin:     General: Skin is warm and dry.   Neurological:      Mental Status: He is alert.   Psychiatric:    "      Behavior: Behavior normal.       Procedure:  Unna boot (compression wrap(s))  Dx lymphedema, venous stasis dermatitis.    Risks - circulation, increased pain; loosen if cyanotic toes or increased pain  Location: left lower ext Wrapped with Unna-Z, sterile kerlix and ace wrap in typical fashion  Patient tolerated well with no immediate complications.  Toe(s) cap ref post-wrapping < 3sec and sensation intact.      Assessment/Plan   Diagnoses and all orders for this visit:    1. Lymphedema of both lower extremities (Primary)  -     Comprehensive Metabolic Panel    2. Other fatigue  -     Comprehensive Metabolic Panel  -     CBC & Differential  -     Vitamin B12  -     TSH    3. Venous stasis dermatitis of both lower extremities    4. Morbid (severe) obesity due to excess calories (CMS/HCC)  -     phentermine (ADIPEX-P) 37.5 MG tablet; Take 1 tablet by mouth Every Morning Before Breakfast.  Dispense: 30 tablet; Refill: 2  -     topiramate (TOPAMAX) 25 MG tablet; Take 1 tablet by mouth Daily.  Dispense: 30 tablet; Refill: 2    -counseled on diet and exercise along with weight loss.  Desires assistance with weight loss and discussed several medication optiosn;  Will try phentermine and trokendi, warned of side effects and risks;  Warned of addiction potential and cardiovascular risks of phentermine.  Denies glaucoma;  Remote hx of kidney stones;    -gave supplies 1 more week fo lymphedema wraps then continue ace wraps.    The patient has tried compression hose, exercise, weight loss, wraps and elevation for lymphedema that has been present for long time and not effective.  He would benefit from lymphedema pump and will order.         -Follow up: 4 to 6 weeks

## 2021-03-10 LAB
ALBUMIN SERPL-MCNC: 4 G/DL (ref 3.8–4.9)
ALBUMIN/GLOB SERPL: 1.5 {RATIO} (ref 1.2–2.2)
ALP SERPL-CCNC: 125 IU/L (ref 39–117)
ALT SERPL-CCNC: 17 IU/L (ref 0–44)
AST SERPL-CCNC: 27 IU/L (ref 0–40)
BASOPHILS # BLD AUTO: 0.1 X10E3/UL (ref 0–0.2)
BASOPHILS NFR BLD AUTO: 1 %
BILIRUB SERPL-MCNC: 0.3 MG/DL (ref 0–1.2)
BUN SERPL-MCNC: 20 MG/DL (ref 6–24)
BUN/CREAT SERPL: 27 (ref 9–20)
CALCIUM SERPL-MCNC: 9.5 MG/DL (ref 8.7–10.2)
CHLORIDE SERPL-SCNC: 105 MMOL/L (ref 96–106)
CO2 SERPL-SCNC: 17 MMOL/L (ref 20–29)
CREAT SERPL-MCNC: 0.73 MG/DL (ref 0.76–1.27)
EOSINOPHIL # BLD AUTO: 0.3 X10E3/UL (ref 0–0.4)
EOSINOPHIL NFR BLD AUTO: 3 %
ERYTHROCYTE [DISTWIDTH] IN BLOOD BY AUTOMATED COUNT: 14.1 % (ref 11.6–15.4)
GLOBULIN SER CALC-MCNC: 2.7 G/DL (ref 1.5–4.5)
GLUCOSE SERPL-MCNC: 107 MG/DL (ref 65–99)
HCT VFR BLD AUTO: 42.6 % (ref 37.5–51)
HGB BLD-MCNC: 14.3 G/DL (ref 13–17.7)
IMM GRANULOCYTES # BLD AUTO: 0.1 X10E3/UL (ref 0–0.1)
IMM GRANULOCYTES NFR BLD AUTO: 1 %
LYMPHOCYTES # BLD AUTO: 2.5 X10E3/UL (ref 0.7–3.1)
LYMPHOCYTES NFR BLD AUTO: 27 %
MCH RBC QN AUTO: 29.9 PG (ref 26.6–33)
MCHC RBC AUTO-ENTMCNC: 33.6 G/DL (ref 31.5–35.7)
MCV RBC AUTO: 89 FL (ref 79–97)
MONOCYTES # BLD AUTO: 0.8 X10E3/UL (ref 0.1–0.9)
MONOCYTES NFR BLD AUTO: 9 %
NEUTROPHILS # BLD AUTO: 5.4 X10E3/UL (ref 1.4–7)
NEUTROPHILS NFR BLD AUTO: 59 %
PLATELET # BLD AUTO: 261 X10E3/UL (ref 150–450)
POTASSIUM SERPL-SCNC: 4.7 MMOL/L (ref 3.5–5.2)
PROT SERPL-MCNC: 6.7 G/DL (ref 6–8.5)
RBC # BLD AUTO: 4.79 X10E6/UL (ref 4.14–5.8)
SODIUM SERPL-SCNC: 139 MMOL/L (ref 134–144)
TSH SERPL DL<=0.005 MIU/L-ACNC: 3.39 UIU/ML (ref 0.45–4.5)
VIT B12 SERPL-MCNC: 487 PG/ML (ref 232–1245)
WBC # BLD AUTO: 9.1 X10E3/UL (ref 3.4–10.8)

## 2021-03-12 ENCOUNTER — ANTICOAGULATION VISIT (OUTPATIENT)
Dept: PHARMACY | Facility: HOSPITAL | Age: 51
End: 2021-03-12

## 2021-03-12 DIAGNOSIS — I26.99 BILATERAL PULMONARY EMBOLISM (HCC): ICD-10-CM

## 2021-03-12 DIAGNOSIS — I26.99 OTHER ACUTE PULMONARY EMBOLISM WITHOUT ACUTE COR PULMONALE (HCC): ICD-10-CM

## 2021-03-12 LAB — INR PPP: 3

## 2021-03-12 NOTE — PROGRESS NOTES
Anticoagulation Clinic Progress Note    Anticoagulation Summary  As of 3/12/2021    INR goal:  2.0-3.0   TTR:  71.6 % (2.1 y)   INR used for dosing:  3.00 (3/12/2021)   Warfarin maintenance plan:  10 mg every Tue, Thu, Sat; 15 mg all other days   Weekly warfarin total:  90 mg   No change documented:  Ramo Morocho RPH   Plan last modified:  Ramo Morocho RPH (7/8/2020)   Next INR check:  3/19/2021   Priority:  High   Target end date:  Indefinite    Indications    Other acute pulmonary embolism without acute cor pulmonale (CMS/HCC) [I26.99]  History of bilateral pulmonary embolism (CMS/HCC) [I26.99]             Anticoagulation Episode Summary     INR check location:      Preferred lab:      Send INR reminders to:  [facility]  POOL    Comments:  new  tristen March 2019 **WEEKLY TRISTEN**      Anticoagulation Care Providers     Provider Role Specialty Phone number    Torri Colmenares APRN Referring Cardiology [phone]    Elsy Baeza MD Responsible Cardiology [phone]          Clinic Interview:  No pertinent clinical findings have been reported.    INR History:  Anticoagulation Monitoring 2/26/2021 3/5/2021 3/12/2021   INR 3.60 4.00 3.00   INR Date 2/26/2021 3/5/2021 3/12/2021   INR Goal 2.0-3.0 2.0-3.0 2.0-3.0   Trend Same Same Same   Last Week Total 90 mg 80 mg 75 mg   Next Week Total 80 mg 75 mg 90 mg   Sun 15 mg 15 mg 15 mg   Mon - 15 mg 15 mg   Tue - 10 mg 10 mg   Wed - 15 mg 15 mg   Thu - 10 mg 10 mg   Fri 5 mg (2/26) Hold (3/5) 15 mg   Sat 10 mg 10 mg 10 mg   Visit Report - - -   Some recent data might be hidden       Plan:  1. INR is therapeutic today- see above in Anticoagulation Summary.    Kameron Melendez to continue their warfarin regimen- see above in Anticoagulation Summary.  2. Follow up in 1 week  3. Pt has agreed to only be called if INR out of range. Left VM w/ instructions. They have been instructed to call if any changes in medications, doses, concerns, etc.   Ramo Morocho  RPH

## 2021-03-14 NOTE — PROGRESS NOTES
Call results to patient.  Kidney and liver function normal  Thyroid and b12 normal  Blood counts normal

## 2021-03-16 RX ORDER — WARFARIN SODIUM 10 MG/1
TABLET ORAL
Qty: 90 TABLET | Refills: 1 | Status: SHIPPED | OUTPATIENT
Start: 2021-03-16 | End: 2021-09-13

## 2021-03-19 ENCOUNTER — ANTICOAGULATION VISIT (OUTPATIENT)
Dept: PHARMACY | Facility: HOSPITAL | Age: 51
End: 2021-03-19

## 2021-03-19 DIAGNOSIS — I26.99 OTHER ACUTE PULMONARY EMBOLISM WITHOUT ACUTE COR PULMONALE (HCC): ICD-10-CM

## 2021-03-19 DIAGNOSIS — I26.99 BILATERAL PULMONARY EMBOLISM (HCC): ICD-10-CM

## 2021-03-19 LAB — INR PPP: 1.9

## 2021-03-19 NOTE — PROGRESS NOTES
Anticoagulation Clinic Progress Note    Anticoagulation Summary  As of 3/19/2021    INR goal:  2.0-3.0   TTR:  71.7 % (2.1 y)   INR used for dosin.90 (3/19/2021)   Warfarin maintenance plan:  10 mg every Tue, Thu, Sat; 15 mg all other days   Weekly warfarin total:  90 mg   Plan last modified:  Ramo Morocho [facility] (2020)   Next INR check:  3/25/2021   Priority:  High   Target end date:  Indefinite    Indications    Other acute pulmonary embolism without acute cor pulmonale (CMS/HCC) [I26.99]  History of bilateral pulmonary embolism (CMS/HCC) [I26.99]             Anticoagulation Episode Summary     INR check location:      Preferred lab:      [facility]    Comments:  new  tristen 2019 **WEEKLY TRISTEN**      Anticoagulation Care Providers     Provider Role Specialty Phone number    Torri Colmenares, APRN Referring Cardiology [phone]    Elsy Baeza MD Responsible Cardiology [phone]            INR History:  Anticoagulation Monitoring 3/5/2021 3/12/2021 3/19/2021   INR 4.00 3.00 1.90   INR Date 3/5/2021 3/12/2021 3/19/2021   INR Goal 2.0-3.0 2.0-3.0 2.0-3.0   Trend Same Same Same   Last Week Total 80 mg 75 mg 90 mg   Next Week Total 75 mg 90 mg 90 mg   Sun 15 mg 15 mg 15 mg   Mon 15 mg 15 mg 15 mg   Tue 10 mg 10 mg 10 mg   Wed 15 mg 15 mg 15 mg   Thu 10 mg 10 mg -   Fri Hold (3/5) 15 mg 15 mg   Sat 10 mg 10 mg 10 mg   Visit Report - - -   Some recent data might be hidden       Plan:  1. INR is Subtherapeutic. Left VM with instructions for Kameron Melendez to continue their warfarin regimen - see above in Anticoagulation Summary.  2. Follow up in 1 week  3. Patient has been instructed to call if any changes in medications, doses, concerns, etc.    Myron Tenorio III, PharmD  PGY1 Pharmacy Resident

## 2021-03-26 ENCOUNTER — BULK ORDERING (OUTPATIENT)
Dept: CASE MANAGEMENT | Facility: OTHER | Age: 51
End: 2021-03-26

## 2021-03-26 ENCOUNTER — ANTICOAGULATION VISIT (OUTPATIENT)
Dept: PHARMACY | Facility: HOSPITAL | Age: 51
End: 2021-03-26

## 2021-03-26 DIAGNOSIS — Z23 IMMUNIZATION DUE: ICD-10-CM

## 2021-03-26 DIAGNOSIS — I26.99 BILATERAL PULMONARY EMBOLISM (HCC): ICD-10-CM

## 2021-03-26 DIAGNOSIS — I26.99 OTHER ACUTE PULMONARY EMBOLISM WITHOUT ACUTE COR PULMONALE (HCC): ICD-10-CM

## 2021-03-26 LAB — INR PPP: 2.5

## 2021-03-26 NOTE — PROGRESS NOTES
Anticoagulation Clinic Progress Note    Anticoagulation Summary  As of 3/26/2021    INR goal:  2.0-3.0   TTR:  71.8 % (2.1 y)   INR used for dosin.50 (3/26/2021)   Warfarin maintenance plan:  10 mg every Tue, Thu, Sat; 15 mg all other days   Weekly warfarin total:  90 mg   No change documented:  Ramo Morocho RPH   Plan last modified:  Ramo Morocho RPH (2020)   Next INR check:  2021   Priority:  High   Target end date:  Indefinite    Indications    Other acute pulmonary embolism without acute cor pulmonale (CMS/HCC) [I26.99]  History of bilateral pulmonary embolism (CMS/HCC) [I26.99]             Anticoagulation Episode Summary     INR check location:      Preferred lab:      Send INR reminders to:  [facility]  POOL    Comments:  new  tristen 2019 **WEEKLY TRISTEN**      Anticoagulation Care Providers     Provider Role Specialty Phone number    Torri Colmenares APRN Referring Cardiology [phone]    Elsy Baeza MD Responsible Cardiology [phone]          Clinic Interview:  No pertinent clinical findings have been reported.    INR History:  Anticoagulation Monitoring 3/12/2021 3/19/2021 3/26/2021   INR 3.00 1.90 2.50   INR Date 3/12/2021 3/19/2021 3/26/2021   INR Goal 2.0-3.0 2.0-3.0 2.0-3.0   Trend Same Same Same   Last Week Total 75 mg 90 mg 90 mg   Next Week Total 90 mg 90 mg 90 mg   Sun 15 mg 15 mg 15 mg   Mon 15 mg 15 mg 15 mg   Tue 10 mg 10 mg 10 mg   Wed 15 mg 15 mg 15 mg   Thu 10 mg - 10 mg   Fri 15 mg 15 mg 15 mg   Sat 10 mg 10 mg 10 mg   Visit Report - - -   Some recent data might be hidden       Plan:  1. INR is therapeutic today- see above in Anticoagulation Summary.    Kameron Melendez to continue their warfarin regimen- see above in Anticoagulation Summary.  2. Follow up in 1 week  3. Left HIPAA-friendly VM w/ instructions. They have been instructed to call if any changes in medications, doses, concerns, etc.     Ramo Morocho RPH

## 2021-04-02 ENCOUNTER — IMMUNIZATION (OUTPATIENT)
Dept: VACCINE CLINIC | Facility: HOSPITAL | Age: 51
End: 2021-04-02

## 2021-04-02 ENCOUNTER — ANTICOAGULATION VISIT (OUTPATIENT)
Dept: PHARMACY | Facility: HOSPITAL | Age: 51
End: 2021-04-02

## 2021-04-02 DIAGNOSIS — I26.99 BILATERAL PULMONARY EMBOLISM (HCC): ICD-10-CM

## 2021-04-02 DIAGNOSIS — I26.99 OTHER ACUTE PULMONARY EMBOLISM WITHOUT ACUTE COR PULMONALE (HCC): ICD-10-CM

## 2021-04-02 LAB — INR PPP: 1.7

## 2021-04-02 PROCEDURE — 91300 HC SARSCOV02 VAC 30MCG/0.3ML IM: CPT | Performed by: INTERNAL MEDICINE

## 2021-04-02 PROCEDURE — 0001A: CPT | Performed by: INTERNAL MEDICINE

## 2021-04-02 NOTE — PROGRESS NOTES
Anticoagulation Clinic Progress Note    Anticoagulation Summary  As of 2021    INR goal:  2.0-3.0   TTR:  71.8 % (2.1 y)   INR used for dosin.70 (2021)   Warfarin maintenance plan:  10 mg every Tue, Thu, Sat; 15 mg all other days   Weekly warfarin total:  90 mg   Plan last modified:  Ramo Morocho RPH (2020)   Next INR check:  2021   Priority:  High   Target end date:  Indefinite    Indications    Other acute pulmonary embolism without acute cor pulmonale (CMS/HCC) [I26.99]  History of bilateral pulmonary embolism (CMS/HCC) [I26.99]             Anticoagulation Episode Summary     INR check location:      Preferred lab:      Send INR reminders to:  [facility]  POOL    Comments:  new  tristen 2019 **WEEKLY TRISTEN**      Anticoagulation Care Providers     Provider Role Specialty Phone number    Torri Colmenares, APRN Referring Cardiology [phone]    Elsy Baeza MD Responsible Cardiology [phone]          Clinic Interview:  Patient Findings     Positives:  Missed doses, Change in diet/appetite    Negatives:  Signs/symptoms of thrombosis, Signs/symptoms of bleeding,   Laboratory test error suspected, Change in health, Change in alcohol use,   Change in activity, Upcoming invasive procedure, Emergency department   visit, Upcoming dental procedure, Extra doses, Change in medications,   Hospital admission, Bruising, Other complaints    Comments:  Reports large serving broccoli on 3/30/21. Reports he missed   yesterday's dose.      Clinical Outcomes     Negatives:  Major bleeding event, Thromboembolic event,   Anticoagulation-related hospital admission, Anticoagulation-related ED   visit, Anticoagulation-related fatality    Comments:  Reports large serving broccoli on 3/30/21. Reports he missed   yesterday's dose.        INR History:  Anticoagulation Monitoring 3/19/2021 3/26/2021 2021   INR 1.90 2.50 1.70   INR Date 3/19/2021 3/26/2021 2021   INR Goal 2.0-3.0  2.0-3.0 2.0-3.0   Trend Same Same Same   Last Week Total 90 mg 90 mg 80 mg   Next Week Total 90 mg 90 mg 100 mg   Sun 15 mg 15 mg 15 mg   Mon 15 mg 15 mg 15 mg   Tue 10 mg 10 mg 10 mg   Wed 15 mg 15 mg 15 mg   Thu - 10 mg 10 mg   Fri 15 mg 15 mg 20 mg (4/2)   Sat 10 mg 10 mg 15 mg (4/3)   Visit Report - - -   Some recent data might be hidden       Plan:  1. INR is Subtherapeutic today- see above in Anticoagulation Summary.   Will instruct Kameron Melendez to Change their warfarin regimen- see above in Anticoagulation Summary.  2. Follow up in 1 week  3. They have been instructed to call if any changes in medications, doses, concerns, etc. Patient expresses understanding and has no further questions at this time.    Ramo Morocho [facility]

## 2021-04-09 ENCOUNTER — ANTICOAGULATION VISIT (OUTPATIENT)
Dept: PHARMACY | Facility: HOSPITAL | Age: 51
End: 2021-04-09

## 2021-04-09 DIAGNOSIS — I26.99 OTHER ACUTE PULMONARY EMBOLISM WITHOUT ACUTE COR PULMONALE (HCC): ICD-10-CM

## 2021-04-09 DIAGNOSIS — I26.99 BILATERAL PULMONARY EMBOLISM (HCC): ICD-10-CM

## 2021-04-09 LAB — INR PPP: 2.3

## 2021-04-09 NOTE — PROGRESS NOTES
Anticoagulation Clinic Progress Note    Anticoagulation Summary  As of 2021    INR goal:  2.0-3.0   TTR:  71.6 % (2.2 y)   INR used for dosin.30 (2021)   Warfarin maintenance plan:  10 mg every Tue, Thu, Sat; 15 mg all other days   Weekly warfarin total:  90 mg   No change documented:  Ramo Morocho RPH   Plan last modified:  Ramo Morocho RPH (2020)   Next INR check:  2021   Priority:  High   Target end date:  Indefinite    Indications    Other acute pulmonary embolism without acute cor pulmonale (CMS/HCC) [I26.99]  History of bilateral pulmonary embolism (CMS/HCC) [I26.99]             Anticoagulation Episode Summary     INR check location:      Preferred lab:      Send INR reminders to:  [facility]  POOL    Comments:  new  tristen 2019 **WEEKLY TRISTEN**      Anticoagulation Care Providers     Provider Role Specialty Phone number    Torri Colmenares APRN Referring Cardiology [phone]    Elsy Baeza MD Responsible Cardiology [phone]          Clinic Interview:  No pertinent clinical findings have been reported.    INR History:  Anticoagulation Monitoring 3/26/2021 2021 2021   INR 2.50 1.70 2.30   INR Date 3/26/2021 2021 2021   INR Goal 2.0-3.0 2.0-3.0 2.0-3.0   Trend Same Same Same   Last Week Total 90 mg 80 mg 100 mg   Next Week Total 90 mg 100 mg 90 mg   Sun 15 mg 15 mg 15 mg   Mon 15 mg 15 mg 15 mg   Tue 10 mg 10 mg 10 mg   Wed 15 mg 15 mg 15 mg   Thu 10 mg 10 mg 10 mg   Fri 15 mg 20 mg () 15 mg   Sat 10 mg 15 mg (4/3) 10 mg   Visit Report - - -   Some recent data might be hidden       Plan:  1. INR is therapeutic today- see above in Anticoagulation Summary.    Kameron Melendez to continue their warfarin regimen- see above in Anticoagulation Summary.  2. Follow up in 1 week  3. Pt has agreed to only be called if INR out of range. Left secure VM w/ instructions. They have been instructed to call if any changes in medications, doses,  concerns, etc.     Ramo Morocho, [facility]

## 2021-04-16 ENCOUNTER — OFFICE VISIT (OUTPATIENT)
Dept: FAMILY MEDICINE CLINIC | Facility: CLINIC | Age: 51
End: 2021-04-16

## 2021-04-16 ENCOUNTER — ANTICOAGULATION VISIT (OUTPATIENT)
Dept: PHARMACY | Facility: HOSPITAL | Age: 51
End: 2021-04-16

## 2021-04-16 VITALS
BODY MASS INDEX: 66.25 KG/M2 | SYSTOLIC BLOOD PRESSURE: 132 MMHG | OXYGEN SATURATION: 98 % | DIASTOLIC BLOOD PRESSURE: 82 MMHG | HEIGHT: 74 IN | HEART RATE: 91 BPM

## 2021-04-16 DIAGNOSIS — I89.0 LYMPHEDEMA: ICD-10-CM

## 2021-04-16 DIAGNOSIS — I26.99 BILATERAL PULMONARY EMBOLISM (HCC): ICD-10-CM

## 2021-04-16 DIAGNOSIS — E66.01 MORBID (SEVERE) OBESITY DUE TO EXCESS CALORIES (HCC): Primary | ICD-10-CM

## 2021-04-16 DIAGNOSIS — I87.2 VENOUS STASIS DERMATITIS OF BOTH LOWER EXTREMITIES: ICD-10-CM

## 2021-04-16 DIAGNOSIS — I26.99 OTHER ACUTE PULMONARY EMBOLISM WITHOUT ACUTE COR PULMONALE (HCC): Primary | ICD-10-CM

## 2021-04-16 DIAGNOSIS — I87.2 CHRONIC VENOUS INSUFFICIENCY: ICD-10-CM

## 2021-04-16 LAB — INR PPP: 3.4

## 2021-04-16 PROCEDURE — 99214 OFFICE O/P EST MOD 30 MIN: CPT | Performed by: FAMILY MEDICINE

## 2021-04-16 RX ORDER — TOPIRAMATE 25 MG/1
25 TABLET ORAL DAILY
Qty: 30 TABLET | Refills: 2 | Status: SHIPPED | OUTPATIENT
Start: 2021-04-16 | End: 2021-07-14

## 2021-04-16 RX ORDER — PHENTERMINE HYDROCHLORIDE 37.5 MG/1
37.5 TABLET ORAL
Qty: 30 TABLET | Refills: 2 | Status: SHIPPED | OUTPATIENT
Start: 2021-04-16 | End: 2021-06-03 | Stop reason: SDUPTHER

## 2021-04-16 NOTE — PROGRESS NOTES
Anticoagulation Clinic Progress Note    Anticoagulation Summary  As of 4/16/2021    INR goal:  2.0-3.0   TTR:  71.5 % (2.2 y)   INR used for dosing:  3.40 (4/16/2021)   Warfarin maintenance plan:  10 mg every Tue, Thu, Sat; 15 mg all other days   Weekly warfarin total:  90 mg   Plan last modified:  Ramo Morocho RPH (7/8/2020)   Next INR check:  4/23/2021   Priority:  High   Target end date:  Indefinite    Indications    Other acute pulmonary embolism without acute cor pulmonale (CMS/HCC) [I26.99]  History of bilateral pulmonary embolism (CMS/HCC) [I26.99]             Anticoagulation Episode Summary     INR check location:      Preferred lab:      [facility]    Comments:  new  tristen March 2019 **WEEKLY TRISTEN**      Anticoagulation Care Providers     Provider Role Specialty Phone number    Torri Colmenares, APRN Referring Cardiology [phone]    Elsy Baeza MD Responsible Cardiology [phone]          Clinic Interview:  Patient Findings     Positives:  Change in diet/appetite    Negatives:  Signs/symptoms of thrombosis, Signs/symptoms of bleeding,   Laboratory test error suspected, Change in health, Change in alcohol use,   Change in activity, Upcoming invasive procedure, Emergency department   visit, Upcoming dental procedure, Missed doses, Extra doses, Change in   medications, Hospital admission, Bruising, Other complaints    Comments:  Has not been eating greens consistently the past week. No   other acute changes noted.       Clinical Outcomes     Negatives:  Major bleeding event, Thromboembolic event,   Anticoagulation-related hospital admission, Anticoagulation-related ED   visit, Anticoagulation-related fatality    Comments:  Has not been eating greens consistently the past week. No   other acute changes noted.         INR History:  Anticoagulation Monitoring 4/2/2021 4/9/2021 4/16/2021   INR 1.70 2.30 3.40   INR Date 4/2/2021 4/9/2021 4/16/2021   INR  Goal 2.0-3.0 2.0-3.0 2.0-3.0   Trend Same Same Same   Last Week Total 80 mg 100 mg 90 mg   Next Week Total 100 mg 90 mg 80 mg   Sun 15 mg 15 mg 15 mg   Mon 15 mg 15 mg 15 mg   Tue 10 mg 10 mg 10 mg   Wed 15 mg 15 mg 15 mg   Thu 10 mg 10 mg 10 mg   Fri 20 mg (4/2) 15 mg 5 mg (4/16)   Sat 15 mg (4/3) 10 mg 10 mg   Visit Report - - -   Some recent data might be hidden       Plan:  1. INR is Supratherapeutic today- see above in Anticoagulation Summary.   Will instruct Kameron Melendez to Change their warfarin regimen- see above in Anticoagulation Summary. Partial dose of 5 mg today (typical dose of 15 mg on Friday) then resume current regimen thereafter.   2. Follow up in 1 week  3. Pt has agreed to only be called if INR out of range. They have been instructed to call if any changes in medications, doses, concerns, etc. Patient expresses understanding and has no further questions at this time.    Sarah Coy, PharmD

## 2021-04-16 NOTE — PROGRESS NOTES
Subjective   Kameron Melendez is a 51 y.o. male. Presents today for   Chief Complaint   Patient presents with   • Leg Swelling   • Obesity       Leg Swelling  This is a chronic problem. The current episode started more than 1 year ago. The problem occurs constantly. The problem has been unchanged. Pertinent negatives include no chest pain. Exacerbated by: severe lymphedmea. Treatments tried: wraps, cmpersion, exercise and elevation. The treatment provided no relief.   Obesity  This is a chronic problem. The current episode started more than 1 year ago. The problem occurs constantly. Progression since onset: lost 33 lbs based on his scale. Pertinent negatives include no chest pain. Associated symptoms comments: Still not interested in bariatric procedure.    Tolerating meds but dry mouth.  Drink more water.. Treatments tried: phentermine and topamax. The treatment provided moderate relief.     529 4/14/21;  552 on 2 weeks ago at home scale after starting phentermine, 33 lbs.    Review of Systems   Cardiovascular: Negative for chest pain.       Patient Active Problem List   Diagnosis   • History of bilateral pulmonary embolism (CMS/HCC)   • Pulmonary hypertension (CMS/HCC)   • Lymphedema of both lower extremities   • Morbid obesity due to excess calories (CMS/HCC)   • Dyslipidemia   • Hyperuricemia   • Hypogonadal obesity   • Knee arthropathy   • Morbid obesity with body mass index of 60.0-69.9 in adult (CMS/HCC)   • ARNOLDO (obstructive sleep apnea)   • Severe edema   • History of pulmonary embolism   • Other acute pulmonary embolism without acute cor pulmonale (CMS/HCC)   • Intractable back pain   • Heterozygous factor V Leiden mutation (CMS/HCC)       Social History     Socioeconomic History   • Marital status:      Spouse name: Not on file   • Number of children: Not on file   • Years of education: Not on file   • Highest education level: Not on file   Tobacco Use   • Smoking status: Light Tobacco Smoker      "Types: Cigars, Pipe     Start date: 1/1/2006   • Smokeless tobacco: Never Used   • Tobacco comment: occasional - < 1 a month   Substance and Sexual Activity   • Alcohol use: No   • Drug use: No   • Sexual activity: Defer       No Known Allergies    Current Outpatient Medications on File Prior to Visit   Medication Sig Dispense Refill   • acetaminophen (TYLENOL) 500 MG tablet Take 500 mg by mouth Every 6 (Six) Hours As Needed for Mild Pain .     • furosemide (Lasix) 80 MG tablet Take 1 tablet by mouth Daily. 30 tablet 2   • potassium chloride (K-DUR,KLOR-CON) 20 MEQ CR tablet Take 1 tablet by mouth Daily. Take with furosemide 30 tablet 2   • warfarin (COUMADIN) 10 MG tablet Take one tablet by mouth daily or as directed. 90 tablet 1   • warfarin (COUMADIN) 5 MG tablet TAKE 1 TABLET ON SUNDAY, MONDAY, WEDNESDAY, AND FRIDAY OR AS DIRECTED. TAKE IN ADDITION TO ONE 10 MG TABLET FOR TOTAL DOSE OF 15 MG 55 tablet 3   • [DISCONTINUED] phentermine (ADIPEX-P) 37.5 MG tablet Take 1 tablet by mouth Every Morning Before Breakfast. 30 tablet 2   • [DISCONTINUED] topiramate (TOPAMAX) 25 MG tablet Take 1 tablet by mouth Daily. 30 tablet 2     No current facility-administered medications on file prior to visit.       Objective   Vitals:    04/16/21 0849   BP: 132/82   Pulse: 91   SpO2: 98%   Weight: Comment: pt exceeds scale limit   Height: 188 cm (74\")     Body mass index is 66.25 kg/m².    Physical Exam  Vitals and nursing note reviewed.   Constitutional:       Appearance: He is well-developed.   Musculoskeletal:      Cervical back: Neck supple.      Right lower leg: Edema present.      Left lower leg: Edema present.   Skin:     General: Skin is warm and dry.      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   Neurological:      Mental Status: He is alert.   Psychiatric:         Behavior: Behavior normal.       Component      Latest Ref Rng & Units 3/9/2021   WBC      3.4 - 10.8 x10E3/uL 9.1   RBC      " 4.14 - 5.80 x10E6/uL 4.79   Hemoglobin      13.0 - 17.7 g/dL 14.3   Hematocrit      37.5 - 51.0 % 42.6   MCV      79 - 97 fL 89   MCH      26.6 - 33.0 pg 29.9   MCHC      31.5 - 35.7 g/dL 33.6   RDW      11.6 - 15.4 % 14.1   Platelets      150 - 450 x10E3/uL 261   Neutrophil Rel %      Not Estab. % 59   Lymphocyte Rel %      Not Estab. % 27   Monocyte Rel %      Not Estab. % 9   Eosinophil Rel %      Not Estab. % 3   Basophil Rel %      Not Estab. % 1   Neutrophils Absolute      1.4 - 7.0 x10E3/uL 5.4   Lymphocytes Absolute      0.7 - 3.1 x10E3/uL 2.5   Monocytes Absolute      0.1 - 0.9 x10E3/uL 0.8   Eosinophils Absolute      0.0 - 0.4 x10E3/uL 0.3   Basophils Absolute      0.0 - 0.2 x10E3/uL 0.1   Immature Granulocyte Rel %      Not Estab. % 1   Immature Grans, Absolute      0.0 - 0.1 x10E3/uL 0.1   Glucose      65 - 99 mg/dL 107 (H)   BUN      6 - 24 mg/dL 20   Creatinine      0.76 - 1.27 mg/dL 0.73 (L)   eGFR Non African Am      >59 mL/min/1.73 107   eGFR African Am      >59 mL/min/1.73 124   BUN/Creatinine Ratio      9 - 20 27 (H)   Sodium      134 - 144 mmol/L 139   Potassium      3.5 - 5.2 mmol/L 4.7   Chloride      96 - 106 mmol/L 105   CO2      20 - 29 mmol/L 17 (L)   Calcium      8.7 - 10.2 mg/dL 9.5   Total Protein      6.0 - 8.5 g/dL 6.7   Albumin      3.8 - 4.9 g/dL 4.0   Globulin      1.5 - 4.5 g/dL 2.7   A/G Ratio      1.2 - 2.2 1.5   Total Bilirubin      0.0 - 1.2 mg/dL 0.3   Alkaline Phosphatase      39 - 117 IU/L 125 (H)   AST (SGOT)      0 - 40 IU/L 27   ALT (SGPT)      0 - 44 IU/L 17   Vitamin B-12      232 - 1,245 pg/mL 487   TSH Baseline      0.450 - 4.500 uIU/mL 3.390     Assessment/Plan   Diagnoses and all orders for this visit:    1. Morbid (severe) obesity due to excess calories (CMS/HCC) (Primary)  -     phentermine (ADIPEX-P) 37.5 MG tablet; Take 1 tablet by mouth Every Morning Before Breakfast.  Dispense: 30 tablet; Refill: 2  -     topiramate (TOPAMAX) 25 MG tablet; Take 1 tablet by  mouth Daily.  Dispense: 30 tablet; Refill: 2    2. Lymphedema    3. Chronic venous insufficiency    4. Venous stasis dermatitis of both lower extremities        Face to Face for lymph edema pump - patient with severe lymph edema and painful with skin changes but no ulceration though at risk for;  Has failed conservative measures and compliant with therapy, compression hose and diuretics.    The pt has been utilizing compression, elevation, and exercise for more than 4 weeks and failed.  He would benefit from a lymphatic pump.  Will continue phentermine and topamax same dose;   Doing better;  Lost 33 lbs;          -Follow up: 6 to 8 weeks and prn      _____________________________________  R. Ramo Patel DO, MS

## 2021-04-23 ENCOUNTER — IMMUNIZATION (OUTPATIENT)
Dept: VACCINE CLINIC | Facility: HOSPITAL | Age: 51
End: 2021-04-23

## 2021-04-23 PROCEDURE — 0002A: CPT | Performed by: INTERNAL MEDICINE

## 2021-04-23 PROCEDURE — 91300 HC SARSCOV02 VAC 30MCG/0.3ML IM: CPT | Performed by: INTERNAL MEDICINE

## 2021-04-30 ENCOUNTER — ANTICOAGULATION VISIT (OUTPATIENT)
Dept: PHARMACY | Facility: HOSPITAL | Age: 51
End: 2021-04-30

## 2021-04-30 DIAGNOSIS — I26.99 OTHER ACUTE PULMONARY EMBOLISM WITHOUT ACUTE COR PULMONALE (HCC): Primary | ICD-10-CM

## 2021-04-30 DIAGNOSIS — I26.99 BILATERAL PULMONARY EMBOLISM (HCC): ICD-10-CM

## 2021-04-30 LAB — INR PPP: 3.4

## 2021-04-30 NOTE — PROGRESS NOTES
Anticoagulation Clinic Progress Note  Anticoagulation Summary  As of 4/30/2021    INR goal:  2.0-3.0   TTR:  70.3 % (2.2 y)   INR used for dosing:     Warfarin maintenance plan:  15 mg every Mon, Wed, Fri; 10 mg all other days   Weekly warfarin total:  85 mg   Plan last modified:  Jamar Bull, PharmD (4/30/2021)   Next INR check:  5/7/2021   Priority:  High   Target end date:  Indefinite    Indications    Other acute pulmonary embolism without acute cor pulmonale (CMS/HCC) [I26.99]  History of bilateral pulmonary embolism (CMS/HCC) [I26.99]             Anticoagulation Episode Summary     INR check location:      Preferred lab:      Send INR reminders to:   [facility]  POOL    Comments:  new  tristen March 2019 **WEEKLY TRISTEN**      Anticoagulation Care Providers     Provider Role Specialty Phone number    Torri Colmenares APRN Referring Cardiology [phone]    Elsy Baeza MD Responsible Cardiology [phone]        Clinic Interview:  Patient Findings     Negatives:  Signs/symptoms of thrombosis, Signs/symptoms of bleeding,   Laboratory test error suspected, Change in health, Change in alcohol use,   Change in activity, Upcoming invasive procedure, Emergency department   visit, Upcoming dental procedure, Missed doses, Extra doses, Change in   medications, Change in diet/appetite, Hospital admission, Bruising, Other   complaints    Comments:  Per wife Aneitha      Clinical Outcomes     Negatives:  Major bleeding event, Thromboembolic event,   Anticoagulation-related hospital admission, Anticoagulation-related ED   visit, Anticoagulation-related fatality    Comments:  Per wife Aneitha      INR History:  Anticoagulation Monitoring 4/9/2021 4/16/2021 4/30/2021   INR 2.30 3.40 -   INR Date 4/9/2021 4/16/2021 -   INR Goal 2.0-3.0 2.0-3.0 2.0-3.0   Trend Same Same Down   Last Week Total 100 mg 90 mg 90 mg   Next Week Total 90 mg 80 mg 80 mg   Sun 15 mg 15 mg 10 mg   Mon 15 mg 15 mg 15 mg   Tue  10 mg 10 mg 10 mg   Wed 15 mg 15 mg 15 mg   Thu 10 mg 10 mg 10 mg   Fri 15 mg 5 mg (4/16) 10 mg (4/30)   Sat 10 mg 10 mg 10 mg   Visit Report - - -   Some recent data might be hidden     Plan:  1. INR is Supratherapeutic today- see above in Anticoagulation Summary.   Will instruct Kameron Melendez via wife Yaya to Change their warfarin regimen- see above in Anticoagulation Summary. Adjust to 10mg TTSS and 15 mg MWF (5.6% reduction in weekly dosage) with a one time dose change today to 10mg.  Wife denies any bleeding for Mr. Melendez.  I instructed to call MD immediately if any bleeding, falls, or bumps to head  2. Follow up in 1 week  3. They have been instructed to call if any changes in medications, doses, concerns, etc. Patient expresses understanding and has no further questions at this time.    Jamar Bull, PharmD

## 2021-05-07 ENCOUNTER — ANTICOAGULATION VISIT (OUTPATIENT)
Dept: PHARMACY | Facility: HOSPITAL | Age: 51
End: 2021-05-07

## 2021-05-07 DIAGNOSIS — I26.99 OTHER ACUTE PULMONARY EMBOLISM WITHOUT ACUTE COR PULMONALE (HCC): Primary | ICD-10-CM

## 2021-05-07 DIAGNOSIS — I26.99 BILATERAL PULMONARY EMBOLISM (HCC): ICD-10-CM

## 2021-05-07 LAB — INR PPP: 2.5

## 2021-05-07 NOTE — PROGRESS NOTES
Anticoagulation Clinic Progress Note  Anticoagulation Summary  As of 2021    INR goal:  2.0-3.0   TTR:  70.1 % (2.2 y)   INR used for dosin.50 (2021)   Warfarin maintenance plan:  15 mg every Mon, Wed, Fri; 10 mg all other days   Weekly warfarin total:  85 mg   Plan last modified:  Jamar Bull, PharmD (2021)   Next INR check:  2021   Priority:  High   Target end date:  Indefinite    Indications    Other acute pulmonary embolism without acute cor pulmonale (CMS/HCC) [I26.99]  History of bilateral pulmonary embolism (CMS/HCC) [I26.99]             Anticoagulation Episode Summary     INR check location:      Preferred lab:      Send INR reminders to:  [facility]  POOL    Comments:  new  tristen 2019 **WEEKLY TRISTEN**      Anticoagulation Care Providers     Provider Role Specialty Phone number    Torri Colmenares APRN Referring Cardiology [phone]    Elsy Baeza MD Responsible Cardiology [phone]        Clinic Interview:  Patient Findings     Comments:  Patient hasn't answered calls from [facility] thus far today.  Left voicemail requesting callback this AM and tried to call thrice more today.      INR History:  Anticoagulation Monitoring 2021   INR 3.40 3.4 2.50   INR Date 2021   INR Goal 2.0-3.0 2.0-3.0 2.0-3.0   Trend Same Down Same   Last Week Total 90 mg 90 mg 80 mg   Next Week Total 80 mg 80 mg 85 mg   Sun 15 mg 10 mg 10 mg   Mon 15 mg 15 mg 15 mg   Tue 10 mg 10 mg 10 mg   Wed 15 mg 15 mg 15 mg   Thu 10 mg 10 mg 10 mg   Fri 5 mg () 10 mg () 15 mg   Sat 10 mg 10 mg 10 mg   Visit Report - - -   Some recent data might be hidden     Plan:  1. INR is Therapeutic today- see above in Anticoagulation Summary.   Will instruct Kameron Melendez to Continue their warfarin regimen- see above in Anticoagulation Summary.  2. Follow up in 1 week  3. Pt has agreed to only be called if INR out of range. Left HIPAA  appropriate secure VM w/ instructions. They have been instructed to call if any changes in medications, doses, concerns, etc.     Murray JuarezD

## 2021-05-13 RX ORDER — FUROSEMIDE 80 MG
80 TABLET ORAL DAILY
Qty: 30 TABLET | Refills: 5 | Status: SHIPPED | OUTPATIENT
Start: 2021-05-13

## 2021-05-14 ENCOUNTER — ANTICOAGULATION VISIT (OUTPATIENT)
Dept: PHARMACY | Facility: HOSPITAL | Age: 51
End: 2021-05-14

## 2021-05-14 DIAGNOSIS — I26.99 BILATERAL PULMONARY EMBOLISM (HCC): ICD-10-CM

## 2021-05-14 DIAGNOSIS — I26.99 OTHER ACUTE PULMONARY EMBOLISM WITHOUT ACUTE COR PULMONALE (HCC): Primary | ICD-10-CM

## 2021-05-14 LAB — INR PPP: 2.8

## 2021-05-14 NOTE — PROGRESS NOTES
Anticoagulation Clinic Progress Note    Anticoagulation Summary  As of 2021    INR goal:  2.0-3.0   TTR:  70.4 % (2.3 y)   INR used for dosin.80 (2021)   Warfarin maintenance plan:  15 mg every Mon, Wed, Fri; 10 mg all other days   Weekly warfarin total:  85 mg   No change documented:  Catrachita Perez   Plan last modified:  Jamar Bull, PharmD (2021)   Next INR check:  2021   Priority:  High   Target end date:  Indefinite    Indications    Other acute pulmonary embolism without acute cor pulmonale (CMS/HCC) [I26.99]  History of bilateral pulmonary embolism (CMS/HCC) [I26.99]             Anticoagulation Episode Summary     INR check location:      Preferred lab:      Send INR reminders to:   [facility]  PO[facility]    Comments:  new  tristen 2019 **WEEKLY TRISTEN**      Anticoagulation Care Providers     Provider Role Specialty Phone number    Torri Colmenares APRN Referring Cardiology [phone]    Elsy Baeza MD Responsible Cardiology [phone]          Clinic Interview:  No pertinent clinical findings have been reported.    INR History:  Anticoagulation Monitoring 2021   INR 3.4 2.50 2.80   INR Date 2021   INR Goal 2.0-3.0 2.0-3.0 2.0-3.0   Trend Down Same Same   Last Week Total 90 mg 80 mg 85 mg   Next Week Total 80 mg 85 mg 85 mg   Sun 10 mg 10 mg 10 mg   Mon 15 mg 15 mg 15 mg   Tue 10 mg 10 mg 10 mg   Wed 15 mg 15 mg 15 mg   Thu 10 mg 10 mg 10 mg   Fri 10 mg () 15 mg 15 mg   Sat 10 mg 10 mg 10 mg   Visit Report - - -   Some recent data might be hidden       Plan:  1. INR is therapeutic today- see above in Anticoagulation Summary.    Kameron Melendez to continue their warfarin regimen- see above in Anticoagulation Summary.  2. Follow up in 1 weeks  3. Pt has agreed to only be called if INR out of range. They have been instructed to call if any changes in medications, doses, concerns, etc. Patient expresses  understanding and has no further questions at this time.    Catrachita Perez

## 2021-05-21 ENCOUNTER — ANTICOAGULATION VISIT (OUTPATIENT)
Dept: PHARMACY | Facility: HOSPITAL | Age: 51
End: 2021-05-21

## 2021-05-21 DIAGNOSIS — I26.99 BILATERAL PULMONARY EMBOLISM (HCC): ICD-10-CM

## 2021-05-21 DIAGNOSIS — I26.99 OTHER ACUTE PULMONARY EMBOLISM WITHOUT ACUTE COR PULMONALE (HCC): Primary | ICD-10-CM

## 2021-05-21 LAB — INR PPP: 3

## 2021-05-21 NOTE — PROGRESS NOTES
Anticoagulation Clinic Progress Note    Anticoagulation Summary  As of 5/21/2021    INR goal:  2.0-3.0   TTR:  70.6 % (2.3 y)   INR used for dosing:  3.00 (5/21/2021)   Warfarin maintenance plan:  15 mg every Mon, Wed, Fri; 10 mg all other days   Weekly warfarin total:  85 mg   No change documented:  Catrachita Perez   Plan last modified:  Jamar Bull, PharmD (4/30/2021)   Next INR check:  5/28/2021   Priority:  High   Target end date:  Indefinite    Indications    Other acute pulmonary embolism without acute cor pulmonale (CMS/HCC) [I26.99]  History of bilateral pulmonary embolism (CMS/HCC) [I26.99]             Anticoagulation Episode Summary     INR check location:      Preferred lab:      Send INR reminders to:   [facility]  PO[facility]    Comments:  new  tristen March 2019 **WEEKLY TRISTEN**      Anticoagulation Care Providers     Provider Role Specialty Phone number    Torri Colmenares APRN Referring Cardiology [phone]    Elsy Baeza MD Responsible Cardiology [phone]          Clinic Interview:  No pertinent clinical findings have been reported.    INR History:  Anticoagulation Monitoring 5/7/2021 5/14/2021 5/21/2021   INR 2.50 2.80 3.00   INR Date 5/7/2021 5/14/2021 5/21/2021   INR Goal 2.0-3.0 2.0-3.0 2.0-3.0   Trend Same Same Same   Last Week Total 80 mg 85 mg 85 mg   Next Week Total 85 mg 85 mg 85 mg   Sun 10 mg 10 mg 10 mg   Mon 15 mg 15 mg 15 mg   Tue 10 mg 10 mg 10 mg   Wed 15 mg 15 mg 15 mg   Thu 10 mg 10 mg 10 mg   Fri 15 mg 15 mg 15 mg   Sat 10 mg 10 mg 10 mg   Visit Report - - -   Some recent data might be hidden       Plan:  1. INR is therapeutic today- see above in Anticoagulation Summary.    Kameron Melendez to continue their warfarin regimen- see above in Anticoagulation Summary.  2. Follow up in 1 weeks  3. Pt has agreed to only be called if INR out of range. They have been instructed to call if any changes in medications, doses, concerns, etc. Patient expresses  understanding and has no further questions at this time.    Catrachita Perez

## 2021-05-24 ENCOUNTER — TELEPHONE (OUTPATIENT)
Dept: FAMILY MEDICINE CLINIC | Facility: CLINIC | Age: 51
End: 2021-05-24

## 2021-05-24 RX ORDER — DOXYCYCLINE HYCLATE 100 MG/1
100 CAPSULE ORAL 2 TIMES DAILY
Qty: 20 CAPSULE | Refills: 0 | Status: SHIPPED | OUTPATIENT
Start: 2021-05-24 | End: 2021-10-29

## 2021-05-24 NOTE — TELEPHONE ENCOUNTER
Sure, I sent in abx.  Watch warfarin as INR likely rise on.  RRJ    Pt said he is getting cellulitis again on his left leg. It is warm to touch, red, swollen and hurts to walk on. Pt said it started yesterday and is worse this morning. He wants to know if you will send in meds for him.

## 2021-05-28 ENCOUNTER — ANTICOAGULATION VISIT (OUTPATIENT)
Dept: PHARMACY | Facility: HOSPITAL | Age: 51
End: 2021-05-28

## 2021-05-28 DIAGNOSIS — I26.99 OTHER ACUTE PULMONARY EMBOLISM WITHOUT ACUTE COR PULMONALE (HCC): Primary | ICD-10-CM

## 2021-05-28 DIAGNOSIS — I26.99 BILATERAL PULMONARY EMBOLISM (HCC): ICD-10-CM

## 2021-05-28 LAB — INR PPP: 2.9

## 2021-05-28 NOTE — PROGRESS NOTES
Anticoagulation Clinic Progress Note    Anticoagulation Summary  As of 2021    INR goal:  2.0-3.0   TTR:  70.9 % (2.3 y)   INR used for dosin.90 (2021)   Warfarin maintenance plan:  15 mg every Mon, Wed, Fri; 10 mg all other days   Weekly warfarin total:  85 mg   No change documented:  Catrachita Perez   Plan last modified:  Jamar Bull, PharmD (2021)   Next INR check:  2021   Priority:  High   Target end date:  Indefinite    Indications    Other acute pulmonary embolism without acute cor pulmonale (CMS/HCC) [I26.99]  History of bilateral pulmonary embolism (CMS/HCC) [I26.99]             Anticoagulation Episode Summary     INR check location:      Preferred lab:      Send INR reminders to:   [facility]  PO[facility]    Comments:  new  tristen 2019 **WEEKLY TRISTEN**      Anticoagulation Care Providers     Provider Role Specialty Phone number    Torri Colmenares APRN Referring Cardiology [phone]    Elsy Baeza MD Responsible Cardiology [phone]          Clinic Interview:  No pertinent clinical findings have been reported.    INR History:  Anticoagulation Monitoring 2021   INR 2.80 3.00 2.90   INR Date 2021   INR Goal 2.0-3.0 2.0-3.0 2.0-3.0   Trend Same Same Same   Last Week Total 85 mg 85 mg 85 mg   Next Week Total 85 mg 85 mg 85 mg   Sun 10 mg 10 mg 10 mg   Mon 15 mg 15 mg 15 mg   Tue 10 mg 10 mg 10 mg   Wed 15 mg 15 mg 15 mg   Thu 10 mg 10 mg 10 mg   Fri 15 mg 15 mg 15 mg   Sat 10 mg 10 mg 10 mg   Visit Report - - -   Some recent data might be hidden       Plan:  1. INR is therapeutic today- see above in Anticoagulation Summary.    Kameron Melendez to continue their warfarin regimen- see above in Anticoagulation Summary.  2. Follow up in 1 weeks  3. Pt has agreed to only be called if INR out of range. They have been instructed to call if any changes in medications, doses, concerns, etc. Patient expresses  understanding and has no further questions at this time.    Catrachita Perez

## 2021-06-03 ENCOUNTER — OFFICE VISIT (OUTPATIENT)
Dept: FAMILY MEDICINE CLINIC | Facility: CLINIC | Age: 51
End: 2021-06-03

## 2021-06-03 VITALS
HEART RATE: 72 BPM | BODY MASS INDEX: 66.25 KG/M2 | SYSTOLIC BLOOD PRESSURE: 122 MMHG | OXYGEN SATURATION: 98 % | HEIGHT: 74 IN | DIASTOLIC BLOOD PRESSURE: 62 MMHG

## 2021-06-03 DIAGNOSIS — E66.01 MORBID (SEVERE) OBESITY DUE TO EXCESS CALORIES (HCC): ICD-10-CM

## 2021-06-03 DIAGNOSIS — I89.0 LYMPHEDEMA: Primary | ICD-10-CM

## 2021-06-03 PROCEDURE — 99213 OFFICE O/P EST LOW 20 MIN: CPT | Performed by: FAMILY MEDICINE

## 2021-06-03 RX ORDER — PHENTERMINE HYDROCHLORIDE 37.5 MG/1
37.5 TABLET ORAL
Qty: 30 TABLET | Refills: 2 | Status: SHIPPED | OUTPATIENT
Start: 2021-06-03 | End: 2021-10-29

## 2021-06-03 NOTE — PATIENT INSTRUCTIONS
Calorie Counting for Weight Loss  Calories are units of energy. Your body needs a certain amount of calories from food to keep you going throughout the day. When you eat more calories than your body needs, your body stores the extra calories as fat. When you eat fewer calories than your body needs, your body burns fat to get the energy it needs.  Calorie counting means keeping track of how many calories you eat and drink each day. Calorie counting can be helpful if you need to lose weight. If you make sure to eat fewer calories than your body needs, you should lose weight. Ask your health care provider what a healthy weight is for you.  For calorie counting to work, you will need to eat the right number of calories in a day in order to lose a healthy amount of weight per week. A dietitian can help you determine how many calories you need in a day and will give you suggestions on how to reach your calorie goal.  · A healthy amount of weight to lose per week is usually 1-2 lb (0.5-0.9 kg). This usually means that your daily calorie intake should be reduced by 500-750 calories.  · Eating 1,200 - 1,500 calories per day can help most women lose weight.  · Eating 1,500 - 1,800 calories per day can help most men lose weight.  What is my plan?  My goal is to have __________ calories per day.  If I have this many calories per day, I should lose around __________ pounds per week.  What do I need to know about calorie counting?  In order to meet your daily calorie goal, you will need to:  · Find out how many calories are in each food you would like to eat. Try to do this before you eat.  · Decide how much of the food you plan to eat.  · Write down what you ate and how many calories it had. Doing this is called keeping a food log.  To successfully lose weight, it is important to balance calorie counting with a healthy lifestyle that includes regular activity. Aim for 150 minutes of moderate exercise (such as walking) or 75  minutes of vigorous exercise (such as running) each week.  Where do I find calorie information?    The number of calories in a food can be found on a Nutrition Facts label. If a food does not have a Nutrition Facts label, try to look up the calories online or ask your dietitian for help.  Remember that calories are listed per serving. If you choose to have more than one serving of a food, you will have to multiply the calories per serving by the amount of servings you plan to eat. For example, the label on a package of bread might say that a serving size is 1 slice and that there are 90 calories in a serving. If you eat 1 slice, you will have eaten 90 calories. If you eat 2 slices, you will have eaten 180 calories.  How do I keep a food log?  Immediately after each meal, record the following information in your food log:  · What you ate. Don't forget to include toppings, sauces, and other extras on the food.  · How much you ate. This can be measured in cups, ounces, or number of items.  · How many calories each food and drink had.  · The total number of calories in the meal.  Keep your food log near you, such as in a small notebook in your pocket, or use a mobile sandra or website. Some programs will calculate calories for you and show you how many calories you have left for the day to meet your goal.  What are some calorie counting tips?    · Use your calories on foods and drinks that will fill you up and not leave you hungry:  ? Some examples of foods that fill you up are nuts and nut butters, vegetables, lean proteins, and high-fiber foods like whole grains. High-fiber foods are foods with more than 5 g fiber per serving.  ? Drinks such as sodas, specialty coffee drinks, alcohol, and juices have a lot of calories, yet do not fill you up.  · Eat nutritious foods and avoid empty calories. Empty calories are calories you get from foods or beverages that do not have many vitamins or protein, such as candy, sweets, and  "soda. It is better to have a nutritious high-calorie food (such as an avocado) than a food with few nutrients (such as a bag of chips).  · Know how many calories are in the foods you eat most often. This will help you calculate calorie counts faster.  · Pay attention to calories in drinks. Low-calorie drinks include water and unsweetened drinks.  · Pay attention to nutrition labels for \"low fat\" or \"fat free\" foods. These foods sometimes have the same amount of calories or more calories than the full fat versions. They also often have added sugar, starch, or salt, to make up for flavor that was removed with the fat.  · Find a way of tracking calories that works for you. Get creative. Try different apps or programs if writing down calories does not work for you.  What are some portion control tips?  · Know how many calories are in a serving. This will help you know how many servings of a certain food you can have.  · Use a measuring cup to measure serving sizes. You could also try weighing out portions on a kitchen scale. With time, you will be able to estimate serving sizes for some foods.  · Take some time to put servings of different foods on your favorite plates, bowls, and cups so you know what a serving looks like.  · Try not to eat straight from a bag or box. Doing this can lead to overeating. Put the amount you would like to eat in a cup or on a plate to make sure you are eating the right portion.  · Use smaller plates, glasses, and bowls to prevent overeating.  · Try not to multitask (for example, watch TV or use your computer) while eating. If it is time to eat, sit down at a table and enjoy your food. This will help you to know when you are full. It will also help you to be aware of what you are eating and how much you are eating.  What are tips for following this plan?  Reading food labels  · Check the calorie count compared to the serving size. The serving size may be smaller than what you are used to " "eating.  · Check the source of the calories. Make sure the food you are eating is high in vitamins and protein and low in saturated and trans fats.  Shopping  · Read nutrition labels while you shop. This will help you make healthy decisions before you decide to purchase your food.  · Make a grocery list and stick to it.  Cooking  · Try to cook your favorite foods in a healthier way. For example, try baking instead of frying.  · Use low-fat dairy products.  Meal planning  · Use more fruits and vegetables. Half of your plate should be fruits and vegetables.  · Include lean proteins like poultry and fish.  How do I count calories when eating out?  · Ask for smaller portion sizes.  · Consider sharing an entree and sides instead of getting your own entree.  · If you get your own entree, eat only half. Ask for a box at the beginning of your meal and put the rest of your entree in it so you are not tempted to eat it.  · If calories are listed on the menu, choose the lower calorie options.  · Choose dishes that include vegetables, fruits, whole grains, low-fat dairy products, and lean protein.  · Choose items that are boiled, broiled, grilled, or steamed. Stay away from items that are buttered, battered, fried, or served with cream sauce. Items labeled \"crispy\" are usually fried, unless stated otherwise.  · Choose water, low-fat milk, unsweetened iced tea, or other drinks without added sugar. If you want an alcoholic beverage, choose a lower calorie option such as a glass of wine or light beer.  · Ask for dressings, sauces, and syrups on the side. These are usually high in calories, so you should limit the amount you eat.  · If you want a salad, choose a garden salad and ask for grilled meats. Avoid extra toppings like jasso, cheese, or fried items. Ask for the dressing on the side, or ask for olive oil and vinegar or lemon to use as dressing.  · Estimate how many servings of a food you are given. For example, a serving of " cooked rice is ½ cup or about the size of half a baseball. Knowing serving sizes will help you be aware of how much food you are eating at restaurants. The list below tells you how big or small some common portion sizes are based on everyday objects:  ? 1 oz--4 stacked dice.  ? 3 oz--1 deck of cards.  ? 1 tsp--1 die.  ? 1 Tbsp--½ a ping-pong ball.  ? 2 Tbsp--1 ping-pong ball.  ? ½ cup--½ baseball.  ? 1 cup--1 baseball.  Summary  · Calorie counting means keeping track of how many calories you eat and drink each day. If you eat fewer calories than your body needs, you should lose weight.  · A healthy amount of weight to lose per week is usually 1-2 lb (0.5-0.9 kg). This usually means reducing your daily calorie intake by 500-750 calories.  · The number of calories in a food can be found on a Nutrition Facts label. If a food does not have a Nutrition Facts label, try to look up the calories online or ask your dietitian for help.  · Use your calories on foods and drinks that will fill you up, and not on foods and drinks that will leave you hungry.  · Use smaller plates, glasses, and bowls to prevent overeating.  This information is not intended to replace advice given to you by your health care provider. Make sure you discuss any questions you have with your health care provider.  Document Revised: 09/06/2019 Document Reviewed: 11/17/2017  [employer] Patient Education © 2020 [employer]      Exercising to Lose Weight  Exercise is structured, repetitive physical activity to improve fitness and health. Getting regular exercise is important for everyone. It is especially important if you are overweight. Being overweight increases your risk of heart disease, stroke, diabetes, high blood pressure, and several types of cancer. Reducing your calorie intake and exercising can help you lose weight.  Exercise is usually categorized as moderate or vigorous intensity. To lose weight, most people need to do a certain amount of  moderate-intensity or vigorous-intensity exercise each week.  Moderate-intensity exercise    Moderate-intensity exercise is any activity that gets you moving enough to burn at least three times more energy (calories) than if you were sitting.  Examples of moderate exercise include:  · Walking a mile in 15 minutes.  · Doing light yard work.  · Biking at an easy pace.  Most people should get at least 150 minutes (2 hours and 30 minutes) a week of moderate-intensity exercise to maintain their body weight.  Vigorous-intensity exercise  Vigorous-intensity exercise is any activity that gets you moving enough to burn at least six times more calories than if you were sitting. When you exercise at this intensity, you should be working hard enough that you are not able to carry on a conversation.  Examples of vigorous exercise include:  · Running.  · Playing a team sport, such as football, basketball, and soccer.  · Jumping rope.  Most people should get at least 75 minutes (1 hour and 15 minutes) a week of vigorous-intensity exercise to maintain their body weight.  How can exercise affect me?  When you exercise enough to burn more calories than you eat, you lose weight. Exercise also reduces body fat and builds muscle. The more muscle you have, the more calories you burn. Exercise also:  · Improves mood.  · Reduces stress and tension.  · Improves your overall fitness, flexibility, and endurance.  · Increases bone strength.  The amount of exercise you need to lose weight depends on:  · Your age.  · The type of exercise.  · Any health conditions you have.  · Your overall physical ability.  Talk to your health care provider about how much exercise you need and what types of activities are safe for you.  What actions can I take to lose weight?  Nutrition    · Make changes to your diet as told by your health care provider or diet and nutrition specialist (dietitian). This may include:  ? Eating fewer calories.  ? Eating more  protein.  ? Eating less unhealthy fats.  ? Eating a diet that includes fresh fruits and vegetables, whole grains, low-fat dairy products, and lean protein.  ? Avoiding foods with added fat, salt, and sugar.  · Drink plenty of water while you exercise to prevent dehydration or heat stroke.  Activity  · Choose an activity that you enjoy and set realistic goals. Your health care provider can help you make an exercise plan that works for you.  · Exercise at a moderate or vigorous intensity most days of the week.  ? The intensity of exercise may vary from person to person. You can tell how intense a workout is for you by paying attention to your breathing and heartbeat. Most people will notice their breathing and heartbeat get faster with more intense exercise.  · Do resistance training twice each week, such as:  ? Push-ups.  ? Sit-ups.  ? Lifting weights.  ? Using resistance bands.  · Getting short amounts of exercise can be just as helpful as long structured periods of exercise. If you have trouble finding time to exercise, try to include exercise in your daily routine.  ? Get up, stretch, and walk around every 30 minutes throughout the day.  ? Go for a walk during your lunch break.  ? Park your car farther away from your destination.  ? If you take public transportation, get off one stop early and walk the rest of the way.  ? Make phone calls while standing up and walking around.  ? Take the stairs instead of elevators or escalators.  · Wear comfortable clothes and shoes with good support.  · Do not exercise so much that you hurt yourself, feel dizzy, or get very short of breath.  Where to find more information  · [place] Department of Health and Human Services: www.hhs.gov  · Centers for Disease Control and Prevention (CDC): www.cdc.gov  Contact a health care provider:  · Before starting a new exercise program.  · If you have questions or concerns about your weight.  · If you have a medical problem that keeps you from  exercising.  Get help right away if you have any of the following while exercising:  · Injury.  · Dizziness.  · Difficulty breathing or shortness of breath that does not go away when you stop exercising.  · Chest pain.  · Rapid heartbeat.  Summary  · Being overweight increases your risk of heart disease, stroke, diabetes, high blood pressure, and several types of cancer.  · Losing weight happens when you burn more calories than you eat.  · Reducing the amount of calories you eat in addition to getting regular moderate or vigorous exercise each week helps you lose weight.  This information is not intended to replace advice given to you by your health care provider. Make sure you discuss any questions you have with your health care provider.  Document Revised: 12/31/2018 Document Reviewed: 12/31/2018  [employer] Patient Education © 2021 [employer]

## 2021-06-03 NOTE — PROGRESS NOTES
Subjective   Kameron Melendez is a 51 y.o. male. Presents today for   Chief Complaint   Patient presents with   • Weight Check       Obesity  This is a chronic problem. The current episode started more than 1 year ago. The problem occurs constantly. Treatments tried: started phentermine and topamax. The treatment provided mild relief.   Leg Swelling  This is a chronic problem. The current episode started more than 1 year ago. Treatments tried: on furosemide, takes after work as cannot run to [place] frequently at work;   Did receive last week the lymphedema pumps, so hoping will help.     Had recurrent cellulitis, taking abx;      Review of Systems   Respiratory: Negative for shortness of breath.    Cardiovascular: Positive for leg swelling.       Patient Active Problem List   Diagnosis   • History of bilateral pulmonary embolism (CMS/HCC)   • Pulmonary hypertension (CMS/HCC)   • Lymphedema of both lower extremities   • Morbid obesity due to excess calories (CMS/HCC)   • Dyslipidemia   • Hyperuricemia   • Hypogonadal obesity   • Knee arthropathy   • Morbid obesity with body mass index of 60.0-69.9 in adult (CMS/HCC)   • ARNOLDO (obstructive sleep apnea)   • Severe edema   • History of pulmonary embolism   • Other acute pulmonary embolism without acute cor pulmonale (CMS/HCC)   • Intractable back pain   • Heterozygous factor V Leiden mutation (CMS/HCC)       Social History     Socioeconomic History   • Marital status:      Spouse name: Not on file   • Number of children: Not on file   • Years of education: Not on file   • Highest education level: Not on file   Tobacco Use   • Smoking status: Light Tobacco Smoker     Types: Cigars, Pipe     Start date: 1/1/2006   • Smokeless tobacco: Never Used   • Tobacco comment: occasional - < 1 a month   Substance and Sexual Activity   • Alcohol use: No   • Drug use: No   • Sexual activity: Defer       No Known Allergies    Current Outpatient Medications on File Prior to Visit  "  Medication Sig Dispense Refill   • acetaminophen (TYLENOL) 500 MG tablet Take 500 mg by mouth Every 6 (Six) Hours As Needed for Mild Pain .     • doxycycline (VIBRAMYCIN) 100 MG capsule Take 1 capsule by mouth 2 (Two) Times a Day. 20 capsule 0   • furosemide (LASIX) 80 MG tablet TAKE 1 TABLET BY MOUTH DAILY 30 tablet 5   • potassium chloride (K-DUR,KLOR-CON) 20 MEQ CR tablet Take 1 tablet by mouth Daily. Take with furosemide 30 tablet 2   • topiramate (TOPAMAX) 25 MG tablet Take 1 tablet by mouth Daily. 30 tablet 2   • warfarin (COUMADIN) 10 MG tablet Take one tablet by mouth daily or as directed. 90 tablet 1   • warfarin (COUMADIN) 5 MG tablet TAKE 1 TABLET ON SUNDAY, MONDAY, WEDNESDAY, AND FRIDAY OR AS DIRECTED. TAKE IN ADDITION TO ONE 10 MG TABLET FOR TOTAL DOSE OF 15 MG 55 tablet 3   • [DISCONTINUED] phentermine (ADIPEX-P) 37.5 MG tablet Take 1 tablet by mouth Every Morning Before Breakfast. 30 tablet 2     No current facility-administered medications on file prior to visit.       Objective   Vitals:    06/03/21 1032   BP: 122/62   Pulse: 72   SpO2: 98%   Weight: Comment: pt's weight exceeds scale limit   Height: 188 cm (74\")     Body mass index is 66.25 kg/m².    Physical Exam  Vitals and nursing note reviewed.   Constitutional:       Appearance: He is well-developed.   Musculoskeletal:      Cervical back: Neck supple.      Right lower leg: Edema present.      Left lower leg: Edema present.   Skin:     General: Skin is warm and dry.   Neurological:      Mental Status: He is alert.   Psychiatric:         Behavior: Behavior normal.         Assessment/Plan   Diagnoses and all orders for this visit:    1. Lymphedema (Primary)    2. Morbid (severe) obesity due to excess calories (CMS/[facility])  -     phentermine (ADIPEX-P) 37.5 MG tablet; Take 1 tablet by mouth Every Morning Before Breakfast.  Dispense: 30 tablet; Refill: 2    ok take furoxemide am and at noon take 1/2 on weekends;   Start lymphedema pump as directed, " try do 1 hour in am and 1 hour evening if can.  Continue phentermine for weight loss, helping;           -Follow up: 3 months and prn

## 2021-06-07 ENCOUNTER — ANTICOAGULATION VISIT (OUTPATIENT)
Dept: PHARMACY | Facility: HOSPITAL | Age: 51
End: 2021-06-07

## 2021-06-07 DIAGNOSIS — I26.99 BILATERAL PULMONARY EMBOLISM (HCC): ICD-10-CM

## 2021-06-07 DIAGNOSIS — I26.99 OTHER ACUTE PULMONARY EMBOLISM WITHOUT ACUTE COR PULMONALE (HCC): Primary | ICD-10-CM

## 2021-06-07 LAB — INR PPP: 3.2

## 2021-06-07 NOTE — PROGRESS NOTES
Anticoagulation Clinic Progress Note    Anticoagulation Summary  As of 6/7/2021    INR goal:  2.0-3.0   TTR:  70.4 % (2.3 y)   INR used for dosing:  3.20 (6/7/2021)   Warfarin maintenance plan:  15 mg every Mon, Wed, Fri; 10 mg all other days   Weekly warfarin total:  85 mg   Plan last modified:  Jamar Bull, PharmD (4/30/2021)   Next INR check:  6/14/2021   Priority:  High   Target end date:  Indefinite    Indications    Other acute pulmonary embolism without acute cor pulmonale (CMS/HCC) [I26.99]  History of bilateral pulmonary embolism (CMS/HCC) [I26.99]             Anticoagulation Episode Summary     INR check location:      Preferred lab:      Send INR reminders to:  [facility]  POOL    Comments:  new  tristen March 2019 **WEEKLY TRISTEN**      Anticoagulation Care Providers     Provider Role Specialty Phone number    Torri Colmenares APRN Referring Cardiology [phone]    Elsy Baeza MD Responsible Cardiology [phone]            INR History:  Anticoagulation Monitoring 5/21/2021 5/28/2021 6/7/2021   INR 3.00 2.90 3.20   INR Date 5/21/2021 5/28/2021 6/7/2021   INR Goal 2.0-3.0 2.0-3.0 2.0-3.0   Trend Same Same Same   Last Week Total 85 mg 85 mg 85 mg   Next Week Total 85 mg 85 mg 80 mg   Sun 10 mg 10 mg 10 mg   Mon 15 mg 15 mg 10 mg (6/7)   Tue 10 mg 10 mg 10 mg   Wed 15 mg 15 mg 15 mg   Thu 10 mg 10 mg 10 mg   Fri 15 mg 15 mg 15 mg   Sat 10 mg 10 mg 10 mg   Visit Report - - -   Some recent data might be hidden       Plan:  1. INR is Supratherapeutic today- see above in Anticoagulation Summary.   Will instruct Kameron Melendez to Change their warfarin regimen - see above in Anticoagulation Summary.  2. Follow up in 1 week  3. Left secure VM w/ instructions per pt request. They have been instructed to call if any changes in medications, doses, concerns, etc.     Ramo Morocho [facility]

## 2021-06-14 ENCOUNTER — ANTICOAGULATION VISIT (OUTPATIENT)
Dept: PHARMACY | Facility: HOSPITAL | Age: 51
End: 2021-06-14

## 2021-06-14 DIAGNOSIS — I26.99 BILATERAL PULMONARY EMBOLISM (HCC): ICD-10-CM

## 2021-06-14 DIAGNOSIS — I26.99 OTHER ACUTE PULMONARY EMBOLISM WITHOUT ACUTE COR PULMONALE (HCC): Primary | ICD-10-CM

## 2021-06-14 LAB — INR PPP: 2.3

## 2021-06-14 NOTE — PROGRESS NOTES
Anticoagulation Clinic Progress Note  Anticoagulation Summary  As of 2021    INR goal:  2.0-3.0   TTR:  70.5 % (2.3 y)   INR used for dosin.30 (2021)   Warfarin maintenance plan:  15 mg every Mon, Wed, Fri; 10 mg all other days   Weekly warfarin total:  85 mg   No change documented:  Jamar Bull PharmD   Plan last modified:  Jamar Bull PharmD (2021)   Next INR check:  2021   Priority:  High   Target end date:  Indefinite    Indications    Other acute pulmonary embolism without acute cor pulmonale (CMS/HCC) [I26.99]  History of bilateral pulmonary embolism (CMS/HCC) [I26.99]             Anticoagulation Episode Summary     INR check location:      Preferred lab:      Send INR reminders to:   [facility]  POOL    Comments:  new  tristen 2019 **WEEKLY TRISTEN**      Anticoagulation Care Providers     Provider Role Specialty Phone number    Torri Colmenares, APRN Referring Cardiology [phone]    Elsy Baeza MD Responsible Cardiology [phone]        INR History:  Anticoagulation Monitoring 2021   INR 2.90 3.20 2.30   INR Date 2021   INR Goal 2.0-3.0 2.0-3.0 2.0-3.0   Trend Same Same Same   Last Week Total 85 mg 85 mg 80 mg   Next Week Total 85 mg 80 mg 85 mg   Sun 10 mg 10 mg 10 mg   Mon 15 mg 10 mg () 15 mg   Tue 10 mg 10 mg 10 mg   Wed 15 mg 15 mg 15 mg   Thu 10 mg 10 mg 10 mg   Fri 15 mg 15 mg 15 mg   Sat 10 mg 10 mg 10 mg   Visit Report - - -   Some recent data might be hidden     Plan:  1. INR is Therapeutic today- see above in Anticoagulation Summary.   Will instruct Kameron Melendez to Continue their warfarin regimen- see above in Anticoagulation Summary.  2. Follow up in 1 week  3. Left secure VM w/ instructions per pt request. They have been instructed to call if any changes in medications, doses, concerns, etc.     Jamar Bull PharmD

## 2021-06-18 ENCOUNTER — ANTICOAGULATION VISIT (OUTPATIENT)
Dept: PHARMACY | Facility: HOSPITAL | Age: 51
End: 2021-06-18

## 2021-06-18 DIAGNOSIS — I26.99 OTHER ACUTE PULMONARY EMBOLISM WITHOUT ACUTE COR PULMONALE (HCC): Primary | ICD-10-CM

## 2021-06-18 DIAGNOSIS — I26.99 BILATERAL PULMONARY EMBOLISM (HCC): ICD-10-CM

## 2021-06-18 LAB — INR PPP: 2

## 2021-06-18 NOTE — PROGRESS NOTES
Anticoagulation Clinic Progress Note    Anticoagulation Summary  As of 2021    INR goal:  2.0-3.0   TTR:  70.6 % (2.4 y)   INR used for dosin.00 (2021)   Warfarin maintenance plan:  15 mg every Mon, Wed, Fri; 10 mg all other days   Weekly warfarin total:  85 mg   No change documented:  Iris Gonzalez   Plan last modified:  Jamar Bull, PharmD (2021)   Next INR check:  2021   Priority:  High   Target end date:  Indefinite    Indications    Other acute pulmonary embolism without acute cor pulmonale (CMS/HCC) [I26.99]  History of bilateral pulmonary embolism (CMS/HCC) [I26.99]             Anticoagulation Episode Summary     INR check location:      Preferred lab:      Send INR reminders to:   [facility]  POOL    Comments:  new  tristen 2019 **WEEKLY TRISTEN**      Anticoagulation Care Providers     Provider Role Specialty Phone number    Torri Colmenares, APRN Referring Cardiology [phone]    Elsy Baeza MD Responsible Cardiology [phone]          Clinic Interview:  No pertinent clinical findings have been reported.    INR History:  Anticoagulation Monitoring 2021   INR 3.20 2.30 2.00   INR Date 2021   INR Goal 2.0-3.0 2.0-3.0 2.0-3.0   Trend Same Same Same   Last Week Total 85 mg 80 mg 85 mg   Next Week Total 80 mg 85 mg 85 mg   Sun 10 mg 10 mg 10 mg   Mon 10 mg () 15 mg 15 mg   Tue 10 mg 10 mg 10 mg   Wed 15 mg 15 mg 15 mg   Thu 10 mg 10 mg 10 mg   Fri 15 mg 15 mg 15 mg   Sat 10 mg 10 mg 10 mg   Visit Report - - -   Some recent data might be hidden       Plan:  1. INR is therapeutic today- see above in Anticoagulation Summary.    Kameron BALDERAS Al to continue their warfarin regimen- see above in Anticoagulation Summary.  2. Follow up in 1 week.  3. Left secure VM, instructed Aureliano Melendez to cont same dose and to call if any changes in medications, doses, concerns, etc. Patient expresses understanding  and has no further questions at this time.    Iris Gonzalez

## 2021-06-25 ENCOUNTER — ANTICOAGULATION VISIT (OUTPATIENT)
Dept: PHARMACY | Facility: HOSPITAL | Age: 51
End: 2021-06-25

## 2021-06-25 DIAGNOSIS — I26.99 OTHER ACUTE PULMONARY EMBOLISM WITHOUT ACUTE COR PULMONALE (HCC): Primary | ICD-10-CM

## 2021-06-25 DIAGNOSIS — I26.99 BILATERAL PULMONARY EMBOLISM (HCC): ICD-10-CM

## 2021-06-25 LAB — INR PPP: 2

## 2021-06-25 NOTE — PROGRESS NOTES
Anticoagulation Clinic Progress Note    Anticoagulation Summary  As of 2021    INR goal:  2.0-3.0   TTR:  70.9 % (2.4 y)   INR used for dosin.00 (2021)   Warfarin maintenance plan:  15 mg every Mon, Wed, Fri; 10 mg all other days   Weekly warfarin total:  85 mg   No change documented:  Catrachita Perez   Plan last modified:  Jamar Bull, PharmD (2021)   Next INR check:  2021   Priority:  High   Target end date:  Indefinite    Indications    Other acute pulmonary embolism without acute cor pulmonale (CMS/HCC) [I26.99]  History of bilateral pulmonary embolism (CMS/HCC) [I26.99]             Anticoagulation Episode Summary     INR check location:      Preferred lab:      Send INR reminders to:   [facility]  POOL    Comments:  new  tristen 2019 **WEEKLY TRISTEN**      Anticoagulation Care Providers     Provider Role Specialty Phone number    Torri Colmenares APRN Referring Cardiology [phone]    Elsy Baeza MD Responsible Cardiology [phone]          Clinic Interview:  No pertinent clinical findings have been reported.    INR History:  Anticoagulation Monitoring 2021   INR 2.30 2.00 2.00   INR Date 2021   INR Goal 2.0-3.0 2.0-3.0 2.0-3.0   Trend Same Same Same   Last Week Total 80 mg 85 mg 85 mg   Next Week Total 85 mg 85 mg 85 mg   Sun 10 mg 10 mg 10 mg   Mon 15 mg 15 mg 15 mg   Tue 10 mg 10 mg 10 mg   Wed 15 mg 15 mg 15 mg   Thu 10 mg 10 mg 10 mg   Fri 15 mg 15 mg 15 mg   Sat 10 mg 10 mg 10 mg   Visit Report - - -   Some recent data might be hidden       Plan:  1. INR is therapeutic today- see above in Anticoagulation Summary.    Kameron Melendez to continue their warfarin regimen- see above in Anticoagulation Summary.  2. Follow up in 1 weeks  3. Pt has agreed to only be called if INR out of range. They have been instructed to call if any changes in medications, doses, concerns, etc. Patient expresses  understanding and has no further questions at this time.    Catrachita Perez

## 2021-07-02 ENCOUNTER — ANTICOAGULATION VISIT (OUTPATIENT)
Dept: PHARMACY | Facility: HOSPITAL | Age: 51
End: 2021-07-02

## 2021-07-02 DIAGNOSIS — I26.99 BILATERAL PULMONARY EMBOLISM (HCC): ICD-10-CM

## 2021-07-02 DIAGNOSIS — I26.99 OTHER ACUTE PULMONARY EMBOLISM WITHOUT ACUTE COR PULMONALE (HCC): Primary | ICD-10-CM

## 2021-07-02 LAB — INR PPP: 1.9

## 2021-07-02 NOTE — PROGRESS NOTES
Anticoagulation Clinic Progress Note    Anticoagulation Summary  As of 2021    INR goal:  2.0-3.0   TTR:  70.3 % (2.4 y)   INR used for dosin.90 (2021)   Warfarin maintenance plan:  10 mg every e, u, Sat; 15 mg all other days   Weekly warfarin total:  90 mg   Plan last modified:  Sarah Coy, PharmD (2021)   Next INR check:  2021   Priority:  High   Target end date:  Indefinite    Indications    Other acute pulmonary embolism without acute cor pulmonale (CMS/HCC) [I26.99]  History of bilateral pulmonary embolism (CMS/HCC) [I26.99]             Anticoagulation Episode Summary     INR check location:      Preferred lab:      Send INR reminders to:  [facility]  POOL    Comments:  new  tristen 2019 **WEEKLY TRISTEN**      Anticoagulation Care Providers     Provider Role Specialty Phone number    Torri Colmenares APRN Referring Cardiology [phone]    Elsy Baeza MD Responsible Cardiology [phone]          Clinic Interview:  Patient Findings     Positives:  Missed doses    Negatives:  Signs/symptoms of thrombosis, Signs/symptoms of bleeding,   Laboratory test error suspected, Change in health, Change in alcohol use,   Change in activity, Upcoming invasive procedure, Emergency department   visit, Upcoming dental procedure, Extra doses, Change in medications,   Change in diet/appetite, Hospital admission, Bruising, Other complaints    Comments:  Patient reports that he missed his dose of warfarin yesterday.   Of note, patient has been taking 15 mg every , Monday, Wednesday,   and Friday and 10 mg all other days of the week - his dosing regimen has   been updated to reflect this.       Clinical Outcomes     Negatives:  Major bleeding event, Thromboembolic event,   Anticoagulation-related hospital admission, Anticoagulation-related ED   visit, Anticoagulation-related fatality    Comments:  Patient reports that he missed his dose of warfarin yesterday.   Of note,  patient has been taking 15 mg every Sunday, Monday, Wednesday,   and Friday and 10 mg all other days of the week - his dosing regimen has   been updated to reflect this.         INR History:  Anticoagulation Monitoring 6/18/2021 6/25/2021 7/2/2021   INR 2.00 2.00 1.90   INR Date 6/18/2021 6/25/2021 7/2/2021   INR Goal 2.0-3.0 2.0-3.0 2.0-3.0   Trend Same Same Up   Last Week Total 85 mg 85 mg 80 mg   Next Week Total 85 mg 85 mg 95 mg   Sun 10 mg 10 mg 15 mg   Mon 15 mg 15 mg 15 mg   Tue 10 mg 10 mg -   Wed 15 mg 15 mg -   Thu 10 mg 10 mg -   Fri 15 mg 15 mg 15 mg   Sat 10 mg 10 mg 15 mg (7/3)   Visit Report - - -   Some recent data might be hidden       Plan:  1. INR is Subtherapeutic today- see above in Anticoagulation Summary.   Will instruct Kameron Melendez to Change their warfarin regimen- see above in Anticoagulation Summary. Take 15 mg daily until next INR check  2. Follow up in 4 days  3. Pt has agreed to only be called if INR out of range. They have been instructed to call if any changes in medications, doses, concerns, etc. Patient expresses understanding and has no further questions at this time.    Sarah Coy, PharmD

## 2021-07-06 ENCOUNTER — ANTICOAGULATION VISIT (OUTPATIENT)
Dept: PHARMACY | Facility: HOSPITAL | Age: 51
End: 2021-07-06

## 2021-07-06 DIAGNOSIS — I26.99 OTHER ACUTE PULMONARY EMBOLISM WITHOUT ACUTE COR PULMONALE (HCC): Primary | ICD-10-CM

## 2021-07-06 DIAGNOSIS — I26.99 BILATERAL PULMONARY EMBOLISM (HCC): ICD-10-CM

## 2021-07-06 LAB — INR PPP: 2.3

## 2021-07-06 NOTE — PROGRESS NOTES
Anticoagulation Clinic Progress Note    Anticoagulation Summary  As of 2021    INR goal:  2.0-3.0   TTR:  70.3 % (2.4 y)   INR used for dosin.30 (2021)   Warfarin maintenance plan:  10 mg every Tue, Thu, Sat; 15 mg all other days   Weekly warfarin total:  90 mg   Plan last modified:  Sarah Coy PharmD (2021)   Next INR check:     Priority:  High   Target end date:  Indefinite    Indications    Other acute pulmonary embolism without acute cor pulmonale (CMS/HCC) [I26.99]  History of bilateral pulmonary embolism (CMS/HCC) [I26.99]             Anticoagulation Episode Summary     INR check location:      Preferred lab:      Send INR reminders to:   [facility]  POOL    Comments:  new  tristen 2019 **WEEKLY TRISTEN**      Anticoagulation Care Providers     Provider Role Specialty Phone number    Torri Colmenares APRN Referring Cardiology [phone]    Elsy Baeza MD Responsible Cardiology [phone]          Clinic Interview:  No pertinent clinical findings have been reported.    INR History:  Anticoagulation Monitoring 2021   INR 2.00 1.90 2.30   INR Date 2021   INR Goal 2.0-3.0 2.0-3.0 2.0-3.0   Trend Same Up Same   Last Week Total 85 mg 80 mg 85 mg   Next Week Total 85 mg 95 mg 90 mg   Sun 10 mg 15 mg 15 mg   Mon 15 mg 15 mg 15 mg   Tue 10 mg - 10 mg   Wed 15 mg - 15 mg   Thu 10 mg - 10 mg   Fri 15 mg 15 mg 15 mg   Sat 10 mg 15 mg (7/3) 10 mg   Visit Report - - -   Some recent data might be hidden       Plan:  1. INR is therapeutic today- see above in Anticoagulation Summary.    Kameron Melendez to continue their warfarin regimen- see above in Anticoagulation Summary.  2. Follow up in 1 week  3. Pt has agreed to only be called if INR out of range. They have been instructed to call if any changes in medications, doses, concerns, etc.     Alem Oscar [facility]

## 2021-07-14 DIAGNOSIS — E66.01 MORBID (SEVERE) OBESITY DUE TO EXCESS CALORIES (HCC): ICD-10-CM

## 2021-07-14 RX ORDER — TOPIRAMATE 25 MG/1
25 TABLET ORAL DAILY
Qty: 30 TABLET | Refills: 2 | Status: SHIPPED | OUTPATIENT
Start: 2021-07-14 | End: 2021-10-29

## 2021-07-16 ENCOUNTER — ANTICOAGULATION VISIT (OUTPATIENT)
Dept: PHARMACY | Facility: HOSPITAL | Age: 51
End: 2021-07-16

## 2021-07-16 DIAGNOSIS — I26.99 BILATERAL PULMONARY EMBOLISM (HCC): ICD-10-CM

## 2021-07-16 DIAGNOSIS — I26.99 OTHER ACUTE PULMONARY EMBOLISM WITHOUT ACUTE COR PULMONALE (HCC): Primary | ICD-10-CM

## 2021-07-16 LAB — INR PPP: 2.7

## 2021-07-16 NOTE — PROGRESS NOTES
Anticoagulation Clinic Progress Note    Anticoagulation Summary  As of 2021    INR goal:  2.0-3.0   TTR:  70.7 % (2.4 y)   INR used for dosin.70 (2021)   Warfarin maintenance plan:  10 mg every Tue, Thu, Sat; 15 mg all other days   Weekly warfarin total:  90 mg   No change documented:  Catrachita Perez   Plan last modified:  Sarah Coy PharmD (2021)   Next INR check:  2021   Priority:  High   Target end date:  Indefinite    Indications    Other acute pulmonary embolism without acute cor pulmonale (CMS/HCC) [I26.99]  History of bilateral pulmonary embolism (CMS/HCC) [I26.99]             Anticoagulation Episode Summary     INR check location:      Preferred lab:      Send INR reminders to:   [facility]  POOL    Comments:  new  tristen 2019 **WEEKLY TRISTEN**      Anticoagulation Care Providers     Provider Role Specialty Phone number    Torri Colmenares, APRN Referring Cardiology [phone]    Elsy Baeza MD Responsible Cardiology [phone]          Clinic Interview:  No pertinent clinical findings have been reported.    INR History:  Anticoagulation Monitoring 2021   INR 1.90 2.30 2.70   INR Date 2021   INR Goal 2.0-3.0 2.0-3.0 2.0-3.0   Trend Up Same Same   Last Week Total 80 mg 85 mg 90 mg   Next Week Total 95 mg 90 mg 90 mg   Sun 15 mg 15 mg 15 mg   Mon 15 mg 15 mg 15 mg   Tue - 10 mg 10 mg   Wed - 15 mg 15 mg   Thu - 10 mg 10 mg   Fri 15 mg 15 mg 15 mg   Sat 15 mg (7/3) 10 mg 10 mg   Visit Report - - -   Some recent data might be hidden       Plan:  1. INR is therapeutic today- see above in Anticoagulation Summary.    Kameron Melendez to continue their warfarin regimen- see above in Anticoagulation Summary.  2. Follow up in 1 weeks  3. Pt has agreed to only be called if INR out of range. They have been instructed to call if any changes in medications, doses, concerns, etc. Patient expresses understanding and has  no further questions at this time.    Catrachita Perez

## 2021-07-23 ENCOUNTER — ANTICOAGULATION VISIT (OUTPATIENT)
Dept: PHARMACY | Facility: HOSPITAL | Age: 51
End: 2021-07-23

## 2021-07-23 DIAGNOSIS — I26.99 OTHER ACUTE PULMONARY EMBOLISM WITHOUT ACUTE COR PULMONALE (HCC): Primary | ICD-10-CM

## 2021-07-23 DIAGNOSIS — I26.99 BILATERAL PULMONARY EMBOLISM (HCC): ICD-10-CM

## 2021-07-23 LAB — INR PPP: 3.1

## 2021-07-23 NOTE — PROGRESS NOTES
Anticoagulation Clinic Progress Note    Anticoagulation Summary  As of 7/23/2021    INR goal:  2.0-3.0   TTR:  70.7 % (2.4 y)   INR used for dosing:  3.10 (7/23/2021)   Warfarin maintenance plan:  10 mg every Tue, Thu, Sat; 15 mg all other days   Weekly warfarin total:  90 mg   No change documented:  Tessie Fatima RPH   Plan last modified:  Sarah Coy PharmD (7/2/2021)   Next INR check:  7/30/2021   Priority:  High   Target end date:  Indefinite    Indications    Other acute pulmonary embolism without acute cor pulmonale (CMS/HCC) [I26.99]  History of bilateral pulmonary embolism (CMS/HCC) [I26.99]             Anticoagulation Episode Summary     INR check location:      Preferred lab:      Send INR reminders to:   [facility]  POOL    Comments:  new  frankie March 2019 **WEEKLY FRANKIE**      Anticoagulation Care Providers     Provider Role Specialty Phone number    Torri Colmenares, APRN Referring Cardiology [phone]    Elsy Baeza MD Responsible Cardiology [phone]          Clinic Interview:  Patient Findings     Negatives:  Signs/symptoms of thrombosis, Signs/symptoms of bleeding,   Laboratory test error suspected, Change in health, Change in alcohol use,   Change in activity, Upcoming invasive procedure, Emergency department   visit, Upcoming dental procedure, Missed doses, Extra doses, Change in   medications, Change in diet/appetite, Hospital admission, Bruising, Other   complaints      Clinical Outcomes     Negatives:  Major bleeding event, Thromboembolic event,   Anticoagulation-related hospital admission, Anticoagulation-related ED   visit, Anticoagulation-related fatality        INR History:  Anticoagulation Monitoring 7/6/2021 7/16/2021 7/23/2021   INR 2.30 2.70 3.10   INR Date 7/6/2021 7/16/2021 7/23/2021   INR Goal 2.0-3.0 2.0-3.0 2.0-3.0   Trend Same Same Same   Last Week Total 85 mg 90 mg 90 mg   Next Week Total 90 mg 90 mg 90 mg   Sun 15 mg 15 mg 15 mg   Mon 15 mg 15 mg  15 mg   Tue 10 mg 10 mg 10 mg   Wed 15 mg 15 mg 15 mg   Thu 10 mg 10 mg 10 mg   Fri 15 mg 15 mg 15 mg   Sat 10 mg 10 mg 10 mg   Visit Report - - -   Some recent data might be hidden       Plan:  1. INR is Supratherapeutic today- see above in Anticoagulation Summary.   Will instruct Kameron Melendez to Continue their warfarin regimen- see above in Anticoagulation Summary.  2. Follow up in 1 week  3. Pt has agreed to only be called if INR out of range. They have been instructed to call if any changes in medications, doses, concerns, etc. Patient expresses understanding and has no further questions at this time.    Tessie Fatima, [facility]

## 2021-07-30 ENCOUNTER — ANTICOAGULATION VISIT (OUTPATIENT)
Dept: PHARMACY | Facility: HOSPITAL | Age: 51
End: 2021-07-30

## 2021-07-30 DIAGNOSIS — I26.99 BILATERAL PULMONARY EMBOLISM (HCC): ICD-10-CM

## 2021-07-30 DIAGNOSIS — I26.99 OTHER ACUTE PULMONARY EMBOLISM WITHOUT ACUTE COR PULMONALE (HCC): Primary | ICD-10-CM

## 2021-07-30 LAB — INR PPP: 1.6

## 2021-07-30 NOTE — PROGRESS NOTES
Anticoagulation Clinic Progress Note  Anticoagulation Summary  As of 2021    INR goal:  2.0-3.0   TTR:  70.7 % (2.5 y)   INR used for dosin.60 (2021)   Warfarin maintenance plan:  10 mg every Tue, Thu, Sat; 15 mg all other days   Weekly warfarin total:  90 mg   Plan last modified:  Sarah Coy PharmD (2021)   Next INR check:  2021   Priority:  High   Target end date:  Indefinite    Indications    Other acute pulmonary embolism without acute cor pulmonale (CMS/HCC) [I26.99]  History of bilateral pulmonary embolism (CMS/HCC) [I26.99]             Anticoagulation Episode Summary     INR check location:      Preferred lab:      Send INR reminders to:  [facility]  POOL    Comments:  new  tristen 2019 **WEEKLY TRISTEN**      Anticoagulation Care Providers     Provider Role Specialty Phone number    Torri Colmenares APRN Referring Cardiology [phone]    Elsy Baeza MD Responsible Cardiology [phone]        Clinic Interview: Unable to make contact with patient today despite [facility] calling the various contact numbers on file 6 times throughout the day between 0800 and 1600.      INR History:  Anticoagulation Monitoring 2021   INR 2.70 3.10 1.60   INR Date 2021   INR Goal 2.0-3.0 2.0-3.0 2.0-3.0   Trend Same Same Same   Last Week Total 90 mg 90 mg 90 mg   Next Week Total 90 mg 90 mg 95 mg   Sun 15 mg 15 mg 15 mg   Mon 15 mg 15 mg -   Tue 10 mg 10 mg -   Wed 15 mg 15 mg -   Thu 10 mg 10 mg -   Fri 15 mg 15 mg 15 mg   Sat 10 mg 10 mg 15 mg ()   Visit Report - - -   Some recent data might be hidden     Plan:  1. INR is Subtherapeutic today- see above in Anticoagulation Summary.   Will instruct Kameron Melendez via HIPAA appropriate / patient previously approved voicemail to Change their warfarin regimen- see above in Anticoagulation Summary.  2. Follow up in 3 days if not sooner.    3. They have been instructed to call if  any changes in medications, doses, concerns, etc. Patient expresses understanding and has no further questions at this time.    Jamar Bull, PharmD

## 2021-08-06 ENCOUNTER — ANTICOAGULATION VISIT (OUTPATIENT)
Dept: PHARMACY | Facility: HOSPITAL | Age: 51
End: 2021-08-06

## 2021-08-06 DIAGNOSIS — I26.99 OTHER ACUTE PULMONARY EMBOLISM WITHOUT ACUTE COR PULMONALE (HCC): Primary | ICD-10-CM

## 2021-08-06 DIAGNOSIS — I26.99 BILATERAL PULMONARY EMBOLISM (HCC): ICD-10-CM

## 2021-08-06 LAB — INR PPP: 2.6

## 2021-08-06 NOTE — PROGRESS NOTES
Anticoagulation Clinic Progress Note    Anticoagulation Summary  As of 2021    INR goal:  2.0-3.0   TTR:  70.6 % (2.5 y)   INR used for dosin.60 (2021)   Warfarin maintenance plan:  10 mg every Tue, Thu, Sat; 15 mg all other days   Weekly warfarin total:  90 mg   No change documented:  Ramo Morocho PharmD   Plan last modified:  Sarah Coy PharmD (2021)   Next INR check:  2021   Priority:  High   Target end date:  Indefinite    Indications    Other acute pulmonary embolism without acute cor pulmonale (CMS/HCC) [I26.99]  History of bilateral pulmonary embolism (CMS/HCC) [I26.99]             Anticoagulation Episode Summary     INR check location:      Preferred lab:      Send INR reminders to:   [facility]    Comments:  new  tristen 2019 **WEEKLY TRISTEN**      Anticoagulation Care Providers     Provider Role Specialty Phone number    Torri Colmenares, APRN Referring Cardiology [phone]    Elsy Baeza MD Responsible Cardiology [phone]          Clinic Interview:  Patient Findings     Negatives:  Signs/symptoms of thrombosis, Signs/symptoms of bleeding,   Laboratory test error suspected, Change in health, Change in alcohol use,   Change in activity, Upcoming invasive procedure, Emergency department   visit, Upcoming dental procedure, Missed doses, Extra doses, Change in   medications, Change in diet/appetite, Hospital admission, Bruising, Other   complaints      Clinical Outcomes     Negatives:  Major bleeding event, Thromboembolic event,   Anticoagulation-related hospital admission, Anticoagulation-related ED   visit, Anticoagulation-related fatality        INR History:  Anticoagulation Monitoring 2021   INR 3.10 1.60 2.60   INR Date 2021   INR Goal 2.0-3.0 2.0-3.0 2.0-3.0   Trend Same Same Same   Last Week Total 90 mg 90 mg 95 mg   Next Week Total 90 mg 95 mg 90 mg   Sun 15 mg 15 mg 15 mg   Mon 15 mg - 15 mg    Tue 10 mg - 10 mg   Wed 15 mg - 15 mg   Thu 10 mg - 10 mg   Fri 15 mg 15 mg 15 mg   Sat 10 mg 15 mg (7/31) 10 mg   Visit Report - - -   Some recent data might be hidden       Plan:  1. INR is Therapeutic today- see above in Anticoagulation Summary.   Will instruct Kameron BALDERAS Al to Continue their warfarin regimen- see above in Anticoagulation Summary.  2. Follow up in 1 week  3. They have been instructed to call if any changes in medications, doses, concerns, etc. Patient expresses understanding and has no further questions at this time.    Ramo Morocho, PharmD

## 2021-08-13 ENCOUNTER — ANTICOAGULATION VISIT (OUTPATIENT)
Dept: PHARMACY | Facility: HOSPITAL | Age: 51
End: 2021-08-13

## 2021-08-13 DIAGNOSIS — I26.99 BILATERAL PULMONARY EMBOLISM (HCC): ICD-10-CM

## 2021-08-13 DIAGNOSIS — I26.99 OTHER ACUTE PULMONARY EMBOLISM WITHOUT ACUTE COR PULMONALE (HCC): Primary | ICD-10-CM

## 2021-08-13 LAB — INR PPP: 2.9

## 2021-08-13 NOTE — PROGRESS NOTES
Anticoagulation Clinic Progress Note    Anticoagulation Summary  As of 2021    INR goal:  2.0-3.0   TTR:  70.8 % (2.5 y)   INR used for dosin.90 (2021)   Warfarin maintenance plan:  10 mg every Tue, Thu, Sat; 15 mg all other days   Weekly warfarin total:  90 mg   No change documented:  Catrachita Perez   Plan last modified:  Sarah Coy PharmD (2021)   Next INR check:  2021   Priority:  High   Target end date:  Indefinite    Indications    Other acute pulmonary embolism without acute cor pulmonale (CMS/HCC) [I26.99]  History of bilateral pulmonary embolism (CMS/HCC) [I26.99]             Anticoagulation Episode Summary     INR check location:      Preferred lab:      Send INR reminders to:   [facility]    Comments:  new  tristen 2019 **WEEKLY TRISTEN**      Anticoagulation Care Providers     Provider Role Specialty Phone number    Torri Colmenares, APRN Referring Cardiology [phone]    Elsy Baeza MD Responsible Cardiology [phone]          Clinic Interview:  No pertinent clinical findings have been reported.    INR History:  Anticoagulation Monitoring 2021   INR 1.60 2.60 2.90   INR Date 2021   INR Goal 2.0-3.0 2.0-3.0 2.0-3.0   Trend Same Same Same   Last Week Total 90 mg 95 mg 90 mg   Next Week Total 95 mg 90 mg 90 mg   Sun 15 mg 15 mg 15 mg   Mon - 15 mg 15 mg   Tue - 10 mg 10 mg   Wed - 15 mg 15 mg   Thu - 10 mg 10 mg   Fri 15 mg 15 mg 15 mg   Sat 15 mg () 10 mg 10 mg   Visit Report - - -   Some recent data might be hidden       Plan:  1. INR is therapeutic today- see above in Anticoagulation Summary.    Kameron Melendez to continue their warfarin regimen- see above in Anticoagulation Summary.  2. Follow up in 1 weeks  3. Pt has agreed to only be called if INR out of range. They have been instructed to call if any changes in medications, doses, concerns, etc. Patient expresses understanding and has  no further questions at this time.    Catrachita Perez

## 2021-08-20 ENCOUNTER — ANTICOAGULATION VISIT (OUTPATIENT)
Dept: PHARMACY | Facility: HOSPITAL | Age: 51
End: 2021-08-20

## 2021-08-20 DIAGNOSIS — I26.99 BILATERAL PULMONARY EMBOLISM (HCC): ICD-10-CM

## 2021-08-20 DIAGNOSIS — I26.99 OTHER ACUTE PULMONARY EMBOLISM WITHOUT ACUTE COR PULMONALE (HCC): Primary | ICD-10-CM

## 2021-08-20 LAB — INR PPP: 2.4

## 2021-08-20 NOTE — PROGRESS NOTES
Anticoagulation Clinic Progress Note    Anticoagulation Summary  As of 2021    INR goal:  2.0-3.0   TTR:  71.0 % (2.5 y)   INR used for dosin.40 (2021)   Warfarin maintenance plan:  10 mg every Tue, Thu, Sat; 15 mg all other days   Weekly warfarin total:  90 mg   No change documented:  Roslyn Jett, [facility]   Plan last modified:  Sarah Coy PharmD (2021)   Next INR check:  2021   Priority:  High   Target end date:  Indefinite    Indications    Other acute pulmonary embolism without acute cor pulmonale (CMS/HCC) [I26.99]  History of bilateral pulmonary embolism (CMS/HCC) [I26.99]             Anticoagulation Episode Summary     INR check location:      Preferred lab:      Send INR reminders to:  [facility]  POOL    Comments:  new  tristen 2019 **WEEKLY TRISTEN**      Anticoagulation Care Providers     Provider Role Specialty Phone number    Torri Colmenares, APRN Referring Cardiology [phone]    Elsy Baeza MD Responsible Cardiology [phone]          Clinic Interview:  No pertinent clinical findings have been reported.    INR History:  Anticoagulation Monitoring 2021   INR 2.60 2.90 2.40   INR Date 2021   INR Goal 2.0-3.0 2.0-3.0 2.0-3.0   Trend Same Same Same   Last Week Total 95 mg 90 mg 90 mg   Next Week Total 90 mg 90 mg 90 mg   Sun 15 mg 15 mg 15 mg   Mon 15 mg 15 mg 15 mg   Tue 10 mg 10 mg 10 mg   Wed 15 mg 15 mg 15 mg   Thu 10 mg 10 mg 10 mg   Fri 15 mg 15 mg 15 mg   Sat 10 mg 10 mg 10 mg   Visit Report - - -   Some recent data might be hidden       Plan:  1. INR is therapeutic today- see above in Anticoagulation Summary.    Kameron Melendez to continue their warfarin regimen- see above in Anticoagulation Summary.  2. Follow up in 1 weeks  3. Pt has agreed to only be called if INR out of range. They have been instructed to call if any changes in medications, doses, concerns, etc. Patient expresses  understanding and has no further questions at this time.    Roslyn Jett, [facility]

## 2021-08-27 ENCOUNTER — ANTICOAGULATION VISIT (OUTPATIENT)
Dept: PHARMACY | Facility: HOSPITAL | Age: 51
End: 2021-08-27

## 2021-08-27 DIAGNOSIS — I26.99 BILATERAL PULMONARY EMBOLISM (HCC): ICD-10-CM

## 2021-08-27 DIAGNOSIS — I26.99 OTHER ACUTE PULMONARY EMBOLISM WITHOUT ACUTE COR PULMONALE (HCC): Primary | ICD-10-CM

## 2021-08-27 LAB — INR PPP: 2.7

## 2021-08-27 NOTE — PROGRESS NOTES
Anticoagulation Clinic Progress Note    Anticoagulation Summary  As of 2021    INR goal:  2.0-3.0   TTR:  71.2 % (2.5 y)   INR used for dosin.70 (2021)   Warfarin maintenance plan:  10 mg every Tue, Thu, Sat; 15 mg all other days   Weekly warfarin total:  90 mg   No change documented:  Alexandra Elias   Plan last modified:  Sarah Coy PharmD (2021)   Next INR check:  9/3/2021   Priority:  High   Target end date:  Indefinite    Indications    Other acute pulmonary embolism without acute cor pulmonale (CMS/HCC) [I26.99]  History of bilateral pulmonary embolism (CMS/HCC) [I26.99]             Anticoagulation Episode Summary     INR check location:      Preferred lab:      Send INR reminders to:   [facility]    Comments:  new  tristen 2019 **WEEKLY TRISTEN**      Anticoagulation Care Providers     Provider Role Specialty Phone number    Torri Colmenares, APRN Referring Cardiology [phone]    Elsy Baeza MD Responsible Cardiology [phone]          Clinic Interview:  No pertinent clinical findings have been reported.    INR History:  Anticoagulation Monitoring 2021   INR 2.90 2.40 2.70   INR Date 2021   INR Goal 2.0-3.0 2.0-3.0 2.0-3.0   Trend Same Same Same   Last Week Total 90 mg 90 mg 90 mg   Next Week Total 90 mg 90 mg 90 mg   Sun 15 mg 15 mg 15 mg   Mon 15 mg 15 mg 15 mg   Tue 10 mg 10 mg 10 mg   Wed 15 mg 15 mg 15 mg   Thu 10 mg 10 mg 10 mg   Fri 15 mg 15 mg 15 mg   Sat 10 mg 10 mg 10 mg   Visit Report - - -   Some recent data might be hidden       Plan:  1. INR is therapeutic today- see above in Anticoagulation Summary.    Kameron Melendez to continue their warfarin regimen- see above in Anticoagulation Summary.  2. Follow up in 1 week  3. Pt has agreed to only be called if INR out of range. They have been instructed to call if any changes in medications, doses, concerns, etc. Patient expresses  understanding and has no further questions at this time.    Alexandra Elias

## 2021-09-02 ENCOUNTER — OFFICE VISIT (OUTPATIENT)
Dept: FAMILY MEDICINE CLINIC | Facility: CLINIC | Age: 51
End: 2021-09-02

## 2021-09-02 VITALS
BODY MASS INDEX: 66.22 KG/M2 | OXYGEN SATURATION: 96 % | HEIGHT: 74 IN | DIASTOLIC BLOOD PRESSURE: 80 MMHG | HEART RATE: 76 BPM | SYSTOLIC BLOOD PRESSURE: 120 MMHG

## 2021-09-02 DIAGNOSIS — I87.2 VENOUS STASIS DERMATITIS OF BOTH LOWER EXTREMITIES: ICD-10-CM

## 2021-09-02 DIAGNOSIS — E66.01 MORBID (SEVERE) OBESITY DUE TO EXCESS CALORIES (HCC): ICD-10-CM

## 2021-09-02 DIAGNOSIS — I89.0 LYMPHEDEMA OF BOTH LOWER EXTREMITIES: Primary | ICD-10-CM

## 2021-09-02 DIAGNOSIS — R53.83 OTHER FATIGUE: ICD-10-CM

## 2021-09-02 PROCEDURE — 99214 OFFICE O/P EST MOD 30 MIN: CPT | Performed by: FAMILY MEDICINE

## 2021-09-02 RX ORDER — AMMONIUM LACTATE 12 G/100G
CREAM TOPICAL
Qty: 385 G | Refills: 12 | Status: ON HOLD | OUTPATIENT
Start: 2021-09-02 | End: 2021-11-03

## 2021-09-02 RX ORDER — CHLORHEXIDINE GLUCONATE 4 G/100ML
SOLUTION TOPICAL DAILY PRN
Qty: 946 ML | Refills: 12 | Status: ON HOLD | OUTPATIENT
Start: 2021-09-02 | End: 2021-11-03

## 2021-09-03 ENCOUNTER — ANTICOAGULATION VISIT (OUTPATIENT)
Dept: PHARMACY | Facility: HOSPITAL | Age: 51
End: 2021-09-03

## 2021-09-03 DIAGNOSIS — I26.99 BILATERAL PULMONARY EMBOLISM (HCC): ICD-10-CM

## 2021-09-03 DIAGNOSIS — I26.99 OTHER ACUTE PULMONARY EMBOLISM WITHOUT ACUTE COR PULMONALE (HCC): Primary | ICD-10-CM

## 2021-09-03 LAB — INR PPP: 2.7

## 2021-09-03 NOTE — PROGRESS NOTES
Anticoagulation Clinic Progress Note    Anticoagulation Summary  As of 9/3/2021    INR goal:  2.0-3.0   TTR:  71.5 % (2.6 y)   INR used for dosin.70 (9/3/2021)   Warfarin maintenance plan:  10 mg every Tue, Thu, Sat; 15 mg all other days   Weekly warfarin total:  90 mg   No change documented:  Iris Gonzalez   Plan last modified:  Sarah Coy PharmD (2021)   Next INR check:  9/10/2021   Priority:  High   Target end date:  Indefinite    Indications    Other acute pulmonary embolism without acute cor pulmonale (CMS/HCC) [I26.99]  History of bilateral pulmonary embolism (CMS/HCC) [I26.99]             Anticoagulation Episode Summary     INR check location:      Preferred lab:      Send INR reminders to:   [facility]  [facility]    Comments:  new  tristen 2019 **WEEKLY TRISTEN**      Anticoagulation Care Providers     Provider Role Specialty Phone number    Torri Colmenares, APRN Referring Cardiology [phone]    Elsy Baeza MD Responsible Cardiology [phone]          Clinic Interview:  No pertinent clinical findings have been reported.    INR History:  Anticoagulation Monitoring 2021 2021 9/3/2021   INR 2.40 2.70 2.70   INR Date 2021 2021 9/3/2021   INR Goal 2.0-3.0 2.0-3.0 2.0-3.0   Trend Same Same Same   Last Week Total 90 mg 90 mg 90 mg   Next Week Total 90 mg 90 mg 90 mg   Sun 15 mg 15 mg 15 mg   Mon 15 mg 15 mg 15 mg   Tue 10 mg 10 mg 10 mg   Wed 15 mg 15 mg 15 mg   Thu 10 mg 10 mg 10 mg   Fri 15 mg 15 mg 15 mg   Sat 10 mg 10 mg 10 mg   Visit Report - - -   Some recent data might be hidden       Plan:  1. INR is therapeutic today- see above in Anticoagulation Summary.    Kameron Melendez to continue their warfarin regimen- see above in Anticoagulation Summary.  2. Follow up in 1 week.  3. Pt has agreed to only be called if INR out of range. They have been instructed to call if any changes in medications, doses, concerns, etc. Patient expresses  understanding and has no further questions at this time.    Iris Gonzalez

## 2021-09-05 LAB
ALBUMIN SERPL-MCNC: 4.1 G/DL (ref 3.5–5.2)
ALBUMIN/GLOB SERPL: 1.8 G/DL
ALP SERPL-CCNC: 112 U/L (ref 39–117)
ALT SERPL-CCNC: 15 U/L (ref 1–41)
AST SERPL-CCNC: 15 U/L (ref 1–40)
BASOPHILS # BLD AUTO: 0.06 10*3/MM3 (ref 0–0.2)
BASOPHILS NFR BLD AUTO: 0.9 % (ref 0–1.5)
BILIRUB SERPL-MCNC: 0.4 MG/DL (ref 0–1.2)
BUN SERPL-MCNC: 13 MG/DL (ref 6–20)
BUN/CREAT SERPL: 15.3 (ref 7–25)
CALCIUM SERPL-MCNC: 9.2 MG/DL (ref 8.6–10.5)
CHLORIDE SERPL-SCNC: 105 MMOL/L (ref 98–107)
CO2 SERPL-SCNC: 24.4 MMOL/L (ref 22–29)
CREAT SERPL-MCNC: 0.85 MG/DL (ref 0.76–1.27)
EOSINOPHIL # BLD AUTO: 0.17 10*3/MM3 (ref 0–0.4)
EOSINOPHIL NFR BLD AUTO: 2.4 % (ref 0.3–6.2)
ERYTHROCYTE [DISTWIDTH] IN BLOOD BY AUTOMATED COUNT: 14.4 % (ref 12.3–15.4)
GLOBULIN SER CALC-MCNC: 2.3 GM/DL
GLUCOSE SERPL-MCNC: 106 MG/DL (ref 65–99)
HCT VFR BLD AUTO: 42.3 % (ref 37.5–51)
HGB BLD-MCNC: 14.1 G/DL (ref 13–17.7)
IMM GRANULOCYTES # BLD AUTO: 0.03 10*3/MM3 (ref 0–0.05)
IMM GRANULOCYTES NFR BLD AUTO: 0.4 % (ref 0–0.5)
LYMPHOCYTES # BLD AUTO: 2.12 10*3/MM3 (ref 0.7–3.1)
LYMPHOCYTES NFR BLD AUTO: 30.5 % (ref 19.6–45.3)
MCH RBC QN AUTO: 29.9 PG (ref 26.6–33)
MCHC RBC AUTO-ENTMCNC: 33.3 G/DL (ref 31.5–35.7)
MCV RBC AUTO: 89.6 FL (ref 79–97)
MONOCYTES # BLD AUTO: 0.64 10*3/MM3 (ref 0.1–0.9)
MONOCYTES NFR BLD AUTO: 9.2 % (ref 5–12)
NEUTROPHILS # BLD AUTO: 3.92 10*3/MM3 (ref 1.7–7)
NEUTROPHILS NFR BLD AUTO: 56.6 % (ref 42.7–76)
NRBC BLD AUTO-RTO: 0 /100 WBC (ref 0–0.2)
PLATELET # BLD AUTO: 230 10*3/MM3 (ref 140–450)
POTASSIUM SERPL-SCNC: 4.4 MMOL/L (ref 3.5–5.2)
PROT SERPL-MCNC: 6.4 G/DL (ref 6–8.5)
RBC # BLD AUTO: 4.72 10*6/MM3 (ref 4.14–5.8)
SODIUM SERPL-SCNC: 139 MMOL/L (ref 136–145)
TESTOST FREE SERPL-MCNC: 9.8 PG/ML (ref 7.2–24)
TESTOST SERPL-MCNC: 54 NG/DL (ref 264–916)
TSH SERPL DL<=0.005 MIU/L-ACNC: 2.56 UIU/ML (ref 0.27–4.2)
VIT B12 SERPL-MCNC: 563 PG/ML (ref 211–946)
WBC # BLD AUTO: 6.94 10*3/MM3 (ref 3.4–10.8)

## 2021-09-06 DIAGNOSIS — N40.0 BENIGN PROSTATIC HYPERPLASIA WITHOUT LOWER URINARY TRACT SYMPTOMS: ICD-10-CM

## 2021-09-06 DIAGNOSIS — E29.1 HYPOGONADISM IN MALE: Primary | ICD-10-CM

## 2021-09-06 NOTE — PROGRESS NOTES
Subjective   Kameron Melendez is a 51 y.o. male. Presents today for   Chief Complaint   Patient presents with   • Weight Loss     follow up   • Paperwork       History of Present Illness  Patient taking weigh loss meds, but struggling to lose weight;  Has severe lymphedema, still some skin thikening and cracking with lymph edema;  No wounds;  Has severe fatigue;  Has vannesa and on cpap;  No cp/soa;  No palpiations.    Review of Systems   Constitutional: Positive for fatigue.   Respiratory: Negative for shortness of breath.    Cardiovascular: Positive for leg swelling. Negative for chest pain and palpitations.       Patient Active Problem List   Diagnosis   • History of bilateral pulmonary embolism (CMS/[facility])   • Pulmonary hypertension (CMS/HCC)   • Lymphedema of both lower extremities   • Morbid obesity due to excess calories (CMS/HCC)   • Dyslipidemia   • Hyperuricemia   • Hypogonadal obesity   • Knee arthropathy   • Morbid obesity with body mass index of 60.0-69.9 in adult (CMS/[facility])   • VANNESA (obstructive sleep apnea)   • Severe edema   • History of pulmonary embolism   • Other acute pulmonary embolism without acute cor pulmonale (CMS/[facility])   • Intractable back pain   • Heterozygous factor V Leiden mutation (CMS/[facility])       Social History     Socioeconomic History   • Marital status:      Spouse name: Not on file   • Number of children: Not on file   • Years of education: Not on file   • Highest education level: Not on file   Tobacco Use   • Smoking status: Light Tobacco Smoker     Types: Cigars, Pipe     Start date: 1/1/2006   • Smokeless tobacco: Never Used   • Tobacco comment: occasional - < 1 a month   Substance and Sexual Activity   • Alcohol use: No   • Drug use: No   • Sexual activity: Defer       No Known Allergies    Current Outpatient Medications on File Prior to Visit   Medication Sig Dispense Refill   • acetaminophen (TYLENOL) 500 MG tablet Take 500 mg by mouth Every 6 (Six) Hours As Needed for Mild Pain  ".     • furosemide (LASIX) 80 MG tablet TAKE 1 TABLET BY MOUTH DAILY 30 tablet 5   • phentermine (ADIPEX-P) 37.5 MG tablet Take 1 tablet by mouth Every Morning Before Breakfast. 30 tablet 2   • potassium chloride (K-DUR,KLOR-CON) 20 MEQ CR tablet Take 1 tablet by mouth Daily. Take with furosemide 30 tablet 2   • topiramate (TOPAMAX) 25 MG tablet TAKE 1 TABLET BY MOUTH DAILY 30 tablet 2   • warfarin (COUMADIN) 10 MG tablet Take one tablet by mouth daily or as directed. 90 tablet 1   • warfarin (COUMADIN) 5 MG tablet TAKE 1 TABLET ON SUNDAY, MONDAY, WEDNESDAY, AND FRIDAY OR AS DIRECTED. TAKE IN ADDITION TO ONE 10 MG TABLET FOR TOTAL DOSE OF 15 MG 55 tablet 3   • doxycycline (VIBRAMYCIN) 100 MG capsule Take 1 capsule by mouth 2 (Two) Times a Day. 20 capsule 0     No current facility-administered medications on file prior to visit.       Objective   Vitals:    09/02/21 0943 09/02/21 1042   BP: 144/92 120/80   Pulse: 76    SpO2: 96%    Weight: Comment: Pt weight exceeds scale    Height: 188 cm (74.02\")      Body mass index is 66.22 kg/m².    Physical Exam  Vitals and nursing note reviewed.   Constitutional:       Appearance: He is well-developed.   HENT:      Head: Normocephalic and atraumatic.   Neck:      Thyroid: No thyromegaly.      Vascular: No JVD.   Cardiovascular:      Rate and Rhythm: Normal rate and regular rhythm.      Heart sounds: Normal heart sounds. No murmur heard.   No friction rub. No gallop.    Pulmonary:      Effort: Pulmonary effort is normal. No respiratory distress.      Breath sounds: Normal breath sounds. No wheezing or rales.   Abdominal:      General: Bowel sounds are normal. There is no distension.      Palpations: Abdomen is soft.      Tenderness: There is no abdominal tenderness. There is no guarding or rebound.   Musculoskeletal:      Cervical back: Neck supple.      Right lower leg: Edema present.      Left lower leg: Edema present.   Skin:     General: Skin is warm and dry. "   Neurological:      Mental Status: He is alert.   Psychiatric:         Behavior: Behavior normal.         Assessment/Plan   Diagnoses and all orders for this visit:    1. Lymphedema of both lower extremities (Primary)  -     chlorhexidine (HIBICLENS) 4 % external liquid; Apply  topically to the appropriate area as directed Daily As Needed for Wound Care (to both legs).  Dispense: 946 mL; Refill: 12  -     ammonium lactate (Lac-Hydrin) 12 % cream; Apply both legs daily for venous stasis dermitis and lymphedema skin thickening  Dispense: 385 g; Refill: 12    2. Venous stasis dermatitis of both lower extremities  -     chlorhexidine (HIBICLENS) 4 % external liquid; Apply  topically to the appropriate area as directed Daily As Needed for Wound Care (to both legs).  Dispense: 946 mL; Refill: 12  -     ammonium lactate (Lac-Hydrin) 12 % cream; Apply both legs daily for venous stasis dermitis and lymphedema skin thickening  Dispense: 385 g; Refill: 12    3. Other fatigue  -     Comprehensive Metabolic Panel  -     CBC & Differential  -     Testosterone, Free, Total  -     TSH  -     Vitamin B12    topicals as above  Ok check labs for fatigue  counsele don diet and exercise;             -Follow up: 3 months and prn

## 2021-09-06 NOTE — PROGRESS NOTES
Call results to patient.  Testosterone is low, return between 8 and 9 am to recheck.    Orders placed.

## 2021-09-13 ENCOUNTER — ANTICOAGULATION VISIT (OUTPATIENT)
Dept: PHARMACY | Facility: HOSPITAL | Age: 51
End: 2021-09-13

## 2021-09-13 DIAGNOSIS — I26.99 OTHER ACUTE PULMONARY EMBOLISM WITHOUT ACUTE COR PULMONALE (HCC): Primary | ICD-10-CM

## 2021-09-13 DIAGNOSIS — I26.99 BILATERAL PULMONARY EMBOLISM (HCC): ICD-10-CM

## 2021-09-13 LAB — INR PPP: 2.1

## 2021-09-13 RX ORDER — WARFARIN SODIUM 10 MG/1
TABLET ORAL
Qty: 120 TABLET | Refills: 1 | Status: SHIPPED | OUTPATIENT
Start: 2021-09-13 | End: 2022-02-22

## 2021-09-13 NOTE — PROGRESS NOTES
Anticoagulation Clinic Progress Note    Anticoagulation Summary  As of 2021    INR goal:  2.0-3.0   TTR:  71.8 % (2.6 y)   INR used for dosin.10 (2021)   Warfarin maintenance plan:  10 mg every Tue, Thu, Sat; 15 mg all other days   Weekly warfarin total:  90 mg   No change documented:  Catrachita Perez   Plan last modified:  Sarah Coy PharmD (2021)   Next INR check:  2021   Priority:  High   Target end date:  Indefinite    Indications    Other acute pulmonary embolism without acute cor pulmonale (CMS/HCC) [I26.99]  History of bilateral pulmonary embolism (CMS/HCC) [I26.99]             Anticoagulation Episode Summary     INR check location:      Preferred lab:      Send INR reminders to:   [facility]    Comments:  new  tristen 2019 **WEEKLY TRISTEN**      Anticoagulation Care Providers     Provider Role Specialty Phone number    Torri Colmenares, APRN Referring Cardiology [phone]    Elsy Baeza MD Responsible Cardiology [phone]          Clinic Interview:  No pertinent clinical findings have been reported.    INR History:  Anticoagulation Monitoring 2021 9/3/2021 2021   INR 2.70 2.70 2.10   INR Date 2021 9/3/2021 2021   INR Goal 2.0-3.0 2.0-3.0 2.0-3.0   Trend Same Same Same   Last Week Total 90 mg 90 mg 90 mg   Next Week Total 90 mg 90 mg 90 mg   Sun 15 mg 15 mg 15 mg   Mon 15 mg 15 mg 15 mg   Tue 10 mg 10 mg 10 mg   Wed 15 mg 15 mg 15 mg   Thu 10 mg 10 mg 10 mg   Fri 15 mg 15 mg 15 mg   Sat 10 mg 10 mg 10 mg   Visit Report - - -   Some recent data might be hidden       Plan:  1. INR is therapeutic today- see above in Anticoagulation Summary.    Kameron Melendez to continue their warfarin regimen- see above in Anticoagulation Summary.  2. Follow up in 1 weeks  3. Pt has agreed to only be called if INR out of range. They have been instructed to call if any changes in medications, doses, concerns, etc. Patient expresses understanding  and has no further questions at this time.    Catrachita Perez

## 2021-09-22 LAB
ESTROGEN SERPL-MCNC: 367 PG/ML (ref 56–213)
FSH SERPL-ACNC: 2.8 MIU/ML (ref 1.5–12.4)
LH SERPL-ACNC: 4.1 MIU/ML (ref 1.7–8.6)
PSA SERPL-MCNC: 0.41 NG/ML (ref 0–4)
SHBG SERPL-SCNC: 11.8 NMOL/L (ref 19.3–76.4)
TESTOST FREE SERPL-MCNC: 6.6 PG/ML (ref 7.2–24)
TESTOST SERPL-MCNC: 58 NG/DL (ref 264–916)

## 2021-09-24 ENCOUNTER — ANTICOAGULATION VISIT (OUTPATIENT)
Dept: PHARMACY | Facility: HOSPITAL | Age: 51
End: 2021-09-24

## 2021-09-24 DIAGNOSIS — I26.99 OTHER ACUTE PULMONARY EMBOLISM WITHOUT ACUTE COR PULMONALE (HCC): Primary | ICD-10-CM

## 2021-09-24 DIAGNOSIS — I26.99 BILATERAL PULMONARY EMBOLISM (HCC): ICD-10-CM

## 2021-09-24 LAB — INR PPP: 2.9

## 2021-09-24 NOTE — PROGRESS NOTES
Anticoagulation Clinic Progress Note    Anticoagulation Summary  As of 2021    INR goal:  2.0-3.0   TTR:  72.1 % (2.6 y)   INR used for dosin.90 (2021)   Warfarin maintenance plan:  10 mg every Tue, Thu, Sat; 15 mg all other days   Weekly warfarin total:  90 mg   No change documented:  Alexandra Elias   Plan last modified:  Sarah Coy PharmD (2021)   Next INR check:  10/1/2021   Priority:  High   Target end date:  Indefinite    Indications    Other acute pulmonary embolism without acute cor pulmonale (CMS/HCC) [I26.99]  History of bilateral pulmonary embolism (CMS/HCC) [I26.99]             Anticoagulation Episode Summary     INR check location:      Preferred lab:      Send INR reminders to:   [facility]    Comments:  new  tristen 2019 **WEEKLY TRISTEN**      Anticoagulation Care Providers     Provider Role Specialty Phone number    Torri Colmenares, APRN Referring Cardiology [phone]    Elsy Baeza MD Responsible Cardiology [phone]          Clinic Interview:  No pertinent clinical findings have been reported.    INR History:  Anticoagulation Monitoring 9/3/2021 2021 2021   INR 2.70 2.10 2.90   INR Date 9/3/2021 2021 2021   INR Goal 2.0-3.0 2.0-3.0 2.0-3.0   Trend Same Same Same   Last Week Total 90 mg 90 mg 90 mg   Next Week Total 90 mg 90 mg 90 mg   Sun 15 mg 15 mg 15 mg   Mon 15 mg 15 mg 15 mg   Tue 10 mg 10 mg 10 mg   Wed 15 mg 15 mg 15 mg   Thu 10 mg 10 mg 10 mg   Fri 15 mg 15 mg 15 mg   Sat 10 mg 10 mg 10 mg   Visit Report - - -   Some recent data might be hidden       Plan:  1. INR is therapeutic today- see above in Anticoagulation Summary.    Kameron Melendez to continue their warfarin regimen- see above in Anticoagulation Summary.  2. Follow up in 1 week  3. Pt has agreed to only be called if INR out of range. They have been instructed to call if any changes in medications, doses, concerns, etc. Patient expresses understanding  and has no further questions at this time.    Alexandra Elias

## 2021-09-26 NOTE — PROGRESS NOTES
Call results to patient.  Testosterone is very low and estrogen high - work on weight loss and would be candidate for testosterone replacement.  Would he like to start?

## 2021-10-01 ENCOUNTER — ANTICOAGULATION VISIT (OUTPATIENT)
Dept: PHARMACY | Facility: HOSPITAL | Age: 51
End: 2021-10-01

## 2021-10-01 DIAGNOSIS — I26.99 OTHER ACUTE PULMONARY EMBOLISM WITHOUT ACUTE COR PULMONALE (HCC): Primary | ICD-10-CM

## 2021-10-01 DIAGNOSIS — I26.99 BILATERAL PULMONARY EMBOLISM (HCC): ICD-10-CM

## 2021-10-01 LAB — INR PPP: 2

## 2021-10-01 NOTE — PROGRESS NOTES
Anticoagulation Clinic Progress Note    Anticoagulation Summary  As of 10/1/2021    INR goal:  2.0-3.0   TTR:  72.3 % (2.6 y)   INR used for dosin.00 (10/1/2021)   Warfarin maintenance plan:  10 mg every Tue, Thu, Sat; 15 mg all other days   Weekly warfarin total:  90 mg   No change documented:  Iris Gonzalez   Plan last modified:  Sarah Coy PharmD (2021)   Next INR check:  10/8/2021   Priority:  High   Target end date:  Indefinite    Indications    Other acute pulmonary embolism without acute cor pulmonale (HCC) [I26.99]  History of bilateral pulmonary embolism (CMS/HCC) [I26.99]             Anticoagulation Episode Summary     INR check location:      Preferred lab:      Send INR reminders to:   [facility]    Comments:  new  tristen 2019 **WEEKLY TRISTEN**      Anticoagulation Care Providers     Provider Role Specialty Phone number    Torri Colmenares, APRN Referring Cardiology [phone]    Elsy Baeza MD Responsible Cardiology [phone]          Clinic Interview:  No pertinent clinical findings have been reported.    INR History:  Anticoagulation Monitoring 2021 2021 10/1/2021   INR 2.10 2.90 2.00   INR Date 2021 2021 10/1/2021   INR Goal 2.0-3.0 2.0-3.0 2.0-3.0   Trend Same Same Same   Last Week Total 90 mg 90 mg 90 mg   Next Week Total 90 mg 90 mg 90 mg   Sun 15 mg 15 mg 15 mg   Mon 15 mg 15 mg 15 mg   Tue 10 mg 10 mg 10 mg   Wed 15 mg 15 mg 15 mg   Thu 10 mg 10 mg 10 mg   Fri 15 mg 15 mg 15 mg   Sat 10 mg 10 mg 10 mg   Visit Report - - -   Some recent data might be hidden       Plan:  1. INR is therapeutic today- see above in Anticoagulation Summary.    Kameron Melendez to continue their warfarin regimen- see above in Anticoagulation Summary.  2. Follow up in 1 week.  3. Pt has agreed to only be called if INR out of range. They have been instructed to call if any changes in medications, doses, concerns, etc. Patient expresses  understanding and has no further questions at this time.    Iris Gonzalez

## 2021-10-05 ENCOUNTER — PATIENT MESSAGE (OUTPATIENT)
Dept: FAMILY MEDICINE CLINIC | Facility: CLINIC | Age: 51
End: 2021-10-05

## 2021-10-08 ENCOUNTER — ANTICOAGULATION VISIT (OUTPATIENT)
Dept: PHARMACY | Facility: HOSPITAL | Age: 51
End: 2021-10-08

## 2021-10-08 DIAGNOSIS — I26.99 BILATERAL PULMONARY EMBOLISM (HCC): ICD-10-CM

## 2021-10-08 DIAGNOSIS — I26.99 OTHER ACUTE PULMONARY EMBOLISM WITHOUT ACUTE COR PULMONALE (HCC): Primary | ICD-10-CM

## 2021-10-08 LAB — INR PPP: 1.9

## 2021-10-08 NOTE — PROGRESS NOTES
Anticoagulation Clinic Progress Note    Anticoagulation Summary  As of 10/8/2021    INR goal:  2.0-3.0   TTR:  71.8 % (2.7 y)   INR used for dosin.90 (10/8/2021)   Warfarin maintenance plan:  10 mg every Tue, Thu, Sat; 15 mg all other days   Weekly warfarin total:  90 mg   Plan last modified:  Sarah Coy PharmD (2021)   Next INR check:  10/15/2021   Priority:  High   Target end date:  Indefinite    Indications    Other acute pulmonary embolism without acute cor pulmonale (HCC) [I26.99]  History of bilateral pulmonary embolism (CMS/HCC) [I26.99]             Anticoagulation Episode Summary     INR check location:      Preferred lab:      Send INR reminders to:  [facility]  POOL    Comments:  new  tristen 2019 **WEEKLY TRISTEN**      Anticoagulation Care Providers     Provider Role Specialty Phone number    Torri Colmenares APRN Referring Cardiology [phone]    Elsy Baeza MD Responsible Cardiology [phone]            INR History:  Anticoagulation Monitoring 2021 10/1/2021 10/8/2021   INR 2.90 2.00 1.90   INR Date 2021 10/1/2021 10/8/2021   INR Goal 2.0-3.0 2.0-3.0 2.0-3.0   Trend Same Same Same   Last Week Total 90 mg 90 mg 90 mg   Next Week Total 90 mg 90 mg 95 mg   Sun 15 mg 15 mg 15 mg   Mon 15 mg 15 mg 15 mg   Tue 10 mg 10 mg 10 mg   Wed 15 mg 15 mg 15 mg   Thu 10 mg 10 mg 10 mg   Fri 15 mg 15 mg 15 mg   Sat 10 mg 10 mg 15 mg (10/9)   Visit Report - - -   Some recent data might be hidden       Plan:  1. INR is Subtherapeutic today- see above in Anticoagulation Summary.   Will instruct Kameron Melendez to Change their warfarin regimen- see above in Anticoagulation Summary.  2. Follow up in 1 week  3. Unable to reach by phone after multiple attempts. Left secure VM w/ instructions per pt permission. They have been instructed to call if any changes in medications, doses, concerns, etc.     Ramo Morocho, PharmD

## 2021-10-22 ENCOUNTER — ANTICOAGULATION VISIT (OUTPATIENT)
Dept: PHARMACY | Facility: HOSPITAL | Age: 51
End: 2021-10-22

## 2021-10-22 DIAGNOSIS — I26.99 BILATERAL PULMONARY EMBOLISM (HCC): ICD-10-CM

## 2021-10-22 DIAGNOSIS — I26.99 OTHER ACUTE PULMONARY EMBOLISM WITHOUT ACUTE COR PULMONALE (HCC): Primary | ICD-10-CM

## 2021-10-22 LAB — INR PPP: 2.2

## 2021-10-22 NOTE — PROGRESS NOTES
Anticoagulation Clinic Progress Note    Anticoagulation Summary  As of 10/22/2021    INR goal:  2.0-3.0   TTR:  71.7 % (2.7 y)   INR used for dosin.20 (10/22/2021)   Warfarin maintenance plan:  10 mg every Tue, Thu, Sat; 15 mg all other days   Weekly warfarin total:  90 mg   No change documented:  Tessie Fatima RPH   Plan last modified:  Sarah Coy PharmD (2021)   Next INR check:  10/29/2021   Priority:  High   Target end date:  Indefinite    Indications    Other acute pulmonary embolism without acute cor pulmonale (HCC) [I26.99]  History of bilateral pulmonary embolism (CMS/HCC) [I26.99]             Anticoagulation Episode Summary     INR check location:      Preferred lab:      Send INR reminders to:   [facility]    Comments:  new  frankie 2019 **WEEKLY FRANKIE**      Anticoagulation Care Providers     Provider Role Specialty Phone number    Torri Colmenares, APRN Referring Cardiology [phone]    Elsy Baeza MD Responsible Cardiology [phone]          Clinic Interview:  Patient Findings     Negatives:  Signs/symptoms of thrombosis, Signs/symptoms of bleeding,   Laboratory test error suspected, Change in health, Change in alcohol use,   Change in activity, Upcoming invasive procedure, Emergency department   visit, Upcoming dental procedure, Missed doses, Extra doses, Change in   medications, Change in diet/appetite, Hospital admission, Bruising, Other   complaints      Clinical Outcomes     Negatives:  Major bleeding event, Thromboembolic event,   Anticoagulation-related hospital admission, Anticoagulation-related ED   visit, Anticoagulation-related fatality        INR History:  Anticoagulation Monitoring 10/1/2021 10/8/2021 10/22/2021   INR 2.00 1.90 2.20   INR Date 10/1/2021 10/8/2021 10/22/2021   INR Goal 2.0-3.0 2.0-3.0 2.0-3.0   Trend Same Same Same   Last Week Total 90 mg 90 mg 90 mg   Next Week Total 90 mg 95 mg 90 mg   Sun 15 mg 15 mg 15 mg   Mon 15 mg 15  mg 15 mg   Tue 10 mg 10 mg 10 mg   Wed 15 mg 15 mg 15 mg   Thu 10 mg 10 mg 10 mg   Fri 15 mg 15 mg 15 mg   Sat 10 mg 15 mg (10/9) 10 mg   Visit Report - - -   Some recent data might be hidden       Plan:  1. INR is Therapeutic today- see above in Anticoagulation Summary.   Will instruct Kameron Melendez to Continue their warfarin regimen- see above in Anticoagulation Summary.  2. Follow up in 1 week  3. Was unable to reach patient after multiple attempts. Left [facility] secure  to continue dose and recheck in 1 week. They have been instructed to call if any changes in medications, doses, concerns, etc. Patient expresses understanding and has no further questions at this time.    Tessie Fatima [facility]

## 2021-10-29 ENCOUNTER — HOSPITAL ENCOUNTER (EMERGENCY)
Facility: HOSPITAL | Age: 51
Discharge: HOME | End: 2021-10-29
Attending: EMERGENCY MEDICINE | Admitting: EMERGENCY MEDICINE

## 2021-10-29 ENCOUNTER — APPOINTMENT (OUTPATIENT)
Dept: CT IMAGING | Facility: HOSPITAL | Age: 51
End: 2021-10-29

## 2021-10-29 ENCOUNTER — ANTICOAGULATION VISIT (OUTPATIENT)
Dept: PHARMACY | Facility: HOSPITAL | Age: 51
End: 2021-10-29

## 2021-10-29 ENCOUNTER — APPOINTMENT (OUTPATIENT)
Dept: GENERAL RADIOLOGY | Facility: HOSPITAL | Age: 51
End: 2021-10-29

## 2021-10-29 VITALS
TEMPERATURE: 98 F | HEART RATE: 103 BPM | OXYGEN SATURATION: 98 % | RESPIRATION RATE: 18 BRPM | SYSTOLIC BLOOD PRESSURE: 119 MMHG | DIASTOLIC BLOOD PRESSURE: 63 MMHG

## 2021-10-29 DIAGNOSIS — R06.09 DYSPNEA ON EXERTION: Primary | ICD-10-CM

## 2021-10-29 DIAGNOSIS — I26.99 OTHER ACUTE PULMONARY EMBOLISM WITHOUT ACUTE COR PULMONALE (HCC): Primary | ICD-10-CM

## 2021-10-29 DIAGNOSIS — I26.99 BILATERAL PULMONARY EMBOLISM (HCC): ICD-10-CM

## 2021-10-29 LAB
ALBUMIN SERPL-MCNC: 3.5 G/DL (ref 3.5–5.2)
ALBUMIN/GLOB SERPL: 1.3 G/DL
ALP SERPL-CCNC: 84 U/L (ref 39–117)
ALT SERPL W P-5'-P-CCNC: 17 U/L (ref 1–41)
ANION GAP SERPL CALCULATED.3IONS-SCNC: 11.3 MMOL/L (ref 5–15)
AST SERPL-CCNC: 24 U/L (ref 1–40)
BACTERIA UR QL AUTO: ABNORMAL /HPF
BASOPHILS # BLD AUTO: 0.02 10*3/MM3 (ref 0–0.2)
BASOPHILS NFR BLD AUTO: 0.1 % (ref 0–1.5)
BILIRUB SERPL-MCNC: 0.9 MG/DL (ref 0–1.2)
BILIRUB UR QL STRIP: NEGATIVE
BUN SERPL-MCNC: 14 MG/DL (ref 6–20)
BUN/CREAT SERPL: 12.5 (ref 7–25)
CALCIUM SPEC-SCNC: 8.5 MG/DL (ref 8.6–10.5)
CHLORIDE SERPL-SCNC: 100 MMOL/L (ref 98–107)
CLARITY UR: ABNORMAL
CO2 SERPL-SCNC: 22.7 MMOL/L (ref 22–29)
COLOR UR: ABNORMAL
CREAT SERPL-MCNC: 1.12 MG/DL (ref 0.76–1.27)
DEPRECATED RDW RBC AUTO: 43.7 FL (ref 37–54)
EOSINOPHIL # BLD AUTO: 0 10*3/MM3 (ref 0–0.4)
EOSINOPHIL NFR BLD AUTO: 0 % (ref 0.3–6.2)
ERYTHROCYTE [DISTWIDTH] IN BLOOD BY AUTOMATED COUNT: 13.9 % (ref 12.3–15.4)
GFR SERPL CREATININE-BSD FRML MDRD: 69 ML/MIN/1.73
GLOBULIN UR ELPH-MCNC: 2.8 GM/DL
GLUCOSE SERPL-MCNC: 118 MG/DL (ref 65–99)
GLUCOSE UR STRIP-MCNC: NEGATIVE MG/DL
HCT VFR BLD AUTO: 39.7 % (ref 37.5–51)
HGB BLD-MCNC: 13.6 G/DL (ref 13–17.7)
HGB UR QL STRIP.AUTO: ABNORMAL
HOLD SPECIMEN: NORMAL
HYALINE CASTS UR QL AUTO: ABNORMAL /LPF
IMM GRANULOCYTES # BLD AUTO: 0.1 10*3/MM3 (ref 0–0.05)
IMM GRANULOCYTES NFR BLD AUTO: 0.7 % (ref 0–0.5)
INR PPP: 2.4 (ref 0.9–1.1)
INR PPP: 2.6
KETONES UR QL STRIP: ABNORMAL
LEUKOCYTE ESTERASE UR QL STRIP.AUTO: ABNORMAL
LYMPHOCYTES # BLD AUTO: 0.34 10*3/MM3 (ref 0.7–3.1)
LYMPHOCYTES NFR BLD AUTO: 2.3 % (ref 19.6–45.3)
MCH RBC QN AUTO: 30 PG (ref 26.6–33)
MCHC RBC AUTO-ENTMCNC: 34.3 G/DL (ref 31.5–35.7)
MCV RBC AUTO: 87.6 FL (ref 79–97)
MONOCYTES # BLD AUTO: 0.36 10*3/MM3 (ref 0.1–0.9)
MONOCYTES NFR BLD AUTO: 2.5 % (ref 5–12)
NEUTROPHILS NFR BLD AUTO: 13.75 10*3/MM3 (ref 1.7–7)
NEUTROPHILS NFR BLD AUTO: 94.4 % (ref 42.7–76)
NITRITE UR QL STRIP: NEGATIVE
NRBC BLD AUTO-RTO: 0.1 /100 WBC (ref 0–0.2)
NT-PROBNP SERPL-MCNC: 469 PG/ML (ref 0–900)
PH UR STRIP.AUTO: <=5 [PH] (ref 5–8)
PLATELET # BLD AUTO: 241 10*3/MM3 (ref 140–450)
PMV BLD AUTO: 10.3 FL (ref 6–12)
POTASSIUM SERPL-SCNC: 3.8 MMOL/L (ref 3.5–5.2)
PROT SERPL-MCNC: 6.3 G/DL (ref 6–8.5)
PROT UR QL STRIP: ABNORMAL
PROTHROMBIN TIME: 25.9 SECONDS (ref 11.7–14.2)
QT INTERVAL: 334 MS
RBC # BLD AUTO: 4.53 10*6/MM3 (ref 4.14–5.8)
RBC # UR: ABNORMAL /HPF
REF LAB TEST METHOD: ABNORMAL
SODIUM SERPL-SCNC: 134 MMOL/L (ref 136–145)
SP GR UR STRIP: >=1.03 (ref 1–1.03)
SQUAMOUS #/AREA URNS HPF: ABNORMAL /HPF
TROPONIN T SERPL-MCNC: <0.01 NG/ML (ref 0–0.03)
UROBILINOGEN UR QL STRIP: ABNORMAL
WBC # BLD AUTO: 14.57 10*3/MM3 (ref 3.4–10.8)
WBC UR QL AUTO: ABNORMAL /HPF
WHOLE BLOOD HOLD SPECIMEN: NORMAL
WHOLE BLOOD HOLD SPECIMEN: NORMAL

## 2021-10-29 PROCEDURE — 81001 URINALYSIS AUTO W/SCOPE: CPT | Performed by: EMERGENCY MEDICINE

## 2021-10-29 PROCEDURE — 99283 EMERGENCY DEPT VISIT LOW MDM: CPT

## 2021-10-29 PROCEDURE — 93010 ELECTROCARDIOGRAM REPORT: CPT | Performed by: INTERNAL MEDICINE

## 2021-10-29 PROCEDURE — 85610 PROTHROMBIN TIME: CPT | Performed by: EMERGENCY MEDICINE

## 2021-10-29 PROCEDURE — 80053 COMPREHEN METABOLIC PANEL: CPT | Performed by: EMERGENCY MEDICINE

## 2021-10-29 PROCEDURE — 71275 CT ANGIOGRAPHY CHEST: CPT

## 2021-10-29 PROCEDURE — 71045 X-RAY EXAM CHEST 1 VIEW: CPT

## 2021-10-29 PROCEDURE — 85025 COMPLETE CBC W/AUTO DIFF WBC: CPT | Performed by: EMERGENCY MEDICINE

## 2021-10-29 PROCEDURE — 83880 ASSAY OF NATRIURETIC PEPTIDE: CPT | Performed by: EMERGENCY MEDICINE

## 2021-10-29 PROCEDURE — 84484 ASSAY OF TROPONIN QUANT: CPT | Performed by: EMERGENCY MEDICINE

## 2021-10-29 PROCEDURE — 0 IOPAMIDOL PER 1 ML: Performed by: EMERGENCY MEDICINE

## 2021-10-29 PROCEDURE — 93005 ELECTROCARDIOGRAM TRACING: CPT

## 2021-10-29 RX ORDER — WARFARIN SODIUM 10 MG/1
15 TABLET ORAL SEE ADMIN INSTRUCTIONS
COMMUNITY
End: 2023-01-05 | Stop reason: SDUPTHER

## 2021-10-29 RX ORDER — SODIUM CHLORIDE 0.9 % (FLUSH) 0.9 %
10 SYRINGE (ML) INJECTION AS NEEDED
Status: DISCONTINUED | OUTPATIENT
Start: 2021-10-29 | End: 2021-10-29 | Stop reason: HOSPADM

## 2021-10-29 RX ADMIN — IOPAMIDOL 95 ML: 755 INJECTION, SOLUTION INTRAVENOUS at 10:27

## 2021-10-29 NOTE — PROGRESS NOTES
Anticoagulation Clinic Progress Note    Anticoagulation Summary  As of 10/29/2021    INR goal:  2.0-3.0   TTR:  71.9 % (2.7 y)   INR used for dosin.60 (10/29/2021)   Warfarin maintenance plan:  10 mg every Tue, Thu, Sat; 15 mg all other days   Weekly warfarin total:  90 mg   No change documented:  Ramo Morocho PharmD   Plan last modified:  Sarah Coy PharmD (2021)   Next INR check:  2021   Priority:  High   Target end date:  Indefinite    Indications    Other acute pulmonary embolism without acute cor pulmonale (HCC) [I26.99]  History of bilateral pulmonary embolism (CMS/HCC) [I26.99]             Anticoagulation Episode Summary     INR check location:      Preferred lab:      Send INR reminders to:   [facility]  [facility]    Comments:  new  tristen 2019 **WEEKLY TRISTEN**      Anticoagulation Care Providers     Provider Role Specialty Phone number    Torri Colmenares, APRN Referring Cardiology [phone]    Elsy Baeza MD Responsible Cardiology [phone]          Clinic Interview:  Patient Findings     Positives:  Emergency department visit    Negatives:  Signs/symptoms of thrombosis, Signs/symptoms of bleeding,   Laboratory test error suspected, Change in health, Change in alcohol use,   Change in activity, Upcoming invasive procedure, Upcoming dental   procedure, Missed doses, Extra doses, Change in medications, Change in   diet/appetite, Hospital admission, Bruising, Other complaints    Comments:  ED visit today r/t SOA and generalized weakness.       Clinical Outcomes     Negatives:  Major bleeding event, Thromboembolic event,   Anticoagulation-related hospital admission, Anticoagulation-related ED   visit, Anticoagulation-related fatality    Comments:  ED visit today r/t SOA and generalized weakness.        INR History:  Anticoagulation Monitoring 10/8/2021 10/22/2021 10/29/2021   INR 1.90 2.20 2.60   INR Date 10/8/2021 10/22/2021 10/29/2021   INR Goal 2.0-3.0 2.0-3.0  2.0-3.0   Trend Same Same Same   Last Week Total 90 mg 90 mg 90 mg   Next Week Total 95 mg 90 mg 90 mg   Sun 15 mg 15 mg 15 mg   Mon 15 mg 15 mg 15 mg   Tue 10 mg 10 mg 10 mg   Wed 15 mg 15 mg 15 mg   Thu 10 mg 10 mg 10 mg   Fri 15 mg 15 mg 15 mg   Sat 15 mg (10/9) 10 mg 10 mg   Visit Report - - -   Some recent data might be hidden       Plan:  1. INR is therapeutic today- see above in Anticoagulation Summary.    Kameron Melendez to continue their warfarin regimen- see above in Anticoagulation Summary.  2. Follow up in 1 week  3. They have been instructed to call if any changes in medications, doses, concerns, etc. Patient expresses understanding and has no further questions at this time.    Ramo Morocho, PharmD

## 2021-11-01 ENCOUNTER — HOSPITAL ENCOUNTER (INPATIENT)
Facility: HOSPITAL | Age: 51
LOS: 6 days | Discharge: HOME | End: 2021-11-08
Attending: EMERGENCY MEDICINE | Admitting: STUDENT IN AN ORGANIZED HEALTH CARE EDUCATION/TRAINING PROGRAM

## 2021-11-01 ENCOUNTER — TELEPHONE (OUTPATIENT)
Dept: FAMILY MEDICINE CLINIC | Facility: CLINIC | Age: 51
End: 2021-11-01

## 2021-11-01 DIAGNOSIS — I89.0 LYMPHEDEMA: ICD-10-CM

## 2021-11-01 DIAGNOSIS — E66.01 OBESITY, MORBID, BMI 50 OR HIGHER (HCC): ICD-10-CM

## 2021-11-01 DIAGNOSIS — L03.116 CELLULITIS OF LEFT LOWER EXTREMITY: ICD-10-CM

## 2021-11-01 DIAGNOSIS — A41.9 SEPSIS WITH CUTANEOUS MANIFESTATIONS (HCC): Primary | ICD-10-CM

## 2021-11-01 DIAGNOSIS — G47.33 OSA (OBSTRUCTIVE SLEEP APNEA): ICD-10-CM

## 2021-11-01 DIAGNOSIS — I48.0 PAROXYSMAL ATRIAL FIBRILLATION (HCC): ICD-10-CM

## 2021-11-01 PROCEDURE — 36415 COLL VENOUS BLD VENIPUNCTURE: CPT

## 2021-11-01 PROCEDURE — 99284 EMERGENCY DEPT VISIT MOD MDM: CPT

## 2021-11-01 RX ORDER — SODIUM CHLORIDE 0.9 % (FLUSH) 0.9 %
10 SYRINGE (ML) INJECTION AS NEEDED
Status: DISCONTINUED | OUTPATIENT
Start: 2021-11-01 | End: 2021-11-08 | Stop reason: HOSPADM

## 2021-11-01 RX ORDER — CEPHALEXIN 500 MG/1
500 CAPSULE ORAL 4 TIMES DAILY
Qty: 40 CAPSULE | Refills: 0 | Status: SHIPPED | OUTPATIENT
Start: 2021-11-01 | End: 2021-11-08 | Stop reason: HOSPADM

## 2021-11-01 RX ORDER — MORPHINE SULFATE 2 MG/ML
4 INJECTION, SOLUTION INTRAMUSCULAR; INTRAVENOUS ONCE
Status: COMPLETED | OUTPATIENT
Start: 2021-11-01 | End: 2021-11-02

## 2021-11-01 RX ORDER — ONDANSETRON 2 MG/ML
4 INJECTION INTRAMUSCULAR; INTRAVENOUS ONCE
Status: COMPLETED | OUTPATIENT
Start: 2021-11-01 | End: 2021-11-02

## 2021-11-01 NOTE — TELEPHONE ENCOUNTER
Cephalexin (keflex) should be 4x a day for cellulitis and it is one antibiotic that doesn't typically interact with warfarin.   I sent in more for him and his wife as well.  RRJ

## 2021-11-01 NOTE — TELEPHONE ENCOUNTER
PATIENT WENT TO ER OVER WEEKEND WITH WHAT HE THOUGHT WAS BLOOD CLOT, HE LATER FOUND OUT HE HAD A BAD OUTBREAK OF CELLULITIS, THEY HAD CALLED IN A SCRIPT FOR KEFLIX 500MG 4x A DAY AND THE PHARMACY WAS CLOSED SO WHEN HE CALLED BACK THE OTHER DOCTER PUT HIM ON KEFLIX 500MG 2x A DAY. HE HAS BEEN TAKING THE 4x A DAY LIKE THE FIRST ONE SAID. HE IS IN A LOT OF PAIN AND WAS WANTING TO KNOW WHICH ONE HE SHOULD BE TAKING. OR IF IT SHOULD BE SOMETHING ELSE. HE ALSO SAID THAT AT 4 x A DAY HE WILL RUN OUT CAUSE THEY GAVE HIM FOR 2 x A DAY FOR 7 DAYS. pLEASE ADVISE

## 2021-11-02 ENCOUNTER — APPOINTMENT (OUTPATIENT)
Dept: GENERAL RADIOLOGY | Facility: HOSPITAL | Age: 51
End: 2021-11-02

## 2021-11-02 PROBLEM — E87.6 HYPOKALEMIA: Status: ACTIVE | Noted: 2021-11-02

## 2021-11-02 PROBLEM — N17.9 AKI (ACUTE KIDNEY INJURY): Status: ACTIVE | Noted: 2021-11-02

## 2021-11-02 PROBLEM — R73.9 HYPERGLYCEMIA: Status: ACTIVE | Noted: 2021-11-02

## 2021-11-02 PROBLEM — L03.116 CELLULITIS OF LEFT LOWER EXTREMITY: Status: ACTIVE | Noted: 2021-11-02

## 2021-11-02 PROBLEM — A41.9 SEPSIS WITH CUTANEOUS MANIFESTATIONS: Status: ACTIVE | Noted: 2021-11-02

## 2021-11-02 PROBLEM — I48.0 PAROXYSMAL ATRIAL FIBRILLATION: Status: ACTIVE | Noted: 2021-11-02

## 2021-11-02 LAB
ALBUMIN SERPL-MCNC: 3.1 G/DL (ref 3.5–5.2)
ALBUMIN/GLOB SERPL: 0.8 G/DL
ALP SERPL-CCNC: 169 U/L (ref 39–117)
ALT SERPL W P-5'-P-CCNC: 18 U/L (ref 1–41)
ANION GAP SERPL CALCULATED.3IONS-SCNC: 13.1 MMOL/L (ref 5–15)
ANION GAP SERPL CALCULATED.3IONS-SCNC: 9.8 MMOL/L (ref 5–15)
APTT PPP: 70.4 SECONDS (ref 22.7–35.4)
AST SERPL-CCNC: 19 U/L (ref 1–40)
BASOPHILS # BLD AUTO: 0.09 10*3/MM3 (ref 0–0.2)
BASOPHILS NFR BLD AUTO: 0.5 % (ref 0–1.5)
BILIRUB SERPL-MCNC: 0.7 MG/DL (ref 0–1.2)
BUN SERPL-MCNC: 22 MG/DL (ref 6–20)
BUN SERPL-MCNC: 23 MG/DL (ref 6–20)
BUN/CREAT SERPL: 17.8 (ref 7–25)
BUN/CREAT SERPL: 24.7 (ref 7–25)
CALCIUM SPEC-SCNC: 8.3 MG/DL (ref 8.6–10.5)
CALCIUM SPEC-SCNC: 9 MG/DL (ref 8.6–10.5)
CHLORIDE SERPL-SCNC: 96 MMOL/L (ref 98–107)
CHLORIDE SERPL-SCNC: 99 MMOL/L (ref 98–107)
CO2 SERPL-SCNC: 24.9 MMOL/L (ref 22–29)
CO2 SERPL-SCNC: 25.2 MMOL/L (ref 22–29)
CREAT SERPL-MCNC: 0.89 MG/DL (ref 0.76–1.27)
CREAT SERPL-MCNC: 1.29 MG/DL (ref 0.76–1.27)
D-LACTATE SERPL-SCNC: 1.3 MMOL/L (ref 0.5–2)
D-LACTATE SERPL-SCNC: 2.2 MMOL/L (ref 0.5–2)
DEPRECATED RDW RBC AUTO: 44.6 FL (ref 37–54)
DEPRECATED RDW RBC AUTO: 45.5 FL (ref 37–54)
EOSINOPHIL # BLD AUTO: 0.18 10*3/MM3 (ref 0–0.4)
EOSINOPHIL NFR BLD AUTO: 0.9 % (ref 0.3–6.2)
ERYTHROCYTE [DISTWIDTH] IN BLOOD BY AUTOMATED COUNT: 14 % (ref 12.3–15.4)
ERYTHROCYTE [DISTWIDTH] IN BLOOD BY AUTOMATED COUNT: 14.3 % (ref 12.3–15.4)
GFR SERPL CREATININE-BSD FRML MDRD: 59 ML/MIN/1.73
GFR SERPL CREATININE-BSD FRML MDRD: 90 ML/MIN/1.73
GLOBULIN UR ELPH-MCNC: 3.7 GM/DL
GLUCOSE BLDC GLUCOMTR-MCNC: 150 MG/DL (ref 70–130)
GLUCOSE BLDC GLUCOMTR-MCNC: 155 MG/DL (ref 70–130)
GLUCOSE BLDC GLUCOMTR-MCNC: 166 MG/DL (ref 70–130)
GLUCOSE SERPL-MCNC: 131 MG/DL (ref 65–99)
GLUCOSE SERPL-MCNC: 132 MG/DL (ref 65–99)
HCT VFR BLD AUTO: 37 % (ref 37.5–51)
HCT VFR BLD AUTO: 41.1 % (ref 37.5–51)
HGB BLD-MCNC: 12.4 G/DL (ref 13–17.7)
HGB BLD-MCNC: 13.8 G/DL (ref 13–17.7)
IMM GRANULOCYTES # BLD AUTO: 0.36 10*3/MM3 (ref 0–0.05)
IMM GRANULOCYTES NFR BLD AUTO: 1.8 % (ref 0–0.5)
INR PPP: 2.55 (ref 0.9–1.1)
INR PPP: 2.99 (ref 0.9–1.1)
LYMPHOCYTES # BLD AUTO: 1.71 10*3/MM3 (ref 0.7–3.1)
LYMPHOCYTES NFR BLD AUTO: 8.6 % (ref 19.6–45.3)
MCH RBC QN AUTO: 29.2 PG (ref 26.6–33)
MCH RBC QN AUTO: 29.6 PG (ref 26.6–33)
MCHC RBC AUTO-ENTMCNC: 33.5 G/DL (ref 31.5–35.7)
MCHC RBC AUTO-ENTMCNC: 33.6 G/DL (ref 31.5–35.7)
MCV RBC AUTO: 87.3 FL (ref 79–97)
MCV RBC AUTO: 88 FL (ref 79–97)
MONOCYTES # BLD AUTO: 0.9 10*3/MM3 (ref 0.1–0.9)
MONOCYTES NFR BLD AUTO: 4.6 % (ref 5–12)
MRSA DNA SPEC QL NAA+PROBE: NORMAL
NEUTROPHILS NFR BLD AUTO: 16.54 10*3/MM3 (ref 1.7–7)
NEUTROPHILS NFR BLD AUTO: 83.6 % (ref 42.7–76)
NRBC BLD AUTO-RTO: 0 /100 WBC (ref 0–0.2)
PLATELET # BLD AUTO: 186 10*3/MM3 (ref 140–450)
PLATELET # BLD AUTO: 200 10*3/MM3 (ref 140–450)
PMV BLD AUTO: 10.2 FL (ref 6–12)
PMV BLD AUTO: 10.9 FL (ref 6–12)
POTASSIUM SERPL-SCNC: 3.3 MMOL/L (ref 3.5–5.2)
POTASSIUM SERPL-SCNC: 3.6 MMOL/L (ref 3.5–5.2)
PROT SERPL-MCNC: 6.8 G/DL (ref 6–8.5)
PROTHROMBIN TIME: 27.1 SECONDS (ref 11.7–14.2)
PROTHROMBIN TIME: 30.8 SECONDS (ref 11.7–14.2)
QT INTERVAL: 362 MS
RBC # BLD AUTO: 4.24 10*6/MM3 (ref 4.14–5.8)
RBC # BLD AUTO: 4.67 10*6/MM3 (ref 4.14–5.8)
SARS-COV-2 RNA RESP QL NAA+PROBE: NOT DETECTED
SODIUM SERPL-SCNC: 134 MMOL/L (ref 136–145)
SODIUM SERPL-SCNC: 134 MMOL/L (ref 136–145)
WBC # BLD AUTO: 17.21 10*3/MM3 (ref 3.4–10.8)
WBC # BLD AUTO: 19.78 10*3/MM3 (ref 3.4–10.8)

## 2021-11-02 PROCEDURE — 87040 BLOOD CULTURE FOR BACTERIA: CPT | Performed by: EMERGENCY MEDICINE

## 2021-11-02 PROCEDURE — 83605 ASSAY OF LACTIC ACID: CPT | Performed by: EMERGENCY MEDICINE

## 2021-11-02 PROCEDURE — 93010 ELECTROCARDIOGRAM REPORT: CPT | Performed by: INTERNAL MEDICINE

## 2021-11-02 PROCEDURE — 25010000002 VANCOMYCIN 10 G RECONSTITUTED SOLUTION: Performed by: NURSE PRACTITIONER

## 2021-11-02 PROCEDURE — 85610 PROTHROMBIN TIME: CPT | Performed by: EMERGENCY MEDICINE

## 2021-11-02 PROCEDURE — 87641 MR-STAPH DNA AMP PROBE: CPT | Performed by: NURSE PRACTITIONER

## 2021-11-02 PROCEDURE — U0003 INFECTIOUS AGENT DETECTION BY NUCLEIC ACID (DNA OR RNA); SEVERE ACUTE RESPIRATORY SYNDROME CORONAVIRUS 2 (SARS-COV-2) (CORONAVIRUS DISEASE [COVID-19]), AMPLIFIED PROBE TECHNIQUE, MAKING USE OF HIGH THROUGHPUT TECHNOLOGIES AS DESCRIBED BY CMS-2020-01-R: HCPCS | Performed by: EMERGENCY MEDICINE

## 2021-11-02 PROCEDURE — 25010000002 DIGOXIN PER 500 MCG: Performed by: NURSE PRACTITIONER

## 2021-11-02 PROCEDURE — 85610 PROTHROMBIN TIME: CPT | Performed by: NURSE PRACTITIONER

## 2021-11-02 PROCEDURE — 82962 GLUCOSE BLOOD TEST: CPT

## 2021-11-02 PROCEDURE — 36415 COLL VENOUS BLD VENIPUNCTURE: CPT | Performed by: NURSE PRACTITIONER

## 2021-11-02 PROCEDURE — 85027 COMPLETE CBC AUTOMATED: CPT | Performed by: NURSE PRACTITIONER

## 2021-11-02 PROCEDURE — 85730 THROMBOPLASTIN TIME PARTIAL: CPT | Performed by: EMERGENCY MEDICINE

## 2021-11-02 PROCEDURE — 63710000001 INSULIN LISPRO (HUMAN) PER 5 UNITS: Performed by: NURSE PRACTITIONER

## 2021-11-02 PROCEDURE — 25010000002 VANCOMYCIN 10 G RECONSTITUTED SOLUTION: Performed by: EMERGENCY MEDICINE

## 2021-11-02 PROCEDURE — 25010000002 ONDANSETRON PER 1 MG: Performed by: EMERGENCY MEDICINE

## 2021-11-02 PROCEDURE — 71045 X-RAY EXAM CHEST 1 VIEW: CPT

## 2021-11-02 PROCEDURE — 25010000002 CEFEPIME PER 500 MG: Performed by: EMERGENCY MEDICINE

## 2021-11-02 PROCEDURE — 99222 1ST HOSP IP/OBS MODERATE 55: CPT | Performed by: NURSE PRACTITIONER

## 2021-11-02 PROCEDURE — 93005 ELECTROCARDIOGRAM TRACING: CPT | Performed by: NURSE PRACTITIONER

## 2021-11-02 PROCEDURE — 25010000002 MORPHINE PER 10 MG: Performed by: EMERGENCY MEDICINE

## 2021-11-02 PROCEDURE — 85025 COMPLETE CBC W/AUTO DIFF WBC: CPT | Performed by: EMERGENCY MEDICINE

## 2021-11-02 PROCEDURE — 80053 COMPREHEN METABOLIC PANEL: CPT | Performed by: EMERGENCY MEDICINE

## 2021-11-02 PROCEDURE — 25010000002 CEFEPIME PER 500 MG: Performed by: NURSE PRACTITIONER

## 2021-11-02 PROCEDURE — 80048 BASIC METABOLIC PNL TOTAL CA: CPT | Performed by: NURSE PRACTITIONER

## 2021-11-02 RX ORDER — GUAIFENESIN 600 MG/1
600 TABLET, EXTENDED RELEASE ORAL EVERY 12 HOURS SCHEDULED
Status: DISCONTINUED | OUTPATIENT
Start: 2021-11-02 | End: 2021-11-08 | Stop reason: HOSPADM

## 2021-11-02 RX ORDER — SODIUM CHLORIDE 9 MG/ML
25 INJECTION, SOLUTION INTRAVENOUS CONTINUOUS
Status: DISCONTINUED | OUTPATIENT
Start: 2021-11-02 | End: 2021-11-03

## 2021-11-02 RX ORDER — WARFARIN SODIUM 7.5 MG/1
15 TABLET ORAL
Status: DISCONTINUED | OUTPATIENT
Start: 2021-11-03 | End: 2021-11-03

## 2021-11-02 RX ORDER — SODIUM CHLORIDE 0.9 % (FLUSH) 0.9 %
10 SYRINGE (ML) INJECTION AS NEEDED
Status: DISCONTINUED | OUTPATIENT
Start: 2021-11-02 | End: 2021-11-08 | Stop reason: HOSPADM

## 2021-11-02 RX ORDER — BENZONATATE 100 MG/1
100 CAPSULE ORAL 2 TIMES DAILY PRN
Status: DISCONTINUED | OUTPATIENT
Start: 2021-11-02 | End: 2021-11-08 | Stop reason: HOSPADM

## 2021-11-02 RX ORDER — METOPROLOL TARTRATE 50 MG/1
50 TABLET, FILM COATED ORAL EVERY 12 HOURS SCHEDULED
Status: DISCONTINUED | OUTPATIENT
Start: 2021-11-02 | End: 2021-11-03

## 2021-11-02 RX ORDER — DIGOXIN 0.25 MG/ML
500 INJECTION INTRAMUSCULAR; INTRAVENOUS ONCE
Status: COMPLETED | OUTPATIENT
Start: 2021-11-02 | End: 2021-11-02

## 2021-11-02 RX ORDER — ACETAMINOPHEN 325 MG/1
650 TABLET ORAL EVERY 4 HOURS PRN
Status: DISCONTINUED | OUTPATIENT
Start: 2021-11-02 | End: 2021-11-08 | Stop reason: HOSPADM

## 2021-11-02 RX ORDER — ONDANSETRON 4 MG/1
4 TABLET, FILM COATED ORAL EVERY 6 HOURS PRN
Status: DISCONTINUED | OUTPATIENT
Start: 2021-11-02 | End: 2021-11-08 | Stop reason: HOSPADM

## 2021-11-02 RX ORDER — HYDROCODONE BITARTRATE AND ACETAMINOPHEN 5; 325 MG/1; MG/1
1 TABLET ORAL EVERY 6 HOURS PRN
Status: DISCONTINUED | OUTPATIENT
Start: 2021-11-02 | End: 2021-11-03

## 2021-11-02 RX ORDER — NICOTINE POLACRILEX 4 MG
15 LOZENGE BUCCAL
Status: DISCONTINUED | OUTPATIENT
Start: 2021-11-02 | End: 2021-11-08 | Stop reason: HOSPADM

## 2021-11-02 RX ORDER — INSULIN LISPRO 100 [IU]/ML
0-7 INJECTION, SOLUTION INTRAVENOUS; SUBCUTANEOUS
Status: DISCONTINUED | OUTPATIENT
Start: 2021-11-02 | End: 2021-11-08 | Stop reason: HOSPADM

## 2021-11-02 RX ORDER — WARFARIN SODIUM 10 MG/1
10 TABLET ORAL
Status: DISCONTINUED | OUTPATIENT
Start: 2021-11-02 | End: 2021-11-03

## 2021-11-02 RX ORDER — ACETAMINOPHEN 650 MG/1
650 SUPPOSITORY RECTAL EVERY 4 HOURS PRN
Status: DISCONTINUED | OUTPATIENT
Start: 2021-11-02 | End: 2021-11-08 | Stop reason: HOSPADM

## 2021-11-02 RX ORDER — SODIUM CHLORIDE 0.9 % (FLUSH) 0.9 %
10 SYRINGE (ML) INJECTION EVERY 12 HOURS SCHEDULED
Status: DISCONTINUED | OUTPATIENT
Start: 2021-11-02 | End: 2021-11-08 | Stop reason: HOSPADM

## 2021-11-02 RX ORDER — DEXTROSE MONOHYDRATE 25 G/50ML
25 INJECTION, SOLUTION INTRAVENOUS
Status: DISCONTINUED | OUTPATIENT
Start: 2021-11-02 | End: 2021-11-08 | Stop reason: HOSPADM

## 2021-11-02 RX ORDER — ACETAMINOPHEN 160 MG/5ML
650 SOLUTION ORAL EVERY 4 HOURS PRN
Status: DISCONTINUED | OUTPATIENT
Start: 2021-11-02 | End: 2021-11-08 | Stop reason: HOSPADM

## 2021-11-02 RX ORDER — ONDANSETRON 2 MG/ML
4 INJECTION INTRAMUSCULAR; INTRAVENOUS EVERY 6 HOURS PRN
Status: DISCONTINUED | OUTPATIENT
Start: 2021-11-02 | End: 2021-11-08 | Stop reason: HOSPADM

## 2021-11-02 RX ORDER — CALCIUM CARBONATE 200(500)MG
2 TABLET,CHEWABLE ORAL 2 TIMES DAILY PRN
Status: DISCONTINUED | OUTPATIENT
Start: 2021-11-02 | End: 2021-11-08 | Stop reason: HOSPADM

## 2021-11-02 RX ADMIN — SODIUM CHLORIDE, PRESERVATIVE FREE 10 ML: 5 INJECTION INTRAVENOUS at 08:46

## 2021-11-02 RX ADMIN — CEFEPIME 1 G: 1 INJECTION, POWDER, FOR SOLUTION INTRAMUSCULAR; INTRAVENOUS at 03:12

## 2021-11-02 RX ADMIN — METOPROLOL TARTRATE 25 MG: 25 TABLET, FILM COATED ORAL at 13:18

## 2021-11-02 RX ADMIN — INSULIN LISPRO 2 UNITS: 100 INJECTION, SOLUTION INTRAVENOUS; SUBCUTANEOUS at 17:21

## 2021-11-02 RX ADMIN — MORPHINE SULFATE 4 MG: 2 INJECTION, SOLUTION INTRAMUSCULAR; INTRAVENOUS at 00:29

## 2021-11-02 RX ADMIN — GUAIFENESIN 600 MG: 600 TABLET, EXTENDED RELEASE ORAL at 19:42

## 2021-11-02 RX ADMIN — CEFEPIME HYDROCHLORIDE 2 G: 2 INJECTION, POWDER, FOR SOLUTION INTRAVENOUS at 11:27

## 2021-11-02 RX ADMIN — METOPROLOL TARTRATE 50 MG: 50 TABLET, FILM COATED ORAL at 19:42

## 2021-11-02 RX ADMIN — BENZONATATE 100 MG: 100 CAPSULE ORAL at 16:09

## 2021-11-02 RX ADMIN — SODIUM CHLORIDE 1000 ML: 9 INJECTION, SOLUTION INTRAVENOUS at 01:38

## 2021-11-02 RX ADMIN — VANCOMYCIN HYDROCHLORIDE 1250 MG: 10 INJECTION, POWDER, LYOPHILIZED, FOR SOLUTION INTRAVENOUS at 15:03

## 2021-11-02 RX ADMIN — WARFARIN 10 MG: 10 TABLET ORAL at 19:42

## 2021-11-02 RX ADMIN — SODIUM CHLORIDE, PRESERVATIVE FREE 10 ML: 5 INJECTION INTRAVENOUS at 19:42

## 2021-11-02 RX ADMIN — VANCOMYCIN HYDROCHLORIDE 2500 MG: 10 INJECTION, POWDER, LYOPHILIZED, FOR SOLUTION INTRAVENOUS at 00:34

## 2021-11-02 RX ADMIN — DIGOXIN 500 MCG: 0.25 INJECTION INTRAMUSCULAR; INTRAVENOUS at 18:17

## 2021-11-02 RX ADMIN — SODIUM CHLORIDE, PRESERVATIVE FREE 10 ML: 5 INJECTION INTRAVENOUS at 06:14

## 2021-11-02 RX ADMIN — SODIUM CHLORIDE 75 ML/HR: 9 INJECTION, SOLUTION INTRAVENOUS at 06:17

## 2021-11-02 RX ADMIN — GUAIFENESIN 600 MG: 600 TABLET, EXTENDED RELEASE ORAL at 16:08

## 2021-11-02 RX ADMIN — ONDANSETRON 4 MG: 2 INJECTION INTRAMUSCULAR; INTRAVENOUS at 00:30

## 2021-11-02 RX ADMIN — CEFEPIME HYDROCHLORIDE 2 G: 2 INJECTION, POWDER, FOR SOLUTION INTRAVENOUS at 17:21

## 2021-11-02 RX ADMIN — HYDROCODONE BITARTRATE AND ACETAMINOPHEN 1 TABLET: 5; 325 TABLET ORAL at 17:21

## 2021-11-02 NOTE — H&P
"    Patient Name:  Kameron Melendez  YOB: 1970  MRN:  [id number]  Admit Date:  11/1/2021  Patient Care Team:  Hansel Patel DO as PCP - General (Family Medicine)  Kim Sotelo RPH as Pharmacist      Subjective   History Present Illness     Chief Complaint   Patient presents with   • Cellulitis       Mr. Melendez is a 51 y.o. smoker with a history of DVT / PE, factor V Leiden on chronic anticoagulation, obesity, BLE lymphedema who presents to [facility] ER with chief complaint of cellulitis & admitted for cellulitis of left lower extremity.      Patient answering all questions appropriately appears to be reliable historian.  Reports chronic lymphedema followed by primary care provider with reported adherence to pneumatic (hip to foot) boots 1 hour daily; however, developed \"low-grade fever\" on Saturday and received Keflex 500 mg p.o. 4 times daily (reportedly completed 2.5 days course) via \"E-visit\" for suspected cellulitis after attempting to correct swelling with 3 cycles of pneumatic boots.  Noted no improvement of left lower extremity swelling and redness; therefore, sought ER evaluation.    AVSS on room air, serum lactate 2.2 improved 1.8 following IV fluid bolus, therapeutic INR 2.5, leukocytosis WBC count 19,000 improved to 17,000 following IV fluids and empiric antibiotic therapy--cefepime, vancomycin.    Recommendations pending hospital course.  Details below in assessment / plan.    History of Present Illness  Review of Systems   Constitutional: Positive for fever. Negative for chills.   HENT: Negative for congestion and rhinorrhea.    Respiratory: Negative for cough and shortness of breath.    Cardiovascular: Negative for chest pain and leg swelling.   Gastrointestinal: Negative for abdominal pain, constipation, diarrhea, nausea and vomiting.   Endocrine: Negative for polydipsia, polyphagia and polyuria.   Genitourinary: Negative for difficulty urinating and dysuria.   Musculoskeletal: " Positive for gait problem (chronic) and myalgias (LLE).   Skin: Positive for color change. Negative for wound.   Neurological: Positive for weakness (generalized weakness). Negative for dizziness.   Psychiatric/Behavioral: Negative for confusion and hallucinations.        Personal History     Past Medical History:   Diagnosis Date   • DVT (deep venous thrombosis) (HCC)     RLE   • Factor V Leiden (HCC)    • Hypertension    • Kidney stone    • Lymphedema    • Lymphedema of left lower extremity    • Obesity    • ARNOLDO (obstructive sleep apnea)    • Pulmonary embolism (HCC)     bilateral   • Pulmonary hypertension (HCC)    • Right ventricular enlargement      Past Surgical History:   Procedure Laterality Date   • CARDIAC CATHETERIZATION Bilateral 7/18/2017    Procedure: Pulmonary angiography- [place] ;  Surgeon: Cedric Coulter MD;  Location:  [facility];  Service:    • CARDIAC CATHETERIZATION N/A 7/18/2017    Procedure: Right Heart Cath;  Surgeon: Cedric Coulter MD;  Location:  [facility];  Service:    • THROMBECTOMY       Family History   Problem Relation Age of Onset   • Heart disease Mother    • Heart failure Mother    • Bradycardia Mother    • No Known Problems Father    • No Known Problems Maternal Grandmother    • No Known Problems Maternal Grandfather    • No Known Problems Paternal Grandmother    • No Known Problems Paternal Grandfather      Social History     Tobacco Use   • Smoking status: Light Tobacco Smoker     Types: Cigars, Pipe     Start date: 1/1/2006   • Smokeless tobacco: Never Used   • Tobacco comment: occasional - < 1 a month   Substance Use Topics   • Alcohol use: No   • Drug use: No     No current facility-administered medications on file prior to encounter.     Current Outpatient Medications on File Prior to Encounter   Medication Sig Dispense Refill   • acetaminophen (TYLENOL) 500 MG tablet Take 500 mg by mouth Every 6 (Six) Hours As Needed for Mild Pain .     • ammonium  "lactate (Lac-Hydrin) 12 % cream Apply both legs daily for venous stasis dermitis and lymphedema skin thickening (Patient taking differently: Apply 1 application topically to the appropriate area as directed As Needed. Apply both legs daily for venous stasis dermitis and lymphedema skin thickening) 385 g 12   • cephalexin (KEFLEX) 500 MG capsule Take 1 capsule by mouth 4 (Four) Times a Day. 40 capsule 0   • chlorhexidine (HIBICLENS) 4 % external liquid Apply  topically to the appropriate area as directed Daily As Needed for Wound Care (to both legs). (Patient taking differently: Apply 1 application topically to the appropriate area as directed Daily As Needed for Wound Care (to both legs).) 946 mL 12   • furosemide (LASIX) 80 MG tablet TAKE 1 TABLET BY MOUTH DAILY (Patient taking differently: Take 80 mg by mouth Daily. Pt. Only takes when swelling is \"excessive\" and he is going to be home) 30 tablet 5   • potassium chloride (K-DUR,KLOR-CON) 20 MEQ CR tablet Take 1 tablet by mouth Daily. Take with furosemide (Patient taking differently: Take 20 mEq by mouth Daily. Only takes with furosemide) 30 tablet 2   • warfarin (COUMADIN) 10 MG tablet TAKE ONE TABLET BY MOUTH ON TUE, THUR, SAT AND TAKE ONE AND ONE-HALF (15 MG) TABLETS ALL OTHER DAYS OR AS DIRECTED (Patient taking differently: Take 10 mg by mouth 3 (Three) Times a Week. Takes 10 mg daily on Tuesday, Thursday,and Saturday) 120 tablet 1   • warfarin (COUMADIN) 10 MG tablet Take 15 mg by mouth 4 (Four) Times a Week. Takes 1.5 tablets (15mg) Sunday, Monday, Wednesday, and Friday.       No Known Allergies    Objective    Objective     Vital Signs  Temp:  [97.8 °F (36.6 °C)-98.2 °F (36.8 °C)] 97.9 °F (36.6 °C)  Heart Rate:  [] 120  Resp:  [16-19] 18  BP: (108-142)/() 142/102  SpO2:  [92 %-99 %] 99 %  on  Flow (L/min):  [2] 2;   Device (Oxygen Therapy): nasal cannula  Body mass index is 72.04 kg/m².    Physical Exam  Constitutional:       General: He is not " in acute distress.     Appearance: He is obese. He is ill-appearing. He is not toxic-appearing.   HENT:      Head: Normocephalic and atraumatic.   Eyes:      Conjunctiva/sclera: Conjunctivae normal.   Cardiovascular:      Rate and Rhythm: Tachycardia present.      Heart sounds: Normal heart sounds.   Pulmonary:      Effort: Pulmonary effort is normal.      Comments: Diminished on expiration; shallow inspiration  Abdominal:      General: Bowel sounds are normal.      Palpations: Abdomen is soft.   Musculoskeletal:         General: Tenderness (LLE) present.      Cervical back: Normal range of motion and neck supple.      Right lower leg: Edema present.      Left lower leg: Edema present.   Skin:     General: Skin is warm and dry.      Findings: Erythema (LLE) present.   Neurological:      Mental Status: He is alert and oriented to person, place, and time.      Cranial Nerves: No cranial nerve deficit.      Motor: Weakness (generalized) present.   Psychiatric:         Behavior: Behavior normal.         Thought Content: Thought content normal.         Results Review:  I reviewed the patient's new clinical results.  I reviewed the patient's new imaging results and agree with the interpretation.  I reviewed the patient's other test results and agree with the interpretation  I personally viewed and interpreted the patient's EKG/Telemetry data  Discussed with ED provider.    Lab Results (last 24 hours)     Procedure Component Value Units Date/Time    COVID PRE-OP / PRE-PROCEDURE SCREENING ORDER (NO ISOLATION) - Swab, Nasopharynx [[id number]]  (Normal) Collected: 11/02/21 0011    Specimen: Swab from Nasopharynx Updated: 11/02/21 0144    Narrative:      The following orders were created for panel order COVID PRE-OP / PRE-PROCEDURE SCREENING ORDER (NO ISOLATION) - Swab, Nasopharynx.  Procedure                               Abnormality         Status                     ---------                               -----------          ------                     COVID-19,[facility]...[[id number]]  Normal              Final result                 Please view results for these tests on the individual orders.    COVID-19,[facility] CEPHEID/THANG NP SWAB IN TRANSPORT MEDIA 8-12 HR TAT - Swab, Nasopharynx [[id number]]  (Normal) Collected: 11/02/21 0011    Specimen: Swab from Nasopharynx Updated: 11/02/21 0144     COVID19 Not Detected    Narrative:      Fact sheet for providers: https://www.fda.gov/media/969898/download     Fact sheet for patients: https://www.fda.gov/media/939424/download    CBC & Differential [[id number]]  (Abnormal) Collected: 11/02/21 0022    Specimen: Blood Updated: 11/02/21 0044    Narrative:      The following orders were created for panel order CBC & Differential.  Procedure                               Abnormality         Status                     ---------                               -----------         ------                     CBC Auto Differential[[id number]]        Abnormal            Final result                 Please view results for these tests on the individual orders.    Comprehensive Metabolic Panel [[id number]]  (Abnormal) Collected: 11/02/21 0022    Specimen: Blood Updated: 11/02/21 0119     Glucose 131 mg/dL      BUN 23 mg/dL      Creatinine 1.29 mg/dL      Sodium 134 mmol/L      Potassium 3.3 mmol/L      Chloride 96 mmol/L      CO2 24.9 mmol/L      Calcium 9.0 mg/dL      Total Protein 6.8 g/dL      Albumin 3.10 g/dL      ALT (SGPT) 18 U/L      AST (SGOT) 19 U/L      Alkaline Phosphatase 169 U/L      Total Bilirubin 0.7 mg/dL      eGFR Non African Amer 59 mL/min/1.73      Globulin 3.7 gm/dL      A/G Ratio 0.8 g/dL      BUN/Creatinine Ratio 17.8     Anion Gap 13.1 mmol/L     Narrative:      GFR Normal >60  Chronic Kidney Disease <60  Kidney Failure <15      Protime-INR [[id number]]  (Abnormal) Collected: 11/02/21 0022    Specimen: Blood Updated: 11/02/21 0102     Protime 27.1 Seconds      INR 2.55    aPTT  [[id number]]  (Abnormal) Collected: 11/02/21 0022    Specimen: Blood Updated: 11/02/21 0102     PTT 70.4 seconds     Lactic Acid, Plasma [[id number]]  (Abnormal) Collected: 11/02/21 0022    Specimen: Blood Updated: 11/02/21 0116     Lactate 2.2 mmol/L     Blood Culture - Blood, Arm, Right [[id number]] Collected: 11/02/21 0022    Specimen: Blood from Arm, Right Updated: 11/02/21 0039    Blood Culture - Blood, Arm, Left [[id number]] Collected: 11/02/21 0022    Specimen: Blood from Arm, Left Updated: 11/02/21 0039    CBC Auto Differential [[id number]]  (Abnormal) Collected: 11/02/21 0022    Specimen: Blood Updated: 11/02/21 0044     WBC 19.78 10*3/mm3      RBC 4.67 10*6/mm3      Hemoglobin 13.8 g/dL      Hematocrit 41.1 %      MCV 88.0 fL      MCH 29.6 pg      MCHC 33.6 g/dL      RDW 14.3 %      RDW-SD 45.5 fl      MPV 10.9 fL      Platelets 200 10*3/mm3      Neutrophil % 83.6 %      Lymphocyte % 8.6 %      Monocyte % 4.6 %      Eosinophil % 0.9 %      Basophil % 0.5 %      Immature Grans % 1.8 %      Neutrophils, Absolute 16.54 10*3/mm3      Lymphocytes, Absolute 1.71 10*3/mm3      Monocytes, Absolute 0.90 10*3/mm3      Eosinophils, Absolute 0.18 10*3/mm3      Basophils, Absolute 0.09 10*3/mm3      Immature Grans, Absolute 0.36 10*3/mm3      nRBC 0.0 /100 WBC     STAT Lactic Acid, Reflex [[id number]]  (Normal) Collected: 11/02/21 0354    Specimen: Blood Updated: 11/02/21 0427     Lactate 1.3 mmol/L     Basic Metabolic Panel [[id number]]  (Abnormal) Collected: 11/02/21 0616    Specimen: Blood Updated: 11/02/21 0646     Glucose 132 mg/dL      BUN 22 mg/dL      Creatinine 0.89 mg/dL      Sodium 134 mmol/L      Potassium 3.6 mmol/L      Chloride 99 mmol/L      CO2 25.2 mmol/L      Calcium 8.3 mg/dL      eGFR Non African Amer 90 mL/min/1.73      BUN/Creatinine Ratio 24.7     Anion Gap 9.8 mmol/L     Narrative:      GFR Normal >60  Chronic Kidney Disease <60  Kidney Failure <15      CBC (No Diff) [[id number]]  (Abnormal) Collected:  11/02/21 0616    Specimen: Blood Updated: 11/02/21 0645     WBC 17.21 10*3/mm3      RBC 4.24 10*6/mm3      Hemoglobin 12.4 g/dL      Hematocrit 37.0 %      MCV 87.3 fL      MCH 29.2 pg      MCHC 33.5 g/dL      RDW 14.0 %      RDW-SD 44.6 fl      MPV 10.2 fL      Platelets 186 10*3/mm3     Protime-INR [[id number]]  (Abnormal) Collected: 11/02/21 1056    Specimen: Blood from Arm, Left Updated: 11/02/21 1119     Protime 30.8 Seconds      INR 2.99          Imaging Results (Last 24 Hours)     Procedure Component Value Units Date/Time    XR Chest 1 View [[id number]] Collected: 11/02/21 0029     Updated: 11/02/21 0033    Narrative:      SINGLE VIEW OF THE CHEST     HISTORY: Cough     COMPARISON: 10/29/2021     FINDINGS:  Cardiomegaly is present. There is no vascular congestion. No  pneumothorax, pleural effusion, or acute infiltrate is seen.       Impression:      No acute findings.     This report was finalized on 11/2/2021 12:30 AM by Dr. Kasey Galloway M.D.             Results for orders placed during the hospital encounter of 01/23/19    Adult Transthoracic Echo Complete W/ Cont if Necessary Per Protocol    Interpretation Summary  · Left ventricular wall thickness is consistent with mild concentric hypertrophy.  · Estimated EF = 60%.  · Left ventricular systolic function is normal.  · Left ventricular diastolic dysfunction (grade I) consistent with impaired relaxation.  · Mild tricuspid valve regurgitation is present.  · Calculated right ventricular systolic pressure from tricuspid regurgitation is 48.7 mmHg.      ECG 12 Lead   Preliminary Result   HEART RATE= 122  bpm   RR Interval= 493  ms   MA Interval=   ms   P Horizontal Axis=   deg   P Front Axis=   deg   QRSD Interval= 108  ms   QT Interval= 362  ms   QRS Axis= -28  deg   T Wave Axis= 140  deg   - ABNORMAL ECG -   Atrial fibrillation   Borderline left axis deviation   Abnormal lateral Q waves   Minimal ST depression, lateral leads   Prolonged QT interval    Electronically Signed By:    Date and Time of Study: 2021-11-02 12:08:11           Assessment/Plan     Active Hospital Problems    Diagnosis  POA   • **Cellulitis of left lower extremity [L03.116]  Yes   • Hypokalemia [E87.6]  Yes   • CAROL (acute kidney injury) (HCC) [N17.9]  Yes   • Sepsis with cutaneous manifestations (HCC) [A41.9]  Yes   • Hyperglycemia [R73.9]  Yes   • Paroxysmal atrial fibrillation (HCC) [I48.0]  Unknown   • Heterozygous factor V Leiden mutation (HCC) [D68.51]  Yes   • Dyslipidemia [E78.5]  Yes   • Lymphedema of both lower extremities [I89.0]  Yes   • Obesity, morbid, BMI 50 or higher (HCC) [E66.01]  Yes   • History of bilateral pulmonary embolism (CMS/HCC) [I26.99]  Yes   • ARNOLDO (obstructive sleep apnea) [G47.33]  Yes      Resolved Hospital Problems   No resolved problems to display.       Mr. Melendez is a 51 y.o. smoker with a history of DVT / PE, factor V Leiden on chronic anticoagulation, obesity who presents to [facility] ER with chief complaint of cellulitis & admitted for cellulitis of left lower extremity.        Cellulitis of left lower extremity / Lymphedema of both lower extremities  Cefepime, vancomycin for now pending BC x2 results  Symptom mgmt  Consider ID vs general surgery consultation vs both if no clinical improvement or progression of cellulitis develops      Sepsis with cutaneous manifestations (HCC)  Likely secondary to #1  Serum lactate 2.2 normalized following IV fluid bolus  Hemodynamically stable, continues IV fluids decreased given elevated DBP  WBC trend:  19-->17 (11/2)      Hypokalemia /  CAROL (acute kidney injury) (HCC)  Resolved  Serum Creatinine 1.2 increased from 0.8 returned to baseline following 1 L NS IV bolus  Avoid nephrotoxins      Atrial fibrillation  Heart rate consistently elevated above 100  Noted on monitor & confirmed on EKG  Consult cardiology  Appropriately anticoagulated / history of factor V Leiden  Provide metoprolol 25 mg P.O. once & defer to  cardiology moving forward      Hyperglycemia  Consistent elevated serum glucose and high probability for diabetes mellitus given BMI 72  A1c in a.m.  Low-dose lispro sliding scale for now  NCS diet      Obesity, morbid, BMI 50 or higher (HCC)  Complicating all problems      ARNOLDO (obstructive sleep apnea)  Requiring 2L NC at night      Heterozygous factor V Leiden mutation (HCC) /   History of bilateral pulmonary embolism (CMS/HCC)  Warfarin continued on admission per pharmacy dosing  Therapeutic INR       Dyslipidemia  Ordering lipid panel    · I discussed the patient's findings and my recommendations with Dr. Jung.    VTE Prophylaxis - warfarin  (home med)  Code Status - CPR    FADY Mehta  [facility]  11/02/21  12:22 EDT

## 2021-11-02 NOTE — ED PROVIDER NOTES
EMERGENCY DEPARTMENT ENCOUNTER    Room Number:  52/52  Date of encounter:  11/2/2021  PCP: Hansel Patel DO  Historian: Patient      HPI:  Chief Complaint: Left leg redness and swelling  A complete HPI/ROS/PMH/PSH/SH/FH are unobtainable due to: None    Context: Kameron Melendez is a 51 y.o. male who presents to the ED c/o worsening cellulitis to left lower extremity.  Patient was seen on 1029 for shortness of breath and ruled out for PE Covid and pneumonia.  Since then he has resolved redness and swelling to left lower extremity.  Patient placed on Keflex but states that pain swelling and redness got worse.  Patient is rather extensive lymphedema to bilateral lower extremities.  Patient is on warfarin for previous PEs.      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   • Knee arthropathy 12/11/2015   • Morbid obesity with body mass index of 60.0-69.9 in adult ([facility]) 12/11/2015   • ARNOLDO (obstructive sleep apnea) 12/11/2015   • Severe edema 12/11/2015   • History of pulmonary embolism 01/23/2019   • Other acute pulmonary embolism without acute cor pulmonale ([facility]) 02/01/2019   • Intractable back pain 11/25/2019   • Heterozygous factor V Leiden mutation ([facility]) 11/25/2019     Resolved Ambulatory Problems     Diagnosis Date Noted   • Right ventricular enlargement 08/29/2017     Past Medical History:   Diagnosis Date   • DVT (deep venous thrombosis) (CMS/HCC)    • Factor V Leiden (CMS/HCC)    • Hypertension    • Kidney stone    • Lymphedema    • Lymphedema of left lower extremity    • Obesity    • Pulmonary embolism (CMS/HCC)          PAST SURGICAL HISTORY  Past Surgical History:    Procedure Laterality Date   • CARDIAC CATHETERIZATION Bilateral 7/18/2017    Procedure: Pulmonary angiography- [facility] ;  Surgeon: Cedric Coulter MD;  Location:  [facility];  Service:    • CARDIAC CATHETERIZATION N/A 7/18/2017    Procedure: Right Heart Cath;  Surgeon: Cedric Coulter MD;  Location:  [facility];  Service:    • THROMBECTOMY           FAMILY HISTORY  Family History   Problem Relation Age of Onset   • Heart disease Mother    • Heart failure Mother    • Bradycardia Mother    • No Known Problems Father    • No Known Problems Maternal Grandmother    • No Known Problems Maternal Grandfather    • No Known Problems Paternal Grandmother    • No Known Problems Paternal Grandfather          SOCIAL HISTORY  Social History     Socioeconomic History   • Marital status:    Tobacco Use   • Smoking status: Light Tobacco Smoker     Types: Cigars, Pipe     Start date: 1/1/2006   • Smokeless tobacco: Never Used   • Tobacco comment: occasional - < 1 a month   Substance and Sexual Activity   • Alcohol use: No   • Drug use: No   • Sexual activity: Defer         ALLERGIES  Patient has no known allergies.        REVIEW OF SYSTEMS  Review of Systems     All systems reviewed and negative except for those discussed in HPI.       PHYSICAL EXAM    I have reviewed the triage vital signs and nursing notes.    ED Triage Vitals [11/01/21 2043]   Temp Heart Rate Resp BP SpO2   97.8 °F (36.6 °C) 69 16 128/60 96 %      Temp src Heart Rate Source Patient Position BP Location FiO2 (%)   Oral Monitor Sitting Right arm --       Physical Exam  GENERAL: Mild distressed  HENT: nares patent  EYES: no scleral icterus  CV: regular rhythm, regular rate, extensive lymphedema bilateral lower extremities  RESPIRATORY: normal effort  ABDOMEN: soft  MUSCULOSKELETAL: no deformity  NEURO: alert, moves all extremities, follows commands  SKIN: warm, dry, extensive lymphedema to bilateral lower extremities, left leg with  erythema induration no fluctuance to medial aspect left lower extremity extending down from thigh to leg -foot with strong DP pulse well-perfused distally        LAB RESULTS  Recent Results (from the past 24 hour(s))   COVID-19,[facility] CEPHEID/[facility]ANG NP SWAB IN TRANSPORT MEDIA 8-12 HR TAT - Swab, Nasopharynx    Collection Time: 11/02/21 12:11 AM    Specimen: Nasopharynx; Swab   Result Value Ref Range    COVID19 Not Detected Not Detected - Ref. Range   Comprehensive Metabolic Panel    Collection Time: 11/02/21 12:22 AM    Specimen: Blood   Result Value Ref Range    Glucose 131 (H) 65 - 99 mg/dL    BUN 23 (H) 6 - 20 mg/dL    Creatinine 1.29 (H) 0.76 - 1.27 mg/dL    Sodium 134 (L) 136 - 145 mmol/L    Potassium 3.3 (L) 3.5 - 5.2 mmol/L    Chloride 96 (L) 98 - 107 mmol/L    CO2 24.9 22.0 - 29.0 mmol/L    Calcium 9.0 8.6 - 10.5 mg/dL    Total Protein 6.8 6.0 - 8.5 g/dL    Albumin 3.10 (L) 3.50 - 5.20 g/dL    ALT (SGPT) 18 1 - 41 U/L    AST (SGOT) 19 1 - 40 U/L    Alkaline Phosphatase 169 (H) 39 - 117 U/L    Total Bilirubin 0.7 0.0 - 1.2 mg/dL    eGFR Non African Amer 59 (L) >60 mL/min/1.73    Globulin 3.7 gm/dL    A/G Ratio 0.8 g/dL    BUN/Creatinine Ratio 17.8 7.0 - 25.0    Anion Gap 13.1 5.0 - 15.0 mmol/L   Protime-INR    Collection Time: 11/02/21 12:22 AM    Specimen: Blood   Result Value Ref Range    Protime 27.1 (H) 11.7 - 14.2 Seconds    INR 2.55 (H) 0.90 - 1.10   aPTT    Collection Time: 11/02/21 12:22 AM    Specimen: Blood   Result Value Ref Range    PTT 70.4 (H) 22.7 - 35.4 seconds   Lactic Acid, Plasma    Collection Time: 11/02/21 12:22 AM    Specimen: Blood   Result Value Ref Range    Lactate 2.2 (C) 0.5 - 2.0 mmol/L   CBC Auto Differential    Collection Time: 11/02/21 12:22 AM    Specimen: Blood   Result Value Ref Range    WBC 19.78 (H) 3.40 - 10.80 10*3/mm3    RBC 4.67 4.14 - 5.80 10*6/mm3    Hemoglobin 13.8 13.0 - 17.7 g/dL    Hematocrit 41.1 37.5 - 51.0 %    MCV 88.0 79.0 - 97.0 fL    MCH 29.6 26.6 -  33.0 pg    MCHC 33.6 31.5 - 35.7 g/dL    RDW 14.3 12.3 - 15.4 %    RDW-SD 45.5 37.0 - 54.0 fl    MPV 10.9 6.0 - 12.0 fL    Platelets 200 140 - 450 10*3/mm3    Neutrophil % 83.6 (H) 42.7 - 76.0 %    Lymphocyte % 8.6 (L) 19.6 - 45.3 %    Monocyte % 4.6 (L) 5.0 - 12.0 %    Eosinophil % 0.9 0.3 - 6.2 %    Basophil % 0.5 0.0 - 1.5 %    Immature Grans % 1.8 (H) 0.0 - 0.5 %    Neutrophils, Absolute 16.54 (H) 1.70 - 7.00 10*3/mm3    Lymphocytes, Absolute 1.71 0.70 - 3.10 10*3/mm3    Monocytes, Absolute 0.90 0.10 - 0.90 10*3/mm3    Eosinophils, Absolute 0.18 0.00 - 0.40 10*3/mm3    Basophils, Absolute 0.09 0.00 - 0.20 10*3/mm3    Immature Grans, Absolute 0.36 (H) 0.00 - 0.05 10*3/mm3    nRBC 0.0 0.0 - 0.2 /100 WBC   STAT Lactic Acid, Reflex    Collection Time: 11/02/21  3:54 AM    Specimen: Blood   Result Value Ref Range    Lactate 1.3 0.5 - 2.0 mmol/L       Ordered the above labs and independently reviewed the results.        RADIOLOGY  XR Chest 1 View    Result Date: 11/2/2021  SINGLE VIEW OF THE CHEST  HISTORY: Cough  COMPARISON: 10/29/2021  FINDINGS: Cardiomegaly is present. There is no vascular congestion. No pneumothorax, pleural effusion, or acute infiltrate is seen.      No acute findings.  This report was finalized on 11/2/2021 12:30 AM by Dr. Kasey Galloway M.D.        I ordered the above noted radiological studies. Reviewed by me and discussed with radiologist.  See dictation for official radiology interpretation.      PROCEDURES    Procedures      MEDICATIONS GIVEN IN ER    Medications   sodium chloride 0.9 % flush 10 mL (has no administration in time range)   sodium chloride 0.9 % flush 10 mL (has no administration in time range)   sodium chloride 0.9 % flush 10 mL (has no administration in time range)   acetaminophen (TYLENOL) tablet 650 mg (has no administration in time range)     Or   acetaminophen (TYLENOL) 160 MG/5ML solution 650 mg (has no administration in time range)     Or   acetaminophen (TYLENOL)  suppository 650 mg (has no administration in time range)   ondansetron (ZOFRAN) tablet 4 mg (has no administration in time range)     Or   ondansetron (ZOFRAN) injection 4 mg (has no administration in time range)   calcium carbonate (TUMS) chewable tablet 500 mg (200 mg elemental) (has no administration in time range)   sodium chloride 0.9 % infusion (has no administration in time range)   Pharmacy to dose vancomycin (has no administration in time range)   Pharmacy Consult - Pharmacy to dose (has no administration in time range)   cefepime 2 gm IVPB in 100 ml NS (VTB) (has no administration in time range)   vancomycin 1250 mg/250 mL 0.9% NS IVPB ([facility]) (has no administration in time range)   morphine injection 4 mg (4 mg Intravenous Given 11/2/21 0029)   ondansetron (ZOFRAN) injection 4 mg (4 mg Intravenous Given 11/2/21 0030)   vancomycin 2500 mg/500 mL 0.9% NS IVPB ([facility]) (0 mg/kg × 234 kg (Order-Specific) Intravenous Stopped 11/2/21 0311)   cefepime (MAXIPIME) 1 g/100 mL 0.9% NS IVPB ([facility]) (0 g Intravenous Stopped 11/2/21 0351)   sodium chloride 0.9 % bolus 1,000 mL (0 mL Intravenous Stopped 11/2/21 0240)         PROGRESS, DATA ANALYSIS, CONSULTS, AND MEDICAL DECISION MAKING    All labs have been independently reviewed by me.  All radiology studies have been reviewed by me and discussed with radiologist dictating the report.   EKG's independently viewed and interpreted by me.  Discussion below represents my analysis of pertinent findings related to patient's condition, differential diagnosis, treatment plan and final disposition.                 PPE: The patient wore a surgical mask throughout the entire patient encounter. I wore an N95.    AS OF 05:55 EDT VITALS:    BP - 133/64  HR - 117  TEMP - 97.8 °F (36.6 °C) (Oral)  O2 SATS - 99%        DIAGNOSIS  Final diagnoses:   Cellulitis of left lower extremity   Lymphedema         DISPOSITION  Discharge           Cedric Garcia MD  11/02/21 9431

## 2021-11-02 NOTE — PAYOR COMM NOTE
"Kameron Covarrubias (51 y.o. Male)     ATTN: INITIAL REQUEST FOR INPATIENT AUTHORIZATION: [id number]    PLEASE REPLY TO UR DEPT: [phone],  [phone]                 Date of Birth Social Security Number Address Home Phone MRN    1970  [id number]  [facility] [phone] [id number]    Protestant Marital Status             Gnosticism        Admission Date Admission Type Admitting Provider Attending Provider Department, Room/Bed    11/1/21 Emergency Kay Jung MD Hetzman, Margaux M, MD [facility] Emergency Department, 52/52    Discharge Date Discharge Disposition Discharge Destination                         Attending Provider: Kay Jung MD    Allergies: No Known Allergies    Isolation: None   Infection: None   Code Status: CPR   Advance Care Planning Activity    Ht: 188 cm (74\")   Wt: 255 kg (561 lb 1.6 oz)    Admission Cmt: None   Principal Problem: Cellulitis of left lower extremity [L03.116]                 Active Insurance as of 11/1/2021     Primary Coverage     Payor Plan Insurance Group Employer/Plan Group    [employer]HEM BLUE CROSS ANTHEM BLUE CROSS BLUE SHIELD PPO [id number]     Payor Plan Address Payor Plan Phone Number Payor Plan Fax Number Effective Dates    [address] [phone]  5/1/2019 - None Entered    [place] [address]       Subscriber Name Subscriber Birth Date Member ID       KAMERON COVARRUBIAS 1970 [id number]                 Emergency Contacts      (Rel.) Home Phone Work Phone Mobile Phone    Yaya Covarrubias (Spouse) [phone] -- [phone]               History & Physical      Jackson Canchola APRN at 11/02/21 0920              Patient Name:  Kameron Covarrubias  YOB: 1970  MRN:  [id number]  Admit Date:  11/1/2021  Patient Care Team:  Hansel Patel DO as PCP - General (Family Medicine)  Kim Sotelo RPH as Pharmacist      Subjective   History Present Illness     Chief Complaint " "  Patient presents with   • Cellulitis       Mr. Melendez is a 51 y.o. smoker with a history of DVT / PE, factor V Leiden on chronic anticoagulation, obesity, BLE lymphedema who presents to [facility] ER with chief complaint of cellulitis & admitted for cellulitis of left lower extremity.      Patient answering all questions appropriately appears to be reliable historian.  Reports chronic lymphedema followed by primary care provider with reported adherence to pneumatic (hip to foot) boots 1 hour daily; however, developed \"low-grade fever\" on Saturday and received Keflex 500 mg p.o. 4 times daily (reportedly completed 2.5 days course) via \"E-visit\" for suspected cellulitis after attempting to correct swelling with 3 cycles of pneumatic boots.  Noted no improvement of left lower extremity swelling and redness; therefore, sought ER evaluation.    AVSS on room air, serum lactate 2.2 improved 1.8 following IV fluid bolus, therapeutic INR 2.5, leukocytosis WBC count 19,000 improved to 17,000 following IV fluids and empiric antibiotic therapy--cefepime, vancomycin.    Recommendations pending hospital course.  Details below in assessment / plan.    History of Present Illness  Review of Systems   Constitutional: Positive for fever. Negative for chills.   HENT: Negative for congestion and rhinorrhea.    Respiratory: Negative for cough and shortness of breath.    Cardiovascular: Negative for chest pain and leg swelling.   Gastrointestinal: Negative for abdominal pain, constipation, diarrhea, nausea and vomiting.   Endocrine: Negative for polydipsia, polyphagia and polyuria.   Genitourinary: Negative for difficulty urinating and dysuria.   Musculoskeletal: Positive for gait problem (chronic) and myalgias (LLE).   Skin: Positive for color change. Negative for wound.   Neurological: Positive for weakness (generalized weakness). Negative for dizziness.   Psychiatric/Behavioral: Negative for confusion and hallucinations.    "     Personal History     Past Medical History:   Diagnosis Date   • DVT (deep venous thrombosis) ([facility])     RLE   • Factor V Leiden (HCC)    • Hypertension    • Kidney stone    • Lymphedema    • Lymphedema of left lower extremity    • Obesity    • ARNOLDO (obstructive sleep apnea)    • Pulmonary embolism (HCC)     bilateral   • Pulmonary hypertension (HCC)    • Right ventricular enlargement      Past Surgical History:   Procedure Laterality Date   • CARDIAC CATHETERIZATION Bilateral 7/18/2017    Procedure: Pulmonary angiography- Inari ;  Surgeon: Cedric Coulter MD;  Location:  [facility];  Service:    • CARDIAC CATHETERIZATION N/A 7/18/2017    Procedure: Right Heart Cath;  Surgeon: Cedric Coulter MD;  Location:  [facility];  Service:    • THROMBECTOMY       Family History   Problem Relation Age of Onset   • Heart disease Mother    • Heart failure Mother    • Bradycardia Mother    • No Known Problems Father    • No Known Problems Maternal Grandmother    • No Known Problems Maternal Grandfather    • No Known Problems Paternal Grandmother    • No Known Problems Paternal Grandfather      Social History     Tobacco Use   • Smoking status: Light Tobacco Smoker     Types: Cigars, Pipe     Start date: 1/1/2006   • Smokeless tobacco: Never Used   • Tobacco comment: occasional - < 1 a month   Substance Use Topics   • Alcohol use: No   • Drug use: No     No current facility-administered medications on file prior to encounter.     Current Outpatient Medications on File Prior to Encounter   Medication Sig Dispense Refill   • acetaminophen (TYLENOL) 500 MG tablet Take 500 mg by mouth Every 6 (Six) Hours As Needed for Mild Pain .     • ammonium lactate (Lac-Hydrin) 12 % cream Apply both legs daily for venous stasis dermitis and lymphedema skin thickening (Patient taking differently: Apply 1 application topically to the appropriate area as directed As Needed. Apply both legs daily for venous stasis dermitis  "and lymphedema skin thickening) 385 g 12   • cephalexin (KEFLEX) 500 MG capsule Take 1 capsule by mouth 4 (Four) Times a Day. 40 capsule 0   • chlorhexidine (HIBICLENS) 4 % external liquid Apply  topically to the appropriate area as directed Daily As Needed for Wound Care (to both legs). (Patient taking differently: Apply 1 application topically to the appropriate area as directed Daily As Needed for Wound Care (to both legs).) 946 mL 12   • furosemide (LASIX) 80 MG tablet TAKE 1 TABLET BY MOUTH DAILY (Patient taking differently: Take 80 mg by mouth Daily. Pt. Only takes when swelling is \"excessive\" and he is going to be home) 30 tablet 5   • potassium chloride (K-DUR,KLOR-CON) 20 MEQ CR tablet Take 1 tablet by mouth Daily. Take with furosemide (Patient taking differently: Take 20 mEq by mouth Daily. Only takes with furosemide) 30 tablet 2   • warfarin (COUMADIN) 10 MG tablet TAKE ONE TABLET BY MOUTH ON TUE, THUR, SAT AND TAKE ONE AND ONE-HALF (15 MG) TABLETS ALL OTHER DAYS OR AS DIRECTED (Patient taking differently: Take 10 mg by mouth 3 (Three) Times a Week. Takes 10 mg daily on Tuesday, Thursday,and Saturday) 120 tablet 1   • warfarin (COUMADIN) 10 MG tablet Take 15 mg by mouth 4 (Four) Times a Week. Takes 1.5 tablets (15mg) Sunday, Monday, Wednesday, and Friday.       No Known Allergies    Objective    Objective     Vital Signs  Temp:  [97.8 °F (36.6 °C)-98.2 °F (36.8 °C)] 97.9 °F (36.6 °C)  Heart Rate:  [] 120  Resp:  [16-19] 18  BP: (108-142)/() 142/102  SpO2:  [92 %-99 %] 99 %  on  Flow (L/min):  [2] 2;   Device (Oxygen Therapy): nasal cannula  Body mass index is 72.04 kg/m².    Physical Exam  Constitutional:       General: He is not in acute distress.     Appearance: He is obese. He is ill-appearing. He is not toxic-appearing.   HENT:      Head: Normocephalic and atraumatic.   Eyes:      Conjunctiva/sclera: Conjunctivae normal.   Cardiovascular:      Rate and Rhythm: Tachycardia present.      " Heart sounds: Normal heart sounds.   Pulmonary:      Effort: Pulmonary effort is normal.      Comments: Diminished on expiration; shallow inspiration  Abdominal:      General: Bowel sounds are normal.      Palpations: Abdomen is soft.   Musculoskeletal:         General: Tenderness (LLE) present.      Cervical back: Normal range of motion and neck supple.      Right lower leg: Edema present.      Left lower leg: Edema present.   Skin:     General: Skin is warm and dry.      Findings: Erythema (LLE) present.   Neurological:      Mental Status: He is alert and oriented to person, place, and time.      Cranial Nerves: No cranial nerve deficit.      Motor: Weakness (generalized) present.   Psychiatric:         Behavior: Behavior normal.         Thought Content: Thought content normal.         Results Review:  I reviewed the patient's new clinical results.  I reviewed the patient's new imaging results and agree with the interpretation.  I reviewed the patient's other test results and agree with the interpretation  I personally viewed and interpreted the patient's EKG/Telemetry data  Discussed with ED provider.    Lab Results (last 24 hours)     Procedure Component Value Units Date/Time    COVID PRE-OP / PRE-PROCEDURE SCREENING ORDER (NO ISOLATION) - Swab, Nasopharynx [[id number]]  (Normal) Collected: 11/02/21 0011    Specimen: Swab from Nasopharynx Updated: 11/02/21 0144    Narrative:      The following orders were created for panel order COVID PRE-OP / PRE-PROCEDURE SCREENING ORDER (NO ISOLATION) - Swab, Nasopharynx.  Procedure                               Abnormality         Status                     ---------                               -----------         ------                     COVID-19,[facility]-HOUSE...[[id number]]  Normal              Final result                 Please view results for these tests on the individual orders.    COVID-19,[facility] CEPHEID/THANG NP SWAB IN TRANSPORT MEDIA 8-12 HR TAT -  Swab, Nasopharynx [[id number]]  (Normal) Collected: 11/02/21 0011    Specimen: Swab from Nasopharynx Updated: 11/02/21 0144     COVID19 Not Detected    Narrative:      Fact sheet for providers: https://www.fda.gov/media/369049/download     Fact sheet for patients: https://www.fda.gov/media/724198/download    CBC & Differential [[id number]]  (Abnormal) Collected: 11/02/21 0022    Specimen: Blood Updated: 11/02/21 0044    Narrative:      The following orders were created for panel order CBC & Differential.  Procedure                               Abnormality         Status                     ---------                               -----------         ------                     CBC Auto Differential[[id number]]        Abnormal            Final result                 Please view results for these tests on the individual orders.    Comprehensive Metabolic Panel [[id number]]  (Abnormal) Collected: 11/02/21 0022    Specimen: Blood Updated: 11/02/21 0119     Glucose 131 mg/dL      BUN 23 mg/dL      Creatinine 1.29 mg/dL      Sodium 134 mmol/L      Potassium 3.3 mmol/L      Chloride 96 mmol/L      CO2 24.9 mmol/L      Calcium 9.0 mg/dL      Total Protein 6.8 g/dL      Albumin 3.10 g/dL      ALT (SGPT) 18 U/L      AST (SGOT) 19 U/L      Alkaline Phosphatase 169 U/L      Total Bilirubin 0.7 mg/dL      eGFR Non African Amer 59 mL/min/1.73      Globulin 3.7 gm/dL      A/G Ratio 0.8 g/dL      BUN/Creatinine Ratio 17.8     Anion Gap 13.1 mmol/L     Narrative:      GFR Normal >60  Chronic Kidney Disease <60  Kidney Failure <15      Protime-INR [[id number]]  (Abnormal) Collected: 11/02/21 0022    Specimen: Blood Updated: 11/02/21 0102     Protime 27.1 Seconds      INR 2.55    aPTT [[id number]]  (Abnormal) Collected: 11/02/21 0022    Specimen: Blood Updated: 11/02/21 0102     PTT 70.4 seconds     Lactic Acid, Plasma [[id number]]  (Abnormal) Collected: 11/02/21 0022    Specimen: Blood Updated: 11/02/21 0116     Lactate 2.2 mmol/L     Blood  Culture - Blood, Arm, Right [[id number]] Collected: 11/02/21 0022    Specimen: Blood from Arm, Right Updated: 11/02/21 0039    Blood Culture - Blood, Arm, Left [[id number]] Collected: 11/02/21 0022    Specimen: Blood from Arm, Left Updated: 11/02/21 0039    CBC Auto Differential [[id number]]  (Abnormal) Collected: 11/02/21 0022    Specimen: Blood Updated: 11/02/21 0044     WBC 19.78 10*3/mm3      RBC 4.67 10*6/mm3      Hemoglobin 13.8 g/dL      Hematocrit 41.1 %      MCV 88.0 fL      MCH 29.6 pg      MCHC 33.6 g/dL      RDW 14.3 %      RDW-SD 45.5 fl      MPV 10.9 fL      Platelets 200 10*3/mm3      Neutrophil % 83.6 %      Lymphocyte % 8.6 %      Monocyte % 4.6 %      Eosinophil % 0.9 %      Basophil % 0.5 %      Immature Grans % 1.8 %      Neutrophils, Absolute 16.54 10*3/mm3      Lymphocytes, Absolute 1.71 10*3/mm3      Monocytes, Absolute 0.90 10*3/mm3      Eosinophils, Absolute 0.18 10*3/mm3      Basophils, Absolute 0.09 10*3/mm3      Immature Grans, Absolute 0.36 10*3/mm3      nRBC 0.0 /100 WBC     STAT Lactic Acid, Reflex [[id number]]  (Normal) Collected: 11/02/21 0354    Specimen: Blood Updated: 11/02/21 0427     Lactate 1.3 mmol/L     Basic Metabolic Panel [[id number]]  (Abnormal) Collected: 11/02/21 0616    Specimen: Blood Updated: 11/02/21 0646     Glucose 132 mg/dL      BUN 22 mg/dL      Creatinine 0.89 mg/dL      Sodium 134 mmol/L      Potassium 3.6 mmol/L      Chloride 99 mmol/L      CO2 25.2 mmol/L      Calcium 8.3 mg/dL      eGFR Non African Amer 90 mL/min/1.73      BUN/Creatinine Ratio 24.7     Anion Gap 9.8 mmol/L     Narrative:      GFR Normal >60  Chronic Kidney Disease <60  Kidney Failure <15      CBC (No Diff) [[id number]]  (Abnormal) Collected: 11/02/21 0616    Specimen: Blood Updated: 11/02/21 0645     WBC 17.21 10*3/mm3      RBC 4.24 10*6/mm3      Hemoglobin 12.4 g/dL      Hematocrit 37.0 %      MCV 87.3 fL      MCH 29.2 pg      MCHC 33.5 g/dL      RDW 14.0 %      RDW-SD 44.6 fl      MPV 10.2 fL       Platelets 186 10*3/mm3     Protime-INR [[id number]]  (Abnormal) Collected: 11/02/21 1056    Specimen: Blood from Arm, Left Updated: 11/02/21 1119     Protime 30.8 Seconds      INR 2.99          Imaging Results (Last 24 Hours)     Procedure Component Value Units Date/Time    XR Chest 1 View [[id number]] Collected: 11/02/21 0029     Updated: 11/02/21 0033    Narrative:      SINGLE VIEW OF THE CHEST     HISTORY: Cough     COMPARISON: 10/29/2021     FINDINGS:  Cardiomegaly is present. There is no vascular congestion. No  pneumothorax, pleural effusion, or acute infiltrate is seen.       Impression:      No acute findings.     This report was finalized on 11/2/2021 12:30 AM by Dr. Kasey Galloway M.D.             Results for orders placed during the hospital encounter of 01/23/19    Adult Transthoracic Echo Complete W/ Cont if Necessary Per Protocol    Interpretation Summary  · Left ventricular wall thickness is consistent with mild concentric hypertrophy.  · Estimated EF = 60%.  · Left ventricular systolic function is normal.  · Left ventricular diastolic dysfunction (grade I) consistent with impaired relaxation.  · Mild tricuspid valve regurgitation is present.  · Calculated right ventricular systolic pressure from tricuspid regurgitation is 48.7 mmHg.      ECG 12 Lead   Preliminary Result   HEART RATE= 122  bpm   RR Interval= 493  ms   AK Interval=   ms   P Horizontal Axis=   deg   P Front Axis=   deg   QRSD Interval= 108  ms   QT Interval= 362  ms   QRS Axis= -28  deg   T Wave Axis= 140  deg   - ABNORMAL ECG -   Atrial fibrillation   Borderline left axis deviation   Abnormal lateral Q waves   Minimal ST depression, lateral leads   Prolonged QT interval   Electronically Signed By:    Date and Time of Study: 2021-11-02 12:08:11           Assessment/Plan     Active Hospital Problems    Diagnosis  POA   • **Cellulitis of left lower extremity [L03.116]  Yes   • Hypokalemia [E87.6]  Yes   • CAROL (acute kidney injury)  (HCC) [N17.9]  Yes   • Sepsis with cutaneous manifestations (HCC) [A41.9]  Yes   • Hyperglycemia [R73.9]  Yes   • Paroxysmal atrial fibrillation (HCC) [I48.0]  Unknown   • Heterozygous factor V Leiden mutation (HCC) [D68.51]  Yes   • Dyslipidemia [E78.5]  Yes   • Lymphedema of both lower extremities [I89.0]  Yes   • Obesity, morbid, BMI 50 or higher (HCC) [E66.01]  Yes   • History of bilateral pulmonary embolism (CMS/HCC) [I26.99]  Yes   • ARNOLDO (obstructive sleep apnea) [G47.33]  Yes      Resolved Hospital Problems   No resolved problems to display.       Mr. Melendez is a 51 y.o. smoker with a history of DVT / PE, factor V Leiden on chronic anticoagulation, obesity who presents to [facility] ER with chief complaint of cellulitis & admitted for cellulitis of left lower extremity.        Cellulitis of left lower extremity / Lymphedema of both lower extremities  Cefepime, vancomycin for now pending BC x2 results  Symptom mgmt  Consider ID vs general surgery consultation vs both if no clinical improvement or progression of cellulitis develops      Sepsis with cutaneous manifestations (HCC)  Likely secondary to #1  Serum lactate 2.2 normalized following IV fluid bolus  Hemodynamically stable, continues IV fluids decreased given elevated DBP  WBC trend:  19-->17 (11/2)      Hypokalemia /  CAROL (acute kidney injury) (HCC)  Resolved  Serum Creatinine 1.2 increased from 0.8 returned to baseline following 1 L NS IV bolus  Avoid nephrotoxins      Atrial fibrillation  Heart rate consistently elevated above 100  Noted on monitor & confirmed on EKG  Consult cardiology  Appropriately anticoagulated / history of factor V Leiden  Provide metoprolol 25 mg P.O. once & defer to cardiology moving forward      Hyperglycemia  Consistent elevated serum glucose and high probability for diabetes mellitus given BMI 72  A1c in a.m.  Low-dose lispro sliding scale for now  NCS diet      Obesity, morbid, BMI 50 or higher (HCC)  Complicating all  problems      ARNOLDO (obstructive sleep apnea)  Requiring 2L NC at night      Heterozygous factor V Leiden mutation (HCC) /   History of bilateral pulmonary embolism (CMS/HCC)  Warfarin continued on admission per pharmacy dosing  Therapeutic INR       Dyslipidemia  Ordering lipid panel    · I discussed the patient's findings and my recommendations with Dr. Jung.    VTE Prophylaxis - warfarin  (home med)  Code Status - CPR    FADY Mehta  [facility]  11/02/21  12:22 EDT      Electronically signed by Jackson Canchola APRN at 11/02/21 1222          Emergency Department Notes      Alina Geiger, RN at 11/01/21 2039        Pt to triage from home with c/o left leg pain - pt seen here Friday for short of breath - was told white count elevated, but not put on antibiotics at that time.  Had tele health visit Saturday for leg pain, put on keflex, has taken doses on Sunday and today, but states there is no improvement in the cellulitis.  Pt wearing mask in triage.  Triage personnel wore appropriate PPE    Pt had covid test Friday, negative.     Alina Geiger RN  11/01/21 2045      Electronically signed by Alina Geiger RN at 11/01/21 2045     Edita Akins RN at 11/01/21 2349        This RN wore gloves, mask, eye protection and all other necessary PPE while performing pt care.     Edita Akins, RN  11/01/21 2349      Electronically signed by Edita Akins RN at 11/01/21 2349     Cedric Garcia MD at 11/01/21 2351           EMERGENCY DEPARTMENT ENCOUNTER    Room Number:  52/52  Date of encounter:  11/2/2021  PCP: Hansel Patel DO  Historian: Patient      HPI:  Chief Complaint: Left leg redness and swelling  A complete HPI/ROS/PMH/PSH/SH/FH are unobtainable due to: None    Context: Kameron Melendez is a 51 y.o. male who presents to the ED c/o worsening cellulitis to left lower extremity.  Patient was seen on 1029 for shortness of breath and ruled out for PE  Covid and pneumonia.  Since then he has resolved redness and swelling to left lower extremity.  Patient placed on Keflex but states that pain swelling and redness got worse.  Patient is rather extensive lymphedema to bilateral lower extremities.  Patient is on warfarin for previous PEs.      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   • Knee arthropathy 12/11/2015   • Morbid obesity with body mass index of 60.0-69.9 in adult ([facility]) 12/11/2015   • ARNOLDO (obstructive sleep apnea) 12/11/2015   • Severe edema 12/11/2015   • History of pulmonary embolism 01/23/2019   • Other acute pulmonary embolism without acute cor pulmonale ([facility]) 02/01/2019   • Intractable back pain 11/25/2019   • Heterozygous factor V Leiden mutation ([facility]) 11/25/2019     Resolved Ambulatory Problems     Diagnosis Date Noted   • Right ventricular enlargement 08/29/2017     Past Medical History:   Diagnosis Date   • DVT (deep venous thrombosis) (CMS/[facility])    • Factor V Leiden (CMS/[facility])    • Hypertension    • Kidney stone    • Lymphedema    • Lymphedema of left lower extremity    • Obesity    • Pulmonary embolism (CMS/[facility])          PAST SURGICAL HISTORY  Past Surgical History:   Procedure Laterality Date   • CARDIAC CATHETERIZATION Bilateral 7/18/2017    Procedure: Pulmonary angiography- Inari ;  Surgeon: Cedric Coulter MD;  Location:  [facility];  Service:    • CARDIAC CATHETERIZATION N/A 7/18/2017    Procedure: Right Heart Cath;  Surgeon: Cedric Coulter MD;  Location:  [facility];  Service:    • THROMBECTOMY           FAMILY HISTORY  Family History   Problem Relation Age of Onset   • Heart  disease Mother    • Heart failure Mother    • Bradycardia Mother    • No Known Problems Father    • No Known Problems Maternal Grandmother    • No Known Problems Maternal Grandfather    • No Known Problems Paternal Grandmother    • No Known Problems Paternal Grandfather          SOCIAL HISTORY  Social History     Socioeconomic History   • Marital status:    Tobacco Use   • Smoking status: Light Tobacco Smoker     Types: Cigars, Pipe     Start date: 1/1/2006   • Smokeless tobacco: Never Used   • Tobacco comment: occasional - < 1 a month   Substance and Sexual Activity   • Alcohol use: No   • Drug use: No   • Sexual activity: Defer         ALLERGIES  Patient has no known allergies.        REVIEW OF SYSTEMS  Review of Systems     All systems reviewed and negative except for those discussed in HPI.       PHYSICAL EXAM    I have reviewed the triage vital signs and nursing notes.    ED Triage Vitals [11/01/21 2043]   Temp Heart Rate Resp BP SpO2   97.8 °F (36.6 °C) 69 16 128/60 96 %      Temp src Heart Rate Source Patient Position BP Location FiO2 (%)   Oral Monitor Sitting Right arm --       Physical Exam  GENERAL: Mild distressed  HENT: nares patent  EYES: no scleral icterus  CV: regular rhythm, regular rate, extensive lymphedema bilateral lower extremities  RESPIRATORY: normal effort  ABDOMEN: soft  MUSCULOSKELETAL: no deformity  NEURO: alert, moves all extremities, follows commands  SKIN: warm, dry, extensive lymphedema to bilateral lower extremities, left leg with erythema induration no fluctuance to medial aspect left lower extremity extending down from thigh to leg -foot with strong DP pulse well-perfused distally        LAB RESULTS  Recent Results (from the past 24 hour(s))   COVID-19,[facility]-HOU[facility] CEPHEID/THANG NP SWAB IN TRANSPORT MEDIA 8-12 HR TAT - Swab, Nasopharynx    Collection Time: 11/02/21 12:11 AM    Specimen: Nasopharynx; Swab   Result Value Ref Range    COVID19 Not Detected Not Detected - Ref.  Range   Comprehensive Metabolic Panel    Collection Time: 11/02/21 12:22 AM    Specimen: Blood   Result Value Ref Range    Glucose 131 (H) 65 - 99 mg/dL    BUN 23 (H) 6 - 20 mg/dL    Creatinine 1.29 (H) 0.76 - 1.27 mg/dL    Sodium 134 (L) 136 - 145 mmol/L    Potassium 3.3 (L) 3.5 - 5.2 mmol/L    Chloride 96 (L) 98 - 107 mmol/L    CO2 24.9 22.0 - 29.0 mmol/L    Calcium 9.0 8.6 - 10.5 mg/dL    Total Protein 6.8 6.0 - 8.5 g/dL    Albumin 3.10 (L) 3.50 - 5.20 g/dL    ALT (SGPT) 18 1 - 41 U/L    AST (SGOT) 19 1 - 40 U/L    Alkaline Phosphatase 169 (H) 39 - 117 U/L    Total Bilirubin 0.7 0.0 - 1.2 mg/dL    eGFR Non African Amer 59 (L) >60 mL/min/1.73    Globulin 3.7 gm/dL    A/G Ratio 0.8 g/dL    BUN/Creatinine Ratio 17.8 7.0 - 25.0    Anion Gap 13.1 5.0 - 15.0 mmol/L   Protime-INR    Collection Time: 11/02/21 12:22 AM    Specimen: Blood   Result Value Ref Range    Protime 27.1 (H) 11.7 - 14.2 Seconds    INR 2.55 (H) 0.90 - 1.10   aPTT    Collection Time: 11/02/21 12:22 AM    Specimen: Blood   Result Value Ref Range    PTT 70.4 (H) 22.7 - 35.4 seconds   Lactic Acid, Plasma    Collection Time: 11/02/21 12:22 AM    Specimen: Blood   Result Value Ref Range    Lactate 2.2 (C) 0.5 - 2.0 mmol/L   CBC Auto Differential    Collection Time: 11/02/21 12:22 AM    Specimen: Blood   Result Value Ref Range    WBC 19.78 (H) 3.40 - 10.80 10*3/mm3    RBC 4.67 4.14 - 5.80 10*6/mm3    Hemoglobin 13.8 13.0 - 17.7 g/dL    Hematocrit 41.1 37.5 - 51.0 %    MCV 88.0 79.0 - 97.0 fL    MCH 29.6 26.6 - 33.0 pg    MCHC 33.6 31.5 - 35.7 g/dL    RDW 14.3 12.3 - 15.4 %    RDW-SD 45.5 37.0 - 54.0 fl    MPV 10.9 6.0 - 12.0 fL    Platelets 200 140 - 450 10*3/mm3    Neutrophil % 83.6 (H) 42.7 - 76.0 %    Lymphocyte % 8.6 (L) 19.6 - 45.3 %    Monocyte % 4.6 (L) 5.0 - 12.0 %    Eosinophil % 0.9 0.3 - 6.2 %    Basophil % 0.5 0.0 - 1.5 %    Immature Grans % 1.8 (H) 0.0 - 0.5 %    Neutrophils, Absolute 16.54 (H) 1.70 - 7.00 10*3/mm3    Lymphocytes, Absolute  1.71 0.70 - 3.10 10*3/mm3    Monocytes, Absolute 0.90 0.10 - 0.90 10*3/mm3    Eosinophils, Absolute 0.18 0.00 - 0.40 10*3/mm3    Basophils, Absolute 0.09 0.00 - 0.20 10*3/mm3    Immature Grans, Absolute 0.36 (H) 0.00 - 0.05 10*3/mm3    nRBC 0.0 0.0 - 0.2 /100 WBC   STAT Lactic Acid, Reflex    Collection Time: 11/02/21  3:54 AM    Specimen: Blood   Result Value Ref Range    Lactate 1.3 0.5 - 2.0 mmol/L       Ordered the above labs and independently reviewed the results.        RADIOLOGY  XR Chest 1 View    Result Date: 11/2/2021  SINGLE VIEW OF THE CHEST  HISTORY: Cough  COMPARISON: 10/29/2021  FINDINGS: Cardiomegaly is present. There is no vascular congestion. No pneumothorax, pleural effusion, or acute infiltrate is seen.      No acute findings.  This report was finalized on 11/2/2021 12:30 AM by Dr. Kasey Galloway M.D.        I ordered the above noted radiological studies. Reviewed by me and discussed with radiologist.  See dictation for official radiology interpretation.      PROCEDURES    Procedures      MEDICATIONS GIVEN IN ER    Medications   sodium chloride 0.9 % flush 10 mL (has no administration in time range)   sodium chloride 0.9 % flush 10 mL (has no administration in time range)   sodium chloride 0.9 % flush 10 mL (has no administration in time range)   acetaminophen (TYLENOL) tablet 650 mg (has no administration in time range)     Or   acetaminophen (TYLENOL) 160 MG/5ML solution 650 mg (has no administration in time range)     Or   acetaminophen (TYLENOL) suppository 650 mg (has no administration in time range)   ondansetron (ZOFRAN) tablet 4 mg (has no administration in time range)     Or   ondansetron (ZOFRAN) injection 4 mg (has no administration in time range)   calcium carbonate (TUMS) chewable tablet 500 mg (200 mg elemental) (has no administration in time range)   sodium chloride 0.9 % infusion (has no administration in time range)   Pharmacy to dose vancomycin (has no administration in time  range)   Pharmacy Consult - Pharmacy to dose (has no administration in time range)   cefepime 2 gm IVPB in 100 ml NS (VTB) (has no administration in time range)   vancomycin 1250 mg/250 mL 0.9% NS IVPB ([facility]) (has no administration in time range)   morphine injection 4 mg (4 mg Intravenous Given 11/2/21 0029)   ondansetron (ZOFRAN) injection 4 mg (4 mg Intravenous Given 11/2/21 0030)   vancomycin 2500 mg/500 mL 0.9% NS IVPB ([facility]) (0 mg/kg × 234 kg (Order-Specific) Intravenous Stopped 11/2/21 0311)   cefepime (MAXIPIME) 1 g/100 mL 0.9% NS IVPB (mbp) (0 g Intravenous Stopped 11/2/21 0351)   sodium chloride 0.9 % bolus 1,000 mL (0 mL Intravenous Stopped 11/2/21 0240)         PROGRESS, DATA ANALYSIS, CONSULTS, AND MEDICAL DECISION MAKING    All labs have been independently reviewed by me.  All radiology studies have been reviewed by me and discussed with radiologist dictating the report.   EKG's independently viewed and interpreted by me.  Discussion below represents my analysis of pertinent findings related to patient's condition, differential diagnosis, treatment plan and final disposition.                 PPE: The patient wore a surgical mask throughout the entire patient encounter. I wore an N95.    AS OF 05:55 EDT VITALS:    BP - 133/64  HR - 117  TEMP - 97.8 °F (36.6 °C) (Oral)  O2 SATS - 99%        DIAGNOSIS  Final diagnoses:   Cellulitis of left lower extremity   Lymphedema         DISPOSITION  Discharge           Cedric Garcia MD  11/02/21 0555      Electronically signed by Cedric Garcia MD at 11/02/21 0555       Vital Signs (last day)     Date/Time Temp Temp src Pulse Resp BP Patient Position SpO2    11/02/21 1318 -- -- 124 -- 165/92 -- --    11/02/21 0856 97.9 (36.6) Oral 120 18 142/102 Lying 99    11/02/21 05:55:14 98.2 (36.8) Tympanic 116 19 127/71 Lying 99    11/02/21 0358 -- -- 117 -- -- -- 99    11/02/21 0335 -- -- 90 -- -- -- 98    11/02/21 0331 -- -- 110 -- 133/64 -- 96    11/02/21  0231 -- -- -- -- 133/75 -- --    11/02/21 0147 -- -- 97 -- -- -- 97    11/02/21 0131 -- -- 96 -- 138/77 -- 99    11/02/21 0119 -- -- 115 -- -- -- 98    11/02/21 0101 -- -- 97 -- 139/59 -- 99    11/02/21 0037 -- -- 85 -- 108/52 -- 92    11/02/21 0022 -- -- 71 -- -- -- 95    11/02/21 0017 -- -- 93 -- -- -- 95    11/01/21 2043 97.8 (36.6) Oral 69 16 128/60 Sitting 96          Oxygen Therapy (last day)     Date/Time SpO2 Device (Oxygen Therapy) Flow (L/min) Oxygen Concentration (%) ETCO2 (mmHg)    11/02/21 0856 99 nasal cannula 2 -- --    11/02/21 05:55:14 99 nasal cannula 2 -- --    11/02/21 0358 99 -- -- -- --    11/02/21 0335 98 -- -- -- --    11/02/21 0331 96 -- -- -- --    11/02/21 0147 97 -- -- -- --    11/02/21 0131 99 -- -- -- --    11/02/21 0119 98 -- -- -- --    11/02/21 0101 99 -- -- -- --    11/02/21 00:54:42 -- nasal cannula 2 -- --    11/02/21 0037 92 -- -- -- --    11/02/21 0022 95 -- -- -- --    11/02/21 0017 95 -- -- -- --    11/01/21 2043 96 room air -- -- --          Intake & Output (last day)       11/01 0701 11/02 0700 11/02 0701 11/03 0700    IV Piggyback 1600     Total Intake(mL/kg) 1600 (6.3)     Net +1600           Urine Unmeasured Occurrence  1 x        Lines, Drains & Airways     Active LDAs     Name Placement date Placement time Site Days    Peripheral IV 11/02/21 0028 Right Antecubital 11/02/21 0028  Antecubital  less than 1                  Facility-Administered Medications as of 11/1/2021   Medication Dose Route Frequency Provider Last Rate Last Admin   • acetaminophen (TYLENOL) tablet 650 mg  650 mg Oral Q4H PRN Tonja Jones APRN        Or   • acetaminophen (TYLENOL) 160 MG/5ML solution 650 mg  650 mg Oral Q4H PRN Tonja Jones APRN        Or   • acetaminophen (TYLENOL) suppository 650 mg  650 mg Rectal Q4H PRN Tonja Jones APRN       • calcium carbonate (TUMS) chewable tablet 500 mg (200 mg elemental)  2 tablet Oral BID PRN Tonja Jones APRN       •  [COMPLETED] cefepime (MAXIPIME) 1 g/100 mL 0.9% NS IVPB (mbp)  1 g Intravenous Once Cedric Garcia MD   Stopped at 11/02/21 0351   • cefepime 2 gm IVPB in 100 ml NS (VTB)  2 g Intravenous Q8H Tonja Jones APRN   Currently Infusing at 11/02/21 1328   • dextrose (D50W) (25 g/50 mL) IV injection 25 g  25 g Intravenous Q15 Min PRN Jackson Canchola APRN       • dextrose (GLUTOSE) oral gel 15 g  15 g Oral Q15 Min PRN Jackson Canchola APRN       • glucagon (human recombinant) (GLUCAGEN DIAGNOSTIC) injection 1 mg  1 mg Subcutaneous Q15 Min PRN Jackson Canchola APRN       • insulin lispro (ADMELOG) injection 0-7 Units  0-7 Units Subcutaneous TID AC Jackson Canchola APRN       • [COMPLETED] metoprolol tartrate (LOPRESSOR) tablet 25 mg  25 mg Oral Once Jackson Canchola APRN   25 mg at 11/02/21 1318   • [COMPLETED] morphine injection 4 mg  4 mg Intravenous Once Cedric Garcia MD   4 mg at 11/02/21 0029   • [COMPLETED] ondansetron (ZOFRAN) injection 4 mg  4 mg Intravenous Once Cedric Garcia MD   4 mg at 11/02/21 0030   • ondansetron (ZOFRAN) tablet 4 mg  4 mg Oral Q6H PRN Tonja Jones APRN        Or   • ondansetron (ZOFRAN) injection 4 mg  4 mg Intravenous Q6H PRN Tonja Jones APRN       • Pharmacy to dose vancomycin   Does not apply Continuous PRN Tonja Jones APRN       • Pharmacy to dose warfarin   Does not apply Continuous PRN Jackson Canchola APRN       • [COMPLETED] sodium chloride 0.9 % bolus 1,000 mL  1,000 mL Intravenous Once Cedric Garcia MD   Stopped at 11/02/21 0240   • sodium chloride 0.9 % flush 10 mL  10 mL Intravenous PRN Cedric Garcia MD       • sodium chloride 0.9 % flush 10 mL  10 mL Intravenous Q12H Tonja Jones APRN   10 mL at 11/02/21 0846   • sodium chloride 0.9 % flush 10 mL  10 mL Intravenous PRN Tonja Jones APRN       • sodium chloride 0.9 % infusion  25 mL/hr Intravenous Continuous Jackson Canchola, APRN 25  mL/hr at 11/02/21 1210 25 mL/hr at 11/02/21 1210   • vancomycin 1250 mg/250 mL 0.9% NS IVPB ([facility])  5 mg/kg Intravenous Q12H Tonja Jones APRN       • [COMPLETED] vancomycin 2500 mg/500 mL 0.9% NS IVPB ([facility])  11 mg/kg (Order-Specific) Intravenous Once Cedric Garcia MD   Stopped at 11/02/21 0311   • warfarin (COUMADIN) tablet 10 mg  10 mg Oral Once per day on Tue Thu Sat Jackson Canchola APRN       • [START ON 11/3/2021] warfarin (COUMADIN) tablet 15 mg  15 mg Oral Once per day on Sun Mon Wed Fri Jackson Canchola APRN             Lab Results (last 24 hours)     Procedure Component Value Units Date/Time    POC Glucose Once [[id number]]  (Abnormal) Collected: 11/02/21 1313    Specimen: Blood Updated: 11/02/21 1315     Glucose 150 mg/dL      Comment: Meter: [id number] : [id number] Baltazar Thomas RN       Protime-INR [[id number]]  (Abnormal) Collected: 11/02/21 1056    Specimen: Blood from Arm, Left Updated: 11/02/21 1119     Protime 30.8 Seconds      INR 2.99    Basic Metabolic Panel [[id number]]  (Abnormal) Collected: 11/02/21 0616    Specimen: Blood Updated: 11/02/21 0646     Glucose 132 mg/dL      BUN 22 mg/dL      Creatinine 0.89 mg/dL      Sodium 134 mmol/L      Potassium 3.6 mmol/L      Chloride 99 mmol/L      CO2 25.2 mmol/L      Calcium 8.3 mg/dL      eGFR Non African Amer 90 mL/min/1.73      BUN/Creatinine Ratio 24.7     Anion Gap 9.8 mmol/L     Narrative:      GFR Normal >60  Chronic Kidney Disease <60  Kidney Failure <15      CBC (No Diff) [[id number]]  (Abnormal) Collected: 11/02/21 0616    Specimen: Blood Updated: 11/02/21 0645     WBC 17.21 10*3/mm3      RBC 4.24 10*6/mm3      Hemoglobin 12.4 g/dL      Hematocrit 37.0 %      MCV 87.3 fL      MCH 29.2 pg      MCHC 33.5 g/dL      RDW 14.0 %      RDW-SD 44.6 fl      MPV 10.2 fL      Platelets 186 10*3/mm3     STAT Lactic Acid, Reflex [[id number]]  (Normal) Collected: 11/02/21 0354    Specimen: Blood Updated: 11/02/21 0427     Lactate 1.3  mmol/L     COVID PRE-OP / PRE-PROCEDURE SCREENING ORDER (NO ISOLATION) - Swab, Nasopharynx [[id number]]  (Normal) Collected: 11/02/21 0011    Specimen: Swab from Nasopharynx Updated: 11/02/21 0144    Narrative:      The following orders were created for panel order COVID PRE-OP / PRE-PROCEDURE SCREENING ORDER (NO ISOLATION) - Swab, Nasopharynx.  Procedure                               Abnormality         Status                     ---------                               -----------         ------                     COVID-19,[facility]...[[id number]]  Normal              Final result                 Please view results for these tests on the individual orders.    COVID-19,[facility] CEPHEID/THANG NP SWAB IN TRANSPORT MEDIA 8-12 HR TAT - Swab, Nasopharynx [[id number]]  (Normal) Collected: 11/02/21 0011    Specimen: Swab from Nasopharynx Updated: 11/02/21 0144     COVID19 Not Detected    Narrative:      Fact sheet for providers: https://www.fda.gov/media/849020/download     Fact sheet for patients: https://www.fda.gov/media/842496/download    Comprehensive Metabolic Panel [[id number]]  (Abnormal) Collected: 11/02/21 0022    Specimen: Blood Updated: 11/02/21 0119     Glucose 131 mg/dL      BUN 23 mg/dL      Creatinine 1.29 mg/dL      Sodium 134 mmol/L      Potassium 3.3 mmol/L      Chloride 96 mmol/L      CO2 24.9 mmol/L      Calcium 9.0 mg/dL      Total Protein 6.8 g/dL      Albumin 3.10 g/dL      ALT (SGPT) 18 U/L      AST (SGOT) 19 U/L      Alkaline Phosphatase 169 U/L      Total Bilirubin 0.7 mg/dL      eGFR Non African Amer 59 mL/min/1.73      Globulin 3.7 gm/dL      A/G Ratio 0.8 g/dL      BUN/Creatinine Ratio 17.8     Anion Gap 13.1 mmol/L     Narrative:      GFR Normal >60  Chronic Kidney Disease <60  Kidney Failure <15      Lactic Acid, Plasma [[id number]]  (Abnormal) Collected: 11/02/21 0022    Specimen: Blood Updated: 11/02/21 0116     Lactate 2.2 mmol/L     aPTT [[id number]]  (Abnormal) Collected:  11/02/21 0022    Specimen: Blood Updated: 11/02/21 0102     PTT 70.4 seconds     Protime-INR [[id number]]  (Abnormal) Collected: 11/02/21 0022    Specimen: Blood Updated: 11/02/21 0102     Protime 27.1 Seconds      INR 2.55    CBC & Differential [[id number]]  (Abnormal) Collected: 11/02/21 0022    Specimen: Blood Updated: 11/02/21 0044    Narrative:      The following orders were created for panel order CBC & Differential.  Procedure                               Abnormality         Status                     ---------                               -----------         ------                     CBC Auto Differential[[id number]]        Abnormal            Final result                 Please view results for these tests on the individual orders.    CBC Auto Differential [[id number]]  (Abnormal) Collected: 11/02/21 0022    Specimen: Blood Updated: 11/02/21 0044     WBC 19.78 10*3/mm3      RBC 4.67 10*6/mm3      Hemoglobin 13.8 g/dL      Hematocrit 41.1 %      MCV 88.0 fL      MCH 29.6 pg      MCHC 33.6 g/dL      RDW 14.3 %      RDW-SD 45.5 fl      MPV 10.9 fL      Platelets 200 10*3/mm3      Neutrophil % 83.6 %      Lymphocyte % 8.6 %      Monocyte % 4.6 %      Eosinophil % 0.9 %      Basophil % 0.5 %      Immature Grans % 1.8 %      Neutrophils, Absolute 16.54 10*3/mm3      Lymphocytes, Absolute 1.71 10*3/mm3      Monocytes, Absolute 0.90 10*3/mm3      Eosinophils, Absolute 0.18 10*3/mm3      Basophils, Absolute 0.09 10*3/mm3      Immature Grans, Absolute 0.36 10*3/mm3      nRBC 0.0 /100 WBC     Blood Culture - Blood, Arm, Left [[id number]] Collected: 11/02/21 0022    Specimen: Blood from Arm, Left Updated: 11/02/21 0039    Blood Culture - Blood, Arm, Right [[id number]] Collected: 11/02/21 0022    Specimen: Blood from Arm, Right Updated: 11/02/21 0039        Imaging Results (Last 24 Hours)     Procedure Component Value Units Date/Time    XR Chest 1 View [[id number]] Collected: 11/02/21 0029     Updated: 11/02/21 0033     Narrative:      SINGLE VIEW OF THE CHEST     HISTORY: Cough     COMPARISON: 10/29/2021     FINDINGS:  Cardiomegaly is present. There is no vascular congestion. No  pneumothorax, pleural effusion, or acute infiltrate is seen.       Impression:      No acute findings.     This report was finalized on 11/2/2021 12:30 AM by Dr. Kasey Galloway M.D.           Orders (last 24 hrs)      Start     Ordered    11/03/21 2000  warfarin (COUMADIN) tablet 15 mg  Once per day on Sun Mon Wed Fri 11/02/21 0959    11/03/21 1130  Vancomycin, Trough Vancomycin dose due at 1200. Please make sure Vancomycin IV is not infusing before drawing the vancomycin level. Hold vancomycin dose if level is >21 mcg/mL.  Timed        Comments: Vancomycin dose due at 1200. Please make sure Vancomycin IV is not infusing before drawing the vancomycin level. Hold vancomycin dose if level is >21 mcg/mL.      11/02/21 0220    11/03/21 0600  Lipid Panel  Morning Draw         11/02/21 1216    11/03/21 0600  CBC (No Diff)  Morning Draw         11/02/21 1216    11/03/21 0600  Basic Metabolic Panel  Morning Draw         11/02/21 1216    11/03/21 0600  Hemoglobin A1c  Morning Draw         11/02/21 1217    11/02/21 2000  warfarin (COUMADIN) tablet 10 mg  Once per day on Tue Thu Sat         11/02/21 0959    11/02/21 1700  POC Glucose 4x Daily AC & at Bedtime  4 Times Daily Before Meals & at Bedtime      Comments: If bedtime blood glucose is greater than 350 mg/dl, call MD.      11/02/21 1217    11/02/21 1316  POC Glucose Once  PROCEDURE ONCE         11/02/21 1313    11/02/21 1224  metoprolol tartrate (LOPRESSOR) tablet 25 mg  Once         11/02/21 1222    11/02/21 1223  Inpatient Cardiology Consult  Once        Specialty:  Cardiology  Provider:  Elsy Baeza MD    11/02/21 1223    11/02/21 1219  insulin lispro (ADMELOG) injection 0-7 Units  3 Times Daily Before Meals         11/02/21 1217    11/02/21 1217  Do NOT Hold Basal or Correction Scale  Insulin When Patient is NPO, Hold Scheduled Mealtime (Bolus) Insulin if NPO  Continuous         11/02/21 1217 11/02/21 1217  Follow [facility] Hypoglycemia Standing Orders For Blood Glucose Less Than 70 mg/dL  Until Discontinued        Comments: ALERT PATIENT - NOT NPO & CAN SAFELY SWALLOW  Administer 4 oz Fruit Juice OR 4 oz Regular Soda OR 8 oz Milk OR 15-30 grams (1 tube) of Glucose Gel.  Recheck Blood Glucose Approximately 15 Minutes After Ingestion, Repeat Treatment & Continue to Recheck Blood Sugar Approximately Every 15 Minutes Until Blood Glucose is 70 or Higher.  Once Blood Glucose is 70 or Higher & if It Will Be More Than 60 Minutes Until Next Meal, Provide Appropriate Snack (Including Carbohydrate Food) Based on Meal Plan Order. Give Meal Tray As Soon As Possible.    PATIENT HAS IV ACCESS - UNRESPONSIVE, NPO OR UNABLE TO SAFELY SWALLOW  Administer 25g (50ml) D50W IV Push.  Recheck Blood Glucose Approximately 15 Minutes After Administration, if Blood Glucose Remains Less Than 70, Repeat Treatment   Recheck Blood Glucose Approximately 15 Minutes After 2nd Administration, if Blood Glucose Remains Less Than 70 After 2nd Dose of D50W, Contact Provider for Further Treatment Orders & Consider Adding IVF With D5W for Maintenance    PATIENT WITHOUT IV ACCESS - UNRESPONSIVE, NPO OR UNABLE TO SAFELY SWALLOW  Administer 1mg Glucagon SQ & Establish IV Access.  Turn Patient on Side - Nausea / Vomiting May Occur.  Recheck Blood Glucose Approximately 15 Minutes After Administration.  If Blood Glucose Remains Less Than 70, Administer 25g D50W IV Push (50ml).  Recheck Blood Glucose Approximately 15 Minutes After Administration of D50W, if Blood Glucose Remains Less Than 70, Contact Provider for Further Treatment Orders & Consider Adding IVF With D5 for Maintenance    Document Event & Patient Response to Interventions in EMR, Document Medications on MAR  Notify Provider if Hypoglycemia Treatment Needed    11/02/21 1217     "11/02/21 1216  dextrose (GLUTOSE) oral gel 15 g  Every 15 Minutes PRN         11/02/21 1217    11/02/21 1216  dextrose (D50W) (25 g/50 mL) IV injection 25 g  Every 15 Minutes PRN         11/02/21 1217    11/02/21 1216  glucagon (human recombinant) (GLUCAGEN DIAGNOSTIC) injection 1 mg  Every 15 Minutes PRN         11/02/21 1217    11/02/21 1200  vancomycin 1250 mg/250 mL 0.9% NS IVPB ([facility])  Every 12 Hours         11/02/21 0220    11/02/21 1037  ECG 12 Lead  Once         11/02/21 1036    11/02/21 1000  cefepime 2 gm IVPB in 100 ml NS (VTB)  Every 8 Hours         11/02/21 0214    11/02/21 0937  Protime-INR  Daily       11/02/21 0936 11/02/21 0936  Pharmacy to dose warfarin  Continuous PRN         11/02/21 0936 11/02/21 0936  Pharmacy to dose warfarin  Continuous PRN,   Status:  Discontinued         11/02/21 0936 11/02/21 0918  Diet Regular; Consistent Carbohydrate  Diet Effective Now         11/02/21 0917    11/02/21 0600  Basic Metabolic Panel  Morning Draw         11/02/21 0209    11/02/21 0600  CBC (No Diff)  Morning Draw         11/02/21 0209    11/02/21 0400  Vital Signs  Every 4 Hours       11/02/21 0209    11/02/21 0322  STAT Lactic Acid, Reflex  PROCEDURE ONCE         11/02/21 0116    11/02/21 0223  MRSA Screen, PCR (Inpatient) - Swab, Nares  Once         11/02/21 0222    11/02/21 0211  sodium chloride 0.9 % flush 10 mL  Every 12 Hours Scheduled         11/02/21 0209    11/02/21 0211  sodium chloride 0.9 % infusion  Continuous         11/02/21 0209 11/02/21 0209  Pharmacy Consult - Pharmacy to dose  Continuous PRN,   Status:  Discontinued         11/02/21 0209 11/02/21 0208  Pharmacy to dose vancomycin  Continuous PRN         11/02/21 0209    11/02/21 0207  calcium carbonate (TUMS) chewable tablet 500 mg (200 mg elemental)  2 Times Daily PRN         11/02/21 0209 11/02/21 0207  ondansetron (ZOFRAN) tablet 4 mg  Every 6 Hours PRN        \"Or\" Linked Group Details    11/02/21 0209 11/02/21 " "0207  ondansetron (ZOFRAN) injection 4 mg  Every 6 Hours PRN        \"Or\" Linked Group Details    11/02/21 0209    11/02/21 0207  acetaminophen (TYLENOL) tablet 650 mg  Every 4 Hours PRN        \"Or\" Linked Group Details    11/02/21 0209    11/02/21 0207  acetaminophen (TYLENOL) 160 MG/5ML solution 650 mg  Every 4 Hours PRN        \"Or\" Linked Group Details    11/02/21 0209    11/02/21 0207  acetaminophen (TYLENOL) suppository 650 mg  Every 4 Hours PRN        \"Or\" Linked Group Details    11/02/21 0209    11/02/21 0207  VTE Prophylaxis Not Indicated: Other: Patient Currently Anticoagulated / Receiving Prophylaxis  Once         11/02/21 0209 11/02/21 0207  Diet Regular  Diet Effective Now,   Status:  Canceled         11/02/21 0209 11/02/21 0206  Intake & Output  Every Shift       11/02/21 0209 11/02/21 0206  Weigh Patient  Once         11/02/21 0209 11/02/21 0206  Oxygen Therapy- Nasal Cannula; Titrate for SPO2: 90% - 95%  Continuous         11/02/21 0209 11/02/21 0206  Insert Peripheral IV  Once         11/02/21 0209 11/02/21 0206  Saline Lock & Maintain IV Access  Continuous         11/02/21 0209 11/02/21 0206  Code Status and Medical Interventions:  Continuous         11/02/21 0209 11/02/21 0205  sodium chloride 0.9 % flush 10 mL  As Needed         11/02/21 0209    11/02/21 0159  Inpatient Admission  Once         11/02/21 0159    11/02/21 0121  cefepime (MAXIPIME) 1 g/100 mL 0.9% NS IVPB (mbp)  Once         11/02/21 0119    11/02/21 0121  sodium chloride 0.9 % bolus 1,000 mL  Once         11/02/21 0119 11/02/21 0121  [facility] (on-call MD unless specified) Details  Once        Specialty:  Hospitalist  Provider:  (Not yet assigned)    11/02/21 0120 11/01/21 2353  morphine injection 4 mg  Once         11/01/21 2351 11/01/21 2353  ondansetron (ZOFRAN) injection 4 mg  Once         11/01/21 2351 11/01/21 2353  vancomycin 2500 mg/500 mL 0.9% NS IVPB ([facility])  Once         11/01/21 2351 11/01/21 " "2352  XR Chest 1 View  1 Time Imaging         11/01/21 2351    11/01/21 2351  Lactic Acid, Plasma  Once         11/01/21 2351    11/01/21 2351  Blood Culture - Blood, Arm, Right  Once         11/01/21 2351    11/01/21 2351  Blood Culture - Blood, Arm, Left  Once         11/01/21 2351    11/01/21 2351  Insert peripheral IV  Once        \"And\" Linked Group Details    11/01/21 2351    11/01/21 2351  Monitor Blood Pressure  Per Hospital Policy         11/01/21 2351    11/01/21 2351  Cardiac Monitoring  Once         11/01/21 2351    11/01/21 2351  Pulse Oximetry, Continuous  Continuous         11/01/21 2351    11/01/21 2350  sodium chloride 0.9 % flush 10 mL  As Needed        \"And\" Linked Group Details    11/01/21 2351    11/01/21 2350  CBC & Differential  Once         11/01/21 2351    11/01/21 2350  Comprehensive Metabolic Panel  Once         11/01/21 2351    11/01/21 2350  Protime-INR  Once         11/01/21 2351    11/01/21 2350  aPTT  Once         11/01/21 2351    11/01/21 2350  COVID PRE-OP / PRE-PROCEDURE SCREENING ORDER (NO ISOLATION) - Swab, Nasopharynx  Once         11/01/21 2351    11/01/21 2350  CBC Auto Differential  PROCEDURE ONCE         11/01/21 2351    11/01/21 2350  COVID-19,[facility] SMOOTH IN-HOUSE CEPHEID/THANG NP SWAB IN TRANSPORT MEDIA 8-12 HR TAT - Swab, Nasopharynx  PROCEDURE ONCE         11/01/21 2351                  "

## 2021-11-02 NOTE — PROGRESS NOTES
"[facility]  Clinical Pharmacy Department     Vancomycin Pharmacokinetic Initial Consult Note    Kameron Melendez is a 51 y.o. male who is on day 1 of pharmacy to dose vancomycin.    MRSA PCR performed: Ordered; Result:    Target: -600 mg/L.hr or Dose by Levels  Indication: SSTI  Culture/Source:    Previous Dose Given: 2500 mg @ 0034  Vancomycin Dose:   1250 mg (5 mg/kg) IV every  12  hours  Planned Duration of Therapy: 5 days  Consulting Provider: FADY Victoria  Other Antimicrobials: Cefepime    Vitals/Labs  Ht: 188 cm (74\"); Wt: (!) 255 kg (561 lb 1.6 oz)  Temp Readings from Last 3 Encounters:   11/01/21 97.8 °F (36.6 °C) (Oral)   10/29/21 98 °F (36.7 °C) (Tympanic)   12/01/19 97.1 °F (36.2 °C) (Oral)   Estimated Creatinine Clearance: 144.7 mL/min (A) (by C-G formula based on SCr of 1.29 mg/dL (H)).   Creatinine   Date Value Ref Range Status   11/02/2021 1.29 (H) 0.76 - 1.27 mg/dL Final   10/29/2021 1.12 0.76 - 1.27 mg/dL Final   09/02/2021 0.85 0.76 - 1.27 mg/dL Final   03/09/2021 0.73 (L) 0.76 - 1.27 mg/dL Final   02/09/2021 0.75 (L) 0.76 - 1.27 mg/dL Final   11/30/2019 0.66 (L) 0.76 - 1.27 mg/dL Final      Lab Results   Component Value Date    WBC 19.78 (H) 11/02/2021    WBC 14.57 (H) 10/29/2021    WBC 6.94 09/02/2021     Assessment/Plan:    1. Predictive AUC level for the dose ordered is 467 mg/L.hr, which is within the target of 400-600 mg/L.hr  2. MRSA has been ordered and will be followed to monitor for continued appropriateness of therapy  3. Vancomycin trough level has been ordered for 11/3/21 at 1130     Pharmacy will follow patient's kidney function and will adjust doses and obtain levels as necessary.Thank you for involving pharmacy in this patient's care. Please contact pharmacy with any questions or concerns.                           Luis Miguel Woo [facility]  Clinical Pharmacist  11/02/21       "

## 2021-11-02 NOTE — PLAN OF CARE
Pt. Slightly tachycardic in A-fib, metoprolol started, IV dig given, HR now improving.  VS otherwise wnl. Pt. Receiving IVFs and IV abx.  Pt. LLE with cellulitis, wound consulted.  Pt. On 3-4L NC, sats decreased when sleeping.  Pt. States has ARNOLDO, cannot tolerate Cpap.  Pt. To have echo.  Pt. Up with assist x1 with walker, adequate UOP.  Pt. Resting comfortably at present, will continue to monitor closely.      Problem: Adult Inpatient Plan of Care  Goal: Plan of Care Review  Outcome: Ongoing, Progressing  Flowsheets (Taken 11/2/2021 8013)  Progress: no change  Plan of Care Reviewed With: patient

## 2021-11-02 NOTE — PROGRESS NOTES
Pharmacy Consult: Warfarin Dosing/ Monitoring    Kameron Melendez is a 51 y.o. male, estimated creatinine clearance is 209.7 mL/min (by C-G formula based on SCr of 0.89 mg/dL). weighing (!) 255 kg (561 lb 1.6 oz).     has a past medical history of DVT (deep venous thrombosis) (HCC), Factor V Leiden (HCC), Hypertension, Kidney stone, Lymphedema, Lymphedema of left lower extremity, Obesity, ARNOLDO (obstructive sleep apnea), Pulmonary embolism (HCC), Pulmonary hypertension (HCC), and Right ventricular enlargement.    Social History     Tobacco Use    Smoking status: Light Tobacco Smoker     Types: Cigars, Pipe     Start date: 1/1/2006    Smokeless tobacco: Never Used    Tobacco comment: occasional - < 1 a month   Substance Use Topics    Alcohol use: No    Drug use: No       Results from last 7 days   Lab Units 11/02/21  0616 11/02/21 0022 10/29/21  0842 10/29/21  0000   INR   --  2.55* 2.40* 2.60   APTT seconds  --  70.4*  --   --    HEMOGLOBIN g/dL 12.4* 13.8 13.6  --    HEMATOCRIT % 37.0* 41.1 39.7  --    PLATELETS 10*3/mm3 186 200 241  --      Results from last 7 days   Lab Units 11/02/21  0616 11/02/21 0022 10/29/21  0842   SODIUM mmol/L 134* 134* 134*   POTASSIUM mmol/L 3.6 3.3* 3.8   CHLORIDE mmol/L 99 96* 100   CO2 mmol/L 25.2 24.9 22.7   BUN mg/dL 22* 23* 14   CREATININE mg/dL 0.89 1.29* 1.12   CALCIUM mg/dL 8.3* 9.0 8.5*   BILIRUBIN mg/dL  --  0.7 0.9   ALK PHOS U/L  --  169* 84   ALT (SGPT) U/L  --  18 17   AST (SGOT) U/L  --  19 24   GLUCOSE mg/dL 132* 131* 118*     Anticoagulation history: Patient followed by [facility] with regimen of 10 mg Tues/Thurs/Sat and 15 mg on Mon/Wed/Fri/Sun (90 mg weekly total) for acute pulmonary embolism treatment (without acute cor pulmonale)     Hospital Anticoagulation:  Consulting provider: Jackson Canchola APRN  Start date: 11/221  Indication: Full anticoagulation. Factor V Leiden  Target INR: 2-3  Expected duration: TBD  Bridge Therapy: No                Date 11/2            INR  2.55            Warfarin dose 10 mg              Potential drug interactions:   Cefepime: increased risk bleeding    Relevant nutrition status: TBD    Assessment/Plan:  Dose: Continue home regimen of 10 mg Tues/Thurs/Sat and 15 mg on Mon/Wed/Fri/Sun  Monitor: INR daily, PLT, Hgb, s/sx bleeding  Follow up: daily    Pharmacy will continue to follow until discharge or discontinuation of warfarin.     Ramon Velazquez, PharmD  Pharmacy Resident

## 2021-11-02 NOTE — PROGRESS NOTES
Pharmacy consult to dose Cefepime    Kameron Melendez is a 51 y.o. male (!) 255 kg (561 lb 1.6 oz).    MRN: [id number]  : 1970      Pharmacy consulted to dose per FADY Victoria  Indication: SSTI    Anti-Infectives (From admission, onward)      Ordered     Dose/Rate Route Frequency Start Stop    21 0214  cefepime 2 gm IVPB in 100 ml NS (VTB)        Ordering Provider: Tonja Jones APRN    2 g  over 4 Hours Intravenous Every 8 Hours 21 1000 21 0959    21 0209  Pharmacy to dose vancomycin        Ordering Provider: Tonja Jones APRN     Does not apply Continuous PRN 21 0208 21 0207    21 0119  cefepime (MAXIPIME) 1 g/100 mL 0.9% NS IVPB (mbp)        Ordering Provider: Cedric Garcia MD    1 g  200 mL/hr over 30 Minutes Intravenous Once 21 0121      21 2351  vancomycin 2500 mg/500 mL 0.9% NS IVPB ([facility])        Ordering Provider: Cedric Garcia MD    11 mg/kg × 234 kg (Order-Specific)  over 2.5 Hours Intravenous Once 21 2353             Results from last 7 days   Lab Units 21  0022 10/29/21  0842   CREATININE mg/dL 1.29* 1.12     Estimated Creatinine Clearance: 144.7 mL/min (A) (by C-G formula based on SCr of 1.29 mg/dL (H)).  Body mass index is 72.04 kg/m².    WBC   Date Value Ref Range Status   2021 19.78 (H) 3.40 - 10.80 10*3/mm3 Final   10/29/2021 14.57 (H) 3.40 - 10.80 10*3/mm3 Final   2021 6.94 3.40 - 10.80 10*3/mm3 Final   2021 9.1 3.4 - 10.8 x10E3/uL Final   2021 7.5 3.4 - 10.8 x10E3/uL Final         Assessment/Plan:    Will start Cefepime to 2 gm IV Q 8 hr infused over 4 hours.      Will monitor and adjust as needed.     Thank you,    Luis Miguel Woo [facility]  21 02:15 EDT

## 2021-11-02 NOTE — CONSULTS
Patient Name: Kameron Melendez  :1970  51 y.o.    Date of Admission: 2021  Date of Consultation:  21  Encounter Provider: FADY Rubalcava  Place of Service: [facility] CARDIOLOGY  Referring Provider: Randall Jordan MD  Patient Care Team:  Hansel Patel DO as PCP - General (Family Medicine)  Kim Sotelo RPH as Pharmacist      Chief complaint: cellulitis    Reason for Consult: new onset AF    History of Present Illness: Mr. Melendez is a 51 year old male who is followed by Dr. Baeza for history of recurrent bilateral pulmonary embolus and prior thrombectomy() - recurred in 2019 treated with heparin and placed back on warfarin. He was diagnosed with Factor V Leiden. He will remain on warfarin indefinitely. He has morbid obesity, hypertension, and untreated sleep apnea. He does not tolerate cpap.      He was seen in the ER on Friday 10/29 for shortness of breath. He ruled out for PE, COVID, and pna. His INr was therapeutic. He was in SR at the time. He was discharged home.     He presented back to the ED on  with redness and warmth of his lower extremity. He was diagnosed with cellulitis and admitted for IV antibiotics. He was noted to be in AF. This is new for him.     CXr completed upon arrival to the ED was negative acutely. Troponin negative. proBNP 469. Lactate 2.2/1.3.       Previous Cardiac Testing:    Echocardiogram 19    · Left ventricular wall thickness is consistent with mild concentric hypertrophy.  · Estimated EF = 60%.  · Left ventricular systolic function is normal.  · Left ventricular diastolic dysfunction (grade I) consistent with impaired relaxation.  · Mild tricuspid valve regurgitation is present.  · Calculated right ventricular systolic pressure from tricuspid regurgitation is 48.7 mmHg.      Cardiac Catheterization 17  FINDINGS:  1. HEMODYNAMICS: RA 18/18/14, RV 71/18, PA 71/33/42.  2. PULMONARY ANGIOGRAPHY: The  right main pulmonary artery was free of clot.  The truncus anterior had a minimal amount of nonocclusive clot that fed the upper lung.  The right lower lung appeared to be free of clot with good flow.  The left main PA has a small amount of proximal thrombus.  The distal main PA was totally occluded with thrombus.  Some of this clot extended into all main branches of the left lung.  3. INTERVENTIONAL COMMENTS: Successful left pulmonary artery thrombectomy was performed as described above.  Adjunctive tPA administration was performed due to residual thrombus burden.  4. POST THROMBECTOMY HEMODYNAMICS: PA 46/12/18.  SUMMARY: Bilateral pulmonary emboli. Successful left pulmonary artery thrombectomy/tPA administration.  RECOMMENDATIONS: Continue heparin. Change to oral AC in 1-2 days. Repeat CT scan in 48 hours as part of FLARE study.        Past Medical History:   Diagnosis Date   • DVT (deep venous thrombosis) (HCC)     RLE   • Factor V Leiden (HCC)    • Hypertension    • Kidney stone    • Lymphedema    • Lymphedema of left lower extremity    • Obesity    • ARNOLDO (obstructive sleep apnea)    • Pulmonary embolism (HCC)     bilateral   • Pulmonary hypertension (HCC)    • Right ventricular enlargement        Past Surgical History:   Procedure Laterality Date   • CARDIAC CATHETERIZATION Bilateral 7/18/2017    Procedure: Pulmonary angiography- [place] ;  Surgeon: Cedric Coulter MD;  Location: [facility] CATH INVASIVE LOCATION;  Service:    • CARDIAC CATHETERIZATION N/A 7/18/2017    Procedure: Right Heart Cath;  Surgeon: Cedric Coulter MD;  Location:  [facility] CATH INVASIVE LOCATION;  Service:    • THROMBECTOMY           Prior to Admission medications    Medication Sig Start Date End Date Taking? Authorizing Provider   acetaminophen (TYLENOL) 500 MG tablet Take 500 mg by mouth Every 6 (Six) Hours As Needed for Mild Pain .   Yes Provider, MD Lee Ann   ammonium lactate (Lac-Hydrin) 12 % cream Apply both legs daily for venous stasis dermitis  "and lymphedema skin thickening  Patient taking differently: Apply 1 application topically to the appropriate area as directed As Needed. Apply both legs daily for venous stasis dermitis and lymphedema skin thickening 9/2/21  Yes Hansel Patel DO   cephalexin (KEFLEX) 500 MG capsule Take 1 capsule by mouth 4 (Four) Times a Day. 11/1/21  Yes Hansel Patel DO   chlorhexidine (HIBICLENS) 4 % external liquid Apply  topically to the appropriate area as directed Daily As Needed for Wound Care (to both legs).  Patient taking differently: Apply 1 application topically to the appropriate area as directed Daily As Needed for Wound Care (to both legs). 9/2/21  Yes Hansel Patel DO   furosemide (LASIX) 80 MG tablet TAKE 1 TABLET BY MOUTH DAILY  Patient taking differently: Take 80 mg by mouth Daily. Pt. Only takes when swelling is \"excessive\" and he is going to be home 5/13/21  Yes Hansel Patel DO   potassium chloride (K-DUR,KLOR-CON) 20 MEQ CR tablet Take 1 tablet by mouth Daily. Take with furosemide  Patient taking differently: Take 20 mEq by mouth Daily. Only takes with furosemide 2/9/21  Yes Hansel Patel DO   warfarin (COUMADIN) 10 MG tablet TAKE ONE TABLET BY MOUTH ON TUE, THUR, SAT AND TAKE ONE AND ONE-HALF (15 MG) TABLETS ALL OTHER DAYS OR AS DIRECTED  Patient taking differently: Take 10 mg by mouth 3 (Three) Times a Week. Takes 10 mg daily on Tuesday, Thursday,and Saturday 9/13/21  Yes Elsy Baeza MD   warfarin (COUMADIN) 10 MG tablet Take 15 mg by mouth 4 (Four) Times a Week. Takes 1.5 tablets (15mg) Sunday, Monday, Wednesday, and Friday.   Yes ProviderLee Ann MD       No Known Allergies    Social History     Socioeconomic History   • Marital status:    Tobacco Use   • Smoking status: Light Tobacco Smoker     Types: Cigars, Pipe     Start date: 1/1/2006   • Smokeless tobacco: Never Used   • Tobacco comment: occasional - < 1 a month   Substance and Sexual Activity   • Alcohol " use: No   • Drug use: No   • Sexual activity: Defer       Family History   Problem Relation Age of Onset   • Heart disease Mother    • Heart failure Mother    • Bradycardia Mother    • No Known Problems Father    • No Known Problems Maternal Grandmother    • No Known Problems Maternal Grandfather    • No Known Problems Paternal Grandmother    • No Known Problems Paternal Grandfather        REVIEW OF SYSTEMS:   All systems reviewed.  Pertinent positives identified in HPI.  All other systems are negative.      Objective:     Vitals:    11/02/21 0358 11/02/21 0555 11/02/21 0856 11/02/21 1318   BP:  127/71 (!) 142/102 165/92   BP Location:  Left arm Left arm    Patient Position:  Lying Lying    Pulse: 117 116 120 (!) 124   Resp:  19 18    Temp:  98.2 °F (36.8 °C) 97.9 °F (36.6 °C)    TempSrc:  Tympanic Oral    SpO2: 99% 99% 99%    Weight:       Height:         Body mass index is 72.04 kg/m².    General Appearance:    Alert, cooperative, in no acute distress morbidly obese,   Head:    Normocephalic, without obvious abnormality, atraumatic   Eyes:            Lids and lashes normal, conjunctivae and sclerae normal, no icterus, no pallor, corneas clear, PERRLA   Ears:    Ears appear intact with no abnormalities noted   Throat:   No oral lesions, no thrush, oral mucosa moist   Neck:   No adenopathy, supple, trachea midline, no thyromegaly, no carotid bruit, no JVD   Back:     No kyphosis present, no scoliosis present, no skin lesions, erythema or scars, no tenderness to percussion or palpation, range of motion normal   Lungs:     Clear to auscultation, respirations regular, even and unlabored    Heart:    irregular rhythm and tachy rate, normal S1 and S2, no murmur, no gallop, no rub, no click   Chest Wall:    No abnormalities observed   Abdomen:     Normal bowel sounds, no masses, no organomegaly, soft, nontender, nondistended, no guarding, no rebound  tenderness   Extremities:   Moves all extremities well, no edema, no  cyanosis, no redness   Pulses:   Pulses palpable and equal bilaterally. Normal radial, carotid, femoral, dorsalis pedis and posterior tibial pulses bilaterally. Normal abdominal aorta   Skin:  Psychiatric:   No bleeding, bruising or rash    Alert and oriented x 3, normal mood and affect   Lab Review:     Results from last 7 days   Lab Units 11/02/21  0616 11/02/21  0022 11/02/21  0022   SODIUM mmol/L 134*   < > 134*   POTASSIUM mmol/L 3.6   < > 3.3*   CHLORIDE mmol/L 99   < > 96*   CO2 mmol/L 25.2   < > 24.9   BUN mg/dL 22*   < > 23*   CREATININE mg/dL 0.89   < > 1.29*   CALCIUM mg/dL 8.3*   < > 9.0   BILIRUBIN mg/dL  --   --  0.7   ALK PHOS U/L  --   --  169*   ALT (SGPT) U/L  --   --  18   AST (SGOT) U/L  --   --  19   GLUCOSE mg/dL 132*   < > 131*    < > = values in this interval not displayed.     Results from last 7 days   Lab Units 10/29/21  0842   TROPONIN T ng/mL <0.010     Results from last 7 days   Lab Units 11/02/21  0616   WBC 10*3/mm3 17.21*   HEMOGLOBIN g/dL 12.4*   HEMATOCRIT % 37.0*   PLATELETS 10*3/mm3 186     Results from last 7 days   Lab Units 11/02/21  1056 11/02/21  0022 10/29/21  0842   INR  2.99* 2.55* 2.40*   APTT seconds  --  70.4*  --                        EKG 11/2/21        Previous EKG 3/16/20          Assessment and Plan:       1. New onset atrial fibrillation - new - add beta blocker. Will give a dose of digoxin. Check echo. TSH normal in September. He is already anticoagulated due to factor V Leiden  2. Cellulitis of the left lower extremity/lymphedema - on IV antibiotics  3. History of recurrent PE/DVT (thrombectomy in 2017). He remains anticoagulated indefinitely.   4. Hypertension  5. Untreated sleep apnea - he has declined re evaluation in the past. He does not tolerate cpap.   6. Morbid obesity - effecting all aspects of care.     Torri Colmenares, APRN  11/02/21  16:49 EDT

## 2021-11-02 NOTE — ED NOTES
This RN wore gloves, mask, eye protection and all other necessary PPE while performing pt care.     Edita Akins, RN  11/01/21 9009

## 2021-11-02 NOTE — ED NOTES
Pt to triage from home with c/o left leg pain - pt seen here Friday for short of breath - was told white count elevated, but not put on antibiotics at that time.  Had tele health visit Saturday for leg pain, put on keflex, has taken doses on Sunday and today, but states there is no improvement in the cellulitis.  Pt wearing mask in triage.  Triage personnel wore appropriate PPE    Pt had covid test Friday, negative.     Alina Geiger, RN  11/01/21 2045

## 2021-11-03 ENCOUNTER — APPOINTMENT (OUTPATIENT)
Dept: CARDIOLOGY | Facility: HOSPITAL | Age: 51
End: 2021-11-03

## 2021-11-03 LAB
ANION GAP SERPL CALCULATED.3IONS-SCNC: 10.2 MMOL/L (ref 5–15)
BUN SERPL-MCNC: 13 MG/DL (ref 6–20)
BUN/CREAT SERPL: 20.6 (ref 7–25)
CALCIUM SPEC-SCNC: 8.4 MG/DL (ref 8.6–10.5)
CHLORIDE SERPL-SCNC: 98 MMOL/L (ref 98–107)
CHOLEST SERPL-MCNC: 112 MG/DL (ref 0–200)
CO2 SERPL-SCNC: 22.8 MMOL/L (ref 22–29)
CREAT SERPL-MCNC: 0.63 MG/DL (ref 0.76–1.27)
DEPRECATED RDW RBC AUTO: 47.6 FL (ref 37–54)
ERYTHROCYTE [DISTWIDTH] IN BLOOD BY AUTOMATED COUNT: 14.3 % (ref 12.3–15.4)
GFR SERPL CREATININE-BSD FRML MDRD: 134 ML/MIN/1.73
GLUCOSE BLDC GLUCOMTR-MCNC: 131 MG/DL (ref 70–130)
GLUCOSE BLDC GLUCOMTR-MCNC: 144 MG/DL (ref 70–130)
GLUCOSE BLDC GLUCOMTR-MCNC: 163 MG/DL (ref 70–130)
GLUCOSE BLDC GLUCOMTR-MCNC: 175 MG/DL (ref 70–130)
GLUCOSE SERPL-MCNC: 150 MG/DL (ref 65–99)
HBA1C MFR BLD: 6.44 % (ref 4.8–5.6)
HCT VFR BLD AUTO: 37.2 % (ref 37.5–51)
HDLC SERPL-MCNC: 30 MG/DL (ref 40–60)
HGB BLD-MCNC: 12.1 G/DL (ref 13–17.7)
INR PPP: 2.98 (ref 0.9–1.1)
LDLC SERPL CALC-MCNC: 64 MG/DL (ref 0–100)
LDLC/HDLC SERPL: 2.1 {RATIO}
MCH RBC QN AUTO: 29.2 PG (ref 26.6–33)
MCHC RBC AUTO-ENTMCNC: 32.5 G/DL (ref 31.5–35.7)
MCV RBC AUTO: 89.9 FL (ref 79–97)
PLATELET # BLD AUTO: 233 10*3/MM3 (ref 140–450)
PMV BLD AUTO: 10.1 FL (ref 6–12)
POTASSIUM SERPL-SCNC: 3.7 MMOL/L (ref 3.5–5.2)
PROTHROMBIN TIME: 30.7 SECONDS (ref 11.7–14.2)
RBC # BLD AUTO: 4.14 10*6/MM3 (ref 4.14–5.8)
SODIUM SERPL-SCNC: 131 MMOL/L (ref 136–145)
TRIGL SERPL-MCNC: 95 MG/DL (ref 0–150)
VLDLC SERPL-MCNC: 18 MG/DL (ref 5–40)
WBC # BLD AUTO: 14.9 10*3/MM3 (ref 3.4–10.8)

## 2021-11-03 PROCEDURE — 93306 TTE W/DOPPLER COMPLETE: CPT | Performed by: INTERNAL MEDICINE

## 2021-11-03 PROCEDURE — 99232 SBSQ HOSP IP/OBS MODERATE 35: CPT | Performed by: INTERNAL MEDICINE

## 2021-11-03 PROCEDURE — 93306 TTE W/DOPPLER COMPLETE: CPT

## 2021-11-03 PROCEDURE — 63710000001 INSULIN LISPRO (HUMAN) PER 5 UNITS: Performed by: NURSE PRACTITIONER

## 2021-11-03 PROCEDURE — 36415 COLL VENOUS BLD VENIPUNCTURE: CPT | Performed by: NURSE PRACTITIONER

## 2021-11-03 PROCEDURE — 80048 BASIC METABOLIC PNL TOTAL CA: CPT | Performed by: NURSE PRACTITIONER

## 2021-11-03 PROCEDURE — 25010000002 PERFLUTREN (DEFINITY) 8.476 MG IN SODIUM CHLORIDE (PF) 0.9 % 10 ML INJECTION: Performed by: NURSE PRACTITIONER

## 2021-11-03 PROCEDURE — 80061 LIPID PANEL: CPT | Performed by: NURSE PRACTITIONER

## 2021-11-03 PROCEDURE — 25010000002 CEFEPIME PER 500 MG: Performed by: NURSE PRACTITIONER

## 2021-11-03 PROCEDURE — 25010000002 VANCOMYCIN 10 G RECONSTITUTED SOLUTION: Performed by: NURSE PRACTITIONER

## 2021-11-03 PROCEDURE — 85610 PROTHROMBIN TIME: CPT | Performed by: NURSE PRACTITIONER

## 2021-11-03 PROCEDURE — 85027 COMPLETE CBC AUTOMATED: CPT | Performed by: NURSE PRACTITIONER

## 2021-11-03 PROCEDURE — 83036 HEMOGLOBIN GLYCOSYLATED A1C: CPT | Performed by: NURSE PRACTITIONER

## 2021-11-03 PROCEDURE — 82962 GLUCOSE BLOOD TEST: CPT

## 2021-11-03 RX ORDER — AMMONIUM LACTATE 120 MG/G
1 CREAM TOPICAL AS NEEDED
COMMUNITY
End: 2022-05-06

## 2021-11-03 RX ORDER — METOPROLOL TARTRATE 50 MG/1
100 TABLET, FILM COATED ORAL EVERY 12 HOURS SCHEDULED
Status: DISCONTINUED | OUTPATIENT
Start: 2021-11-03 | End: 2021-11-05

## 2021-11-03 RX ORDER — WARFARIN SODIUM 10 MG/1
10 TABLET ORAL
Status: COMPLETED | OUTPATIENT
Start: 2021-11-03 | End: 2021-11-03

## 2021-11-03 RX ORDER — METOPROLOL TARTRATE 50 MG/1
50 TABLET, FILM COATED ORAL ONCE
Status: COMPLETED | OUTPATIENT
Start: 2021-11-03 | End: 2021-11-03

## 2021-11-03 RX ORDER — CHLORHEXIDINE GLUCONATE 213 G/1000ML
1 SOLUTION TOPICAL DAILY PRN
COMMUNITY
End: 2022-05-06

## 2021-11-03 RX ORDER — HYDROCODONE BITARTRATE AND ACETAMINOPHEN 7.5; 325 MG/1; MG/1
1 TABLET ORAL EVERY 6 HOURS PRN
Status: DISCONTINUED | OUTPATIENT
Start: 2021-11-03 | End: 2021-11-08 | Stop reason: HOSPADM

## 2021-11-03 RX ADMIN — CEFEPIME HYDROCHLORIDE 2 G: 2 INJECTION, POWDER, FOR SOLUTION INTRAVENOUS at 18:17

## 2021-11-03 RX ADMIN — GUAIFENESIN 600 MG: 600 TABLET, EXTENDED RELEASE ORAL at 09:34

## 2021-11-03 RX ADMIN — METOPROLOL TARTRATE 100 MG: 50 TABLET, FILM COATED ORAL at 20:27

## 2021-11-03 RX ADMIN — PERFLUTREN 2 ML: 6.52 INJECTION, SUSPENSION INTRAVENOUS at 17:48

## 2021-11-03 RX ADMIN — CEFEPIME HYDROCHLORIDE 2 G: 2 INJECTION, POWDER, FOR SOLUTION INTRAVENOUS at 02:30

## 2021-11-03 RX ADMIN — WARFARIN 10 MG: 10 TABLET ORAL at 18:18

## 2021-11-03 RX ADMIN — VANCOMYCIN HYDROCHLORIDE 1250 MG: 10 INJECTION, POWDER, LYOPHILIZED, FOR SOLUTION INTRAVENOUS at 00:39

## 2021-11-03 RX ADMIN — GUAIFENESIN 600 MG: 600 TABLET, EXTENDED RELEASE ORAL at 20:27

## 2021-11-03 RX ADMIN — SODIUM CHLORIDE, PRESERVATIVE FREE 10 ML: 5 INJECTION INTRAVENOUS at 20:28

## 2021-11-03 RX ADMIN — METOPROLOL TARTRATE 50 MG: 50 TABLET, FILM COATED ORAL at 09:37

## 2021-11-03 RX ADMIN — HYDROCODONE BITARTRATE AND ACETAMINOPHEN 1 TABLET: 7.5; 325 TABLET ORAL at 20:28

## 2021-11-03 RX ADMIN — SODIUM CHLORIDE, PRESERVATIVE FREE 10 ML: 5 INJECTION INTRAVENOUS at 11:01

## 2021-11-03 RX ADMIN — HYDROCODONE BITARTRATE AND ACETAMINOPHEN 1 TABLET: 5; 325 TABLET ORAL at 09:34

## 2021-11-03 RX ADMIN — INSULIN LISPRO 2 UNITS: 100 INJECTION, SOLUTION INTRAVENOUS; SUBCUTANEOUS at 18:18

## 2021-11-03 RX ADMIN — METOPROLOL TARTRATE 50 MG: 50 TABLET, FILM COATED ORAL at 13:00

## 2021-11-03 RX ADMIN — CEFEPIME HYDROCHLORIDE 2 G: 2 INJECTION, POWDER, FOR SOLUTION INTRAVENOUS at 11:01

## 2021-11-03 NOTE — PROGRESS NOTES
Name: Kameron Melendez ADMIT: 2021   : 1970  PCP: Hansel Patel DO    MRN: [id number] LOS: 1 days   AGE/SEX: 51 y.o. male  ROOM: [facility]/     Subjective   Subjective   Patient appears chronically ill, morbidly obese, generally weak, relatively comfortable, no apparent distress.  Still with left lower extremity tenderness.  Denies bowel or bladder complaints.  Tolerating p.o.    Review of Systems   Constitutional: Negative for fever. Negative for chills.   HENT: Negative for congestion and rhinorrhea.    Respiratory: Negative for cough and shortness of breath.    Cardiovascular: Negative for chest pain and leg swelling.   Gastrointestinal: Negative for abdominal pain, constipation, diarrhea, nausea and vomiting.   Endocrine: Negative for polydipsia, polyphagia and polyuria.   Genitourinary: Negative for difficulty urinating and dysuria.   Musculoskeletal: Positive for gait problem (chronic) and myalgias (LLE).   Skin: Positive for color change. Negative for wound.   Neurological: Positive for weakness (generalized weakness). Negative for dizziness.   Psychiatric/Behavioral: Negative for confusion and hallucinations.      Objective   Objective   Vital Signs  Temp:  [97.8 °F (36.6 °C)-98.3 °F (36.8 °C)] 98.2 °F (36.8 °C)  Heart Rate:  [] 113  Resp:  [18-20] 20  BP: (101-165)/(57-92) 136/84  SpO2:  [91 %-96 %] 91 %  on  Flow (L/min):  [3-5] 4;   Device (Oxygen Therapy): nasal cannula  Body mass index is 72.04 kg/m².     Physical Exam   Constitutional:       General: He is not in acute distress.     Appearance: He is obese. He is ill-appearing. He is not toxic-appearing.   HENT:      Head: Normocephalic and atraumatic.   Eyes:      Conjunctiva/sclera: Conjunctivae normal.   Cardiovascular:      Rate and Rhythm: Tachycardia present.      Heart sounds: Normal heart sounds.   Pulmonary:      Effort: Pulmonary effort is normal.      Comments: Diminished on expiration; shallow inspiration  Abdominal:       General: Bowel sounds are normal.      Palpations: Abdomen is soft.   Musculoskeletal:         General: Tenderness (LLE) present.      Cervical back: Normal range of motion and neck supple.      Right lower leg: Edema present.      Left lower leg: Edema present.   Skin:     General: Skin is warm and dry.      Findings: Erythema (LLE) present.   Neurological:      Mental Status: He is alert and oriented to person, place, and time.      Cranial Nerves: No cranial nerve deficit.      Motor: Weakness (generalized) present.   Psychiatric:         Behavior: Behavior normal.         Thought Content: Thought content normal.     Results Review     I reviewed the patient's new clinical results.  Results from last 7 days   Lab Units 11/02/21  0616 11/02/21  0022 10/29/21  0842   WBC 10*3/mm3 17.21* 19.78* 14.57*   HEMOGLOBIN g/dL 12.4* 13.8 13.6   PLATELETS 10*3/mm3 186 200 241     Results from last 7 days   Lab Units 11/02/21  0616 11/02/21  0022 10/29/21  0842   SODIUM mmol/L 134* 134* 134*   POTASSIUM mmol/L 3.6 3.3* 3.8   CHLORIDE mmol/L 99 96* 100   CO2 mmol/L 25.2 24.9 22.7   BUN mg/dL 22* 23* 14   CREATININE mg/dL 0.89 1.29* 1.12   GLUCOSE mg/dL 132* 131* 118*   EGFR IF NONAFRICN AM mL/min/1.73 90 59* 69     Results from last 7 days   Lab Units 11/02/21  0022 10/29/21  0842   ALBUMIN g/dL 3.10* 3.50   BILIRUBIN mg/dL 0.7 0.9   ALK PHOS U/L 169* 84   AST (SGOT) U/L 19 24   ALT (SGPT) U/L 18 17     Results from last 7 days   Lab Units 11/02/21  0616 11/02/21  0022 10/29/21  0842   CALCIUM mg/dL 8.3* 9.0 8.5*   ALBUMIN g/dL  --  3.10* 3.50     Results from last 7 days   Lab Units 11/02/21  0354 11/02/21  0022   LACTATE mmol/L 1.3 2.2*     COVID19   Date Value Ref Range Status   11/02/2021 Not Detected Not Detected - Ref. Range Final     Glucose   Date/Time Value Ref Range Status   11/03/2021 0608 131 (H) 70 - 130 mg/dL Final     Comment:     Meter: [id number] : [id number] Moshe Gaitan   11/02/2021 1958 166  (H) 70 - 130 mg/dL Final     Comment:     Meter: [id number] : [id number] Cedric J[facility]   11/02/2021 1608 155 (H) 70 - 130 mg/dL Final     Comment:     Meter: [id number] : [id number] Baltazar Thomas RN   11/02/2021 1313 150 (H) 70 - 130 mg/dL Final     Comment:     Meter: [id number] : [id number] Baltazar Thomas RN       XR Chest 1 View  Narrative: SINGLE VIEW OF THE CHEST     HISTORY: Cough     COMPARISON: 10/29/2021     FINDINGS:  Cardiomegaly is present. There is no vascular congestion. No  pneumothorax, pleural effusion, or acute infiltrate is seen.     Impression: No acute findings.     This report was finalized on 11/2/2021 12:30 AM by Dr. Kasey Galloway M.D.       Scheduled Medications  cefepime, 2 g, Intravenous, Q8H  guaiFENesin, 600 mg, Oral, Q12H  insulin lispro, 0-7 Units, Subcutaneous, TID AC  metoprolol tartrate, 50 mg, Oral, Q12H  sodium chloride, 10 mL, Intravenous, Q12H  vancomycin, 5 mg/kg, Intravenous, Q12H  warfarin, 10 mg, Oral, Once per day on Tue Thu Sat  warfarin, 15 mg, Oral, Once per day on Sun Mon Wed Fri    Infusions  Pharmacy to dose vancomycin,   Pharmacy to dose warfarin,   sodium chloride, 25 mL/hr, Last Rate: 25 mL/hr (11/03/21 0732)    Diet  Diet Regular; Consistent Carbohydrate       Assessment/Plan     Active Hospital Problems    Diagnosis  POA   • **Cellulitis of left lower extremity [L03.116]  Yes   • Hypokalemia [E87.6]  Yes   • CAROL (acute kidney injury) ([facility]) [N17.9]  Yes   • Sepsis with cutaneous manifestations ([facility]) [A41.9]  Yes   • Hyperglycemia [R73.9]  Yes   • Paroxysmal atrial fibrillation (HCC) [I48.0]  Unknown   • Heterozygous factor V Leiden mutation ([facility]) [D68.51]  Yes   • Dyslipidemia [E78.5]  Yes   • Lymphedema of both lower extremities [I89.0]  Yes   • Obesity, morbid, BMI 50 or higher ([facility]) [E66.01]  Yes   • History of bilateral pulmonary embolism (CMS/HCC) [I26.99]  Yes   • ARNOLDO (obstructive sleep apnea) [G47.33]  Yes      Resolved  Hospital Problems   No resolved problems to display.       51 y.o. male admitted with Cellulitis of left lower extremity.      Cellulitis of left lower extremity / Lymphedema of both lower extremities  Cefepime for now pending BC x2 (NGTD)  S/p vancomycin discontinued given negative MRSA screen   Symptom mgmt  Consider ID consultation if no clinical improvement or progression of cellulitis develops       Sepsis with cutaneous manifestations (HCC)  Likely secondary to #1  Serum lactate 2.2 normalized following IV fluid bolus  Hemodynamically stable, DC IVF  WBC trend:  19-->17-->14 (11/3)       Hypokalemia /  CAROL (acute kidney injury) (HCC)  Resolved  Previous sCr 1.2 increased from 0.8 returned to baseline following 1 L NS IV bolus  Avoid nephrotoxins       Atrial fibrillation, new onset  Likely due to uncontrolled ARNOLDO in setting of morbid obesity   Cardiology following--metoprolol tartrate 100 mg every 12 hours  Appropriately anticoagulated (history of factor V Leiden )       Hyperglycemia  Acceptable glucose readings  A1c 6.4 (at goal without previous antidiabetic agents on board)  Low-dose lispro sliding scale for now  NCS diet       Obesity, morbid, BMI 50 or higher (HCC)  Complicating all problems       ARNOLDO (obstructive sleep apnea)  Requiring 2L NC at night       Heterozygous factor V Leiden mutation (HCC) /   History of bilateral pulmonary embolism (CMS/HCC)  Warfarin continued on admission per pharmacy dosing  Therapeutic INR:  2.98       Dyslipidemia  Unremarkable lipid panel    · Warfarin (home med) for DVT prophylaxis.  · CPR  · Discussed with Dr. Jung.  · Anticipate discharge pending clinical course / plan home       FADY Mehta  [facility]  11/03/21  10:04 EDT

## 2021-11-03 NOTE — PROGRESS NOTES
Pharmacy Consult: Warfarin Dosing/ Monitoring    Kameron Melendez is a 51 y.o. male, estimated creatinine clearance is 296.3 mL/min (A) (by C-G formula based on SCr of 0.63 mg/dL (L)). weighing (!) 255 kg (561 lb 1.6 oz).     has a past medical history of DVT (deep venous thrombosis) (HCC), Factor V Leiden (HCC), Hypertension, Kidney stone, Lymphedema, Lymphedema of left lower extremity, Obesity, ARNOLDO (obstructive sleep apnea), Pulmonary embolism (HCC), Pulmonary hypertension (HCC), and Right ventricular enlargement.    Social History     Tobacco Use    Smoking status: Light Tobacco Smoker     Types: Cigars, Pipe     Start date: 1/1/2006    Smokeless tobacco: Never Used    Tobacco comment: occasional - < 1 a month   Substance Use Topics    Alcohol use: No    Drug use: No       Results from last 7 days   Lab Units 11/03/21  1001 11/02/21  1056 11/02/21  0616 11/02/21  0022 10/29/21  0842 10/29/21  0000   INR  2.98* 2.99*  --  2.55* 2.40* 2.60   APTT seconds  --   --   --  70.4*  --   --    HEMOGLOBIN g/dL 12.1*  --  12.4* 13.8 13.6  --    HEMATOCRIT % 37.2*  --  37.0* 41.1 39.7  --    PLATELETS 10*3/mm3 233  --  186 200 241  --      Results from last 7 days   Lab Units 11/03/21  1001 11/02/21  0616 11/02/21  0022 10/29/21  0842 10/29/21  0842   SODIUM mmol/L 131* 134* 134*   < > 134*   POTASSIUM mmol/L 3.7 3.6 3.3*   < > 3.8   CHLORIDE mmol/L 98 99 96*   < > 100   CO2 mmol/L 22.8 25.2 24.9   < > 22.7   BUN mg/dL 13 22* 23*   < > 14   CREATININE mg/dL 0.63* 0.89 1.29*   < > 1.12   CALCIUM mg/dL 8.4* 8.3* 9.0   < > 8.5*   BILIRUBIN mg/dL  --   --  0.7  --  0.9   ALK PHOS U/L  --   --  169*  --  84   ALT (SGPT) U/L  --   --  18  --  17   AST (SGOT) U/L  --   --  19  --  24   GLUCOSE mg/dL 150* 132* 131*   < > 118*    < > = values in this interval not displayed.     Anticoagulation history: Patient followed by [facility] with regimen of 10 mg Tues/Thurs/Sat and 15 mg on Mon/Wed/Fri/Sun (90 mg weekly total) for acute pulmonary  "embolism treatment (without acute cor pulmonale)     Hospital Anticoagulation:  Consulting provider: Jackson Canchola APRN  Start date: 11/221  Indication: Full anticoagulation. Factor V Leiden  Target INR: 2-3  Expected duration: TBD  Bridge Therapy: No                Date 11/2 11/3           INR 2.55 2.98           Warfarin dose 10 mg 10 mg             Potential drug interactions:   Cefepime: may enhance the anticoagulant effect of warfarin    Relevant nutrition status: regular diet    Assessment/Plan:  INR is therapeutic but borderline elevated - will discontinue home regimen at this time given acute illness and receiving IV antibiotics and will dose 10 mg once today to sustain a therapeutic INR.  Monitor INR and follow up daily      Pharmacy will continue to follow until discharge or discontinuation of warfarin.     Myron \"Eduar\" Jona ZAVALETA, PharmD  Clinical Pharmacist      "

## 2021-11-03 NOTE — PROGRESS NOTES
"Clinical Pharmacy Services: Medication History    Kameron Melendez is a 51 y.o. male presenting to [facility] for Lymphedema [I89.0]  Cellulitis of left lower extremity [L03.116]    He  has a past medical history of DVT (deep venous thrombosis) ([facility]), Factor V Leiden ([facility]), Hypertension, Kidney stone, Lymphedema, Lymphedema of left lower extremity, Obesity, ARNOLDO (obstructive sleep apnea), Pulmonary embolism ([facility]), Pulmonary hypertension ([facility]), and Right ventricular enlargement.    Allergies as of 11/01/2021    (No Known Allergies)       Medication information was obtained from: Patient    Prior to Admission Medications       Prescriptions Last Dose Informant Patient Reported? Taking?    acetaminophen (TYLENOL) 500 MG tablet  Self Yes Yes    Take 500 mg by mouth Every 6 (Six) Hours As Needed for Mild Pain .    ammonium lactate (AMLACTIN) 12 % cream  Self Yes Yes    Apply 1 application topically to the appropriate area as directed As Needed for Dry Skin. Apply to both legs daily as needed for venous stasis dermatitis and lymphedema skin thickening    cephalexin (KEFLEX) 500 MG capsule 11/1/2021 Self No Yes    Take 1 capsule by mouth 4 (Four) Times a Day.    chlorhexidine (Hibiclens) 4 % external liquid  Self Yes Yes    Apply 1 application topically to the appropriate area as directed Daily As Needed for Wound Care.    furosemide (LASIX) 80 MG tablet Past Week Self No Yes    TAKE 1 TABLET BY MOUTH DAILY    Patient taking differently:  Take 80 mg by mouth Daily. Pt. Only takes when swelling is \"excessive\" and he is going to be home    potassium chloride (K-DUR,KLOR-CON) 20 MEQ CR tablet  Self No Yes    Take 1 tablet by mouth Daily. Take with furosemide    Patient taking differently:  Take 20 mEq by mouth Daily. Only takes with furosemide    warfarin (COUMADIN) 10 MG tablet  Self No Yes    TAKE ONE TABLET BY MOUTH ON TUE, THUR, SAT AND TAKE ONE AND ONE-HALF (15 MG) TABLETS ALL OTHER DAYS OR AS DIRECTED    " Patient taking differently:  Take 10 mg by mouth 3 (Three) Times a Week. Takes 10 mg daily on Tuesday, Thursday,and Saturday    warfarin (COUMADIN) 10 MG tablet 11/1/2021 Self Yes Yes    Take 15 mg by mouth 4 (Four) Times a Week. Takes 1.5 tablets (15mg) Sunday, Monday, Wednesday, and Friday.            This medication list is complete to the best of my knowledge as of 11/3/2021    Abelardo Galvez, PharmD  11/3/2021 12:00 EDT

## 2021-11-03 NOTE — PLAN OF CARE
Goal Outcome Evaluation:           Progress: no change  Outcome Summary: pt is alert and oriented, 3-4L o2 nasal cannula, afib, rate better, no falls, bed alarm on, IV fluids, IV antibiotics, continue to monitor

## 2021-11-03 NOTE — CASE MANAGEMENT/SOCIAL WORK
Discharge Planning Assessment  [facility]     Patient Name: Kameron Melendez  MRN: [id number]  Today's Date: 11/3/2021    Admit Date: 11/1/2021     Discharge Needs Assessment    No documentation.                Discharge Plan     Row Name 11/03/21 1500       Plan    Plan Undetermined at this time.    Plan Comments CCP called pt. in his room and introduced self and reviewed face sheet.  All information is correct.  Pt is still working and independent with all ADL’s/IADL’s.  He has never used Home Health or been to rehabilitation and only uses a rolling walker when needed.   Currently pt. is on IV antibiotics and unsure what the dc plan will be.  If pt. needs Home Health CCP will deliver paperwork with Home Health options.  His pharmacy is [facility] [address] and he normally has no problems with his prescriptions.  He is willing to use Meds to Beds if IV antibiotics are ordered for home pending private insurance cost.  His wife also works but will be available to assist.  Pt has other family members who have offered assistance upon discharge.  DC plan pending at this time.  CCP will continue to monitor pt. needs.  Thanks, MELANIE Rolon              Continued Care and Services - Admitted Since 11/1/2021    Coordination has not been started for this encounter.          Demographic Summary    No documentation.                Functional Status     Row Name 11/03/21 1501       Functional Status    Usual Activity Tolerance excellent    Current Activity Tolerance good    Functional Status Comments pt noirmally independent with ADL's/IADL's       Functional Status, IADL    Medications independent    Meal Preparation independent    Housekeeping independent    Laundry independent    Shopping independent       Mental Status Summary    Recent Changes in Mental Status/Cognitive Functioning ability to speak clearly; ability to understand       Employment/    Employment Status employed full-time     Shift Worked first shift               Psychosocial    No documentation.                Abuse/Neglect    No documentation.                Legal    No documentation.                Substance Abuse    No documentation.                Patient Forms    No documentation.                   Jo Kurtz

## 2021-11-03 NOTE — CASE MANAGEMENT/SOCIAL WORK
Continued Stay Note  [facility]     Patient Name: Kameron Melendez  MRN: [id number]  Today's Date: 11/3/2021    Admit Date: 11/1/2021     Discharge Plan     Row Name 11/03/21 1500       Plan    Plan Undetermined at this time.    Plan Comments CCP called pt. in his room and introduced self and reviewed face sheet.  All information is correct.  Pt is still working and independent with all ADL’s/IADL’s.  He has never used Home Health or been to rehabilitation and does not use DME.   Currently pt. is on IV antibiotics and unsure what the dc plan will be.  If pt. needs Home Health CCP will deliver paperwork with Home Health options.  His pharmacy is [facility] off [address] [facility] [address] [facility] and he normally has no problems with his prescriptions.  He is willing to use Meds to Beds if IV antibiotics are ordered for home pending private insurance cost.  His wife also works but will be available to assist.  Pt has other family members who have offered assistance upon discharge.  DC plan pending at this time.  CCP will continue to monitor pt. needs.  Thanks, MELANIE Rolon               Discharge Codes    No documentation.                     Jo Kurtz

## 2021-11-03 NOTE — NURSING NOTE
Wound/ostomy - spoke to patient's nurse and she reports that no significant wounds were noted to coccyx. We discussed risk for skin damage from shear/friction and appropriate use of barrier cream. Patient is on a bariatric low air loss mattress.     Per review of chart patient is here for cellulitis to LLE and has lymphedema to BLE with super morbid obesity. Cellulitis is being managed with antibiotics, there are no wounds noted so no need for local wound care.     If lymphedema requires management OT consult would be appropriate to initiate any treatment but patient would need to be agreeable to ongoing outpatient lymphedema treatment and learning to apply compression wraps at home.

## 2021-11-03 NOTE — PROGRESS NOTES
"Patient Care Team:  Hansel Patel DO as PCP - General (Family Medicine)  Kim Sotelo SHERRIE as Pharmacist    Chief Complaint: New onset atrial fibrillation with RVR.    Interval History:   Ventricular rate still mildly elevated.  No new complaints.    Objective   Vital Signs  Temp:  [97.8 °F (36.6 °C)-98.3 °F (36.8 °C)] 98.2 °F (36.8 °C)  Heart Rate:  [] 113  Resp:  [18-20] 20  BP: (101-165)/(57-92) 136/84    Intake/Output Summary (Last 24 hours) at 11/3/2021 1014  Last data filed at 11/3/2021 0517  Gross per 24 hour   Intake 600 ml   Output 450 ml   Net 150 ml     Flowsheet Rows      First Filed Value   Admission Height 188 cm (74\") Documented at 11/01/2021 2043   Admission Weight 255 kg (561 lb 1.6 oz) Documented at 11/02/2021 0006          Temp:  [97.8 °F (36.6 °C)-98.3 °F (36.8 °C)] 98.2 °F (36.8 °C)  Heart Rate:  [] 113  Resp:  [18-20] 20  BP: (101-165)/(57-92) 136/84    Intake/Output Summary (Last 24 hours) at 11/3/2021 1014  Last data filed at 11/3/2021 0517  Gross per 24 hour   Intake 600 ml   Output 450 ml   Net 150 ml     Flowsheet Rows      First Filed Value   Admission Height 188 cm (74\") Documented at 11/01/2021 2043   Admission Weight 255 kg (561 lb 1.6 oz) Documented at 11/02/2021 0006          General Appearance:   Morbidly obese   Head:    Normocephalic, without obvious abnormality, atraumatic       Neck/Lymph   No adenopathy, supple, no thyromegaly, no carotid bruit, no    JVD   Lungs:     Clear to auscultation bilaterally, no wheezes, rales, or     rhonchi    Cardiac:   Tachycardic, irregular rhythm, no murmur, no rub, no gallop   Chest Wall:    No abnormalities observed   GI:     Normal bowel sounds, soft, nontender, nondistended,            no rebound tenderness   Extremities:   No cyanosis, clubbing, 2-3+ bilateral lower extremity edema.   Circulatory/Peripheral Vascular :   Pulses palpable and equal bilaterally   Integumentary:   No bleeding or rash. Normal temperature.  Left " lower extremity erythema.       Neurologic:   Cranial nerves 2 - 12 grossly intact, sensation intact              cefepime, 2 g, Intravenous, Q8H  guaiFENesin, 600 mg, Oral, Q12H  insulin lispro, 0-7 Units, Subcutaneous, TID AC  metoprolol tartrate, 100 mg, Oral, Q12H  metoprolol tartrate, 50 mg, Oral, Once  sodium chloride, 10 mL, Intravenous, Q12H  vancomycin, 5 mg/kg, Intravenous, Q12H  warfarin, 10 mg, Oral, Once per day on Tue Thu Sat  warfarin, 15 mg, Oral, Once per day on Sun Mon Wed Fri        Pharmacy to dose vancomycin,   Pharmacy to dose warfarin,   sodium chloride, 25 mL/hr, Last Rate: 25 mL/hr (11/03/21 0732)        Results Review:    Results from last 7 days   Lab Units 11/02/21  0616   SODIUM mmol/L 134*   POTASSIUM mmol/L 3.6   CHLORIDE mmol/L 99   CO2 mmol/L 25.2   BUN mg/dL 22*   CREATININE mg/dL 0.89   GLUCOSE mg/dL 132*   CALCIUM mg/dL 8.3*     Results from last 7 days   Lab Units 10/29/21  0842   TROPONIN T ng/mL <0.010     Results from last 7 days   Lab Units 11/02/21  0616   WBC 10*3/mm3 17.21*   HEMOGLOBIN g/dL 12.4*   HEMATOCRIT % 37.0*   PLATELETS 10*3/mm3 186     Results from last 7 days   Lab Units 11/02/21  1056 11/02/21  0022 10/29/21  0842   INR  2.99* 2.55* 2.40*   APTT seconds  --  70.4*  --                  @LABRCNT(bnp)@  I reviewed the patient's new clinical results.  I personally viewed and interpreted the patient's EKG/Telemetry data            Assessment/Plan   1.  New onset atrial fibrillation with RVR  2.  Left lower extremity cellulitis/lymphedema  3.  History of recurrent PE and DVT  4.  Essential hypertension  5.  Untreated sleep apnea  6.  Morbid obesity    -Ventricular rate still slightly elevated.  Increase metoprolol tartrate to 100 mg every 12 hours.  Additional dose of 50 right now.  I would not recommend diltiazem or digoxin unless we have maximized our metoprolol dose.  -I do think we have any chance of keeping him in sinus with his morbid obesity and untreated  sleep apnea.  Recommend rate control approach  -Continue anticoagulant therapy.  Already on warfarin.

## 2021-11-03 NOTE — PLAN OF CARE
Goal Outcome Evaluation:  Plan of Care Reviewed With: patient        Progress: no change  Outcome Summary: No major issues over shift, pt transferred to bariatric bed, given norco for pain, meds adjusted, PT and nutrition consult placed, pt continues on IV abx, coumadin adjusted, will continue to monitor   supportive care pantoprazole levothyroxin improved lasix and potassium, f up with cardiologist monitor and f up with pcp cont current tt nebs

## 2021-11-03 NOTE — CONSULTS
"Adult Nutrition  Assessment/PES    Patient Name:  Kameron Melendez  YOB: 1970  MRN: [id number]  Admit Date:  11/1/2021    Assessment Date:  11/3/2021  Nutrition assessment triggered by calorie count order (x 1 day)  EMR reviewed. Admitted with LLE cellulitis, new onset Afib, morbid obesity.  Nutritional needs (based on IBW): 1547-7092 kcal, 82-98 gm protein.  Will follow with results.      Reason for Assessment     Row Name 11/03/21 1559          Reason for Assessment    Reason For Assessment calorie count order; nurse/nurse practitioner consult     Diagnosis cellulitis LLE, Afib, morbid obesity                Nutrition/Diet History     Row Name 11/03/21 1559          Nutrition/Diet History    Typical Food/Fluid Intake CC diet                Anthropometrics     Row Name 11/03/21 1559          Anthropometrics    Height 188 cm (74\")            Admit Weight    Admit Weight 255 kg (562 lb 2.8 oz)            Ideal Body Weight (IBW)    Ideal Body Weight (IBW) (kg) 87.66     % of Ideal Body Weight Assessment --  291%            Body Mass Index (BMI)    BMI Assessment BMI 40 or greater: obesity grade III  BMI-72                Labs/Tests/Procedures/Meds     Row Name 11/03/21 1600          Labs/Procedures/Meds    Lab Results Reviewed reviewed, pertinent     Lab Results Comments Glu, Na            Diagnostic Tests/Procedures    Diagnostic Test/Procedure Reviewed reviewed, pertinent            Medications    Pertinent Medications Reviewed reviewed, pertinent     Pertinent Medications Comments insulin, coumadin                Physical Findings     Row Name 11/03/21 1600          Physical Findings    Overall Physical Appearance obese     Skin --  lymphedema                Estimated/Assessed Needs     Row Name 11/03/21 1600 11/03/21 1559       Calculation Measurements    Weight Used For Calculations 255 kg (562 lb 2.8 oz) --    Height -- 188 cm (74\")       Estimated/Assessed Needs    Additional Documentation " KCAL/KG (Group); Protein Requirements (Group); Fluid Requirements (Group) --       KCAL/KG    KCAL/KG 14 Kcal/Kg (kcal) --    14 Kcal/Kg (kcal) 3570 --       Protein Requirements    Weight Used For Protein Calculations 255 kg (562 lb 2.8 oz) --    Est Protein Requirement Amount (gms/kg) 0.6 gm protein --    Estimated Protein Requirements (gms/day) 153 --       Fluid Requirements    Fluid Requirements (mL/day) 3500 --    RDA Method (mL) 3500 --               Nutrition Prescription Ordered     Row Name 11/03/21 1601          Nutrition Prescription PO    Common Modifiers Consistent Carbohydrate                Evaluation of Received Nutrient/Fluid Intake     Row Name 11/03/21 1601          PO Evaluation    Number of Meals 1     % PO Intake 100                     Problem/Interventions:   Problem 1     Row Name 11/03/21 1601          Nutrition Diagnoses Problem 1    Problem 1 Overweight/Obesity     Etiology (related to) MNT for Treatment/Condition     Signs/Symptoms (evidenced by) BMI     BMI Greater than 40                      Intervention Goal     Row Name 11/03/21 1602          Intervention Goal    General Maintain nutrition; Reduce/improve symptoms; Meet nutritional needs for age/condition; Disease management/therapy     PO Tolerate PO     Weight Appropriate weight loss                Nutrition Intervention     Row Name 11/03/21 1602          Nutrition Intervention    RD/Tech Action Care plan reviewd; Follow Tx progress                  Education/Evaluation     Row Name 11/03/21 1602          Education    Education Will Instruct as appropriate            Monitor/Evaluation    Monitor Per protocol                 Electronically signed by:  Lindsey El RD  11/03/21 16:02 EDT

## 2021-11-04 ENCOUNTER — APPOINTMENT (OUTPATIENT)
Dept: ULTRASOUND IMAGING | Facility: HOSPITAL | Age: 51
End: 2021-11-04

## 2021-11-04 LAB
ANION GAP SERPL CALCULATED.3IONS-SCNC: 9.4 MMOL/L (ref 5–15)
AORTIC DIMENSIONLESS INDEX: 0.6 (DI)
BH CV ECHO MEAS - AO MAX PG (FULL): 4.6 MMHG
BH CV ECHO MEAS - AO MAX PG: 7.6 MMHG
BH CV ECHO MEAS - AO MEAN PG (FULL): 2.2 MMHG
BH CV ECHO MEAS - AO MEAN PG: 4 MMHG
BH CV ECHO MEAS - AO V2 MAX: 138.1 CM/SEC
BH CV ECHO MEAS - AO V2 MEAN: 92.9 CM/SEC
BH CV ECHO MEAS - AO V2 VTI: 24.8 CM
BH CV ECHO MEAS - ASC AORTA: 3.5 CM
BH CV ECHO MEAS - AVA(I,A): 2.9 CM^2
BH CV ECHO MEAS - AVA(I,D): 2.9 CM^2
BH CV ECHO MEAS - AVA(V,A): 2.8 CM^2
BH CV ECHO MEAS - AVA(V,D): 2.8 CM^2
BH CV ECHO MEAS - BSA(HAYCOCK): 3.8 M^2
BH CV ECHO MEAS - BSA: 3.4 M^2
BH CV ECHO MEAS - BZI_BMI: 72.1 KILOGRAMS/M^2
BH CV ECHO MEAS - BZI_METRIC_HEIGHT: 188 CM
BH CV ECHO MEAS - BZI_METRIC_WEIGHT: 255 KG
BH CV ECHO MEAS - EDV(CUBED): 256.8 ML
BH CV ECHO MEAS - EDV(MOD-SP2): 223 ML
BH CV ECHO MEAS - EDV(MOD-SP4): 222 ML
BH CV ECHO MEAS - EDV(TEICH): 205.3 ML
BH CV ECHO MEAS - EF(MOD-BP): 59 %
BH CV ECHO MEAS - EF(MOD-SP2): 60.5 %
BH CV ECHO MEAS - EF(MOD-SP4): 58.1 %
BH CV ECHO MEAS - ESV(MOD-SP2): 88 ML
BH CV ECHO MEAS - ESV(MOD-SP4): 93 ML
BH CV ECHO MEAS - IVS/LVPW: 1.2
BH CV ECHO MEAS - IVSD: 1 CM
BH CV ECHO MEAS - LAT PEAK E' VEL: 18.8 CM/SEC
BH CV ECHO MEAS - LV DIASTOLIC VOL/BSA (35-75): 65.8 ML/M^2
BH CV ECHO MEAS - LV MASS(C)D: 240.7 GRAMS
BH CV ECHO MEAS - LV MASS(C)DI: 71.4 GRAMS/M^2
BH CV ECHO MEAS - LV MAX PG: 3 MMHG
BH CV ECHO MEAS - LV MEAN PG: 1.8 MMHG
BH CV ECHO MEAS - LV SYSTOLIC VOL/BSA (12-30): 27.6 ML/M^2
BH CV ECHO MEAS - LV V1 MAX: 86.5 CM/SEC
BH CV ECHO MEAS - LV V1 MEAN: 64.2 CM/SEC
BH CV ECHO MEAS - LV V1 VTI: 15.8 CM
BH CV ECHO MEAS - LVIDD: 6.4 CM
BH CV ECHO MEAS - LVLD AP2: 10.1 CM
BH CV ECHO MEAS - LVLD AP4: 9.9 CM
BH CV ECHO MEAS - LVLS AP2: 7.9 CM
BH CV ECHO MEAS - LVLS AP4: 8.4 CM
BH CV ECHO MEAS - LVOT AREA (M): 4.5 CM^2
BH CV ECHO MEAS - LVOT AREA: 4.5 CM^2
BH CV ECHO MEAS - LVOT DIAM: 2.4 CM
BH CV ECHO MEAS - LVPWD: 0.81 CM
BH CV ECHO MEAS - MED PEAK E' VEL: 11.2 CM/SEC
BH CV ECHO MEAS - MV A DUR: 132 SEC
BH CV ECHO MEAS - MV A MAX VEL: 58.5 CM/SEC
BH CV ECHO MEAS - MV DEC TIME: 166 SEC
BH CV ECHO MEAS - MV E MAX VEL: 111 CM/SEC
BH CV ECHO MEAS - MV E/A: 1.9
BH CV ECHO MEAS - PA ACC TIME: 0.09 SEC
BH CV ECHO MEAS - PA MAX PG (FULL): 1.2 MMHG
BH CV ECHO MEAS - PA MAX PG: 4.2 MMHG
BH CV ECHO MEAS - PA PR(ACCEL): 38.6 MMHG
BH CV ECHO MEAS - PA V2 MAX: 102.4 CM/SEC
BH CV ECHO MEAS - PVA(V,A): 7.2 CM^2
BH CV ECHO MEAS - PVA(V,D): 7.2 CM^2
BH CV ECHO MEAS - QP/QS: 1.9
BH CV ECHO MEAS - RV MAX PG: 3 MMHG
BH CV ECHO MEAS - RV MEAN PG: 1.4 MMHG
BH CV ECHO MEAS - RV V1 MAX: 87 CM/SEC
BH CV ECHO MEAS - RV V1 MEAN: 55.8 CM/SEC
BH CV ECHO MEAS - RV V1 VTI: 15.9 CM
BH CV ECHO MEAS - RVOT AREA: 8.4 CM^2
BH CV ECHO MEAS - RVOT DIAM: 3.3 CM
BH CV ECHO MEAS - SI(LVOT): 21.1 ML/M^2
BH CV ECHO MEAS - SI(MOD-SP2): 40 ML/M^2
BH CV ECHO MEAS - SI(MOD-SP4): 38.3 ML/M^2
BH CV ECHO MEAS - SV(LVOT): 71.2 ML
BH CV ECHO MEAS - SV(MOD-SP2): 135 ML
BH CV ECHO MEAS - SV(MOD-SP4): 129 ML
BH CV ECHO MEAS - SV(RVOT): 133.9 ML
BH CV ECHO MEASUREMENTS AVERAGE E/E' RATIO: 7.4
BUN SERPL-MCNC: 12 MG/DL (ref 6–20)
BUN/CREAT SERPL: 24.5 (ref 7–25)
CALCIUM SPEC-SCNC: 8.7 MG/DL (ref 8.6–10.5)
CHLORIDE SERPL-SCNC: 98 MMOL/L (ref 98–107)
CO2 SERPL-SCNC: 23.6 MMOL/L (ref 22–29)
CREAT SERPL-MCNC: 0.49 MG/DL (ref 0.76–1.27)
DEPRECATED RDW RBC AUTO: 44.9 FL (ref 37–54)
EOSINOPHIL # BLD MANUAL: 0.47 10*3/MM3 (ref 0–0.4)
EOSINOPHIL NFR BLD MANUAL: 3 % (ref 0.3–6.2)
ERYTHROCYTE [DISTWIDTH] IN BLOOD BY AUTOMATED COUNT: 14 % (ref 12.3–15.4)
GFR SERPL CREATININE-BSD FRML MDRD: >150 ML/MIN/1.73
GLUCOSE BLDC GLUCOMTR-MCNC: 136 MG/DL (ref 70–130)
GLUCOSE BLDC GLUCOMTR-MCNC: 143 MG/DL (ref 70–130)
GLUCOSE BLDC GLUCOMTR-MCNC: 155 MG/DL (ref 70–130)
GLUCOSE BLDC GLUCOMTR-MCNC: 165 MG/DL (ref 70–130)
GLUCOSE SERPL-MCNC: 133 MG/DL (ref 65–99)
HCT VFR BLD AUTO: 36.3 % (ref 37.5–51)
HGB BLD-MCNC: 12 G/DL (ref 13–17.7)
INR PPP: 2.83 (ref 0.9–1.1)
LEFT ATRIUM VOLUME INDEX: 22 ML/M2
LYMPHOCYTES # BLD MANUAL: 2.99 10*3/MM3 (ref 0.7–3.1)
LYMPHOCYTES NFR BLD MANUAL: 11 % (ref 5–12)
LYMPHOCYTES NFR BLD MANUAL: 19 % (ref 19.6–45.3)
MAGNESIUM SERPL-MCNC: 2 MG/DL (ref 1.6–2.6)
MAGNESIUM SERPL-MCNC: 2 MG/DL (ref 1.6–2.6)
MAXIMAL PREDICTED HEART RATE: 169 BPM
MCH RBC QN AUTO: 29.1 PG (ref 26.6–33)
MCHC RBC AUTO-ENTMCNC: 33.1 G/DL (ref 31.5–35.7)
MCV RBC AUTO: 88.1 FL (ref 79–97)
METAMYELOCYTES NFR BLD MANUAL: 1 % (ref 0–0)
MONOCYTES # BLD AUTO: 1.73 10*3/MM3 (ref 0.1–0.9)
MYELOCYTES NFR BLD MANUAL: 1 % (ref 0–0)
NEUTROPHILS # BLD AUTO: 10.23 10*3/MM3 (ref 1.7–7)
NEUTROPHILS NFR BLD MANUAL: 65 % (ref 42.7–76)
PHOSPHATE SERPL-MCNC: 2.5 MG/DL (ref 2.5–4.5)
PLAT MORPH BLD: NORMAL
PLATELET # BLD AUTO: 268 10*3/MM3 (ref 140–450)
PMV BLD AUTO: 9.9 FL (ref 6–12)
POTASSIUM SERPL-SCNC: 4 MMOL/L (ref 3.5–5.2)
POTASSIUM SERPL-SCNC: 4.1 MMOL/L (ref 3.5–5.2)
PROTHROMBIN TIME: 29.5 SECONDS (ref 11.7–14.2)
QT INTERVAL: 403 MS
RBC # BLD AUTO: 4.12 10*6/MM3 (ref 4.14–5.8)
RBC MORPH BLD: NORMAL
SODIUM SERPL-SCNC: 131 MMOL/L (ref 136–145)
STRESS TARGET HR: 144 BPM
WBC # BLD AUTO: 15.74 10*3/MM3 (ref 3.4–10.8)
WBC MORPH BLD: NORMAL

## 2021-11-04 PROCEDURE — 80048 BASIC METABOLIC PNL TOTAL CA: CPT | Performed by: STUDENT IN AN ORGANIZED HEALTH CARE EDUCATION/TRAINING PROGRAM

## 2021-11-04 PROCEDURE — 84132 ASSAY OF SERUM POTASSIUM: CPT | Performed by: STUDENT IN AN ORGANIZED HEALTH CARE EDUCATION/TRAINING PROGRAM

## 2021-11-04 PROCEDURE — 97110 THERAPEUTIC EXERCISES: CPT

## 2021-11-04 PROCEDURE — 25010000002 CEFEPIME PER 500 MG: Performed by: NURSE PRACTITIONER

## 2021-11-04 PROCEDURE — 85007 BL SMEAR W/DIFF WBC COUNT: CPT | Performed by: STUDENT IN AN ORGANIZED HEALTH CARE EDUCATION/TRAINING PROGRAM

## 2021-11-04 PROCEDURE — 93005 ELECTROCARDIOGRAM TRACING: CPT | Performed by: STUDENT IN AN ORGANIZED HEALTH CARE EDUCATION/TRAINING PROGRAM

## 2021-11-04 PROCEDURE — 85610 PROTHROMBIN TIME: CPT | Performed by: NURSE PRACTITIONER

## 2021-11-04 PROCEDURE — 63710000001 INSULIN LISPRO (HUMAN) PER 5 UNITS: Performed by: NURSE PRACTITIONER

## 2021-11-04 PROCEDURE — 76882 US LMTD JT/FCL EVL NVASC XTR: CPT

## 2021-11-04 PROCEDURE — 97162 PT EVAL MOD COMPLEX 30 MIN: CPT

## 2021-11-04 PROCEDURE — 84100 ASSAY OF PHOSPHORUS: CPT | Performed by: STUDENT IN AN ORGANIZED HEALTH CARE EDUCATION/TRAINING PROGRAM

## 2021-11-04 PROCEDURE — 99232 SBSQ HOSP IP/OBS MODERATE 35: CPT | Performed by: INTERNAL MEDICINE

## 2021-11-04 PROCEDURE — 25010000002 VANCOMYCIN 10 G RECONSTITUTED SOLUTION: Performed by: STUDENT IN AN ORGANIZED HEALTH CARE EDUCATION/TRAINING PROGRAM

## 2021-11-04 PROCEDURE — 82962 GLUCOSE BLOOD TEST: CPT

## 2021-11-04 PROCEDURE — 97116 GAIT TRAINING THERAPY: CPT

## 2021-11-04 PROCEDURE — 85025 COMPLETE CBC W/AUTO DIFF WBC: CPT | Performed by: STUDENT IN AN ORGANIZED HEALTH CARE EDUCATION/TRAINING PROGRAM

## 2021-11-04 PROCEDURE — 83735 ASSAY OF MAGNESIUM: CPT | Performed by: STUDENT IN AN ORGANIZED HEALTH CARE EDUCATION/TRAINING PROGRAM

## 2021-11-04 RX ORDER — WARFARIN SODIUM 10 MG/1
10 TABLET ORAL
Status: DISCONTINUED | OUTPATIENT
Start: 2021-11-04 | End: 2021-11-05

## 2021-11-04 RX ADMIN — WARFARIN 10 MG: 10 TABLET ORAL at 17:30

## 2021-11-04 RX ADMIN — VANCOMYCIN HYDROCHLORIDE 3000 MG: 10 INJECTION, POWDER, LYOPHILIZED, FOR SOLUTION INTRAVENOUS at 15:02

## 2021-11-04 RX ADMIN — CEFEPIME HYDROCHLORIDE 2 G: 2 INJECTION, POWDER, FOR SOLUTION INTRAVENOUS at 17:30

## 2021-11-04 RX ADMIN — GUAIFENESIN 600 MG: 600 TABLET, EXTENDED RELEASE ORAL at 20:41

## 2021-11-04 RX ADMIN — HYDROCODONE BITARTRATE AND ACETAMINOPHEN 1 TABLET: 7.5; 325 TABLET ORAL at 09:36

## 2021-11-04 RX ADMIN — CEFEPIME HYDROCHLORIDE 2 G: 2 INJECTION, POWDER, FOR SOLUTION INTRAVENOUS at 02:57

## 2021-11-04 RX ADMIN — GUAIFENESIN 600 MG: 600 TABLET, EXTENDED RELEASE ORAL at 09:35

## 2021-11-04 RX ADMIN — CEFEPIME HYDROCHLORIDE 2 G: 2 INJECTION, POWDER, FOR SOLUTION INTRAVENOUS at 09:35

## 2021-11-04 RX ADMIN — INSULIN LISPRO 2 UNITS: 100 INJECTION, SOLUTION INTRAVENOUS; SUBCUTANEOUS at 17:30

## 2021-11-04 RX ADMIN — SODIUM CHLORIDE, PRESERVATIVE FREE 10 ML: 5 INJECTION INTRAVENOUS at 09:37

## 2021-11-04 RX ADMIN — METOPROLOL TARTRATE 100 MG: 50 TABLET, FILM COATED ORAL at 09:37

## 2021-11-04 RX ADMIN — BENZONATATE 100 MG: 100 CAPSULE ORAL at 17:36

## 2021-11-04 RX ADMIN — SODIUM CHLORIDE, PRESERVATIVE FREE 10 ML: 5 INJECTION INTRAVENOUS at 20:42

## 2021-11-04 RX ADMIN — METOPROLOL TARTRATE 100 MG: 50 TABLET, FILM COATED ORAL at 20:41

## 2021-11-04 NOTE — PROGRESS NOTES
Name: Kameron Melendez ADMIT: 2021   : 1970  PCP: Hansel Patel DO    MRN: [id number] LOS: 2 days   AGE/SEX: 51 y.o. male  ROOM: E4/     Subjective   Subjective   Patient is resting in bed, he reports his pain is slightly improved, he was able to work with physical therapy and walk to the hallway and back.  He is still complaining of significant pain in his leg although better.  His wife has been admitted to hospital for IV antibiotics as well    Review of Systems   As above  Objective   Objective   Vital Signs  Temp:  [97.6 °F (36.4 °C)-98.4 °F (36.9 °C)] 98.4 °F (36.9 °C)  Heart Rate:  [] 77  Resp:  [18] 18  BP: (113-136)/(75-94) 136/76  SpO2:  [90 %-92 %] 90 %  on  Flow (L/min):  [4] 4;   Device (Oxygen Therapy): nasal cannula  Body mass index is 72.15 kg/m².  Physical Exam  Constitutional:       General: He is not in acute distress.     Appearance: He is obese. He is ill-appearing. He is not toxic-appearing.   HENT:      Head: Normocephalic and atraumatic.   Eyes:      Conjunctiva/sclera: Conjunctivae normal.   Cardiovascular:      Rate and Rhythm: Regular rate, irregular rhythm.      Heart sounds: Normal heart sounds.   Pulmonary:      Effort: Pulmonary effort is normal.      Comments: Diminished on expiration; shallow inspiration  Abdominal:      General: Bowel sounds are normal.      Palpations: Abdomen is soft.   Musculoskeletal:         General: Tenderness (LLE) present.      Cervical back: Normal range of motion and neck supple.      Right lower leg: Edema present.      Left lower leg: Edema present.  Possible area of fluctuance on the left posterior calf  Skin:     General: Skin is warm and dry.      Findings: Erythema (LLE) present.   Neurological:      Mental Status: He is alert and oriented to person, place, and time.      Cranial Nerves: No cranial nerve deficit.      Motor: Weakness (generalized) present.   Psychiatric:         Behavior: Behavior normal.         Thought  Content: Thought content normal. Results Review     I reviewed the patient's new clinical results.  Results from last 7 days   Lab Units 11/04/21  0614 11/03/21  1001 11/02/21 0616 11/02/21  0022   WBC 10*3/mm3 15.74* 14.90* 17.21* 19.78*   HEMOGLOBIN g/dL 12.0* 12.1* 12.4* 13.8   PLATELETS 10*3/mm3 268 233 186 200     Results from last 7 days   Lab Units 11/04/21  0614 11/03/21  1001 11/02/21 0616 11/02/21  0022   SODIUM mmol/L 131* 131* 134* 134*   POTASSIUM mmol/L 4.0 3.7 3.6 3.3*   CHLORIDE mmol/L 98 98 99 96*   CO2 mmol/L 23.6 22.8 25.2 24.9   BUN mg/dL 12 13 22* 23*   CREATININE mg/dL 0.49* 0.63* 0.89 1.29*   GLUCOSE mg/dL 133* 150* 132* 131*   Estimated Creatinine Clearance: 380.9 mL/min (A) (by C-G formula based on SCr of 0.49 mg/dL (L)).  Results from last 7 days   Lab Units 11/02/21  0022 10/29/21  0842   ALBUMIN g/dL 3.10* 3.50   BILIRUBIN mg/dL 0.7 0.9   ALK PHOS U/L 169* 84   AST (SGOT) U/L 19 24   ALT (SGPT) U/L 18 17     Results from last 7 days   Lab Units 11/04/21 0614 11/03/21  1001 11/02/21 0616 11/02/21  0022 10/29/21  0842 10/29/21  0842   CALCIUM mg/dL 8.7 8.4* 8.3* 9.0   < > 8.5*   ALBUMIN g/dL  --   --   --  3.10*  --  3.50   MAGNESIUM mg/dL 2.0  --   --   --   --   --    PHOSPHORUS mg/dL 2.5  --   --   --   --   --     < > = values in this interval not displayed.     Results from last 7 days   Lab Units 11/02/21  0354 11/02/21  0022   LACTATE mmol/L 1.3 2.2*     COVID19   Date Value Ref Range Status   11/02/2021 Not Detected Not Detected - Ref. Range Final     Hemoglobin A1C   Date/Time Value Ref Range Status   11/03/2021 1001 6.44 (H) 4.80 - 5.60 % Final     Glucose   Date/Time Value Ref Range Status   11/04/2021 1118 143 (H) 70 - 130 mg/dL Final     Comment:     Meter: [id number] : [id number] Chuy JACOB   11/04/2021 0610 136 (H) 70 - 130 mg/dL Final     Comment:     Meter: [id number] : [id number] Cecilia JACOB   11/03/2021 2007 163 (H) 70 - 130 mg/dL Final      Comment:     Meter: [id number] : [id number] Cecilia Taylor NA   11/03/2021 1629 175 (H) 70 - 130 mg/dL Final     Comment:     Meter: [id number] : [id number] Peggy Gaines NA   11/03/2021 1103 144 (H) 70 - 130 mg/dL Final     Comment:     Meter: [id number] : [id number] Peggy Gaines NA   11/03/2021 0608 131 (H) 70 - 130 mg/dL Final     Comment:     Meter: [id number] : [id number] Moshe Gaitan   11/02/2021 1958 166 (H) 70 - 130 mg/dL Final     Comment:     Meter: [id number] : [id number] Cedric JACKSON [facility]       Adult Transthoracic Echo Complete W/ Cont if Necessary Per Protocol  · The left ventricular cavity is mild to moderately dilated.  · Left ventricular ejection fraction appears to be 61 - 65%. Left   ventricular systolic function is normal.  · The right ventricular cavity is mildly dilated. Normal right ventricular   systolic function noted.  · The left atrial cavity is mild to moderately dilated.  · There is no evidence of pericardial effusion       Scheduled Medications  cefepime, 2 g, Intravenous, Q8H  guaiFENesin, 600 mg, Oral, Q12H  insulin lispro, 0-7 Units, Subcutaneous, TID AC  metoprolol tartrate, 100 mg, Oral, Q12H  sodium chloride, 10 mL, Intravenous, Q12H  warfarin, 10 mg, Oral, Daily    Infusions  Pharmacy to dose vancomycin,   Pharmacy to dose warfarin,     Diet  Diet Regular; Consistent Carbohydrate       Assessment/Plan     Active Hospital Problems    Diagnosis  POA   • **Cellulitis of left lower extremity [L03.116]  Yes   • Hypokalemia [E87.6]  Yes   • CAROL (acute kidney injury) ([facility]) [N17.9]  Yes   • Sepsis with cutaneous manifestations ([facility]) [A41.9]  Yes   • Hyperglycemia [R73.9]  Yes   • Paroxysmal atrial fibrillation (HCC) [I48.0]  Unknown   • Heterozygous factor V Leiden mutation ([facility]) [D68.51]  Yes   • Dyslipidemia [E78.5]  Yes   • Lymphedema of both lower extremities [I89.0]  Yes   • Obesity, morbid, BMI 50 or higher ([facility]) [E66.01]  Yes   • History of bilateral  pulmonary embolism (CMS/HCC) [I26.99]  Yes   • ARNOLDO (obstructive sleep apnea) [G47.33]  Yes      Resolved Hospital Problems   No resolved problems to display.       51 y.o. male admitted with Cellulitis of left lower extremity.    Cellulitis of left lower extremity / Lymphedema of both lower extremities  Initially started on Vanco/cefepime with significant improvement of leukocytosis, no purulence evident so I stopped vancomycin yesterday  -Leukocytosis did not continue to improve, I am concerned there is an area on his left calf that feels fluctuant/concerning for abscess underneath  -Continue cefepime, resume vancomycin; obtain left lower extremity ultrasound to evaluate for abscess  -Consider ID consultation if no clinical improvement or progression of cellulitis develops     Atrial fibrillation, new onset  Likely due to uncontrolled ARNOLDO in setting of morbid obesity   Cardiology following--metoprolol tartrate 100 mg every 12 hours  Already on anticoagulation (history of factor V Leiden )     Prediabetes  Acceptable glucose readings  A1c 6.4 (at goal without previous antidiabetic agents on board)  Low-dose lispro sliding scale for now  NCS diet     Obesity, morbid, BMI 50 or higher  Complicating all problems     Obstructive sleep apnea  Requiring 2L NC at night     Resolved/stable issues: Hypokalemia, CAROL, dyslipidemia, sepsis; Heterozygous factor V Leiden mutation/History of bilateral pulmonary embolism    · Warfarin (home med) for DVT prophylaxis.  · CPR  · Anticipate discharge pending clinical course / plan home     Kay Jung MD  [facility]  11/04/21  13:57 EDT    Patient was wearing facemask when I entered the room and throughout our encounter.  I wore protective equipment throughout this patient encounter including a face mask, gloves and protective eyewear.  Hand hygiene was performed before donning protective equipment and after removal when leaving the room.

## 2021-11-04 NOTE — PLAN OF CARE
Goal Outcome Evaluation:  Plan of Care Reviewed With: patient        Progress: no change  Outcome Summary: C/o left foot pain, Norco given x1. VSS on 4Lo2. Weight shifted on bariatric bed.IVB  given as ordeered. Currently resting in bed. Will continue to monitor.  Problem: Adult Inpatient Plan of Care  Goal: Plan of Care Review  Outcome: Ongoing, Progressing

## 2021-11-04 NOTE — PLAN OF CARE
Goal Outcome Evaluation:  Plan of Care Reviewed With: patient           Outcome Summary: 52yo morbidly obese white male admitted 11/1/21 due to L LE cellulitis. PMH includes sepsis, afib, high cholesterol, sleep apnea, hbp, PE. PLOF: Pt was living at home with spouse and used a r wx for mobility. 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 He is primarily limited at this tiem by generalized weakness as well as decreased activity tolerance that limit his functional mobility. Pt resting in bed in NAD - L LE with edema and redness, painful, and weeping. Small blisters on L lateral foot opened with bed mobility. He req max A of 2 for bed mobility, mod A of 2 for sit/stand. He was able to amb 25-30' with r wx and min A of 2 with very wide SRIDEVI (due to soft tissue approximation), shuffled steps. He would benefit from skilled PT for ther ex, gt/transfer training, bed mobility, balance, and endurance as tolerated to prepare for d/c.  Patient was intermittently wearing a face mask during this therapy encounter. Therapist used appropriate personal protective equipment including eye protection, mask, and gloves.  Mask used was standard procedure mask. Appropriate PPE was worn during the entire therapy session. Hand hygiene was completed before and after therapy session. Patient is not in enhanced droplet precautions.  PT Keenan Osorio

## 2021-11-04 NOTE — NURSING NOTE
Wound/ostomy - patient is being treated for cellulitis to LLE, there is some concern for abscess and hospitalist is following and may consult ID. Wound dept received a consult for a blister. There is a roofed, thin blister to L lateral ankle area, no current drainage, white in appearance, irregular in shape. Will manage any blistering (roofed and unroofed) with xeroform gauze and change as needed if they become soiled.

## 2021-11-04 NOTE — PLAN OF CARE
Goal Outcome Evaluation:  Plan of Care Reviewed With: patient           Outcome Summary: VSS. Pain treated with PO meds X1. Wound assessed the leg and blisteres have closed over, orders in if it reopens.   An US was ordered. IV vanc started.Called Cardiology following a 9.6 sec pause, no new orders. NAYANM

## 2021-11-04 NOTE — PROGRESS NOTES
[facility] Follow Up    Chief Complaint: Follow up atrial fibrillation    Interval History: No chest pain.  Breathing has improved.  Heart rate control has improved.    Objective:     Objective:  Temp:  [97.3 °F (36.3 °C)-98.3 °F (36.8 °C)] 97.6 °F (36.4 °C)  Heart Rate:  [] 75  Resp:  [18] 18  BP: (113-136)/(75-94) 134/76     Intake/Output Summary (Last 24 hours) at 11/4/2021 0832  Last data filed at 11/4/2021 0725  Gross per 24 hour   Intake 240 ml   Output 1475 ml   Net -1235 ml     Body mass index is 72.15 kg/m².      11/02/21  0006 11/03/21  1746   Weight: (!) 255 kg (561 lb 1.6 oz) (!) 255 kg (562 lb 2.8 oz)     Weight change:       Physical Exam:   General : Alert, cooperative, in no acute distress.  Neuro: Alert,cooperative and oriented.  Lungs: CTAB. Normal respiratory effort and rate.  CV: Irregularly, irregular, normal S1 and S2, no murmurs, gallops or rubs.  ABD: Soft, nontender, nondistended. Positive bowel sounds.  Extr: Chronic bilateral lower extremity edema, increased edema in the left lower extremity with erythema extending up to his inguinal area.    Lab Review:   Results from last 7 days   Lab Units 11/04/21  0614 11/03/21  1001 11/02/21  0616 11/02/21  0022 10/29/21  0842 10/29/21  0842   SODIUM mmol/L 131* 131*   < > 134*   < > 134*   POTASSIUM mmol/L 4.0 3.7   < > 3.3*   < > 3.8   CHLORIDE mmol/L 98 98   < > 96*   < > 100   CO2 mmol/L 23.6 22.8   < > 24.9   < > 22.7   BUN mg/dL 12 13   < > 23*   < > 14   CREATININE mg/dL 0.49* 0.63*   < > 1.29*   < > 1.12   GLUCOSE mg/dL 133* 150*   < > 131*   < > 118*   CALCIUM mg/dL 8.7 8.4*   < > 9.0   < > 8.5*   AST (SGOT) U/L  --   --   --  19  --  24   ALT (SGPT) U/L  --   --   --  18  --  17    < > = values in this interval not displayed.     Results from last 7 days   Lab Units 10/29/21  0842   TROPONIN T ng/mL <0.010     Results from last 7 days   Lab Units 11/04/21  0614 11/03/21  1001   WBC 10*3/mm3 15.74* 14.90*    HEMOGLOBIN g/dL 12.0* 12.1*   HEMATOCRIT % 36.3* 37.2*   PLATELETS 10*3/mm3 268 233     Results from last 7 days   Lab Units 11/04/21  0614 11/03/21  1001 11/02/21  1056 11/02/21  0022   INR  2.83* 2.98*   < > 2.55*   APTT seconds  --   --   --  70.4*    < > = values in this interval not displayed.     Results from last 7 days   Lab Units 11/04/21  0614   MAGNESIUM mg/dL 2.0     Results from last 7 days   Lab Units 11/03/21  1001   CHOLESTEROL mg/dL 112   TRIGLYCERIDES mg/dL 95   HDL CHOL mg/dL 30*     Results from last 7 days   Lab Units 10/29/21  0842   PROBNP pg/mL 469.0         I reviewed the patient's new clinical results.  I personally viewed and interpreted the patient's EKG  Current Medications:   Scheduled Meds:cefepime, 2 g, Intravenous, Q8H  guaiFENesin, 600 mg, Oral, Q12H  insulin lispro, 0-7 Units, Subcutaneous, TID AC  metoprolol tartrate, 100 mg, Oral, Q12H  sodium chloride, 10 mL, Intravenous, Q12H      Continuous Infusions:Pharmacy to dose warfarin,         Allergies:  No Known Allergies    Assessment/Plan:     1. Atrial fibrillation.  New onset.  On metoprolol with improved rate control.  Already anticoagulated with warfarin.   2. Left lower extremity cellulitis/lymphedema  3. History of recurrent PE and DVT  4. Factor V leiden   5. Hypertension.  Well-controlled.  6. Untreated ARNOLDO  7. Morbid obesity    -Continue current dose of metoprolol tartrate.  -Continue anticoagulation with warfarin.  -Wean oxygen as tolerates.    Elsy Baeza MD  11/04/21  08:32 EDT

## 2021-11-04 NOTE — PROGRESS NOTES
"[facility]  Clinical Pharmacy Department     Vancomycin Pharmacokinetic Initial Consult Note    Kameron Melendez is a 51 y.o. male who is on day 1 of pharmacy to dose vancomycin. Resuming vancomycin    MRSA PCR performed: 11/2; Result: negative  Target: -600 mg/L.hr   Indication: SSTI  Culture/Source: 11/2 Blood x2 -ngtd   Previous Dose Given: 1250 mg on 11/3 at 0039  Vancomycin Dose:     Planned Duration of Therapy: 7 days  Consulting Provider: Dr Jung  Other Antimicrobials: cefepime    Vitals/Labs  Ht: 188 cm (74.02\"); Wt: (!) 255 kg (562 lb 2.8 oz)  Temp Readings from Last 3 Encounters:   11/04/21 98.4 °F (36.9 °C) (Oral)   10/29/21 98 °F (36.7 °C) (Tympanic)   12/01/19 97.1 °F (36.2 °C) (Oral)   Estimated Creatinine Clearance: 380.9 mL/min (A) (by C-G formula based on SCr of 0.49 mg/dL (L)).   Creatinine   Date Value Ref Range Status   11/04/2021 0.49 (L) 0.76 - 1.27 mg/dL Final   11/03/2021 0.63 (L) 0.76 - 1.27 mg/dL Final   11/02/2021 0.89 0.76 - 1.27 mg/dL Final      Lab Results   Component Value Date    WBC 15.74 (H) 11/04/2021    WBC 14.90 (H) 11/03/2021    WBC 17.21 (H) 11/02/2021     Assessment/Plan:    Predictive AUC level for the dose ordered is 448 mg/L.hr, which is within the target of 400-600 mg/L.hr  Will reload with 3000 mg IV x1 since predicted level <10 mcg/mL at this time. Then 1250 mg IV q12h  Vancomycin trough level has been ordered for 11/5 at 1300. Checking sooner since BMI >70 will difficult to predict with standard models.     Pharmacy will follow patient's kidney function and will adjust doses and obtain levels as necessary.Thank you for involving pharmacy in this patient's care. Please contact pharmacy with any questions or concerns.                           Laci Zepeda [facility]  Clinical Pharmacist  11/04/21     "

## 2021-11-04 NOTE — THERAPY EVALUATION
Patient Name: Kameron Melendez  : 1970    MRN: [id number]                              Today's Date: 2021       Admit Date: 2021    Visit Dx:     ICD-10-CM ICD-9-CM   1. Cellulitis of left lower extremity  L03.116 682.6   2. Lymphedema  I89.0 457.1     Patient Active Problem List   Diagnosis   • History of bilateral pulmonary embolism (CMS/HCC)   • Pulmonary hypertension (HCC)   • Lymphedema of both lower extremities   • Obesity, morbid, BMI 50 or higher (HCC)   • Dyslipidemia   • Hyperuricemia   • Hypogonadal obesity   • Knee arthropathy   • Morbid obesity with body mass index of 60.0-69.9 in adult (HCC)   • ARNOLDO (obstructive sleep apnea)   • Severe edema   • History of pulmonary embolism   • Other acute pulmonary embolism without acute cor pulmonale (HCC)   • Intractable back pain   • Heterozygous factor V Leiden mutation (HCC)   • Cellulitis of left lower extremity   • Hypokalemia   • CAROL (acute kidney injury) (HCC)   • Sepsis with cutaneous manifestations (HCC)   • Hyperglycemia   • Paroxysmal atrial fibrillation (HCC)     Past Medical History:   Diagnosis Date   • DVT (deep venous thrombosis) (HCC)     RLE   • Factor V Leiden (HCC)    • Hypertension    • Kidney stone    • Lymphedema    • Lymphedema of left lower extremity    • Obesity    • ARNOLDO (obstructive sleep apnea)    • Pulmonary embolism (HCC)     bilateral   • Pulmonary hypertension (HCC)    • Right ventricular enlargement      Past Surgical History:   Procedure Laterality Date   • CARDIAC CATHETERIZATION Bilateral 2017    Procedure: Pulmonary angiography- [place] ;  Surgeon: Cedric Coulter MD;  Location: [facility] INVASIVE LOCATION;  Service:    • CARDIAC CATHETERIZATION N/A 2017    Procedure: Right Heart Cath;  Surgeon: Cedric Coulter MD;  Location: [facility] CATH INVASIVE LOCATION;  Service:    • THROMBECTOMY        General Information     Row Name 21 0950          Physical Therapy Time and Intention    Document Type evaluation   -DJ     Mode of Treatment individual therapy; physical therapy  -DJ     Row Name 11/04/21 0950          General Information    Patient Profile Reviewed yes  -DJ     Prior Level of Function min assist:; ADL's  pt was amb short distances with wx and driving and working  -DJ     Existing Precautions/Restrictions fall  -DJ     Barriers to Rehab medically complex; previous functional deficit  -DJ     Row Name 11/04/21 0950          Living Environment    Lives With spouse  -DJ     Row Name 11/04/21 0950          Cognition    Orientation Status (Cognition) oriented x 4  pleasant and cooperative, eager to attempt to move  -DJ     Row Name 11/04/21 0950          Safety Issues, Functional Mobility    Safety Issues Affecting Function (Mobility) positioning of assistive device  -DJ     Impairments Affecting Function (Mobility) endurance/activity tolerance; pain; strength  -DJ     Comment, Safety Issues/Impairments (Mobility) gt belt, crocs  -DJ           User Key  (r) = Recorded By, (t) = Taken By, (c) = Cosigned By    Initials Name Provider Type    Praveena Zambrano, PT Physical Therapist               Mobility     Row Name 11/04/21 0952          Bed Mobility    Bed Mobility supine-sit; sit-supine  -DJ     Supine-Sit [place] (Bed Mobility) maximum assist (25% patient effort); 2 person assist; verbal cues  -DJ     Sit-Supine [place] (Bed Mobility) maximum assist (25% patient effort); 2 person assist; verbal cues  -DJ     Assistive Device (Bed Mobility) bed rails; head of bed elevated  -DJ     Comment (Bed Mobility) able to help move himself; req most assist to move legs L>R  -DJ     Row Name 11/04/21 0952          Transfers    Comment (Transfers) sit/stand from EOB  -DJ     Row Name 11/04/21 0952          Sit-Stand Transfer    Sit-Stand [place] (Transfers) moderate assist (50% patient effort); 2 person assist; verbal cues  -DJ     Assistive Device (Sit-Stand Transfers) walker, front-wheeled  -DJ     Row Name  11/04/21 0952          Gait/Stairs (Locomotion)    [place] Level (Gait) minimum assist (75% patient effort); moderate assist (50% patient effort); 2 person assist; verbal cues  -DJ     Assistive Device (Gait) walker, front-wheeled  -DJ     Distance in Feet (Gait) 25-30' from bed to/from hallway  -DJ     Deviations/Abnormal Patterns (Gait) base of support, wide; festinating/shuffling; reg decreased; gait speed decreased; stride length decreased  -DJ     Bilateral Gait Deviations forward flexed posture; heel strike decreased  -DJ     [place] Level (Stairs) not tested  -DJ     Comment (Gait/Stairs) Pt amb 25-30' from bed to/from hallway with r wx with wide SRIDEVI due to soft tissue approximation, decreased step length, shuffled steps. Would benefit from eduar wx as well as eduar chair and BSC in room  -DJ           User Key  (r) = Recorded By, (t) = Taken By, (c) = Cosigned By    Initials Name Provider Type    Praveena Zambrano, PT Physical Therapist               Obj/Interventions     Row Name 11/04/21 0955          Range of Motion Comprehensive    General Range of Motion no range of motion deficits identified  -DJ     Comment, General Range of Motion LE limited due to soft tissue approximation  -DJ     Row Name 11/04/21 0955          Strength Comprehensive (MMT)    Comment, General Manual Muscle Testing (MMT) Assessment good UE strength, LEs <3/5  -DJ     Row Name 11/04/21 0955          Balance    Balance Assessment sitting static balance; standing static balance; standing dynamic balance  -DJ     Static Sitting Balance WFL; unsupported; sitting, edge of bed  -DJ     Static Standing Balance WFL; supported; standing  -DJ     Dynamic Standing Balance mild impairment; supported; standing  -DJ     Balance Interventions sitting; standing; sit to stand; supported; weight shifting activity  -DJ     Row Name 11/04/21 0955          Sensory Assessment (Somatosensory)    Sensory Assessment (Somatosensory) not tested  -DJ            User Key  (r) = Recorded By, (t) = Taken By, (c) = Cosigned By    Initials Name Provider Type    Praveena Zambrano, PT Physical Therapist               Goals/Plan     Row Name 11/04/21 1004          Bed Mobility Goal 1 (PT)    Activity/Assistive Device (Bed Mobility Goal 1, PT) sit to supine; supine to sit  -DJ     [place] Level/Cues Needed (Bed Mobility Goal 1, PT) minimum assist (75% or more patient effort); 2 person assist; verbal cues required  -DJ     Time Frame (Bed Mobility Goal 1, PT) 10 days  -DJ     Row Name 11/04/21 1004          Transfer Goal 1 (PT)    Activity/Assistive Device (Transfer Goal 1, PT) sit-to-stand/stand-to-sit; other (see comments)  eduar wx  -DJ     [place] Level/Cues Needed (Transfer Goal 1, PT) verbal cues required; contact guard assist  -DJ     Time Frame (Transfer Goal 1, PT) 10 days  -DJ     Row Name 11/04/21 1004          Gait Training Goal 1 (PT)    Activity/Assistive Device (Gait Training Goal 1, PT) gait (walking locomotion); assistive device use; improve balance and speed; increase endurance/gait distance; increase energy conservation; walker, rolling  -DJ     [place] Level (Gait Training Goal 1, PT) contact guard assist; verbal cues required  -DJ     Distance (Gait Training Goal 1, PT) 100'  -DJ     Time Frame (Gait Training Goal 1, PT) 10 days  -DJ     Row Name 11/04/21 1004          Patient Education Goal (PT)    Activity (Patient Education Goal, PT) HEP  -DJ     [place]/Cues/Accuracy (Memory Goal 2, PT) demonstrates adequately; verbalizes understanding  -DJ     Time Frame (Patient Education Goal, PT) short term goal (STG); 3 days  -DJ           User Key  (r) = Recorded By, (t) = Taken By, (c) = Cosigned By    Initials Name Provider Type    Praveena Zambrano, PT Physical Therapist               Clinical Impression     Row Name 11/04/21 0957          Pain    Additional Documentation Pain Scale: Numbers Pre/Post-Treatment (Group)  -DJ     Row Name  11/04/21 0957          Pain Scale: Numbers Pre/Post-Treatment    Pain Location - Side Left  -DJ     Pain Location - Orientation lower  -DJ     Pain Location extremity  -DJ     Pre/Posttreatment Pain Comment c/o pain L LE  -DJ     Pain Intervention(s) Repositioned; Rest  -DJ     Row Name 11/04/21 0957          Plan of Care Review    Plan of Care Reviewed With patient  -DJ     Outcome Summary 50yo morbidly obese white male admitted 11/1/21 due to L LE cellulitis. PMH includes sepsis, afib, high cholesterol, sleep apnea, hbp, PE. PLOF: Pt was living at home with spouse and used a r wx for mobility. 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 He is primarily limited at this [facility] by generalized weakness as well as decreased activity tolerance that limit his functional mobility. Pt resting in bed in NAD - L LE with edema and redness, painful, and weeping. Small blisters on L lateral foot opened with bed mobility. He req max A of 2 for bed mobility, mod A of 2 for sit/stand. He was able to amb 25-30' with r wx and min A of 2 with very wide SRIDEVI (due to soft tissue approximation), shuffled steps. He would benefit from skilled PT for ther ex, gt/transfer training, bed mobility, balance, and endurance as tolerated to prepare for d/c.  -DJ     Row Name 11/04/21 0957          Therapy Assessment/Plan (PT)    Rehab Potential (PT) good, to achieve stated therapy goals  -DJ     Criteria for Skilled Interventions Met (PT) yes; meets criteria; skilled treatment is necessary  -DJ     Row Name 11/04/21 0957          Vital Signs    Pre SpO2 (%) 95  -DJ     O2 Delivery Pre Treatment supplemental O2  3-4  -DJ     O2 Delivery Intra Treatment room air  -DJ     Post SpO2 (%) 93  -DJ     O2 Delivery Post Treatment supplemental O2  3-4L  -DJ     Pre Patient Position Supine  -DJ     Intra Patient Position Standing  -DJ     Post Patient Position Supine  -DJ     Row Name 11/04/21 0957          Positioning and Restraints     Pre-Treatment Position in bed  -DJ     Post Treatment Position bed  -DJ     In Bed notified nsg; supine; call light within reach; encouraged to call for assist; exit alarm on  -DJ           User Key  (r) = Recorded By, (t) = Taken By, (c) = Cosigned By    Initials Name Provider Type    Praveena Zambrano PT Physical Therapist               Outcome Measures     Row Name 11/04/21 1005          How much help from another person do you currently need...    Turning from your back to your side while in flat bed without using bedrails? 2  -DJ     Moving from lying on back to sitting on the side of a flat bed without bedrails? 2  -DJ     Moving to and from a bed to a chair (including a wheelchair)? 2  -DJ     Standing up from a chair using your arms (e.g., wheelchair, bedside chair)? 2  -DJ     Climbing 3-5 steps with a railing? 1  -DJ     To walk in hospital room? 3  -DJ     AM-PAC 6 Clicks Score (PT) 12  -DJ     Row Name 11/04/21 1005          Functional Assessment    Outcome Measure Options AM-PAC 6 Clicks Basic Mobility (PT)  -DJ           User Key  (r) = Recorded By, (t) = Taken By, (c) = Cosigned By    Initials Name Provider Type    Praveena Zambrano PT Physical Therapist                             Physical Therapy Education                 Title: PT OT SLP Therapies (In Progress)     Topic: Physical Therapy (In Progress)     Point: Mobility training (Done)     Learning Progress Summary           Patient Acceptance, E, VU,NR by ROMARIO at 11/4/2021 1006                   Point: Home exercise program (Not Started)     Learner Progress:  Not documented in this visit.          Point: Body mechanics (Done)     Learning Progress Summary           Patient Acceptance, E, VU,NR by ROMARIO at 11/4/2021 1006                   Point: Precautions (Done)     Learning Progress Summary           Patient Acceptance, E, VU,NR by ROMARIO at 11/4/2021 1006                               User Key     Initials Effective Dates Name Provider Type Discipline     ROMARIO 10/25/19 -  Praveena Burdick PT Physical Therapist PT              PT Recommendation and Plan  Planned Therapy Interventions (PT): balance training, bed mobility training, gait training, home exercise program, strengthening, postural re-education, patient/family education, transfer training  Plan of Care Reviewed With: patient  Outcome Summary: 52yo morbidly obese white male admitted 11/1/21 due to L LE cellulitis. PMH includes sepsis, afib, high cholesterol, sleep apnea, hbp, PE. PLOF: Pt was living at home with spouse and used a r wx for mobility. 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 He is primarily limited at this [facility] by generalized weakness as well as decreased activity tolerance that limit his functional mobility. Pt resting in bed in NAD - L LE with edema and redness, painful, and weeping. Small blisters on L lateral foot opened with bed mobility. He req max A of 2 for bed mobility, mod A of 2 for sit/stand. He was able to amb 25-30' with r wx and min A of 2 with very wide SRIDEVI (due to soft tissue approximation), shuffled steps. He would benefit from skilled PT for ther ex, gt/transfer training, bed mobility, balance, and endurance as tolerated to prepare for d/c.     Time Calculation:    PT Charges     Row Name 11/04/21 1007             Time Calculation    Start Time 0844  -DJ      Stop Time 0927  -DJ      Time Calculation (min) 43 min  -DJ      PT Non-Billable Time (min) 10 min  -DJ      PT Received On 11/04/21  -DJ      PT - Next Appointment 11/05/21  -DJ      PT Goal Re-Cert Due Date 11/14/21  -DJ            User Key  (r) = Recorded By, (t) = Taken By, (c) = Cosigned By    Initials Name Provider Type    Praveena Zambrano PT Physical Therapist              Therapy Charges for Today     Code Description Service Date Service Provider Modifiers Qty    86329067807 HC PT EVAL MOD COMPLEXITY 2 11/4/2021 Praveena Burdick PT GP 1    94367245670 HC GAIT TRAINING EA 15 MIN 11/4/2021 Gennaro  Praveena, PT GP 1    74269531491 HC PT THER PROC EA 15 MIN 11/4/2021 Praveena Burdick, PT GP 2    46392268414 HC PT THER SUPP EA 15 MIN 11/4/2021 Praveena Burdick, PT GP 2          PT G-Codes  Outcome Measure Options: AM-PAC 6 Clicks Basic Mobility (PT)  AM-PAC 6 Clicks Score (PT): 12    Praveena Burdick, PT  11/4/2021

## 2021-11-04 NOTE — PROGRESS NOTES
Pharmacy Consult: Warfarin Dosing/ Monitoring    Kameron Melendez is a 51 y.o. male, estimated creatinine clearance is 380.9 mL/min (A) (by C-G formula based on SCr of 0.49 mg/dL (L)). weighing (!) 255 kg (562 lb 2.8 oz).     has a past medical history of DVT (deep venous thrombosis) ([facility]), Factor V Leiden (HCC), Hypertension, Kidney stone, Lymphedema, Lymphedema of left lower extremity, Obesity, ARNOLDO (obstructive sleep apnea), Pulmonary embolism (HCC), Pulmonary hypertension (HCC), and Right ventricular enlargement.    Social History     Tobacco Use    Smoking status: Light Tobacco Smoker     Types: Cigars, Pipe     Start date: 1/1/2006    Smokeless tobacco: Never Used    Tobacco comment: occasional - < 1 a month   Substance Use Topics    Alcohol use: No    Drug use: No       Results from last 7 days   Lab Units 11/04/21  0614 11/03/21  1001 11/02/21  1056 11/02/21  0616 11/02/21  0022 10/29/21  0842 10/29/21  0000   INR  2.83* 2.98* 2.99*  --  2.55* 2.40* 2.60   APTT seconds  --   --   --   --  70.4*  --   --    HEMOGLOBIN g/dL 12.0* 12.1*  --  12.4* 13.8 13.6  --    HEMATOCRIT % 36.3* 37.2*  --  37.0* 41.1 39.7  --    PLATELETS 10*3/mm3 268 233  --  186 200 241  --      Results from last 7 days   Lab Units 11/04/21  0614 11/03/21  1001 11/02/21  0616 11/02/21  0022 11/02/21  0022 10/29/21  0842 10/29/21  0842   SODIUM mmol/L 131* 131* 134*   < > 134*   < > 134*   POTASSIUM mmol/L 4.0 3.7 3.6   < > 3.3*   < > 3.8   CHLORIDE mmol/L 98 98 99   < > 96*   < > 100   CO2 mmol/L 23.6 22.8 25.2   < > 24.9   < > 22.7   BUN mg/dL 12 13 22*   < > 23*   < > 14   CREATININE mg/dL 0.49* 0.63* 0.89   < > 1.29*   < > 1.12   CALCIUM mg/dL 8.7 8.4* 8.3*   < > 9.0   < > 8.5*   BILIRUBIN mg/dL  --   --   --   --  0.7  --  0.9   ALK PHOS U/L  --   --   --   --  169*  --  84   ALT (SGPT) U/L  --   --   --   --  18  --  17   AST (SGOT) U/L  --   --   --   --  19  --  24   GLUCOSE mg/dL 133* 150* 132*   < > 131*   < > 118*    < > = values  in this interval not displayed.     Anticoagulation history: Patient followed by [facility] with regimen of 10 mg Tues/Thurs/Sat and 15 mg on Mon/Wed/Fri/Sun (90 mg weekly total) for acute pulmonary embolism treatment (without acute cor pulmonale)     Hospital Anticoagulation:  Consulting provider: Jackson Canchola APRN  Start date: 11/221  Indication: Full anticoagulation. Factor V Leiden  Target INR: 2-3  Expected duration: TBD  Bridge Therapy: No                Date 11/2 11/3 11/4          INR 2.55 2.98 2.83          Warfarin dose 10 mg 10 mg             Potential drug interactions:   Cefepime: may enhance the anticoagulant effect of warfarin    Relevant nutrition status: regular diet    Assessment/Plan:  INR is therapeutic but was borderline elevated yesterday -  will dose 10 mg once daily, can resume home dosage once INR stabilizes  Monitor INR and follow up daily      Pharmacy will continue to follow until discharge or discontinuation of warfarin.     Laci Zepeda [facility]

## 2021-11-04 NOTE — PROGRESS NOTES
Adult Nutrition  Assessment/PES    Patient Name:  Kameron Melendez  YOB: 1970  MRN: [id number]  Admit Date:  11/1/2021    Assessment Date:  11/4/2021  Nutrition follow up/calorie count.  Po intake for dinner and breakfast: 1245 kcal (60% kcal needs) and 58 gm protein (70% protein needs) for weight loss.     Reason for Assessment     Row Name 11/04/21 1220          Reason for Assessment    Reason For Assessment follow-up protocol; calorie count order                Nutrition/Diet History     Row Name 11/04/21 1220          Nutrition/Diet History    Typical Food/Fluid Intake CC diet, 100% of meals                    Physical Findings     Row Name 11/04/21 1220          Physical Findings    Overall Physical Appearance obese     Skin poor skin integrity/turgor                  Nutrition Prescription Ordered     Row Name 11/04/21 1220          Nutrition Prescription PO    Common Modifiers Consistent Carbohydrate                Evaluation of Received Nutrient/Fluid Intake     Row Name 11/04/21 1220          Calories Evaluation    Oral Calories (kcal) 1245     % of Kcal Needs 60            Protein Evaluation    Oral Protein (gm) 58     % of Protein Needs 70               Problem/Interventions:     Intervention Goal     Row Name 11/04/21 1221          Intervention Goal    General Maintain nutrition; Meet nutritional needs for age/condition; Reduce/improve symptoms     PO Tolerate PO; Maintain intake     Weight Appropriate weight loss                Nutrition Intervention     Row Name 11/04/21 1221          Nutrition Intervention    RD/Tech Action Care plan reviewd; Follow Tx progress                  Education/Evaluation     Row Name 11/04/21 1221          Education    Education Will Instruct as appropriate            Monitor/Evaluation    Monitor Per protocol                 Electronically signed by:  Lindsey El RD  11/04/21 12:21 EDT

## 2021-11-05 LAB
ANION GAP SERPL CALCULATED.3IONS-SCNC: 5.9 MMOL/L (ref 5–15)
BUN SERPL-MCNC: 9 MG/DL (ref 6–20)
BUN/CREAT SERPL: 15.3 (ref 7–25)
CALCIUM SPEC-SCNC: 8.7 MG/DL (ref 8.6–10.5)
CHLORIDE SERPL-SCNC: 99 MMOL/L (ref 98–107)
CO2 SERPL-SCNC: 26.1 MMOL/L (ref 22–29)
CREAT SERPL-MCNC: 0.59 MG/DL (ref 0.76–1.27)
DEPRECATED RDW RBC AUTO: 47.8 FL (ref 37–54)
EOSINOPHIL # BLD MANUAL: 0.33 10*3/MM3 (ref 0–0.4)
EOSINOPHIL NFR BLD MANUAL: 2 % (ref 0.3–6.2)
ERYTHROCYTE [DISTWIDTH] IN BLOOD BY AUTOMATED COUNT: 14.2 % (ref 12.3–15.4)
GFR SERPL CREATININE-BSD FRML MDRD: 145 ML/MIN/1.73
GLUCOSE BLDC GLUCOMTR-MCNC: 146 MG/DL (ref 70–130)
GLUCOSE BLDC GLUCOMTR-MCNC: 151 MG/DL (ref 70–130)
GLUCOSE BLDC GLUCOMTR-MCNC: 158 MG/DL (ref 70–130)
GLUCOSE BLDC GLUCOMTR-MCNC: 169 MG/DL (ref 70–130)
GLUCOSE SERPL-MCNC: 148 MG/DL (ref 65–99)
HCT VFR BLD AUTO: 37.2 % (ref 37.5–51)
HGB BLD-MCNC: 11.8 G/DL (ref 13–17.7)
INR PPP: 3.2 (ref 0.9–1.1)
LYMPHOCYTES # BLD MANUAL: 1.97 10*3/MM3 (ref 0.7–3.1)
LYMPHOCYTES NFR BLD MANUAL: 12 % (ref 19.6–45.3)
LYMPHOCYTES NFR BLD MANUAL: 9 % (ref 5–12)
MAGNESIUM SERPL-MCNC: 2 MG/DL (ref 1.6–2.6)
MCH RBC QN AUTO: 29 PG (ref 26.6–33)
MCHC RBC AUTO-ENTMCNC: 31.7 G/DL (ref 31.5–35.7)
MCV RBC AUTO: 91.4 FL (ref 79–97)
MONOCYTES # BLD AUTO: 1.48 10*3/MM3 (ref 0.1–0.9)
NEUTROPHILS # BLD AUTO: 12.63 10*3/MM3 (ref 1.7–7)
NEUTROPHILS NFR BLD MANUAL: 77 % (ref 42.7–76)
PHOSPHATE SERPL-MCNC: 2.3 MG/DL (ref 2.5–4.5)
PLAT MORPH BLD: NORMAL
PLATELET # BLD AUTO: 312 10*3/MM3 (ref 140–450)
PMV BLD AUTO: 9.7 FL (ref 6–12)
POTASSIUM SERPL-SCNC: 4.4 MMOL/L (ref 3.5–5.2)
PROTHROMBIN TIME: 32.5 SECONDS (ref 11.7–14.2)
RBC # BLD AUTO: 4.07 10*6/MM3 (ref 4.14–5.8)
RBC MORPH BLD: NORMAL
SODIUM SERPL-SCNC: 131 MMOL/L (ref 136–145)
WBC # BLD AUTO: 16.4 10*3/MM3 (ref 3.4–10.8)
WBC MORPH BLD: NORMAL

## 2021-11-05 PROCEDURE — 82962 GLUCOSE BLOOD TEST: CPT

## 2021-11-05 PROCEDURE — 25010000002 CEFEPIME PER 500 MG: Performed by: NURSE PRACTITIONER

## 2021-11-05 PROCEDURE — 25010000002 VANCOMYCIN 10 G RECONSTITUTED SOLUTION: Performed by: STUDENT IN AN ORGANIZED HEALTH CARE EDUCATION/TRAINING PROGRAM

## 2021-11-05 PROCEDURE — 63710000001 INSULIN LISPRO (HUMAN) PER 5 UNITS: Performed by: NURSE PRACTITIONER

## 2021-11-05 PROCEDURE — 36415 COLL VENOUS BLD VENIPUNCTURE: CPT | Performed by: NURSE PRACTITIONER

## 2021-11-05 PROCEDURE — 80048 BASIC METABOLIC PNL TOTAL CA: CPT | Performed by: STUDENT IN AN ORGANIZED HEALTH CARE EDUCATION/TRAINING PROGRAM

## 2021-11-05 PROCEDURE — 85007 BL SMEAR W/DIFF WBC COUNT: CPT | Performed by: STUDENT IN AN ORGANIZED HEALTH CARE EDUCATION/TRAINING PROGRAM

## 2021-11-05 PROCEDURE — 99222 1ST HOSP IP/OBS MODERATE 55: CPT | Performed by: INTERNAL MEDICINE

## 2021-11-05 PROCEDURE — 85025 COMPLETE CBC W/AUTO DIFF WBC: CPT | Performed by: STUDENT IN AN ORGANIZED HEALTH CARE EDUCATION/TRAINING PROGRAM

## 2021-11-05 PROCEDURE — 83735 ASSAY OF MAGNESIUM: CPT | Performed by: STUDENT IN AN ORGANIZED HEALTH CARE EDUCATION/TRAINING PROGRAM

## 2021-11-05 PROCEDURE — 97110 THERAPEUTIC EXERCISES: CPT

## 2021-11-05 PROCEDURE — 85610 PROTHROMBIN TIME: CPT | Performed by: NURSE PRACTITIONER

## 2021-11-05 PROCEDURE — 99232 SBSQ HOSP IP/OBS MODERATE 35: CPT | Performed by: NURSE PRACTITIONER

## 2021-11-05 PROCEDURE — 84100 ASSAY OF PHOSPHORUS: CPT | Performed by: STUDENT IN AN ORGANIZED HEALTH CARE EDUCATION/TRAINING PROGRAM

## 2021-11-05 RX ORDER — FUROSEMIDE 80 MG
80 TABLET ORAL DAILY
Status: DISCONTINUED | OUTPATIENT
Start: 2021-11-05 | End: 2021-11-08 | Stop reason: HOSPADM

## 2021-11-05 RX ADMIN — GUAIFENESIN 600 MG: 600 TABLET, EXTENDED RELEASE ORAL at 21:10

## 2021-11-05 RX ADMIN — HYDROCODONE BITARTRATE AND ACETAMINOPHEN 1 TABLET: 7.5; 325 TABLET ORAL at 10:16

## 2021-11-05 RX ADMIN — BENZONATATE 100 MG: 100 CAPSULE ORAL at 21:12

## 2021-11-05 RX ADMIN — CEFEPIME HYDROCHLORIDE 2 G: 2 INJECTION, POWDER, FOR SOLUTION INTRAVENOUS at 02:29

## 2021-11-05 RX ADMIN — INSULIN LISPRO 2 UNITS: 100 INJECTION, SOLUTION INTRAVENOUS; SUBCUTANEOUS at 17:51

## 2021-11-05 RX ADMIN — GUAIFENESIN 600 MG: 600 TABLET, EXTENDED RELEASE ORAL at 10:15

## 2021-11-05 RX ADMIN — SODIUM CHLORIDE, PRESERVATIVE FREE 10 ML: 5 INJECTION INTRAVENOUS at 21:12

## 2021-11-05 RX ADMIN — CEFEPIME HYDROCHLORIDE 2 G: 2 INJECTION, POWDER, FOR SOLUTION INTRAVENOUS at 10:17

## 2021-11-05 RX ADMIN — SODIUM CHLORIDE, PRESERVATIVE FREE 10 ML: 5 INJECTION INTRAVENOUS at 10:16

## 2021-11-05 RX ADMIN — VANCOMYCIN HYDROCHLORIDE 1250 MG: 10 INJECTION, POWDER, LYOPHILIZED, FOR SOLUTION INTRAVENOUS at 16:52

## 2021-11-05 RX ADMIN — VANCOMYCIN HYDROCHLORIDE 1250 MG: 10 INJECTION, POWDER, LYOPHILIZED, FOR SOLUTION INTRAVENOUS at 02:29

## 2021-11-05 RX ADMIN — CEFEPIME HYDROCHLORIDE 2 G: 2 INJECTION, POWDER, FOR SOLUTION INTRAVENOUS at 17:49

## 2021-11-05 RX ADMIN — FUROSEMIDE 80 MG: 80 TABLET ORAL at 18:56

## 2021-11-05 RX ADMIN — METOPROLOL TARTRATE 100 MG: 50 TABLET, FILM COATED ORAL at 10:15

## 2021-11-05 NOTE — PAYOR COMM NOTE
"Christiana Covarrubias (51 y.o. Male)     U/D FOR [id number]    CONTACT TIKI FUCHS  P# [phone]  F# [phone]              Date of Birth Social Security Number Address Home Phone MRN    1970  [address]  [facility] [address] [phone] [id number]    Gnosticism Marital Status             Restorationism        Admission Date Admission Type Admitting Provider Attending Provider Department, Room/Bed    21 Emergency Kay Jung MD Reddy, Rahul Kandada, MD [address], [address]    Discharge Date Discharge Disposition Discharge Destination                         Attending Provider: Larry Wise MD    Allergies: No Known Allergies    Isolation: None   Infection: None   Code Status: CPR   Advance Care Planning Activity    Ht: 188 cm (74.02\")   Wt: 255 kg (562 lb 2.8 oz)    Admission Cmt: None   Principal Problem: Cellulitis of left lower extremity [L03.116]                 Active Insurance as of 2021     Primary Coverage     Payor Plan Insurance Group Employer/Plan Group    [employer]HE[employer] BLUE CROSS AN[employer]M BLUE CROSS BLUE SHIELD PPO [id number]     Payor Plan Address Payor Plan Phone Number Payor Plan Fax Number Effective Dates    [address] [phone]  2019 - None Entered    [place] [address]       Subscriber Name Subscriber Birth Date Member ID       CHRISTIANA COVARRUBIAS 1970 [id number]                 Emergency Contacts      (Rel.) Home Phone Work Phone Mobile Phone    Yaya Covarrubias (Spouse) [phone] -- [phone]               Physician Progress Notes (last 48 hours)      Kay Jung MD at 21 1356              Name: Christiana Covarrubias ADMIT: 2021   : 1970  PCP: Hansel Patel DO    MRN: [id number] LOS: 2 days   AGE/SEX: 51 y.o. male  ROOM: E4/     Subjective   Subjective   Patient is resting in bed, he reports his pain is slightly improved, he was able to work with physical therapy and walk to the " hallway and back.  He is still complaining of significant pain in his leg although better.  His wife has been admitted to hospital for IV antibiotics as well    Review of Systems  As above  Objective   Objective   Vital Signs  Temp:  [97.6 °F (36.4 °C)-98.4 °F (36.9 °C)] 98.4 °F (36.9 °C)  Heart Rate:  [] 77  Resp:  [18] 18  BP: (113-136)/(75-94) 136/76  SpO2:  [90 %-92 %] 90 %  on  Flow (L/min):  [4] 4;   Device (Oxygen Therapy): nasal cannula  Body mass index is 72.15 kg/m².  Physical Exam  Constitutional:       General: He is not in acute distress.     Appearance: He is obese. He is ill-appearing. He is not toxic-appearing.   HENT:      Head: Normocephalic and atraumatic.   Eyes:      Conjunctiva/sclera: Conjunctivae normal.   Cardiovascular:      Rate and Rhythm: Regular rate, irregular rhythm.      Heart sounds: Normal heart sounds.   Pulmonary:      Effort: Pulmonary effort is normal.      Comments: Diminished on expiration; shallow inspiration  Abdominal:      General: Bowel sounds are normal.      Palpations: Abdomen is soft.   Musculoskeletal:         General: Tenderness (LLE) present.      Cervical back: Normal range of motion and neck supple.      Right lower leg: Edema present.      Left lower leg: Edema present.  Possible area of fluctuance on the left posterior calf  Skin:     General: Skin is warm and dry.      Findings: Erythema (LLE) present.   Neurological:      Mental Status: He is alert and oriented to person, place, and time.      Cranial Nerves: No cranial nerve deficit.      Motor: Weakness (generalized) present.   Psychiatric:         Behavior: Behavior normal.         Thought Content: Thought content normal. Results Review     I reviewed the patient's new clinical results.  Results from last 7 days   Lab Units 11/04/21  0614 11/03/21  1001 11/02/21  0616 11/02/21  0022   WBC 10*3/mm3 15.74* 14.90* 17.21* 19.78*   HEMOGLOBIN g/dL 12.0* 12.1* 12.4* 13.8   PLATELETS 10*3/mm3 268 233 186  200     Results from last 7 days   Lab Units 11/04/21  0614 11/03/21  1001 11/02/21  0616 11/02/21  0022   SODIUM mmol/L 131* 131* 134* 134*   POTASSIUM mmol/L 4.0 3.7 3.6 3.3*   CHLORIDE mmol/L 98 98 99 96*   CO2 mmol/L 23.6 22.8 25.2 24.9   BUN mg/dL 12 13 22* 23*   CREATININE mg/dL 0.49* 0.63* 0.89 1.29*   GLUCOSE mg/dL 133* 150* 132* 131*   Estimated Creatinine Clearance: 380.9 mL/min (A) (by C-G formula based on SCr of 0.49 mg/dL (L)).  Results from last 7 days   Lab Units 11/02/21  0022 10/29/21  0842   ALBUMIN g/dL 3.10* 3.50   BILIRUBIN mg/dL 0.7 0.9   ALK PHOS U/L 169* 84   AST (SGOT) U/L 19 24   ALT (SGPT) U/L 18 17     Results from last 7 days   Lab Units 11/04/21  0614 11/03/21  1001 11/02/21  0616 11/02/21  0022 10/29/21  0842 10/29/21  0842   CALCIUM mg/dL 8.7 8.4* 8.3* 9.0   < > 8.5*   ALBUMIN g/dL  --   --   --  3.10*  --  3.50   MAGNESIUM mg/dL 2.0  --   --   --   --   --    PHOSPHORUS mg/dL 2.5  --   --   --   --   --     < > = values in this interval not displayed.     Results from last 7 days   Lab Units 11/02/21  0354 11/02/21  0022   LACTATE mmol/L 1.3 2.2*     COVID19   Date Value Ref Range Status   11/02/2021 Not Detected Not Detected - Ref. Range Final     Hemoglobin A1C   Date/Time Value Ref Range Status   11/03/2021 1001 6.44 (H) 4.80 - 5.60 % Final     Glucose   Date/Time Value Ref Range Status   11/04/2021 1118 143 (H) 70 - 130 mg/dL Final     Comment:     Meter: [id number] : [id number] Chuy JACOB   11/04/2021 0610 136 (H) 70 - 130 mg/dL Final     Comment:     Meter: [id number] : [id number] Cecilia Taylor    11/03/2021 2007 163 (H) 70 - 130 mg/dL Final     Comment:     Meter: [id number] : [id number] Cecilia Connoja    11/03/2021 1629 175 (H) 70 - 130 mg/dL Final     Comment:     Meter: [id number] : [id number] Peggy Gaines    11/03/2021 1103 144 (H) 70 - 130 mg/dL Final     Comment:     Meter: [id number] : [id number] [facility]   11/03/2021 0608 131  (H) 70 - 130 mg/dL Final     Comment:     Meter: [id number] : [id number] Moshe Gaitan   11/02/2021 1958 166 (H) 70 - 130 mg/dL Final     Comment:     Meter: [id number] : [id number] Cedric JACKSON [facility]       Adult Transthoracic Echo Complete W/ Cont if Necessary Per Protocol  · The left ventricular cavity is mild to moderately dilated.  · Left ventricular ejection fraction appears to be 61 - 65%. Left   ventricular systolic function is normal.  · The right ventricular cavity is mildly dilated. Normal right ventricular   systolic function noted.  · The left atrial cavity is mild to moderately dilated.  · There is no evidence of pericardial effusion       Scheduled Medications  cefepime, 2 g, Intravenous, Q8H  guaiFENesin, 600 mg, Oral, Q12H  insulin lispro, 0-7 Units, Subcutaneous, TID AC  metoprolol tartrate, 100 mg, Oral, Q12H  sodium chloride, 10 mL, Intravenous, Q12H  warfarin, 10 mg, Oral, Daily    Infusions  Pharmacy to dose vancomycin,   Pharmacy to dose warfarin,     Diet  Diet Regular; Consistent Carbohydrate      Assessment/Plan     Active Hospital Problems    Diagnosis  POA   • **Cellulitis of left lower extremity [L03.116]  Yes   • Hypokalemia [E87.6]  Yes   • CAROL (acute kidney injury) ([facility]) [N17.9]  Yes   • Sepsis with cutaneous manifestations ([facility]) [A41.9]  Yes   • Hyperglycemia [R73.9]  Yes   • Paroxysmal atrial fibrillation ([facility]) [I48.0]  Unknown   • Heterozygous factor V Leiden mutation ([facility]) [D68.51]  Yes   • Dyslipidemia [E78.5]  Yes   • Lymphedema of both lower extremities [I89.0]  Yes   • Obesity, morbid, BMI 50 or higher ([facility]) [E66.01]  Yes   • History of bilateral pulmonary embolism (CMS/HCC) [I26.99]  Yes   • ARNOLDO (obstructive sleep apnea) [G47.33]  Yes      Resolved Hospital Problems   No resolved problems to display.       51 y.o. male admitted with Cellulitis of left lower extremity.    Cellulitis of left lower extremity / Lymphedema of both lower extremities  Initially  started on Vanco/cefepime with significant improvement of leukocytosis, no purulence evident so I stopped vancomycin yesterday  -Leukocytosis did not continue to improve, I am concerned there is an area on his left calf that feels fluctuant/concerning for abscess underneath  -Continue cefepime, resume vancomycin; obtain left lower extremity ultrasound to evaluate for abscess  -Consider ID consultation if no clinical improvement or progression of cellulitis develops     Atrial fibrillation, new onset  Likely due to uncontrolled ARNOLDO in setting of morbid obesity   Cardiology following--metoprolol tartrate 100 mg every 12 hours  Already on anticoagulation (history of factor V Leiden )     Prediabetes  Acceptable glucose readings  A1c 6.4 (at goal without previous antidiabetic agents on board)  Low-dose lispro sliding scale for now  NCS diet     Obesity, morbid, BMI 50 or higher  Complicating all problems     Obstructive sleep apnea  Requiring 2L NC at night     Resolved/stable issues: Hypokalemia, CAROL, dyslipidemia, sepsis; Heterozygous factor V Leiden mutation/History of bilateral pulmonary embolism    · Warfarin (home med) for DVT prophylaxis.  · CPR  · Anticipate discharge pending clinical course / plan home     Kay Jung MD  [facility]  11/04/21  13:57 EDT    Patient was wearing facemask when I entered the room and throughout our encounter.  I wore protective equipment throughout this patient encounter including a face mask, gloves and protective eyewear.  Hand hygiene was performed before donning protective equipment and after removal when leaving the room.          Electronically signed by Kay Jung MD at 11/04/21 4505     Elsy Baeza MD at 11/04/21 5738            [facility] Follow Up    Chief Complaint: Follow up atrial fibrillation    Interval History: No chest pain.  Breathing has improved.  Heart rate control has improved.    Objective:      Objective:  Temp:  [97.3 °F (36.3 °C)-98.3 °F (36.8 °C)] 97.6 °F (36.4 °C)  Heart Rate:  [] 75  Resp:  [18] 18  BP: (113-136)/(75-94) 134/76     Intake/Output Summary (Last 24 hours) at 11/4/2021 0832  Last data filed at 11/4/2021 0725  Gross per 24 hour   Intake 240 ml   Output 1475 ml   Net -1235 ml     Body mass index is 72.15 kg/m².      11/02/21  0006 11/03/21  1746   Weight: (!) 255 kg (561 lb 1.6 oz) (!) 255 kg (562 lb 2.8 oz)     Weight change:       Physical Exam:   General : Alert, cooperative, in no acute distress.  Neuro: Alert,cooperative and oriented.  Lungs: CTAB. Normal respiratory effort and rate.  CV: Irregularly, irregular, normal S1 and S2, no murmurs, gallops or rubs.  ABD: Soft, nontender, nondistended. Positive bowel sounds.  Extr: Chronic bilateral lower extremity edema, increased edema in the left lower extremity with erythema extending up to his inguinal area.    Lab Review:   Results from last 7 days   Lab Units 11/04/21  0614 11/03/21  1001 11/02/21  0616 11/02/21  0022 10/29/21  0842 10/29/21  0842   SODIUM mmol/L 131* 131*   < > 134*   < > 134*   POTASSIUM mmol/L 4.0 3.7   < > 3.3*   < > 3.8   CHLORIDE mmol/L 98 98   < > 96*   < > 100   CO2 mmol/L 23.6 22.8   < > 24.9   < > 22.7   BUN mg/dL 12 13   < > 23*   < > 14   CREATININE mg/dL 0.49* 0.63*   < > 1.29*   < > 1.12   GLUCOSE mg/dL 133* 150*   < > 131*   < > 118*   CALCIUM mg/dL 8.7 8.4*   < > 9.0   < > 8.5*   AST (SGOT) U/L  --   --   --  19  --  24   ALT (SGPT) U/L  --   --   --  18  --  17    < > = values in this interval not displayed.     Results from last 7 days   Lab Units 10/29/21  0842   TROPONIN T ng/mL <0.010     Results from last 7 days   Lab Units 11/04/21  0614 11/03/21  1001   WBC 10*3/mm3 15.74* 14.90*   HEMOGLOBIN g/dL 12.0* 12.1*   HEMATOCRIT % 36.3* 37.2*   PLATELETS 10*3/mm3 268 233     Results from last 7 days   Lab Units 11/04/21  0614 11/03/21  1001 11/02/21  1056 11/02/21  0022   INR  2.83* 2.98*   <  > 2.55*   APTT seconds  --   --   --  70.4*    < > = values in this interval not displayed.     Results from last 7 days   Lab Units 11/04/21  0614   MAGNESIUM mg/dL 2.0     Results from last 7 days   Lab Units 11/03/21  1001   CHOLESTEROL mg/dL 112   TRIGLYCERIDES mg/dL 95   HDL CHOL mg/dL 30*     Results from last 7 days   Lab Units 10/29/21  0842   PROBNP pg/mL 469.0         I reviewed the patient's new clinical results.  I personally viewed and interpreted the patient's EKG  Current Medications:   Scheduled Meds:cefepime, 2 g, Intravenous, Q8H  guaiFENesin, 600 mg, Oral, Q12H  insulin lispro, 0-7 Units, Subcutaneous, TID AC  metoprolol tartrate, 100 mg, Oral, Q12H  sodium chloride, 10 mL, Intravenous, Q12H      Continuous Infusions:Pharmacy to dose warfarin,         Allergies:  No Known Allergies    Assessment/Plan:     1. Atrial fibrillation.  New onset.  On metoprolol with improved rate control.  Already anticoagulated with warfarin.   2. Left lower extremity cellulitis/lymphedema  3. History of recurrent PE and DVT  4. Factor V leiden   5. Hypertension.  Well-controlled.  6. Untreated ARNOLDO  7. Morbid obesity    -Continue current dose of metoprolol tartrate.  -Continue anticoagulation with warfarin.  -Wean oxygen as tolerates.    Elsy Baeza MD  11/04/21  08:32 EDT    Electronically signed by Elsy Baeza MD at 11/04/21 1026     Ge Call MD at 11/03/21 1014          Patient Care Team:  Hansel Patel DO as PCP - General (Family Medicine)  Kim Sotelo SHERRIE as Pharmacist    Chief Complaint: New onset atrial fibrillation with RVR.    Interval History:   Ventricular rate still mildly elevated.  No new complaints.    Objective   Vital Signs  Temp:  [97.8 °F (36.6 °C)-98.3 °F (36.8 °C)] 98.2 °F (36.8 °C)  Heart Rate:  [] 113  Resp:  [18-20] 20  BP: (101-165)/(57-92) 136/84    Intake/Output Summary (Last 24 hours) at 11/3/2021 1014  Last data filed at 11/3/2021 0517  Gross per 24 hour  "  Intake 600 ml   Output 450 ml   Net 150 ml     Flowsheet Rows      First Filed Value   Admission Height 188 cm (74\") Documented at 11/01/2021 2043   Admission Weight 255 kg (561 lb 1.6 oz) Documented at 11/02/2021 0006          Temp:  [97.8 °F (36.6 °C)-98.3 °F (36.8 °C)] 98.2 °F (36.8 °C)  Heart Rate:  [] 113  Resp:  [18-20] 20  BP: (101-165)/(57-92) 136/84    Intake/Output Summary (Last 24 hours) at 11/3/2021 1014  Last data filed at 11/3/2021 0517  Gross per 24 hour   Intake 600 ml   Output 450 ml   Net 150 ml     Flowsheet Rows      First Filed Value   Admission Height 188 cm (74\") Documented at 11/01/2021 2043   Admission Weight 255 kg (561 lb 1.6 oz) Documented at 11/02/2021 0006          General Appearance:   Morbidly obese   Head:    Normocephalic, without obvious abnormality, atraumatic       Neck/Lymph   No adenopathy, supple, no thyromegaly, no carotid bruit, no    JVD   Lungs:     Clear to auscultation bilaterally, no wheezes, rales, or     rhonchi    Cardiac:   Tachycardic, irregular rhythm, no murmur, no rub, no gallop   Chest Wall:    No abnormalities observed   GI:     Normal bowel sounds, soft, nontender, nondistended,            no rebound tenderness   Extremities:   No cyanosis, clubbing, 2-3+ bilateral lower extremity edema.   Circulatory/Peripheral Vascular :   Pulses palpable and equal bilaterally   Integumentary:   No bleeding or rash. Normal temperature.  Left lower extremity erythema.       Neurologic:   Cranial nerves 2 - 12 grossly intact, sensation intact              cefepime, 2 g, Intravenous, Q8H  guaiFENesin, 600 mg, Oral, Q12H  insulin lispro, 0-7 Units, Subcutaneous, TID AC  metoprolol tartrate, 100 mg, Oral, Q12H  metoprolol tartrate, 50 mg, Oral, Once  sodium chloride, 10 mL, Intravenous, Q12H  vancomycin, 5 mg/kg, Intravenous, Q12H  warfarin, 10 mg, Oral, Once per day on Tue Thu Sat  warfarin, 15 mg, Oral, Once per day on Sun Mon Wed Fri        Pharmacy to dose " vancomycin,   Pharmacy to dose warfarin,   sodium chloride, 25 mL/hr, Last Rate: 25 mL/hr (11/03/21 0732)        Results Review:    Results from last 7 days   Lab Units 11/02/21  0616   SODIUM mmol/L 134*   POTASSIUM mmol/L 3.6   CHLORIDE mmol/L 99   CO2 mmol/L 25.2   BUN mg/dL 22*   CREATININE mg/dL 0.89   GLUCOSE mg/dL 132*   CALCIUM mg/dL 8.3*     Results from last 7 days   Lab Units 10/29/21  0842   TROPONIN T ng/mL <0.010     Results from last 7 days   Lab Units 11/02/21  0616   WBC 10*3/mm3 17.21*   HEMOGLOBIN g/dL 12.4*   HEMATOCRIT % 37.0*   PLATELETS 10*3/mm3 186     Results from last 7 days   Lab Units 11/02/21  1056 11/02/21  0022 10/29/21  0842   INR  2.99* 2.55* 2.40*   APTT seconds  --  70.4*  --                  @LABNT(bnp)@  I reviewed the patient's new clinical results.  I personally viewed and interpreted the patient's EKG/Telemetry data            Assessment/Plan   1.  New onset atrial fibrillation with RVR  2.  Left lower extremity cellulitis/lymphedema  3.  History of recurrent PE and DVT  4.  Essential hypertension  5.  Untreated sleep apnea  6.  Morbid obesity    -Ventricular rate still slightly elevated.  Increase metoprolol tartrate to 100 mg every 12 hours.  Additional dose of 50 right now.  I would not recommend diltiazem or digoxin unless we have maximized our metoprolol dose.  -I do think we have any chance of keeping him in sinus with his morbid obesity and untreated sleep apnea.  Recommend rate control approach  -Continue anticoagulant therapy.  Already on warfarin.                  Electronically signed by Ge Call MD at 11/03/21 1016     Jackson Canchola APRN at 11/03/21 1004     Attestation signed by Kay Jung MD at 11/03/21 8691    I have reviewed the history and plan as obtained by Jackson SHRESTHA. I have personally examined the patient. I have reviewed available clinical results, imaging results, EKG/Telemetry results, and prior records. My exam  confirms their physical findings and I agree with the plan as listed above, with the addition of the following:    Patient reports minimal subjective improvement overnight, still having pain and swelling in his left lower extremity.  Leukocytosis greatly improved from 19-14.    Exam:  General A/Ox3 NAD  HEENT NCAT EOMI  CV RRR  Lungs CTAB, no increased WOB  Abdomen Soft, ND NT  Extremity bilateral lower extremity lymphedema, left lower extremity redness up to the thigh  Neuro CN II through XII grossly intact  Psych normal mood and affect    Plan:  New onset atrial fibrillation, appreciate cardiology's recommendations, they have increased his metoprolol to improve his rate control, he is already anticoagulated due to his factor V Leiden, per cardiology it will be difficult to control his rhythm in the setting of his morbid obesity and untreated sleep apnea so they will pursue rate control    Morbid obesity-patient requesting dietitian consult while inpatient    Cellulitis-we will DC Vanco as it is nonpurulent, continue cefepime, will ask wound care to evaluate per patient request; adjust pain meds                          Name: Kameron Melendez ADMIT: 2021   : 1970  PCP: Hansel Patel DO    MRN: [id number] LOS: 1 days   AGE/SEX: 51 y.o. male  ROOM: E458/1     Subjective   Subjective   Patient appears chronically ill, morbidly obese, generally weak, relatively comfortable, no apparent distress.  Still with left lower extremity tenderness.  Denies bowel or bladder complaints.  Tolerating p.o.    Review of Systems   Constitutional: Negative for fever. Negative for chills.   HENT: Negative for congestion and rhinorrhea.    Respiratory: Negative for cough and shortness of breath.    Cardiovascular: Negative for chest pain and leg swelling.   Gastrointestinal: Negative for abdominal pain, constipation, diarrhea, nausea and vomiting.   Endocrine: Negative for polydipsia, polyphagia and polyuria.    Genitourinary: Negative for difficulty urinating and dysuria.   Musculoskeletal: Positive for gait problem (chronic) and myalgias (LLE).   Skin: Positive for color change. Negative for wound.   Neurological: Positive for weakness (generalized weakness). Negative for dizziness.   Psychiatric/Behavioral: Negative for confusion and hallucinations.     Objective   Objective   Vital Signs  Temp:  [97.8 °F (36.6 °C)-98.3 °F (36.8 °C)] 98.2 °F (36.8 °C)  Heart Rate:  [] 113  Resp:  [18-20] 20  BP: (101-165)/(57-92) 136/84  SpO2:  [91 %-96 %] 91 %  on  Flow (L/min):  [3-5] 4;   Device (Oxygen Therapy): nasal cannula  Body mass index is 72.04 kg/m².     Physical Exam   Constitutional:       General: He is not in acute distress.     Appearance: He is obese. He is ill-appearing. He is not toxic-appearing.   HENT:      Head: Normocephalic and atraumatic.   Eyes:      Conjunctiva/sclera: Conjunctivae normal.   Cardiovascular:      Rate and Rhythm: Tachycardia present.      Heart sounds: Normal heart sounds.   Pulmonary:      Effort: Pulmonary effort is normal.      Comments: Diminished on expiration; shallow inspiration  Abdominal:      General: Bowel sounds are normal.      Palpations: Abdomen is soft.   Musculoskeletal:         General: Tenderness (LLE) present.      Cervical back: Normal range of motion and neck supple.      Right lower leg: Edema present.      Left lower leg: Edema present.   Skin:     General: Skin is warm and dry.      Findings: Erythema (LLE) present.   Neurological:      Mental Status: He is alert and oriented to person, place, and time.      Cranial Nerves: No cranial nerve deficit.      Motor: Weakness (generalized) present.   Psychiatric:         Behavior: Behavior normal.         Thought Content: Thought content normal.     Results Review     I reviewed the patient's new clinical results.  Results from last 7 days   Lab Units 11/02/21  0616 11/02/21  0022 10/29/21  0842   WBC 10*3/mm3 17.21*  19.78* 14.57*   HEMOGLOBIN g/dL 12.4* 13.8 13.6   PLATELETS 10*3/mm3 186 200 241     Results from last 7 days   Lab Units 11/02/21  0616 11/02/21  0022 10/29/21  0842   SODIUM mmol/L 134* 134* 134*   POTASSIUM mmol/L 3.6 3.3* 3.8   CHLORIDE mmol/L 99 96* 100   CO2 mmol/L 25.2 24.9 22.7   BUN mg/dL 22* 23* 14   CREATININE mg/dL 0.89 1.29* 1.12   GLUCOSE mg/dL 132* 131* 118*   EGFR IF NONAFRICN AM mL/min/1.73 90 59* 69     Results from last 7 days   Lab Units 11/02/21  0022 10/29/21  0842   ALBUMIN g/dL 3.10* 3.50   BILIRUBIN mg/dL 0.7 0.9   ALK PHOS U/L 169* 84   AST (SGOT) U/L 19 24   ALT (SGPT) U/L 18 17     Results from last 7 days   Lab Units 11/02/21  0616 11/02/21  0022 10/29/21  0842   CALCIUM mg/dL 8.3* 9.0 8.5*   ALBUMIN g/dL  --  3.10* 3.50     Results from last 7 days   Lab Units 11/02/21  0354 11/02/21  0022   LACTATE mmol/L 1.3 2.2*     COVID19   Date Value Ref Range Status   11/02/2021 Not Detected Not Detected - Ref. Range Final     Glucose   Date/Time Value Ref Range Status   11/03/2021 0608 131 (H) 70 - 130 mg/dL Final     Comment:     Meter: [id number] : [id number] Anatolydaniel Chacha Gaitan   11/02/2021 1958 166 (H) 70 - 130 mg/dL Final     Comment:     Meter: [id number] : [id number] Cedric JACKSON [facility]   11/02/2021 1608 155 (H) 70 - 130 mg/dL Final     Comment:     Meter: [id number] : [id number] Baltazar Thomas RN   11/02/2021 1313 150 (H) 70 - 130 mg/dL Final     Comment:     Meter: [id number] : [id number] Baltazar Thomas RN       XR Chest 1 View  Narrative: SINGLE VIEW OF THE CHEST     HISTORY: Cough     COMPARISON: 10/29/2021     FINDINGS:  Cardiomegaly is present. There is no vascular congestion. No  pneumothorax, pleural effusion, or acute infiltrate is seen.     Impression: No acute findings.     This report was finalized on 11/2/2021 12:30 AM by Dr. Kasey Galloway M.D.       Scheduled Medications  cefepime, 2 g, Intravenous, Q8H  guaiFENesin, 600 mg, Oral, Q12H  insulin  lispro, 0-7 Units, Subcutaneous, TID AC  metoprolol tartrate, 50 mg, Oral, Q12H  sodium chloride, 10 mL, Intravenous, Q12H  vancomycin, 5 mg/kg, Intravenous, Q12H  warfarin, 10 mg, Oral, Once per day on Tue Thu Sat  warfarin, 15 mg, Oral, Once per day on Sun Mon Wed Fri    Infusions  Pharmacy to dose vancomycin,   Pharmacy to dose warfarin,   sodium chloride, 25 mL/hr, Last Rate: 25 mL/hr (11/03/21 0732)    Diet  Diet Regular; Consistent Carbohydrate      Assessment/Plan     Active Hospital Problems    Diagnosis  POA   • **Cellulitis of left lower extremity [L03.116]  Yes   • Hypokalemia [E87.6]  Yes   • CAROL (acute kidney injury) (HCC) [N17.9]  Yes   • Sepsis with cutaneous manifestations (HCC) [A41.9]  Yes   • Hyperglycemia [R73.9]  Yes   • Paroxysmal atrial fibrillation (HCC) [I48.0]  Unknown   • Heterozygous factor V Leiden mutation (HCC) [D68.51]  Yes   • Dyslipidemia [E78.5]  Yes   • Lymphedema of both lower extremities [I89.0]  Yes   • Obesity, morbid, BMI 50 or higher (HCC) [E66.01]  Yes   • History of bilateral pulmonary embolism (CMS/HCC) [I26.99]  Yes   • ARNOLDO (obstructive sleep apnea) [G47.33]  Yes      Resolved Hospital Problems   No resolved problems to display.       51 y.o. male admitted with Cellulitis of left lower extremity.      Cellulitis of left lower extremity / Lymphedema of both lower extremities  Cefepime for now pending BC x2 (NGTD)  S/p vancomycin discontinued given negative MRSA screen   Symptom mgmt  Consider ID consultation if no clinical improvement or progression of cellulitis develops       Sepsis with cutaneous manifestations (HCC)  Likely secondary to #1  Serum lactate 2.2 normalized following IV fluid bolus  Hemodynamically stable, DC IVF  WBC trend:  19-->17-->14 (11/3)       Hypokalemia /  CAROL (acute kidney injury) (HCC)  Resolved  Previous sCr 1.2 increased from 0.8 returned to baseline following 1 L NS IV bolus  Avoid nephrotoxins       Atrial fibrillation, new onset  Likely  due to uncontrolled ARNOLDO in setting of morbid obesity   Cardiology following--metoprolol tartrate 100 mg every 12 hours  Appropriately anticoagulated (history of factor V Leiden )       Hyperglycemia  Acceptable glucose readings  A1c 6.4 (at goal without previous antidiabetic agents on board)  Low-dose lispro sliding scale for now  NCS diet       Obesity, morbid, BMI 50 or higher (HCC)  Complicating all problems       ARNOLDO (obstructive sleep apnea)  Requiring 2L NC at night       Heterozygous factor V Leiden mutation (HCC) /   History of bilateral pulmonary embolism (CMS/HCC)  Warfarin continued on admission per pharmacy dosing  Therapeutic INR:  2.98       Dyslipidemia  Unremarkable lipid panel    · Warfarin (home med) for DVT prophylaxis.  · CPR  · Discussed with Dr. Jung.  · Anticipate discharge pending clinical course / plan home       FADY Mehta  [facility]  11/03/21  10:04 EDT        Electronically signed by Kay Jung MD at 11/03/21 1539       Consult Notes (last 48 hours)  Notes from 11/03/21 0625 through 11/05/21 0625   No notes of this type exist for this encounter.                           Encounter Information    Encounter Information    Department Encounter #   11/1/2021 11:31 PM [address] [id number]   Cedric Johnson, RN   Registered Nurse   Orthopedics   Plan of Care   Signed   Date of Service:  11/05/21 0415   Creation Time:  11/05/21 0415              Signed              Goal Outcome Evaluation:  52 yo male admitted 11/1 with cellulitis LLE and new onset afib. IV vanc and cefepime. In aflutter, rate WNL. Purewick placed this shift due to difficulty voiding with urinal. VS stable, 4L O2 NC, A&Ox4.                        All medication doses during the admission are shown, including meds that are no longer on order.    Scheduled Meds Sorted by Name  for Kameron Melendez as of 11/3/21 through 11/5/21    1 Day 3 Days 7 Days 10 Days < Today >   Legend:                           Inactive     Active     Other Encounter     Linked                 Medications 11/03/21 11/04/21 11/05/21   cefepime (MAXIPIME) 1 g/100 mL 0.9% NS IVPB (mbp)  Dose: 1 g  Freq: Once Route: IV  Indications of Use: SEPSIS  Last Dose: Stopped (11/02/21 0351)  Start: 11/02/21 0121 End: 11/02/21 0351   Admin Instructions:   Break seal and mix to activate vial before use         cefepime 2 gm IVPB in 100 ml NS (VTB)  Dose: 2 g  Freq: Every 8 Hours Route: IV  Indications of Use: SKIN AND SOFT TISSUE INFECTION  Last Dose: 0 g (11/03/21 0645)  Start: 11/02/21 1000 End: 11/07/21 0959         0230   0442   0645     1101   1817            0257   0935   1730           0229   1000   1800        digoxin (LANOXIN) injection 500 mcg  Dose: 500 mcg  Freq: Once Route: IV  Start: 11/02/21 1651 End: 11/02/21 1817   Admin Instructions:    Check and record heart rate. Use filter needle to withdraw dose from ampule. Dose may be pushed over 5 minutes.         guaiFENesin (MUCINEX) 12 hr tablet 600 mg  Dose: 600 mg  Freq: Every 12 Hours Scheduled Route: PO  Start: 11/02/21 1443   Admin Instructions:   Caution: Look alike/sound alike drug alert               Do not crush, split, or chew.         0934   2027            0935   2041            0900   2100         insulin lispro (ADMELOG) injection 0-7 Units  Dose: 0-7 Units  Freq: 3 Times Daily Before Meals Route: SC  Start: 11/02/21 1219   Admin Instructions:   Correction - Low Dose.  Less than 40 units/day total insulin dose or lean, elderly, renal patients    Blood glucose 150-199 mg/dL - 2 units  Blood glucose 200-249 mg/dL - 3 units  Blood glucose 250-299 mg/dL - 4 units  Blood glucose 300-349 mg/dL - 5 units  Blood glucose 350-400 mg/dL - 6 units  Blood glucose greater than 400 mg/dL - 7 units and call provider   Caution: Look alike/sound alike drug alert         (0338) [phone]) [phone]   1731        metoprolol  tartrate (LOPRESSOR) tablet 100 mg  Dose: 100 mg  Freq: Every 12 Hours Scheduled Route: PO  Start: 11/03/21 2100 2027 0937   2041 0900 2100         metoprolol tartrate (LOPRESSOR) tablet 25 mg  Dose: 25 mg  Freq: Once Route: PO  Start: 11/02/21 1224 End: 11/02/21 1318         metoprolol tartrate (LOPRESSOR) tablet 50 mg  Dose: 50 mg  Freq: Once Route: PO  Start: 11/03/21 1100 End: 11/03/21 1300         1300            metoprolol tartrate (LOPRESSOR) tablet 50 mg  Dose: 50 mg  Freq: Every 12 Hours Scheduled Route: PO  Start: 11/02/21 2100 End: 11/03/21 1013         0937   1013-D/C'd         morphine injection 4 mg  Dose: 4 mg  Freq: Once Route: IV  Start: 11/01/21 2353 End: 11/02/21 0029   Admin Instructions:   If given for pain, use the following pain scale:  Mild Pain = Pain Score of 1-3, CPOT 1-2  Moderate Pain = Pain Score of 4-6, CPOT 3-4  Severe Pain = Pain Score of 7-10, CPOT 5-8         ondansetron (ZOFRAN) injection 4 mg  Dose: 4 mg  Freq: Once Route: IV  Start: 11/01/21 2353 End: 11/02/21 0030         perflutren (DEFINITY) 8.476 mg in Sodium Chloride (PF) 0.9 % 10 mL injection  Dose: 2 mL  Freq: Once in Imaging Route: IV  Start: 11/03/21 1730 End: 11/03/21 1748   Admin Instructions:   Mix 1.3 mL of mechanically activated Definity with 8.7 mL of normal saline. A total of 2 m of the resulting Definity solution was administered.         1748            sodium chloride 0.9 % bolus 1,000 mL  Dose: 1,000 mL  Freq: Once Route: IV  Last Dose: Stopped (11/02/21 0240)  Start: 11/02/21 0121 End: 11/02/21 0240         sodium chloride 0.9 % flush 10 mL  Dose: 10 mL  Freq: Every 12 Hours Scheduled Route: IV  Start: 11/02/21 0211 1101 2028 0937 2042 0900 2100         vancomycin 3000 mg/1000 mL 0.9% NS IVPB  Dose: 20 mg/kg  Weight Dosing Info: 255 kg  Freq: Once Route: IV  Indications of Use: SKIN AND SOFT TISSUE INFECTION  Start: 11/04/21 1515 End:  11/04/21 1902          1502           Followed by  vancomycin 1250 mg/250 mL 0.9% NS IVPB ([facility])  Dose: 1,250 mg  Freq: Every 12 Hours Route: IV  Indications of Use: SKIN AND SOFT TISSUE INFECTION  Start: 11/05/21 0315 End: 11/12/21 0314 0229   1515         vancomycin 1250 mg/250 mL 0.9% NS IVPB ([facility])  Dose: 5 mg/kg  Weight Dosing Info: 255 kg  Freq: Every 12 Hours Route: IV  Indications of Use: SKIN AND SOFT TISSUE INFECTION  Last Dose: Stopped (11/03/21 0303)  Start: 11/02/21 1200 End: 11/03/21 1127 0039   0303 [C]   1127-D/C'd        vancomycin 2500 mg/500 mL 0.9% NS IVPB ([facility])  Dose: 11 mg/kg  Weight Dosing Info: 234 kg (Order-Specific)  Freq: Once Route: IV  Indications of Use: SKIN AND SOFT TISSUE INFECTION  Last Dose: Stopped (11/02/21 0311)  Start: 11/01/21 2353 End: 11/02/21 0311         warfarin (COUMADIN) tablet 10 mg  Dose: 10 mg  Freq: Daily Warfarin Route: PO  Indications of Use: Other - full anticoagulation  Indications Comment: Factor V Leiden  Start: 11/04/21 1800   Admin Instructions:   Food-Drug Interaction  If INR Greater Than Target, Contact Pharmacy Prior to Giving Dose.  Acutely Hazardous. Waste BOTH Residual Medication and/or Empty Package. .   Order specific questions:   Target INR 2 - 3          1730             1800          warfarin (COUMADIN) tablet 10 mg  Dose: 10 mg  Freq: Once (Warfarin) Route: PO  Indications of Use: Other - full anticoagulation  Indications Comment: Factor V Leiden  Start: 11/03/21 1800 End: 11/03/21 1818   Admin Instructions:   Food-Drug Interaction  If INR Greater Than Target, Contact Pharmacy Prior to Giving Dose.  Acutely Hazardous. Waste BOTH Residual Medication and/or Empty Package. .   Order specific questions:   Target INR 2 - 3         1818            warfarin (COUMADIN) tablet 10 mg  Dose: 10 mg  Freq: Once per day on Tue Thu Sat Route: PO  Indications of Use: Other - full anticoagulation  Indications Comment: Factor V Leiden  Start:  11/02/21 2000 End: 11/03/21 1311   Admin Instructions:   Food-Drug Interaction  If INR Greater Than Target, Contact Pharmacy Prior to Giving Dose.  Acutely Hazardous. Waste BOTH Residual Medication and/or Empty Package. .   Order specific questions:   Target INR 2 - 3         1311-D/C'd          warfarin (COUMADIN) tablet 15 mg  Dose: 15 mg  Freq: Once per day on Sun Mon Wed Fri Route: PO  Indications of Use: Other - full anticoagulation  Indications Comment: Factor V Leiden  Start: 11/03/21 2000 End: 11/03/21 1311   Admin Instructions:   Food-Drug Interaction  If INR Greater Than Target, Contact Pharmacy Prior to Giving Dose.  Acutely Hazardous. Waste BOTH Residual Medication and/or Empty Package. .   Order specific questions:   Target INR 2 - 3         1311-D/C'd          Medications 11/03/21 11/04/21 11/05/21         Continuous Meds Sorted by Name  for Kameron Melendez as of 11/3/21 through 11/5/21  Legend:                          Inactive     Active     Other Encounter     Linked                 Medications 11/03/21 11/04/21 11/05/21   Pharmacy Consult - Pharmacy to dose  Freq: Continuous PRN Route: XX  PRN Reason: Consult  Start: 11/02/21 0209 End: 11/02/21 0910   Order specific questions:   Pharmacy to Dose: cefepime  Indication of Use skin and soft tissue infection         Pharmacy to dose vancomycin  Freq: Continuous PRN Route: XX  PRN Reason: Consult  Indications of Use: SKIN AND SOFT TISSUE INFECTION  Start: 11/04/21 1353 End: 11/11/21 1352         Pharmacy to dose vancomycin  Freq: Continuous PRN Route: XX  PRN Reason: Consult  Indications of Use: SKIN AND SOFT TISSUE INFECTION  Start: 11/02/21 0208 End: 11/03/21 1131         1131-D/C'd          Pharmacy to dose warfarin  Freq: Continuous PRN Route: XX  PRN Reason: Consult  Indications of Use: Other - full anticoagulation  Start: 11/02/21 0936 End: 11/02/21 0959   Admin Instructions:   If INR Greater Than Target, Contact Pharmacy Prior to Giving Dose.   Acutely Hazardous. Waste BOTH Residual Medication and/or Empty Package. .   Order specific questions:   Target INR 2 - 3         Pharmacy to dose warfarin  Freq: Continuous PRN Route: XX  PRN Reason: Consult  Indications of Use: Other - full anticoagulation  Indications Comment: Factor V Leiden  Start: 11/02/21 0936   Admin Instructions:   If INR Greater Than Target, Contact Pharmacy Prior to Giving Dose.  Acutely Hazardous. Waste BOTH Residual Medication and/or Empty Package. .   Order specific questions:   Target INR 2 - 3         sodium chloride 0.9 % infusion  Rate: 25 mL/hr Dose: 25 mL/hr  Freq: Continuous Route: IV  Last Dose: 25 mL/hr (11/03/21 0732)  Start: 11/02/21 0211 End: 11/03/21 1125         0032   0303   0519     0732   1125-D/C'd         Medications 11/03/21 11/04/21 11/05/21         PRN Meds Sorted by Name  for Kameron Melendez as of 11/3/21 through 11/5/21  Legend:                          Inactive     Active     Other Encounter     Linked                 Medications 11/03/21 11/04/21 11/05/21   acetaminophen (TYLENOL) tablet 650 mg  Dose: 650 mg  Freq: Every 4 Hours PRN Route: PO  PRN Reason: Mild Pain   Start: 11/02/21 0207   Admin Instructions:   Do not exceed 4 grams of acetaminophen in a 24 hr period. Max dose of 2gm for AST/ALT greater than 120 units/L      If given for pain, use the following pain scale:   Mild Pain = Pain Score of 1-3, CPOT 1-2  Moderate Pain = Pain Score of 4-6, CPOT 3-4  Severe Pain = Pain Score of 7-10, CPOT 5-8         Or  acetaminophen (TYLENOL) 160 MG/5ML solution 650 mg  Dose: 650 mg  Freq: Every 4 Hours PRN Route: PO  PRN Reason: Mild Pain   Start: 11/02/21 0207   Admin Instructions:   Do not exceed 4 grams of acetaminophen in a 24 hr period. Max dose of 2gm for AST/ALT greater than 120 units/L      If given for pain, use the following pain scale:   Mild Pain = Pain Score of 1-3, CPOT 1-2  Moderate Pain = Pain Score of 4-6, CPOT 3-4  Severe Pain = Pain Score of  7-10, CPOT 5-8         Or  acetaminophen (TYLENOL) suppository 650 mg  Dose: 650 mg  Freq: Every 4 Hours PRN Route: RE  PRN Reason: Mild Pain   Start: 11/02/21 0207   Admin Instructions:   Do not exceed 4 grams of acetaminophen in a 24 hr period. Max dose of 2gm for AST/ALT greater than 120 units/L      If given for pain, use the following pain scale:   Mild Pain = Pain Score of 1-3, CPOT 1-2  Moderate Pain = Pain Score of 4-6, CPOT 3-4  Severe Pain = Pain Score of 7-10, CPOT 5-8         benzonatate (TESSALON) capsule 100 mg  Dose: 100 mg  Freq: 2 Times Daily PRN Route: PO  PRN Reason: Cough  Start: 11/02/21 1441   Admin Instructions:   Swallow whole.  Do not crush, chew, or open capsule.          1736           calcium carbonate (TUMS) chewable tablet 500 mg (200 mg elemental)  Dose: 2 tablet  Freq: 2 Times Daily PRN Route: PO  PRN Reason: Heartburn  Start: 11/02/21 0207   Admin Instructions:   One tablet contains 200 mg elemental calcium.  Take with food.         dextrose (D50W) (25 g/50 mL) IV injection 25 g  Dose: 25 g  Freq: Every 15 Minutes PRN Route: IV  PRN Reason: Low Blood Sugar  PRN Comment: Blood Sugar Less Than 70  Start: 11/02/21 1216   Admin Instructions:   Blood sugar less than 70; patient has IV access - Unresponsive, NPO or Unable To Safely Swallow         dextrose (GLUTOSE) oral gel 15 g  Dose: 15 g  Freq: Every 15 Minutes PRN Route: PO  PRN Reason: Low Blood Sugar  PRN Comment: Blood sugar less than 70  Start: 11/02/21 1216   Admin Instructions:   BS<70, Patient Alert, Is not NPO, Can safely swallow.         glucagon (human recombinant) (GLUCAGEN DIAGNOSTIC) injection 1 mg  Dose: 1 mg  Freq: Every 15 Minutes PRN Route: SC  PRN Reason: Low Blood Sugar  PRN Comment: Blood Glucose Less Than 70  Start: 11/02/21 1216   Admin Instructions:   Blood Glucose Less Than 70 - Patient Without IV Access - Unresponsive, NPO or Unable To Safely Swallow         HYDROcodone-acetaminophen (NORCO) 5-325 MG per  tablet 1 tablet  Dose: 1 tablet  Freq: Every 6 Hours PRN Route: PO  PRN Reason: Moderate Pain   Start: 11/02/21 1440 End: 11/03/21 1432   Admin Instructions:   [QUINN]    Do not exceed 4 grams of acetaminophen in a 24 hr period. Max dose of 2gm for AST/ALT greater than 120 units/L        If given for pain, use the following pain scale:   Mild Pain = Pain Score of 1-3, CPOT 1-2  Moderate Pain = Pain Score of 4-6, CPOT 3-4  Severe Pain = Pain Score of 7-10, CPOT 5-8         0934   1432-D/C'd         HYDROcodone-acetaminophen (NORCO) 7.5-325 MG per tablet 1 tablet  Dose: 1 tablet  Freq: Every 6 Hours PRN Route: PO  PRN Reason: Moderate Pain   Start: 11/03/21 1432 End: 11/10/21 1431   Admin Instructions:   [QUINN]    Do not exceed 4 grams of acetaminophen in a 24 hr period. Max dose of 2gm for AST/ALT greater than 120 units/L        If given for pain, use the following pain scale:   Mild Pain = Pain Score of 1-3, CPOT 1-2  Moderate Pain = Pain Score of 4-6, CPOT 3-4  Severe Pain = Pain Score of 7-10, CPOT 5-8         2021             0913           ondansetron (ZOFRAN) tablet 4 mg  Dose: 4 mg  Freq: Every 6 Hours PRN Route: PO  PRN Reasons: Nausea,Vomiting  Start: 11/02/21 0207   Admin Instructions:   If BOTH ondansetron (ZOFRAN) and promethazine (PHENERGAN) are ordered use ondansetron first and THEN promethazine IF ondansetron is ineffective.         Or  ondansetron (ZOFRAN) injection 4 mg  Dose: 4 mg  Freq: Every 6 Hours PRN Route: IV  PRN Reasons: Nausea,Vomiting  Start: 11/02/21 0207   Admin Instructions:   If BOTH ondansetron (ZOFRAN) and promethazine (PHENERGAN) are ordered use ondansetron first and THEN promethazine IF ondansetron is ineffective.         Pharmacy Consult - Pharmacy to dose  Freq: Continuous PRN Route: XX  PRN Reason: Consult  Start: 11/02/21 0209 End: 11/02/21 0910   Order specific questions:   Pharmacy to Dose: cefepime  Indication of Use skin and soft tissue infection         Pharmacy to dose  vancomycin  Freq: Continuous PRN Route: XX  PRN Reason: Consult  Indications of Use: SKIN AND SOFT TISSUE INFECTION  Start: 11/04/21 1353 End: 11/11/21 1352         Pharmacy to dose vancomycin  Freq: Continuous PRN Route: XX  PRN Reason: Consult  Indications of Use: SKIN AND SOFT TISSUE INFECTION  Start: 11/02/21 0208 End: 11/03/21 1131         1131-D/C'd          Pharmacy to dose warfarin  Freq: Continuous PRN Route: XX  PRN Reason: Consult  Indications of Use: Other - full anticoagulation  Start: 11/02/21 0936 End: 11/02/21 0959   Admin Instructions:   If INR Greater Than Target, Contact Pharmacy Prior to Giving Dose.  Acutely Hazardous. Waste BOTH Residual Medication and/or Empty Package. .   Order specific questions:   Target INR 2 - 3         Pharmacy to dose warfarin  Freq: Continuous PRN Route: XX  PRN Reason: Consult  Indications of Use: Other - full anticoagulation  Indications Comment: Factor V Leiden  Start: 11/02/21 0936   Admin Instructions:   If INR Greater Than Target, Contact Pharmacy Prior to Giving Dose. Acutely Hazardous. Waste BOTH Residual Medication and/or Empty Package. .   Order specific questions:   Target INR 2 - 3         sodium chloride 0.9 % flush 10 mL  Dose: 10 mL  Freq: As Needed Route: IV  PRN Reason: Line Care  Start: 11/02/21 0205         sodium chloride 0.9 % flush 10 mL  Dose: 10 mL  Freq: As Needed Route: IV  PRN Reason: Line Care  Start: 11/01/21 2350         Medications 11/03/21 11/04/21 11/05/21

## 2021-11-05 NOTE — PLAN OF CARE
Goal Outcome Evaluation:     50 yo male admitted 11/1 with cellulitis LLE and new onset afib. IV vanc and cefepime. In aflutter, rate WNL. Purewick placed this shift due to difficulty voiding with urinal. VS stable, 4L O2 NC, A&Ox4.

## 2021-11-05 NOTE — PROGRESS NOTES
Name: Kameron Melendez ADMIT: 2021   : 1970  PCP: Hansel Patel DO    MRN: [id number] LOS: 3 days   AGE/SEX: 51 y.o. male  ROOM: E4/     Subjective   Subjective     Patient is lying in bed in no major distress.  No new events overnight.  Denies nausea, vomiting abdominal pain, chest pain.    Review of Systems   As above  Objective   Objective   Vital Signs  Temp:  [97.3 °F (36.3 °C)-98.4 °F (36.9 °C)] 98.1 °F (36.7 °C)  Heart Rate:  [72-85] 78  Resp:  [18] 18  BP: (133-147)/(74-92) 134/74  SpO2:  [91 %-92 %] 91 %  on  Flow (L/min):  [3-4] 3;   Device (Oxygen Therapy): nasal cannula; humidified  Body mass index is 72.15 kg/m².  Physical Exam  Constitutional:       General: He is not in acute distress.     Appearance: He is obese. He is ill-appearing. He is not toxic-appearing.   HENT:      Head: Normocephalic and atraumatic.   Eyes:      Conjunctiva/sclera: Conjunctivae normal.   Cardiovascular:      Rate and Rhythm: Regular rate, irregular rhythm.      Heart sounds: Normal heart sounds.   Pulmonary:      Effort: Pulmonary effort is normal.      Comments: Diminished on expiration; shallow inspiration  Abdominal:      General: Bowel sounds are normal.      Palpations: Abdomen is soft.   Musculoskeletal:         General: Tenderness (LLE) present.      Cervical back: Normal range of motion and neck supple.      Right lower leg: Edema present.      Left lower leg: Edema present.  Possible area of fluctuance on the left posterior calf  Skin:     General: Skin is warm and dry.      Findings: Erythema (LLE) present.   Neurological:      Mental Status: He is alert and oriented to person, place, and time.      Cranial Nerves: No cranial nerve deficit.      Motor: Weakness (generalized) present.   Psychiatric:         Behavior: Behavior normal.         Thought Content: Thought content normal. Results Review     I reviewed the patient's new clinical results.  Results from last 7 days   Lab Units  11/05/21  0531 11/04/21  0614 11/03/21  1001 11/02/21  0616   WBC 10*3/mm3 16.40* 15.74* 14.90* 17.21*   HEMOGLOBIN g/dL 11.8* 12.0* 12.1* 12.4*   PLATELETS 10*3/mm3 312 268 233 186     Results from last 7 days   Lab Units 11/05/21  0531 11/04/21  1601 11/04/21  0614 11/03/21  1001 11/02/21  0616 11/02/21  0616   SODIUM mmol/L 131*  --  131* 131*  --  134*   POTASSIUM mmol/L 4.4 4.1 4.0 3.7   < > 3.6   CHLORIDE mmol/L 99  --  98 98  --  99   CO2 mmol/L 26.1  --  23.6 22.8  --  25.2   BUN mg/dL 9  --  12 13  --  22*   CREATININE mg/dL 0.59*  --  0.49* 0.63*  --  0.89   GLUCOSE mg/dL 148*  --  133* 150*  --  132*    < > = values in this interval not displayed.   Estimated Creatinine Clearance: 316.4 mL/min (A) (by C-G formula based on SCr of 0.59 mg/dL (L)).  Results from last 7 days   Lab Units 11/02/21  0022   ALBUMIN g/dL 3.10*   BILIRUBIN mg/dL 0.7   ALK PHOS U/L 169*   AST (SGOT) U/L 19   ALT (SGPT) U/L 18     Results from last 7 days   Lab Units 11/05/21  0531 11/04/21  1601 11/04/21  0614 11/03/21  1001 11/02/21  0616 11/02/21  0022 11/02/21  0022   CALCIUM mg/dL 8.7  --  8.7 8.4* 8.3*   < > 9.0   ALBUMIN g/dL  --   --   --   --   --   --  3.10*   MAGNESIUM mg/dL 2.0 2.0 2.0  --   --   --   --    PHOSPHORUS mg/dL 2.3*  --  2.5  --   --   --   --     < > = values in this interval not displayed.     Results from last 7 days   Lab Units 11/02/21  0354 11/02/21  0022   LACTATE mmol/L 1.3 2.2*     COVID19   Date Value Ref Range Status   11/02/2021 Not Detected Not Detected - Ref. Range Final     Hemoglobin A1C   Date/Time Value Ref Range Status   11/03/2021 1001 6.44 (H) 4.80 - 5.60 % Final     Glucose   Date/Time Value Ref Range Status   11/05/2021 1129 151 (H) 70 - 130 mg/dL Final     Comment:     Meter: [id number] : [id number] Milan JACOB   11/05/2021 0602 146 (H) 70 - 130 mg/dL Final     Comment:     Meter: [id number] : [id number] Mayur JACOB   11/04/2021 2054 155 (H) 70 - 130 mg/dL Final      Comment:     Meter: [id number] : [id number] Mayur Jenkins NA   11/04/2021 1653 165 (H) 70 - 130 mg/dL Final     Comment:     Meter: [id number] : [id number] Eugenio Vásquez NA   11/04/2021 1118 143 (H) 70 - 130 mg/dL Final     Comment:     Meter: [id number] : [id number] Chuy Baeza NA   11/04/2021 0610 136 (H) 70 - 130 mg/dL Final     Comment:     Meter: [id number] : [id number] Cecilia Connoja NA   11/03/2021 2007 163 (H) 70 - 130 mg/dL Final     Comment:     Meter: [id number] : [id number] Cecilia Connoja NA       US Nonvascular Extremity Limited  US NONVASCULAR EXTREMITY LIMITED-     Clinical: Cellulitis left leg, Evaluate for catheter abscess     Ultrasound performed along the left calf at the area of interest.     FINDINGS: There is diffuse skin thickening. There is edema demonstrated  within the subcutaneous fat. There is a trace amount of fluid outlines  several of the lobules. There is no discrete focal fluid collection  typical for abscess. Only the soft tissues 2.5 cm below the skin surface  could be evaluated. Due to the patient's large body habitus the deeper  abscess not excluded by ultrasound. If indicated computed tomography as  follow-up.     CONCLUSION: There is skin thickening with diffuse subcutaneous fat  edema. This is compatible with cellulitis. The soft tissues deeper than  2.5 cm cannot be evaluated by ultrasound. Computed tomography if further  evaluation is warranted clinically.     This report was finalized on 11/4/2021 4:05 PM by Dr. Arnold Newton M.D.     Adult Transthoracic Echo Complete W/ Cont if Necessary Per Protocol  · The left ventricular cavity is mild to moderately dilated.  · Left ventricular ejection fraction appears to be 61 - 65%. Left   ventricular systolic function is normal.  · The right ventricular cavity is mildly dilated. Normal right ventricular   systolic function noted.  · The left atrial cavity is mild to moderately dilated.  · There is no  evidence of pericardial effusion       Scheduled Medications  cefepime, 2 g, Intravenous, Q8H  guaiFENesin, 600 mg, Oral, Q12H  insulin lispro, 0-7 Units, Subcutaneous, TID AC  sodium chloride, 10 mL, Intravenous, Q12H  vancomycin, 1,250 mg, Intravenous, Q12H  warfarin (COUMADIN) (dosing per levels), , Does not apply, Daily    Infusions  Pharmacy to dose vancomycin,   Pharmacy to dose warfarin,     Diet  Diet Regular; Consistent Carbohydrate       Assessment/Plan     Active Hospital Problems    Diagnosis  POA   • **Cellulitis of left lower extremity [L03.116]  Yes   • Hypokalemia [E87.6]  Yes   • CAROL (acute kidney injury) ([facility]) [N17.9]  Yes   • Sepsis with cutaneous manifestations ([facility]) [A41.9]  Yes   • Hyperglycemia [R73.9]  Yes   • Paroxysmal atrial fibrillation ([facility]) [I48.0]  Unknown   • Heterozygous factor V Leiden mutation ([facility]) [D68.51]  Yes   • Dyslipidemia [E78.5]  Yes   • Lymphedema of both lower extremities [I89.0]  Yes   • Obesity, morbid, BMI 50 or higher ([facility]) [E66.01]  Yes   • History of bilateral pulmonary embolism (CMS/[facility]) [I26.99]  Yes   • ARNOLDO (obstructive sleep apnea) [G47.33]  Yes      Resolved Hospital Problems   No resolved problems to display.       51 y.o. male admitted with Cellulitis of left lower extremity.    Cellulitis of left lower extremity / Lymphedema of both lower extremities    Continue with IV vancomycin and cefepime and WBC 20251 today. Left leg ultrasound was done which is not adequate steady.  Infectious disease consult has also been obtained.     Atrial fibrillation, new onset  Likely due to uncontrolled ARNOLDO in setting of morbid obesity    Continue with metoprolol and warfarin and monitor INR closely.  Patient also has underlying history of factor 5 laden deficiency.     Prediabetes  Hemoglobin A1c 6.4 and will continue with corrective dose insulin.  Counseled patient to lose weight as well.     Obesity, morbid, BMI 50 or higher  Complicating all problems     Obstructive sleep  apnea  Requiring 2L NC at night     Resolved/stable issues: Hypokalemia, CAROL, dyslipidemia, sepsis; Heterozygous factor V Leiden mutation/History of bilateral pulmonary embolism    · Warfarin (home med) for DVT prophylaxis.  · CPR    Larry Wise MD  [facility]  11/05/21  15:25 EDT    Patient was wearing facemask when I entered the room and throughout our encounter.  I wore protective equipment throughout this patient encounter including a face mask, gloves and protective eyewear.  Hand hygiene was performed before donning protective equipment and after removal when leaving the room.

## 2021-11-05 NOTE — PROGRESS NOTES
Pharmacy Consult: Warfarin Dosing/ Monitoring    Kameron Melendez is a 51 y.o. male, estimated creatinine clearance is 316.4 mL/min (A) (by C-G formula based on SCr of 0.59 mg/dL (L)). weighing (!) 255 kg (562 lb 2.8 oz).      Results from last 7 days   Lab Units 11/05/21  0531 11/04/21  0614 11/03/21  1001 11/02/21  1056 11/02/21  0616 11/02/21  0022   INR  3.20* 2.83* 2.98* 2.99*  --  2.55*   APTT seconds  --   --   --   --   --  70.4*   HEMOGLOBIN g/dL 11.8* 12.0* 12.1*  --  12.4* 13.8   HEMATOCRIT % 37.2* 36.3* 37.2*  --  37.0* 41.1   PLATELETS 10*3/mm3 312 268 233  --  186 200       Anticoagulation history: Patient followed by [facility] with regimen of 10 mg Tues/Thurs/Sat and 15 mg on Mon/Wed/Fri/Sun (90 mg weekly total) for acute pulmonary embolism treatment (without acute cor pulmonale)     Hospital Anticoagulation:  Consulting provider: Jackson Canchola APRN  Start date: 11/221  Indication: Full anticoagulation. Factor V Leiden  Target INR: 2-3  Expected duration: TBD  Bridge Therapy: No                Date 11/2 11/3 11/4 11/5         INR 2.55 2.98 2.83 3.2         Warfarin dose 10 mg 10 mg 10mg hold           Potential drug interactions:   · Cefepime: may enhance the anticoagulant effect of warfarin    Relevant nutrition status: regular diet    Assessment/Plan:  • INR is supratherapeutic - will hold warfarin today  • Monitor INR and follow up daily      Pharmacy will continue to follow until discharge or discontinuation of warfarin.     Adolfo Brian pharmD

## 2021-11-05 NOTE — CONSULTS
Electrophysiology Hospital Consult            Patient Name: Kameron Melendez  Age/Sex: 51 y.o. male  : 1970  MRN: [id number]    Date of Admission: 2021  Date of Encounter Visit: 21  Encounter Provider: Zack Santamaria MD  Referring Provider: Randall Jordan MD  Place of Service: [facility] CARDIOLOGY  Patient Care Team:  Hansel Patel DO as PCP - General (Family Medicine)  Kim Sotelo RPH as Pharmacist      Subjective:   EP Consultation for: Bradycardia    Chief Complaint: He has no complaints cardiac wise.    History of Present Illness:  Kameron Melendez is a 51 y.o. male who we were asked to see for bradycardic pauses.    The patient has no symptoms of bradycardia. He has never had near syncope or syncope. He told us the last time he was in the hospital the same thing happened where they told him his heart rate got low during sleep    He indeed does have up to 15-second pauses on his monitor but is asymptomatic. The rhythm strip today which showed a 15-second pause also showed an O2 saturation of 86%.    He has known sleep apnea and cannot tolerate a CPAP mask. Though he has not seen a pulmonologist in a long time.    He has no chest pain and at rest he has no dyspnea. He is here for cellulitis and lower extremity edema.        Past Medical History:  Past Medical History:   Diagnosis Date   • DVT (deep venous thrombosis) (HCC)     RLE   • Factor V Leiden (HCC)    • Hypertension    • Kidney stone    • Lymphedema    • Lymphedema of left lower extremity    • Obesity    • ARNOLDO (obstructive sleep apnea)    • Pulmonary embolism (HCC)     bilateral   • Pulmonary hypertension (HCC)    • Right ventricular enlargement        Past Surgical History:   Procedure Laterality Date   • CARDIAC CATHETERIZATION Bilateral 2017    Procedure: Pulmonary angiography- [place] ;  Surgeon: Cedric Coulter MD;  Location: [facility] INVASIVE LOCATION;  Service:    • CARDIAC  "CATHETERIZATION N/A 7/18/2017    Procedure: Right Heart Cath;  Surgeon: Cedric Coulter MD;  Location: [facility] CATH INVASIVE LOCATION;  Service:    • THROMBECTOMY         Home Medications:   Medications Prior to Admission   Medication Sig Dispense Refill Last Dose   • acetaminophen (TYLENOL) 500 MG tablet Take 500 mg by mouth Every 6 (Six) Hours As Needed for Mild Pain .      • ammonium lactate (AMLACTIN) 12 % cream Apply 1 application topically to the appropriate area as directed As Needed for Dry Skin. Apply to both legs daily as needed for venous stasis dermatitis and lymphedema skin thickening      • cephalexin (KEFLEX) 500 MG capsule Take 1 capsule by mouth 4 (Four) Times a Day. 40 capsule 0 11/1/2021 at Unknown time   • chlorhexidine (Hibiclens) 4 % external liquid Apply 1 application topically to the appropriate area as directed Daily As Needed for Wound Care.      • furosemide (LASIX) 80 MG tablet TAKE 1 TABLET BY MOUTH DAILY (Patient taking differently: Take 80 mg by mouth Daily. Pt. Only takes when swelling is \"excessive\" and he is going to be home) 30 tablet 5 Past Week at Unknown time   • potassium chloride (K-DUR,KLOR-CON) 20 MEQ CR tablet Take 1 tablet by mouth Daily. Take with furosemide (Patient taking differently: Take 20 mEq by mouth Daily. Only takes with furosemide) 30 tablet 2    • warfarin (COUMADIN) 10 MG tablet TAKE ONE TABLET BY MOUTH ON TUE, THUR, SAT AND TAKE ONE AND ONE-HALF (15 MG) TABLETS ALL OTHER DAYS OR AS DIRECTED (Patient taking differently: Take 10 mg by mouth 3 (Three) Times a Week. Takes 10 mg daily on Tuesday, Thursday,and Saturday) 120 tablet 1    • warfarin (COUMADIN) 10 MG tablet Take 15 mg by mouth 4 (Four) Times a Week. Takes 1.5 tablets (15mg) Sunday, Monday, Wednesday, and Friday.   11/1/2021 at Unknown time       Allergies:  No Known Allergies    Past Social History:  Social History     Socioeconomic History   • Marital status:    Tobacco Use   • Smoking status: Light " Tobacco Smoker     Types: Cigars, Pipe     Start date: 1/1/2006   • Smokeless tobacco: Never Used   • Tobacco comment: occasional - < 1 a month   Substance and Sexual Activity   • Alcohol use: No   • Drug use: No   • Sexual activity: Defer       Past Family History:  Family History   Problem Relation Age of Onset   • Heart disease Mother    • Heart failure Mother    • Bradycardia Mother    • No Known Problems Father    • No Known Problems Maternal Grandmother    • No Known Problems Maternal Grandfather    • No Known Problems Paternal Grandmother    • No Known Problems Paternal Grandfather        Review of Systems: All systems reviewed. Pertinent positives identified in HPI. All other systems are negative.     14 point ROS was performed and is negative except as outlined in HPI.     Objective:     Objective:  Vital Signs (last 24 hours)       11/04 0700  11/05 0659 11/05 0700  11/05 1733   Most Recent      Temp (°F) 97.6 -  98.4    97.3 -  98.1     98.1 (36.7) 11/05 1403    Heart Rate 72 -  80    78 -  85     78 11/05 1403    Resp   18      18     18 11/05 1403    /87 -  141/92    134/74 -  147/81     134/74 11/05 1403    SpO2 (%) 90 -  92       91 11/04 2244        Temp:  [97.3 °F (36.3 °C)-98.4 °F (36.9 °C)] 98.1 °F (36.7 °C)  Heart Rate:  [72-85] 78  Resp:  [18] 18  BP: (133-147)/(74-92) 134/74  Body mass index is 72.15 kg/m².        Physical Exam:   Constitutional:       Appearance: Morbidly obese.   Eyes:      General:         Right eye: No discharge.         Left eye: No discharge.   HENT:      Head: Normocephalic and atraumatic.   Neck:      Thyroid: No thyromegaly.      Vascular: No JVD.   Pulmonary:      Effort: Pulmonary effort is normal.      Breath sounds: Normal breath sounds. No rales.   Cardiovascular:      Normal rate. Irregularly irregular rhythm.      No gallop.   Edema:     Peripheral edema absent.   Abdominal:      General: Bowel sounds are normal.      Palpations: Abdomen is soft.       Tenderness: There is no abdominal tenderness.   Musculoskeletal: Normal range of motion.         General: No deformity. Skin:     General: Skin is warm and dry.      Findings: No erythema.   Neurological:      Mental Status: Alert and oriented to person, place, and time.      Motor: Normal muscle tone.   Psychiatric:         Behavior: Behavior normal.         Thought Content: Thought content normal.          Labs:   Lab Review:     Results from last 7 days   Lab Units 11/05/21  0531 11/04/21  1601 11/04/21  0614 11/03/21  1001 11/02/21  0616 11/02/21  0022   SODIUM mmol/L 131*  --  131* 131* 134* 134*   POTASSIUM mmol/L 4.4 4.1 4.0 3.7 3.6 3.3*   CHLORIDE mmol/L 99  --  98 98 99 96*   CO2 mmol/L 26.1  --  23.6 22.8 25.2 24.9   BUN mg/dL 9  --  12 13 22* 23*   CREATININE mg/dL 0.59*  --  0.49* 0.63* 0.89 1.29*   GLUCOSE mg/dL 148*  --  133* 150* 132* 131*   CALCIUM mg/dL 8.7  --  8.7 8.4* 8.3* 9.0   AST (SGOT) U/L  --   --   --   --   --  19   ALT (SGPT) U/L  --   --   --   --   --  18         Results from last 7 days   Lab Units 11/05/21  0531   WBC 10*3/mm3 16.40*   HEMOGLOBIN g/dL 11.8*   HEMATOCRIT % 37.2*   PLATELETS 10*3/mm3 312     Results from last 7 days   Lab Units 11/05/21  0531 11/04/21  0614 11/03/21  1001 11/02/21  1056 11/02/21  0022   INR  3.20* 2.83* 2.98*   < > 2.55*   APTT seconds  --   --   --   --  70.4*    < > = values in this interval not displayed.     Results from last 7 days   Lab Units 11/03/21  1001   CHOLESTEROL mg/dL 112     Results from last 7 days   Lab Units 11/05/21  0531   MAGNESIUM mg/dL 2.0     Results from last 7 days   Lab Units 11/03/21  1001   CHOLESTEROL mg/dL 112   TRIGLYCERIDES mg/dL 95   HDL CHOL mg/dL 30*   LDL CHOL mg/dL 64                       Imaging:     Imaging Results (Most Recent)     Procedure Component Value Units Date/Time    US Nonvascular Extremity Limited [[id number]] Collected: 11/04/21 1601     Updated: 11/04/21 1608    Narrative:      US NONVASCULAR  EXTREMITY LIMITED-     Clinical: Cellulitis left leg, Evaluate for catheter abscess     Ultrasound performed along the left calf at the area of interest.     FINDINGS: There is diffuse skin thickening. There is edema demonstrated  within the subcutaneous fat. There is a trace amount of fluid outlines  several of the lobules. There is no discrete focal fluid collection  typical for abscess. Only the soft tissues 2.5 cm below the skin surface  could be evaluated. Due to the patient's large body habitus the deeper  abscess not excluded by ultrasound. If indicated computed tomography as  follow-up.     CONCLUSION: There is skin thickening with diffuse subcutaneous fat  edema. This is compatible with cellulitis. The soft tissues deeper than  2.5 cm cannot be evaluated by ultrasound. Computed tomography if further  evaluation is warranted clinically.     This report was finalized on 11/4/2021 4:05 PM by Dr. Arnold Newton M.D.       XR Chest 1 View [[id number]] Collected: 11/02/21 0029     Updated: 11/02/21 0033    Narrative:      SINGLE VIEW OF THE CHEST     HISTORY: Cough     COMPARISON: 10/29/2021     FINDINGS:  Cardiomegaly is present. There is no vascular congestion. No  pneumothorax, pleural effusion, or acute infiltrate is seen.       Impression:      No acute findings.     This report was finalized on 11/2/2021 12:30 AM by Dr. Kasey Galloway M.D.               EKG:   Atrial fibrillation      I personally viewed and interpreted the patient's EKG/Telemetry data.    Assessment:       Cellulitis of left lower extremity    History of bilateral pulmonary embolism (CMS/HCC)    Lymphedema of both lower extremities    Obesity, morbid, BMI 50 or higher (HCC)    Dyslipidemia    ARNOLDO (obstructive sleep apnea)    Heterozygous factor V Leiden mutation (HCC)    Hypokalemia    CAROL (acute kidney injury) (HCC)    Sepsis with cutaneous manifestations (HCC)    Hyperglycemia    Paroxysmal atrial fibrillation (HCC)        Plan:       This is a complex patient with multiple medical problems mostly related to his obesity. I am certain that he has very severe sleep disordered breathing, apnea and these pauses are undoubtedly related to that.    He has never had symptomatic bradycardia. I would not recommend a pacemaker at this time as these are almost certainly due to apneic spells.    I discussed with him the importance of sleep apnea therapy and I am going to ask pulmonology to consult on this. I mean, it is so severe I wonder whether a tracheo ostomy would be beneficial.    His atrial fibrillation is also new. However he is currently on chronic anticoagulant therapy and his rate is fairly well controlled and I would not recommend any rhythm control therapy given his comorbidities.    Thank you for allowing me to participate in the care of Kameron BALDERAS Josefinamaria guadalupe. Feel free to contact me directly with any further questions or concerns.    Zack Santamaria MD  [facility]  11/05/21  17:33 EDT

## 2021-11-05 NOTE — CASE MANAGEMENT/SOCIAL WORK
Continued Stay Note  [facility]     Patient Name: Kameron Melendez  MRN: [id number]  Today's Date: 11/5/2021    Admit Date: 11/1/2021     Discharge Plan     Row Name 11/05/21 1408       Plan    Plan TBD    Provided Post Acute Provider List? Yes    Post Acute Provider List Home Health    Patient/Family in Agreement with Plan yes    Plan Comments Patient admitted with wounds to legs.  Bariatric patient.  Patients wife also admitted to hospital currently.  Patient typically lives at home and works full time.  Patient states he has had HH nursing in the past, but cannot remember which agency - also he was homebound at the time.  Patient agreeable to HH if it is available to him at discharge, but knows if he is working that HH will not be an option.  Patient provided with list of local HH agencies and states he will look it over.  CCP to follow for needs and make appropriate referrals.  Lisa               Discharge Codes    No documentation.                     Patrice Feldman

## 2021-11-05 NOTE — THERAPY TREATMENT NOTE
Patient Name: Kameron Melendez  : 1970    MRN: [id number]                              Today's Date: 2021       Admit Date: 2021    Visit Dx:     ICD-10-CM ICD-9-CM   1. Cellulitis of left lower extremity  L03.116 682.6   2. Lymphedema  I89.0 457.1     Patient Active Problem List   Diagnosis   • History of bilateral pulmonary embolism (CMS/HCC)   • Pulmonary hypertension (HCC)   • Lymphedema of both lower extremities   • Obesity, morbid, BMI 50 or higher (HCC)   • Dyslipidemia   • Hyperuricemia   • Hypogonadal obesity   • Knee arthropathy   • Morbid obesity with body mass index of 60.0-69.9 in adult (HCC)   • ARNOLDO (obstructive sleep apnea)   • Severe edema   • History of pulmonary embolism   • Other acute pulmonary embolism without acute cor pulmonale (HCC)   • Intractable back pain   • Heterozygous factor V Leiden mutation (HCC)   • Cellulitis of left lower extremity   • Hypokalemia   • CAROL (acute kidney injury) (HCC)   • Sepsis with cutaneous manifestations (HCC)   • Hyperglycemia   • Paroxysmal atrial fibrillation (HCC)     Past Medical History:   Diagnosis Date   • DVT (deep venous thrombosis) (HCC)     RLE   • Factor V Leiden (HCC)    • Hypertension    • Kidney stone    • Lymphedema    • Lymphedema of left lower extremity    • Obesity    • ARNOLDO (obstructive sleep apnea)    • Pulmonary embolism (HCC)     bilateral   • Pulmonary hypertension (HCC)    • Right ventricular enlargement      Past Surgical History:   Procedure Laterality Date   • CARDIAC CATHETERIZATION Bilateral 2017    Procedure: Pulmonary angiography- Inari ;  Surgeon: Cedric Coulter MD;  Location: [facility] INVASIVE LOCATION;  Service:    • CARDIAC CATHETERIZATION N/A 2017    Procedure: Right Heart Cath;  Surgeon: Cedric Coulter MD;  Location: [facility] CATH INVASIVE LOCATION;  Service:    • THROMBECTOMY        General Information     Row Name 21 1538          Physical Therapy Time and Intention    Document Type therapy note  (daily note)  -DJ     Mode of Treatment individual therapy; physical therapy  -DJ     Row Name 11/05/21 1538          General Information    Patient Profile Reviewed yes  -DJ     Existing Precautions/Restrictions fall  -DJ     Row Name 11/05/21 1538          Cognition    Orientation Status (Cognition) oriented x 4  pleasant and cooperative  -DJ     Row Name 11/05/21 1538          Safety Issues, Functional Mobility    Comment, Safety Issues/Impairments (Mobility) gt belt, crocs, eduar wx  -DJ           User Key  (r) = Recorded By, (t) = Taken By, (c) = Cosigned By    Initials Name Provider Type    Praveena Zambrano, PT Physical Therapist               Mobility     Row Name 11/05/21 1539          Bed Mobility    Bed Mobility supine-sit; sit-supine  -DJ     Supine-Sit [place] (Bed Mobility) moderate assist (50% patient effort); maximum assist (25% patient effort); 2 person assist; verbal cues  -DJ     Sit-Supine [place] (Bed Mobility) moderate assist (50% patient effort); maximum assist (25% patient effort); 2 person assist; verbal cues  -DJ     Assistive Device (Bed Mobility) bed rails; head of bed elevated  -DJ     Comment (Bed Mobility) A to move LEs but pt able to help reposition self in bed  -DJ     Row Name 11/05/21 1539          Transfers    Comment (Transfers) sit/stand from EOB  -DJ     Row Name 11/05/21 1539          Bed-Chair Transfer    Bed-Chair [place] (Transfers) not tested  -DJ     Row Name 11/05/21 1539          Sit-Stand Transfer    Sit-Stand [place] (Transfers) moderate assist (50% patient effort); 2 person assist; verbal cues  -DJ     Assistive Device (Sit-Stand Transfers) bariatric  -DJ     Row Name 11/05/21 1539          Gait/Stairs (Locomotion)    [place] Level (Gait) minimum assist (75% patient effort); moderate assist (50% patient effort); 2 person assist; verbal cues  -DJ     Assistive Device (Gait) bariatric equipment  -DJ     Distance in Feet (Gait) 4-5 side steps  toward HOB  -DJ     Deviations/Abnormal Patterns (Gait) base of support, wide; festinating/shuffling; reg decreased  -DJ     Bilateral Gait Deviations forward flexed posture; heel strike decreased  -DJ     [place] Level (Stairs) not tested  -DJ     Comment (Gait/Stairs) Pt not feeling like amb today but was able to take 4-5 smaall side steps toward HOB with min-mod A of 2  -DJ           User Key  (r) = Recorded By, (t) = Taken By, (c) = Cosigned By    Initials Name Provider Type    Praveena Zambrano, LUCIEN Physical Therapist               Obj/Interventions     Row Name 11/05/21 1542          Balance    Balance Assessment sitting static balance; standing static balance; standing dynamic balance  -DJ     Static Sitting Balance WFL; sitting, edge of bed; unsupported  -DJ     Static Standing Balance WFL; supported; standing  -DJ     Dynamic Standing Balance mild impairment; standing; supported  -DJ     Balance Interventions sitting; standing; sit to stand; supported; weight shifting activity  -DJ           User Key  (r) = Recorded By, (t) = Taken By, (c) = Cosigned By    Initials Name Provider Type    Praveena Zambrano, PT Physical Therapist               Goals/Plan    No documentation.                Clinical Impression     Row Name 11/05/21 1542          Pain    Additional Documentation Pain Scale: Numbers Pre/Post-Treatment (Group)  -DJ     Row Name 11/05/21 1542          Pain Scale: Numbers Pre/Post-Treatment    Pain Location - Side Left  -DJ     Pain Location - Orientation lower  -DJ     Pain Location extremity  -DJ     Pre/Posttreatment Pain Comment L LE discomfort better today  -DJ     Pain Intervention(s) Repositioned; Rest  -DJ     Row Name 11/05/21 6832          Plan of Care Review    Plan of Care Reviewed With patient  -DJ     Progress no change  -DJ     Outcome Summary Pt upset about newly identified heart issues and hsi wife being admitted to hospital, reluctant to participate in PT but with encouragement  agreed to do so. L LE looks better today with decreased redness. Pt req mod-max A of 2 for bed mobility, requiring most assist to move LEs. PT req mod A of 2 for sit/stand from EOB. He was able to take 4-5 shuffled lateral steps toward HOB but declined further amb. Bariatric r wx and eduar recliner chair now present in room. Cont PT to address functional deficits and progress as tolearted to prepare for d/c.  -DJ     Row Name 11/05/21 1542          Therapy Assessment/Plan (PT)    Criteria for Skilled Interventions Met (PT) skilled treatment is necessary  -DJ     Row Name 11/05/21 1542          Vital Signs    O2 Delivery Pre Treatment supplemental O2  3L  -DJ     O2 Delivery Intra Treatment room air  3L  -DJ     O2 Delivery Post Treatment supplemental O2  -DJ     Pre Patient Position Supine  -DJ     Intra Patient Position Standing  -DJ     Post Patient Position Supine  -DJ     Row Name 11/05/21 1546          Positioning and Restraints    Pre-Treatment Position in bed  -DJ     Post Treatment Position bed  -DJ     In Bed notified nsg; supine; call light within reach; encouraged to call for assist; exit alarm on  -DJ           User Key  (r) = Recorded By, (t) = Taken By, (c) = Cosigned By    Initials Name Provider Type    Praveena Zambrano, PT Physical Therapist               Outcome Measures     Row Name 11/05/21 3549          How much help from another person do you currently need...    Turning from your back to your side while in flat bed without using bedrails? 2  -DJ     Moving from lying on back to sitting on the side of a flat bed without bedrails? 2  -DJ     Moving to and from a bed to a chair (including a wheelchair)? 2  -DJ     Standing up from a chair using your arms (e.g., wheelchair, bedside chair)? 2  -DJ     Climbing 3-5 steps with a railing? 1  -DJ     To walk in hospital room? 2  -DJ     AM-PAC 6 Clicks Score (PT) 11  -DJ     Row Name 11/05/21 3594          Functional Assessment    Outcome Measure Options  AM-PAC 6 Clicks Basic Mobility (PT)  -           User Key  (r) = Recorded By, (t) = Taken By, (c) = Cosigned By    Initials Name Provider Type    Praveena Zambrano PT Physical Therapist                             Physical Therapy Education                 Title: PT OT SLP Therapies (In Progress)     Topic: Physical Therapy (In Progress)     Point: Mobility training (Done)     Learning Progress Summary           Patient Acceptance, E, VU by  at 11/5/2021 1547    Acceptance, E, VU,NR by ROMARIO at 11/4/2021 1006                   Point: Home exercise program (Not Started)     Learner Progress:  Not documented in this visit.          Point: Body mechanics (Done)     Learning Progress Summary           Patient Acceptance, E, VU by ROMARIO at 11/5/2021 1547    Acceptance, E, VU,NR by ROMARIO at 11/4/2021 1006                   Point: Precautions (Done)     Learning Progress Summary           Patient Acceptance, E, VU by ROMARIO at 11/5/2021 1547    Acceptance, E, VU,NR by  at 11/4/2021 1006                               User Key     Initials Effective Dates Name Provider Type Discipline     10/25/19 -  Praveena Burdick, LUCIEN Physical Therapist PT              PT Recommendation and Plan  Planned Therapy Interventions (PT): balance training, bed mobility training, gait training, home exercise program, strengthening, postural re-education, patient/family education, transfer training  Plan of Care Reviewed With: patient  Progress: no change  Outcome Summary: Pt upset about newly identified heart issues and hsi wife being admitted to hospital, reluctant to participate in PT but with encouragement agreed to do so. L LE looks better today with decreased redness. Pt req mod-max A of 2 for bed mobility, requiring most assist to move LEs. PT req mod A of 2 for sit/stand from EOB. He was able to take 4-5 shuffled lateral steps toward HOB but declined further amb. Bariatric r wx and eduar recliner chair now present in room. Cont PT to address  functional deficits and progress as tolearted to prepare for d/c.     Time Calculation:    PT Charges     Row Name 11/05/21 1548             Time Calculation    Start Time 1415  -DJ      Stop Time 1450  -DJ      Time Calculation (min) 35 min  -DJ      PT Non-Billable Time (min) 10 min  -DJ      PT Received On 11/05/21  -DJ      PT - Next Appointment 11/06/21  -DJ            User Key  (r) = Recorded By, (t) = Taken By, (c) = Cosigned By    Initials Name Provider Type    Praveena Zambrano, PT Physical Therapist              Therapy Charges for Today     Code Description Service Date Service Provider Modifiers Qty    37757201904 HC PT EVAL MOD COMPLEXITY 2 11/4/2021 Praveena Burdick, PT GP 1    99131611942 HC GAIT TRAINING EA 15 MIN 11/4/2021 Praveena Burdick, PT GP 1    91212083657 HC PT THER PROC EA 15 MIN 11/4/2021 Praveena Burdick, PT GP 2    22972476164 HC PT THER SUPP EA 15 MIN 11/4/2021 Praveena Burdick, PT GP 2    16667515294 HC PT THER PROC EA 15 MIN 11/5/2021 Praveena Burdick, PT GP 2    78287035611 HC PT THER SUPP EA 15 MIN 11/5/2021 Praveena Burdick, PT GP 2          PT G-Codes  Outcome Measure Options: AM-PAC 6 Clicks Basic Mobility (PT)  AM-PAC 6 Clicks Score (PT): 11    Praveena Burdick, PT  11/5/2021

## 2021-11-05 NOTE — PLAN OF CARE
Goal Outcome Evaluation:  Plan of Care Reviewed With: patient           Outcome Summary: Pain treated with PO meds X1. Patient had a 15 sec and a 10 sec pause today, NP notified. BB was DCd and EP was consulted. Patient has a PW and would like to keep it. I educated on the importance of maintaining mobility and optimal independence. O2 needs increase with sleep. IV vanc and cefidime continued. NAYANM

## 2021-11-05 NOTE — PROGRESS NOTES
Hospital Follow Up    LOS:  LOS: 3 days   Patient Name: Kameron Melendez  Age/Sex: 51 y.o. male  : 1970  MRN: [id number]    Day of Service: 21   Length of Stay: 3  Encounter Provider: FADY John  Place of Service: [facility] CARDIOLOGY  Patient Care Team:  Hansel Patel DO as PCP - General (Family Medicine)  Kim Sotelo RPH as Pharmacist    Subjective:     Chief Complaint: New onset A. fib, obstructive sleep apnea, left lower extremity cellulitis    Interval History: No complaints of palpitations or chest pain    Objective:     Objective:  Temp:  [97.3 °F (36.3 °C)-98.4 °F (36.9 °C)] 97.3 °F (36.3 °C)  Heart Rate:  [72-85] 85  Resp:  [18] 18  BP: (133-147)/(76-92) 147/81     Intake/Output Summary (Last 24 hours) at 2021 1158  Last data filed at 2021 1009  Gross per 24 hour   Intake 640 ml   Output 1200 ml   Net -560 ml     Body mass index is 72.15 kg/m².      21  0006 21  1746   Weight: (!) 255 kg (561 lb 1.6 oz) (!) 255 kg (562 lb 2.8 oz)     Weight change:     Physical Exam:   General Appearance:    Awake alert and oriented in no acute distress. Morbidly obese   Color:  Skin:  Neuro:  HEENT:    Lungs:     Pink  Warm and dry  No focal, motor or sensory deficits  Neck supple, pupils equal, round and reactive. No JVD, No Bruit  Clear to auscultation,respirations regular, even and                  unlabored    Heart:   Irregularly irregular rate and rhythm, S1 and S2, no murmur, no gallop, no rub. No edema, DP/PT pulses are 2+   Chest Wall:    No abnormalities observed   Abdomen:     Normal bowel sounds, no masses, no organomegaly, soft        non-tender, non-distended, no guarding, no ascites noted   Extremities:   Moves all extremities well, 2+ bilateral edema, no cyanosis, left lower extremity erythema       Lab Review:   Results from last 7 days   Lab Units 21  0531 21  1601 21  0614 21  0614  11/02/21  0616 11/02/21  0022   SODIUM mmol/L 131*  --   --  131*   < > 134*   POTASSIUM mmol/L 4.4 4.1   < > 4.0   < > 3.3*   CHLORIDE mmol/L 99  --   --  98   < > 96*   CO2 mmol/L 26.1  --   --  23.6   < > 24.9   BUN mg/dL 9  --   --  12   < > 23*   CREATININE mg/dL 0.59*  --   --  0.49*   < > 1.29*   GLUCOSE mg/dL 148*  --   --  133*   < > 131*   CALCIUM mg/dL 8.7  --   --  8.7   < > 9.0   AST (SGOT) U/L  --   --   --   --   --  19   ALT (SGPT) U/L  --   --   --   --   --  18    < > = values in this interval not displayed.         Results from last 7 days   Lab Units 11/05/21  0531 11/04/21  0614   WBC 10*3/mm3 16.40* 15.74*   HEMOGLOBIN g/dL 11.8* 12.0*   HEMATOCRIT % 37.2* 36.3*   PLATELETS 10*3/mm3 312 268     Results from last 7 days   Lab Units 11/05/21  0531 11/04/21  0614 11/02/21  1056 11/02/21  0022   INR  3.20* 2.83*   < > 2.55*   APTT seconds  --   --   --  70.4*    < > = values in this interval not displayed.     Results from last 7 days   Lab Units 11/05/21  0531 11/04/21  1601   MAGNESIUM mg/dL 2.0 2.0     Results from last 7 days   Lab Units 11/03/21  1001   CHOLESTEROL mg/dL 112   TRIGLYCERIDES mg/dL 95   HDL CHOL mg/dL 30*             I reviewed the patient's new clinical results.  I personally viewed and interpreted the patient's EKG  Current Medications:   Scheduled Meds:cefepime, 2 g, Intravenous, Q8H  guaiFENesin, 600 mg, Oral, Q12H  insulin lispro, 0-7 Units, Subcutaneous, TID AC  metoprolol tartrate, 100 mg, Oral, Q12H  sodium chloride, 10 mL, Intravenous, Q12H  vancomycin, 1,250 mg, Intravenous, Q12H  warfarin, 10 mg, Oral, Daily      Continuous Infusions:Pharmacy to dose vancomycin,   Pharmacy to dose warfarin,         Allergies:  No Known Allergies    Assessment:       Cellulitis of left lower extremity    History of bilateral pulmonary embolism (CMS/HCC)    Lymphedema of both lower extremities    Obesity, morbid, BMI 50 or higher (HCC)    Dyslipidemia    ARNOLDO (obstructive sleep apnea)     Heterozygous factor V Leiden mutation (HCC)    Hypokalemia    CAROL (acute kidney injury) (HCC)    Sepsis with cutaneous manifestations (HCC)    Hyperglycemia    Paroxysmal atrial fibrillation (HCC)    1. Atrial fibrillation.  New onset.  On metoprolol with improved rate control.  Already anticoagulated with warfarin.   2. Left lower extremity cellulitis/lymphedema  3. History of recurrent PE and DVT  4. Factor V leiden   5. Hypertension.  Well-controlled.  6. Untreated ARNOLDO  7. Morbid obesity    Plan:      Heart rate controlled . Anticoagulated with the Coumadin. Echo from the third shows his RV is mildly dilated as well as his LV but his EF is 61 to 65%. We will continue with right management.    FADY John  11/05/21  11:58 EDT  Electronically signed by AFDY John, 11/05/21, 11:58 AM EDT.

## 2021-11-05 NOTE — PROGRESS NOTES
"[facility]  Clinical Pharmacy Department     Vancomycin Pharmacokinetic Initial Consult Note    Kameron Melendez is a 51 y.o. male who is on day 1 of pharmacy to dose vancomycin. Resuming vancomycin    MRSA PCR performed: 11/2; Result: negative  Target: -600 mg/L.hr   Indication: SSTI  Culture/Source: 11/2 Blood x2 -ngtd   Previous Dose Given: 1250 mg IV q12h  Vancomycin Dose:     Planned Duration of Therapy: 7 days  Consulting Provider: Dr Jung  Other Antimicrobials: cefepime    Vitals/Labs  Ht: 188 cm (74.02\"); Wt: (!) 255 kg (562 lb 2.8 oz)  Temp Readings from Last 3 Encounters:   11/05/21 98.1 °F (36.7 °C) (Oral)   10/29/21 98 °F (36.7 °C) (Tympanic)   12/01/19 97.1 °F (36.2 °C) (Oral)   Estimated Creatinine Clearance: 316.4 mL/min (A) (by C-G formula based on SCr of 0.59 mg/dL (L)).   Creatinine   Date Value Ref Range Status   11/05/2021 0.59 (L) 0.76 - 1.27 mg/dL Final   11/04/2021 0.49 (L) 0.76 - 1.27 mg/dL Final   11/03/2021 0.63 (L) 0.76 - 1.27 mg/dL Final      Lab Results   Component Value Date    WBC 16.40 (H) 11/05/2021    WBC 15.74 (H) 11/04/2021    WBC 14.90 (H) 11/03/2021     Assessment/Plan:    Predictive AUC level for the dose ordered is 448 mg/L.hr, which is within the target of 400-600 mg/L.hr  Vancomycin trough scheduled today at 1300 was never collected by lab. Level will be re-timed to be drawn timed at 11/6 @ 0400 and subsequent doses will be adjusted accordingly.   Will continue to monitor for efficacy and safety.     Pharmacy will follow patient's kidney function and will adjust doses and obtain levels as necessary.Thank you for involving pharmacy in this patient's care. Please contact pharmacy with any questions or concerns.                           [facility], located within [facility]  Clinical Pharmacist  11/05/21       "

## 2021-11-06 LAB
ANION GAP SERPL CALCULATED.3IONS-SCNC: 10.2 MMOL/L (ref 5–15)
ANISOCYTOSIS BLD QL: ABNORMAL
BUN SERPL-MCNC: 9 MG/DL (ref 6–20)
BUN/CREAT SERPL: 16.4 (ref 7–25)
CALCIUM SPEC-SCNC: 8.7 MG/DL (ref 8.6–10.5)
CHLORIDE SERPL-SCNC: 97 MMOL/L (ref 98–107)
CO2 SERPL-SCNC: 26.8 MMOL/L (ref 22–29)
CREAT SERPL-MCNC: 0.55 MG/DL (ref 0.76–1.27)
DEPRECATED RDW RBC AUTO: 43.9 FL (ref 37–54)
EOSINOPHIL # BLD MANUAL: 0.83 10*3/MM3 (ref 0–0.4)
EOSINOPHIL NFR BLD MANUAL: 5.1 % (ref 0.3–6.2)
ERYTHROCYTE [DISTWIDTH] IN BLOOD BY AUTOMATED COUNT: 14 % (ref 12.3–15.4)
GFR SERPL CREATININE-BSD FRML MDRD: >150 ML/MIN/1.73
GLUCOSE BLDC GLUCOMTR-MCNC: 133 MG/DL (ref 70–130)
GLUCOSE BLDC GLUCOMTR-MCNC: 145 MG/DL (ref 70–130)
GLUCOSE BLDC GLUCOMTR-MCNC: 176 MG/DL (ref 70–130)
GLUCOSE BLDC GLUCOMTR-MCNC: 178 MG/DL (ref 70–130)
GLUCOSE SERPL-MCNC: 153 MG/DL (ref 65–99)
HCT VFR BLD AUTO: 35.5 % (ref 37.5–51)
HGB BLD-MCNC: 12 G/DL (ref 13–17.7)
INR PPP: 2.9 (ref 0.9–1.1)
LYMPHOCYTES # BLD MANUAL: 2.14 10*3/MM3 (ref 0.7–3.1)
LYMPHOCYTES NFR BLD MANUAL: 13.1 % (ref 19.6–45.3)
LYMPHOCYTES NFR BLD MANUAL: 9.1 % (ref 5–12)
MAGNESIUM SERPL-MCNC: 1.8 MG/DL (ref 1.6–2.6)
MCH RBC QN AUTO: 29.5 PG (ref 26.6–33)
MCHC RBC AUTO-ENTMCNC: 33.8 G/DL (ref 31.5–35.7)
MCV RBC AUTO: 87.2 FL (ref 79–97)
MONOCYTES # BLD AUTO: 1.49 10*3/MM3 (ref 0.1–0.9)
NEUTROPHILS # BLD AUTO: 11.87 10*3/MM3 (ref 1.7–7)
NEUTROPHILS NFR BLD MANUAL: 72.7 % (ref 42.7–76)
PHOSPHATE SERPL-MCNC: 2.8 MG/DL (ref 2.5–4.5)
PLAT MORPH BLD: NORMAL
PLATELET # BLD AUTO: 359 10*3/MM3 (ref 140–450)
PMV BLD AUTO: 9.5 FL (ref 6–12)
POLYCHROMASIA BLD QL SMEAR: ABNORMAL
POTASSIUM SERPL-SCNC: 4.1 MMOL/L (ref 3.5–5.2)
PROTHROMBIN TIME: 30.1 SECONDS (ref 11.7–14.2)
RBC # BLD AUTO: 4.07 10*6/MM3 (ref 4.14–5.8)
SODIUM SERPL-SCNC: 134 MMOL/L (ref 136–145)
VANCOMYCIN TROUGH SERPL-MCNC: 6.7 MCG/ML (ref 5–20)
WBC # BLD AUTO: 16.33 10*3/MM3 (ref 3.4–10.8)
WBC MORPH BLD: NORMAL

## 2021-11-06 PROCEDURE — 83735 ASSAY OF MAGNESIUM: CPT | Performed by: STUDENT IN AN ORGANIZED HEALTH CARE EDUCATION/TRAINING PROGRAM

## 2021-11-06 PROCEDURE — 25010000002 VANCOMYCIN 10 G RECONSTITUTED SOLUTION: Performed by: STUDENT IN AN ORGANIZED HEALTH CARE EDUCATION/TRAINING PROGRAM

## 2021-11-06 PROCEDURE — 85610 PROTHROMBIN TIME: CPT | Performed by: NURSE PRACTITIONER

## 2021-11-06 PROCEDURE — 80048 BASIC METABOLIC PNL TOTAL CA: CPT | Performed by: STUDENT IN AN ORGANIZED HEALTH CARE EDUCATION/TRAINING PROGRAM

## 2021-11-06 PROCEDURE — 63710000001 INSULIN LISPRO (HUMAN) PER 5 UNITS: Performed by: NURSE PRACTITIONER

## 2021-11-06 PROCEDURE — 85025 COMPLETE CBC W/AUTO DIFF WBC: CPT | Performed by: STUDENT IN AN ORGANIZED HEALTH CARE EDUCATION/TRAINING PROGRAM

## 2021-11-06 PROCEDURE — 84100 ASSAY OF PHOSPHORUS: CPT | Performed by: STUDENT IN AN ORGANIZED HEALTH CARE EDUCATION/TRAINING PROGRAM

## 2021-11-06 PROCEDURE — 80202 ASSAY OF VANCOMYCIN: CPT | Performed by: INTERNAL MEDICINE

## 2021-11-06 PROCEDURE — 36415 COLL VENOUS BLD VENIPUNCTURE: CPT | Performed by: NURSE PRACTITIONER

## 2021-11-06 PROCEDURE — 82962 GLUCOSE BLOOD TEST: CPT

## 2021-11-06 PROCEDURE — 85007 BL SMEAR W/DIFF WBC COUNT: CPT | Performed by: STUDENT IN AN ORGANIZED HEALTH CARE EDUCATION/TRAINING PROGRAM

## 2021-11-06 PROCEDURE — 97110 THERAPEUTIC EXERCISES: CPT

## 2021-11-06 PROCEDURE — 25010000002 CEFEPIME PER 500 MG: Performed by: NURSE PRACTITIONER

## 2021-11-06 PROCEDURE — 99231 SBSQ HOSP IP/OBS SF/LOW 25: CPT | Performed by: INTERNAL MEDICINE

## 2021-11-06 RX ORDER — WARFARIN SODIUM 2 MG/1
2 TABLET ORAL
Status: COMPLETED | OUTPATIENT
Start: 2021-11-06 | End: 2021-11-06

## 2021-11-06 RX ADMIN — FUROSEMIDE 80 MG: 80 TABLET ORAL at 09:24

## 2021-11-06 RX ADMIN — CEFEPIME HYDROCHLORIDE 2 G: 2 INJECTION, POWDER, FOR SOLUTION INTRAVENOUS at 03:19

## 2021-11-06 RX ADMIN — ACETAMINOPHEN 650 MG: 325 TABLET, FILM COATED ORAL at 07:23

## 2021-11-06 RX ADMIN — GUAIFENESIN 600 MG: 600 TABLET, EXTENDED RELEASE ORAL at 09:24

## 2021-11-06 RX ADMIN — WARFARIN 2 MG: 2 TABLET ORAL at 17:06

## 2021-11-06 RX ADMIN — VANCOMYCIN HYDROCHLORIDE 1250 MG: 10 INJECTION, POWDER, LYOPHILIZED, FOR SOLUTION INTRAVENOUS at 21:25

## 2021-11-06 RX ADMIN — VANCOMYCIN HYDROCHLORIDE 1250 MG: 10 INJECTION, POWDER, LYOPHILIZED, FOR SOLUTION INTRAVENOUS at 05:32

## 2021-11-06 RX ADMIN — SODIUM CHLORIDE, PRESERVATIVE FREE 10 ML: 5 INJECTION INTRAVENOUS at 21:25

## 2021-11-06 RX ADMIN — BENZONATATE 100 MG: 100 CAPSULE ORAL at 17:14

## 2021-11-06 RX ADMIN — INSULIN LISPRO 2 UNITS: 100 INJECTION, SOLUTION INTRAVENOUS; SUBCUTANEOUS at 17:06

## 2021-11-06 RX ADMIN — VANCOMYCIN HYDROCHLORIDE 1250 MG: 10 INJECTION, POWDER, LYOPHILIZED, FOR SOLUTION INTRAVENOUS at 12:38

## 2021-11-06 RX ADMIN — GUAIFENESIN 600 MG: 600 TABLET, EXTENDED RELEASE ORAL at 21:25

## 2021-11-06 RX ADMIN — CEFEPIME HYDROCHLORIDE 2 G: 2 INJECTION, POWDER, FOR SOLUTION INTRAVENOUS at 17:06

## 2021-11-06 RX ADMIN — CEFEPIME HYDROCHLORIDE 2 G: 2 INJECTION, POWDER, FOR SOLUTION INTRAVENOUS at 09:24

## 2021-11-06 NOTE — PROGRESS NOTES
LOS: 4 days   Patient Care Team:  Hansel Patel DO as PCP - General (Family Medicine)  Kim Sotelo RPH as Pharmacist    Chief Complaint:   Atrial Flutter, Pauses    Interval History:   No significant pauses noted on telemetry.  His heart has trended up slightly, but he has no awareness of tachycardia or palpitations.     Objective   Vital Signs  Temp:  [97.7 °F (36.5 °C)-98.1 °F (36.7 °C)] 98 °F (36.7 °C)  Heart Rate:  [] 104  Resp:  [18-22] 22  BP: (134-144)/(74-95) 144/95    Intake/Output Summary (Last 24 hours) at 11/6/2021 1357  Last data filed at 11/6/2021 1334  Gross per 24 hour   Intake 250 ml   Output 8150 ml   Net -7900 ml       Review of Systems   Constitutional: Negative.   Cardiovascular: Negative.    Respiratory: Negative.    Gastrointestinal: Negative.        Physical Exam  Vitals and nursing note reviewed.   Constitutional:       Appearance: Normal appearance.   HENT:      Head: Normocephalic.   Cardiovascular:      Rate and Rhythm: Normal rate and regular rhythm.   Skin:     General: Skin is warm.   Neurological:      General: No focal deficit present.      Mental Status: He is alert and oriented to person, place, and time.   Psychiatric:         Mood and Affect: Mood normal.         Behavior: Behavior normal.           Results Review:      Results from last 7 days   Lab Units 11/06/21  0427 11/05/21  0531 11/05/21  0531 11/04/21  1601 11/04/21  0614 11/04/21  0614   SODIUM mmol/L 134*  --  131*  --   --  131*   POTASSIUM mmol/L 4.1  --  4.4 4.1   < > 4.0   CHLORIDE mmol/L 97*  --  99  --   --  98   CO2 mmol/L 26.8  --  26.1  --   --  23.6   BUN mg/dL 9  --  9  --   --  12   CREATININE mg/dL 0.55*  --  0.59*  --   --  0.49*   GLUCOSE mg/dL 153*   < > 148*  --    < > 133*   CALCIUM mg/dL 8.7  --  8.7  --   --  8.7    < > = values in this interval not displayed.         Results from last 7 days   Lab Units 11/06/21  0427 11/05/21  0531 11/04/21  0614   WBC 10*3/mm3 16.33* 16.40* 15.74*    HEMOGLOBIN g/dL 12.0* 11.8* 12.0*   HEMATOCRIT % 35.5* 37.2* 36.3*   PLATELETS 10*3/mm3 359 312 268     Results from last 7 days   Lab Units 11/06/21  0427 11/05/21  0531 11/04/21  0614 11/02/21  1056 11/02/21  0022   INR  2.90* 3.20* 2.83*   < > 2.55*   APTT seconds  --   --   --   --  70.4*    < > = values in this interval not displayed.     Results from last 7 days   Lab Units 11/03/21  1001   CHOLESTEROL mg/dL 112     Results from last 7 days   Lab Units 11/06/21  0427   MAGNESIUM mg/dL 1.8     Results from last 7 days   Lab Units 11/03/21  1001   CHOLESTEROL mg/dL 112   TRIGLYCERIDES mg/dL 95   HDL CHOL mg/dL 30*   LDL CHOL mg/dL 64       I reviewed the patient's new clinical results.  I personally viewed and interpreted the patient's EKG/Telemetry data        Medication Review:   cefepime, 2 g, Intravenous, Q8H  furosemide, 80 mg, Oral, Daily  guaiFENesin, 600 mg, Oral, Q12H  insulin lispro, 0-7 Units, Subcutaneous, TID AC  sodium chloride, 10 mL, Intravenous, Q12H  vancomycin, 1,250 mg, Intravenous, Q8H  warfarin (COUMADIN) (dosing per levels), , Does not apply, Daily  warfarin, 2 mg, Oral, Once        Pharmacy to dose vancomycin,   Pharmacy to dose warfarin,         Assessment/Plan       Cellulitis of left lower extremity    History of bilateral pulmonary embolism (CMS/HCC)    Lymphedema of both lower extremities    Obesity, morbid, BMI 50 or higher (HCC)    Dyslipidemia    ARNOLDO (obstructive sleep apnea)    Heterozygous factor V Leiden mutation (HCC)    Hypokalemia    CAROL (acute kidney injury) (HCC)    Sepsis with cutaneous manifestations (HCC)    Hyperglycemia    Paroxysmal atrial fibrillation (HCC)    Atrial Flutter with reasonable rate control and anticoagulation with warfarin.  No new recommendations today.  Pauses appear less frequent.      Skyler Quintanilla MD  11/06/21  13:57 EDT

## 2021-11-06 NOTE — PLAN OF CARE
Problem: Adult Inpatient Plan of Care  Goal: Plan of Care Review  Outcome: Ongoing, Progressing  Flowsheets (Taken 11/6/2021 7797)  Progress: improving  Plan of Care Reviewed With: patient  Outcome Summary: Patient doing well. No major complaints. Tolerating IV antibiotics. Infectious disease consulted. Plan of care reviewed and education provided.   Goal Outcome Evaluation:  Plan of Care Reviewed With: patient     Progress: improving  Outcome Summary: Patient doing well. No major complaints. Tolerating IV antibiotics. Infectious disease consulted. Plan of care reviewed and education provided.

## 2021-11-06 NOTE — PROGRESS NOTES
Pharmacy Consult: Warfarin Dosing/ Monitoring    Kameron Melendez is a 51 y.o. male, estimated creatinine clearance is 316.4 mL/min (A) (by C-G formula based on SCr of 0.59 mg/dL (L)). weighing (!) 255 kg (562 lb 2.8 oz).      Results from last 7 days   Lab Units 11/06/21  0427 11/05/21  0531 11/04/21  0614 11/03/21  1001 11/02/21  1056 11/02/21  0616 11/02/21  0022   INR  2.90* 3.20* 2.83* 2.98* 2.99*  --  2.55*   APTT seconds  --   --   --   --   --   --  70.4*   HEMOGLOBIN g/dL 12.0* 11.8* 12.0* 12.1*  --  12.4* 13.8   HEMATOCRIT % 35.5* 37.2* 36.3* 37.2*  --  37.0* 41.1   PLATELETS 10*3/mm3 359 312 268 233  --  186 200       Anticoagulation history: Patient followed by [facility] with regimen of 10 mg Tues/Thurs/Sat and 15 mg on Mon/Wed/Fri/Sun (90 mg weekly total) for acute pulmonary embolism treatment (without acute cor pulmonale)     Hospital Anticoagulation:  Consulting provider: Jackson Canchola APRN  Start date: 11/221  Indication: Full anticoagulation. Factor V Leiden  Target INR: 2-3  Expected duration: TBD  Bridge Therapy: No                Date 11/2 11/3 11/4 11/5 11/6        INR 2.55 2.98 2.83 3.2  2.9        Warfarin dose 10 mg 10 mg 10mg hold  2 mg          Potential drug interactions:   Cefepime and Vancomycin: may enhance the anticoagulant effect of warfarin    Relevant nutrition status: regular diet    Assessment/Plan:  INR is therapeutic today but at higher end of goal, will give one time dose of 2mg today  Monitor INR and follow up daily      Pharmacy will continue to follow until discharge or discontinuation of warfarin.     Alem Acosta, [facility]

## 2021-11-06 NOTE — PLAN OF CARE
Goal Outcome Evaluation:              Outcome Summary: Pt. able to ambulate 12 feet, Min. assist x 2, with use of Rwx this date. Pt. requires Max. assist x 2 for bed mobility and Min. assist x 2 for sit <-> stand transfers.  Pt. performed sit <-> stand transfers x 3 reps for functional strength training.  Verbal/tactile cues given during ambulation for posture correction and Wx. guidance. Limited by pain and overall fatigue/weakness when upright.    Patient was wearing a face mask during this therapy encounter. Therapist used appropriate personal protective equipment including eye protection, mask, and gloves.  Mask used was standard procedure mask. Appropriate PPE was worn during the entire therapy session. Hand hygiene was completed before and after therapy session. Patient is not in enhanced droplet precautions.

## 2021-11-06 NOTE — PLAN OF CARE
Goal Outcome Evaluation:  Plan of Care Reviewed With: patient        Progress: improving  Outcome Summary: Pt has severe sleep apnea, which causes O2 sat ranges from 74%-96% and HR ranges from 30s-100s.Cardiology on board; will probably consult pulmonolgy for sleep apnea per cardiology's note. Pt had 10sec and 15 secs pauses the night before but not last night. VSS, cont to monitor.

## 2021-11-06 NOTE — PROGRESS NOTES
".[facility]  Clinical Pharmacy Department     Vancomycin Monitoring Note    Kameron Melendez is a 51 y.o. male who is on day #2 /#7 of pharmacy to dose vancomycin for SSTI.    Synopsis of Recommendations    Previous Vancomycin Dose:   1250 mg IV every  12  hours  Updated Vancomycin Dose: 1250 mg IV every  8  Hours  Updated Cultures and Sensitivities: 11/2 B/C x 2  NG x 4 days    Last Vanc Level Result: 6.7 mcg/mL collected on 11/6/21 at 0427 (estimated auc:366/ estimated trough:6.4)  Next Level Date and Time:  VT on  11/7/21 at 1300 prior to 4th dose of new regimen    Vitals/Labs  Ht: 188 cm (74.02\"); Wt: (!) 255 kg (562 lb 2.8 oz)  Temp Readings from Last 3 Encounters:   11/06/21 97.7 °F (36.5 °C) (Oral)   10/29/21 98 °F (36.7 °C) (Tympanic)   12/01/19 97.1 °F (36.2 °C) (Oral)   Estimated Creatinine Clearance: 339.4 mL/min (A) (by C-G formula based on SCr of 0.55 mg/dL (L)).   Creatinine   Date Value Ref Range Status   11/06/2021 0.55 (L) 0.76 - 1.27 mg/dL Final   11/05/2021 0.59 (L) 0.76 - 1.27 mg/dL Final   11/04/2021 0.49 (L) 0.76 - 1.27 mg/dL Final      Lab Results   Component Value Date    WBC 16.33 (H) 11/06/2021    WBC 16.40 (H) 11/05/2021    WBC 15.74 (H) 11/04/2021     Assessment/Plan  Vancomycin dose of 1250 mg every 8 hours provides a predicted  mg/L.hr or steady state trough of 13.5 mg/L  Plan to repeat levels as needed to further guide dosing   We will continue to monitor renal function     Thank you for involving pharmacy in this patient's care. Please contact pharmacy with any questions or concerns.       Catalina Acosta [facility]  Clinical Pharmacist  11/06/21         "

## 2021-11-06 NOTE — THERAPY TREATMENT NOTE
Patient Name: Kameron Melendez  : 1970    MRN: [id number]                              Today's Date: 2021       Admit Date: 2021    Visit Dx:     ICD-10-CM ICD-9-CM   1. Cellulitis of left lower extremity  L03.116 682.6   2. Lymphedema  I89.0 457.1     Patient Active Problem List   Diagnosis   • History of bilateral pulmonary embolism (CMS/HCC)   • Pulmonary hypertension (HCC)   • Lymphedema of both lower extremities   • Obesity, morbid, BMI 50 or higher (HCC)   • Dyslipidemia   • Hyperuricemia   • Hypogonadal obesity   • Knee arthropathy   • Morbid obesity with body mass index of 60.0-69.9 in adult (HCC)   • ARNOLDO (obstructive sleep apnea)   • Severe edema   • History of pulmonary embolism   • Other acute pulmonary embolism without acute cor pulmonale (HCC)   • Intractable back pain   • Heterozygous factor V Leiden mutation (HCC)   • Cellulitis of left lower extremity   • Hypokalemia   • CAROL (acute kidney injury) (HCC)   • Sepsis with cutaneous manifestations (HCC)   • Hyperglycemia   • Paroxysmal atrial fibrillation (HCC)     Past Medical History:   Diagnosis Date   • DVT (deep venous thrombosis) (HCC)     RLE   • Factor V Leiden (HCC)    • Hypertension    • Kidney stone    • Lymphedema    • Lymphedema of left lower extremity    • Obesity    • ARNOLDO (obstructive sleep apnea)    • Pulmonary embolism (HCC)     bilateral   • Pulmonary hypertension (HCC)    • Right ventricular enlargement      Past Surgical History:   Procedure Laterality Date   • CARDIAC CATHETERIZATION Bilateral 2017    Procedure: Pulmonary angiography- In[place] ;  Surgeon: Cedric Coulter MD;  Location: [facility] INVASIVE LOCATION;  Service:    • CARDIAC CATHETERIZATION N/A 2017    Procedure: Right Heart Cath;  Surgeon: Cedric Coulter MD;  Location: [facility] CATH INVASIVE LOCATION;  Service:    • THROMBECTOMY        General Information     Row Name 21 1126          Physical Therapy Time and Intention    Document Type therapy note  (daily note)  -MS     Mode of Treatment physical therapy; individual therapy  -MS     Row Name 11/06/21 1126          General Information    Patient Profile Reviewed yes  -MS     Existing Precautions/Restrictions fall   -MS     Barriers to Rehab physical barrier  -MS     Row Name 11/06/21 1126          Cognition    Orientation Status (Cognition) oriented x 3  -MS     Row Name 11/06/21 1126          Safety Issues, Functional Mobility    Comment, Safety Issues/Impairments (Mobility) Gait belt used for safety.  -MS           User Key  (r) = Recorded By, (t) = Taken By, (c) = Cosigned By    Initials Name Provider Type    Austin Andrew, PT Physical Therapist               Mobility     Row Name 11/06/21 1127          Bed Mobility    Supine-Sit [place] (Bed Mobility) maximum assist (25% patient effort); 2 person assist  -MS     Sit-Supine [place] (Bed Mobility) maximum assist (25% patient effort); 2 person assist  -MS     Row Name 11/06/21 1127          Transfers    Comment (Transfers) Pt. performed sit <-> stand transfers x 3 reps for functional strength training.  -MS     Row Name 11/06/21 1127          Sit-Stand Transfer    Sit-Stand [place] (Transfers) minimum assist (75% patient effort); 2 person assist  -MS     Assistive Device (Sit-Stand Transfers) walker, front-wheeled  -MS     Row Name 11/06/21 1127          Gait/Stairs (Locomotion)    [place] Level (Gait) minimum assist (75% patient effort); 2 person assist  -MS     Assistive Device (Gait) walker, front-wheeled  -MS     Distance in Feet (Gait) 12 feet  -MS     Deviations/Abnormal Patterns (Gait) reg decreased; base of support, wide  -MS     Bilateral Gait Deviations forward flexed posture  -MS     Comment (Gait/Stairs) Verbal/tactile cues for posture correction.  x 1 seated rest break  -MS           User Key  (r) = Recorded By, (t) = Taken By, (c) = Cosigned By    Initials Name Provider Type    Austin Andrew, PT Physical  Therapist               Obj/Interventions    No documentation.                Goals/Plan    No documentation.                Clinical Impression     Row Name 11/06/21 1128          Pain Scale: Numbers Pre/Post-Treatment    Pretreatment Pain Rating 4/10  -MS     Posttreatment Pain Rating 4/10  -MS     Pain Location - Side Left  -MS     Pain Location calf  -MS     Pain Intervention(s) Medication (See MAR); Elevated; Nursing Notified; Repositioned  -MS     Row Name 11/06/21 1128          Positioning and Restraints    Pre-Treatment Position in bed  -MS     Post Treatment Position bed  -MS     In Bed notified nsg; supine; call light within reach; encouraged to call for assist  All lines intact.  -MS           User Key  (r) = Recorded By, (t) = Taken By, (c) = Cosigned By    Initials Name Provider Type    Austin Andrew, PT Physical Therapist               Outcome Measures     Row Name 11/06/21 1129          How much help from another person do you currently need...    Turning from your back to your side while in flat bed without using bedrails? 2  -MS     Moving from lying on back to sitting on the side of a flat bed without bedrails? 2  -MS     Moving to and from a bed to a chair (including a wheelchair)? 2  -MS     Standing up from a chair using your arms (e.g., wheelchair, bedside chair)? 2  -MS     Climbing 3-5 steps with a railing? 1  -MS     To walk in hospital room? 2  -MS     AM-PAC 6 Clicks Score (PT) 11  -MS     Row Name 11/06/21 1129          Functional Assessment    Outcome Measure Options AM-PAC 6 Clicks Basic Mobility (PT)  -MS           User Key  (r) = Recorded By, (t) = Taken By, (c) = Cosigned By    Initials Name Provider Type    Austin Andrew, PT Physical Therapist                             Physical Therapy Education                 Title: PT OT SLP Therapies (In Progress)     Topic: Physical Therapy (Done)     Point: Mobility training (Done)     Learning Progress Summary            Patient Acceptance, E,D, VU,NR by MS at 11/6/2021 1129    Acceptance, E, VU by DJ at 11/5/2021 1547    Acceptance, E, VU,NR by DJ at 11/4/2021 1006                   Point: Home exercise program (Done)     Learning Progress Summary           Patient Acceptance, E,D, VU,NR by MS at 11/6/2021 1129                   Point: Body mechanics (Done)     Learning Progress Summary           Patient Acceptance, E,D, VU,NR by MS at 11/6/2021 1129    Acceptance, E, VU by DJ at 11/5/2021 1547    Acceptance, E, VU,NR by DJ at 11/4/2021 1006                   Point: Precautions (Done)     Learning Progress Summary           Patient Acceptance, E,D, VU,NR by MS at 11/6/2021 1129    Acceptance, E, VU by DJ at 11/5/2021 1547    Acceptance, E, VU,NR by DJ at 11/4/2021 1006                               User Key     Initials Effective Dates Name Provider Type Discipline    MS 06/16/21 -  Austin Austin, PT Physical Therapist PT     10/25/19 -  Praveena Burdick, LUCIEN Physical Therapist PT              PT Recommendation and Plan     Outcome Summary: Pt. able to ambulate 12 feet, Min. assist x 2, with use of Rwx this date. Pt. requires Max. assist x 2 for bed mobility and Min. assist x 2 for sit <-> stand transfers.  Pt. performed sit <-> stand transfers x 3 reps for functional strength training.  Verbal/tactile cues given during ambulation for posture correction and Wx. guidance. Limited by pain and overall fatigue/weakness when upright.     Time Calculation:    PT Charges     Row Name 11/06/21 1131             Time Calculation    Start Time 0845  -MS      Stop Time 0910  -MS      Time Calculation (min) 25 min  -MS      PT Received On 11/06/21  -MS      PT - Next Appointment 11/08/21  -MS              Time Calculation- PT    Total Timed Code Minutes- PT 24 minute(s)  -MS            User Key  (r) = Recorded By, (t) = Taken By, (c) = Cosigned By    Initials Name Provider Type    MS Austin Austin, PT Physical Therapist               Therapy Charges for Today     Code Description Service Date Service Provider Modifiers Qty    [id number]  PT THER PROC EA 15 MIN 11/6/2021 Austin Austin, PT GP 2    65204451305 HC PT THER SUPP EA 15 MIN 11/6/2021 Austin Austin, PT GP 2          PT G-Codes  Outcome Measure Options: AM-PAC 6 Clicks Basic Mobility (PT)  AM-PAC 6 Clicks Score (PT): 11    Austin Austin, PT  11/6/2021

## 2021-11-06 NOTE — PROGRESS NOTES
Name: Kameron Melendez ADMIT: 2021   : 1970  PCP: Hansel Patel DO    MRN: [id number] LOS: 4 days   AGE/SEX: 51 y.o. male  ROOM: E4/     Subjective   Subjective     Patient is lying in bed in no major distress.  No new events overnight.  Denies nausea, vomiting abdominal pain, chest pain.    Review of Systems   As above  Objective   Objective   Vital Signs  Temp:  [97.7 °F (36.5 °C)-98 °F (36.7 °C)] 98 °F (36.7 °C)  Heart Rate:  [] 123  Resp:  [18-22] 22  BP: (137-144)/(86-95) 144/95  SpO2:  [90 %-96 %] 92 %  on  Flow (L/min):  [3-4] 4;   Device (Oxygen Therapy): room air  Body mass index is 72.15 kg/m².  Physical Exam  Constitutional:       General: He is not in acute distress.     Appearance: He is obese. He is ill-appearing. He is not toxic-appearing.   HENT:      Head: Normocephalic and atraumatic.   Eyes:      Conjunctiva/sclera: Conjunctivae normal.   Cardiovascular:      Rate and Rhythm: Regular rate, irregular rhythm.      Heart sounds: Normal heart sounds.   Pulmonary:      Effort: Pulmonary effort is normal.      Comments: Diminished on expiration; shallow inspiration  Abdominal:      General: Bowel sounds are normal.      Palpations: Abdomen is soft.   Musculoskeletal:         General: Tenderness (LLE) present.      Cervical back: Normal range of motion and neck supple.      Right lower leg: Edema present.      Left lower leg: Edema present.  Possible area of fluctuance on the left posterior calf  Skin:     General: Skin is warm and dry.      Findings: Erythema (LLE) present.   Neurological:      Mental Status: He is alert and oriented to person, place, and time.      Cranial Nerves: No cranial nerve deficit.      Motor: Weakness (generalized) present.   Psychiatric:         Behavior: Behavior normal.         Thought Content: Thought content normal. Results Review     I reviewed the patient's new clinical results.  Results from last 7 days   Lab Units 21  1415  11/05/21 0531 11/04/21  0614 11/03/21  1001   WBC 10*3/mm3 16.33* 16.40* 15.74* 14.90*   HEMOGLOBIN g/dL 12.0* 11.8* 12.0* 12.1*   PLATELETS 10*3/mm3 359 312 268 233     Results from last 7 days   Lab Units 11/06/21 0427 11/05/21 0531 11/04/21  1601 11/04/21  0614 11/03/21  1001 11/03/21  1001   SODIUM mmol/L 134* 131*  --  131*  --  131*   POTASSIUM mmol/L 4.1 4.4 4.1 4.0   < > 3.7   CHLORIDE mmol/L 97* 99  --  98  --  98   CO2 mmol/L 26.8 26.1  --  23.6  --  22.8   BUN mg/dL 9 9  --  12  --  13   CREATININE mg/dL 0.55* 0.59*  --  0.49*  --  0.63*   GLUCOSE mg/dL 153* 148*  --  133*  --  150*    < > = values in this interval not displayed.   Estimated Creatinine Clearance: 339.4 mL/min (A) (by C-G formula based on SCr of 0.55 mg/dL (L)).  Results from last 7 days   Lab Units 11/02/21  0022   ALBUMIN g/dL 3.10*   BILIRUBIN mg/dL 0.7   ALK PHOS U/L 169*   AST (SGOT) U/L 19   ALT (SGPT) U/L 18     Results from last 7 days   Lab Units 11/06/21 0427 11/05/21 0531 11/04/21  1601 11/04/21  0614 11/03/21  1001 11/02/21  0616 11/02/21  0022   CALCIUM mg/dL 8.7 8.7  --  8.7 8.4*   < > 9.0   ALBUMIN g/dL  --   --   --   --   --   --  3.10*   MAGNESIUM mg/dL 1.8 2.0 2.0 2.0  --   --   --    PHOSPHORUS mg/dL 2.8 2.3*  --  2.5  --   --   --     < > = values in this interval not displayed.     Results from last 7 days   Lab Units 11/02/21  0354 11/02/21  0022   LACTATE mmol/L 1.3 2.2*     COVID19   Date Value Ref Range Status   11/02/2021 Not Detected Not Detected - Ref. Range Final     Glucose   Date/Time Value Ref Range Status   11/06/2021 1205 133 (H) 70 - 130 mg/dL Final     Comment:     Meter: [id number] : [id number] Gideon Jarvis NA   11/06/2021 0617 145 (H) 70 - 130 mg/dL Final     Comment:     Meter: [id number] : [id number] Mayur Jenkins NA   11/05/2021 2030 158 (H) 70 - 130 mg/dL Final     Comment:     Meter: [id number] : [id number] Mayur Alice NA   11/05/2021 1610 169 (H) 70 - 130 mg/dL Final     Comment:      Meter: [id number] : [id number] Milan Kurtz NA   11/05/2021 1129 151 (H) 70 - 130 mg/dL Final     Comment:     Meter: [id number] : [id number] Milan Kurtz NA   11/05/2021 0602 146 (H) 70 - 130 mg/dL Final     Comment:     Meter: [id number] : [id number] Mayur Jenkins NA   11/04/2021 2054 155 (H) 70 - 130 mg/dL Final     Comment:     Meter: [id number] : [id number] Mayur Jenkins NA       US Nonvascular Extremity Limited  US NONVASCULAR EXTREMITY LIMITED-     Clinical: Cellulitis left leg, Evaluate for catheter abscess     Ultrasound performed along the left calf at the area of interest.     FINDINGS: There is diffuse skin thickening. There is edema demonstrated  within the subcutaneous fat. There is a trace amount of fluid outlines  several of the lobules. There is no discrete focal fluid collection  typical for abscess. Only the soft tissues 2.5 cm below the skin surface  could be evaluated. Due to the patient's large body habitus the deeper  abscess not excluded by ultrasound. If indicated computed tomography as  follow-up.     CONCLUSION: There is skin thickening with diffuse subcutaneous fat  edema. This is compatible with cellulitis. The soft tissues deeper than  2.5 cm cannot be evaluated by ultrasound. Computed tomography if further  evaluation is warranted clinically.     This report was finalized on 11/4/2021 4:05 PM by Dr. Arnold Newton M.D.     Adult Transthoracic Echo Complete W/ Cont if Necessary Per Protocol  · The left ventricular cavity is mild to moderately dilated.  · Left ventricular ejection fraction appears to be 61 - 65%. Left   ventricular systolic function is normal.  · The right ventricular cavity is mildly dilated. Normal right ventricular   systolic function noted.  · The left atrial cavity is mild to moderately dilated.  · There is no evidence of pericardial effusion       Scheduled Medications  cefepime, 2 g, Intravenous, Q8H  furosemide, 80 mg, Oral,  Daily  guaiFENesin, 600 mg, Oral, Q12H  insulin lispro, 0-7 Units, Subcutaneous, TID AC  sodium chloride, 10 mL, Intravenous, Q12H  vancomycin, 1,250 mg, Intravenous, Q8H  warfarin (COUMADIN) (dosing per levels), , Does not apply, Daily  warfarin, 2 mg, Oral, Once    Infusions  Pharmacy to dose vancomycin,   Pharmacy to dose warfarin,     Diet  Diet Regular; Consistent Carbohydrate       Assessment/Plan     Active Hospital Problems    Diagnosis  POA   • **Cellulitis of left lower extremity [L03.116]  Yes   • Hypokalemia [E87.6]  Yes   • CAROL (acute kidney injury) ([facility]) [N17.9]  Yes   • Sepsis with cutaneous manifestations ([facility]) [A41.9]  Yes   • Hyperglycemia [R73.9]  Yes   • Paroxysmal atrial fibrillation ([facility]) [I48.0]  Unknown   • Heterozygous factor V Leiden mutation ([facility]) [D68.51]  Yes   • Dyslipidemia [E78.5]  Yes   • Lymphedema of both lower extremities [I89.0]  Yes   • Obesity, morbid, BMI 50 or higher ([facility]) [E66.01]  Yes   • History of bilateral pulmonary embolism (CMS/[facility]) [I26.99]  Yes   • ARNOLDO (obstructive sleep apnea) [G47.33]  Yes      Resolved Hospital Problems   No resolved problems to display.       51 y.o. male admitted with Cellulitis of left lower extremity.    Cellulitis of left lower extremity / Lymphedema of both lower extremities    Continue with IV vancomycin and cefepime and [place] [address] today. Left leg ultrasound was done which is not adequate steady.   Infectious disease consult has been obtained and await recommendations.     Atrial fibrillation, new onset  Likely due to uncontrolled ARNOLDO in setting of morbid obesity    Continue with metoprolol and warfarin and monitor INR closely.  Patient also has underlying history of factor 5 laden deficiency.     Prediabetes  Hemoglobin A1c 6.4 and will continue with corrective dose insulin.  Counseled patient to lose weight as well.     Obesity, morbid, BMI 50 or higher  Complicating all problems     Obstructive sleep apnea  Requiring 2L NC at  night     Resolved/stable issues: Hypokalemia, CAROL, dyslipidemia, sepsis; Heterozygous factor V Leiden mutation/History of bilateral pulmonary embolism    · Warfarin (home med) for DVT prophylaxis.  · CPR    Larry Wise MD  [facility]  11/06/21  16:41 EDT    Patient was wearing facemask when I entered the room and throughout our encounter.  I wore protective equipment throughout this patient encounter including a face mask, gloves and protective eyewear.  Hand hygiene was performed before donning protective equipment and after removal when leaving the room.

## 2021-11-07 LAB
ANION GAP SERPL CALCULATED.3IONS-SCNC: 9 MMOL/L (ref 5–15)
BACTERIA SPEC AEROBE CULT: NORMAL
BACTERIA SPEC AEROBE CULT: NORMAL
BUN SERPL-MCNC: 7 MG/DL (ref 6–20)
BUN/CREAT SERPL: 15.6 (ref 7–25)
CALCIUM SPEC-SCNC: 8.5 MG/DL (ref 8.6–10.5)
CHLORIDE SERPL-SCNC: 97 MMOL/L (ref 98–107)
CO2 SERPL-SCNC: 28 MMOL/L (ref 22–29)
CREAT SERPL-MCNC: 0.45 MG/DL (ref 0.76–1.27)
DEPRECATED RDW RBC AUTO: 43.1 FL (ref 37–54)
EOSINOPHIL # BLD MANUAL: 0.14 10*3/MM3 (ref 0–0.4)
EOSINOPHIL NFR BLD MANUAL: 1 % (ref 0.3–6.2)
ERYTHROCYTE [DISTWIDTH] IN BLOOD BY AUTOMATED COUNT: 13.8 % (ref 12.3–15.4)
GFR SERPL CREATININE-BSD FRML MDRD: >150 ML/MIN/1.73
GLUCOSE BLDC GLUCOMTR-MCNC: 150 MG/DL (ref 70–130)
GLUCOSE BLDC GLUCOMTR-MCNC: 160 MG/DL (ref 70–130)
GLUCOSE BLDC GLUCOMTR-MCNC: 163 MG/DL (ref 70–130)
GLUCOSE BLDC GLUCOMTR-MCNC: 166 MG/DL (ref 70–130)
GLUCOSE SERPL-MCNC: 148 MG/DL (ref 65–99)
HCT VFR BLD AUTO: 34.6 % (ref 37.5–51)
HGB BLD-MCNC: 11.8 G/DL (ref 13–17.7)
INR PPP: 2.2 (ref 0.9–1.1)
LYMPHOCYTES # BLD MANUAL: 1.74 10*3/MM3 (ref 0.7–3.1)
LYMPHOCYTES NFR BLD MANUAL: 12.4 % (ref 19.6–45.3)
LYMPHOCYTES NFR BLD MANUAL: 7.2 % (ref 5–12)
MAGNESIUM SERPL-MCNC: 1.7 MG/DL (ref 1.6–2.6)
MCH RBC QN AUTO: 29.4 PG (ref 26.6–33)
MCHC RBC AUTO-ENTMCNC: 34.1 G/DL (ref 31.5–35.7)
MCV RBC AUTO: 86.3 FL (ref 79–97)
METAMYELOCYTES NFR BLD MANUAL: 4.1 % (ref 0–0)
MONOCYTES # BLD AUTO: 1.01 10*3/MM3 (ref 0.1–0.9)
MYELOCYTES NFR BLD MANUAL: 6.2 % (ref 0–0)
NEUTROPHILS # BLD AUTO: 9.71 10*3/MM3 (ref 1.7–7)
NEUTROPHILS NFR BLD MANUAL: 69.1 % (ref 42.7–76)
PHOSPHATE SERPL-MCNC: 2.7 MG/DL (ref 2.5–4.5)
PLAT MORPH BLD: NORMAL
PLATELET # BLD AUTO: 395 10*3/MM3 (ref 140–450)
PMV BLD AUTO: 9.4 FL (ref 6–12)
POTASSIUM SERPL-SCNC: 3.9 MMOL/L (ref 3.5–5.2)
PROTHROMBIN TIME: 24.2 SECONDS (ref 11.7–14.2)
RBC # BLD AUTO: 4.01 10*6/MM3 (ref 4.14–5.8)
RBC MORPH BLD: NORMAL
SODIUM SERPL-SCNC: 134 MMOL/L (ref 136–145)
WBC # BLD AUTO: 14.05 10*3/MM3 (ref 3.4–10.8)
WBC MORPH BLD: NORMAL

## 2021-11-07 PROCEDURE — 85610 PROTHROMBIN TIME: CPT | Performed by: NURSE PRACTITIONER

## 2021-11-07 PROCEDURE — 84100 ASSAY OF PHOSPHORUS: CPT | Performed by: STUDENT IN AN ORGANIZED HEALTH CARE EDUCATION/TRAINING PROGRAM

## 2021-11-07 PROCEDURE — 80048 BASIC METABOLIC PNL TOTAL CA: CPT | Performed by: STUDENT IN AN ORGANIZED HEALTH CARE EDUCATION/TRAINING PROGRAM

## 2021-11-07 PROCEDURE — 82962 GLUCOSE BLOOD TEST: CPT

## 2021-11-07 PROCEDURE — 99231 SBSQ HOSP IP/OBS SF/LOW 25: CPT | Performed by: INTERNAL MEDICINE

## 2021-11-07 PROCEDURE — 83735 ASSAY OF MAGNESIUM: CPT | Performed by: STUDENT IN AN ORGANIZED HEALTH CARE EDUCATION/TRAINING PROGRAM

## 2021-11-07 PROCEDURE — 36415 COLL VENOUS BLD VENIPUNCTURE: CPT | Performed by: STUDENT IN AN ORGANIZED HEALTH CARE EDUCATION/TRAINING PROGRAM

## 2021-11-07 PROCEDURE — 63710000001 INSULIN LISPRO (HUMAN) PER 5 UNITS: Performed by: NURSE PRACTITIONER

## 2021-11-07 PROCEDURE — 85025 COMPLETE CBC W/AUTO DIFF WBC: CPT | Performed by: STUDENT IN AN ORGANIZED HEALTH CARE EDUCATION/TRAINING PROGRAM

## 2021-11-07 PROCEDURE — 99222 1ST HOSP IP/OBS MODERATE 55: CPT | Performed by: STUDENT IN AN ORGANIZED HEALTH CARE EDUCATION/TRAINING PROGRAM

## 2021-11-07 PROCEDURE — 85007 BL SMEAR W/DIFF WBC COUNT: CPT | Performed by: STUDENT IN AN ORGANIZED HEALTH CARE EDUCATION/TRAINING PROGRAM

## 2021-11-07 PROCEDURE — 25010000002 CEFEPIME PER 500 MG: Performed by: NURSE PRACTITIONER

## 2021-11-07 RX ORDER — WARFARIN SODIUM 5 MG/1
5 TABLET ORAL
Status: COMPLETED | OUTPATIENT
Start: 2021-11-07 | End: 2021-11-07

## 2021-11-07 RX ORDER — CEPHALEXIN 500 MG/1
1000 CAPSULE ORAL EVERY 6 HOURS SCHEDULED
Status: DISCONTINUED | OUTPATIENT
Start: 2021-11-07 | End: 2021-11-08 | Stop reason: HOSPADM

## 2021-11-07 RX ADMIN — INSULIN LISPRO 2 UNITS: 100 INJECTION, SOLUTION INTRAVENOUS; SUBCUTANEOUS at 12:04

## 2021-11-07 RX ADMIN — WARFARIN SODIUM 5 MG: 5 TABLET ORAL at 17:32

## 2021-11-07 RX ADMIN — FUROSEMIDE 80 MG: 80 TABLET ORAL at 08:19

## 2021-11-07 RX ADMIN — BENZONATATE 100 MG: 100 CAPSULE ORAL at 05:48

## 2021-11-07 RX ADMIN — BENZONATATE 100 MG: 100 CAPSULE ORAL at 17:34

## 2021-11-07 RX ADMIN — CEFEPIME HYDROCHLORIDE 2 G: 2 INJECTION, POWDER, FOR SOLUTION INTRAVENOUS at 01:54

## 2021-11-07 RX ADMIN — CEPHALEXIN 1000 MG: 500 CAPSULE ORAL at 12:04

## 2021-11-07 RX ADMIN — SODIUM CHLORIDE, PRESERVATIVE FREE 10 ML: 5 INJECTION INTRAVENOUS at 21:42

## 2021-11-07 RX ADMIN — HYDROCODONE BITARTRATE AND ACETAMINOPHEN 1 TABLET: 7.5; 325 TABLET ORAL at 18:13

## 2021-11-07 RX ADMIN — CEPHALEXIN 1000 MG: 500 CAPSULE ORAL at 17:33

## 2021-11-07 RX ADMIN — GUAIFENESIN 600 MG: 600 TABLET, EXTENDED RELEASE ORAL at 08:19

## 2021-11-07 RX ADMIN — GUAIFENESIN 600 MG: 600 TABLET, EXTENDED RELEASE ORAL at 21:42

## 2021-11-07 RX ADMIN — CEPHALEXIN 1000 MG: 500 CAPSULE ORAL at 23:15

## 2021-11-07 RX ADMIN — INSULIN LISPRO 2 UNITS: 100 INJECTION, SOLUTION INTRAVENOUS; SUBCUTANEOUS at 17:32

## 2021-11-07 NOTE — PROGRESS NOTES
Name: Kameron Melendez ADMIT: 2021   : 1970  PCP: Hansel Patel DO    MRN: [id number] LOS: 5 days   AGE/SEX: 51 y.o. male  ROOM: E4/     Subjective   Subjective     Patient is lying in bed in no major distress.  No new events overnight.  Denies nausea, vomiting abdominal pain, chest pain, shortness of breath.    Review of Systems   As above  Objective   Objective   Vital Signs  Temp:  [97.7 °F (36.5 °C)-98.3 °F (36.8 °C)] 98.3 °F (36.8 °C)  Heart Rate:  [] 100  Resp:  [18] 18  BP: (143-170)/(73-97) 170/73  SpO2:  [89 %-93 %] 89 %  on  Flow (L/min):  [4] 4;   Device (Oxygen Therapy): room air  Body mass index is 72.15 kg/m².  Physical Exam  Constitutional:       General: He is not in acute distress.     Appearance: He is obese. He is ill-appearing. He is not toxic-appearing.   HENT:      Head: Normocephalic and atraumatic.   Eyes:      Conjunctiva/sclera: Conjunctivae normal.   Cardiovascular:      Rate and Rhythm: Regular rate, irregular rhythm.      Heart sounds: Normal heart sounds.   Pulmonary:      Effort: Pulmonary effort is normal.      Comments: Diminished on expiration; shallow inspiration  Abdominal:      General: Bowel sounds are normal.      Palpations: Abdomen is soft.   Musculoskeletal:         General: Tenderness (LLE) present.      Cervical back: Normal range of motion and neck supple.      Right lower leg: Edema present.      Left lower leg: Edema present.  Possible area of fluctuance on the left posterior calf  Skin:     General: Skin is warm and dry.      Findings: Erythema (LLE) present.   Neurological:      Mental Status: He is alert and oriented to person, place, and time.      Cranial Nerves: No cranial nerve deficit.      Motor: Weakness (generalized) present.   Psychiatric:         Behavior: Behavior normal.         Thought Content: Thought content normal. Results Review     I reviewed the patient's new clinical results.  Results from last 7 days   Lab Units  11/07/21 0618 11/06/21 0427 11/05/21 0531 11/04/21 0614   WBC 10*3/mm3 14.05* 16.33* 16.40* 15.74*   HEMOGLOBIN g/dL 11.8* 12.0* 11.8* 12.0*   PLATELETS 10*3/mm3 395 359 312 268     Results from last 7 days   Lab Units 11/07/21 0618 11/06/21 0427 11/05/21 0531 11/04/21  1601 11/04/21 0614 11/04/21 0614   SODIUM mmol/L 134* 134* 131*  --   --  131*   POTASSIUM mmol/L 3.9 4.1 4.4 4.1   < > 4.0   CHLORIDE mmol/L 97* 97* 99  --   --  98   CO2 mmol/L 28.0 26.8 26.1  --   --  23.6   BUN mg/dL 7 9 9  --   --  12   CREATININE mg/dL 0.45* 0.55* 0.59*  --   --  0.49*   GLUCOSE mg/dL 148* 153* 148*  --   --  133*    < > = values in this interval not displayed.   Estimated Creatinine Clearance: 414.8 mL/min (A) (by C-G formula based on SCr of 0.45 mg/dL (L)).  Results from last 7 days   Lab Units 11/02/21  0022   ALBUMIN g/dL 3.10*   BILIRUBIN mg/dL 0.7   ALK PHOS U/L 169*   AST (SGOT) U/L 19   ALT (SGPT) U/L 18     Results from last 7 days   Lab Units 11/07/21 0618 11/06/21 0427 11/05/21 0531 11/04/21  1601 11/04/21  0614 11/04/21  0614 11/02/21  0616 11/02/21  0022   CALCIUM mg/dL 8.5* 8.7 8.7  --   --  8.7   < > 9.0   ALBUMIN g/dL  --   --   --   --   --   --   --  3.10*   MAGNESIUM mg/dL 1.7 1.8 2.0 2.0   < > 2.0  --   --    PHOSPHORUS mg/dL 2.7 2.8 2.3*  --   --  2.5  --   --     < > = values in this interval not displayed.     Results from last 7 days   Lab Units 11/02/21  0354 11/02/21  0022   LACTATE mmol/L 1.3 2.2*     COVID19   Date Value Ref Range Status   11/02/2021 Not Detected Not Detected - Ref. Range Final     Glucose   Date/Time Value Ref Range Status   11/07/2021 1059 163 (H) 70 - 130 mg/dL Final     Comment:     Meter: [id number] : [id number] Jono JACOB   11/07/2021 0600 150 (H) 70 - 130 mg/dL Final     Comment:     Meter: [id number] : [id number] Mayur JACOB   11/06/2021 2045 178 (H) 70 - 130 mg/dL Final     Comment:     Meter: [id number] : [id number] Mayur JACOB    11/06/2021 1656 176 (H) 70 - 130 mg/dL Final     Comment:     Meter: [id number] : [id number] Gideon JACOB   11/06/2021 1205 133 (H) 70 - 130 mg/dL Final     Comment:     Meter: [id number] : [id number] Gideon JACOB   11/06/2021 0617 145 (H) 70 - 130 mg/dL Final     Comment:     Meter: [id number] : [id number] Mayur Jenkins NA   11/05/2021 2030 158 (H) 70 - 130 mg/dL Final     Comment:     Meter: [id number] : [id number] Mayur Jenkins NA       US Nonvascular Extremity Limited  US NONVASCULAR EXTREMITY LIMITED-     Clinical: Cellulitis left leg, Evaluate for catheter abscess     Ultrasound performed along the left calf at the area of interest.     FINDINGS: There is diffuse skin thickening. There is edema demonstrated  within the subcutaneous fat. There is a trace amount of fluid outlines  several of the lobules. There is no discrete focal fluid collection  typical for abscess. Only the soft tissues 2.5 cm below the skin surface  could be evaluated. Due to the patient's large body habitus the deeper  abscess not excluded by ultrasound. If indicated computed tomography as  follow-up.     CONCLUSION: There is skin thickening with diffuse subcutaneous fat  edema. This is compatible with cellulitis. The soft tissues deeper than  2.5 cm cannot be evaluated by ultrasound. Computed tomography if further  evaluation is warranted clinically.     This report was finalized on 11/4/2021 4:05 PM by Dr. Arnold Newton M.D.     Adult Transthoracic Echo Complete W/ Cont if Necessary Per Protocol  · The left ventricular cavity is mild to moderately dilated.  · Left ventricular ejection fraction appears to be 61 - 65%. Left   ventricular systolic function is normal.  · The right ventricular cavity is mildly dilated. Normal right ventricular   systolic function noted.  · The left atrial cavity is mild to moderately dilated.  · There is no evidence of pericardial effusion       Scheduled Medications  cephalexin, 1,000 mg,  Oral, Q6H  furosemide, 80 mg, Oral, Daily  guaiFENesin, 600 mg, Oral, Q12H  insulin lispro, 0-7 Units, Subcutaneous, TID AC  sodium chloride, 10 mL, Intravenous, Q12H  warfarin (COUMADIN) (dosing per levels), , Does not apply, Daily  warfarin, 5 mg, Oral, Once    Infusions  Pharmacy to dose warfarin,     Diet  Diet Regular; Consistent Carbohydrate       Assessment/Plan     Active Hospital Problems    Diagnosis  POA   • **Cellulitis of left lower extremity [L03.116]  Yes   • Hypokalemia [E87.6]  Yes   • CAROL (acute kidney injury) ([facility]) [N17.9]  Yes   • Sepsis with cutaneous manifestations ([facility]) [A41.9]  Yes   • Hyperglycemia [R73.9]  Yes   • Paroxysmal atrial fibrillation ([facility]) [I48.0]  Unknown   • Heterozygous factor V Leiden mutation ([facility]) [D68.51]  Yes   • Dyslipidemia [E78.5]  Yes   • Lymphedema of both lower extremities [I89.0]  Yes   • Obesity, morbid, BMI 50 or higher ([facility]) [E66.01]  Yes   • History of bilateral pulmonary embolism (CMS/[facility]) [I26.99]  Yes   • ARNOLDO (obstructive sleep apnea) [G47.33]  Yes      Resolved Hospital Problems   No resolved problems to display.       51 y.o. male admitted with Cellulitis of left lower extremity.    Cellulitis of left lower extremity / Lymphedema of both lower extremities  Off IV vancomycin and cefepime and patient has been initiated on Keflex.  Left leg ultrasound was inadequate study however patient is improving clinically.  WBC is better at 14,000.  He is afebrile and hemodynamically stable.  Anticipate discharge home tomorrow.     Atrial fibrillation, new onset  Likely due to uncontrolled ARNOLDO in setting of morbid obesity    Continue with metoprolol and warfarin and monitor INR closely.  Patient also has underlying history of factor 5 laden deficiency.     Prediabetes  Hemoglobin A1c 6.4 and will continue with corrective dose insulin.  Counseled patient to lose weight as well.     Obesity, morbid, BMI 50 or higher  Complicating all problems     Obstructive sleep  apnea  Requiring 2L NC at night     Resolved/stable issues: Hypokalemia, CAROL, dyslipidemia, sepsis; Heterozygous factor V Leiden mutation/History of bilateral pulmonary embolism    · Warfarin (home med) for DVT prophylaxis.  · CPR    Larry Wise MD  [facility]  11/07/21  14:39 EST    Patient was wearing facemask when I entered the room and throughout our encounter.  I wore protective equipment throughout this patient encounter including a face mask, gloves and protective eyewear.  Hand hygiene was performed before donning protective equipment and after removal when leaving the room.

## 2021-11-07 NOTE — PLAN OF CARE
Goal Outcome Evaluation:  Plan of Care Reviewed With: patient        Progress: improving  Outcome Summary: HR and O2 sat more stable than the night before, no pauses occur. Stable on 2-4L for sleep apnea throughout the night. IV abx givne. VSS, cont to monitor.

## 2021-11-07 NOTE — PROGRESS NOTES
Patient will need in lab sleep study,  I have placed an order for such  He should follow up with [facility] sleep MD post discharge.

## 2021-11-07 NOTE — CONSULTS
Referring Provider: Randall Jordan MD  Reason for Consultation: Cellulitis  Chief Complaint   Patient presents with   • Cellulitis       Subjective   History of present illness: Patient is a 51-year-old male with morbid obesity, prediabetes and bilateral lower extremity lymphedema who presents with worsening redness and swelling of the left lower extremity and ID is consulted for cellulitis.    Patient was admitted on 11/2 and has been on broad-spectrum antibiotics with vancomycin and Zosyn for lower extremity cellulitis.  Ultrasound was obtained due to concern for possible abscess however did not identify any underlying abscess.  Patient had leukocytosis on admission which is fluctuated.      He reports being placed on oral Keflex outpatient with continued worsening and presented to the hospital.  Since being here in the hospital he reports marked improvement in the left lower extremity swelling and redness.  Reports no fevers or chills.  States he is feeling much better and markedly less tenderness of the left lower extremity.  Denies any nausea, vomiting, diarrhea.  Denies any shortness of breath.  Denies any other rash.    Past Medical History:   Diagnosis Date   • DVT (deep venous thrombosis) (HCC)     RLE   • Factor V Leiden (HCC)    • Hypertension    • Kidney stone    • Lymphedema    • Lymphedema of left lower extremity    • Obesity    • ARNOLDO (obstructive sleep apnea)    • Pulmonary embolism (HCC)     bilateral   • Pulmonary hypertension (HCC)    • Right ventricular enlargement        Past Surgical History:   Procedure Laterality Date   • CARDIAC CATHETERIZATION Bilateral 7/18/2017    Procedure: Pulmonary angiography- [facility] ;  Surgeon: Cedric Coulter MD;  Location:  INVASIVE LOCATION;  Service:    • CARDIAC CATHETERIZATION N/A 7/18/2017    Procedure: Right Heart Cath;  Surgeon: Cedric Coulter MD;  Location:  INVASIVE LOCATION;  Service:    • THROMBECTOMY         family history includes  "Bradycardia in his mother; Heart disease in his mother; Heart failure in his mother; No Known Problems in his father, maternal grandfather, maternal grandmother, paternal grandfather, and paternal grandmother.     reports that he has been smoking cigars and pipe. He started smoking about 15 years ago. He has never used smokeless tobacco. He reports that he does not drink alcohol and does not use drugs.     No Known Allergies    Medication:  Antibiotics:  Anti-Infectives (From admission, onward)    Ordered     Dose/Rate Route Frequency Start Stop    11/06/21 1000  vancomycin 1250 mg/250 mL 0.9% NS IVPB ([facility])        Ordering Provider: Kay Jung MD   \"Followed by\" Linked Group Details    1,250 mg  over 75 Minutes Intravenous Every 8 Hours 11/06/21 1330 11/11/21 1329    11/04/21 1420  vancomycin 3000 mg/1000 mL 0.9% NS IVPB        Ordering Provider: Kay Jung MD   \"Followed by\" Linked Group Details    20 mg/kg × 255 kg  over 240 Minutes Intravenous Once 11/04/21 1515 11/04/21 1902    11/04/21 1354  Pharmacy to dose vancomycin        Ordering Provider: Kay Jung MD     Does not apply Continuous PRN 11/04/21 1353 11/11/21 1352    11/02/21 0214  cefepime 2 gm IVPB in 100 ml NS ([facility])        Ordering Provider: Tonja Jones APRN    2 g  over 4 Hours Intravenous Every 8 Hours 11/02/21 1000 11/07/21 0554    11/02/21 0119  cefepime (MAXIPIME) 1 g/100 mL 0.9% NS IVPB ([facility])        Ordering Provider: Cedric Garcia MD    1 g  200 mL/hr over 30 Minutes Intravenous Once 11/02/21 0121 11/02/21 0351    11/01/21 2351  vancomycin 2500 mg/500 mL 0.9% NS IVPB ([facility])        Ordering Provider: Cedric Garcia MD    11 mg/kg × 234 kg (Order-Specific)  over 2.5 Hours Intravenous Once 11/01/21 2353 11/02/21 0311          Review of Systems  Pertinent items are noted in HPI, all other systems reviewed and negative    Objective     Physical Exam:   Vital Signs   Temp:  [97.7 °F (36.5 °C)-98 °F " (36.7 °C)] 97.8 °F (36.6 °C)  Heart Rate:  [] 80  Resp:  [18-22] 18  BP: (143-164)/(90-97) 164/97    GENERAL: Awake and alert, in no acute distress.  Morbidly obese  HEENT: Oropharynx is clear. Hearing is grossly normal.   EYES: PERRL. No conjunctival injection. No lid lag.   LYMPHATICS: No lymphadenopathy of the neck or inguinal regions.   HEART: Regular rate and regular rhythm. No peripheral edema.   LUNGS: Clear to auscultation anteriorly with normal respiratory effort.   GI: Soft, nontender, nondistended. No appreciable organomegaly.   SKIN: Bilateral lower extremities with chronic venous stasis dermatitis appearance.  Currently no overt signs of cellulitis in the lower extremities other than possible residual erythema in the creases within the skin.  No purulence.  PSYCHIATRIC: Appropriate mood, affect, insight, and judgment.       Results Review:   I reviewed the patient's new clinical results.  I reviewed the patient's new imaging results and agree with the interpretation.  I reviewed the patient's other test results and agree with the interpretation    Lab Results   Component Value Date    WBC 14.05 (H) 11/07/2021    HGB 11.8 (L) 11/07/2021    HCT 34.6 (L) 11/07/2021    MCV 86.3 11/07/2021     11/07/2021       Lab Results   Component Value Date    [facility]       Lab Results   Component Value Date    GLUCOSE 148 (H) 11/07/2021    BUN 7 11/07/2021    CREATININE 0.45 (L) 11/07/2021    EGFRIFNONA >150 11/07/2021    EGFRIFAFRI 115 09/02/2021    BCR 15.6 11/07/2021    CO2 28.0 11/07/2021    CALCIUM 8.5 (L) 11/07/2021    PROTENTOTREF 6.4 09/02/2021    ALBUMIN 3.10 (L) 11/02/2021    LABIL2 1.8 09/02/2021    AST 19 11/02/2021    ALT 18 11/02/2021         Estimated Creatinine Clearance: 414.8 mL/min (A) (by C-G formula based on SCr of 0.45 mg/dL (L)).      Microbiology:  11/2 Covid negative  11/2 blood cultures no growth today  11/2 MRSA nares screen negative    Radiology:  11/2 chest  x-ray reviewed by me without any signs of pulmonary infiltrate.  Cardiomegaly.    11/4 ultrasound of the left lower extremity with skin thickening and diffuse subcutaneous edema consistent with cellulitis.  Exam cannot evaluate deeper collections.    Assessment/Plan   #Left lower extremity cellulitis  #Morbid obesity  #Chronic lower extremity lymphedema and venous stasis     Given patient's reported marked improvement from admission on antibiotics believe his white blood cell count is largely due to the difficulty with penetration of fatty tissues with antibiotic therapy.  He is likely requiring a longer course given these limitations.  Ultrasound is limited due to body habitus but no discernible abscess was identified which is reassuring.    As he has received IV lead-in therapy I do believe it is reasonable to transition to oral cephalexin 1 g 4 times daily through 11/16, which would be a 14-day course in the setting of his morbid obesity I believe this would be the best course.      Thank you for allowing me to be involved in the care of this patient. Infectious diseases will sign off at this time with antibiotics plan in place, but please call me at [phone] if any further ID questions or new ID concerns.

## 2021-11-07 NOTE — PROGRESS NOTES
LOS: 5 days   Patient Care Team:  Hansel Patel DO as PCP - General (Family Medicine)  Kim Sotelo RPH as Pharmacist    Chief Complaint:   Atrial Flutter, Pauses    Interval History:   Atrial Flutter is stable.  He is open to outpatient evaluation for another attempt with CPAP therapy.  Feels that leg is improving.     Objective   Vital Signs  Temp:  [97.7 °F (36.5 °C)-98.3 °F (36.8 °C)] 98.3 °F (36.8 °C)  Heart Rate:  [] 100  Resp:  [18] 18  BP: (143-170)/(73-97) 170/73    Intake/Output Summary (Last 24 hours) at 11/7/2021 1349  Last data filed at 11/7/2021 1344  Gross per 24 hour   Intake 1590 ml   Output 2325 ml   Net -735 ml       Review of Systems   Constitutional: Negative.   Cardiovascular: Negative.    Respiratory: Negative.    Gastrointestinal: Negative.        Physical Exam  Vitals and nursing note reviewed.   Constitutional:       Appearance: Normal appearance. He is morbidly obese.   Cardiovascular:      Rate and Rhythm: Normal rate and regular rhythm.   Pulmonary:      Effort: Pulmonary effort is normal.   Skin:     General: Skin is warm.   Neurological:      General: No focal deficit present.      Mental Status: He is alert and oriented to person, place, and time.   Psychiatric:         Attention and Perception: Attention and perception normal.         Mood and Affect: Mood normal.         Behavior: Behavior normal.           Results Review:      Results from last 7 days   Lab Units 11/07/21 0618 11/06/21 0427 11/06/21  0427 11/05/21  0531 11/05/21  0531   SODIUM mmol/L 134*  --  134*  --  131*   POTASSIUM mmol/L 3.9  --  4.1  --  4.4   CHLORIDE mmol/L 97*  --  97*  --  99   CO2 mmol/L 28.0  --  26.8  --  26.1   BUN mg/dL 7  --  9  --  9   CREATININE mg/dL 0.45*  --  0.55*  --  0.59*   GLUCOSE mg/dL 148*   < > 153*   < > 148*   CALCIUM mg/dL 8.5*  --  8.7  --  8.7    < > = values in this interval not displayed.         Results from last 7 days   Lab Units 11/07/21 0618 11/06/21  0427  11/05/21  0531   WBC 10*3/mm3 14.05* 16.33* 16.40*   HEMOGLOBIN g/dL 11.8* 12.0* 11.8*   HEMATOCRIT % 34.6* 35.5* 37.2*   PLATELETS 10*3/mm3 395 359 312     Results from last 7 days   Lab Units 11/07/21  0618 11/06/21  0427 11/05/21  0531 11/02/21  1056 11/02/21  0022   INR  2.20* 2.90* 3.20*   < > 2.55*   APTT seconds  --   --   --   --  70.4*    < > = values in this interval not displayed.     Results from last 7 days   Lab Units 11/03/21  1001   CHOLESTEROL mg/dL 112     Results from last 7 days   Lab Units 11/07/21  0618   MAGNESIUM mg/dL 1.7     Results from last 7 days   Lab Units 11/03/21  1001   CHOLESTEROL mg/dL 112   TRIGLYCERIDES mg/dL 95   HDL CHOL mg/dL 30*   LDL CHOL mg/dL 64       I reviewed the patient's new clinical results.  I personally viewed and interpreted the patient's EKG/Telemetry data        Medication Review:   cephalexin, 1,000 mg, Oral, Q6H  furosemide, 80 mg, Oral, Daily  guaiFENesin, 600 mg, Oral, Q12H  insulin lispro, 0-7 Units, Subcutaneous, TID AC  sodium chloride, 10 mL, Intravenous, Q12H  warfarin (COUMADIN) (dosing per levels), , Does not apply, Daily  warfarin, 5 mg, Oral, Once        Pharmacy to dose warfarin,         Assessment/Plan       Cellulitis of left lower extremity    History of bilateral pulmonary embolism (CMS/HCC)    Lymphedema of both lower extremities    Obesity, morbid, BMI 50 or higher (HCC)    Dyslipidemia    ARNOLDO (obstructive sleep apnea)    Heterozygous factor V Leiden mutation (HCC)    Hypokalemia    CAROL (acute kidney injury) (HCC)    Sepsis with cutaneous manifestations (HCC)    Hyperglycemia    Paroxysmal atrial fibrillation (HCC)    Atrial Flutter is stable.  No further pauses.  He is on anticoagulation with warfarin.  He is not a candidate for cardioversion or ablation with his weight.  No further recommendations at this time.  Will sign off, please call with questions.      Skyler Quintanilla MD  11/07/21  13:49 EST

## 2021-11-07 NOTE — PROGRESS NOTES
Pharmacy Consult: Warfarin Dosing/ Monitoring    Kameron Melendez is a 51 y.o. male, estimated creatinine clearance is 316.4 mL/min (A) (by C-G formula based on SCr of 0.59 mg/dL (L)). weighing (!) 255 kg (562 lb 2.8 oz).      Results from last 7 days   Lab Units 11/07/21  0618 11/06/21  0427 11/05/21  0531 11/04/21  0614 11/03/21  1001 11/02/21  1056 11/02/21  0616 11/02/21  0022   INR  2.20* 2.90* 3.20* 2.83* 2.98* 2.99*  --  2.55*   APTT seconds  --   --   --   --   --   --   --  70.4*   HEMOGLOBIN g/dL 11.8* 12.0* 11.8* 12.0* 12.1*  --  12.4* 13.8   HEMATOCRIT % 34.6* 35.5* 37.2* 36.3* 37.2*  --  37.0* 41.1   PLATELETS 10*3/mm3 395 359 312 268 233  --  186 200       Anticoagulation history: Patient followed by [facility] with regimen of 10 mg Tues/Thurs/Sat and 15 mg on Mon/Wed/Fri/Sun (90 mg weekly total) for acute pulmonary embolism treatment (without acute cor pulmonale)     Hospital Anticoagulation:  Consulting provider: Jackson Canchola APRN  Start date: 11/221  Indication: Full anticoagulation. Factor V Leiden  Target INR: 2-3  Expected duration: TBD  Bridge Therapy: No                Date 11/2 11/3 11/4 11/5 11/6  11/7       INR 2.55 2.98 2.83 3.2  2.9  2.2       Warfarin dose 10 mg 10 mg 10mg hold  2 mg  5 mg         Potential drug interactions:   Cefepime and Vancomycin: may enhance the anticoagulant effect of warfarin    Relevant nutrition status: regular diet   11/7 B 75%    Assessment/Plan:  INR remains therapeutic today at 2.2 , will give one time dose of 5mg today/ watch for trend before starting back on home dose  Monitor INR and follow up daily      Pharmacy will continue to follow until discharge or discontinuation of warfarin.     Alem Acosta, [facility]

## 2021-11-08 ENCOUNTER — READMISSION MANAGEMENT (OUTPATIENT)
Dept: CALL CENTER | Facility: HOSPITAL | Age: 51
End: 2021-11-08

## 2021-11-08 VITALS
BODY MASS INDEX: 40.43 KG/M2 | RESPIRATION RATE: 18 BRPM | HEIGHT: 74 IN | HEART RATE: 99 BPM | OXYGEN SATURATION: 93 % | SYSTOLIC BLOOD PRESSURE: 138 MMHG | DIASTOLIC BLOOD PRESSURE: 89 MMHG | WEIGHT: 315 LBS | TEMPERATURE: 98.2 F

## 2021-11-08 LAB
ANION GAP SERPL CALCULATED.3IONS-SCNC: 8.7 MMOL/L (ref 5–15)
BASOPHILS # BLD AUTO: 0.06 10*3/MM3 (ref 0–0.2)
BASOPHILS NFR BLD AUTO: 0.5 % (ref 0–1.5)
BUN SERPL-MCNC: 8 MG/DL (ref 6–20)
BUN/CREAT SERPL: 14 (ref 7–25)
CALCIUM SPEC-SCNC: 8.4 MG/DL (ref 8.6–10.5)
CHLORIDE SERPL-SCNC: 94 MMOL/L (ref 98–107)
CO2 SERPL-SCNC: 29.3 MMOL/L (ref 22–29)
CREAT SERPL-MCNC: 0.57 MG/DL (ref 0.76–1.27)
DEPRECATED RDW RBC AUTO: 46 FL (ref 37–54)
EOSINOPHIL # BLD AUTO: 0.17 10*3/MM3 (ref 0–0.4)
EOSINOPHIL NFR BLD AUTO: 1.4 % (ref 0.3–6.2)
ERYTHROCYTE [DISTWIDTH] IN BLOOD BY AUTOMATED COUNT: 13.9 % (ref 12.3–15.4)
GFR SERPL CREATININE-BSD FRML MDRD: >150 ML/MIN/1.73
GLUCOSE BLDC GLUCOMTR-MCNC: 148 MG/DL (ref 70–130)
GLUCOSE BLDC GLUCOMTR-MCNC: 171 MG/DL (ref 70–130)
GLUCOSE SERPL-MCNC: 162 MG/DL (ref 65–99)
HCT VFR BLD AUTO: 37.4 % (ref 37.5–51)
HGB BLD-MCNC: 12 G/DL (ref 13–17.7)
IMM GRANULOCYTES # BLD AUTO: 0.54 10*3/MM3 (ref 0–0.05)
IMM GRANULOCYTES NFR BLD AUTO: 4.4 % (ref 0–0.5)
INR PPP: 1.72 (ref 0.9–1.1)
LYMPHOCYTES # BLD AUTO: 1.94 10*3/MM3 (ref 0.7–3.1)
LYMPHOCYTES NFR BLD AUTO: 16 % (ref 19.6–45.3)
MAGNESIUM SERPL-MCNC: 1.8 MG/DL (ref 1.6–2.6)
MCH RBC QN AUTO: 29.1 PG (ref 26.6–33)
MCHC RBC AUTO-ENTMCNC: 32.1 G/DL (ref 31.5–35.7)
MCV RBC AUTO: 90.6 FL (ref 79–97)
MONOCYTES # BLD AUTO: 0.92 10*3/MM3 (ref 0.1–0.9)
MONOCYTES NFR BLD AUTO: 7.6 % (ref 5–12)
NEUTROPHILS NFR BLD AUTO: 70.1 % (ref 42.7–76)
NEUTROPHILS NFR BLD AUTO: 8.53 10*3/MM3 (ref 1.7–7)
NRBC BLD AUTO-RTO: 0.1 /100 WBC (ref 0–0.2)
PHOSPHATE SERPL-MCNC: 3.2 MG/DL (ref 2.5–4.5)
PLATELET # BLD AUTO: 404 10*3/MM3 (ref 140–450)
PMV BLD AUTO: 9.3 FL (ref 6–12)
POTASSIUM SERPL-SCNC: 4.1 MMOL/L (ref 3.5–5.2)
PROTHROMBIN TIME: 19.9 SECONDS (ref 11.7–14.2)
RBC # BLD AUTO: 4.13 10*6/MM3 (ref 4.14–5.8)
SODIUM SERPL-SCNC: 132 MMOL/L (ref 136–145)
WBC # BLD AUTO: 12.16 10*3/MM3 (ref 3.4–10.8)

## 2021-11-08 PROCEDURE — 97110 THERAPEUTIC EXERCISES: CPT

## 2021-11-08 PROCEDURE — 84100 ASSAY OF PHOSPHORUS: CPT | Performed by: STUDENT IN AN ORGANIZED HEALTH CARE EDUCATION/TRAINING PROGRAM

## 2021-11-08 PROCEDURE — 80048 BASIC METABOLIC PNL TOTAL CA: CPT | Performed by: STUDENT IN AN ORGANIZED HEALTH CARE EDUCATION/TRAINING PROGRAM

## 2021-11-08 PROCEDURE — 85610 PROTHROMBIN TIME: CPT | Performed by: NURSE PRACTITIONER

## 2021-11-08 PROCEDURE — 85025 COMPLETE CBC W/AUTO DIFF WBC: CPT | Performed by: STUDENT IN AN ORGANIZED HEALTH CARE EDUCATION/TRAINING PROGRAM

## 2021-11-08 PROCEDURE — 82962 GLUCOSE BLOOD TEST: CPT

## 2021-11-08 PROCEDURE — 83735 ASSAY OF MAGNESIUM: CPT | Performed by: STUDENT IN AN ORGANIZED HEALTH CARE EDUCATION/TRAINING PROGRAM

## 2021-11-08 PROCEDURE — 63710000001 INSULIN LISPRO (HUMAN) PER 5 UNITS: Performed by: NURSE PRACTITIONER

## 2021-11-08 PROCEDURE — 94618 PULMONARY STRESS TESTING: CPT

## 2021-11-08 RX ORDER — WARFARIN SODIUM 10 MG/1
10 TABLET ORAL
Status: DISCONTINUED | OUTPATIENT
Start: 2021-11-09 | End: 2021-11-08 | Stop reason: HOSPADM

## 2021-11-08 RX ORDER — WARFARIN SODIUM 7.5 MG/1
15 TABLET ORAL
Status: DISCONTINUED | OUTPATIENT
Start: 2021-11-08 | End: 2021-11-08 | Stop reason: HOSPADM

## 2021-11-08 RX ORDER — CEPHALEXIN 500 MG/1
1000 CAPSULE ORAL EVERY 6 HOURS SCHEDULED
Qty: 64 CAPSULE | Refills: 0 | Status: SHIPPED | OUTPATIENT
Start: 2021-11-08 | End: 2021-11-16

## 2021-11-08 RX ADMIN — CEPHALEXIN 1000 MG: 500 CAPSULE ORAL at 11:37

## 2021-11-08 RX ADMIN — SODIUM CHLORIDE, PRESERVATIVE FREE 10 ML: 5 INJECTION INTRAVENOUS at 08:26

## 2021-11-08 RX ADMIN — INSULIN LISPRO 2 UNITS: 100 INJECTION, SOLUTION INTRAVENOUS; SUBCUTANEOUS at 11:37

## 2021-11-08 RX ADMIN — CEPHALEXIN 1000 MG: 500 CAPSULE ORAL at 05:28

## 2021-11-08 RX ADMIN — FUROSEMIDE 80 MG: 80 TABLET ORAL at 08:26

## 2021-11-08 RX ADMIN — GUAIFENESIN 600 MG: 600 TABLET, EXTENDED RELEASE ORAL at 08:26

## 2021-11-08 NOTE — PROGRESS NOTES
Pharmacy Consult: Warfarin Dosing/ Monitoring    Kameron Melendez is a 51 y.o. male, estimated creatinine clearance is 327.5 mL/min (A) (by C-G formula based on SCr of 0.57 mg/dL (L)). weighing (!) 255 kg (562 lb 2.8 oz).      Results from last 7 days   Lab Units 11/08/21  0420 11/07/21  0618 11/06/21  0427 11/05/21  0531 11/04/21  0614 11/03/21  1001 11/02/21  1056 11/02/21  0616 11/02/21  0022 11/02/21  0022   INR  1.72* 2.20* 2.90* 3.20* 2.83* 2.98* 2.99*  --    < > 2.55*   APTT seconds  --   --   --   --   --   --   --   --   --  70.4*   HEMOGLOBIN g/dL 12.0* 11.8* 12.0* 11.8* 12.0* 12.1*  --  12.4*   < > 13.8   HEMATOCRIT % 37.4* 34.6* 35.5* 37.2* 36.3* 37.2*  --  37.0*   < > 41.1   PLATELETS 10*3/mm3 404 395 359 312 268 233  --  186   < > 200    < > = values in this interval not displayed.       Anticoagulation history: Patient followed by [facility] with regimen of 10 mg Tues/Thurs/Sat and 15 mg on Mon/Wed/Fri/Sun (90 mg weekly total) for acute pulmonary embolism treatment (without acute cor pulmonale)     Hospital Anticoagulation:  Consulting provider: Jackson Canchola APRN  Start date: 11/221  Indication: Full anticoagulation. Factor V Leiden  Target INR: 2-3  Expected duration: TBD  Bridge Therapy: No                Date 11/2 11/3 11/4 11/5 11/6  11/7 11/8      INR 2.55 2.98 2.83 3.2  2.9  2.2 1.72      Warfarin dose 10 mg 10 mg 10mg hold  2 mg  5 mg 15mg        Potential drug interactions:   · Cefepime and Vancomycin: may enhance the anticoagulant effect of warfarin    Relevant nutrition status: regular diet   11/7 B 75%    Assessment/Plan:  • INR 1.72 sub therapeutic. However dose was held on 11/5 and then patient only rec'd small doses compare to normal dose outpatient. So this level is expected. Will resume home regimen    1. Warfarin 15mg Mon/Wed/Fri/Sun and 10mg Tues/Thurs/Sat  2. Daily INR      Pharmacy will continue to follow until discharge or discontinuation of warfarin.     Jamar Marx,  PharmD  11/8

## 2021-11-08 NOTE — PROGRESS NOTES
Discharge Planning Assessment  [facility]     Patient Name: Kameron Melendez  MRN: [id number]  Today's Date: 11/8/2021    Admit Date: 11/1/2021     Discharge Needs Assessment    No documentation.                Discharge Plan     Row Name 11/08/21 1347       Plan    Plan HOme    Final Discharge Disposition Code 01 - home or self-care    Final Note Home              Continued Care and Services - Admitted Since 11/1/2021    Coordination has not been started for this encounter.       Expected Discharge Date and Time     Expected Discharge Date Expected Discharge Time    Nov 8, 2021          Demographic Summary    No documentation.                Functional Status    No documentation.                Psychosocial    No documentation.                Abuse/Neglect    No documentation.                Legal    No documentation.                Substance Abuse    No documentation.                Patient Forms    No documentation.                   Nafisa Miles RN

## 2021-11-08 NOTE — THERAPY TREATMENT NOTE
Patient Name: Kameron Melendez  : 1970    MRN: [id number]                              Today's Date: 2021       Admit Date: 2021    Visit Dx:     ICD-10-CM ICD-9-CM   1. Sepsis with cutaneous manifestations (HCC)  A41.9 038.9     995.91   2. Cellulitis of left lower extremity  L03.116 682.6   3. Lymphedema  I89.0 457.1   4. Paroxysmal atrial fibrillation (HCC)  I48.0 427.31   5. ARNOLDO (obstructive sleep apnea)  G47.33 327.23   6. Obesity, morbid, BMI 50 or higher (HCC)  E66.01 278.01     Patient Active Problem List   Diagnosis   • History of bilateral pulmonary embolism (CMS/HCC)   • Pulmonary hypertension (HCC)   • Lymphedema of both lower extremities   • Obesity, morbid, BMI 50 or higher (HCC)   • Dyslipidemia   • Hyperuricemia   • Hypogonadal obesity   • Knee arthropathy   • Morbid obesity with body mass index of 60.0-69.9 in adult (HCC)   • ARNOLDO (obstructive sleep apnea)   • Severe edema   • History of pulmonary embolism   • Other acute pulmonary embolism without acute cor pulmonale (HCC)   • Intractable back pain   • Heterozygous factor V Leiden mutation (HCC)   • Cellulitis of left lower extremity   • Hypokalemia   • CAROL (acute kidney injury) (HCC)   • Sepsis with cutaneous manifestations (HCC)   • Hyperglycemia   • Paroxysmal atrial fibrillation (HCC)     Past Medical History:   Diagnosis Date   • DVT (deep venous thrombosis) (HCC)     RLE   • Factor V Leiden (HCC)    • Hypertension    • Kidney stone    • Lymphedema    • Lymphedema of left lower extremity    • Obesity    • ARNOLDO (obstructive sleep apnea)    • Pulmonary embolism (HCC)     bilateral   • Pulmonary hypertension (HCC)    • Right ventricular enlargement      Past Surgical History:   Procedure Laterality Date   • CARDIAC CATHETERIZATION Bilateral 2017    Procedure: Pulmonary angiography- Inari ;  Surgeon: Cedric Coulter MD;  Location: [facility] INVASIVE LOCATION;  Service:    • CARDIAC CATHETERIZATION N/A 2017    Procedure: Right  Heart Cath;  Surgeon: Cedric Coulter MD;  Location:  [facility] INVASIVE LOCATION;  Service:    • THROMBECTOMY        General Information     Row Name 11/08/21 1115          Physical Therapy Time and Intention    Document Type therapy note (daily note)  -MS     Mode of Treatment physical therapy; individual therapy  -MS     Row Name 11/08/21 1115          General Information    Patient Profile Reviewed yes  -MS     Existing Precautions/Restrictions fall   -MS     Row Name 11/08/21 1115          Cognition    Orientation Status (Cognition) oriented x 3  -MS     Row Name 11/08/21 1115          Safety Issues, Functional Mobility    Comment, Safety Issues/Impairments (Mobility) Gait belt used for safety.  -MS           User Key  (r) = Recorded By, (t) = Taken By, (c) = Cosigned By    Initials Name Provider Type    Austin Andrew, PT Physical Therapist               Mobility     Row Name 11/08/21 1115          Bed Mobility    Supine-Sit Shirley (Bed Mobility) moderate assist (50% patient effort); 2 person assist  -MS     Sit-Supine Shirley (Bed Mobility) moderate assist (50% patient effort); 2 person assist  -MS     Row Name 11/08/21 1115          Sit-Stand Transfer    Sit-Stand Shirley (Transfers) minimum assist (75% patient effort); 2 person assist  -MS     Assistive Device (Sit-Stand Transfers) walker, standard  -MS     Row Name 11/08/21 1115          Gait/Stairs (Locomotion)    Shirley Level (Gait) contact guard; 2 person assist  -MS     Assistive Device (Gait) walker, standard  -MS     Distance in Feet (Gait) 5 feet  -MS     Deviations/Abnormal Patterns (Gait) reg decreased; base of support, wide  -MS     Bilateral Gait Deviations forward flexed posture  -MS     Comment (Gait/Stairs) Verbal/tactile cues for posture correction.  Limited by overall fatigue.  -MS           User Key  (r) = Recorded By, (t) = Taken By, (c) = Cosigned By    Initials Name Provider Type    Austin Andrew, PT  Physical Therapist               Obj/Interventions    No documentation.                Goals/Plan    No documentation.                Clinical Impression     Row Name 11/08/21 1116          Pain Scale: Numbers Pre/Post-Treatment    Pretreatment Pain Rating 3/10  -MS     Posttreatment Pain Rating 3/10  -MS     Pain Location - Side Left  -MS     Pre/Posttreatment Pain Comment Left inner thigh (increased swelling)  -MS     Row Name 11/08/21 1116          Positioning and Restraints    Pre-Treatment Position in bed  -MS     Post Treatment Position bed  -MS     In Bed notified nsg; supine; call light within reach; encouraged to call for assist; with nsg  All lines intact. V.S.S.  -MS           User Key  (r) = Recorded By, (t) = Taken By, (c) = Cosigned By    Initials Name Provider Type    Austin Andrew, PT Physical Therapist               Outcome Measures     Row Name 11/08/21 1117          How much help from another person do you currently need...    Turning from your back to your side while in flat bed without using bedrails? 2  -MS     Moving from lying on back to sitting on the side of a flat bed without bedrails? 2  -MS     Moving to and from a bed to a chair (including a wheelchair)? 2  -MS     Standing up from a chair using your arms (e.g., wheelchair, bedside chair)? 2  -MS     Climbing 3-5 steps with a railing? 2  -MS     To walk in hospital room? 2  -MS     AM-PAC 6 Clicks Score (PT) 12  -MS     Row Name 11/08/21 1117          Functional Assessment    Outcome Measure Options AM-PAC 6 Clicks Basic Mobility (PT)  -MS           User Key  (r) = Recorded By, (t) = Taken By, (c) = Cosigned By    Initials Name Provider Type    Austin Andrew, PT Physical Therapist                             Physical Therapy Education                 Title: PT OT SLP Therapies (In Progress)     Topic: Physical Therapy (Done)     Point: Mobility training (Done)     Learning Progress Summary           Patient Acceptance,  E,D, VU,NR by MS at 11/8/2021 1117    Acceptance, E,D, VU,NR by MS at 11/6/2021 1129    Acceptance, E, VU by DJ at 11/5/2021 1547    Acceptance, E, VU,NR by DJ at 11/4/2021 1006                   Point: Home exercise program (Done)     Learning Progress Summary           Patient Acceptance, E,D, VU,NR by MS at 11/8/2021 1117    Acceptance, E,D, VU,NR by MS at 11/6/2021 1129                   Point: Body mechanics (Done)     Learning Progress Summary           Patient Acceptance, E,D, VU,NR by MS at 11/8/2021 1117    Acceptance, E,D, VU,NR by MS at 11/6/2021 1129    Acceptance, E, VU by DJ at 11/5/2021 1547    Acceptance, E, VU,NR by DJ at 11/4/2021 1006                   Point: Precautions (Done)     Learning Progress Summary           Patient Acceptance, E,D, VU,NR by MS at 11/8/2021 1117    Acceptance, E,D, VU,NR by MS at 11/6/2021 1129    Acceptance, E, VU by DJ at 11/5/2021 1547    Acceptance, E, VU,NR by DJ at 11/4/2021 1006                               User Key     Initials Effective Dates Name Provider Type Discipline    MS 06/16/21 -  Austin Austin PT Physical Therapist PT    DJ 10/25/19 -  Praveena Burdick PT Physical Therapist PT              PT Recommendation and Plan     Plan of Care Reviewed With: patient  Outcome Summary: Pt. able to ambulate 5 feet, CGA x 2, with use of Standard Wide Wx. this date.  Pt. requires Mod. assist x 2 for bed mobility and Min. assist x 2 for sit <-> stand transfers.  Verbal/tactile cues given throughout upright mobility for posture correction and Wx. guidance.  Limited in upright mobility due to fatigue.     Time Calculation:    PT Charges     Row Name 11/08/21 1120             Time Calculation    Start Time 0940  -MS      Stop Time 1000  -MS      Time Calculation (min) 20 min  -MS      PT Received On 11/08/21  -MS      PT - Next Appointment 11/09/21  -MS              Time Calculation- PT    Total Timed Code Minutes- PT 19 minute(s)  -MS            User Key  (r) =  Recorded By, (t) = Taken By, (c) = Cosigned By    Initials Name Provider Type    Austin Andrew, PT Physical Therapist              Therapy Charges for Today     Code Description Service Date Service Provider Modifiers Qty    83850944353 HC PT THER PROC EA 15 MIN 11/8/2021 Austin Austin, PT GP 1    13728319330 HC PT THER SUPP EA 15 MIN 11/8/2021 Austin Austin, PT GP 1          PT G-Codes  Outcome Measure Options: AM-PAC 6 Clicks Basic Mobility (PT)  AM-PAC 6 Clicks Score (PT): 12    Austin Austin, PT  11/8/2021

## 2021-11-08 NOTE — PLAN OF CARE
Goal Outcome Evaluation:  Plan of Care Reviewed With: patient           Outcome Summary: Pt. able to ambulate 5 feet, CGA x 2, with use of Standard Wide Wx. this date.  Pt. requires Mod. assist x 2 for bed mobility and Min. assist x 2 for sit <-> stand transfers.  Verbal/tactile cues given throughout upright mobility for posture correction and Wx. guidance.  Limited in upright mobility due to fatigue.    Patient was intermittently wearing a face mask during this therapy encounter. Therapist used appropriate personal protective equipment including eye protection, mask, and gloves.  Mask used was standard procedure mask. Appropriate PPE was worn during the entire therapy session. Hand hygiene was completed before and after therapy session. Patient is not in enhanced droplet precautions.

## 2021-11-08 NOTE — PROGRESS NOTES
Exercise Oximetry    Patient Name:Kameron Melendez   MRN: [id number]   Date: 11/08/21             ROOM AIR BASELINE   SpO2% 100   Heart Rate 95   Blood Pressure      EXERCISE ON ROOM AIR SpO2% EXERCISE ON O2 @  LPM SpO2%   1 MINUTE 99 1 MINUTE    2 MINUTES 100 2 MINUTES    3 MINUTES 99 3 MINUTES    4 MINUTES 96 4 MINUTES    5 MINUTES 97 5 MINUTES    6 MINUTES 99 6 MINUTES               Distance Walked   Distance Walked   Dyspnea (Radha Scale)   Dyspnea (Radha Scale)   Fatigue (Radha Scale)   Fatigue (Radha Scale)   SpO2% Post Exercise  100 SpO2% Post Exercise   HR Post Exercise  107 HR Post Exercise   Time to Recovery   Time to Recovery     Comments: PT was able to walk a total of 6 minutes making it from the bed to the door and back with walker and gait belt in place. PT had no complaints of SOA, dizziness or chest pain. PT only had complaints of pain and pressure of his legs. HR reach 152 but returned to 107 with rest. RN aware

## 2021-11-08 NOTE — PAYOR COMM NOTE
"Christiana Covarrubias (51 y.o. Male)     U/D FOR [id number]    CONTACT TIKI FUCHS  P# [phone]  F# [phone]            Date of Birth Social Security Number Address Home Phone MRN    1970  [address]  [facility] [address] [phone] [id number]    Sabianist Marital Status             Rastafari        Admission Date Admission Type Admitting Provider Attending Provider Department, Room/Bed    21 Emergency Kay Jung MD Reddy, Rahul Kandada, MD [address], [address]/    Discharge Date Discharge Disposition Discharge Destination           Home or Self Care              Attending Provider: Larry Wise MD    Allergies: No Known Allergies    Isolation: None   Infection: None   Code Status: CPR   Advance Care Planning Activity    Ht: 188 cm (74.02\")   Wt: 255 kg (562 lb 2.8 oz)    Admission Cmt: None   Principal Problem: Cellulitis of left lower extremity [L03.116]                 Active Insurance as of 2021     Primary Coverage     Payor Plan Insurance Group Employer/Plan Group    [employer]HE[employer] BLUE CROSS AN[employer]M BLUE CROSS BLUE SHIELD PPO [id number]     Payor Plan Address Payor Plan Phone Number Payor Plan Fax Number Effective Dates    [address] [phone]  2019 - None Entered    [place] [address]       Subscriber Name Subscriber Birth Date Member ID       CHRISTIANA COVARRUBIAS 1970 [id number]                 Emergency Contacts      (Rel.) Home Phone Work Phone Mobile Phone    Yaya Covarrubias (Spouse) [phone] -- [phone]            Operative/Procedure Notes (last 72 hours)  Notes from 21 1238 through 21 1238   No notes of this type exist for this encounter.            Physician Progress Notes (last 72 hours)      Larry Wise MD at 21 1439              Name: Christiana Covarrubias ADMIT: 2021   : 1970  PCP: Hansel Patel DO    MRN: [id number] LOS: 5 days   AGE/SEX: 51 y.o. male  ROOM: " E458/1     Subjective   Subjective     Patient is lying in bed in no major distress.  No new events overnight.  Denies nausea, vomiting abdominal pain, chest pain, shortness of breath.    Review of Systems  As above  Objective   Objective   Vital Signs  Temp:  [97.7 °F (36.5 °C)-98.3 °F (36.8 °C)] 98.3 °F (36.8 °C)  Heart Rate:  [] 100  Resp:  [18] 18  BP: (143-170)/(73-97) 170/73  SpO2:  [89 %-93 %] 89 %  on  Flow (L/min):  [4] 4;   Device (Oxygen Therapy): room air  Body mass index is 72.15 kg/m².  Physical Exam  Constitutional:       General: He is not in acute distress.     Appearance: He is obese. He is ill-appearing. He is not toxic-appearing.   HENT:      Head: Normocephalic and atraumatic.   Eyes:      Conjunctiva/sclera: Conjunctivae normal.   Cardiovascular:      Rate and Rhythm: Regular rate, irregular rhythm.      Heart sounds: Normal heart sounds.   Pulmonary:      Effort: Pulmonary effort is normal.      Comments: Diminished on expiration; shallow inspiration  Abdominal:      General: Bowel sounds are normal.      Palpations: Abdomen is soft.   Musculoskeletal:         General: Tenderness (LLE) present.      Cervical back: Normal range of motion and neck supple.      Right lower leg: Edema present.      Left lower leg: Edema present.  Possible area of fluctuance on the left posterior calf  Skin:     General: Skin is warm and dry.      Findings: Erythema (LLE) present.   Neurological:      Mental Status: He is alert and oriented to person, place, and time.      Cranial Nerves: No cranial nerve deficit.      Motor: Weakness (generalized) present.   Psychiatric:         Behavior: Behavior normal.         Thought Content: Thought content normal. Results Review     I reviewed the patient's new clinical results.  Results from last 7 days   Lab Units 11/07/21  0618 11/06/21  0427 11/05/21  0531 11/04/21  0614   WBC 10*3/mm3 14.05* 16.33* 16.40* 15.74*   HEMOGLOBIN g/dL 11.8* 12.0* 11.8* 12.0*    PLATELETS 10*3/mm3 395 915 984 464     Results from last 7 days   Lab Units 11/07/21  0618 11/06/21 0427 11/05/21  0531 11/04/21  1601 11/04/21  0614 11/04/21  0614   SODIUM mmol/L 134* 134* 131*  --   --  131*   POTASSIUM mmol/L 3.9 4.1 4.4 4.1   < > 4.0   CHLORIDE mmol/L 97* 97* 99  --   --  98   CO2 mmol/L 28.0 26.8 26.1  --   --  23.6   BUN mg/dL 7 9 9  --   --  12   CREATININE mg/dL 0.45* 0.55* 0.59*  --   --  0.49*   GLUCOSE mg/dL 148* 153* 148*  --   --  133*    < > = values in this interval not displayed.   Estimated Creatinine Clearance: 414.8 mL/min (A) (by C-G formula based on SCr of 0.45 mg/dL (L)).  Results from last 7 days   Lab Units 11/02/21  0022   ALBUMIN g/dL 3.10*   BILIRUBIN mg/dL 0.7   ALK PHOS U/L 169*   AST (SGOT) U/L 19   ALT (SGPT) U/L 18     Results from last 7 days   Lab Units 11/07/21 0618 11/06/21 0427 11/05/21 0531 11/04/21  1601 11/04/21  0614 11/04/21  0614 11/02/21  0616 11/02/21  0022   CALCIUM mg/dL 8.5* 8.7 8.7  --   --  8.7   < > 9.0   ALBUMIN g/dL  --   --   --   --   --   --   --  3.10*   MAGNESIUM mg/dL 1.7 1.8 2.0 2.0   < > 2.0  --   --    PHOSPHORUS mg/dL 2.7 2.8 2.3*  --   --  2.5  --   --     < > = values in this interval not displayed.     Results from last 7 days   Lab Units 11/02/21  0354 11/02/21  0022   LACTATE mmol/L 1.3 2.2*     COVID19   Date Value Ref Range Status   11/02/2021 Not Detected Not Detected - Ref. Range Final     Glucose   Date/Time Value Ref Range Status   11/07/2021 1059 163 (H) 70 - 130 mg/dL Final     Comment:     Meter: [id number] : [id number] Jnoo Aditi    11/07/2021 0600 150 (H) 70 - 130 mg/dL Final     Comment:     Meter: [id number] : [id number] Mayur Jenkins    11/06/2021 2045 178 (H) 70 - 130 mg/dL Final     Comment:     Meter: [id number] : [id number] [facility]   11/06/2021 1656 176 (H) 70 - 130 mg/dL Final     Comment:     Meter: [id number] : [id number] Gideon Jarvis    11/06/2021 1205 133 (H) 70 - 130 mg/dL  Final     Comment:     Meter: [id number] : [id number] Gideon Jarvis NA   11/06/2021 0617 145 (H) 70 - 130 mg/dL Final     Comment:     Meter: [id number] : [id number] Mayur Jenkins NA   11/05/2021 2030 158 (H) 70 - 130 mg/dL Final     Comment:     Meter: [id number] : [id number] Mayur JACOB       US Nonvascular Extremity Limited  US NONVASCULAR EXTREMITY LIMITED-     Clinical: Cellulitis left leg, Evaluate for catheter abscess     Ultrasound performed along the left calf at the area of interest.     FINDINGS: There is diffuse skin thickening. There is edema demonstrated  within the subcutaneous fat. There is a trace amount of fluid outlines  several of the lobules. There is no discrete focal fluid collection  typical for abscess. Only the soft tissues 2.5 cm below the skin surface  could be evaluated. Due to the patient's large body habitus the deeper  abscess not excluded by ultrasound. If indicated computed tomography as  follow-up.     CONCLUSION: There is skin thickening with diffuse subcutaneous fat  edema. This is compatible with cellulitis. The soft tissues deeper than  2.5 cm cannot be evaluated by ultrasound. Computed tomography if further  evaluation is warranted clinically.     This report was finalized on 11/4/2021 4:05 PM by Dr. Arnold Newton M.D.     Adult Transthoracic Echo Complete W/ Cont if Necessary Per Protocol  · The left ventricular cavity is mild to moderately dilated.  · Left ventricular ejection fraction appears to be 61 - 65%. Left   ventricular systolic function is normal.  · The right ventricular cavity is mildly dilated. Normal right ventricular   systolic function noted.  · The left atrial cavity is mild to moderately dilated.  · There is no evidence of pericardial effusion       Scheduled Medications  cephalexin, 1,000 mg, Oral, Q6H  furosemide, 80 mg, Oral, Daily  guaiFENesin, 600 mg, Oral, Q12H  insulin lispro, 0-7 Units, Subcutaneous, TID AC  sodium chloride, 10 mL,  Intravenous, Q12H  warfarin (COUMADIN) (dosing per levels), , Does not apply, Daily  warfarin, 5 mg, Oral, Once    Infusions  Pharmacy to dose warfarin,     Diet  Diet Regular; Consistent Carbohydrate      Assessment/Plan     Active Hospital Problems    Diagnosis  POA   • **Cellulitis of left lower extremity [L03.116]  Yes   • Hypokalemia [E87.6]  Yes   • CAROL (acute kidney injury) (HCC) [N17.9]  Yes   • Sepsis with cutaneous manifestations ([facility]) [A41.9]  Yes   • Hyperglycemia [R73.9]  Yes   • Paroxysmal atrial fibrillation (HCC) [I48.0]  Unknown   • Heterozygous factor V Leiden mutation (HCC) [D68.51]  Yes   • Dyslipidemia [E78.5]  Yes   • Lymphedema of both lower extremities [I89.0]  Yes   • Obesity, morbid, BMI 50 or higher ([facility]) [E66.01]  Yes   • History of bilateral pulmonary embolism (CMS/HCC) [I26.99]  Yes   • ARNOLDO (obstructive sleep apnea) [G47.33]  Yes      Resolved Hospital Problems   No resolved problems to display.       51 y.o. male admitted with Cellulitis of left lower extremity.    Cellulitis of left lower extremity / Lymphedema of both lower extremities  Off IV vancomycin and cefepime and patient has been initiated on Keflex.  Left leg ultrasound was inadequate study however patient is improving clinically.  WBC is better at 14,000.  He is afebrile and hemodynamically stable.  Anticipate discharge home tomorrow.     Atrial fibrillation, new onset  Likely due to uncontrolled ARNOLDO in setting of morbid obesity    Continue with metoprolol and warfarin and monitor INR closely.  Patient also has underlying history of factor 5 laden deficiency.     Prediabetes  Hemoglobin A1c 6.4 and will continue with corrective dose insulin.  Counseled patient to lose weight as well.     Obesity, morbid, BMI 50 or higher  Complicating all problems     Obstructive sleep apnea  Requiring 2L NC at night     Resolved/stable issues: Hypokalemia, CAROL, dyslipidemia, sepsis; Heterozygous factor V Leiden mutation/History of  bilateral pulmonary embolism    · Warfarin (home med) for DVT prophylaxis.  · CPR    Larry Wise MD  [facility]  11/07/21  14:39 EST    Patient was wearing facemask when I entered the room and throughout our encounter.  I wore protective equipment throughout this patient encounter including a face mask, gloves and protective eyewear.  Hand hygiene was performed before donning protective equipment and after removal when leaving the room.          Electronically signed by Larry Wise MD at 11/07/21 1440     Skyler Quintanilla MD at 11/07/21 1349             LOS: 5 days   Patient Care Team:  Hansel Patel DO as PCP - General (Family Medicine)  Kim Sotelo RPH as Pharmacist    Chief Complaint:   Atrial Flutter, Pauses    Interval History:   Atrial Flutter is stable.  He is open to outpatient evaluation for another attempt with CPAP therapy.  Feels that leg is improving.     Objective   Vital Signs  Temp:  [97.7 °F (36.5 °C)-98.3 °F (36.8 °C)] 98.3 °F (36.8 °C)  Heart Rate:  [] 100  Resp:  [18] 18  BP: (143-170)/(73-97) 170/73    Intake/Output Summary (Last 24 hours) at 11/7/2021 1349  Last data filed at 11/7/2021 1344  Gross per 24 hour   Intake 1590 ml   Output 2325 ml   Net -735 ml       Review of Systems   Constitutional: Negative.   Cardiovascular: Negative.    Respiratory: Negative.    Gastrointestinal: Negative.        Physical Exam  Vitals and nursing note reviewed.   Constitutional:       Appearance: Normal appearance. He is morbidly obese.   Cardiovascular:      Rate and Rhythm: Normal rate and regular rhythm.   Pulmonary:      Effort: Pulmonary effort is normal.   Skin:     General: Skin is warm.   Neurological:      General: No focal deficit present.      Mental Status: He is alert and oriented to person, place, and time.   Psychiatric:         Attention and Perception: Attention and perception normal.         Mood and Affect: Mood normal.          Behavior: Behavior normal.           Results Review:      Results from last 7 days   Lab Units 11/07/21 0618 11/06/21 0427 11/06/21 0427 11/05/21 0531 11/05/21 0531   SODIUM mmol/L 134*  --  134*  --  131*   POTASSIUM mmol/L 3.9  --  4.1  --  4.4   CHLORIDE mmol/L 97*  --  97*  --  99   CO2 mmol/L 28.0  --  26.8  --  26.1   BUN mg/dL 7  --  9  --  9   CREATININE mg/dL 0.45*  --  0.55*  --  0.59*   GLUCOSE mg/dL 148*   < > 153*   < > 148*   CALCIUM mg/dL 8.5*  --  8.7  --  8.7    < > = values in this interval not displayed.         Results from last 7 days   Lab Units 11/07/21 0618 11/06/21 0427 11/05/21 0531   WBC 10*3/mm3 14.05* 16.33* 16.40*   HEMOGLOBIN g/dL 11.8* 12.0* 11.8*   HEMATOCRIT % 34.6* 35.5* 37.2*   PLATELETS 10*3/mm3 395 359 312     Results from last 7 days   Lab Units 11/07/21 0618 11/06/21 0427 11/05/21 0531 11/02/21  1056 11/02/21  0022   INR  2.20* 2.90* 3.20*   < > 2.55*   APTT seconds  --   --   --   --  70.4*    < > = values in this interval not displayed.     Results from last 7 days   Lab Units 11/03/21  1001   CHOLESTEROL mg/dL 112     Results from last 7 days   Lab Units 11/07/21 0618   MAGNESIUM mg/dL 1.7     Results from last 7 days   Lab Units 11/03/21  1001   CHOLESTEROL mg/dL 112   TRIGLYCERIDES mg/dL 95   HDL CHOL mg/dL 30*   LDL CHOL mg/dL 64       I reviewed the patient's new clinical results.  I personally viewed and interpreted the patient's EKG/Telemetry data        Medication Review:   cephalexin, 1,000 mg, Oral, Q6H  furosemide, 80 mg, Oral, Daily  guaiFENesin, 600 mg, Oral, Q12H  insulin lispro, 0-7 Units, Subcutaneous, TID AC  sodium chloride, 10 mL, Intravenous, Q12H  warfarin (COUMADIN) (dosing per levels), , Does not apply, Daily  warfarin, 5 mg, Oral, Once        Pharmacy to dose warfarin,         Assessment/Plan       Cellulitis of left lower extremity    History of bilateral pulmonary embolism (CMS/HCC)    Lymphedema of both lower extremities    Obesity,  morbid, BMI 50 or higher (HCC)    Dyslipidemia    ARNOLDO (obstructive sleep apnea)    Heterozygous factor V Leiden mutation (HCC)    Hypokalemia    CAROL (acute kidney injury) (HCC)    Sepsis with cutaneous manifestations (HCC)    Hyperglycemia    Paroxysmal atrial fibrillation (HCC)    Atrial Flutter is stable.  No further pauses.  He is on anticoagulation with warfarin.  He is not a candidate for cardioversion or ablation with his weight.  No further recommendations at this time.  Will sign off, please call with questions.      Skyler Quintanilla MD  21  13:49 EST    Electronically signed by Skyler Quintanilla MD at 21 1353     Kameron Owen MD at 21 0809        Patient will need in lab sleep study,  I have placed an order for such  He should follow up with HANNAH sleep MD post discharge.    Electronically signed by Kameron Owen MD at 21 0810     Larry Wise MD at 21 1441              Name: Kameron Melendez ADMIT: 2021   : 1970  PCP: Hansel Patel DO    MRN: [id number] LOS: 4 days   AGE/SEX: 51 y.o. male  ROOM: E4/     Subjective   Subjective     Patient is lying in bed in no major distress.  No new events overnight.  Denies nausea, vomiting abdominal pain, chest pain.    Review of Systems  As above  Objective   Objective   Vital Signs  Temp:  [97.7 °F (36.5 °C)-98 °F (36.7 °C)] 98 °F (36.7 °C)  Heart Rate:  [] 123  Resp:  [18-22] 22  BP: (137-144)/(86-95) 144/95  SpO2:  [90 %-96 %] 92 %  on  Flow (L/min):  [3-4] 4;   Device (Oxygen Therapy): room air  Body mass index is 72.15 kg/m².  Physical Exam  Constitutional:       General: He is not in acute distress.     Appearance: He is obese. He is ill-appearing. He is not toxic-appearing.   HENT:      Head: Normocephalic and atraumatic.   Eyes:      Conjunctiva/sclera: Conjunctivae normal.   Cardiovascular:      Rate and Rhythm: Regular rate, irregular rhythm.      Heart sounds: Normal heart sounds.    Pulmonary:      Effort: Pulmonary effort is normal.      Comments: Diminished on expiration; shallow inspiration  Abdominal:      General: Bowel sounds are normal.      Palpations: Abdomen is soft.   Musculoskeletal:         General: Tenderness (LLE) present.      Cervical back: Normal range of motion and neck supple.      Right lower leg: Edema present.      Left lower leg: Edema present.  Possible area of fluctuance on the left posterior calf  Skin:     General: Skin is warm and dry.      Findings: Erythema (LLE) present.   Neurological:      Mental Status: He is alert and oriented to person, place, and time.      Cranial Nerves: No cranial nerve deficit.      Motor: Weakness (generalized) present.   Psychiatric:         Behavior: Behavior normal.         Thought Content: Thought content normal. Results Review     I reviewed the patient's new clinical results.  Results from last 7 days   Lab Units 11/06/21 0427 11/05/21 0531 11/04/21  0614 11/03/21  1001   WBC 10*3/mm3 16.33* 16.40* 15.74* 14.90*   HEMOGLOBIN g/dL 12.0* 11.8* 12.0* 12.1*   PLATELETS 10*3/mm3 359 312 268 233     Results from last 7 days   Lab Units 11/06/21 0427 11/05/21  0531 11/04/21  1601 11/04/21  0614 11/03/21  1001 11/03/21  1001   SODIUM mmol/L 134* 131*  --  131*  --  131*   POTASSIUM mmol/L 4.1 4.4 4.1 4.0   < > 3.7   CHLORIDE mmol/L 97* 99  --  98  --  98   CO2 mmol/L 26.8 26.1  --  23.6  --  22.8   BUN mg/dL 9 9  --  12  --  13   CREATININE mg/dL 0.55* 0.59*  --  0.49*  --  0.63*   GLUCOSE mg/dL 153* 148*  --  133*  --  150*    < > = values in this interval not displayed.   Estimated Creatinine Clearance: 339.4 mL/min (A) (by C-G formula based on SCr of 0.55 mg/dL (L)).  Results from last 7 days   Lab Units 11/02/21  0022   ALBUMIN g/dL 3.10*   BILIRUBIN mg/dL 0.7   ALK PHOS U/L 169*   AST (SGOT) U/L 19   ALT (SGPT) U/L 18     Results from last 7 days   Lab Units 11/06/21  0427 11/05/21  0531 11/04/21  1601 11/04/21  0614  11/03/21  1001 11/02/21  0616 11/02/21  0022   CALCIUM mg/dL 8.7 8.7  --  8.7 8.4*   < > 9.0   ALBUMIN g/dL  --   --   --   --   --   --  3.10*   MAGNESIUM mg/dL 1.8 2.0 2.0 2.0  --   --   --    PHOSPHORUS mg/dL 2.8 2.3*  --  2.5  --   --   --     < > = values in this interval not displayed.     Results from last 7 days   Lab Units 11/02/21  0354 11/02/21  0022   LACTATE mmol/L 1.3 2.2*     COVID19   Date Value Ref Range Status   11/02/2021 Not Detected Not Detected - Ref. Range Final     Glucose   Date/Time Value Ref Range Status   11/06/2021 1205 133 (H) 70 - 130 mg/dL Final     Comment:     Meter: [id number] : [id number] Gideon Jarvis    11/06/2021 0617 145 (H) 70 - 130 mg/dL Final     Comment:     Meter: [id number] : [id number] Mayur Jenkins NA   11/05/2021 2030 158 (H) 70 - 130 mg/dL Final     Comment:     Meter: [id number] : [id number] Mayur Murillou NA   11/05/2021 1610 169 (H) 70 - 130 mg/dL Final     Comment:     Meter: [id number] : [id number] Milan Kurtz    11/05/2021 1129 151 (H) 70 - 130 mg/dL Final     Comment:     Meter: [id number] : [id number] Milan Kurtz    11/05/2021 0602 146 (H) 70 - 130 mg/dL Final     Comment:     Meter: [id number] : [id number] Mayur Jenkins NA   11/04/2021 2054 155 (H) 70 - 130 mg/dL Final     Comment:     Meter: [id number] : [id number] Mayur Murillou NA       US Nonvascular Extremity Limited  US NONVASCULAR EXTREMITY LIMITED-     Clinical: Cellulitis left leg, Evaluate for catheter abscess     Ultrasound performed along the left calf at the area of interest.     FINDINGS: There is diffuse skin thickening. There is edema demonstrated  within the subcutaneous fat. There is a trace amount of fluid outlines  several of the lobules. There is no discrete focal fluid collection  typical for abscess. Only the soft tissues 2.5 cm below the skin surface  could be evaluated. Due to the patient's large body habitus the deeper  abscess not excluded by ultrasound.  If indicated computed tomography as  follow-up.     CONCLUSION: There is skin thickening with diffuse subcutaneous fat  edema. This is compatible with cellulitis. The soft tissues deeper than  2.5 cm cannot be evaluated by ultrasound. Computed tomography if further  evaluation is warranted clinically.     This report was finalized on 11/4/2021 4:05 PM by Dr. Arnold Newton M.D.     Adult Transthoracic Echo Complete W/ Cont if Necessary Per Protocol  · The left ventricular cavity is mild to moderately dilated.  · Left ventricular ejection fraction appears to be 61 - 65%. Left   ventricular systolic function is normal.  · The right ventricular cavity is mildly dilated. Normal right ventricular   systolic function noted.  · The left atrial cavity is mild to moderately dilated.  · There is no evidence of pericardial effusion       Scheduled Medications  cefepime, 2 g, Intravenous, Q8H  furosemide, 80 mg, Oral, Daily  guaiFENesin, 600 mg, Oral, Q12H  insulin lispro, 0-7 Units, Subcutaneous, TID AC  sodium chloride, 10 mL, Intravenous, Q12H  vancomycin, 1,250 mg, Intravenous, Q8H  warfarin (COUMADIN) (dosing per levels), , Does not apply, Daily  warfarin, 2 mg, Oral, Once    Infusions  Pharmacy to dose vancomycin,   Pharmacy to dose warfarin,     Diet  Diet Regular; Consistent Carbohydrate      Assessment/Plan     Active Hospital Problems    Diagnosis  POA   • **Cellulitis of left lower extremity [L03.116]  Yes   • Hypokalemia [E87.6]  Yes   • CAROL (acute kidney injury) ([facility]) [N17.9]  Yes   • Sepsis with cutaneous manifestations ([facility]) [A41.9]  Yes   • Hyperglycemia [R73.9]  Yes   • Paroxysmal atrial fibrillation ([facility]) [I48.0]  Unknown   • Heterozygous factor V Leiden mutation ([facility]) [D68.51]  Yes   • Dyslipidemia [E78.5]  Yes   • Lymphedema of both lower extremities [I89.0]  Yes   • Obesity, morbid, BMI 50 or higher ([facility]) [E66.01]  Yes   • History of bilateral pulmonary embolism (CMS/HCC) [I26.99]  Yes   • ARNOLDO (obstructive  sleep apnea) [G47.33]  Yes      Resolved Hospital Problems   No resolved problems to display.       51 y.o. male admitted with Cellulitis of left lower extremity.    Cellulitis of left lower extremity / Lymphedema of both lower extremities    Continue with IV vancomycin and cefepime and [place] still [place] [id number] today. Left leg ultrasound was done which is not adequate steady.   Infectious disease consult has been obtained and await recommendations.     Atrial fibrillation, new onset  Likely due to uncontrolled ARNOLDO in setting of morbid obesity    Continue with metoprolol and warfarin and monitor INR closely.  Patient also has underlying history of factor 5 laden deficiency.     Prediabetes  Hemoglobin A1c 6.4 and will continue with corrective dose insulin.  Counseled patient to lose weight as well.     Obesity, morbid, BMI 50 or higher  Complicating all problems     Obstructive sleep apnea  Requiring 2L NC at night     Resolved/stable issues: Hypokalemia, CAROL, dyslipidemia, sepsis; Heterozygous factor V Leiden mutation/History of bilateral pulmonary embolism    · Warfarin (home med) for DVT prophylaxis.  · CPR    Larry Wise MD  [facility]  11/06/21  16:41 EDT    Patient was wearing facemask when I entered the room and throughout our encounter.  I wore protective equipment throughout this patient encounter including a face mask, gloves and protective eyewear.  Hand hygiene was performed before donning protective equipment and after removal when leaving the room.          Electronically signed by Larry Wise MD at 11/06/21 1642     Skyler Quintanilla MD at 11/06/21 1356             LOS: 4 days   Patient Care Team:  Hansel Patel DO as PCP - General (Family Medicine)  Kim Sotelo RPH as Pharmacist    Chief Complaint:   Atrial Flutter, Pauses    Interval History:   No significant pauses noted on telemetry.  His heart has trended up slightly, but he has no awareness of  tachycardia or palpitations.     Objective   Vital Signs  Temp:  [97.7 °F (36.5 °C)-98.1 °F (36.7 °C)] 98 °F (36.7 °C)  Heart Rate:  [] 104  Resp:  [18-22] 22  BP: (134-144)/(74-95) 144/95    Intake/Output Summary (Last 24 hours) at 11/6/2021 1357  Last data filed at 11/6/2021 1334  Gross per 24 hour   Intake 250 ml   Output 8150 ml   Net -7900 ml       Review of Systems   Constitutional: Negative.   Cardiovascular: Negative.    Respiratory: Negative.    Gastrointestinal: Negative.        Physical Exam  Vitals and nursing note reviewed.   Constitutional:       Appearance: Normal appearance.   HENT:      Head: Normocephalic.   Cardiovascular:      Rate and Rhythm: Normal rate and regular rhythm.   Skin:     General: Skin is warm.   Neurological:      General: No focal deficit present.      Mental Status: He is alert and oriented to person, place, and time.   Psychiatric:         Mood and Affect: Mood normal.         Behavior: Behavior normal.           Results Review:      Results from last 7 days   Lab Units 11/06/21 0427 11/05/21 0531 11/05/21 0531 11/04/21  1601 11/04/21 0614 11/04/21 0614   SODIUM mmol/L 134*  --  131*  --   --  131*   POTASSIUM mmol/L 4.1  --  4.4 4.1   < > 4.0   CHLORIDE mmol/L 97*  --  99  --   --  98   CO2 mmol/L 26.8  --  26.1  --   --  23.6   BUN mg/dL 9  --  9  --   --  12   CREATININE mg/dL 0.55*  --  0.59*  --   --  0.49*   GLUCOSE mg/dL 153*   < > 148*  --    < > 133*   CALCIUM mg/dL 8.7  --  8.7  --   --  8.7    < > = values in this interval not displayed.         Results from last 7 days   Lab Units 11/06/21 0427 11/05/21 0531 11/04/21 0614   WBC 10*3/mm3 16.33* 16.40* 15.74*   HEMOGLOBIN g/dL 12.0* 11.8* 12.0*   HEMATOCRIT % 35.5* 37.2* 36.3*   PLATELETS 10*3/mm3 359 312 268     Results from last 7 days   Lab Units 11/06/21  0427 11/05/21  0531 11/04/21  0614 11/02/21  1056 11/02/21  0022   INR  2.90* 3.20* 2.83*   < > 2.55*   APTT seconds  --   --   --   --  70.4*    <  > = values in this interval not displayed.     Results from last 7 days   Lab Units 21  1001   CHOLESTEROL mg/dL 112     Results from last 7 days   Lab Units 21  0427   MAGNESIUM mg/dL 1.8     Results from last 7 days   Lab Units 21  1001   CHOLESTEROL mg/dL 112   TRIGLYCERIDES mg/dL 95   HDL CHOL mg/dL 30*   LDL CHOL mg/dL 64       I reviewed the patient's new clinical results.  I personally viewed and interpreted the patient's EKG/Telemetry data        Medication Review:   cefepime, 2 g, Intravenous, Q8H  furosemide, 80 mg, Oral, Daily  guaiFENesin, 600 mg, Oral, Q12H  insulin lispro, 0-7 Units, Subcutaneous, TID AC  sodium chloride, 10 mL, Intravenous, Q12H  vancomycin, 1,250 mg, Intravenous, Q8H  warfarin (COUMADIN) (dosing per levels), , Does not apply, Daily  warfarin, 2 mg, Oral, Once        Pharmacy to dose vancomycin,   Pharmacy to dose warfarin,         Assessment/Plan       Cellulitis of left lower extremity    History of bilateral pulmonary embolism (CMS/HCC)    Lymphedema of both lower extremities    Obesity, morbid, BMI 50 or higher (HCC)    Dyslipidemia    ARNOLDO (obstructive sleep apnea)    Heterozygous factor V Leiden mutation (HCC)    Hypokalemia    CAROL (acute kidney injury) (HCC)    Sepsis with cutaneous manifestations (HCC)    Hyperglycemia    Paroxysmal atrial fibrillation (HCC)    Atrial Flutter with reasonable rate control and anticoagulation with warfarin.  No new recommendations today.  Pauses appear less frequent.      Skyler Quintanilla MD  21  13:57 EDT    Electronically signed by Skyler Quintanilla MD at 21 1359     Larry Wise MD at 21 1525              Name: Kameron Melendez ADMIT: 2021   : 1970  PCP: Hansel Patel DO    MRN: [id number] LOS: 3 days   AGE/SEX: 51 y.o. male  ROOM: E4/     Subjective   Subjective     Patient is lying in bed in no major distress.  No new events overnight.  Denies nausea, vomiting abdominal  pain, chest pain.    Review of Systems  As above  Objective   Objective   Vital Signs  Temp:  [97.3 °F (36.3 °C)-98.4 °F (36.9 °C)] 98.1 °F (36.7 °C)  Heart Rate:  [72-85] 78  Resp:  [18] 18  BP: (133-147)/(74-92) 134/74  SpO2:  [91 %-92 %] 91 %  on  Flow (L/min):  [3-4] 3;   Device (Oxygen Therapy): nasal cannula; humidified  Body mass index is 72.15 kg/m².  Physical Exam  Constitutional:       General: He is not in acute distress.     Appearance: He is obese. He is ill-appearing. He is not toxic-appearing.   HENT:      Head: Normocephalic and atraumatic.   Eyes:      Conjunctiva/sclera: Conjunctivae normal.   Cardiovascular:      Rate and Rhythm: Regular rate, irregular rhythm.      Heart sounds: Normal heart sounds.   Pulmonary:      Effort: Pulmonary effort is normal.      Comments: Diminished on expiration; shallow inspiration  Abdominal:      General: Bowel sounds are normal.      Palpations: Abdomen is soft.   Musculoskeletal:         General: Tenderness (LLE) present.      Cervical back: Normal range of motion and neck supple.      Right lower leg: Edema present.      Left lower leg: Edema present.  Possible area of fluctuance on the left posterior calf  Skin:     General: Skin is warm and dry.      Findings: Erythema (LLE) present.   Neurological:      Mental Status: He is alert and oriented to person, place, and time.      Cranial Nerves: No cranial nerve deficit.      Motor: Weakness (generalized) present.   Psychiatric:         Behavior: Behavior normal.         Thought Content: Thought content normal. Results Review     I reviewed the patient's new clinical results.  Results from last 7 days   Lab Units 11/05/21 0531 11/04/21 0614 11/03/21  1001 11/02/21  0616   WBC 10*3/mm3 16.40* 15.74* 14.90* 17.21*   HEMOGLOBIN g/dL 11.8* 12.0* 12.1* 12.4*   PLATELETS 10*3/mm3 312 268 233 186     Results from last 7 days   Lab Units 11/05/21  0531 11/04/21  1601 11/04/21  0614 11/03/21  1001 11/02/21  0616  11/02/21  0616   SODIUM mmol/L 131*  --  131* 131*  --  134*   POTASSIUM mmol/L 4.4 4.1 4.0 3.7   < > 3.6   CHLORIDE mmol/L 99  --  98 98  --  99   CO2 mmol/L 26.1  --  23.6 22.8  --  25.2   BUN mg/dL 9  --  12 13  --  22*   CREATININE mg/dL 0.59*  --  0.49* 0.63*  --  0.89   GLUCOSE mg/dL 148*  --  133* 150*  --  132*    < > = values in this interval not displayed.   Estimated Creatinine Clearance: 316.4 mL/min (A) (by C-G formula based on SCr of 0.59 mg/dL (L)).  Results from last 7 days   Lab Units 11/02/21  0022   ALBUMIN g/dL 3.10*   BILIRUBIN mg/dL 0.7   ALK PHOS U/L 169*   AST (SGOT) U/L 19   ALT (SGPT) U/L 18     Results from last 7 days   Lab Units 11/05/21  0531 11/04/21  1601 11/04/21  0614 11/03/21  1001 11/02/21  0616 11/02/21  0022 11/02/21  0022   CALCIUM mg/dL 8.7  --  8.7 8.4* 8.3*   < > 9.0   ALBUMIN g/dL  --   --   --   --   --   --  3.10*   MAGNESIUM mg/dL 2.0 2.0 2.0  --   --   --   --    PHOSPHORUS mg/dL 2.3*  --  2.5  --   --   --   --     < > = values in this interval not displayed.     Results from last 7 days   Lab Units 11/02/21  0354 11/02/21  0022   LACTATE mmol/L 1.3 2.2*     COVID19   Date Value Ref Range Status   11/02/2021 Not Detected Not Detected - Ref. Range Final     Hemoglobin A1C   Date/Time Value Ref Range Status   11/03/2021 1001 6.44 (H) 4.80 - 5.60 % Final     Glucose   Date/Time Value Ref Range Status   11/05/2021 1129 151 (H) 70 - 130 mg/dL Final     Comment:     Meter: [id number] : [id number] Milan Kurtz    11/05/2021 0602 146 (H) 70 - 130 mg/dL Final     Comment:     Meter: [id number] : [id number] Mayur Jenkins    11/04/2021 2054 155 (H) 70 - 130 mg/dL Final     Comment:     Meter: [id number] : [id number] Mayur Jenkins    11/04/2021 1653 165 (H) 70 - 130 mg/dL Final     Comment:     Meter: [id number] : [id number] Satindervalery Vásquez    11/04/2021 1118 143 (H) 70 - 130 mg/dL Final     Comment:     Meter: [id number] : [id number] Chuy Baeza  CECIL   11/04/2021 0610 136 (H) 70 - 130 mg/dL Final     Comment:     Meter: [id number] : [id number] Cecilia Taylor CECIL   11/03/2021 2007 163 (H) 70 - 130 mg/dL Final     Comment:     Meter: [id number] : [id number] JACOB       US Nonvascular Extremity Limited  US NONVASCULAR EXTREMITY LIMITED-     Clinical: Cellulitis left leg, Evaluate for catheter abscess     Ultrasound performed along the left calf at the area of interest.     FINDINGS: There is diffuse skin thickening. There is edema demonstrated  within the subcutaneous fat. There is a trace amount of fluid outlines  several of the lobules. There is no discrete focal fluid collection  typical for abscess. Only the soft tissues 2.5 cm below the skin surface  could be evaluated. Due to the patient's large body habitus the deeper  abscess not excluded by ultrasound. If indicated computed tomography as  follow-up.     CONCLUSION: There is skin thickening with diffuse subcutaneous fat  edema. This is compatible with cellulitis. The soft tissues deeper than  2.5 cm cannot be evaluated by ultrasound. Computed tomography if further  evaluation is warranted clinically.     This report was finalized on 11/4/2021 4:05 PM by Dr. Arnold Newton M.D.     Adult Transthoracic Echo Complete W/ Cont if Necessary Per Protocol  · The left ventricular cavity is mild to moderately dilated.  · Left ventricular ejection fraction appears to be 61 - 65%. Left   ventricular systolic function is normal.  · The right ventricular cavity is mildly dilated. Normal right ventricular   systolic function noted.  · The left atrial cavity is mild to moderately dilated.  · There is no evidence of pericardial effusion       Scheduled Medications  cefepime, 2 g, Intravenous, Q8H  guaiFENesin, 600 mg, Oral, Q12H  insulin lispro, 0-7 Units, Subcutaneous, TID AC  sodium chloride, 10 mL, Intravenous, Q12H  vancomycin, 1,250 mg, Intravenous, Q12H  warfarin (COUMADIN) (dosing per levels), ,  Does not apply, Daily    Infusions  Pharmacy to dose vancomycin,   Pharmacy to dose warfarin,     Diet  Diet Regular; Consistent Carbohydrate      Assessment/Plan     Active Hospital Problems    Diagnosis  POA   • **Cellulitis of left lower extremity [L03.116]  Yes   • Hypokalemia [E87.6]  Yes   • CAROL (acute kidney injury) (HCC) [N17.9]  Yes   • Sepsis with cutaneous manifestations ([facility]) [A41.9]  Yes   • Hyperglycemia [R73.9]  Yes   • Paroxysmal atrial fibrillation ([facility]) [I48.0]  Unknown   • Heterozygous factor V Leiden mutation ([facility]) [D68.51]  Yes   • Dyslipidemia [E78.5]  Yes   • Lymphedema of both lower extremities [I89.0]  Yes   • Obesity, morbid, BMI 50 or higher ([facility]) [E66.01]  Yes   • History of bilateral pulmonary embolism (CMS/HCC) [I26.99]  Yes   • ARNOLDO (obstructive sleep apnea) [G47.33]  Yes      Resolved Hospital Problems   No resolved problems to display.       51 y.o. male admitted with Cellulitis of left lower extremity.    Cellulitis of left lower extremity / Lymphedema of both lower extremities    Continue with IV vancomycin and cefepime and WBC 35713 today. Left leg ultrasound was done which is not adequate steady.  Infectious disease consult has also been obtained.     Atrial fibrillation, new onset  Likely due to uncontrolled ARNOLDO in setting of morbid obesity    Continue with metoprolol and warfarin and monitor INR closely.  Patient also has underlying history of factor 5 laden deficiency.     Prediabetes  Hemoglobin A1c 6.4 and will continue with corrective dose insulin.  Counseled patient to lose weight as well.     Obesity, morbid, BMI 50 or higher  Complicating all problems     Obstructive sleep apnea  Requiring 2L NC at night     Resolved/stable issues: Hypokalemia, CAROL, dyslipidemia, sepsis; Heterozygous factor V Leiden mutation/History of bilateral pulmonary embolism    · Warfarin (home med) for DVT prophylaxis.  · CPR    Larry Wise MD  [facility]  11/05/21  15:25 EDT    Patient was wearing facemask when I entered the room and throughout our encounter.  I wore protective equipment throughout this patient encounter including a face mask, gloves and protective eyewear.  Hand hygiene was performed before donning protective equipment and after removal when leaving the room.          Electronically signed by Larry Wise MD at 11/05/21 1527          Consult Notes (last 72 hours)      Osman Tinoco DO at 11/07/21 0843      Consult Orders    1. Inpatient Infectious Diseases Consult [[id number]] ordered by Larry Wise MD at 11/06/21 1648               Referring Provider: Randall Jordan MD  Reason for Consultation: Cellulitis  Chief Complaint   Patient presents with   • Cellulitis       Subjective   History of present illness: Patient is a 51-year-old male with morbid obesity, prediabetes and bilateral lower extremity lymphedema who presents with worsening redness and swelling of the left lower extremity and ID is consulted for cellulitis.    Patient was admitted on 11/2 and has been on broad-spectrum antibiotics with vancomycin and Zosyn for lower extremity cellulitis.  Ultrasound was obtained due to concern for possible abscess however did not identify any underlying abscess.  Patient had leukocytosis on admission which is fluctuated.      He reports being placed on oral Keflex outpatient with continued worsening and presented to the hospital.  Since being here in the hospital he reports marked improvement in the left lower extremity swelling and redness.  Reports no fevers or chills.  States he is feeling much better and markedly less tenderness of the left lower extremity.  Denies any nausea, vomiting, diarrhea.  Denies any shortness of breath.  Denies any other rash.    Past Medical History:   Diagnosis Date   • DVT (deep venous thrombosis) (HCC)     RLE   • Factor V Leiden (HCC)    • Hypertension    • Kidney stone    •  "Lymphedema    • Lymphedema of left lower extremity    • Obesity    • ARNOLDO (obstructive sleep apnea)    • Pulmonary embolism (HCC)     bilateral   • Pulmonary hypertension (HCC)    • Right ventricular enlargement        Past Surgical History:   Procedure Laterality Date   • CARDIAC CATHETERIZATION Bilateral 7/18/2017    Procedure: Pulmonary angiography- [facility] ;  Surgeon: Cedric Coulter MD;  Location:  [facility];  Service:    • CARDIAC CATHETERIZATION N/A 7/18/2017    Procedure: Right Heart Cath;  Surgeon: Cedric Coulter MD;  Location:  [facility];  Service:    • THROMBECTOMY         family history includes Bradycardia in his mother; Heart disease in his mother; Heart failure in his mother; No Known Problems in his father, maternal grandfather, maternal grandmother, paternal grandfather, and paternal grandmother.     reports that he has been smoking cigars and pipe. He started smoking about 15 years ago. He has never used smokeless tobacco. He reports that he does not drink alcohol and does not use drugs.     No Known Allergies    Medication:  Antibiotics:  Anti-Infectives (From admission, onward)    Ordered     Dose/Rate Route Frequency Start Stop    11/06/21 1000  vancomycin 1250 mg/250 mL 0.9% NS IVPB ([facility])        Ordering Provider: Kay Jung MD   \"Followed by\" Linked Group Details    1,250 mg  over 75 Minutes Intravenous Every 8 Hours 11/06/21 1330 11/11/21 1329    11/04/21 1420  vancomycin 3000 mg/1000 mL 0.9% NS IVPB        Ordering Provider: Kay Jung MD   \"Followed by\" Linked Group Details    20 mg/kg × 255 kg  over 240 Minutes Intravenous Once 11/04/21 1515 11/04/21 1902    11/04/21 1354  Pharmacy to dose vancomycin        Ordering Provider: Kay Jung MD     Does not apply Continuous PRN 11/04/21 1353 11/11/21 1352    11/02/21 0214  cefepime 2 gm IVPB in 100 ml NS ([facility])        Ordering Provider: Tonja Jones APRN    2 g  over 4 Hours " Intravenous Every 8 Hours 11/02/21 1000 11/07/21 0554    11/02/21 0119  cefepime (MAXIPIME) 1 g/100 mL 0.9% NS IVPB ([facility])        Ordering Provider: Cedric Garcia MD    1 g  200 mL/hr over 30 Minutes Intravenous Once 11/02/21 0121 11/02/21 0351    11/01/21 2351  vancomycin 2500 mg/500 mL 0.9% NS IVPB ([facility])        Ordering Provider: Cedric Garcia MD    11 mg/kg × 234 kg (Order-Specific)  over 2.5 Hours Intravenous Once 11/01/21 2353 11/02/21 0311          Review of Systems  Pertinent items are noted in HPI, all other systems reviewed and negative    Objective     Physical Exam:   Vital Signs   Temp:  [97.7 °F (36.5 °C)-98 °F (36.7 °C)] 97.8 °F (36.6 °C)  Heart Rate:  [] 80  Resp:  [18-22] 18  BP: (143-164)/(90-97) 164/97    GENERAL: Awake and alert, in no acute distress.  Morbidly obese  HEENT: Oropharynx is clear. Hearing is grossly normal.   EYES: PERRL. No conjunctival injection. No lid lag.   LYMPHATICS: No lymphadenopathy of the neck or inguinal regions.   HEART: Regular rate and regular rhythm. No peripheral edema.   LUNGS: Clear to auscultation anteriorly with normal respiratory effort.   GI: Soft, nontender, nondistended. No appreciable organomegaly.   SKIN: Bilateral lower extremities with chronic venous stasis dermatitis appearance.  Currently no overt signs of cellulitis in the lower extremities other than possible residual erythema in the creases within the skin.  No purulence.  PSYCHIATRIC: Appropriate mood, affect, insight, and judgment.       Results Review:   I reviewed the patient's new clinical results.  I reviewed the patient's new imaging results and agree with the interpretation.  I reviewed the patient's other test results and agree with the interpretation    Lab Results   Component Value Date    WBC 14.05 (H) 11/07/2021    HGB 11.8 (L) 11/07/2021    HCT 34.6 (L) 11/07/2021    MCV 86.3 11/07/2021     11/07/2021       Lab Results   Component Value Date    [facility]  6.70 11/06/2021       Lab Results   Component Value Date    GLUCOSE 148 (H) 11/07/2021    BUN 7 11/07/2021    CREATININE 0.45 (L) 11/07/2021    EGFRIFNONA >150 11/07/2021    EGFRIFAFRI 115 09/02/2021    BCR 15.6 11/07/2021    CO2 28.0 11/07/2021    CALCIUM 8.5 (L) 11/07/2021    PROTENTOTREF 6.4 09/02/2021    ALBUMIN 3.10 (L) 11/02/2021    LABIL2 1.8 09/02/2021    AST 19 11/02/2021    ALT 18 11/02/2021         Estimated Creatinine Clearance: 414.8 mL/min (A) (by C-G formula based on SCr of 0.45 mg/dL (L)).      Microbiology:  11/2 Covid negative  11/2 blood cultures no growth today  11/2 MRSA nares screen negative    Radiology:  11/2 chest x-ray reviewed by me without any signs of pulmonary infiltrate.  Cardiomegaly.    11/4 ultrasound of the left lower extremity with skin thickening and diffuse subcutaneous edema consistent with cellulitis.  Exam cannot evaluate deeper collections.    Assessment/Plan   #Left lower extremity cellulitis  #Morbid obesity  #Chronic lower extremity lymphedema and venous stasis     Given patient's reported marked improvement from admission on antibiotics believe his white blood cell count is largely due to the difficulty with penetration of fatty tissues with antibiotic therapy.  He is likely requiring a longer course given these limitations.  Ultrasound is limited due to body habitus but no discernible abscess was identified which is reassuring.    As he has received IV lead-in therapy I do believe it is reasonable to transition to oral cephalexin 1 g 4 times daily through 11/16, which would be a 14-day course in the setting of his morbid obesity I believe this would be the best course.      Thank you for allowing me to be involved in the care of this patient. Infectious diseases will sign off at this time with antibiotics plan in place, but please call me at [phone] if any further ID questions or new ID concerns.      Electronically signed by Osman Tinoco DO at 11/07/21  0856     Henry Ashford MD at 11/06/21 2112      Consult Orders    1. Inpatient Pulmonology Consult [[id number]] ordered by Cherri Jones APRN at 11/05/21 1738                                Referring Provider: Dr. Santamaria  Reason for Consultation: Sleep apnea    Patient Care Team:  Hansel Patel DO as PCP - General (Family Medicine)  Kim Sotelo RPH as Pharmacist    Chief complaint:   Admitted for cellulitis.  We are consulted for sleep apnea    History of present illness:    Subjective   This is a 51-year-old male patient who was admitted on 11/2/2021 for cellulitis.    During he does mention, he developed atrial fibrillation/flutter and pauses.  We are consulted for sleep apnea assessment as this could be a contributor to his arrhythmia.    Patient reported diagnosis of sleep apnea about 12 years ago in [facility].  He was treated with CPAP which he tried for about a week from what he recalls and could not really tolerate despite using different masks but he did not really elaborate much on that issue since he does not recall well.  Couple of years later, he was asked to get reassessed and underwent a sleep study again at [facility] and again was placed on CPAP therapy but he could not tolerate and had to return the equipment.  I assessed him in 2017 when he was admitted for PE and he was noted to have significant nocturnal desaturation and offered sleep apnea testing and treatment but he declined.    He did endorse loud snoring but denies excessive daytime sleepiness.  He usually goes to bed around 10:30 PM and falls asleep quickly and wake up around 4 AM.  He wakes up without an alarm.  He feels okay upon awakening.  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  He wakes up once at night to urinate.  He has dry mouth occasionally.  He does not recall the severity of his sleep apnea and he thinks that his weight has increased since his initial diagnosis of ARNOLDO.    Labs reviewed:  On  presentation on 11/2/2021: Hemoglobin 12.4; bicarb 25    Review of Systems  Constitutional: No fever or chills.   ENMT: No sinus congestion  Cardiovascular: No chest pain, palpitation but legs swelling.    Respiratory: Mild dyspnea on activities.  No cough or wheezing  Gastrointestinal: No constipation, diarrhea or abdominal pain.  No nausea or vomiting.  Neurology: No headache, weakness, numbness or dizziness.   Musculoskeletal: No joints pain, stiffness or swelling.  Pain in his left leg.  Psychiatry: No depression.  Genitourinary: No dysuria or frequent urination  Endo: No weight changes. No cold or warm intolerance.  Lymphatic: No swollen glands.  Integumentary: No rash.    History  Past Medical History:   Diagnosis Date   • DVT (deep venous thrombosis) (HCC)     RLE   • Factor V Leiden (HCC)    • Hypertension    • Kidney stone    • Lymphedema    • Lymphedema of left lower extremity    • Obesity    • ARNOLDO (obstructive sleep apnea)    • Pulmonary embolism (HCC)     bilateral   • Pulmonary hypertension (HCC)    • Right ventricular enlargement    ,   Past Surgical History:   Procedure Laterality Date   • CARDIAC CATHETERIZATION Bilateral 7/18/2017    Procedure: Pulmonary angiography- [facility] ;  Surgeon: Cedric Coulter MD;  Location: [facility] CATH INVASIVE LOCATION;  Service:    • CARDIAC CATHETERIZATION N/A 7/18/2017    Procedure: Right Heart Cath;  Surgeon: Cedric Coulter MD;  Location: [facility] CATH INVASIVE LOCATION;  Service:    • THROMBECTOMY     ,   Family History   Problem Relation Age of Onset   • Heart disease Mother    • Heart failure Mother    • Bradycardia Mother    • No Known Problems Father    • No Known Problems Maternal Grandmother    • No Known Problems Maternal Grandfather    • No Known Problems Paternal Grandmother    • No Known Problems Paternal Grandfather    ,   Social History     Socioeconomic History   • Marital status:    Tobacco Use   • Smoking status: Light Tobacco Smoker     Types: Cigars, Pipe  "    Start date: 1/1/2006   • Smokeless tobacco: Never Used   • Tobacco comment: occasional - < 1 a month   Substance and Sexual Activity   • Alcohol use: No   • Drug use: No   • Sexual activity: Defer       ,   Medications Prior to Admission   Medication Sig Dispense Refill Last Dose   • acetaminophen (TYLENOL) 500 MG tablet Take 500 mg by mouth Every 6 (Six) Hours As Needed for Mild Pain .      • ammonium lactate (AMLACTIN) 12 % cream Apply 1 application topically to the appropriate area as directed As Needed for Dry Skin. Apply to both legs daily as needed for venous stasis dermatitis and lymphedema skin thickening      • cephalexin (KEFLEX) 500 MG capsule Take 1 capsule by mouth 4 (Four) Times a Day. 40 capsule 0 11/1/2021 at Unknown time   • chlorhexidine (Hibiclens) 4 % external liquid Apply 1 application topically to the appropriate area as directed Daily As Needed for Wound Care.      • furosemide (LASIX) 80 MG tablet TAKE 1 TABLET BY MOUTH DAILY (Patient taking differently: Take 80 mg by mouth Daily. Pt. Only takes when swelling is \"excessive\" and he is going to be home) 30 tablet 5 Past Week at Unknown time   • potassium chloride (K-DUR,KLOR-CON) 20 MEQ CR tablet Take 1 tablet by mouth Daily. Take with furosemide (Patient taking differently: Take 20 mEq by mouth Daily. Only takes with furosemide) 30 tablet 2    • warfarin (COUMADIN) 10 MG tablet TAKE ONE TABLET BY MOUTH ON TUE, THUR, SAT AND TAKE ONE AND ONE-HALF (15 MG) TABLETS ALL OTHER DAYS OR AS DIRECTED (Patient taking differently: Take 10 mg by mouth 3 (Three) Times a Week. Takes 10 mg daily on Tuesday, Thursday,and Saturday) 120 tablet 1    • warfarin (COUMADIN) 10 MG tablet Take 15 mg by mouth 4 (Four) Times a Week. Takes 1.5 tablets (15mg) Sunday, Monday, Wednesday, and Friday.   11/1/2021 at Unknown time   , Scheduled Meds:  cephalexin, 1,000 mg, Oral, Q6H  furosemide, 80 mg, Oral, Daily  guaiFENesin, 600 mg, Oral, Q12H  insulin lispro, 0-7 Units, " Subcutaneous, TID AC  sodium chloride, 10 mL, Intravenous, Q12H  warfarin (COUMADIN) (dosing per levels), , Does not apply, Daily    , Continuous Infusions:  Pharmacy to dose warfarin,      and Allergies:  Patient has no known allergies.    Objective     Vital Signs   Temp:  [97.7 °F (36.5 °C)-98.3 °F (36.8 °C)] 98.3 °F (36.8 °C)  Heart Rate:  [] 83  Resp:  [18] 18  BP: (143-170)/(73-97) 153/97    PPE used per hospital policy    Physical Exam:  Constitutional: Not in acute distress.  Eyes: Injected conjunctivae, EOMI. pupils equal reactive to light.  ENMT: Guerrero and Mallampati 3. No oral thrush.  Moist tongue  Neck: Large. Trachea midline. No thyromegaly  Heart: RRR, no murmur  Lungs: Good and equal air entry bilaterally.  Non labored breathing.   Abdomen: Obese. Soft. No tenderness or dullness. No HSM.  Extremities: No cyanosis, clubbing but bilateral pitting edema in legs up to the knees.  Warm extremities and well-perfused.  Neuro: Conscious, alert, oriented x3.  Strength 5/5 in arms.  Psych: Appropriate mood and affect.    Integumentary: Erythema left leg with scaling of the skin.  Lymphatic: No palpable cervical or supraclavicular lymph nodes.      Diagnostic imaging:  I personally and independently reviewed the following images:   CTA chest 10/29/2021: No PE.        Assessment   1. ARNOLDO  2. Super morbid obesity, BMI 72  3. Paroxysmal A. Fib      Recommendations:    Check split-night PSG.  Had long discussion with the patient regarding sleep apnea.  He is reluctant about CPAP therapy but said he would consider it.  I explained the treatment options which mostly include CPAP as he would not be a good candidate for oral appliance or advanced interventions such as hypoglossal nerve stimulation.  However, I explained to him that the PAP therapy has advanced and there are many ways to use to overcome the issues with intolerance including fitting with different kind of mask, using bilevel instead of CPAP and  also possibly using sleep aid to reduce awakening.  He said that he would consider that.  Order for split-night study was placed and hopefully patient will schedule that after discharge.    Counseled for weight loss    Discussed the importance of sleep apnea therapy in the setting of cardiac comorbidities including no A. fib and the relationship between sleep apnea and A. fib      Henry Ashford MD  21  19:12 EST              Electronically signed by Henry Ashford MD at 21 1940     Zack Santamaria MD at 21 [id number]                Electrophysiology Hospital Consult            Patient Name: Kameron Melendez  Age/Sex: 51 y.o. male  : 1970  MRN: [id number]    Date of Admission: 2021  Date of Encounter Visit: 21  Encounter Provider: Zack Santamaria MD  Referring Provider: Randall Jordan MD  Place of Service: [facility] CARDIOLOGY  Patient Care Team:  Hansel Patel DO as PCP - General (Family Medicine)  Kim Sotelo RPH as Pharmacist      Subjective:   EP Consultation for: Bradycardia    Chief Complaint: He has no complaints cardiac wise.    History of Present Illness:  Kameron Melendez is a 51 y.o. male who we were asked to see for bradycardic pauses.    The patient has no symptoms of bradycardia. He has never had near syncope or syncope. He told us the last time he was in the hospital the same thing happened where they told him his heart rate got low during sleep    He indeed does have up to 15-second pauses on his monitor but is asymptomatic. The rhythm strip today which showed a 15-second pause also showed an O2 saturation of 86%.    He has known sleep apnea and cannot tolerate a CPAP mask. Though he has not seen a pulmonologist in a long time.    He has no chest pain and at rest he has no dyspnea. He is here for cellulitis and lower extremity edema.        Past Medical History:  Past Medical History:   Diagnosis Date   • DVT (deep venous thrombosis)  "(HCC)     RLE   • Factor V Leiden (HCC)    • Hypertension    • Kidney stone    • Lymphedema    • Lymphedema of left lower extremity    • Obesity    • ARNOLDO (obstructive sleep apnea)    • Pulmonary embolism (HCC)     bilateral   • Pulmonary hypertension (HCC)    • Right ventricular enlargement        Past Surgical History:   Procedure Laterality Date   • CARDIAC CATHETERIZATION Bilateral 7/18/2017    Procedure: Pulmonary angiography- [place] ;  Surgeon: Cedric Coulter MD;  Location:  [facility];  Service:    • CARDIAC CATHETERIZATION N/A 7/18/2017    Procedure: Right Heart Cath;  Surgeon: Cedric Coulter MD;  Location:  [facility];  Service:    • THROMBECTOMY         Home Medications:   Medications Prior to Admission   Medication Sig Dispense Refill Last Dose   • acetaminophen (TYLENOL) 500 MG tablet Take 500 mg by mouth Every 6 (Six) Hours As Needed for Mild Pain .      • ammonium lactate (AMLACTIN) 12 % cream Apply 1 application topically to the appropriate area as directed As Needed for Dry Skin. Apply to both legs daily as needed for venous stasis dermatitis and lymphedema skin thickening      • cephalexin (KEFLEX) 500 MG capsule Take 1 capsule by mouth 4 (Four) Times a Day. 40 capsule 0 11/1/2021 at Unknown time   • chlorhexidine (Hibiclens) 4 % external liquid Apply 1 application topically to the appropriate area as directed Daily As Needed for Wound Care.      • furosemide (LASIX) 80 MG tablet TAKE 1 TABLET BY MOUTH DAILY (Patient taking differently: Take 80 mg by mouth Daily. Pt. Only takes when swelling is \"excessive\" and he is going to be home) 30 tablet 5 Past Week at Unknown time   • potassium chloride (K-DUR,KLOR-CON) 20 MEQ CR tablet Take 1 tablet by mouth Daily. Take with furosemide (Patient taking differently: Take 20 mEq by mouth Daily. Only takes with furosemide) 30 tablet 2    • warfarin (COUMADIN) 10 MG tablet TAKE ONE TABLET BY MOUTH ON TUE, THUR, SAT AND TAKE ONE AND " ONE-HALF (15 MG) TABLETS ALL OTHER DAYS OR AS DIRECTED (Patient taking differently: Take 10 mg by mouth 3 (Three) Times a Week. Takes 10 mg daily on Tuesday, Thursday,and Saturday) 120 tablet 1    • warfarin (COUMADIN) 10 MG tablet Take 15 mg by mouth 4 (Four) Times a Week. Takes 1.5 tablets (15mg) Sunday, Monday, Wednesday, and Friday.   11/1/2021 at Unknown time       Allergies:  No Known Allergies    Past Social History:  Social History     Socioeconomic History   • Marital status:    Tobacco Use   • Smoking status: Light Tobacco Smoker     Types: Cigars, Pipe     Start date: 1/1/2006   • Smokeless tobacco: Never Used   • Tobacco comment: occasional - < 1 a month   Substance and Sexual Activity   • Alcohol use: No   • Drug use: No   • Sexual activity: Defer       Past Family History:  Family History   Problem Relation Age of Onset   • Heart disease Mother    • Heart failure Mother    • Bradycardia Mother    • No Known Problems Father    • No Known Problems Maternal Grandmother    • No Known Problems Maternal Grandfather    • No Known Problems Paternal Grandmother    • No Known Problems Paternal Grandfather        Review of Systems: All systems reviewed. Pertinent positives identified in HPI. All other systems are negative.     14 point ROS was performed and is negative except as outlined in HPI.     Objective:     Objective:  Vital Signs (last 24 hours)       11/04 0700  11/05 0659 11/05 0700  11/05 1733   Most Recent      Temp (°F) 97.6 -  98.4    97.3 -  98.1     98.1 (36.7) 11/05 1403    Heart Rate 72 -  80    78 -  85     78 11/05 1403    Resp   18      18     18 11/05 1403    /87 -  141/92    134/74 -  147/81     134/74 11/05 1403    SpO2 (%) 90 -  92       91 11/04 2244        Temp:  [97.3 °F (36.3 °C)-98.4 °F (36.9 °C)] 98.1 °F (36.7 °C)  Heart Rate:  [72-85] 78  Resp:  [18] 18  BP: (133-147)/(74-92) 134/74  Body mass index is 72.15 kg/m².        Physical Exam:   Constitutional:        Appearance: Morbidly obese.   Eyes:      General:         Right eye: No discharge.         Left eye: No discharge.   HENT:      Head: Normocephalic and atraumatic.   Neck:      Thyroid: No thyromegaly.      Vascular: No JVD.   Pulmonary:      Effort: Pulmonary effort is normal.      Breath sounds: Normal breath sounds. No rales.   Cardiovascular:      Normal rate. Irregularly irregular rhythm.      No gallop.   Edema:     Peripheral edema absent.   Abdominal:      General: Bowel sounds are normal.      Palpations: Abdomen is soft.      Tenderness: There is no abdominal tenderness.   Musculoskeletal: Normal range of motion.         General: No deformity. Skin:     General: Skin is warm and dry.      Findings: No erythema.   Neurological:      Mental Status: Alert and oriented to person, place, and time.      Motor: Normal muscle tone.   Psychiatric:         Behavior: Behavior normal.         Thought Content: Thought content normal.          Labs:   Lab Review:     Results from last 7 days   Lab Units 11/05/21  0531 11/04/21  1601 11/04/21  0614 11/03/21  1001 11/02/21  0616 11/02/21  0022   SODIUM mmol/L 131*  --  131* 131* 134* 134*   POTASSIUM mmol/L 4.4 4.1 4.0 3.7 3.6 3.3*   CHLORIDE mmol/L 99  --  98 98 99 96*   CO2 mmol/L 26.1  --  23.6 22.8 25.2 24.9   BUN mg/dL 9  --  12 13 22* 23*   CREATININE mg/dL 0.59*  --  0.49* 0.63* 0.89 1.29*   GLUCOSE mg/dL 148*  --  133* 150* 132* 131*   CALCIUM mg/dL 8.7  --  8.7 8.4* 8.3* 9.0   AST (SGOT) U/L  --   --   --   --   --  19   ALT (SGPT) U/L  --   --   --   --   --  18         Results from last 7 days   Lab Units 11/05/21 0531   WBC 10*3/mm3 16.40*   HEMOGLOBIN g/dL 11.8*   HEMATOCRIT % 37.2*   PLATELETS 10*3/mm3 312     Results from last 7 days   Lab Units 11/05/21  0531 11/04/21  0614 11/03/21  1001 11/02/21  1056 11/02/21  0022   INR  3.20* 2.83* 2.98*   < > 2.55*   APTT seconds  --   --   --   --  70.4*    < > = values in this interval not displayed.     Results  from last 7 days   Lab Units 11/03/21  1001   CHOLESTEROL mg/dL 112     Results from last 7 days   Lab Units 11/05/21  0531   MAGNESIUM mg/dL 2.0     Results from last 7 days   Lab Units 11/03/21  1001   CHOLESTEROL mg/dL 112   TRIGLYCERIDES mg/dL 95   HDL CHOL mg/dL 30*   LDL CHOL mg/dL 64                       Imaging:     Imaging Results (Most Recent)     Procedure Component Value Units Date/Time    US Nonvascular Extremity Limited [[id number]] Collected: 11/04/21 1601     Updated: 11/04/21 1608    Narrative:      US NONVASCULAR EXTREMITY LIMITED-     Clinical: Cellulitis left leg, Evaluate for catheter abscess     Ultrasound performed along the left calf at the area of interest.     FINDINGS: There is diffuse skin thickening. There is edema demonstrated  within the subcutaneous fat. There is a trace amount of fluid outlines  several of the lobules. There is no discrete focal fluid collection  typical for abscess. Only the soft tissues 2.5 cm below the skin surface  could be evaluated. Due to the patient's large body habitus the deeper  abscess not excluded by ultrasound. If indicated computed tomography as  follow-up.     CONCLUSION: There is skin thickening with diffuse subcutaneous fat  edema. This is compatible with cellulitis. The soft tissues deeper than  2.5 cm cannot be evaluated by ultrasound. Computed tomography if further  evaluation is warranted clinically.     This report was finalized on 11/4/2021 4:05 PM by Dr. Arnold Newton M.D.       XR Chest 1 View [[id number]] Collected: 11/02/21 0029     Updated: 11/02/21 0033    Narrative:      SINGLE VIEW OF THE CHEST     HISTORY: Cough     COMPARISON: 10/29/2021     FINDINGS:  Cardiomegaly is present. There is no vascular congestion. No  pneumothorax, pleural effusion, or acute infiltrate is seen.       Impression:      No acute findings.     This report was finalized on 11/2/2021 12:30 AM by Dr. Kasey Galloway M.D.               EKG:   Atrial  fibrillation      I personally viewed and interpreted the patient's EKG/Telemetry data.    Assessment:       Cellulitis of left lower extremity    History of bilateral pulmonary embolism (CMS/HCC)    Lymphedema of both lower extremities    Obesity, morbid, BMI 50 or higher (HCC)    Dyslipidemia    ARNOLDO (obstructive sleep apnea)    Heterozygous factor V Leiden mutation (HCC)    Hypokalemia    CAROL (acute kidney injury) (HCC)    Sepsis with cutaneous manifestations (HCC)    Hyperglycemia    Paroxysmal atrial fibrillation (HCC)        Plan:      This is a complex patient with multiple medical problems mostly related to his obesity. I am certain that he has very severe sleep disordered breathing, apnea and these pauses are undoubtedly related to that.    He has never had symptomatic bradycardia. I would not recommend a pacemaker at this time as these are almost certainly due to apneic spells.    I discussed with him the importance of sleep apnea therapy and I am going to ask pulmonology to consult on this. I mean, it is so severe I wonder whether a tracheo ostomy would be beneficial.    His atrial fibrillation is also new. However he is currently on chronic anticoagulant therapy and his rate is fairly well controlled and I would not recommend any rhythm control therapy given his comorbidities.    Thank you for allowing me to participate in the care of Kameron Melendez. Feel free to contact me directly with any further questions or concerns.    Zack Santamaria MD  [facility]  11/05/21  17:33 EDT    Electronically signed by Zack Santamaria MD at 11/05/21 3072         All medication doses during the admission are shown, including meds that are no longer on order.    Scheduled Meds Sorted by Name  for Kameron Melendez as of 11/6/21 through 11/8/21    1 Day 3 Days 7 Days 10 Days < Today >   Legend:                          Inactive     Active     Other Encounter     Linked                 Medications 11/06/21  11/07/21 11/08/21   cefepime (MAXIPIME) 1 g/100 mL 0.9% NS IVPB (mbp)  Dose: 1 g  Freq: Once Route: IV  Indications of Use: SEPSIS  Last Dose: Stopped (11/02/21 0351)  Start: 11/02/21 0121 End: 11/02/21 0351   Admin Instructions:   Break seal and mix to activate vial before use         cefepime 2 gm IVPB in 100 ml NS (VTB)  Dose: 2 g  Freq: Every 8 Hours Route: IV  Indications of Use: SKIN AND SOFT TISSUE INFECTION  Last Dose: 0 g (11/03/21 0645)  Start: 11/02/21 1000 End: 11/07/21 0554         0319   0924   1706           0154 [C]             cephalexin (KEFLEX) capsule 1,000 mg  Dose: 1,000 mg  Freq: Every 6 Hours Scheduled Route: PO  Indications of Use: SKIN AND SOFT TISSUE INFECTION  Start: 11/07/21 1200 End: 11/16/21 1159   Admin Instructions:   Take with food if GI upset occurs.          1204   1733   2315           0528   1137   1800        digoxin (LANOXIN) injection 500 mcg  Dose: 500 mcg  Freq: Once Route: IV  Start: 11/02/21 1651 End: 11/02/21 1817   Admin Instructions:    Check and record heart rate. Use filter needle to withdraw dose from ampule. Dose may be pushed over 5 minutes.         furosemide (LASIX) tablet 80 mg  Dose: 80 mg  Freq: Daily Route: PO  Start: 11/05/21 1700         0924             0819             0826          guaiFENesin (MUCINEX) 12 hr tablet 600 mg  Dose: 600 mg  Freq: Every 12 Hours Scheduled Route: PO  Start: 11/02/21 1443   Admin Instructions:   Caution: Look alike/sound alike drug alert               Do not crush, split, or chew.         0924   2125            0819   2142            0826   2100         insulin lispro (ADMELOG) injection 0-7 Units  Dose: 0-7 Units  Freq: 3 Times Daily Before Meals Route: SC  Start: 11/02/21 1219   Admin Instructions:   Correction - Low Dose.  Less than 40 units/day total insulin dose or lean, elderly, renal patients    Blood glucose 150-199 mg/dL - 2 units  Blood glucose 200-249 mg/dL - 3 units  Blood glucose 250-299 mg/dL - 4 units  Blood  glucose 300-349 mg/dL - 5 units  Blood glucose 350-400 mg/dL - 6 units  Blood glucose greater than 400 mg/dL - 7 units and call provider   Caution: Look alike/sound alike drug alert         (0901)   [phone]        metoprolol tartrate (LOPRESSOR) tablet 100 mg  Dose: 100 mg  Freq: Every 12 Hours Scheduled Route: PO  Start: 11/03/21 2100 End: 11/05/21 1454         metoprolol tartrate (LOPRESSOR) tablet 25 mg  Dose: 25 mg  Freq: Once Route: PO  Start: 11/02/21 1224 End: 11/02/21 1318         metoprolol tartrate (LOPRESSOR) tablet 50 mg  Dose: 50 mg  Freq: Once Route: PO  Start: 11/03/21 1100 End: 11/03/21 1300         metoprolol tartrate (LOPRESSOR) tablet 50 mg  Dose: 50 mg  Freq: Every 12 Hours Scheduled Route: PO  Start: 11/02/21 2100 End: 11/03/21 1013         morphine injection 4 mg  Dose: 4 mg  Freq: Once Route: IV  Start: 11/01/21 2353 End: 11/02/21 0029   Admin Instructions:   If given for pain, use the following pain scale:  Mild Pain = Pain Score of 1-3, CPOT 1-2  Moderate Pain = Pain Score of 4-6, CPOT 3-4  Severe Pain = Pain Score of 7-10, CPOT 5-8         ondansetron (ZOFRAN) injection 4 mg  Dose: 4 mg  Freq: Once Route: IV  Start: 11/01/21 2353 End: 11/02/21 0030         perflutren (DEFINITY) 8.476 mg in Sodium Chloride (PF) 0.9 % 10 mL injection  Dose: 2 mL  Freq: Once in Imaging Route: IV  Start: 11/03/21 1730 End: 11/03/21 1748   Admin Instructions:   Mix 1.3 mL of mechanically activated Definity with 8.7 mL of normal saline. A total of 2 m of the resulting Definity solution was administered.         sodium chloride 0.9 % bolus 1,000 mL  Dose: 1,000 mL  Freq: Once Route: IV  Last Dose: Stopped (11/02/21 0240)  Start: 11/02/21 0121 End: 11/02/21 0240         sodium chloride 0.9 % flush 10 mL  Dose: 10 mL  Freq: Every 12 Hours Scheduled Route: IV  Start: 11/02/21 0211            2125               2142            0826   2100          vancomycin 1250 mg/250 mL 0.9% NS IVPB ([facility])  Dose: 1,250 mg  Freq: Every 8 Hours Route: IV  Indications of Use: SKIN AND SOFT TISSUE INFECTION  Start: 11/06/21 1330 End: 11/07/21 0858         1238   2125            (0887)   0858-D/C'd        vancomycin 3000 mg/1000 mL 0.9% NS IVPB  Dose: 20 mg/kg  Weight Dosing Info: 255 kg  Freq: Once Route: IV  Indications of Use: SKIN AND SOFT TISSUE INFECTION  Start: 11/04/21 1515 End: 11/04/21 1902         Followed by  vancomycin 1250 mg/250 mL 0.9% NS IVPB ([facility])  Dose: 1,250 mg  Freq: Every 12 Hours Route: IV  Indications of Use: SKIN AND SOFT TISSUE INFECTION  Start: 11/05/21 0315 End: 11/06/21 1000         0532   1000-D/C'd         vancomycin 1250 mg/250 mL 0.9% NS IVPB ([facility])  Dose: 5 mg/kg  Weight Dosing Info: 255 kg  Freq: Every 12 Hours Route: IV  Indications of Use: SKIN AND SOFT TISSUE INFECTION  Last Dose: Stopped (11/03/21 0303)  Start: 11/02/21 1200 End: 11/03/21 1127         vancomycin 2500 mg/500 mL 0.9% NS IVPB ([facility])  Dose: 11 mg/kg  Weight Dosing Info: 234 kg (Order-Specific)  Freq: Once Route: IV  Indications of Use: SKIN AND SOFT TISSUE INFECTION  Last Dose: Stopped (11/02/21 0311)  Start: 11/01/21 2353 End: 11/02/21 0311         warfarin (COUMADIN) (dosing per levels)  Freq: Daily Warfarin Route: XX  Start: 11/05/21 1800   Admin Instructions:   Pharmacy is dosing warfarin per INR  If INR Greater Than Target, Contact Pharmacy Prior to Giving Dose.  Acutely Hazardous. Waste BOTH Residual Medication and/or Empty Package. .   Order specific questions:   Target INR 2 - 3         (1703)             (7642)             1800          warfarin (COUMADIN) tablet 10 mg  Dose: 10 mg  Freq: Once per day on Tue Thu Sat Route: PO  Indications of Use: Other - full anticoagulation  Indications Comment: Factor V Leiden  Start: 11/09/21 1800   Admin Instructions:   Food-Drug Interaction  If INR Greater Than Target, Contact Pharmacy Prior to Giving Dose.  Acutely Hazardous.  Waste BOTH Residual Medication and/or Empty Package. .   Order specific questions:   Target INR 2 - 3         warfarin (COUMADIN) tablet 10 mg  Dose: 10 mg  Freq: Daily Warfarin Route: PO  Indications of Use: Other - full anticoagulation  Indications Comment: Factor V Leiden  Start: 11/04/21 1800 End: 11/05/21 1330   Admin Instructions:   Food-Drug Interaction  If INR Greater Than Target, Contact Pharmacy Prior to Giving Dose.  Acutely Hazardous. Waste BOTH Residual Medication and/or Empty Package. .   Order specific questions:   Target INR 2 - 3         warfarin (COUMADIN) tablet 10 mg  Dose: 10 mg  Freq: Once (Warfarin) Route: PO  Indications of Use: Other - full anticoagulation  Indications Comment: Factor V Leiden  Start: 11/03/21 1800 End: 11/03/21 1818   Admin Instructions:   Food-Drug Interaction  If INR Greater Than Target, Contact Pharmacy Prior to Giving Dose.  Acutely Hazardous. Waste BOTH Residual Medication and/or Empty Package. .   Order specific questions:   Target INR 2 - 3         warfarin (COUMADIN) tablet 10 mg  Dose: 10 mg  Freq: Once per day on Tue Thu Sat Route: PO  Indications of Use: Other - full anticoagulation  Indications Comment: Factor V Leiden  Start: 11/02/21 2000 End: 11/03/21 1311   Admin Instructions:   Food-Drug Interaction  If INR Greater Than Target, Contact Pharmacy Prior to Giving Dose.  Acutely Hazardous. Waste BOTH Residual Medication and/or Empty Package. .   Order specific questions:   Target INR 2 - 3         warfarin (COUMADIN) tablet 15 mg  Dose: 15 mg  Freq: Once per day on Sun Mon Wed Fri Route: PO  Indications of Use: Other - full anticoagulation  Indications Comment: factor B leiden  Start: 11/08/21 1800   Admin Instructions:   Food-Drug Interaction  If INR Greater Than Target, Contact Pharmacy Prior to Giving Dose.  Acutely Hazardous. Waste BOTH Residual Medication and/or Empty Package. .   Order specific questions:   Target INR 2 - 3           1800           warfarin (COUMADIN) tablet 15 mg  Dose: 15 mg  Freq: Once per day on Sun Mon Wed Fri Route: PO  Indications of Use: Other - full anticoagulation  Indications Comment: Factor V Leiden  Start: 11/03/21 2000 End: 11/03/21 1311   Admin Instructions:   Food-Drug Interaction  If INR Greater Than Target, Contact Pharmacy Prior to Giving Dose.  Acutely Hazardous. Waste BOTH Residual Medication and/or Empty Package. .   Order specific questions:   Target INR 2 - 3         warfarin (COUMADIN) tablet 2 mg  Dose: 2 mg  Freq: Once (Warfarin) Route: PO  Indications of Use: Other - full anticoagulation  Indications Comment: Factor V Leiden  Start: 11/06/21 1800 End: 11/06/21 1706   Admin Instructions:   Food-Drug Interaction  If INR Greater Than Target, Contact Pharmacy Prior to Giving Dose.  Acutely Hazardous. Waste BOTH Residual Medication and/or Empty Package. .   Order specific questions:   Target INR 2 - 3         1706            warfarin (COUMADIN) tablet 5 mg  Dose: 5 mg  Freq: Once (Warfarin) Route: PO  Indications of Use: Other - full anticoagulation  Indications Comment: Factor V Leiden  Start: 11/07/21 1800 End: 11/07/21 1732   Admin Instructions:   Food-Drug Interaction  If INR Greater Than Target, Contact Pharmacy Prior to Giving Dose.  Acutely Hazardous. Waste BOTH Residual Medication and/or Empty Package. .   Order specific questions:   Target INR 2 - 3          1732           Medications 11/06/21 11/07/21 11/08/21         Continuous Meds Sorted by Name  for Kameron Melendez as of 11/6/21 through 11/8/21  Legend:                          Inactive     Active     Other Encounter     Linked                 Medications 11/06/21 11/07/21 11/08/21   Pharmacy Consult - Pharmacy to dose  Freq: Continuous PRN Route: XX  PRN Reason: Consult  Start: 11/02/21 0209 End: 11/02/21 0910   Order specific questions:   Pharmacy to Dose: cefepime  Indication of Use skin and soft tissue infection         Pharmacy to dose  vancomycin  Freq: Continuous PRN Route: XX  PRN Reason: Consult  Indications of Use: SKIN AND SOFT TISSUE INFECTION  Start: 11/04/21 1353 End: 11/07/21 0916          0916-D/C'd         Pharmacy to dose vancomycin  Freq: Continuous PRN Route: XX  PRN Reason: Consult  Indications of Use: SKIN AND SOFT TISSUE INFECTION  Start: 11/02/21 0208 End: 11/03/21 1131         Pharmacy to dose warfarin  Freq: Continuous PRN Route: XX  PRN Reason: Consult  Indications of Use: Other - full anticoagulation  Start: 11/02/21 0936 End: 11/02/21 0959   Admin Instructions:   If INR Greater Than Target, Contact Pharmacy Prior to Giving Dose.  Acutely Hazardous. Waste BOTH Residual Medication and/or Empty Package. .   Order specific questions:   Target INR 2 - 3         Pharmacy to dose warfarin  Freq: Continuous PRN Route: XX  PRN Reason: Consult  Indications of Use: Other - full anticoagulation  Indications Comment: Factor V Leiden  Start: 11/02/21 0936   Admin Instructions:   If INR Greater Than Target, Contact Pharmacy Prior to Giving Dose.  Acutely Hazardous. Waste BOTH Residual Medication and/or Empty Package. .   Order specific questions:   Target INR 2 - 3         sodium chloride 0.9 % infusion  Rate: 25 mL/hr Dose: 25 mL/hr  Freq: Continuous Route: IV  Last Dose: 25 mL/hr (11/03/21 0732)  Start: 11/02/21 0211 End: 11/03/21 1125         Medications 11/06/21 11/07/21 11/08/21         PRN Meds Sorted by Name  for Kameron Melendez as of 11/6/21 through 11/8/21  Legend:                          Inactive     Active     Other Encounter     Linked                 Medications 11/06/21 11/07/21 11/08/21   acetaminophen (TYLENOL) tablet 650 mg  Dose: 650 mg  Freq: Every 4 Hours PRN Route: PO  PRN Reason: Mild Pain   Start: 11/02/21 0207   Admin Instructions:   Do not exceed 4 grams of acetaminophen in a 24 hr period. Max dose of 2gm for AST/ALT greater than 120 units/L      If given for pain, use the following pain scale:   Mild Pain = Pain  Score of 1-3, CPOT 1-2  Moderate Pain = Pain Score of 4-6, CPOT 3-4  Severe Pain = Pain Score of 7-10, CPOT 5-8         0723            Or  acetaminophen (TYLENOL) 160 MG/5ML solution 650 mg  Dose: 650 mg  Freq: Every 4 Hours PRN Route: PO  PRN Reason: Mild Pain   Start: 11/02/21 0207   Admin Instructions:   Do not exceed 4 grams of acetaminophen in a 24 hr period. Max dose of 2gm for AST/ALT greater than 120 units/L      If given for pain, use the following pain scale:   Mild Pain = Pain Score of 1-3, CPOT 1-2  Moderate Pain = Pain Score of 4-6, CPOT 3-4  Severe Pain = Pain Score of 7-10, CPOT 5-8                     Or  acetaminophen (TYLENOL) suppository 650 mg  Dose: 650 mg  Freq: Every 4 Hours PRN Route: RE  PRN Reason: Mild Pain   Start: 11/02/21 0207   Admin Instructions:   Do not exceed 4 grams of acetaminophen in a 24 hr period. Max dose of 2gm for AST/ALT greater than 120 units/L      If given for pain, use the following pain scale:   Mild Pain = Pain Score of 1-3, CPOT 1-2  Moderate Pain = Pain Score of 4-6, CPOT 3-4  Severe Pain = Pain Score of 7-10, CPOT 5-8                     benzonatate (TESSALON) capsule 100 mg  Dose: 100 mg  Freq: 2 Times Daily PRN Route: PO  PRN Reason: Cough  Start: 11/02/21 1441   Admin Instructions:   Swallow whole.  Do not crush, chew, or open capsule.         2740 (2622) [C]            [id number] [id number]          calcium carbonate (TUMS) chewable tablet 500 mg (200 mg elemental)  Dose: 2 tablet  Freq: 2 Times Daily PRN Route: PO  PRN Reason: Heartburn  Start: 11/02/21 0207   Admin Instructions:   One tablet contains 200 mg elemental calcium.  Take with food.         dextrose (D50W) (25 g/50 mL) IV injection 25 g  Dose: 25 g  Freq: Every 15 Minutes PRN Route: IV  PRN Reason: Low Blood Sugar  PRN Comment: Blood Sugar Less Than 70  Start: 11/02/21 1216   Admin Instructions:   Blood sugar less than 70; patient has IV access - Unresponsive, NPO or Unable To Safely Swallow          dextrose (GLUTOSE) oral gel 15 g  Dose: 15 g  Freq: Every 15 Minutes PRN Route: PO  PRN Reason: Low Blood Sugar  PRN Comment: Blood sugar less than 70  Start: 11/02/21 1216   Admin Instructions:   BS<70, Patient Alert, Is not NPO, Can safely swallow.         glucagon (human recombinant) (GLUCAGEN DIAGNOSTIC) injection 1 mg  Dose: 1 mg  Freq: Every 15 Minutes PRN Route: SC  PRN Reason: Low Blood Sugar  PRN Comment: Blood Glucose Less Than 70  Start: 11/02/21 1216   Admin Instructions:   Blood Glucose Less Than 70 - Patient Without IV Access - Unresponsive, NPO or Unable To Safely Swallow         HYDROcodone-acetaminophen (NORCO) 5-325 MG per tablet 1 tablet  Dose: 1 tablet  Freq: Every 6 Hours PRN Route: PO  PRN Reason: Moderate Pain   Start: 11/02/21 1440 End: 11/03/21 1432   Admin Instructions:   [QUINN]    Do not exceed 4 grams of acetaminophen in a 24 hr period. Max dose of 2gm for AST/ALT greater than 120 units/L        If given for pain, use the following pain scale:   Mild Pain = Pain Score of 1-3, CPOT 1-2  Moderate Pain = Pain Score of 4-6, CPOT 3-4  Severe Pain = Pain Score of 7-10, CPOT 5-8         HYDROcodone-acetaminophen (NORCO) 7.5-325 MG per tablet 1 tablet  Dose: 1 tablet  Freq: Every 6 Hours PRN Route: PO  PRN Reason: Moderate Pain   Start: 11/03/21 1432 End: 11/10/21 1431   Admin Instructions:   [QUINN]    Do not exceed 4 grams of acetaminophen in a 24 hr period. Max dose of 2gm for AST/ALT greater than 120 units/L        If given for pain, use the following pain scale:   Mild Pain = Pain Score of 1-3, CPOT 1-2  Moderate Pain = Pain Score of 4-6, CPOT 3-4  Severe Pain = Pain Score of 7-10, CPOT 5-8          1813           ondansetron (ZOFRAN) tablet 4 mg  Dose: 4 mg  Freq: Every 6 Hours PRN Route: PO  PRN Reasons: Nausea,Vomiting  Start: 11/02/21 0207   Admin Instructions:   If BOTH ondansetron (ZOFRAN) and promethazine (PHENERGAN) are ordered use ondansetron first and THEN promethazine IF  ondansetron is ineffective.         Or  ondansetron (ZOFRAN) injection 4 mg  Dose: 4 mg  Freq: Every 6 Hours PRN Route: IV  PRN Reasons: Nausea,Vomiting  Start: 11/02/21 0207   Admin Instructions:   If BOTH ondansetron (ZOFRAN) and promethazine (PHENERGAN) are ordered use ondansetron first and THEN promethazine IF ondansetron is ineffective.         Pharmacy Consult - Pharmacy to dose  Freq: Continuous PRN Route: XX  PRN Reason: Consult  Start: 11/02/21 0209 End: 11/02/21 0910   Order specific questions:   Pharmacy to Dose: cefepime  Indication of Use skin and soft tissue infection         Pharmacy to dose vancomycin  Freq: Continuous PRN Route: XX  PRN Reason: Consult  Indications of Use: SKIN AND SOFT TISSUE INFECTION  Start: 11/04/21 1353 End: 11/07/21 0916          0916-D/C'd         Pharmacy to dose vancomycin  Freq: Continuous PRN Route: XX  PRN Reason: Consult  Indications of Use: SKIN AND SOFT TISSUE INFECTION  Start: 11/02/21 0208 End: 11/03/21 1131         Pharmacy to dose warfarin  Freq: Continuous PRN Route: XX  PRN Reason: Consult  Indications of Use: Other - full anticoagulation  Start: 11/02/21 0936 End: 11/02/21 0959   Admin Instructions:   If INR Greater Than Target, Contact Pharmacy Prior to Giving Dose.  Acutely Hazardous. Waste BOTH Residual Medication and/or Empty Package. .   Order specific questions:   Target INR 2 - 3         Pharmacy to dose warfarin  Freq: Continuous PRN Route: XX  PRN Reason: Consult  Indications of Use: Other - full anticoagulation  Indications Comment: Factor V Leiden  Start: 11/02/21 0936   Admin Instructions:   If INR Greater Than Target, Contact Pharmacy Prior to Giving Dose. Acutely Hazardous. Waste BOTH Residual Medication and/or Empty Package. .   Order specific questions:   Target INR 2 - 3         sodium chloride 0.9 % flush 10 mL  Dose: 10 mL  Freq: As Needed Route: IV  PRN Reason: Line Care  Start: 11/02/21 0205         sodium chloride 0.9 % flush 10 mL  Dose:  10 mL  Freq: As Needed Route: IV  PRN Reason: Line Care  Start: 11/01/21 2350         Medications 11/06/21 11/07/21 11/08/21

## 2021-11-08 NOTE — PLAN OF CARE
Goal Outcome Evaluation:  Plan of Care Reviewed With: patient        Progress: improving  Outcome Summary: VSS, up with stand by assist with walker.  Tachycardia noted.  Abx changed from IV to PO today.  No new complaints.  Continue to monitor.

## 2021-11-08 NOTE — PLAN OF CARE
Problem: Adult Inpatient Plan of Care  Goal: Plan of Care Review  Outcome: Met   Goal Outcome Evaluation:   D/c home

## 2021-11-08 NOTE — CONSULTS
Referring Provider: Dr. Santamaria  Reason for Consultation: Sleep apnea    Patient Care Team:  Hansel Patel DO as PCP - General (Family Medicine)  Kim Sotelo RPH as Pharmacist    Chief complaint:   Admitted for cellulitis.  We are consulted for sleep apnea    History of present illness:    Subjective   This is a 51-year-old male patient who was admitted on 11/2/2021 for cellulitis.    During he does mention, he developed atrial fibrillation/flutter and pauses.  We are consulted for sleep apnea assessment as this could be a contributor to his arrhythmia.    Patient reported diagnosis of sleep apnea about 12 years ago in [facility] [place].  He was treated with CPAP which he tried for about a week from what he recalls and could not really tolerate despite using different masks but he did not really elaborate much on that issue since he does not recall well.  Couple of years later, he was asked to get reassessed and underwent a sleep study again at [facility] and again was placed on CPAP therapy but he could not tolerate and had to return the equipment.  I assessed him in 2017 when he was admitted for PE and he was noted to have significant nocturnal desaturation and offered sleep apnea testing and treatment but he declined.    He did endorse loud snoring but denies excessive daytime sleepiness.  He usually goes to bed around 10:30 PM and falls asleep quickly and wake up around 4 AM.  He wakes up without an alarm.  He feels okay upon awakening.  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  He wakes up once at night to urinate.  He has dry mouth occasionally.  He does not recall the severity of his sleep apnea and he thinks that his weight has increased since his initial diagnosis of ARNOLDO.    Labs reviewed:  On presentation on 11/2/2021: Hemoglobin 12.4; bicarb 25    Review of Systems  Constitutional: No fever or chills.   ENMT: No sinus congestion  Cardiovascular: No chest pain,  palpitation but legs swelling.    Respiratory: Mild dyspnea on activities.  No cough or wheezing  Gastrointestinal: No constipation, diarrhea or abdominal pain.  No nausea or vomiting.  Neurology: No headache, weakness, numbness or dizziness.   Musculoskeletal: No joints pain, stiffness or swelling.  Pain in his left leg.  Psychiatry: No depression.  Genitourinary: No dysuria or frequent urination  Endo: No weight changes. No cold or warm intolerance.  Lymphatic: No swollen glands.  Integumentary: No rash.    History  Past Medical History:   Diagnosis Date   • DVT (deep venous thrombosis) (HCC)     RLE   • Factor V Leiden (HCC)    • Hypertension    • Kidney stone    • Lymphedema    • Lymphedema of left lower extremity    • Obesity    • ARNOLDO (obstructive sleep apnea)    • Pulmonary embolism (HCC)     bilateral   • Pulmonary hypertension (HCC)    • Right ventricular enlargement    ,   Past Surgical History:   Procedure Laterality Date   • CARDIAC CATHETERIZATION Bilateral 7/18/2017    Procedure: Pulmonary angiography- [place] ;  Surgeon: Cedric Coulter MD;  Location: [facility] INVASIVE LOCATION;  Service:    • CARDIAC CATHETERIZATION N/A 7/18/2017    Procedure: Right Heart Cath;  Surgeon: Cedric Coulter MD;  Location: [facility] INVASIVE LOCATION;  Service:    • THROMBECTOMY     ,   Family History   Problem Relation Age of Onset   • Heart disease Mother    • Heart failure Mother    • Bradycardia Mother    • No Known Problems Father    • No Known Problems Maternal Grandmother    • No Known Problems Maternal Grandfather    • No Known Problems Paternal Grandmother    • No Known Problems Paternal Grandfather    ,   Social History     Socioeconomic History   • Marital status:    Tobacco Use   • Smoking status: Light Tobacco Smoker     Types: Cigars, Pipe     Start date: 1/1/2006   • Smokeless tobacco: Never Used   • Tobacco comment: occasional - < 1 a month   Substance and Sexual Activity   • Alcohol use: No   • Drug use:  "No   • Sexual activity: Defer       ,   Medications Prior to Admission   Medication Sig Dispense Refill Last Dose   • acetaminophen (TYLENOL) 500 MG tablet Take 500 mg by mouth Every 6 (Six) Hours As Needed for Mild Pain .      • ammonium lactate (AMLACTIN) 12 % cream Apply 1 application topically to the appropriate area as directed As Needed for Dry Skin. Apply to both legs daily as needed for venous stasis dermatitis and lymphedema skin thickening      • cephalexin (KEFLEX) 500 MG capsule Take 1 capsule by mouth 4 (Four) Times a Day. 40 capsule 0 11/1/2021 at Unknown time   • chlorhexidine (Hibiclens) 4 % external liquid Apply 1 application topically to the appropriate area as directed Daily As Needed for Wound Care.      • furosemide (LASIX) 80 MG tablet TAKE 1 TABLET BY MOUTH DAILY (Patient taking differently: Take 80 mg by mouth Daily. Pt. Only takes when swelling is \"excessive\" and he is going to be home) 30 tablet 5 Past Week at Unknown time   • potassium chloride (K-DUR,KLOR-CON) 20 MEQ CR tablet Take 1 tablet by mouth Daily. Take with furosemide (Patient taking differently: Take 20 mEq by mouth Daily. Only takes with furosemide) 30 tablet 2    • warfarin (COUMADIN) 10 MG tablet TAKE ONE TABLET BY MOUTH ON TUE, THUR, SAT AND TAKE ONE AND ONE-HALF (15 MG) TABLETS ALL OTHER DAYS OR AS DIRECTED (Patient taking differently: Take 10 mg by mouth 3 (Three) Times a Week. Takes 10 mg daily on Tuesday, Thursday,and Saturday) 120 tablet 1    • warfarin (COUMADIN) 10 MG tablet Take 15 mg by mouth 4 (Four) Times a Week. Takes 1.5 tablets (15mg) Sunday, Monday, Wednesday, and Friday.   11/1/2021 at Unknown time   , Scheduled Meds:  cephalexin, 1,000 mg, Oral, Q6H  furosemide, 80 mg, Oral, Daily  guaiFENesin, 600 mg, Oral, Q12H  insulin lispro, 0-7 Units, Subcutaneous, TID AC  sodium chloride, 10 mL, Intravenous, Q12H  warfarin (COUMADIN) (dosing per levels), , Does not apply, Daily    , Continuous Infusions:  Pharmacy to " dose warfarin,      and Allergies:  Patient has no known allergies.    Objective     Vital Signs   Temp:  [97.7 °F (36.5 °C)-98.3 °F (36.8 °C)] 98.3 °F (36.8 °C)  Heart Rate:  [] 83  Resp:  [18] 18  BP: (143-170)/(73-97) 153/97    PPE used per hospital policy    Physical Exam:  Constitutional: Not in acute distress.  Eyes: Injected conjunctivae, EOMI. pupils equal reactive to light.  ENMT: Guerrero and Mallampati 3. No oral thrush.  Moist tongue  Neck: Large. Trachea midline. No thyromegaly  Heart: RRR, no murmur  Lungs: Good and equal air entry bilaterally.  Non labored breathing.   Abdomen: Obese. Soft. No tenderness or dullness. No HSM.  Extremities: No cyanosis, clubbing but bilateral pitting edema in legs up to the knees.  Warm extremities and well-perfused.  Neuro: Conscious, alert, oriented x3.  Strength 5/5 in arms.  Psych: Appropriate mood and affect.    Integumentary: Erythema left leg with scaling of the skin.  Lymphatic: No palpable cervical or supraclavicular lymph nodes.      Diagnostic imaging:  I personally and independently reviewed the following images:   CTA chest 10/29/2021: No PE.        Assessment   1. ARNOLDO  2. Super morbid obesity, BMI 72  3. Paroxysmal A. Fib      Recommendations:    Check split-night PSG.  Had long discussion with the patient regarding sleep apnea.  He is reluctant about CPAP therapy but said he would consider it.  I explained the treatment options which mostly include CPAP as he would not be a good candidate for oral appliance or advanced interventions such as hypoglossal nerve stimulation.  However, I explained to him that the PAP therapy has advanced and there are many ways to use to overcome the issues with intolerance including fitting with different kind of mask, using bilevel instead of CPAP and also possibly using sleep aid to reduce awakening.  He said that he would consider that.  Order for split-night study was placed and hopefully patient will schedule that  after discharge.    Counseled for weight loss    Discussed the importance of sleep apnea therapy in the setting of cardiac comorbidities including no A. fib and the relationship between sleep apnea and A. fib      Henry Ashford MD  11/07/21  19:12 EST

## 2021-11-08 NOTE — PLAN OF CARE
Problem: Adult Inpatient Plan of Care  Goal: Plan of Care Review  Outcome: Ongoing, Progressing  Flowsheets (Taken 11/8/2021 0422)  Progress: improving  Plan of Care Reviewed With: patient  Outcome Summary: VSS. Pt on 4L of O2 per NC throughout night. Pt denies pain, N/V. PO antibiotics given. Pt stable and needs met at this time.

## 2021-11-08 NOTE — NURSING NOTE
Spoke with Dr. Wise, aware hr in 150's when up with RT for walking oximetry.MD states ok to d/c home.

## 2021-11-09 ENCOUNTER — TRANSITIONAL CARE MANAGEMENT TELEPHONE ENCOUNTER (OUTPATIENT)
Dept: CALL CENTER | Facility: HOSPITAL | Age: 51
End: 2021-11-09

## 2021-11-09 NOTE — OUTREACH NOTE
Call Center TCM Note      Responses   [facility] patient discharged from? [place]   Does the patient have one of the following disease processes/diagnoses(primary or secondary)? Other   TCM attempt successful? Yes   Call start time 1529   Call end time 1531   Discharge diagnosis Cellulitis of left lower extremity   Meds reviewed with patient/caregiver? Yes   Is the patient having any side effects they believe may be caused by any medication additions or changes? No   Does the patient have all medications ordered at discharge? Yes   Is the patient taking all medications as directed (includes completed medication regime)? Yes   Does the patient have a primary care provider?  Yes   Does the patient have an appointment with their PCP within 7 days of discharge? Yes   Comments regarding PCP 11/10/21 at 8:00am    Has the patient kept scheduled appointments due by today? N/A   Has home health visited the patient within 72 hours of discharge? N/A   Psychosocial issues? No   Did the patient receive a copy of their discharge instructions? Yes   Nursing interventions Reviewed instructions with patient   What is the patient's perception of their health status since discharge? Improving   Is the patient/caregiver able to teach back signs and symptoms related to disease process for when to call PCP? Yes   Is the patient/caregiver able to teach back signs and symptoms related to disease process for when to call 911? Yes   Is the patient/caregiver able to teach back the hierarchy of who to call/visit for symptoms/problems? PCP, Specialist, Home health nurse, Urgent Care, ED, 911 Yes   If the patient is a current smoker, are they able to teach back resources for cessation? Not a smoker   TCM call completed? Yes          Naa Thapa RN    11/9/2021, 15:32 EST

## 2021-11-09 NOTE — PAYOR COMM NOTE
"Kameron Covarrubias (51 y.o. Male)     PATIENT DC'D HOME 11/8  NO DC SUMMARY YET.                Date of Birth Social Security Number Address Home Phone MRN    1970  [id number]  [facility] [address] [phone] [id number]    Anglican Marital Status             Quaker        Admission Date Admission Type Admitting Provider Attending Provider Department, Room/Bed    11/1/21 Emergency Kay Jung MD  [address], [address]    Discharge Date Discharge Disposition Discharge Destination          11/8/2021 Home or Self Care              Attending Provider: (none)   Allergies: No Known Allergies    Isolation: None   Infection: None   Code Status: Prior   Advance Care Planning Activity    Ht: 188 cm (74.02\")   Wt: 255 kg (562 lb 2.8 oz)    Admission Cmt: None   Principal Problem: Cellulitis of left lower extremity [L03.116]                 Active Insurance as of 11/1/2021     Primary Coverage     Payor Plan Insurance Group Employer/Plan Group    [employer] PPO [id number]     Payor Plan Address Payor Plan Phone Number Payor Plan Fax Number Effective Dates    [address] [phone]  5/1/2019 - None Entered    [place] [address]       Subscriber Name Subscriber Birth Date Member ID       KMAERON COVARRUBIAS 1970 [id number]                 Emergency Contacts      (Rel.) Home Phone Work Phone Mobile Phone    JosefinaYaya lerma (Spouse) [phone] -- [phone]            Discharge Summary    No notes of this type exist for this encounter.                             Encounter Information    Encounter Information    Department Encounter #   11/1/2021 11:31 PM [address] [id number]   Estee Vazquez RN   Registered Nurse      Plan of Care   Signed   Date of Service:  11/08/21 1121   Creation Time:  11/08/21 1121              Signed                 Problem: Adult Inpatient Plan of Care  Goal: Plan of Care " Review  Outcome: Met   Goal Outcome Evaluation:   D/c home

## 2021-11-09 NOTE — OUTREACH NOTE
Prep Survey      Responses   [facility] facility patient discharged from? [place]   Is LACE score < 7 ? No   Emergency Room discharge w/ pulse ox? No   Eligibility [facility]   Date of Admission 11/01/21   Date of Discharge 11/08/21   Discharge Disposition Home or Self Care   Discharge diagnosis Cellulitis of left lower extremity   Does the patient have one of the following disease processes/diagnoses(primary or secondary)? Other   Does the patient have Home health ordered? No   Is there a DME ordered? No   Prep survey completed? Yes          Heidy Arreguin RN

## 2021-11-09 NOTE — OUTREACH NOTE
Call Center TCM Note      Responses   [facility] patient discharged from? [place]   Does the patient have one of the following disease processes/diagnoses(primary or secondary)? Other   TCM attempt successful? No  [Wife]   Unsuccessful attempts Attempt 1          Naa Thapa RN    11/9/2021, 10:15 EST

## 2021-11-10 ENCOUNTER — OFFICE VISIT (OUTPATIENT)
Dept: FAMILY MEDICINE CLINIC | Facility: CLINIC | Age: 51
End: 2021-11-10

## 2021-11-10 VITALS
OXYGEN SATURATION: 98 % | SYSTOLIC BLOOD PRESSURE: 118 MMHG | BODY MASS INDEX: 72.18 KG/M2 | DIASTOLIC BLOOD PRESSURE: 68 MMHG | HEIGHT: 74 IN | HEART RATE: 78 BPM

## 2021-11-10 DIAGNOSIS — I48.3 TYPICAL ATRIAL FLUTTER (HCC): ICD-10-CM

## 2021-11-10 DIAGNOSIS — G47.33 OSA (OBSTRUCTIVE SLEEP APNEA): ICD-10-CM

## 2021-11-10 DIAGNOSIS — I89.0 LYMPHEDEMA: Primary | ICD-10-CM

## 2021-11-10 DIAGNOSIS — I87.2 VENOUS STASIS DERMATITIS OF LEFT LOWER EXTREMITY: ICD-10-CM

## 2021-11-10 DIAGNOSIS — R26.9 ABNORMAL GAIT: ICD-10-CM

## 2021-11-10 DIAGNOSIS — E66.01 MORBID (SEVERE) OBESITY DUE TO EXCESS CALORIES (HCC): ICD-10-CM

## 2021-11-10 DIAGNOSIS — L03.116 CELLULITIS OF LEFT LOWER EXTREMITY: ICD-10-CM

## 2021-11-10 PROCEDURE — 99214 OFFICE O/P EST MOD 30 MIN: CPT | Performed by: FAMILY MEDICINE

## 2021-11-10 NOTE — PROGRESS NOTES
Subjective   Kameron Melendez is a 51 y.o. male. Presents today for   Chief Complaint   Patient presents with   • Cellulitis     [facility] Follow up        Patient her efor f/u for severe cellulitis with lymphedema;   He also had atrial flutter 4:1 block;  Taken off beta blocker in hospital.  CAROL with hypok on lasix;  Released on keflex 1g qid per ID;  No cp/soa;  No fevers or chills;  Still extensive redness;  Patient struggling to get around, had to have family help to get here today.     History of Present Illness    Review of Systems   Constitutional: Negative for chills and fever.   Respiratory: Negative for shortness of breath.    Cardiovascular: Positive for leg swelling. Negative for chest pain.   Gastrointestinal: Negative for abdominal pain, nausea and vomiting.       Patient Active Problem List   Diagnosis   • History of bilateral pulmonary embolism (CMS/HCC)   • Pulmonary hypertension (HCC)   • Lymphedema of both lower extremities   • Obesity, morbid, BMI 50 or higher (HCC)   • Dyslipidemia   • Hyperuricemia   • Hypogonadal obesity   • Knee arthropathy   • Morbid obesity with body mass index of 60.0-69.9 in adult ([facility])   • ARNOLDO (obstructive sleep apnea)   • Severe edema   • History of pulmonary embolism   • Other acute pulmonary embolism without acute cor pulmonale (HCC)   • Intractable back pain   • Heterozygous factor V Leiden mutation ([facility])   • Cellulitis of left lower extremity   • Hypokalemia   • CAROL (acute kidney injury) (HCC)   • Sepsis with cutaneous manifestations ([facility])   • Hyperglycemia   • Paroxysmal atrial fibrillation ([facility])       Social History     Socioeconomic History   • Marital status:    Tobacco Use   • Smoking status: Light Tobacco Smoker     Types: Cigars, Pipe     Start date: 1/1/2006   • Smokeless tobacco: Never Used   • Tobacco comment: occasional - < 1 a month   Substance and Sexual Activity   • Alcohol use: No   • Drug use: No   • Sexual activity: Defer       No Known  "Allergies    Current Outpatient Medications on File Prior to Visit   Medication Sig Dispense Refill   • acetaminophen (TYLENOL) 500 MG tablet Take 500 mg by mouth Every 6 (Six) Hours As Needed for Mild Pain .     • ammonium lactate (AMLACTIN) 12 % cream Apply 1 application topically to the appropriate area as directed As Needed for Dry Skin. Apply to both legs daily as needed for venous stasis dermatitis and lymphedema skin thickening     • cephalexin (KEFLEX) 500 MG capsule Take 2 capsules by mouth Every 6 (Six) Hours for 32 doses. Indications: Infection of the Skin and/or Soft Tissue 64 capsule 0   • chlorhexidine (Hibiclens) 4 % external liquid Apply 1 application topically to the appropriate area as directed Daily As Needed for Wound Care.     • furosemide (LASIX) 80 MG tablet TAKE 1 TABLET BY MOUTH DAILY (Patient taking differently: Take 80 mg by mouth Daily. Pt. Only takes when swelling is \"excessive\" and he is going to be home) 30 tablet 5   • potassium chloride (K-DUR,KLOR-CON) 20 MEQ CR tablet Take 1 tablet by mouth Daily. Take with furosemide (Patient taking differently: Take 20 mEq by mouth Daily. Only takes with furosemide) 30 tablet 2   • warfarin (COUMADIN) 10 MG tablet TAKE ONE TABLET BY MOUTH ON TUE, THUR, SAT AND TAKE ONE AND ONE-HALF (15 MG) TABLETS ALL OTHER DAYS OR AS DIRECTED (Patient taking differently: Take 10 mg by mouth 3 (Three) Times a Week. Takes 10 mg daily on Tuesday, Thursday,and Saturday) 120 tablet 1   • warfarin (COUMADIN) 10 MG tablet Take 15 mg by mouth 4 (Four) Times a Week. Takes 1.5 tablets (15mg) Sunday, Monday, Wednesday, and Friday.       No current facility-administered medications on file prior to visit.       Objective   Vitals:    11/10/21 0812   BP: 118/68   Pulse: 78   SpO2: 98%   Weight: Comment: pt exceeds scale limit   Height: 188 cm (74\")     Body mass index is 72.18 kg/m².    Physical Exam  Vitals and nursing note reviewed.   Constitutional:       Appearance: He " is well-developed.   Musculoskeletal:      Cervical back: Neck supple.      Right lower leg: Edema present.      Left lower leg: Edema present.   Lymphadenopathy:      Comments: Severe lymphedema;  lef tleg with elphantitis, cracking and weeping; left leg redness, warmth;  Very woody appearance.     Skin:     General: Skin is warm and dry.   Neurological:      Mental Status: He is alert.   Psychiatric:         Behavior: Behavior normal.       11/2/21 ECHO  Interpretation Summary    · The left ventricular cavity is mild to moderately dilated.  · Left ventricular ejection fraction appears to be 61 - 65%. Left ventricular systolic function is normal.  · The right ventricular cavity is mildly dilated. Normal right ventricular systolic function noted.  · The left atrial cavity is mild to moderately dilated.  · There is no evidence of pericardial effusion     Study Result    Narrative & Impression   US NONVASCULAR EXTREMITY LIMITED-     Clinical: Cellulitis left leg, Evaluate for catheter abscess     Ultrasound performed along the left calf at the area of interest.     FINDINGS: There is diffuse skin thickening. There is edema demonstrated  within the subcutaneous fat. There is a trace amount of fluid outlines  several of the lobules. There is no discrete focal fluid collection  typical for abscess. Only the soft tissues 2.5 cm below the skin surface  could be evaluated. Due to the patient's large body habitus the deeper  abscess not excluded by ultrasound. If indicated computed tomography as  follow-up.     CONCLUSION: There is skin thickening with diffuse subcutaneous fat  edema. This is compatible with cellulitis. The soft tissues deeper than  2.5 cm cannot be evaluated by ultrasound. Computed tomography if further  evaluation is warranted clinically.     This report was finalized on 11/4/2021 4:05 PM by Dr. Arnold Newton M.D.        Component      Latest Ref Rng & Units 11/8/2021   WBC      3.40 - 10.80 10*3/mm3  12.16 (H)   RBC      4.14 - 5.80 10*6/mm3 4.13 (L)   Hemoglobin      13.0 - 17.7 g/dL 12.0 (L)   Hematocrit      37.5 - 51.0 % 37.4 (L)   MCV      79.0 - 97.0 fL 90.6   MCH      26.6 - 33.0 pg 29.1   MCHC      31.5 - 35.7 g/dL 32.1   RDW      12.3 - 15.4 % 13.9   RDW-SD      37.0 - 54.0 fl 46.0   MPV      6.0 - 12.0 fL 9.3   Platelets      140 - 450 10*3/mm3 404   Neutrophil Rel %      42.7 - 76.0 % 70.1   Lymphocyte Rel %      19.6 - 45.3 % 16.0 (L)   Monocyte Rel %      5.0 - 12.0 % 7.6   Eosinophil Rel %      0.3 - 6.2 % 1.4   Basophil Rel %      0.0 - 1.5 % 0.5   Immature Granulocyte Rel %      0.0 - 0.5 % 4.4 (H)   Neutrophils Absolute      1.70 - 7.00 10*3/mm3 8.53 (H)   Lymphocytes Absolute      0.70 - 3.10 10*3/mm3 1.94   Monocytes Absolute      0.10 - 0.90 10*3/mm3 0.92 (H)   Eosinophils Absolute      0.00 - 0.40 10*3/mm3 0.17   Basophils Absolute      0.00 - 0.20 10*3/mm3 0.06   Immature Grans, Absolute      0.00 - 0.05 10*3/mm3 0.54 (H)   nRBC      0.0 - 0.2 /100 WBC 0.1   Glucose      65 - 99 mg/dL 162 (H)   BUN      6 - 20 mg/dL 8   Creatinine      0.76 - 1.27 mg/dL 0.57 (L)   Sodium      136 - 145 mmol/L 132 (L)   Potassium      3.5 - 5.2 mmol/L 4.1   Chloride      98 - 107 mmol/L 94 (L)   CO2      22.0 - 29.0 mmol/L 29.3 (H)   Calcium      8.6 - 10.5 mg/dL 8.4 (L)   eGFR Non African Am      >60 mL/min/1.73 >150   BUN/Creatinine Ratio      7.0 - 25.0 14.0   Anion Gap      5.0 - 15.0 mmol/L 8.7   Magnesium      1.6 - 2.6 mg/dL 1.8   Phosphorus      2.5 - 4.5 mg/dL 3.2       Assessment/Plan   Diagnoses and all orders for this visit:    1. Lymphedema (Primary)    2. Venous stasis dermatitis of left lower extremity    3. Cellulitis of left lower extremity  -     CBC & Differential  -     Comprehensive Metabolic Panel  -     Sedimentation Rate    4. Typical atrial flutter (HCC)    5. Abnormal gait    patient severe lymphedema with immobility;  He is home bound at moment.  He needs PT to help with  mobilization and lymphedema;   Would benefit from nursing monitor cardiac status;  Also wound care to address the severe cracking and peeling as nidus for infection which currently has.    Reports intolerant cpap and ddeclines and aware risks  D/w and really hitting critical weight, needs to lose;  Will refer to bariatrics and encouraged to go;  Will also set-up with nutritionist.   Atrial flutter  - already on anticoagulation for hypercoagulable state.        -Follow up: 6 weeks and prn

## 2021-11-11 ENCOUNTER — TELEPHONE (OUTPATIENT)
Dept: PHARMACY | Facility: HOSPITAL | Age: 51
End: 2021-11-11

## 2021-11-11 LAB
ALBUMIN SERPL-MCNC: 3 G/DL (ref 3.8–4.9)
ALBUMIN/GLOB SERPL: 0.7 {RATIO} (ref 1.2–2.2)
ALP SERPL-CCNC: 173 IU/L (ref 44–121)
ALT SERPL-CCNC: 27 IU/L (ref 0–44)
AST SERPL-CCNC: 22 IU/L (ref 0–40)
BASOPHILS # BLD AUTO: 0 X10E3/UL (ref 0–0.2)
BASOPHILS NFR BLD AUTO: 0 %
BILIRUB SERPL-MCNC: 0.6 MG/DL (ref 0–1.2)
BUN SERPL-MCNC: 9 MG/DL (ref 6–24)
BUN/CREAT SERPL: 15 (ref 9–20)
CALCIUM SERPL-MCNC: 8.5 MG/DL (ref 8.7–10.2)
CHLORIDE SERPL-SCNC: 94 MMOL/L (ref 96–106)
CO2 SERPL-SCNC: 25 MMOL/L (ref 20–29)
CREAT SERPL-MCNC: 0.61 MG/DL (ref 0.76–1.27)
EOSINOPHIL # BLD AUTO: 0.1 X10E3/UL (ref 0–0.4)
EOSINOPHIL NFR BLD AUTO: 1 %
ERYTHROCYTE [DISTWIDTH] IN BLOOD BY AUTOMATED COUNT: 13.6 % (ref 11.6–15.4)
ERYTHROCYTE [SEDIMENTATION RATE] IN BLOOD BY WESTERGREN METHOD: 119 MM/HR (ref 0–30)
GLOBULIN SER CALC-MCNC: 4.4 G/DL (ref 1.5–4.5)
GLUCOSE SERPL-MCNC: 159 MG/DL (ref 65–99)
HCT VFR BLD AUTO: 36.5 % (ref 37.5–51)
HGB BLD-MCNC: 12.2 G/DL (ref 13–17.7)
IMM GRANULOCYTES # BLD AUTO: 0.1 X10E3/UL (ref 0–0.1)
IMM GRANULOCYTES NFR BLD AUTO: 1 %
LYMPHOCYTES # BLD AUTO: 2.3 X10E3/UL (ref 0.7–3.1)
LYMPHOCYTES NFR BLD AUTO: 17 %
MCH RBC QN AUTO: 29 PG (ref 26.6–33)
MCHC RBC AUTO-ENTMCNC: 33.4 G/DL (ref 31.5–35.7)
MCV RBC AUTO: 87 FL (ref 79–97)
MONOCYTES # BLD AUTO: 1.1 X10E3/UL (ref 0.1–0.9)
MONOCYTES NFR BLD AUTO: 8 %
NEUTROPHILS # BLD AUTO: 9.7 X10E3/UL (ref 1.4–7)
NEUTROPHILS NFR BLD AUTO: 73 %
PLATELET # BLD AUTO: 443 X10E3/UL (ref 150–450)
POTASSIUM SERPL-SCNC: 4.4 MMOL/L (ref 3.5–5.2)
PROT SERPL-MCNC: 7.4 G/DL (ref 6–8.5)
RBC # BLD AUTO: 4.2 X10E6/UL (ref 4.14–5.8)
SODIUM SERPL-SCNC: 131 MMOL/L (ref 134–144)
WBC # BLD AUTO: 13.4 X10E3/UL (ref 3.4–10.8)

## 2021-11-11 NOTE — TELEPHONE ENCOUNTER
The medication management spoke with the patient on 11/10 requesting an INR test on 11/12 prior to the weekend.

## 2021-11-12 ENCOUNTER — ANTICOAGULATION VISIT (OUTPATIENT)
Dept: PHARMACY | Facility: HOSPITAL | Age: 51
End: 2021-11-12

## 2021-11-12 DIAGNOSIS — I26.99 BILATERAL PULMONARY EMBOLISM (HCC): ICD-10-CM

## 2021-11-12 DIAGNOSIS — I26.99 OTHER ACUTE PULMONARY EMBOLISM WITHOUT ACUTE COR PULMONALE (HCC): Primary | ICD-10-CM

## 2021-11-12 LAB — INR PPP: 2

## 2021-11-12 NOTE — PROGRESS NOTES
Anticoagulation Clinic Progress Note    Anticoagulation Summary  As of 2021    INR goal:  2.0-3.0   TTR:  72.0 % (2.7 y)   INR used for dosin.00 (2021)   Warfarin maintenance plan:  10 mg every Tue, Thu, Sat; 15 mg all other days   Weekly warfarin total:  90 mg   No change documented:  Tessie Fatima RPH   Plan last modified:  Sarah Coy, PharmD (2021)   Next INR check:  2021   Priority:  High   Target end date:  Indefinite    Indications    Other acute pulmonary embolism without acute cor pulmonale (HCC) [I26.99]  History of bilateral pulmonary embolism (CMS/HCC) [I26.99]             Anticoagulation Episode Summary     INR check location:      Preferred lab:      Send INR reminders to:   [facility]  POOL    Comments:  new  frankie 2019 **WEEKLY FRANKIE**      Anticoagulation Care Providers     Provider Role Specialty Phone number    Torri Colmenares, APRN Referring Cardiology [phone]    Elsy Baeza MD Responsible Cardiology [phone]          Clinic Interview:  Patient Findings     Positives:  Change in medications, Hospital admission    Negatives:  Signs/symptoms of thrombosis, Signs/symptoms of bleeding,   Laboratory test error suspected, Change in health, Change in alcohol use,   Change in activity, Upcoming invasive procedure, Emergency department   visit, Upcoming dental procedure, Missed doses, Extra doses, Change in   diet/appetite, Bruising, Other complaints    Comments:  Patient was in hospital for cellulitis, warfarin dosing was   changed for 3 day. Patient was started on keflex at home  for 8 days        Clinical Outcomes     Negatives:  Major bleeding event, Thromboembolic event,   Anticoagulation-related hospital admission, Anticoagulation-related ED   visit, Anticoagulation-related fatality    Comments:  Patient was in hospital for cellulitis, warfarin dosing was   changed for 3 day. Patient was started on keflex at home  for 8  days          INR History:  Anticoagulation Monitoring 10/22/2021 10/29/2021 11/12/2021   INR 2.20 2.60 2.00   INR Date 10/22/2021 10/29/2021 11/12/2021   INR Goal 2.0-3.0 2.0-3.0 2.0-3.0   Trend Same Same Same   Last Week Total 90 mg 90 mg 57.5 mg   Next Week Total 90 mg 90 mg 90 mg   Sun 15 mg 15 mg 15 mg   Mon 15 mg 15 mg 15 mg   Tue 10 mg 10 mg 10 mg   Wed 15 mg 15 mg 15 mg   Thu 10 mg 10 mg 10 mg   Fri 15 mg 15 mg 15 mg   Sat 10 mg 10 mg 10 mg   Visit Report - - -   Some recent data might be hidden       Plan:  1. INR is Therapeutic today- see above in Anticoagulation Summary.   Will instruct Kameron Melendez to Continue their warfarin regimen- see above in Anticoagulation Summary.  2. Follow up in 1 week  3. They have been instructed to call if any changes in medications, doses, concerns, etc. Patient expresses understanding and has no further questions at this time.    Tessie Fatima [facility]

## 2021-11-17 ENCOUNTER — READMISSION MANAGEMENT (OUTPATIENT)
Dept: CALL CENTER | Facility: HOSPITAL | Age: 51
End: 2021-11-17

## 2021-11-17 ENCOUNTER — TELEPHONE (OUTPATIENT)
Dept: FAMILY MEDICINE CLINIC | Facility: CLINIC | Age: 51
End: 2021-11-17

## 2021-11-17 NOTE — OUTREACH NOTE
Medical Week 2 Survey      Responses   [facility] [place] patient discharged from? [place]   Does the patient have one of the following disease processes/diagnoses(primary or secondary)? Other   Week 2 attempt successful? Yes   Call start time 1227   Discharge diagnosis Cellulitis of left lower extremity   Call end time 1229   Meds reviewed with patient/caregiver? Yes   Is the patient having any side effects they believe may be caused by any medication additions or changes? No   Does the patient have all medications ordered at discharge? Yes   Is the patient taking all medications as directed (includes completed medication regime)? Yes   Does the patient have a primary care provider?  Yes   Does the patient have an appointment with their PCP within 7 days of discharge? Yes   Comments regarding PCP Saw PCP   Has the patient kept scheduled appointments due by today? Yes   Has home health visited the patient within 72 hours of discharge? N/A   Home health comments Nurse was there yesterday  PT was there today.    Psychosocial issues? No   Did the patient receive a copy of their discharge instructions? Yes   Nursing interventions Reviewed instructions with patient   What is the patient's perception of their health status since discharge? Improving   Is the patient/caregiver able to teach back signs and symptoms related to disease process for when to call PCP? Yes   Is the patient/caregiver able to teach back signs and symptoms related to disease process for when to call 911? Yes   Is the patient/caregiver able to teach back the hierarchy of who to call/visit for symptoms/problems? PCP, Specialist, Home health nurse, Urgent Care, ED, 911 Yes   If the patient is a current smoker, are they able to teach back resources for cessation? Not a smoker   Week 2 Call Completed? Yes   Wrap up additional comments States he is still having some problems getting around but is hopeful PT will help with this. No needs identified.           Alem Dia RN

## 2021-11-17 NOTE — TELEPHONE ENCOUNTER
JEF FROM [facility] IS CALLING IN   WANTS TO KNOW IF IT IS OK TO PUT COMPRESSION DEVICE   OVER HIS LEGS. THEY ARE FROM THE LYMPHEDEMA  CLINIC. SHE IS WANTING TO MAKE SURE IT IS OK FOR HER TO ASSIST HIM IN GETTING THESE ON FOR HIM.    CAN LEAVE MESSAGE ON HER PHONE SHE IS WITH PATIENTS ALL DAY.    CALLBACK NUMBER IS  [phone]

## 2021-11-19 ENCOUNTER — ANTICOAGULATION VISIT (OUTPATIENT)
Dept: PHARMACY | Facility: HOSPITAL | Age: 51
End: 2021-11-19

## 2021-11-19 DIAGNOSIS — I26.99 OTHER ACUTE PULMONARY EMBOLISM WITHOUT ACUTE COR PULMONALE (HCC): Primary | ICD-10-CM

## 2021-11-19 DIAGNOSIS — I26.99 BILATERAL PULMONARY EMBOLISM (HCC): ICD-10-CM

## 2021-11-19 LAB — INR PPP: 3.5

## 2021-11-19 NOTE — PROGRESS NOTES
Anticoagulation Clinic Progress Note    Anticoagulation Summary  As of 11/19/2021    INR goal:  2.0-3.0   TTR:  72.0 % (2.7 y)   INR used for dosing:  3.50 (11/19/2021)   Warfarin maintenance plan:  10 mg every Tue, Thu, Sat; 15 mg all other days   Weekly warfarin total:  90 mg   Plan last modified:  Stjepic, Sarah, PharmD (7/2/2021)   Next INR check:  11/26/2021   Priority:  High   Target end date:  Indefinite    Indications    Other acute pulmonary embolism without acute cor pulmonale (HCC) [I26.99]  History of bilateral pulmonary embolism (CMS/HCC) [I26.99]             Anticoagulation Episode Summary     INR check location:      Preferred lab:      Send INR reminders to:  [facility]  POOL    Comments:  new  tristen March 2019 **WEEKLY TRISTEN**      Anticoagulation Care Providers     Provider Role Specialty Phone number    Torri Colmenares APRN Referring Cardiology [phone]    Elsy Baeza MD Responsible Cardiology [phone]          Clinic Interview:  Patient Findings     Positives:  Change in medications    Negatives:  Signs/symptoms of thrombosis, Signs/symptoms of bleeding,   Laboratory test error suspected, Change in health, Change in alcohol use,   Change in activity, Upcoming invasive procedure, Emergency department   visit, Upcoming dental procedure, Missed doses, Extra doses, Change in   diet/appetite, Hospital admission, Bruising, Other complaints    Comments:  Patient finished keflex on 11/15       Clinical Outcomes     Negatives:  Major bleeding event, Thromboembolic event,   Anticoagulation-related hospital admission, Anticoagulation-related ED   visit, Anticoagulation-related fatality    Comments:  Patient finished keflex on 11/15         INR History:  Anticoagulation Monitoring 10/29/2021 11/12/2021 11/19/2021   INR 2.60 2.00 3.50   INR Date 10/29/2021 11/12/2021 11/19/2021   INR Goal 2.0-3.0 2.0-3.0 2.0-3.0   Trend Same Same Same   Last Week Total 90 mg 57.5 mg 90 mg   Next  Week Total 90 mg 90 mg 80 mg   Sun 15 mg 15 mg 15 mg   Mon 15 mg 15 mg 15 mg   Tue 10 mg 10 mg 10 mg   Wed 15 mg 15 mg 15 mg   Thu 10 mg 10 mg 10 mg   Fri 15 mg 15 mg 5 mg (11/19)   Sat 10 mg 10 mg 10 mg   Visit Report - - -   Some recent data might be hidden       Plan:  1. INR is Supratherapeutic today- see above in Anticoagulation Summary.   Will instruct Kameron Melendez to Change their warfarin regimen- see above in Anticoagulation Summary.  2. Follow up in 1 week  3. They have been instructed to call if any changes in medications, doses, concerns, etc. Patient expresses understanding and has no further questions at this time.    Tessie Fatima [facility]

## 2021-11-23 ENCOUNTER — READMISSION MANAGEMENT (OUTPATIENT)
Dept: CALL CENTER | Facility: HOSPITAL | Age: 51
End: 2021-11-23

## 2021-11-23 NOTE — OUTREACH NOTE
Medical Week 3 Survey      Responses   [facility] patient discharged from? [place]   Does the patient have one of the following disease processes/diagnoses(primary or secondary)? Other   Week 3 attempt successful? Yes   Call start time 1524   Call end time 1525   Discharge diagnosis Cellulitis of left lower extremity   Meds reviewed with patient/caregiver? Yes   Is the patient taking all medications as directed (includes completed medication regime)? Yes   Has the patient kept scheduled appointments due by today? Yes   What is the patient's perception of their health status since discharge? Improving   Week 3 Call Completed? Yes   Graduated Yes   Is the patient interested in additional calls from an ambulatory ?  NOTE:  applies to high risk patients requiring additional follow-up. No   Did the patient feel the follow up calls were helpful during their recovery period? Yes   Was the number of calls appropriate? Yes   Graduated/Revoked comments Doing well, denies needs.          Charlotte Castle, RN

## 2021-11-26 LAB — INR PPP: 2.4

## 2021-11-29 ENCOUNTER — ANTICOAGULATION VISIT (OUTPATIENT)
Dept: PHARMACY | Facility: HOSPITAL | Age: 51
End: 2021-11-29

## 2021-11-29 DIAGNOSIS — I26.99 OTHER ACUTE PULMONARY EMBOLISM WITHOUT ACUTE COR PULMONALE (HCC): Primary | ICD-10-CM

## 2021-11-29 DIAGNOSIS — I26.99 BILATERAL PULMONARY EMBOLISM (HCC): ICD-10-CM

## 2021-11-29 NOTE — PROGRESS NOTES
Anticoagulation Clinic Progress Note    Anticoagulation Summary  As of 2021    INR goal:  2.0-3.0   TTR:  71.9 % (2.8 y)   INR used for dosin.40 (2021)   Warfarin maintenance plan:  10 mg every Tue, Thu, Sat; 15 mg all other days   Weekly warfarin total:  90 mg   No change documented:  Tessie Fatima RPH   Plan last modified:  Sarah Coy PharmD (2021)   Next INR check:  12/3/2021   Priority:  High   Target end date:  Indefinite    Indications    Other acute pulmonary embolism without acute cor pulmonale (HCC) [I26.99]  History of bilateral pulmonary embolism (CMS/HCC) [I26.99]             Anticoagulation Episode Summary     INR check location:      Preferred lab:      Send INR reminders to:   [facility]  POOL    Comments:  new  frankie 2019 **WEEKLY FRANKIE**      Anticoagulation Care Providers     Provider Role Specialty Phone number    Torri Colmenares, APRN Referring Cardiology [phone]    Elsy Baeza MD Responsible Cardiology [phone]          Clinic Interview:  No pertinent clinical findings have been reported.    INR History:  Anticoagulation Monitoring 2021   INR 2.00 3.50 2.40   INR Date 2021   INR Goal 2.0-3.0 2.0-3.0 2.0-3.0   Trend Same Same Same   Last Week Total 57.5 mg 90 mg 90 mg   Next Week Total 90 mg 80 mg 90 mg   Sun 15 mg 15 mg -   Mon 15 mg 15 mg 15 mg   Tue 10 mg 10 mg 10 mg   Wed 15 mg 15 mg 15 mg   Thu 10 mg 10 mg 10 mg   Fri 15 mg 5 mg () -   Sat 10 mg 10 mg -   Visit Report - - -   Some recent data might be hidden       Plan:  1. INR is therapeutic today- see above in Anticoagulation Summary.    Kameron Melendez to continue their warfarin regimen- see above in Anticoagulation Summary.  2. Follow up in 1 week  3. They have been instructed to call if any changes in medications, doses, concerns, etc. Patient expresses understanding and has no further questions at this  time.    Tessie Fatima, [facility]

## 2021-11-30 DIAGNOSIS — I89.0 LYMPHEDEMA: Primary | ICD-10-CM

## 2021-11-30 DIAGNOSIS — I87.2 VENOUS STASIS DERMATITIS OF LEFT LOWER EXTREMITY: ICD-10-CM

## 2021-12-03 ENCOUNTER — ANTICOAGULATION VISIT (OUTPATIENT)
Dept: PHARMACY | Facility: HOSPITAL | Age: 51
End: 2021-12-03

## 2021-12-03 DIAGNOSIS — I26.99 OTHER ACUTE PULMONARY EMBOLISM WITHOUT ACUTE COR PULMONALE (HCC): Primary | ICD-10-CM

## 2021-12-03 DIAGNOSIS — I26.99 BILATERAL PULMONARY EMBOLISM (HCC): ICD-10-CM

## 2021-12-03 LAB — INR PPP: 3.1

## 2021-12-03 NOTE — PROGRESS NOTES
Anticoagulation Clinic Progress Note    Anticoagulation Summary  As of 12/3/2021    INR goal:  2.0-3.0   TTR:  71.9 % (2.8 y)   INR used for dosing:  3.10 (12/3/2021)   Warfarin maintenance plan:  10 mg every Tue, Thu, Sat; 15 mg all other days   Weekly warfarin total:  90 mg   No change documented:  Ramo Morocho PharmD   Plan last modified:  Sarah Coy PharmD (7/2/2021)   Next INR check:  12/10/2021   Priority:  High   Target end date:  Indefinite    Indications    Other acute pulmonary embolism without acute cor pulmonale (HCC) [I26.99]  History of bilateral pulmonary embolism (CMS/HCC) [I26.99]             Anticoagulation Episode Summary     INR check location:      Preferred lab:      Send INR reminders to:   [facility]  POOL    Comments:  new  tristen March 2019 **WEEKLY TRISTEN**      Anticoagulation Care Providers     Provider Role Specialty Phone number    Torri Colmenares, APRN Referring Cardiology [phone]    Elsy Baeza MD Responsible Cardiology [phone]          Clinic Interview:  Patient Findings     Positives:  Missed doses, Change in diet/appetite    Negatives:  Signs/symptoms of thrombosis, Signs/symptoms of bleeding,   Laboratory test error suspected, Change in health, Change in alcohol use,   Change in activity, Upcoming invasive procedure, Emergency department   visit, Upcoming dental procedure, Extra doses, Change in medications,   Hospital admission, Bruising, Other complaints    Comments:  Reports lower appetite recently. Missed last night's dose of   warfarin.      Clinical Outcomes     Negatives:  Major bleeding event, Thromboembolic event,   Anticoagulation-related hospital admission, Anticoagulation-related ED   visit, Anticoagulation-related fatality    Comments:  Reports lower appetite recently. Missed last night's dose of   warfarin.        INR History:  Anticoagulation Monitoring 11/19/2021 11/29/2021 12/3/2021   INR 3.50 2.40 3.10   INR Date 11/19/2021  11/26/2021 12/3/2021   INR Goal 2.0-3.0 2.0-3.0 2.0-3.0   Trend Same Same Same   Last Week Total 90 mg 90 mg 80 mg   Next Week Total 80 mg 90 mg 90 mg   Sun 15 mg - 15 mg   Mon 15 mg 15 mg 15 mg   Tue 10 mg 10 mg 10 mg   Wed 15 mg 15 mg 15 mg   Thu 10 mg 10 mg 10 mg   Fri 5 mg (11/19) - 15 mg   Sat 10 mg - 10 mg   Visit Report - - -   Some recent data might be hidden       Plan:  1. INR is Supratherapeutic today- see above in Anticoagulation Summary. Expect his missed dose from last night to reduce INR over next 1-2 days.   Will instruct Kameron Melendez to Continue their warfarin regimen for now - see above in Anticoagulation Summary.  2. Follow up in 1 week to determine need for dose reduction.   3. They have been instructed to call if any changes in medications, doses, concerns, etc. Patient expresses understanding and has no further questions at this time.    Ramo Morocho, PharmD

## 2021-12-10 ENCOUNTER — ANTICOAGULATION VISIT (OUTPATIENT)
Dept: PHARMACY | Facility: HOSPITAL | Age: 51
End: 2021-12-10

## 2021-12-10 DIAGNOSIS — I26.99 BILATERAL PULMONARY EMBOLISM (HCC): ICD-10-CM

## 2021-12-10 DIAGNOSIS — I26.99 OTHER ACUTE PULMONARY EMBOLISM WITHOUT ACUTE COR PULMONALE (HCC): Primary | ICD-10-CM

## 2021-12-10 LAB — INR PPP: 3

## 2021-12-10 NOTE — PROGRESS NOTES
Anticoagulation Clinic Progress Note    Anticoagulation Summary  As of 12/10/2021    INR goal:  2.0-3.0   TTR:  71.5 % (2.8 y)   INR used for dosing:  3.00 (12/10/2021)   Warfarin maintenance plan:  10 mg every Tue, Thu, Sat; 15 mg all other days   Weekly warfarin total:  90 mg   No change documented:  Tessie Fatima RPH   Plan last modified:  Sarah Coy, Ariela (7/2/2021)   Next INR check:  12/17/2021   Priority:  High   Target end date:  Indefinite    Indications    Other acute pulmonary embolism without acute cor pulmonale (HCC) [I26.99]  History of bilateral pulmonary embolism (CMS/HCC) [I26.99]             Anticoagulation Episode Summary     INR check location:      Preferred lab:      Send INR reminders to:   [facility]  POOL    Comments:  new  frankie March 2019 **WEEKLY FRANKIE**      Anticoagulation Care Providers     Provider Role Specialty Phone number    Torri Colmenares, APRN Referring Cardiology [phone]    Elsy Baeza MD Responsible Cardiology [phone]          Clinic Interview:  Patient Findings     Negatives:  Signs/symptoms of thrombosis, Signs/symptoms of bleeding,   Laboratory test error suspected, Change in health, Change in alcohol use,   Change in activity, Upcoming invasive procedure, Emergency department   visit, Upcoming dental procedure, Missed doses, Extra doses, Change in   medications, Change in diet/appetite, Hospital admission, Bruising, Other   complaints      Clinical Outcomes     Negatives:  Major bleeding event, Thromboembolic event,   Anticoagulation-related hospital admission, Anticoagulation-related ED   visit, Anticoagulation-related fatality        INR History:  Anticoagulation Monitoring 11/29/2021 12/3/2021 12/10/2021   INR 2.40 3.10 3.00   INR Date 11/26/2021 12/3/2021 12/10/2021   INR Goal 2.0-3.0 2.0-3.0 2.0-3.0   Trend Same Same Same   Last Week Total 90 mg 80 mg 90 mg   Next Week Total 90 mg 90 mg 90 mg   Sun - 15 mg 15 mg   Mon 15 mg 15  mg 15 mg   Tue 10 mg 10 mg 10 mg   Wed 15 mg 15 mg 15 mg   Thu 10 mg 10 mg 10 mg   Fri - 15 mg 15 mg   Sat - 10 mg 10 mg   Visit Report - - -   Some recent data might be hidden       Plan:  1. INR is Therapeutic today- see above in Anticoagulation Summary.   Will instruct Kameron Melendez to Continue their warfarin regimen- see above in Anticoagulation Summary.  2. Follow up in 1 week   3. They have been instructed to call if any changes in medications, doses, concerns, etc. Patient expresses understanding and has no further questions at this time.    Tessie Fatima [facility]

## 2021-12-17 ENCOUNTER — ANTICOAGULATION VISIT (OUTPATIENT)
Dept: PHARMACY | Facility: HOSPITAL | Age: 51
End: 2021-12-17

## 2021-12-17 ENCOUNTER — OFFICE VISIT (OUTPATIENT)
Dept: FAMILY MEDICINE CLINIC | Facility: CLINIC | Age: 51
End: 2021-12-17

## 2021-12-17 VITALS
HEART RATE: 115 BPM | OXYGEN SATURATION: 99 % | SYSTOLIC BLOOD PRESSURE: 118 MMHG | HEIGHT: 74 IN | BODY MASS INDEX: 72.18 KG/M2 | DIASTOLIC BLOOD PRESSURE: 72 MMHG

## 2021-12-17 DIAGNOSIS — E66.01 MORBID (SEVERE) OBESITY DUE TO EXCESS CALORIES (HCC): ICD-10-CM

## 2021-12-17 DIAGNOSIS — Z12.11 ENCOUNTER FOR COLORECTAL CANCER SCREENING: ICD-10-CM

## 2021-12-17 DIAGNOSIS — I87.2 VENOUS STASIS DERMATITIS OF BOTH LOWER EXTREMITIES: ICD-10-CM

## 2021-12-17 DIAGNOSIS — Z12.12 ENCOUNTER FOR COLORECTAL CANCER SCREENING: ICD-10-CM

## 2021-12-17 DIAGNOSIS — I26.99 OTHER ACUTE PULMONARY EMBOLISM WITHOUT ACUTE COR PULMONALE (HCC): Primary | ICD-10-CM

## 2021-12-17 DIAGNOSIS — I26.99 BILATERAL PULMONARY EMBOLISM (HCC): ICD-10-CM

## 2021-12-17 DIAGNOSIS — I89.0 LYMPHEDEMA: ICD-10-CM

## 2021-12-17 DIAGNOSIS — L03.116 CELLULITIS OF LEFT LOWER EXTREMITY: Primary | ICD-10-CM

## 2021-12-17 PROBLEM — I25.10 ATHSCL HEART DISEASE OF NATIVE CORONARY ARTERY W/O ANG PCTRS: Status: ACTIVE | Noted: 2021-11-16

## 2021-12-17 PROBLEM — Z79.01 LONG TERM (CURRENT) USE OF ANTICOAGULANTS: Status: ACTIVE | Noted: 2021-11-16

## 2021-12-17 PROBLEM — Z86.718 PERSONAL HISTORY OF OTHER VENOUS THROMBOSIS AND EMBOLISM: Status: ACTIVE | Noted: 2021-11-16

## 2021-12-17 PROBLEM — F17.290 NICOTINE DEPENDENCE, OTHER TOBACCO PRODUCT, UNCOMPLICATED: Status: ACTIVE | Noted: 2021-11-16

## 2021-12-17 PROBLEM — I48.3 TYPICAL ATRIAL FLUTTER: Status: ACTIVE | Noted: 2021-11-16

## 2021-12-17 LAB — INR PPP: 2.7

## 2021-12-17 PROCEDURE — 99214 OFFICE O/P EST MOD 30 MIN: CPT | Performed by: FAMILY MEDICINE

## 2021-12-17 RX ORDER — SEMAGLUTIDE 0.5 MG/.5ML
0.5 INJECTION, SOLUTION SUBCUTANEOUS WEEKLY
Qty: 1 ML | Refills: 0 | Status: SHIPPED | OUTPATIENT
Start: 2021-12-17 | End: 2022-03-18

## 2021-12-17 RX ORDER — SEMAGLUTIDE 0.25 MG/.5ML
0.25 INJECTION, SOLUTION SUBCUTANEOUS WEEKLY
Qty: 1 ML | Refills: 0 | Status: SHIPPED | OUTPATIENT
Start: 2021-12-17 | End: 2022-03-18

## 2021-12-17 RX ORDER — SEMAGLUTIDE 1 MG/.5ML
1 INJECTION, SOLUTION SUBCUTANEOUS WEEKLY
Qty: 2 ML | Refills: 5 | Status: SHIPPED | OUTPATIENT
Start: 2021-12-17 | End: 2022-03-18

## 2021-12-17 NOTE — PROGRESS NOTES
Anticoagulation Clinic Progress Note    Anticoagulation Summary  As of 2021    INR goal:  2.0-3.0   TTR:  71.6 % (2.8 y)   INR used for dosin.7 (2021)   Warfarin maintenance plan:  10 mg every Tue, Thu, Sat; 15 mg all other days   Weekly warfarin total:  90 mg   No change documented:  Catrachita Perez   Plan last modified:  Sarah Coy PharmD (2021)   Next INR check:  2021   Priority:  High   Target end date:  Indefinite    Indications    Other acute pulmonary embolism without acute cor pulmonale (HCC) [I26.99]  History of bilateral pulmonary embolism (CMS/HCC) [I26.99]             Anticoagulation Episode Summary     INR check location:      Preferred lab:      Send INR reminders to:   [facility]  POOL    Comments:  new  tristen 2019 **WEEKLY TRISTEN**      Anticoagulation Care Providers     Provider Role Specialty Phone number    Torri Colmenares APRN Referring Cardiology [phone]    Elsy Baeza MD Responsible Cardiology [phone]          Clinic Interview:  No pertinent clinical findings have been reported.    INR History:  Anticoagulation Monitoring 12/3/2021 12/10/2021 2021   INR 3.10 3.00 2.7   INR Date 12/3/2021 12/10/2021 2021   INR Goal 2.0-3.0 2.0-3.0 2.0-3.0   Trend Same Same Same   Last Week Total 80 mg 90 mg 90 mg   Next Week Total 90 mg 90 mg 90 mg   Sun 15 mg 15 mg 15 mg   Mon 15 mg 15 mg 15 mg   Tue 10 mg 10 mg 10 mg   Wed 15 mg 15 mg 15 mg   Thu 10 mg 10 mg 10 mg   Fri 15 mg 15 mg 15 mg   Sat 10 mg 10 mg 10 mg   Visit Report - - -   Some recent data might be hidden       Plan:  1. INR is therapeutic today- see above in Anticoagulation Summary.    Kameron Melendez to continue their warfarin regimen- see above in Anticoagulation Summary.  2. Follow up in 1 week  3. Pt has agreed to only be called if INR out of range. They have been instructed to call if any changes in medications, doses, concerns, etc. Patient expresses  understanding and has no further questions at this time.    Catrachita ePrez

## 2021-12-17 NOTE — PATIENT INSTRUCTIONS
Calorie Counting for Weight Loss  Calories are units of energy. Your body needs a certain number of calories from food to keep going throughout the day. When you eat or drink more calories than your body needs, your body stores the extra calories mostly as fat. When you eat or drink fewer calories than your body needs, your body burns fat to get the energy it needs.  Calorie counting means keeping track of how many calories you eat and drink each day. Calorie counting can be helpful if you need to lose weight. If you eat fewer calories than your body needs, you should lose weight. Ask your health care provider what a healthy weight is for you.  For calorie counting to work, you will need to eat the right number of calories each day to lose a healthy amount of weight per week. A dietitian can help you figure out how many calories you need in a day and will suggest ways to reach your calorie goal.  · A healthy amount of weight to lose each week is usually 1-2 lb (0.5-0.9 kg). This usually means that your daily calorie intake should be reduced by 500-750 calories.  · Eating 1,200-1,500 calories a day can help most women lose weight.  · Eating 1,500-1,800 calories a day can help most men lose weight.  What do I need to know about calorie counting?  Work with your health care provider or dietitian to determine how many calories you should get each day. To meet your daily calorie goal, you will need to:  · Find out how many calories are in each food that you would like to eat. Try to do this before you eat.  · Decide how much of the food you plan to eat.  · Keep a food log. Do this by writing down what you ate and how many calories it had.  To successfully lose weight, it is important to balance calorie counting with a healthy lifestyle that includes regular activity.  Where do I find calorie information?    The number of calories in a food can be found on a Nutrition Facts label. If a food does not have a Nutrition Facts  label, try to look up the calories online or ask your dietitian for help.  Remember that calories are listed per serving. If you choose to have more than one serving of a food, you will have to multiply the calories per serving by the number of servings you plan to eat. For example, the label on a package of bread might say that a serving size is 1 slice and that there are 90 calories in a serving. If you eat 1 slice, you will have eaten 90 calories. If you eat 2 slices, you will have eaten 180 calories.  How do I keep a food log?  After each time that you eat, record the following in your food log as soon as possible:  · What you ate. Be sure to include toppings, sauces, and other extras on the food.  · How much you ate. This can be measured in cups, ounces, or number of items.  · How many calories were in each food and drink.  · The total number of calories in the food you ate.  Keep your food log near you, such as in a pocket-sized notebook or on an sandra or website on your mobile phone. Some programs will calculate calories for you and show you how many calories you have left to meet your daily goal.  What are some portion-control tips?  · Know how many calories are in a serving. This will help you know how many servings you can have of a certain food.  · Use a measuring cup to measure serving sizes. You could also try weighing out portions on a kitchen scale. With time, you will be able to estimate serving sizes for some foods.  · Take time to put servings of different foods on your favorite plates or in your favorite bowls and cups so you know what a serving looks like.  · Try not to eat straight from a food's packaging, such as from a bag or box. Eating straight from the package makes it hard to see how much you are eating and can lead to overeating. Put the amount you would like to eat in a cup or on a plate to make sure you are eating the right portion.  · Use smaller plates, glasses, and bowls for smaller  portions and to prevent overeating.  · Try not to multitask. For example, avoid watching TV or using your computer while eating. If it is time to eat, sit down at a table and enjoy your food. This will help you recognize when you are full. It will also help you be more mindful of what and how much you are eating.  What are tips for following this plan?  Reading food labels  · Check the calorie count compared with the serving size. The serving size may be smaller than what you are used to eating.  · Check the source of the calories. Try to choose foods that are high in protein, fiber, and vitamins, and low in saturated fat, trans fat, and sodium.  Shopping  · Read nutrition labels while you shop. This will help you make healthy decisions about which foods to buy.  · Pay attention to nutrition labels for low-fat or fat-free foods. These foods sometimes have the same number of calories or more calories than the full-fat versions. They also often have added sugar, starch, or salt to make up for flavor that was removed with the fat.  · Make a grocery list of lower-calorie foods and stick to it.  Cooking  · Try to cook your favorite foods in a healthier way. For example, try baking instead of frying.  · Use low-fat dairy products.  Meal planning  · Use more fruits and vegetables. One-half of your plate should be fruits and vegetables.  · Include lean proteins, such as chicken, turkey, and fish.  Lifestyle  Each week, aim to do one of the following:  · 150 minutes of moderate exercise, such as walking.  · 75 minutes of vigorous exercise, such as running.  General information  · Know how many calories are in the foods you eat most often. This will help you calculate calorie counts faster.  · Find a way of tracking calories that works for you. Get creative. Try different apps or programs if writing down calories does not work for you.  What foods should I eat?    · Eat nutritious foods. It is better to have a nutritious,  high-calorie food, such as an avocado, than a food with few nutrients, such as a bag of potato chips.  · Use your calories on foods and drinks that will fill you up and will not leave you hungry soon after eating.  ? Examples of foods that fill you up are nuts and nut butters, vegetables, lean proteins, and high-fiber foods such as whole grains. High-fiber foods are foods with more than 5 g of fiber per serving.  · Pay attention to calories in drinks. Low-calorie drinks include water and unsweetened drinks.  The items listed above may not be a complete list of foods and beverages you can eat. Contact a dietitian for more information.  What foods should I limit?  Limit foods or drinks that are not good sources of vitamins, minerals, or protein or that are high in unhealthy fats. These include:  · Candy.  · Other sweets.  · Sodas, specialty coffee drinks, alcohol, and juice.  The items listed above may not be a complete list of foods and beverages you should avoid. Contact a dietitian for more information.  How do I count calories when eating out?  · Pay attention to portions. Often, portions are much larger when eating out. Try these tips to keep portions smaller:  ? Consider sharing a meal instead of getting your own.  ? If you get your own meal, eat only half of it. Before you start eating, ask for a container and put half of your meal into it.  ? When available, consider ordering smaller portions from the menu instead of full portions.  · Pay attention to your food and drink choices. Knowing the way food is cooked and what is included with the meal can help you eat fewer calories.  ? If calories are listed on the menu, choose the lower-calorie options.  ? Choose dishes that include vegetables, fruits, whole grains, low-fat dairy products, and lean proteins.  ? Choose items that are boiled, broiled, grilled, or steamed. Avoid items that are buttered, battered, fried, or served with cream sauce. Items labeled as  crispy are usually fried, unless stated otherwise.  ? Choose water, low-fat milk, unsweetened iced tea, or other drinks without added sugar. If you want an alcoholic beverage, choose a lower-calorie option, such as a glass of wine or light beer.  ? Ask for dressings, sauces, and syrups on the side. These are usually high in calories, so you should limit the amount you eat.  ? If you want a salad, choose a garden salad and ask for grilled meats. Avoid extra toppings such as jasso, cheese, or fried items. Ask for the dressing on the side, or ask for olive oil and vinegar or lemon to use as dressing.  · Estimate how many servings of a food you are given. Knowing serving sizes will help you be aware of how much food you are eating at restaurants.  Where to find more information  · Centers for Disease Control and Prevention: www.cdc.gov  · [place] Department of Agriculture: [employer]  Summary  · Calorie counting means keeping track of how many calories you eat and drink each day. If you eat fewer calories than your body needs, you should lose weight.  · A healthy amount of weight to lose per week is usually 1-2 lb (0.5-0.9 kg). This usually means reducing your daily calorie intake by 500-750 calories.  · The number of calories in a food can be found on a Nutrition Facts label. If a food does not have a Nutrition Facts label, try to look up the calories online or ask your dietitian for help.  · Use smaller plates, glasses, and bowls for smaller portions and to prevent overeating.  · Use your calories on foods and drinks that will fill you up and not leave you hungry shortly after a meal.  This information is not intended to replace advice given to you by your health care provider. Make sure you discuss any questions you have with your health care provider.  Document Revised: 01/28/2021 Document Reviewed: 01/28/2021  Elsevier Patient Education © 2021 [employer]      Exercising to Lose Weight  Exercise is structured,  repetitive physical activity to improve fitness and health. Getting regular exercise is important for everyone. It is especially important if you are overweight. Being overweight increases your risk of heart disease, stroke, diabetes, high blood pressure, and several types of cancer. Reducing your calorie intake and exercising can help you lose weight.  Exercise is usually categorized as moderate or vigorous intensity. To lose weight, most people need to do a certain amount of moderate-intensity or vigorous-intensity exercise each week.  Moderate-intensity exercise    Moderate-intensity exercise is any activity that gets you moving enough to burn at least three times more energy (calories) than if you were sitting.  Examples of moderate exercise include:  · Walking a mile in 15 minutes.  · Doing light yard work.  · Biking at an easy pace.  Most people should get at least 150 minutes (2 hours and 30 minutes) a week of moderate-intensity exercise to maintain their body weight.  Vigorous-intensity exercise  Vigorous-intensity exercise is any activity that gets you moving enough to burn at least six times more calories than if you were sitting. When you exercise at this intensity, you should be working hard enough that you are not able to carry on a conversation.  Examples of vigorous exercise include:  · Running.  · Playing a team sport, such as football, basketball, and soccer.  · Jumping rope.  Most people should get at least 75 minutes (1 hour and 15 minutes) a week of vigorous-intensity exercise to maintain their body weight.  How can exercise affect me?  When you exercise enough to burn more calories than you eat, you lose weight. Exercise also reduces body fat and builds muscle. The more muscle you have, the more calories you burn. Exercise also:  · Improves mood.  · Reduces stress and tension.  · Improves your overall fitness, flexibility, and endurance.  · Increases bone strength.  The amount of exercise you  need to lose weight depends on:  · Your age.  · The type of exercise.  · Any health conditions you have.  · Your overall physical ability.  Talk to your health care provider about how much exercise you need and what types of activities are safe for you.  What actions can I take to lose weight?  Nutrition    · Make changes to your diet as told by your health care provider or diet and nutrition specialist (dietitian). This may include:  ? Eating fewer calories.  ? Eating more protein.  ? Eating less unhealthy fats.  ? Eating a diet that includes fresh fruits and vegetables, whole grains, low-fat dairy products, and lean protein.  ? Avoiding foods with added fat, salt, and sugar.  · Drink plenty of water while you exercise to prevent dehydration or heat stroke.    Activity  · Choose an activity that you enjoy and set realistic goals. Your health care provider can help you make an exercise plan that works for you.  · Exercise at a moderate or vigorous intensity most days of the week.  ? The intensity of exercise may vary from person to person. You can tell how intense a workout is for you by paying attention to your breathing and heartbeat. Most people will notice their breathing and heartbeat get faster with more intense exercise.  · Do resistance training twice each week, such as:  ? Push-ups.  ? Sit-ups.  ? Lifting weights.  ? Using resistance bands.  · Getting short amounts of exercise can be just as helpful as long structured periods of exercise. If you have trouble finding time to exercise, try to include exercise in your daily routine.  ? Get up, stretch, and walk around every 30 minutes throughout the day.  ? Go for a walk during your lunch break.  ? Park your car farther away from your destination.  ? If you take public transportation, get off one stop early and walk the rest of the way.  ? Make phone calls while standing up and walking around.  ? Take the stairs instead of elevators or escalators.  · Wear  comfortable clothes and shoes with good support.  · Do not exercise so much that you hurt yourself, feel dizzy, or get very short of breath.  Where to find more information  · [place] Department of Health and Human Services: www.hhs.gov  · Centers for Disease Control and Prevention (CDC): www.cdc.gov  Contact a health care provider:  · Before starting a new exercise program.  · If you have questions or concerns about your weight.  · If you have a medical problem that keeps you from exercising.  Get help right away if you have any of the following while exercising:  · Injury.  · Dizziness.  · Difficulty breathing or shortness of breath that does not go away when you stop exercising.  · Chest pain.  · Rapid heartbeat.  Summary  · Being overweight increases your risk of heart disease, stroke, diabetes, high blood pressure, and several types of cancer.  · Losing weight happens when you burn more calories than you eat.  · Reducing the amount of calories you eat in addition to getting regular moderate or vigorous exercise each week helps you lose weight.  This information is not intended to replace advice given to you by your health care provider. Make sure you discuss any questions you have with your health care provider.  Document Revised: 04/15/2021 Document Reviewed: 04/15/2021  Elsevier Patient Education © 2021 [employer]

## 2021-12-17 NOTE — PROGRESS NOTES
Subjective   Kameron Melendez is a 51 y.o. male. Presents today for   Chief Complaint   Patient presents with   • Follow-up     6 weeks   • Edema       Leg Swelling  This is a chronic problem. The current episode started more than 1 year ago. The problem occurs constantly. The problem has been waxing and waning. Pertinent negatives include no chest pain. Associated symptoms comments: Has severe lymphedema;  Recurrent cellulitis;  Has severe skin thikening c/w venous stasis dermatitis.  . Treatments tried: wraps, finished abx;   Was cleared by PT after exercises and for lymphedema. The treatment provided mild (no skin breakdown or wounds now.) relief.   Obesity  This is a chronic problem. The current episode started more than 1 year ago. The problem occurs constantly. The problem has been waxing and waning. Pertinent negatives include no chest pain. Treatments tried: referred to bariatrics, but reports insurance does not cover.    Has been discussing with dietician.       Review of Systems   Respiratory: Negative for shortness of breath.    Cardiovascular: Positive for leg swelling. Negative for chest pain.       Patient Active Problem List   Diagnosis   • History of bilateral pulmonary embolism (CMS/HCC)   • Pulmonary hypertension (HCC)   • Lymphedema of both lower extremities   • Obesity, morbid, BMI 50 or higher ([facility])   • Dyslipidemia   • Hyperuricemia   • Hypogonadal obesity   • Knee arthropathy   • Morbid obesity with body mass index of 60.0-69.9 in adult ([facility])   • ARNOLDO (obstructive sleep apnea)   • Severe edema   • History of pulmonary embolism   • Other acute pulmonary embolism without acute cor pulmonale ([facility])   • Intractable back pain   • Heterozygous factor V Leiden mutation ([facility])   • Cellulitis of left lower extremity   • Hypokalemia   • CAROL (acute kidney injury) ([facility])   • Sepsis with cutaneous manifestations ([facility])   • Hyperglycemia   • Paroxysmal atrial fibrillation ([facility])   • 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   • Long term (current) use of anticoagulants   • Lymphedema, not elsewhere classified   • Nicotine dependence, other tobacco product, uncomplicated   • Personal history of other venous thrombosis and embolism   • Typical atrial flutter (HCC)   • Venous insufficiency (chronic) (peripheral)       Social History     Socioeconomic History   • Marital status:    Tobacco Use   • Smoking status: Light Tobacco Smoker     Types: Cigars, Pipe     Start date: 1/1/2006   • Smokeless tobacco: Never Used   • Tobacco comment: occasional - < 1 a month   Substance and Sexual Activity   • Alcohol use: No   • Drug use: No   • Sexual activity: Defer       No Known Allergies    Current Outpatient Medications on File Prior to Visit   Medication Sig Dispense Refill   • acetaminophen (TYLENOL) 500 MG tablet Take 500 mg by mouth Every 6 (Six) Hours As Needed for Mild Pain .     • ammonium lactate (AMLACTIN) 12 % cream Apply 1 application topically to the appropriate area as directed As Needed for Dry Skin. Apply to both legs daily as needed for venous stasis dermatitis and lymphedema skin thickening     • chlorhexidine (Hibiclens) 4 % external liquid Apply 1 application topically to the appropriate area as directed Daily As Needed for Wound Care.     • furosemide (LASIX) 80 MG tablet TAKE 1 TABLET BY MOUTH DAILY (Patient taking differently: Take 80 mg by mouth Daily. Pt. Only takes when swelling is \"excessive\" and he is going to be home) 30 tablet 5   • potassium chloride (K-DUR,KLOR-CON) 20 MEQ CR tablet Take 1 tablet by mouth Daily. Take with furosemide (Patient taking differently: Take 20 mEq by mouth Daily. Only takes with furosemide) 30 tablet 2   • warfarin (COUMADIN) 10 MG tablet TAKE ONE TABLET BY MOUTH ON TUE, THUR, SAT AND TAKE ONE AND ONE-HALF (15 MG) TABLETS ALL OTHER DAYS OR AS DIRECTED (Patient taking differently: Take 10 mg by mouth 3 (Three) Times a Week. Takes 10 mg daily on Tuesday, " "Thursday,and Saturday) 120 tablet 1   • warfarin (COUMADIN) 10 MG tablet Take 15 mg by mouth 4 (Four) Times a Week. Takes 1.5 tablets (15mg) Sunday, Monday, Wednesday, and Friday.       No current facility-administered medications on file prior to visit.       Objective   Vitals:    12/17/21 1231   BP: 118/72   Pulse: 115   SpO2: 99%   Weight: Comment: weight exceeds scale   Height: 188 cm (74\")   PainSc: 0-No pain     Body mass index is 72.18 kg/m².    Physical Exam  Vitals and nursing note reviewed.   Constitutional:       Appearance: He is well-developed.   Musculoskeletal:      Cervical back: Neck supple.      Right lower leg: Edema present.      Left lower leg: Edema present.   Skin:     General: Skin is warm and dry.      Comments: Severe venous stasis dermatitis left > right   Neurological:      Mental Status: He is alert.   Psychiatric:         Behavior: Behavior normal.     Protime-INR  Order: [id number]   Status: Final result     Visible to patient: Yes (not seen)     Next appt: 03/03/2022 at 08:30 AM in Cardiology (Elsy Baeza MD)    Specimen Information: Blood         0 Result Notes    Component 7 d ago   INR 3.00         Component      Latest Ref Rng & Units 11/10/2021   WBC      3.4 - 10.8 x10E3/uL 13.4 (H)   RBC      4.14 - 5.80 x10E6/uL 4.20   Hemoglobin      13.0 - 17.7 g/dL 12.2 (L)   Hematocrit      37.5 - 51.0 % 36.5 (L)   MCV      79 - 97 fL 87   MCH      26.6 - 33.0 pg 29.0   MCHC      31.5 - 35.7 g/dL 33.4   RDW      11.6 - 15.4 % 13.6   Platelets      150 - 450 x10E3/uL 443   Neutrophil Rel %      Not Estab. % 73   Lymphocyte Rel %      Not Estab. % 17   Monocyte Rel %      Not Estab. % 8   Eosinophil Rel %      Not Estab. % 1   Basophil Rel %      Not Estab. % 0   Neutrophils Absolute      1.4 - 7.0 x10E3/uL 9.7 (H)   Lymphocytes Absolute      0.7 - 3.1 x10E3/uL 2.3   Monocytes Absolute      0.1 - 0.9 x10E3/uL 1.1 (H)   Eosinophils Absolute      0.0 - 0.4 x10E3/uL 0.1   Basophils " Absolute      0.0 - 0.2 x10E3/uL 0.0   Immature Granulocyte Rel %      Not Estab. % 1   Immature Grans, Absolute      0.0 - 0.1 x10E3/uL 0.1   Glucose      65 - 99 mg/dL 159 (H)   BUN      6 - 24 mg/dL 9   Creatinine      0.76 - 1.27 mg/dL 0.61 (L)   eGFR Non African Am      >59 mL/min/1.73 116   eGFR African Am      >59 mL/min/1.73 134   BUN/Creatinine Ratio      9 - 20 15   Sodium      134 - 144 mmol/L 131 (L)   Potassium      3.5 - 5.2 mmol/L 4.4   Chloride      96 - 106 mmol/L 94 (L)   CO2      20 - 29 mmol/L 25   Calcium      8.7 - 10.2 mg/dL 8.5 (L)   Total Protein      6.0 - 8.5 g/dL 7.4   Albumin      3.8 - 4.9 g/dL 3.0 (L)   Globulin      1.5 - 4.5 g/dL 4.4   A/G Ratio      1.2 - 2.2 0.7 (L)   Total Bilirubin      0.0 - 1.2 mg/dL 0.6   Alkaline Phosphatase      44 - 121 IU/L 173 (H)   AST (SGOT)      0 - 40 IU/L 22   ALT (SGPT)      0 - 44 IU/L 27   Sed Rate      0 - 30 mm/hr 119 (H)       Assessment/Plan   Diagnoses and all orders for this visit:    1. Cellulitis of left lower extremity (Primary)    2. Lymphedema    3. Venous stasis dermatitis of both lower extremities    4. Morbid (severe) obesity due to excess calories (HCC)  -     Semaglutide-Weight Management (semaglutide) 0.25 MG/0.5ML solution auto-injector; Inject 0.25 mg under the skin into the appropriate area as directed 1 (One) Time Per Week. X 2 weeks then go to 0.5mg  Dispense: 1 mL; Refill: 0  -     Semaglutide-Weight Management (Wegovy) 0.5 MG/0.5ML solution auto-injector; Inject 0.5 mg under the skin into the appropriate area as directed 1 (One) Time Per Week. X 2 weeks then go to 1mg  Dispense: 1 mL; Refill: 0  -     Semaglutide-Weight Management (Wegovy) 1 MG/0.5ML solution auto-injector; Inject 1 mg under the skin into the appropriate area as directed 1 (One) Time Per Week.  Dispense: 2 mL; Refill: 5    -bariatrics not covered by insurance pe rpatient;  Recommend try wegovy as safe and can help.  Go to website for discount  card  -counseled on diet and exercise  Continue diuretic, continue sequential compression device.  Will release back to work for 12/20/21 without restrictions (foot wear allowed).   Will need up date FMLA as will need see lymphedema clinic, but not able get in until mid-January         -Follow up: 3 [facility] andprn

## 2021-12-24 LAB — INR PPP: 2.7

## 2021-12-27 ENCOUNTER — ANTICOAGULATION VISIT (OUTPATIENT)
Dept: PHARMACY | Facility: HOSPITAL | Age: 51
End: 2021-12-27

## 2021-12-27 DIAGNOSIS — I26.99 BILATERAL PULMONARY EMBOLISM (HCC): ICD-10-CM

## 2021-12-27 DIAGNOSIS — I26.99 OTHER ACUTE PULMONARY EMBOLISM WITHOUT ACUTE COR PULMONALE (HCC): Primary | ICD-10-CM

## 2021-12-27 NOTE — PROGRESS NOTES
Anticoagulation Clinic Progress Note    Anticoagulation Summary  As of 2021    INR goal:  2.0-3.0   TTR:  71.8 % (2.8 y)   INR used for dosin.70 (2021)   Warfarin maintenance plan:  10 mg every Tue, Thu, Sat; 15 mg all other days   Weekly warfarin total:  90 mg   No change documented:  Iris Gonzalez   Plan last modified:  Sarah Coy PharmD (2021)   Next INR check:  2021   Priority:  High   Target end date:  Indefinite    Indications    Other acute pulmonary embolism without acute cor pulmonale (HCC) [I26.99]  History of bilateral pulmonary embolism (CMS/HCC) [I26.99]             Anticoagulation Episode Summary     INR check location:      Preferred lab:      Send INR reminders to:   [facility]  POOL    Comments:  new  tristen 2019 **WEEKLY TRISTEN**      Anticoagulation Care Providers     Provider Role Specialty Phone number    Torri Colmenares, APRN Referring Cardiology [phone]    Elsy Baeza MD Responsible Cardiology [phone]          Clinic Interview:  No pertinent clinical findings have been reported.    INR History:  Anticoagulation Monitoring 12/10/2021 2021 2021   INR 3.00 2.7 2.70   INR Date 12/10/2021 2021 2021   INR Goal 2.0-3.0 2.0-3.0 2.0-3.0   Trend Same Same Same   Last Week Total 90 mg 90 mg 90 mg   Next Week Total 90 mg 90 mg 90 mg   Sun 15 mg 15 mg -   Mon 15 mg 15 mg 15 mg   Tue 10 mg 10 mg 10 mg   Wed 15 mg 15 mg 15 mg   Thu 10 mg 10 mg 10 mg   Fri 15 mg 15 mg -   Sat 10 mg 10 mg -   Visit Report - - -   Some recent data might be hidden       Plan:  1. INR is therapeutic today- see above in Anticoagulation Summary.    Kameron Melendez to continue their warfarin regimen- see above in Anticoagulation Summary.  2. Follow up in 1 week  3. Pt has agreed to only be called if INR out of range. They have been instructed to call if any changes in medications, doses, concerns, etc. Patient expresses  understanding and has no further questions at this time.    Iris Gonzalez

## 2021-12-31 LAB — INR PPP: 3.8

## 2022-01-03 ENCOUNTER — ANTICOAGULATION VISIT (OUTPATIENT)
Dept: PHARMACY | Facility: HOSPITAL | Age: 52
End: 2022-01-03

## 2022-01-03 DIAGNOSIS — I26.99 BILATERAL PULMONARY EMBOLISM: ICD-10-CM

## 2022-01-03 DIAGNOSIS — I26.99 OTHER ACUTE PULMONARY EMBOLISM WITHOUT ACUTE COR PULMONALE: Primary | ICD-10-CM

## 2022-01-04 RX ORDER — WARFARIN SODIUM 5 MG/1
TABLET ORAL
Qty: 55 TABLET | Refills: 3 | OUTPATIENT
Start: 2022-01-04

## 2022-01-05 NOTE — PROGRESS NOTES
Anticoagulation Clinic Progress Note    Anticoagulation Summary  As of 1/3/2022    INR goal:  2.0-3.0   TTR:  71.5 % (2.9 y)   INR used for dosing:  3.80 (12/31/2021)   Warfarin maintenance plan:  10 mg every Tue, Thu, Sat; 15 mg all other days   Weekly warfarin total:  90 mg   Plan last modified:  Sarah Coy PharmD (7/2/2021)   Next INR check:  1/7/2022   Priority:  High   Target end date:  Indefinite    Indications    Other acute pulmonary embolism without acute cor pulmonale (HCC) [I26.99]  History of bilateral pulmonary embolism (CMS/HCC) [I26.99]             Anticoagulation Episode Summary     INR check location:      Preferred lab:      Send INR reminders to:  [facility]  POOL    Comments:  new  tristen March 2019 **WEEKLY TRISTEN**      Anticoagulation Care Providers     Provider Role Specialty Phone number    Torri Colmenares APRN Referring Cardiology [phone]    Elsy Baeza MD Responsible Cardiology [phone]          Clinic Interview:  Patient Findings     Positives:  Change in medications    Negatives:  Signs/symptoms of thrombosis, Signs/symptoms of bleeding,   Laboratory test error suspected, Change in health, Change in alcohol use,   Change in activity, Upcoming invasive procedure, Emergency department   visit, Upcoming dental procedure, Missed doses, Extra doses, Change in   diet/appetite, Hospital admission, Bruising, Other complaints    Comments:  Keflex was started      Clinical Outcomes     Negatives:  Major bleeding event, Thromboembolic event,   Anticoagulation-related hospital admission, Anticoagulation-related ED   visit, Anticoagulation-related fatality    Comments:  Keflex was started        INR History:  Anticoagulation Monitoring 12/17/2021 12/27/2021 1/3/2022   INR 2.7 2.70 3.80   INR Date 12/17/2021 12/24/2021 12/31/2021   INR Goal 2.0-3.0 2.0-3.0 2.0-3.0   Trend Same Same Same   Last Week Total 90 mg 90 mg 85 mg   Next Week Total 90 mg 90 mg 85 mg   Sun 15  mg - -   Mon 15 mg 15 mg 10 mg (1/3)   Tue 10 mg 10 mg 10 mg   Wed 15 mg 15 mg 15 mg   Thu 10 mg 10 mg 10 mg   Fri 15 mg - -   Sat 10 mg - -   Visit Report - - -   Some recent data might be hidden       Plan:  1. INR is Supratherapeutic today- see above in Anticoagulation Summary.   Will instruct Kameron Melendez to Change their warfarin regimen- see above in Anticoagulation Summary.  2. Follow up in 1 week  3. They have been instructed to call if any changes in medications, doses, concerns, etc. Patient expresses understanding and has no further questions at this time.    Tessie Fatima, [facility]

## 2022-01-14 ENCOUNTER — ANTICOAGULATION VISIT (OUTPATIENT)
Dept: PHARMACY | Facility: HOSPITAL | Age: 52
End: 2022-01-14

## 2022-01-14 DIAGNOSIS — I26.99 OTHER ACUTE PULMONARY EMBOLISM WITHOUT ACUTE COR PULMONALE: Primary | ICD-10-CM

## 2022-01-14 DIAGNOSIS — I26.99 BILATERAL PULMONARY EMBOLISM: ICD-10-CM

## 2022-01-14 LAB — INR PPP: 3

## 2022-01-14 NOTE — PROGRESS NOTES
Anticoagulation Clinic Progress Note    Anticoagulation Summary  As of 1/14/2022    INR goal:  2.0-3.0   TTR:  70.6 % (2.9 y)   INR used for dosing:  3.00 (1/14/2022)   Warfarin maintenance plan:  10 mg every Tue, Thu, Sat; 15 mg all other days   Weekly warfarin total:  90 mg   No change documented:  Tessie Fatima RPH   Plan last modified:  Sarah Coy PharmD (7/2/2021)   Next INR check:  1/28/2022   Priority:  High   Target end date:  Indefinite    Indications    Other acute pulmonary embolism without acute cor pulmonale (HCC) [I26.99]  History of bilateral pulmonary embolism (CMS/HCC) [I26.99]             Anticoagulation Episode Summary     INR check location:      Preferred lab:      Send INR reminders to:   [facility]  POOL    Comments:  new  frankie March 2019 **WEEKLY FRANKIE**      Anticoagulation Care Providers     Provider Role Specialty Phone number    Torri Colmenares, APRN Referring Cardiology [phone]    Elsy Baeza MD Responsible Cardiology [phone]          Clinic Interview:  No pertinent clinical findings have been reported.    INR History:  Anticoagulation Monitoring 12/27/2021 1/3/2022 1/14/2022   INR 2.70 3.80 3.00   INR Date 12/24/2021 12/31/2021 1/14/2022   INR Goal 2.0-3.0 2.0-3.0 2.0-3.0   Trend Same Same Same   Last Week Total 90 mg 85 mg 90 mg   Next Week Total 90 mg 85 mg 90 mg   Sun - - 15 mg   Mon 15 mg 10 mg (1/3) 15 mg   Tue 10 mg 10 mg 10 mg   Wed 15 mg 15 mg 15 mg   Thu 10 mg 10 mg 10 mg   Fri - - 15 mg   Sat - - 10 mg   Visit Report - - -   Some recent data might be hidden       Plan:  1. INR is therapeutic today- see above in Anticoagulation Summary.    Kameron Melendez to continue their warfarin regimen- see above in Anticoagulation Summary.  2. Follow up in 2 weeks  3.  They have been instructed to call if any changes in medications, doses, concerns, etc. Patient expresses understanding and has no further questions at this time.    Tessie Fatima  RPH

## 2022-01-21 ENCOUNTER — ANTICOAGULATION VISIT (OUTPATIENT)
Dept: PHARMACY | Facility: HOSPITAL | Age: 52
End: 2022-01-21

## 2022-01-21 DIAGNOSIS — I26.99 OTHER ACUTE PULMONARY EMBOLISM WITHOUT ACUTE COR PULMONALE: Primary | ICD-10-CM

## 2022-01-21 DIAGNOSIS — I26.99 BILATERAL PULMONARY EMBOLISM: ICD-10-CM

## 2022-01-21 LAB — INR PPP: 3.5

## 2022-01-21 NOTE — PROGRESS NOTES
Anticoagulation Clinic Progress Note    Anticoagulation Summary  As of 1/21/2022    INR goal:  2.0-3.0   TTR:  70.1 % (2.9 y)   INR used for dosing:  3.50 (1/21/2022)   Warfarin maintenance plan:  10 mg every Tue, Thu, Sat; 15 mg all other days   Weekly warfarin total:  90 mg   Plan last modified:  Sarah Coy, PharmD (7/2/2021)   Next INR check:  1/28/2022   Priority:  High   Target end date:  Indefinite    Indications    Other acute pulmonary embolism without acute cor pulmonale (HCC) [I26.99]  History of bilateral pulmonary embolism (CMS/HCC) [I26.99]             Anticoagulation Episode Summary     INR check location:      Preferred lab:      Send INR reminders to:  [facility] [place]  POOL    Comments:  new  tristen March 2019 **WEEKLY TRISTEN**      Anticoagulation Care Providers     Provider Role Specialty Phone number    Torri Colmenares APRN Referring Cardiology [phone]    Elsy Baeza MD Responsible Cardiology [phone]          Clinic Interview:  Patient Findings     Positives:  Change in health, Change in medications    Negatives:  Signs/symptoms of thrombosis, Signs/symptoms of bleeding,   Laboratory test error suspected, Change in alcohol use, Change in   activity, Upcoming invasive procedure, Emergency department visit,   Upcoming dental procedure, Missed doses, Extra doses, Change in   diet/appetite, Hospital admission, Bruising, Other complaints    Comments:  Patient endorses fever, chills, and fatigue. He reports no   nausea, vomiting, or diarrhea. He was prescribed tamiflu       Clinical Outcomes     Negatives:  Major bleeding event, Thromboembolic event,   Anticoagulation-related hospital admission, Anticoagulation-related ED   visit, Anticoagulation-related fatality    Comments:  Patient endorses fever, chills, and fatigue. He reports no   nausea, vomiting, or diarrhea. He was prescribed tamiflu         INR History:  Anticoagulation Monitoring 1/3/2022 1/14/2022 1/21/2022    INR 3.80 3.00 3.50   INR Date 12/31/2021 1/14/2022 1/21/2022   INR Goal 2.0-3.0 2.0-3.0 2.0-3.0   Trend Same Same Same   Last Week Total 85 mg 90 mg 90 mg   Next Week Total 85 mg 90 mg 80 mg   Sun - 15 mg 15 mg   Mon 10 mg (1/3) 15 mg 15 mg   Tue 10 mg 10 mg 10 mg   Wed 15 mg 15 mg 15 mg   Thu 10 mg 10 mg 10 mg   Fri - 15 mg 5 mg (1/21)   Sat - 10 mg 10 mg   Visit Report - - -   Some recent data might be hidden       Plan:  1. INR is Supratherapeutic today- see above in Anticoagulation Summary.   Will instruct Kameron Melendez to Change their warfarin regimen- see above in Anticoagulation Summary.  2. Follow up in 1 week. Patient is agreeable to test INR sooner if symptoms progress.   3. They have been instructed to call if any changes in medications, doses, concerns, etc. Patient expresses understanding and has no further questions at this time.    Tessie Fatima [facility]

## 2022-01-26 ENCOUNTER — TELEPHONE (OUTPATIENT)
Dept: FAMILY MEDICINE CLINIC | Facility: CLINIC | Age: 52
End: 2022-01-26

## 2022-01-26 NOTE — TELEPHONE ENCOUNTER
Caller: HENOK     Relationship to patient: [employer]     Best call back number: [phone]  CASE #[id number]    Patient is needing:COLOGUARD CRITICAL RESULTS WERE FAXED TO DR WARNER 1-25-22.   PLEASE ADVISE IF THESE RESULTS WERE NOT RECEIVED.

## 2022-01-27 ENCOUNTER — PRE-PROCEDURE SCREENING (OUTPATIENT)
Dept: GASTROENTEROLOGY | Facility: CLINIC | Age: 52
End: 2022-01-27

## 2022-01-27 DIAGNOSIS — R19.5 POSITIVE COLORECTAL CANCER SCREENING USING COLOGUARD TEST: Primary | ICD-10-CM

## 2022-01-27 NOTE — TELEPHONE ENCOUNTER
Thank you.  I did task to report to him he needs to see GI.  I put in referral as cologiliana +.  RRJ

## 2022-01-28 ENCOUNTER — ANTICOAGULATION VISIT (OUTPATIENT)
Dept: PHARMACY | Facility: HOSPITAL | Age: 52
End: 2022-01-28

## 2022-01-28 DIAGNOSIS — I26.99 OTHER ACUTE PULMONARY EMBOLISM WITHOUT ACUTE COR PULMONALE: Primary | ICD-10-CM

## 2022-01-28 DIAGNOSIS — I26.99 BILATERAL PULMONARY EMBOLISM: ICD-10-CM

## 2022-01-28 LAB — INR PPP: 2.3

## 2022-01-28 NOTE — PROGRESS NOTES
Anticoagulation Clinic Progress Note    Anticoagulation Summary  As of 2022    INR goal:  2.0-3.0   TTR:  70.1 % (2.9 y)   INR used for dosin.30 (2022)   Warfarin maintenance plan:  10 mg every Tue, Thu, Sat; 15 mg all other days   Weekly warfarin total:  90 mg   No change documented:  Tessie Fatima RPH   Plan last modified:  Sarah Coy PharmD (2021)   Next INR check:  2022   Priority:  High   Target end date:  Indefinite    Indications    Other acute pulmonary embolism without acute cor pulmonale (HCC) [I26.99]  History of bilateral pulmonary embolism (CMS/HCC) [I26.99]             Anticoagulation Episode Summary     INR check location:      Preferred lab:      Send INR reminders to:   [facility]  POOL    Comments:  new  frankie 2019 **WEEKLY FRANKIE**      Anticoagulation Care Providers     Provider Role Specialty Phone number    Torri Colmenares, APRN Referring Cardiology [phone]    Elsy Baeza MD Responsible Cardiology [phone]          Clinic Interview:  Patient Findings     Negatives:  Signs/symptoms of thrombosis, Signs/symptoms of bleeding,   Laboratory test error suspected, Change in health, Change in alcohol use,   Change in activity, Upcoming invasive procedure, Emergency department   visit, Upcoming dental procedure, Missed doses, Extra doses, Change in   medications, Change in diet/appetite, Hospital admission, Bruising, Other   complaints      Clinical Outcomes     Negatives:  Major bleeding event, Thromboembolic event,   Anticoagulation-related hospital admission, Anticoagulation-related ED   visit, Anticoagulation-related fatality        INR History:  Anticoagulation Monitoring 2022   INR 3.00 3.50 2.30   INR Date 2022   INR Goal 2.0-3.0 2.0-3.0 2.0-3.0   Trend Same Same Same   Last Week Total 90 mg 90 mg 80 mg   Next Week Total 90 mg 80 mg 90 mg   Sun 15 mg 15 mg 15 mg   Mon 15 mg 15 mg 15  mg   Tue 10 mg 10 mg 10 mg   Wed 15 mg 15 mg 15 mg   Thu 10 mg 10 mg 10 mg   Fri 15 mg 5 mg (1/21) 15 mg   Sat 10 mg 10 mg 10 mg   Visit Report - - -   Some recent data might be hidden       Plan:  1. INR is Therapeutic today- see above in Anticoagulation Summary.   Will instruct Kameron Melendez to Continue their warfarin regimen- see above in Anticoagulation Summary.  2. Follow up in 1 weeks  3. They have been instructed to call if any changes in medications, doses, concerns, etc. Patient expresses understanding and has no further questions at this time.    Tessie Fatima, formerly [facility]

## 2022-02-04 ENCOUNTER — ANTICOAGULATION VISIT (OUTPATIENT)
Dept: PHARMACY | Facility: HOSPITAL | Age: 52
End: 2022-02-04

## 2022-02-04 DIAGNOSIS — I26.99 OTHER ACUTE PULMONARY EMBOLISM WITHOUT ACUTE COR PULMONALE: Primary | ICD-10-CM

## 2022-02-04 DIAGNOSIS — I26.99 BILATERAL PULMONARY EMBOLISM: ICD-10-CM

## 2022-02-04 LAB — INR PPP: 3.2

## 2022-02-04 NOTE — PROGRESS NOTES
Anticoagulation Clinic Progress Note    Anticoagulation Summary  As of 2/4/2022    INR goal:  2.0-3.0   TTR:  70.1 % (3 y)   INR used for dosing:  3.20 (2/4/2022)   Warfarin maintenance plan:  15 mg every Mon, Wed, Fri; 10 mg all other days   Weekly warfarin total:  85 mg   Plan last modified:  Tessie Fatima RPH (2/4/2022)   Next INR check:  2/11/2022   Priority:  High   Target end date:  Indefinite    Indications    Other acute pulmonary embolism without acute cor pulmonale (HCC) [I26.99]  History of bilateral pulmonary embolism (CMS/HCC) [I26.99]             Anticoagulation Episode Summary     INR check location:      Preferred lab:      Send INR reminders to:   [facility]  POOL    Comments:  new  frankie March 2019 **WEEKLY FRANKIE**      Anticoagulation Care Providers     Provider Role Specialty Phone number    Torri Colmenares APRN Referring Cardiology [phone]    Elsy Baeza MD Responsible Cardiology [phone]          Clinic Interview:  Patient Findings     Negatives:  Signs/symptoms of thrombosis, Signs/symptoms of bleeding,   Laboratory test error suspected, Change in health, Change in alcohol use,   Change in activity, Upcoming invasive procedure, Emergency department   visit, Upcoming dental procedure, Missed doses, Extra doses, Change in   medications, Change in diet/appetite, Hospital admission, Bruising, Other   complaints      Clinical Outcomes     Negatives:  Major bleeding event, Thromboembolic event,   Anticoagulation-related hospital admission, Anticoagulation-related ED   visit, Anticoagulation-related fatality        INR History:  Anticoagulation Monitoring 1/21/2022 1/28/2022 2/4/2022   INR 3.50 2.30 3.20   INR Date 1/21/2022 1/28/2022 2/4/2022   INR Goal 2.0-3.0 2.0-3.0 2.0-3.0   Trend Same Same Down   Last Week Total 90 mg 80 mg 90 mg   Next Week Total 80 mg 90 mg 85 mg   Sun 15 mg 15 mg 10 mg   Mon 15 mg 15 mg 15 mg   Tue 10 mg 10 mg 10 mg   Wed 15 mg 15 mg 15 mg    Thu 10 mg 10 mg 10 mg   Fri 5 mg (1/21) 15 mg 15 mg   Sat 10 mg 10 mg 10 mg   Visit Report - - -   Some recent data might be hidden       Plan:  1. INR is Supratherapeutic today- see above in Anticoagulation Summary.   Will instruct Kameron Melendez to Change their warfarin regimen- see above in Anticoagulation Summary.  2. Follow up in 1 weeks  3. They have been instructed to call if any changes in medications, doses, concerns, etc. Patient expresses understanding and has no further questions at this time.    Tessie Fatima, [facility]

## 2022-02-11 ENCOUNTER — ANTICOAGULATION VISIT (OUTPATIENT)
Dept: PHARMACY | Facility: HOSPITAL | Age: 52
End: 2022-02-11

## 2022-02-11 DIAGNOSIS — I26.99 OTHER ACUTE PULMONARY EMBOLISM WITHOUT ACUTE COR PULMONALE: Primary | ICD-10-CM

## 2022-02-11 DIAGNOSIS — I26.99 BILATERAL PULMONARY EMBOLISM: ICD-10-CM

## 2022-02-11 LAB — INR PPP: 2.6

## 2022-02-11 NOTE — PROGRESS NOTES
Anticoagulation Clinic Progress Note    Anticoagulation Summary  As of 2022    INR goal:  2.0-3.0   TTR:  70.1 % (3 y)   INR used for dosin.60 (2022)   Warfarin maintenance plan:  15 mg every Mon, Wed, Fri; 10 mg all other days   Weekly warfarin total:  85 mg   No change documented:  Tessie Fatima RPH   Plan last modified:  Tessie Fatima RPH (2022)   Next INR check:  2022   Priority:  High   Target end date:  Indefinite    Indications    Other acute pulmonary embolism without acute cor pulmonale (HCC) [I26.99]  History of bilateral pulmonary embolism (CMS/HCC) [I26.99]             Anticoagulation Episode Summary     INR check location:      Preferred lab:      Send INR reminders to:   [facility]  POOL    Comments:  new  frankie 2019 **WEEKLY FRANKIE**      Anticoagulation Care Providers     Provider Role Specialty Phone number    Torri Colmenares APRN Referring Cardiology [phone]    Elsy Baeza MD Responsible Cardiology [phone]          Clinic Interview:  No pertinent clinical findings have been reported.     INR History:  Anticoagulation Monitoring 2022   INR 2.30 3.20 2.60   INR Date 2022   INR Goal 2.0-3.0 2.0-3.0 2.0-3.0   Trend Same Down Same   Last Week Total 80 mg 90 mg 85 mg   Next Week Total 90 mg 85 mg 85 mg   Sun 15 mg 10 mg 10 mg   Mon 15 mg 15 mg 15 mg   Tue 10 mg 10 mg 10 mg   Wed 15 mg 15 mg 15 mg   Thu 10 mg 10 mg 10 mg   Fri 15 mg 15 mg 15 mg   Sat 10 mg 10 mg 10 mg   Visit Report - - -   Some recent data might be hidden       Plan:  1. INR is therapeutic today- see above in Anticoagulation Summary.    Kameron Melendez to continue their warfarin regimen- see above in Anticoagulation Summary.  2. Follow up in 1 week  3. Pt has agreed to only be called if INR out of range. They have been instructed to call if any changes in medications, doses, concerns, etc. Patient expresses understanding  and has no further questions at this time.    Tessie Fatima, RP

## 2022-02-18 ENCOUNTER — ANTICOAGULATION VISIT (OUTPATIENT)
Dept: PHARMACY | Facility: HOSPITAL | Age: 52
End: 2022-02-18

## 2022-02-18 ENCOUNTER — DOCUMENTATION (OUTPATIENT)
Dept: PHARMACY | Facility: HOSPITAL | Age: 52
End: 2022-02-18

## 2022-02-18 DIAGNOSIS — I26.99 OTHER ACUTE PULMONARY EMBOLISM WITHOUT ACUTE COR PULMONALE: Primary | ICD-10-CM

## 2022-02-18 DIAGNOSIS — I26.99 BILATERAL PULMONARY EMBOLISM: ICD-10-CM

## 2022-02-18 LAB — INR PPP: 1.8

## 2022-02-18 NOTE — PROGRESS NOTES
Anticoagulation Clinic Progress Note    Anticoagulation Summary  As of 2022    INR goal:  2.0-3.0   TTR:  70.1 % (3 y)   INR used for dosin.80 (2022)   Warfarin maintenance plan:  10 mg every Sun, Tue, Thu; 15 mg all other days   Weekly warfarin total:  90 mg   Plan last modified:  Ramo Morocho, PharmD (2022)   Next INR check:  2022   Priority:  High   Target end date:  Indefinite    Indications    Other acute pulmonary embolism without acute cor pulmonale (HCC) [I26.99]  History of bilateral pulmonary embolism (CMS/HCC) [I26.99]             Anticoagulation Episode Summary     INR check location:      Preferred lab:      [facility]    Comments:  new  tristen 2019 **WEEKLY TRISTEN**      Anticoagulation Care Providers     Provider Role Specialty Phone number    Torri Colmenares, APRN Referring Cardiology [phone]    Elsy Baeza MD Responsible Cardiology [phone]            INR History:  Anticoagulation Monitoring 2022   INR 3.20 2.60 1.80   INR Date 2022   INR Goal 2.0-3.0 2.0-3.0 2.0-3.0   Trend Down Same Up   Last Week Total 90 mg 85 mg 85 mg   Next Week Total 85 mg 85 mg 90 mg   Sun 10 mg 10 mg 10 mg   Mon 15 mg 15 mg 15 mg   Tue 10 mg 10 mg 10 mg   Wed 15 mg 15 mg 15 mg   Thu 10 mg 10 mg 10 mg   Fri 15 mg 15 mg 15 mg   Sat 10 mg 10 mg 15 mg   Visit Report - - -   Some recent data might be hidden       Plan:  1. INR is Subtherapeutic today- see above in Anticoagulation Summary.   Will instruct Kameron Melendez to Increase their warfarin regimen- see above in Anticoagulation Summary.  2. Follow up in 1 week  3. Unable to reach by phone. Left HIPAA-friendly VM w/ instructions and request for call back. They have been instructed to call if any changes in medications, doses, concerns, etc.     Ramo Morocho, PharmD

## 2022-02-22 ENCOUNTER — TELEPHONE (OUTPATIENT)
Dept: FAMILY MEDICINE CLINIC | Facility: CLINIC | Age: 52
End: 2022-02-22

## 2022-02-22 RX ORDER — DOXYCYCLINE HYCLATE 100 MG/1
100 CAPSULE ORAL 2 TIMES DAILY
Qty: 20 CAPSULE | Refills: 0 | Status: SHIPPED | OUTPATIENT
Start: 2022-02-22 | End: 2022-03-18

## 2022-02-22 RX ORDER — WARFARIN SODIUM 10 MG/1
TABLET ORAL
Qty: 120 TABLET | Refills: 1 | Status: SHIPPED | OUTPATIENT
Start: 2022-02-22 | End: 2023-01-05 | Stop reason: SDUPTHER

## 2022-02-22 RX ORDER — DOXYCYCLINE HYCLATE 100 MG/1
100 CAPSULE ORAL 2 TIMES DAILY
Qty: 20 CAPSULE | Refills: 0 | Status: SHIPPED | OUTPATIENT
Start: 2022-02-22 | End: 2022-02-22 | Stop reason: SDUPTHER

## 2022-02-22 NOTE — TELEPHONE ENCOUNTER
Pt is having a flare up with his cellulitis and asked if you will send in med to [facility] on [facility]. He is also need [place] paperwork filled out. I will get it to you once I have it filled out.

## 2022-02-22 NOTE — TELEPHONE ENCOUNTER
I sent in doxycycline.  Start right away.  Keep legs elevated as much as possible.  GO ER if progresses.  RRJ

## 2022-02-25 ENCOUNTER — ANTICOAGULATION VISIT (OUTPATIENT)
Dept: PHARMACY | Facility: HOSPITAL | Age: 52
End: 2022-02-25

## 2022-02-25 DIAGNOSIS — I26.99 BILATERAL PULMONARY EMBOLISM: ICD-10-CM

## 2022-02-25 DIAGNOSIS — I26.99 OTHER ACUTE PULMONARY EMBOLISM WITHOUT ACUTE COR PULMONALE: Primary | ICD-10-CM

## 2022-02-25 LAB — INR PPP: 2.1

## 2022-02-25 RX ORDER — WARFARIN SODIUM 5 MG/1
TABLET ORAL
Qty: 55 TABLET | Refills: 1 | Status: SHIPPED | OUTPATIENT
Start: 2022-02-25 | End: 2022-05-06

## 2022-02-25 NOTE — PROGRESS NOTES
Anticoagulation Clinic Progress Note    Anticoagulation Summary  As of 2022    INR goal:  2.0-3.0   TTR:  69.9 % (3 y)   INR used for dosin.10 (2022)   Warfarin maintenance plan:  10 mg every Sun, Tue, Thu; 15 mg all other days   Weekly warfarin total:  90 mg   No change documented:  Tessie Fatima RPH   Plan last modified:  Ramo Morocho, PharmD (2022)   Next INR check:  3/4/2022   Priority:  High   Target end date:  Indefinite    Indications    Other acute pulmonary embolism without acute cor pulmonale (HCC) [I26.99]  History of bilateral pulmonary embolism (CMS/HCC) [I26.99]             Anticoagulation Episode Summary     INR check location:      Preferred lab:      Send INR reminders to:  [facility]  POOL    Comments:  new  frankie 2019 **WEEKLY FRANKEI**      Anticoagulation Care Providers     Provider Role Specialty Phone number    Torri Colmenares APRN Referring Cardiology [phone]    Elsy Baeza MD Responsible Cardiology [phone]          Clinic Interview:  Patient Findings     Positives:  Change in medications    Negatives:  Signs/symptoms of thrombosis, Signs/symptoms of bleeding,   Laboratory test error suspected, Change in health, Change in alcohol use,   Change in activity, Upcoming invasive procedure, Emergency department   visit, Upcoming dental procedure, Missed doses, Extra doses, Change in   diet/appetite, Hospital admission, Bruising, Other complaints    Comments:  Patient was started on doxycyline on  for cellulitis.       Clinical Outcomes     Negatives:  Major bleeding event, Thromboembolic event,   Anticoagulation-related hospital admission, Anticoagulation-related ED   visit, Anticoagulation-related fatality    Comments:  Patient was started on doxycyline on  for cellulitis.         INR History:  Anticoagulation Monitoring 2022   INR 2.60 1.80 2.10   INR Date 2022   INR Goal 2.0-3.0  2.0-3.0 2.0-3.0   Trend Same Up Same   Last Week Total 85 mg 85 mg 90 mg   Next Week Total 85 mg 90 mg 90 mg   Sun 10 mg 10 mg 10 mg   Mon 15 mg 15 mg 15 mg   Tue 10 mg 10 mg 10 mg   Wed 15 mg 15 mg 15 mg   Thu 10 mg 10 mg 10 mg   Fri 15 mg 15 mg 15 mg   Sat 10 mg 15 mg 15 mg   Visit Report - - -   Some recent data might be hidden       Plan:  1. INR is Therapeutic today- see above in Anticoagulation Summary.   Will instruct Kameron Melendez to Continue their warfarin regimen- see above in Anticoagulation Summary. Will continue since on the lower end of the goal and started on doxycyline. Will recheck in 1 week to ensure INR is not rising too rapidly with the antibiotic   2. Follow up in 1 weeks  3. . They have been instructed to call if any changes in medications, doses, concerns, etc. Patient expresses understanding and has no further questions at this time.    Tessie Fatima [facility]

## 2022-03-04 ENCOUNTER — ANTICOAGULATION VISIT (OUTPATIENT)
Dept: PHARMACY | Facility: HOSPITAL | Age: 52
End: 2022-03-04

## 2022-03-04 DIAGNOSIS — I26.99 BILATERAL PULMONARY EMBOLISM: ICD-10-CM

## 2022-03-04 DIAGNOSIS — I26.99 OTHER ACUTE PULMONARY EMBOLISM WITHOUT ACUTE COR PULMONALE: Primary | ICD-10-CM

## 2022-03-04 LAB — INR PPP: 1.7

## 2022-03-04 NOTE — PROGRESS NOTES
Anticoagulation Clinic Progress Note    Anticoagulation Summary  As of 3/4/2022    INR goal:  2.0-3.0   TTR:  69.6 % (3 y)   INR used for dosin.70 (3/4/2022)   Warfarin maintenance plan:  10 mg every Tue, Thu; 15 mg all other days   Weekly warfarin total:  95 mg   Plan last modified:  Tessie Fatima RPH (3/4/2022)   Next INR check:  3/11/2022   Priority:  High   Target end date:  Indefinite    Indications    Other acute pulmonary embolism without acute cor pulmonale (HCC) [I26.99]  History of bilateral pulmonary embolism (CMS/HCC) [I26.99]             Anticoagulation Episode Summary     INR check location:      Preferred lab:      Send INR reminders to:   [facility]  POOL    Comments:  new  frankie 2019 **WEEKLY FRANKIE**      Anticoagulation Care Providers     Provider Role Specialty Phone number    Torri Colmenares APRN Referring Cardiology [phone]    Elsy Baeza MD Responsible Cardiology [phone]          Clinic Interview:  Patient Findings     Negatives:  Signs/symptoms of thrombosis, Signs/symptoms of bleeding,   Laboratory test error suspected, Change in health, Change in alcohol use,   Change in activity, Upcoming invasive procedure, Emergency department   visit, Upcoming dental procedure, Missed doses, Extra doses, Change in   medications, Change in diet/appetite, Hospital admission, Bruising, Other   complaints      Clinical Outcomes     Negatives:  Major bleeding event, Thromboembolic event,   Anticoagulation-related hospital admission, Anticoagulation-related ED   visit, Anticoagulation-related fatality        INR History:  Anticoagulation Monitoring 2022 2022 3/4/2022   INR 1.80 2.10 1.70   INR Date 2022 2022 3/4/2022   INR Goal 2.0-3.0 2.0-3.0 2.0-3.0   Trend Up Same Up   Last Week Total 85 mg 90 mg 90 mg   Next Week Total 90 mg 90 mg 95 mg   Sun 10 mg 10 mg 15 mg   Mon 15 mg 15 mg 15 mg   Tue 10 mg 10 mg 10 mg   Wed 15 mg 15 mg 15 mg   Thu 10  mg 10 mg 10 mg   Fri 15 mg 15 mg 15 mg   Sat 15 mg 15 mg 15 mg   Visit Report - - -   Some recent data might be hidden       Plan:  1. INR is Subtherapeutic today- see above in Anticoagulation Summary.   Will instruct Kameron Melendez to Increase their warfarin regimen- see above in Anticoagulation Summary.  2. Follow up in 1 week  3.  They have been instructed to call if any changes in medications, doses, concerns, etc. Patient expresses understanding and has no further questions at this time.    Tessie Fatima [facility]

## 2022-03-11 ENCOUNTER — ANTICOAGULATION VISIT (OUTPATIENT)
Dept: PHARMACY | Facility: HOSPITAL | Age: 52
End: 2022-03-11

## 2022-03-11 DIAGNOSIS — I26.99 OTHER ACUTE PULMONARY EMBOLISM WITHOUT ACUTE COR PULMONALE: Primary | ICD-10-CM

## 2022-03-11 DIAGNOSIS — I26.99 BILATERAL PULMONARY EMBOLISM: ICD-10-CM

## 2022-03-11 LAB — INR PPP: 1.7

## 2022-03-11 NOTE — PROGRESS NOTES
Anticoagulation Clinic Progress Note    Anticoagulation Summary  As of 3/11/2022    INR goal:  2.0-3.0   TTR:  69.1 % (3.1 y)   INR used for dosin.70 (3/11/2022)   Warfarin maintenance plan:  10 mg every Tue, Thu; 15 mg all other days   Weekly warfarin total:  95 mg   No change documented:  Ramo Morocho, PharmD   Plan last modified:  Tessie Fatima RPH (3/4/2022)   Next INR check:  3/18/2022   Priority:  High   Target end date:  Indefinite    Indications    Other acute pulmonary embolism without acute cor pulmonale (HCC) [I26.99]  History of bilateral pulmonary embolism (CMS/HCC) [I26.99]             Anticoagulation Episode Summary     INR check location:      Preferred lab:      Send INR reminders to:  [facility]  POOL    Comments:  new  frankie 2019 **WEEKLY FRANKIE**      Anticoagulation Care Providers     Provider Role Specialty Phone number    Torri Colmenares APRN Referring Cardiology [phone]    Elsy Baeza MD Responsible Cardiology [phone]          Clinic Interview:  Patient Findings     Positives:  Missed doses    Negatives:  Signs/symptoms of thrombosis, Signs/symptoms of bleeding,   Laboratory test error suspected, Change in health, Change in alcohol use,   Change in activity, Upcoming invasive procedure, Emergency department   visit, Upcoming dental procedure, Extra doses, Change in medications,   Change in diet/appetite, Hospital admission, Bruising, Other complaints    Comments:  He reports missing 10-mg dose on 3/8/22.       Clinical Outcomes     Negatives:  Major bleeding event, Thromboembolic event,   Anticoagulation-related hospital admission, Anticoagulation-related ED   visit, Anticoagulation-related fatality    Comments:  He reports missing 10-mg dose on 3/8/22.         INR History:  Anticoagulation Monitoring 2022 3/4/2022 3/11/2022   INR 2.10 1.70 1.70   INR Date 2022 3/4/2022 3/11/2022   INR Goal 2.0-3.0 2.0-3.0 2.0-3.0   Trend Same Up Same    Last Week Total 90 mg 90 mg 85 mg   Next Week Total 90 mg 95 mg 95 mg   Sun 10 mg 15 mg 15 mg   Mon 15 mg 15 mg 15 mg   Tue 10 mg 10 mg 10 mg   Wed 15 mg 15 mg 15 mg   Thu 10 mg 10 mg 10 mg   Fri 15 mg 15 mg 15 mg   Sat 15 mg 15 mg 15 mg   Visit Report - - -   Some recent data might be hidden       Plan:  1. INR is Subtherapeutic today- see above in Anticoagulation Summary.   Will instruct Kameron Melendez to Continue their warfarin regimen (due for 4 consecutive 15 mg doses) - see above in Anticoagulation Summary.  2. Follow up in 1 week  3. They have been instructed to call if any changes in medications, doses, concerns, etc. Patient expresses understanding and has no further questions at this time.    Ramo Morocho, PharmD

## 2022-03-18 ENCOUNTER — ANTICOAGULATION VISIT (OUTPATIENT)
Dept: PHARMACY | Facility: HOSPITAL | Age: 52
End: 2022-03-18

## 2022-03-18 ENCOUNTER — OFFICE VISIT (OUTPATIENT)
Dept: FAMILY MEDICINE CLINIC | Facility: CLINIC | Age: 52
End: 2022-03-18

## 2022-03-18 VITALS
DIASTOLIC BLOOD PRESSURE: 74 MMHG | HEART RATE: 103 BPM | BODY MASS INDEX: 72.18 KG/M2 | OXYGEN SATURATION: 99 % | HEIGHT: 74 IN | SYSTOLIC BLOOD PRESSURE: 114 MMHG

## 2022-03-18 DIAGNOSIS — I89.0 LYMPHEDEMA: ICD-10-CM

## 2022-03-18 DIAGNOSIS — E66.01 MORBID (SEVERE) OBESITY DUE TO EXCESS CALORIES: Primary | ICD-10-CM

## 2022-03-18 DIAGNOSIS — I26.99 OTHER ACUTE PULMONARY EMBOLISM WITHOUT ACUTE COR PULMONALE: Primary | ICD-10-CM

## 2022-03-18 DIAGNOSIS — L03.90 WOUND CELLULITIS: ICD-10-CM

## 2022-03-18 DIAGNOSIS — L03.116 CELLULITIS OF LEFT LOWER EXTREMITY: ICD-10-CM

## 2022-03-18 DIAGNOSIS — I26.99 BILATERAL PULMONARY EMBOLISM: ICD-10-CM

## 2022-03-18 LAB — INR PPP: 2.1

## 2022-03-18 PROCEDURE — 99214 OFFICE O/P EST MOD 30 MIN: CPT | Performed by: FAMILY MEDICINE

## 2022-03-18 RX ORDER — BUPROPION HYDROCHLORIDE 150 MG/1
150 TABLET ORAL DAILY
Qty: 30 TABLET | Refills: 5 | Status: SHIPPED | OUTPATIENT
Start: 2022-03-18 | End: 2022-12-16 | Stop reason: SDUPTHER

## 2022-03-18 RX ORDER — TOPIRAMATE 25 MG/1
25 TABLET ORAL 2 TIMES DAILY
Qty: 60 TABLET | Refills: 5 | Status: SHIPPED | OUTPATIENT
Start: 2022-03-18

## 2022-03-18 RX ORDER — CEPHALEXIN 500 MG/1
500 CAPSULE ORAL 4 TIMES DAILY
Qty: 40 CAPSULE | Refills: 0 | Status: SHIPPED | OUTPATIENT
Start: 2022-03-18 | End: 2022-05-06

## 2022-03-18 NOTE — PROGRESS NOTES
Anticoagulation Clinic Progress Note    Anticoagulation Summary  As of 3/18/2022    INR goal:  2.0-3.0   TTR:  68.9 % (3.1 y)   INR used for dosin.10 (3/18/2022)   Warfarin maintenance plan:  10 mg every Tue, Thu; 15 mg all other days   Weekly warfarin total:  95 mg   No change documented:  Tessie Fatima RPH   Plan last modified:  Tessie Fatima RPH (3/4/2022)   Next INR check:  3/25/2022   Priority:  High   Target end date:  Indefinite    Indications    Other acute pulmonary embolism without acute cor pulmonale (HCC) [I26.99]  History of bilateral pulmonary embolism (CMS/HCC) [I26.99]             Anticoagulation Episode Summary     INR check location:      Preferred lab:      Send INR reminders to:   [facility]  POOL    Comments:  new  frankie 2019 **WEEKLY FRANKIE**      Anticoagulation Care Providers     Provider Role Specialty Phone number    Torri Colmenares APRN Referring Cardiology [phone]    Elsy Baeza MD Responsible Cardiology [phone]          Clinic Interview:  No pertinent clinical findings have been reported.    INR History:  Anticoagulation Monitoring 3/4/2022 3/11/2022 3/18/2022   INR 1.70 1.70 2.10   INR Date 3/4/2022 3/11/2022 3/18/2022   INR Goal 2.0-3.0 2.0-3.0 2.0-3.0   Trend Up Same Same   Last Week Total 90 mg 85 mg 95 mg   Next Week Total 95 mg 95 mg 95 mg   Sun 15 mg 15 mg 15 mg   Mon 15 mg 15 mg 15 mg   Tue 10 mg 10 mg 10 mg   Wed 15 mg 15 mg 15 mg   Thu 10 mg 10 mg 10 mg   Fri 15 mg 15 mg 15 mg   Sat 15 mg 15 mg 15 mg   Visit Report - - -   Some recent data might be hidden       Plan:  1. INR is therapeutic today- see above in Anticoagulation Summary.    Kameron Melendez to continue their warfarin regimen- see above in Anticoagulation Summary.  2. Follow up in 1 week  3.  They have been instructed to call if any changes in medications, doses, concerns, etc. Patient expresses understanding and has no further questions at this time.    Tessie  [place], [facility]

## 2022-03-18 NOTE — PROGRESS NOTES
Subjective   Kameron Melendez is a 52 y.o. male. Presents today for   Chief Complaint   Patient presents with   • Cellulitis   • Weight Check       Cellulitis  This is a recurrent problem. The current episode started 1 to 4 weeks ago. The problem occurs intermittently. The problem has been unchanged. Associated symptoms include a rash. Pertinent negatives include no chest pain. Associated symptoms comments: Severe lymphedema with skin thickening nd recurrent infection. Treatments tried: abx. The treatment provided mild relief.   Leg Swelling  This is a chronic problem. The current episode started more than 1 year ago. The problem occurs constantly. The problem has been waxing and waning. Associated symptoms include a rash. Pertinent negatives include no chest pain. Treatments tried: cannot take furosemide at work and legs down long periods.  wrapping as well. The treatment provided mild relief.   Hypogonadism:  Testosterone in 50s and very low;  Does have untreated vannesa, d/w can make worse.    Obesity - tyring lose weight struggling with activity givne weight and mobility issues.    Review of Systems   Respiratory: Negative for shortness of breath.    Cardiovascular: Positive for leg swelling. Negative for chest pain.   Skin: Positive for rash.       Patient Active Problem List   Diagnosis   • History of bilateral pulmonary embolism (CMS/[facility])   • Pulmonary hypertension ([facility])   • Lymphedema of both lower extremities   • Obesity, morbid, BMI 50 or higher ([facility])   • Dyslipidemia   • Hyperuricemia   • Hypogonadal obesity   • Knee arthropathy   • Morbid obesity with body mass index of 60.0-69.9 in adult ([facility])   • VANNESA (obstructive sleep apnea)   • Severe edema   • History of pulmonary embolism   • Other acute pulmonary embolism without acute cor pulmonale ([facility])   • Intractable back pain   • Heterozygous factor V Leiden mutation ([facility])   • Cellulitis of left lower extremity   • Hypokalemia   • CAROL (acute kidney injury) ([facility])  "  • Sepsis with cutaneous manifestations (HCC)   • Hyperglycemia   • Paroxysmal atrial fibrillation (HCC)   • Athscl heart 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   • Long term (current) use of anticoagulants   • Lymphedema, not elsewhere classified   • Nicotine dependence, other tobacco product, uncomplicated   • Personal history of other venous thrombosis and embolism   • Typical atrial flutter (HCC)   • Venous insufficiency (chronic) (peripheral)       Social History     Socioeconomic History   • Marital status:    Tobacco Use   • Smoking status: Light Tobacco Smoker     Types: Cigars, Pipe     Start date: 1/1/2006   • Smokeless tobacco: Never Used   • Tobacco comment: occasional - < 1 a month   Substance and Sexual Activity   • Alcohol use: No   • Drug use: No   • Sexual activity: Defer       No Known Allergies    Current Outpatient Medications on File Prior to Visit   Medication Sig Dispense Refill   • acetaminophen (TYLENOL) 500 MG tablet Take 500 mg by mouth Every 6 (Six) Hours As Needed for Mild Pain .     • furosemide (LASIX) 80 MG tablet TAKE 1 TABLET BY MOUTH DAILY (Patient taking differently: Take 80 mg by mouth Daily. Pt. Only takes when swelling is \"excessive\" and he is going to be home) 30 tablet 5   • potassium chloride (K-DUR,KLOR-CON) 20 MEQ CR tablet Take 1 tablet by mouth Daily. Take with furosemide (Patient taking differently: Take 20 mEq by mouth Daily. Only takes with furosemide) 30 tablet 2   • warfarin (COUMADIN) 10 MG tablet Take 15 mg by mouth 4 (Four) Times a Week. Takes 1.5 tablets (15mg) Sunday, Monday, Wednesday, and Friday.     • warfarin (COUMADIN) 10 MG tablet TAKE 1 TABLET ON TUESDAY, THURSDAY, SUNDAY AND TAKE ONE AND ONE-HALF TABLETS ALL OTHER DAYS OR AS DIRECTED 120 tablet 1   • warfarin (Coumadin) 5 MG tablet Take one tablet by mouth on Mon, wed, fri and sat along with a 10 mg tablet to make 15 mg. Take 10 mg all other days of the week 55 tablet 1   • ammonium " "lactate (AMLACTIN) 12 % cream Apply 1 application topically to the appropriate area as directed As Needed for Dry Skin. Apply to both legs daily as needed for venous stasis dermatitis and lymphedema skin thickening     • chlorhexidine (Hibiclens) 4 % external liquid Apply 1 application topically to the appropriate area as directed Daily As Needed for Wound Care.       No current facility-administered medications on file prior to visit.       Objective   Vitals:    03/18/22 1300   BP: 114/74   Pulse: 103   SpO2: 99%   Weight: Comment: weight exceeds scale   Height: 188 cm (74\")     Body mass index is 72.18 kg/m².    Physical Exam  Vitals and nursing note reviewed.   Constitutional:       Appearance: He is well-developed.   Musculoskeletal:      Cervical back: Neck supple.      Right lower leg: Edema present.      Left lower leg: Edema present.   Skin:     General: Skin is warm and dry.      Comments: Thickened skin, weeping;     Neurological:      Mental Status: He is alert.   Psychiatric:         Behavior: Behavior normal.         Assessment/Plan   Diagnoses and all orders for this visit:    1. Morbid (severe) obesity due to excess calories (HCC) (Primary)  -     topiramate (TOPAMAX) 25 MG tablet; Take 1 tablet by mouth 2 (Two) Times a Day.  Dispense: 60 tablet; Refill: 5  -     buPROPion XL (WELLBUTRIN XL) 150 MG 24 hr tablet; Take 1 tablet by mouth Daily.  Dispense: 30 tablet; Refill: 5    2. Lymphedema  -     Ambulatory Referral to Physical Therapy Evaluate and treat, Lymphedema  -     Ambulatory Referral to Wound Clinic    3. Cellulitis of left lower extremity  -     cephalexin (KEFLEX) 500 MG capsule; Take 1 capsule by mouth 4 (Four) Times a Day.  Dispense: 40 capsule; Refill: 0    4. Wound cellulitis left lower extremity  -     Ambulatory Referral to Wound Clinic      reocmmend lymphedema clinic  Will extend abx  Elevate legs and wrap  Will try meds for obesity above; phentermine no relief;  wegovy not " covered  Wound care clinic requests to see them for skin breakdown on legs  Would not replace tesoterone unless on cpap       -Follow up: 3 months and prn

## 2022-03-25 ENCOUNTER — ANTICOAGULATION VISIT (OUTPATIENT)
Dept: PHARMACY | Facility: HOSPITAL | Age: 52
End: 2022-03-25

## 2022-03-25 DIAGNOSIS — I26.99 BILATERAL PULMONARY EMBOLISM: ICD-10-CM

## 2022-03-25 DIAGNOSIS — I26.99 OTHER ACUTE PULMONARY EMBOLISM WITHOUT ACUTE COR PULMONALE: Primary | ICD-10-CM

## 2022-03-25 LAB — INR PPP: 2.6

## 2022-03-25 NOTE — PROGRESS NOTES
Anticoagulation Clinic Progress Note    Anticoagulation Summary  As of 3/25/2022    INR goal:  2.0-3.0   TTR:  69.1 % (3.1 y)   INR used for dosin.60 (3/25/2022)   Warfarin maintenance plan:  10 mg every Tue, Thu; 15 mg all other days   Weekly warfarin total:  95 mg   No change documented:  Geraldine Marie, Pharmacy Technician   Plan last modified:  Tessie Fatima RPH (3/4/2022)   Next INR check:  2022   Priority:  High   Target end date:  Indefinite    Indications    Other acute pulmonary embolism without acute cor pulmonale (HCC) [I26.99]  History of bilateral pulmonary embolism (CMS/HCC) [I26.99]             Anticoagulation Episode Summary     INR check location:      Preferred lab:      Send INR reminders to:   [facility]  POOL    Comments:  new  frankie 2019 **WEEKLY FRANKIE**      Anticoagulation Care Providers     Provider Role Specialty Phone number    Torri Colmenares APRN Referring Cardiology [phone]    Elsy Baeza MD Responsible Cardiology [phone]          Clinic Interview:  No pertinent clinical findings have been reported.    INR History:  Anticoagulation Monitoring 3/11/2022 3/18/2022 3/25/2022   INR 1.70 2.10 2.60   INR Date 3/11/2022 3/18/2022 3/25/2022   INR Goal 2.0-3.0 2.0-3.0 2.0-3.0   Trend Same Same Same   Last Week Total 85 mg 95 mg 95 mg   Next Week Total 95 mg 95 mg 95 mg   Sun 15 mg 15 mg 15 mg   Mon 15 mg 15 mg 15 mg   Tue 10 mg 10 mg 10 mg   Wed 15 mg 15 mg 15 mg   Thu 10 mg 10 mg 10 mg   Fri 15 mg 15 mg 15 mg   Sat 15 mg 15 mg 15 mg   Visit Report - - -   Some recent data might be hidden       Plan:  1. INR is therapeutic today- see above in Anticoagulation Summary.    Kameron Melendez to continue their warfarin regimen- see above in Anticoagulation Summary.  2. Follow up in 1 week  3. Pt has agreed to only be called if INR out of range. They have been instructed to call if any changes in medications, doses, concerns, etc. Patient expresses  understanding and has no further questions at this time.    Geraldine Marie, Pharmacy Technician

## 2022-04-01 ENCOUNTER — ANTICOAGULATION VISIT (OUTPATIENT)
Dept: PHARMACY | Facility: HOSPITAL | Age: 52
End: 2022-04-01

## 2022-04-01 DIAGNOSIS — I26.99 BILATERAL PULMONARY EMBOLISM: ICD-10-CM

## 2022-04-01 DIAGNOSIS — I26.99 OTHER ACUTE PULMONARY EMBOLISM WITHOUT ACUTE COR PULMONALE: Primary | ICD-10-CM

## 2022-04-01 LAB — INR PPP: 2.4

## 2022-04-01 NOTE — PROGRESS NOTES
Anticoagulation Clinic Progress Note    Anticoagulation Summary  As of 2022    INR goal:  2.0-3.0   TTR:  69.3 % (3.1 y)   INR used for dosin.40 (2022)   Warfarin maintenance plan:  10 mg every Tue, Thu; 15 mg all other days   Weekly warfarin total:  95 mg   No change documented:  Iris Gonzalez   Plan last modified:  Tessie Fatima RPH (3/4/2022)   Next INR check:  2022   Priority:  High   Target end date:  Indefinite    Indications    Other acute pulmonary embolism without acute cor pulmonale (HCC) [I26.99]  History of bilateral pulmonary embolism (CMS/HCC) [I26.99]             Anticoagulation Episode Summary     INR check location:      Preferred lab:      Send INR reminders to:  [facility]  POOL    Comments:  new  tristen 2019 **WEEKLY TRISTEN**      Anticoagulation Care Providers     Provider Role Specialty Phone number    Torri Colmenares APRN Referring Cardiology [phone]    Elsy Baeza MD Responsible Cardiology [phone]          Clinic Interview:  No pertinent clinical findings have been reported.    INR History:  Anticoagulation Monitoring 3/18/2022 3/25/2022 2022   INR 2.10 2.60 2.40   INR Date 3/18/2022 3/25/2022 2022   INR Goal 2.0-3.0 2.0-3.0 2.0-3.0   Trend Same Same Same   Last Week Total 95 mg 95 mg 95 mg   Next Week Total 95 mg 95 mg 95 mg   Sun 15 mg 15 mg 15 mg   Mon 15 mg 15 mg 15 mg   Tue 10 mg 10 mg 10 mg   Wed 15 mg 15 mg 15 mg   Thu 10 mg 10 mg 10 mg   Fri 15 mg 15 mg 15 mg   Sat 15 mg 15 mg 15 mg   Visit Report - - -   Some recent data might be hidden       Plan:  1. INR is therapeutic today- see above in Anticoagulation Summary.    Kameron Melendez to continue their warfarin regimen- see above in Anticoagulation Summary.  2. Follow up in 1 week  3. Pt has agreed to only be called if INR out of range. They have been instructed to call if any changes in medications, doses, concerns, etc. Patient expresses understanding and  has no further questions at this time.    Iris Gonzalez

## 2022-04-08 ENCOUNTER — ANTICOAGULATION VISIT (OUTPATIENT)
Dept: PHARMACY | Facility: HOSPITAL | Age: 52
End: 2022-04-08

## 2022-04-08 DIAGNOSIS — I26.99 OTHER ACUTE PULMONARY EMBOLISM WITHOUT ACUTE COR PULMONALE: Primary | ICD-10-CM

## 2022-04-08 DIAGNOSIS — I26.99 BILATERAL PULMONARY EMBOLISM: ICD-10-CM

## 2022-04-08 LAB — INR PPP: 2.2

## 2022-04-15 ENCOUNTER — ANTICOAGULATION VISIT (OUTPATIENT)
Dept: PHARMACY | Facility: HOSPITAL | Age: 52
End: 2022-04-15

## 2022-04-15 DIAGNOSIS — I26.99 BILATERAL PULMONARY EMBOLISM: ICD-10-CM

## 2022-04-15 DIAGNOSIS — I26.99 OTHER ACUTE PULMONARY EMBOLISM WITHOUT ACUTE COR PULMONALE: Primary | ICD-10-CM

## 2022-04-15 LAB — INR PPP: 2.2

## 2022-04-15 NOTE — PROGRESS NOTES
Anticoagulation Clinic Progress Note    Anticoagulation Summary  As of 4/15/2022    INR goal:  2.0-3.0   TTR:  69.7 % (3.2 y)   INR used for dosin.20 (4/15/2022)   Warfarin maintenance plan:  10 mg every Tue, Thu; 15 mg all other days   Weekly warfarin total:  95 mg   No change documented:  Geraldine Marie, Pharmacy Technician   Plan last modified:  Tessie Fatima RPH (3/4/2022)   Next INR check:  2022   Priority:  High   Target end date:  Indefinite    Indications    Other acute pulmonary embolism without acute cor pulmonale (HCC) [I26.99]  History of bilateral pulmonary embolism (CMS/HCC) [I26.99]             Anticoagulation Episode Summary     INR check location:      Preferred lab:      [facility]    Comments:  new  frankie 2019 **WEEKLY FRANKIE**      Anticoagulation Care Providers     Provider Role Specialty Phone number    Torri Colmenares APRN Referring Cardiology [phone]    Elsy Baeza MD Responsible Cardiology [phone]          Clinic Interview:  No pertinent clinical findings have been reported.    INR History:  Anticoagulation Monitoring 2022 2022 4/15/2022   INR 2.40 2.20 2.20   INR Date 2022 2022 4/15/2022   INR Goal 2.0-3.0 2.0-3.0 2.0-3.0   Trend Same Same Same   Last Week Total 95 mg 95 mg 95 mg   Next Week Total 95 mg 95 mg 95 mg   Sun 15 mg 15 mg 15 mg   Mon 15 mg 15 mg 15 mg   Tue 10 mg 10 mg 10 mg   Wed 15 mg 15 mg 15 mg   Thu 10 mg 10 mg 10 mg   Fri 15 mg 15 mg 15 mg   Sat 15 mg 15 mg 15 mg   Visit Report - - -   Some recent data might be hidden       Plan:  1. INR is therapeutic today- see above in Anticoagulation Summary.    Kameron Melendez to continue their warfarin regimen- see above in Anticoagulation Summary.  2. Follow up in 1 week  3. They have been instructed to call if any changes in medications, doses, concerns, etc. Patient expresses understanding and has no further questions at this  time.    Geraldine Marie, Pharmacy Technician

## 2022-04-22 ENCOUNTER — ANTICOAGULATION VISIT (OUTPATIENT)
Dept: PHARMACY | Facility: HOSPITAL | Age: 52
End: 2022-04-22

## 2022-04-22 DIAGNOSIS — I26.99 OTHER ACUTE PULMONARY EMBOLISM WITHOUT ACUTE COR PULMONALE: Primary | ICD-10-CM

## 2022-04-22 DIAGNOSIS — I26.99 BILATERAL PULMONARY EMBOLISM: ICD-10-CM

## 2022-04-22 LAB — INR PPP: 1.6

## 2022-04-22 NOTE — PROGRESS NOTES
Anticoagulation Clinic Progress Note    Anticoagulation Summary  As of 2022    INR goal:  2.0-3.0   TTR:  69.4 % (3.2 y)   INR used for dosin.60 (2022)   Warfarin maintenance plan:  10 mg every Tue, Thu; 15 mg all other days   Weekly warfarin total:  95 mg   Plan last modified:  Tessie Fatima RPH (3/4/2022)   Next INR check:  2022   Priority:  High   Target end date:  Indefinite    Indications    Other acute pulmonary embolism without acute cor pulmonale (HCC) [I26.99]  History of bilateral pulmonary embolism (CMS/HCC) [I26.99]             Anticoagulation Episode Summary     INR check location:      Preferred lab:      Send INR reminders to:   [facility]  POOL    Comments:  new  frankie 2019 **WEEKLY FRANKIE**      Anticoagulation Care Providers     Provider Role Specialty Phone number    Torri Colmenares APRN Referring Cardiology [phone]    Elsy Baeza MD Responsible Cardiology [phone]          Clinic Interview:  Patient Findings     Negatives:  Signs/symptoms of thrombosis, Signs/symptoms of bleeding,   Laboratory test error suspected, Change in health, Change in alcohol use,   Change in activity, Upcoming invasive procedure, Emergency department   visit, Upcoming dental procedure, Missed doses, Extra doses, Change in   medications, Change in diet/appetite, Hospital admission, Bruising, Other   complaints      Clinical Outcomes     Negatives:  Major bleeding event, Thromboembolic event,   Anticoagulation-related hospital admission, Anticoagulation-related ED   visit, Anticoagulation-related fatality        INR History:  Anticoagulation Monitoring 2022 4/15/2022 2022   INR 2.20 2.20 1.60   INR Date 2022 4/15/2022 2022   INR Goal 2.0-3.0 2.0-3.0 2.0-3.0   Trend Same Same Same   Last Week Total 95 mg 95 mg 95 mg   Next Week Total 95 mg 95 mg 100 mg   Sun 15 mg 15 mg 15 mg   Mon 15 mg 15 mg 15 mg   Tue 10 mg 10 mg 10 mg   Wed 15 mg 15 mg 15 mg    Thu 10 mg 10 mg 10 mg   Fri 15 mg 15 mg 20 mg (4/22)   Sat 15 mg 15 mg 15 mg   Visit Report - - -   Some recent data might be hidden       Plan:  1. INR is Subtherapeutic today- see above in Anticoagulation Summary.   Will instruct Kameron Melendez to Increase their warfarin regimen- see above in Anticoagulation Summary.  2. Follow up in 1 weeks  3. They have been instructed to call if any changes in medications, doses, concerns, etc. Patient expresses understanding and has no further questions at this time.    Tessie Fatima [facility]

## 2022-04-29 ENCOUNTER — ANTICOAGULATION VISIT (OUTPATIENT)
Dept: PHARMACY | Facility: HOSPITAL | Age: 52
End: 2022-04-29

## 2022-04-29 DIAGNOSIS — I26.99 BILATERAL PULMONARY EMBOLISM: ICD-10-CM

## 2022-04-29 DIAGNOSIS — I26.99 OTHER ACUTE PULMONARY EMBOLISM WITHOUT ACUTE COR PULMONALE: Primary | ICD-10-CM

## 2022-04-29 LAB — INR PPP: 2.2

## 2022-04-29 NOTE — PROGRESS NOTES
Anticoagulation Clinic Progress Note    Anticoagulation Summary  As of 2022    INR goal:  2.0-3.0   TTR:  69.3 % (3.2 y)   INR used for dosin.20 (2022)   Warfarin maintenance plan:  10 mg every Tue, Thu; 15 mg all other days   Weekly warfarin total:  95 mg   No change documented:  Tessie Fatima RPH   Plan last modified:  Tessie Fatima RPH (3/4/2022)   Next INR check:  2022   Priority:  High   Target end date:  Indefinite    Indications    Other acute pulmonary embolism without acute cor pulmonale (HCC) [I26.99]  History of bilateral pulmonary embolism (CMS/HCC) [I26.99]             Anticoagulation Episode Summary     INR check location:      Preferred lab:      [facility]    Comments:  new  frankie 2019 **WEEKLY FRANKIE**      Anticoagulation Care Providers     Provider Role Specialty Phone number    Torri Colmenares APRN Referring Cardiology [phone]    Elsy Baeza MD Responsible Cardiology [phone]          Clinic Interview:  No pertinent clinical findings have been reported.    INR History:  Anticoagulation Monitoring 4/15/2022 2022 2022   INR 2.20 1.60 2.20   INR Date 4/15/2022 2022 2022   INR Goal 2.0-3.0 2.0-3.0 2.0-3.0   Trend Same Same Same   Last Week Total 95 mg 95 mg 100 mg   Next Week Total 95 mg 100 mg 95 mg   Sun 15 mg 15 mg 15 mg   Mon 15 mg 15 mg 15 mg   Tue 10 mg 10 mg 10 mg   Wed 15 mg 15 mg 15 mg   Thu 10 mg 10 mg 10 mg   Fri 15 mg 20 mg () 15 mg   Sat 15 mg 15 mg 15 mg   Visit Report - - -   Some recent data might be hidden       Plan:  1. INR is therapeutic today- see above in Anticoagulation Summary.    Kameron Melendez to continue their warfarin regimen- see above in Anticoagulation Summary.  2. Follow up in 1 week  3. They have been instructed to call if any changes in medications, doses, concerns, etc. Patient expresses understanding and has no further questions at this  time.    Tessie Fatima, [facility]

## 2022-05-06 ENCOUNTER — TELEPHONE (OUTPATIENT)
Dept: FAMILY MEDICINE CLINIC | Facility: CLINIC | Age: 52
End: 2022-05-06

## 2022-05-06 ENCOUNTER — APPOINTMENT (OUTPATIENT)
Dept: CARDIOLOGY | Facility: HOSPITAL | Age: 52
End: 2022-05-06

## 2022-05-06 ENCOUNTER — HOSPITAL ENCOUNTER (INPATIENT)
Facility: HOSPITAL | Age: 52
LOS: 6 days | Discharge: HOME | End: 2022-05-12
Attending: EMERGENCY MEDICINE | Admitting: HOSPITALIST

## 2022-05-06 DIAGNOSIS — L03.115 CELLULITIS OF RIGHT LOWER EXTREMITY: Primary | ICD-10-CM

## 2022-05-06 DIAGNOSIS — I89.0 LYMPHEDEMA: ICD-10-CM

## 2022-05-06 LAB
ALBUMIN SERPL-MCNC: 2.9 G/DL (ref 3.5–5.2)
ALBUMIN/GLOB SERPL: 0.6 G/DL
ALP SERPL-CCNC: 103 U/L (ref 39–117)
ALT SERPL W P-5'-P-CCNC: 16 U/L (ref 1–41)
ANION GAP SERPL CALCULATED.3IONS-SCNC: 11.3 MMOL/L (ref 5–15)
AST SERPL-CCNC: 22 U/L (ref 1–40)
BASOPHILS # BLD AUTO: 0.07 10*3/MM3 (ref 0–0.2)
BASOPHILS NFR BLD AUTO: 0.8 % (ref 0–1.5)
BH CV LOWER VASCULAR LEFT COMMON FEMORAL AUGMENT: NORMAL
BH CV LOWER VASCULAR LEFT COMMON FEMORAL COMPETENT: NORMAL
BH CV LOWER VASCULAR LEFT COMMON FEMORAL COMPRESS: NORMAL
BH CV LOWER VASCULAR LEFT COMMON FEMORAL PHASIC: NORMAL
BH CV LOWER VASCULAR LEFT COMMON FEMORAL SPONT: NORMAL
BH CV LOWER VASCULAR RIGHT COMMON FEMORAL AUGMENT: NORMAL
BH CV LOWER VASCULAR RIGHT COMMON FEMORAL COMPETENT: NORMAL
BH CV LOWER VASCULAR RIGHT COMMON FEMORAL COMPRESS: NORMAL
BH CV LOWER VASCULAR RIGHT COMMON FEMORAL PHASIC: NORMAL
BH CV LOWER VASCULAR RIGHT COMMON FEMORAL SPONT: NORMAL
BH CV LOWER VASCULAR RIGHT DISTAL FEMORAL COMPRESS: NORMAL
BH CV LOWER VASCULAR RIGHT GASTRONEMIUS COMPRESS: NORMAL
BH CV LOWER VASCULAR RIGHT GREATER SAPH AK COMPRESS: NORMAL
BH CV LOWER VASCULAR RIGHT GREATER SAPH BK COMPRESS: NORMAL
BH CV LOWER VASCULAR RIGHT LESSER SAPH COMPRESS: NORMAL
BH CV LOWER VASCULAR RIGHT MID FEMORAL AUGMENT: NORMAL
BH CV LOWER VASCULAR RIGHT MID FEMORAL COMPETENT: NORMAL
BH CV LOWER VASCULAR RIGHT MID FEMORAL COMPRESS: NORMAL
BH CV LOWER VASCULAR RIGHT MID FEMORAL PHASIC: NORMAL
BH CV LOWER VASCULAR RIGHT MID FEMORAL SPONT: NORMAL
BH CV LOWER VASCULAR RIGHT POPLITEAL AUGMENT: NORMAL
BH CV LOWER VASCULAR RIGHT POPLITEAL COMPETENT: NORMAL
BH CV LOWER VASCULAR RIGHT POPLITEAL COMPRESS: NORMAL
BH CV LOWER VASCULAR RIGHT POPLITEAL PHASIC: NORMAL
BH CV LOWER VASCULAR RIGHT POPLITEAL SPONT: NORMAL
BH CV LOWER VASCULAR RIGHT PROFUNDA FEMORAL COMPRESS: NORMAL
BH CV LOWER VASCULAR RIGHT PROXIMAL FEMORAL COMPRESS: NORMAL
BH CV LOWER VASCULAR RIGHT SAPHENOFEMORAL JUNCTION COMPRESS: NORMAL
BILIRUB SERPL-MCNC: 0.3 MG/DL (ref 0–1.2)
BUN SERPL-MCNC: 9 MG/DL (ref 6–20)
BUN/CREAT SERPL: 13.2 (ref 7–25)
CALCIUM SPEC-SCNC: 9.1 MG/DL (ref 8.6–10.5)
CHLORIDE SERPL-SCNC: 101 MMOL/L (ref 98–107)
CO2 SERPL-SCNC: 21.7 MMOL/L (ref 22–29)
CREAT SERPL-MCNC: 0.68 MG/DL (ref 0.76–1.27)
D-LACTATE SERPL-SCNC: 1.1 MMOL/L (ref 0.5–2)
DEPRECATED RDW RBC AUTO: 49.5 FL (ref 37–54)
EGFRCR SERPLBLD CKD-EPI 2021: 111.8 ML/MIN/1.73
EOSINOPHIL # BLD AUTO: 0.33 10*3/MM3 (ref 0–0.4)
EOSINOPHIL NFR BLD AUTO: 3.7 % (ref 0.3–6.2)
ERYTHROCYTE [DISTWIDTH] IN BLOOD BY AUTOMATED COUNT: 15.5 % (ref 12.3–15.4)
GLOBULIN UR ELPH-MCNC: 4.9 GM/DL
GLUCOSE SERPL-MCNC: 127 MG/DL (ref 65–99)
HCT VFR BLD AUTO: 37.4 % (ref 37.5–51)
HGB BLD-MCNC: 11.9 G/DL (ref 13–17.7)
IMM GRANULOCYTES # BLD AUTO: 0.14 10*3/MM3 (ref 0–0.05)
IMM GRANULOCYTES NFR BLD AUTO: 1.6 % (ref 0–0.5)
INR PPP: 3.22 (ref 0.9–1.1)
LYMPHOCYTES # BLD AUTO: 2.04 10*3/MM3 (ref 0.7–3.1)
LYMPHOCYTES NFR BLD AUTO: 22.7 % (ref 19.6–45.3)
MAXIMAL PREDICTED HEART RATE: 168 BPM
MCH RBC QN AUTO: 27.8 PG (ref 26.6–33)
MCHC RBC AUTO-ENTMCNC: 31.8 G/DL (ref 31.5–35.7)
MCV RBC AUTO: 87.4 FL (ref 79–97)
MONOCYTES # BLD AUTO: 0.76 10*3/MM3 (ref 0.1–0.9)
MONOCYTES NFR BLD AUTO: 8.5 % (ref 5–12)
NEUTROPHILS NFR BLD AUTO: 5.64 10*3/MM3 (ref 1.7–7)
NEUTROPHILS NFR BLD AUTO: 62.7 % (ref 42.7–76)
NRBC BLD AUTO-RTO: 0 /100 WBC (ref 0–0.2)
PLATELET # BLD AUTO: 257 10*3/MM3 (ref 140–450)
PMV BLD AUTO: 9.6 FL (ref 6–12)
POTASSIUM SERPL-SCNC: 4.6 MMOL/L (ref 3.5–5.2)
PROCALCITONIN SERPL-MCNC: 0.19 NG/ML (ref 0–0.25)
PROT SERPL-MCNC: 7.8 G/DL (ref 6–8.5)
PROTHROMBIN TIME: 33.1 SECONDS (ref 11.7–14.2)
RBC # BLD AUTO: 4.28 10*6/MM3 (ref 4.14–5.8)
SARS-COV-2 RNA RESP QL NAA+PROBE: NOT DETECTED
SODIUM SERPL-SCNC: 134 MMOL/L (ref 136–145)
STRESS TARGET HR: 143 BPM
WBC NRBC COR # BLD: 8.98 10*3/MM3 (ref 3.4–10.8)

## 2022-05-06 PROCEDURE — U0003 INFECTIOUS AGENT DETECTION BY NUCLEIC ACID (DNA OR RNA); SEVERE ACUTE RESPIRATORY SYNDROME CORONAVIRUS 2 (SARS-COV-2) (CORONAVIRUS DISEASE [COVID-19]), AMPLIFIED PROBE TECHNIQUE, MAKING USE OF HIGH THROUGHPUT TECHNOLOGIES AS DESCRIBED BY CMS-2020-01-R: HCPCS | Performed by: PHYSICIAN ASSISTANT

## 2022-05-06 PROCEDURE — 25010000002 PIPERACILLIN SOD-TAZOBACTAM PER 1 G: Performed by: HOSPITALIST

## 2022-05-06 PROCEDURE — 25010000002 VANCOMYCIN 10 G RECONSTITUTED SOLUTION: Performed by: PHYSICIAN ASSISTANT

## 2022-05-06 PROCEDURE — 99283 EMERGENCY DEPT VISIT LOW MDM: CPT

## 2022-05-06 PROCEDURE — 83605 ASSAY OF LACTIC ACID: CPT | Performed by: PHYSICIAN ASSISTANT

## 2022-05-06 PROCEDURE — 80053 COMPREHEN METABOLIC PANEL: CPT | Performed by: PHYSICIAN ASSISTANT

## 2022-05-06 PROCEDURE — 25010000002 CEFEPIME PER 500 MG: Performed by: PHYSICIAN ASSISTANT

## 2022-05-06 PROCEDURE — 84145 PROCALCITONIN (PCT): CPT | Performed by: PHYSICIAN ASSISTANT

## 2022-05-06 PROCEDURE — 93971 EXTREMITY STUDY: CPT

## 2022-05-06 PROCEDURE — 85610 PROTHROMBIN TIME: CPT | Performed by: PHYSICIAN ASSISTANT

## 2022-05-06 PROCEDURE — 85025 COMPLETE CBC W/AUTO DIFF WBC: CPT | Performed by: PHYSICIAN ASSISTANT

## 2022-05-06 PROCEDURE — 87040 BLOOD CULTURE FOR BACTERIA: CPT | Performed by: PHYSICIAN ASSISTANT

## 2022-05-06 RX ORDER — SODIUM CHLORIDE 0.9 % (FLUSH) 0.9 %
10 SYRINGE (ML) INJECTION AS NEEDED
Status: DISCONTINUED | OUTPATIENT
Start: 2022-05-06 | End: 2022-05-12 | Stop reason: HOSPADM

## 2022-05-06 RX ORDER — ONDANSETRON 4 MG/1
4 TABLET, FILM COATED ORAL EVERY 6 HOURS PRN
Status: DISCONTINUED | OUTPATIENT
Start: 2022-05-06 | End: 2022-05-12 | Stop reason: HOSPADM

## 2022-05-06 RX ORDER — FAMOTIDINE 20 MG/1
20 TABLET, FILM COATED ORAL
Status: DISCONTINUED | OUTPATIENT
Start: 2022-05-06 | End: 2022-05-12 | Stop reason: HOSPADM

## 2022-05-06 RX ORDER — SODIUM CHLORIDE 0.9 % (FLUSH) 0.9 %
10 SYRINGE (ML) INJECTION EVERY 12 HOURS SCHEDULED
Status: DISCONTINUED | OUTPATIENT
Start: 2022-05-06 | End: 2022-05-12 | Stop reason: HOSPADM

## 2022-05-06 RX ORDER — TOPIRAMATE 25 MG/1
25 TABLET ORAL 2 TIMES DAILY
Status: DISCONTINUED | OUTPATIENT
Start: 2022-05-06 | End: 2022-05-12 | Stop reason: HOSPADM

## 2022-05-06 RX ORDER — BUPROPION HYDROCHLORIDE 150 MG/1
150 TABLET ORAL DAILY
Status: DISCONTINUED | OUTPATIENT
Start: 2022-05-06 | End: 2022-05-12 | Stop reason: HOSPADM

## 2022-05-06 RX ORDER — ONDANSETRON 2 MG/ML
4 INJECTION INTRAMUSCULAR; INTRAVENOUS EVERY 6 HOURS PRN
Status: DISCONTINUED | OUTPATIENT
Start: 2022-05-06 | End: 2022-05-12 | Stop reason: HOSPADM

## 2022-05-06 RX ORDER — ACETAMINOPHEN 325 MG/1
650 TABLET ORAL EVERY 4 HOURS PRN
Status: DISCONTINUED | OUTPATIENT
Start: 2022-05-06 | End: 2022-05-12 | Stop reason: HOSPADM

## 2022-05-06 RX ADMIN — Medication 10 ML: at 20:24

## 2022-05-06 RX ADMIN — Medication 2 G: at 08:33

## 2022-05-06 RX ADMIN — TAZOBACTAM SODIUM AND PIPERACILLIN SODIUM 3.38 G: 375; 3 INJECTION, SOLUTION INTRAVENOUS at 22:22

## 2022-05-06 RX ADMIN — VANCOMYCIN HYDROCHLORIDE 3000 MG: 10 INJECTION, POWDER, LYOPHILIZED, FOR SOLUTION INTRAVENOUS at 10:14

## 2022-05-06 RX ADMIN — FAMOTIDINE 20 MG: 20 TABLET ORAL at 18:30

## 2022-05-06 RX ADMIN — TAZOBACTAM SODIUM AND PIPERACILLIN SODIUM 3.38 G: 375; 3 INJECTION, SOLUTION INTRAVENOUS at 14:11

## 2022-05-06 RX ADMIN — BUPROPION HYDROCHLORIDE 150 MG: 150 TABLET, EXTENDED RELEASE ORAL at 18:30

## 2022-05-06 RX ADMIN — TOPIRAMATE 25 MG: 25 TABLET, FILM COATED ORAL at 20:24

## 2022-05-06 RX ADMIN — ACETAMINOPHEN 650 MG: 325 TABLET, FILM COATED ORAL at 20:24

## 2022-05-06 NOTE — CASE MANAGEMENT/SOCIAL WORK
Spoke w/ EVS supervisor and there are no bariatric beds available. Contacted [facility] and ordered eduar bed w/ trapeze to be delivered to [facility]. Unknown ETA. ER RN and ER CN updated

## 2022-05-06 NOTE — ED PROVIDER NOTES
EMERGENCY DEPARTMENT ENCOUNTER    Room Number:  P485/1  Date of encounter:  5/6/2022  PCP: Hansel Patel DO  Historian: Patient      I used full protective equipment while examining this patient.  This includes face mask, gloves and protective eyewear.  I washed my hands before entering the room and immediately upon leaving the room      HPI:  Chief Complaint: Leg swelling, pain  A complete HPI/ROS/PMH/PSH/SH/FH are unobtainable due to: Nothing    Context: Kameron Melendez is a 52 y.o. male who presents to the ED c/o 5-day history of right leg redness, swelling, pain.  Patient has a known history of lymphedema.  He has had recurrent infections his lower legs in the past.  Patient took Keflex and attempts to holloway off hospitalization however symptoms have progressed.  He has had subjective fever and chills.  He does have history of DVT and states he has been compliant with his Coumadin.    Review of Medical Records  I reviewed admission from 11/1/2021.  Patient admitted for cellulitis of left lower extremity.    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   • Knee arthropathy 12/11/2015   • Morbid obesity with body mass index of 60.0-69.9 in adult ([facility]) 12/11/2015   • ARNOLDO (obstructive sleep apnea) 12/11/2015   • Severe edema 12/11/2015   • History of pulmonary embolism 01/23/2019   • Other acute pulmonary embolism without acute cor pulmonale ([facility]) 02/01/2019   • Intractable back pain 11/25/2019   • Heterozygous factor V Leiden mutation ([facility]) 11/25/2019   • Cellulitis of left lower extremity 11/02/2021   • Hypokalemia 11/02/2021   • CAROL (acute kidney injury) ([facility]) 11/02/2021   •  Sepsis with cutaneous manifestations (HCC) 11/02/2021   • Hyperglycemia 11/02/2021   • Paroxysmal atrial fibrillation (HCC) 11/02/2021   • Athscl heart 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 11/16/2021   • Long term (current) use of anticoagulants 11/16/2021   • Lymphedema, not elsewhere classified 11/16/2021   • Nicotine dependence, other tobacco product, uncomplicated 11/16/2021   • Personal history of other venous thrombosis and embolism 11/16/2021   • Typical atrial flutter (HCC) 11/16/2021   • Venous insufficiency (chronic) (peripheral) 11/16/2021     Resolved Ambulatory Problems     Diagnosis Date Noted   • Right ventricular enlargement 08/29/2017     Past Medical History:   Diagnosis Date   • DVT (deep venous thrombosis) (HCC)    • Factor V Leiden (HCC)    • Hypertension    • Kidney stone    • Lymphedema    • Lymphedema of left lower extremity    • Obesity    • Pulmonary embolism (HCC)          PAST SURGICAL HISTORY  Past Surgical History:   Procedure Laterality Date   • CARDIAC CATHETERIZATION Bilateral 7/18/2017    Procedure: Pulmonary angiography- In[facility] ;  Surgeon: Cedric Coulter MD;  Location:  [facility];  Service:    • CARDIAC CATHETERIZATION N/A 7/18/2017    Procedure: Right Heart Cath;  Surgeon: Cedric Coulter MD;  Location:  [facility];  Service:    • THROMBECTOMY           FAMILY HISTORY  Family History   Problem Relation Age of Onset   • Heart disease Mother    • Heart failure Mother    • Bradycardia Mother    • No Known Problems Father    • No Known Problems Maternal Grandmother    • No Known Problems Maternal Grandfather    • No Known Problems Paternal Grandmother    • No Known Problems Paternal Grandfather          SOCIAL HISTORY  Social History     Socioeconomic History   • Marital status:    Tobacco Use   • Smoking status: Light Tobacco Smoker     Types: Cigars, Pipe     Start date: 1/1/2006   • Smokeless tobacco: Never Used   • Tobacco comment:  occasional - < 1 a month   Substance and Sexual Activity   • Alcohol use: No   • Drug use: No   • Sexual activity: Defer         ALLERGIES  Patient has no known allergies.        REVIEW OF SYSTEMS  All systems reviewed and negative except for those discussed in HPI.       PHYSICAL EXAM    I have reviewed the triage vital signs and nursing notes.    ED Triage Vitals   Temp Heart Rate Resp BP SpO2   05/06/22 0716 05/06/22 0711 05/06/22 0711 05/06/22 0723 05/06/22 0711   98.1 °F (36.7 °C) 84 18 127/67 97 %      Temp src Heart Rate Source Patient Position BP Location FiO2 (%)   -- -- 05/06/22 0723 05/06/22 0723 --     Lying Right arm        Physical Exam  GENERAL: Alert, oriented, morbidly obese, not distressed  HENT: head atraumatic, no nuchal rigidity  EYES: no scleral icterus, EOMI  CV: regular rhythm, regular rate, no murmur  RESPIRATORY: normal effort, CTA  ABDOMEN: soft, nontender  MUSCULOSKELETAL: Severe lymphedema of bilateral lower extremities.  Moderate erythema of the right lower extremity extending from the foot up to the knee.  Area is indurated, tender.  Good pulses distally.  NEURO: alert, moves all extremities, follows commands  SKIN: warm, dry        LAB RESULTS  Recent Results (from the past 24 hour(s))   Comprehensive Metabolic Panel    Collection Time: 05/06/22  7:43 AM    Specimen: Blood   Result Value Ref Range    Glucose 127 (H) 65 - 99 mg/dL    BUN 9 6 - 20 mg/dL    Creatinine 0.68 (L) 0.76 - 1.27 mg/dL    Sodium 134 (L) 136 - 145 mmol/L    Potassium 4.6 3.5 - 5.2 mmol/L    Chloride 101 98 - 107 mmol/L    CO2 21.7 (L) 22.0 - 29.0 mmol/L    Calcium 9.1 8.6 - 10.5 mg/dL    Total Protein 7.8 6.0 - 8.5 g/dL    Albumin 2.90 (L) 3.50 - 5.20 g/dL    ALT (SGPT) 16 1 - 41 U/L    AST (SGOT) 22 1 - 40 U/L    Alkaline Phosphatase 103 39 - 117 U/L    Total Bilirubin 0.3 0.0 - 1.2 mg/dL    Globulin 4.9 gm/dL    A/G Ratio 0.6 g/dL    BUN/Creatinine Ratio 13.2 7.0 - 25.0    Anion Gap 11.3 5.0 - 15.0 mmol/L     eGFR 111.8 >60.0 mL/min/1.73   Procalcitonin    Collection Time: 05/06/22  7:43 AM    Specimen: Blood   Result Value Ref Range    Procalcitonin 0.19 0.00 - 0.25 ng/mL   Protime-INR    Collection Time: 05/06/22  7:43 AM    Specimen: Blood   Result Value Ref Range    Protime 33.1 (H) 11.7 - 14.2 Seconds    INR 3.22 (H) 0.90 - 1.10   COVID-19, [facility] IN-HOUSE CEPHEID/THANG NP SWAB IN TRANSPORT MEDIA 8-12 HR TAT - Swab, Nasopharynx    Collection Time: 05/06/22  7:45 AM    Specimen: Nasopharynx; Swab   Result Value Ref Range    COVID19 Not Detected Not Detected - Ref. Range   Lactic Acid, Plasma    Collection Time: 05/06/22  8:22 AM    Specimen: Blood   Result Value Ref Range    Lactate 1.1 0.5 - 2.0 mmol/L   CBC Auto Differential    Collection Time: 05/06/22  8:22 AM    Specimen: Blood   Result Value Ref Range    WBC 8.98 3.40 - 10.80 10*3/mm3    RBC 4.28 4.14 - 5.80 10*6/mm3    Hemoglobin 11.9 (L) 13.0 - 17.7 g/dL    Hematocrit 37.4 (L) 37.5 - 51.0 %    MCV 87.4 79.0 - 97.0 fL    MCH 27.8 26.6 - 33.0 pg    MCHC 31.8 31.5 - 35.7 g/dL    RDW 15.5 (H) 12.3 - 15.4 %    RDW-SD 49.5 37.0 - 54.0 fl    MPV 9.6 6.0 - 12.0 fL    Platelets 257 140 - 450 10*3/mm3    Neutrophil % 62.7 42.7 - 76.0 %    Lymphocyte % 22.7 19.6 - 45.3 %    Monocyte % 8.5 5.0 - 12.0 %    Eosinophil % 3.7 0.3 - 6.2 %    Basophil % 0.8 0.0 - 1.5 %    Immature Grans % 1.6 (H) 0.0 - 0.5 %    Neutrophils, Absolute 5.64 1.70 - 7.00 10*3/mm3    Lymphocytes, Absolute 2.04 0.70 - 3.10 10*3/mm3    Monocytes, Absolute 0.76 0.10 - 0.90 10*3/mm3    Eosinophils, Absolute 0.33 0.00 - 0.40 10*3/mm3    Basophils, Absolute 0.07 0.00 - 0.20 10*3/mm3    Immature Grans, Absolute 0.14 (H) 0.00 - 0.05 10*3/mm3    nRBC 0.0 0.0 - 0.2 /100 WBC   Duplex Venous Lower Extremity - Right CAR    Collection Time: 05/06/22  9:43 AM   Result Value Ref Range    Target HR (85%) 143 bpm    Max. Pred. HR (100%) 168 bpm    Right Common Femoral Spont Y     Right Common Femoral Phasic Y     Right  Common Femoral Augment Y     Right Common Femoral Competent Y     Right Common Femoral Compress C     Right Saphenofemoral Junction Compress C     Right Profunda Femoral Compress C     Right Proximal Femoral Compress C     Right Mid Femoral Spont Y     Right Mid Femoral Phasic Y     Right Mid Femoral Augment Y     Right Mid Femoral Competent Y     Right Mid Femoral Compress C     Right Distal Femoral Compress C     Right Popliteal Spont Y     Right Popliteal Phasic Y     Right Popliteal Augment Y     Right Popliteal Competent Y     Right Popliteal Compress C     Right Gastronemius Compress C     Right Greater Saph AK Compress C     Right Greater Saph BK Compress C     Right Lesser Saph Compress C     Left Common Femoral Spont Y     Left Common Femoral Phasic Y     Left Common Femoral Augment Y     Left Common Femoral Competent Y     Left Common Femoral Compress C        Ordered the above labs and independently reviewed the results.        RADIOLOGY  Duplex Venous Lower Extremity - Right CAR    Result Date: 5/6/2022  · Normal right lower extremity venous duplex scan.        I ordered the above noted radiological studies. Reviewed by me and discussed with radiologist.  See dictation for official radiology interpretation.      MEDICATIONS GIVEN IN ER    Medications   sodium chloride 0.9 % flush 10 mL (has no administration in time range)   Pharmacy to dose vancomycin (has no administration in time range)   piperacillin-tazobactam (ZOSYN) 3.375 g in iso-osmotic dextrose 50 ml (premix) (has no administration in time range)   sodium chloride 0.9 % flush 10 mL (10 mL Intravenous Not Given 5/6/22 1230)   sodium chloride 0.9 % flush 10 mL (has no administration in time range)   acetaminophen (TYLENOL) tablet 650 mg (has no administration in time range)   ondansetron (ZOFRAN) tablet 4 mg (has no administration in time range)     Or   ondansetron (ZOFRAN) injection 4 mg (has no administration in time range)   buPROPion XL  (WELLBUTRIN XL) 24 hr tablet 150 mg (has no administration in time range)   topiramate (TOPAMAX) tablet 25 mg (25 mg Oral Not Given 5/6/22 1407)   famotidine (PEPCID) tablet 20 mg (has no administration in time range)   cefepime (MAXIPIME) 2 g/100 mL 0.9% NS (mbp) (0 g Intravenous Stopped 5/6/22 1014)   vancomycin 3000 mg/1000 mL 0.9% NS IVPB (3,000 mg Intravenous Given 5/6/22 1014)   piperacillin-tazobactam (ZOSYN) 3.375 g in iso-osmotic dextrose 50 ml (premix) (3.375 g Intravenous New Bag 5/6/22 1411)         PROGRESS, DATA ANALYSIS, CONSULTS, AND MEDICAL DECISION MAKING    All labs have been independently reviewed by me.  All radiology studies have been reviewed by me and discussed with radiologist dictating the report.   EKG's independently viewed and interpreted by me.  Discussion below represents my analysis of pertinent findings related to patient's condition, differential diagnosis, treatment plan and final disposition.    I have discussed case with Dr. Harrison, emergency room physician.  He has performed his own bedside examination and agrees with treatment plan.    ED Course as of 05/06/22 1545   Fri May 06, 2022   0742 Patient presents with right leg redness, swelling with known history of DVT, lymphedema.  Differential diagnoses include but not limited to cellulitis, DVT, arterial occlusion, venous insufficiency.  Plan for antibiotics, blood cultures, ultrasound, admission. [EE]   0807 INR(!): 3.22 [EE]   0821 Glucose(!): 127 [EE]   0821 Creatinine(!): 0.68 [EE]   1026 WBC: 8.98 [EE]   1026 Lactate: 1.1 [EE]   1028 INR(!): 3.22 [MM]   1028 WBC: 8.98 [MM]   1028 Sodium(!): 134 [MM]   1028 Glucose(!): 127 [MM]   1028 Procalcitonin: 0.19 [MM]   1028 Lactate: 1.1 [MM]   1031 I discussed ultrasound findings with Jim.  No acute DVT seen. [EE]   1127 I discussed patient with Dr. Mock [facility].  He agrees to admit. [EE]      ED Course User Index  [EE] Eris Robbins PA  [MM] Alf Harrison MD       AS OF 15:45  EDT VITALS:    BP - 139/78  HR - 75  TEMP - 99 °F (37.2 °C) (Oral)  O2 SATS - 97%        DIAGNOSIS  Final diagnoses:   Cellulitis of right lower extremity   Lymphedema         DISPOSITION  Admitted      Dictated utilizing Dragon dictation     Eris Robbins PA  05/06/22 [id number]

## 2022-05-06 NOTE — H&P
HISTORY AND PHYSICAL   [facility]        Date of Admission: 2022  Patient Identification:  Name: Kameron Melendez  Age: 52 y.o.  Sex: male  :  1970  MRN: [id number]                     Primary Care Physician: Hansel Patel DO    Chief Complaint: Fever chills and right lower extremity redness    History of Present Illness:    is a wonderfully pleasant 52-year-old male who still is actively working and ultimately Saturday or  night he noticed temperatures of 101 with chills and he started noticed right lower extremity redness and warmth.  He has longstanding issues with lymphedema secondary to his morbid obesity though he claims not to be a diabetic and he Amaro is developing.  He had Keflex left over and he took that for a day or 2 and ultimately once that ran out he switched to doxycycline and took some additional doses of that.  Ultimately while I do not endorse self prescribing he did the right thing but I got a feeling that the cellulitis was likely more pronounced and given his body habitus and severe lymphedema those oral antibiotics perhaps were not penetrating all these tissues.  He stated he continued to feel some subjective fever and chills.  He denies any nausea vomiting or diarrhea.  Denies any chest pain shortness of breath or cough.  In the ER is found to have cellulitis of the right lower extremity and he does have a past history of coagulopathy of which she is anticoagulated on Coumadin with a supratherapeutic INR 3.2.  Doppler of the lower extremities are negative in the ER.  Patient was given vancomycin and cefepime and [facility] was called to further admit and I was happy to oblige.    Past Medical History:  Past Medical History:   Diagnosis Date   • DVT (deep venous thrombosis) (HCC)     RLE   • Factor V Leiden (HCC)    • Hypertension    • Kidney stone    • Lymphedema    • Lymphedema of left lower extremity    • Obesity    • ARNOLDO (obstructive sleep apnea)     • Pulmonary embolism (HCC)     bilateral   • Pulmonary hypertension (HCC)    • Right ventricular enlargement      Past Surgical History:  Past Surgical History:   Procedure Laterality Date   • CARDIAC CATHETERIZATION Bilateral 7/18/2017    Procedure: Pulmonary angiography- Inari ;  Surgeon: Cedric Coulter MD;  Location:  [facility];  Service:    • CARDIAC CATHETERIZATION N/A 7/18/2017    Procedure: Right Heart Cath;  Surgeon: Cedric Coulter MD;  Location:  [facility];  Service:    • THROMBECTOMY        Home Meds:  (Not in a hospital admission)      Allergies:  No Known Allergies  Immunizations:  Immunization History   Administered Date(s) Administered   • COVID-19 (PFIZER) PURPLE CAP 04/02/2021, 04/23/2021   • Flu Vaccine Intradermal Quad 18-64YR 01/31/2019, 10/14/2020   • Hepatitis A 04/25/2018   • flucelvax quad pfs =>4 YRS 01/31/2019     Social History:   Social History     Social History Narrative   • Not on file     Social History     Socioeconomic History   • Marital status:    Tobacco Use   • Smoking status: Light Tobacco Smoker     Types: Cigars, Pipe     Start date: 1/1/2006   • Smokeless tobacco: Never Used   • Tobacco comment: occasional - < 1 a month   Substance and Sexual Activity   • Alcohol use: No   • Drug use: No   • Sexual activity: Defer       Family History:  Family History   Problem Relation Age of Onset   • Heart disease Mother    • Heart failure Mother    • Bradycardia Mother    • No Known Problems Father    • No Known Problems Maternal Grandmother    • No Known Problems Maternal Grandfather    • No Known Problems Paternal Grandmother    • No Known Problems Paternal Grandfather         Review of Systems  See history of present illness and past medical history.  Patient admits to fever chills night sweats.  Denies nausea vomiting diarrhea abdominal pain.  Denies any chest pain palpitation cough shortness of breath.  Denies any focal changes to vision smell  "taste or sound.  Denies syncope loss of function.  Admits to redness and warmth to right lower extremity as well as fever and chills.  Remainder of ROS is negative.    Objective:  T Max 24 hrs: Temp (24hrs), Av.1 °F (36.7 °C), Min:98.1 °F (36.7 °C), Max:98.1 °F (36.7 °C)    Vitals Ranges:   Temp:  [98.1 °F (36.7 °C)] 98.1 °F (36.7 °C)  Heart Rate:  [84] 84  Resp:  [18] 18  BP: (127)/(67) 127/67      Exam:  /67 (BP Location: Right arm, Patient Position: Lying)   Pulse 84   Temp 98.1 °F (36.7 °C)   Resp 18   Ht 188 cm (74\")   Wt (!) 249 kg (548 lb)   SpO2 97%   BMI 70.36 kg/m²     General Appearance:    Alert, cooperative, very casual and conversational, nontoxic in appearance, excellent historian, no family at bedside, morbidly obese   Head:    Normocephalic, without obvious abnormality, atraumatic   Eyes:    PERRL, conjunctivae/corneas clear, EOM's intact, both eyes   Ears:    Normal external ear canals, both ears   Nose:   Nares normal, septum midline, mucosa normal, no drainage    or sinus tenderness   Throat:   Lips, mucosa, and tongue normal   Neck:   Supple, no rigidity or JVD       Lungs:     Clear to auscultation bilaterally, respirations unlabored   Chest Wall:    No tenderness or deformity    Heart:    Regular rate and rhythm, S1 and S2 normal, no murmur   Abdomen:     Soft, nontender, bowel sounds active all four quadrants   Extremities:  Significant lower extremity lymphedema bilaterally with cellulitis to the right lower extremity involving the foot stretching up to just below the knee and I have outlined that with a marker.  I cannot appreciate any type of abscess or anything that needs surgical attention at this time.  Foot hygiene is poor/cracking of skin noted   Pulses:   2+ and symmetric lower extremities           Neurologic:   CNII-XII intact, moving all without focal deficit      .    Data Review:  Labs in chart were reviewed.             Imaging Results (All)     None    "         Assessment:  Active Hospital Problems    Diagnosis  POA   • **Cellulitis of right lower extremity [L03.115]  Yes   • Long term (current) use of anticoagulants [Z79.01]  Not Applicable   • Paroxysmal atrial fibrillation (HCC) [I48.0]  Yes   • Heterozygous factor V Leiden mutation (HCC) [D68.51]  Yes   • Dyslipidemia [E78.5]  Yes   • Lymphedema of both lower extremities [I89.0]  Yes   • Pulmonary hypertension (HCC) [I27.20]  Yes   • History of bilateral pulmonary embolism (CMS/HCC) [I26.99]  Yes   • Morbid obesity with body mass index of 60.0-69.9 in adult (HCC) [E66.01, Z68.44]  Not Applicable   • ARNOLDO (obstructive sleep apnea) [G47.33]  Yes      Resolved Hospital Problems   No resolved problems to display.       Plan:    Recurrent cellulitis to lower extremity secondary to chronic lymphedema and resulting morbidly obese legs   -Vancomycin and Zosyn for now -denies any past history of diabetes despite morbid obesity with a BMI greater than 70 and will add a.m. A1c to labs   -Outline tracing of erythema and anticipate regression   -Once clinical improvement noted enough, patient will be switched to p.o. antibiotics at discharge for remainder of treatment   -No sepsis present on admission    Supratherapeutic INR -3.2 today and I will hold Coumadin replacement and recheck this in a.m. and I will dose accordingly    Home med rec states she utilizes diuretics as needed    No additional prophylaxis warranted given INR    Pepcid for GI prophylaxis    Ruslan Mock MD  5/6/2022  12:03 EDT

## 2022-05-06 NOTE — TELEPHONE ENCOUNTER
Caller: ROSELIA     Relationship:     Best call back number:     What orders are you requesting (i.e. lab or imaging): HOME NURSING ORDERS    In what timeframe would the patient need to come in:     Where will you receive your lab/imaging services:     Additional notes: ROSELIA IS CALLING IN TO SEE IF DR WARNER WILL HAVE ORDERS FOR PATIENTS HOME HEALTH NURSING AS HE IS SCHEDULED TO BE RELEASED SOON.

## 2022-05-06 NOTE — PROGRESS NOTES
Clinical Pharmacy Services: Medication History    Kameron Melendez is a 52 y.o. male presenting to [facility] for   Chief Complaint   Patient presents with   • Leg Pain     Right leg, red, painful swelling       He  has a past medical history of DVT (deep venous thrombosis) (HCC), Factor V Leiden (HCC), Hypertension, Kidney stone, Lymphedema, Lymphedema of left lower extremity, Obesity, ARNOLDO (obstructive sleep apnea), Pulmonary embolism (HCC), Pulmonary hypertension (HCC), and Right ventricular enlargement.    Allergies as of 05/06/2022   • (No Known Allergies)       Medication information was obtained from: Patient   Pharmacy and Phone Number:     Prior to Admission Medications     Prescriptions Last Dose Informant Patient Reported? Taking?    acetaminophen (TYLENOL) 500 MG tablet 5/5/2022 Self Yes Yes    Take 500 mg by mouth Every 6 (Six) Hours As Needed for Mild Pain .    buPROPion XL (WELLBUTRIN XL) 150 MG 24 hr tablet 5/5/2022 Self No Yes    Take 1 tablet by mouth Daily.    furosemide (LASIX) 80 MG tablet Past Week Self No Yes    TAKE 1 TABLET BY MOUTH DAILY    Patient taking differently:  Take 80 mg by mouth Daily As Needed.    potassium chloride (K-DUR,KLOR-CON) 20 MEQ CR tablet Past Week Self No Yes    Take 1 tablet by mouth Daily. Take with furosemide    Patient taking differently:  Take 20 mEq by mouth Daily. Only takes with furosemide    topiramate (TOPAMAX) 25 MG tablet 5/5/2022 Self No Yes    Take 1 tablet by mouth 2 (Two) Times a Day.    warfarin (COUMADIN) 10 MG tablet Past Week Self Yes Yes    Take 15 mg by mouth See Admin Instructions. Takes 1.5 tablet (15 mg) Monday, Wednesday, Friday, Saturday, and Sunday    warfarin (COUMADIN) 10 MG tablet 5/5/2022 Self No Yes    TAKE 1 TABLET ON TUESDAY, THURSDAY, SUNDAY AND TAKE ONE AND ONE-HALF TABLETS ALL OTHER DAYS OR AS DIRECTED    Patient taking differently:  Take 10 mg by mouth 2 (Two) Times a Week. 1 tablet (10 mg) on Tuesday and Thursday             Medication notes:     This medication list is complete to the best of my knowledge as of 5/6/2022    Please call if questions.    Keven Rubio  Medication History Technician  [phone]    5/6/2022 11:45 EDT

## 2022-05-06 NOTE — PROGRESS NOTES
Discharge Planning Assessment  [facility]     Patient Name: Kameron Melendez  MRN: [id number]  Today's Date: 5/6/2022    Admit Date: 5/6/2022     Discharge Needs Assessment     Row Name 05/06/22 1634       Discharge Needs Assessment    Equipment Currently Used at Home other (see comments)  4 stairs to enter into his home and his bedroom is on the second floor but he has for the past year slept in his recliner which is on the main floor of his house    Row Name 05/06/22 1625       Living Environment    People in Home spouse    Current Living Arrangements home    Duration at Residence 4 stairs to enter into his home and his bedroom is on the second floor but he has for the past year slept in his recliner which is on the main floor of his house.    Primary Care Provided by self;spouse/significant other    Provides Primary Care For no one    Caregiving Concerns spouse/Aneitha    Family Caregiver if Needed spouse    Family Caregiver Names Yaya Melendez/spouse [phone]    Quality of Family Relationships helpful;involved;supportive    Living Arrangement Comments lives with spouse       Resource/Environmental Concerns    Resource/Environmental Concerns none       Transition Planning    Patient/Family Anticipates Transition to home    Transportation Anticipated family or friend will provide       Discharge Needs Assessment    Equipment Currently Used at Home walker, rolling;other (see comments)  recliner/lift    Anticipated Changes Related to Illness inability to care for self    Equipment Needed After Discharge other (see comments)  will follow    Discharge Facility/Level of Care Needs home with home health    Provided Post Acute Provider List? Yes    Post Acute Provider List Home Health    Provided Post Acute Provider Quality & Resource List? Yes    Post Acute Provider Quality and Resource List Home Health    Delivered To Patient    Method of Delivery In person    Patient's Choice of Community Agency([facility]) [facility]               Discharge Plan     Row Name 05/06/22 1631       Plan    Plan home with spouse and she will provide the transportation    Post Acute Provider Quality and Resource List Home Health    Delivered To Patient    Method of Delivery In person    Patient/Family in Agreement with Plan yes    Plan Comments Spoke with the patient at bedside and verified the face sheet. The patient states he is not current with any home health at this time. He would [facility] if needed at discharge. Called Marta with [facility] and they will evaluate and follow. The patient denies any discharge or DME needs at this time. Spouse at bedside.Spoke with the patient at bedside and explained Meds to Bed and he states understanding. Changed in EPIC. Discharge plan is home with spouse and she will provide the transportation at discharge. Will follow for IVAB's at discharge. GILMAR GarzaN, RN, CCP, CDP              Continued Care and Services - Admitted Since 5/6/2022     Home Medical Care     Service Provider Request Status Selected Services Address Phone Fax Patient Preferred     [facility]  Accepted N/A [address] [address] [phone] --              Expected Discharge Date and Time     Expected Discharge Date Expected Discharge Time    May 10, 2022          Demographic Summary     Row Name 05/06/22 1624       General Information    Admission Type inpatient    Arrived From emergency department    Referral Source admission list    Reason for Consult discharge planning    Preferred Language English       Contact Information    Permission Granted to Share Info With     Contact Information Obtained for                Functional Status     Row Name 05/06/22 1633       Functional Status    Functional Status Comments 4 stairs to enter into his home and his bedroom is on the second floor but he has for the past year slept in his recliner which is on the  main floor of his house    Row Name 05/06/22 9547       Functional Status    Usual Activity Tolerance fair    Current Activity Tolerance fair       Functional Status, IADL    Medications independent    Meal Preparation assistive person    Housekeeping completely dependent    Laundry assistive person    Shopping assistive person    IADL Comments IADL'S prior to admission       Mental Status    General Appearance WDL WDL       Mental Status Summary    Recent Changes in Mental Status/Cognitive Functioning no changes       Employment/    Employment Status employed full-time  [employer]    Shift Worked first shift    Current or Previous Occupation desk job    Employment/ Comments none               Psychosocial    No documentation.                Abuse/Neglect    No documentation.                Legal    No documentation.                Substance Abuse    No documentation.                Patient Forms    No documentation.                   Lina Welch RN

## 2022-05-06 NOTE — ED PROVIDER NOTES
I supervised care provided by the midlevel provider.   We have discussed this patient's history, physical exam, and treatment plan.  I have reviewed the note and personally saw and examined the patient and agree with the plan of care.   Patient reports on April April 30 on Saturday started noticing that his right leg was starting to get red.  The next day on May 1 he started taking Keflex because he was concerned that he was going to have an infection of his leg.  He has had history of previous infections and cellulitis in the past.  Frequently it is on the left leg.  This was involving his right leg.  The redness and swelling and pain persisted.  On Tuesday, May 3 he switched from Keflex to doxycycline.  The symptoms still progressed.  Had a fever of over 101 at home that was documented.  He was not getting better so decided to come here to the emergency department.  He denies any chest pain or shortness of breath.  History of similar episodes in the past with cellulitis to his legs.  Patient is super morbidly obese and has chronic significant edema.  Patient is on Coumadin and his INRs have been appropriate and he is not needed to adjust his Coumadin dose    GENERAL: not distressed.  Not septic or toxic appearing  HENT: nares patent  Head/neck/ face are symmetric without gross deformity or swelling  EYES: no scleral icterus  CV: regular rhythm, regular rate with intact distal pulses  RESPIRATORY: normal effort and no respiratory distress  ABDOMEN: soft and nontender.  Super morbidly obese.  MUSCULOSKELETAL: 3+ edema to bilateral lower extremities.  To his right lower extremity below the knee down to his toes there is erythema and warmth.  There is tenderness to palpation.  There are some areas of thickened skin.  There are some area of scaling and hyperpigmentation likely from chronic venous stasis changes.  He has tenderness to palpation.  I do not feel any obvious crepitance or fluctuance.  He has no coolness or  cyanosis.  Capillary refills intact.  NEURO: alert and appropriate, moves all extremities, follows commands  SKIN: warm, dry    Vital signs and nursing notes reviewed.    Plan this gentleman very likely has a cellulitis to his right lower extremity.  His INR here is 3.2.  We will get a check a Doppler of his leg.  I informed him he will need to be admitted we will start him on IV antibiotics.  Other lab work will be checked.  All questions answered.    We are currently under a pandemic from the COVID19 infection.  The patient presented to the emergency department by ambulance or personal vehicle. I followed the current protocols required by Infection Control at [facility] in my evaluation and treatment of the patient. The patient was wearing a face mask during my evaluation and throughout my encounter. During my whole encounter with this patient I used appropriate personal protective equipment.  This equipment consisted of eye protection, facemask, gown, and gloves.  I applied this equipment before entering the room.    ED Course as of 05/06/22 1607   Fri May 06, 2022   0742 Patient presents with right leg redness, swelling with known history of DVT, lymphedema.  Differential diagnoses include but not limited to cellulitis, DVT, arterial occlusion, venous insufficiency.  Plan for antibiotics, blood cultures, ultrasound, admission. [EE]   0807 INR(!): 3.22 [EE]   0821 Glucose(!): 127 [EE]   0821 Creatinine(!): 0.68 [EE]   1026 WBC: 8.98 [EE]   1026 Lactate: 1.1 [EE]   1028 INR(!): 3.22 [MM]   1028 WBC: 8.98 [MM]   1028 Sodium(!): 134 [MM]   1028 Glucose(!): 127 [MM]   1028 Procalcitonin: 0.19 [MM]   1028 Lactate: 1.1 [MM]   1031 I discussed ultrasound findings with Jim.  No acute DVT seen. [EE]   1127 I discussed patient with LEROY Cornelius.  He agrees to admit. [EE]      ED Course User Index  [EE] Eris Robbins PA  [MM] Alf Harrison MD Molinari, Mark, MD  05/06/22 1607

## 2022-05-06 NOTE — DISCHARGE PLACEMENT REQUEST
"Kameron Covarrubias (52 y.o. Male)             Date of Birth   1970    Social Security Number       Address   [address] [place] [address]    Home Phone   [phone]    MRN   [id number]       [facility]    Marital Status                               Admission Date   5/6/22    Admission Type   Emergency    Admitting Provider   Ruslan Mock MD    Attending Provider   Ruslan Mock MD    Department, Room/Bed   [address], P485/1       Discharge Date       Discharge Disposition       Discharge Destination                               Attending Provider: Ruslan Mock MD    Allergies: No Known Allergies    Isolation: None   Infection: None   Code Status: CPR   Advance Care Planning Activity    Ht: 188 cm (74\")   Wt: 249 kg (548 lb)    Admission Cmt: None   Principal Problem: Cellulitis of right lower extremity [L03.115]                 Active Insurance as of 5/6/2022     Primary Coverage     Payor Plan Insurance Group Employer/Plan Group    [employer] BLUE [employer]OSS [employer] [address] CROSS BLUE SHIELD PPO [id number]     Payor Plan Address Payor Plan Phone Number Payor Plan Fax Number Effective Dates    [address] [phone]  5/1/2019 - None Entered    [place] [address]       Subscriber Name Subscriber Birth Date Member ID       KAMERON COVARRUBIAS 1970 [id number]                 Emergency Contacts      (Rel.) Home Phone Work Phone Mobile Phone    JosefinaYaya lerma (Spouse) [phone] -- [phone]              "

## 2022-05-06 NOTE — ED TRIAGE NOTES
Patient to ER via car from home. Patient states that he has had right leg pain and redness/swelling since Saturday. Patient states that this started on Saturday with no known injury. Patient states that he had a fever when this started so he took keflex and tylenol. Patient has hx of cellulitis.   Patient wearing mask this RN in PPE  
Swallowed foreign body, initial encounter

## 2022-05-06 NOTE — PLAN OF CARE
Goal Outcome Evaluation:  Plan of Care Reviewed With: patient        Progress: no change  Outcome Evaluation: ER admit for RLE cellulitis. He states the LLE is usually the leg with cellulitis. IV abx started. Morbid obese. Up with assist x1. Reg diet.

## 2022-05-07 LAB
ANION GAP SERPL CALCULATED.3IONS-SCNC: 12.5 MMOL/L (ref 5–15)
BUN SERPL-MCNC: 7 MG/DL (ref 6–20)
BUN/CREAT SERPL: 13 (ref 7–25)
CALCIUM SPEC-SCNC: 8.7 MG/DL (ref 8.6–10.5)
CHLORIDE SERPL-SCNC: 104 MMOL/L (ref 98–107)
CO2 SERPL-SCNC: 23.5 MMOL/L (ref 22–29)
CREAT SERPL-MCNC: 0.54 MG/DL (ref 0.76–1.27)
EGFRCR SERPLBLD CKD-EPI 2021: 119.9 ML/MIN/1.73
GLUCOSE SERPL-MCNC: 123 MG/DL (ref 65–99)
HBA1C MFR BLD: 6.9 % (ref 4.8–5.6)
INR PPP: 3.42 (ref 0.9–1.1)
POTASSIUM SERPL-SCNC: 3.6 MMOL/L (ref 3.5–5.2)
PROTHROMBIN TIME: 34.8 SECONDS (ref 11.7–14.2)
SODIUM SERPL-SCNC: 140 MMOL/L (ref 136–145)
VANCOMYCIN TROUGH SERPL-MCNC: 8.4 MCG/ML (ref 5–20)

## 2022-05-07 PROCEDURE — 85610 PROTHROMBIN TIME: CPT | Performed by: HOSPITALIST

## 2022-05-07 PROCEDURE — 83036 HEMOGLOBIN GLYCOSYLATED A1C: CPT | Performed by: HOSPITALIST

## 2022-05-07 PROCEDURE — 80202 ASSAY OF VANCOMYCIN: CPT | Performed by: HOSPITALIST

## 2022-05-07 PROCEDURE — 36415 COLL VENOUS BLD VENIPUNCTURE: CPT | Performed by: HOSPITALIST

## 2022-05-07 PROCEDURE — 80048 BASIC METABOLIC PNL TOTAL CA: CPT | Performed by: HOSPITALIST

## 2022-05-07 PROCEDURE — 25010000002 VANCOMYCIN 10 G RECONSTITUTED SOLUTION: Performed by: HOSPITALIST

## 2022-05-07 PROCEDURE — 25010000002 PIPERACILLIN SOD-TAZOBACTAM PER 1 G: Performed by: HOSPITALIST

## 2022-05-07 PROCEDURE — 25010000002 VANCOMYCIN PER 500 MG: Performed by: HOSPITALIST

## 2022-05-07 RX ORDER — VANCOMYCIN HYDROCHLORIDE 1 G/200ML
1000 INJECTION, SOLUTION INTRAVENOUS EVERY 8 HOURS
Status: DISCONTINUED | OUTPATIENT
Start: 2022-05-07 | End: 2022-05-09

## 2022-05-07 RX ORDER — PETROLATUM 420 MG/G
1 OINTMENT TOPICAL EVERY 12 HOURS SCHEDULED
Status: DISCONTINUED | OUTPATIENT
Start: 2022-05-07 | End: 2022-05-12 | Stop reason: HOSPADM

## 2022-05-07 RX ADMIN — BUPROPION HYDROCHLORIDE 150 MG: 150 TABLET, EXTENDED RELEASE ORAL at 09:26

## 2022-05-07 RX ADMIN — VANCOMYCIN HYDROCHLORIDE 1500 MG: 10 INJECTION, POWDER, LYOPHILIZED, FOR SOLUTION INTRAVENOUS at 02:47

## 2022-05-07 RX ADMIN — TAZOBACTAM SODIUM AND PIPERACILLIN SODIUM 3.38 G: 375; 3 INJECTION, SOLUTION INTRAVENOUS at 23:33

## 2022-05-07 RX ADMIN — Medication 10 ML: at 09:26

## 2022-05-07 RX ADMIN — FAMOTIDINE 20 MG: 20 TABLET ORAL at 17:01

## 2022-05-07 RX ADMIN — TAZOBACTAM SODIUM AND PIPERACILLIN SODIUM 3.38 G: 375; 3 INJECTION, SOLUTION INTRAVENOUS at 16:07

## 2022-05-07 RX ADMIN — TOPIRAMATE 25 MG: 25 TABLET, FILM COATED ORAL at 20:46

## 2022-05-07 RX ADMIN — ACETAMINOPHEN 650 MG: 325 TABLET, FILM COATED ORAL at 23:37

## 2022-05-07 RX ADMIN — FAMOTIDINE 20 MG: 20 TABLET ORAL at 08:15

## 2022-05-07 RX ADMIN — Medication 10 ML: at 20:46

## 2022-05-07 RX ADMIN — VANCOMYCIN HYDROCHLORIDE 1000 MG: 1 INJECTION, SOLUTION INTRAVENOUS at 20:46

## 2022-05-07 RX ADMIN — PETROLATUM 1 APPLICATION: 420 OINTMENT TOPICAL at 20:47

## 2022-05-07 RX ADMIN — TAZOBACTAM SODIUM AND PIPERACILLIN SODIUM 3.38 G: 375; 3 INJECTION, SOLUTION INTRAVENOUS at 07:42

## 2022-05-07 RX ADMIN — ACETAMINOPHEN 650 MG: 325 TABLET, FILM COATED ORAL at 02:38

## 2022-05-07 RX ADMIN — TOPIRAMATE 25 MG: 25 TABLET, FILM COATED ORAL at 09:25

## 2022-05-07 RX ADMIN — PETROLATUM 1 APPLICATION: 420 OINTMENT TOPICAL at 15:37

## 2022-05-07 NOTE — PLAN OF CARE
Goal Outcome Evaluation:  Plan of Care Reviewed With: patient        Progress: no change  Outcome Evaluation: Per  order I covered pt.'s legs with patrolium jelly. Both legs re still very swollen however the left leg is improving slightly and is no longer as red or as hot in temp. Pt. stated that he was in some pain during application but did not want any pain medication. Pt. is able to ambulate to the bathroom and to the chair from his bed. He has been pleasant and cooperative all day and hopes to return home soon. Wife unable to visit today as they live by [facility] She will be in tomorrow. Vanc. was discontinued but Zosyn still given several times today.

## 2022-05-07 NOTE — PROGRESS NOTES
"[facility] Clinical Pharmacy Services: Vancomycin Monitoring Note    Kameron Mleendez is a 52 y.o. male who is on day 2/7 of pharmacy to dose vancomycin for SSTI.    Previous Vancomycin Dose:   1500 mg IV every  12  hours  Updated Cultures and Sensitivities: pending  Results from last 7 days   Lab Units 05/07/22  1606   VANCOMYCIN TR mcg/mL 8.40     Vitals/Labs  Ht: 188 cm (74\"); Wt: (!) 249 kg (548 lb)   Temp Readings from Last 1 Encounters:   05/07/22 97.1 °F (36.2 °C) (Oral)     Estimated Creatinine Clearance: 337.2 mL/min (A) (by C-G formula based on SCr of 0.54 mg/dL (L)).       Results from last 7 days   Lab Units 05/07/22  0616 05/06/22  0822 05/06/22  0743   CREATININE mg/dL 0.54*  --  0.68*   WBC 10*3/mm3  --  8.98  --      Assessment/Plan    WIll change dose to: 1000 mg IV every  8  hours; provides a predicted  mg/L.hr   Next Level Date and Time: Vanc Trough on 5/8 at 1745  We will continue to monitor patient changes and renal function     Thank you for involving pharmacy in this patient's care. Please contact pharmacy with any questions or concerns.       Adolfo Brian PharmD  Clinical Pharmacist          "

## 2022-05-07 NOTE — PLAN OF CARE
Goal Outcome Evaluation:  Plan of Care Reviewed With: patient        Progress: no change  Outcome Evaluation: VSS.  Pt. rested comfortably throughout the afternoon.  His wife was at bedside.  No concerns noted.  Will continue to monitor.

## 2022-05-07 NOTE — PROGRESS NOTES
"    DAILY PROGRESS NOTE  [facility]    Patient Identification:  Name: Kameron Melendez  Age: 52 y.o.  Sex: male  :  1970  MRN: [id number]         Primary Care Physician: Hansel Patel DO    Subjective:  Interval History: Had a rough night.  He does still feel pain in that right lower extremity but does feel some improvement.  Denies nausea vomiting diarrhea chest pain or troubles breathing though he does have a cough.  Denies fever or chills    Objective: Resting soundly upon entering room.  Easily awakened and conversational and pleasant.  Nontoxic in appearance.  No family at bedside and patient did not asked me to discuss his case with anyone additionally    Scheduled Meds:buPROPion XL, 150 mg, Oral, Daily  famotidine, 20 mg, Oral, BID AC  piperacillin-tazobactam, 3.375 g, Intravenous, Q8H  sodium chloride, 10 mL, Intravenous, Q12H  topiramate, 25 mg, Oral, BID      Continuous Infusions:Pharmacy to dose vancomycin,         Vital signs in last 24 hours:  Temp:  [97.1 °F (36.2 °C)-99.5 °F (37.5 °C)] 97.1 °F (36.2 °C)  Heart Rate:  [75-88] 75  Resp:  [18] 18  BP: (135-151)/(76-81) 141/81    Intake/Output:    Intake/Output Summary (Last 24 hours) at 2022 1153  Last data filed at 2022 1151  Gross per 24 hour   Intake 1780 ml   Output 1000 ml   Net 780 ml       Exam:  /81 (BP Location: Left arm, Patient Position: Lying)   Pulse 75   Temp 97.1 °F (36.2 °C) (Oral)   Resp 18   Ht 188 cm (74\")   Wt (!) 249 kg (548 lb)   SpO2 96%   BMI 70.36 kg/m²     General Appearance:    Alert, cooperative, AOx3                          Head:    Normocephalic, without obvious abnormality, atraumatic                           Eyes:    Conjunctivae/corneas clear, EOM's intact, both eyes                         Throat:   Oral mucosa pink and moist                         Lungs:    Clear to auscultation bilaterally, respirations unlabored                          Heart:    Regular rate and " rhythm, S1 and S2 normal                  Abdomen:     Soft, nontender, bowel sounds active                 Extremities: Lymphedema bilaterally with right lower extremity cellulitis improving with about 5 to 6 inches of regression versus outline tracing.  Decreased warmth.  NVM intact.  Dry skin noted                        Pulses:   Pulses palpable in lower extremities                  Neurologic:   CNII-XII intact     Data Review:  Labs in chart were reviewed.    Assessment:  Active Hospital Problems    Diagnosis  POA   • **Cellulitis of right lower extremity [L03.115]  Yes   • Long term (current) use of anticoagulants [Z79.01]  Not Applicable   • Paroxysmal atrial fibrillation (HCC) [I48.0]  Yes   • Heterozygous factor V Leiden mutation ([facility]) [D68.51]  Yes   • Dyslipidemia [E78.5]  Yes   • Lymphedema of both lower extremities [I89.0]  Yes   • Pulmonary hypertension ([facility]) [I27.20]  Yes   • History of bilateral pulmonary embolism (CMS/HCC) [I26.99]  Yes   • Morbid obesity with body mass index of 60.0-69.9 in adult ([facility]) [E66.01, Z68.44]  Not Applicable   • ARNOLDO (obstructive sleep apnea) [G47.33]  Yes      Resolved Hospital Problems   No resolved problems to display.       Plan:    Recurrent cellulitis to lower extremity secondary to chronic lymphedema and resulting morbidly obese legs              -Vancomycin and Zosyn with excellent improvement over the last 24 hours   -A1c 6.9 and patient becoming borderline              -Outline tracing of erythema illustrates regression              -Needs at least 3 days of IV antibiotics then patient will be switched to p.o. antibiotics at discharge for remainder of treatment              -No sepsis present on admission     Supratherapeutic INR - persistent/hold Coumadin and track daily INR.  No reported bleeding or complications     No additional prophylaxis warranted given INR    Noncompliant with ARNOLDO/CPAP -patient reports cough -likely element of atelectasis and counseled  to increase activity and will add I-S.  If not better by tomorrow we will formally evaluate with chest x-ray.  Current O2 saturations 96% on room air     Pepcid for GI prophylaxis       Ruslan Mock MD  5/7/2022  11:53 EDT

## 2022-05-08 LAB
ANION GAP SERPL CALCULATED.3IONS-SCNC: 9.7 MMOL/L (ref 5–15)
BUN SERPL-MCNC: 7 MG/DL (ref 6–20)
BUN/CREAT SERPL: 13.7 (ref 7–25)
CALCIUM SPEC-SCNC: 8.7 MG/DL (ref 8.6–10.5)
CHLORIDE SERPL-SCNC: 100 MMOL/L (ref 98–107)
CO2 SERPL-SCNC: 24.3 MMOL/L (ref 22–29)
CREAT SERPL-MCNC: 0.51 MG/DL (ref 0.76–1.27)
EGFRCR SERPLBLD CKD-EPI 2021: 122 ML/MIN/1.73
GLUCOSE SERPL-MCNC: 144 MG/DL (ref 65–99)
INR PPP: 2.6 (ref 0.9–1.1)
POTASSIUM SERPL-SCNC: 3.6 MMOL/L (ref 3.5–5.2)
PROTHROMBIN TIME: 28 SECONDS (ref 11.7–14.2)
SODIUM SERPL-SCNC: 134 MMOL/L (ref 136–145)
VANCOMYCIN TROUGH SERPL-MCNC: 11.3 MCG/ML (ref 5–20)

## 2022-05-08 PROCEDURE — 25010000002 VANCOMYCIN PER 500 MG: Performed by: HOSPITALIST

## 2022-05-08 PROCEDURE — 80048 BASIC METABOLIC PNL TOTAL CA: CPT | Performed by: HOSPITALIST

## 2022-05-08 PROCEDURE — 80202 ASSAY OF VANCOMYCIN: CPT | Performed by: HOSPITALIST

## 2022-05-08 PROCEDURE — 25010000002 PIPERACILLIN SOD-TAZOBACTAM PER 1 G: Performed by: HOSPITALIST

## 2022-05-08 PROCEDURE — 85610 PROTHROMBIN TIME: CPT | Performed by: HOSPITALIST

## 2022-05-08 RX ORDER — WARFARIN SODIUM 7.5 MG/1
7.5 TABLET ORAL
Status: DISCONTINUED | OUTPATIENT
Start: 2022-05-08 | End: 2022-05-08

## 2022-05-08 RX ORDER — POTASSIUM CHLORIDE 750 MG/1
40 TABLET, FILM COATED, EXTENDED RELEASE ORAL EVERY 4 HOURS
Status: COMPLETED | OUTPATIENT
Start: 2022-05-08 | End: 2022-05-08

## 2022-05-08 RX ORDER — WARFARIN SODIUM 10 MG/1
10 TABLET ORAL
Status: COMPLETED | OUTPATIENT
Start: 2022-05-08 | End: 2022-05-08

## 2022-05-08 RX ORDER — HYDROCODONE BITARTRATE AND ACETAMINOPHEN 7.5; 325 MG/1; MG/1
1 TABLET ORAL EVERY 6 HOURS PRN
Status: DISCONTINUED | OUTPATIENT
Start: 2022-05-08 | End: 2022-05-12 | Stop reason: HOSPADM

## 2022-05-08 RX ORDER — FUROSEMIDE 40 MG/1
40 TABLET ORAL DAILY
Status: DISCONTINUED | OUTPATIENT
Start: 2022-05-08 | End: 2022-05-12 | Stop reason: HOSPADM

## 2022-05-08 RX ADMIN — WARFARIN 10 MG: 10 TABLET ORAL at 17:55

## 2022-05-08 RX ADMIN — POTASSIUM CHLORIDE 40 MEQ: 750 TABLET, EXTENDED RELEASE ORAL at 17:55

## 2022-05-08 RX ADMIN — PETROLATUM 1 APPLICATION: 420 OINTMENT TOPICAL at 09:45

## 2022-05-08 RX ADMIN — FUROSEMIDE 40 MG: 40 TABLET ORAL at 13:51

## 2022-05-08 RX ADMIN — TAZOBACTAM SODIUM AND PIPERACILLIN SODIUM 3.38 G: 375; 3 INJECTION, SOLUTION INTRAVENOUS at 08:37

## 2022-05-08 RX ADMIN — ACETAMINOPHEN 650 MG: 325 TABLET, FILM COATED ORAL at 15:21

## 2022-05-08 RX ADMIN — TOPIRAMATE 25 MG: 25 TABLET, FILM COATED ORAL at 09:45

## 2022-05-08 RX ADMIN — TOPIRAMATE 25 MG: 25 TABLET, FILM COATED ORAL at 21:39

## 2022-05-08 RX ADMIN — VANCOMYCIN HYDROCHLORIDE 1000 MG: 1 INJECTION, SOLUTION INTRAVENOUS at 04:24

## 2022-05-08 RX ADMIN — VANCOMYCIN HYDROCHLORIDE 1000 MG: 1 INJECTION, SOLUTION INTRAVENOUS at 09:49

## 2022-05-08 RX ADMIN — ACETAMINOPHEN 650 MG: 325 TABLET, FILM COATED ORAL at 22:14

## 2022-05-08 RX ADMIN — TAZOBACTAM SODIUM AND PIPERACILLIN SODIUM 3.38 G: 375; 3 INJECTION, SOLUTION INTRAVENOUS at 15:22

## 2022-05-08 RX ADMIN — VANCOMYCIN HYDROCHLORIDE 1000 MG: 1 INJECTION, SOLUTION INTRAVENOUS at 19:24

## 2022-05-08 RX ADMIN — BUPROPION HYDROCHLORIDE 150 MG: 150 TABLET, EXTENDED RELEASE ORAL at 09:45

## 2022-05-08 RX ADMIN — Medication 10 ML: at 09:45

## 2022-05-08 RX ADMIN — FAMOTIDINE 20 MG: 20 TABLET ORAL at 08:15

## 2022-05-08 RX ADMIN — PETROLATUM 1 APPLICATION: 420 OINTMENT TOPICAL at 21:39

## 2022-05-08 RX ADMIN — Medication 10 ML: at 21:40

## 2022-05-08 RX ADMIN — POTASSIUM CHLORIDE 40 MEQ: 750 TABLET, EXTENDED RELEASE ORAL at 13:50

## 2022-05-08 RX ADMIN — FAMOTIDINE 20 MG: 20 TABLET ORAL at 16:39

## 2022-05-08 NOTE — PROGRESS NOTES
"    DAILY PROGRESS NOTE  [facility]    Patient Identification:  Name: Kameorn Melendez  Age: 52 y.o.  Sex: male  :  1970  MRN: [id number]         Primary Care Physician: Hansel Patel DO    Subjective:  Interval History: Not voicing any new complaints per se today.  No chest pain no troubles breathing.  He states he has some nausea at times but he thinks is more so secondary to the food here at the hospital.  Denies any new abdominal pain diarrhea or any issues with confusion    Objective: Very pleasant and conversational.  Nontoxic.  No family at bedside.    Scheduled Meds:buPROPion XL, 150 mg, Oral, Daily  famotidine, 20 mg, Oral, BID AC  furosemide, 40 mg, Oral, Daily  Petrolatum, 1 application, Topical, Q12H  piperacillin-tazobactam, 3.375 g, Intravenous, Q8H  potassium chloride, 40 mEq, Oral, Q4H  sodium chloride, 10 mL, Intravenous, Q12H  topiramate, 25 mg, Oral, BID  vancomycin, 1,000 mg, Intravenous, Q8H  warfarin, 10 mg, Oral, Once      Continuous Infusions:Pharmacy to dose vancomycin,         Vital signs in last 24 hours:  Temp:  [97.9 °F (36.6 °C)-98 °F (36.7 °C)] 97.9 °F (36.6 °C)  Heart Rate:  [77-79] 77  Resp:  [16] 16  BP: (145)/(80-84) 145/84    Intake/Output:    Intake/Output Summary (Last 24 hours) at 2022 1202  Last data filed at 2022 0600  Gross per 24 hour   Intake 780 ml   Output 2575 ml   Net -1795 ml       Exam:  /84 (BP Location: Right arm, Patient Position: Sitting)   Pulse 77   Temp 97.9 °F (36.6 °C) (Oral)   Resp 16   Ht 188 cm (74\")   Wt (!) 249 kg (548 lb)   SpO2 93%   BMI 70.36 kg/m²     General Appearance:    Alert, cooperative, no distress, AAOx3                         Throat:   Oral mucosa pink and moist                         Lungs:    Clear to auscultation bilaterally, respirations unlabored                 Chest Wall:    No tenderness or deformity                          Heart:    Regular rate and rhythm, S1 and S2 normal         "         Abdomen:     Soft, nontender, bowel sounds active, MO                 Extremities: Moving all, advanced lymphedema to bilateral lower extremities with legs starting to show further accumulation of fluid, right lower extremity cellulitis is continuing to show clinical improvement.  He has decreased warmth with decrease of erythema and no signs of abscess.  The most remaining warmth and erythema remains in the foot which is not unexpected as antibiotics have a difficult time permeating the extremities and legs with a severe case of lymphedema.                                    Neurologic:   CNII-XII intact, moving all     Data Review:  Labs in chart were reviewed.    Assessment:  Active Hospital Problems    Diagnosis  POA   • **Cellulitis of right lower extremity [L03.115]  Yes   • Long term (current) use of anticoagulants [Z79.01]  Not Applicable   • Paroxysmal atrial fibrillation (HCC) [I48.0]  Yes   • Heterozygous factor V Leiden mutation ([facility]) [D68.51]  Yes   • Dyslipidemia [E78.5]  Yes   • Lymphedema of both lower extremities [I89.0]  Yes   • Pulmonary hypertension ([facility]) [I27.20]  Yes   • History of bilateral pulmonary embolism (CMS/HCC) [I26.99]  Yes   • Morbid obesity with body mass index of 60.0-69.9 in adult ([facility]) [E66.01, Z68.44]  Not Applicable   • ARNOLDO (obstructive sleep apnea) [G47.33]  Yes      Resolved Hospital Problems   No resolved problems to display.       Plan:    Recurrent cellulitis to lower extremity secondary to chronic lymphedema and resulting morbidly obese legs              -Vancomycin and Zosyn with excellent improvement over the last 24 hours              -A1c 6.9 and patient becoming borderline              -Outline tracing of erythema illustrates regression              -Needs at least another day or so of IV antibiotics before switching to p.o. antibiotics at discharge for remainder of treatment              -No sepsis present on admission     Supratherapeutic INR - INR is  finally drifted down to 2.6 and I will restart Coumadin at 10 mg x 1 and dose daily pending INR trends     Noncompliant with ARNOLDO/CPAP -patient reports cough -likely element of atelectasis and counseled to increase activity and will add I-S.  If not better by tomorrow we will formally evaluate with chest x-ray.  Current O2 saturations 96% on room air     Pepcid for GI prophylaxis    Ruslan Mock MD  5/8/2022  12:02 EDT

## 2022-05-08 NOTE — PROGRESS NOTES
"[facility] Clinical Pharmacy Services: Vancomycin Monitoring Note    Kameron Melendez is a 52 y.o. male who is on day 3/7 of pharmacy to dose vancomycin for SSTI.    Previous Vancomycin Dose: 1000 mg IV every 8 hours  Updated Cultures and Sensitivities: 5/6 BCx: NGTD  Results from last 7 days   Lab Units 05/08/22  1747 05/07/22  1606   VANCOMYCIN TR mcg/mL 11.30 8.40     Vitals/Labs  Ht: 188 cm (74\"); Wt: (!) 249 kg (548 lb)   Temp Readings from Last 1 Encounters:   05/08/22 98.3 °F (36.8 °C) (Oral)     Estimated Creatinine Clearance: 357.1 mL/min (A) (by C-G formula based on SCr of 0.51 mg/dL (L)).     Results from last 7 days   Lab Units 05/08/22  0535 05/07/22  0616 05/06/22  0822 05/06/22  0743   CREATININE mg/dL 0.51* 0.54*  --  0.68*   WBC 10*3/mm3  --   --  8.98  --      Assessment/Plan    Current Vancomycin Dose: 1000 mg IV every 8 hours; provides a predicted  mg/L.hr  (borderline low but since pt has improved will continue current dose)  Next Level Date and Time: to be ordered in several days  We will continue to monitor patient changes and renal function     Thank you for involving pharmacy in this patient's care. Please contact pharmacy with any questions or concerns.       Adolfo Brian PharmD  Clinical Pharmacist      "

## 2022-05-08 NOTE — PLAN OF CARE
Goal Outcome Evaluation:  Plan of Care Reviewed With: patient           Outcome Evaluation: Up to bathroom and chair today.  Given Lasix with good urine output.  RLE remains painful, red, swollen and taking Tylenol for it (he is aware he has stronger pain med if needed).   Receiving antibiotics.

## 2022-05-09 LAB
ANION GAP SERPL CALCULATED.3IONS-SCNC: 11.3 MMOL/L (ref 5–15)
BUN SERPL-MCNC: 6 MG/DL (ref 6–20)
BUN/CREAT SERPL: 9 (ref 7–25)
CALCIUM SPEC-SCNC: 8.8 MG/DL (ref 8.6–10.5)
CHLORIDE SERPL-SCNC: 103 MMOL/L (ref 98–107)
CO2 SERPL-SCNC: 23.7 MMOL/L (ref 22–29)
CREAT SERPL-MCNC: 0.67 MG/DL (ref 0.76–1.27)
EGFRCR SERPLBLD CKD-EPI 2021: 112.3 ML/MIN/1.73
GLUCOSE SERPL-MCNC: 120 MG/DL (ref 65–99)
INR PPP: 2.13 (ref 0.9–1.1)
POTASSIUM SERPL-SCNC: 4.2 MMOL/L (ref 3.5–5.2)
PROTHROMBIN TIME: 23.9 SECONDS (ref 11.7–14.2)
SODIUM SERPL-SCNC: 138 MMOL/L (ref 136–145)

## 2022-05-09 PROCEDURE — 80048 BASIC METABOLIC PNL TOTAL CA: CPT | Performed by: HOSPITALIST

## 2022-05-09 PROCEDURE — 25010000002 VANCOMYCIN 10 G RECONSTITUTED SOLUTION: Performed by: INTERNAL MEDICINE

## 2022-05-09 PROCEDURE — 85610 PROTHROMBIN TIME: CPT | Performed by: HOSPITALIST

## 2022-05-09 PROCEDURE — 25010000002 PIPERACILLIN SOD-TAZOBACTAM PER 1 G: Performed by: HOSPITALIST

## 2022-05-09 PROCEDURE — 25010000002 VANCOMYCIN PER 500 MG: Performed by: HOSPITALIST

## 2022-05-09 RX ORDER — WARFARIN SODIUM 7.5 MG/1
15 TABLET ORAL
Status: DISCONTINUED | OUTPATIENT
Start: 2022-05-09 | End: 2022-05-12 | Stop reason: HOSPADM

## 2022-05-09 RX ORDER — WARFARIN SODIUM 10 MG/1
10 TABLET ORAL
Status: DISCONTINUED | OUTPATIENT
Start: 2022-05-10 | End: 2022-05-12 | Stop reason: HOSPADM

## 2022-05-09 RX ADMIN — PETROLATUM 1 APPLICATION: 420 OINTMENT TOPICAL at 20:44

## 2022-05-09 RX ADMIN — PETROLATUM 1 APPLICATION: 420 OINTMENT TOPICAL at 08:03

## 2022-05-09 RX ADMIN — TAZOBACTAM SODIUM AND PIPERACILLIN SODIUM 3.38 G: 375; 3 INJECTION, SOLUTION INTRAVENOUS at 07:54

## 2022-05-09 RX ADMIN — TAZOBACTAM SODIUM AND PIPERACILLIN SODIUM 3.38 G: 375; 3 INJECTION, SOLUTION INTRAVENOUS at 16:13

## 2022-05-09 RX ADMIN — FAMOTIDINE 20 MG: 20 TABLET ORAL at 06:48

## 2022-05-09 RX ADMIN — Medication 10 ML: at 20:44

## 2022-05-09 RX ADMIN — VANCOMYCIN HYDROCHLORIDE 1000 MG: 1 INJECTION, SOLUTION INTRAVENOUS at 02:39

## 2022-05-09 RX ADMIN — VANCOMYCIN HYDROCHLORIDE 1000 MG: 1 INJECTION, SOLUTION INTRAVENOUS at 11:31

## 2022-05-09 RX ADMIN — FUROSEMIDE 40 MG: 40 TABLET ORAL at 08:02

## 2022-05-09 RX ADMIN — TOPIRAMATE 25 MG: 25 TABLET, FILM COATED ORAL at 20:44

## 2022-05-09 RX ADMIN — ACETAMINOPHEN 650 MG: 325 TABLET, FILM COATED ORAL at 20:43

## 2022-05-09 RX ADMIN — BUPROPION HYDROCHLORIDE 150 MG: 150 TABLET, EXTENDED RELEASE ORAL at 08:02

## 2022-05-09 RX ADMIN — TAZOBACTAM SODIUM AND PIPERACILLIN SODIUM 3.38 G: 375; 3 INJECTION, SOLUTION INTRAVENOUS at 00:56

## 2022-05-09 RX ADMIN — TOPIRAMATE 25 MG: 25 TABLET, FILM COATED ORAL at 08:02

## 2022-05-09 RX ADMIN — WARFARIN 15 MG: 7.5 TABLET ORAL at 20:43

## 2022-05-09 RX ADMIN — VANCOMYCIN HYDROCHLORIDE 1250 MG: 10 INJECTION, POWDER, LYOPHILIZED, FOR SOLUTION INTRAVENOUS at 18:23

## 2022-05-09 RX ADMIN — Medication 10 ML: at 08:03

## 2022-05-09 RX ADMIN — FAMOTIDINE 20 MG: 20 TABLET ORAL at 18:23

## 2022-05-09 NOTE — PAYOR COMM NOTE
"INITIAL CLINICAL FOR REVIEW  REF #[id number]  ID #[id number]  P:  [phone]  F:  [phone]      Kameron Covarrubias (52 y.o. Male)             Date of Birth   1970    Social Security Number       Address   [address] [place] [id number]    Home Phone   [phone]    MRN   [id number]       Orthodoxy   [employer]eh[employer]    Marital Status                               Admission Date   22    Admission Type   Emergency    Admitting Provider   Ruslan Mock MD    Attending Provider   Larry Wise MD    Department, Room/Bed   [facility] [address], P485/1       Discharge Date       Discharge Disposition       Discharge Destination                               Attending Provider: Larry Wise MD    Allergies: No Known Allergies    Isolation: None   Infection: None   Code Status: CPR   Advance Care Planning Activity    Ht: 188 cm (74\")   Wt: 249 kg (548 lb)    Admission Cmt: None   Principal Problem: Cellulitis of right lower extremity [L03.115]                 Active Insurance as of 2022     Primary Coverage     Payor Plan Insurance Group Employer/Plan Group    [employer] BLUE CROSS AN[employer] BLUE CROSS BLUE SHIELD PPO [id number]     Payor Plan Address Payor Plan Phone Number Payor Plan Fax Number Effective Dates    [address] [phone]  2019 - None Entered    [place] [address]       Subscriber Name Subscriber Birth Date Member ID       KAMERON COVARRUBIAS 1970 [id number]                 Emergency Contacts      (Rel.) Home Phone Work Phone Mobile Phone    Yaya Covarrubias (Spouse) [phone] -- [phone]               History & Physical      Ruslan Mock MD at 22 1202          HISTORY AND PHYSICAL   [facility]        Date of Admission: 2022  Patient Identification:  Name: Kameron Covarrubias  Age: 52 y.o.  Sex: male  :  1970  MRN: [id number]                     Primary Care Physician: Jorge" Hansel URIOSTEGUI DO    Chief Complaint: Fever chills and right lower extremity redness    History of Present Illness:    is a wonderfully pleasant 52-year-old male who still is actively working and ultimately Saturday or Sunday night he noticed temperatures of 101 with chills and he started noticed right lower extremity redness and warmth.  He has longstanding issues with lymphedema secondary to his morbid obesity though he claims not to be a diabetic and he Amaro is developing.  He had Keflex left over and he took that for a day or 2 and ultimately once that ran out he switched to doxycycline and took some additional doses of that.  Ultimately while I do not endorse self prescribing he did the right thing but I got a feeling that the cellulitis was likely more pronounced and given his body habitus and severe lymphedema those oral antibiotics perhaps were not penetrating all these tissues.  He stated he continued to feel some subjective fever and chills.  He denies any nausea vomiting or diarrhea.  Denies any chest pain shortness of breath or cough.  In the ER is found to have cellulitis of the right lower extremity and he does have a past history of coagulopathy of which she is anticoagulated on Coumadin with a supratherapeutic INR 3.2.  Doppler of the lower extremities are negative in the ER.  Patient was given vancomycin and cefepime and [facility] was called to further admit and I was happy to oblige.    Past Medical History:  Past Medical History:   Diagnosis Date   • DVT (deep venous thrombosis) (HCC)     RLE   • Factor V Leiden (HCC)    • Hypertension    • Kidney stone    • Lymphedema    • Lymphedema of left lower extremity    • Obesity    • ARNOLDO (obstructive sleep apnea)    • Pulmonary embolism (HCC)     bilateral   • Pulmonary hypertension (HCC)    • Right ventricular enlargement      Past Surgical History:  Past Surgical History:   Procedure Laterality Date   • CARDIAC CATHETERIZATION Bilateral 7/18/2017     Procedure: Pulmonary angiography- Inari ;  Surgeon: Cedric Coulter MD;  Location:  [facility];  Service:    • CARDIAC CATHETERIZATION N/A 2017    Procedure: Right Heart Cath;  Surgeon: Cedric Coulter MD;  Location:  [facility];  Service:    • THROMBECTOMY        Home Meds:  (Not in a hospital admission)      Allergies:  No Known Allergies  Immunizations:  Immunization History   Administered Date(s) Administered   • COVID-19 (PFIZER) PURPLE CAP 2021, 2021   • Flu Vaccine Intradermal Quad 18-64YR 2019, 10/14/2020   • Hepatitis A 2018   • flucelvax quad pfs =>4 YRS 2019     Social History:   Social History     Social History Narrative   • Not on file     Social History     Socioeconomic History   • Marital status:    Tobacco Use   • Smoking status: Light Tobacco Smoker     Types: Cigars, Pipe     Start date: 2006   • Smokeless tobacco: Never Used   • Tobacco comment: occasional - < 1 a month   Substance and Sexual Activity   • Alcohol use: No   • Drug use: No   • Sexual activity: Defer       Family History:  Family History   Problem Relation Age of Onset   • Heart disease Mother    • Heart failure Mother    • Bradycardia Mother    • No Known Problems Father    • No Known Problems Maternal Grandmother    • No Known Problems Maternal Grandfather    • No Known Problems Paternal Grandmother    • No Known Problems Paternal Grandfather         Review of Systems  See history of present illness and past medical history.  Patient admits to fever chills night sweats.  Denies nausea vomiting diarrhea abdominal pain.  Denies any chest pain palpitation cough shortness of breath.  Denies any focal changes to vision smell taste or sound.  Denies syncope loss of function.  Admits to redness and warmth to right lower extremity as well as fever and chills.  Remainder of ROS is negative.    Objective:  T Max 24 hrs: Temp (24hrs), Av.1 °F (36.7 °C), Min:98.1 °F  "(36.7 °C), Max:98.1 °F (36.7 °C)    Vitals Ranges:   Temp:  [98.1 °F (36.7 °C)] 98.1 °F (36.7 °C)  Heart Rate:  [84] 84  Resp:  [18] 18  BP: (127)/(67) 127/67      Exam:  /67 (BP Location: Right arm, Patient Position: Lying)   Pulse 84   Temp 98.1 °F (36.7 °C)   Resp 18   Ht 188 cm (74\")   Wt (!) 249 kg (548 lb)   SpO2 97%   BMI 70.36 kg/m²     General Appearance:    Alert, cooperative, very casual and conversational, nontoxic in appearance, excellent historian, no family at bedside, morbidly obese   Head:    Normocephalic, without obvious abnormality, atraumatic   Eyes:    PERRL, conjunctivae/corneas clear, EOM's intact, both eyes   Ears:    Normal external ear canals, both ears   Nose:   Nares normal, septum midline, mucosa normal, no drainage    or sinus tenderness   Throat:   Lips, mucosa, and tongue normal   Neck:   Supple, no rigidity or JVD       Lungs:     Clear to auscultation bilaterally, respirations unlabored   Chest Wall:    No tenderness or deformity    Heart:    Regular rate and rhythm, S1 and S2 normal, no murmur   Abdomen:     Soft, nontender, bowel sounds active all four quadrants   Extremities:  Significant lower extremity lymphedema bilaterally with cellulitis to the right lower extremity involving the foot stretching up to just below the knee and I have outlined that with a marker.  I cannot appreciate any type of abscess or anything that needs surgical attention at this time.  Foot hygiene is poor/cracking of skin noted   Pulses:   2+ and symmetric lower extremities           Neurologic:   CNII-XII intact, moving all without focal deficit      .    Data Review:  Labs in chart were reviewed.             Imaging Results (All)     None            Assessment:  Active Hospital Problems    Diagnosis  POA   • **Cellulitis of right lower extremity [L03.115]  Yes   • Long term (current) use of anticoagulants [Z79.01]  Not Applicable   • Paroxysmal atrial fibrillation (HCC) [I48.0]  Yes   • " Heterozygous factor V Leiden mutation (HCC) [D68.51]  Yes   • Dyslipidemia [E78.5]  Yes   • Lymphedema of both lower extremities [I89.0]  Yes   • Pulmonary hypertension (HCC) [I27.20]  Yes   • History of bilateral pulmonary embolism (CMS/HCC) [I26.99]  Yes   • Morbid obesity with body mass index of 60.0-69.9 in adult (HCC) [E66.01, Z68.44]  Not Applicable   • ARNOLDO (obstructive sleep apnea) [G47.33]  Yes      Resolved Hospital Problems   No resolved problems to display.       Plan:    Recurrent cellulitis to lower extremity secondary to chronic lymphedema and resulting morbidly obese legs   -Vancomycin and Zosyn for now -denies any past history of diabetes despite morbid obesity with a BMI greater than 70 and will add a.m. A1c to labs   -Outline tracing of erythema and anticipate regression   -Once clinical improvement noted enough, patient will be switched to p.o. antibiotics at discharge for remainder of treatment   -No sepsis present on admission    Supratherapeutic INR -3.2 today and I will hold Coumadin replacement and recheck this in a.m. and I will dose accordingly    Home med rec states she utilizes diuretics as needed    No additional prophylaxis warranted given INR    Pepcid for GI prophylaxis    Ruslan Mock MD  5/6/2022  12:03 EDT    Electronically signed by Ruslan Mock MD at 05/06/22 1209          Emergency Department Notes      Mariajose Suarez RN at 05/06/22 0709        Patient to ER via car from home. Patient states that he has had right leg pain and redness/swelling since Saturday. Patient states that this started on Saturday with no known injury. Patient states that he had a fever when this started so he took keflex and tylenol. Patient has hx of cellulitis.   Patient wearing mask this RN in PPE    Electronically signed by Mariajose Suarez RN at 05/06/22 0711     Eris Robbins PA at 05/06/22 0743     Attestation signed by Alf Harrison MD at 05/06/22 1608        SHARED APC FACE TO  FACE: This visit was performed by both the physician and an APC. I personally evaluated and examined the patient. I performed the entirety of the physical exam and I documented this exam in this attestation or in accompanying documentation.    Alf Harrison MD 5/6/2022 16:08 EDT                         EMERGENCY DEPARTMENT ENCOUNTER    Room Number:  P485/1  Date of encounter:  5/6/2022  PCP: Hansel Patel DO  Historian: Patient      I used full protective equipment while examining this patient.  This includes face mask, gloves and protective eyewear.  I washed my hands before entering the room and immediately upon leaving the room      HPI:  Chief Complaint: Leg swelling, pain  A complete HPI/ROS/PMH/PSH/SH/FH are unobtainable due to: Nothing    Context: Kameron Melendez is a 52 y.o. male who presents to the ED c/o 5-day history of right leg redness, swelling, pain.  Patient has a known history of lymphedema.  He has had recurrent infections his lower legs in the past.  Patient took Keflex and attempts to holloway off hospitalization however symptoms have progressed.  He has had subjective fever and chills.  He does have history of DVT and states he has been compliant with his Coumadin.    Review of Medical Records  I reviewed admission from 11/1/2021.  Patient admitted for cellulitis of left lower extremity.    PAST MEDICAL HISTORY  Active Ambulatory Problems     Diagnosis Date Noted   • History of bilateral pulmonary embolism (CMS/[facility]) 07/17/2017   • Pulmonary hypertension ([facility]) 08/29/2017   • Lymphedema of both lower extremities 08/29/2017   • Obesity, morbid, BMI 50 or higher ([facility]) 08/29/2017   • Dyslipidemia 06/10/2018   • Hyperuricemia 06/10/2018   • Hypogonadal obesity 03/11/2016   • Knee arthropathy 12/11/2015   • Morbid obesity with body mass index of 60.0-69.9 in adult ([facility]) 12/11/2015   • ARNOLDO (obstructive sleep apnea) 12/11/2015   • Severe edema 12/11/2015   • History of pulmonary embolism 01/23/2019   •  Other acute pulmonary embolism without acute cor pulmonale ([facility]) 02/01/2019   • Intractable back pain 11/25/2019   • Heterozygous factor V Leiden mutation ([facility]) 11/25/2019   • Cellulitis of left lower extremity 11/02/2021   • Hypokalemia 11/02/2021   • CAROL (acute kidney injury) ([facility]) 11/02/2021   • Sepsis with cutaneous manifestations ([facility]) 11/02/2021   • Hyperglycemia 11/02/2021   • Paroxysmal atrial fibrillation ([facility]) 11/02/2021   • Athscl heart 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 11/16/2021   • Long term (current) use of anticoagulants 11/16/2021   • Lymphedema, not elsewhere classified 11/16/2021   • Nicotine dependence, other tobacco product, uncomplicated 11/16/2021   • Personal history of other venous thrombosis and embolism 11/16/2021   • Typical atrial flutter ([facility]) 11/16/2021   • Venous insufficiency (chronic) (peripheral) 11/16/2021     Resolved Ambulatory Problems     Diagnosis Date Noted   • Right ventricular enlargement 08/29/2017     Past Medical History:   Diagnosis Date   • DVT (deep venous thrombosis) ([facility])    • Factor V Leiden ([facility])    • Hypertension    • Kidney stone    • Lymphedema    • Lymphedema of left lower extremity    • Obesity    • Pulmonary embolism ([facility])          PAST SURGICAL HISTORY  Past Surgical History:   Procedure Laterality Date   • CARDIAC CATHETERIZATION Bilateral 7/18/2017    Procedure: Pulmonary angiography- Inari ;  Surgeon: Cedric Coulter MD;  Location: [facility] INVASIVE LOCATION;  Service:    • CARDIAC CATHETERIZATION N/A 7/18/2017    Procedure: Right Heart Cath;  Surgeon: Cedric Coulter MD;  Location: [facility] INVASIVE LOCATION;  Service:    • THROMBECTOMY           FAMILY HISTORY  Family History   Problem Relation Age of Onset   • Heart disease Mother    • Heart failure Mother    • Bradycardia Mother    • No Known Problems Father    • No Known Problems Maternal Grandmother    • No Known Problems Maternal Grandfather    • No Known Problems Paternal Grandmother     • No Known Problems Paternal Grandfather          SOCIAL HISTORY  Social History     Socioeconomic History   • Marital status:    Tobacco Use   • Smoking status: Light Tobacco Smoker     Types: Cigars, Pipe     Start date: 1/1/2006   • Smokeless tobacco: Never Used   • Tobacco comment: occasional - < 1 a month   Substance and Sexual Activity   • Alcohol use: No   • Drug use: No   • Sexual activity: Defer         ALLERGIES  Patient has no known allergies.        REVIEW OF SYSTEMS  All systems reviewed and negative except for those discussed in HPI.       PHYSICAL EXAM    I have reviewed the triage vital signs and nursing notes.    ED Triage Vitals   Temp Heart Rate Resp BP SpO2   05/06/22 0716 05/06/22 0711 05/06/22 0711 05/06/22 0723 05/06/22 0711   98.1 °F (36.7 °C) 84 18 127/67 97 %      Temp src Heart Rate Source Patient Position BP Location FiO2 (%)   -- -- 05/06/22 0723 05/06/22 0723 --     Lying Right arm        Physical Exam  GENERAL: Alert, oriented, morbidly obese, not distressed  HENT: head atraumatic, no nuchal rigidity  EYES: no scleral icterus, EOMI  CV: regular rhythm, regular rate, no murmur  RESPIRATORY: normal effort, CTA  ABDOMEN: soft, nontender  MUSCULOSKELETAL: Severe lymphedema of bilateral lower extremities.  Moderate erythema of the right lower extremity extending from the foot up to the knee.  Area is indurated, tender.  Good pulses distally.  NEURO: alert, moves all extremities, follows commands  SKIN: warm, dry        LAB RESULTS  Recent Results (from the past 24 hour(s))   Comprehensive Metabolic Panel    Collection Time: 05/06/22  7:43 AM    Specimen: Blood   Result Value Ref Range    Glucose 127 (H) 65 - 99 mg/dL    BUN 9 6 - 20 mg/dL    Creatinine 0.68 (L) 0.76 - 1.27 mg/dL    Sodium 134 (L) 136 - 145 mmol/L    Potassium 4.6 3.5 - 5.2 mmol/L    Chloride 101 98 - 107 mmol/L    CO2 21.7 (L) 22.0 - 29.0 mmol/L    Calcium 9.1 8.6 - 10.5 mg/dL    Total Protein 7.8 6.0 - 8.5 g/dL     Albumin 2.90 (L) 3.50 - 5.20 g/dL    ALT (SGPT) 16 1 - 41 U/L    AST (SGOT) 22 1 - 40 U/L    Alkaline Phosphatase 103 39 - 117 U/L    Total Bilirubin 0.3 0.0 - 1.2 mg/dL    Globulin 4.9 gm/dL    A/G Ratio 0.6 g/dL    BUN/Creatinine Ratio 13.2 7.0 - 25.0    Anion Gap 11.3 5.0 - 15.0 mmol/L    eGFR 111.8 >60.0 mL/min/1.73   Procalcitonin    Collection Time: 05/06/22  7:43 AM    Specimen: Blood   Result Value Ref Range    Procalcitonin 0.19 0.00 - 0.25 ng/mL   Protime-INR    Collection Time: 05/06/22  7:43 AM    Specimen: Blood   Result Value Ref Range    Protime 33.1 (H) 11.7 - 14.2 Seconds    INR 3.22 (H) 0.90 - 1.10   COVID-19, [facility] IN-HOUSE CEPHEID/THANG NP SWAB IN TRANSPORT MEDIA 8-12 HR TAT - Swab, Nasopharynx    Collection Time: 05/06/22  7:45 AM    Specimen: Nasopharynx; Swab   Result Value Ref Range    COVID19 Not Detected Not Detected - Ref. Range   Lactic Acid, Plasma    Collection Time: 05/06/22  8:22 AM    Specimen: Blood   Result Value Ref Range    Lactate 1.1 0.5 - 2.0 mmol/L   CBC Auto Differential    Collection Time: 05/06/22  8:22 AM    Specimen: Blood   Result Value Ref Range    WBC 8.98 3.40 - 10.80 10*3/mm3    RBC 4.28 4.14 - 5.80 10*6/mm3    Hemoglobin 11.9 (L) 13.0 - 17.7 g/dL    Hematocrit 37.4 (L) 37.5 - 51.0 %    MCV 87.4 79.0 - 97.0 fL    MCH 27.8 26.6 - 33.0 pg    MCHC 31.8 31.5 - 35.7 g/dL    RDW 15.5 (H) 12.3 - 15.4 %    RDW-SD 49.5 37.0 - 54.0 fl    MPV 9.6 6.0 - 12.0 fL    Platelets 257 140 - 450 10*3/mm3    Neutrophil % 62.7 42.7 - 76.0 %    Lymphocyte % 22.7 19.6 - 45.3 %    Monocyte % 8.5 5.0 - 12.0 %    Eosinophil % 3.7 0.3 - 6.2 %    Basophil % 0.8 0.0 - 1.5 %    Immature Grans % 1.6 (H) 0.0 - 0.5 %    Neutrophils, Absolute 5.64 1.70 - 7.00 10*3/mm3    Lymphocytes, Absolute 2.04 0.70 - 3.10 10*3/mm3    Monocytes, Absolute 0.76 0.10 - 0.90 10*3/mm3    Eosinophils, Absolute 0.33 0.00 - 0.40 10*3/mm3    Basophils, Absolute 0.07 0.00 - 0.20 10*3/mm3    Immature Grans, Absolute 0.14 (H)  0.00 - 0.05 10*3/mm3    nRBC 0.0 0.0 - 0.2 /100 WBC   Duplex Venous Lower Extremity - Right CAR    Collection Time: 05/06/22  9:43 AM   Result Value Ref Range    Target HR (85%) 143 bpm    Max. Pred. HR (100%) 168 bpm    Right Common Femoral Spont Y     Right Common Femoral Phasic Y     Right Common Femoral Augment Y     Right Common Femoral Competent Y     Right Common Femoral Compress C     Right Saphenofemoral Junction Compress C     Right Profunda Femoral Compress C     Right Proximal Femoral Compress C     Right Mid Femoral Spont Y     Right Mid Femoral Phasic Y     Right Mid Femoral Augment Y     Right Mid Femoral Competent Y     Right Mid Femoral Compress C     Right Distal Femoral Compress C     Right Popliteal Spont Y     Right Popliteal Phasic Y     Right Popliteal Augment Y     Right Popliteal Competent Y     Right Popliteal Compress C     Right Gastronemius Compress C     Right Greater Saph AK Compress C     Right Greater Saph BK Compress C     Right Lesser Saph Compress C     Left Common Femoral Spont Y     Left Common Femoral Phasic Y     Left Common Femoral Augment Y     Left Common Femoral Competent Y     Left Common Femoral Compress C        Ordered the above labs and independently reviewed the results.        RADIOLOGY  Duplex Venous Lower Extremity - Right CAR    Result Date: 5/6/2022  · Normal right lower extremity venous duplex scan.        I ordered the above noted radiological studies. Reviewed by me and discussed with radiologist.  See dictation for official radiology interpretation.      MEDICATIONS GIVEN IN ER    Medications   sodium chloride 0.9 % flush 10 mL (has no administration in time range)   Pharmacy to dose vancomycin (has no administration in time range)   piperacillin-tazobactam (ZOSYN) 3.375 g in iso-osmotic dextrose 50 ml (premix) (has no administration in time range)   sodium chloride 0.9 % flush 10 mL (10 mL Intravenous Not Given 5/6/22 1230)   sodium chloride 0.9 % flush 10  mL (has no administration in time range)   acetaminophen (TYLENOL) tablet 650 mg (has no administration in time range)   ondansetron (ZOFRAN) tablet 4 mg (has no administration in time range)     Or   ondansetron (ZOFRAN) injection 4 mg (has no administration in time range)   buPROPion XL (WELLBUTRIN XL) 24 hr tablet 150 mg (has no administration in time range)   topiramate (TOPAMAX) tablet 25 mg (25 mg Oral Not Given 5/6/22 1407)   famotidine (PEPCID) tablet 20 mg (has no administration in time range)   cefepime (MAXIPIME) 2 g/100 mL 0.9% NS (mbp) (0 g Intravenous Stopped 5/6/22 1014)   vancomycin 3000 mg/1000 mL 0.9% NS IVPB (3,000 mg Intravenous Given 5/6/22 1014)   piperacillin-tazobactam (ZOSYN) 3.375 g in iso-osmotic dextrose 50 ml (premix) (3.375 g Intravenous New Bag 5/6/22 1411)         PROGRESS, DATA ANALYSIS, CONSULTS, AND MEDICAL DECISION MAKING    All labs have been independently reviewed by me.  All radiology studies have been reviewed by me and discussed with radiologist dictating the report.   EKG's independently viewed and interpreted by me.  Discussion below represents my analysis of pertinent findings related to patient's condition, differential diagnosis, treatment plan and final disposition.    I have discussed case with Dr. Harrison, emergency room physician.  He has performed his own bedside examination and agrees with treatment plan.    ED Course as of 05/06/22 1545   Fri May 06, 2022   0742 Patient presents with right leg redness, swelling with known history of DVT, lymphedema.  Differential diagnoses include but not limited to cellulitis, DVT, arterial occlusion, venous insufficiency.  Plan for antibiotics, blood cultures, ultrasound, admission. [EE]   0807 INR(!): 3.22 [EE]   0821 Glucose(!): 127 [EE]   0821 Creatinine(!): 0.68 [EE]   1026 WBC: 8.98 [EE]   1026 Lactate: 1.1 [EE]   1028 INR(!): 3.22 [MM]   1028 WBC: 8.98 [MM]   1028 Sodium(!): 134 [MM]   1028 Glucose(!): 127 [MM]   1027  Procalcitonin: 0.19 [MM]   1028 Lactate: 1.1 [MM]   1031 I discussed ultrasound findings with Jim.  No acute DVT seen. [EE]   1127 I discussed patient with LEROY Cornelius.  He agrees to admit. [EE]      ED Course User Index  [EE] Eris Robbins PA  [MM] Alf Harrison MD       AS OF 15:45 EDT VITALS:    BP - 139/78  HR - 75  TEMP - 99 °F (37.2 °C) (Oral)  O2 SATS - 97%        DIAGNOSIS  Final diagnoses:   Cellulitis of right lower extremity   Lymphedema         DISPOSITION  Admitted      Dictated utilizing Dragon dictation     Eris Robbins PA  05/06/22 1545      Electronically signed by Alf Harrison MD at 05/06/22 1608     Alf Harrison MD at 05/06/22 0808          I supervised care provided by the midlevel provider.   We have discussed this patient's history, physical exam, and treatment plan.  I have reviewed the note and personally saw and examined the patient and agree with the plan of care.   Patient reports on April April 30 on Saturday started noticing that his right leg was starting to get red.  The next day on May 1 he started taking Keflex because he was concerned that he was going to have an infection of his leg.  He has had history of previous infections and cellulitis in the past.  Frequently it is on the left leg.  This was involving his right leg.  The redness and swelling and pain persisted.  On Tuesday, May 3 he switched from Keflex to doxycycline.  The symptoms still progressed.  Had a fever of over 101 at home that was documented.  He was not getting better so decided to come here to the emergency department.  He denies any chest pain or shortness of breath.  History of similar episodes in the past with cellulitis to his legs.  Patient is super morbidly obese and has chronic significant edema.  Patient is on Coumadin and his INRs have been appropriate and he is not needed to adjust his Coumadin dose    GENERAL: not distressed.  Not septic or toxic appearing  HENT: lisa  patent  Head/neck/ face are symmetric without gross deformity or swelling  EYES: no scleral icterus  CV: regular rhythm, regular rate with intact distal pulses  RESPIRATORY: normal effort and no respiratory distress  ABDOMEN: soft and nontender.  Super morbidly obese.  MUSCULOSKELETAL: 3+ edema to bilateral lower extremities.  To his right lower extremity below the knee down to his toes there is erythema and warmth.  There is tenderness to palpation.  There are some areas of thickened skin.  There are some area of scaling and hyperpigmentation likely from chronic venous stasis changes.  He has tenderness to palpation.  I do not feel any obvious crepitance or fluctuance.  He has no coolness or cyanosis.  Capillary refills intact.  NEURO: alert and appropriate, moves all extremities, follows commands  SKIN: warm, dry    Vital signs and nursing notes reviewed.    Plan this gentleman very likely has a cellulitis to his right lower extremity.  His INR here is 3.2.  We will get a check a Doppler of his leg.  I informed him he will need to be admitted we will start him on IV antibiotics.  Other lab work will be checked.  All questions answered.    We are currently under a pandemic from the COVID19 infection.  The patient presented to the emergency department by ambulance or personal vehicle. I followed the current protocols required by Infection Control at [facility] in my evaluation and treatment of the patient. The patient was wearing a face mask during my evaluation and throughout my encounter. During my whole encounter with this patient I used appropriate personal protective equipment.  This equipment consisted of eye protection, facemask, gown, and gloves.  I applied this equipment before entering the room.    ED Course as of 05/06/22 1607   Fri May 06, 2022   0742 Patient presents with right leg redness, swelling with known history of DVT, lymphedema.  Differential diagnoses include but not limited to  cellulitis, DVT, arterial occlusion, venous insufficiency.  Plan for antibiotics, blood cultures, ultrasound, admission. [EE]   0807 INR(!): 3.22 [EE]   0821 Glucose(!): 127 [EE]   0821 Creatinine(!): 0.68 [EE]   1026 WBC: 8.98 [EE]   1026 Lactate: 1.1 [EE]   1028 INR(!): 3.22 [MM]   1028 WBC: 8.98 [MM]   1028 Sodium(!): 134 [MM]   1028 Glucose(!): 127 [MM]   1028 Procalcitonin: 0.19 [MM]   1028 Lactate: 1.1 [MM]   1031 I discussed ultrasound findings with Jim.  No acute DVT seen. [EE]   1127 I discussed patient with LEROY Cornelius.  He agrees to admit. [EE]      ED Course User Index  [EE] Eris Robbins, PA  [MM] Alf Harrison MD Molinari, Mark, MD  05/06/22 1607      Electronically signed by Alf Harrison MD at 05/06/22 1607     Chandu Clark RN at 05/06/22 1139        Transport to be held pending bariatric bed placement in receiving room.    Electronically signed by Chandu Clark RN at 05/06/22 1139         Vital Signs (last day)     Date/Time Temp Temp src Pulse Resp BP Patient Position SpO2    05/09/22 0520 97.4 (36.3) Oral 69 16 137/72 Sitting 92    05/08/22 2034 98 (36.7) Oral 95 16 143/81 Sitting 95    05/08/22 1843 98.3 (36.8) Oral 72 16 129/70 Lying 94    05/08/22 1435 97.8 (36.6) Oral 76 14 133/74 Sitting 94    05/08/22 0536 97.9 (36.6) Oral 77 16 145/84 Sitting 93          Oxygen Therapy (last day)     Date/Time SpO2 Device (Oxygen Therapy) Flow (L/min) Oxygen Concentration (%) ETCO2 (mmHg)    05/09/22 0520 92 room air -- -- --    05/08/22 2034 95 room air -- -- --    05/08/22 1843 94 room air -- -- --    05/08/22 1435 94 room air -- -- --    05/08/22 0835 -- room air -- -- --    05/08/22 0536 93 room air -- -- --          Lines, Drains & Airways     Active LDAs     Name Placement date Placement time Site Days    Peripheral IV 05/06/22 0821 Anterior;Left Forearm 05/06/22 0821  Forearm  2    External Urinary Catheter 11/08/21  0900  --  181                  Medication  Administration Report for Kameron Melendez as of 05/09/22 0755   Legend:    Given Hold Not Given Due Canceled Entry Other Actions    Time Time (Time) Time  Time-Action       Discontinued     Completed     Future     MAR Hold     Linked           Medications 05/07/22 05/08/22 05/09/22    buPROPion XL (WELLBUTRIN XL) 24 hr tablet 150 mg  Dose: 150 mg  Freq: Daily Route: PO  Start: 05/06/22 1300   Admin Instructions:   Caution: Look alike/sound alike drug alert. Swallow whole.  Do not crush, chew, or split tablet.    0926-Given          0945-Given          0900             famotidine (PEPCID) tablet 20 mg  Dose: 20 mg  Freq: 2 Times Daily Before Meals Route: PO  Start: 05/06/22 1730    0815-Given     1701-Given         0815-Given     1639-Given         0648-Given     1730            furosemide (LASIX) tablet 40 mg  Dose: 40 mg  Freq: Daily Route: PO  Start: 05/08/22 1230     1351-Given          0900             Petrolatum ointment 1 application  Dose: 1 application  Freq: Every 12 Hours Scheduled Route: TOP  Start: 05/07/22 1245   Admin Instructions:   Cover lower extremities    (1424)-Not Given [C]     1537-Given     2047-Given        0945-Given     2139-Given         0900     2100            piperacillin-tazobactam (ZOSYN) 3.375 g in iso-osmotic dextrose 50 ml (premix)  Dose: 3.375 g  Freq: Every 8 Hours Route: IV  Indications of Use: SKIN AND SOFT TISSUE INFECTION  Start: 05/06/22 2000   End: 05/13/22 1559   Admin Instructions:   Refrigerate    (0600)-Not Given     0742-New Bag     1607-New Bag       2333-New Bag          0837-New Bag     1522-New Bag         0056-New Bag     0754-New Bag     1600           sodium chloride 0.9 % flush 10 mL  Dose: 10 mL  Freq: Every 12 Hours Scheduled Route: IV  Start: 05/06/22 1203    0926-Given     2046-Given         0945-Given     2140-Given         0900     2100            topiramate (TOPAMAX) tablet 25 mg  Dose: 25 mg  Freq: 2 Times Daily Route: PO  Start: 05/06/22 1300   Admin  Instructions:   Swallow whole; do not crush, split or chew tablet.    0925-Given     2046-Given         0945-Given     2139-Given         0900     2100            vancomycin (VANCOCIN) 1000 mg/200 mL dextrose 5% IVPB  Dose: 1,000 mg  Freq: Every 8 Hours Route: IV  Indications of Use: SKIN AND SOFT TISSUE INFECTION  Start: 05/07/22 1815   End: 05/13/22 1814    2046-New Bag          0424-New Bag     0949-New Bag     1924-New Bag        0239-New Bag     1015     1815          Completed Medications  Medications 05/07/22 05/08/22 05/09/22       cefepime (MAXIPIME) 2 g/100 mL 0.9% NS (mbp)  Dose: 2 g  Freq: Once Route: IV  Indications of Use: SKIN AND SOFT TISSUE INFECTION  Start: 05/06/22 0742   End: 05/06/22 1014   Admin Instructions:   Break seal and mix to activate vial before use          piperacillin-tazobactam (ZOSYN) 3.375 g in iso-osmotic dextrose 50 ml (premix)  Dose: 3.375 g  Freq: Once Route: IV  Start: 05/06/22 1202   End: 05/06/22 1441   Admin Instructions:   Refrigerate          potassium chloride (K-DUR,KLOR-CON) ER tablet 40 mEq  Dose: 40 mEq  Freq: Every 4 Hours Route: PO  Start: 05/08/22 1230   End: 05/08/22 1755   Admin Instructions:   Swallow whole; do not crush, split, or chew.     1350-Given [C]     1755-Given [C]             vancomycin 3000 mg/1000 mL 0.9% NS IVPB  Dose: 20 mg/kg  Weight Dosing Info: 249 kg  Freq: Once Route: IV  Indications of Use: SKIN AND SOFT TISSUE INFECTION  Start: 05/06/22 0742   End: 05/06/22 1014          warfarin (COUMADIN) tablet 10 mg  Dose: 10 mg  Freq: Once (Warfarin) Route: PO  Indications of Use: ATRIAL FIBRILLATION,DVT/PE (active thrombosis)  Start: 05/08/22 1800   End: 05/08/22 1755   Admin Instructions:   Food-Drug Interaction  If INR Greater Than Target, Contact Pharmacy Prior to Giving Dose.  Acutely Hazardous. Waste BOTH Residual Medication and/or Empty Package. .   Order specific questions:   Target INR 2 - 3       1755-Given             Discontinued  Medications  Medications 05/07/22 05/08/22 05/09/22       vancomycin 1500 mg/500 mL 0.9% NS IVPB ([facility])  Dose: 1,500 mg  Freq: Every 12 Hours Route: IV  Indications of Use: SKIN AND SOFT TISSUE INFECTION  Start: 05/06/22 2200   End: 05/07/22 0923    0247-New Bag               warfarin (COUMADIN) tablet 7.5 mg  Dose: 7.5 mg  Freq: Once (Warfarin) Route: PO  Indications of Use: ATRIAL FIBRILLATION,DVT/PE (active thrombosis)  Start: 05/08/22 1800   End: 05/08/22 1138   Admin Instructions:   Food-Drug Interaction  If INR Greater Than Target, Contact Pharmacy Prior to Giving Dose.  Acutely Hazardous. Waste BOTH Residual Medication and/or Empty Package. .   Order specific questions:   Target INR 2 - 3            warfarin (COUMADIN) tablet 7.5 mg  Dose: 7.5 mg  Freq: Daily Warfarin Route: PO  Indications of Use: ATRIAL FIBRILLATION,DVT/PE (active thrombosis)  Start: 05/08/22 1800   End: 05/08/22 1108   Admin Instructions:   Food-Drug Interaction  If INR Greater Than Target, Contact Pharmacy Prior to Giving Dose.  Acutely Hazardous. Waste BOTH Residual Medication and/or Empty Package. .   Order specific questions:   Target INR 2 - 3                 ,   Medication Administration Report for Kameron eMlendez as of 05/09/22 0755   Legend:    Given Hold Not Given Due Canceled Entry Other Actions    Time Time (Time) Time  Time-Action       Discontinued     Completed     Future     MAR Hold     Linked           Medications 05/07/22 05/08/22 05/09/22    Pharmacy to dose vancomycin  Freq: Continuous PRN Route: XX  PRN Comment: 0  Indications of Use: SKIN AND SOFT TISSUE INFECTION  Start: 05/06/22 1159   End: 05/13/22 1158                and   Medication Administration Report for Kameron Melendez as of 05/09/22 0755   Legend:    Given Hold Not Given Due Canceled Entry Other Actions    Time Time (Time) Time  Time-Action       Discontinued     Completed     Future     MAR Hold     Linked           Medications 05/07/22 05/08/22 05/09/22     acetaminophen (TYLENOL) tablet 650 mg  Dose: 650 mg  Freq: Every 4 Hours PRN Route: PO  PRN Reason: Mild Pain   Start: 05/06/22 1201   Admin Instructions:   Do not exceed 4 grams of acetaminophen in a 24 hr period. Max dose of 2gm for AST/ALT greater than 120 units/L    If given for fever, use fever parameter: fever greater than 100.4 °F.    If given for pain, use the following pain scale:   Mild Pain = Pain Score of 1-3, CPOT 1-2  Moderate Pain = Pain Score of 4-6, CPOT 3-4  Severe Pain = Pain Score of 7-10, CPOT 5-8    0238-Given     2337-Given         1521-Given     2214-Given             HYDROcodone-acetaminophen (NORCO) 7.5-325 MG per tablet 1 tablet  Dose: 1 tablet  Freq: Every 6 Hours PRN Route: PO  PRN Reason: Moderate Pain   Start: 05/08/22 1141   End: 05/15/22 1140   Admin Instructions:   [QUINN]    Do not exceed 4 grams of acetaminophen in a 24 hr period. Max dose of 2gm for AST/ALT greater than 120 units/L        If given for pain, use the following pain scale:   Mild Pain = Pain Score of 1-3, CPOT 1-2  Moderate Pain = Pain Score of 4-6, CPOT 3-4  Severe Pain = Pain Score of 7-10, CPOT 5-8          ondansetron (ZOFRAN) tablet 4 mg  Dose: 4 mg  Freq: Every 6 Hours PRN Route: PO  PRN Reasons: Nausea,Vomiting  Start: 05/06/22 1201   Admin Instructions:   If BOTH ondansetron (ZOFRAN) and promethazine (PHENERGAN) are ordered use ondansetron first and THEN promethazine IF ondansetron is ineffective.         Or  ondansetron (ZOFRAN) injection 4 mg  Dose: 4 mg  Freq: Every 6 Hours PRN Route: IV  PRN Reasons: Nausea,Vomiting  Start: 05/06/22 1201   Admin Instructions:   If BOTH ondansetron (ZOFRAN) and promethazine (PHENERGAN) are ordered use ondansetron first and THEN promethazine IF ondansetron is ineffective.          Pharmacy to dose vancomycin  Freq: Continuous PRN Route: XX  PRN Comment: 0  Indications of Use: SKIN AND SOFT TISSUE INFECTION  Start: 05/06/22 1159   End: 05/13/22 1158          sodium  "chloride 0.9 % flush 10 mL  Dose: 10 mL  Freq: As Needed Route: IV  PRN Reason: Line Care  Start: 05/06/22 1200          sodium chloride 0.9 % flush 10 mL  Dose: 10 mL  Freq: As Needed Route: IV  PRN Reason: Line Care  Start: 05/06/22 0737                   Orders (active)      Start     Ordered    05/09/22 0600  Protime-INR  Morning Draw         05/08/22 1107    05/09/22 0600  Basic Metabolic Panel  Morning Draw         05/08/22 1140    05/08/22 1230  furosemide (LASIX) tablet 40 mg  Daily         05/08/22 1140    05/08/22 1141  HYDROcodone-acetaminophen (NORCO) 7.5-325 MG per tablet 1 tablet  Every 6 Hours PRN         05/08/22 1141    05/07/22 1815  vancomycin (VANCOCIN) 1000 mg/200 mL dextrose 5% IVPB  Every 8 Hours         05/07/22 1729    05/07/22 1400  Incentive Spirometry  Every 4 Hours While Awake       05/07/22 1156    05/07/22 1245  Petrolatum ointment 1 application  Every 12 Hours Scheduled         05/07/22 1153    05/06/22 2000  piperacillin-tazobactam (ZOSYN) 3.375 g in iso-osmotic dextrose 50 ml (premix)  Every 8 Hours         05/06/22 1200    05/06/22 1730  famotidine (PEPCID) tablet 20 mg  2 Times Daily Before Meals         05/06/22 1205    05/06/22 1600  Vital Signs  Every 4 Hours       05/06/22 1201    05/06/22 1300  buPROPion XL (WELLBUTRIN XL) 24 hr tablet 150 mg  Daily         05/06/22 1211    05/06/22 1300  topiramate (TOPAMAX) tablet 25 mg  2 Times Daily         05/06/22 1211    05/06/22 1203  sodium chloride 0.9 % flush 10 mL  Every 12 Hours Scheduled         05/06/22 1201    05/06/22 1202  Diet Regular  Diet Effective Now         05/06/22 1201    05/06/22 1202  Okay for home use of CPAP  Nursing Communication  Once        Comments: Okay for home use of CPAP    05/06/22 1201    05/06/22 1201  ondansetron (ZOFRAN) tablet 4 mg  Every 6 Hours PRN        \"Or\" Linked Group Details    05/06/22 1201    05/06/22 1201  ondansetron (ZOFRAN) injection 4 mg  Every 6 Hours PRN        \"Or\" Linked Group " "Details    22 1201    22 1201  acetaminophen (TYLENOL) tablet 650 mg  Every 4 Hours PRN         22 1201    22 1201  Intake & Output  Every Shift       22 1201    22 1201  Oxygen Therapy- Nasal Cannula; Titrate for SPO2: 90% - 95%  Continuous         22 1201    22 1201  Insert Peripheral IV  Once         22 1201    22 1201  Saline Lock & Maintain IV Access  Continuous         22 1201    22 1201  MedSurg is appropriate.  No telemetry needed  Nursing Communication  Once        Comments: MedSurg is appropriate.  No telemetry needed    22 1201    22 1201  Code Status and Medical Interventions:  Continuous         22 1201    22 1200  sodium chloride 0.9 % flush 10 mL  As Needed         22 1201    22 1159  Pharmacy to dose vancomycin  Continuous PRN         22 1200    22 1027  [facility] (on-call MD unless specified) Details  Once        Specialty:  Hospitalist  Provider:  Ruslan Mock MD    22 1027    22 0738  Insert peripheral IV  Once        \"And\" Linked Group Details    22 0740    22 0737  sodium chloride 0.9 % flush 10 mL  As Needed        \"And\" Linked Group Details    22 0740    Unscheduled  Up with assistance  As Needed       22 1201                     Physician Progress Notes      Ruslan Mock MD at 22 1201              DAILY PROGRESS NOTE  [facility]    Patient Identification:  Name: Kameron Melendez  Age: 52 y.o.  Sex: male  :  1970  MRN: [id number]         Primary Care Physician: Hansel Patel DO    Subjective:  Interval History: Not voicing any new complaints per se today.  No chest pain no troubles breathing.  He states he has some nausea at times but he thinks is more so secondary to the food here at the hospital.  Denies any new abdominal pain diarrhea or any issues with confusion    Objective: Very pleasant and " "conversational.  Nontoxic.  No family at bedside.    Vital signs in last 24 hours:  Temp:  [97.9 °F (36.6 °C)-98 °F (36.7 °C)] 97.9 °F (36.6 °C)  Heart Rate:  [77-79] 77  Resp:  [16] 16  BP: (145)/(80-84) 145/84    Intake/Output:    Intake/Output Summary (Last 24 hours) at 5/8/2022 1202  Last data filed at 5/8/2022 0600  Gross per 24 hour   Intake 780 ml   Output 2575 ml   Net -1795 ml       Exam:  /84 (BP Location: Right arm, Patient Position: Sitting)   Pulse 77   Temp 97.9 °F (36.6 °C) (Oral)   Resp 16   Ht 188 cm (74\")   Wt (!) 249 kg (548 lb)   SpO2 93%   BMI 70.36 kg/m²     General Appearance:    Alert, cooperative, no distress, AAOx3                         Throat:   Oral mucosa pink and moist                         Lungs:    Clear to auscultation bilaterally, respirations unlabored                 Chest Wall:    No tenderness or deformity                          Heart:    Regular rate and rhythm, S1 and S2 normal                 Abdomen:     Soft, nontender, bowel sounds active, MO                 Extremities: Moving all, advanced lymphedema to bilateral lower extremities with legs starting to show further accumulation of fluid, right lower extremity cellulitis is continuing to show clinical improvement.  He has decreased warmth with decrease of erythema and no signs of abscess.  The most remaining warmth and erythema remains in the foot which is not unexpected as antibiotics have a difficult time permeating the extremities and legs with a severe case of lymphedema.                                    Neurologic:   CNII-XII intact, moving all     Data Review:  Labs in chart were reviewed.    Assessment:  Active Hospital Problems    Diagnosis  POA   • **Cellulitis of right lower extremity [L03.115]  Yes   • Long term (current) use of anticoagulants [Z79.01]  Not Applicable   • Paroxysmal atrial fibrillation (HCC) [I48.0]  Yes   • Heterozygous factor V Leiden mutation (HCC) [D68.51]  Yes   • " Dyslipidemia [E78.5]  Yes   • Lymphedema of both lower extremities [I89.0]  Yes   • Pulmonary hypertension (HCC) [I27.20]  Yes   • History of bilateral pulmonary embolism (CMS/HCC) [I26.99]  Yes   • Morbid obesity with body mass index of 60.0-69.9 in adult (HCC) [E66.01, Z68.44]  Not Applicable   • ARNOLDO (obstructive sleep apnea) [G47.33]  Yes      Resolved Hospital Problems   No resolved problems to display.       Plan:    Recurrent cellulitis to lower extremity secondary to chronic lymphedema and resulting morbidly obese legs              -Vancomycin and Zosyn with excellent improvement over the last 24 hours              -A1c 6.9 and patient becoming borderline              -Outline tracing of erythema illustrates regression              -Needs at least another day or so of IV antibiotics before switching to p.o. antibiotics at discharge for remainder of treatment              -No sepsis present on admission     Supratherapeutic INR - INR is finally drifted down to 2.6 and I will restart Coumadin at 10 mg x 1 and dose daily pending INR trends     Noncompliant with ARNOLDO/CPAP -patient reports cough -likely element of atelectasis and counseled to increase activity and will add I-S.  If not better by tomorrow we will formally evaluate with chest x-ray.  Current O2 saturations 96% on room air     Pepcid for GI prophylaxis    Ruslan Mock MD  2022  12:02 EDT      Electronically signed by Ruslan Mock MD at 22 1205     Ruslan Mock MD at 22 1153              DAILY PROGRESS NOTE  [facility]    Patient Identification:  Name: Kameron Melendez  Age: 52 y.o.  Sex: male  :  1970  MRN: [id number]         Primary Care Physician: Hansel Patel DO    Subjective:  Interval History: Had a rough night.  He does still feel pain in that right lower extremity but does feel some improvement.  Denies nausea vomiting diarrhea chest pain or troubles breathing though he does  "have a cough.  Denies fever or chills    Objective: Resting soundly upon entering room.  Easily awakened and conversational and pleasant.  Nontoxic in appearance.  No family at bedside and patient did not asked me to discuss his case with anyone additionally    Scheduled Meds:buPROPion XL, 150 mg, Oral, Daily  famotidine, 20 mg, Oral, BID AC  piperacillin-tazobactam, 3.375 g, Intravenous, Q8H  sodium chloride, 10 mL, Intravenous, Q12H  topiramate, 25 mg, Oral, BID      Continuous Infusions:Pharmacy to dose vancomycin,         Vital signs in last 24 hours:  Temp:  [97.1 °F (36.2 °C)-99.5 °F (37.5 °C)] 97.1 °F (36.2 °C)  Heart Rate:  [75-88] 75  Resp:  [18] 18  BP: (135-151)/(76-81) 141/81    Intake/Output:    Intake/Output Summary (Last 24 hours) at 5/7/2022 1153  Last data filed at 5/7/2022 1151  Gross per 24 hour   Intake 1780 ml   Output 1000 ml   Net 780 ml       Exam:  /81 (BP Location: Left arm, Patient Position: Lying)   Pulse 75   Temp 97.1 °F (36.2 °C) (Oral)   Resp 18   Ht 188 cm (74\")   Wt (!) 249 kg (548 lb)   SpO2 96%   BMI 70.36 kg/m²     General Appearance:    Alert, cooperative, AOx3                          Head:    Normocephalic, without obvious abnormality, atraumatic                           Eyes:    Conjunctivae/corneas clear, EOM's intact, both eyes                         Throat:   Oral mucosa pink and moist                         Lungs:    Clear to auscultation bilaterally, respirations unlabored                          Heart:    Regular rate and rhythm, S1 and S2 normal                  Abdomen:     Soft, nontender, bowel sounds active                 Extremities: Lymphedema bilaterally with right lower extremity cellulitis improving with about 5 to 6 inches of regression versus outline tracing.  Decreased warmth.  NVM intact.  Dry skin noted                        Pulses:   Pulses palpable in lower extremities                  Neurologic:   CNII-XII intact     Data " Review:  Labs in chart were reviewed.    Assessment:  Active Hospital Problems    Diagnosis  POA   • **Cellulitis of right lower extremity [L03.115]  Yes   • Long term (current) use of anticoagulants [Z79.01]  Not Applicable   • Paroxysmal atrial fibrillation (HCC) [I48.0]  Yes   • Heterozygous factor V Leiden mutation (HCC) [D68.51]  Yes   • Dyslipidemia [E78.5]  Yes   • Lymphedema of both lower extremities [I89.0]  Yes   • Pulmonary hypertension (HCC) [I27.20]  Yes   • History of bilateral pulmonary embolism (CMS/HCC) [I26.99]  Yes   • Morbid obesity with body mass index of 60.0-69.9 in adult (HCC) [E66.01, Z68.44]  Not Applicable   • ARNOLDO (obstructive sleep apnea) [G47.33]  Yes      Resolved Hospital Problems   No resolved problems to display.       Plan:    Recurrent cellulitis to lower extremity secondary to chronic lymphedema and resulting morbidly obese legs              -Vancomycin and Zosyn with excellent improvement over the last 24 hours   -A1c 6.9 and patient becoming borderline              -Outline tracing of erythema illustrates regression              -Needs at least 3 days of IV antibiotics then patient will be switched to p.o. antibiotics at discharge for remainder of treatment              -No sepsis present on admission     Supratherapeutic INR - persistent/hold Coumadin and track daily INR.  No reported bleeding or complications     No additional prophylaxis warranted given INR    Noncompliant with ARNOLDO/CPAP -patient reports cough -likely element of atelectasis and counseled to increase activity and will add I-S.  If not better by tomorrow we will formally evaluate with chest x-ray.  Current O2 saturations 96% on room air     Pepcid for GI prophylaxis       Ruslan Mock MD  5/7/2022  11:53 EDT      Electronically signed by Ruslan Mock MD at 05/07/22 8083           Lina Welch RN       Case Management   Progress Notes      Signed   Date of Service:  05/06/22 4398    Creation Time:  05/06/22 1635              Signed            Discharge Planning Assessment  [facility]     Patient Name: Kameron Melendez                  MRN: [id number]  Today's Date: 5/6/2022                       Admit Date: 5/6/2022             Discharge Needs Assessment            Row Name 05/06/22 1634             Discharge Needs Assessment      Equipment Currently Used at Home other (see comments)  4 stairs to enter into his home and his bedroom is on the second floor but he has for the past year slept in his recliner which is on the main floor of his house                      Discharge Plan            Row Name 05/06/22 1631             Plan      Plan home with spouse and she will provide the transportation      Post Acute Provider Quality and Resource List Home Health      Delivered To Patient      Method of Delivery In person      Patient/Family in Agreement with Plan yes      Plan Comments Spoke with the patient at bedside and verified the face sheet. The patient states he is not current with any home health at this time. He would [facility] if needed at discharge. Called Marta with [facility] and they will evaluate and follow. The patient denies any discharge or DME needs at this time. Spouse at bedside.Spoke with the patient at bedside and explained Meds to Bed and he states understanding. Changed in EPIC. Discharge plan is home with spouse and she will provide the transportation at discharge. Will follow for IVAB's at discharge. GILMAR GarzaN, RN, CCP, CDP

## 2022-05-09 NOTE — PROGRESS NOTES
[facility] Clinical Pharmacy Services: Warfarin Dosing/Monitoring Consult    Kameron Melendez is a 52 y.o. male, estimated creatinine clearance is 271.8 mL/min (A) (by C-G formula based on SCr of 0.67 mg/dL (L)). weighing (!) 249 kg (548 lb).    Results from last 7 days   Lab Units 05/09/22  0647 05/08/22  0536 05/07/22  0616 05/06/22  0822 05/06/22  0743   INR  2.13* 2.60* 3.42*  --  3.22*   HEMOGLOBIN g/dL  --   --   --  11.9*  --    HEMATOCRIT %  --   --   --  37.4*  --    PLATELETS 10*3/mm3  --   --   --  257  --      Prior to admission anticoagulation: follows with [facility], last visit 4/29:  warfarin 10 mg Tues/Thurs and 15 mg all other days    Hospital Anticoagulation:  Consulting provider: Dr. Wise   Start date: PTA medication, resumed inpatient 5/8/22  Indication: Afib + Hx of VTE  Target INR: 2 - 3  Expected duration: Indefinite    Bridge Therapy: No      Potential food or drug interactions:   No moderate/major interactions identified at this time     Education complete?/Date: Follows with [facility]     Assessment/Plan:  INR therapeutic at 2.13 (Goal 2-3). Resume home regimen (dose verified with clinic records) of warfarin 10 mg every Tues/Thurs and warfarin 15 mg all other days.   Monitor for any signs or symptoms of bleeding  Follow up daily INRs and dose adjustments    Pharmacy will continue to follow until discharge or discontinuation of warfarin.     Azalea Mcdonough [facility]  Clinical Pharmacist

## 2022-05-09 NOTE — PLAN OF CARE
Goal Outcome Evaluation:  Plan of Care Reviewed With: patient        Progress: no change  Outcome Evaluation: VSS. Pt up to bathroom and to the chair today. Pt recieved no pain medications today. Antibiotics given as ordered. No needs at this time

## 2022-05-09 NOTE — PROGRESS NOTES
"[facility] Clinical Pharmacy Services: Vancomycin Monitoring Note    Kameron Melendez is a 52 y.o. male who is on day 4/7 of pharmacy to dose vancomycin for SSTI.    Previous Vancomycin Dose: 1000 mg IV every 8 hours  Updated Cultures and Sensitivities: 5/6 BCx: NGTD  Results from last 7 days   Lab Units 05/08/22  1747 05/07/22  1606   VANCOMYCIN TR mcg/mL 11.30 8.40     Vitals/Labs    Ht: 188 cm (74\"); Wt: (!) 249 kg (548 lb)   Temp Readings from Last 1 Encounters:   05/09/22 97.1 °F (36.2 °C) (Oral)     Estimated Creatinine Clearance: 271.8 mL/min (A) (by C-G formula based on SCr of 0.67 mg/dL (L)).     Results from last 7 days   Lab Units 05/09/22  0647 05/08/22  0535 05/07/22  0616 05/06/22  0822   CREATININE mg/dL 0.67* 0.51* 0.54*  --    WBC 10*3/mm3  --   --   --  8.98     Assessment/Plan  Wound continues to be described as beefy red, swollen and warm to touch. Per RN, improvement noted in one extremity, however the other still appears affected. Blood cx are negative, no wound cx available. Given that predicted AUC is below goal, will increase to 1250 Q8 and plan to recheck level tomorrow at 1000 dose. Want to monitor closely given concurrent use of Zosyn.     Current Vancomycin Dose: 1000 mg IV every 8 hours; provides a predicted  mg/L.hr . Increasing to 1250 q8 provides a predicted AUC of 472 mg/L*hr.   Next Level Date and Time: Recheck level on Tuesday 5/10 at 0930.   We will continue to monitor patient changes and renal function     Thank you for involving pharmacy in this patient's care. Please contact pharmacy with any questions or concerns.       Carli Bartlett, Pharm.D., [facility]  Oncology Pharmacy Specialist  [phone]          "

## 2022-05-09 NOTE — PROGRESS NOTES
"    DAILY PROGRESS NOTE  [facility]    Patient Identification:  Name: Kameron Melendez  Age: 52 y.o.  Sex: male  :  1970  MRN: [id number]         Primary Care Physician: Hansel Patel,     Subjective:  Patient is lying on the bed and is in no distress.  No new events overnight.  Denies nausea, vomiting, abdominal pain, chest pain.    Scheduled Meds:buPROPion XL, 150 mg, Oral, Daily  famotidine, 20 mg, Oral, BID AC  furosemide, 40 mg, Oral, Daily  Petrolatum, 1 application, Topical, Q12H  piperacillin-tazobactam, 3.375 g, Intravenous, Q8H  sodium chloride, 10 mL, Intravenous, Q12H  topiramate, 25 mg, Oral, BID  vancomycin, 1,250 mg, Intravenous, Q8H      Continuous Infusions:Pharmacy to dose vancomycin,         Vital signs in last 24 hours:  Temp:  [97.1 °F (36.2 °C)-98.3 °F (36.8 °C)] 97.1 °F (36.2 °C)  Heart Rate:  [69-95] 83  Resp:  [16] 16  BP: (129-143)/(70-81) 136/76    Intake/Output:    Intake/Output Summary (Last 24 hours) at 2022 1711  Last data filed at 2022 1622  Gross per 24 hour   Intake 1250 ml   Output 5875 ml   Net -4625 ml       Exam:  /76 (BP Location: Right arm, Patient Position: Lying)   Pulse 83   Temp 97.1 °F (36.2 °C) (Oral)   Resp 16   Ht 188 cm (74\")   Wt (!) 249 kg (548 lb)   SpO2 96%   BMI 70.36 kg/m²     General Appearance:    Alert, cooperative, no distress, AAOx3                         Throat:   Oral mucosa pink and moist                         Lungs:    Clear to auscultation bilaterally, respirations unlabored                 Chest Wall:    No tenderness or deformity                          Heart:    Regular rate and rhythm, S1 and S2 normal                 Abdomen:     Soft, nontender, bowel sounds active, MO                 Extremities: Moving all, advanced lymphedema to bilateral lower extremities with legs starting to show further accumulation of fluid, right lower extremity cellulitis is continuing to show clinical improvement.  " He has decreased warmth with decrease of erythema and no signs of abscess.  The most remaining warmth and erythema remains in the foot which is not unexpected as antibiotics have a difficult time permeating the extremities and legs with a severe case of lymphedema.                                    Neurologic:   CNII-XII intact, moving all     Data Review:  Labs in chart were reviewed.    Assessment:  Active Hospital Problems    Diagnosis  POA   • **Cellulitis of right lower extremity [L03.115]  Yes   • Long term (current) use of anticoagulants [Z79.01]  Not Applicable   • Paroxysmal atrial fibrillation (HCC) [I48.0]  Yes   • Heterozygous factor V Leiden mutation (HCC) [D68.51]  Yes   • Dyslipidemia [E78.5]  Yes   • Lymphedema of both lower extremities [I89.0]  Yes   • Pulmonary hypertension (HCC) [I27.20]  Yes   • History of bilateral pulmonary embolism (CMS/HCC) [I26.99]  Yes   • Morbid obesity with body mass index of 60.0-69.9 in adult ([facility]) [E66.01, Z68.44]  Not Applicable   • ARNOLDO (obstructive sleep apnea) [G47.33]  Yes      Resolved Hospital Problems   No resolved problems to display.       #1 severe right leg cellulitis with lymphedema, patient is currently on IV vancomycin and Zosyn and will be continued and infectious disease consult will also be obtained.  2.  Paroxysmal atrial relation, resume warfarin and INR is in the therapeutic range.  3.  History of bilateral pulmonary embolism along with factor V Leyden deficiency, as mentioned above continue with anticoagulation.  4.  Morbid obesity, patient was counseled to lose weight.           Larry Wise MD  5/9/2022  17:11 EDT

## 2022-05-09 NOTE — PLAN OF CARE
Goal Outcome Evaluation:  Plan of Care Reviewed With: patient        Progress: no change   Patient is alert and oriented times four. Up to the restroom with stand by assistance. Iv antibiotic therapy continues. Right lower leg is beefy red , swollen and warm to the touch. Tylenol given for pain. Will continue to monitor.

## 2022-05-10 LAB
ANION GAP SERPL CALCULATED.3IONS-SCNC: 10.2 MMOL/L (ref 5–15)
BASOPHILS # BLD AUTO: 0.08 10*3/MM3 (ref 0–0.2)
BASOPHILS NFR BLD AUTO: 0.7 % (ref 0–1.5)
BUN SERPL-MCNC: 5 MG/DL (ref 6–20)
BUN/CREAT SERPL: 6.3 (ref 7–25)
CALCIUM SPEC-SCNC: 9.6 MG/DL (ref 8.6–10.5)
CHLORIDE SERPL-SCNC: 103 MMOL/L (ref 98–107)
CO2 SERPL-SCNC: 25.8 MMOL/L (ref 22–29)
CREAT SERPL-MCNC: 0.79 MG/DL (ref 0.76–1.27)
DEPRECATED RDW RBC AUTO: 48.4 FL (ref 37–54)
EGFRCR SERPLBLD CKD-EPI 2021: 106.9 ML/MIN/1.73
EOSINOPHIL # BLD AUTO: 0.38 10*3/MM3 (ref 0–0.4)
EOSINOPHIL NFR BLD AUTO: 3.4 % (ref 0.3–6.2)
ERYTHROCYTE [DISTWIDTH] IN BLOOD BY AUTOMATED COUNT: 15.3 % (ref 12.3–15.4)
GLUCOSE SERPL-MCNC: 124 MG/DL (ref 65–99)
HCT VFR BLD AUTO: 40.8 % (ref 37.5–51)
HGB BLD-MCNC: 13.3 G/DL (ref 13–17.7)
IMM GRANULOCYTES # BLD AUTO: 0.29 10*3/MM3 (ref 0–0.05)
IMM GRANULOCYTES NFR BLD AUTO: 2.6 % (ref 0–0.5)
INR PPP: 1.88 (ref 0.9–1.1)
LYMPHOCYTES # BLD AUTO: 2.77 10*3/MM3 (ref 0.7–3.1)
LYMPHOCYTES NFR BLD AUTO: 24.8 % (ref 19.6–45.3)
MCH RBC QN AUTO: 28.4 PG (ref 26.6–33)
MCHC RBC AUTO-ENTMCNC: 32.6 G/DL (ref 31.5–35.7)
MCV RBC AUTO: 87.2 FL (ref 79–97)
MONOCYTES # BLD AUTO: 0.89 10*3/MM3 (ref 0.1–0.9)
MONOCYTES NFR BLD AUTO: 8 % (ref 5–12)
NEUTROPHILS NFR BLD AUTO: 6.77 10*3/MM3 (ref 1.7–7)
NEUTROPHILS NFR BLD AUTO: 60.5 % (ref 42.7–76)
NRBC BLD AUTO-RTO: 0.1 /100 WBC (ref 0–0.2)
PLATELET # BLD AUTO: 463 10*3/MM3 (ref 140–450)
PMV BLD AUTO: 9.4 FL (ref 6–12)
POTASSIUM SERPL-SCNC: 4.5 MMOL/L (ref 3.5–5.2)
PROTHROMBIN TIME: 21.6 SECONDS (ref 11.7–14.2)
RBC # BLD AUTO: 4.68 10*6/MM3 (ref 4.14–5.8)
SODIUM SERPL-SCNC: 139 MMOL/L (ref 136–145)
VANCOMYCIN TROUGH SERPL-MCNC: 17.2 MCG/ML (ref 5–20)
WBC NRBC COR # BLD: 11.18 10*3/MM3 (ref 3.4–10.8)

## 2022-05-10 PROCEDURE — 85610 PROTHROMBIN TIME: CPT | Performed by: INTERNAL MEDICINE

## 2022-05-10 PROCEDURE — 80202 ASSAY OF VANCOMYCIN: CPT | Performed by: INTERNAL MEDICINE

## 2022-05-10 PROCEDURE — 80048 BASIC METABOLIC PNL TOTAL CA: CPT | Performed by: INTERNAL MEDICINE

## 2022-05-10 PROCEDURE — 25010000002 PIPERACILLIN SOD-TAZOBACTAM PER 1 G: Performed by: HOSPITALIST

## 2022-05-10 PROCEDURE — 25010000002 CEFAZOLIN IN DEXTROSE 2-4 GM/100ML-% SOLUTION: Performed by: STUDENT IN AN ORGANIZED HEALTH CARE EDUCATION/TRAINING PROGRAM

## 2022-05-10 PROCEDURE — 85025 COMPLETE CBC W/AUTO DIFF WBC: CPT | Performed by: INTERNAL MEDICINE

## 2022-05-10 PROCEDURE — 99222 1ST HOSP IP/OBS MODERATE 55: CPT | Performed by: STUDENT IN AN ORGANIZED HEALTH CARE EDUCATION/TRAINING PROGRAM

## 2022-05-10 PROCEDURE — 25010000002 VANCOMYCIN 10 G RECONSTITUTED SOLUTION: Performed by: INTERNAL MEDICINE

## 2022-05-10 RX ORDER — CEFAZOLIN SODIUM 2 G/100ML
2 INJECTION, SOLUTION INTRAVENOUS EVERY 6 HOURS
Status: COMPLETED | OUTPATIENT
Start: 2022-05-10 | End: 2022-05-12

## 2022-05-10 RX ADMIN — TAZOBACTAM SODIUM AND PIPERACILLIN SODIUM 3.38 G: 375; 3 INJECTION, SOLUTION INTRAVENOUS at 00:17

## 2022-05-10 RX ADMIN — VANCOMYCIN HYDROCHLORIDE 1250 MG: 10 INJECTION, POWDER, LYOPHILIZED, FOR SOLUTION INTRAVENOUS at 02:21

## 2022-05-10 RX ADMIN — TOPIRAMATE 25 MG: 25 TABLET, FILM COATED ORAL at 08:38

## 2022-05-10 RX ADMIN — Medication 10 ML: at 21:20

## 2022-05-10 RX ADMIN — CEFAZOLIN SODIUM 2 G: 2 INJECTION, SOLUTION INTRAVENOUS at 22:24

## 2022-05-10 RX ADMIN — FAMOTIDINE 20 MG: 20 TABLET ORAL at 08:38

## 2022-05-10 RX ADMIN — CEFAZOLIN SODIUM 2 G: 2 INJECTION, SOLUTION INTRAVENOUS at 11:23

## 2022-05-10 RX ADMIN — FUROSEMIDE 40 MG: 40 TABLET ORAL at 08:38

## 2022-05-10 RX ADMIN — WARFARIN 10 MG: 10 TABLET ORAL at 17:03

## 2022-05-10 RX ADMIN — HYDROCODONE BITARTRATE AND ACETAMINOPHEN 1 TABLET: 7.5; 325 TABLET ORAL at 21:20

## 2022-05-10 RX ADMIN — Medication 10 ML: at 08:38

## 2022-05-10 RX ADMIN — BUPROPION HYDROCHLORIDE 150 MG: 150 TABLET, EXTENDED RELEASE ORAL at 08:38

## 2022-05-10 RX ADMIN — PETROLATUM 1 APPLICATION: 420 OINTMENT TOPICAL at 08:38

## 2022-05-10 RX ADMIN — FAMOTIDINE 20 MG: 20 TABLET ORAL at 17:03

## 2022-05-10 RX ADMIN — TOPIRAMATE 25 MG: 25 TABLET, FILM COATED ORAL at 21:20

## 2022-05-10 RX ADMIN — ACETAMINOPHEN 650 MG: 325 TABLET, FILM COATED ORAL at 13:15

## 2022-05-10 RX ADMIN — PETROLATUM 1 APPLICATION: 420 OINTMENT TOPICAL at 21:20

## 2022-05-10 RX ADMIN — CEFAZOLIN SODIUM 2 G: 2 INJECTION, SOLUTION INTRAVENOUS at 17:03

## 2022-05-10 NOTE — PROGRESS NOTES
"    DAILY PROGRESS NOTE  [facility]    Patient Identification:  Name: Kameron Melendez  Age: 52 y.o.  Sex: male  :  1970  MRN: [id number]         Primary Care Physician: Hansel Patel DO    Subjective:  Patient is lying on the bed and is in no distress.  No new events overnight.  Denies nausea, vomiting, abdominal pain, chest pain.    Scheduled Meds:buPROPion XL, 150 mg, Oral, Daily  ceFAZolin, 2 g, Intravenous, Q6H  famotidine, 20 mg, Oral, BID AC  furosemide, 40 mg, Oral, Daily  Petrolatum, 1 application, Topical, Q12H  sodium chloride, 10 mL, Intravenous, Q12H  topiramate, 25 mg, Oral, BID  warfarin, 10 mg, Oral, Once per day on   warfarin, 15 mg, Oral, Once per day on Sun Mon Wed Fri Sat      Continuous Infusions:Pharmacy to dose warfarin,         Vital signs in last 24 hours:  Temp:  [97.4 °F (36.3 °C)-98.8 °F (37.1 °C)] 97.4 °F (36.3 °C)  Heart Rate:  [74-86] 83  Resp:  [16] 16  BP: (130-148)/(77-90) 130/77    Intake/Output:    Intake/Output Summary (Last 24 hours) at 5/10/2022 1901  Last data filed at 5/10/2022 1314  Gross per 24 hour   Intake 1260 ml   Output 1550 ml   Net -290 ml       Exam:  /77 (BP Location: Right arm, Patient Position: Lying)   Pulse 83   Temp 97.4 °F (36.3 °C) (Oral)   Resp 16   Ht 188 cm (74\")   Wt (!) 249 kg (548 lb)   SpO2 94%   BMI 70.36 kg/m²     General Appearance:    Alert, cooperative, no distress, AAOx3                         Throat:   Oral mucosa pink and moist                         Lungs:    Clear to auscultation bilaterally, respirations unlabored                 Chest Wall:    No tenderness or deformity                          Heart:    Regular rate and rhythm, S1 and S2 normal                 Abdomen:     Soft, nontender, bowel sounds active, MO                 Extremities: Moving all, advanced lymphedema to bilateral lower extremities with legs starting to show further accumulation of fluid, right lower extremity " cellulitis is continuing to show clinical improvement.  He has decreased warmth with decrease of erythema and no signs of abscess.  The most remaining warmth and erythema remains in the foot which is not unexpected as antibiotics have a difficult time permeating the extremities and legs with a severe case of lymphedema.                                    Neurologic:   CNII-XII intact, moving all     Data Review:  Labs in chart were reviewed.    Assessment:  Active Hospital Problems    Diagnosis  POA   • **Cellulitis of right lower extremity [L03.115]  Yes   • Long term (current) use of anticoagulants [Z79.01]  Not Applicable   • Paroxysmal atrial fibrillation (HCC) [I48.0]  Yes   • Heterozygous factor V Leiden mutation (HCC) [D68.51]  Yes   • Dyslipidemia [E78.5]  Yes   • Lymphedema of both lower extremities [I89.0]  Yes   • Pulmonary hypertension ([facility]) [I27.20]  Yes   • History of bilateral pulmonary embolism (CMS/HCC) [I26.99]  Yes   • Morbid obesity with body mass index of 60.0-69.9 in adult ([facility]) [E66.01, Z68.44]  Not Applicable   • ARNOLDO (obstructive sleep apnea) [G47.33]  Yes      Resolved Hospital Problems   No resolved problems to display.       #1 severe right leg cellulitis with lymphedema, please note IV vancomycin and continuing IV cefazolin has been initiated.  2.  Paroxysmal atrial relation, resumed warfarin and INR is mildly sub therapeutic range.  Pharmacy is dosing warfarin.  3.  History of bilateral pulmonary embolism along with factor V Leyden deficiency, as mentioned above continue with anticoagulation.  4.  Morbid obesity, patient was counseled to lose weight.           Larry Wise MD  5/10/2022  19:01 EDT

## 2022-05-10 NOTE — PLAN OF CARE
Goal Outcome Evaluation:  Plan of Care Reviewed With: patient        Progress: improving   Patient is alert and oriented times four. Up with stand by assistance. He has been ambulating to the restroom. Iv antibiotic therapy continues for cellulitis of lower extremities. Patient reports that the edema to his legs has decreased. Tylenol given once for complaints of pain. Will monitor

## 2022-05-10 NOTE — CONSULTS
Referring Provider: Ruslan Mock MD  Reason for Consultation: Cellulitis  Chief Complaint   Patient presents with   • Leg Pain     Right leg, red, painful swelling         Subjective   History of present illness: Patient is a 52-year-old male with a past medical history of morbid obesity and lower extremity lymphedema who presents in the setting of swelling of the right leg concerning for cellulitis.  ID was consulted for antibiotic recommendations.    Patient reports that he first noticed starting symptoms around 4/30 when he developed chills.  He states the next day he noticed some redness of his right lower extremity.  Reports he normally has difficulty with cellulitis of the left lower extremity but this was on the right.  States his left lower extremity is within normal limits that has been since the beginning of this episode.  Reports he noticed increasing redness, pain and swelling of the right lower extremity.  He took 2 different types of antibiotics at home that included Keflex and later switched to doxycycline.  He notes that these had little effect.  Chills have now resolved however he continues to have pain of the right lower extremity.  States that with IV antibiotics here he has seen marked improvement of his swelling, redness and pain.  Denies any fevers or chills.      Since presentation he has been without fever but does have minor leukocytosis today.  Started on empiric vancomycin and Zosyn and tolerated this well.  Procalcitonin and lactate have been within normal limits.    Past Medical History:   Diagnosis Date   • DVT (deep venous thrombosis) (HCC)     RLE   • Factor V Leiden (HCC)    • Hypertension    • Kidney stone    • Lymphedema    • Lymphedema of left lower extremity    • Obesity    • ARNOLDO (obstructive sleep apnea)    • Pulmonary embolism (HCC)     bilateral   • Pulmonary hypertension (HCC)    • Right ventricular enlargement        Past Surgical History:   Procedure Laterality Date    • CARDIAC CATHETERIZATION Bilateral 7/18/2017    Procedure: Pulmonary angiography- [place] ;  Surgeon: Cedric Coulter MD;  Location:  [facility];  Service:    • CARDIAC CATHETERIZATION N/A 7/18/2017    Procedure: Right Heart Cath;  Surgeon: Cedric Coulter MD;  Location:  [facility];  Service:    • THROMBECTOMY         family history includes Bradycardia in his mother; Heart disease in his mother; Heart failure in his mother; No Known Problems in his father, maternal grandfather, maternal grandmother, paternal grandfather, and paternal grandmother.     reports that he has been smoking cigars and pipe. He started smoking about 16 years ago. He has never used smokeless tobacco. He reports that he does not drink alcohol and does not use drugs.     No Known Allergies    Medication:  Antibiotics:  Anti-Infectives (From admission, onward)    Ordered     Dose/Rate Route Frequency Start Stop    05/10/22 0945  ceFAZolin in dextrose (ANCEF) IVPB solution 2 g        Ordering Provider: Osman Tinoco DO    2 g  over 30 Minutes Intravenous Every 6 Hours 05/10/22 1045 05/20/22 1044    05/06/22 1200  piperacillin-tazobactam (ZOSYN) 3.375 g in iso-osmotic dextrose 50 ml (premix)        Ordering Provider: Ruslan Mock MD    3.375 g  over 30 Minutes Intravenous Once 05/06/22 1202 05/06/22 1441    05/06/22 0740  cefepime (MAXIPIME) 2 g/100 mL 0.9% NS (mbp)        Ordering Provider: Eris Robbins PA    2 g  200 mL/hr over 30 Minutes Intravenous Once 05/06/22 0742 05/06/22 1014    05/06/22 0740  vancomycin 3000 mg/1000 mL 0.9% NS IVPB        Ordering Provider: Eris Robbins PA    20 mg/kg × 249 kg Intravenous Once 05/06/22 0742 05/06/22 1014          Review of Systems  Pertinent items are noted in HPI, all other systems reviewed and negative    Objective     Physical Exam:   Vital Signs   Temp:  [97.1 °F (36.2 °C)-98.8 °F (37.1 °C)] 97.9 °F (36.6 °C)  Heart Rate:  [74-86] 74  Resp:  [16]  16  BP: (136-148)/(76-90) 141/84    GENERAL: Awake and alert, in no acute distress.  Morbid obesity.  HEENT: Oropharynx is clear. Hearing is grossly normal.   EYES: PERRL. No conjunctival injection. No lid lag.   LYMPHATICS: No lymphadenopathy of the neck or inguinal regions.   HEART: Regular rate and regular rhythm. No peripheral edema.   LUNGS: Clear to auscultation anteriorly with normal respiratory effort.   GI: Soft, nontender, nondistended. No appreciable organomegaly.   SKIN: Warmth and erythema of the right lower extremity.  Bilateral lower extremity lymphedema.  No purulence noted at the area of cellulitis.  No weeping.  PSYCHIATRIC: Appropriate mood, affect, insight, and judgment.     Results Review:   I reviewed the patient's new clinical results.  I reviewed the patient's new imaging results and agree with the interpretation.  I reviewed the patient's other test results and agree with the interpretation    Lab Results   Component Value Date    WBC 11.18 (H) 05/10/2022    HGB 13.3 05/10/2022    HCT 40.8 05/10/2022    MCV 87.2 05/10/2022     (H) 05/10/2022       Lab Results   Component Value Date    VANCOTROUGH 11.30 05/08/2022       Lab Results   Component Value Date    GLUCOSE 124 (H) 05/10/2022    BUN 5 (L) 05/10/2022    CREATININE 0.79 05/10/2022    EGFRIFNONA 116 11/10/2021    EGFRIFAFRI 134 11/10/2021    BCR 6.3 (L) 05/10/2022    CO2 25.8 05/10/2022    CALCIUM 9.6 05/10/2022    PROTENTOTREF 7.4 11/10/2021    ALBUMIN 2.90 (L) 05/06/2022    LABIL2 0.7 (L) 11/10/2021    AST 22 05/06/2022    ALT 16 05/06/2022         Estimated Creatinine Clearance: 230.5 mL/min (by C-G formula based on SCr of 0.79 mg/dL).      Microbiology:  5/6 blood cultures no growth to date    Radiology:  5/6 right lower extremity venous Doppler without evidence of DVT.    Assessment   #Right lower extremity cellulitis  #Bilateral lower extremity lymphedema.  #Morbid obesity BMI greater than 70  #Factor V Leiden deficiency and  history of pulmonary embolism on anticoagulation     Patient's right lower extremity cellulitis appears to have improved on empiric antibiotic therapy.  At this point would recommend transition to IV cefazolin 2 g every 6 hours (high dose due to morbid obesity).  Ultimately if he continues to improve would plan to discharge on high-dose Keflex 1 g 4 times daily to complete out 10 more days of therapy.    Thank you for this consult.  We will continue to follow along and tailor antibiotics as the patient's clinical course evolves.

## 2022-05-10 NOTE — PLAN OF CARE
Goal Outcome Evaluation:  Plan of Care Reviewed With: patient        Progress: improving  Outcome Evaluation: VSS. Pt up to bathroom and to chair today. Pt states legs feeling better. Antibiotics changed to cefazolin, given as ordered. Received one dose of tylenol. Family at bedside. No needs at this time

## 2022-05-11 LAB
ANION GAP SERPL CALCULATED.3IONS-SCNC: 11 MMOL/L (ref 5–15)
BACTERIA SPEC AEROBE CULT: NORMAL
BACTERIA SPEC AEROBE CULT: NORMAL
BASOPHILS # BLD AUTO: 0.05 10*3/MM3 (ref 0–0.2)
BASOPHILS NFR BLD AUTO: 0.6 % (ref 0–1.5)
BUN SERPL-MCNC: 5 MG/DL (ref 6–20)
BUN/CREAT SERPL: 8.2 (ref 7–25)
CALCIUM SPEC-SCNC: 8.4 MG/DL (ref 8.6–10.5)
CHLORIDE SERPL-SCNC: 102 MMOL/L (ref 98–107)
CO2 SERPL-SCNC: 24 MMOL/L (ref 22–29)
CREAT SERPL-MCNC: 0.61 MG/DL (ref 0.76–1.27)
DEPRECATED RDW RBC AUTO: 44.5 FL (ref 37–54)
EGFRCR SERPLBLD CKD-EPI 2021: 115.6 ML/MIN/1.73
EOSINOPHIL # BLD AUTO: 0.28 10*3/MM3 (ref 0–0.4)
EOSINOPHIL NFR BLD AUTO: 3.5 % (ref 0.3–6.2)
ERYTHROCYTE [DISTWIDTH] IN BLOOD BY AUTOMATED COUNT: 14.9 % (ref 12.3–15.4)
GLUCOSE SERPL-MCNC: 120 MG/DL (ref 65–99)
HCT VFR BLD AUTO: 34.7 % (ref 37.5–51)
HGB BLD-MCNC: 11.7 G/DL (ref 13–17.7)
IMM GRANULOCYTES # BLD AUTO: 0.13 10*3/MM3 (ref 0–0.05)
IMM GRANULOCYTES NFR BLD AUTO: 1.6 % (ref 0–0.5)
INR PPP: 2.04 (ref 0.9–1.1)
LYMPHOCYTES # BLD AUTO: 2.08 10*3/MM3 (ref 0.7–3.1)
LYMPHOCYTES NFR BLD AUTO: 26 % (ref 19.6–45.3)
MCH RBC QN AUTO: 28.1 PG (ref 26.6–33)
MCHC RBC AUTO-ENTMCNC: 33.7 G/DL (ref 31.5–35.7)
MCV RBC AUTO: 83.2 FL (ref 79–97)
MONOCYTES # BLD AUTO: 0.65 10*3/MM3 (ref 0.1–0.9)
MONOCYTES NFR BLD AUTO: 8.1 % (ref 5–12)
NEUTROPHILS NFR BLD AUTO: 4.8 10*3/MM3 (ref 1.7–7)
NEUTROPHILS NFR BLD AUTO: 60.2 % (ref 42.7–76)
NRBC BLD AUTO-RTO: 0 /100 WBC (ref 0–0.2)
PLATELET # BLD AUTO: 348 10*3/MM3 (ref 140–450)
PMV BLD AUTO: 9.3 FL (ref 6–12)
POTASSIUM SERPL-SCNC: 3.9 MMOL/L (ref 3.5–5.2)
PROTHROMBIN TIME: 23.1 SECONDS (ref 11.7–14.2)
RBC # BLD AUTO: 4.17 10*6/MM3 (ref 4.14–5.8)
SODIUM SERPL-SCNC: 137 MMOL/L (ref 136–145)
WBC NRBC COR # BLD: 7.99 10*3/MM3 (ref 3.4–10.8)

## 2022-05-11 PROCEDURE — 25010000002 CEFAZOLIN IN DEXTROSE 2-4 GM/100ML-% SOLUTION: Performed by: STUDENT IN AN ORGANIZED HEALTH CARE EDUCATION/TRAINING PROGRAM

## 2022-05-11 PROCEDURE — 85610 PROTHROMBIN TIME: CPT | Performed by: INTERNAL MEDICINE

## 2022-05-11 PROCEDURE — 85025 COMPLETE CBC W/AUTO DIFF WBC: CPT | Performed by: INTERNAL MEDICINE

## 2022-05-11 PROCEDURE — 80048 BASIC METABOLIC PNL TOTAL CA: CPT | Performed by: INTERNAL MEDICINE

## 2022-05-11 PROCEDURE — 99232 SBSQ HOSP IP/OBS MODERATE 35: CPT | Performed by: STUDENT IN AN ORGANIZED HEALTH CARE EDUCATION/TRAINING PROGRAM

## 2022-05-11 RX ADMIN — CEFAZOLIN SODIUM 2 G: 2 INJECTION, SOLUTION INTRAVENOUS at 17:10

## 2022-05-11 RX ADMIN — FAMOTIDINE 20 MG: 20 TABLET ORAL at 06:32

## 2022-05-11 RX ADMIN — FUROSEMIDE 40 MG: 40 TABLET ORAL at 08:49

## 2022-05-11 RX ADMIN — FAMOTIDINE 20 MG: 20 TABLET ORAL at 17:10

## 2022-05-11 RX ADMIN — PETROLATUM 1 APPLICATION: 420 OINTMENT TOPICAL at 21:55

## 2022-05-11 RX ADMIN — CEFAZOLIN SODIUM 2 G: 2 INJECTION, SOLUTION INTRAVENOUS at 04:34

## 2022-05-11 RX ADMIN — HYDROCODONE BITARTRATE AND ACETAMINOPHEN 1 TABLET: 7.5; 325 TABLET ORAL at 13:05

## 2022-05-11 RX ADMIN — TOPIRAMATE 25 MG: 25 TABLET, FILM COATED ORAL at 21:54

## 2022-05-11 RX ADMIN — Medication 10 ML: at 21:54

## 2022-05-11 RX ADMIN — PETROLATUM 1 APPLICATION: 420 OINTMENT TOPICAL at 08:48

## 2022-05-11 RX ADMIN — ACETAMINOPHEN 650 MG: 325 TABLET, FILM COATED ORAL at 01:45

## 2022-05-11 RX ADMIN — BUPROPION HYDROCHLORIDE 150 MG: 150 TABLET, EXTENDED RELEASE ORAL at 08:49

## 2022-05-11 RX ADMIN — CEFAZOLIN SODIUM 2 G: 2 INJECTION, SOLUTION INTRAVENOUS at 21:55

## 2022-05-11 RX ADMIN — Medication 10 ML: at 08:49

## 2022-05-11 RX ADMIN — TOPIRAMATE 25 MG: 25 TABLET, FILM COATED ORAL at 08:49

## 2022-05-11 RX ADMIN — WARFARIN 15 MG: 7.5 TABLET ORAL at 17:10

## 2022-05-11 RX ADMIN — CEFAZOLIN SODIUM 2 G: 2 INJECTION, SOLUTION INTRAVENOUS at 10:14

## 2022-05-11 NOTE — PLAN OF CARE
Goal Outcome Evaluation:     VSS on room air. Patient refused skin barrier application behind ear, sore in place d/t glasses. Given Norco x1 prn for pain. Will ctm.

## 2022-05-11 NOTE — PROGRESS NOTES
LOS: 5 days     Chief Complaint: Cellulitis    Interval History: Patient reports he is feeling better this morning.  Was up changing and using the bathroom with some return of redness to his lower extremities but otherwise thinks the swelling is improved.  Continues to have some pain.  Denies any fevers or chills.  States he is tolerating antibiotics well.    Vital Signs  Temp:  [97.1 °F (36.2 °C)-98 °F (36.7 °C)] 97.1 °F (36.2 °C)  Heart Rate:  [74-83] 74  Resp:  [16] 16  BP: (130-147)/(77-78) 140/77    Physical Exam:  General: In no acute distress.  Morbidly obese.  HEENT: Oropharynx clear, moist mucous membranes  Cardiovascular: RRR, normal S1 and S2, no M/R/G  Respiratory: Clear to ascultation bilaterally, no wheezing  GI: Soft, NT/ND, + bowel sounds bilaterally, no masses  Skin: No rashes or lesions  Extremities: Bilateral lower extremity edema that does improve somewhat with elevation of the legs.  Right lower extremity erythema improving.  No purulence.  Access: Peripheral IV.    Antibiotics:  Anti-Infectives (From admission, onward)    Ordered     Dose/Rate Route Frequency Start Stop    05/10/22 0945  ceFAZolin in dextrose (ANCEF) IVPB solution 2 g        Ordering Provider: Osman Tinoco DO    2 g  over 30 Minutes Intravenous Every 6 Hours 05/10/22 1045 05/20/22 1044    05/06/22 1200  piperacillin-tazobactam (ZOSYN) 3.375 g in iso-osmotic dextrose 50 ml (premix)        Ordering Provider: Ruslan Mock MD    3.375 g  over 30 Minutes Intravenous Once 05/06/22 1202 05/06/22 1441    05/06/22 0740  cefepime (MAXIPIME) 2 g/100 mL 0.9% NS (mbp)        Ordering Provider: Eris Robbins PA    2 g  200 mL/hr over 30 Minutes Intravenous Once 05/06/22 0742 05/06/22 1014    05/06/22 0740  vancomycin 3000 mg/1000 mL 0.9% NS IVPB        Ordering Provider: Eris Robbins PA    20 mg/kg × 249 kg Intravenous Once 05/06/22 0742 05/06/22 1014           Results Review:     I reviewed the patient's new clinical  results.    Lab Results   Component Value Date    WBC 7.99 05/11/2022    HGB 11.7 (L) 05/11/2022    HCT 34.7 (L) 05/11/2022    MCV 83.2 05/11/2022     05/11/2022     Lab Results   Component Value Date    GLUCOSE 120 (H) 05/11/2022    BUN 5 (L) 05/11/2022    CREATININE 0.61 (L) 05/11/2022    EGFRIFNONA 116 11/10/2021    EGFRIFAFRI 134 11/10/2021    BCR 8.2 05/11/2022    CO2 24.0 05/11/2022    CALCIUM 8.4 (L) 05/11/2022    PROTENTOTREF 7.4 11/10/2021    ALBUMIN 2.90 (L) 05/06/2022    LABIL2 0.7 (L) 11/10/2021    AST 22 05/06/2022    ALT 16 05/06/2022       Microbiology:  5/6 blood cultures no growth to date    Assessment    #Right lower extremity cellulitis  #Bilateral lower extremity lymphedema.  #Morbid obesity BMI greater than 70  #Factor V Leiden deficiency and history of pulmonary embolism on anticoagulation     Patient reports today that he has seen improvement in his swelling but does not feel comfortable with discharge today so continue IV cefazolin 2 g every 6 hours (high dose due to morbid obesity) today with ultimate plan to discharge on high-dose Keflex 1 g 4 times daily to complete on 5/20.      ID will follow.

## 2022-05-11 NOTE — PROGRESS NOTES
"    DAILY PROGRESS NOTE  [facility]    Patient Identification:  Name: Kameron Melendez  Age: 52 y.o.  Sex: male  :  1970  MRN: [id number]         Primary Care Physician: Hansel Patel DO    Subjective:  Patient is lying on the bed and is in no distress.  No new events overnight.  Denies nausea, vomiting, abdominal pain, chest pain.    Scheduled Meds:buPROPion XL, 150 mg, Oral, Daily  ceFAZolin, 2 g, Intravenous, Q6H  famotidine, 20 mg, Oral, BID AC  furosemide, 40 mg, Oral, Daily  Petrolatum, 1 application, Topical, Q12H  sodium chloride, 10 mL, Intravenous, Q12H  topiramate, 25 mg, Oral, BID  warfarin, 10 mg, Oral, Once per day on   warfarin, 15 mg, Oral, Once per day on Sun Mon Wed Fri Sat      Continuous Infusions:Pharmacy to dose warfarin,         Vital signs in last 24 hours:  Temp:  [97.1 °F (36.2 °C)-98 °F (36.7 °C)] 97.5 °F (36.4 °C)  Heart Rate:  [70-79] 73  Resp:  [16-18] 18  BP: (136-148)/(73-83) 136/73    Intake/Output:    Intake/Output Summary (Last 24 hours) at 2022 1640  Last data filed at 2022 0947  Gross per 24 hour   Intake 960 ml   Output --   Net 960 ml       Exam:  /73 (BP Location: Right arm, Patient Position: Lying)   Pulse 73   Temp 97.5 °F (36.4 °C) (Oral)   Resp 18   Ht 188 cm (74\")   Wt (!) 249 kg (548 lb)   SpO2 97%   BMI 70.36 kg/m²     General Appearance:    Alert, cooperative, no distress, AAOx3                         Throat:   Oral mucosa pink and moist                         Lungs:    Clear to auscultation bilaterally, respirations unlabored                 Chest Wall:    No tenderness or deformity                          Heart:    Regular rate and rhythm, S1 and S2 normal                 Abdomen:     Soft, nontender, bowel sounds active, MO                 Extremities: Moving all, advanced lymphedema to bilateral lower extremities with legs starting to show further accumulation of fluid, right lower extremity cellulitis is " continuing to show clinical improvement.  He has decreased warmth with decrease of erythema and no signs of abscess.  The most remaining warmth and erythema remains in the foot which is not unexpected as antibiotics have a difficult time permeating the extremities and legs with a severe case of lymphedema.                                    Neurologic:   CNII-XII intact, moving all     Data Review:  Labs in chart were reviewed.    Assessment:  Active Hospital Problems    Diagnosis  POA   • **Cellulitis of right lower extremity [L03.115]  Yes   • Long term (current) use of anticoagulants [Z79.01]  Not Applicable   • Paroxysmal atrial fibrillation (HCC) [I48.0]  Yes   • Heterozygous factor V Leiden mutation (HCC) [D68.51]  Yes   • Dyslipidemia [E78.5]  Yes   • Lymphedema of both lower extremities [I89.0]  Yes   • Pulmonary hypertension ([facility]) [I27.20]  Yes   • History of bilateral pulmonary embolism (CMS/HCC) [I26.99]  Yes   • Morbid obesity with body mass index of 60.0-69.9 in adult ([facility]) [E66.01, Z68.44]  Not Applicable   • ARNOLDO (obstructive sleep apnea) [G47.33]  Yes      Resolved Hospital Problems   No resolved problems to display.       1. severe right leg cellulitis with lymphedema, please note IV vancomycin and continuing IV cefazolin has been initiated.  Clinically improving and hopefully can be switched to p.o. tomorrow and then go home.  Right leg venous Dopplers did not reveal any evidence of DVT.  2.  Paroxysmal atrial relation, resumed warfarin and INR is mildly sub therapeutic range.  Pharmacy is dosing warfarin.  3.  History of bilateral pulmonary embolism along with factor V Leyden deficiency, as mentioned above continue with anticoagulation.  4.  Morbid obesity, patient was counseled to lose weight.           Larry Wise MD  5/11/2022  16:40 EDT

## 2022-05-11 NOTE — PROGRESS NOTES
Discharge Planning Assessment  [facility]     Patient Name: Kameron Melendez  MRN: [id number]  Today's Date: 5/11/2022    Admit Date: 5/6/2022     Discharge Needs Assessment    No documentation.                Discharge Plan     Row Name 05/11/22 1509       Plan    Plan Comments Shonda/Case mgmt for [employer] offered her assistance if any discharge needs arise [phone] Welch RN, CCP              Continued Care and Services - Admitted Since 5/6/2022     Home Medical Care     Service Provider Request Status Selected Services Address Phone Fax Patient Preferred     [facility]  Accepted N/A [address] [id number] [phone] --              Expected Discharge Date and Time     Expected Discharge Date Expected Discharge Time    May 13, 2022          Demographic Summary    No documentation.                Functional Status    No documentation.                Psychosocial    No documentation.                Abuse/Neglect    No documentation.                Legal    No documentation.                Substance Abuse    No documentation.                Patient Forms    No documentation.                   Lina Welch RN

## 2022-05-12 ENCOUNTER — HOME HEALTH ADMISSION (OUTPATIENT)
Dept: HOME HEALTH SERVICES | Facility: HOME HEALTHCARE | Age: 52
End: 2022-05-12

## 2022-05-12 ENCOUNTER — READMISSION MANAGEMENT (OUTPATIENT)
Dept: CALL CENTER | Facility: HOSPITAL | Age: 52
End: 2022-05-12

## 2022-05-12 VITALS
HEIGHT: 74 IN | WEIGHT: 315 LBS | TEMPERATURE: 97.3 F | OXYGEN SATURATION: 93 % | RESPIRATION RATE: 18 BRPM | DIASTOLIC BLOOD PRESSURE: 72 MMHG | SYSTOLIC BLOOD PRESSURE: 135 MMHG | BODY MASS INDEX: 40.43 KG/M2 | HEART RATE: 76 BPM

## 2022-05-12 LAB
ANION GAP SERPL CALCULATED.3IONS-SCNC: 12 MMOL/L (ref 5–15)
BASOPHILS # BLD AUTO: 0.06 10*3/MM3 (ref 0–0.2)
BASOPHILS NFR BLD AUTO: 0.6 % (ref 0–1.5)
BUN SERPL-MCNC: 7 MG/DL (ref 6–20)
BUN/CREAT SERPL: 10 (ref 7–25)
CALCIUM SPEC-SCNC: 9.2 MG/DL (ref 8.6–10.5)
CHLORIDE SERPL-SCNC: 100 MMOL/L (ref 98–107)
CO2 SERPL-SCNC: 23 MMOL/L (ref 22–29)
CREAT SERPL-MCNC: 0.7 MG/DL (ref 0.76–1.27)
DEPRECATED RDW RBC AUTO: 45.2 FL (ref 37–54)
EGFRCR SERPLBLD CKD-EPI 2021: 110.9 ML/MIN/1.73
EOSINOPHIL # BLD AUTO: 0.24 10*3/MM3 (ref 0–0.4)
EOSINOPHIL NFR BLD AUTO: 2.3 % (ref 0.3–6.2)
ERYTHROCYTE [DISTWIDTH] IN BLOOD BY AUTOMATED COUNT: 14.9 % (ref 12.3–15.4)
GLUCOSE SERPL-MCNC: 111 MG/DL (ref 65–99)
HCT VFR BLD AUTO: 37.2 % (ref 37.5–51)
HGB BLD-MCNC: 12.7 G/DL (ref 13–17.7)
IMM GRANULOCYTES # BLD AUTO: 0.1 10*3/MM3 (ref 0–0.05)
IMM GRANULOCYTES NFR BLD AUTO: 1 % (ref 0–0.5)
INR PPP: 2.02 (ref 0.9–1.1)
LYMPHOCYTES # BLD AUTO: 2.7 10*3/MM3 (ref 0.7–3.1)
LYMPHOCYTES NFR BLD AUTO: 25.8 % (ref 19.6–45.3)
MCH RBC QN AUTO: 28.5 PG (ref 26.6–33)
MCHC RBC AUTO-ENTMCNC: 34.1 G/DL (ref 31.5–35.7)
MCV RBC AUTO: 83.4 FL (ref 79–97)
MONOCYTES # BLD AUTO: 0.86 10*3/MM3 (ref 0.1–0.9)
MONOCYTES NFR BLD AUTO: 8.2 % (ref 5–12)
NEUTROPHILS NFR BLD AUTO: 6.5 10*3/MM3 (ref 1.7–7)
NEUTROPHILS NFR BLD AUTO: 62.1 % (ref 42.7–76)
NRBC BLD AUTO-RTO: 0 /100 WBC (ref 0–0.2)
PLATELET # BLD AUTO: 446 10*3/MM3 (ref 140–450)
PMV BLD AUTO: 9.6 FL (ref 6–12)
POTASSIUM SERPL-SCNC: 4.2 MMOL/L (ref 3.5–5.2)
PROTHROMBIN TIME: 22.9 SECONDS (ref 11.7–14.2)
RBC # BLD AUTO: 4.46 10*6/MM3 (ref 4.14–5.8)
SODIUM SERPL-SCNC: 135 MMOL/L (ref 136–145)
WBC NRBC COR # BLD: 10.46 10*3/MM3 (ref 3.4–10.8)

## 2022-05-12 PROCEDURE — 99232 SBSQ HOSP IP/OBS MODERATE 35: CPT | Performed by: STUDENT IN AN ORGANIZED HEALTH CARE EDUCATION/TRAINING PROGRAM

## 2022-05-12 PROCEDURE — 85610 PROTHROMBIN TIME: CPT | Performed by: INTERNAL MEDICINE

## 2022-05-12 PROCEDURE — 80048 BASIC METABOLIC PNL TOTAL CA: CPT | Performed by: INTERNAL MEDICINE

## 2022-05-12 PROCEDURE — 85025 COMPLETE CBC W/AUTO DIFF WBC: CPT | Performed by: INTERNAL MEDICINE

## 2022-05-12 PROCEDURE — 25010000002 CEFAZOLIN IN DEXTROSE 2-4 GM/100ML-% SOLUTION: Performed by: STUDENT IN AN ORGANIZED HEALTH CARE EDUCATION/TRAINING PROGRAM

## 2022-05-12 RX ORDER — CEPHALEXIN 500 MG/1
1000 CAPSULE ORAL EVERY 6 HOURS SCHEDULED
Status: DISCONTINUED | OUTPATIENT
Start: 2022-05-12 | End: 2022-05-12 | Stop reason: HOSPADM

## 2022-05-12 RX ORDER — CEPHALEXIN 500 MG/1
1000 CAPSULE ORAL EVERY 6 HOURS SCHEDULED
Qty: 64 CAPSULE | Refills: 0 | Status: SHIPPED | OUTPATIENT
Start: 2022-05-12 | End: 2022-05-19

## 2022-05-12 RX ADMIN — FUROSEMIDE 40 MG: 40 TABLET ORAL at 09:27

## 2022-05-12 RX ADMIN — ACETAMINOPHEN 650 MG: 325 TABLET, FILM COATED ORAL at 11:06

## 2022-05-12 RX ADMIN — CEFAZOLIN SODIUM 2 G: 2 INJECTION, SOLUTION INTRAVENOUS at 05:49

## 2022-05-12 RX ADMIN — HYDROCODONE BITARTRATE AND ACETAMINOPHEN 1 TABLET: 7.5; 325 TABLET ORAL at 16:20

## 2022-05-12 RX ADMIN — BUPROPION HYDROCHLORIDE 150 MG: 150 TABLET, EXTENDED RELEASE ORAL at 09:27

## 2022-05-12 RX ADMIN — Medication 10 ML: at 09:28

## 2022-05-12 RX ADMIN — CEFAZOLIN SODIUM 2 G: 2 INJECTION, SOLUTION INTRAVENOUS at 09:28

## 2022-05-12 RX ADMIN — PETROLATUM 1 APPLICATION: 420 OINTMENT TOPICAL at 09:29

## 2022-05-12 RX ADMIN — TOPIRAMATE 25 MG: 25 TABLET, FILM COATED ORAL at 09:27

## 2022-05-12 RX ADMIN — CEPHALEXIN 1000 MG: 500 CAPSULE ORAL at 16:18

## 2022-05-12 RX ADMIN — HYDROCODONE BITARTRATE AND ACETAMINOPHEN 1 TABLET: 7.5; 325 TABLET ORAL at 03:51

## 2022-05-12 RX ADMIN — FAMOTIDINE 20 MG: 20 TABLET ORAL at 09:27

## 2022-05-12 NOTE — PROGRESS NOTES
[facility] Clinical Pharmacy Services: Warfarin Dosing/Monitoring Consult    Kameron Melendez is a 52 y.o. male, estimated creatinine clearance is 298.5 mL/min (A) (by C-G formula based on SCr of 0.61 mg/dL (L)). weighing (!) 249 kg (548 lb).    Results from last 7 days   Lab Units 05/11/22  1941 05/11/22  0643 05/10/22  0720 05/09/22  0647 05/08/22  0536 05/07/22  0616 05/06/22  0822   INR  2.04*  --  1.88* 2.13* 2.60* 3.42*  --    HEMOGLOBIN g/dL  --  11.7* 13.3  --   --   --  11.9*   HEMATOCRIT %  --  34.7* 40.8  --   --   --  37.4*   PLATELETS 10*3/mm3  --  348 463*  --   --   --  257     Prior to admission anticoagulation: follows with  [facility], last visit 4/29:  warfarin 10 mg Tues/Thurs and 15 mg all other days    Hospital Anticoagulation:  Consulting provider: Dr. Wise   Start date: PTA medication, resumed inpatient 5/8/22  Indication: Afib + Hx of VTE  Target INR: 2 - 3  Expected duration: Indefinite    Bridge Therapy: No      Potential food or drug interactions:   Cefazolin - may increase anticoagulant effect    Education complete?/Date: [facility]     Assessment/Plan:  INR therapeutic at 2.04 (Goal 2-3).  Given this will continue current dose and recheck INR tomorrow. Continue home dose for now.  Monitor for any signs or symptoms of bleeding  Follow up daily INRs and dose adjustments    Pharmacy will continue to follow until discharge or discontinuation of warfarin.     Zan Arreguin, PharmD, BCIDP, BCPS  Clinical Pharmacist

## 2022-05-12 NOTE — PROGRESS NOTES
LOS: 6 days     Chief Complaint: Cellulitis    Interval History: Patient reports he is doing well this morning.  Denies any fevers or chills.  States he continues to have some pain and warmth of the right lower extremity.  Thinks the redness is slightly improved.  No fevers documented and he is without leukocytosis.  Tolerating antibiotics well.    Vital Signs  Temp:  [97 °F (36.1 °C)-97.7 °F (36.5 °C)] 97.2 °F (36.2 °C)  Heart Rate:  [70-87] 78  Resp:  [18-20] 20  BP: (132-152)/(72-83) 132/77    Physical Exam:  General: In no acute distress.  Morbidly obese.  HEENT: Oropharynx clear, moist mucous membranes  Cardiovascular: RRR, normal S1 and S2, no M/R/G  Respiratory: Clear to ascultation bilaterally, no wheezing  GI: Soft, NT/ND, + bowel sounds bilaterally, no masses  Skin: No rashes or lesions  Extremities: Right lower extremity erythema improving.  No purulence.  Access: Peripheral IV.    Antibiotics:  Anti-Infectives (From admission, onward)    Ordered     Dose/Rate Route Frequency Start Stop    05/10/22 0945  ceFAZolin in dextrose (ANCEF) IVPB solution 2 g        Ordering Provider: Osman Tinoco DO    2 g  over 30 Minutes Intravenous Every 6 Hours 05/10/22 1045 05/20/22 1044    05/06/22 1200  piperacillin-tazobactam (ZOSYN) 3.375 g in iso-osmotic dextrose 50 ml (premix)        Ordering Provider: Ruslan Mock MD    3.375 g  over 30 Minutes Intravenous Once 05/06/22 1202 05/06/22 1441    05/06/22 0740  cefepime (MAXIPIME) 2 g/100 mL 0.9% NS (mbp)        Ordering Provider: Eris Robbins PA    2 g  200 mL/hr over 30 Minutes Intravenous Once 05/06/22 0742 05/06/22 1014    05/06/22 0740  vancomycin 3000 mg/1000 mL 0.9% NS IVPB        Ordering Provider: Eris Robbins PA    20 mg/kg × 249 kg Intravenous Once 05/06/22 0742 05/06/22 1014           Results Review:     I reviewed the patient's new clinical results.    Lab Results   Component Value Date    WBC 10.46 05/12/2022    HGB 12.7 (L) 05/12/2022     HCT 37.2 (L) 05/12/2022    MCV 83.4 05/12/2022     05/12/2022     Lab Results   Component Value Date    GLUCOSE 120 (H) 05/11/2022    BUN 5 (L) 05/11/2022    CREATININE 0.61 (L) 05/11/2022    EGFRIFNONA 116 11/10/2021    EGFRIFAFRI 134 11/10/2021    BCR 8.2 05/11/2022    CO2 24.0 05/11/2022    CALCIUM 8.4 (L) 05/11/2022    PROTENTOTREF 7.4 11/10/2021    ALBUMIN 2.90 (L) 05/06/2022    LABIL2 0.7 (L) 11/10/2021    AST 22 05/06/2022    ALT 16 05/06/2022       Microbiology:  5/6 blood cultures no growth to date    Assessment    #Right lower extremity cellulitis  #Bilateral lower extremity lymphedema  #Morbid obesity BMI greater than 70  #Factor V Leiden deficiency and history of pulmonary embolism on anticoagulation     Continue IV cefazolin 2 g every 6 hours (high dose due to morbid obesity) for 1 more dose today today with ultimate plan to discharge later this evening on high-dose Keflex 1 g 4 times daily to complete on 5/20.      Thank you for allowing me to be involved in the care of this patient. Infectious diseases will sign off at this time with antibiotics plan in place, but please call me at [phone] if any further ID questions or new ID concerns.

## 2022-05-12 NOTE — OUTREACH NOTE
Prep Survey    Flowsheet Row Responses   [facility] patient discharged from? [place]   Is LACE score < 7 ? No   Emergency Room discharge w/ pulse ox? No   Eligibility [facility]   Date of Admission 05/06/22   Date of Discharge 05/12/22   Discharge Disposition Home or Self Care   Discharge diagnosis Right lower extremity cellulitis   Does the patient have one of the following disease processes/diagnoses(primary or secondary)? Other   Does the patient have Home health ordered? No   Is there a DME ordered? No   Prep survey completed? Yes          YULI WISDOM - Registered Nurse

## 2022-05-12 NOTE — PROGRESS NOTES
Case Management Discharge Note      Final Note: Discharged to home on 5/12/22. AMBER Welch RN, CCP    Provided Post Acute Provider List?: Yes  Post Acute Provider List: Home Health  Provided Post Acute Provider Quality & Resource List?: Yes  Post Acute Provider Quality and Resource List: Home Health  Delivered To: Patient  Method of Delivery: In person    Selected Continued Care - Discharged on 5/12/2022 Admission date: 5/6/2022 - Discharge disposition: Home or Self Care    Destination    No services have been selected for the patient.              Durable Medical Equipment    No services have been selected for the patient.              Dialysis/Infusion    No services have been selected for the patient.              Home Medical Care    No services have been selected for the patient.              Therapy    No services have been selected for the patient.              Community Resources    No services have been selected for the patient.              Community & DME    No services have been selected for the patient.                  Transportation Services  Private: Car    Final Discharge Disposition Code: 01 - home or self-care

## 2022-05-12 NOTE — PLAN OF CARE
Goal Outcome Evaluation:  Plan of Care Reviewed With: patient        Progress: improving  Outcome Evaluation: Pt complained of pain one time. PRN [place] given.  Pt up to bathroom.  Will continue to monitor.

## 2022-05-12 NOTE — DISCHARGE SUMMARY
[facility]  [phone]    DISCHARGE SUMMARY  [facility]    Patient Identification:  Name: Kameron Melendez  Age: 52 y.o.  Sex: male  :  1970  MRN: [id number]  Primary Care Physician: Hansel Patel DO    Admit date: 2022  Discharge date and time:      Discharge Diagnoses:  Cellulitis of right lower extremity    History of bilateral pulmonary embolism (CMS/HCC)    Pulmonary hypertension (HCC)    Lymphedema of both lower extremities    Dyslipidemia    Morbid obesity with body mass index of 60.0-69.9 in adult (HCC)    ARNOLDO (obstructive sleep apnea)    Heterozygous factor V Leiden mutation (HCC)    Paroxysmal atrial fibrillation (HCC)    Long term (current) use of anticoagulants       History of present illness from H&P:   is a wonderfully pleasant 52-year-old male who still is actively working and ultimately Saturday or  night he noticed temperatures of 101 with chills and he started noticed right lower extremity redness and warmth.  He has longstanding issues with lymphedema secondary to his morbid obesity though he claims not to be a diabetic and he Amaro is developing.  He had Keflex left over and he took that for a day or 2 and ultimately once that ran out he switched to doxycycline and took some additional doses of that.    He stated he continued to feel some subjective fever and chills.  He denies any nausea vomiting or diarrhea.  In the ER is found to have cellulitis of the right lower extremity and he does have a past history of coagulopathy of which she is anticoagulated on Coumadin with a supratherapeutic INR 3.2.  Doppler of the lower extremities are negative in the ER.  Patient was given vancomycin and cefepime and A was called to further admit and I was happy to oblige.    Hospital Course:     As noted the patient is admitted with right leg cellulitis and lymphedema.  He was placed on vancomycin and cefazolin and had  improvement in erythema.  Patient was seen in consultation by infectious disease who on the day of discharge feel the patient is stable for discharge on high-dose Keflex 1 g 4 times a day until 5/20.    1. severe right leg cellulitis with lymphedema  -Right leg venous Dopplers did not reveal any evidence of DVT.    2.  Paroxysmal atrial relation, resumed warfarin and INR is mildly sub therapeutic range.  Pharmacy dosed warfarin during hospitalization patient can restart warfarin on discharge.    3.  History of bilateral pulmonary embolism along with factor V Leyden deficiency, as mentioned above continue with anticoagulation.    4.  Morbid obesity, patient was counseled to lose weight.    The patient was seen and examined on the day of discharge.    Consults:   Consults     Date and Time Order Name Status Description    5/9/2022  5:14 PM Inpatient Infectious Diseases Consult Completed     5/6/2022 10:27 AM [facility] (on-call MD unless specified) Details            Results from last 7 days   Lab Units 05/12/22  0802   WBC 10*3/mm3 10.46   HEMOGLOBIN g/dL 12.7*   HEMATOCRIT % 37.2*   PLATELETS 10*3/mm3 446       Results from last 7 days   Lab Units 05/12/22  0802   SODIUM mmol/L 135*   POTASSIUM mmol/L 4.2   CHLORIDE mmol/L 100   CO2 mmol/L 23.0   BUN mg/dL 7   CREATININE mg/dL 0.70*   GLUCOSE mg/dL 111*   CALCIUM mg/dL 9.2       Significant Diagnostic Studies:   No results found for: WBC, HGB, HCT, PLT  No results found for: NA, K, CL, CO2, BUN, CREATININE, GLUCOSE  No results found for: CALCIUM, MG, PHOS  No results found for: AST, ALT, ALKPHOS  INR   Date Value Ref Range Status   05/13/2022 2.30  Final     No results found for: COLORU, CLARITYU, SPECGRAV, PHUR, PROTEINUR, GLUCOSEU, KETONESU, BLOODU, NITRITE, LEUKOCYTESUR, BILIRUBINUR, UROBILINOGEN, RBCUA, WBCUA, BACTERIA, UACOMMENT  No results found for: TROPONINT, TROPONINI, BNP  No components found for: HGBA1C;2  No components found for: TSH;2    Imaging Results (All)      None      No results found for: SITE, ALLENTEST, PHART, JAW6QXI, PO2ART, FZB7YYP, BASEEXCESS, X9ZFNJCR, HGBBG, HCTABG, OXYHEMOGLOBI, METHHGBN, CARBOXYHGB, CO2CT, BAROMETRIC, MODALITY, FIO2       Discharge Medications      New Medications      Instructions Start Date   cephalexin 500 MG capsule  Commonly known as: KEFLEX   1,000 mg, Oral, Every 6 Hours Scheduled         Changes to Medications      Instructions Start Date   furosemide 80 MG tablet  Commonly known as: LASIX  What changed:   · when to take this  · reasons to take this   80 mg, Oral, Daily      warfarin 10 MG tablet  Commonly known as: COUMADIN  What changed: Another medication with the same name was changed. Make sure you understand how and when to take each.   15 mg, Oral, See Admin Instructions, Takes 1.5 tablet (15 mg) Monday, Wednesday, Friday, Saturday, and Sunday      warfarin 10 MG tablet  Commonly known as: COUMADIN  What changed:   · how much to take  · how to take this  · when to take this  · additional instructions   TAKE 1 TABLET ON TUESDAY, THURSDAY, SUNDAY AND TAKE ONE AND ONE-HALF TABLETS ALL OTHER DAYS OR AS DIRECTED         Continue These Medications      Instructions Start Date   acetaminophen 500 MG tablet  Commonly known as: TYLENOL   500 mg, Oral, Every 6 Hours PRN      buPROPion  MG 24 hr tablet  Commonly known as: WELLBUTRIN XL   150 mg, Oral, Daily      topiramate 25 MG tablet  Commonly known as: TOPAMAX   25 mg, Oral, 2 Times Daily         Stop These Medications    potassium chloride 20 MEQ CR tablet  Commonly known as: K-DUR,KLOR-CON              Patient Instructions:       Future Appointments   Date Time Provider Department Center   5/19/2022 10:45 AM Hansel Patel DO [facility]   6/3/2022  9:00 AM Hansel Patel DO [facility]         Follow-up Information     Hansel Patel DO .    Specialties: Family Medicine, Urgent Care, Emergency Medicine  Contact information:  [address]  [facility] [address]  [phone]                         Discharge Order (From admission, onward)     Start     Ordered    05/12/22 1157  Discharge patient  Once        Expected Discharge Date: 05/12/22    Discharge Disposition: Home or Self Care    Physician of Record for Attribution - Please select from Treatment Team: JACINDA MCMULLEN [[id number]]    Review needed by CMO to determine Physician of Record: No       Question Answer Comment   Physician of Record for Attribution - Please select from Treatment Team JACINDA MCMULLEN    Review needed by CMO to determine Physician of Record No        05/12/22 1158                Diet Order   Procedures   • Diet Regular       TEST  RESULTS PENDING AT DISCHARGE        Discharge instructions:  Follow up with your primary care provider in 1-2 weeks with a cbc and cmp         Total time spent discharging patient including evaluation, post hospitalization follow up,  medication and post hospitalization instructions and education, total time exceeds 30 minutes.    Signed:  Lucio Starkey MD  5/12/2022  11:59 EDT

## 2022-05-13 ENCOUNTER — ANTICOAGULATION VISIT (OUTPATIENT)
Dept: PHARMACY | Facility: HOSPITAL | Age: 52
End: 2022-05-13

## 2022-05-13 ENCOUNTER — TRANSITIONAL CARE MANAGEMENT TELEPHONE ENCOUNTER (OUTPATIENT)
Dept: CALL CENTER | Facility: HOSPITAL | Age: 52
End: 2022-05-13

## 2022-05-13 DIAGNOSIS — I26.99 OTHER ACUTE PULMONARY EMBOLISM WITHOUT ACUTE COR PULMONALE: Primary | ICD-10-CM

## 2022-05-13 DIAGNOSIS — I26.99 BILATERAL PULMONARY EMBOLISM: ICD-10-CM

## 2022-05-13 LAB — INR PPP: 2.3

## 2022-05-13 NOTE — OUTREACH NOTE
Call Center TCM Note    Flowsheet Row Responses   [facility] patient discharged from? [place]   Does the patient have one of the following disease processes/diagnoses(primary or secondary)? Other   TCM attempt successful? Yes   Call start time 1528   Call end time 1530   Discharge diagnosis Right lower extremity cellulitis   Meds reviewed with patient/caregiver? Yes   Is the patient having any side effects they believe may be caused by any medication additions or changes? No   Does the patient have all medications ordered at discharge? Yes   Is the patient taking all medications as directed (includes completed medication regime)? Yes   Does the patient have a primary care provider?  Yes   Does the patient have an appointment with their PCP within 7 days of discharge? Yes   Comments regarding PCP PCP APPOINTMENT IS 5/19/22   Has the patient kept scheduled appointments due by today? N/A   Has home health visited the patient within 72 hours of discharge? N/A   Home health comments DID NOT NEED HH   Psychosocial issues? No   Did the patient receive a copy of their discharge instructions? Yes   Nursing interventions Reviewed instructions with patient   What is the patient's perception of their health status since discharge? Improving   Is the patient/caregiver able to teach back signs and symptoms related to disease process for when to call PCP? Yes   Is the patient/caregiver able to teach back signs and symptoms related to disease process for when to call 911? Yes   Is the patient/caregiver able to teach back the hierarchy of who to call/visit for symptoms/problems? PCP, Specialist, Home health nurse, Urgent Care, ED, 911 Yes   TCM call completed? Yes          Tamara Irving LPN    5/13/2022, 15:31 EDT

## 2022-05-13 NOTE — PAYOR COMM NOTE
"DISCHARGED   REF #[id number]      Kameron Covarrubias (52 y.o. Male)             Date of Birth   1970    Social Security Number       Address   [address] [place] [address]    Home Phone   [phone]    MRN   [id number]       [facility]    Marital Status                               Admission Date   5/6/22    Admission Type   Emergency    Admitting Provider   Rusaln Mock MD    Attending Provider       Department, Room/Bed   [address], P485/1       Discharge Date   5/12/2022    Discharge Disposition   Home or Self Care    Discharge Destination                               Attending Provider: (none)   Allergies: No Known Allergies    Isolation: None   Infection: None   Code Status: Prior   Advance Care Planning Activity    Ht: 188 cm (74\")   Wt: 249 kg (548 lb)    Admission Cmt: None   Principal Problem: Cellulitis of right lower extremity [L03.115]                 Active Insurance as of 5/6/2022     Primary Coverage     Payor Plan Insurance Group Employer/Plan Group    [employer] PPO [id number]     Payor Plan Address Payor Plan Phone Number Payor Plan Fax Number Effective Dates    [address] [phone]  5/1/2019 - None Entered    [place] [address]       Subscriber Name Subscriber Birth Date Member ID       KAMERON COVARRUBIAS 1970 [id number]                 Emergency Contacts      (Rel.) Home Phone Work Phone Mobile Phone    Yaya Covarrubias (Spouse) [phone] -- [phone]            Discharge Summary    No notes of this type exist for this encounter.         Discharge Order (From admission, onward)     Start     Ordered    05/12/22 1157  Discharge patient  Once        Expected Discharge Date: 05/12/22    Discharge Disposition: Home or Self Care    Physician of Record for Attribution - Please select from Treatment Team: JACINDA MCMULLEN [[id number]]    Review needed by CMO to " determine Physician of Record: No       Question Answer Comment   Physician of Record for Attribution - Please select from Treatment Team JACINDA MCMULLEN    Review needed by CMO to determine Physician of Record No        05/12/22 5927

## 2022-05-13 NOTE — PROGRESS NOTES
Anticoagulation Clinic Progress Note    Anticoagulation Summary  As of 2022    INR goal:  2.0-3.0   TTR:  69.2 % (3.2 y)   INR used for dosin.30 (2022)   Warfarin maintenance plan:  10 mg every Tue, Thu; 15 mg all other days   Weekly warfarin total:  95 mg   No change documented:  Geraldine Marie, Pharmacy Technician   Plan last modified:  Tessie Fatima RPH (3/4/2022)   Next INR check:  2022   Priority:  High   Target end date:  Indefinite    Indications    Other acute pulmonary embolism without acute cor pulmonale (HCC) [I26.99]  History of bilateral pulmonary embolism (CMS/HCC) [I26.99]             Anticoagulation Episode Summary     INR check location:      Preferred lab:      Send INR reminders to:   [facility]  [facility]    Comments:  new  frankie 2019 **WEEKLY FRANKIE**      Anticoagulation Care Providers     Provider Role Specialty Phone number    Torri Colmenares APRN Referring Cardiology [phone]    Elsy Baeza MD Responsible Cardiology [phone]          Clinic Interview:  Patient Findings     Positives:  Emergency department visit, Hospital admission    Negatives:  Signs/symptoms of thrombosis, Signs/symptoms of bleeding,   Laboratory test error suspected, Change in health, Change in alcohol use,   Change in activity, Upcoming invasive procedure, Upcoming dental   procedure, Missed doses, Extra doses, Change in medications, Change in   diet/appetite, Bruising, Other complaints    Comments:  On zosyn and vacnomycin during stay, d/c on cephalexin 1000mg   q6h       Clinical Outcomes     Negatives:  Major bleeding event, Thromboembolic event,   Anticoagulation-related hospital admission, Anticoagulation-related ED   visit, Anticoagulation-related fatality    Comments:  On zosyn and vacnomycin during stay, d/c on cephalexin 1000mg   q6h         INR History:  Anticoagulation Monitoring 2022   INR 1.60 2.20 2.30   INR Date 2022  4/29/2022 5/13/2022   INR Goal 2.0-3.0 2.0-3.0 2.0-3.0   Trend Same Same Same   Last Week Total 95 mg 100 mg 65 mg   Next Week Total 100 mg 95 mg 95 mg   Sun 15 mg 15 mg 15 mg   Mon 15 mg 15 mg 15 mg   Tue 10 mg 10 mg 10 mg   Wed 15 mg 15 mg 15 mg   Thu 10 mg 10 mg 10 mg   Fri 20 mg (4/22) 15 mg 15 mg   Sat 15 mg 15 mg 15 mg   Visit Report - - -   Some recent data might be hidden       Plan:  1. INR is Therapeutic today- see above in Anticoagulation Summary.   Will instruct Kameron Melendez to Continue their warfarin regimen- see above in Anticoagulation Summary.  2. Follow up in 1 weeks  3. They have been instructed to call if any changes in medications, doses, concerns, etc. Patient expresses understanding and has no further questions at this time.    Tonja Kaye, PharmD

## 2022-05-16 NOTE — PAYOR COMM NOTE
"CONTINUED STAY REVIEW  REF #[id number]  P:  [phone]  F:  [phone]      Kameron Covarrubias (52 y.o. Male)             Date of Birth   1970    Social Security Number       Address   [address] [place] [id number]    Home Phone   [phone]    MRN   [id number]       Church   [facility]    Marital Status                               Admission Date   5/6/22    Admission Type   Emergency    Admitting Provider   Ruslan Mock MD    Attending Provider       Department, Room/Bed   [facility] [place], P485/1       Discharge Date   5/12/2022    Discharge Disposition   Home or Self Care    Discharge Destination                               Attending Provider: (none)   Allergies: No Known Allergies    Isolation: None   Infection: None   Code Status: Prior   Advance Care Planning Activity    Ht: 188 cm (74\")   Wt: 249 kg (548 lb)    Admission Cmt: None   Principal Problem: Cellulitis of right lower extremity [L03.115]                 Active Insurance as of 5/6/2022     Primary Coverage     Payor Plan Insurance Group Employer/Plan Group    [employer]HEM BLUE CROSS ANTHEM BLUE CROSS BLUE SHIELD PPO [id number]     Payor Plan Address Payor Plan Phone Number Payor Plan Fax Number Effective Dates    [address] [phone]  5/1/2019 - None Entered    [place] [address]       Subscriber Name Subscriber Birth Date Member ID       KAMERON COVARRUBIAS 1970 [id number]                 Emergency Contacts      (Rel.) Home Phone Work Phone Mobile Phone    Yaya Covarrubias (Spouse) [phone] -- [phone]            Vital Signs (last day) before discharge     Date/Time Temp Temp src Pulse Resp BP Patient Position SpO2    05/12/22 1025 97.3 (36.3) Oral 76 18 135/72 Lying 93    05/12/22 0531 97.2 (36.2) Oral 78 20 132/77 Lying 91    05/11/22 2113 97 (36.1) Oral 78 20 136/72 Lying 94    05/11/22 1729 97.2 (36.2) Oral 87 20 152/83 Lying 92    05/11/22 1436 97.5 (36.4) Oral " 73 18 136/73 Lying 97    05/11/22 0956 97.7 (36.5) Oral 70 18 148/83 Sitting 93    05/11/22 0507 97.1 (36.2) Oral 74 16 140/77 Lying 95          Oxygen Therapy (last day) before discharge     Date/Time SpO2 Device (Oxygen Therapy) Flow (L/min) Oxygen Concentration (%) ETCO2 (mmHg)    05/12/22 1025 93 room air -- -- --    05/12/22 0800 -- room air -- -- --    05/12/22 0531 91 room air -- -- --    05/11/22 2155 -- room air -- -- --    05/11/22 2113 94 room air -- -- --    05/11/22 1729 92 room air -- -- --    05/11/22 1436 97 -- -- -- --    05/11/22 1130 -- room air -- -- --    05/11/22 0956 93 -- -- -- --    05/11/22 0507 95 room air -- -- --          Lines, Drains & Airways     Active LDAs     None          Inactive LDAs     Name Placement date Placement time Removal date Removal time Site Days    [REMOVED] Peripheral IV 05/06/22 0821 Anterior;Left Forearm 05/06/22  0821  05/12/22  1639  Forearm  6    [REMOVED] External Urinary Catheter 11/08/21  0900  05/12/22  1639  --  185                  Medication Administration Report for Kameron Melendez as of 05/16/22 1006   Legend:    Given Hold Not Given Due Canceled Entry Other Actions    Time Time (Time) Time  Time-Action       Discontinued     Completed     Future     MAR Hold     Linked           Medications 05/10/22 05/11/22 05/12/22   Completed Medications    ceFAZolin in dextrose (ANCEF) IVPB solution 2 g  Dose: 2 g  Freq: Every 6 Hours Route: IV  Indications of Use: SKIN AND SOFT TISSUE INFECTION  Start: 05/10/22 1045   End: 05/12/22 0958   Admin Instructions:   Caution: Look alike/sound alike drug alert    1123-New Bag     1703-New Bag     2224-New Bag        0434-New Bag     1014-New Bag     1710-New Bag       2155-New Bag          0549-New Bag     0928-New Bag     1045-Canceled Entry [C]           cefepime (MAXIPIME) 2 g/100 mL 0.9% NS (mbp)  Dose: 2 g  Freq: Once Route: IV  Indications of Use: SKIN AND SOFT TISSUE INFECTION  Start: 05/06/22 0742   End:  05/06/22 1014   Admin Instructions:   Break seal and mix to activate vial before use          piperacillin-tazobactam (ZOSYN) 3.375 g in iso-osmotic dextrose 50 ml (premix)  Dose: 3.375 g  Freq: Once Route: IV  Start: 05/06/22 1202   End: 05/06/22 1441   Admin Instructions:   Refrigerate          potassium chloride (K-DUR,KLOR-CON) ER tablet 40 mEq  Dose: 40 mEq  Freq: Every 4 Hours Route: PO  Start: 05/08/22 1230   End: 05/08/22 1755   Admin Instructions:   Swallow whole; do not crush, split, or chew.          vancomycin 3000 mg/1000 mL 0.9% NS IVPB  Dose: 20 mg/kg  Weight Dosing Info: 249 kg  Freq: Once Route: IV  Indications of Use: SKIN AND SOFT TISSUE INFECTION  Start: 05/06/22 0742   End: 05/06/22 1014          warfarin (COUMADIN) tablet 10 mg  Dose: 10 mg  Freq: Once (Warfarin) Route: PO  Indications of Use: ATRIAL FIBRILLATION,DVT/PE (active thrombosis)  Start: 05/08/22 1800   End: 05/08/22 1755   Admin Instructions:   Food-Drug Interaction  If INR Greater Than Target, Contact Pharmacy Prior to Giving Dose.  Acutely Hazardous. Waste BOTH Residual Medication and/or Empty Package. .   Order specific questions:   Target INR 2 - 3           Discontinued Medications  Medications 05/10/22 05/11/22 05/12/22       buPROPion XL (WELLBUTRIN XL) 24 hr tablet 150 mg  Dose: 150 mg  Freq: Daily Route: PO  Start: 05/06/22 1300   End: 05/12/22 1917   Admin Instructions:   Caution: Look alike/sound alike drug alert. Swallow whole.  Do not crush, chew, or split tablet.    0838-Given          0849-Given          0927-Given             cephalexin (KEFLEX) capsule 1,000 mg  Dose: 1,000 mg  Freq: Every 6 Hours Scheduled Route: PO  Indications of Use: SKIN AND SOFT TISSUE INFECTION  Start: 05/12/22 1600   End: 05/12/22 1917   Admin Instructions:   Take with food if GI upset occurs.      1618-Given             famotidine (PEPCID) tablet 20 mg  Dose: 20 mg  Freq: 2 Times Daily Before Meals Route: PO  Start: 05/06/22 1730   End:  05/12/22 1917    0838-Given     1703-Given         0632-Given     1710-Given         0927-Given             furosemide (LASIX) tablet 40 mg  Dose: 40 mg  Freq: Daily Route: PO  Start: 05/08/22 1230   End: 05/12/22 1917    0838-Given          0849-Given          0927-Given             Petrolatum ointment 1 application  Dose: 1 application  Freq: Every 12 Hours Scheduled Route: TOP  Start: 05/07/22 1245   End: 05/12/22 1917   Admin Instructions:   Cover lower extremities    0838-Given     2120-Given         0848-Given     2155-Given         0929-Given             piperacillin-tazobactam (ZOSYN) 3.375 g in iso-osmotic dextrose 50 ml (premix)  Dose: 3.375 g  Freq: Every 8 Hours Route: IV  Indications of Use: SKIN AND SOFT TISSUE INFECTION  Start: 05/06/22 2000   End: 05/10/22 0945   Admin Instructions:   Refrigerate    0017-New Bag     (0953)-Not Given [C]              sodium chloride 0.9 % flush 10 mL  Dose: 10 mL  Freq: Every 12 Hours Scheduled Route: IV  Start: 05/06/22 1203   End: 05/12/22 1917    0838-Given     2120-Given         0849-Given     2154-Given         0928-Given             topiramate (TOPAMAX) tablet 25 mg  Dose: 25 mg  Freq: 2 Times Daily Route: PO  Start: 05/06/22 1300   End: 05/12/22 1917   Admin Instructions:   Swallow whole; do not crush, split or chew tablet.    0838-Given     2120-Given         0849-Given     2154-Given         0927-Given             vancomycin (VANCOCIN) 1000 mg/200 mL dextrose 5% IVPB  Dose: 1,000 mg  Freq: Every 8 Hours Route: IV  Indications of Use: SKIN AND SOFT TISSUE INFECTION  Start: 05/07/22 1815   End: 05/09/22 1341          vancomycin 1250 mg/250 mL 0.9% NS IVPB ([facility])  Dose: 1,250 mg  Freq: Every 8 Hours Route: IV  Indications of Use: SKIN AND SOFT TISSUE INFECTION  Start: 05/09/22 1800   End: 05/10/22 0945    0221-New Bag               vancomycin 1500 mg/500 mL 0.9% NS IVPB ([facility])  Dose: 1,500 mg  Freq: Every 12 Hours Route: IV  Indications of Use: SKIN AND SOFT  TISSUE INFECTION  Start: 05/06/22 2200   End: 05/07/22 0923          warfarin (COUMADIN) tablet 10 mg  Dose: 10 mg  Freq: Once per day on Tue Thu Route: PO  Indications of Use: ATRIAL FIBRILLATION,history of DVT/PE  Start: 05/10/22 1800   End: 05/12/22 1917   Admin Instructions:   Food-Drug Interaction  If INR Greater Than Target, Contact Pharmacy Prior to Giving Dose.  Acutely Hazardous. Waste BOTH Residual Medication and/or Empty Package. .   Order specific questions:   Target INR 2 - 3      1703-Given               warfarin (COUMADIN) tablet 15 mg  Dose: 15 mg  Freq: Once per day on Sun Mon Wed Fri Sat Route: PO  Indications of Use: ATRIAL FIBRILLATION,history of DVT/PE  Start: 05/09/22 1738   End: 05/12/22 1917   Admin Instructions:   Food-Drug Interaction  If INR Greater Than Target, Contact Pharmacy Prior to Giving Dose.  Acutely Hazardous. Waste BOTH Residual Medication and/or Empty Package. .   Order specific questions:   Target INR 2 - 3       1710-Given              warfarin (COUMADIN) tablet 7.5 mg  Dose: 7.5 mg  Freq: Once (Warfarin) Route: PO  Indications of Use: ATRIAL FIBRILLATION,DVT/PE (active thrombosis)  Start: 05/08/22 1800   End: 05/08/22 1138   Admin Instructions:   Food-Drug Interaction  If INR Greater Than Target, Contact Pharmacy Prior to Giving Dose.  Acutely Hazardous. Waste BOTH Residual Medication and/or Empty Package. .   Order specific questions:   Target INR 2 - 3            warfarin (COUMADIN) tablet 7.5 mg  Dose: 7.5 mg  Freq: Daily Warfarin Route: PO  Indications of Use: ATRIAL FIBRILLATION,DVT/PE (active thrombosis)  Start: 05/08/22 1800   End: 05/08/22 1108   Admin Instructions:   Food-Drug Interaction  If INR Greater Than Target, Contact Pharmacy Prior to Giving Dose.  Acutely Hazardous. Waste BOTH Residual Medication and/or Empty Package. .   Order specific questions:   Target INR 2 - 3                 ,   Medication Administration Report for Kameron Melendez as of 05/16/22  1006   Legend:    Given Hold Not Given Due Canceled Entry Other Actions    Time Time (Time) Time  Time-Action       Discontinued     Completed     Future     MAR Hold     Linked           Medications 05/10/22 05/11/22 05/12/22   Discontinued Medications    Pharmacy Consult  Freq: Continuous PRN Route: XX  PRN Reason: Consult  Start: 05/09/22 1714   End: 05/09/22 1724   Order specific questions:   Consult for warfarin dosing, hx of a.fib and PE            Pharmacy Consult - Pharmacy to dose  Freq: Continuous PRN Route: XX  PRN Reason: Consult  Start: 05/11/22 1641   End: 05/12/22 0812   Order specific questions:   Pharmacy to Dose: warfarin  Indication of Use history of PE            Pharmacy to dose vancomycin  Freq: Continuous PRN Route: XX  PRN Comment: 0  Indications of Use: SKIN AND SOFT TISSUE INFECTION  Start: 05/06/22 1159   End: 05/10/22 0945          Pharmacy to dose warfarin  Freq: Continuous PRN Route: XX  PRN Reason: Consult  Indications of Use: ATRIAL FIBRILLATION,history of DVT/PE  Start: 05/09/22 1722   End: 05/12/22 1917   Admin Instructions:   Targeted INR Information (720h ago, onward)             warfarin (COUMADIN) tablet 10 mg  Once (Warfarin)        Question:  Target INR  Answer:  2 - 3               If INR Greater Than Target, Contact Pharmacy Prior to Giving Dose.  Acutely Hazardous. Waste BOTH Residual Medication and/or Empty Package. .   Order specific questions:   Target INR 2 - 3                  and   Medication Administration Report for Kameron Melendez as of 05/16/22 1006   Legend:    Given Hold Not Given Due Canceled Entry Other Actions    Time Time (Time) Time  Time-Action       Discontinued     Completed     Future     MAR Hold     Linked           Medications 05/10/22 05/11/22 05/12/22   Discontinued Medications    acetaminophen (TYLENOL) tablet 650 mg  Dose: 650 mg  Freq: Every 4 Hours PRN Route: PO  PRN Reason: Mild Pain   Start: 05/06/22 1201   End: 05/12/22 1917   Admin  Instructions:   Do not exceed 4 grams of acetaminophen in a 24 hr period. Max dose of 2gm for AST/ALT greater than 120 units/L    If given for fever, use fever parameter: fever greater than 100.4 °F.    If given for pain, use the following pain scale:   Mild Pain = Pain Score of 1-3, CPOT 1-2  Moderate Pain = Pain Score of 4-6, CPOT 3-4  Severe Pain = Pain Score of 7-10, CPOT 5-8    1315-Given          0145-Given          1106-Given             HYDROcodone-acetaminophen (NORCO) 7.5-325 MG per tablet 1 tablet  Dose: 1 tablet  Freq: Every 6 Hours PRN Route: PO  PRN Reason: Moderate Pain   Start: 05/08/22 1141   End: 05/12/22 1917   Admin Instructions:   [QUINN]    Do not exceed 4 grams of acetaminophen in a 24 hr period. Max dose of 2gm for AST/ALT greater than 120 units/L        If given for pain, use the following pain scale:   Mild Pain = Pain Score of 1-3, CPOT 1-2  Moderate Pain = Pain Score of 4-6, CPOT 3-4  Severe Pain = Pain Score of 7-10, CPOT 5-8    2120-Given          1305-Given          0351-Given     1620-Given            ondansetron (ZOFRAN) tablet 4 mg  Dose: 4 mg  Freq: Every 6 Hours PRN Route: PO  PRN Reasons: Nausea,Vomiting  Start: 05/06/22 1201   End: 05/12/22 1917   Admin Instructions:   If BOTH ondansetron (ZOFRAN) and promethazine (PHENERGAN) are ordered use ondansetron first and THEN promethazine IF ondansetron is ineffective.         Or  ondansetron (ZOFRAN) injection 4 mg  Dose: 4 mg  Freq: Every 6 Hours PRN Route: IV  PRN Reasons: Nausea,Vomiting  Start: 05/06/22 1201   End: 05/12/22 1917   Admin Instructions:   If BOTH ondansetron (ZOFRAN) and promethazine (PHENERGAN) are ordered use ondansetron first and THEN promethazine IF ondansetron is ineffective.          Pharmacy Consult  Freq: Continuous PRN Route: XX  PRN Reason: Consult  Start: 05/09/22 1714   End: 05/09/22 1724   Order specific questions:   Consult for warfarin dosing, zaina and PE            Pharmacy Consult - Pharmacy to  dose  Freq: Continuous PRN Route: XX  PRN Reason: Consult  Start: 05/11/22 1641   End: 05/12/22 0812   Order specific questions:   Pharmacy to Dose: warfarin  Indication of Use history of PE            Pharmacy to dose vancomycin  Freq: Continuous PRN Route: XX  PRN Comment: 0  Indications of Use: SKIN AND SOFT TISSUE INFECTION  Start: 05/06/22 1159   End: 05/10/22 0945          Pharmacy to dose warfarin  Freq: Continuous PRN Route: XX  PRN Reason: Consult  Indications of Use: ATRIAL FIBRILLATION,history of DVT/PE  Start: 05/09/22 1722   End: 05/12/22 1917   Admin Instructions:   Targeted INR Information (720h ago, onward)             warfarin (COUMADIN) tablet 10 mg  Once (Warfarin)        Question:  Target INR  Answer:  2 - 3               If INR Greater Than Target, Contact Pharmacy Prior to Giving Dose.  Acutely Hazardous. Waste BOTH Residual Medication and/or Empty Package. .   Order specific questions:   Target INR 2 - 3            sodium chloride 0.9 % flush 10 mL  Dose: 10 mL  Freq: As Needed Route: IV  PRN Reason: Line Care  Start: 05/06/22 1200   End: 05/12/22 1917          sodium chloride 0.9 % flush 10 mL  Dose: 10 mL  Freq: As Needed Route: IV  PRN Reason: Line Care  Start: 05/06/22 0737   End: 05/12/22 1917                    Physician Progress Notes       Osman Tinoco, DO at 05/12/22 0908           LOS: 6 days     Chief Complaint: Cellulitis    Interval History: Patient reports he is doing well this morning.  Denies any fevers or chills.  States he continues to have some pain and warmth of the right lower extremity.  Thinks the redness is slightly improved.  No fevers documented and he is without leukocytosis.  Tolerating antibiotics well.    Vital Signs  Temp:  [97 °F (36.1 °C)-97.7 °F (36.5 °C)] 97.2 °F (36.2 °C)  Heart Rate:  [70-87] 78  Resp:  [18-20] 20  BP: (132-152)/(72-83) 132/77    Physical Exam:  General: In no acute distress.  Morbidly obese.  HEENT: Oropharynx clear, moist mucous  membranes  Cardiovascular: RRR, normal S1 and S2, no M/R/G  Respiratory: Clear to ascultation bilaterally, no wheezing  GI: Soft, NT/ND, + bowel sounds bilaterally, no masses  Skin: No rashes or lesions  Extremities: Right lower extremity erythema improving.  No purulence.  Access: Peripheral IV.    Antibiotics:  Anti-Infectives (From admission, onward)    Ordered     Dose/Rate Route Frequency Start Stop    05/10/22 0945  ceFAZolin in dextrose (ANCEF) IVPB solution 2 g        Ordering Provider: Osman Tinoco DO    2 g  over 30 Minutes Intravenous Every 6 Hours 05/10/22 1045 05/20/22 1044    05/06/22 1200  piperacillin-tazobactam (ZOSYN) 3.375 g in iso-osmotic dextrose 50 ml (premix)        Ordering Provider: Ruslan Mock MD    3.375 g  over 30 Minutes Intravenous Once 05/06/22 1202 05/06/22 1441    05/06/22 0740  cefepime (MAXIPIME) 2 g/100 mL 0.9% NS (mbp)        Ordering Provider: Eris Robbins PA    2 g  200 mL/hr over 30 Minutes Intravenous Once 05/06/22 0742 05/06/22 1014    05/06/22 0740  vancomycin 3000 mg/1000 mL 0.9% NS IVPB        Ordering Provider: Eris Robbins PA    20 mg/kg × 249 kg Intravenous Once 05/06/22 0742 05/06/22 1014           Results Review:     I reviewed the patient's new clinical results.    Lab Results   Component Value Date    WBC 10.46 05/12/2022    HGB 12.7 (L) 05/12/2022    HCT 37.2 (L) 05/12/2022    MCV 83.4 05/12/2022     05/12/2022     Lab Results   Component Value Date    GLUCOSE 120 (H) 05/11/2022    BUN 5 (L) 05/11/2022    CREATININE 0.61 (L) 05/11/2022    EGFRIFNONA 116 11/10/2021    EGFRIFAFRI 134 11/10/2021    BCR 8.2 05/11/2022    CO2 24.0 05/11/2022    CALCIUM 8.4 (L) 05/11/2022    PROTENTOTREF 7.4 11/10/2021    ALBUMIN 2.90 (L) 05/06/2022    LABIL2 0.7 (L) 11/10/2021    AST 22 05/06/2022    ALT 16 05/06/2022       Microbiology:  5/6 blood cultures no growth to date    Assessment    #Right lower extremity cellulitis  #Bilateral lower extremity  lymphedema  #Morbid obesity BMI greater than 70  #Factor V Leiden deficiency and history of pulmonary embolism on anticoagulation     Continue IV cefazolin 2 g every 6 hours (high dose due to morbid obesity) for 1 more dose today today with ultimate plan to discharge later this evening on high-dose Keflex 1 g 4 times daily to complete on .      Thank you for allowing me to be involved in the care of this patient. Infectious diseases will sign off at this time with antibiotics plan in place, but please call me at [phone] if any further ID questions or new ID concerns.        Electronically signed by Osman Tinoco DO at 22 0912     Larry Wise MD at 22 1540              DAILY PROGRESS NOTE  [facility]    Patient Identification:  Name: Kameron Melendez  Age: 52 y.o.  Sex: male  :  1970  MRN: [id number]         Primary Care Physician: Hansel Patel DO    Subjective:  Patient is lying on the bed and is in no distress.  No new events overnight.  Denies nausea, vomiting, abdominal pain, chest pain.    Scheduled Meds:buPROPion XL, 150 mg, Oral, Daily  ceFAZolin, 2 g, Intravenous, Q6H  famotidine, 20 mg, Oral, BID AC  furosemide, 40 mg, Oral, Daily  Petrolatum, 1 application, Topical, Q12H  sodium chloride, 10 mL, Intravenous, Q12H  topiramate, 25 mg, Oral, BID  warfarin, 10 mg, Oral, Once per day on   warfarin, 15 mg, Oral, Once per day on Sun Mon Wed Fri Sat      Continuous Infusions:Pharmacy to dose warfarin,         Vital signs in last 24 hours:  Temp:  [97.1 °F (36.2 °C)-98 °F (36.7 °C)] 97.5 °F (36.4 °C)  Heart Rate:  [70-79] 73  Resp:  [16-18] 18  BP: (136-148)/(73-83) 136/73    Intake/Output:    Intake/Output Summary (Last 24 hours) at 2022 1640  Last data filed at 2022 0947  Gross per 24 hour   Intake 960 ml   Output --   Net 960 ml       Exam:  /73 (BP Location: Right arm, Patient Position: Lying)   Pulse 73   Temp 97.5 °F (36.4  "°C) (Oral)   Resp 18   Ht 188 cm (74\")   Wt (!) 249 kg (548 lb)   SpO2 97%   BMI 70.36 kg/m²     General Appearance:    Alert, cooperative, no distress, AAOx3                         Throat:   Oral mucosa pink and moist                         Lungs:    Clear to auscultation bilaterally, respirations unlabored                 Chest Wall:    No tenderness or deformity                          Heart:    Regular rate and rhythm, S1 and S2 normal                 Abdomen:     Soft, nontender, bowel sounds active, MO                 Extremities: Moving all, advanced lymphedema to bilateral lower extremities with legs starting to show further accumulation of fluid, right lower extremity cellulitis is continuing to show clinical improvement.  He has decreased warmth with decrease of erythema and no signs of abscess.  The most remaining warmth and erythema remains in the foot which is not unexpected as antibiotics have a difficult time permeating the extremities and legs with a severe case of lymphedema.                                    Neurologic:   CNII-XII intact, moving all     Data Review:  Labs in chart were reviewed.    Assessment:  Active Hospital Problems    Diagnosis  POA   • **Cellulitis of right lower extremity [L03.115]  Yes   • Long term (current) use of anticoagulants [Z79.01]  Not Applicable   • Paroxysmal atrial fibrillation (HCC) [I48.0]  Yes   • Heterozygous factor V Leiden mutation (HCC) [D68.51]  Yes   • Dyslipidemia [E78.5]  Yes   • Lymphedema of both lower extremities [I89.0]  Yes   • Pulmonary hypertension (HCC) [I27.20]  Yes   • History of bilateral pulmonary embolism (CMS/HCC) [I26.99]  Yes   • Morbid obesity with body mass index of 60.0-69.9 in adult (HCC) [E66.01, Z68.44]  Not Applicable   • ARNOLDO (obstructive sleep apnea) [G47.33]  Yes      Resolved Hospital Problems   No resolved problems to display.       1. severe right leg cellulitis with lymphedema, please note IV vancomycin and " continuing IV cefazolin has been initiated.  Clinically improving and hopefully can be switched to p.o. tomorrow and then go home.  Right leg venous Dopplers did not reveal any evidence of DVT.  2.  Paroxysmal atrial relation, resumed warfarin and INR is mildly sub therapeutic range.  Pharmacy is dosing warfarin.  3.  History of bilateral pulmonary embolism along with factor V Leyden deficiency, as mentioned above continue with anticoagulation.  4.  Morbid obesity, patient was counseled to lose weight.           Larry Wise MD  5/11/2022  16:40 EDT      Electronically signed by Larry Wise MD at 05/11/22 1642     Osman Tinoco DO at 05/11/22 0950           LOS: 5 days     Chief Complaint: Cellulitis    Interval History: Patient reports he is feeling better this morning.  Was up changing and using the bathroom with some return of redness to his lower extremities but otherwise thinks the swelling is improved.  Continues to have some pain.  Denies any fevers or chills.  States he is tolerating antibiotics well.    Vital Signs  Temp:  [97.1 °F (36.2 °C)-98 °F (36.7 °C)] 97.1 °F (36.2 °C)  Heart Rate:  [74-83] 74  Resp:  [16] 16  BP: (130-147)/(77-78) 140/77    Physical Exam:  General: In no acute distress.  Morbidly obese.  HEENT: Oropharynx clear, moist mucous membranes  Cardiovascular: RRR, normal S1 and S2, no M/R/G  Respiratory: Clear to ascultation bilaterally, no wheezing  GI: Soft, NT/ND, + bowel sounds bilaterally, no masses  Skin: No rashes or lesions  Extremities: Bilateral lower extremity edema that does improve somewhat with elevation of the legs.  Right lower extremity erythema improving.  No purulence.  Access: Peripheral IV.    Antibiotics:  Anti-Infectives (From admission, onward)    Ordered     Dose/Rate Route Frequency Start Stop    05/10/22 0965  ceFAZolin in dextrose (ANCEF) IVPB solution 2 g        Ordering Provider: Osman Tinoco DO    2 g  over 30 Minutes  Intravenous Every 6 Hours 05/10/22 1045 05/20/22 1044    05/06/22 1200  piperacillin-tazobactam (ZOSYN) 3.375 g in iso-osmotic dextrose 50 ml (premix)        Ordering Provider: Ruslan Mock MD    3.375 g  over 30 Minutes Intravenous Once 05/06/22 1202 05/06/22 1441    05/06/22 0740  cefepime (MAXIPIME) 2 g/100 mL 0.9% NS (mbp)        Ordering Provider: Eris Robbins PA    2 g  200 mL/hr over 30 Minutes Intravenous Once 05/06/22 0742 05/06/22 1014    05/06/22 0740  vancomycin 3000 mg/1000 mL 0.9% NS IVPB        Ordering Provider: Eris Robbins PA    20 mg/kg × 249 kg Intravenous Once 05/06/22 0742 05/06/22 1014           Results Review:     I reviewed the patient's new clinical results.    Lab Results   Component Value Date    WBC 7.99 05/11/2022    HGB 11.7 (L) 05/11/2022    HCT 34.7 (L) 05/11/2022    MCV 83.2 05/11/2022     05/11/2022     Lab Results   Component Value Date    GLUCOSE 120 (H) 05/11/2022    BUN 5 (L) 05/11/2022    CREATININE 0.61 (L) 05/11/2022    EGFRIFNONA 116 11/10/2021    EGFRIFAFRI 134 11/10/2021    BCR 8.2 05/11/2022    CO2 24.0 05/11/2022    CALCIUM 8.4 (L) 05/11/2022    PROTENTOTREF 7.4 11/10/2021    ALBUMIN 2.90 (L) 05/06/2022    LABIL2 0.7 (L) 11/10/2021    AST 22 05/06/2022    ALT 16 05/06/2022       Microbiology:  5/6 blood cultures no growth to date    Assessment    #Right lower extremity cellulitis  #Bilateral lower extremity lymphedema.  #Morbid obesity BMI greater than 70  #Factor V Leiden deficiency and history of pulmonary embolism on anticoagulation     Patient reports today that he has seen improvement in his swelling but does not feel comfortable with discharge today so continue IV cefazolin 2 g every 6 hours (high dose due to morbid obesity) today with ultimate plan to discharge on high-dose Keflex 1 g 4 times daily to complete on 5/20.      ID will follow.       Electronically signed by Osman Tinoco DO at 05/11/22 5590     Larry Wise MD at  "05/10/22 1401              DAILY PROGRESS NOTE  [facility]    Patient Identification:  Name: Kameron Melendez  Age: 52 y.o.  Sex: male  :  1970  MRN: [id number]         Primary Care Physician: Hansel Patel, DO    Subjective:  Patient is lying on the bed and is in no distress.  No new events overnight.  Denies nausea, vomiting, abdominal pain, chest pain.    Scheduled Meds:buPROPion XL, 150 mg, Oral, Daily  ceFAZolin, 2 g, Intravenous, Q6H  famotidine, 20 mg, Oral, BID AC  furosemide, 40 mg, Oral, Daily  Petrolatum, 1 application, Topical, Q12H  sodium chloride, 10 mL, Intravenous, Q12H  topiramate, 25 mg, Oral, BID  warfarin, 10 mg, Oral, Once per day on   warfarin, 15 mg, Oral, Once per day on Sun Mon Wed Fri Sat      Continuous Infusions:Pharmacy to dose warfarin,         Vital signs in last 24 hours:  Temp:  [97.4 °F (36.3 °C)-98.8 °F (37.1 °C)] 97.4 °F (36.3 °C)  Heart Rate:  [74-86] 83  Resp:  [16] 16  BP: (130-148)/(77-90) 130/77    Intake/Output:    Intake/Output Summary (Last 24 hours) at 5/10/2022 1901  Last data filed at 5/10/2022 1314  Gross per 24 hour   Intake 1260 ml   Output 1550 ml   Net -290 ml       Exam:  /77 (BP Location: Right arm, Patient Position: Lying)   Pulse 83   Temp 97.4 °F (36.3 °C) (Oral)   Resp 16   Ht 188 cm (74\")   Wt (!) 249 kg (548 lb)   SpO2 94%   BMI 70.36 kg/m²     General Appearance:    Alert, cooperative, no distress, AAOx3                         Throat:   Oral mucosa pink and moist                         Lungs:    Clear to auscultation bilaterally, respirations unlabored                 Chest Wall:    No tenderness or deformity                          Heart:    Regular rate and rhythm, S1 and S2 normal                 Abdomen:     Soft, nontender, bowel sounds active, MO                 Extremities: Moving all, advanced lymphedema to bilateral lower extremities with legs starting to show further accumulation of fluid, right " lower extremity cellulitis is continuing to show clinical improvement.  He has decreased warmth with decrease of erythema and no signs of abscess.  The most remaining warmth and erythema remains in the foot which is not unexpected as antibiotics have a difficult time permeating the extremities and legs with a severe case of lymphedema.                                    Neurologic:   CNII-XII intact, moving all     Data Review:  Labs in chart were reviewed.    Assessment:  Active Hospital Problems    Diagnosis  POA   • **Cellulitis of right lower extremity [L03.115]  Yes   • Long term (current) use of anticoagulants [Z79.01]  Not Applicable   • Paroxysmal atrial fibrillation (HCC) [I48.0]  Yes   • Heterozygous factor V Leiden mutation (HCC) [D68.51]  Yes   • Dyslipidemia [E78.5]  Yes   • Lymphedema of both lower extremities [I89.0]  Yes   • Pulmonary hypertension (HCC) [I27.20]  Yes   • History of bilateral pulmonary embolism (CMS/HCC) [I26.99]  Yes   • Morbid obesity with body mass index of 60.0-69.9 in adult ([facility]) [E66.01, Z68.44]  Not Applicable   • ARNOLDO (obstructive sleep apnea) [G47.33]  Yes      Resolved Hospital Problems   No resolved problems to display.       #1 severe right leg cellulitis with lymphedema, please note IV vancomycin and continuing IV cefazolin has been initiated.  2.  Paroxysmal atrial relation, resumed warfarin and INR is mildly sub therapeutic range.  Pharmacy is dosing warfarin.  3.  History of bilateral pulmonary embolism along with factor V Leyden deficiency, as mentioned above continue with anticoagulation.  4.  Morbid obesity, patient was counseled to lose weight.           Larry Wise MD  5/10/2022  19:01 EDT      Electronically signed by Larry Wise MD at 05/10/22 1902     Larry Wise MD at 22 1411              DAILY PROGRESS NOTE  [facility]    Patient Identification:  Name: Kameron Melendez  Age: 52 y.o.  Sex: male  :   "1970  MRN: [id number]         Primary Care Physician: Hansel Patel DO    Subjective:  Patient is lying on the bed and is in no distress.  No new events overnight.  Denies nausea, vomiting, abdominal pain, chest pain.    Scheduled Meds:buPROPion XL, 150 mg, Oral, Daily  famotidine, 20 mg, Oral, BID AC  furosemide, 40 mg, Oral, Daily  Petrolatum, 1 application, Topical, Q12H  piperacillin-tazobactam, 3.375 g, Intravenous, Q8H  sodium chloride, 10 mL, Intravenous, Q12H  topiramate, 25 mg, Oral, BID  vancomycin, 1,250 mg, Intravenous, Q8H      Continuous Infusions:Pharmacy to dose vancomycin,         Vital signs in last 24 hours:  Temp:  [97.1 °F (36.2 °C)-98.3 °F (36.8 °C)] 97.1 °F (36.2 °C)  Heart Rate:  [69-95] 83  Resp:  [16] 16  BP: (129-143)/(70-81) 136/76    Intake/Output:    Intake/Output Summary (Last 24 hours) at 5/9/2022 1711  Last data filed at 5/9/2022 1622  Gross per 24 hour   Intake 1250 ml   Output 5875 ml   Net -4625 ml       Exam:  /76 (BP Location: Right arm, Patient Position: Lying)   Pulse 83   Temp 97.1 °F (36.2 °C) (Oral)   Resp 16   Ht 188 cm (74\")   Wt (!) 249 kg (548 lb)   SpO2 96%   BMI 70.36 kg/m²     General Appearance:    Alert, cooperative, no distress, AAOx3                         Throat:   Oral mucosa pink and moist                         Lungs:    Clear to auscultation bilaterally, respirations unlabored                 Chest Wall:    No tenderness or deformity                          Heart:    Regular rate and rhythm, S1 and S2 normal                 Abdomen:     Soft, nontender, bowel sounds active, MO                 Extremities: Moving all, advanced lymphedema to bilateral lower extremities with legs starting to show further accumulation of fluid, right lower extremity cellulitis is continuing to show clinical improvement.  He has decreased warmth with decrease of erythema and no signs of abscess.  The most remaining warmth and erythema remains in the foot " which is not unexpected as antibiotics have a difficult time permeating the extremities and legs with a severe case of lymphedema.                                    Neurologic:   CNII-XII intact, moving all     Data Review:  Labs in chart were reviewed.    Assessment:  Active Hospital Problems    Diagnosis  POA   • **Cellulitis of right lower extremity [L03.115]  Yes   • Long term (current) use of anticoagulants [Z79.01]  Not Applicable   • Paroxysmal atrial fibrillation (HCC) [I48.0]  Yes   • Heterozygous factor V Leiden mutation (HCC) [D68.51]  Yes   • Dyslipidemia [E78.5]  Yes   • Lymphedema of both lower extremities [I89.0]  Yes   • Pulmonary hypertension (HCC) [I27.20]  Yes   • History of bilateral pulmonary embolism (CMS/HCC) [I26.99]  Yes   • Morbid obesity with body mass index of 60.0-69.9 in adult ([facility]) [E66.01, Z68.44]  Not Applicable   • ARNOLDO (obstructive sleep apnea) [G47.33]  Yes      Resolved Hospital Problems   No resolved problems to display.       #1 severe right leg cellulitis with lymphedema, patient is currently on IV vancomycin and Zosyn and will be continued and infectious disease consult will also be obtained.  2.  Paroxysmal atrial relation, resume warfarin and INR is in the therapeutic range.  3.  History of bilateral pulmonary embolism along with factor V Leyden deficiency, as mentioned above continue with anticoagulation.  4.  Morbid obesity, patient was counseled to lose weight.           Larry Wise MD  5/9/2022  17:11 EDT      Electronically signed by Larry Wise MD at 05/09/22 9917            Consult Notes      Osman Tinoco DO at 05/10/22 0929      Consult Orders    1. Inpatient Infectious Diseases Consult [[id number]] ordered by Larry Wise MD at 05/09/22 1573               Referring Provider: Ruslan Mock MD  Reason for Consultation: Cellulitis  Chief Complaint   Patient presents with   • Leg Pain     Right leg, red, painful swelling          Subjective   History of present illness: Patient is a 52-year-old male with a past medical history of morbid obesity and lower extremity lymphedema who presents in the setting of swelling of the right leg concerning for cellulitis.  ID was consulted for antibiotic recommendations.    Patient reports that he first noticed starting symptoms around 4/30 when he developed chills.  He states the next day he noticed some redness of his right lower extremity.  Reports he normally has difficulty with cellulitis of the left lower extremity but this was on the right.  States his left lower extremity is within normal limits that has been since the beginning of this episode.  Reports he noticed increasing redness, pain and swelling of the right lower extremity.  He took 2 different types of antibiotics at home that included Keflex and later switched to doxycycline.  He notes that these had little effect.  Chills have now resolved however he continues to have pain of the right lower extremity.  States that with IV antibiotics here he has seen marked improvement of his swelling, redness and pain.  Denies any fevers or chills.      Since presentation he has been without fever but does have minor leukocytosis today.  Started on empiric vancomycin and Zosyn and tolerated this well.  Procalcitonin and lactate have been within normal limits.    Past Medical History:   Diagnosis Date   • DVT (deep venous thrombosis) (HCC)     RLE   • Factor V Leiden (HCC)    • Hypertension    • Kidney stone    • Lymphedema    • Lymphedema of left lower extremity    • Obesity    • ARNOLDO (obstructive sleep apnea)    • Pulmonary embolism (HCC)     bilateral   • Pulmonary hypertension (HCC)    • Right ventricular enlargement        Past Surgical History:   Procedure Laterality Date   • CARDIAC CATHETERIZATION Bilateral 7/18/2017    Procedure: Pulmonary angiography- [place] ;  Surgeon: Cedric Coulter MD;  Location: [facility] INVASIVE LOCATION;  Service:     • CARDIAC CATHETERIZATION N/A 7/18/2017    Procedure: Right Heart Cath;  Surgeon: Cedric Coulter MD;  Location:  [facility] INVASIVE LOCATION;  Service:    • THROMBECTOMY         family history includes Bradycardia in his mother; Heart disease in his mother; Heart failure in his mother; No Known Problems in his father, maternal grandfather, maternal grandmother, paternal grandfather, and paternal grandmother.     reports that he has been smoking cigars and pipe. He started smoking about 16 years ago. He has never used smokeless tobacco. He reports that he does not drink alcohol and does not use drugs.     No Known Allergies    Medication:  Antibiotics:  Anti-Infectives (From admission, onward)    Ordered     Dose/Rate Route Frequency Start Stop    05/10/22 0945  ceFAZolin in dextrose (ANCEF) IVPB solution 2 g        Ordering Provider: Osman Tinoco,     2 g  over 30 Minutes Intravenous Every 6 Hours 05/10/22 1045 05/20/22 1044    05/06/22 1200  piperacillin-tazobactam (ZOSYN) 3.375 g in iso-osmotic dextrose 50 ml (premix)        Ordering Provider: Ruslan Mock MD    3.375 g  over 30 Minutes Intravenous Once 05/06/22 1202 05/06/22 1441    05/06/22 0740  cefepime (MAXIPIME) 2 g/100 mL 0.9% NS (mbp)        Ordering Provider: Eris Robbins PA    2 g  200 mL/hr over 30 Minutes Intravenous Once 05/06/22 0742 05/06/22 1014    05/06/22 0740  vancomycin 3000 mg/1000 mL 0.9% NS IVPB        Ordering Provider: Eris Robbins PA    20 mg/kg × 249 kg Intravenous Once 05/06/22 0742 05/06/22 1014          Review of Systems  Pertinent items are noted in HPI, all other systems reviewed and negative    Objective     Physical Exam:   Vital Signs   Temp:  [97.1 °F (36.2 °C)-98.8 °F (37.1 °C)] 97.9 °F (36.6 °C)  Heart Rate:  [74-86] 74  Resp:  [16] 16  BP: (136-148)/(76-90) 141/84    GENERAL: Awake and alert, in no acute distress.  Morbid obesity.  HEENT: Oropharynx is clear. Hearing is grossly normal.   EYES: PERRL. No  conjunctival injection. No lid lag.   LYMPHATICS: No lymphadenopathy of the neck or inguinal regions.   HEART: Regular rate and regular rhythm. No peripheral edema.   LUNGS: Clear to auscultation anteriorly with normal respiratory effort.   GI: Soft, nontender, nondistended. No appreciable organomegaly.   SKIN: Warmth and erythema of the right lower extremity.  Bilateral lower extremity lymphedema.  No purulence noted at the area of cellulitis.  No weeping.  PSYCHIATRIC: Appropriate mood, affect, insight, and judgment.     Results Review:   I reviewed the patient's new clinical results.  I reviewed the patient's new imaging results and agree with the interpretation.  I reviewed the patient's other test results and agree with the interpretation    Lab Results   Component Value Date    WBC 11.18 (H) 05/10/2022    HGB 13.3 05/10/2022    HCT 40.8 05/10/2022    MCV 87.2 05/10/2022     (H) 05/10/2022       Lab Results   Component Value Date    VANCOTROUGH 11.30 05/08/2022       Lab Results   Component Value Date    GLUCOSE 124 (H) 05/10/2022    BUN 5 (L) 05/10/2022    CREATININE 0.79 05/10/2022    EGFRIFNONA 116 11/10/2021    EGFRIFAFRI 134 11/10/2021    BCR 6.3 (L) 05/10/2022    CO2 25.8 05/10/2022    CALCIUM 9.6 05/10/2022    PROTENTOTREF 7.4 11/10/2021    ALBUMIN 2.90 (L) 05/06/2022    LABIL2 0.7 (L) 11/10/2021    AST 22 05/06/2022    ALT 16 05/06/2022         Estimated Creatinine Clearance: 230.5 mL/min (by C-G formula based on SCr of 0.79 mg/dL).      Microbiology:  5/6 blood cultures no growth to date    Radiology:  5/6 right lower extremity venous Doppler without evidence of DVT.    Assessment   #Right lower extremity cellulitis  #Bilateral lower extremity lymphedema.  #Morbid obesity BMI greater than 70  #Factor V Leiden deficiency and history of pulmonary embolism on anticoagulation     Patient's right lower extremity cellulitis appears to have improved on empiric antibiotic therapy.  At this point would  recommend transition to IV cefazolin 2 g every 6 hours (high dose due to morbid obesity).  Ultimately if he continues to improve would plan to discharge on high-dose Keflex 1 g 4 times daily to complete out 10 more days of therapy.    Thank you for this consult.  We will continue to follow along and tailor antibiotics as the patient's clinical course evolves.      Electronically signed by Osman Tinoco DO at 05/10/22 1283

## 2022-05-19 ENCOUNTER — OFFICE VISIT (OUTPATIENT)
Dept: FAMILY MEDICINE CLINIC | Facility: CLINIC | Age: 52
End: 2022-05-19

## 2022-05-19 VITALS
HEIGHT: 74 IN | WEIGHT: 315 LBS | HEART RATE: 91 BPM | SYSTOLIC BLOOD PRESSURE: 142 MMHG | RESPIRATION RATE: 20 BRPM | OXYGEN SATURATION: 98 % | DIASTOLIC BLOOD PRESSURE: 76 MMHG | BODY MASS INDEX: 40.43 KG/M2 | TEMPERATURE: 98.8 F

## 2022-05-19 DIAGNOSIS — L03.115 CELLULITIS OF RIGHT LOWER EXTREMITY: Primary | ICD-10-CM

## 2022-05-19 DIAGNOSIS — I89.0 LYMPHEDEMA: ICD-10-CM

## 2022-05-19 PROCEDURE — 96372 THER/PROPH/DIAG INJ SC/IM: CPT | Performed by: FAMILY MEDICINE

## 2022-05-19 PROCEDURE — 99495 TRANSJ CARE MGMT MOD F2F 14D: CPT | Performed by: FAMILY MEDICINE

## 2022-05-19 RX ORDER — ACETAMINOPHEN AND CODEINE PHOSPHATE 300; 30 MG/1; MG/1
1 TABLET ORAL EVERY 4 HOURS PRN
Qty: 18 TABLET | Refills: 1 | Status: SHIPPED | OUTPATIENT
Start: 2022-05-19 | End: 2022-12-16 | Stop reason: SDUPTHER

## 2022-05-19 RX ORDER — DOXYCYCLINE HYCLATE 100 MG/1
100 CAPSULE ORAL 2 TIMES DAILY
Qty: 20 CAPSULE | Refills: 0 | Status: SHIPPED | OUTPATIENT
Start: 2022-05-19 | End: 2022-06-01

## 2022-05-19 RX ORDER — CEFTRIAXONE 1 G/1
2 INJECTION, POWDER, FOR SOLUTION INTRAMUSCULAR; INTRAVENOUS ONCE
Status: COMPLETED | OUTPATIENT
Start: 2022-05-19 | End: 2022-05-19

## 2022-05-19 RX ADMIN — CEFTRIAXONE 2 G: 1 INJECTION, POWDER, FOR SOLUTION INTRAMUSCULAR; INTRAVENOUS at 12:33

## 2022-05-19 NOTE — PROGRESS NOTES
Subjective   Kameron Melendez is a 52 y.o. male. Presents today for   Chief Complaint   Patient presents with   • hospital followup     Yazidism fup    • Cellulitis   • Edema     Legs feet pain        History of present illness from H&P:   is a wonderfully pleasant 52-year-old male who still is actively working and ultimately Saturday or Sunday night he noticed temperatures of 101 with chills and he started noticed right lower extremity redness and warmth.  He has longstanding issues with lymphedema secondary to his morbid obesity though he claims not to be a diabetic and he Amaro is developing.  He had Keflex left over and he took that for a day or 2 and ultimately once that ran out he switched to doxycycline and took some additional doses of that.    He stated he continued to feel some subjective fever and chills.  He denies any nausea vomiting or diarrhea.  In the ER is found to have cellulitis of the right lower extremity and he does have a past history of coagulopathy of which she is anticoagulated on Coumadin with a supratherapeutic INR 3.2.  Doppler of the lower extremities are negative in the ER.  Patient was given vancomycin and cefepime and A was called to further admit and I was happy to oblige.     Hospital Course:      As noted the patient is admitted with right leg cellulitis and lymphedema.  He was placed on vancomycin and cefazolin and had improvement in erythema.  Patient was seen in consultation by infectious disease who on the day of discharge feel the patient is stable for discharge on high-dose Keflex 1 g 4 times a day until 5/20.     1. severe right leg cellulitis with lymphedema  -Right leg venous Dopplers did not reveal any evidence of DVT.     2.  Paroxysmal atrial relation, resumed warfarin and INR is mildly sub therapeutic range.  Pharmacy dosed warfarin during hospitalization patient can restart warfarin on discharge.     3.  History of bilateral pulmonary embolism along with  factor V Leyden deficiency, as mentioned above continue with anticoagulation.     4.  Morbid obesity, patient was counseled to lose weight.     Cellulitis  This is a recurrent problem. The current episode started 1 to 4 weeks ago. The problem occurs constantly. The problem has been gradually worsening. Pertinent negatives include no chills or fever. Associated symptoms comments: Reports swelling improved;  F/c resolved prior to hospitalizaiton, but concerned as redness extending and worsening.  Reports redness, warmth, swelling and very painful now.. Exacerbated by: Has severe lymphedema. Treatments tried: treated vancomycin and cefazolin iv and transition to keflex as outpatient, reports progressing now.       Anticoagulation mgmt per Dr. Owusu.   Due check tomorrow.      Review of Systems   Constitutional: Negative for chills and fever.       Patient Active Problem List   Diagnosis   • History of bilateral pulmonary embolism (CMS/HCC)   • Pulmonary hypertension (HCC)   • Lymphedema of both lower extremities   • Obesity, morbid, BMI 50 or higher ([facility])   • Dyslipidemia   • Hyperuricemia   • Hypogonadal obesity   • Knee arthropathy   • Morbid obesity with body mass index of 60.0-69.9 in adult ([facility])   • ARNOLDO (obstructive sleep apnea)   • Severe edema   • History of pulmonary embolism   • Other acute pulmonary embolism without acute cor pulmonale ([facility])   • Intractable back pain   • Heterozygous factor V Leiden mutation ([facility])   • Cellulitis of left lower extremity   • Hypokalemia   • CAROL (acute kidney injury) ([facility])   • Sepsis with cutaneous manifestations ([facility])   • Hyperglycemia   • Paroxysmal atrial fibrillation ([facility])   • Athscl heart 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   • Long term (current) use of anticoagulants   • Lymphedema, not elsewhere classified   • Nicotine dependence, other tobacco product, uncomplicated   • Personal history of other venous thrombosis and embolism   • Typical atrial flutter  "([facility])   • Venous insufficiency (chronic) (peripheral)   • Cellulitis of right lower extremity       Social History     Socioeconomic History   • Marital status:    Tobacco Use   • Smoking status: Light Tobacco Smoker     Types: Cigars, Pipe     Start date: 1/1/2006   • Smokeless tobacco: Never Used   • Tobacco comment: occasional - < 1 a month   Vaping Use   • Vaping Use: Never used   Substance and Sexual Activity   • Alcohol use: No   • Drug use: No   • Sexual activity: Defer       No Known Allergies    Current Outpatient Medications on File Prior to Visit   Medication Sig Dispense Refill   • acetaminophen (TYLENOL) 500 MG tablet Take 500 mg by mouth Every 6 (Six) Hours As Needed for Mild Pain .     • buPROPion XL (WELLBUTRIN XL) 150 MG 24 hr tablet Take 1 tablet by mouth Daily. 30 tablet 5   • cephalexin (KEFLEX) 500 MG capsule Take 2 capsules by mouth Every 6 (Six) Hours for 32 doses. 64 capsule 0   • furosemide (LASIX) 80 MG tablet TAKE 1 TABLET BY MOUTH DAILY (Patient taking differently: Take 80 mg by mouth Daily As Needed.) 30 tablet 5   • topiramate (TOPAMAX) 25 MG tablet Take 1 tablet by mouth 2 (Two) Times a Day. 60 tablet 5   • warfarin (COUMADIN) 10 MG tablet Take 15 mg by mouth See Admin Instructions. Takes 1.5 tablet (15 mg) Monday, Wednesday, Friday, Saturday, and Sunday     • warfarin (COUMADIN) 10 MG tablet TAKE 1 TABLET ON TUESDAY, THURSDAY, SUNDAY AND TAKE ONE AND ONE-HALF TABLETS ALL OTHER DAYS OR AS DIRECTED (Patient taking differently: Take 10 mg by mouth 2 (Two) Times a Week. 1 tablet (10 mg) on Tuesday and Thursday) 120 tablet 1     No current facility-administered medications on file prior to visit.       Objective   Vitals:    05/19/22 1120   BP: 142/76   BP Location: Left arm   Patient Position: Sitting   Cuff Size: Large Adult   Pulse: 91   Resp: 20   Temp: 98.8 °F (37.1 °C)   TempSrc: Temporal   SpO2: 98%   Weight: (!) 245 kg (540 lb)   Height: 188 cm (74\")   PainSc:   9     Body " mass index is 69.33 kg/m².    Physical Exam  Vitals and nursing note reviewed.   Constitutional:       Appearance: He is well-developed.   Musculoskeletal:      Cervical back: Neck supple.      Right lower leg: Edema present.      Left lower leg: Edema present.   Skin:     General: Skin is warm and dry.      Comments: Severe lymphedema;   Right leg redness, hot to touch and very painful.    Neurological:      Mental Status: He is alert.   Psychiatric:         Behavior: Behavior normal.       Protime-INR  Order: [id number]   Status: Final result     Visible to patient: Yes (not seen)     Next appt: 06/03/2022 at 09:00 AM in [facility] (Hansel Patel DO)    Specimen Information: Blood         0 Result Notes    Component 6 d ago    INR 2.30               Specimen Collected: 05/13/22 Last Resulted: 05/13/22        Lab Flowsheet     Order Details     View Encounter     Lab and Collection Details     Routing     Result History             Result Care Coordination            Assessment & Plan   Diagnoses and all orders for this visit:    1. Cellulitis of right lower extremity (Primary)  -     CBC & Differential  -     Basic Metabolic Panel  -     C-reactive Protein  -     Sedimentation Rate  -     cefTRIAXone (ROCEPHIN) injection 2 g  -     doxycycline (VIBRAMYCIN) 100 MG capsule; Take 1 capsule by mouth 2 (Two) Times a Day.  Dispense: 20 capsule; Refill: 0    2. Lymphedema    check INR tomorrow and again in 1 week as need to monitor closely with abx change;  Infection progressing on cephalexin and so will change to doxy and give IM dose.  Will need to monitor closely.           -Follow up: 48 hours in ;  Also 1 to 2 weeks with me

## 2022-05-20 ENCOUNTER — ANTICOAGULATION VISIT (OUTPATIENT)
Dept: PHARMACY | Facility: HOSPITAL | Age: 52
End: 2022-05-20

## 2022-05-20 DIAGNOSIS — I26.99 BILATERAL PULMONARY EMBOLISM: ICD-10-CM

## 2022-05-20 DIAGNOSIS — I26.99 OTHER ACUTE PULMONARY EMBOLISM WITHOUT ACUTE COR PULMONALE: Primary | ICD-10-CM

## 2022-05-20 LAB
BASOPHILS # BLD AUTO: 0.1 X10E3/UL (ref 0–0.2)
BASOPHILS NFR BLD AUTO: 1 %
BUN SERPL-MCNC: 16 MG/DL (ref 6–24)
BUN/CREAT SERPL: 23 (ref 9–20)
CALCIUM SERPL-MCNC: 9.5 MG/DL (ref 8.7–10.2)
CHLORIDE SERPL-SCNC: 97 MMOL/L (ref 96–106)
CO2 SERPL-SCNC: 22 MMOL/L (ref 20–29)
CREAT SERPL-MCNC: 0.7 MG/DL (ref 0.76–1.27)
CRP SERPL-MCNC: 67 MG/L (ref 0–10)
EGFRCR SERPLBLD CKD-EPI 2021: 111 ML/MIN/1.73
EOSINOPHIL # BLD AUTO: 0.1 X10E3/UL (ref 0–0.4)
EOSINOPHIL NFR BLD AUTO: 2 %
ERYTHROCYTE [DISTWIDTH] IN BLOOD BY AUTOMATED COUNT: 14.4 % (ref 11.6–15.4)
ERYTHROCYTE [SEDIMENTATION RATE] IN BLOOD BY WESTERGREN METHOD: 120 MM/HR (ref 0–30)
GLUCOSE SERPL-MCNC: 118 MG/DL (ref 65–99)
HCT VFR BLD AUTO: 38.6 % (ref 37.5–51)
HGB BLD-MCNC: 12.9 G/DL (ref 13–17.7)
IMM GRANULOCYTES # BLD AUTO: 0 X10E3/UL (ref 0–0.1)
IMM GRANULOCYTES NFR BLD AUTO: 0 %
INR PPP: 3.9
LYMPHOCYTES # BLD AUTO: 2.2 X10E3/UL (ref 0.7–3.1)
LYMPHOCYTES NFR BLD AUTO: 25 %
MCH RBC QN AUTO: 28.5 PG (ref 26.6–33)
MCHC RBC AUTO-ENTMCNC: 33.4 G/DL (ref 31.5–35.7)
MCV RBC AUTO: 85 FL (ref 79–97)
MONOCYTES # BLD AUTO: 0.9 X10E3/UL (ref 0.1–0.9)
MONOCYTES NFR BLD AUTO: 10 %
NEUTROPHILS # BLD AUTO: 5.5 X10E3/UL (ref 1.4–7)
NEUTROPHILS NFR BLD AUTO: 62 %
PLATELET # BLD AUTO: 526 X10E3/UL (ref 150–450)
POTASSIUM SERPL-SCNC: 4.6 MMOL/L (ref 3.5–5.2)
RBC # BLD AUTO: 4.52 X10E6/UL (ref 4.14–5.8)
SODIUM SERPL-SCNC: 135 MMOL/L (ref 134–144)
WBC # BLD AUTO: 8.9 X10E3/UL (ref 3.4–10.8)

## 2022-05-20 NOTE — PROGRESS NOTES
"Anticoagulation Clinic Progress Note    Anticoagulation Summary  As of 5/20/2022    INR goal:  2.0-3.0   TTR:  69.0 % (3.2 y)   INR used for dosing:  3.90 (5/20/2022)   Warfarin maintenance plan:  10 mg every Tue, Thu; 15 mg all other days   Weekly warfarin total:  95 mg   Plan last modified:  Tessie Fatima RPH (3/4/2022)   Next INR check:  5/27/2022   Priority:  High   Target end date:  Indefinite    Indications    Other acute pulmonary embolism without acute cor pulmonale (HCC) [I26.99]  History of bilateral pulmonary embolism (CMS/HCC) [I26.99]             Anticoagulation Episode Summary     INR check location:      Preferred lab:      Send INR reminders to:   [facility]  POOL    Comments:  new  frankie March 2019 **WEEKLY FRANKIE**      Anticoagulation Care Providers     Provider Role Specialty Phone number    Torri Colmenares, APRN Referring Cardiology [phone]    Elsy Baeza MD Responsible Cardiology [phone]          Clinic Interview:  Patient Findings     Positives:  Change in health, Change in medications, Change in   diet/appetite    Negatives:  Signs/symptoms of thrombosis, Signs/symptoms of bleeding,   Laboratory test error suspected, Change in alcohol use, Change in   activity, Upcoming invasive procedure, Emergency department visit,   Upcoming dental procedure, Missed doses, Extra doses, Hospital admission,   Bruising, Other complaints    Comments:  Cephalexin course ended. Received 2 g ceftriaxone IM   yesterday, and a 10-day course of doxycycline was started for cellulitis.   Denies fever. Reports swelling around cellulitis. Reports appetite is \"hit   and miss\".      Clinical Outcomes     Negatives:  Major bleeding event, Thromboembolic event,   Anticoagulation-related hospital admission, Anticoagulation-related ED   visit, Anticoagulation-related fatality    Comments:  Cephalexin course ended. Received 2 g ceftriaxone IM   yesterday, and a 10-day course of doxycycline was " "started for cellulitis.   Denies fever. Reports swelling around cellulitis. Reports appetite is \"hit   and miss\".        INR History:  Anticoagulation Monitoring 4/29/2022 5/13/2022 5/20/2022   INR 2.20 2.30 3.90   INR Date 4/29/2022 5/13/2022 5/20/2022   INR Goal 2.0-3.0 2.0-3.0 2.0-3.0   Trend Same Same Same   Last Week Total 100 mg 65 mg 95 mg   Next Week Total 95 mg 95 mg 75 mg   Sun 15 mg 15 mg 10 mg (5/22)   Mon 15 mg 15 mg 15 mg   Tue 10 mg 10 mg 10 mg   Wed 15 mg 15 mg 15 mg   Thu 10 mg 10 mg 10 mg   Fri 15 mg 15 mg Hold (5/20)   Sat 15 mg 15 mg 15 mg   Visit Report - - -   Some recent data might be hidden       Plan:  1. INR is Supratherapeutic today- see above in Anticoagulation Summary.   Will instruct Kameron Melendez to Change their warfarin regimen- see above in Anticoagulation Summary.  2. Follow up in 1 week  3. They have been instructed to call if any changes in medications, doses, concerns, etc. Patient expresses understanding and has no further questions at this time.    Ramo Morocho, PharmD  "

## 2022-05-23 ENCOUNTER — OFFICE VISIT (OUTPATIENT)
Dept: FAMILY MEDICINE CLINIC | Facility: CLINIC | Age: 52
End: 2022-05-23

## 2022-05-23 VITALS
RESPIRATION RATE: 20 BRPM | WEIGHT: 315 LBS | OXYGEN SATURATION: 99 % | HEIGHT: 74 IN | SYSTOLIC BLOOD PRESSURE: 132 MMHG | BODY MASS INDEX: 40.43 KG/M2 | DIASTOLIC BLOOD PRESSURE: 80 MMHG | TEMPERATURE: 96.8 F | HEART RATE: 86 BPM

## 2022-05-23 DIAGNOSIS — I87.2 VENOUS STASIS DERMATITIS OF BOTH LOWER EXTREMITIES: ICD-10-CM

## 2022-05-23 DIAGNOSIS — L03.115 CELLULITIS OF RIGHT LOWER EXTREMITY: Primary | ICD-10-CM

## 2022-05-23 DIAGNOSIS — L03.90 WOUND CELLULITIS: ICD-10-CM

## 2022-05-23 DIAGNOSIS — I89.0 LYMPHEDEMA: ICD-10-CM

## 2022-05-23 PROCEDURE — 99214 OFFICE O/P EST MOD 30 MIN: CPT | Performed by: FAMILY MEDICINE

## 2022-05-23 PROCEDURE — 96372 THER/PROPH/DIAG INJ SC/IM: CPT | Performed by: FAMILY MEDICINE

## 2022-05-23 RX ORDER — CEFTRIAXONE 1 G/1
2 INJECTION, POWDER, FOR SOLUTION INTRAMUSCULAR; INTRAVENOUS ONCE
Status: COMPLETED | OUTPATIENT
Start: 2022-05-23 | End: 2022-05-23

## 2022-05-23 RX ORDER — CEFTRIAXONE 1 G/1
1 INJECTION, POWDER, FOR SOLUTION INTRAMUSCULAR; INTRAVENOUS EVERY 24 HOURS
Qty: 1 EACH | Refills: 0 | Status: SHIPPED | OUTPATIENT
Start: 2022-05-23 | End: 2022-05-23 | Stop reason: SDUPTHER

## 2022-05-23 RX ORDER — CEFTRIAXONE 1 G/1
1 INJECTION, POWDER, FOR SOLUTION INTRAMUSCULAR; INTRAVENOUS EVERY 24 HOURS
Qty: 1 EACH | Refills: 0 | Status: SHIPPED | OUTPATIENT
Start: 2022-05-23 | End: 2022-05-24 | Stop reason: SDUPTHER

## 2022-05-23 RX ADMIN — CEFTRIAXONE 2 G: 1 INJECTION, POWDER, FOR SOLUTION INTRAMUSCULAR; INTRAVENOUS at 14:03

## 2022-05-24 ENCOUNTER — TELEPHONE (OUTPATIENT)
Dept: FAMILY MEDICINE CLINIC | Facility: CLINIC | Age: 52
End: 2022-05-24

## 2022-05-24 DIAGNOSIS — I89.0 LYMPHEDEMA: ICD-10-CM

## 2022-05-24 DIAGNOSIS — L03.115 CELLULITIS OF RIGHT LOWER EXTREMITY: ICD-10-CM

## 2022-05-24 DIAGNOSIS — L03.90 WOUND CELLULITIS: ICD-10-CM

## 2022-05-24 RX ORDER — CEFTRIAXONE 1 G/1
1 INJECTION, POWDER, FOR SOLUTION INTRAMUSCULAR; INTRAVENOUS EVERY 24 HOURS
Qty: 10 EACH | Refills: 0 | Status: SHIPPED | OUTPATIENT
Start: 2022-05-24 | End: 2022-06-15

## 2022-05-24 NOTE — TELEPHONE ENCOUNTER
Caller: MICHELLE    Relationship to patient: [facility] RADIOLOGY SCHEDULING    Best call back number: [phone]    Patient is needing: PATIENT IS NEEDING A PIC LINE

## 2022-05-24 NOTE — TELEPHONE ENCOUNTER
For the IV ceftriaxone I found coupon with goodrx ordering through [facility] [facility] 10 vials (10 days worth) for $21.49.   Other options for IV antibiotics will be just as expensive or worse and so would try.  I sent the goodrx coupon codes with script for ceftriaxone to [facility].   If they will not order or get in quickly (possible), then we will have to order via infusion clinic and have him go to the hospital daily.   DEE DEEJ

## 2022-05-25 DIAGNOSIS — L03.115 CELLULITIS OF RIGHT LOWER EXTREMITY: ICD-10-CM

## 2022-05-25 DIAGNOSIS — L03.90 WOUND CELLULITIS: ICD-10-CM

## 2022-05-25 DIAGNOSIS — L03.115 CELLULITIS OF RIGHT LOWER EXTREMITY: Primary | ICD-10-CM

## 2022-05-25 DIAGNOSIS — I87.2 VENOUS STASIS DERMATITIS OF BOTH LOWER EXTREMITIES: ICD-10-CM

## 2022-05-25 DIAGNOSIS — I89.0 LYMPHEDEMA: ICD-10-CM

## 2022-05-25 NOTE — TELEPHONE ENCOUNTER
SPOKE WITH PT GOING TO ORDER OUT PT INFUSION @ [facility] PER DR WARNER

## 2022-05-26 ENCOUNTER — HOSPITAL ENCOUNTER (OUTPATIENT)
Dept: INFUSION THERAPY | Facility: HOSPITAL | Age: 52
Setting detail: INFUSION SERIES
Discharge: HOME | End: 2022-05-26

## 2022-05-26 VITALS
HEART RATE: 91 BPM | OXYGEN SATURATION: 95 % | DIASTOLIC BLOOD PRESSURE: 67 MMHG | SYSTOLIC BLOOD PRESSURE: 147 MMHG | TEMPERATURE: 96.6 F | RESPIRATION RATE: 20 BRPM

## 2022-05-26 DIAGNOSIS — L03.90 WOUND CELLULITIS: Primary | ICD-10-CM

## 2022-05-26 DIAGNOSIS — L03.115 CELLULITIS OF RIGHT LOWER EXTREMITY: ICD-10-CM

## 2022-05-26 LAB
ANION GAP SERPL CALCULATED.3IONS-SCNC: 9 MMOL/L (ref 5–15)
BASOPHILS # BLD AUTO: 0.08 10*3/MM3 (ref 0–0.2)
BASOPHILS NFR BLD AUTO: 0.9 % (ref 0–1.5)
BUN SERPL-MCNC: 12 MG/DL (ref 6–20)
BUN/CREAT SERPL: 18.5 (ref 7–25)
CALCIUM SPEC-SCNC: 8.7 MG/DL (ref 8.6–10.5)
CHLORIDE SERPL-SCNC: 101 MMOL/L (ref 98–107)
CO2 SERPL-SCNC: 26 MMOL/L (ref 22–29)
CREAT SERPL-MCNC: 0.65 MG/DL (ref 0.76–1.27)
CRP SERPL-MCNC: 2.9 MG/DL (ref 0–0.5)
DEPRECATED RDW RBC AUTO: 47.9 FL (ref 37–54)
EGFRCR SERPLBLD CKD-EPI 2021: 113.4 ML/MIN/1.73
EOSINOPHIL # BLD AUTO: 0.23 10*3/MM3 (ref 0–0.4)
EOSINOPHIL NFR BLD AUTO: 2.6 % (ref 0.3–6.2)
ERYTHROCYTE [DISTWIDTH] IN BLOOD BY AUTOMATED COUNT: 14.9 % (ref 12.3–15.4)
ERYTHROCYTE [SEDIMENTATION RATE] IN BLOOD: 97 MM/HR (ref 0–20)
GLUCOSE SERPL-MCNC: 118 MG/DL (ref 65–99)
HCT VFR BLD AUTO: 38.2 % (ref 37.5–51)
HGB BLD-MCNC: 12.1 G/DL (ref 13–17.7)
IMM GRANULOCYTES # BLD AUTO: 0.04 10*3/MM3 (ref 0–0.05)
IMM GRANULOCYTES NFR BLD AUTO: 0.5 % (ref 0–0.5)
LYMPHOCYTES # BLD AUTO: 2.46 10*3/MM3 (ref 0.7–3.1)
LYMPHOCYTES NFR BLD AUTO: 27.7 % (ref 19.6–45.3)
MCH RBC QN AUTO: 27.7 PG (ref 26.6–33)
MCHC RBC AUTO-ENTMCNC: 31.7 G/DL (ref 31.5–35.7)
MCV RBC AUTO: 87.4 FL (ref 79–97)
MONOCYTES # BLD AUTO: 0.75 10*3/MM3 (ref 0.1–0.9)
MONOCYTES NFR BLD AUTO: 8.5 % (ref 5–12)
NEUTROPHILS NFR BLD AUTO: 5.31 10*3/MM3 (ref 1.7–7)
NEUTROPHILS NFR BLD AUTO: 59.8 % (ref 42.7–76)
NRBC BLD AUTO-RTO: 0 /100 WBC (ref 0–0.2)
PLATELET # BLD AUTO: 350 10*3/MM3 (ref 140–450)
PMV BLD AUTO: 9.5 FL (ref 6–12)
POTASSIUM SERPL-SCNC: 4.2 MMOL/L (ref 3.5–5.2)
RBC # BLD AUTO: 4.37 10*6/MM3 (ref 4.14–5.8)
SODIUM SERPL-SCNC: 136 MMOL/L (ref 136–145)
WBC NRBC COR # BLD: 8.87 10*3/MM3 (ref 3.4–10.8)

## 2022-05-26 PROCEDURE — 36592 COLLECT BLOOD FROM PICC: CPT

## 2022-05-26 PROCEDURE — G0463 HOSPITAL OUTPT CLINIC VISIT: HCPCS

## 2022-05-26 PROCEDURE — 85025 COMPLETE CBC W/AUTO DIFF WBC: CPT | Performed by: FAMILY MEDICINE

## 2022-05-26 PROCEDURE — 96365 THER/PROPH/DIAG IV INF INIT: CPT

## 2022-05-26 PROCEDURE — 25010000002 CEFTRIAXONE PER 250 MG: Performed by: FAMILY MEDICINE

## 2022-05-26 PROCEDURE — 85652 RBC SED RATE AUTOMATED: CPT | Performed by: FAMILY MEDICINE

## 2022-05-26 PROCEDURE — 80048 BASIC METABOLIC PNL TOTAL CA: CPT | Performed by: FAMILY MEDICINE

## 2022-05-26 PROCEDURE — 86140 C-REACTIVE PROTEIN: CPT | Performed by: FAMILY MEDICINE

## 2022-05-26 RX ADMIN — CEFTRIAXONE 1 G: 1 INJECTION, POWDER, FOR SOLUTION INTRAMUSCULAR; INTRAVENOUS at 15:29

## 2022-05-27 ENCOUNTER — HOSPITAL ENCOUNTER (OUTPATIENT)
Dept: INFUSION THERAPY | Facility: HOSPITAL | Age: 52
Setting detail: INFUSION SERIES
Discharge: HOME | End: 2022-05-27

## 2022-05-27 ENCOUNTER — ANTICOAGULATION VISIT (OUTPATIENT)
Dept: PHARMACY | Facility: HOSPITAL | Age: 52
End: 2022-05-27

## 2022-05-27 VITALS
TEMPERATURE: 96.9 F | OXYGEN SATURATION: 94 % | SYSTOLIC BLOOD PRESSURE: 143 MMHG | RESPIRATION RATE: 20 BRPM | HEART RATE: 97 BPM | DIASTOLIC BLOOD PRESSURE: 72 MMHG

## 2022-05-27 DIAGNOSIS — L03.115 CELLULITIS OF RIGHT LOWER EXTREMITY: ICD-10-CM

## 2022-05-27 DIAGNOSIS — I26.99 BILATERAL PULMONARY EMBOLISM: ICD-10-CM

## 2022-05-27 DIAGNOSIS — I26.99 OTHER ACUTE PULMONARY EMBOLISM WITHOUT ACUTE COR PULMONALE: Primary | ICD-10-CM

## 2022-05-27 DIAGNOSIS — L03.90 WOUND CELLULITIS: Primary | ICD-10-CM

## 2022-05-27 LAB — INR PPP: 3.4

## 2022-05-27 PROCEDURE — 96365 THER/PROPH/DIAG IV INF INIT: CPT

## 2022-05-27 PROCEDURE — 25010000002 CEFTRIAXONE PER 250 MG: Performed by: FAMILY MEDICINE

## 2022-05-27 RX ADMIN — CEFTRIAXONE 1 G: 1 INJECTION, POWDER, FOR SOLUTION INTRAMUSCULAR; INTRAVENOUS at 14:22

## 2022-05-27 NOTE — PROGRESS NOTES
I have supervised and reviewed the notes, assessments, and/or procedures performed by our  pharmacy resident . The documented assessment and plan were developed cooperatively, and the plan was not implemented in my presence. I concur with the documentation of this patient encounter.

## 2022-05-27 NOTE — PROGRESS NOTES
Anticoagulation Clinic Progress Note    Anticoagulation Summary  As of 5/27/2022    INR goal:  2.0-3.0   TTR:  68.7 % (3.2 y)   INR used for dosing:  3.40 (5/27/2022)   Warfarin maintenance plan:  10 mg every Tue, Thu; 15 mg all other days   Weekly warfarin total:  95 mg   Plan last modified:  Tessie Fatima RPH (3/4/2022)   Next INR check:  6/3/2022   Priority:  High   Target end date:  Indefinite    Indications    Other acute pulmonary embolism without acute cor pulmonale (HCC) [I26.99]  History of bilateral pulmonary embolism (CMS/HCC) [I26.99]             Anticoagulation Episode Summary     INR check location:      Preferred lab:      Send INR reminders to:   [facility]  POOL    Comments:  new  frankie March 2019 **WEEKLY FRANKIE**      Anticoagulation Care Providers     Provider Role Specialty Phone number    Torri Colmenares, APRN Referring Cardiology [phone]    Elsy Baeza MD Responsible Cardiology [phone]          Clinic Interview:  Patient Findings     Positives:  Change in medications    Negatives:  Signs/symptoms of thrombosis, Signs/symptoms of bleeding,   Laboratory test error suspected, Change in health, Change in alcohol use,   Change in activity, Upcoming invasive procedure, Emergency department   visit, Upcoming dental procedure, Missed doses, Extra doses, Change in   diet/appetite, Hospital admission, Bruising, Other complaints    Comments:  Patient has 2.5 days remaining of doxycycline and 13 days   remaining of ceftriaxone.        Clinical Outcomes     Negatives:  Major bleeding event, Thromboembolic event,   Anticoagulation-related hospital admission, Anticoagulation-related ED   visit, Anticoagulation-related fatality    Comments:  Patient has 2.5 days remaining of doxycycline and 13 days   remaining of ceftriaxone.          INR History:  Anticoagulation Monitoring 5/13/2022 5/20/2022 5/27/2022   INR 2.30 3.90 3.40   INR Date 5/13/2022 5/20/2022 5/27/2022   INR Goal  2.0-3.0 2.0-3.0 2.0-3.0   Trend Same Same Same   Last Week Total 65 mg 95 mg 75 mg   Next Week Total 95 mg 75 mg 80 mg   Sun 15 mg 10 mg (5/22) 15 mg   Mon 15 mg 15 mg 15 mg   Tue 10 mg 10 mg 10 mg   Wed 15 mg 15 mg 15 mg   Thu 10 mg 10 mg 10 mg   Fri 15 mg Hold (5/20) Hold (5/27)   Sat 15 mg 15 mg 15 mg   Visit Report - - -   Some recent data might be hidden       Plan:  1. INR is Supratherapeutic today- see above in Anticoagulation Summary.   Will instruct Kameron Melendez to Change their warfarin regimen- see above in Anticoagulation Summary.  2. Follow up in 1 weeks  3. They have been instructed to call if any changes in medications, doses, concerns, etc. Patient expresses understanding and has no further questions at this time.    Kwadwo Holcomb [facility]

## 2022-05-28 ENCOUNTER — HOSPITAL ENCOUNTER (OUTPATIENT)
Dept: INFUSION THERAPY | Facility: HOSPITAL | Age: 52
Setting detail: INFUSION SERIES
Discharge: HOME | End: 2022-05-28

## 2022-05-28 VITALS
SYSTOLIC BLOOD PRESSURE: 121 MMHG | TEMPERATURE: 97.1 F | RESPIRATION RATE: 22 BRPM | OXYGEN SATURATION: 95 % | DIASTOLIC BLOOD PRESSURE: 64 MMHG | HEART RATE: 85 BPM

## 2022-05-28 DIAGNOSIS — L03.115 CELLULITIS OF RIGHT LOWER EXTREMITY: ICD-10-CM

## 2022-05-28 DIAGNOSIS — L03.90 WOUND CELLULITIS: Primary | ICD-10-CM

## 2022-05-28 PROCEDURE — 25010000002 CEFTRIAXONE PER 250 MG: Performed by: FAMILY MEDICINE

## 2022-05-28 PROCEDURE — 96365 THER/PROPH/DIAG IV INF INIT: CPT

## 2022-05-28 RX ADMIN — CEFTRIAXONE 1 G: 1 INJECTION, POWDER, FOR SOLUTION INTRAMUSCULAR; INTRAVENOUS at 09:48

## 2022-05-29 ENCOUNTER — HOSPITAL ENCOUNTER (OUTPATIENT)
Dept: INFUSION THERAPY | Facility: HOSPITAL | Age: 52
Setting detail: INFUSION SERIES
Discharge: HOME | End: 2022-05-29

## 2022-05-29 VITALS
RESPIRATION RATE: 22 BRPM | TEMPERATURE: 97.8 F | SYSTOLIC BLOOD PRESSURE: 117 MMHG | OXYGEN SATURATION: 96 % | HEART RATE: 83 BPM | DIASTOLIC BLOOD PRESSURE: 70 MMHG

## 2022-05-29 DIAGNOSIS — L03.90 WOUND CELLULITIS: Primary | ICD-10-CM

## 2022-05-29 DIAGNOSIS — L03.115 CELLULITIS OF RIGHT LOWER EXTREMITY: ICD-10-CM

## 2022-05-29 PROCEDURE — 96365 THER/PROPH/DIAG IV INF INIT: CPT

## 2022-05-29 PROCEDURE — 25010000002 CEFTRIAXONE PER 250 MG: Performed by: FAMILY MEDICINE

## 2022-05-29 RX ADMIN — CEFTRIAXONE 1 G: 1 INJECTION, POWDER, FOR SOLUTION INTRAMUSCULAR; INTRAVENOUS at 09:31

## 2022-05-30 ENCOUNTER — HOSPITAL ENCOUNTER (OUTPATIENT)
Dept: INFUSION THERAPY | Facility: HOSPITAL | Age: 52
Setting detail: INFUSION SERIES
Discharge: HOME | End: 2022-05-30

## 2022-05-30 VITALS
TEMPERATURE: 97.3 F | RESPIRATION RATE: 22 BRPM | HEART RATE: 79 BPM | SYSTOLIC BLOOD PRESSURE: 123 MMHG | OXYGEN SATURATION: 93 % | DIASTOLIC BLOOD PRESSURE: 83 MMHG

## 2022-05-30 DIAGNOSIS — L03.115 CELLULITIS OF RIGHT LOWER EXTREMITY: ICD-10-CM

## 2022-05-30 DIAGNOSIS — L03.90 WOUND CELLULITIS: Primary | ICD-10-CM

## 2022-05-30 PROCEDURE — 96365 THER/PROPH/DIAG IV INF INIT: CPT

## 2022-05-30 PROCEDURE — 25010000002 CEFTRIAXONE PER 250 MG: Performed by: FAMILY MEDICINE

## 2022-05-30 RX ADMIN — CEFTRIAXONE 1 G: 1 INJECTION, POWDER, FOR SOLUTION INTRAMUSCULAR; INTRAVENOUS at 09:15

## 2022-05-31 ENCOUNTER — HOSPITAL ENCOUNTER (OUTPATIENT)
Dept: INFUSION THERAPY | Facility: HOSPITAL | Age: 52
Setting detail: INFUSION SERIES
Discharge: HOME | End: 2022-05-31

## 2022-05-31 VITALS
HEART RATE: 79 BPM | SYSTOLIC BLOOD PRESSURE: 125 MMHG | DIASTOLIC BLOOD PRESSURE: 59 MMHG | TEMPERATURE: 97.3 F | OXYGEN SATURATION: 94 % | RESPIRATION RATE: 22 BRPM

## 2022-05-31 DIAGNOSIS — L03.90 WOUND CELLULITIS: Primary | ICD-10-CM

## 2022-05-31 DIAGNOSIS — L03.115 CELLULITIS OF RIGHT LOWER EXTREMITY: ICD-10-CM

## 2022-05-31 LAB
ALBUMIN SERPL-MCNC: 3.6 G/DL (ref 3.5–5.2)
ALBUMIN/GLOB SERPL: 1 G/DL
ALP SERPL-CCNC: 114 U/L (ref 39–117)
ALT SERPL W P-5'-P-CCNC: 24 U/L (ref 1–41)
ANION GAP SERPL CALCULATED.3IONS-SCNC: 11.7 MMOL/L (ref 5–15)
AST SERPL-CCNC: 16 U/L (ref 1–40)
BASOPHILS # BLD AUTO: 0.05 10*3/MM3 (ref 0–0.2)
BASOPHILS NFR BLD AUTO: 0.6 % (ref 0–1.5)
BILIRUB SERPL-MCNC: 0.3 MG/DL (ref 0–1.2)
BUN SERPL-MCNC: 13 MG/DL (ref 6–20)
BUN/CREAT SERPL: 17.6 (ref 7–25)
CALCIUM SPEC-SCNC: 8.9 MG/DL (ref 8.6–10.5)
CHLORIDE SERPL-SCNC: 100 MMOL/L (ref 98–107)
CO2 SERPL-SCNC: 24.3 MMOL/L (ref 22–29)
CREAT SERPL-MCNC: 0.74 MG/DL (ref 0.76–1.27)
CRP SERPL-MCNC: 2.12 MG/DL (ref 0–0.5)
DEPRECATED RDW RBC AUTO: 47.7 FL (ref 37–54)
EGFRCR SERPLBLD CKD-EPI 2021: 109 ML/MIN/1.73
EOSINOPHIL # BLD AUTO: 0.23 10*3/MM3 (ref 0–0.4)
EOSINOPHIL NFR BLD AUTO: 3 % (ref 0.3–6.2)
ERYTHROCYTE [DISTWIDTH] IN BLOOD BY AUTOMATED COUNT: 15 % (ref 12.3–15.4)
ERYTHROCYTE [SEDIMENTATION RATE] IN BLOOD: 89 MM/HR (ref 0–20)
GLOBULIN UR ELPH-MCNC: 3.5 GM/DL
GLUCOSE SERPL-MCNC: 95 MG/DL (ref 65–99)
HCT VFR BLD AUTO: 37.6 % (ref 37.5–51)
HGB BLD-MCNC: 12.2 G/DL (ref 13–17.7)
IMM GRANULOCYTES # BLD AUTO: 0.05 10*3/MM3 (ref 0–0.05)
IMM GRANULOCYTES NFR BLD AUTO: 0.6 % (ref 0–0.5)
LYMPHOCYTES # BLD AUTO: 2.55 10*3/MM3 (ref 0.7–3.1)
LYMPHOCYTES NFR BLD AUTO: 33.1 % (ref 19.6–45.3)
MCH RBC QN AUTO: 27.9 PG (ref 26.6–33)
MCHC RBC AUTO-ENTMCNC: 32.4 G/DL (ref 31.5–35.7)
MCV RBC AUTO: 86 FL (ref 79–97)
MONOCYTES # BLD AUTO: 0.7 10*3/MM3 (ref 0.1–0.9)
MONOCYTES NFR BLD AUTO: 9.1 % (ref 5–12)
NEUTROPHILS NFR BLD AUTO: 4.13 10*3/MM3 (ref 1.7–7)
NEUTROPHILS NFR BLD AUTO: 53.6 % (ref 42.7–76)
NRBC BLD AUTO-RTO: 0 /100 WBC (ref 0–0.2)
PLATELET # BLD AUTO: 322 10*3/MM3 (ref 140–450)
PMV BLD AUTO: 9.7 FL (ref 6–12)
POTASSIUM SERPL-SCNC: 4.2 MMOL/L (ref 3.5–5.2)
PROT SERPL-MCNC: 7.1 G/DL (ref 6–8.5)
RBC # BLD AUTO: 4.37 10*6/MM3 (ref 4.14–5.8)
SODIUM SERPL-SCNC: 136 MMOL/L (ref 136–145)
WBC NRBC COR # BLD: 7.71 10*3/MM3 (ref 3.4–10.8)

## 2022-05-31 PROCEDURE — 85025 COMPLETE CBC W/AUTO DIFF WBC: CPT | Performed by: FAMILY MEDICINE

## 2022-05-31 PROCEDURE — 36592 COLLECT BLOOD FROM PICC: CPT

## 2022-05-31 PROCEDURE — 85652 RBC SED RATE AUTOMATED: CPT | Performed by: FAMILY MEDICINE

## 2022-05-31 PROCEDURE — 96365 THER/PROPH/DIAG IV INF INIT: CPT

## 2022-05-31 PROCEDURE — 80053 COMPREHEN METABOLIC PANEL: CPT | Performed by: FAMILY MEDICINE

## 2022-05-31 PROCEDURE — 86140 C-REACTIVE PROTEIN: CPT | Performed by: FAMILY MEDICINE

## 2022-05-31 PROCEDURE — 25010000002 CEFTRIAXONE PER 250 MG: Performed by: FAMILY MEDICINE

## 2022-05-31 RX ADMIN — CEFTRIAXONE 1 G: 1 INJECTION, POWDER, FOR SOLUTION INTRAMUSCULAR; INTRAVENOUS at 13:09

## 2022-06-01 ENCOUNTER — HOSPITAL ENCOUNTER (OUTPATIENT)
Dept: INFUSION THERAPY | Facility: HOSPITAL | Age: 52
Setting detail: INFUSION SERIES
Discharge: HOME | End: 2022-06-01

## 2022-06-01 VITALS
SYSTOLIC BLOOD PRESSURE: 156 MMHG | HEART RATE: 77 BPM | OXYGEN SATURATION: 92 % | TEMPERATURE: 97 F | RESPIRATION RATE: 22 BRPM | DIASTOLIC BLOOD PRESSURE: 56 MMHG

## 2022-06-01 DIAGNOSIS — L03.90 WOUND CELLULITIS: Primary | ICD-10-CM

## 2022-06-01 DIAGNOSIS — L03.115 CELLULITIS OF RIGHT LOWER EXTREMITY: ICD-10-CM

## 2022-06-01 PROCEDURE — 25010000002 CEFTRIAXONE PER 250 MG: Performed by: FAMILY MEDICINE

## 2022-06-01 PROCEDURE — 96365 THER/PROPH/DIAG IV INF INIT: CPT

## 2022-06-01 RX ADMIN — CEFTRIAXONE 1 G: 1 INJECTION, POWDER, FOR SOLUTION INTRAMUSCULAR; INTRAVENOUS at 11:18

## 2022-06-02 ENCOUNTER — HOSPITAL ENCOUNTER (OUTPATIENT)
Dept: INFUSION THERAPY | Facility: HOSPITAL | Age: 52
Setting detail: INFUSION SERIES
Discharge: HOME | End: 2022-06-02

## 2022-06-02 VITALS
DIASTOLIC BLOOD PRESSURE: 65 MMHG | HEART RATE: 67 BPM | TEMPERATURE: 96.9 F | OXYGEN SATURATION: 98 % | RESPIRATION RATE: 20 BRPM | SYSTOLIC BLOOD PRESSURE: 145 MMHG

## 2022-06-02 DIAGNOSIS — L03.115 CELLULITIS OF RIGHT LOWER EXTREMITY: ICD-10-CM

## 2022-06-02 DIAGNOSIS — L03.90 WOUND CELLULITIS: Primary | ICD-10-CM

## 2022-06-02 PROCEDURE — 25010000002 CEFTRIAXONE PER 250 MG: Performed by: FAMILY MEDICINE

## 2022-06-02 PROCEDURE — G0463 HOSPITAL OUTPT CLINIC VISIT: HCPCS

## 2022-06-02 PROCEDURE — 96365 THER/PROPH/DIAG IV INF INIT: CPT

## 2022-06-02 RX ADMIN — CEFTRIAXONE 1 G: 1 INJECTION, POWDER, FOR SOLUTION INTRAMUSCULAR; INTRAVENOUS at 11:07

## 2022-06-03 ENCOUNTER — OFFICE VISIT (OUTPATIENT)
Dept: FAMILY MEDICINE CLINIC | Facility: CLINIC | Age: 52
End: 2022-06-03

## 2022-06-03 ENCOUNTER — HOSPITAL ENCOUNTER (OUTPATIENT)
Dept: INFUSION THERAPY | Facility: HOSPITAL | Age: 52
Setting detail: INFUSION SERIES
Discharge: HOME | End: 2022-06-03

## 2022-06-03 ENCOUNTER — ANTICOAGULATION VISIT (OUTPATIENT)
Dept: PHARMACY | Facility: HOSPITAL | Age: 52
End: 2022-06-03

## 2022-06-03 VITALS
DIASTOLIC BLOOD PRESSURE: 66 MMHG | HEART RATE: 74 BPM | RESPIRATION RATE: 22 BRPM | SYSTOLIC BLOOD PRESSURE: 160 MMHG | OXYGEN SATURATION: 96 % | TEMPERATURE: 96.6 F

## 2022-06-03 VITALS
OXYGEN SATURATION: 96 % | BODY MASS INDEX: 40.43 KG/M2 | SYSTOLIC BLOOD PRESSURE: 132 MMHG | WEIGHT: 315 LBS | DIASTOLIC BLOOD PRESSURE: 60 MMHG | HEIGHT: 74 IN | HEART RATE: 74 BPM | RESPIRATION RATE: 20 BRPM

## 2022-06-03 DIAGNOSIS — L03.115 CELLULITIS OF RIGHT LOWER EXTREMITY: Primary | ICD-10-CM

## 2022-06-03 DIAGNOSIS — I26.99 BILATERAL PULMONARY EMBOLISM: ICD-10-CM

## 2022-06-03 DIAGNOSIS — I89.0 LYMPHEDEMA: ICD-10-CM

## 2022-06-03 DIAGNOSIS — L03.115 CELLULITIS OF RIGHT LOWER EXTREMITY: ICD-10-CM

## 2022-06-03 DIAGNOSIS — I26.99 OTHER ACUTE PULMONARY EMBOLISM WITHOUT ACUTE COR PULMONALE: Primary | ICD-10-CM

## 2022-06-03 DIAGNOSIS — L03.90 WOUND CELLULITIS: Primary | ICD-10-CM

## 2022-06-03 LAB — INR PPP: 3.2

## 2022-06-03 PROCEDURE — 99213 OFFICE O/P EST LOW 20 MIN: CPT | Performed by: FAMILY MEDICINE

## 2022-06-03 PROCEDURE — 25010000002 CEFTRIAXONE PER 250 MG: Performed by: FAMILY MEDICINE

## 2022-06-03 PROCEDURE — 96365 THER/PROPH/DIAG IV INF INIT: CPT

## 2022-06-03 RX ORDER — DOXYCYCLINE HYCLATE 100 MG/1
100 CAPSULE ORAL 2 TIMES DAILY
Qty: 28 CAPSULE | Refills: 0 | Status: SHIPPED | OUTPATIENT
Start: 2022-06-03 | End: 2022-06-03 | Stop reason: SDUPTHER

## 2022-06-03 RX ORDER — DOXYCYCLINE HYCLATE 100 MG/1
100 CAPSULE ORAL 2 TIMES DAILY
Qty: 28 CAPSULE | Refills: 0 | Status: SHIPPED | OUTPATIENT
Start: 2022-06-03 | End: 2022-06-10 | Stop reason: SDUPTHER

## 2022-06-03 RX ADMIN — CEFTRIAXONE 1 G: 1 INJECTION, POWDER, FOR SOLUTION INTRAMUSCULAR; INTRAVENOUS at 14:28

## 2022-06-03 NOTE — PROGRESS NOTES
Anticoagulation Clinic Progress Note    Anticoagulation Summary  As of 6/3/2022    INR goal:  2.0-3.0   TTR:  68.2 % (3.3 y)   INR used for dosing:  3.20 (6/3/2022)   Warfarin maintenance plan:  10 mg every Tue, Thu; 15 mg all other days   Weekly warfarin total:  95 mg   Plan last modified:  Tessie Fatima RPH (3/4/2022)   Next INR check:  6/10/2022   Priority:  High   Target end date:  Indefinite    Indications    Other acute pulmonary embolism without acute cor pulmonale (HCC) [I26.99]  History of bilateral pulmonary embolism (CMS/HCC) [I26.99]             Anticoagulation Episode Summary     INR check location:      Preferred lab:      Send INR reminders to:   [facility]  POOL    Comments:  new  frankie March 2019 **WEEKLY FRANKIE**      Anticoagulation Care Providers     Provider Role Specialty Phone number    Torri Colmenares, APRN Referring Cardiology [phone]    Elsy Baeza MD Responsible Cardiology [phone]          Clinic Interview:  Patient Findings     Positives:  Other complaints    Negatives:  Signs/symptoms of thrombosis, Signs/symptoms of bleeding,   Laboratory test error suspected, Change in health, Change in alcohol use,   Change in activity, Upcoming invasive procedure, Emergency department   visit, Upcoming dental procedure, Missed doses, Extra doses, Change in   medications, Change in diet/appetite, Hospital admission, Bruising    Comments:  Still has cellulitis infection and receiving weekly infusion   of ceftriaxone.  Completed doxycycline course today.       Clinical Outcomes     Negatives:  Major bleeding event, Thromboembolic event,   Anticoagulation-related hospital admission, Anticoagulation-related ED   visit, Anticoagulation-related fatality    Comments:  Still has cellulitis infection and receiving weekly infusion   of ceftriaxone.  Completed doxycycline course today.         INR History:  Anticoagulation Monitoring 5/20/2022 5/27/2022 6/3/2022   INR 3.90 3.40  3.20   INR Date 5/20/2022 5/27/2022 6/3/2022   INR Goal 2.0-3.0 2.0-3.0 2.0-3.0   Trend Same Same Same   Last Week Total 95 mg 75 mg 80 mg   Next Week Total 75 mg 80 mg 80 mg   Sun 10 mg (5/22) 15 mg 15 mg   Mon 15 mg 15 mg 15 mg   Tue 10 mg 10 mg 10 mg   Wed 15 mg 15 mg 15 mg   Thu 10 mg 10 mg 10 mg   Fri Hold (5/20) Hold (5/27) Hold (6/3)   Sat 15 mg 15 mg 15 mg   Visit Report - - -   Some recent data might be hidden       Plan:  1. INR is Supratherapeutic today- see above in Anticoagulation Summary.   Will instruct Kameron Melendez to Change their warfarin regimen- see above in Anticoagulation Summary.  Hold dose today, 6/3 then resume previous dosing regimen.  2. Follow up in 1 weeks  3.  They have been instructed to call if any changes in medications, doses, concerns, etc. Patient expresses understanding and has no further questions at this time.    Tonja Kaye, PharmD

## 2022-06-03 NOTE — PROGRESS NOTES
Subjective   Kameron Melendez is a 52 y.o. male. Presents today for   Chief Complaint   Patient presents with   • Cellulitis   • Edema       History of Present Illness  Patient 51 y/o with severe lymphedema with serious cellulitis right leg;  He is on rocephin 1gm IV and has received 8 doses, will need complete 14d.  Finished oral and INR ok on checks.   No f/c;  Leg swelling some improvement and pain imrpovement but not resolved;  Hsa been wrapping and elevating above level of heart and staying off feet as directed other than short drives to infusions daily.    Review of Systems   Constitutional: Negative for chills and fever.   Respiratory: Negative for shortness of breath.    Cardiovascular: Positive for leg swelling. Negative for chest pain.       Patient Active Problem List   Diagnosis   • History of bilateral pulmonary embolism (CMS/HCC)   • Pulmonary hypertension (HCC)   • Lymphedema of both lower extremities   • Obesity, morbid, BMI 50 or higher (HCC)   • Dyslipidemia   • Hyperuricemia   • Hypogonadal obesity   • Knee arthropathy   • Morbid obesity with body mass index of 60.0-69.9 in adult ([facility])   • ARNOLDO (obstructive sleep apnea)   • Severe edema   • History of pulmonary embolism   • Other acute pulmonary embolism without acute cor pulmonale (HCC)   • Intractable back pain   • Heterozygous factor V Leiden mutation (HCC)   • Cellulitis of left lower extremity   • Hypokalemia   • CAROL (acute kidney injury) (HCC)   • Sepsis with cutaneous manifestations (HCC)   • Hyperglycemia   • Paroxysmal atrial fibrillation (HCC)   • Athscl heart 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   • Long term (current) use of anticoagulants   • Lymphedema, not elsewhere classified   • Nicotine dependence, other tobacco product, uncomplicated   • Personal history of other venous thrombosis and embolism   • Typical atrial flutter (HCC)   • Venous insufficiency (chronic) (peripheral)   • Cellulitis of right lower extremity   •  Wound cellulitis       Social History     Socioeconomic History   • Marital status:    Tobacco Use   • Smoking status: Light Tobacco Smoker     Types: Cigars, Pipe     Start date: 1/1/2006   • Smokeless tobacco: Never Used   • Tobacco comment: occasional - < 1 a month   Vaping Use   • Vaping Use: Never used   Substance and Sexual Activity   • Alcohol use: No   • Drug use: No   • Sexual activity: Defer       No Known Allergies    Current Outpatient Medications on File Prior to Visit   Medication Sig Dispense Refill   • acetaminophen (TYLENOL) 500 MG tablet Take 500 mg by mouth Every 6 (Six) Hours As Needed for Mild Pain .     • acetaminophen-codeine (TYLENOL #3) 300-30 MG per tablet Take 1 tablet by mouth Every 4 (Four) Hours As Needed for Moderate Pain  (severe pain). 18 tablet 1   • buPROPion XL (WELLBUTRIN XL) 150 MG 24 hr tablet Take 1 tablet by mouth Daily. 30 tablet 5   • cefTRIAXone (ROCEPHIN) 1 g injection Infuse 1 g into a venous catheter Daily. X 10 days (he has picc line) 10 each 0   • furosemide (LASIX) 80 MG tablet TAKE 1 TABLET BY MOUTH DAILY (Patient taking differently: Take 80 mg by mouth Daily As Needed.) 30 tablet 5   • topiramate (TOPAMAX) 25 MG tablet Take 1 tablet by mouth 2 (Two) Times a Day. 60 tablet 5   • warfarin (COUMADIN) 10 MG tablet Take 15 mg by mouth See Admin Instructions. Takes 1.5 tablet (15 mg) Monday, Wednesday, Friday, Saturday, and Sunday     • warfarin (COUMADIN) 10 MG tablet TAKE 1 TABLET ON TUESDAY, THURSDAY, SUNDAY AND TAKE ONE AND ONE-HALF TABLETS ALL OTHER DAYS OR AS DIRECTED (Patient taking differently: Take 10 mg by mouth 2 (Two) Times a Week. 1 tablet (10 mg) on Tuesday and Thursday) 120 tablet 1     Current Facility-Administered Medications on File Prior to Visit   Medication Dose Route Frequency Provider Last Rate Last Admin   • cefTRIAXone (ROCEPHIN) 1 g in sodium chloride 0.9 % 100 mL IVPB-VTB  1 g Intravenous Q24H Hansel Patel DO   Stopped at 06/02/22  "1140   • [DISCONTINUED] cefTRIAXone (ROCEPHIN) 1 g in sodium chloride 0.9 % 100 mL IVPB-VTB  1 g Intravenous Q24H Hansel Patel DO   Stopped at 06/01/22 1151       Objective   Vitals:    06/03/22 0909   BP: 132/60   BP Location: Right arm   Patient Position: Sitting   Cuff Size: Large Adult   Pulse: 74   Resp: 20   SpO2: 96%   Weight: (!) 227 kg (500 lb)   Height: 188 cm (74\")   PainSc: 0-No pain     Body mass index is 64.2 kg/m².    Physical Exam  Vitals and nursing note reviewed.   Constitutional:       Appearance: He is well-developed.   Musculoskeletal:      Cervical back: Neck supple.      Right lower leg: Edema present.      Left lower leg: Edema present.   Skin:     General: Skin is warm and dry.      Comments: Right leg severe lymphedema, erythema and warmth;  Painful to touch;  Has improved from prior check   Neurological:      Mental Status: He is alert.   Psychiatric:         Behavior: Behavior normal.       Component      Latest Ref Rng & Units 5/19/2022 5/26/2022 5/31/2022   WBC      3.40 - 10.80 10*3/mm3 8.9 8.87 7.71   RBC      4.14 - 5.80 10*6/mm3 4.52 4.37 4.37   Hemoglobin      13.0 - 17.7 g/dL 12.9 (L) 12.1 (L) 12.2 (L)   Hematocrit      37.5 - 51.0 % 38.6 38.2 37.6   MCV      79.0 - 97.0 fL 85 87.4 86.0   MCH      26.6 - 33.0 pg 28.5 27.7 27.9   MCHC      31.5 - 35.7 g/dL 33.4 31.7 32.4   RDW      12.3 - 15.4 % 14.4 14.9 15.0   RDW-SD      37.0 - 54.0 fl  47.9 47.7   MPV      6.0 - 12.0 fL  9.5 9.7   Platelets      140 - 450 10*3/mm3 526 (H) 350 322   Neutrophil Rel %      42.7 - 76.0 % 62 59.8 53.6   Lymphocyte Rel %      19.6 - 45.3 % 25 27.7 33.1   Monocyte Rel %      5.0 - 12.0 % 10 8.5 9.1   Eosinophil Rel %      0.3 - 6.2 % 2 2.6 3.0   Basophil Rel %      0.0 - 1.5 % 1 0.9 0.6   Immature Granulocyte Rel %      0.0 - 0.5 % 0 0.5 0.6 (H)   Neutrophils Absolute      1.70 - 7.00 10*3/mm3 5.5 5.31 4.13   Lymphocytes Absolute      0.70 - 3.10 10*3/mm3 2.2 2.46 2.55   Monocytes Absolute      " 0.10 - 0.90 10*3/mm3 0.9 0.75 0.70   Eosinophils Absolute      0.00 - 0.40 10*3/mm3 0.1 0.23 0.23   Basophils Absolute      0.00 - 0.20 10*3/mm3 0.1 0.08 0.05   Immature Grans, Absolute      0.00 - 0.05 10*3/mm3 0.0 0.04 0.05   nRBC      0.0 - 0.2 /100 WBC  0.0 0.0   Glucose      65 - 99 mg/dL 118 (H) 118 (H) 95   BUN      6 - 20 mg/dL 16 12 13   Creatinine      0.76 - 1.27 mg/dL 0.70 (L) 0.65 (L) 0.74 (L)   Sodium      136 - 145 mmol/L 135 136 136   Potassium      3.5 - 5.2 mmol/L 4.6 4.2 4.2   Chloride      98 - 107 mmol/L 97 101 100   CO2      22.0 - 29.0 mmol/L  26.0 24.3   Calcium      8.6 - 10.5 mg/dL 9.5 8.7 8.9   Total Protein      6.0 - 8.5 g/dL   7.1   Albumin      3.50 - 5.20 g/dL   3.60   ALT (SGPT)      1 - 41 U/L   24   AST (SGOT)      1 - 40 U/L   16   Alkaline Phosphatase      39 - 117 U/L   114   Total Bilirubin      0.0 - 1.2 mg/dL   0.3   Globulin      gm/dL   3.5   A/G Ratio      g/dL   1.0   BUN/Creatinine Ratio      7.0 - 25.0 23 (H) 18.5 17.6   Anion Gap      5.0 - 15.0 mmol/L  9.0 11.7   eGFR      >60.0 mL/min/1.73  113.4 109.0   EGFR Result      >59 mL/min/1.73 111     CO2      20 - 29 mmol/L 22     C-Reactive Protein      0.00 - 0.50 mg/dL 67 (H) 2.90 (H) 2.12 (H)   Sed Rate      0 - 20 mm/hr 120 (H) 97 (H) 89 (H)       Assessment & Plan   Diagnoses and all orders for this visit:    1. Cellulitis of right lower extremity (Primary)  -     doxycycline (VIBRAMYCIN) 100 MG capsule; Take 1 capsule by mouth 2 (Two) Times a Day.  Dispense: 28 capsule; Refill: 0    2. Lymphedema    will finish rocephin and add on doxy again to continue  Remain off work as elevated acute phase reactants, still swollen and infected, cannot keep leg down that would have to at work.  WIll see back on 6/10/22 after complete IV abx to assess for return to work.      Pharmd monitoring INR, continue close watch as drug to drug interaction with abx.         -Follow up: 1 week and prn

## 2022-06-04 ENCOUNTER — HOSPITAL ENCOUNTER (OUTPATIENT)
Dept: INFUSION THERAPY | Facility: HOSPITAL | Age: 52
Setting detail: INFUSION SERIES
Discharge: HOME | End: 2022-06-04

## 2022-06-04 VITALS
TEMPERATURE: 97.8 F | DIASTOLIC BLOOD PRESSURE: 67 MMHG | SYSTOLIC BLOOD PRESSURE: 110 MMHG | RESPIRATION RATE: 20 BRPM | OXYGEN SATURATION: 96 % | HEART RATE: 73 BPM

## 2022-06-04 DIAGNOSIS — L03.90 WOUND CELLULITIS: Primary | ICD-10-CM

## 2022-06-04 DIAGNOSIS — L03.115 CELLULITIS OF RIGHT LOWER EXTREMITY: ICD-10-CM

## 2022-06-04 PROCEDURE — 96365 THER/PROPH/DIAG IV INF INIT: CPT

## 2022-06-04 PROCEDURE — 25010000002 CEFTRIAXONE PER 250 MG: Performed by: FAMILY MEDICINE

## 2022-06-04 RX ADMIN — SODIUM CHLORIDE 1 G: 9 INJECTION, SOLUTION INTRAVENOUS at 08:58

## 2022-06-04 NOTE — PROGRESS NOTES
Pt arrived for his infusion. PICC flushed with positive blood return. No issues noted. Pt ambulated to main entrance on own. NAD noted.

## 2022-06-05 ENCOUNTER — HOSPITAL ENCOUNTER (OUTPATIENT)
Dept: INFUSION THERAPY | Facility: HOSPITAL | Age: 52
Setting detail: INFUSION SERIES
Discharge: HOME | End: 2022-06-05

## 2022-06-05 VITALS
HEART RATE: 67 BPM | SYSTOLIC BLOOD PRESSURE: 138 MMHG | RESPIRATION RATE: 16 BRPM | TEMPERATURE: 98 F | DIASTOLIC BLOOD PRESSURE: 73 MMHG | OXYGEN SATURATION: 95 %

## 2022-06-05 DIAGNOSIS — L03.90 WOUND CELLULITIS: Primary | ICD-10-CM

## 2022-06-05 DIAGNOSIS — L03.115 CELLULITIS OF RIGHT LOWER EXTREMITY: ICD-10-CM

## 2022-06-05 PROCEDURE — 25010000002 CEFTRIAXONE PER 250 MG: Performed by: FAMILY MEDICINE

## 2022-06-05 PROCEDURE — 96365 THER/PROPH/DIAG IV INF INIT: CPT

## 2022-06-05 RX ADMIN — SODIUM CHLORIDE 1 G: 9 INJECTION, SOLUTION INTRAVENOUS at 09:50

## 2022-06-05 NOTE — CODE DOCUMENTATION
PICC line flushed, positive blood return. NAD noted.  Pt ATB infusion completed, tolerated without complaint. Ambulated with rolling walker to main entrance.

## 2022-06-06 ENCOUNTER — HOSPITAL ENCOUNTER (OUTPATIENT)
Dept: INFUSION THERAPY | Facility: HOSPITAL | Age: 52
Setting detail: INFUSION SERIES
Discharge: HOME | End: 2022-06-06

## 2022-06-06 VITALS
TEMPERATURE: 97.5 F | OXYGEN SATURATION: 97 % | SYSTOLIC BLOOD PRESSURE: 150 MMHG | DIASTOLIC BLOOD PRESSURE: 69 MMHG | RESPIRATION RATE: 16 BRPM | HEART RATE: 71 BPM

## 2022-06-06 DIAGNOSIS — L03.90 WOUND CELLULITIS: Primary | ICD-10-CM

## 2022-06-06 DIAGNOSIS — L03.115 CELLULITIS OF RIGHT LOWER EXTREMITY: ICD-10-CM

## 2022-06-06 PROCEDURE — 25010000002 CEFTRIAXONE PER 250 MG: Performed by: FAMILY MEDICINE

## 2022-06-06 PROCEDURE — 96365 THER/PROPH/DIAG IV INF INIT: CPT

## 2022-06-06 RX ADMIN — CEFTRIAXONE 1 G: 1 INJECTION, POWDER, FOR SOLUTION INTRAMUSCULAR; INTRAVENOUS at 10:24

## 2022-06-07 ENCOUNTER — HOSPITAL ENCOUNTER (OUTPATIENT)
Dept: INFUSION THERAPY | Facility: HOSPITAL | Age: 52
Discharge: HOME | End: 2022-06-07
Admitting: FAMILY MEDICINE

## 2022-06-07 VITALS
TEMPERATURE: 97.5 F | RESPIRATION RATE: 20 BRPM | HEART RATE: 90 BPM | DIASTOLIC BLOOD PRESSURE: 76 MMHG | SYSTOLIC BLOOD PRESSURE: 148 MMHG | OXYGEN SATURATION: 95 %

## 2022-06-07 DIAGNOSIS — L03.90 WOUND CELLULITIS: Primary | ICD-10-CM

## 2022-06-07 DIAGNOSIS — L03.115 CELLULITIS OF RIGHT LOWER EXTREMITY: ICD-10-CM

## 2022-06-07 LAB
ALBUMIN SERPL-MCNC: 3.7 G/DL (ref 3.5–5.2)
ALBUMIN/GLOB SERPL: 1.1 G/DL
ALP SERPL-CCNC: 104 U/L (ref 39–117)
ALT SERPL W P-5'-P-CCNC: 14 U/L (ref 1–41)
ANION GAP SERPL CALCULATED.3IONS-SCNC: 11 MMOL/L (ref 5–15)
AST SERPL-CCNC: 13 U/L (ref 1–40)
BILIRUB SERPL-MCNC: 0.3 MG/DL (ref 0–1.2)
BUN SERPL-MCNC: 18 MG/DL (ref 6–20)
BUN/CREAT SERPL: 22 (ref 7–25)
CALCIUM SPEC-SCNC: 8.8 MG/DL (ref 8.6–10.5)
CHLORIDE SERPL-SCNC: 101 MMOL/L (ref 98–107)
CO2 SERPL-SCNC: 23 MMOL/L (ref 22–29)
CREAT SERPL-MCNC: 0.82 MG/DL (ref 0.76–1.27)
CRP SERPL-MCNC: 1.39 MG/DL (ref 0–0.5)
DEPRECATED RDW RBC AUTO: 48.3 FL (ref 37–54)
EGFRCR SERPLBLD CKD-EPI 2021: 105.7 ML/MIN/1.73
ERYTHROCYTE [DISTWIDTH] IN BLOOD BY AUTOMATED COUNT: 15.4 % (ref 12.3–15.4)
ERYTHROCYTE [SEDIMENTATION RATE] IN BLOOD: 77 MM/HR (ref 0–20)
GLOBULIN UR ELPH-MCNC: 3.5 GM/DL
GLUCOSE SERPL-MCNC: 188 MG/DL (ref 65–99)
HCT VFR BLD AUTO: 37.9 % (ref 37.5–51)
HGB BLD-MCNC: 12.4 G/DL (ref 13–17.7)
MCH RBC QN AUTO: 28.4 PG (ref 26.6–33)
MCHC RBC AUTO-ENTMCNC: 32.7 G/DL (ref 31.5–35.7)
MCV RBC AUTO: 86.9 FL (ref 79–97)
PLATELET # BLD AUTO: 297 10*3/MM3 (ref 140–450)
PMV BLD AUTO: 9.6 FL (ref 6–12)
POTASSIUM SERPL-SCNC: 3.9 MMOL/L (ref 3.5–5.2)
PROT SERPL-MCNC: 7.2 G/DL (ref 6–8.5)
RBC # BLD AUTO: 4.36 10*6/MM3 (ref 4.14–5.8)
SODIUM SERPL-SCNC: 135 MMOL/L (ref 136–145)
WBC NRBC COR # BLD: 7.82 10*3/MM3 (ref 3.4–10.8)

## 2022-06-07 PROCEDURE — 85652 RBC SED RATE AUTOMATED: CPT | Performed by: FAMILY MEDICINE

## 2022-06-07 PROCEDURE — 96365 THER/PROPH/DIAG IV INF INIT: CPT

## 2022-06-07 PROCEDURE — 86140 C-REACTIVE PROTEIN: CPT | Performed by: FAMILY MEDICINE

## 2022-06-07 PROCEDURE — 85027 COMPLETE CBC AUTOMATED: CPT | Performed by: FAMILY MEDICINE

## 2022-06-07 PROCEDURE — 36592 COLLECT BLOOD FROM PICC: CPT

## 2022-06-07 PROCEDURE — 25010000002 CEFTRIAXONE PER 250 MG: Performed by: FAMILY MEDICINE

## 2022-06-07 PROCEDURE — 80053 COMPREHEN METABOLIC PANEL: CPT | Performed by: FAMILY MEDICINE

## 2022-06-07 RX ADMIN — CEFTRIAXONE 1 G: 1 INJECTION, POWDER, FOR SOLUTION INTRAMUSCULAR; INTRAVENOUS at 10:46

## 2022-06-08 ENCOUNTER — HOSPITAL ENCOUNTER (OUTPATIENT)
Dept: INFUSION THERAPY | Facility: HOSPITAL | Age: 52
Discharge: HOME | End: 2022-06-08
Admitting: FAMILY MEDICINE

## 2022-06-08 VITALS
TEMPERATURE: 97.5 F | RESPIRATION RATE: 20 BRPM | HEART RATE: 75 BPM | OXYGEN SATURATION: 97 % | DIASTOLIC BLOOD PRESSURE: 51 MMHG | SYSTOLIC BLOOD PRESSURE: 110 MMHG

## 2022-06-08 DIAGNOSIS — L03.115 CELLULITIS OF RIGHT LOWER EXTREMITY: ICD-10-CM

## 2022-06-08 DIAGNOSIS — L03.90 WOUND CELLULITIS: Primary | ICD-10-CM

## 2022-06-08 PROCEDURE — G0463 HOSPITAL OUTPT CLINIC VISIT: HCPCS

## 2022-06-08 PROCEDURE — 96365 THER/PROPH/DIAG IV INF INIT: CPT

## 2022-06-08 PROCEDURE — 25010000002 CEFTRIAXONE PER 250 MG: Performed by: FAMILY MEDICINE

## 2022-06-08 RX ADMIN — CEFTRIAXONE 1 G: 1 INJECTION, POWDER, FOR SOLUTION INTRAMUSCULAR; INTRAVENOUS at 10:09

## 2022-06-08 NOTE — PROGRESS NOTES
Pt completed IV antibiotics as ordered by Dr. Patel. Order states to remove PICC on last day.  PICC removed post infusion per protocol. Pt remained supine at 30 degree angle for 30 minutes. Pt tolerated well. Pt discharged and ambulated out of ACU with a walker.

## 2022-06-10 ENCOUNTER — OFFICE VISIT (OUTPATIENT)
Dept: FAMILY MEDICINE CLINIC | Facility: CLINIC | Age: 52
End: 2022-06-10

## 2022-06-10 ENCOUNTER — TELEPHONE (OUTPATIENT)
Dept: FAMILY MEDICINE CLINIC | Facility: CLINIC | Age: 52
End: 2022-06-10

## 2022-06-10 ENCOUNTER — ANTICOAGULATION VISIT (OUTPATIENT)
Dept: PHARMACY | Facility: HOSPITAL | Age: 52
End: 2022-06-10

## 2022-06-10 VITALS
RESPIRATION RATE: 20 BRPM | SYSTOLIC BLOOD PRESSURE: 130 MMHG | OXYGEN SATURATION: 98 % | HEART RATE: 75 BPM | DIASTOLIC BLOOD PRESSURE: 74 MMHG | WEIGHT: 315 LBS | BODY MASS INDEX: 40.43 KG/M2 | HEIGHT: 74 IN

## 2022-06-10 DIAGNOSIS — I26.99 OTHER ACUTE PULMONARY EMBOLISM WITHOUT ACUTE COR PULMONALE: Primary | ICD-10-CM

## 2022-06-10 DIAGNOSIS — L03.115 CELLULITIS OF RIGHT LOWER EXTREMITY: Primary | ICD-10-CM

## 2022-06-10 DIAGNOSIS — I89.0 LYMPHEDEMA: ICD-10-CM

## 2022-06-10 DIAGNOSIS — I26.99 BILATERAL PULMONARY EMBOLISM: ICD-10-CM

## 2022-06-10 LAB — INR PPP: 2.8

## 2022-06-10 PROCEDURE — 99214 OFFICE O/P EST MOD 30 MIN: CPT | Performed by: FAMILY MEDICINE

## 2022-06-10 RX ORDER — DOXYCYCLINE HYCLATE 100 MG/1
100 CAPSULE ORAL 2 TIMES DAILY
Qty: 28 CAPSULE | Refills: 0 | Status: SHIPPED | OUTPATIENT
Start: 2022-06-10 | End: 2022-06-15

## 2022-06-10 NOTE — PROGRESS NOTES
Anticoagulation Clinic Progress Note    Anticoagulation Summary  As of 6/10/2022    INR goal:  2.0-3.0   TTR:  68.2 % (3.3 y)   INR used for dosin.80 (6/10/2022)   Warfarin maintenance plan:  10 mg every Tue, Thu; 15 mg all other days   Weekly warfarin total:  95 mg   Plan last modified:  Tessie Fatima RPH (6/10/2022)   Next INR check:  2022   Priority:  High   Target end date:  Indefinite    Indications    Other acute pulmonary embolism without acute cor pulmonale (HCC) [I26.99]  History of bilateral pulmonary embolism (CMS/HCC) [I26.99]             Anticoagulation Episode Summary     INR check location:      Preferred lab:      Send INR reminders to:   [facility]  POOL    Comments:  new  frankie 2019 **WEEKLY FRANKIE**      Anticoagulation Care Providers     Provider Role Specialty Phone number    Torri Colmenares APRN Referring Cardiology [phone]    Elsy Baeza MD Responsible Cardiology [phone]          Clinic Interview:  Patient Findings     Positives:  Change in medications    Comments:  CTX course completed, now back on doxycyline x 14 days   (started 2 days ago)       Clinical Outcomes     Comments:  CTX course completed, now back on doxycyline x 14 days   (started 2 days ago)         INR History:  Anticoagulation Monitoring 2022 6/3/2022 6/10/2022   INR 3.40 3.20 2.80   INR Date 2022 6/3/2022 6/10/2022   INR Goal 2.0-3.0 2.0-3.0 2.0-3.0   Trend Same Same Same   Last Week Total 75 mg 80 mg 80 mg   Next Week Total 80 mg 80 mg 85 mg   Sun 15 mg 15 mg 10 mg ()   Mon 15 mg 15 mg 15 mg   Tue 10 mg 10 mg 10 mg   Wed 15 mg 15 mg 15 mg   Thu 10 mg 10 mg 10 mg   Fri Hold () Hold (6/3) 15 mg   Sat 15 mg 15 mg 10 mg ()   Visit Report - - -   Some recent data might be hidden       Plan:  1. INR is Therapeutic today- see above in Anticoagulation Summary.   Will instruct Kameron Melendez to Change their warfarin regimen- see above in Anticoagulation  Summary.  2. Follow up in 1 week  3. They have been instructed to call if any changes in medications, doses, concerns, etc. Patient expresses understanding and has no further questions at this time.    Tessie Fatima RP

## 2022-06-10 NOTE — PROGRESS NOTES
Subjective   Kameron Melendez is a 52 y.o. male. Presents today for   Chief Complaint   Patient presents with   • Cellulitis       Cellulitis  This is a recurrent problem. The current episode started 1 to 4 weeks ago. The problem occurs constantly. The problem has been gradually improving. Pertinent negatives include no chills or fever. Treatments tried: completed rocephin;  on doxycycline. The treatment provided moderate relief.       Review of Systems   Constitutional: Negative for chills and fever.       Patient Active Problem List   Diagnosis   • History of bilateral pulmonary embolism (CMS/HCC)   • Pulmonary hypertension (HCC)   • Lymphedema of both lower extremities   • Obesity, morbid, BMI 50 or higher (HCC)   • Dyslipidemia   • Hyperuricemia   • Hypogonadal obesity   • Knee arthropathy   • Morbid obesity with body mass index of 60.0-69.9 in adult ([facility])   • ARNOLDO (obstructive sleep apnea)   • Severe edema   • History of pulmonary embolism   • Other acute pulmonary embolism without acute cor pulmonale ([facility])   • Intractable back pain   • Heterozygous factor V Leiden mutation ([facility])   • Cellulitis of left lower extremity   • Hypokalemia   • CAROL (acute kidney injury) ([facility])   • Sepsis with cutaneous manifestations ([facility])   • Hyperglycemia   • Paroxysmal atrial fibrillation ([facility])   • Athscl heart 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   • Long term (current) use of anticoagulants   • Lymphedema, not elsewhere classified   • Nicotine dependence, other tobacco product, uncomplicated   • Personal history of other venous thrombosis and embolism   • Typical atrial flutter ([facility])   • Venous insufficiency (chronic) (peripheral)   • Cellulitis of right lower extremity   • Wound cellulitis       Social History     Socioeconomic History   • Marital status:    Tobacco Use   • Smoking status: Light Tobacco Smoker     Types: Cigars, Pipe     Start date: 1/1/2006   • Smokeless tobacco: Never Used   • Tobacco comment:  occasional - < 1 a month   Vaping Use   • Vaping Use: Never used   Substance and Sexual Activity   • Alcohol use: No   • Drug use: No   • Sexual activity: Defer       No Known Allergies    Current Outpatient Medications on File Prior to Visit   Medication Sig Dispense Refill   • acetaminophen (TYLENOL) 500 MG tablet Take 500 mg by mouth Every 6 (Six) Hours As Needed for Mild Pain .     • acetaminophen-codeine (TYLENOL #3) 300-30 MG per tablet Take 1 tablet by mouth Every 4 (Four) Hours As Needed for Moderate Pain  (severe pain). 18 tablet 1   • buPROPion XL (WELLBUTRIN XL) 150 MG 24 hr tablet Take 1 tablet by mouth Daily. 30 tablet 5   • cefTRIAXone (ROCEPHIN) 1 g injection Infuse 1 g into a venous catheter Daily. X 10 days (he has picc line) 10 each 0   • furosemide (LASIX) 80 MG tablet TAKE 1 TABLET BY MOUTH DAILY (Patient taking differently: Take 80 mg by mouth Daily As Needed.) 30 tablet 5   • topiramate (TOPAMAX) 25 MG tablet Take 1 tablet by mouth 2 (Two) Times a Day. 60 tablet 5   • warfarin (COUMADIN) 10 MG tablet Take 15 mg by mouth See Admin Instructions. Takes 1.5 tablet (15 mg) Monday, Wednesday, Friday, Saturday, and Sunday     • warfarin (COUMADIN) 10 MG tablet TAKE 1 TABLET ON TUESDAY, THURSDAY, SUNDAY AND TAKE ONE AND ONE-HALF TABLETS ALL OTHER DAYS OR AS DIRECTED (Patient taking differently: Take 10 mg by mouth 2 (Two) Times a Week. 1 tablet (10 mg) on Tuesday and Thursday) 120 tablet 1   • [DISCONTINUED] doxycycline (VIBRAMYCIN) 100 MG capsule Take 1 capsule by mouth 2 (Two) Times a Day. 28 capsule 0     Current Facility-Administered Medications on File Prior to Visit   Medication Dose Route Frequency Provider Last Rate Last Admin   • [DISCONTINUED] cefTRIAXone (ROCEPHIN) 1 g in sodium chloride 0.9 % 100 mL IVPB-VTB  1 g Intravenous Q24H Hansel Patel DO   Stopped at 06/08/22 1046       Objective   Vitals:    06/10/22 1105   BP: 130/74   BP Location: Left arm   Patient Position: Sitting   Cuff  "Size: Adult   Pulse: 75   Resp: 20   SpO2: 98%   Weight: (!) 249 kg (550 lb)   Height: 188 cm (74\")   PainSc:   5     Body mass index is 70.62 kg/m².    Physical Exam  Vitals and nursing note reviewed.   Constitutional:       Appearance: He is well-developed.   Musculoskeletal:      Cervical back: Neck supple.   Skin:     General: Skin is warm and dry.      Comments: Right lower extremity redness improvement, swelling and tenderness.  Warmth improved.    Severe lymphedema   Neurological:      Mental Status: He is alert.   Psychiatric:         Behavior: Behavior normal.       Current outpatient and discharge medications have been reconciled for the patient.  Reviewed by: Hansel Patel,     Assessment & Plan   Diagnoses and all orders for this visit:    1. Cellulitis of right lower extremity (Primary)  -     Basic Metabolic Panel  -     CBC & Differential  -     C-reactive Protein  -     Sedimentation Rate  -     doxycycline (VIBRAMYCIN) 100 MG capsule; Take 1 capsule by mouth 2 (Two) Times a Day.  Dispense: 28 capsule; Refill: 0    2. Lymphedema    patient with severe cellulitis and lymphedema;   Has improved with improvement wbc, sed rate and crp;   Is not resolved and I think need more time.  Will extend doxycycline and recheck labs;  Off work 1 more week for continued leg elevation and abx;  I am concerend will worsen again with legs down and such poor circulation with lymphedema          -Follow up:  1 week and prn   "

## 2022-06-13 ENCOUNTER — TELEPHONE (OUTPATIENT)
Dept: FAMILY MEDICINE CLINIC | Facility: CLINIC | Age: 52
End: 2022-06-13

## 2022-06-14 LAB
BASOPHILS # BLD AUTO: 0.1 X10E3/UL (ref 0–0.2)
BASOPHILS NFR BLD AUTO: 1 %
BUN SERPL-MCNC: 17 MG/DL (ref 6–24)
BUN/CREAT SERPL: 19 (ref 9–20)
CALCIUM SERPL-MCNC: 9 MG/DL (ref 8.7–10.2)
CHLORIDE SERPL-SCNC: 103 MMOL/L (ref 96–106)
CO2 SERPL-SCNC: 20 MMOL/L (ref 20–29)
CREAT SERPL-MCNC: 0.9 MG/DL (ref 0.76–1.27)
CRP SERPL-MCNC: 11 MG/L (ref 0–10)
EGFRCR SERPLBLD CKD-EPI 2021: 103 ML/MIN/1.73
EOSINOPHIL # BLD AUTO: 0.2 X10E3/UL (ref 0–0.4)
EOSINOPHIL NFR BLD AUTO: 2 %
ERYTHROCYTE [DISTWIDTH] IN BLOOD BY AUTOMATED COUNT: 15.3 % (ref 11.6–15.4)
ERYTHROCYTE [SEDIMENTATION RATE] IN BLOOD BY WESTERGREN METHOD: 80 MM/HR (ref 0–30)
GLUCOSE SERPL-MCNC: 177 MG/DL (ref 65–99)
HCT VFR BLD AUTO: 39.9 % (ref 37.5–51)
HGB BLD-MCNC: 12.8 G/DL (ref 13–17.7)
IMM GRANULOCYTES # BLD AUTO: 0 X10E3/UL (ref 0–0.1)
IMM GRANULOCYTES NFR BLD AUTO: 0 %
LYMPHOCYTES # BLD AUTO: 2.3 X10E3/UL (ref 0.7–3.1)
LYMPHOCYTES NFR BLD AUTO: 29 %
MCH RBC QN AUTO: 27.6 PG (ref 26.6–33)
MCHC RBC AUTO-ENTMCNC: 32.1 G/DL (ref 31.5–35.7)
MCV RBC AUTO: 86 FL (ref 79–97)
MONOCYTES # BLD AUTO: 0.5 X10E3/UL (ref 0.1–0.9)
MONOCYTES NFR BLD AUTO: 7 %
NEUTROPHILS # BLD AUTO: 4.8 X10E3/UL (ref 1.4–7)
NEUTROPHILS NFR BLD AUTO: 61 %
PLATELET # BLD AUTO: 293 X10E3/UL (ref 150–450)
POTASSIUM SERPL-SCNC: 4.4 MMOL/L (ref 3.5–5.2)
RBC # BLD AUTO: 4.63 X10E6/UL (ref 4.14–5.8)
SODIUM SERPL-SCNC: 139 MMOL/L (ref 134–144)
WBC # BLD AUTO: 7.9 X10E3/UL (ref 3.4–10.8)

## 2022-06-15 ENCOUNTER — CLINICAL SUPPORT (OUTPATIENT)
Dept: FAMILY MEDICINE CLINIC | Facility: CLINIC | Age: 52
End: 2022-06-15

## 2022-06-15 DIAGNOSIS — L03.115 CELLULITIS OF RIGHT LOWER EXTREMITY: ICD-10-CM

## 2022-06-15 DIAGNOSIS — L03.115 CELLULITIS OF RIGHT LOWER EXTREMITY: Primary | ICD-10-CM

## 2022-06-15 PROCEDURE — 96372 THER/PROPH/DIAG INJ SC/IM: CPT | Performed by: FAMILY MEDICINE

## 2022-06-15 RX ORDER — SULFAMETHOXAZOLE AND TRIMETHOPRIM 800; 160 MG/1; MG/1
1 TABLET ORAL 2 TIMES DAILY
Qty: 28 TABLET | Refills: 0 | Status: SHIPPED | OUTPATIENT
Start: 2022-06-15 | End: 2022-06-24 | Stop reason: SDUPTHER

## 2022-06-15 RX ORDER — CEPHALEXIN 500 MG/1
500 CAPSULE ORAL 4 TIMES DAILY
Qty: 56 CAPSULE | Refills: 0 | Status: SHIPPED | OUTPATIENT
Start: 2022-06-15 | End: 2022-06-15 | Stop reason: SDUPTHER

## 2022-06-15 RX ORDER — CEFTRIAXONE 1 G/1
2 INJECTION, POWDER, FOR SOLUTION INTRAMUSCULAR; INTRAVENOUS EVERY 12 HOURS
Status: SHIPPED | OUTPATIENT
Start: 2022-06-15 | End: 2022-06-16

## 2022-06-15 RX ORDER — SULFAMETHOXAZOLE AND TRIMETHOPRIM 800; 160 MG/1; MG/1
1 TABLET ORAL 2 TIMES DAILY
Qty: 28 TABLET | Refills: 0 | Status: SHIPPED | OUTPATIENT
Start: 2022-06-15 | End: 2022-06-15 | Stop reason: SDUPTHER

## 2022-06-15 RX ORDER — CEPHALEXIN 500 MG/1
500 CAPSULE ORAL 4 TIMES DAILY
Qty: 56 CAPSULE | Refills: 0 | Status: SHIPPED | OUTPATIENT
Start: 2022-06-15 | End: 2022-06-24 | Stop reason: SDUPTHER

## 2022-06-15 RX ADMIN — CEFTRIAXONE 1 G: 1 INJECTION, POWDER, FOR SOLUTION INTRAMUSCULAR; INTRAVENOUS at 13:00

## 2022-06-15 NOTE — PROGRESS NOTES
Call results to patient.  Sed rate and crp went back up a little.   I am going to change antibiotics around.to bactrim and keflex;  stop doxycycline.  Keep leg elevated as much as possible.   Have stop in for nurse visit today, tomorrow and will give at Friday appt with me (3 days) of ceftriaxone 2mg.

## 2022-06-16 ENCOUNTER — CLINICAL SUPPORT (OUTPATIENT)
Dept: FAMILY MEDICINE CLINIC | Facility: CLINIC | Age: 52
End: 2022-06-16

## 2022-06-16 DIAGNOSIS — L03.116 CELLULITIS OF LEFT LOWER EXTREMITY: ICD-10-CM

## 2022-06-16 PROCEDURE — 96372 THER/PROPH/DIAG INJ SC/IM: CPT | Performed by: FAMILY MEDICINE

## 2022-06-16 RX ADMIN — CEFTRIAXONE 2 G: 1 INJECTION, POWDER, FOR SOLUTION INTRAMUSCULAR; INTRAVENOUS at 16:03

## 2022-06-17 ENCOUNTER — OFFICE VISIT (OUTPATIENT)
Dept: FAMILY MEDICINE CLINIC | Facility: CLINIC | Age: 52
End: 2022-06-17

## 2022-06-17 ENCOUNTER — ANTICOAGULATION VISIT (OUTPATIENT)
Dept: PHARMACY | Facility: HOSPITAL | Age: 52
End: 2022-06-17

## 2022-06-17 VITALS
TEMPERATURE: 98.2 F | RESPIRATION RATE: 20 BRPM | BODY MASS INDEX: 40.43 KG/M2 | HEIGHT: 74 IN | WEIGHT: 315 LBS | SYSTOLIC BLOOD PRESSURE: 122 MMHG | OXYGEN SATURATION: 96 % | DIASTOLIC BLOOD PRESSURE: 70 MMHG | HEART RATE: 72 BPM

## 2022-06-17 DIAGNOSIS — L03.115 CELLULITIS OF RIGHT LOWER EXTREMITY: Primary | ICD-10-CM

## 2022-06-17 DIAGNOSIS — I26.99 OTHER ACUTE PULMONARY EMBOLISM WITHOUT ACUTE COR PULMONALE: Primary | ICD-10-CM

## 2022-06-17 DIAGNOSIS — I26.99 BILATERAL PULMONARY EMBOLISM: ICD-10-CM

## 2022-06-17 LAB — INR PPP: 2.8

## 2022-06-17 PROCEDURE — 96372 THER/PROPH/DIAG INJ SC/IM: CPT | Performed by: FAMILY MEDICINE

## 2022-06-17 PROCEDURE — 99214 OFFICE O/P EST MOD 30 MIN: CPT | Performed by: FAMILY MEDICINE

## 2022-06-17 RX ORDER — CEFTRIAXONE 1 G/1
2 INJECTION, POWDER, FOR SOLUTION INTRAMUSCULAR; INTRAVENOUS ONCE
Status: COMPLETED | OUTPATIENT
Start: 2022-06-17 | End: 2022-06-17

## 2022-06-17 RX ADMIN — CEFTRIAXONE 2 G: 1 INJECTION, POWDER, FOR SOLUTION INTRAMUSCULAR; INTRAVENOUS at 12:39

## 2022-06-17 NOTE — PROGRESS NOTES
Subjective   Kameron Melendez is a 52 y.o. male. Presents today for   Chief Complaint   Patient presents with   • Cellulitis   • Edema       Cellulitis  This is a recurrent problem. The current episode started 1 to 4 weeks ago. The problem occurs constantly. The problem has been gradually worsening. Associated symptoms include weakness. Pertinent negatives include no chills or fever. Associated symptoms comments: Feels swellign wrose;  Inflammatory markers reynaldo.  . Exacerbated by: severe lymphedema. Treatments tried: On wed changed to bactrim/keflex;  started IM Rocephin x 3 days;   Improvement on treatment: too early tell.   Has not taken furosemide x1 week as too hard getting up and down with leg elevated.      Review of Systems   Constitutional: Negative for chills and fever.   Cardiovascular: Positive for leg swelling.   Neurological: Positive for weakness.       Patient Active Problem List   Diagnosis   • History of bilateral pulmonary embolism (CMS/HCC)   • Pulmonary hypertension (HCC)   • Lymphedema of both lower extremities   • Obesity, morbid, BMI 50 or higher ([facility])   • Dyslipidemia   • Hyperuricemia   • Hypogonadal obesity   • Knee arthropathy   • Morbid obesity with body mass index of 60.0-69.9 in adult ([facility])   • ARNOLDO (obstructive sleep apnea)   • Severe edema   • History of pulmonary embolism   • Other acute pulmonary embolism without acute cor pulmonale ([facility])   • Intractable back pain   • Heterozygous factor V Leiden mutation ([facility])   • Cellulitis of left lower extremity   • Hypokalemia   • CAROL (acute kidney injury) ([facility])   • Sepsis with cutaneous manifestations ([facility])   • Hyperglycemia   • Paroxysmal atrial fibrillation ([facility])   • Athscl heart 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   • Long term (current) use of anticoagulants   • Lymphedema, not elsewhere classified   • Nicotine dependence, other tobacco product, uncomplicated   • Personal history of other venous thrombosis and embolism   •  Typical atrial flutter (HCC)   • Venous insufficiency (chronic) (peripheral)   • Cellulitis of right lower extremity   • Wound cellulitis       Social History     Socioeconomic History   • Marital status:    Tobacco Use   • Smoking status: Light Tobacco Smoker     Types: Cigars, Pipe     Start date: 1/1/2006   • Smokeless tobacco: Never Used   • Tobacco comment: occasional - < 1 a month   Vaping Use   • Vaping Use: Never used   Substance and Sexual Activity   • Alcohol use: No   • Drug use: No   • Sexual activity: Defer       No Known Allergies    Current Outpatient Medications on File Prior to Visit   Medication Sig Dispense Refill   • acetaminophen (TYLENOL) 500 MG tablet Take 500 mg by mouth Every 6 (Six) Hours As Needed for Mild Pain .     • acetaminophen-codeine (TYLENOL #3) 300-30 MG per tablet Take 1 tablet by mouth Every 4 (Four) Hours As Needed for Moderate Pain  (severe pain). 18 tablet 1   • buPROPion XL (WELLBUTRIN XL) 150 MG 24 hr tablet Take 1 tablet by mouth Daily. 30 tablet 5   • cephalexin (KEFLEX) 500 MG capsule Take 1 capsule by mouth 4 (Four) Times a Day. 56 capsule 0   • furosemide (LASIX) 80 MG tablet TAKE 1 TABLET BY MOUTH DAILY (Patient taking differently: Take 80 mg by mouth Daily As Needed.) 30 tablet 5   • sulfamethoxazole-trimethoprim (Bactrim DS) 800-160 MG per tablet Take 1 tablet by mouth 2 (Two) Times a Day. 28 tablet 0   • topiramate (TOPAMAX) 25 MG tablet Take 1 tablet by mouth 2 (Two) Times a Day. 60 tablet 5   • warfarin (COUMADIN) 10 MG tablet Take 15 mg by mouth See Admin Instructions. Takes 1.5 tablet (15 mg) Monday, Wednesday, Friday, Saturday, and Sunday     • warfarin (COUMADIN) 10 MG tablet TAKE 1 TABLET ON TUESDAY, THURSDAY, SUNDAY AND TAKE ONE AND ONE-HALF TABLETS ALL OTHER DAYS OR AS DIRECTED (Patient taking differently: Take 10 mg by mouth 2 (Two) Times a Week. 1 tablet (10 mg) on Tuesday and Thursday) 120 tablet 1     Current Facility-Administered Medications on  "File Prior to Visit   Medication Dose Route Frequency Provider Last Rate Last Admin   • [] cefTRIAXone (ROCEPHIN) injection 2 g  2 g Intramuscular Q12H JorgeHansel    2 g at 22 1603       Objective   Vitals:    22 1150   BP: 122/70   BP Location: Left arm   Patient Position: Sitting   Cuff Size: Large Adult   Pulse: 72   Resp: 20   Temp: 98.2 °F (36.8 °C)   TempSrc: Temporal   SpO2: 96%   Weight: (!) 249 kg (550 lb)   Height: 188 cm (74\")   PainSc:   5     Body mass index is 70.62 kg/m².    Physical Exam  Vitals and nursing note reviewed.   Constitutional:       Appearance: He is well-developed.   Musculoskeletal:      Cervical back: Neck supple.      Right lower leg: Edema present.      Left lower leg: Edema present.   Skin:     General: Skin is warm and dry.      Comments: Severe lymphedema with chronic skin changes;  righ tleg redness/warmth worsening.     Neurological:      Mental Status: He is alert.   Psychiatric:         Behavior: Behavior normal.       Component      Latest Ref Rng & Units 2022   WBC      3.4 - 10.8 x10E3/uL 10.46 8.9 8.87 7.71   RBC      4.14 - 5.80 x10E6/uL 4.46 4.52 4.37 4.37   Hemoglobin      13.0 - 17.7 g/dL 12.7 (L) 12.9 (L) 12.1 (L) 12.2 (L)   Hematocrit      37.5 - 51.0 % 37.2 (L) 38.6 38.2 37.6   MCV      79 - 97 fL 83.4 85 87.4 86.0   MCH      26.6 - 33.0 pg 28.5 28.5 27.7 27.9   MCHC      31.5 - 35.7 g/dL 34.1 33.4 31.7 32.4   RDW      11.6 - 15.4 % 14.9 14.4 14.9 15.0   RDW-SD      37.0 - 54.0 fl 45.2  47.9 47.7   MPV      6.0 - 12.0 fL 9.6  9.5 9.7   Platelets      150 - 450 x10E3/uL 446 526 (H) 350 322   Neutrophil Rel %      Not Estab. % 62.1 62 59.8 53.6   Lymphocyte Rel %      Not Estab. % 25.8 25 27.7 33.1   Monocyte Rel %      Not Estab. % 8.2 10 8.5 9.1   Eosinophil Rel %      Not Estab. % 2.3 2 2.6 3.0   Basophil Rel %      Not Estab. % 0.6 1 0.9 0.6   Immature Granulocyte Rel %      Not Estab. % 1.0 (H) 0 " 0.5 0.6 (H)   Neutrophils Absolute      1.4 - 7.0 x10E3/uL 6.50 5.5 5.31 4.13   Lymphocytes Absolute      0.7 - 3.1 x10E3/uL 2.70 2.2 2.46 2.55   Monocytes Absolute      0.1 - 0.9 x10E3/uL 0.86 0.9 0.75 0.70   Eosinophils Absolute      0.0 - 0.4 x10E3/uL 0.24 0.1 0.23 0.23   Basophils Absolute      0.0 - 0.2 x10E3/uL 0.06 0.1 0.08 0.05   Immature Grans, Absolute      0.0 - 0.1 x10E3/uL 0.10 (H) 0.0 0.04 0.05   nRBC      0.0 - 0.2 /100 WBC 0.0  0.0 0.0   C-Reactive Protein      0 - 10 mg/L  67 (H) 2.90 (H) 2.12 (H)   Sed Rate      0 - 30 mm/hr  120 (H) 97 (H) 89 (H)     Component      Latest Ref Rng & Units 6/7/2022 6/13/2022   WBC      3.4 - 10.8 x10E3/uL 7.82 7.9   RBC      4.14 - 5.80 x10E6/uL 4.36 4.63   Hemoglobin      13.0 - 17.7 g/dL 12.4 (L) 12.8 (L)   Hematocrit      37.5 - 51.0 % 37.9 39.9   MCV      79 - 97 fL 86.9 86   MCH      26.6 - 33.0 pg 28.4 27.6   MCHC      31.5 - 35.7 g/dL 32.7 32.1   RDW      11.6 - 15.4 % 15.4 15.3   RDW-SD      37.0 - 54.0 fl 48.3    MPV      6.0 - 12.0 fL 9.6    Platelets      150 - 450 x10E3/uL 297 293   Neutrophil Rel %      Not Estab. %  61   Lymphocyte Rel %      Not Estab. %  29   Monocyte Rel %      Not Estab. %  7   Eosinophil Rel %      Not Estab. %  2   Basophil Rel %      Not Estab. %  1   Immature Granulocyte Rel %      Not Estab. %  0   Neutrophils Absolute      1.4 - 7.0 x10E3/uL  4.8   Lymphocytes Absolute      0.7 - 3.1 x10E3/uL  2.3   Monocytes Absolute      0.1 - 0.9 x10E3/uL  0.5   Eosinophils Absolute      0.0 - 0.4 x10E3/uL  0.2   Basophils Absolute      0.0 - 0.2 x10E3/uL  0.1   Immature Grans, Absolute      0.0 - 0.1 x10E3/uL  0.0   nRBC      0.0 - 0.2 /100 WBC     C-Reactive Protein      0 - 10 mg/L 1.39 (H) 11 (H)   Sed Rate      0 - 30 mm/hr 77 (H) 80 (H)       Assessment & Plan   Diagnoses and all orders for this visit:    1. Cellulitis of right lower extremity (Primary)  -     cefTRIAXone (ROCEPHIN) injection 2 g  -     CBC & Differential  -      Basic Metabolic Panel  -     C-reactive Protein  -     Sedimentation Rate      Will continue cephalexin and bactrim started 2 days ago  Today 3rd day of high dose recephin and will need ot keep off work;   He is not stable to return        -Follow up: 1 week

## 2022-06-17 NOTE — PROGRESS NOTES
Anticoagulation Clinic Progress Note    Anticoagulation Summary  As of 2022    INR goal:  2.0-3.0   TTR:  68.4 % (3.3 y)   INR used for dosin.80 (2022)   Warfarin maintenance plan:  10 mg every Tue, Thu; 15 mg all other days   Weekly warfarin total:  95 mg   Plan last modified:  Tessie Fatima RPH (2022)   Next INR check:  2022   Priority:  High   Target end date:  Indefinite    Indications    Other acute pulmonary embolism without acute cor pulmonale (HCC) [I26.99]  History of bilateral pulmonary embolism (CMS/HCC) [I26.99]             Anticoagulation Episode Summary     INR check location:      Preferred lab:      Send INR reminders to:   [facility]  PO[facility]    Comments:  new  frankie 2019 **WEEKLY FRANKIE**      Anticoagulation Care Providers     Provider Role Specialty Phone number    Torri Colmenares, APRN Referring Cardiology [phone]    Elsy Baeza MD Responsible Cardiology [phone]          Clinic Interview:  Patient Findings     Positives:  Change in medications    Negatives:  Signs/symptoms of thrombosis, Signs/symptoms of bleeding,   Laboratory test error suspected, Change in health, Change in alcohol use,   Change in activity, Upcoming invasive procedure, Emergency department   visit, Upcoming dental procedure, Missed doses, Extra doses, Change in   diet/appetite, Hospital admission, Bruising, Other complaints    Comments:  Started on keflex and bactrim on Wednesday for 14 days        Clinical Outcomes     Negatives:  Major bleeding event, Thromboembolic event,   Anticoagulation-related hospital admission, Anticoagulation-related ED   visit, Anticoagulation-related fatality    Comments:  Started on keflex and bactrim on Wednesday for 14 days          INR History:  Anticoagulation Monitoring 6/3/2022 6/10/2022 2022   INR 3.20 2.80 2.80   INR Date 6/3/2022 6/10/2022 2022   INR Goal 2.0-3.0 2.0-3.0 2.0-3.0   Trend Same Same Same   Last Week  Total 80 mg 80 mg 85 mg   Next Week Total 80 mg 85 mg 75 mg   Sun 15 mg 10 mg (6/12) 10 mg (6/19)   Mon 15 mg 15 mg 10 mg (6/20)   Tue 10 mg 10 mg 10 mg   Wed 15 mg 15 mg -   Thu 10 mg 10 mg -   Fri Hold (6/3) 15 mg 10 mg (6/17)   Sat 15 mg 10 mg (6/11) 10 mg (6/18)   Visit Report - - -   Some recent data might be hidden       Plan:  1. INR is Therapeutic today- see above in Anticoagulation Summary.   Will instruct Kameron Melendez to Change their warfarin regimen- see above in Anticoagulation Summary.  2. Follow up in 5 days   3. They have been instructed to call if any changes in medications, doses, concerns, etc. Patient expresses understanding and has no further questions at this time.    Tessie Fatima [facility]

## 2022-06-18 LAB
BASOPHILS # BLD AUTO: 0.1 X10E3/UL (ref 0–0.2)
BASOPHILS NFR BLD AUTO: 1 %
BUN SERPL-MCNC: 18 MG/DL (ref 6–24)
BUN/CREAT SERPL: 21 (ref 9–20)
CALCIUM SERPL-MCNC: 9.3 MG/DL (ref 8.7–10.2)
CHLORIDE SERPL-SCNC: 102 MMOL/L (ref 96–106)
CO2 SERPL-SCNC: 20 MMOL/L (ref 20–29)
CREAT SERPL-MCNC: 0.86 MG/DL (ref 0.76–1.27)
CRP SERPL-MCNC: 16 MG/L (ref 0–10)
EGFRCR SERPLBLD CKD-EPI 2021: 104 ML/MIN/1.73
EOSINOPHIL # BLD AUTO: 0.1 X10E3/UL (ref 0–0.4)
EOSINOPHIL NFR BLD AUTO: 2 %
ERYTHROCYTE [DISTWIDTH] IN BLOOD BY AUTOMATED COUNT: 15.4 % (ref 11.6–15.4)
ERYTHROCYTE [SEDIMENTATION RATE] IN BLOOD BY WESTERGREN METHOD: 86 MM/HR (ref 0–30)
GLUCOSE SERPL-MCNC: 113 MG/DL (ref 65–99)
HCT VFR BLD AUTO: 41.9 % (ref 37.5–51)
HGB BLD-MCNC: 13.8 G/DL (ref 13–17.7)
IMM GRANULOCYTES # BLD AUTO: 0 X10E3/UL (ref 0–0.1)
IMM GRANULOCYTES NFR BLD AUTO: 0 %
LYMPHOCYTES # BLD AUTO: 1.7 X10E3/UL (ref 0.7–3.1)
LYMPHOCYTES NFR BLD AUTO: 26 %
MCH RBC QN AUTO: 28.2 PG (ref 26.6–33)
MCHC RBC AUTO-ENTMCNC: 32.9 G/DL (ref 31.5–35.7)
MCV RBC AUTO: 86 FL (ref 79–97)
MONOCYTES # BLD AUTO: 0.6 X10E3/UL (ref 0.1–0.9)
MONOCYTES NFR BLD AUTO: 9 %
NEUTROPHILS # BLD AUTO: 4.1 X10E3/UL (ref 1.4–7)
NEUTROPHILS NFR BLD AUTO: 62 %
PLATELET # BLD AUTO: 247 X10E3/UL (ref 150–450)
POTASSIUM SERPL-SCNC: 4.6 MMOL/L (ref 3.5–5.2)
RBC # BLD AUTO: 4.9 X10E6/UL (ref 4.14–5.8)
SODIUM SERPL-SCNC: 136 MMOL/L (ref 134–144)
WBC # BLD AUTO: 6.6 X10E3/UL (ref 3.4–10.8)

## 2022-06-19 DIAGNOSIS — L03.115 CELLULITIS OF RIGHT LOWER EXTREMITY: Primary | ICD-10-CM

## 2022-06-19 NOTE — PROGRESS NOTES
Call results to patient.  Sed rate and CRP has risen further.    Have stop in Monday June 20 for repeat, if still rising and if having more pain/swelling then going to have to go back to hospital.

## 2022-06-21 LAB
BASOPHILS # BLD AUTO: 0.1 X10E3/UL (ref 0–0.2)
BASOPHILS NFR BLD AUTO: 1 %
CRP SERPL-MCNC: 20 MG/L (ref 0–10)
EOSINOPHIL # BLD AUTO: 0.2 X10E3/UL (ref 0–0.4)
EOSINOPHIL NFR BLD AUTO: 2 %
ERYTHROCYTE [DISTWIDTH] IN BLOOD BY AUTOMATED COUNT: 15.3 % (ref 11.6–15.4)
ERYTHROCYTE [SEDIMENTATION RATE] IN BLOOD BY WESTERGREN METHOD: 69 MM/HR (ref 0–30)
HCT VFR BLD AUTO: 41.3 % (ref 37.5–51)
HGB BLD-MCNC: 13.3 G/DL (ref 13–17.7)
IMM GRANULOCYTES # BLD AUTO: 0 X10E3/UL (ref 0–0.1)
IMM GRANULOCYTES NFR BLD AUTO: 0 %
LYMPHOCYTES # BLD AUTO: 2 X10E3/UL (ref 0.7–3.1)
LYMPHOCYTES NFR BLD AUTO: 29 %
MCH RBC QN AUTO: 28.1 PG (ref 26.6–33)
MCHC RBC AUTO-ENTMCNC: 32.2 G/DL (ref 31.5–35.7)
MCV RBC AUTO: 87 FL (ref 79–97)
MONOCYTES # BLD AUTO: 0.6 X10E3/UL (ref 0.1–0.9)
MONOCYTES NFR BLD AUTO: 8 %
NEUTROPHILS # BLD AUTO: 4.1 X10E3/UL (ref 1.4–7)
NEUTROPHILS NFR BLD AUTO: 60 %
PLATELET # BLD AUTO: 257 X10E3/UL (ref 150–450)
RBC # BLD AUTO: 4.73 X10E6/UL (ref 4.14–5.8)
WBC # BLD AUTO: 6.9 X10E3/UL (ref 3.4–10.8)

## 2022-06-21 NOTE — PROGRESS NOTES
Call results to patient.  WBC normal;  CRP went up but sed rate went down.   Lets continue oral antibiotics changed to until I see on 6/24/22 and will check labs then again.

## 2022-06-22 ENCOUNTER — ANTICOAGULATION VISIT (OUTPATIENT)
Dept: PHARMACY | Facility: HOSPITAL | Age: 52
End: 2022-06-22

## 2022-06-22 DIAGNOSIS — I26.99 BILATERAL PULMONARY EMBOLISM: ICD-10-CM

## 2022-06-22 DIAGNOSIS — I26.99 OTHER ACUTE PULMONARY EMBOLISM WITHOUT ACUTE COR PULMONALE: Primary | ICD-10-CM

## 2022-06-22 LAB — INR PPP: 3.2

## 2022-06-22 NOTE — PROGRESS NOTES
Anticoagulation Clinic Progress Note    Anticoagulation Summary  As of 6/22/2022    INR goal:  2.0-3.0   TTR:  68.2 % (3.3 y)   INR used for dosing:  3.20 (6/22/2022)   Warfarin maintenance plan:  10 mg every Tue, Thu; 15 mg all other days   Weekly warfarin total:  95 mg   Plan last modified:  Tessie Fatima RPH (6/17/2022)   Next INR check:  6/24/2022   Priority:  High   Target end date:  Indefinite    Indications    Other acute pulmonary embolism without acute cor pulmonale (HCC) [I26.99]  History of bilateral pulmonary embolism (CMS/HCC) [I26.99]             Anticoagulation Episode Summary     INR check location:      Preferred lab:      [facility]    Comments:  new  frankie March 2019 **WEEKLY FRANKIE**      Anticoagulation Care Providers     Provider Role Specialty Phone number    Torri Colmenares, FADY Referring Cardiology [phone]    Elsy Baeza MD Responsible Cardiology [phone]          Clinic Interview:  Patient Findings     Positives:  Change in medications    Comments:  Still has another week of bactrim and cephalexin; took 15mg on   Sunday instead of 10mg.       Clinical Outcomes     Comments:  Still has another week of bactrim and cephalexin; took 15mg on   Sunday instead of 10mg.         INR History:  Anticoagulation Monitoring 6/10/2022 6/17/2022 6/22/2022   INR 2.80 2.80 3.20   INR Date 6/10/2022 6/17/2022 6/22/2022   INR Goal 2.0-3.0 2.0-3.0 2.0-3.0   Trend Same Same Same   Last Week Total 80 mg 85 mg 80 mg   Next Week Total 85 mg 75 mg 70 mg   Sun 10 mg (6/12) 10 mg (6/19) -   Mon 15 mg 10 mg (6/20) -   Tue 10 mg 10 mg -   Wed 15 mg - 10 mg (6/22)   Thu 10 mg - 10 mg   Fri 15 mg 10 mg (6/17) -   Sat 10 mg (6/11) 10 mg (6/18) -   Visit Report - - -   Some recent data might be hidden       Plan:  1. INR is Supratherapeutic today- see above in Anticoagulation Summary.   Will instruct Kameron Melendez to Continue their warfarin regimen- see  above in Anticoagulation Summary.  Take 10mg daily until antibiotic course is complete.   2. Follow up Friday, 6/24.  3. They have been instructed to call if any changes in medications, doses, concerns, etc. Patient expresses understanding and has no further questions at this time.    Tonja Kaye, PharmD

## 2022-06-24 ENCOUNTER — OFFICE VISIT (OUTPATIENT)
Dept: FAMILY MEDICINE CLINIC | Facility: CLINIC | Age: 52
End: 2022-06-24

## 2022-06-24 ENCOUNTER — ANTICOAGULATION VISIT (OUTPATIENT)
Dept: PHARMACY | Facility: HOSPITAL | Age: 52
End: 2022-06-24

## 2022-06-24 VITALS
HEART RATE: 61 BPM | TEMPERATURE: 99.3 F | WEIGHT: 315 LBS | DIASTOLIC BLOOD PRESSURE: 70 MMHG | SYSTOLIC BLOOD PRESSURE: 138 MMHG | HEIGHT: 74 IN | RESPIRATION RATE: 20 BRPM | OXYGEN SATURATION: 96 % | BODY MASS INDEX: 40.43 KG/M2

## 2022-06-24 DIAGNOSIS — I26.99 OTHER ACUTE PULMONARY EMBOLISM WITHOUT ACUTE COR PULMONALE: Primary | ICD-10-CM

## 2022-06-24 DIAGNOSIS — L03.115 CELLULITIS OF RIGHT LOWER EXTREMITY: Primary | ICD-10-CM

## 2022-06-24 DIAGNOSIS — I89.0 LYMPHEDEMA OF BOTH LOWER EXTREMITIES: ICD-10-CM

## 2022-06-24 DIAGNOSIS — I26.99 BILATERAL PULMONARY EMBOLISM: ICD-10-CM

## 2022-06-24 LAB — INR PPP: 2.8

## 2022-06-24 PROCEDURE — 99214 OFFICE O/P EST MOD 30 MIN: CPT | Performed by: FAMILY MEDICINE

## 2022-06-24 RX ORDER — CEPHALEXIN 500 MG/1
500 CAPSULE ORAL 4 TIMES DAILY
Qty: 56 CAPSULE | Refills: 0 | Status: SHIPPED | OUTPATIENT
Start: 2022-06-24 | End: 2022-07-18

## 2022-06-24 RX ORDER — SULFAMETHOXAZOLE AND TRIMETHOPRIM 800; 160 MG/1; MG/1
1 TABLET ORAL 2 TIMES DAILY
Qty: 28 TABLET | Refills: 0 | Status: SHIPPED | OUTPATIENT
Start: 2022-06-24 | End: 2022-07-18

## 2022-06-24 NOTE — PROGRESS NOTES
Anticoagulation Clinic Progress Note    Anticoagulation Summary  As of 2022    INR goal:  2.0-3.0   TTR:  68.2 % (3.3 y)   INR used for dosin.80 (2022)   Warfarin maintenance plan:  10 mg every Tue, Thu; 15 mg all other days   Weekly warfarin total:  95 mg   No change documented:  Ramo Morocho, PharmD   Plan last modified:  Tessie Fatima RPH (2022)   Next INR check:  2022   Priority:  High   Target end date:  Indefinite    Indications    Other acute pulmonary embolism without acute cor pulmonale (HCC) [I26.99]  History of bilateral pulmonary embolism (CMS/HCC) [I26.99]             Anticoagulation Episode Summary     INR check location:      Preferred lab:      Send INR reminders to:  [facility]  POOL    Comments:  new  frankie 2019 **WEEKLY FRANKIE**      Anticoagulation Care Providers     Provider Role Specialty Phone number    Torri Colmenares APRN Referring Cardiology [phone]    Elsy Baeza MD Responsible Cardiology [phone]          Clinic Interview:  Continues on 14-day course of Bactrim and cephalexin.     INR History:  Anticoagulation Monitoring 2022   INR 2.80 3.20 2.80   INR Date 2022   INR Goal 2.0-3.0 2.0-3.0 2.0-3.0   Trend Same Same Same   Last Week Total 85 mg 80 mg 75 mg   Next Week Total 75 mg 70 mg 75 mg   Sun 10 mg () - 10 mg ()   Mon 10 mg () - -   Tue 10 mg - -   Wed - 10 mg () -   Thu - 10 mg -   Fri 10 mg () - 10 mg ()   Sat 10 mg () - 10 mg ()   Visit Report - - -   Some recent data might be hidden       Plan:  1. INR is therapeutic today- see above in Anticoagulation Summary.    Kameron Melendez to continue their warfarin regimen as discussed 2 days ago (10 mg daily while on course of Bactrim and cephalexin) - see above in Anticoagulation Summary.  2. Follow up in 3 days  3. Left HIPAA-friendly VM w/ instructions and request for call back for  confirmation. They have been instructed to call if any changes in medications, doses, concerns, etc.     Murray IveyD

## 2022-06-24 NOTE — PROGRESS NOTES
Subjective   Kameron Melendez is a 52 y.o. male. Presents today for   Chief Complaint   Patient presents with   • Cellulitis       History of Present Illness  Patient here for f/u severe celluitis and lymphedema;  Reports finally pian improved and redness less deep;  Warmth improving;   On oral abx;  Had to extend as acute phase reactants went up and clinically worse on exam.   Given lymphedema off work as high risk for admission.    Review of Systems   Cardiovascular: Positive for leg swelling.   Skin: Positive for rash.       Patient Active Problem List   Diagnosis   • History of bilateral pulmonary embolism (CMS/HCC)   • Pulmonary hypertension (HCC)   • Lymphedema of both lower extremities   • Obesity, morbid, BMI 50 or higher ([facility])   • Dyslipidemia   • Hyperuricemia   • Hypogonadal obesity   • Knee arthropathy   • Morbid obesity with body mass index of 60.0-69.9 in adult ([facility])   • ARNOLDO (obstructive sleep apnea)   • Severe edema   • History of pulmonary embolism   • Other acute pulmonary embolism without acute cor pulmonale ([facility])   • Intractable back pain   • Heterozygous factor V Leiden mutation ([facility])   • Cellulitis of left lower extremity   • Hypokalemia   • CAROL (acute kidney injury) ([facility])   • Sepsis with cutaneous manifestations ([facility])   • Hyperglycemia   • Paroxysmal atrial fibrillation ([facility])   • Athscl heart 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   • Long term (current) use of anticoagulants   • Lymphedema, not elsewhere classified   • Nicotine dependence, other tobacco product, uncomplicated   • Personal history of other venous thrombosis and embolism   • Typical atrial flutter ([facility])   • Venous insufficiency (chronic) (peripheral)   • Cellulitis of right lower extremity   • Wound cellulitis       Social History     Socioeconomic History   • Marital status:    Tobacco Use   • Smoking status: Light Tobacco Smoker     Types: Cigars, Pipe     Start date: 1/1/2006   • Smokeless tobacco: Never Used  "  • Tobacco comment: occasional - < 1 a month   Vaping Use   • Vaping Use: Never used   Substance and Sexual Activity   • Alcohol use: No   • Drug use: No   • Sexual activity: Defer       No Known Allergies    Current Outpatient Medications on File Prior to Visit   Medication Sig Dispense Refill   • acetaminophen (TYLENOL) 500 MG tablet Take 500 mg by mouth Every 6 (Six) Hours As Needed for Mild Pain .     • acetaminophen-codeine (TYLENOL #3) 300-30 MG per tablet Take 1 tablet by mouth Every 4 (Four) Hours As Needed for Moderate Pain  (severe pain). 18 tablet 1   • buPROPion XL (WELLBUTRIN XL) 150 MG 24 hr tablet Take 1 tablet by mouth Daily. 30 tablet 5   • furosemide (LASIX) 80 MG tablet TAKE 1 TABLET BY MOUTH DAILY (Patient taking differently: Take 80 mg by mouth Daily As Needed.) 30 tablet 5   • topiramate (TOPAMAX) 25 MG tablet Take 1 tablet by mouth 2 (Two) Times a Day. 60 tablet 5   • warfarin (COUMADIN) 10 MG tablet Take 15 mg by mouth See Admin Instructions. Takes 1.5 tablet (15 mg) Monday, Wednesday, Friday, Saturday, and Sunday     • warfarin (COUMADIN) 10 MG tablet TAKE 1 TABLET ON TUESDAY, THURSDAY, SUNDAY AND TAKE ONE AND ONE-HALF TABLETS ALL OTHER DAYS OR AS DIRECTED (Patient taking differently: Take 10 mg by mouth 2 (Two) Times a Week. 1 tablet (10 mg) on Tuesday and Thursday) 120 tablet 1   • [DISCONTINUED] cephalexin (KEFLEX) 500 MG capsule Take 1 capsule by mouth 4 (Four) Times a Day. 56 capsule 0   • [DISCONTINUED] sulfamethoxazole-trimethoprim (Bactrim DS) 800-160 MG per tablet Take 1 tablet by mouth 2 (Two) Times a Day. 28 tablet 0     No current facility-administered medications on file prior to visit.       Objective   Vitals:    06/24/22 1542   BP: 138/70   Pulse: 61   Resp: 20   Temp: 99.3 °F (37.4 °C)   TempSrc: Temporal   SpO2: 96%   Weight: (!) 249 kg (550 lb)   Height: 188 cm (74\")   PainSc: 0-No pain     Body mass index is 70.62 kg/m².    Physical Exam  Vitals and nursing note " reviewed.   Constitutional:       Appearance: He is well-developed.   Musculoskeletal:      Cervical back: Neck supple.   Skin:     General: Skin is warm and dry.   Neurological:      Mental Status: He is alert.   Psychiatric:         Behavior: Behavior normal.         Assessment & Plan   Diagnoses and all orders for this visit:    1. Cellulitis of right lower extremity (Primary)  -     CBC & Differential  -     C-reactive Protein  -     Sedimentation Rate  -     cephalexin (KEFLEX) 500 MG capsule; Take 1 capsule by mouth 4 (Four) Times a Day.  Dispense: 56 capsule; Refill: 0  -     sulfamethoxazole-trimethoprim (Bactrim DS) 800-160 MG per tablet; Take 1 tablet by mouth 2 (Two) Times a Day.  Dispense: 28 tablet; Refill: 0    2. Lymphedema of both lower extremities    will extend abx and see in 1 week, re-evaluate then fo rreturn to work;  Continue leg elevation as much as possible         -Follow up: 1 week and prn

## 2022-06-25 LAB
BASOPHILS # BLD AUTO: 0.1 X10E3/UL (ref 0–0.2)
BASOPHILS NFR BLD AUTO: 1 %
CRP SERPL-MCNC: 9 MG/L (ref 0–10)
EOSINOPHIL # BLD AUTO: 0.2 X10E3/UL (ref 0–0.4)
EOSINOPHIL NFR BLD AUTO: 3 %
ERYTHROCYTE [DISTWIDTH] IN BLOOD BY AUTOMATED COUNT: 15.4 % (ref 11.6–15.4)
ERYTHROCYTE [SEDIMENTATION RATE] IN BLOOD BY WESTERGREN METHOD: 73 MM/HR (ref 0–30)
HCT VFR BLD AUTO: 41.1 % (ref 37.5–51)
HGB BLD-MCNC: 13.4 G/DL (ref 13–17.7)
IMM GRANULOCYTES # BLD AUTO: 0 X10E3/UL (ref 0–0.1)
IMM GRANULOCYTES NFR BLD AUTO: 0 %
LYMPHOCYTES # BLD AUTO: 2.3 X10E3/UL (ref 0.7–3.1)
LYMPHOCYTES NFR BLD AUTO: 32 %
MCH RBC QN AUTO: 28 PG (ref 26.6–33)
MCHC RBC AUTO-ENTMCNC: 32.6 G/DL (ref 31.5–35.7)
MCV RBC AUTO: 86 FL (ref 79–97)
MONOCYTES # BLD AUTO: 0.7 X10E3/UL (ref 0.1–0.9)
MONOCYTES NFR BLD AUTO: 9 %
NEUTROPHILS # BLD AUTO: 3.9 X10E3/UL (ref 1.4–7)
NEUTROPHILS NFR BLD AUTO: 55 %
PLATELET # BLD AUTO: 260 X10E3/UL (ref 150–450)
RBC # BLD AUTO: 4.78 X10E6/UL (ref 4.14–5.8)
WBC # BLD AUTO: 7.2 X10E3/UL (ref 3.4–10.8)

## 2022-06-27 NOTE — PROGRESS NOTES
WBC normal  CRP came down quite a bit, sed rate went back up a little.  I think overall better.  Will check again at follow-up.  As long as less pain and redness in leg, we can continue outpatient oral medications.

## 2022-06-28 RX ORDER — CEFTRIAXONE 1 G/1
1 INJECTION, POWDER, FOR SOLUTION INTRAMUSCULAR; INTRAVENOUS ONCE
Status: COMPLETED | OUTPATIENT
Start: 2022-06-28 | End: 2022-06-15

## 2022-06-29 ENCOUNTER — ANTICOAGULATION VISIT (OUTPATIENT)
Dept: PHARMACY | Facility: HOSPITAL | Age: 52
End: 2022-06-29

## 2022-06-29 DIAGNOSIS — I26.99 OTHER ACUTE PULMONARY EMBOLISM WITHOUT ACUTE COR PULMONALE: Primary | ICD-10-CM

## 2022-06-29 DIAGNOSIS — I26.99 BILATERAL PULMONARY EMBOLISM: ICD-10-CM

## 2022-06-29 LAB — INR PPP: 3.1

## 2022-06-29 NOTE — PROGRESS NOTES
Anticoagulation Clinic Progress Note    Anticoagulation Summary  As of 6/29/2022    INR goal:  2.0-3.0   TTR:  68.2 % (3.3 y)   INR used for dosing:  3.10 (6/29/2022)   Warfarin maintenance plan:  10 mg every Tue, Thu; 15 mg all other days   Weekly warfarin total:  95 mg   Plan last modified:  Tessie Fatima RPH (6/17/2022)   Next INR check:  7/1/2022   Priority:  High   Target end date:  Indefinite    Indications    Other acute pulmonary embolism without acute cor pulmonale (HCC) [I26.99]  History of bilateral pulmonary embolism (CMS/HCC) [I26.99]             Anticoagulation Episode Summary     INR check location:      Preferred lab:      Send INR reminders to:   [facility]  [facility]    Comments:  new  frankie March 2019 **WEEKLY FRANKIE**      Anticoagulation Care Providers     Provider Role Specialty Phone number    Torri Colmenares APRN Referring Cardiology [phone]    Elsy Baeza MD Responsible Cardiology [phone]          Clinic Interview:  Patient Findings     Positives:  Change in medications    Comments:  Continuing on Bactrim for another 14days.       Clinical Outcomes     Comments:  Continuing on Bactrim for another 14days.         INR History:  Anticoagulation Monitoring 6/22/2022 6/24/2022 6/29/2022   INR 3.20 2.80 3.10   INR Date 6/22/2022 6/24/2022 6/29/2022   INR Goal 2.0-3.0 2.0-3.0 2.0-3.0   Trend Same Same Same   Last Week Total 80 mg 75 mg 70 mg   Next Week Total 70 mg 75 mg 70 mg   Sun - 10 mg (6/26) -   Mon - - -   Tue - - -   Wed 10 mg (6/22) - 10 mg (6/29)   Thu 10 mg - 10 mg   Fri - 10 mg (6/24) -   Sat - 10 mg (6/25) -   Visit Report - - -   Some recent data might be hidden       Plan:  1. INR is Supratherapeutic today- see above in Anticoagulation Summary.   Will instruct Kameron Melendez to Continue their warfarin regimen- see above in Anticoagulation Summary.  Instructed patient to continue the reduced dosing of 10mg daily while on Bactrim.   2. Follow up Friday,  7/1  3. They have been instructed to call if any changes in medications, doses, concerns, etc. Patient expresses understanding and has no further questions at this time.    Tonja Kaye, PharmD

## 2022-07-01 ENCOUNTER — OFFICE VISIT (OUTPATIENT)
Dept: FAMILY MEDICINE CLINIC | Facility: CLINIC | Age: 52
End: 2022-07-01

## 2022-07-01 VITALS
DIASTOLIC BLOOD PRESSURE: 76 MMHG | SYSTOLIC BLOOD PRESSURE: 132 MMHG | RESPIRATION RATE: 18 BRPM | HEART RATE: 86 BPM | WEIGHT: 315 LBS | OXYGEN SATURATION: 95 % | TEMPERATURE: 98.1 F | HEIGHT: 74 IN | BODY MASS INDEX: 40.43 KG/M2

## 2022-07-01 DIAGNOSIS — I89.0 LYMPHEDEMA: ICD-10-CM

## 2022-07-01 DIAGNOSIS — L03.115 CELLULITIS OF RIGHT LOWER EXTREMITY: Primary | ICD-10-CM

## 2022-07-01 DIAGNOSIS — E66.01 MORBID (SEVERE) OBESITY DUE TO EXCESS CALORIES: ICD-10-CM

## 2022-07-01 PROCEDURE — 99214 OFFICE O/P EST MOD 30 MIN: CPT | Performed by: FAMILY MEDICINE

## 2022-07-01 NOTE — PROGRESS NOTES
Subjective   Kameron Melendez is a 52 y.o. male. Presents today for   Chief Complaint   Patient presents with   • Cellulitis     edema       History of Present Illness  Patient 53 y/o with lymphedema and cellulitis;  Still some pain, has good and bad days on swelling;  Have had to keep of work as need to elevate legs.    Review of Systems   Cardiovascular: Positive for leg swelling.   Skin: Positive for rash. Negative for wound.       Patient Active Problem List   Diagnosis   • History of bilateral pulmonary embolism (CMS/HCC)   • Pulmonary hypertension (HCC)   • Lymphedema of both lower extremities   • Obesity, morbid, BMI 50 or higher (HCC)   • Dyslipidemia   • Hyperuricemia   • Hypogonadal obesity   • Knee arthropathy   • Morbid obesity with body mass index of 60.0-69.9 in adult (HCC)   • ARNOLDO (obstructive sleep apnea)   • Severe edema   • History of pulmonary embolism   • Other acute pulmonary embolism without acute cor pulmonale ([facility])   • Intractable back pain   • Heterozygous factor V Leiden mutation (HCC)   • Cellulitis of left lower extremity   • Hypokalemia   • CAROL (acute kidney injury) ([facility])   • Sepsis with cutaneous manifestations ([facility])   • Hyperglycemia   • Paroxysmal atrial fibrillation (HCC)   • Athscl heart 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   • Long term (current) use of anticoagulants   • Lymphedema, not elsewhere classified   • Nicotine dependence, other tobacco product, uncomplicated   • Personal history of other venous thrombosis and embolism   • Typical atrial flutter (HCC)   • Venous insufficiency (chronic) (peripheral)   • Cellulitis of right lower extremity   • Wound cellulitis       Social History     Socioeconomic History   • Marital status:    Tobacco Use   • Smoking status: Light Tobacco Smoker     Types: Cigars, Pipe     Start date: 1/1/2006   • Smokeless tobacco: Never Used   • Tobacco comment: occasional - < 1 a month   Vaping Use   • Vaping Use: Never used  "  Substance and Sexual Activity   • Alcohol use: No   • Drug use: No   • Sexual activity: Defer       No Known Allergies    Current Outpatient Medications on File Prior to Visit   Medication Sig Dispense Refill   • acetaminophen (TYLENOL) 500 MG tablet Take 500 mg by mouth Every 6 (Six) Hours As Needed for Mild Pain .     • acetaminophen-codeine (TYLENOL #3) 300-30 MG per tablet Take 1 tablet by mouth Every 4 (Four) Hours As Needed for Moderate Pain  (severe pain). 18 tablet 1   • buPROPion XL (WELLBUTRIN XL) 150 MG 24 hr tablet Take 1 tablet by mouth Daily. 30 tablet 5   • cephalexin (KEFLEX) 500 MG capsule Take 1 capsule by mouth 4 (Four) Times a Day. 56 capsule 0   • furosemide (LASIX) 80 MG tablet TAKE 1 TABLET BY MOUTH DAILY (Patient taking differently: Take 80 mg by mouth Daily As Needed.) 30 tablet 5   • sulfamethoxazole-trimethoprim (Bactrim DS) 800-160 MG per tablet Take 1 tablet by mouth 2 (Two) Times a Day. 28 tablet 0   • topiramate (TOPAMAX) 25 MG tablet Take 1 tablet by mouth 2 (Two) Times a Day. 60 tablet 5   • warfarin (COUMADIN) 10 MG tablet Take 15 mg by mouth See Admin Instructions. Takes 1.5 tablet (15 mg) Monday, Wednesday, Friday, Saturday, and Sunday     • warfarin (COUMADIN) 10 MG tablet TAKE 1 TABLET ON TUESDAY, THURSDAY, SUNDAY AND TAKE ONE AND ONE-HALF TABLETS ALL OTHER DAYS OR AS DIRECTED (Patient taking differently: Take 10 mg by mouth 2 (Two) Times a Week. 1 tablet (10 mg) on Tuesday and Thursday) 120 tablet 1     No current facility-administered medications on file prior to visit.       Objective   Vitals:    07/01/22 1421   BP: 132/76   Pulse: 86   Resp: 18   Temp: 98.1 °F (36.7 °C)   TempSrc: Oral   SpO2: 95%   Weight: (!) 252 kg (555 lb)   Height: 188 cm (74\")   PainSc:   5     Body mass index is 71.26 kg/m².    Physical Exam  Vitals and nursing note reviewed.   Constitutional:       Appearance: He is well-developed.   Musculoskeletal:      Cervical back: Neck supple.      Right " lower leg: Edema present.      Left lower leg: Edema present.   Skin:     General: Skin is warm and dry.      Comments: Redness faded with less warmth  Severe lymphedema   Neurological:      Mental Status: He is alert.   Psychiatric:         Behavior: Behavior normal.       Component      Latest Ref Rng & Units 5/26/2022 5/31/2022 6/7/2022 6/13/2022   WBC      3.4 - 10.8 x10E3/uL   7.82 7.9   RBC      4.14 - 5.80 x10E6/uL   4.36 4.63   Hemoglobin      13.0 - 17.7 g/dL   12.4 (L) 12.8 (L)   Hematocrit      37.5 - 51.0 %   37.9 39.9   MCV      79 - 97 fL   86.9 86   MCH      26.6 - 33.0 pg   28.4 27.6   MCHC      31.5 - 35.7 g/dL   32.7 32.1   RDW      11.6 - 15.4 %   15.4 15.3   Platelets      150 - 450 x10E3/uL   297 293   Neutrophil Rel %      Not Estab. %    61   Lymphocyte Rel %      Not Estab. %    29   Monocyte Rel %      Not Estab. %    7   Eosinophil Rel %      Not Estab. %    2   Basophil Rel %      Not Estab. %    1   Neutrophils Absolute      1.4 - 7.0 x10E3/uL    4.8   Lymphocytes Absolute      0.7 - 3.1 x10E3/uL    2.3   Monocytes Absolute      0.1 - 0.9 x10E3/uL    0.5   Eosinophils Absolute      0.0 - 0.4 x10E3/uL    0.2   Basophils Absolute      0.0 - 0.2 x10E3/uL    0.1   Immature Granulocyte Rel %      Not Estab. %    0   Immature Grans, Absolute      0.0 - 0.1 x10E3/uL    0.0   RDW-SD      37.0 - 54.0 fl   48.3    MPV      6.0 - 12.0 fL   9.6    Glucose      65 - 99 mg/dL    177 (H)   BUN      6 - 24 mg/dL    17   Creatinine      0.76 - 1.27 mg/dL    0.90   EGFR Result      >59 mL/min/1.73    103   BUN/Creatinine Ratio      9 - 20    19   Sodium      134 - 144 mmol/L    139   Potassium      3.5 - 5.2 mmol/L    4.4   Chloride      96 - 106 mmol/L    103   CO2      20 - 29 mmol/L    20   Calcium      8.7 - 10.2 mg/dL    9.0   C-Reactive Protein      0 - 10 mg/L 2.90 (H) 2.12 (H) 1.39 (H) 11 (H)   Sed Rate      0 - 30 mm/hr  89 (H) 77 (H) 80 (H)     Component      Latest Ref Rng & Units 6/17/2022  6/20/2022 6/24/2022   WBC      3.4 - 10.8 x10E3/uL 6.6 6.9 7.2   RBC      4.14 - 5.80 x10E6/uL 4.90 4.73 4.78   Hemoglobin      13.0 - 17.7 g/dL 13.8 13.3 13.4   Hematocrit      37.5 - 51.0 % 41.9 41.3 41.1   MCV      79 - 97 fL 86 87 86   MCH      26.6 - 33.0 pg 28.2 28.1 28.0   MCHC      31.5 - 35.7 g/dL 32.9 32.2 32.6   RDW      11.6 - 15.4 % 15.4 15.3 15.4   Platelets      150 - 450 x10E3/uL 247 257 260   Neutrophil Rel %      Not Estab. % 62 60 55   Lymphocyte Rel %      Not Estab. % 26 29 32   Monocyte Rel %      Not Estab. % 9 8 9   Eosinophil Rel %      Not Estab. % 2 2 3   Basophil Rel %      Not Estab. % 1 1 1   Neutrophils Absolute      1.4 - 7.0 x10E3/uL 4.1 4.1 3.9   Lymphocytes Absolute      0.7 - 3.1 x10E3/uL 1.7 2.0 2.3   Monocytes Absolute      0.1 - 0.9 x10E3/uL 0.6 0.6 0.7   Eosinophils Absolute      0.0 - 0.4 x10E3/uL 0.1 0.2 0.2   Basophils Absolute      0.0 - 0.2 x10E3/uL 0.1 0.1 0.1   Immature Granulocyte Rel %      Not Estab. % 0 0 0   Immature Grans, Absolute      0.0 - 0.1 x10E3/uL 0.0 0.0 0.0   RDW-SD      37.0 - 54.0 fl      MPV      6.0 - 12.0 fL      Glucose      65 - 99 mg/dL      BUN      6 - 24 mg/dL      Creatinine      0.76 - 1.27 mg/dL      EGFR Result      >59 mL/min/1.73      BUN/Creatinine Ratio      9 - 20      Sodium      134 - 144 mmol/L      Potassium      3.5 - 5.2 mmol/L      Chloride      96 - 106 mmol/L      CO2      20 - 29 mmol/L      Calcium      8.7 - 10.2 mg/dL      C-Reactive Protein      0 - 10 mg/L 16 (H) 20 (H) 9   Sed Rate      0 - 30 mm/hr 86 (H) 69 (H) 73 (H)     Assessment & Plan   Diagnoses and all orders for this visit:    1. Cellulitis of right lower extremity (Primary)  -     Basic Metabolic Panel  -     CBC & Differential  -     C-reactive Protein  -     Sedimentation Rate    2. Lymphedema    3. Morbid (severe) obesity due to excess calories (HCC)  -     Semaglutide, 1 MG/DOSE, 4 MG/3ML solution pen-injector; Inject 1 mg under the skin into the  appropriate area as directed 1 (One) Time Per Week.  Dispense: 3 pen; Refill: 3        Out of bactrim for 2 days as pharmacy not ready;  On keflex;  Will continue both as responding.  I am very leary about sending back to work as quite severe lymphedema and severe infection.  He needs to keep his leg elevated at all times and not able to do so at work.  He is still high risk with inflammatory markers this high of readmit and suspect may end up in hospital if send back to work too soon.     Insurance will not cover bariatric procedure unfortunately.  Really needs to lose weight.  wegovy on back order and will see if can get ozempic instead as really needs to lose weight;  Gave 6 week starter pen. Gave first 0.25mg today and demonstrated use.       -Follow up: 2 weeks and prn

## 2022-07-06 ENCOUNTER — ANTICOAGULATION VISIT (OUTPATIENT)
Dept: PHARMACY | Facility: HOSPITAL | Age: 52
End: 2022-07-06

## 2022-07-06 DIAGNOSIS — I26.99 BILATERAL PULMONARY EMBOLISM: ICD-10-CM

## 2022-07-06 DIAGNOSIS — I26.99 OTHER ACUTE PULMONARY EMBOLISM WITHOUT ACUTE COR PULMONALE: Primary | ICD-10-CM

## 2022-07-06 LAB
BASOPHILS # BLD AUTO: 0.1 X10E3/UL (ref 0–0.2)
BASOPHILS NFR BLD AUTO: 1 %
BUN SERPL-MCNC: 15 MG/DL (ref 6–24)
BUN/CREAT SERPL: 17 (ref 9–20)
CALCIUM SERPL-MCNC: 8.8 MG/DL (ref 8.7–10.2)
CHLORIDE SERPL-SCNC: 106 MMOL/L (ref 96–106)
CO2 SERPL-SCNC: 19 MMOL/L (ref 20–29)
CREAT SERPL-MCNC: 0.87 MG/DL (ref 0.76–1.27)
CRP SERPL-MCNC: 24 MG/L (ref 0–10)
EGFRCR SERPLBLD CKD-EPI 2021: 104 ML/MIN/1.73
EOSINOPHIL # BLD AUTO: 0.2 X10E3/UL (ref 0–0.4)
EOSINOPHIL NFR BLD AUTO: 3 %
ERYTHROCYTE [DISTWIDTH] IN BLOOD BY AUTOMATED COUNT: 15.3 % (ref 11.6–15.4)
ERYTHROCYTE [SEDIMENTATION RATE] IN BLOOD BY WESTERGREN METHOD: 55 MM/HR (ref 0–30)
GLUCOSE SERPL-MCNC: 134 MG/DL (ref 65–99)
HCT VFR BLD AUTO: 42.6 % (ref 37.5–51)
HGB BLD-MCNC: 13 G/DL (ref 13–17.7)
IMM GRANULOCYTES # BLD AUTO: 0.1 X10E3/UL (ref 0–0.1)
IMM GRANULOCYTES NFR BLD AUTO: 1 %
INR PPP: 1.6
LYMPHOCYTES # BLD AUTO: 1.6 X10E3/UL (ref 0.7–3.1)
LYMPHOCYTES NFR BLD AUTO: 25 %
MCH RBC QN AUTO: 27.4 PG (ref 26.6–33)
MCHC RBC AUTO-ENTMCNC: 30.5 G/DL (ref 31.5–35.7)
MCV RBC AUTO: 90 FL (ref 79–97)
MONOCYTES # BLD AUTO: 0.5 X10E3/UL (ref 0.1–0.9)
MONOCYTES NFR BLD AUTO: 8 %
NEUTROPHILS # BLD AUTO: 3.9 X10E3/UL (ref 1.4–7)
NEUTROPHILS NFR BLD AUTO: 62 %
PLATELET # BLD AUTO: 262 X10E3/UL (ref 150–450)
POTASSIUM SERPL-SCNC: 4.6 MMOL/L (ref 3.5–5.2)
RBC # BLD AUTO: 4.74 X10E6/UL (ref 4.14–5.8)
SODIUM SERPL-SCNC: 138 MMOL/L (ref 134–144)
WBC # BLD AUTO: 6.2 X10E3/UL (ref 3.4–10.8)

## 2022-07-06 NOTE — PROGRESS NOTES
Anticoagulation Clinic Progress Note    Anticoagulation Summary  As of 2022    INR goal:  2.0-3.0   TTR:  68.0 % (3.4 y)   INR used for dosin.60 (2022)   Warfarin maintenance plan:  10 mg every Tue, Thu; 15 mg all other days   Weekly warfarin total:  95 mg   Plan last modified:  Tessie Fatima RPH (2022)   Next INR check:  2022   Priority:  High   Target end date:  Indefinite    Indications    Other acute pulmonary embolism without acute cor pulmonale (HCC) [I26.99]  History of bilateral pulmonary embolism (CMS/HCC) [I26.99]             Anticoagulation Episode Summary     INR check location:      Preferred lab:      Send INR reminders to:   [facility]  [facility]    Comments:  new  frankie 2019 **WEEKLY FRANKIE**      Anticoagulation Care Providers     Provider Role Specialty Phone number    Torri Colmenares APRN Referring Cardiology [phone]    Elsy Baeza MD Responsible Cardiology [phone]          Clinic Interview:  Patient Findings     Positives:  Change in medications    Comments:  7 more days of bactrim       Clinical Outcomes     Comments:  7 more days of bactrim         INR History:  Anticoagulation Monitoring 2022   INR 2.80 3.10 1.60   INR Date 2022   INR Goal 2.0-3.0 2.0-3.0 2.0-3.0   Trend Same Same Same   Last Week Total 75 mg 70 mg 70 mg   Next Week Total 75 mg 70 mg 75 mg   Sun 10 mg () - 10 mg (7/10)   Mon - - 10 mg ()   Tue - - 10 mg   Wed - 10 mg () 15 mg   Thu - 10 mg 10 mg   Fri 10 mg () - 10 mg ()   Sat 10 mg () - 10 mg ()   Visit Report - - -   Some recent data might be hidden       Plan:  1. INR is Subtherapeutic today- see above in Anticoagulation Summary.   Will instruct Kameron Melendez to Increase their warfarin regimen- see above in Anticoagulation Summary.  2. Follow up in 1 weeks  3. They have been instructed to call if any changes in medications, doses, concerns,  etc. Patient expresses understanding and has no further questions at this time.    Tessie Fatima, [facility]

## 2022-07-07 NOTE — PROGRESS NOTES
Call results to patient.  Sed rate dropped significantly but crp went back up some.   Continue abx, would continue and need more?

## 2022-07-12 ENCOUNTER — OFFICE VISIT (OUTPATIENT)
Dept: FAMILY MEDICINE CLINIC | Facility: CLINIC | Age: 52
End: 2022-07-12

## 2022-07-12 ENCOUNTER — ANTICOAGULATION VISIT (OUTPATIENT)
Dept: PHARMACY | Facility: HOSPITAL | Age: 52
End: 2022-07-12

## 2022-07-12 VITALS
DIASTOLIC BLOOD PRESSURE: 86 MMHG | HEART RATE: 84 BPM | SYSTOLIC BLOOD PRESSURE: 122 MMHG | OXYGEN SATURATION: 95 % | BODY MASS INDEX: 40.43 KG/M2 | WEIGHT: 315 LBS | HEIGHT: 74 IN

## 2022-07-12 DIAGNOSIS — I26.99 BILATERAL PULMONARY EMBOLISM: ICD-10-CM

## 2022-07-12 DIAGNOSIS — L03.115 CELLULITIS OF RIGHT LOWER EXTREMITY: ICD-10-CM

## 2022-07-12 DIAGNOSIS — R19.7 DIARRHEA, UNSPECIFIED TYPE: Primary | ICD-10-CM

## 2022-07-12 DIAGNOSIS — I26.99 OTHER ACUTE PULMONARY EMBOLISM WITHOUT ACUTE COR PULMONALE: Primary | ICD-10-CM

## 2022-07-12 DIAGNOSIS — R19.7 DIARRHEA, UNSPECIFIED TYPE: ICD-10-CM

## 2022-07-12 DIAGNOSIS — I89.0 LYMPHEDEMA: Primary | ICD-10-CM

## 2022-07-12 LAB — INR PPP: 2.7

## 2022-07-12 PROCEDURE — 99213 OFFICE O/P EST LOW 20 MIN: CPT | Performed by: FAMILY MEDICINE

## 2022-07-12 RX ORDER — CHOLESTYRAMINE 4 G/9G
1 POWDER, FOR SUSPENSION ORAL 2 TIMES DAILY PRN
Qty: 60 PACKET | Refills: 0 | Status: SHIPPED | OUTPATIENT
Start: 2022-07-12 | End: 2022-07-18 | Stop reason: SDUPTHER

## 2022-07-12 RX ORDER — SACCHAROMYCES BOULARDII 250 MG
250 CAPSULE ORAL 2 TIMES DAILY
Qty: 60 CAPSULE | Refills: 0 | Status: SHIPPED | OUTPATIENT
Start: 2022-07-12

## 2022-07-12 NOTE — PROGRESS NOTES
Subjective   Kameron Melendez is a 52 y.o. male. Presents today for   Chief Complaint   Patient presents with   • Cellulitis     Follow up        History of Present Illness  Patient here for f/u of severe cellulitis and lymphedema;  Pain better, redness better;  Had started ozempic but diarrhea since 4 days ago, mild crmaping, severe smell and frequent watery;  Blood initially but thinks hemorrhoid and none since 1st day.   On keflex and bactrim.  Feels diarrhea began after ozempic, has been 1 week since took last dose.     Review of Systems   Constitutional: Positive for chills. Negative for fever.   Cardiovascular: Positive for leg swelling.   Gastrointestinal: Positive for diarrhea. Negative for abdominal pain.   Skin: Positive for rash.       Patient Active Problem List   Diagnosis   • History of bilateral pulmonary embolism (CMS/HCC)   • Pulmonary hypertension (HCC)   • Lymphedema of both lower extremities   • Obesity, morbid, BMI 50 or higher (HCC)   • Dyslipidemia   • Hyperuricemia   • Hypogonadal obesity   • Knee arthropathy   • Morbid obesity with body mass index of 60.0-69.9 in adult ([facility])   • ARNOLDO (obstructive sleep apnea)   • Severe edema   • History of pulmonary embolism   • Other acute pulmonary embolism without acute cor pulmonale ([facility])   • Intractable back pain   • Heterozygous factor V Leiden mutation ([facility])   • Cellulitis of left lower extremity   • Hypokalemia   • CAROL (acute kidney injury) ([facility])   • Sepsis with cutaneous manifestations ([facility])   • Hyperglycemia   • Paroxysmal atrial fibrillation (HCC)   • Athscl heart 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   • Long term (current) use of anticoagulants   • Lymphedema, not elsewhere classified   • Nicotine dependence, other tobacco product, uncomplicated   • Personal history of other venous thrombosis and embolism   • Typical atrial flutter ([facility])   • Venous insufficiency (chronic) (peripheral)   • Cellulitis of right lower extremity   • Wound  cellulitis       Social History     Socioeconomic History   • Marital status:    Tobacco Use   • Smoking status: Light Tobacco Smoker     Types: Cigars, Pipe     Start date: 1/1/2006   • Smokeless tobacco: Never Used   • Tobacco comment: occasional - < 1 a month   Vaping Use   • Vaping Use: Never used   Substance and Sexual Activity   • Alcohol use: No   • Drug use: No   • Sexual activity: Defer       No Known Allergies    Current Outpatient Medications on File Prior to Visit   Medication Sig Dispense Refill   • acetaminophen (TYLENOL) 500 MG tablet Take 500 mg by mouth Every 6 (Six) Hours As Needed for Mild Pain .     • acetaminophen-codeine (TYLENOL #3) 300-30 MG per tablet Take 1 tablet by mouth Every 4 (Four) Hours As Needed for Moderate Pain  (severe pain). 18 tablet 1   • buPROPion XL (WELLBUTRIN XL) 150 MG 24 hr tablet Take 1 tablet by mouth Daily. 30 tablet 5   • cephalexin (KEFLEX) 500 MG capsule Take 1 capsule by mouth 4 (Four) Times a Day. 56 capsule 0   • furosemide (LASIX) 80 MG tablet TAKE 1 TABLET BY MOUTH DAILY (Patient taking differently: Take 80 mg by mouth Daily As Needed.) 30 tablet 5   • Semaglutide, 1 MG/DOSE, 4 MG/3ML solution pen-injector Inject 1 mg under the skin into the appropriate area as directed 1 (One) Time Per Week. 3 pen 3   • sulfamethoxazole-trimethoprim (Bactrim DS) 800-160 MG per tablet Take 1 tablet by mouth 2 (Two) Times a Day. 28 tablet 0   • topiramate (TOPAMAX) 25 MG tablet Take 1 tablet by mouth 2 (Two) Times a Day. 60 tablet 5   • warfarin (COUMADIN) 10 MG tablet Take 15 mg by mouth See Admin Instructions. Takes 1.5 tablet (15 mg) Monday, Wednesday, Friday, Saturday, and Sunday     • warfarin (COUMADIN) 10 MG tablet TAKE 1 TABLET ON TUESDAY, THURSDAY, SUNDAY AND TAKE ONE AND ONE-HALF TABLETS ALL OTHER DAYS OR AS DIRECTED (Patient taking differently: Take 10 mg by mouth 2 (Two) Times a Week. 1 tablet (10 mg) on Tuesday and Thursday) 120 tablet 1     No current  "facility-administered medications on file prior to visit.       Objective   Vitals:    07/12/22 0838   BP: 122/86   Pulse: 84   SpO2: 95%   Weight: (!) 252 kg (555 lb)   Height: 188 cm (74\")     Body mass index is 71.26 kg/m².    Physical Exam  Vitals and nursing note reviewed.   Constitutional:       Appearance: He is well-developed.   Musculoskeletal:      Cervical back: Neck supple.   Skin:     General: Skin is warm and dry.      Comments: Severe lymphedema;  righ tleg redness much improved and mostly faded, mild tenderness now.    Neurological:      Mental Status: He is alert.   Psychiatric:         Behavior: Behavior normal.         Assessment & Plan   Diagnoses and all orders for this visit:    1. Lymphedema (Primary)    2. Cellulitis of right lower extremity  -     CBC & Differential  -     Basic Metabolic Panel  -     Sedimentation Rate  -     C-reactive Protein    3. Diarrhea, unspecified type  -     Clostridium Difficile EIA - Stool, Per Rectum  -     saccharomyces boulardii (Florastor) 250 MG capsule; Take 1 capsule by mouth 2 (Two) Times a Day.  Dispense: 60 capsule; Refill: 0  -     cholestyramine (Questran) 4 g packet; Take 1 packet by mouth 2 (Two) Times a Day As Needed (diarrhea).  Dispense: 60 packet; Refill: 0    continue raise legs above level of heart;  Continue abx;  Possible c. Diff based on descriptiion and on abx, will test and start probioitcs; I think less likely ozempic but hold for now while figure this out.   Will check labs as well and stool  Based on appearance of leg may be ready to go back but need labs and testing diarrhea as c. Diff very infectious.  Will hold return to work until July 20 and see back July 18 to allow for labs and testing.            -Follow up: 1 weeks and prn         "

## 2022-07-12 NOTE — PROGRESS NOTES
Anticoagulation Clinic Progress Note    Anticoagulation Summary  As of 2022    INR goal:  2.0-3.0   TTR:  68.1 % (3.4 y)   INR used for dosin.70 (2022)   Warfarin maintenance plan:  10 mg every Tue, Thu; 15 mg all other days   Weekly warfarin total:  95 mg   No change documented:  Roslyn Jett [facility]   Plan last modified:  Tessie Fatima RP (2022)   Next INR check:  7/15/2022   Priority:  High   Target end date:  Indefinite    Indications    Other acute pulmonary embolism without acute cor pulmonale (HCC) [I26.99]  History of bilateral pulmonary embolism (CMS/HCC) [I26.99]             Anticoagulation Episode Summary     INR check location:      Preferred lab:      Send INR reminders to:   [facility]  POOL    Comments:  new  frankie 2019 **WEEKLY FRANKIE**      Anticoagulation Care Providers     Provider Role Specialty Phone number    Torri Colmenares APRN Referring Cardiology [phone]    Elsy Baeza MD Responsible Cardiology [phone]          Drug interactions: has remained unchanged.  Diet: has remained unchanged.    Clinic Interview:  No pertinent clinical findings have been reported.    INR History:  Anticoagulation Monitoring 2022   INR 3.10 1.60 2.70   INR Date 2022   INR Goal 2.0-3.0 2.0-3.0 2.0-3.0   Trend Same Same Same   Last Week Total 70 mg 70 mg 75 mg   Next Week Total 70 mg 75 mg 95 mg   Sun - 10 mg (7/10) -   Mon - 10 mg () -   Tue - 10 mg 10 mg   Wed 10 mg () 15 mg 15 mg   Thu 10 mg 10 mg 10 mg   Fri - 10 mg () -   Sat - 10 mg () -   Visit Report - - -   Some recent data might be hidden       Plan:  1. INR is Therapeutic today- see above in Anticoagulation Summary.   cont same 10mg daily, MD may be extending Bactrim therapy beyond  depending on labs drawn today. see above in Anticoagulation Summary.  2. Follow up in Friday  3. Pt has agreed to only be called if INR out of range.  They have been instructed to call if any changes in medications, doses, concerns, etc. Patient expresses understanding and has no further questions at this time.    Roslyn Jett, [facility]

## 2022-07-13 LAB
BASOPHILS # BLD AUTO: 0 X10E3/UL (ref 0–0.2)
BASOPHILS NFR BLD AUTO: 1 %
BUN SERPL-MCNC: 9 MG/DL (ref 6–24)
BUN/CREAT SERPL: 11 (ref 9–20)
C DIFF TOX A+B STL QL IA: POSITIVE
CALCIUM SERPL-MCNC: 9 MG/DL (ref 8.7–10.2)
CHLORIDE SERPL-SCNC: 104 MMOL/L (ref 96–106)
CO2 SERPL-SCNC: 21 MMOL/L (ref 20–29)
CREAT SERPL-MCNC: 0.84 MG/DL (ref 0.76–1.27)
CRP SERPL-MCNC: 30 MG/L (ref 0–10)
EGFRCR SERPLBLD CKD-EPI 2021: 105 ML/MIN/1.73
EOSINOPHIL # BLD AUTO: 0.2 X10E3/UL (ref 0–0.4)
EOSINOPHIL NFR BLD AUTO: 3 %
ERYTHROCYTE [DISTWIDTH] IN BLOOD BY AUTOMATED COUNT: 15.5 % (ref 11.6–15.4)
ERYTHROCYTE [SEDIMENTATION RATE] IN BLOOD BY WESTERGREN METHOD: 56 MM/HR (ref 0–30)
GLUCOSE SERPL-MCNC: 111 MG/DL (ref 65–99)
HCT VFR BLD AUTO: 40.9 % (ref 37.5–51)
HGB BLD-MCNC: 13.3 G/DL (ref 13–17.7)
IMM GRANULOCYTES # BLD AUTO: 0.1 X10E3/UL (ref 0–0.1)
IMM GRANULOCYTES NFR BLD AUTO: 1 %
LYMPHOCYTES # BLD AUTO: 1.9 X10E3/UL (ref 0.7–3.1)
LYMPHOCYTES NFR BLD AUTO: 25 %
MCH RBC QN AUTO: 28.6 PG (ref 26.6–33)
MCHC RBC AUTO-ENTMCNC: 32.5 G/DL (ref 31.5–35.7)
MCV RBC AUTO: 88 FL (ref 79–97)
MONOCYTES # BLD AUTO: 0.8 X10E3/UL (ref 0.1–0.9)
MONOCYTES NFR BLD AUTO: 11 %
NEUTROPHILS # BLD AUTO: 4.6 X10E3/UL (ref 1.4–7)
NEUTROPHILS NFR BLD AUTO: 59 %
PLATELET # BLD AUTO: 260 X10E3/UL (ref 150–450)
POTASSIUM SERPL-SCNC: 4.2 MMOL/L (ref 3.5–5.2)
RBC # BLD AUTO: 4.65 X10E6/UL (ref 4.14–5.8)
SODIUM SERPL-SCNC: 137 MMOL/L (ref 134–144)
WBC # BLD AUTO: 7.6 X10E3/UL (ref 3.4–10.8)

## 2022-07-13 NOTE — PROGRESS NOTES
Inflammatory markers about the same, it's possible they are staying up due to the diarrhea as clinically the infection is much better.   Awaiting c. Diff.

## 2022-07-15 ENCOUNTER — ANTICOAGULATION VISIT (OUTPATIENT)
Dept: PHARMACY | Facility: HOSPITAL | Age: 52
End: 2022-07-15

## 2022-07-15 DIAGNOSIS — I26.99 BILATERAL PULMONARY EMBOLISM: ICD-10-CM

## 2022-07-15 DIAGNOSIS — I26.99 OTHER ACUTE PULMONARY EMBOLISM WITHOUT ACUTE COR PULMONALE: Primary | ICD-10-CM

## 2022-07-15 LAB — INR PPP: 3.2

## 2022-07-15 NOTE — PROGRESS NOTES
Anticoagulation Clinic Progress Note    Anticoagulation Summary  As of 7/15/2022    INR goal:  2.0-3.0   TTR:  68.0 % (3.4 y)   INR used for dosing:  3.20 (7/15/2022)   Warfarin maintenance plan:  10 mg every Tue, Thu; 15 mg all other days   Weekly warfarin total:  95 mg   Plan last modified:  Tessie Fatima RPH (6/17/2022)   Next INR check:  7/20/2022   Priority:  High   Target end date:  Indefinite    Indications    Other acute pulmonary embolism without acute cor pulmonale (HCC) [I26.99]  History of bilateral pulmonary embolism (CMS/HCC) [I26.99]             Anticoagulation Episode Summary     INR check location:      Preferred lab:      Send INR reminders to:   [facility]  POOL    Comments:  new  frankie March 2019 **WEEKLY FRANKIE**      Anticoagulation Care Providers     Provider Role Specialty Phone number    Torri Colmenares APRN Referring Cardiology [phone]    Elsy Baeza MD Responsible Cardiology [phone]          Clinic Interview:  Patient Findings     Positives:  Change in health, Change in medications    Negatives:  Signs/symptoms of thrombosis, Signs/symptoms of bleeding,   Laboratory test error suspected, Change in alcohol use, Change in   activity, Upcoming invasive procedure, Emergency department visit,   Upcoming dental procedure, Missed doses, Extra doses, Change in   diet/appetite, Hospital admission, Bruising, Other complaints    Comments:  extended bactrim for unknown duration, patient is having   diarrhea and is getting worked up for c.diff.       Clinical Outcomes     Negatives:  Major bleeding event, Thromboembolic event,   Anticoagulation-related hospital admission, Anticoagulation-related ED   visit, Anticoagulation-related fatality    Comments:  extended bactrim for unknown duration, patient is having   diarrhea and is getting worked up for c.diff.         INR History:  Anticoagulation Monitoring 7/6/2022 7/12/2022 7/15/2022   INR 1.60 2.70 3.20   INR Date  7/6/2022 7/12/2022 7/15/2022   INR Goal 2.0-3.0 2.0-3.0 2.0-3.0   Trend Same Same Same   Last Week Total 70 mg 75 mg 70 mg   Next Week Total 75 mg 95 mg 70 mg   Sun 10 mg (7/10) - 10 mg (7/17)   Mon 10 mg (7/11) - 10 mg (7/18)   Tue 10 mg 10 mg 10 mg   Wed 15 mg 15 mg -   Thu 10 mg 10 mg -   Fri 10 mg (7/8) - 5 mg (7/15)   Sat 10 mg (7/9) - 10 mg (7/16)   Visit Report - - -   Some recent data might be hidden       Plan:  1. INR is Supratherapeutic today- see above in Anticoagulation Summary.   Will instruct Kameron Melendez to Change their warfarin regimen- see above in Anticoagulation Summary.  2. Follow up in 5 days   3. They have been instructed to call if any changes in medications, doses, concerns, etc. Patient expresses understanding and has no further questions at this time.    Tessie Fatima [facility]

## 2022-07-16 DIAGNOSIS — A04.72 CLOSTRIDIUM DIFFICILE DIARRHEA: Primary | ICD-10-CM

## 2022-07-16 RX ORDER — VANCOMYCIN HYDROCHLORIDE 125 MG/1
125 CAPSULE ORAL 4 TIMES DAILY
Qty: 40 CAPSULE | Refills: 0 | Status: SHIPPED | OUTPATIENT
Start: 2022-07-16 | End: 2022-09-23

## 2022-07-16 RX ORDER — METRONIDAZOLE 500 MG/1
TABLET ORAL
Qty: 42 TABLET | Refills: 0 | Status: SHIPPED | OUTPATIENT
Start: 2022-07-16 | End: 2022-09-23

## 2022-07-16 NOTE — PROGRESS NOTES
C. Diff positive, conitnue probiotics;   Sent vancomycin to start and if not covered, take metronidazole let patient know and sent message to pharmacy;  Start ASAP.

## 2022-07-18 ENCOUNTER — OFFICE VISIT (OUTPATIENT)
Dept: FAMILY MEDICINE CLINIC | Facility: CLINIC | Age: 52
End: 2022-07-18

## 2022-07-18 VITALS
BODY MASS INDEX: 40.43 KG/M2 | WEIGHT: 315 LBS | HEART RATE: 86 BPM | SYSTOLIC BLOOD PRESSURE: 110 MMHG | OXYGEN SATURATION: 95 % | DIASTOLIC BLOOD PRESSURE: 82 MMHG | HEIGHT: 74 IN

## 2022-07-18 DIAGNOSIS — R19.7 DIARRHEA, UNSPECIFIED TYPE: ICD-10-CM

## 2022-07-18 DIAGNOSIS — A04.72 CLOSTRIDIUM DIFFICILE DIARRHEA: Primary | ICD-10-CM

## 2022-07-18 DIAGNOSIS — I89.0 LYMPHEDEMA: ICD-10-CM

## 2022-07-18 DIAGNOSIS — L03.115 CELLULITIS OF RIGHT LOWER EXTREMITY: ICD-10-CM

## 2022-07-18 PROCEDURE — 99213 OFFICE O/P EST LOW 20 MIN: CPT | Performed by: FAMILY MEDICINE

## 2022-07-18 RX ORDER — CHOLESTYRAMINE 4 G/9G
2 POWDER, FOR SUSPENSION ORAL 2 TIMES DAILY PRN
Qty: 60 PACKET | Refills: 0 | Status: SHIPPED | OUTPATIENT
Start: 2022-07-18 | End: 2022-09-23

## 2022-07-18 NOTE — PROGRESS NOTES
Subjective   Kameron Melendez is a 52 y.o. male. Presents today for   Chief Complaint   Patient presents with   • Edema   • Cellulitis   • Diarrhea       History of Present Illness  Patient 51 y/o here for follow-up of right lower extremity cellulitis and lymphedema;  Unfortunately after prolonged abx developed c. Diff active infection.  Was able to pick-up vancomycin and started, but still copious watery stools;  No f/c;  No abd pain, some cramping.   Having to run to bathroom dozens of times a day.      Review of Systems   Cardiovascular: Positive for leg swelling.   Skin: Positive for rash.       Patient Active Problem List   Diagnosis   • History of bilateral pulmonary embolism (CMS/HCC)   • Pulmonary hypertension (HCC)   • Lymphedema of both lower extremities   • Obesity, morbid, BMI 50 or higher ([facility])   • Dyslipidemia   • Hyperuricemia   • Hypogonadal obesity   • Knee arthropathy   • Morbid obesity with body mass index of 60.0-69.9 in adult ([facility])   • ARNOLDO (obstructive sleep apnea)   • Severe edema   • History of pulmonary embolism   • Other acute pulmonary embolism without acute cor pulmonale ([facility])   • Intractable back pain   • Heterozygous factor V Leiden mutation ([facility])   • Cellulitis of left lower extremity   • Hypokalemia   • CAROL (acute kidney injury) ([facility])   • Sepsis with cutaneous manifestations ([facility])   • Hyperglycemia   • Paroxysmal atrial fibrillation ([facility])   • Athscl heart 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   • Long term (current) use of anticoagulants   • Lymphedema, not elsewhere classified   • Nicotine dependence, other tobacco product, uncomplicated   • Personal history of other venous thrombosis and embolism   • Typical atrial flutter ([facility])   • Venous insufficiency (chronic) (peripheral)   • Cellulitis of right lower extremity   • Wound cellulitis       Social History     Socioeconomic History   • Marital status:    Tobacco Use   • Smoking status: Light Tobacco Smoker     Types:  Cigars, Pipe     Start date: 1/1/2006   • Smokeless tobacco: Never Used   • Tobacco comment: occasional - < 1 a month   Vaping Use   • Vaping Use: Never used   Substance and Sexual Activity   • Alcohol use: No   • Drug use: No   • Sexual activity: Defer       No Known Allergies    Current Outpatient Medications on File Prior to Visit   Medication Sig Dispense Refill   • acetaminophen (TYLENOL) 500 MG tablet Take 500 mg by mouth Every 6 (Six) Hours As Needed for Mild Pain .     • acetaminophen-codeine (TYLENOL #3) 300-30 MG per tablet Take 1 tablet by mouth Every 4 (Four) Hours As Needed for Moderate Pain  (severe pain). 18 tablet 1   • buPROPion XL (WELLBUTRIN XL) 150 MG 24 hr tablet Take 1 tablet by mouth Daily. 30 tablet 5   • furosemide (LASIX) 80 MG tablet TAKE 1 TABLET BY MOUTH DAILY (Patient taking differently: Take 80 mg by mouth Daily As Needed.) 30 tablet 5   • saccharomyces boulardii (Florastor) 250 MG capsule Take 1 capsule by mouth 2 (Two) Times a Day. 60 capsule 0   • Semaglutide, 1 MG/DOSE, 4 MG/3ML solution pen-injector Inject 1 mg under the skin into the appropriate area as directed 1 (One) Time Per Week. 3 pen 3   • topiramate (TOPAMAX) 25 MG tablet Take 1 tablet by mouth 2 (Two) Times a Day. 60 tablet 5   • vancomycin (VANCOCIN) 125 MG capsule Take 1 capsule by mouth 4 (Four) Times a Day. 40 capsule 0   • warfarin (COUMADIN) 10 MG tablet Take 15 mg by mouth See Admin Instructions. Takes 1.5 tablet (15 mg) Monday, Wednesday, Friday, Saturday, and Sunday     • warfarin (COUMADIN) 10 MG tablet TAKE 1 TABLET ON TUESDAY, THURSDAY, SUNDAY AND TAKE ONE AND ONE-HALF TABLETS ALL OTHER DAYS OR AS DIRECTED (Patient taking differently: Take 10 mg by mouth 2 (Two) Times a Week. 1 tablet (10 mg) on Tuesday and Thursday) 120 tablet 1   • [DISCONTINUED] cholestyramine (Questran) 4 g packet Take 1 packet by mouth 2 (Two) Times a Day As Needed (diarrhea). 60 packet 0   • metroNIDAZOLE (Flagyl) 500 MG tablet 1 po tid  "x14d if vancomycin not covered 42 tablet 0   • [DISCONTINUED] cephalexin (KEFLEX) 500 MG capsule Take 1 capsule by mouth 4 (Four) Times a Day. 56 capsule 0   • [DISCONTINUED] sulfamethoxazole-trimethoprim (Bactrim DS) 800-160 MG per tablet Take 1 tablet by mouth 2 (Two) Times a Day. 28 tablet 0     No current facility-administered medications on file prior to visit.       Objective   Vitals:    07/18/22 1343   BP: 110/82   Pulse: 86   SpO2: 95%   Weight: (!) 252 kg (555 lb)   Height: 188 cm (74\")     Body mass index is 71.26 kg/m².    Physical Exam  Vitals and nursing note reviewed.   Constitutional:       Appearance: He is well-developed.   Musculoskeletal:      Cervical back: Neck supple.   Skin:     General: Skin is warm and dry.      Comments: Right lower extremity greatly improved.    Neurological:      Mental Status: He is alert.   Psychiatric:         Behavior: Behavior normal.         Assessment & Plan   Diagnoses and all orders for this visit:    1. Clostridium difficile diarrhea (Primary)  -     cholestyramine (Questran) 4 g packet; Take 2 packets by mouth 2 (Two) Times a Day As Needed (diarrhea).  Dispense: 60 packet; Refill: 0    2. Diarrhea, unspecified type    3. Cellulitis of right lower extremity    4. Lymphedema    patient cellulitis greatly imrpoved and resolved thankfully.  Unfortunately, has developed c. Diff with copious stools.  He was able to get vancomycin and taking oral probiotics.  Diarrhea not improved yet, but only 48 hours worth of dosing.  Will have increase questran to 2 packets bid and continue probiotic and vancomycin and possible release on Monday if diarrhea stop.  Should be allowed bathroom breaks due to c. Diff.  To call if doesn't settle as may need delay return.         -Follow up: 2 months and prn         "

## 2022-07-22 ENCOUNTER — ANTICOAGULATION VISIT (OUTPATIENT)
Dept: PHARMACY | Facility: HOSPITAL | Age: 52
End: 2022-07-22

## 2022-07-22 DIAGNOSIS — I26.99 OTHER ACUTE PULMONARY EMBOLISM WITHOUT ACUTE COR PULMONALE: Primary | ICD-10-CM

## 2022-07-22 DIAGNOSIS — I26.99 BILATERAL PULMONARY EMBOLISM: ICD-10-CM

## 2022-07-22 LAB — INR PPP: 2.6

## 2022-07-22 NOTE — PROGRESS NOTES
Anticoagulation Clinic Progress Note    Anticoagulation Summary  As of 2022    INR goal:  2.0-3.0   TTR:  68.0 % (3.4 y)   INR used for dosin.60 (2022)   Warfarin maintenance plan:  10 mg every Tue, Thu; 15 mg all other days   Weekly warfarin total:  95 mg   Plan last modified:  Tessie Fatima RPH (2022)   Next INR check:  2022   Priority:  High   Target end date:  Indefinite    Indications    Other acute pulmonary embolism without acute cor pulmonale (HCC) [I26.99]  History of bilateral pulmonary embolism (CMS/HCC) [I26.99]             Anticoagulation Episode Summary     INR check location:      Preferred lab:      Send INR reminders to:   [facility]  POOL    Comments:  new  frankie 2019 **WEEKLY FRANKIE**      Anticoagulation Care Providers     Provider Role Specialty Phone number    Torri Colmenares APRN Referring Cardiology [phone]    Elsy Baeza MD Responsible Cardiology [phone]          Clinic Interview:  Patient Findings     Positives:  Change in medications    Negatives:  Signs/symptoms of thrombosis, Signs/symptoms of bleeding,   Laboratory test error suspected, Change in health, Change in alcohol use,   Change in activity, Upcoming invasive procedure, Emergency department   visit, Upcoming dental procedure, Missed doses, Extra doses, Change in   diet/appetite, Hospital admission, Bruising, Other complaints    Comments:  Off the bactrim for reportedly around 7 days, on oral vanc for   c. Diff but diarrhea has resolved. INR is therapeutic so hesitant to   resume normal dose, trial on 15 mg on Friday, 10 mg AOD, rck 1 week to   determine if need to start adding back other 15 mg days         Clinical Outcomes     Negatives:  Major bleeding event, Thromboembolic event,   Anticoagulation-related hospital admission, Anticoagulation-related ED   visit, Anticoagulation-related fatality    Comments:  Off the bactrim for reportedly around 7 days, on oral  vanc for   c. Diff but diarrhea has resolved. INR is therapeutic so hesitant to   resume normal dose, trial on 15 mg on Friday, 10 mg AORENETTA, rcbennie 1 week to   determine if need to start adding back other 15 mg days           INR History:  Anticoagulation Monitoring 7/12/2022 7/15/2022 7/22/2022   INR 2.70 3.20 2.60   INR Date 7/12/2022 7/15/2022 7/22/2022   INR Goal 2.0-3.0 2.0-3.0 2.0-3.0   Trend Same Same Same   Last Week Total 75 mg 70 mg 65 mg   Next Week Total 95 mg 70 mg 75 mg   Sun - 10 mg (7/17) 10 mg (7/24)   Mon - 10 mg (7/18) 10 mg (7/25)   Tue 10 mg 10 mg 10 mg   Wed 15 mg - 10 mg (7/27)   Thu 10 mg - 10 mg   Fri - 5 mg (7/15) 15 mg   Sat - 10 mg (7/16) 10 mg (7/23)   Visit Report - - -   Some recent data might be hidden       Plan:  1. INR is Therapeutic today- see above in Anticoagulation Summary.   Will instruct Kameron Melendez to Change their warfarin regimen- see above in Anticoagulation Summary.  2. Follow up in 1 week  3. They have been instructed to call if any changes in medications, doses, concerns, etc. Patient expresses understanding and has no further questions at this time.    Tessie Fatima [facility]

## 2022-07-25 ENCOUNTER — TELEPHONE (OUTPATIENT)
Dept: FAMILY MEDICINE CLINIC | Facility: CLINIC | Age: 52
End: 2022-07-25

## 2022-07-25 NOTE — TELEPHONE ENCOUNTER
PT wants to know if you will extend his time off for a couple more days. Pt said he started yesterday with abdominal cramps, then started vomiting during the night. Pt said when he breathes it is a sharp pain and he can feel it. He doesn't have fevers.

## 2022-07-26 DIAGNOSIS — E66.01 MORBID (SEVERE) OBESITY DUE TO EXCESS CALORIES: ICD-10-CM

## 2022-07-29 ENCOUNTER — ANTICOAGULATION VISIT (OUTPATIENT)
Dept: PHARMACY | Facility: HOSPITAL | Age: 52
End: 2022-07-29

## 2022-07-29 DIAGNOSIS — I26.99 BILATERAL PULMONARY EMBOLISM: ICD-10-CM

## 2022-07-29 DIAGNOSIS — I26.99 OTHER ACUTE PULMONARY EMBOLISM WITHOUT ACUTE COR PULMONALE: Primary | ICD-10-CM

## 2022-07-29 LAB — INR PPP: 5.2

## 2022-07-29 NOTE — PROGRESS NOTES
Anticoagulation Clinic Progress Note    Anticoagulation Summary  As of 2022    INR goal:  2.0-3.0   TTR:  67.7 % (3.4 y)   INR used for dosin.20 (2022)   Warfarin maintenance plan:  10 mg every Tue, Thu; 15 mg all other days   Weekly warfarin total:  95 mg   Plan last modified:  Tessie Fatima RPH (2022)   Next INR check:  2022   Priority:  High   Target end date:  Indefinite    Indications    Other acute pulmonary embolism without acute cor pulmonale (HCC) [I26.99]  History of bilateral pulmonary embolism (CMS/HCC) [I26.99]             Anticoagulation Episode Summary     INR check location:      Preferred lab:      Send INR reminders to:   [facility]  POOL    Comments:  new  frankie 2019 **WEEKLY FRANKIE**      Anticoagulation Care Providers     Provider Role Specialty Phone number    Torri Colmenares, APRN Referring Cardiology [phone]    Elsy Baeza MD Responsible Cardiology [phone]          Clinic Interview:  Patient Findings     Positives:  Change in diet/appetite    Negatives:  Signs/symptoms of thrombosis, Signs/symptoms of bleeding,   Laboratory test error suspected, Change in health, Change in alcohol use,   Change in activity, Upcoming invasive procedure, Emergency department   visit, Upcoming dental procedure, Missed doses, Extra doses, Change in   medications, Hospital admission, Bruising, Other complaints    Comments:  Reports no longer on any antibiotics. Reports constipation now   rather than diarrhea. Reports food intake significantly reduced.       Clinical Outcomes     Negatives:  Major bleeding event, Thromboembolic event,   Anticoagulation-related hospital admission, Anticoagulation-related ED   visit, Anticoagulation-related fatality    Comments:  Reports no longer on any antibiotics. Reports constipation now   rather than diarrhea. Reports food intake significantly reduced.         INR History:  Anticoagulation Monitoring 7/15/2022  7/22/2022 7/29/2022   INR 3.20 2.60 5.20   INR Date 7/15/2022 7/22/2022 7/29/2022   INR Goal 2.0-3.0 2.0-3.0 2.0-3.0   Trend Same Same Same   Last Week Total 70 mg 65 mg 70 mg   Next Week Total 70 mg 75 mg 55 mg   Sun 10 mg (7/17) 10 mg (7/24) 10 mg (7/31)   Mon 10 mg (7/18) 10 mg (7/25) 10 mg (8/1)   Tue 10 mg 10 mg -   Wed - 10 mg (7/27) -   Thu - 10 mg -   Fri 5 mg (7/15) 15 mg Hold (7/29)   Sat 10 mg (7/16) 10 mg (7/23) Hold (7/30)   Visit Report - - -   Some recent data might be hidden       Plan:  1. INR is Supratherapeutic today- see above in Anticoagulation Summary.   Will instruct Kameron Melendez to Change their warfarin regimen- see above in Anticoagulation Summary.  2. Follow up in 4 days  3. To seek immediate medical attention if s/sx of bleeding develop. They have been instructed to call if any changes in medications, doses, concerns, etc. Patient expresses understanding and has no further questions at this time.    Ramo Morocho, PharmD

## 2022-08-02 ENCOUNTER — ANTICOAGULATION VISIT (OUTPATIENT)
Dept: PHARMACY | Facility: HOSPITAL | Age: 52
End: 2022-08-02

## 2022-08-02 DIAGNOSIS — I26.99 OTHER ACUTE PULMONARY EMBOLISM WITHOUT ACUTE COR PULMONALE: Primary | ICD-10-CM

## 2022-08-02 DIAGNOSIS — I26.99 BILATERAL PULMONARY EMBOLISM: ICD-10-CM

## 2022-08-02 LAB — INR PPP: 2.4

## 2022-08-02 NOTE — PROGRESS NOTES
Anticoagulation Clinic Progress Note    Anticoagulation Summary  As of 2022    INR goal:  2.0-3.0   TTR:  67.7 % (3.4 y)   INR used for dosin.40 (2022)   Warfarin maintenance plan:  10 mg every Tue, Thu; 15 mg all other days   Weekly warfarin total:  95 mg   Plan last modified:  Tessie Fatima RPH (2022)   Next INR check:  2022   Priority:  High   Target end date:  Indefinite    Indications    Other acute pulmonary embolism without acute cor pulmonale (HCC) [I26.99]  History of bilateral pulmonary embolism (CMS/HCC) [I26.99]             Anticoagulation Episode Summary     INR check location:      Preferred lab:      Send INR reminders to:   [facility]  POOL    Comments:  new  frankie 2019 **WEEKLY FRANKIE**      Anticoagulation Care Providers     Provider Role Specialty Phone number    Torri Colmenares, APRN Referring Cardiology [phone]    Elsy Baeza MD Responsible Cardiology [phone]          Clinic Interview:  Patient Findings     Negatives:  Signs/symptoms of thrombosis, Signs/symptoms of bleeding,   Laboratory test error suspected, Change in health, Change in alcohol use,   Change in activity, Upcoming invasive procedure, Emergency department   visit, Upcoming dental procedure, Missed doses, Extra doses, Change in   medications, Change in diet/appetite, Hospital admission, Bruising, Other   complaints    Comments:  Reports he completed antibiotics and C Diff has resolved.   Started taking a probiotic last week.      Clinical Outcomes     Negatives:  Major bleeding event, Thromboembolic event,   Anticoagulation-related hospital admission, Anticoagulation-related ED   visit, Anticoagulation-related fatality    Comments:  Reports he completed antibiotics and C Diff has resolved.   Started taking a probiotic last week.        INR History:  Anticoagulation Monitoring 2022   INR 2.60 5.20 2.40   INR Date 2022   INR  Goal 2.0-3.0 2.0-3.0 2.0-3.0   Trend Same Same Same   Last Week Total 65 mg 70 mg 50 mg   Next Week Total 75 mg 55 mg 80 mg   Sun 10 mg (7/24) 10 mg (7/31) 15 mg   Mon 10 mg (7/25) 10 mg (8/1) -   Tue 10 mg - 10 mg   Wed 10 mg (7/27) - 10 mg (8/3)   Thu 10 mg - 10 mg   Fri 15 mg Hold (7/29) 10 mg (8/5)   Sat 10 mg (7/23) Hold (7/30) 10 mg (8/6)   Visit Report - - -   Some recent data might be hidden       Plan:  1. INR is Therapeutic today- see above in Anticoagulation Summary.   Will instruct Kameron Melendez to Change their warfarin regimen- see above in Anticoagulation Summary.  Take 10 mg daily the rest of this week and take 15mg on Sunday.   2. Follow up in 6 days.  3. They have been instructed to call if any changes in medications, doses, concerns, etc. Patient expresses understanding and has no further questions at this time.    Tonja Kaye, PharmD

## 2022-08-10 ENCOUNTER — ANTICOAGULATION VISIT (OUTPATIENT)
Dept: PHARMACY | Facility: HOSPITAL | Age: 52
End: 2022-08-10

## 2022-08-10 DIAGNOSIS — I26.99 OTHER ACUTE PULMONARY EMBOLISM WITHOUT ACUTE COR PULMONALE: Primary | ICD-10-CM

## 2022-08-10 DIAGNOSIS — I26.99 BILATERAL PULMONARY EMBOLISM: ICD-10-CM

## 2022-08-10 LAB — INR PPP: 2.8

## 2022-08-17 ENCOUNTER — ANTICOAGULATION VISIT (OUTPATIENT)
Dept: PHARMACY | Facility: HOSPITAL | Age: 52
End: 2022-08-17

## 2022-08-17 DIAGNOSIS — I26.99 BILATERAL PULMONARY EMBOLISM: ICD-10-CM

## 2022-08-17 DIAGNOSIS — I26.99 OTHER ACUTE PULMONARY EMBOLISM WITHOUT ACUTE COR PULMONALE: Primary | ICD-10-CM

## 2022-08-17 LAB — INR PPP: 2.3

## 2022-08-17 NOTE — PROGRESS NOTES
Anticoagulation Clinic Progress Note    Anticoagulation Summary  As of 2022    INR goal:  2.0-3.0   TTR:  68.0 % (3.5 y)   INR used for dosin.30 (2022)   Warfarin maintenance plan:  15 mg every Sun; 10 mg all other days   Weekly warfarin total:  75 mg   No change documented:  Geraldine Marie, Pharmacy Technician   Plan last modified:  Ramo Morocho, PharmD (2022)   Next INR check:  2022   Priority:  High   Target end date:  Indefinite    Indications    Other acute pulmonary embolism without acute cor pulmonale (HCC) [I26.99]  History of bilateral pulmonary embolism (CMS/HCC) [I26.99]             Anticoagulation Episode Summary     INR check location:      Preferred lab:      Send INR reminders to:   [facility]  PO[facility]    Comments:  new  tristen 2019 **WEEKLY TRISTEN**      Anticoagulation Care Providers     Provider Role Specialty Phone number    Torri Colmenares APRN Referring Cardiology [phone]    Elsy Baeza MD Responsible Cardiology [phone]          Clinic Interview:  No pertinent clinical findings have been reported.    INR History:  Anticoagulation Monitoring 2022 8/10/2022 2022   INR 2.40 2.80 2.30   INR Date 2022 8/10/2022 2022   INR Goal 2.0-3.0 2.0-3.0 2.0-3.0   Trend Same Down Same   Last Week Total 50 mg 75 mg 75 mg   Next Week Total 80 mg 75 mg 75 mg   Sun 15 mg 15 mg 15 mg   Mon - 10 mg 10 mg   Tue 10 mg 10 mg 10 mg   Wed 10 mg (8/3) 10 mg 10 mg   Thu 10 mg 10 mg 10 mg   Fri 10 mg () 10 mg 10 mg   Sat 10 mg () 10 mg 10 mg   Visit Report - - -   Some recent data might be hidden       Plan:  1. INR is therapeutic today- see above in Anticoagulation Summary.    Kameron Melendez to continue their warfarin regimen- see above in Anticoagulation Summary.  2. Follow up in 1 week  3. Pt has agreed to only be called if INR out of range. They have been instructed to call if any changes in medications, doses, concerns, etc. Patient  expresses understanding and has no further questions at this time.    Geraldine Marie, Pharmacy Technician

## 2022-08-26 ENCOUNTER — ANTICOAGULATION VISIT (OUTPATIENT)
Dept: PHARMACY | Facility: HOSPITAL | Age: 52
End: 2022-08-26

## 2022-08-26 DIAGNOSIS — I26.99 BILATERAL PULMONARY EMBOLISM: ICD-10-CM

## 2022-08-26 DIAGNOSIS — I26.99 OTHER ACUTE PULMONARY EMBOLISM WITHOUT ACUTE COR PULMONALE: Primary | ICD-10-CM

## 2022-08-26 LAB — INR PPP: 1.4

## 2022-08-26 NOTE — PROGRESS NOTES
Anticoagulation Clinic Progress Note    Anticoagulation Summary  As of 2022    INR goal:  2.0-3.0   TTR:  67.8 % (3.5 y)   INR used for dosin.40 (2022)   Warfarin maintenance plan:  15 mg every Sun; 10 mg all other days   Weekly warfarin total:  75 mg   Plan last modified:  Ramo Morocho, PharmD (2022)   Next INR check:  2022   Priority:  High   Target end date:  Indefinite    Indications    Other acute pulmonary embolism without acute cor pulmonale (HCC) [I26.99]  History of bilateral pulmonary embolism (CMS/HCC) [I26.99]             Anticoagulation Episode Summary     INR check location:      Preferred lab:      Send INR reminders to:   [facility]  [facility]    Comments:  new  tristen 2019 **WEEKLY TRISTEN**      Anticoagulation Care Providers     Provider Role Specialty Phone number    Torri Colmenares APRN Referring Cardiology [phone]    Elsy Baeza MD Responsible Cardiology [phone]          Clinic Interview:  Patient Findings     Positives:  Change in health    Negatives:  Signs/symptoms of thrombosis, Signs/symptoms of bleeding,   Laboratory test error suspected, Change in alcohol use, Change in   activity, Upcoming invasive procedure, Emergency department visit,   Upcoming dental procedure, Missed doses, Extra doses, Change in   medications, Change in diet/appetite, Hospital admission, Bruising, Other   complaints    Comments:  Patient reports having COVID for past week.  He is feeling   better, but possibly missed a dose.      Clinical Outcomes     Negatives:  Major bleeding event, Thromboembolic event,   Anticoagulation-related hospital admission, Anticoagulation-related ED   visit, Anticoagulation-related fatality    Comments:  Patient reports having COVID for past week.  He is feeling   better, but possibly missed a dose.        INR History:  Anticoagulation Monitoring 8/10/2022 2022 2022   INR 2.80 2.30 1.40   INR Date 8/10/2022 2022  8/26/2022   INR Goal 2.0-3.0 2.0-3.0 2.0-3.0   Trend Down Same Same   Last Week Total 75 mg 75 mg 75 mg   Next Week Total 75 mg 75 mg 80 mg   Sun 15 mg 15 mg 15 mg   Mon 10 mg 10 mg 10 mg   Tue 10 mg 10 mg 10 mg   Wed 10 mg 10 mg 10 mg   Thu 10 mg 10 mg 10 mg   Fri 10 mg 10 mg 15 mg (8/26)   Sat 10 mg 10 mg 10 mg   Visit Report - - -   Some recent data might be hidden       Plan:  1. INR is Subtherapeutic today- see above in Anticoagulation Summary.   Will instruct Kameron Melendez to Change their warfarin regimen- see above in Anticoagulation Summary.  2. Follow up in 1 weeks  3.  They have been instructed to call if any changes in medications, doses, concerns, etc. Patient expresses understanding and has no further questions at this time.    Tonaj Kaye, PharmD

## 2022-09-02 ENCOUNTER — ANTICOAGULATION VISIT (OUTPATIENT)
Dept: PHARMACY | Facility: HOSPITAL | Age: 52
End: 2022-09-02

## 2022-09-02 DIAGNOSIS — I26.99 BILATERAL PULMONARY EMBOLISM: ICD-10-CM

## 2022-09-02 DIAGNOSIS — I26.99 OTHER ACUTE PULMONARY EMBOLISM WITHOUT ACUTE COR PULMONALE: Primary | ICD-10-CM

## 2022-09-02 LAB — INR PPP: 2.3

## 2022-09-02 NOTE — PROGRESS NOTES
Anticoagulation Clinic Progress Note    Anticoagulation Summary  As of 2022    INR goal:  2.0-3.0   TTR:  67.6 % (3.5 y)   INR used for dosin.30 (2022)   Warfarin maintenance plan:  15 mg every Sun; 10 mg all other days   Weekly warfarin total:  75 mg   No change documented:  Tonja Kaye, PharmD   Plan last modified:  Ramo Morocho, PharmD (2022)   Next INR check:  2022   Priority:  High   Target end date:  Indefinite    Indications    Other acute pulmonary embolism without acute cor pulmonale (HCC) [I26.99]  History of bilateral pulmonary embolism (CMS/HCC) [I26.99]             Anticoagulation Episode Summary     INR check location:      Preferred lab:      [facility]    Comments:  new  tristen 2019 **WEEKLY TRISTEN**      Anticoagulation Care Providers     Provider Role Specialty Phone number    Torri Colmenares, APRN Referring Cardiology [phone]    Elsy Baeza MD Responsible Cardiology [phone]          Clinic Interview:  Patient Findings     Negatives:  Signs/symptoms of thrombosis, Signs/symptoms of bleeding,   Laboratory test error suspected, Change in health, Change in alcohol use,   Change in activity, Upcoming invasive procedure, Emergency department   visit, Upcoming dental procedure, Missed doses, Extra doses, Change in   medications, Change in diet/appetite, Hospital admission, Bruising, Other   complaints      Clinical Outcomes     Negatives:  Major bleeding event, Thromboembolic event,   Anticoagulation-related hospital admission, Anticoagulation-related ED   visit, Anticoagulation-related fatality        INR History:  Anticoagulation Monitoring 2022   INR 2.30 1.40 2.30   INR Date 2022   INR Goal 2.0-3.0 2.0-3.0 2.0-3.0   Trend Same Same Same   Last Week Total 75 mg 75 mg 80 mg   Next Week Total 75 mg 80 mg 75 mg   Sun 15 mg 15 mg 15 mg   Mon 10 mg 10 mg 10 mg   Tue  10 mg 10 mg 10 mg   Wed 10 mg 10 mg 10 mg   Thu 10 mg 10 mg 10 mg   Fri 10 mg 15 mg (8/26) 10 mg   Sat 10 mg 10 mg 10 mg   Visit Report - - -   Some recent data might be hidden       Plan:  1. INR is Therapeutic today- see above in Anticoagulation Summary.   Will instruct Kameron Melendez to Continue their warfarin regimen- see above in Anticoagulation Summary.  2. Follow up in 1 weeks  3.They have been instructed to call if any changes in medications, doses, concerns, etc. Patient expresses understanding and has no further questions at this time.    Tonja Kaye, PharmD

## 2022-09-09 ENCOUNTER — ANTICOAGULATION VISIT (OUTPATIENT)
Dept: PHARMACY | Facility: HOSPITAL | Age: 52
End: 2022-09-09

## 2022-09-09 DIAGNOSIS — I26.99 BILATERAL PULMONARY EMBOLISM: ICD-10-CM

## 2022-09-09 DIAGNOSIS — I26.99 OTHER ACUTE PULMONARY EMBOLISM WITHOUT ACUTE COR PULMONALE: Primary | ICD-10-CM

## 2022-09-09 LAB — INR PPP: 2.3

## 2022-09-09 NOTE — PROGRESS NOTES
Anticoagulation Clinic Progress Note    Anticoagulation Summary  As of 2022    INR goal:  2.0-3.0   TTR:  67.8 % (3.5 y)   INR used for dosin.30 (2022)   Warfarin maintenance plan:  15 mg every Sun; 10 mg all other days   Weekly warfarin total:  75 mg   No change documented:  Geraldine Marie, Pharmacy Technician   Plan last modified:  Ramo Morocho, PharmD (2022)   Next INR check:  2022   Priority:  High   Target end date:  Indefinite    Indications    Other acute pulmonary embolism without acute cor pulmonale (HCC) [I26.99]  History of bilateral pulmonary embolism (CMS/HCC) [I26.99]             Anticoagulation Episode Summary     INR check location:      Preferred lab:      [facility]    Comments:  new  tristen 2019 **WEEKLY TRISTEN**      Anticoagulation Care Providers     Provider Role Specialty Phone number    Torri Colmenares APRN Referring Cardiology [phone]    Elsy Baeza MD Responsible Cardiology [phone]          Clinic Interview:  No pertinent clinical findings have been reported.    INR History:  Anticoagulation Monitoring 2022   INR 1.40 2.30 2.30   INR Date 2022   INR Goal 2.0-3.0 2.0-3.0 2.0-3.0   Trend Same Same Same   Last Week Total 75 mg 80 mg 75 mg   Next Week Total 80 mg 75 mg 75 mg   Sun 15 mg 15 mg 15 mg   Mon 10 mg 10 mg 10 mg   Tue 10 mg 10 mg 10 mg   Wed 10 mg 10 mg 10 mg   Thu 10 mg 10 mg 10 mg   Fri 15 mg () 10 mg 10 mg   Sat 10 mg 10 mg 10 mg   Visit Report - - -   Some recent data might be hidden       Plan:  1. INR is therapeutic today- see above in Anticoagulation Summary.    Kameron Melendez to continue their warfarin regimen- see above in Anticoagulation Summary.  2. Follow up in 1 week  3. Left voicemail instructed to call if any changes in medications, doses, concerns, etc. Patient expresses understanding and has no further questions at this  time.    Geraldine Marie, Pharmacy Technician

## 2022-09-16 ENCOUNTER — ANTICOAGULATION VISIT (OUTPATIENT)
Dept: PHARMACY | Facility: HOSPITAL | Age: 52
End: 2022-09-16

## 2022-09-16 DIAGNOSIS — I26.99 BILATERAL PULMONARY EMBOLISM: ICD-10-CM

## 2022-09-16 DIAGNOSIS — I26.99 OTHER ACUTE PULMONARY EMBOLISM WITHOUT ACUTE COR PULMONALE: Primary | ICD-10-CM

## 2022-09-16 LAB — INR PPP: 2.4

## 2022-09-16 NOTE — PROGRESS NOTES
Anticoagulation Clinic Progress Note    Anticoagulation Summary  As of 2022    INR goal:  2.0-3.0   TTR:  68.0 % (3.6 y)   INR used for dosin.40 (2022)   Warfarin maintenance plan:  15 mg every Sun; 10 mg all other days   Weekly warfarin total:  75 mg   No change documented:  Iris Gonzalez   Plan last modified:  Ramo Morocho, PharmD (2022)   Next INR check:  2022   Priority:  High   Target end date:  Indefinite    Indications    Other acute pulmonary embolism without acute cor pulmonale (HCC) [I26.99]  History of bilateral pulmonary embolism (CMS/HCC) [I26.99]             Anticoagulation Episode Summary     INR check location:      Preferred lab:      Send INR reminders to:   [facility]  POOL    Comments:  new  tristen 2019 **WEEKLY TRISTEN**      Anticoagulation Care Providers     Provider Role Specialty Phone number    Torri Colmenares APRN Referring Cardiology [phone]    Elsy Baeza MD Responsible Cardiology [phone]          Clinic Interview:  No pertinent clinical findings have been reported.    INR History:  Anticoagulation Monitoring 2022   INR 2.30 2.30 2.40   INR Date 2022   INR Goal 2.0-3.0 2.0-3.0 2.0-3.0   Trend Same Same Same   Last Week Total 80 mg 75 mg 75 mg   Next Week Total 75 mg 75 mg 75 mg   Sun 15 mg 15 mg 15 mg   Mon 10 mg 10 mg 10 mg   Tue 10 mg 10 mg 10 mg   Wed 10 mg 10 mg 10 mg   Thu 10 mg 10 mg 10 mg   Fri 10 mg 10 mg 10 mg   Sat 10 mg 10 mg 10 mg   Visit Report - - -   Some recent data might be hidden       Plan:  1. INR is therapeutic today- see above in Anticoagulation Summary.    Kameron Melendez to continue their warfarin regimen- see above in Anticoagulation Summary.  2. Follow up in 1 week  3. Pt has agreed to only be called if INR out of range. They have been instructed to call if any changes in medications, doses, concerns, etc. Patient expresses understanding and has  no further questions at this time.    Iris Gonzalez

## 2022-09-23 ENCOUNTER — ANTICOAGULATION VISIT (OUTPATIENT)
Dept: PHARMACY | Facility: HOSPITAL | Age: 52
End: 2022-09-23

## 2022-09-23 ENCOUNTER — OFFICE VISIT (OUTPATIENT)
Dept: FAMILY MEDICINE CLINIC | Facility: CLINIC | Age: 52
End: 2022-09-23

## 2022-09-23 VITALS
HEIGHT: 74 IN | OXYGEN SATURATION: 96 % | SYSTOLIC BLOOD PRESSURE: 130 MMHG | BODY MASS INDEX: 71.26 KG/M2 | DIASTOLIC BLOOD PRESSURE: 84 MMHG | HEART RATE: 64 BPM

## 2022-09-23 DIAGNOSIS — I26.99 OTHER ACUTE PULMONARY EMBOLISM WITHOUT ACUTE COR PULMONALE: Primary | ICD-10-CM

## 2022-09-23 DIAGNOSIS — I89.0 LYMPHEDEMA: ICD-10-CM

## 2022-09-23 DIAGNOSIS — I26.99 BILATERAL PULMONARY EMBOLISM: ICD-10-CM

## 2022-09-23 DIAGNOSIS — E66.01 MORBID (SEVERE) OBESITY DUE TO EXCESS CALORIES: Primary | ICD-10-CM

## 2022-09-23 LAB — INR PPP: 1.9

## 2022-09-23 PROCEDURE — 99213 OFFICE O/P EST LOW 20 MIN: CPT | Performed by: FAMILY MEDICINE

## 2022-09-23 NOTE — PATIENT INSTRUCTIONS
"\"5-9-0-Almost None!\"  Healthy Habits Start Early    EAT 5 OR MORE SERVINGS OF VEGETABLES AND FRUITS EVERY DAY.    Help Kameron get three vegetables and two fruits each day. Red, green, yellow, orange...encourage them to try all the colors so they can enjoy different flavors and get more vitamins.    How can I help Kameron do this?  ---------------------------------------------  -BE PATIENT WITH Kameron, remember it may take 10 times before they start to like new food. So, start with small bites and just keep trying.  -Serve at least one vegetable or fruit at every meal. Even try two. Remember, portions do not have to be as big as you think.  -Encourage eating fruits and vegetables instead of drinking them..it's a better way to get fiber and vitamins..so limit the amount of juice to 1/2 cup per day for children 1-6 years and one cup per day for children 7-18 years of age. Try using 1/2 part water and 1/2 part juice.    Spend less than two hours per day watching television and other screen media. Screen media includes video games, movies and computer use for entertainment.    How can I help Kameron do this?  -Turn off the TV at dinner. Dinner is the best time to hang out with your kids and just talk, learn about their day, and tell them about your day. Your kids have a lot to learn from you and dinner is a great time to share.  "

## 2022-09-23 NOTE — PROGRESS NOTES
Subjective   Kameron Melendez is a 52 y.o. male. Presents today for   Chief Complaint   Patient presents with   • Weight Check       History of Present Illness   Patien there for f/u of obesity;  Reports started 548 prior to ozempic and now 524 to 527 on average;  Doing well on ozempic at 1mg x 3 weeks now and tolerating well.  On program at work for weight loss;  Sent scale goes up to 700lbs.  Has severe lymphedema with recurrent cellulitis;  Had c-diff after last round abx;  Doing topamax and wellbutrin as well for weight loss.  Bariatric procedure not covered under plan;  Had friend on diet plan that lost 95 lbs and so started (optavia)    Review of Systems   Respiratory: Negative for shortness of breath.    Cardiovascular: Negative for chest pain.       Patient Active Problem List   Diagnosis   • History of bilateral pulmonary embolism (CMS/HCC)   • Pulmonary hypertension (HCC)   • Lymphedema of both lower extremities   • Obesity, morbid, BMI 50 or higher ([facility])   • Dyslipidemia   • Hyperuricemia   • Hypogonadal obesity   • Knee arthropathy   • Morbid obesity with body mass index of 60.0-69.9 in adult ([facility])   • ARNOLDO (obstructive sleep apnea)   • Severe edema   • History of pulmonary embolism   • Other acute pulmonary embolism without acute cor pulmonale ([facility])   • Intractable back pain   • Heterozygous factor V Leiden mutation ([facility])   • Cellulitis of left lower extremity   • Hypokalemia   • CAROL (acute kidney injury) ([facility])   • Sepsis with cutaneous manifestations ([facility])   • Hyperglycemia   • Paroxysmal atrial fibrillation ([facility])   • Athscl heart 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   • Long term (current) use of anticoagulants   • Lymphedema, not elsewhere classified   • Nicotine dependence, other tobacco product, uncomplicated   • Personal history of other venous thrombosis and embolism   • Typical atrial flutter ([facility])   • Venous insufficiency (chronic) (peripheral)   • Cellulitis of right lower extremity    • Wound cellulitis       Social History     Socioeconomic History   • Marital status:    Tobacco Use   • Smoking status: Light Tobacco Smoker     Types: Cigars, Pipe     Start date: 1/1/2006   • Smokeless tobacco: Never Used   • Tobacco comment: occasional - < 1 a month   Vaping Use   • Vaping Use: Never used   Substance and Sexual Activity   • Alcohol use: No   • Drug use: No   • Sexual activity: Defer       No Known Allergies    Current Outpatient Medications on File Prior to Visit   Medication Sig Dispense Refill   • acetaminophen (TYLENOL) 500 MG tablet Take 500 mg by mouth Every 6 (Six) Hours As Needed for Mild Pain .     • acetaminophen-codeine (TYLENOL #3) 300-30 MG per tablet Take 1 tablet by mouth Every 4 (Four) Hours As Needed for Moderate Pain  (severe pain). 18 tablet 1   • buPROPion XL (WELLBUTRIN XL) 150 MG 24 hr tablet Take 1 tablet by mouth Daily. 30 tablet 5   • furosemide (LASIX) 80 MG tablet TAKE 1 TABLET BY MOUTH DAILY (Patient taking differently: Take 80 mg by mouth Daily As Needed.) 30 tablet 5   • saccharomyces boulardii (Florastor) 250 MG capsule Take 1 capsule by mouth 2 (Two) Times a Day. 60 capsule 0   • topiramate (TOPAMAX) 25 MG tablet Take 1 tablet by mouth 2 (Two) Times a Day. 60 tablet 5   • warfarin (COUMADIN) 10 MG tablet Take 15 mg by mouth See Admin Instructions. Takes 1.5 tablet (15 mg) Monday, Wednesday, Friday, Saturday, and Sunday     • warfarin (COUMADIN) 10 MG tablet TAKE 1 TABLET ON TUESDAY, THURSDAY, SUNDAY AND TAKE ONE AND ONE-HALF TABLETS ALL OTHER DAYS OR AS DIRECTED (Patient taking differently: Take 10 mg by mouth 2 (Two) Times a Week. 1 tablet (10 mg) on Tuesday and Thursday) 120 tablet 1   • [DISCONTINUED] Semaglutide, 1 MG/DOSE, 4 MG/3ML solution pen-injector Inject 1 mg under the skin into the appropriate area as directed 1 (One) Time Per Week. 3 pen 3   • [DISCONTINUED] cholestyramine (Questran) 4 g packet Take 2 packets by mouth 2 (Two) Times a Day As  "Needed (diarrhea). 60 packet 0   • [DISCONTINUED] metroNIDAZOLE (Flagyl) 500 MG tablet 1 po tid x14d if vancomycin not covered 42 tablet 0   • [DISCONTINUED] vancomycin (VANCOCIN) 125 MG capsule Take 1 capsule by mouth 4 (Four) Times a Day. 40 capsule 0     No current facility-administered medications on file prior to visit.       Objective   Vitals:    09/23/22 1134   BP: 130/84   Pulse: 64   SpO2: 96%   Weight: Comment: pt exceeds scale limit   Height: 188 cm (74\")     Body mass index is 71.26 kg/m².  BMI above goal, educational material on diet and exercise provided in AVS.      Physical Exam  Vitals and nursing note reviewed.   Constitutional:       Appearance: He is well-developed.   Musculoskeletal:      Cervical back: Neck supple.   Lymphadenopathy:      Comments: B/l massive lymphedema   Skin:     General: Skin is warm and dry.   Neurological:      Mental Status: He is alert.   Psychiatric:         Behavior: Behavior normal.         Assessment & Plan   Diagnoses and all orders for this visit:    1. Morbid (severe) obesity due to excess calories (HCC) (Primary)  -     Semaglutide, 2 MG/DOSE, 8 MG/3ML solution pen-injector; Inject 2 mg under the skin into the appropriate area as directed 1 (One) Time Per Week.  Dispense: 9 mL; Refill: 3    2. Lymphedema      Will go up to 2mg weekly fo ozempic to further weight loss. Continue wellbutrin and topamax as well;  Continue work on diet and exercise  Reports skin doing well, off/on weeping, did xeroform with relief;          -Follow up: 3 months and prn   "

## 2022-09-23 NOTE — PROGRESS NOTES
Anticoagulation Clinic Progress Note    Anticoagulation Summary  As of 2022    INR goal:  2.0-3.0   TTR:  68.0 % (3.6 y)   INR used for dosin.90 (2022)   Warfarin maintenance plan:  15 mg every Sun; 10 mg all other days   Weekly warfarin total:  75 mg   No change documented:  Tessie Fatima RPH   Plan last modified:  Ramo Morocho, PharmD (2022)   Next INR check:  2022   Priority:  High   Target end date:  Indefinite    Indications    Other acute pulmonary embolism without acute cor pulmonale (HCC) [I26.99]  History of bilateral pulmonary embolism (CMS/HCC) [I26.99]             Anticoagulation Episode Summary     INR check location:      Preferred lab:      [facility]    Comments:  new  frankie 2019 **WEEKLY FRANKIE**      Anticoagulation Care Providers     Provider Role Specialty Phone number    Torri Colmenares APRN Referring Cardiology [phone]    Elsy Baeza MD Responsible Cardiology [phone]          Clinic Interview:  No pertinent clinical findings have been reported.    INR History:  Anticoagulation Monitoring 2022   INR 2.30 2.40 1.90   INR Date 2022   INR Goal 2.0-3.0 2.0-3.0 2.0-3.0   Trend Same Same Same   Last Week Total 75 mg 75 mg 75 mg   Next Week Total 75 mg 75 mg 75 mg   Sun 15 mg 15 mg 15 mg   Mon 10 mg 10 mg 10 mg   Tue 10 mg 10 mg 10 mg   Wed 10 mg 10 mg 10 mg   Thu 10 mg 10 mg 10 mg   Fri 10 mg 10 mg 10 mg   Sat 10 mg 10 mg 10 mg   Visit Report - - -   Some recent data might be hidden       Plan:  1. INR is slightly subtherapeutic today- see above in Anticoagulation Summary.    Kameron Melendez to continue their warfarin regimen- see above in Anticoagulation Summary.  2. Follow up in 1 week  3. They have been instructed to call if any changes in medications, doses, concerns, etc. Patient expresses understanding and has no further questions at this  time.    Tessie Fatima, [facility]

## 2022-09-30 ENCOUNTER — ANTICOAGULATION VISIT (OUTPATIENT)
Dept: PHARMACY | Facility: HOSPITAL | Age: 52
End: 2022-09-30

## 2022-09-30 DIAGNOSIS — I26.99 BILATERAL PULMONARY EMBOLISM: ICD-10-CM

## 2022-09-30 DIAGNOSIS — I26.99 OTHER ACUTE PULMONARY EMBOLISM WITHOUT ACUTE COR PULMONALE: Primary | ICD-10-CM

## 2022-09-30 LAB — INR PPP: 1.7

## 2022-10-07 ENCOUNTER — ANTICOAGULATION VISIT (OUTPATIENT)
Dept: PHARMACY | Facility: HOSPITAL | Age: 52
End: 2022-10-07

## 2022-10-07 DIAGNOSIS — I26.99 OTHER ACUTE PULMONARY EMBOLISM WITHOUT ACUTE COR PULMONALE: Primary | ICD-10-CM

## 2022-10-07 DIAGNOSIS — I26.99 BILATERAL PULMONARY EMBOLISM: ICD-10-CM

## 2022-10-07 LAB — INR PPP: 1.4

## 2022-10-07 NOTE — PROGRESS NOTES
Anticoagulation Clinic Progress Note    Anticoagulation Summary  As of 10/7/2022    INR goal:  2.0-3.0   TTR:  67.2 % (3.6 y)   INR used for dosin.40 (10/7/2022)   Warfarin maintenance plan:  15 mg every Sun, Wed, Fri; 10 mg all other days   Weekly warfarin total:  85 mg   Plan last modified:  Ramo Morocho, PharmD (10/7/2022)   Next INR check:  10/14/2022   Priority:  High   Target end date:  Indefinite    Indications    Other acute pulmonary embolism without acute cor pulmonale (HCC) [I26.99]  History of bilateral pulmonary embolism (CMS/HCC) [I26.99]             Anticoagulation Episode Summary     INR check location:      Preferred lab:      Send INR reminders to:   [facility]  PO[facility]    Comments:  new  tristen 2019 **WEEKLY TRISTEN**      Anticoagulation Care Providers     Provider Role Specialty Phone number    Torri Colmenares APRN Referring Cardiology [phone]    Elsy Baeza MD Responsible Cardiology [phone]          Clinic Interview:  Patient Findings     Negatives:  Signs/symptoms of thrombosis, Signs/symptoms of bleeding,   Laboratory test error suspected, Change in health, Change in alcohol use,   Change in activity, Upcoming invasive procedure, Emergency department   visit, Upcoming dental procedure, Missed doses, Extra doses, Change in   medications, Change in diet/appetite, Hospital admission, Bruising, Other   complaints      Clinical Outcomes     Negatives:  Major bleeding event, Thromboembolic event,   Anticoagulation-related hospital admission, Anticoagulation-related ED   visit, Anticoagulation-related fatality        INR History:  Anticoagulation Monitoring 2022 2022 10/7/2022   INR 1.90 1.70 1.40   INR Date 2022 2022 10/7/2022   INR Goal 2.0-3.0 2.0-3.0 2.0-3.0   Trend Same Same Up   Last Week Total 75 mg 75 mg 80 mg   Next Week Total 75 mg 80 mg 85 mg   Sun 15 mg 15 mg 15 mg   Mon 10 mg 10 mg 10 mg   Tue 10 mg 10 mg 10 mg   Wed 10 mg 10 mg  15 mg   Thu 10 mg 10 mg 10 mg   Fri 10 mg 15 mg (9/30) 15 mg   Sat 10 mg 10 mg 10 mg   Visit Report - - -   Some recent data might be hidden       Plan:  1. INR is Subtherapeutic today- see above in Anticoagulation Summary.   Will instruct Kameron Melendez to Increase their warfarin regimen- see above in Anticoagulation Summary.  2. Follow up in 1 week  3. They have been instructed to call if any changes in medications, doses, concerns, etc. Patient expresses understanding and has no further questions at this time.    Ramo Morocho, PharmD

## 2022-10-14 ENCOUNTER — ANTICOAGULATION VISIT (OUTPATIENT)
Dept: PHARMACY | Facility: HOSPITAL | Age: 52
End: 2022-10-14

## 2022-10-14 DIAGNOSIS — I26.99 BILATERAL PULMONARY EMBOLISM: ICD-10-CM

## 2022-10-14 DIAGNOSIS — I26.99 OTHER ACUTE PULMONARY EMBOLISM WITHOUT ACUTE COR PULMONALE: Primary | ICD-10-CM

## 2022-10-14 LAB — INR PPP: 2.3

## 2022-10-14 NOTE — PROGRESS NOTES
Anticoagulation Clinic Progress Note    Anticoagulation Summary  As of 10/14/2022    INR goal:  2.0-3.0   TTR:  67.1 % (3.6 y)   INR used for dosin.30 (10/14/2022)   Warfarin maintenance plan:  15 mg every Sun, Wed, Fri; 10 mg all other days   Weekly warfarin total:  85 mg   No change documented:  Tessie Fatima RPH   Plan last modified:  Ramo Morocho, PharmD (10/7/2022)   Next INR check:  10/21/2022   Priority:  High   Target end date:  Indefinite    Indications    Other acute pulmonary embolism without acute cor pulmonale (HCC) [I26.99]  History of bilateral pulmonary embolism (CMS/HCC) [I26.99]             Anticoagulation Episode Summary     INR check location:      Preferred lab:      [facility]    Comments:  new  frankie 2019 **WEEKLY FRANKIE**      Anticoagulation Care Providers     Provider Role Specialty Phone number    Torri Colmenares APRN Referring Cardiology [phone]    Elsy Baeza MD Responsible Cardiology [phone]          Clinic Interview:  No pertinent clinical findings have been reported.    INR History:  Anticoagulation Monitoring 2022 10/7/2022 10/14/2022   INR 1.70 1.40 2.30   INR Date 2022 10/7/2022 10/14/2022   INR Goal 2.0-3.0 2.0-3.0 2.0-3.0   Trend Same Up Same   Last Week Total 75 mg 80 mg 85 mg   Next Week Total 80 mg 85 mg 85 mg   Sun 15 mg 15 mg 15 mg   Mon 10 mg 10 mg 10 mg   Tue 10 mg 10 mg 10 mg   Wed 10 mg 15 mg 15 mg   Thu 10 mg 10 mg 10 mg   Fri 15 mg () 15 mg 15 mg   Sat 10 mg 10 mg 10 mg   Visit Report - - -   Some recent data might be hidden       Plan:  1. INR is therapeutic today- see above in Anticoagulation Summary.    Kameron Melendez to continue their warfarin regimen- see above in Anticoagulation Summary.  2. Follow up in 1 week  3. Left voicemail with instructions per patient request. They have been instructed to call if any changes in medications, doses, concerns, etc. Patient expresses  understanding and has no further questions at this time.    Tessie Fatima, RP

## 2022-10-21 ENCOUNTER — ANTICOAGULATION VISIT (OUTPATIENT)
Dept: PHARMACY | Facility: HOSPITAL | Age: 52
End: 2022-10-21

## 2022-10-21 DIAGNOSIS — I26.99 OTHER ACUTE PULMONARY EMBOLISM WITHOUT ACUTE COR PULMONALE: Primary | ICD-10-CM

## 2022-10-21 DIAGNOSIS — I26.99 BILATERAL PULMONARY EMBOLISM: ICD-10-CM

## 2022-10-21 LAB — INR PPP: 2.4

## 2022-10-21 NOTE — PROGRESS NOTES
Anticoagulation Clinic Progress Note    Anticoagulation Summary  As of 10/21/2022    INR goal:  2.0-3.0   TTR:  67.3 % (3.7 y)   INR used for dosin.40 (10/21/2022)   Warfarin maintenance plan:  15 mg every Sun, Wed, Fri; 10 mg all other days   Weekly warfarin total:  85 mg   No change documented:  Geraldine Marie, Pharmacy Technician   Plan last modified:  Ramo Morocho, PharmD (10/7/2022)   Next INR check:  10/28/2022   Priority:  High   Target end date:  Indefinite    Indications    Other acute pulmonary embolism without acute cor pulmonale (HCC) [I26.99]  History of bilateral pulmonary embolism (CMS/HCC) [I26.99]             Anticoagulation Episode Summary     INR check location:      Preferred lab:      Send INR reminders to:   [facility]  POOL    Comments:  new  tristen 2019 **WEEKLY TRISTEN**      Anticoagulation Care Providers     Provider Role Specialty Phone number    Torri Colmenares APRN Referring Cardiology [phone]    Elsy Baeza MD Responsible Cardiology [phone]          Clinic Interview:  No pertinent clinical findings have been reported.    INR History:  Anticoagulation Monitoring 10/7/2022 10/14/2022 10/21/2022   INR 1.40 2.30 2.40   INR Date 10/7/2022 10/14/2022 10/21/2022   INR Goal 2.0-3.0 2.0-3.0 2.0-3.0   Trend Up Same Same   Last Week Total 80 mg 85 mg 85 mg   Next Week Total 85 mg 85 mg 85 mg   Sun 15 mg 15 mg 15 mg   Mon 10 mg 10 mg 10 mg   Tue 10 mg 10 mg 10 mg   Wed 15 mg 15 mg 15 mg   Thu 10 mg 10 mg 10 mg   Fri 15 mg 15 mg 15 mg   Sat 10 mg 10 mg 10 mg   Visit Report - - -   Some recent data might be hidden       Plan:  1. INR is therapeutic today- see above in Anticoagulation Summary.    Kameron Melendez to continue their warfarin regimen- see above in Anticoagulation Summary.  2. Follow up in 1 week  3. Pt has agreed to only be called if INR out of range. They have been instructed to call if any changes in medications, doses, concerns, etc. Patient  expresses understanding and has no further questions at this time.    Geraldine Marie, Pharmacy Technician

## 2022-10-28 ENCOUNTER — ANTICOAGULATION VISIT (OUTPATIENT)
Dept: PHARMACY | Facility: HOSPITAL | Age: 52
End: 2022-10-28

## 2022-10-28 DIAGNOSIS — I26.99 OTHER ACUTE PULMONARY EMBOLISM WITHOUT ACUTE COR PULMONALE: Primary | ICD-10-CM

## 2022-10-28 DIAGNOSIS — I26.99 BILATERAL PULMONARY EMBOLISM: ICD-10-CM

## 2022-10-28 LAB — INR PPP: 2.7

## 2022-10-28 NOTE — PROGRESS NOTES
Anticoagulation Clinic Progress Note    Anticoagulation Summary  As of 10/28/2022    INR goal:  2.0-3.0   TTR:  67.5 % (3.7 y)   INR used for dosin.70 (10/28/2022)   Warfarin maintenance plan:  15 mg every Sun, Wed, Fri; 10 mg all other days   Weekly warfarin total:  85 mg   No change documented:  Iris Gonzalez   Plan last modified:  Ramo Morocho, PharmD (10/7/2022)   Next INR check:  2022   Priority:  High   Target end date:  Indefinite    Indications    Other acute pulmonary embolism without acute cor pulmonale (HCC) [I26.99]  History of bilateral pulmonary embolism (CMS/HCC) [I26.99]             Anticoagulation Episode Summary     INR check location:      Preferred lab:      Send INR reminders to:   [facility]  POOL    Comments:  new  tristen 2019 **WEEKLY TRISTEN**      Anticoagulation Care Providers     Provider Role Specialty Phone number    Torri Colmenares APRN Referring Cardiology [phone]    Elsy Baeza MD Responsible Cardiology [phone]          Clinic Interview:  No pertinent clinical findings have been reported.    INR History:  Anticoagulation Monitoring 10/14/2022 10/21/2022 10/28/2022   INR 2.30 2.40 2.70   INR Date 10/14/2022 10/21/2022 10/28/2022   INR Goal 2.0-3.0 2.0-3.0 2.0-3.0   Trend Same Same Same   Last Week Total 85 mg 85 mg 85 mg   Next Week Total 85 mg 85 mg 85 mg   Sun 15 mg 15 mg 15 mg   Mon 10 mg 10 mg 10 mg   Tue 10 mg 10 mg 10 mg   Wed 15 mg 15 mg 15 mg   Thu 10 mg 10 mg 10 mg   Fri 15 mg 15 mg 15 mg   Sat 10 mg 10 mg 10 mg   Visit Report - - -   Some recent data might be hidden       Plan:  1. INR is therapeutic today- see above in Anticoagulation Summary.    Kameron Melendez to continue their warfarin regimen- see above in Anticoagulation Summary.  2. Follow up in 1 week  3. Pt has agreed to only be called if INR out of range. They have been instructed to call if any changes in medications, doses, concerns, etc. Patient expresses  understanding and has no further questions at this time.    Iris Gonzalez

## 2022-11-11 ENCOUNTER — ANTICOAGULATION VISIT (OUTPATIENT)
Dept: PHARMACY | Facility: HOSPITAL | Age: 52
End: 2022-11-11

## 2022-11-11 DIAGNOSIS — I26.99 BILATERAL PULMONARY EMBOLISM: ICD-10-CM

## 2022-11-11 DIAGNOSIS — I26.99 OTHER ACUTE PULMONARY EMBOLISM WITHOUT ACUTE COR PULMONALE: Primary | ICD-10-CM

## 2022-11-11 LAB — INR PPP: 3.2

## 2022-11-11 NOTE — PROGRESS NOTES
Anticoagulation Clinic Progress Note    Anticoagulation Summary  As of 11/11/2022    INR goal:  2.0-3.0   TTR:  67.3 % (3.7 y)   INR used for dosing:  3.20 (11/11/2022)   Warfarin maintenance plan:  15 mg every Sun, Wed, Fri; 10 mg all other days; Starting 11/11/2022   Weekly warfarin total:  85 mg   Plan last modified:  Ramo Morocho, PharmD (10/7/2022)   Next INR check:  11/25/2022   Priority:  High   Target end date:  Indefinite    Indications    Other acute pulmonary embolism without acute cor pulmonale (HCC) [I26.99]  History of bilateral pulmonary embolism (CMS/HCC) [I26.99]             Anticoagulation Episode Summary     INR check location:      Preferred lab:      Send INR reminders to:   [facility]  POOL    Comments:  new  tristen March 2019 **WEEKLY TRSITEN**      Anticoagulation Care Providers     Provider Role Specialty Phone number    Torri Colmenares APRN Referring Cardiology [phone]    Elsy Baeza MD Responsible Cardiology [phone]          Clinic Interview:  Patient Findings     Negatives:  Signs/symptoms of thrombosis, Signs/symptoms of bleeding,   Laboratory test error suspected, Change in health, Change in alcohol use,   Change in activity, Upcoming invasive procedure, Emergency department   visit, Upcoming dental procedure, Missed doses, Extra doses, Change in   medications, Change in diet/appetite, Hospital admission, Bruising, Other   complaints      Clinical Outcomes     Negatives:  Major bleeding event, Thromboembolic event,   Anticoagulation-related hospital admission, Anticoagulation-related ED   visit, Anticoagulation-related fatality        INR History:  Anticoagulation Monitoring 10/21/2022 10/28/2022 11/11/2022   INR 2.40 2.70 3.20   INR Date 10/21/2022 10/28/2022 11/11/2022   INR Goal 2.0-3.0 2.0-3.0 2.0-3.0   Trend Same Same Same   Last Week Total 85 mg 85 mg 85 mg   Next Week Total 85 mg 85 mg 80 mg   Sun 15 mg 15 mg 15 mg   Mon 10 mg 10 mg 10 mg   Tue 10 mg  10 mg 10 mg   Wed 15 mg 15 mg 15 mg   Thu 10 mg 10 mg 10 mg   Fri 15 mg 15 mg 10 mg (11/11); Otherwise 15 mg   Sat 10 mg 10 mg 10 mg   Visit Report - - -   Some recent data might be hidden       Plan:  1. INR is Supratherapeutic today- see above in Anticoagulation Summary.   Will instruct Kameron Melendez to Change their warfarin regimen- see above in Anticoagulation Summary.  2. Follow up in 2 weeks  3. They have been instructed to call if any changes in medications, doses, concerns, etc. Patient expresses understanding and has no further questions at this time.    Tessie Fatima [facility]

## 2022-11-15 ENCOUNTER — DOCUMENTATION (OUTPATIENT)
Dept: FAMILY MEDICINE CLINIC | Facility: CLINIC | Age: 52
End: 2022-11-15

## 2022-11-18 ENCOUNTER — ANTICOAGULATION VISIT (OUTPATIENT)
Dept: PHARMACY | Facility: HOSPITAL | Age: 52
End: 2022-11-18

## 2022-11-18 DIAGNOSIS — I26.99 BILATERAL PULMONARY EMBOLISM: ICD-10-CM

## 2022-11-18 DIAGNOSIS — I26.99 OTHER ACUTE PULMONARY EMBOLISM WITHOUT ACUTE COR PULMONALE: Primary | ICD-10-CM

## 2022-11-18 LAB — INR PPP: 2.6

## 2022-11-18 NOTE — PROGRESS NOTES
Anticoagulation Clinic Progress Note    Anticoagulation Summary  As of 2022    INR goal:  2.0-3.0   TTR:  67.4 % (3.7 y)   INR used for dosin.60 (2022)   Warfarin maintenance plan:  15 mg every Sun, Wed, Fri; 10 mg all other days; Starting 2022   Weekly warfarin total:  85 mg   No change documented:  Ramo Morocho, Ariela   Plan last modified:  Ramo Morocho PharmD (10/7/2022)   Next INR check:  2022   Priority:  High   Target end date:  Indefinite    Indications    Other acute pulmonary embolism without acute cor pulmonale (HCC) [I26.99]  History of bilateral pulmonary embolism (CMS/HCC) [I26.99]             Anticoagulation Episode Summary     INR check location:      Preferred lab:      [facility]    Comments:  new  tristen 2019 **WEEKLY TRISTEN**      Anticoagulation Care Providers     Provider Role Specialty Phone number    Torri Colmenares, APRN Referring Cardiology [phone]    Elsy Baeza MD Responsible Cardiology [phone]          Clinic Interview:  No pertinent clinical findings have been reported.    INR History:  Anticoagulation Monitoring 10/28/2022 2022 2022   INR 2.70 3.20 2.60   INR Date 10/28/2022 2022 2022   INR Goal 2.0-3.0 2.0-3.0 2.0-3.0   Trend Same Same Same   Last Week Total 85 mg 85 mg 80 mg   Next Week Total 85 mg 80 mg 85 mg   Sun 15 mg 15 mg 15 mg   Mon 10 mg 10 mg 10 mg   Tue 10 mg 10 mg 10 mg   Wed 15 mg 15 mg 15 mg   Thu 10 mg 10 mg 10 mg   Fri 15 mg 10 mg (); Otherwise 15 mg 15 mg   Sat 10 mg 10 mg 10 mg   Visit Report - - -   Some recent data might be hidden       Plan:  1. INR is therapeutic today- see above in Anticoagulation Summary.    Kameron Melendez to continue their warfarin regimen- see above in Anticoagulation Summary.  2. Follow up in 1 week  3. Pt has agreed to only be called if INR out of range. Left HIPAA-friendly voicemail with instructions. They have  been instructed to call if any changes in medications, doses, concerns, etc.     Ramo Morocho, PharmD

## 2022-11-22 ENCOUNTER — TELEPHONE (OUTPATIENT)
Dept: FAMILY MEDICINE CLINIC | Facility: CLINIC | Age: 52
End: 2022-11-22

## 2022-11-22 DIAGNOSIS — E66.01 MORBID OBESITY WITH BODY MASS INDEX OF 60.0-69.9 IN ADULT: ICD-10-CM

## 2022-11-22 DIAGNOSIS — E66.01 OBESITY, MORBID, BMI 50 OR HIGHER: Primary | ICD-10-CM

## 2022-11-22 DIAGNOSIS — I48.0 PAROXYSMAL ATRIAL FIBRILLATION: ICD-10-CM

## 2022-11-22 DIAGNOSIS — I25.10 ATHSCL HEART DISEASE OF NATIVE CORONARY ARTERY W/O ANG PCTRS: ICD-10-CM

## 2022-11-22 NOTE — TELEPHONE ENCOUNTER
Tonja, was he ever able to get the 1mg or 2mg ozempic. OK send in 1mg and see if will let inject twice weekly.  RRJ    ----- Message from Tonja Powell MA sent at 11/8/2022 11:12 AM EST -----  Regarding: FW: Ozempic  Contact: [phone]    ----- Message -----  From: Kameron Melendez  Sent: 11/8/2022  10:55 AM EST  To: Miguel Khoury [facility]  Subject: Ozempic                                          Alexander is having trouble getting the 2mg Ozempic. Should I fill the 1mg and take 2 shots? I already missed this past Friday dose.

## 2022-11-22 NOTE — TELEPHONE ENCOUNTER
Pt has not gotten Ozempic 2mg Alexander had it and then it was on hold Ozempic 1mg sent to pharmacy pt informed

## 2022-11-25 LAB — INR PPP: 1.8

## 2022-11-28 ENCOUNTER — ANTICOAGULATION VISIT (OUTPATIENT)
Dept: PHARMACY | Facility: HOSPITAL | Age: 52
End: 2022-11-28

## 2022-11-28 DIAGNOSIS — I26.99 OTHER ACUTE PULMONARY EMBOLISM WITHOUT ACUTE COR PULMONALE: Primary | ICD-10-CM

## 2022-11-28 DIAGNOSIS — I26.99 BILATERAL PULMONARY EMBOLISM: ICD-10-CM

## 2022-11-28 NOTE — PROGRESS NOTES
Anticoagulation Clinic Progress Note    Anticoagulation Summary  As of 2022    INR goal:  2.0-3.0   TTR:  67.3 % (3.7 y)   INR used for dosin.80 (2022)   Warfarin maintenance plan:  15 mg every Sun, Wed, Fri; 10 mg all other days; Starting 2022   Weekly warfarin total:  85 mg   Plan last modified:  Ramo Morocho, PharmD (10/7/2022)   Next INR check:  2022   Priority:  High   Target end date:  Indefinite    Indications    Other acute pulmonary embolism without acute cor pulmonale (HCC) [I26.99]  History of bilateral pulmonary embolism (CMS/HCC) [I26.99]             Anticoagulation Episode Summary     INR check location:      Preferred lab:      Send INR reminders to:   [facility]  POOL    Comments:  new  tristen 2019 **WEEKLY TRISTEN**      Anticoagulation Care Providers     Provider Role Specialty Phone number    Torri Colmenares APRN Referring Cardiology [phone]    Elsy Baeza MD Responsible Cardiology [phone]          Clinic Interview:  Patient Findings     Negatives:  Signs/symptoms of thrombosis, Signs/symptoms of bleeding,   Laboratory test error suspected, Change in health, Change in alcohol use,   Change in activity, Upcoming invasive procedure, Emergency department   visit, Upcoming dental procedure, Missed doses, Extra doses, Change in   medications, Change in diet/appetite, Hospital admission, Bruising, Other   complaints      Clinical Outcomes     Negatives:  Major bleeding event, Thromboembolic event,   Anticoagulation-related hospital admission, Anticoagulation-related ED   visit, Anticoagulation-related fatality        INR History:  Anticoagulation Monitoring 2022   INR 3.20 2.60 1.80   INR Date 2022   INR Goal 2.0-3.0 2.0-3.0 2.0-3.0   Trend Same Same Same   Last Week Total 85 mg 80 mg 85 mg   Next Week Total 80 mg 85 mg 90 mg   Sun 15 mg 15 mg -   Mon 10 mg 10 mg 15 mg ()   Tu 10  mg 10 mg 10 mg   Wed 15 mg 15 mg 15 mg   Thu 10 mg 10 mg 10 mg   Fri 10 mg (11/11); Otherwise 15 mg 15 mg -   Sat 10 mg 10 mg -   Visit Report - - -   Some recent data might be hidden       Plan:  1. INR is Subtherapeutic today- see above in Anticoagulation Summary.   Will instruct Kameron Melendez to Increase their warfarin regimen- see above in Anticoagulation Summary.  2. Follow up on Friday   3.  They have been instructed to call if any changes in medications, doses, concerns, etc. Patient expresses understanding and has no further questions at this time.    Tessie Fatima [facility]

## 2022-12-02 ENCOUNTER — ANTICOAGULATION VISIT (OUTPATIENT)
Dept: PHARMACY | Facility: HOSPITAL | Age: 52
End: 2022-12-02

## 2022-12-02 DIAGNOSIS — I26.99 OTHER ACUTE PULMONARY EMBOLISM WITHOUT ACUTE COR PULMONALE: Primary | ICD-10-CM

## 2022-12-02 DIAGNOSIS — I26.99 BILATERAL PULMONARY EMBOLISM: ICD-10-CM

## 2022-12-02 LAB — INR PPP: 2.7

## 2022-12-02 NOTE — PROGRESS NOTES
Anticoagulation Clinic Progress Note    Anticoagulation Summary  As of 2022    INR goal:  2.0-3.0   TTR:  67.4 % (3.8 y)   INR used for dosin.70 (2022)   Warfarin maintenance plan:  15 mg every Sun, Wed, Fri; 10 mg all other days; Starting 2022   Weekly warfarin total:  85 mg   No change documented:  Tessie Fatima RPH   Plan last modified:  Ramo oMrocho, PharmD (10/7/2022)   Next INR check:  2022   Priority:  High   Target end date:  Indefinite    Indications    Other acute pulmonary embolism without acute cor pulmonale (HCC) [I26.99]  History of bilateral pulmonary embolism (CMS/HCC) [I26.99]             Anticoagulation Episode Summary     INR check location:      Preferred lab:      [facility]    Comments:  new  frankie 2019 **WEEKLY FRANKIE**      Anticoagulation Care Providers     Provider Role Specialty Phone number    Torri Colmenares, APRN Referring Cardiology [phone]    Elsy Baeza MD Responsible Cardiology [phone]          Clinic Interview:  No pertinent clinical findings have been reported.    INR History:  Anticoagulation Monitoring 2022   INR 2.60 1.80 2.70   INR Date 2022   INR Goal 2.0-3.0 2.0-3.0 2.0-3.0   Trend Same Same Same   Last Week Total 80 mg 85 mg 90 mg   Next Week Total 85 mg 90 mg 85 mg   Sun 15 mg - 15 mg   Mon 10 mg 15 mg () 10 mg   Tue 10 mg 10 mg 10 mg   Wed 15 mg 15 mg 15 mg   Thu 10 mg 10 mg 10 mg   Fri 15 mg - 15 mg   Sat 10 mg - 10 mg   Visit Report - - -   Some recent data might be hidden       Plan:  1. INR is therapeutic today- see above in Anticoagulation Summary.    Kameron Melendez to continue their warfarin regimen- see above in Anticoagulation Summary.  2. Follow up in 1 week  3.  They have been instructed to call if any changes in medications, doses, concerns, etc. Patient expresses understanding and has no further questions  at this time.    Tessie Fatima RP

## 2022-12-09 ENCOUNTER — ANTICOAGULATION VISIT (OUTPATIENT)
Dept: PHARMACY | Facility: HOSPITAL | Age: 52
End: 2022-12-09

## 2022-12-09 DIAGNOSIS — I26.99 BILATERAL PULMONARY EMBOLISM: ICD-10-CM

## 2022-12-09 DIAGNOSIS — I26.99 OTHER ACUTE PULMONARY EMBOLISM WITHOUT ACUTE COR PULMONALE: Primary | ICD-10-CM

## 2022-12-09 LAB — INR PPP: 3.5

## 2022-12-09 NOTE — PROGRESS NOTES
Anticoagulation Clinic Progress Note    Anticoagulation Summary  As of 12/9/2022    INR goal:  2.0-3.0   TTR:  67.2 % (3.8 y)   INR used for dosing:  3.50 (12/9/2022)   Warfarin maintenance plan:  15 mg every Sun, Wed, Fri; 10 mg all other days; Starting 12/9/2022   Weekly warfarin total:  85 mg   Plan last modified:  Ramo Morocho, PharmD (10/7/2022)   Next INR check:  12/16/2022   Priority:  High   Target end date:  Indefinite    Indications    Other acute pulmonary embolism without acute cor pulmonale (HCC) [I26.99]  History of bilateral pulmonary embolism (CMS/HCC) [I26.99]             Anticoagulation Episode Summary     INR check location:      Preferred lab:      Send INR reminders to:   [facility]  POOL    Comments:  new  tristen March 2019 **WEEKLY TRISTEN**      Anticoagulation Care Providers     Provider Role Specialty Phone number    Torri Colmenares APRN Referring Cardiology [phone]    Elsy Baeza MD Responsible Cardiology [phone]              INR History:  Anticoagulation Monitoring 11/28/2022 12/2/2022 12/9/2022   INR 1.80 2.70 3.50   INR Date 11/25/2022 12/2/2022 12/9/2022   INR Goal 2.0-3.0 2.0-3.0 2.0-3.0   Trend Same Same Same   Last Week Total 85 mg 90 mg 85 mg   Next Week Total 90 mg 85 mg 80 mg   Sun - 15 mg 15 mg   Mon 15 mg (11/28) 10 mg 10 mg   Tue 10 mg 10 mg 10 mg   Wed 15 mg 15 mg 15 mg   Thu 10 mg 10 mg 10 mg   Fri - 15 mg 10 mg (12/9)   Sat - 10 mg 10 mg   Visit Report - - -   Some recent data might be hidden       Plan:  1. INR is Supratherapeutic today- see above in Anticoagulation Summary.   Will instruct Kameron Melendez to Change their warfarin regimen- see above in Anticoagulation Summary.  2. Follow up in 1 week  3. Left voicemail with instructions per patient request.  They have been instructed to call if any changes in medications, doses, concerns, etc. Patient expresses understanding and has no further questions at this time.    Tessie Fatima [facility]

## 2022-12-16 ENCOUNTER — ANTICOAGULATION VISIT (OUTPATIENT)
Dept: PHARMACY | Facility: HOSPITAL | Age: 52
End: 2022-12-16

## 2022-12-16 ENCOUNTER — OFFICE VISIT (OUTPATIENT)
Dept: FAMILY MEDICINE CLINIC | Facility: CLINIC | Age: 52
End: 2022-12-16

## 2022-12-16 ENCOUNTER — TELEPHONE (OUTPATIENT)
Dept: FAMILY MEDICINE CLINIC | Facility: CLINIC | Age: 52
End: 2022-12-16

## 2022-12-16 VITALS
OXYGEN SATURATION: 93 % | SYSTOLIC BLOOD PRESSURE: 138 MMHG | BODY MASS INDEX: 71.26 KG/M2 | HEART RATE: 117 BPM | HEIGHT: 74 IN | DIASTOLIC BLOOD PRESSURE: 82 MMHG

## 2022-12-16 DIAGNOSIS — I26.99 OTHER ACUTE PULMONARY EMBOLISM WITHOUT ACUTE COR PULMONALE: Primary | ICD-10-CM

## 2022-12-16 DIAGNOSIS — I26.99 BILATERAL PULMONARY EMBOLISM: ICD-10-CM

## 2022-12-16 DIAGNOSIS — E66.01 MORBID (SEVERE) OBESITY DUE TO EXCESS CALORIES: ICD-10-CM

## 2022-12-16 DIAGNOSIS — M72.2 PLANTAR FASCIITIS OF LEFT FOOT: Primary | ICD-10-CM

## 2022-12-16 DIAGNOSIS — I89.0 LYMPHEDEMA: ICD-10-CM

## 2022-12-16 LAB — INR PPP: 3

## 2022-12-16 PROCEDURE — 99214 OFFICE O/P EST MOD 30 MIN: CPT | Performed by: FAMILY MEDICINE

## 2022-12-16 PROCEDURE — 96372 THER/PROPH/DIAG INJ SC/IM: CPT | Performed by: FAMILY MEDICINE

## 2022-12-16 RX ORDER — TRIAMCINOLONE ACETONIDE 40 MG/ML
40 INJECTION, SUSPENSION INTRA-ARTICULAR; INTRAMUSCULAR ONCE
Status: COMPLETED | OUTPATIENT
Start: 2022-12-16 | End: 2022-12-16

## 2022-12-16 RX ORDER — KETOROLAC TROMETHAMINE 30 MG/ML
60 INJECTION, SOLUTION INTRAMUSCULAR; INTRAVENOUS ONCE
Status: COMPLETED | OUTPATIENT
Start: 2022-12-16 | End: 2022-12-16

## 2022-12-16 RX ORDER — METOLAZONE 5 MG/1
TABLET ORAL
Qty: 12 TABLET | Refills: 1 | Status: SHIPPED | OUTPATIENT
Start: 2022-12-16

## 2022-12-16 RX ORDER — PHENTERMINE HYDROCHLORIDE 37.5 MG/1
37.5 TABLET ORAL
Qty: 90 TABLET | Refills: 1 | Status: SHIPPED | OUTPATIENT
Start: 2022-12-16 | End: 2022-12-17 | Stop reason: SDUPTHER

## 2022-12-16 RX ORDER — ACETAMINOPHEN AND CODEINE PHOSPHATE 300; 30 MG/1; MG/1
1 TABLET ORAL EVERY 4 HOURS PRN
Qty: 18 TABLET | Refills: 1 | Status: SHIPPED | OUTPATIENT
Start: 2022-12-16

## 2022-12-16 RX ORDER — BUPROPION HYDROCHLORIDE 150 MG/1
150 TABLET ORAL DAILY
Qty: 90 TABLET | Refills: 3 | Status: SHIPPED | OUTPATIENT
Start: 2022-12-16

## 2022-12-16 RX ADMIN — KETOROLAC TROMETHAMINE 60 MG: 30 INJECTION, SOLUTION INTRAMUSCULAR; INTRAVENOUS at 10:25

## 2022-12-16 RX ADMIN — TRIAMCINOLONE ACETONIDE 40 MG: 40 INJECTION, SUSPENSION INTRA-ARTICULAR; INTRAMUSCULAR at 10:27

## 2022-12-16 NOTE — PROGRESS NOTES
Subjective   Kameron Melendez is a 52 y.o. male. Presents today for   Chief Complaint   Patient presents with   • Follow-up     3 month   • Edema       History of Present Illness  Patient 53 y/o male with severe lymphedema and morbid obesity.  Was off ozempic for a while due to shortages.  However back on and doig better;  Did get down to 497, but back up due to swelling.  Also severe left foot pain, wrose first stand up and when on feet long time.       Review of Systems   Respiratory: Negative for shortness of breath and wheezing.    Cardiovascular: Positive for leg swelling. Negative for chest pain and palpitations.       Patient Active Problem List   Diagnosis   • History of bilateral pulmonary embolism (CMS/HCC)   • Pulmonary hypertension (HCC)   • Lymphedema of both lower extremities   • Obesity, morbid, BMI 50 or higher (HCC)   • Dyslipidemia   • Hyperuricemia   • Hypogonadal obesity   • Knee arthropathy   • Morbid obesity with body mass index of 60.0-69.9 in adult ([facility])   • ARNOLDO (obstructive sleep apnea)   • Severe edema   • History of pulmonary embolism   • Other acute pulmonary embolism without acute cor pulmonale ([facility])   • Intractable back pain   • Heterozygous factor V Leiden mutation ([facility])   • Cellulitis of left lower extremity   • Hypokalemia   • CAROL (acute kidney injury) (HCC)   • Sepsis with cutaneous manifestations (HCC)   • Hyperglycemia   • Paroxysmal atrial fibrillation (HCC)   • Athscl heart 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   • Long term (current) use of anticoagulants   • Lymphedema, not elsewhere classified   • Nicotine dependence, other tobacco product, uncomplicated   • Personal history of other venous thrombosis and embolism   • Typical atrial flutter (HCC)   • Venous insufficiency (chronic) (peripheral)   • Cellulitis of right lower extremity   • Wound cellulitis       Social History     Socioeconomic History   • Marital status:    Tobacco Use   • Smoking status: Light  "Smoker     Types: Cigars, Pipe     Start date: 1/1/2006   • Smokeless tobacco: Never   • Tobacco comments:     occasional - < 1 a month   Vaping Use   • Vaping Use: Never used   Substance and Sexual Activity   • Alcohol use: No   • Drug use: No   • Sexual activity: Defer       No Known Allergies    Current Outpatient Medications on File Prior to Visit   Medication Sig Dispense Refill   • acetaminophen (TYLENOL) 500 MG tablet Take 500 mg by mouth Every 6 (Six) Hours As Needed for Mild Pain .     • acetaminophen-codeine (TYLENOL #3) 300-30 MG per tablet Take 1 tablet by mouth Every 4 (Four) Hours As Needed for Moderate Pain  (severe pain). 18 tablet 1   • buPROPion XL (WELLBUTRIN XL) 150 MG 24 hr tablet Take 1 tablet by mouth Daily. 30 tablet 5   • furosemide (LASIX) 80 MG tablet TAKE 1 TABLET BY MOUTH DAILY (Patient taking differently: Take 80 mg by mouth Daily As Needed.) 30 tablet 5   • Semaglutide, 1 MG/DOSE, (OZEMPIC) 2 MG/1.5ML solution pen-injector Inject 1 mg under the skin into the appropriate area as directed 2 (Two) Times a Week. 1.5 mL 6   • topiramate (TOPAMAX) 25 MG tablet Take 1 tablet by mouth 2 (Two) Times a Day. 60 tablet 5   • warfarin (COUMADIN) 10 MG tablet Take 15 mg by mouth See Admin Instructions. Takes 1.5 tablet (15 mg) Monday, Wednesday, Friday, Saturday, and Sunday     • warfarin (COUMADIN) 10 MG tablet TAKE 1 TABLET ON TUESDAY, THURSDAY, SUNDAY AND TAKE ONE AND ONE-HALF TABLETS ALL OTHER DAYS OR AS DIRECTED (Patient taking differently: Take 10 mg by mouth 2 (Two) Times a Week. 1 tablet (10 mg) on Tuesday and Thursday) 120 tablet 1   • saccharomyces boulardii (Florastor) 250 MG capsule Take 1 capsule by mouth 2 (Two) Times a Day. 60 capsule 0     No current facility-administered medications on file prior to visit.       Objective   Vitals:    12/16/22 0849   BP: 138/82   Pulse: 117   SpO2: 93%   Weight: Comment: exceeds scale limit   Height: 188 cm (74\")   PainSc:   5   PainLoc: " Leg  Comment: left leg and foot     Body mass index is 71.26 kg/m².    Physical Exam  Vitals and nursing note reviewed.   Constitutional:       Appearance: He is well-developed.   Musculoskeletal:      Cervical back: Neck supple.      Comments: Left foot plantar fascia ttp   Lymphadenopathy:      Comments: Severe lymphedema of legs;  Left leg large lymphedema growth/swelling   Skin changes c/w lymphedema/venous stasis; no open wounds;     Skin:     General: Skin is warm and dry.   Neurological:      Mental Status: He is alert.   Psychiatric:         Behavior: Behavior normal.         Assessment & Plan   Diagnoses and all orders for this visit:    1. Plantar fasciitis of left foot (Primary)  -     acetaminophen-codeine (TYLENOL #3) 300-30 MG per tablet; Take 1 tablet by mouth Every 4 (Four) Hours As Needed for Moderate Pain (severe pain).  Dispense: 18 tablet; Refill: 1  -     triamcinolone acetonide (KENALOG-40) injection 40 mg  -     ketorolac (TORADOL) injection 60 mg    2. Morbid (severe) obesity due to excess calories (HCC)  -     buPROPion XL (WELLBUTRIN XL) 150 MG 24 hr tablet; Take 1 tablet by mouth Daily.  Dispense: 90 tablet; Refill: 3  -     phentermine (ADIPEX-P) 37.5 MG tablet; Take 1 tablet by mouth Every Morning Before Breakfast.  Dispense: 90 tablet; Refill: 1    3. Lymphedema  -     metOLazone (ZAROXOLYN) 5 MG tablet; 1 po weekly if >10 lbs weight gain take one.  Continue furosemide  Dispense: 12 tablet; Refill: 1  -     Basic Metabolic Panel    will give med for foot pain and try steroid inejctions;  Demonstrated exercises as well  Will add wellbutrin to phentermine for weight loss  Will hav etake metolazone weekly when weight gain in addition to furosemide  Check potassium and renal function;  Watch closely on added diuretic         -Follow up: 3 months and prn

## 2022-12-16 NOTE — TELEPHONE ENCOUNTER
We can send to [facility] with goodrx if wants.  It is the cheapest there.  About $11 for 30 days I believe.  RRJ

## 2022-12-16 NOTE — PROGRESS NOTES
Anticoagulation Clinic Progress Note    Anticoagulation Summary  As of 12/16/2022    INR goal:  2.0-3.0   TTR:  67.0 % (3.8 y)   INR used for dosing:  3.00 (12/16/2022)   Warfarin maintenance plan:  15 mg every Sun, Wed; 10 mg all other days; Starting 12/16/2022   Weekly warfarin total:  80 mg   Plan last modified:  Tonja Kaye, PharmD (12/16/2022)   Next INR check:  12/21/2022   Priority:  High   Target end date:  Indefinite    Indications    Other acute pulmonary embolism without acute cor pulmonale (HCC) [I26.99]  History of bilateral pulmonary embolism (CMS/HCC) [I26.99]             Anticoagulation Episode Summary     INR check location:      Preferred lab:      Send INR reminders to:  [facility]  POOL    Comments:  new  tristen March 2019 **WEEKLY TRISTEN**      Anticoagulation Care Providers     Provider Role Specialty Phone number    Torri Colmenares, APRN Referring Cardiology [phone]    Elsy Baeza MD Responsible Cardiology [phone]          Clinic Interview:  Patient Findings     Positives:  Change in medications    Negatives:  Signs/symptoms of thrombosis, Signs/symptoms of bleeding,   Laboratory test error suspected, Change in health, Change in alcohol use,   Change in activity, Upcoming invasive procedure, Emergency department   visit, Upcoming dental procedure, Missed doses, Extra doses, Change in   diet/appetite, Hospital admission, Bruising, Other complaints    Comments:  Starting metolazone and phentermine today and had injections   of ketorolac and triamcinolone today.       Clinical Outcomes     Negatives:  Major bleeding event, Thromboembolic event,   Anticoagulation-related hospital admission, Anticoagulation-related ED   visit, Anticoagulation-related fatality    Comments:  Starting metolazone and phentermine today and had injections   of ketorolac and triamcinolone today.         INR History:  Anticoagulation Monitoring 12/2/2022 12/9/2022 12/16/2022   INR 2.70  3.50 3.00   INR Date 12/2/2022 12/9/2022 12/16/2022   INR Goal 2.0-3.0 2.0-3.0 2.0-3.0   Trend Same Same Down   Last Week Total 90 mg 85 mg 80 mg   Next Week Total 85 mg 80 mg 75 mg   Sun 15 mg 15 mg 10 mg (12/18)   Mon 10 mg 10 mg 10 mg   Tue 10 mg 10 mg 10 mg   Wed 15 mg 15 mg -   Thu 10 mg 10 mg -   Fri 15 mg 10 mg (12/9) 10 mg   Sat 10 mg 10 mg 10 mg   Visit Report - - -   Some recent data might be hidden       Plan:  1. INR is Therapeutic today- see above in Anticoagulation Summary.   Will instruct Kameron Melendez to Change their warfarin regimen- see above in Anticoagulation Summary.  Due to steroid injection today, change to 15 mg W only and 10 mg all other days until next INR.  2. Follow up in 5 days  3. They have been instructed to call if any changes in medications, doses, concerns, etc. Patient expresses understanding and has no further questions at this time.    Tonja Kaye, PharmD

## 2022-12-17 DIAGNOSIS — E66.01 MORBID (SEVERE) OBESITY DUE TO EXCESS CALORIES: ICD-10-CM

## 2022-12-17 LAB
BUN SERPL-MCNC: 15 MG/DL (ref 6–20)
BUN/CREAT SERPL: 20 (ref 7–25)
CALCIUM SERPL-MCNC: 9.4 MG/DL (ref 8.6–10.5)
CHLORIDE SERPL-SCNC: 102 MMOL/L (ref 98–107)
CO2 SERPL-SCNC: 24.1 MMOL/L (ref 22–29)
CREAT SERPL-MCNC: 0.75 MG/DL (ref 0.76–1.27)
EGFRCR SERPLBLD CKD-EPI 2021: 108.6 ML/MIN/1.73
GLUCOSE SERPL-MCNC: 76 MG/DL (ref 65–99)
POTASSIUM SERPL-SCNC: 4.6 MMOL/L (ref 3.5–5.2)
SODIUM SERPL-SCNC: 138 MMOL/L (ref 136–145)

## 2022-12-17 RX ORDER — PHENTERMINE HYDROCHLORIDE 37.5 MG/1
37.5 TABLET ORAL
Qty: 30 TABLET | Refills: 1 | Status: SHIPPED | OUTPATIENT
Start: 2022-12-17

## 2022-12-17 NOTE — PROGRESS NOTES
Mail copy of results to patient.  Kidney function looks good  [place]akon phentermine not covered.  Can get via [employer] relatively cheap.  Cheapest usually at [facility].  Let Tonja or Candis know would like send.

## 2022-12-23 ENCOUNTER — ANTICOAGULATION VISIT (OUTPATIENT)
Dept: PHARMACY | Facility: HOSPITAL | Age: 52
End: 2022-12-23

## 2022-12-23 DIAGNOSIS — I26.99 BILATERAL PULMONARY EMBOLISM: ICD-10-CM

## 2022-12-23 DIAGNOSIS — I26.99 OTHER ACUTE PULMONARY EMBOLISM WITHOUT ACUTE COR PULMONALE: Primary | ICD-10-CM

## 2022-12-23 LAB — INR PPP: 1.5

## 2022-12-23 NOTE — PROGRESS NOTES
Anticoagulation Clinic Progress Note    Anticoagulation Summary  As of 2022    INR goal:  2.0-3.0   TTR:  66.9 % (3.8 y)   INR used for dosin.50 (2022)   Warfarin maintenance plan:  15 mg every Sun, Wed; 10 mg all other days; Starting 2022   Weekly warfarin total:  80 mg   Plan last modified:  Tonja Kaye, PharmD (2022)   Next INR check:  2022   Priority:  High   Target end date:  Indefinite    Indications    Other acute pulmonary embolism without acute cor pulmonale (HCC) [I26.99]  History of bilateral pulmonary embolism (CMS/HCC) [I26.99]             Anticoagulation Episode Summary     INR check location:      Preferred lab:      [facility]    Comments:  new  tristen 2019 **WEEKLY TRISTEN**      Anticoagulation Care Providers     Provider Role Specialty Phone number    Torri Colmenares APRN Referring Cardiology [phone]    Elsy Baeza MD Responsible Cardiology [phone]          Clinic Interview:  Patient Findings     Negatives:  Signs/symptoms of thrombosis, Signs/symptoms of bleeding,   Laboratory test error suspected, Change in health, Change in alcohol use,   Change in activity, Upcoming invasive procedure, Emergency department   visit, Upcoming dental procedure, Missed doses, Extra doses, Change in   medications, Change in diet/appetite, Hospital admission, Bruising, Other   complaints      Clinical Outcomes     Negatives:  Major bleeding event, Thromboembolic event,   Anticoagulation-related hospital admission, Anticoagulation-related ED   visit, Anticoagulation-related fatality        INR History:  Anticoagulation Monitoring 2022   INR 3.50 3.00 1.50   INR Date 2022   INR Goal 2.0-3.0 2.0-3.0 2.0-3.0   Trend Same Down Same   Last Week Total 85 mg 80 mg 75 mg   Next Week Total 80 mg 75 mg 85 mg   Sun 15 mg 10 mg () 15 mg   Mon 10 mg 10 mg 10 mg   Tue  10 mg 10 mg 10 mg   Wed 15 mg - 15 mg   Thu 10 mg - 10 mg   Fri 10 mg (12/9) 10 mg 15 mg (12/23)   Sat 10 mg 10 mg 10 mg   Visit Report - - -   Some recent data might be hidden       Plan:  1. INR is Subtherapeutic today- see above in Anticoagulation Summary.   Will instruct Kameron Melendez to Increase their warfarin regimen- see above in Anticoagulation Summary.  2. Follow up in 1 weeks  3.They have been instructed to call if any changes in medications, doses, concerns, etc. Patient expresses understanding and has no further questions at this time.    Tessie Fatima [facility]

## 2022-12-30 ENCOUNTER — ANTICOAGULATION VISIT (OUTPATIENT)
Dept: PHARMACY | Facility: HOSPITAL | Age: 52
End: 2022-12-30

## 2022-12-30 DIAGNOSIS — I26.99 BILATERAL PULMONARY EMBOLISM: ICD-10-CM

## 2022-12-30 DIAGNOSIS — I26.99 OTHER ACUTE PULMONARY EMBOLISM WITHOUT ACUTE COR PULMONALE: Primary | ICD-10-CM

## 2022-12-30 LAB — INR PPP: 2.3

## 2022-12-30 NOTE — PROGRESS NOTES
Anticoagulation Clinic Progress Note    Anticoagulation Summary  As of 2022    INR goal:  2.0-3.0   TTR:  66.9 % (3.8 y)   INR used for dosin.30 (2022)   Warfarin maintenance plan:  15 mg every Sun, Wed; 10 mg all other days; Starting 2022   Weekly warfarin total:  80 mg   No change documented:  Tessie Fatima RPH   Plan last modified:  Tonja Kaye, PharmD (2022)   Next INR check:  2023   Priority:  High   Target end date:  Indefinite    Indications    Other acute pulmonary embolism without acute cor pulmonale (HCC) [I26.99]  History of bilateral pulmonary embolism (CMS/HCC) [I26.99]             Anticoagulation Episode Summary     INR check location:      Preferred lab:      Send INR reminders to:  [facility]  POOL    Comments:  new  frankie 2019 **WEEKLY FRANKIE**      Anticoagulation Care Providers     Provider Role Specialty Phone number    Torri Colmenares, APRN Referring Cardiology [phone]    Elsy Baeza MD Responsible Cardiology [phone]          Clinic Interview:  Patient Findings     Negatives:  Signs/symptoms of thrombosis, Signs/symptoms of bleeding,   Laboratory test error suspected, Change in health, Change in alcohol use,   Change in activity, Upcoming invasive procedure, Emergency department   visit, Upcoming dental procedure, Missed doses, Extra doses, Change in   medications, Change in diet/appetite, Hospital admission, Bruising, Other   complaints      Clinical Outcomes     Negatives:  Major bleeding event, Thromboembolic event,   Anticoagulation-related hospital admission, Anticoagulation-related ED   visit, Anticoagulation-related fatality        INR History:  Anticoagulation Monitoring 2022   INR 3.00 1.50 2.30   INR Date 2022   INR Goal 2.0-3.0 2.0-3.0 2.0-3.0   Trend Down Same Same   Last Week Total 80 mg 75 mg 85 mg   Next Week Total 75 mg 85 mg 80 mg   Sun 10 mg  (12/18) 15 mg 15 mg   Mon 10 mg 10 mg 10 mg   Tue 10 mg 10 mg 10 mg   Wed - 15 mg 15 mg   Thu - 10 mg 10 mg   Fri 10 mg 15 mg (12/23) 10 mg   Sat 10 mg 10 mg 10 mg   Visit Report - - -   Some recent data might be hidden       Plan:  1. INR is Therapeutic today- see above in Anticoagulation Summary.   Will instruct Kameron Melendez to Continue their warfarin regimen- see above in Anticoagulation Summary.  2. Follow up in 1 weeks  3. They have been instructed to call if any changes in medications, doses, concerns, etc. Patient expresses understanding and has no further questions at this time.    Tessie Fatima [facility]

## 2023-01-05 RX ORDER — WARFARIN SODIUM 5 MG/1
TABLET ORAL
Qty: 30 TABLET | Refills: 1 | Status: SHIPPED | OUTPATIENT
Start: 2023-01-05

## 2023-01-05 RX ORDER — WARFARIN SODIUM 10 MG/1
TABLET ORAL
Qty: 90 TABLET | Refills: 1 | Status: SHIPPED | OUTPATIENT
Start: 2023-01-05 | End: 2023-03-20 | Stop reason: SDUPTHER

## 2023-01-06 ENCOUNTER — ANTICOAGULATION VISIT (OUTPATIENT)
Dept: PHARMACY | Facility: HOSPITAL | Age: 53
End: 2023-01-06
Payer: COMMERCIAL

## 2023-01-06 DIAGNOSIS — I26.99 BILATERAL PULMONARY EMBOLISM: ICD-10-CM

## 2023-01-06 DIAGNOSIS — I26.99 OTHER ACUTE PULMONARY EMBOLISM WITHOUT ACUTE COR PULMONALE: Primary | ICD-10-CM

## 2023-01-06 LAB — INR PPP: 2.4

## 2023-01-06 NOTE — PROGRESS NOTES
Anticoagulation Clinic Progress Note    Anticoagulation Summary  As of 2023    INR goal:  2.0-3.0   TTR:  67.0 % (3.9 y)   INR used for dosin.40 (2023)   Warfarin maintenance plan:  15 mg every Sun, Wed; 10 mg all other days; Starting 2023   Weekly warfarin total:  80 mg   No change documented:  Kaylee De Leon, Pharmacy Technician   Plan last modified:  Tonja Kaye, PharmD (2022)   Next INR check:  2023   Priority:  High   Target end date:  Indefinite    Indications    Other acute pulmonary embolism without acute cor pulmonale (HCC) [I26.99]  History of bilateral pulmonary embolism (CMS/HCC) [I26.99]             Anticoagulation Episode Summary     INR check location:      Preferred lab:      Send INR reminders to:  [facility]  POOL    Comments:  new  tristen 2019 **WEEKLY TRISTEN**      Anticoagulation Care Providers     Provider Role Specialty Phone number    Torri Colmenares, APRN Referring Cardiology [phone]    Elsy Baeza MD Responsible Cardiology [phone]          Clinic Interview:  No pertinent clinical findings have been reported.    INR History:  Anticoagulation Monitoring 2022   INR 1.50 2.30 2.40   INR Date 2022   INR Goal 2.0-3.0 2.0-3.0 2.0-3.0   Trend Same Same Same   Last Week Total 75 mg 85 mg 80 mg   Next Week Total 85 mg 80 mg 80 mg   Sun 15 mg 15 mg 15 mg   Mon 10 mg 10 mg 10 mg   Tue 10 mg 10 mg 10 mg   Wed 15 mg 15 mg 15 mg   Thu 10 mg 10 mg 10 mg   Fri 15 mg () 10 mg 10 mg   Sat 10 mg 10 mg 10 mg   Visit Report - - -   Some recent data might be hidden       Plan:  1. INR is therapeutic today- see above in Anticoagulation Summary.    Kameron Melendez to continue their warfarin regimen- see above in Anticoagulation Summary.  2. Follow up in 1 week  3. Spoke with patient and instructed to call if any changes in medications, doses, concerns, etc. Patient expresses understanding  and has no further questions at this time.    Kaylee De Leon, Pharmacy Technician

## 2023-01-12 RX ORDER — CEPHALEXIN 500 MG/1
500 CAPSULE ORAL 4 TIMES DAILY
Qty: 40 CAPSULE | Refills: 0 | Status: SHIPPED | OUTPATIENT
Start: 2023-01-12

## 2023-01-13 ENCOUNTER — ANTICOAGULATION VISIT (OUTPATIENT)
Dept: PHARMACY | Facility: HOSPITAL | Age: 53
End: 2023-01-13
Payer: COMMERCIAL

## 2023-01-13 DIAGNOSIS — I26.99 BILATERAL PULMONARY EMBOLISM: ICD-10-CM

## 2023-01-13 DIAGNOSIS — I26.99 OTHER ACUTE PULMONARY EMBOLISM WITHOUT ACUTE COR PULMONALE: Primary | ICD-10-CM

## 2023-01-13 LAB — INR PPP: 1.6

## 2023-01-13 NOTE — PROGRESS NOTES
Anticoagulation Clinic Progress Note    Anticoagulation Summary  As of 2023    INR goal:  2.0-3.0   TTR:  66.8 % (3.9 y)   INR used for dosin.60 (2023)   Warfarin maintenance plan:  15 mg every Sun, Wed; 10 mg all other days; Starting 2023   Weekly warfarin total:  80 mg   Plan last modified:  Tonja Kaye, PharmD (2022)   Next INR check:  2023   Priority:  High   Target end date:  Indefinite    Indications    Other acute pulmonary embolism without acute cor pulmonale (HCC) [I26.99]  History of bilateral pulmonary embolism (CMS/HCC) [I26.99]             Anticoagulation Episode Summary     INR check location:      Preferred lab:      [facility]    Comments:  new  tristen 2019 **WEEKLY TRISTEN**      Anticoagulation Care Providers     Provider Role Specialty Phone number    Torri Colmenares APRN Referring Cardiology [phone]    Elsy Baeza MD Responsible Cardiology [phone]          Clinic Interview:  Patient Findings     Positives:  Change in health, Change in medications    Negatives:  Signs/symptoms of thrombosis, Signs/symptoms of bleeding,   Laboratory test error suspected, Change in alcohol use, Change in   activity, Upcoming invasive procedure, Emergency department visit,   Upcoming dental procedure, Missed doses, Extra doses, Change in   diet/appetite, Hospital admission, Bruising, Other complaints    Comments:  Reports having cellulitis and taking cephalexin. Appetite is   low and having pain due to cellulitis; taking APAP/IBU but aknowledged IBU   not the best choice due to bleeding.  Advised to limit this due to bleed   risk.  Also reports he received Jantoven instead of warfarin from his mail   order pharmacy.      Clinical Outcomes     Negatives:  Major bleeding event, Thromboembolic event,   Anticoagulation-related hospital admission, Anticoagulation-related ED   visit, Anticoagulation-related fatality     Comments:  Reports having cellulitis and taking cephalexin. Appetite is   low and having pain due to cellulitis; taking APAP/IBU but aknowledged IBU   not the best choice due to bleeding.  Advised to limit this due to bleed   risk.  Also reports he received Jantoven instead of warfarin from his mail   order pharmacy.        INR History:  Anticoagulation Monitoring 12/30/2022 1/6/2023 1/13/2023   INR 2.30 2.40 1.60   INR Date 12/30/2022 1/6/2023 1/13/2023   INR Goal 2.0-3.0 2.0-3.0 2.0-3.0   Trend Same Same Same   Last Week Total 85 mg 80 mg 80 mg   Next Week Total 80 mg 80 mg 85 mg   Sun 15 mg 15 mg 15 mg   Mon 10 mg 10 mg 10 mg   Tue 10 mg 10 mg 10 mg   Wed 15 mg 15 mg 15 mg   Thu 10 mg 10 mg 10 mg   Fri 10 mg 10 mg 15 mg (1/13)   Sat 10 mg 10 mg 10 mg   Visit Report - - -   Some recent data might be hidden       Plan:  1. INR is Subtherapeutic today- see above in Anticoagulation Summary.   Will instruct Kameron Melendez to Change their warfarin regimen- see above in Anticoagulation Summary.  2. Follow up in 1 weeks  3. They have been instructed to call if any changes in medications, doses, concerns, etc. Patient expresses understanding and has no further questions at this time.    Tonja Kaye, PharmD

## 2023-01-20 ENCOUNTER — ANTICOAGULATION VISIT (OUTPATIENT)
Dept: PHARMACY | Facility: HOSPITAL | Age: 53
End: 2023-01-20
Payer: COMMERCIAL

## 2023-01-20 DIAGNOSIS — I26.99 OTHER ACUTE PULMONARY EMBOLISM WITHOUT ACUTE COR PULMONALE: Primary | ICD-10-CM

## 2023-01-20 DIAGNOSIS — I26.99 BILATERAL PULMONARY EMBOLISM: ICD-10-CM

## 2023-01-20 LAB — INR PPP: 2.2

## 2023-01-20 NOTE — PROGRESS NOTES
Anticoagulation Clinic Progress Note    Anticoagulation Summary  As of 2023    INR goal:  2.0-3.0   TTR:  66.8 % (3.9 y)   INR used for dosin.20 (2023)   Warfarin maintenance plan:  15 mg every Sun, Wed; 10 mg all other days; Starting 2023   Weekly warfarin total:  80 mg   No change documented:  Tessie Fatima RPH   Plan last modified:  Tonja Kaye, PharmD (2022)   Next INR check:  2023   Priority:  High   Target end date:  Indefinite    Indications    Other acute pulmonary embolism without acute cor pulmonale (HCC) [I26.99]  History of bilateral pulmonary embolism (CMS/HCC) [I26.99]             Anticoagulation Episode Summary     INR check location:      Preferred lab:      Send INR reminders to:   [facility]  PO[facility]    Comments:  new  frankie 2019 **WEEKLY FRANKIE**      Anticoagulation Care Providers     Provider Role Specialty Phone number    Torri Colmenares, APRN Referring Cardiology [phone]    Elsy Baeza MD Responsible Cardiology [phone]          Clinic Interview:  No pertinent clinical findings have been reported.    INR History:  Anticoagulation Monitoring 2023   INR 2.40 1.60 2.20   INR Date 2023   INR Goal 2.0-3.0 2.0-3.0 2.0-3.0   Trend Same Same Same   Last Week Total 80 mg 80 mg 85 mg   Next Week Total 80 mg 85 mg 80 mg   Sun 15 mg 15 mg 15 mg   Mon 10 mg 10 mg 10 mg   Tue 10 mg 10 mg 10 mg   Wed 15 mg 15 mg 15 mg   Thu 10 mg 10 mg 10 mg   Fri 10 mg 15 mg () 10 mg   Sat 10 mg 10 mg 10 mg   Visit Report - - -   Some recent data might be hidden       Plan:  1. INR is therapeutic today- see above in Anticoagulation Summary.    Kameron Melendez to continue their warfarin regimen- see above in Anticoagulation Summary.  2. Follow up in 1 week  3.  They have been instructed to call if any changes in medications, doses, concerns, etc. Patient expresses understanding and has no further  questions at this time.    Tessie Fatima, RP

## 2023-01-27 ENCOUNTER — ANTICOAGULATION VISIT (OUTPATIENT)
Dept: PHARMACY | Facility: HOSPITAL | Age: 53
End: 2023-01-27
Payer: COMMERCIAL

## 2023-01-27 DIAGNOSIS — I26.99 BILATERAL PULMONARY EMBOLISM: ICD-10-CM

## 2023-01-27 DIAGNOSIS — I26.99 OTHER ACUTE PULMONARY EMBOLISM WITHOUT ACUTE COR PULMONALE: Primary | ICD-10-CM

## 2023-01-27 LAB — INR PPP: 2.7

## 2023-01-27 NOTE — PROGRESS NOTES
Anticoagulation Clinic Progress Note    Anticoagulation Summary  As of 2023    INR goal:  2.0-3.0   TTR:  66.9 % (3.9 y)   INR used for dosin.70 (2023)   Warfarin maintenance plan:  15 mg every Sun, Wed; 10 mg all other days; Starting 2023   Weekly warfarin total:  80 mg   No change documented:  Iris Gonzalez   Plan last modified:  Tonja Kaye, PharmD (2022)   Next INR check:  2/3/2023   Priority:  High   Target end date:  Indefinite    Indications    Other acute pulmonary embolism without acute cor pulmonale (HCC) [I26.99]  History of bilateral pulmonary embolism (CMS/HCC) [I26.99]             Anticoagulation Episode Summary     INR check location:      Preferred lab:      Send INR reminders to:  [facility]  POOL    Comments:  new  tristen 2019 **WEEKLY TRISTEN**      Anticoagulation Care Providers     Provider Role Specialty Phone number    Torri Colmenares, FADY Referring Cardiology [phone]    Elsy Baeza MD Responsible Cardiology [phone]          Clinic Interview:  No pertinent clinical findings have been reported.    INR History:  Anticoagulation Monitoring 2023   INR 1.60 2.20 2.70   INR Date 2023   INR Goal 2.0-3.0 2.0-3.0 2.0-3.0   Trend Same Same Same   Last Week Total 80 mg 85 mg 80 mg   Next Week Total 85 mg 80 mg 80 mg   Sun 15 mg 15 mg 15 mg   Mon 10 mg 10 mg 10 mg   Tue 10 mg 10 mg 10 mg   Wed 15 mg 15 mg 15 mg   Thu 10 mg 10 mg 10 mg   Fri 15 mg () 10 mg 10 mg   Sat 10 mg 10 mg 10 mg   Visit Report - - -   Some recent data might be hidden       Plan:  1. INR is therapeutic today- see above in Anticoagulation Summary.    Kameron Melendez to continue their warfarin regimen- see above in Anticoagulation Summary.  2. Follow up in 1 week  3. Pt has agreed to only be called if INR out of range. They have been instructed to call if any changes in medications, doses, concerns, etc.  Patient expresses understanding and has no further questions at this time.    Iris Gonzalez

## 2023-02-03 ENCOUNTER — ANTICOAGULATION VISIT (OUTPATIENT)
Dept: PHARMACY | Facility: HOSPITAL | Age: 53
End: 2023-02-03
Payer: COMMERCIAL

## 2023-02-03 DIAGNOSIS — I26.99 OTHER ACUTE PULMONARY EMBOLISM WITHOUT ACUTE COR PULMONALE: Primary | ICD-10-CM

## 2023-02-03 DIAGNOSIS — I26.99 BILATERAL PULMONARY EMBOLISM: ICD-10-CM

## 2023-02-03 LAB — INR PPP: 2.5

## 2023-02-03 NOTE — PROGRESS NOTES
Anticoagulation Clinic Progress Note    Anticoagulation Summary  As of 2/3/2023    INR goal:  2.0-3.0   TTR:  67.1 % (3.9 y)   INR used for dosin.50 (2/3/2023)   Warfarin maintenance plan:  15 mg every Sun, Wed; 10 mg all other days; Starting 2/3/2023   Weekly warfarin total:  80 mg   No change documented:  Kaylee De Leon, Pharmacy Technician   Plan last modified:  Tonja Kaye, PharmD (2022)   Next INR check:  2/10/2023   Priority:  High   Target end date:  Indefinite    Indications    Other acute pulmonary embolism without acute cor pulmonale (HCC) [I26.99]  History of bilateral pulmonary embolism (CMS/HCC) [I26.99]             Anticoagulation Episode Summary     INR check location:      Preferred lab:      Send INR reminders to:  [facility]  POOL    Comments:  new  tristen 2019 **WEEKLY TRISTEN**      Anticoagulation Care Providers     Provider Role Specialty Phone number    Torri Colmenares, APRN Referring Cardiology [phone]    Elsy Baeza MD Responsible Cardiology [phone]          Clinic Interview:  No pertinent clinical findings have been reported.    INR History:  Anticoagulation Monitoring 2023 2023 2/3/2023   INR 2.20 2.70 2.50   INR Date 2023 2023 2/3/2023   INR Goal 2.0-3.0 2.0-3.0 2.0-3.0   Trend Same Same Same   Last Week Total 85 mg 80 mg 80 mg   Next Week Total 80 mg 80 mg 80 mg   Sun 15 mg 15 mg 15 mg   Mon 10 mg 10 mg 10 mg   Tue 10 mg 10 mg 10 mg   Wed 15 mg 15 mg 15 mg   Thu 10 mg 10 mg 10 mg   Fri 10 mg 10 mg 10 mg   Sat 10 mg 10 mg 10 mg   Visit Report - - -   Some recent data might be hidden       Plan:  1. INR is therapeutic today- see above in Anticoagulation Summary.    Kameron Melendez to continue their warfarin regimen- see above in Anticoagulation Summary.  2. Follow up in 1 week  3. Pt has agreed to only be called if INR out of range. They have been instructed to call if any changes in medications, doses, concerns, etc.  Patient expresses understanding and has no further questions at this time.    Kaylee De Leon, Pharmacy Technician

## 2023-02-17 ENCOUNTER — ANTICOAGULATION VISIT (OUTPATIENT)
Dept: PHARMACY | Facility: HOSPITAL | Age: 53
End: 2023-02-17
Payer: COMMERCIAL

## 2023-02-17 DIAGNOSIS — I26.99 OTHER ACUTE PULMONARY EMBOLISM WITHOUT ACUTE COR PULMONALE: Primary | ICD-10-CM

## 2023-02-17 DIAGNOSIS — I26.99 BILATERAL PULMONARY EMBOLISM: ICD-10-CM

## 2023-02-17 LAB — INR PPP: 3

## 2023-02-23 ENCOUNTER — OFFICE VISIT (OUTPATIENT)
Dept: FAMILY MEDICINE CLINIC | Facility: CLINIC | Age: 53
End: 2023-02-23
Payer: COMMERCIAL

## 2023-02-23 VITALS
SYSTOLIC BLOOD PRESSURE: 130 MMHG | TEMPERATURE: 96.9 F | DIASTOLIC BLOOD PRESSURE: 74 MMHG | RESPIRATION RATE: 20 BRPM | BODY MASS INDEX: 40.43 KG/M2 | HEIGHT: 74 IN | HEART RATE: 82 BPM | OXYGEN SATURATION: 97 % | WEIGHT: 315 LBS

## 2023-02-23 DIAGNOSIS — R10.13 EPIGASTRIC ABDOMINAL PAIN: Primary | ICD-10-CM

## 2023-02-23 DIAGNOSIS — E66.01 MORBID (SEVERE) OBESITY DUE TO EXCESS CALORIES: ICD-10-CM

## 2023-02-23 DIAGNOSIS — E66.01 MORBID OBESITY WITH BODY MASS INDEX OF 60.0-69.9 IN ADULT: ICD-10-CM

## 2023-02-23 DIAGNOSIS — M72.2 PLANTAR FASCIITIS OF LEFT FOOT: ICD-10-CM

## 2023-02-23 DIAGNOSIS — R19.7 DIARRHEA, UNSPECIFIED TYPE: ICD-10-CM

## 2023-02-23 PROCEDURE — 99214 OFFICE O/P EST MOD 30 MIN: CPT | Performed by: FAMILY MEDICINE

## 2023-02-23 PROCEDURE — 96372 THER/PROPH/DIAG INJ SC/IM: CPT | Performed by: FAMILY MEDICINE

## 2023-02-23 RX ORDER — TRIAMCINOLONE ACETONIDE 40 MG/ML
40 INJECTION, SUSPENSION INTRA-ARTICULAR; INTRAMUSCULAR ONCE
Status: COMPLETED | OUTPATIENT
Start: 2023-02-23 | End: 2023-02-23

## 2023-02-23 RX ORDER — KETOROLAC TROMETHAMINE 30 MG/ML
60 INJECTION, SOLUTION INTRAMUSCULAR; INTRAVENOUS ONCE
Status: COMPLETED | OUTPATIENT
Start: 2023-02-23 | End: 2023-02-23

## 2023-02-23 RX ADMIN — KETOROLAC TROMETHAMINE 60 MG: 30 INJECTION, SOLUTION INTRAMUSCULAR; INTRAVENOUS at 15:12

## 2023-02-23 RX ADMIN — KETOROLAC TROMETHAMINE 60 MG: 30 INJECTION, SOLUTION INTRAMUSCULAR; INTRAVENOUS at 15:09

## 2023-02-23 RX ADMIN — TRIAMCINOLONE ACETONIDE 40 MG: 40 INJECTION, SUSPENSION INTRA-ARTICULAR; INTRAMUSCULAR at 15:10

## 2023-02-23 NOTE — PROGRESS NOTES
Subjective   Kameron Melendez is a 53 y.o. male. Presents today for   Chief Complaint   Patient presents with   • Abdominal Pain     ? Gallbladder    • Diarrhea   • Foot Pain     C/o lt foot pain        History of Present Illness  Patient 54 y/o male with recent abd pain, point epigastric abd pain;  Had stopped to get meal at [employer].   Prior to this had time had diarrhea, took questran with relief and family had similar;  However on tueday night with abd pain had very severe diarrhea for 1 night 2 days ago.  Since then more flatus.  Abdominal pain resolved;      Patient also here for f/u of left plantar fasciitis;  Last ov gave kenalog and tordol IM had relief of pain for 4 days.  Got new shoes, since on them Monday feels good and by Thursday/friday changes to tennis shoes and rests over weekend with relief, but all over again;  Has shoe for plantar fasciitis.      weogjt loss below 500lbs not been there in long time;  On zoempic and toelrating;  On phentermine and wellbutrin; d /w wegovy 2.4mg       Review of Systems   Respiratory: Negative for shortness of breath.    Cardiovascular: Positive for leg swelling. Negative for chest pain.   Gastrointestinal: Positive for abdominal pain, diarrhea and nausea. Negative for vomiting.       Patient Active Problem List   Diagnosis   • History of bilateral pulmonary embolism (CMS/HCC)   • Pulmonary hypertension ([facility])   • Lymphedema of both lower extremities   • Obesity, morbid, BMI 50 or higher ([facility])   • Dyslipidemia   • Hyperuricemia   • Hypogonadal obesity   • Knee arthropathy   • Morbid obesity with body mass index of 60.0-69.9 in adult ([facility])   • ARNOLDO (obstructive sleep apnea)   • Severe edema   • History of pulmonary embolism   • Other acute pulmonary embolism without acute cor pulmonale ([facility])   • Intractable back pain   • Heterozygous factor V Leiden mutation ([facility])   • Cellulitis of left lower extremity   • Hypokalemia   • CAROL (acute kidney injury) ([facility])   • Sepsis with  cutaneous manifestations (HCC)   • Hyperglycemia   • Paroxysmal atrial fibrillation (HCC)   • Athscl heart 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   • Long term (current) use of anticoagulants   • Lymphedema, not elsewhere classified   • Nicotine dependence, other tobacco product, uncomplicated   • Personal history of other venous thrombosis and embolism   • Typical atrial flutter (HCC)   • Venous insufficiency (chronic) (peripheral)   • Cellulitis of right lower extremity   • Wound cellulitis       Social History     Socioeconomic History   • Marital status:    Tobacco Use   • Smoking status: Light Smoker     Types: Cigars, Pipe     Start date: 1/1/2006   • Smokeless tobacco: Never   • Tobacco comments:     occasional - < 1 a month   Vaping Use   • Vaping Use: Never used   Substance and Sexual Activity   • Alcohol use: No   • Drug use: No   • Sexual activity: Defer       No Known Allergies    Current Outpatient Medications on File Prior to Visit   Medication Sig Dispense Refill   • acetaminophen (TYLENOL) 500 MG tablet Take 500 mg by mouth Every 6 (Six) Hours As Needed for Mild Pain .     • acetaminophen-codeine (TYLENOL #3) 300-30 MG per tablet Take 1 tablet by mouth Every 4 (Four) Hours As Needed for Moderate Pain (severe pain). 18 tablet 1   • buPROPion XL (WELLBUTRIN XL) 150 MG 24 hr tablet Take 1 tablet by mouth Daily. 90 tablet 3   • cephalexin (KEFLEX) 500 MG capsule Take 1 capsule by mouth 4 (Four) Times a Day. 40 capsule 0   • furosemide (LASIX) 80 MG tablet TAKE 1 TABLET BY MOUTH DAILY (Patient taking differently: Take 80 mg by mouth Daily As Needed.) 30 tablet 5   • metOLazone (ZAROXOLYN) 5 MG tablet 1 po weekly if >10 lbs weight gain take one.  Continue furosemide 12 tablet 1   • phentermine (ADIPEX-P) 37.5 MG tablet Take 1 tablet by mouth Every Morning Before Breakfast. 30 tablet 1   • saccharomyces boulardii (Florastor) 250 MG capsule Take 1 capsule by mouth 2 (Two) Times a Day. 60  "capsule 0   • topiramate (TOPAMAX) 25 MG tablet Take 1 tablet by mouth 2 (Two) Times a Day. 60 tablet 5   • warfarin (COUMADIN) 10 MG tablet TAKE ONE TABLET BY MOUTH DAILY. ON Sunday AND Wednesday TAKE A 5 MG TABLET ALONG WITH A TEN MG TABLET TO EQUAL 15 MG OR AS DIRECTED 90 tablet 1   • warfarin (Coumadin) 5 MG tablet TAKE ONE TABLET BY MOUTH ON SUN AND WED ALONG WITH A 10 MG TABLET TO EQUAL 15 MG. TAKE 10 MG ALL OTHER DAYS OF THE WEEK 30 tablet 1   • [DISCONTINUED] Semaglutide, 1 MG/DOSE, (OZEMPIC) 2 MG/1.5ML solution pen-injector Inject 1 mg under the skin into the appropriate area as directed 2 (Two) Times a Week. 1.5 mL 6     No current facility-administered medications on file prior to visit.       Objective   Vitals:    02/23/23 1413   BP: 130/74   Pulse: 82   Resp: 20   Temp: 96.9 °F (36.1 °C)   TempSrc: Temporal   SpO2: 97%   Weight: (!) 220 kg (485 lb)   Height: 188 cm (74\")   PainSc:   6   PainLoc: Foot  Comment: lt foot     Body mass index is 62.27 kg/m².    Physical Exam  Vitals and nursing note reviewed.   Constitutional:       Appearance: He is well-developed.   HENT:      Head: Normocephalic and atraumatic.   Neck:      Thyroid: No thyromegaly.      Vascular: No JVD.   Cardiovascular:      Rate and Rhythm: Normal rate and regular rhythm.      Heart sounds: Normal heart sounds. No murmur heard.    No friction rub. No gallop.   Pulmonary:      Effort: Pulmonary effort is normal. No respiratory distress.      Breath sounds: Normal breath sounds. No wheezing or rales.   Abdominal:      General: Bowel sounds are normal. There is no distension.      Palpations: Abdomen is soft.      Tenderness: There is no abdominal tenderness. There is no guarding or rebound.   Musculoskeletal:      Cervical back: Neck supple.   Skin:     General: Skin is warm and dry.   Neurological:      Mental Status: He is alert.   Psychiatric:         Behavior: Behavior normal.         Assessment & Plan   Diagnoses and all orders for " this visit:    1. Epigastric abdominal pain (Primary)  -     US Gallbladder; Future    2. Diarrhea, unspecified type  -     US Gallbladder; Future    3. Plantar fasciitis of left foot  -     ketorolac (TORADOL) injection 60 mg  -     triamcinolone acetonide (KENALOG-40) injection 40 mg    4. Morbid obesity with body mass index of 60.0-69.9 in adult (HCC)  -     Semaglutide, 2 MG/DOSE, 8 MG/3ML solution pen-injector; Inject 2 mg under the skin into the appropriate area as directed 1 (One) Time Per Week.  Dispense: 6 mL; Refill: 3    5. Morbid (severe) obesity due to excess calories (HCC)    sounds like more issue with food than GB, but will check gb us  Will try IM meds again and have try nocturnal dorsal splint;  Consider podiatry referral, declines for now  Patient below 500lbs; cotinue ozempic 2mg;  D/w wegovy in stock and could always go up to 2.4mg.  Prefers wait for now as doing well.           -Follow up: 3 months and prn

## 2023-02-24 ENCOUNTER — ANTICOAGULATION VISIT (OUTPATIENT)
Dept: PHARMACY | Facility: HOSPITAL | Age: 53
End: 2023-02-24
Payer: COMMERCIAL

## 2023-02-24 DIAGNOSIS — I26.99 BILATERAL PULMONARY EMBOLISM: ICD-10-CM

## 2023-02-24 DIAGNOSIS — I26.99 OTHER ACUTE PULMONARY EMBOLISM WITHOUT ACUTE COR PULMONALE: Primary | ICD-10-CM

## 2023-02-24 LAB — INR PPP: 2.8

## 2023-02-24 NOTE — PROGRESS NOTES
Anticoagulation Clinic Progress Note    Anticoagulation Summary  As of 2023    INR goal:  2.0-3.0   TTR:  67.6 % (4 y)   INR used for dosin.80 (2023)   Warfarin maintenance plan:  15 mg every Sun, Wed; 10 mg all other days; Starting 2023   Weekly warfarin total:  80 mg   No change documented:  Iris Gonzalez   Plan last modified:  Tonja Kaye, PharmD (2022)   Next INR check:  3/3/2023   Priority:  High   Target end date:  Indefinite    Indications    Other acute pulmonary embolism without acute cor pulmonale (HCC) [I26.99]  History of bilateral pulmonary embolism (CMS/HCC) [I26.99]             Anticoagulation Episode Summary     INR check location:      Preferred lab:      Send INR reminders to:   [facility]  POOL    Comments:  new  tristen 2019 **WEEKLY TRISTEN**      Anticoagulation Care Providers     Provider Role Specialty Phone number    Torri Colmenares, APRN Referring Cardiology [phone]    Elsy Baeza MD Responsible Cardiology [phone]          Clinic Interview:  No pertinent clinical findings have been reported.    INR History:  Anticoagulation Monitoring 2/3/2023 2023 2023   INR 2.50 3.00 2.80   INR Date 2/3/2023 2023 2023   INR Goal 2.0-3.0 2.0-3.0 2.0-3.0   Trend Same Same Same   Last Week Total 80 mg 80 mg 80 mg   Next Week Total 80 mg 80 mg 80 mg   Sun 15 mg 15 mg 15 mg   Mon 10 mg 10 mg 10 mg   Tue 10 mg 10 mg 10 mg   Wed 15 mg 15 mg 15 mg   Thu 10 mg 10 mg 10 mg   Fri 10 mg 10 mg 10 mg   Sat 10 mg 10 mg 10 mg   Visit Report - - -   Some recent data might be hidden       Plan:  1. INR is therapeutic today- see above in Anticoagulation Summary.    Kameron Melendez to continue their warfarin regimen- see above in Anticoagulation Summary.  2. Follow up in 1 week  3. Pt has agreed to only be called if INR out of range. They have been instructed to call if any changes in medications, doses, concerns, etc. Patient  expresses understanding and has no further questions at this time.    Iris Gonzalez

## 2023-03-03 ENCOUNTER — ANTICOAGULATION VISIT (OUTPATIENT)
Dept: PHARMACY | Facility: HOSPITAL | Age: 53
End: 2023-03-03
Payer: COMMERCIAL

## 2023-03-03 DIAGNOSIS — I26.99 OTHER ACUTE PULMONARY EMBOLISM WITHOUT ACUTE COR PULMONALE: Primary | ICD-10-CM

## 2023-03-03 DIAGNOSIS — I26.99 BILATERAL PULMONARY EMBOLISM: ICD-10-CM

## 2023-03-03 LAB — INR PPP: 2.4

## 2023-03-03 NOTE — PROGRESS NOTES
Anticoagulation Clinic Progress Note    Anticoagulation Summary  As of 3/3/2023    INR goal:  2.0-3.0   TTR:  67.7 % (4 y)   INR used for dosin.40 (3/3/2023)   Warfarin maintenance plan:  15 mg every Sun, Wed; 10 mg all other days; Starting 3/3/2023   Weekly warfarin total:  80 mg   No change documented:  Kaylee De Leon, Pharmacy Technician   Plan last modified:  Tonja Kaye, PharmD (2022)   Next INR check:  3/10/2023   Priority:  High   Target end date:  Indefinite    Indications    Other acute pulmonary embolism without acute cor pulmonale (HCC) [I26.99]  History of bilateral pulmonary embolism (CMS/HCC) [I26.99]             Anticoagulation Episode Summary     INR check location:      Preferred lab:      Send INR reminders to:  [facility]  POOL    Comments:  new  tristen 2019 **WEEKLY TRISTEN**      Anticoagulation Care Providers     Provider Role Specialty Phone number    Torri Colmenares, APRN Referring Cardiology [phone]    Elsy Baeza MD Responsible Cardiology [phone]          Clinic Interview:  No pertinent clinical findings have been reported.    INR History:  Anticoagulation Monitoring 2023 2023 3/3/2023   INR 3.00 2.80 2.40   INR Date 2023 2023 3/3/2023   INR Goal 2.0-3.0 2.0-3.0 2.0-3.0   Trend Same Same Same   Last Week Total 80 mg 80 mg 80 mg   Next Week Total 80 mg 80 mg 80 mg   Sun 15 mg 15 mg 15 mg   Mon 10 mg 10 mg 10 mg   Tue 10 mg 10 mg 10 mg   Wed 15 mg 15 mg 15 mg   Thu 10 mg 10 mg 10 mg   Fri 10 mg 10 mg 10 mg   Sat 10 mg 10 mg 10 mg   Visit Report - - -   Some recent data might be hidden       Plan:  1. INR is therapeutic today- see above in Anticoagulation Summary.    Kameron Melendez to continue their warfarin regimen- see above in Anticoagulation Summary.  2. Follow up in 1 week  3. Pt has agreed to only be called if INR out of range. They have been instructed to call if any changes in medications, doses, concerns, etc.  Patient expresses understanding and has no further questions at this time.    Kaylee De Leon, Pharmacy Technician

## 2023-03-10 ENCOUNTER — ANTICOAGULATION VISIT (OUTPATIENT)
Dept: PHARMACY | Facility: HOSPITAL | Age: 53
End: 2023-03-10
Payer: COMMERCIAL

## 2023-03-10 DIAGNOSIS — I26.99 BILATERAL PULMONARY EMBOLISM: ICD-10-CM

## 2023-03-10 DIAGNOSIS — I26.99 OTHER ACUTE PULMONARY EMBOLISM WITHOUT ACUTE COR PULMONALE: Primary | ICD-10-CM

## 2023-03-10 LAB — INR PPP: 2

## 2023-03-10 NOTE — PROGRESS NOTES
Anticoagulation Clinic Progress Note    Anticoagulation Summary  As of 3/10/2023    INR goal:  2.0-3.0   TTR:  67.9 % (4 y)   INR used for dosin.00 (3/10/2023)   Warfarin maintenance plan:  15 mg every Sun, Wed; 10 mg all other days; Starting 3/10/2023   Weekly warfarin total:  80 mg   No change documented:  Kaylee De Leon, Pharmacy Technician   Plan last modified:  Tonja Kaye, PharmD (2022)   Next INR check:  3/17/2023   Priority:  High   Target end date:  Indefinite    Indications    Other acute pulmonary embolism without acute cor pulmonale (HCC) [I26.99]  History of bilateral pulmonary embolism (CMS/HCC) [I26.99]             Anticoagulation Episode Summary     INR check location:      Preferred lab:      Send INR reminders to:  [facility]  POOL    Comments:  new  tristen 2019 **WEEKLY TRISTEN**      Anticoagulation Care Providers     Provider Role Specialty Phone number    Torri Colmenares, APRN Referring Cardiology [phone]    Elsy Baeza MD Responsible Cardiology [phone]          Clinic Interview:  No pertinent clinical findings have been reported.    INR History:  Anticoagulation Monitoring 2023 3/3/2023 3/10/2023   INR 2.80 2.40 2.00   INR Date 2023 3/3/2023 3/10/2023   INR Goal 2.0-3.0 2.0-3.0 2.0-3.0   Trend Same Same Same   Last Week Total 80 mg 80 mg 80 mg   Next Week Total 80 mg 80 mg 80 mg   Sun 15 mg 15 mg 15 mg   Mon 10 mg 10 mg 10 mg   Tue 10 mg 10 mg 10 mg   Wed 15 mg 15 mg 15 mg   Thu 10 mg 10 mg 10 mg   Fri 10 mg 10 mg 10 mg   Sat 10 mg 10 mg 10 mg   Visit Report - - -   Some recent data might be hidden       Plan:  1. INR is therapeutic today- see above in Anticoagulation Summary.    Kameron Melendez to continue their warfarin regimen- see above in Anticoagulation Summary.  2. Follow up in 1 week  3. Pt has agreed to only be called if INR out of range. They have been instructed to call if any changes in medications, doses, concerns, etc.  Patient expresses understanding and has no further questions at this time.    Kaylee De Leon, Pharmacy Technician

## 2023-03-17 ENCOUNTER — ANTICOAGULATION VISIT (OUTPATIENT)
Dept: PHARMACY | Facility: HOSPITAL | Age: 53
End: 2023-03-17
Payer: COMMERCIAL

## 2023-03-17 DIAGNOSIS — I26.99 BILATERAL PULMONARY EMBOLISM: ICD-10-CM

## 2023-03-17 DIAGNOSIS — I26.99 OTHER ACUTE PULMONARY EMBOLISM WITHOUT ACUTE COR PULMONALE: Primary | ICD-10-CM

## 2023-03-17 LAB — INR PPP: 1.8

## 2023-03-17 NOTE — PROGRESS NOTES
Anticoagulation Clinic Progress Note    Anticoagulation Summary  As of 3/17/2023    INR goal:  2.0-3.0   TTR:  67.5 % (4.1 y)   INR used for dosin.80 (3/17/2023)   Warfarin maintenance plan:  15 mg every Sun, Wed; 10 mg all other days; Starting 3/17/2023   Weekly warfarin total:  80 mg   Plan last modified:  Tonja Kaye, PharmD (2022)   Next INR check:  3/24/2023   Priority:  High   Target end date:  Indefinite    Indications    Other acute pulmonary embolism without acute cor pulmonale (HCC) [I26.99]  History of bilateral pulmonary embolism (CMS/HCC) [I26.99]             Anticoagulation Episode Summary     INR check location:      Preferred lab:      [facility]    Comments:  new  tristen 2019 **WEEKLY TRISTEN**      Anticoagulation Care Providers     Provider Role Specialty Phone number    Torri Colmenares APRN Referring Cardiology [phone]    Elsy Baeza MD Responsible Cardiology [phone]          Clinic Interview:  Patient Findings     Negatives:  Signs/symptoms of thrombosis, Signs/symptoms of bleeding,   Laboratory test error suspected, Change in health, Change in alcohol use,   Change in activity, Upcoming invasive procedure, Emergency department   visit, Upcoming dental procedure, Missed doses, Extra doses, Change in   medications, Change in diet/appetite, Hospital admission, Bruising, Other   complaints      Clinical Outcomes     Negatives:  Major bleeding event, Thromboembolic event,   Anticoagulation-related hospital admission, Anticoagulation-related ED   visit, Anticoagulation-related fatality        INR History:  Anticoagulation Monitoring 3/3/2023 3/10/2023 3/17/2023   INR 2.40 2.00 1.80   INR Date 3/3/2023 3/10/2023 3/17/2023   INR Goal 2.0-3.0 2.0-3.0 2.0-3.0   Trend Same Same Same   Last Week Total 80 mg 80 mg 80 mg   Next Week Total 80 mg 80 mg 85 mg   Sun 15 mg 15 mg 15 mg   Mon 10 mg 10 mg 10 mg   Tue 10 mg 10 mg 10 mg    Wed 15 mg 15 mg 15 mg   Thu 10 mg 10 mg 10 mg   Fri 10 mg 10 mg 15 mg (3/17)   Sat 10 mg 10 mg 10 mg   Visit Report - - -   Some recent data might be hidden       Plan:  1. INR is Subtherapeutic today- see above in Anticoagulation Summary.   Will instruct Kameron Melendez to Change their warfarin regimen- see above in Anticoagulation Summary.  2. Follow up in 1 week.  3. They have been instructed to call if any changes in medications, doses, concerns, etc. Patient expresses understanding and has no further questions at this time.    Ramo Morocho, PharmD

## 2023-03-20 RX ORDER — WARFARIN SODIUM 10 MG/1
TABLET ORAL
Qty: 90 TABLET | Refills: 1 | Status: SHIPPED | OUTPATIENT
Start: 2023-03-20

## 2023-03-24 ENCOUNTER — ANTICOAGULATION VISIT (OUTPATIENT)
Dept: PHARMACY | Facility: HOSPITAL | Age: 53
End: 2023-03-24
Payer: COMMERCIAL

## 2023-03-24 DIAGNOSIS — I26.99 BILATERAL PULMONARY EMBOLISM: ICD-10-CM

## 2023-03-24 DIAGNOSIS — I26.99 OTHER ACUTE PULMONARY EMBOLISM WITHOUT ACUTE COR PULMONALE: Primary | ICD-10-CM

## 2023-03-24 LAB — INR PPP: 2.2

## 2023-03-24 NOTE — PROGRESS NOTES
Anticoagulation Clinic Progress Note    Anticoagulation Summary  As of 3/24/2023    INR goal:  2.0-3.0   TTR:  67.5 % (4.1 y)   INR used for dosin.20 (3/24/2023)   Warfarin maintenance plan:  15 mg every Sun, Wed; 10 mg all other days; Starting 3/24/2023   Weekly warfarin total:  80 mg   No change documented:  Tessie Fatima RPH   Plan last modified:  Tonja Kaye, PharmD (2022)   Next INR check:  3/31/2023   Priority:  High   Target end date:  Indefinite    Indications    Other acute pulmonary embolism without acute cor pulmonale (HCC) [I26.99]  History of bilateral pulmonary embolism (CMS/HCC) [I26.99]             Anticoagulation Episode Summary     INR check location:      Preferred lab:      Send INR reminders to:   [facility]  POOL    Comments:  new  frankie 2019 **WEEKLY FRANKIE**      Anticoagulation Care Providers     Provider Role Specialty Phone number    Torri Colmenares, APRN Referring Cardiology [phone]    Elsy Baeza MD Responsible Cardiology [phone]          Clinic Interview:  No pertinent clinical findings have been reported.    INR History:  Anticoagulation Monitoring 3/10/2023 3/17/2023 3/24/2023   INR 2.00 1.80 2.20   INR Date 3/10/2023 3/17/2023 3/24/2023   INR Goal 2.0-3.0 2.0-3.0 2.0-3.0   Trend Same Same Same   Last Week Total 80 mg 80 mg 85 mg   Next Week Total 80 mg 85 mg 80 mg   Sun 15 mg 15 mg 15 mg   Mon 10 mg 10 mg 10 mg   Tue 10 mg 10 mg 10 mg   Wed 15 mg 15 mg 15 mg   Thu 10 mg 10 mg 10 mg   Fri 10 mg 15 mg (3/17) 10 mg   Sat 10 mg 10 mg 10 mg   Visit Report - - -   Some recent data might be hidden       Plan:  1. INR is therapeutic today- see above in Anticoagulation Summary.    Kameron Melendez to continue their warfarin regimen- see above in Anticoagulation Summary.  2. Follow up in 1 week  3. LVM with instructions as requested. They have been instructed to call if any changes in medications, doses, concerns, etc. Patient expresses  understanding and has no further questions at this time.    Tessie Fatima, RP

## 2023-03-31 ENCOUNTER — ANTICOAGULATION VISIT (OUTPATIENT)
Dept: PHARMACY | Facility: HOSPITAL | Age: 53
End: 2023-03-31
Payer: COMMERCIAL

## 2023-03-31 DIAGNOSIS — I26.99 BILATERAL PULMONARY EMBOLISM: ICD-10-CM

## 2023-03-31 DIAGNOSIS — I26.99 OTHER ACUTE PULMONARY EMBOLISM WITHOUT ACUTE COR PULMONALE: Primary | ICD-10-CM

## 2023-03-31 LAB — INR PPP: 2.3

## 2023-03-31 NOTE — PROGRESS NOTES
Anticoagulation Clinic Progress Note    Anticoagulation Summary  As of 3/31/2023    INR goal:  2.0-3.0   TTR:  67.7 % (4.1 y)   INR used for dosin.30 (3/31/2023)   Warfarin maintenance plan:  15 mg every Sun, Wed; 10 mg all other days; Starting 3/31/2023   Weekly warfarin total:  80 mg   No change documented:  Kaylee De Leon, Pharmacy Technician   Plan last modified:  Tonja Kaye, PharmD (2022)   Next INR check:  2023   Priority:  High   Target end date:  Indefinite    Indications    Other acute pulmonary embolism without acute cor pulmonale (HCC) [I26.99]  History of bilateral pulmonary embolism (CMS/HCC) [I26.99]             Anticoagulation Episode Summary     INR check location:      Preferred lab:      Send INR reminders to:  [facility]  POOL    Comments:  new  tristen 2019 **WEEKLY TRISTEN**      Anticoagulation Care Providers     Provider Role Specialty Phone number    Torri Colmenares, APRN Referring Cardiology [phone]    Elsy Baeza MD Responsible Cardiology [phone]          Clinic Interview:  No pertinent clinical findings have been reported.    INR History:  Anticoagulation Monitoring 3/17/2023 3/24/2023 3/31/2023   INR 1.80 2.20 2.30   INR Date 3/17/2023 3/24/2023 3/31/2023   INR Goal 2.0-3.0 2.0-3.0 2.0-3.0   Trend Same Same Same   Last Week Total 80 mg 85 mg 80 mg   Next Week Total 85 mg 80 mg 80 mg   Sun 15 mg 15 mg 15 mg   Mon 10 mg 10 mg 10 mg   Tue 10 mg 10 mg 10 mg   Wed 15 mg 15 mg 15 mg   Thu 10 mg 10 mg 10 mg   Fri 15 mg (3/17) 10 mg 10 mg   Sat 10 mg 10 mg 10 mg   Visit Report - - -   Some recent data might be hidden       Plan:  1. INR is therapeutic today- see above in Anticoagulation Summary.    Kameron Melendez to continue their warfarin regimen- see above in Anticoagulation Summary.  2. Follow up in 1 week  3. Pt has agreed to only be called if INR out of range. They have been instructed to call if any changes in medications, doses,  concerns, etc. Patient expresses understanding and has no further questions at this time.    Kaylee De Leon, Pharmacy Technician

## 2023-04-07 ENCOUNTER — ANTICOAGULATION VISIT (OUTPATIENT)
Dept: PHARMACY | Facility: HOSPITAL | Age: 53
End: 2023-04-07
Payer: COMMERCIAL

## 2023-04-07 DIAGNOSIS — I26.99 OTHER ACUTE PULMONARY EMBOLISM WITHOUT ACUTE COR PULMONALE: Primary | ICD-10-CM

## 2023-04-07 DIAGNOSIS — I26.99 BILATERAL PULMONARY EMBOLISM: ICD-10-CM

## 2023-04-07 LAB — INR PPP: 1.9

## 2023-04-07 NOTE — PROGRESS NOTES
Anticoagulation Clinic Progress Note    Anticoagulation Summary  As of 2023    INR goal:  2.0-3.0   TTR:  67.6 % (4.1 y)   INR used for dosin.90 (2023)   Warfarin maintenance plan:  15 mg every Sun, Wed; 10 mg all other days; Starting 2023   Weekly warfarin total:  80 mg   No change documented:  Tessie Fatima RPH   Plan last modified:  Tonja Kaye, PharmD (2022)   Next INR check:  2023   Priority:  High   Target end date:  Indefinite    Indications    Other acute pulmonary embolism without acute cor pulmonale [I26.99]  History of bilateral pulmonary embolism (CMS/HCC) [I26.99]             Anticoagulation Episode Summary     INR check location:      Preferred lab:      Send INR reminders to:   [facility]  [facility]    Comments:  new  franike 2019 **WEEKLY FRANKIE**      Anticoagulation Care Providers     Provider Role Specialty Phone number    Torri Colmenares, APRN Referring Cardiology [phone]    Elsy Baeza MD Responsible Cardiology [phone]          Clinic Interview:  Patient Findings     Negatives:  Signs/symptoms of thrombosis, Signs/symptoms of bleeding,   Laboratory test error suspected, Change in health, Change in alcohol use,   Change in activity, Upcoming invasive procedure, Emergency department   visit, Upcoming dental procedure, Missed doses, Extra doses, Change in   medications, Change in diet/appetite, Hospital admission, Bruising, Other   complaints      Clinical Outcomes     Negatives:  Major bleeding event, Thromboembolic event,   Anticoagulation-related hospital admission, Anticoagulation-related ED   visit, Anticoagulation-related fatality        INR History:  Anticoagulation Monitoring 3/24/2023 3/31/2023 2023   INR 2.20 2.30 1.90   INR Date 3/24/2023 3/31/2023 2023   INR Goal 2.0-3.0 2.0-3.0 2.0-3.0   Trend Same Same Same   Last Week Total 85 mg 80 mg 80 mg   Next Week Total 80 mg 80 mg 80 mg   Sun 15 mg 15 mg 15 mg   Mon 10 mg  10 mg 10 mg   Tue 10 mg 10 mg 10 mg   Wed 15 mg 15 mg 15 mg   Thu 10 mg 10 mg 10 mg   Fri 10 mg 10 mg 10 mg   Sat 10 mg 10 mg 10 mg   Visit Report - - -   Some recent data might be hidden       Plan:  1. INR is Subtherapeutic today- see above in Anticoagulation Summary.   Will instruct Kameron Melendez to Continue their warfarin regimen as INR is very close to goal- see above in Anticoagulation Summary.  2. Follow up in 1 weeks  3. They have been instructed to call if any changes in medications, doses, concerns, etc. Patient expresses understanding and has no further questions at this time.    Tessie Fatima, [facility]

## 2023-04-10 DIAGNOSIS — E66.01 MORBID (SEVERE) OBESITY DUE TO EXCESS CALORIES: ICD-10-CM

## 2023-04-10 RX ORDER — PHENTERMINE HYDROCHLORIDE 37.5 MG/1
37.5 TABLET ORAL
Qty: 30 TABLET | Refills: 2 | Status: SHIPPED | OUTPATIENT
Start: 2023-04-10

## 2023-04-21 ENCOUNTER — ANTICOAGULATION VISIT (OUTPATIENT)
Dept: PHARMACY | Facility: HOSPITAL | Age: 53
End: 2023-04-21
Payer: COMMERCIAL

## 2023-04-21 DIAGNOSIS — I26.99 BILATERAL PULMONARY EMBOLISM: ICD-10-CM

## 2023-04-21 DIAGNOSIS — I26.99 OTHER ACUTE PULMONARY EMBOLISM WITHOUT ACUTE COR PULMONALE: Primary | ICD-10-CM

## 2023-04-21 LAB — INR PPP: 2.1

## 2023-04-21 NOTE — PROGRESS NOTES
Anticoagulation Clinic Progress Note    Anticoagulation Summary  As of 2023    INR goal:  2.0-3.0   TTR:  67.5 % (4.2 y)   INR used for dosin.10 (2023)   Warfarin maintenance plan:  15 mg every Sun, Wed; 10 mg all other days; Starting 2023   Weekly warfarin total:  80 mg   No change documented:  Tessie Fatima, EVELYNE   Plan last modified:  Tonja Kaye, PharmD (2022)   Next INR check:  2023   Priority:  High   Target end date:  Indefinite    Indications    Other acute pulmonary embolism without acute cor pulmonale [I26.99]  History of bilateral pulmonary embolism (CMS/HCC) [I26.99]             Anticoagulation Episode Summary     INR check location:      Preferred lab:      Send INR reminders to:   [facility]  [facility]    Comments:  new  frankie 2019 **WEEKLY FRANKIE**      Anticoagulation Care Providers     Provider Role Specialty Phone number    Torri Colmenares, APRN Referring Cardiology [phone]    Elsy Baeza MD Responsible Cardiology [phone]          Clinic Interview:  No pertinent clinical findings have been reported.    INR History:      3/24/2023     9:07 AM 3/31/2023    12:00 AM 3/31/2023     7:54 AM 2023    12:00 AM 2023     9:05 AM 2023    12:00 AM 2023     9:38 AM   Anticoagulation Monitoring   INR 2.20  2.30  1.90  2.10   INR Date 3/24/2023  3/31/2023  2023  2023   INR Goal 2.0-3.0  2.0-3.0  2.0-3.0  2.0-3.0   Trend Same  Same  Same  Same   Last Week Total 85 mg  80 mg  80 mg  80 mg   Next Week Total 80 mg  80 mg  80 mg  80 mg   Sun 15 mg  15 mg  15 mg  15 mg   Mon 10 mg  10 mg  10 mg  10 mg   Tue 10 mg  10 mg  10 mg  10 mg   Wed 15 mg  15 mg  15 mg  15 mg   Thu 10 mg  10 mg  10 mg  10 mg   Fri 10 mg  10 mg  10 mg  10 mg   Sat 10 mg  10 mg  10 mg  10 mg   Historical INR  2.30       1.90       2.10             This result is from an external source.       Plan:  1. INR is therapeutic today- see above in  Anticoagulation Summary.    Kameron Melendez to continue their warfarin regimen- see above in Anticoagulation Summary.  2. Follow up in 1 week  3. Pt has agreed to only be called if INR out of range. They have been instructed to call if any changes in medications, doses, concerns, etc. Patient expresses understanding and has no further questions at this time.    Tessie Fatima, [facility]

## 2023-05-05 ENCOUNTER — ANTICOAGULATION VISIT (OUTPATIENT)
Dept: PHARMACY | Facility: HOSPITAL | Age: 53
End: 2023-05-05
Payer: COMMERCIAL

## 2023-05-05 DIAGNOSIS — I26.99 BILATERAL PULMONARY EMBOLISM: ICD-10-CM

## 2023-05-05 DIAGNOSIS — I26.99 OTHER ACUTE PULMONARY EMBOLISM WITHOUT ACUTE COR PULMONALE: Primary | ICD-10-CM

## 2023-05-05 LAB — INR PPP: 1.8

## 2023-05-08 NOTE — PROGRESS NOTES
Anticoagulation Clinic Progress Note    Anticoagulation Summary  As of 2/17/2023    INR goal:  2.0-3.0   TTR:  67.4 % (4 y)   INR used for dosing:  3.00 (2/17/2023)   Warfarin maintenance plan:  15 mg every Sun, Wed; 10 mg all other days; Starting 2/17/2023   Weekly warfarin total:  80 mg   No change documented:  Kaylee De Leon, Pharmacy Technician   Plan last modified:  Tonja Kaye, PharmD (12/16/2022)   Next INR check:  3/3/2023   Priority:  High   Target end date:  Indefinite    Indications    Other acute pulmonary embolism without acute cor pulmonale (HCC) [I26.99]  History of bilateral pulmonary embolism (CMS/HCC) [I26.99]             Anticoagulation Episode Summary     INR check location:      Preferred lab:      Send INR reminders to:  [facility]  POOL    Comments:  new  tristen March 2019 **WEEKLY TRISTEN**      Anticoagulation Care Providers     Provider Role Specialty Phone number    Torri Colmenares, APRN Referring Cardiology [phone]    Elsy Baeza MD Responsible Cardiology [phone]          Clinic Interview:  No pertinent clinical findings have been reported.    INR History:  Anticoagulation Monitoring 1/27/2023 2/3/2023 2/17/2023   INR 2.70 2.50 3.00   INR Date 1/27/2023 2/3/2023 2/17/2023   INR Goal 2.0-3.0 2.0-3.0 2.0-3.0   Trend Same Same Same   Last Week Total 80 mg 80 mg 80 mg   Next Week Total 80 mg 80 mg 80 mg   Sun 15 mg 15 mg 15 mg   Mon 10 mg 10 mg 10 mg   Tue 10 mg 10 mg 10 mg   Wed 15 mg 15 mg 15 mg   Thu 10 mg 10 mg 10 mg   Fri 10 mg 10 mg 10 mg   Sat 10 mg 10 mg 10 mg   Visit Report - - -   Some recent data might be hidden       Plan:  1. INR is therapeutic today- see above in Anticoagulation Summary.    Kameron Melendez to continue their warfarin regimen- see above in Anticoagulation Summary.  2. Follow up in 2 weeks  3. Pt has agreed to only be called if INR out of range. They have been instructed to call if any changes in medications, doses, concerns, etc.  Updated the daughter on her results.     Cipro updated.   Cephalexin sent to her RX.      Mailed lab results to EC as requested.   All questions answered.         Patient expresses understanding and has no further questions at this time.    Kaylee De Leon, Pharmacy Technician

## 2023-05-09 NOTE — PROGRESS NOTES
Anticoagulation Clinic Progress Note    Anticoagulation Summary  As of 2023    INR goal:  2.0-3.0   TTR:  67.2 % (4.2 y)   INR used for dosin.80 (2023)   Warfarin maintenance plan:  15 mg every Sun, Wed; 10 mg all other days; Starting 2023   Weekly warfarin total:  80 mg   Plan last modified:  Tonja Kaye, PharmD (2022)   Next INR check:  2023   Priority:  High   Target end date:  Indefinite    Indications    Other acute pulmonary embolism without acute cor pulmonale [I26.99]  History of bilateral pulmonary embolism (CMS/HCC) [I26.99]             Anticoagulation Episode Summary     INR check location:      Preferred lab:      Send INR reminders to:  [facility]  POOL    Comments:  new  tristen 2019 **WEEKLY TRISTEN**      Anticoagulation Care Providers     Provider Role Specialty Phone number    Torri Colmenares APRN Referring Cardiology [phone]    Elsy Baeza MD Responsible Cardiology [phone]          INR History:      3/31/2023     7:54 AM 2023    12:00 AM 2023     9:05 AM 2023    12:00 AM 2023     9:38 AM 2023    12:00 AM 2023     9:00 AM   Anticoagulation Monitoring   INR 2.30  1.90  2.10  1.80   INR Date 3/31/2023  2023  2023  2023   INR Goal 2.0-3.0  2.0-3.0  2.0-3.0  2.0-3.0   Trend Same  Same  Same  Same   Last Week Total 80 mg  80 mg  80 mg  80 mg   Next Week Total 80 mg  80 mg  80 mg  85 mg   Sun 15 mg  15 mg  15 mg  15 mg   Mon 10 mg  10 mg  10 mg  10 mg   Tue 10 mg  10 mg  10 mg  10 mg   Wed 15 mg  15 mg  15 mg  15 mg   Thu 10 mg  10 mg  10 mg  10 mg   Fri 10 mg  10 mg  10 mg  15 mg ()   Sat 10 mg  10 mg  10 mg  10 mg   Historical INR  1.90       2.10       1.80             This result is from an external source.       Plan:  1. INR is Subtherapeutic today- see above in Anticoagulation Summary. Unable to reach 23; VM full. Left VM on 23 and 23. Since we have been unable to reach him and  he has not returned our calls, left voicemail on 5/9/23 advising him to continue same dose for the remainder of the week and recheck INR on 5/12/23. --- He called back on 5/9/23 15:26, and reported that he took 15 mg on Fri, 5/5/23, in response to subtherapeutic INR. Continue 15 mg Sun/Wed, 10 mg all other days for now. Pending INR on 5/12/23, will consider need for 2 or 3 days of 15 mg each week.   Will instruct Kameron Melendez to Continue their warfarin regimen- see above in Anticoagulation Summary.  2. Follow up 5/12/23.   3. They have been instructed to call if any changes in medications, doses, concerns, etc. He voiced understanding, is agreeable with plan, and has no further questions.     Ramo Morocho, PharmD

## 2023-05-12 ENCOUNTER — ANTICOAGULATION VISIT (OUTPATIENT)
Dept: PHARMACY | Facility: HOSPITAL | Age: 53
End: 2023-05-12
Payer: COMMERCIAL

## 2023-05-12 DIAGNOSIS — I26.99 BILATERAL PULMONARY EMBOLISM: ICD-10-CM

## 2023-05-12 DIAGNOSIS — I26.99 OTHER ACUTE PULMONARY EMBOLISM WITHOUT ACUTE COR PULMONALE: Primary | ICD-10-CM

## 2023-05-12 LAB — INR PPP: 2

## 2023-05-12 NOTE — PROGRESS NOTES
Anticoagulation Clinic Progress Note    Anticoagulation Summary  As of 2023    INR goal:  2.0-3.0   TTR:  67.0 % (4.2 y)   INR used for dosin.00 (2023)   Warfarin maintenance plan:  15 mg every Sun, Wed, Fri; 10 mg all other days; Starting 2023   Weekly warfarin total:  85 mg   Plan last modified:  Tessie Fatima RPH (2023)   Next INR check:     Priority:  High   Target end date:  Indefinite    Indications    Other acute pulmonary embolism without acute cor pulmonale [I26.99]  History of bilateral pulmonary embolism (CMS/HCC) [I26.99]             Anticoagulation Episode Summary     INR check location:      Preferred lab:      Send INR reminders to:   [facility]  PO[facility]    Comments:  new  frankie 2019 **WEEKLY FRANKIE**      Anticoagulation Care Providers     Provider Role Specialty Phone number    Torri Colmenares APRN Referring Cardiology [phone]    Elsy Baeza MD Responsible Cardiology [phone]          Clinic Interview:  No pertinent clinical findings have been reported.    INR History:      2023     9:05 AM 2023    12:00 AM 2023     9:38 AM 2023    12:00 AM 2023     9:00 AM 2023    12:00 AM 2023    10:40 AM   Anticoagulation Monitoring   INR 1.90  2.10  1.80  2.00   INR Date 2023   INR Goal 2.0-3.0  2.0-3.0  2.0-3.0  2.0-3.0   Trend Same  Same  Same  Up   Last Week Total 80 mg  80 mg  80 mg  85 mg   Next Week Total 80 mg  80 mg  85 mg  85 mg   Sun 15 mg  15 mg  15 mg  15 mg   Mon 10 mg  10 mg  10 mg  10 mg   Tue 10 mg  10 mg  10 mg  10 mg   Wed 15 mg  15 mg  15 mg  15 mg   Thu 10 mg  10 mg  10 mg  10 mg   Fri 10 mg  10 mg  15 mg ()  15 mg   Sat 10 mg  10 mg  10 mg  10 mg   Historical INR  2.10       1.80       2.00             This result is from an external source.       Plan:  1. INR is therapeutic today- see above in Anticoagulation Summary.    Kameron Melendez to continue their  warfarin regimen- see above in Anticoagulation Summary.  Continue 15 mg on sun, wed, fri, and 10 mg AOD, rck 1 week   2. Follow up in 1 week  3. They have been instructed to call if any changes in medications, doses, concerns, etc. Patient expresses understanding and has no further questions at this time.    Tessie Fatima [facility]

## 2023-05-19 ENCOUNTER — OFFICE VISIT (OUTPATIENT)
Dept: FAMILY MEDICINE CLINIC | Facility: CLINIC | Age: 53
End: 2023-05-19

## 2023-05-19 ENCOUNTER — ANTICOAGULATION VISIT (OUTPATIENT)
Dept: PHARMACY | Facility: HOSPITAL | Age: 53
End: 2023-05-19
Payer: COMMERCIAL

## 2023-05-19 VITALS
SYSTOLIC BLOOD PRESSURE: 132 MMHG | DIASTOLIC BLOOD PRESSURE: 88 MMHG | BODY MASS INDEX: 62.27 KG/M2 | HEIGHT: 74 IN | OXYGEN SATURATION: 95 % | HEART RATE: 75 BPM

## 2023-05-19 DIAGNOSIS — M72.2 PLANTAR FASCIITIS OF LEFT FOOT: ICD-10-CM

## 2023-05-19 DIAGNOSIS — I89.0 LYMPHEDEMA: Primary | ICD-10-CM

## 2023-05-19 DIAGNOSIS — K21.9 GASTROESOPHAGEAL REFLUX DISEASE, UNSPECIFIED WHETHER ESOPHAGITIS PRESENT: ICD-10-CM

## 2023-05-19 DIAGNOSIS — I26.99 BILATERAL PULMONARY EMBOLISM: ICD-10-CM

## 2023-05-19 DIAGNOSIS — R22.42 MASS OF LEFT LOWER EXTREMITY: ICD-10-CM

## 2023-05-19 DIAGNOSIS — I26.99 OTHER ACUTE PULMONARY EMBOLISM WITHOUT ACUTE COR PULMONALE: Primary | ICD-10-CM

## 2023-05-19 DIAGNOSIS — E66.01 MORBID (SEVERE) OBESITY DUE TO EXCESS CALORIES: ICD-10-CM

## 2023-05-19 LAB — INR PPP: 2.5

## 2023-05-19 RX ORDER — OMEPRAZOLE 40 MG/1
40 CAPSULE, DELAYED RELEASE ORAL DAILY
Qty: 90 CAPSULE | Refills: 3 | Status: SHIPPED | OUTPATIENT
Start: 2023-05-19

## 2023-05-19 RX ORDER — TOPIRAMATE 50 MG/1
50 TABLET, FILM COATED ORAL 2 TIMES DAILY
Qty: 180 TABLET | Refills: 3 | Status: SHIPPED | OUTPATIENT
Start: 2023-05-19

## 2023-05-19 RX ORDER — WARFARIN SODIUM 5 MG/1
5 TABLET ORAL
COMMUNITY

## 2023-05-19 RX ORDER — BUPROPION HYDROCHLORIDE 300 MG/1
300 TABLET ORAL EVERY MORNING
Qty: 90 TABLET | Refills: 3 | Status: SHIPPED | OUTPATIENT
Start: 2023-05-19

## 2023-05-19 RX ORDER — KETOROLAC TROMETHAMINE 30 MG/ML
60 INJECTION, SOLUTION INTRAMUSCULAR; INTRAVENOUS ONCE
Status: COMPLETED | OUTPATIENT
Start: 2023-05-19 | End: 2023-05-19

## 2023-05-19 RX ORDER — SPIRONOLACTONE 25 MG/1
25 TABLET ORAL DAILY
Qty: 60 TABLET | Refills: 5 | Status: SHIPPED | OUTPATIENT
Start: 2023-05-19

## 2023-05-19 RX ORDER — METHYLPREDNISOLONE 4 MG/1
TABLET ORAL
Qty: 21 TABLET | Refills: 1 | Status: SHIPPED | OUTPATIENT
Start: 2023-05-19

## 2023-05-19 RX ORDER — WARFARIN SODIUM 10 MG/1
10 TABLET ORAL
COMMUNITY

## 2023-05-19 RX ADMIN — KETOROLAC TROMETHAMINE 60 MG: 30 INJECTION, SOLUTION INTRAMUSCULAR; INTRAVENOUS at 15:15

## 2023-05-19 NOTE — PROGRESS NOTES
Subjective   Kameron Melendez is a 53 y.o. male. Presents today for   Chief Complaint   Patient presents with   • Foot Pain     left   • Leg Swelling     Left    • Weight Check       History of Present Illness  Patient 52 y/o here for weight check, reports clothes loose and dropping sizes but lymphedema worse with severe pain and swelling;  Has also area of leg that has swollen more like growth that is between legs make hard to use restroom and ambulate;   Also plantar fasciitis severe by end of week;   Injections of steroid and ketorolac helps but recurs.   Having a lot of gerd on ozempic, prilosec helps, bu tneeds;    Review of Systems   Cardiovascular: Positive for leg swelling.   Musculoskeletal: Positive for arthralgias and gait problem.       Patient Active Problem List   Diagnosis   • History of bilateral pulmonary embolism (CMS/HCC)   • Pulmonary hypertension (HCC)   • Lymphedema of both lower extremities   • Obesity, morbid, BMI 50 or higher ([facility])   • Dyslipidemia   • Hyperuricemia   • Hypogonadal obesity   • Knee arthropathy   • Morbid obesity with body mass index of 60.0-69.9 in adult ([facility])   • ARNOLDO (obstructive sleep apnea)   • Severe edema   • History of pulmonary embolism   • Other acute pulmonary embolism without acute cor pulmonale   • Intractable back pain   • Heterozygous factor V Leiden mutation (HCC)   • Cellulitis of left lower extremity   • Hypokalemia   • CAROL (acute kidney injury)   • Sepsis with cutaneous manifestations   • Hyperglycemia   • Paroxysmal atrial fibrillation   • Athscl heart 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   • Long term (current) use of anticoagulants   • Lymphedema, not elsewhere classified   • Nicotine dependence, other tobacco product, uncomplicated   • Personal history of other venous thrombosis and embolism   • Typical atrial flutter   • Venous insufficiency (chronic) (peripheral)   • Cellulitis of right lower extremity   • Wound cellulitis       Social  History     Socioeconomic History   • Marital status:    Tobacco Use   • Smoking status: Light Smoker     Types: Cigars, Pipe     Start date: 1/1/2006   • Smokeless tobacco: Never   • Tobacco comments:     occasional - < 1 a month   Vaping Use   • Vaping Use: Never used   Substance and Sexual Activity   • Alcohol use: No   • Drug use: No   • Sexual activity: Defer       No Known Allergies    Current Outpatient Medications on File Prior to Visit   Medication Sig Dispense Refill   • acetaminophen (TYLENOL) 500 MG tablet Take 1 tablet by mouth Every 6 (Six) Hours As Needed for Mild Pain.     • acetaminophen-codeine (TYLENOL #3) 300-30 MG per tablet Take 1 tablet by mouth Every 4 (Four) Hours As Needed for Moderate Pain (severe pain). 18 tablet 1   • furosemide (LASIX) 80 MG tablet TAKE 1 TABLET BY MOUTH DAILY (Patient taking differently: Take 1 tablet by mouth Daily As Needed.) 30 tablet 5   • metOLazone (ZAROXOLYN) 5 MG tablet 1 po weekly if >10 lbs weight gain take one.  Continue furosemide 12 tablet 1   • phentermine (ADIPEX-P) 37.5 MG tablet Take 1 tablet by mouth Every Morning Before Breakfast. 30 tablet 2   • saccharomyces boulardii (Florastor) 250 MG capsule Take 1 capsule by mouth 2 (Two) Times a Day. 60 capsule 0   • Semaglutide, 2 MG/DOSE, 8 MG/3ML solution pen-injector Inject 2 mg under the skin into the appropriate area as directed 1 (One) Time Per Week. 6 mL 3   • warfarin (Jantoven) 10 MG tablet Take 1 tablet by mouth Daily.     • warfarin (Jantoven) 5 MG tablet Take 1 tablet by mouth Daily.     • [DISCONTINUED] buPROPion XL (WELLBUTRIN XL) 150 MG 24 hr tablet Take 1 tablet by mouth Daily. 90 tablet 3   • [DISCONTINUED] topiramate (TOPAMAX) 25 MG tablet Take 1 tablet by mouth 2 (Two) Times a Day. 60 tablet 5   • [DISCONTINUED] cephalexin (KEFLEX) 500 MG capsule Take 1 capsule by mouth 4 (Four) Times a Day. (Patient not taking: Reported on 5/19/2023) 40 capsule 0   • [DISCONTINUED] warfarin  "(COUMADIN) 10 MG tablet TAKE ONE TABLET BY MOUTH DAILY AND on Sunday AND Wednesday TAKE A 5 MG TABLET ALONG WITH A TEN MG TABLET TO EQUAL 15 MG OR AS DIRECTED (Patient not taking: Reported on 5/19/2023) 90 tablet 1   • [DISCONTINUED] warfarin (Coumadin) 5 MG tablet TAKE ONE TABLET BY MOUTH ON SUN AND WED ALONG WITH A 10 MG TABLET TO EQUAL 15 MG. TAKE 10 MG ALL OTHER DAYS OF THE WEEK (Patient not taking: Reported on 5/19/2023) 30 tablet 1     No current facility-administered medications on file prior to visit.       Objective   Vitals:    05/19/23 1401   BP: 132/88   Pulse: 75   SpO2: 95%   Weight: Comment: pt exceeds scale limit   Height: 188 cm (74\")     Body mass index is 62.27 kg/m².    Physical Exam  Vitals and nursing note reviewed.   Constitutional:       Appearance: He is well-developed.   Musculoskeletal:      Cervical back: Neck supple.      Comments: Large fold of thigh hangs mildine, very large with lymphedema.   Skin:     General: Skin is warm and dry.   Neurological:      Mental Status: He is alert.   Psychiatric:         Behavior: Behavior normal.         Assessment & Plan   Diagnoses and all orders for this visit:    1. Lymphedema (Primary)  -     spironolactone (Aldactone) 25 MG tablet; Take 1 tablet by mouth Daily.  Dispense: 60 tablet; Refill: 5  -     Semaglutide-Weight Management 2.4 MG/0.75ML solution auto-injector; Inject 2.4 mg under the skin into the appropriate area as directed 1 (One) Time Per Week.  Dispense: 9 mL; Refill: 3    2. Morbid (severe) obesity due to excess calories  -     buPROPion XL (WELLBUTRIN XL) 300 MG 24 hr tablet; Take 1 tablet by mouth Every Morning.  Dispense: 90 tablet; Refill: 3  -     topiramate (TOPAMAX) 50 MG tablet; Take 1 tablet by mouth 2 (Two) Times a Day.  Dispense: 180 tablet; Refill: 3  -     Semaglutide-Weight Management 2.4 MG/0.75ML solution auto-injector; Inject 2.4 mg under the skin into the appropriate area as directed 1 (One) Time Per Week.  " Dispense: 9 mL; Refill: 3    3. Mass of left lower extremity  -     Ambulatory Referral to Plastic Surgery    4. Plantar fasciitis of left foot  -     methylPREDNISolone (MEDROL) 4 MG dose pack; Take as directed on package instructions.  Dispense: 21 tablet; Refill: 1  -     ketorolac (TORADOL) injection 60 mg    5. Gastroesophageal reflux disease, unspecified whether esophagitis present  -     omeprazole (priLOSEC) 40 MG capsule; Take 1 capsule by mouth Daily.  Dispense: 90 capsule; Refill: 3    take metolozone when get home;  Continue furosemide and will add spironolactone  Fold of leg left upper thigh hanging like growth in midline, making hard to walk and use restroom.  Will send to plastics to see if something that could possibly be removed given impact on function.     Weight loss - losing on ozempic, giving wellbutrin, topamax, phentermine as well.   Will go up on wellbutrin to 300mg and topamax to 50mg po bid and see if can drive more weight loss;  Wegovy added to plan, when near end of 2mg ozempic, will see if can get 2.4mg as need to tweak to see if can get more weight off.             Class 3 Severe Obesity (BMI >=40). Obesity-related health conditions include the following: dyslipidemias. Obesity is improving with treatment. BMI is is above average; BMI management plan is completed. We discussed portion control and increasing exercise.     -Follow up: 2 months and prn

## 2023-05-19 NOTE — PROGRESS NOTES
Anticoagulation Clinic Progress Note    Anticoagulation Summary  As of 2023    INR goal:  2.0-3.0   TTR:  67.2 % (4.2 y)   INR used for dosin.50 (2023)   Warfarin maintenance plan:  15 mg every Sun, Wed, Fri; 10 mg all other days; Starting 2023   Weekly warfarin total:  85 mg   No change documented:  Kaylee De Leon, Pharmacy Technician   Plan last modified:  Tessie Fatima RPH (2023)   Next INR check:  2023   Priority:  High   Target end date:  Indefinite    Indications    Other acute pulmonary embolism without acute cor pulmonale [I26.99]  History of bilateral pulmonary embolism (CMS/HCC) [I26.99]             Anticoagulation Episode Summary     INR check location:      Preferred lab:      Send INR reminders to:  [facility]  POOL    Comments:  new  frankie 2019 **WEEKLY FRANKIE**      Anticoagulation Care Providers     Provider Role Specialty Phone number    Torri Colmenares, APRN Referring Cardiology [phone]    Elsy Baeza MD Responsible Cardiology [phone]          Clinic Interview:  No pertinent clinical findings have been reported.    INR History:      2023     9:38 AM 2023    12:00 AM 2023     9:00 AM 2023    12:00 AM 2023    10:40 AM 2023    12:00 AM 2023    11:49 AM   Anticoagulation Monitoring   INR 2.10  1.80  2.00  2.50   INR Date 2023   INR Goal 2.0-3.0  2.0-3.0  2.0-3.0  2.0-3.0   Trend Same  Same  Up  Same   Last Week Total 80 mg  80 mg  85 mg  85 mg   Next Week Total 80 mg  85 mg  85 mg  85 mg   Sun 15 mg  15 mg  15 mg  15 mg   Mon 10 mg  10 mg  10 mg  10 mg   Tue 10 mg  10 mg  10 mg  10 mg   Wed 15 mg  15 mg  15 mg  15 mg   Thu 10 mg  10 mg  10 mg  10 mg   Fri 10 mg  15 mg ()  15 mg  15 mg   Sat 10 mg  10 mg  10 mg  10 mg   Historical INR  1.80       2.00       2.50             This result is from an external source.       Plan:  1. INR is therapeutic today- see  above in Anticoagulation Summary.    Kameron Melendez to continue their warfarin regimen- see above in Anticoagulation Summary.  2. Follow up in 2 weeks  3. Spoke with patient and instructed to call if any changes in medications, doses, concerns, etc. Patient expresses understanding and has no further questions at this time.    Kaylee De Leon, Pharmacy Technician

## 2023-05-22 ENCOUNTER — TELEPHONE (OUTPATIENT)
Dept: FAMILY MEDICINE CLINIC | Facility: CLINIC | Age: 53
End: 2023-05-22
Payer: COMMERCIAL

## 2023-05-22 NOTE — TELEPHONE ENCOUNTER
Called Dr Luz's office [phone] he is not located in that office. They have never heard of him.     Asked if they would accept the referral to see first availible in their office. Referral was declined stating patient needs to see a dermatologist and have testing first before coming to see them.    Anyone else you'd like to refer the pt to?

## 2023-05-26 ENCOUNTER — ANTICOAGULATION VISIT (OUTPATIENT)
Dept: PHARMACY | Facility: HOSPITAL | Age: 53
End: 2023-05-26
Payer: COMMERCIAL

## 2023-05-26 DIAGNOSIS — I26.99 BILATERAL PULMONARY EMBOLISM: ICD-10-CM

## 2023-05-26 DIAGNOSIS — I26.99 OTHER ACUTE PULMONARY EMBOLISM WITHOUT ACUTE COR PULMONALE: Primary | ICD-10-CM

## 2023-05-26 LAB — INR PPP: 2.7

## 2023-05-26 NOTE — PROGRESS NOTES
Anticoagulation Clinic Progress Note    Anticoagulation Summary  As of 2023    INR goal:  2.0-3.0   TTR:  67.3 % (4.2 y)   INR used for dosin.70 (2023)   Warfarin maintenance plan:  15 mg every Sun, Wed, Fri; 10 mg all other days; Starting 2023   Weekly warfarin total:  85 mg   No change documented:  Kaylee De Leon, Pharmacy Technician   Plan last modified:  Tessie Fatima RPH (2023)   Next INR check:  2023   Priority:  High   Target end date:  Indefinite    Indications    Other acute pulmonary embolism without acute cor pulmonale [I26.99]  History of bilateral pulmonary embolism (CMS/HCC) [I26.99]             Anticoagulation Episode Summary     INR check location:      Preferred lab:      Send INR reminders to:  [facility]  POOL    Comments:  new  frankie 2019 **WEEKLY FRANKIE**      Anticoagulation Care Providers     Provider Role Specialty Phone number    Torri Colmenares, APRN Referring Cardiology [phone]    Elsy Baeza MD Responsible Cardiology [phone]          Clinic Interview:  No pertinent clinical findings have been reported.    INR History:      2023     9:00 AM 2023    12:00 AM 2023    10:40 AM 2023    12:00 AM 2023    11:49 AM 2023    12:00 AM 2023     8:30 AM   Anticoagulation Monitoring   INR 1.80  2.00  2.50  2.70   INR Date 2023   INR Goal 2.0-3.0  2.0-3.0  2.0-3.0  2.0-3.0   Trend Same  Up  Same  Same   Last Week Total 80 mg  85 mg  85 mg  85 mg   Next Week Total 85 mg  85 mg  85 mg  85 mg   Sun 15 mg  15 mg  15 mg  15 mg   Mon 10 mg  10 mg  10 mg  10 mg   Tue 10 mg  10 mg  10 mg  10 mg   Wed 15 mg  15 mg  15 mg  15 mg   Thu 10 mg  10 mg  10 mg  10 mg   Fri 15 mg ()  15 mg  15 mg  15 mg   Sat 10 mg  10 mg  10 mg  10 mg   Historical INR  2.00       2.50       2.70         Visit Report    Report Report         This result is from an external source.       Plan:  1.  INR is therapeutic today- see above in Anticoagulation Summary.    Kameron Melendez to continue their warfarin regimen- see above in Anticoagulation Summary.  2. Follow up in 1 week  3. Pt has agreed to only be called if INR out of range. They have been instructed to call if any changes in medications, doses, concerns, etc. Patient expresses understanding and has no further questions at this time.    Kaylee De Leon, Pharmacy Technician

## 2023-06-01 ENCOUNTER — TELEPHONE (OUTPATIENT)
Dept: FAMILY MEDICINE CLINIC | Facility: CLINIC | Age: 53
End: 2023-06-01

## 2023-06-01 NOTE — TELEPHONE ENCOUNTER
RRJ,    Any place else we can send this patient to for plastic surgery for that mass on his lower left leg.  The provider assigned to the last referral no one has ever heard of. Sent you a previous message but I am out of ides on where to refer to.    Thanks

## 2023-06-02 ENCOUNTER — ANTICOAGULATION VISIT (OUTPATIENT)
Dept: PHARMACY | Facility: HOSPITAL | Age: 53
End: 2023-06-02

## 2023-06-02 DIAGNOSIS — I26.99 OTHER ACUTE PULMONARY EMBOLISM WITHOUT ACUTE COR PULMONALE: Primary | ICD-10-CM

## 2023-06-02 DIAGNOSIS — I26.99 BILATERAL PULMONARY EMBOLISM: ICD-10-CM

## 2023-06-02 LAB — INR PPP: 2.7

## 2023-06-02 NOTE — PROGRESS NOTES
Anticoagulation Clinic Progress Note    Anticoagulation Summary  As of 2023    INR goal:  2.0-3.0   TTR:  67.5 % (4.3 y)   INR used for dosin.70 (2023)   Warfarin maintenance plan:  15 mg every Sun, Wed, Fri; 10 mg all other days; Starting 2023   Weekly warfarin total:  85 mg   No change documented:  Kaylee De Leon, Pharmacy Technician   Plan last modified:  Tessie Fatima RPH (2023)   Next INR check:  2023   Priority:  High   Target end date:  Indefinite    Indications    Other acute pulmonary embolism without acute cor pulmonale [I26.99]  History of bilateral pulmonary embolism (CMS/HCC) [I26.99]             Anticoagulation Episode Summary     INR check location:      Preferred lab:      Send INR reminders to:  [facility]  POOL    Comments:  new  frankie 2019 **WEEKLY FRANKIE**      Anticoagulation Care Providers     Provider Role Specialty Phone number    Torri Colmenares, APRN Referring Cardiology [phone]    Elsy Baeza MD Responsible Cardiology [phone]          Clinic Interview:  No pertinent clinical findings have been reported.    INR History:      2023    10:40 AM 2023    12:00 AM 2023    11:49 AM 2023    12:00 AM 2023     8:30 AM 2023    12:00 AM 2023     8:30 AM   Anticoagulation Monitoring   INR 2.00  2.50  2.70  2.70   INR Date 2023   INR Goal 2.0-3.0  2.0-3.0  2.0-3.0  2.0-3.0   Trend Up  Same  Same  Same   Last Week Total 85 mg  85 mg  85 mg  85 mg   Next Week Total 85 mg  85 mg  85 mg  85 mg   Sun 15 mg  15 mg  15 mg  15 mg   Mon 10 mg  10 mg  10 mg  10 mg   Tue 10 mg  10 mg  10 mg  10 mg   Wed 15 mg  15 mg  15 mg  15 mg   Thu 10 mg  10 mg  10 mg  10 mg   Fri 15 mg  15 mg  15 mg  15 mg   Sat 10 mg  10 mg  10 mg  10 mg   Historical INR  2.50       2.70       2.70         Visit Report  Report Report           This result is from an external source.       Plan:  1. INR is  therapeutic today- see above in Anticoagulation Summary.    Kameron Melendez to continue their warfarin regimen- see above in Anticoagulation Summary.  2. Follow up in 1 week  3. Pt has agreed to only be called if INR out of range. They have been instructed to call if any changes in medications, doses, concerns, etc. Patient expresses understanding and has no further questions at this time.    Kaylee De Leon, Pharmacy Technician

## 2023-06-06 ENCOUNTER — TELEPHONE (OUTPATIENT)
Dept: FAMILY MEDICINE CLINIC | Facility: CLINIC | Age: 53
End: 2023-06-06
Payer: COMMERCIAL

## 2023-06-06 NOTE — TELEPHONE ENCOUNTER
OK FOR HUB TO READ:    Called pt to advise referral sent to [facility]. He will need to call their office to make an appt if he does not hear from them in a few days.    [phone]

## 2023-06-09 ENCOUNTER — ANTICOAGULATION VISIT (OUTPATIENT)
Dept: PHARMACY | Facility: HOSPITAL | Age: 53
End: 2023-06-09
Payer: COMMERCIAL

## 2023-06-09 DIAGNOSIS — I26.99 BILATERAL PULMONARY EMBOLISM: ICD-10-CM

## 2023-06-09 DIAGNOSIS — I26.99 OTHER ACUTE PULMONARY EMBOLISM WITHOUT ACUTE COR PULMONALE: Primary | ICD-10-CM

## 2023-06-09 LAB — INR PPP: 3.7

## 2023-06-09 NOTE — PROGRESS NOTES
Anticoagulation Clinic Progress Note    Anticoagulation Summary  As of 6/9/2023    INR goal:  2.0-3.0   TTR:  67.3 % (4.3 y)   INR used for dosing:  3.70 (6/9/2023)   Warfarin maintenance plan:  15 mg every Sun, Wed, Fri; 10 mg all other days; Starting 6/9/2023   Weekly warfarin total:  85 mg   Plan last modified:  Tessie Fatima RPH (5/12/2023)   Next INR check:  6/16/2023   Priority:  High   Target end date:  Indefinite    Indications    Other acute pulmonary embolism without acute cor pulmonale [I26.99]  Bilateral pulmonary embolism [I26.99]             Anticoagulation Episode Summary     INR check location:      Preferred lab:      Send INR reminders to:   [facility]  POOL    Comments:  new  frankie March 2019 **WEEKLY FRANKIE**      Anticoagulation Care Providers     Provider Role Specialty Phone number    Torri Colmenares APRN Referring Cardiology [phone]    Elsy Baeza MD Responsible Cardiology [phone]          Clinic Interview:  Patient Findings     Positives:  Other complaints    Negatives:  Signs/symptoms of thrombosis, Signs/symptoms of bleeding,   Laboratory test error suspected, Change in health, Change in alcohol use,   Change in activity, Upcoming invasive procedure, Emergency department   visit, Upcoming dental procedure, Missed doses, Extra doses, Change in   medications, Change in diet/appetite, Hospital admission, Bruising    Comments:  Reports he may have accidentally taken 20 mg one day rather   than 15 mg.       Clinical Outcomes     Negatives:  Major bleeding event, Thromboembolic event,   Anticoagulation-related hospital admission, Anticoagulation-related ED   visit, Anticoagulation-related fatality    Comments:  Reports he may have accidentally taken 20 mg one day rather   than 15 mg.         INR History:      5/19/2023    11:49 AM 5/26/2023    12:00 AM 5/26/2023     8:30 AM 6/2/2023    12:00 AM 6/2/2023     8:30 AM 6/9/2023    12:00 AM 6/9/2023     9:35 AM    Anticoagulation Monitoring   INR 2.50  2.70  2.70  3.70   INR Date 5/19/2023 5/26/2023 6/2/2023 6/9/2023   INR Goal 2.0-3.0  2.0-3.0  2.0-3.0  2.0-3.0   Trend Same  Same  Same  Same   Last Week Total 85 mg  85 mg  85 mg  85 mg   Next Week Total 85 mg  85 mg  85 mg  75 mg   Sun 15 mg  15 mg  15 mg  15 mg   Mon 10 mg  10 mg  10 mg  10 mg   Tue 10 mg  10 mg  10 mg  10 mg   Wed 15 mg  15 mg  15 mg  15 mg   Thu 10 mg  10 mg  10 mg  10 mg   Fri 15 mg  15 mg  15 mg  5 mg (6/9)   Sat 10 mg  10 mg  10 mg  10 mg   Historical INR  2.70      2.70      3.70        Visit Report Report             This result is from an external source.       Plan:  1. INR is Supratherapeutic today- see above in Anticoagulation Summary.   Will instruct Kameron Melendez to Change their warfarin regimen- see above in Anticoagulation Summary.  2. Follow up in 1 week.  3. They have been instructed to call if any changes in medications, doses, concerns, etc. Patient expresses understanding and has no further questions at this time.    Ramo Morocho, PharmD

## 2023-06-16 ENCOUNTER — ANTICOAGULATION VISIT (OUTPATIENT)
Dept: PHARMACY | Facility: HOSPITAL | Age: 53
End: 2023-06-16
Payer: COMMERCIAL

## 2023-06-16 DIAGNOSIS — I26.99 OTHER ACUTE PULMONARY EMBOLISM WITHOUT ACUTE COR PULMONALE: Primary | ICD-10-CM

## 2023-06-16 DIAGNOSIS — I26.99 BILATERAL PULMONARY EMBOLISM: ICD-10-CM

## 2023-06-16 LAB — INR PPP: 2.9

## 2023-06-16 NOTE — PROGRESS NOTES
Anticoagulation Clinic Progress Note    Anticoagulation Summary  As of 2023      INR goal:  2.0-3.0   TTR:  67.1 % (4.3 y)   INR used for dosin.90 (2023)   Warfarin maintenance plan:  15 mg every Sun, Wed, Fri; 10 mg all other days; Starting 2023   Weekly warfarin total:  85 mg   No change documented:  Tessie Fatima RPH   Plan last modified:  Tessie Fatima RPH (2023)   Next INR check:  2023   Priority:  High   Target end date:  Indefinite    Indications    Other acute pulmonary embolism without acute cor pulmonale [I26.99]  Bilateral pulmonary embolism [I26.99]                 Anticoagulation Episode Summary       INR check location:      Preferred lab:      [facility]    Comments:  new  frankie 2019 **WEEKLY FRANKIE**          Anticoagulation Care Providers       Provider Role Specialty Phone number    Torri Colmenares APRN Referring Cardiology [phone]    Elsy Baeza MD Responsible Cardiology [phone]            Clinic Interview:  No pertinent clinical findings have been reported.    INR History:      2023     8:30 AM 2023    12:00 AM 2023     8:30 AM 2023    12:00 AM 2023     9:35 AM 2023    12:00 AM 2023     8:40 AM   Anticoagulation Monitoring   INR 2.70  2.70  3.70  2.90   INR Date 2023   INR Goal 2.0-3.0  2.0-3.0  2.0-3.0  2.0-3.0   Trend Same  Same  Same  Same   Last Week Total 85 mg  85 mg  85 mg  75 mg   Next Week Total 85 mg  85 mg  75 mg  85 mg   Sun 15 mg  15 mg  15 mg  15 mg   Mon 10 mg  10 mg  10 mg  10 mg   Tue 10 mg  10 mg  10 mg  10 mg   Wed 15 mg  15 mg  15 mg  15 mg   Thu 10 mg  10 mg  10 mg  10 mg   Fri 15 mg  15 mg  5 mg ()  15 mg   Sat 10 mg  10 mg  10 mg  10 mg   Historical INR  2.70      3.70      2.90            This result is from an external source.       Plan:  1. INR is therapeutic today- see above in Anticoagulation  Summary.    Kameron Melendez to continue their warfarin regimen- see above in Anticoagulation Summary.  2. Follow up in 1 week  3. They have been instructed to call if any changes in medications, doses, concerns, etc. Patient expresses understanding and has no further questions at this time.    Tessie Fatima, [facility]

## 2023-07-21 NOTE — LETTER
July 21, 2023     Kameron Rochamaria guadalupe    No Recipients      Dear Mr. Melendez:    Thank you for enrolling in [employer]. Please follow the instructions below to securely access your online medical record. [employer] allows you to send messages to your doctor, view your test results, renew your prescriptions, schedule appointments, and more.     How Do I Sign Up?  In your Internet browser, go to [employer].  Click on the Sign Up Now link in the New User? box.   Enter your [employer]mit Activation Code exactly as it appears below. You will not need to use this code after you have completed the sign-up process. If you do not sign up before the expiration date, you must request a new code.  Taskmit Activation Code: Activation code not generated  Current Taskmit Status: Active    Enter the last four digits of your Social Security Number and your Date of Birth as indicated and click Next. You will be taken to the next sign-up page.  Create a Taskmit username. Think of one that is secure and easy to remember.  Create a [employer] password. You can change your password at any time.  Choose a security question, enter your answer, and click Next. This can be used to access [employer]mit if you forget your password.   Select your communication preference. Enter a valid email address to receive email notifications when new information is available in Taskmit.  Click Sign In. You can now view your medical record.     Additional Information  If you have questions, you can email [email or web address] or call [phone] to talk to our Taskmit staff. Remember, [employer]mit is NOT to be used for urgent needs. For medical emergencies, dial 911.         Sincerely,        Hansel Patel DO        CC:   No Recipients

## 2023-07-21 NOTE — LETTER
July 21, 2023     Patient: Kameron Melendez   YOB: 1970   Date of Visit: 7/21/2023       To Whom It May Concern:    Please excuse Kameron Melendez today as he was here for medical appointment.           Sincerely,          Hansel Patel DO    CC:   No Recipients

## 2023-07-28 ENCOUNTER — ANTICOAGULATION VISIT (OUTPATIENT)
Dept: PHARMACY | Facility: HOSPITAL | Age: 53
End: 2023-07-28
Payer: COMMERCIAL

## 2023-07-28 DIAGNOSIS — I26.99 OTHER ACUTE PULMONARY EMBOLISM WITHOUT ACUTE COR PULMONALE: Primary | ICD-10-CM

## 2023-07-28 DIAGNOSIS — I26.99 BILATERAL PULMONARY EMBOLISM: ICD-10-CM

## 2023-07-28 LAB — INR PPP: 3.9

## 2023-07-28 NOTE — PROGRESS NOTES
Anticoagulation Clinic Progress Note    Anticoagulation Summary  As of 7/28/2023      INR goal:  2.0-3.0   TTR:  67.0 % (4.4 y)   INR used for dosing:  3.90 (7/28/2023)   Warfarin maintenance plan:  15 mg every Sun, Wed, Fri; 10 mg all other days   Weekly warfarin total:  85 mg   Plan last modified:  Tessie Faitma RPH (5/12/2023)   Next INR check:  8/4/2023   Priority:  High   Target end date:  Indefinite    Indications    Other acute pulmonary embolism without acute cor pulmonale [I26.99]  Bilateral pulmonary embolism [I26.99]                 Anticoagulation Episode Summary       INR check location:      Preferred lab:      Send INR reminders to:  [facility]  POOL    Comments:  new  frankie March 2019 **WEEKLY FRANKIE**          Anticoagulation Care Providers       Provider Role Specialty Phone number    Torri Colmenares APRN Referring Cardiology [phone]    Elsy Baeza MD Responsible Cardiology [phone]            Clinic Interview:  Patient Findings     Negatives:  Signs/symptoms of thrombosis, Signs/symptoms of bleeding,   Laboratory test error suspected, Change in health, Change in alcohol use,   Change in activity, Upcoming invasive procedure, Emergency department   visit, Upcoming dental procedure, Missed doses, Extra doses, Change in   medications, Change in diet/appetite, Hospital admission, Bruising, Other   complaints      Clinical Outcomes     Negatives:  Major bleeding event, Thromboembolic event,   Anticoagulation-related hospital admission, Anticoagulation-related ED   visit, Anticoagulation-related fatality        INR History:      7/7/2023     8:41 AM 7/14/2023    12:00 AM 7/14/2023     8:07 AM 7/21/2023    12:00 AM 7/21/2023     8:57 AM 7/28/2023    12:00 AM 7/28/2023     9:12 AM   Anticoagulation Monitoring   INR 3.00  2.40  2.70  3.90   INR Date 7/7/2023 7/14/2023 7/21/2023 7/28/2023   INR Goal 2.0-3.0  2.0-3.0  2.0-3.0  2.0-3.0   Trend Same  Same  Same  Same    Last Week Total 80 mg  85 mg  85 mg  85 mg   Next Week Total 85 mg  85 mg  85 mg  75 mg   Sun 15 mg  15 mg  15 mg  15 mg   Mon 10 mg  10 mg  10 mg  10 mg   Tue 10 mg  10 mg  10 mg  10 mg   Wed 15 mg  15 mg  15 mg  15 mg   Thu 10 mg  10 mg  10 mg  10 mg   Fri 15 mg  15 mg  15 mg  5 mg (7/28)   Sat 10 mg  10 mg  10 mg  10 mg   Historical INR  2.40      2.70      3.90        Visit Report    Report Report         This result is from an external source.       Plan:  1. INR is Supratherapeutic today- see above in Anticoagulation Summary.   Will instruct Kameron Melendez to Change their warfarin regimen- see above in Anticoagulation Summary.  Partial to 5 mg today then resume, rck 1 week   2. Follow up in 1 weeks  3. They have been instructed to call if any changes in medications, doses, concerns, etc. Patient expresses understanding and has no further questions at this time.    Tessie Fatima [facility]

## 2023-08-04 ENCOUNTER — ANTICOAGULATION VISIT (OUTPATIENT)
Dept: PHARMACY | Facility: HOSPITAL | Age: 53
End: 2023-08-04
Payer: COMMERCIAL

## 2023-08-04 DIAGNOSIS — I26.99 OTHER ACUTE PULMONARY EMBOLISM WITHOUT ACUTE COR PULMONALE: Primary | ICD-10-CM

## 2023-08-04 DIAGNOSIS — I26.99 BILATERAL PULMONARY EMBOLISM: ICD-10-CM

## 2023-08-04 LAB — INR PPP: 2.2

## 2023-08-11 ENCOUNTER — ANTICOAGULATION VISIT (OUTPATIENT)
Dept: PHARMACY | Facility: HOSPITAL | Age: 53
End: 2023-08-11
Payer: COMMERCIAL

## 2023-08-11 DIAGNOSIS — I26.99 BILATERAL PULMONARY EMBOLISM: ICD-10-CM

## 2023-08-11 DIAGNOSIS — I26.99 OTHER ACUTE PULMONARY EMBOLISM WITHOUT ACUTE COR PULMONALE: Primary | ICD-10-CM

## 2023-08-11 LAB — INR PPP: 2.6

## 2023-08-11 NOTE — PROGRESS NOTES
Anticoagulation Clinic Progress Note    Anticoagulation Summary  As of 2023      INR goal:  2.0-3.0   TTR:  67.0 % (4.5 y)   INR used for dosin.60 (2023)   Warfarin maintenance plan:  15 mg every Sun, Wed, Fri; 10 mg all other days   Weekly warfarin total:  85 mg   No change documented:  Kaylee De Leon, Pharmacy Technician   Plan last modified:  Tessie Fatima RPH (2023)   Next INR check:  2023   Priority:  High   Target end date:  Indefinite    Indications    Other acute pulmonary embolism without acute cor pulmonale [I26.99]  Bilateral pulmonary embolism [I26.99]                 Anticoagulation Episode Summary       INR check location:      Preferred lab:      Send INR reminders to:  [facility]  POOL    Comments:  new  frankie 2019 **WEEKLY FRANKIE**          Anticoagulation Care Providers       Provider Role Specialty Phone number    Torri Colmenares APRN Referring Cardiology [phone]    Elsy Baeza MD Responsible Cardiology [phone]            Clinic Interview:  No pertinent clinical findings have been reported.    INR History:      2023     8:57 AM 2023    12:00 AM 2023     9:12 AM 2023    12:00 AM 2023    11:40 AM 2023    12:00 AM 2023     8:25 AM   Anticoagulation Monitoring   INR 2.70  3.90  2.20  2.60   INR Date 2023   INR Goal 2.0-3.0  2.0-3.0  2.0-3.0  2.0-3.0   Trend Same  Same  Same  Same   Last Week Total 85 mg  85 mg  75 mg  85 mg   Next Week Total 85 mg  75 mg  85 mg  85 mg   Sun 15 mg  15 mg  15 mg  15 mg   Mon 10 mg  10 mg  10 mg  10 mg   Tue 10 mg  10 mg  10 mg  10 mg   Wed 15 mg  15 mg  15 mg  15 mg   Thu 10 mg  10 mg  10 mg  10 mg   Fri 15 mg  5 mg ()  15 mg  15 mg   Sat 10 mg  10 mg  10 mg  10 mg   Historical INR  3.90      2.20      2.60        Visit Report Report             This result is from an external source.       Plan:  1. INR is therapeutic today- see  above in Anticoagulation Summary.    Kameron Melendez to continue their warfarin regimen- see above in Anticoagulation Summary.  2. Follow up in 1 week  3. Pt has agreed to only be called if INR out of range. They have been instructed to call if any changes in medications, doses, concerns, etc. Patient expresses understanding and has no further questions at this time.    Kaylee De Leon, Pharmacy Technician

## 2023-08-18 ENCOUNTER — ANTICOAGULATION VISIT (OUTPATIENT)
Dept: PHARMACY | Facility: HOSPITAL | Age: 53
End: 2023-08-18
Payer: COMMERCIAL

## 2023-08-18 DIAGNOSIS — I26.99 BILATERAL PULMONARY EMBOLISM: ICD-10-CM

## 2023-08-18 DIAGNOSIS — I26.99 OTHER ACUTE PULMONARY EMBOLISM WITHOUT ACUTE COR PULMONALE: Primary | ICD-10-CM

## 2023-08-18 LAB — INR PPP: 2.3

## 2023-08-18 NOTE — PROGRESS NOTES
Anticoagulation Clinic Progress Note    Anticoagulation Summary  As of 2023      INR goal:  2.0-3.0   TTR:  67.2 % (4.5 y)   INR used for dosin.30 (2023)   Warfarin maintenance plan:  15 mg every Sun, Wed, Fri; 10 mg all other days   Weekly warfarin total:  85 mg   No change documented:  Iris Gonzalez   Plan last modified:  Tessie Fatima RPH (2023)   Next INR check:  2023   Priority:  High   Target end date:  Indefinite    Indications    Other acute pulmonary embolism without acute cor pulmonale [I26.99]  Bilateral pulmonary embolism [I26.99]                 Anticoagulation Episode Summary       INR check location:      Preferred lab:      Send INR reminders to:  [facility]  POOL    Comments:  new  tristen 2019 **WEEKLY TRISTEN**          Anticoagulation Care Providers       Provider Role Specialty Phone number    Torri Colmenares APRN Referring Cardiology [phone]    Elsy Baeza MD Responsible Cardiology [phone]            Clinic Interview:  No pertinent clinical findings have been reported.    INR History:      2023     9:12 AM 2023    12:00 AM 2023    11:40 AM 2023    12:00 AM 2023     8:25 AM 2023    12:00 AM 2023     8:32 AM   Anticoagulation Monitoring   INR 3.90  2.20  2.60  2.30   INR Date 2023   INR Goal 2.0-3.0  2.0-3.0  2.0-3.0  2.0-3.0   Trend Same  Same  Same  Same   Last Week Total 85 mg  75 mg  85 mg  85 mg   Next Week Total 75 mg  85 mg  85 mg  85 mg   Sun 15 mg  15 mg  15 mg  15 mg   Mon 10 mg  10 mg  10 mg  10 mg   Tue 10 mg  10 mg  10 mg  10 mg   Wed 15 mg  15 mg  15 mg  15 mg   Thu 10 mg  10 mg  10 mg  10 mg   Fri 5 mg ()  15 mg  15 mg  15 mg   Sat 10 mg  10 mg  10 mg  10 mg   Historical INR  2.20      2.60      2.30            This result is from an external source.       Plan:  1. INR is therapeutic today- see above in Anticoagulation Summary.     Kameron Melendez to continue their warfarin regimen- see above in Anticoagulation Summary.  2. Follow up in 1 week  3. Pt has agreed to only be called if INR out of range. They have been instructed to call if any changes in medications, doses, concerns, etc. Patient expresses understanding and has no further questions at this time.    Iris Gonzalez

## 2023-08-25 ENCOUNTER — ANTICOAGULATION VISIT (OUTPATIENT)
Dept: PHARMACY | Facility: HOSPITAL | Age: 53
End: 2023-08-25
Payer: COMMERCIAL

## 2023-08-25 DIAGNOSIS — I26.99 BILATERAL PULMONARY EMBOLISM: ICD-10-CM

## 2023-08-25 DIAGNOSIS — I26.99 OTHER ACUTE PULMONARY EMBOLISM WITHOUT ACUTE COR PULMONALE: Primary | ICD-10-CM

## 2023-08-25 LAB — INR PPP: 2.9

## 2023-08-25 NOTE — PROGRESS NOTES
Anticoagulation Clinic Progress Note    Anticoagulation Summary  As of 2023      INR goal:  2.0-3.0   TTR:  67.3 % (4.5 y)   INR used for dosin.90 (2023)   Warfarin maintenance plan:  15 mg every Sun, Wed, Fri; 10 mg all other days   Weekly warfarin total:  85 mg   No change documented:  Iris Gonzalez   Plan last modified:  Tessie Fatima RPH (2023)   Next INR check:  2023   Priority:  High   Target end date:  Indefinite    Indications    Other acute pulmonary embolism without acute cor pulmonale [I26.99]  Bilateral pulmonary embolism [I26.99]                 Anticoagulation Episode Summary       INR check location:      Preferred lab:      Send INR reminders to:  [facility]  POOL    Comments:  new  tristen 2019 **WEEKLY TRISTEN**          Anticoagulation Care Providers       Provider Role Specialty Phone number    Torri Colmenares APRN Referring Cardiology [phone]    Elsy Baeza MD Responsible Cardiology [phone]            Clinic Interview:  No pertinent clinical findings have been reported.    INR History:      2023    11:40 AM 2023    12:00 AM 2023     8:25 AM 2023    12:00 AM 2023     8:32 AM 2023    12:00 AM 2023     9:25 AM   Anticoagulation Monitoring   INR 2.20  2.60  2.30  2.90   INR Date 2023   INR Goal 2.0-3.0  2.0-3.0  2.0-3.0  2.0-3.0   Trend Same  Same  Same  Same   Last Week Total 75 mg  85 mg  85 mg  85 mg   Next Week Total 85 mg  85 mg  85 mg  85 mg   Sun 15 mg  15 mg  15 mg  15 mg   Mon 10 mg  10 mg  10 mg  10 mg   Tue 10 mg  10 mg  10 mg  10 mg   Wed 15 mg  15 mg  15 mg  15 mg   Thu 10 mg  10 mg  10 mg  10 mg   Fri 15 mg  15 mg  15 mg  15 mg   Sat 10 mg  10 mg  10 mg  10 mg   Historical INR  2.60      2.30      2.90            This result is from an external source.       Plan:  1. INR is therapeutic today- see above in Anticoagulation Summary.    Kameron BALDERAS  Al to continue their warfarin regimen- see above in Anticoagulation Summary.  2. Follow up in 1 week  3. Pt has agreed to only be called if INR out of range. They have been instructed to call if any changes in medications, doses, concerns, etc. Patient expresses understanding and has no further questions at this time.    Iris Gonzalez

## 2023-09-01 ENCOUNTER — ANTICOAGULATION VISIT (OUTPATIENT)
Dept: PHARMACY | Facility: HOSPITAL | Age: 53
End: 2023-09-01
Payer: COMMERCIAL

## 2023-09-01 DIAGNOSIS — I26.99 OTHER ACUTE PULMONARY EMBOLISM WITHOUT ACUTE COR PULMONALE: Primary | ICD-10-CM

## 2023-09-01 DIAGNOSIS — I26.99 BILATERAL PULMONARY EMBOLISM: ICD-10-CM

## 2023-09-01 LAB — INR PPP: 4

## 2023-09-01 NOTE — PROGRESS NOTES
Anticoagulation Clinic Progress Note    Anticoagulation Summary  As of 2023      INR goal:  2.0-3.0   TTR:  67.1 % (4.5 y)   INR used for dosin.00 (2023)   Warfarin maintenance plan:  15 mg every Sun, Wed, Fri; 10 mg all other days   Weekly warfarin total:  85 mg   Plan last modified:  Tessie Fatima RPH (2023)   Next INR check:  2023   Priority:  High   Target end date:  Indefinite    Indications    Other acute pulmonary embolism without acute cor pulmonale [I26.99]  Bilateral pulmonary embolism [I26.99]                 Anticoagulation Episode Summary       INR check location:      Preferred lab:      [facility]    Comments:  new  frankie 2019 **WEEKLY FRANKIE**          Anticoagulation Care Providers       Provider Role Specialty Phone number    Torri Colmenares APRN Referring Cardiology [phone]    Elsy Baeza MD Responsible Cardiology [phone]            Clinic Interview:  Patient Findings     Positives:  Extra doses    Negatives:  Signs/symptoms of thrombosis, Signs/symptoms of bleeding,   Laboratory test error suspected, Change in health, Change in alcohol use,   Change in activity, Upcoming invasive procedure, Emergency department   visit, Upcoming dental procedure, Missed doses, Change in medications,   Change in diet/appetite, Hospital admission, Bruising, Other complaints    Comments:  He left voicemail indicating he believes he accidentally took   double dose of warfarin earlier this week.       Clinical Outcomes     Negatives:  Major bleeding event, Thromboembolic event,   Anticoagulation-related hospital admission, Anticoagulation-related ED   visit, Anticoagulation-related fatality    Comments:  He left voicemail indicating he believes he accidentally took   double dose of warfarin earlier this week.         INR History:      2023     8:25 AM 2023    12:00 AM 2023     8:32 AM 2023    12:00 AM  8/25/2023     9:25 AM 9/1/2023    12:00 AM 9/1/2023     9:24 AM   Anticoagulation Monitoring   INR 2.60  2.30  2.90  4.00   INR Date 8/11/2023 8/18/2023 8/25/2023 9/1/2023   INR Goal 2.0-3.0  2.0-3.0  2.0-3.0  2.0-3.0   Trend Same  Same  Same  Same   Last Week Total 85 mg  85 mg  85 mg  95 mg   Next Week Total 85 mg  85 mg  85 mg  75 mg   Sun 15 mg  15 mg  15 mg  15 mg   Mon 10 mg  10 mg  10 mg  10 mg   Tue 10 mg  10 mg  10 mg  10 mg   Wed 15 mg  15 mg  15 mg  15 mg   Thu 10 mg  10 mg  10 mg  10 mg   Fri 15 mg  15 mg  15 mg  5 mg (9/1)   Sat 10 mg  10 mg  10 mg  10 mg   Historical INR  2.30      2.90      4.00            This result is from an external source.       Plan:  1. INR is Supratherapeutic today- see above in Anticoagulation Summary.   Will instruct Kameron Melendez to Change their warfarin regimen (5 mg today, then resume 15 mg Sun/Wed/Fri, 10 mg all other days) - see above in Anticoagulation Summary.  2. Follow up in 1 week.  3. Per patient request, left voicemail with detailed instructions. They have been instructed to call if any changes in medications, doses, concerns, etc.     Ramo Morocho, PharmD

## 2023-09-08 ENCOUNTER — ANTICOAGULATION VISIT (OUTPATIENT)
Dept: PHARMACY | Facility: HOSPITAL | Age: 53
End: 2023-09-08
Payer: COMMERCIAL

## 2023-09-08 DIAGNOSIS — I26.99 BILATERAL PULMONARY EMBOLISM: ICD-10-CM

## 2023-09-08 DIAGNOSIS — I26.99 OTHER ACUTE PULMONARY EMBOLISM WITHOUT ACUTE COR PULMONALE: Primary | ICD-10-CM

## 2023-09-08 LAB — INR PPP: 3.5

## 2023-09-08 NOTE — PROGRESS NOTES
Anticoagulation Clinic Progress Note    Anticoagulation Summary  As of 9/8/2023      INR goal:  2.0-3.0   TTR:  66.8 % (4.5 y)   INR used for dosing:  3.50 (9/8/2023)   Warfarin maintenance plan:  15 mg every Sun, Wed, Fri; 10 mg all other days   Weekly warfarin total:  85 mg   Plan last modified:  Tessie Fatima RPH (5/12/2023)   Next INR check:  9/15/2023   Priority:  High   Target end date:  Indefinite    Indications    Other acute pulmonary embolism without acute cor pulmonale [I26.99]  Bilateral pulmonary embolism [I26.99]                 Anticoagulation Episode Summary       INR check location:      Preferred lab:      Send INR reminders to:  [facility]  POOL    Comments:  new  frankie March 2019 **WEEKLY FRANKIE**          Anticoagulation Care Providers       Provider Role Specialty Phone number    Torri Colmenares APRN Referring Cardiology [phone]    Elsy Baeza MD Responsible Cardiology [phone]            Clinic Interview:  Patient Findings     Negatives:  Signs/symptoms of thrombosis, Signs/symptoms of bleeding,   Laboratory test error suspected, Change in health, Change in alcohol use,   Change in activity, Upcoming invasive procedure, Emergency department   visit, Upcoming dental procedure, Missed doses, Extra doses, Change in   medications, Change in diet/appetite, Hospital admission, Bruising, Other   complaints      Clinical Outcomes     Negatives:  Major bleeding event, Thromboembolic event,   Anticoagulation-related hospital admission, Anticoagulation-related ED   visit, Anticoagulation-related fatality        INR History:      8/18/2023     8:32 AM 8/25/2023    12:00 AM 8/25/2023     9:25 AM 9/1/2023    12:00 AM 9/1/2023     9:24 AM 9/8/2023    12:00 AM 9/8/2023    10:59 AM   Anticoagulation Monitoring   INR 2.30  2.90  4.00  3.50   INR Date 8/18/2023 8/25/2023 9/1/2023 9/8/2023   INR Goal 2.0-3.0  2.0-3.0  2.0-3.0  2.0-3.0   Trend Same  Same  Same  Same   Last  Week Total 85 mg  85 mg  95 mg  75 mg   Next Week Total 85 mg  85 mg  75 mg  75 mg   Sun 15 mg  15 mg  15 mg  10 mg (9/10)   Mon 10 mg  10 mg  10 mg  10 mg   Tue 10 mg  10 mg  10 mg  10 mg   Wed 15 mg  15 mg  15 mg  15 mg   Thu 10 mg  10 mg  10 mg  10 mg   Fri 15 mg  15 mg  5 mg (9/1)  10 mg (9/8)   Sat 10 mg  10 mg  10 mg  10 mg   Historical INR  2.90      4.00      3.50            This result is from an external source.       Plan:  1. INR is Supratherapeutic today- see above in Anticoagulation Summary.   Will instruct Kameron Melendez to Change their warfarin regimen- see above in Anticoagulation Summary.  trial 15 mg on wed, 10 mg fatemeh FLORES 1 week   2. Follow up in 1 weeks  3. They have been instructed to call if any changes in medications, doses, concerns, etc. Patient expresses understanding and has no further questions at this time.    Tessie Fatima [facility]

## 2023-09-18 ENCOUNTER — ANTICOAGULATION VISIT (OUTPATIENT)
Dept: PHARMACY | Facility: HOSPITAL | Age: 53
End: 2023-09-18
Payer: COMMERCIAL

## 2023-09-18 DIAGNOSIS — I26.99 OTHER ACUTE PULMONARY EMBOLISM WITHOUT ACUTE COR PULMONALE: Primary | ICD-10-CM

## 2023-09-18 DIAGNOSIS — I26.99 BILATERAL PULMONARY EMBOLISM: ICD-10-CM

## 2023-09-18 LAB — INR PPP: 3

## 2023-09-18 NOTE — PROGRESS NOTES
Anticoagulation Clinic Progress Note    Anticoagulation Summary  As of 9/18/2023      INR goal:  2.0-3.0   TTR:  66.4 % (4.6 y)   INR used for dosing:  3.00 (9/18/2023)   Warfarin maintenance plan:  15 mg every Wed; 10 mg all other days   Weekly warfarin total:  75 mg   Plan last modified:  Tessie Fatima RPH (9/18/2023)   Next INR check:  9/25/2023   Priority:  High   Target end date:  Indefinite    Indications    Other acute pulmonary embolism without acute cor pulmonale [I26.99]  Bilateral pulmonary embolism [I26.99]                 Anticoagulation Episode Summary       INR check location:      Preferred lab:      Send INR reminders to:  [facility]  POOL    Comments:  new  frankie March 2019 **WEEKLY FRANKIE**          Anticoagulation Care Providers       Provider Role Specialty Phone number    Torri Colmenares, FADY Referring Cardiology [phone]    Elsy Baeza MD Responsible Cardiology [phone]            Clinic Interview:  No pertinent clinical findings have been reported.    INR History:      8/25/2023     9:25 AM 9/1/2023    12:00 AM 9/1/2023     9:24 AM 9/8/2023    12:00 AM 9/8/2023    10:59 AM 9/18/2023    12:00 AM 9/18/2023     8:50 AM   Anticoagulation Monitoring   INR 2.90  4.00  3.50  3.00   INR Date 8/25/2023 9/1/2023 9/8/2023 9/18/2023   INR Goal 2.0-3.0  2.0-3.0  2.0-3.0  2.0-3.0   Trend Same  Same  Same  Down   Last Week Total 85 mg  95 mg  75 mg  80 mg   Next Week Total 85 mg  75 mg  75 mg  75 mg   Sun 15 mg  15 mg  10 mg (9/10)  10 mg   Mon 10 mg  10 mg  10 mg  10 mg   Tue 10 mg  10 mg  10 mg  10 mg   Wed 15 mg  15 mg  15 mg  15 mg   Thu 10 mg  10 mg  10 mg  10 mg   Fri 15 mg  5 mg (9/1)  10 mg (9/8)  10 mg   Sat 10 mg  10 mg  10 mg  10 mg   Historical INR  4.00      3.50      3.00            This result is from an external source.       Plan:  1. INR is therapeutic today- see above in Anticoagulation Summary.    Kameron Melendez to continue their warfarin regimen-  see above in Anticoagulation Summary.  2. Follow up in 1 week  3.  They have been instructed to call if any changes in medications, doses, concerns, etc. Patient expresses understanding and has no further questions at this time.    Tessie Fatima RP

## 2023-09-25 NOTE — PROGRESS NOTES
Anticoagulation Clinic Progress Note    Anticoagulation Summary  As of 2022    INR goal:  2.0-3.0   TTR:  69.5 % (3.1 y)   INR used for dosin.20 (2022)   Warfarin maintenance plan:  10 mg every Tue, Thu; 15 mg all other days   Weekly warfarin total:  95 mg   No change documented:  Iris Gonzalez   Plan last modified:  Tessie Fatima RPH (3/4/2022)   Next INR check:  4/15/2022   Priority:  High   Target end date:  Indefinite    Indications    Other acute pulmonary embolism without acute cor pulmonale (HCC) [I26.99]  History of bilateral pulmonary embolism (CMS/HCC) [I26.99]             Anticoagulation Episode Summary     INR check location:      Preferred lab:      Send INR reminders to:  [facility]  POOL    Comments:  new  tristen 2019 **WEEKLY TRISTEN**      Anticoagulation Care Providers     Provider Role Specialty Phone number    Torri Colmenares APRN Referring Cardiology [phone]    Elsy Baeza MD Responsible Cardiology [phone]          Clinic Interview:  No pertinent clinical findings have been reported.    INR History:  Anticoagulation Monitoring 3/25/2022 2022 2022   INR 2.60 2.40 2.20   INR Date 3/25/2022 2022 2022   INR Goal 2.0-3.0 2.0-3.0 2.0-3.0   Trend Same Same Same   Last Week Total 95 mg 95 mg 95 mg   Next Week Total 95 mg 95 mg 95 mg   Sun 15 mg 15 mg 15 mg   Mon 15 mg 15 mg 15 mg   Tue 10 mg 10 mg 10 mg   Wed 15 mg 15 mg 15 mg   Thu 10 mg 10 mg 10 mg   Fri 15 mg 15 mg 15 mg   Sat 15 mg 15 mg 15 mg   Visit Report - - -   Some recent data might be hidden       Plan:  1. INR is therapeutic today- see above in Anticoagulation Summary.    Kameron Melendez to continue their warfarin regimen- see above in Anticoagulation Summary.  2. Follow up in 1 week  3. Pt has agreed to only be called if INR out of range. They have been instructed to call if any changes in medications, doses, concerns, etc. Patient expresses understanding and  has no further questions at this time.    Iris Gonzalez   No

## 2023-09-29 ENCOUNTER — ANTICOAGULATION VISIT (OUTPATIENT)
Dept: PHARMACY | Facility: HOSPITAL | Age: 53
End: 2023-09-29
Payer: COMMERCIAL

## 2023-09-29 DIAGNOSIS — I26.99 OTHER ACUTE PULMONARY EMBOLISM WITHOUT ACUTE COR PULMONALE: Primary | ICD-10-CM

## 2023-09-29 DIAGNOSIS — I89.0 LYMPHEDEMA: ICD-10-CM

## 2023-09-29 DIAGNOSIS — I26.99 BILATERAL PULMONARY EMBOLISM: ICD-10-CM

## 2023-09-29 DIAGNOSIS — E66.01 MORBID (SEVERE) OBESITY DUE TO EXCESS CALORIES: ICD-10-CM

## 2023-09-29 LAB — INR PPP: 3

## 2023-09-29 NOTE — PROGRESS NOTES
Anticoagulation Clinic Progress Note    Anticoagulation Summary  As of 9/29/2023      INR goal:  2.0-3.0   TTR:  66.6 % (4.6 y)   INR used for dosing:  3.00 (9/29/2023)   Warfarin maintenance plan:  15 mg every Wed; 10 mg all other days   Weekly warfarin total:  75 mg   No change documented:  Iris Gonzalez   Plan last modified:  Tessie Fatima RPH (9/18/2023)   Next INR check:  10/6/2023   Priority:  High   Target end date:  Indefinite    Indications    Other acute pulmonary embolism without acute cor pulmonale [I26.99]  Bilateral pulmonary embolism [I26.99]                 Anticoagulation Episode Summary       INR check location:      Preferred lab:      Send INR reminders to:   [facility]  POOL    Comments:  new  tristen March 2019 **WEEKLY TRISTEN**          Anticoagulation Care Providers       Provider Role Specialty Phone number    Torri Colmenares APRN Referring Cardiology [phone]    Elsy Baeza MD Responsible Cardiology [phone]            Clinic Interview:  No pertinent clinical findings have been reported.    INR History:      9/1/2023     9:24 AM 9/8/2023    12:00 AM 9/8/2023    10:59 AM 9/18/2023    12:00 AM 9/18/2023     8:50 AM 9/29/2023    12:00 AM 9/29/2023     8:48 AM   Anticoagulation Monitoring   INR 4.00  3.50  3.00  3.00   INR Date 9/1/2023 9/8/2023 9/18/2023 9/29/2023   INR Goal 2.0-3.0  2.0-3.0  2.0-3.0  2.0-3.0   Trend Same  Same  Down  Same   Last Week Total 95 mg  75 mg  80 mg  75 mg   Next Week Total 75 mg  75 mg  75 mg  75 mg   Sun 15 mg  10 mg (9/10)  10 mg  10 mg   Mon 10 mg  10 mg  10 mg  10 mg   Tue 10 mg  10 mg  10 mg  10 mg   Wed 15 mg  15 mg  15 mg  15 mg   Thu 10 mg  10 mg  10 mg  10 mg   Fri 5 mg (9/1)  10 mg (9/8)  10 mg  10 mg   Sat 10 mg  10 mg  10 mg  10 mg   Historical INR  3.50      3.00      3.00            This result is from an external source.       Plan:  1. INR is therapeutic today- see above in Anticoagulation Summary.     Kameron Melendez to continue their warfarin regimen- see above in Anticoagulation Summary.  2. Follow up in 1 week  3. Pt has agreed to only be called if INR out of range. They have been instructed to call if any changes in medications, doses, concerns, etc. Patient expresses understanding and has no further questions at this time.    Iris Gonzalez

## 2023-10-06 ENCOUNTER — ANTICOAGULATION VISIT (OUTPATIENT)
Dept: PHARMACY | Facility: HOSPITAL | Age: 53
End: 2023-10-06
Payer: COMMERCIAL

## 2023-10-06 DIAGNOSIS — I26.99 BILATERAL PULMONARY EMBOLISM: ICD-10-CM

## 2023-10-06 DIAGNOSIS — I26.99 OTHER ACUTE PULMONARY EMBOLISM WITHOUT ACUTE COR PULMONALE: Primary | ICD-10-CM

## 2023-10-06 LAB — INR PPP: 3.3

## 2023-10-06 NOTE — PROGRESS NOTES
Anticoagulation Clinic Progress Note    Anticoagulation Summary  As of 10/6/2023      INR goal:  2.0-3.0   TTR:  66.3 % (4.6 y)   INR used for dosing:  3.30 (10/6/2023)   Warfarin maintenance plan:  15 mg every Wed; 10 mg all other days   Weekly warfarin total:  75 mg   Plan last modified:  Tessie Fatima RPH (9/18/2023)   Next INR check:  10/13/2023   Priority:  High   Target end date:  Indefinite    Indications    Other acute pulmonary embolism without acute cor pulmonale [I26.99]  Bilateral pulmonary embolism [I26.99]                 Anticoagulation Episode Summary       INR check location:      Preferred lab:      Send INR reminders to:   [facility]  POOL    Comments:  new  frankie March 2019 **WEEKLY FRANKIE**          Anticoagulation Care Providers       Provider Role Specialty Phone number    Torri Colmenares, APRN Referring Cardiology [phone]    Elsy Baeza MD Responsible Cardiology [phone]            Clinic Interview:  Patient Findings     Positives:  Change in health, Change in medications, Change in   diet/appetite    Negatives:  Signs/symptoms of thrombosis, Signs/symptoms of bleeding,   Laboratory test error suspected, Change in alcohol use, Change in   activity, Upcoming invasive procedure, Emergency department visit,   Upcoming dental procedure, Missed doses, Extra doses, Hospital admission,   Bruising, Other complaints    Comments:  Reported by voicemail that he has been taking a course of   cephalexin this week, and he has had a lower appetite than usual.      Clinical Outcomes     Negatives:  Major bleeding event, Thromboembolic event,   Anticoagulation-related hospital admission, Anticoagulation-related ED   visit, Anticoagulation-related fatality    Comments:  Reported by voicemail that he has been taking a course of   cephalexin this week, and he has had a lower appetite than usual.        INR History:      9/8/2023    10:59 AM 9/18/2023    12:00 AM 9/18/2023      8:50 AM 9/29/2023    12:00 AM 9/29/2023     8:48 AM 10/6/2023    12:00 AM 10/6/2023     8:42 AM   Anticoagulation Monitoring   INR 3.50  3.00  3.00  3.30   INR Date 9/8/2023  9/18/2023  9/29/2023  10/6/2023   INR Goal 2.0-3.0  2.0-3.0  2.0-3.0  2.0-3.0   Trend Same  Down  Same  Same   Last Week Total 75 mg  80 mg  75 mg  75 mg   Next Week Total 75 mg  75 mg  75 mg  70 mg   Sun 10 mg (9/10)  10 mg  10 mg  10 mg   Mon 10 mg  10 mg  10 mg  10 mg   Tue 10 mg  10 mg  10 mg  10 mg   Wed 15 mg  15 mg  15 mg  15 mg   Thu 10 mg  10 mg  10 mg  10 mg   Fri 10 mg (9/8)  10 mg  10 mg  5 mg (10/6)   Sat 10 mg  10 mg  10 mg  10 mg   Historical INR  3.00      3.00      3.30            This result is from an external source.       Plan:  1. INR is Supratherapeutic today- see above in Anticoagulation Summary.   Will instruct Kameron SHERRIE Al to Change their warfarin regimen (5 mg today, then resume 15 mg Wed, 10 mg all other days) - see above in Anticoagulation Summary.  2. Follow up in 1 week.  3. Left secure VM with instructions per patient request. They have been instructed to call if any changes in medications, doses, concerns, etc.     Ramo Morocho, PharmD

## 2023-10-13 ENCOUNTER — ANTICOAGULATION VISIT (OUTPATIENT)
Dept: PHARMACY | Facility: HOSPITAL | Age: 53
End: 2023-10-13
Payer: COMMERCIAL

## 2023-10-13 DIAGNOSIS — I26.99 OTHER ACUTE PULMONARY EMBOLISM WITHOUT ACUTE COR PULMONALE: Primary | ICD-10-CM

## 2023-10-13 DIAGNOSIS — I26.99 BILATERAL PULMONARY EMBOLISM: ICD-10-CM

## 2023-10-13 LAB — INR PPP: 3.4

## 2023-10-13 NOTE — PROGRESS NOTES
Anticoagulation Clinic Progress Note    Anticoagulation Summary  As of 10/13/2023      INR goal:  2.0-3.0   TTR:  66.1% (4.6 y)   INR used for dosing:  3.40 (10/13/2023)   Warfarin maintenance plan:  15 mg every Wed; 10 mg all other days   Weekly warfarin total:  75 mg   Plan last modified:  Tessie Fatima RPH (10/13/2023)   Next INR check:  10/20/2023   Priority:  High   Target end date:  Indefinite    Indications    Other acute pulmonary embolism without acute cor pulmonale [I26.99]  Bilateral pulmonary embolism [I26.99]                 Anticoagulation Episode Summary       INR check location:      Preferred lab:      Send INR reminders to:   [facility]  POOL    Comments:  new  frankie March 2019 **WEEKLY FRANKIE**          Anticoagulation Care Providers       Provider Role Specialty Phone number    Torri Colmenares, FADY Referring Cardiology [phone]    Elsy Baeza MD Responsible Cardiology [phone]            Clinic Interview:  Patient Findings     Negatives:  Signs/symptoms of thrombosis, Signs/symptoms of bleeding,   Laboratory test error suspected, Change in health, Change in alcohol use,   Change in activity, Upcoming invasive procedure, Emergency department   visit, Upcoming dental procedure, Missed doses, Extra doses, Change in   medications, Change in diet/appetite, Hospital admission, Bruising, Other   complaints      Clinical Outcomes     Negatives:  Major bleeding event, Thromboembolic event,   Anticoagulation-related hospital admission, Anticoagulation-related ED   visit, Anticoagulation-related fatality        INR History:      9/18/2023     8:50 AM 9/29/2023    12:00 AM 9/29/2023     8:48 AM 10/6/2023    12:00 AM 10/6/2023     8:42 AM 10/13/2023    12:00 AM 10/13/2023     8:30 AM   Anticoagulation Monitoring   INR 3.00  3.00  3.30  3.40   INR Date 9/18/2023  9/29/2023  10/6/2023  10/13/2023   INR Goal 2.0-3.0  2.0-3.0  2.0-3.0  2.0-3.0   Trend Down  Same  Same  Same   Last  Week Total 80 mg  75 mg  75 mg  70 mg   Next Week Total 75 mg  75 mg  70 mg  65 mg   Sun 10 mg  10 mg  10 mg  10 mg   Mon 10 mg  10 mg  10 mg  10 mg   Tue 10 mg  10 mg  10 mg  10 mg   Wed 15 mg  15 mg  15 mg  10 mg (10/18)   Thu 10 mg  10 mg  10 mg  10 mg   Fri 10 mg  10 mg  5 mg (10/6)  5 mg (10/13)   Sat 10 mg  10 mg  10 mg  10 mg   Historical INR  3.00      3.30      3.40            This result is from an external source.       Plan:  1. INR is Supratherapeutic today- see above in Anticoagulation Summary.   Will instruct Kameron Melendez to Change their warfarin regimen- see above in Anticoagulation Summary.  partial to 5 mg today then trial 10 mg daily    2. Follow up in 1 weeks  3. LVM with instructions per patient request. They have been instructed to call if any changes in medications, doses, concerns, etc. Patient expresses understanding and has no further questions at this time.    Tessie Fatima RP

## 2023-10-20 ENCOUNTER — ANTICOAGULATION VISIT (OUTPATIENT)
Dept: PHARMACY | Facility: HOSPITAL | Age: 53
End: 2023-10-20
Payer: COMMERCIAL

## 2023-10-20 DIAGNOSIS — I26.99 BILATERAL PULMONARY EMBOLISM: ICD-10-CM

## 2023-10-20 DIAGNOSIS — I26.99 OTHER ACUTE PULMONARY EMBOLISM WITHOUT ACUTE COR PULMONALE: Primary | ICD-10-CM

## 2023-10-20 LAB — INR PPP: 4

## 2023-10-20 NOTE — PROGRESS NOTES
Anticoagulation Clinic Progress Note    Anticoagulation Summary  As of 10/20/2023      INR goal:  2.0-3.0   TTR:  65.8% (4.6 y)   INR used for dosin.00 (10/20/2023)   Warfarin maintenance plan:  5 mg every Sat; 10 mg all other days   Weekly warfarin total:  65 mg   Plan last modified:  Tessie Fatima RPH (10/20/2023)   Next INR check:  10/27/2023   Priority:  High   Target end date:  Indefinite    Indications    Other acute pulmonary embolism without acute cor pulmonale [I26.99]  Bilateral pulmonary embolism [I26.99]                 Anticoagulation Episode Summary       INR check location:      Preferred lab:      Send INR reminders to:   [facility]  POOL    Comments:  new  frankie 2019 **WEEKLY FRANKIE**          Anticoagulation Care Providers       Provider Role Specialty Phone number    Torri Colmenares, APRN Referring Cardiology [phone]    Elsy Baeza MD Responsible Cardiology [phone]            Clinic Interview:  Patient Findings     Negatives:  Signs/symptoms of thrombosis, Signs/symptoms of bleeding,   Laboratory test error suspected, Change in health, Change in alcohol use,   Change in activity, Upcoming invasive procedure, Emergency department   visit, Upcoming dental procedure, Missed doses, Extra doses, Change in   medications, Change in diet/appetite, Hospital admission, Bruising, Other   complaints      Clinical Outcomes     Negatives:  Major bleeding event, Thromboembolic event,   Anticoagulation-related hospital admission, Anticoagulation-related ED   visit, Anticoagulation-related fatality        INR History:      2023     8:48 AM 10/6/2023    12:00 AM 10/6/2023     8:42 AM 10/13/2023    12:00 AM 10/13/2023     8:30 AM 10/20/2023    12:00 AM 10/20/2023     8:17 AM   Anticoagulation Monitoring   INR 3.00  3.30  3.40  4.00   INR Date 2023  10/6/2023  10/13/2023  10/20/2023   INR Goal 2.0-3.0  2.0-3.0  2.0-3.0  2.0-3.0   Trend Same  Same  Same  Down    Last Week Total 75 mg  75 mg  70 mg  65 mg   Next Week Total 75 mg  70 mg  65 mg  55 mg   Sun 10 mg  10 mg  10 mg  10 mg   Mon 10 mg  10 mg  10 mg  10 mg   Tue 10 mg  10 mg  10 mg  10 mg   Wed 15 mg  15 mg  10 mg (10/18)  10 mg   Thu 10 mg  10 mg  10 mg  10 mg   Fri 10 mg  5 mg (10/6)  5 mg (10/13)  Hold (10/20)   Sat 10 mg  10 mg  10 mg  5 mg   Historical INR  3.30      3.40      4.00            This result is from an external source.       Plan:  1. INR is Supratherapeutic today- see above in Anticoagulation Summary.   Will instruct Kameron Melendez to Change their warfarin regimen- see above in Anticoagulation Summary.  Reports he has not been feeling well, swelling in his legs, hold and then trial on 5 mg on fri, 10 mg AOD, rck 1 week   2. They have been instructed to call if any changes in medications, doses, concerns, etc. Patient expresses understanding and has no further questions at this time.    Tessie Fatima [facility]

## 2023-10-27 ENCOUNTER — OFFICE VISIT (OUTPATIENT)
Dept: FAMILY MEDICINE CLINIC | Facility: CLINIC | Age: 53
End: 2023-10-27
Payer: COMMERCIAL

## 2023-10-27 ENCOUNTER — ANTICOAGULATION VISIT (OUTPATIENT)
Dept: PHARMACY | Facility: HOSPITAL | Age: 53
End: 2023-10-27
Payer: COMMERCIAL

## 2023-10-27 VITALS
SYSTOLIC BLOOD PRESSURE: 124 MMHG | HEART RATE: 73 BPM | DIASTOLIC BLOOD PRESSURE: 82 MMHG | BODY MASS INDEX: 40.43 KG/M2 | OXYGEN SATURATION: 95 % | WEIGHT: 315 LBS | HEIGHT: 74 IN

## 2023-10-27 DIAGNOSIS — R53.83 OTHER FATIGUE: ICD-10-CM

## 2023-10-27 DIAGNOSIS — I26.99 OTHER ACUTE PULMONARY EMBOLISM WITHOUT ACUTE COR PULMONALE: Primary | ICD-10-CM

## 2023-10-27 DIAGNOSIS — N40.0 BENIGN PROSTATIC HYPERPLASIA WITHOUT LOWER URINARY TRACT SYMPTOMS: ICD-10-CM

## 2023-10-27 DIAGNOSIS — I26.99 BILATERAL PULMONARY EMBOLISM: ICD-10-CM

## 2023-10-27 DIAGNOSIS — E66.01 MORBID (SEVERE) OBESITY DUE TO EXCESS CALORIES: ICD-10-CM

## 2023-10-27 DIAGNOSIS — E29.1 HYPOGONADISM IN MALE: ICD-10-CM

## 2023-10-27 DIAGNOSIS — E78.5 DYSLIPIDEMIA: ICD-10-CM

## 2023-10-27 DIAGNOSIS — I89.0 LYMPHEDEMA: Primary | ICD-10-CM

## 2023-10-27 LAB — INR PPP: 2.1

## 2023-10-27 RX ORDER — SPIRONOLACTONE 25 MG/1
25 TABLET ORAL DAILY
Qty: 180 TABLET | Refills: 3 | Status: SHIPPED | OUTPATIENT
Start: 2023-10-27

## 2023-10-27 RX ORDER — FUROSEMIDE 80 MG
80 TABLET ORAL DAILY PRN
Qty: 90 TABLET | Refills: 3 | Status: SHIPPED | OUTPATIENT
Start: 2023-10-27

## 2023-10-27 RX ORDER — NALTREXONE HYDROCHLORIDE 50 MG/1
50 TABLET, FILM COATED ORAL DAILY
Qty: 30 TABLET | Refills: 5 | Status: SHIPPED | OUTPATIENT
Start: 2023-10-27

## 2023-10-27 NOTE — PROGRESS NOTES
Anticoagulation Clinic Progress Note    Anticoagulation Summary  As of 10/27/2023      INR goal:  2.0-3.0   TTR:  65.7% (4.7 y)   INR used for dosin.10 (10/27/2023)   Warfarin maintenance plan:  5 mg every Sat; 10 mg all other days   Weekly warfarin total:  65 mg   No change documented:  Tessie Fatima RPH   Plan last modified:  Tessie Fatima RPH (10/20/2023)   Next INR check:  11/3/2023   Priority:  High   Target end date:  Indefinite    Indications    Other acute pulmonary embolism without acute cor pulmonale [I26.99]  Bilateral pulmonary embolism [I26.99]                 Anticoagulation Episode Summary       INR check location:      Preferred lab:      [facility]    Comments:  new  frankie 2019 **WEEKLY FRANKIE**          Anticoagulation Care Providers       Provider Role Specialty Phone number    Torri Colmenares APRN Referring Cardiology [phone]    Elsy Baeza MD Responsible Cardiology [phone]            Clinic Interview:  No pertinent clinical findings have been reported.    INR History:      10/6/2023     8:42 AM 10/13/2023    12:00 AM 10/13/2023     8:30 AM 10/20/2023    12:00 AM 10/20/2023     8:17 AM 10/27/2023    12:00 AM 10/27/2023     1:36 PM   Anticoagulation Monitoring   INR 3.30  3.40  4.00  2.10   INR Date 10/6/2023  10/13/2023  10/20/2023  10/27/2023   INR Goal 2.0-3.0  2.0-3.0  2.0-3.0  2.0-3.0   Trend Same  Same  Down  Same   Last Week Total 75 mg  70 mg  65 mg  55 mg   Next Week Total 70 mg  65 mg  55 mg  65 mg   Sun 10 mg  10 mg  10 mg  10 mg   Mon 10 mg  10 mg  10 mg  10 mg   Tue 10 mg  10 mg  10 mg  10 mg   Wed 15 mg  10 mg (10/18)  10 mg  10 mg   Thu 10 mg  10 mg  10 mg  10 mg   Fri 5 mg (10/6)  5 mg (10/13)  Hold (10/20)  10 mg   Sat 10 mg  10 mg  5 mg  5 mg   Historical INR  3.40      4.00      2.10        Visit Report      Report Report       This result is from an external source.       Plan:  1. INR is  therapeutic today- see above in Anticoagulation Summary.    Kameron Melendez to continue their warfarin regimen- see above in Anticoagulation Summary.  2. Follow up in 1 week  3. They have been instructed to call if any changes in medications, doses, concerns, etc. Patient expresses understanding and has no further questions at this time.    Tessie Fatima, [facility]

## 2023-10-27 NOTE — PROGRESS NOTES
Subjective   Kameron Melendez is a 53 y.o. male. Presents today for   Chief Complaint   Patient presents with    Weight Check    Atrial Fibrillation    Edema              History of Present Illness  Patient 52 y/o male with morbid obesity and very severe lymphedema;   On wegovy;   was on wellbutrin/topamax, but holding as dry mouth.   Has T# on list, but not had one in 2 months.    Has recurrent cellulitis and needed cephalexin again couple times;      Patient chronic fatigue, due labs and check lipids;  has hypogondadism, would like try again;  was on injections;      Review of Systems   Constitutional:  Positive for fatigue.   Respiratory:  Negative for shortness of breath.    Cardiovascular:  Positive for leg swelling. Negative for chest pain.   Skin:  Positive for rash.       Patient Active Problem List   Diagnosis    Bilateral pulmonary embolism    Pulmonary hypertension    Lymphedema of both lower extremities    Obesity, morbid, BMI 50 or higher    Dyslipidemia    Hyperuricemia    Hypogonadal obesity    Knee arthropathy    Morbid obesity with body mass index of 60.0-69.9 in adult    ARNOLDO (obstructive sleep apnea)    Severe edema    History of pulmonary embolism    Other acute pulmonary embolism without acute cor pulmonale    Intractable back pain    Heterozygous factor V Leiden mutation    Cellulitis of left lower extremity    Hypokalemia    CAROL (acute kidney injury)    Sepsis with cutaneous manifestations    Hyperglycemia    Paroxysmal atrial fibrillation    Athscl heart 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    Long term (current) use of anticoagulants    Lymphedema, not elsewhere classified    Nicotine dependence, other tobacco product, uncomplicated    Personal history of other venous thrombosis and embolism    Typical atrial flutter    Venous insufficiency (chronic) (peripheral)    Cellulitis of right lower extremity    Wound cellulitis       Social History     Socioeconomic History    Marital  status:    Tobacco Use    Smoking status: Light Smoker     Types: Cigars, Pipe     Start date: 1/1/2006    Smokeless tobacco: Never    Tobacco comments:     occasional - < 1 a month   Vaping Use    Vaping Use: Never used   Substance and Sexual Activity    Alcohol use: No    Drug use: No    Sexual activity: Defer       No Known Allergies    Current Outpatient Medications on File Prior to Visit   Medication Sig Dispense Refill    acetaminophen (TYLENOL) 500 MG tablet Take 1 tablet by mouth Every 6 (Six) Hours As Needed for Mild Pain.      acetaminophen-codeine (TYLENOL #3) 300-30 MG per tablet Take 1 tablet by mouth Every 4 (Four) Hours As Needed for Moderate Pain (severe pain). 18 tablet 1    cephalexin (KEFLEX) 500 MG capsule Take 1 capsule by mouth 4 (Four) Times a Day. 40 capsule 2    furosemide (LASIX) 80 MG tablet TAKE 1 TABLET BY MOUTH DAILY (Patient taking differently: Take 1 tablet by mouth Daily As Needed.) 30 tablet 5    metOLazone (ZAROXOLYN) 5 MG tablet 1 po weekly if >10 lbs weight gain take one.  Continue furosemide 12 tablet 1    omeprazole (priLOSEC) 40 MG capsule Take 1 capsule by mouth Daily. 90 capsule 3    Semaglutide-Weight Management 2.4 MG/0.75ML solution auto-injector Inject 2.4 mg under the skin into the appropriate area as directed 1 (One) Time Per Week. 9 mL 3    spironolactone (Aldactone) 25 MG tablet Take 1 tablet by mouth Daily. 60 tablet 5    warfarin (Jantoven) 5 MG tablet Take three tablets by mouth on sun, wed, fri and take one tablet by mouth all other days or as directed 220 tablet 1    buPROPion XL (WELLBUTRIN XL) 300 MG 24 hr tablet Take 1 tablet by mouth Every Morning. (Patient not taking: Reported on 10/27/2023) 90 tablet 3    methylPREDNISolone (MEDROL) 4 MG dose pack Take as directed on package instructions. (Patient not taking: Reported on 10/27/2023) 21 tablet 1    topiramate (TOPAMAX) 50 MG tablet Take 1 tablet by mouth 2 (Two) Times a Day. (Patient not taking:  "Reported on 10/27/2023) 180 tablet 3    [DISCONTINUED] phentermine (ADIPEX-P) 37.5 MG tablet Take 1 tablet by mouth Every Morning Before Breakfast. (Patient not taking: Reported on 10/27/2023) 30 tablet 2    [DISCONTINUED] saccharomyces boulardii (Florastor) 250 MG capsule Take 1 capsule by mouth 2 (Two) Times a Day. (Patient not taking: Reported on 10/27/2023) 60 capsule 0    [DISCONTINUED] Semaglutide, 2 MG/DOSE, 8 MG/3ML solution pen-injector Inject 2 mg under the skin into the appropriate area as directed 1 (One) Time Per Week. (Patient not taking: Reported on 10/27/2023) 6 mL 3     No current facility-administered medications on file prior to visit.       Objective   Vitals:    10/27/23 0833   BP: 124/82   Pulse: 73   SpO2: 95%   Weight: Comment: pt exceeds scale limit   Height: 188 cm (74\")     Body mass index is 62.27 kg/m².    Physical Exam  Vitals and nursing note reviewed.   Constitutional:       Appearance: He is well-developed.   Musculoskeletal:      Cervical back: Neck supple.   Skin:     General: Skin is warm and dry.   Neurological:      Mental Status: He is alert.   Psychiatric:         Behavior: Behavior normal.         Assessment & Plan   Diagnoses and all orders for this visit:    1. Lymphedema (Primary)  -     furosemide (LASIX) 80 MG tablet; Take 1 tablet by mouth Daily As Needed (leg swelling).  Dispense: 90 tablet; Refill: 3  -     spironolactone (Aldactone) 25 MG tablet; Take 1 tablet by mouth Daily.  Dispense: 180 tablet; Refill: 3    2. Morbid (severe) obesity due to excess calories  -     naltrexone (DEPADE) 50 MG tablet; Take 1 tablet by mouth Daily.  Dispense: 30 tablet; Refill: 5    3. Hypogonadism in male  -     Comprehensive Metabolic Panel  -     Testosterone  -     PSA DIAGNOSTIC  -     CBC & Differential  -     Estrone  -     Estrogens, Total  -     FSH & LH  -     Sex Horm Binding Globulin    4. Dyslipidemia  -     Lipid Panel    5. Other fatigue  -     Comprehensive Metabolic " Panel  -     Testosterone  -     CBC & Differential  -     TSH  -     Vitamin B12    6. Benign prostatic hyperplasia without lower urinary tract symptoms  -     PSA DIAGNOSTIC    Up dated fmla as needing 4 to 5 days for lymphedema and cellulitis;   has left large growth like is bascially lymphedema interfereing with bathroom, after 1st of year would like see plastics.    Will try naltrexone add for weigh tloss;  ok off wellbutrin as dry mouth;  continue topamax;  continue wegovy;  weigh tback up some, but severe lymphedema  Due check labs for fatigue and lipids  -I explained at length to patient treatment options, risks of replacement that can include acne, mood changes, ? Cardiovascular disease, polycythemia and liver dysfunction.  I also warned about concerns regarding prostate and breast cancer.   I discussed with potential for improvement in fatigue and preventing osteoporosis.    I discussed with would titrate and repeat check in 3 months along with full labs once starts treatment.  I discsued though he has untx vannesa and strong likelihood it will make fatigue worse as worsens vannesa and also can induce a.f ib which now has fam hx of;  also fam hx of prostate cancer;  would hold off treatment;  d/w inspire <35 bmi                  -Follow up: 3 motnhs and prn

## 2023-11-01 RX ORDER — WARFARIN SODIUM 5 MG/1
TABLET ORAL
Qty: 180 TABLET | Refills: 1 | Status: SHIPPED | OUTPATIENT
Start: 2023-11-01

## 2023-11-02 LAB
ALBUMIN SERPL-MCNC: 4.2 G/DL (ref 3.8–4.9)
ALBUMIN/GLOB SERPL: 1.5 {RATIO} (ref 1.2–2.2)
ALP SERPL-CCNC: 117 IU/L (ref 44–121)
ALT SERPL-CCNC: 16 IU/L (ref 0–44)
AST SERPL-CCNC: 18 IU/L (ref 0–40)
BASOPHILS # BLD AUTO: 0.1 X10E3/UL (ref 0–0.2)
BASOPHILS NFR BLD AUTO: 1 %
BILIRUB SERPL-MCNC: 0.5 MG/DL (ref 0–1.2)
BUN SERPL-MCNC: 16 MG/DL (ref 6–24)
BUN/CREAT SERPL: 13 (ref 9–20)
CALCIUM SERPL-MCNC: 9.6 MG/DL (ref 8.7–10.2)
CHLORIDE SERPL-SCNC: 99 MMOL/L (ref 96–106)
CHOLEST SERPL-MCNC: 202 MG/DL (ref 100–199)
CO2 SERPL-SCNC: 24 MMOL/L (ref 20–29)
CREAT SERPL-MCNC: 1.24 MG/DL (ref 0.76–1.27)
EGFRCR SERPLBLD CKD-EPI 2021: 70 ML/MIN/1.73
EOSINOPHIL # BLD AUTO: 0.3 X10E3/UL (ref 0–0.4)
EOSINOPHIL NFR BLD AUTO: 3 %
ERYTHROCYTE [DISTWIDTH] IN BLOOD BY AUTOMATED COUNT: 14.3 % (ref 11.6–15.4)
ESTROGEN SERPL-MCNC: 193 PG/ML (ref 56–213)
ESTRONE SERPL-MCNC: 91 PG/ML (ref 0–174)
FSH SERPL-ACNC: 6.2 MIU/ML (ref 1.5–12.4)
GLOBULIN SER CALC-MCNC: 2.8 G/DL (ref 1.5–4.5)
GLUCOSE SERPL-MCNC: 96 MG/DL (ref 70–99)
HCT VFR BLD AUTO: 41.5 % (ref 37.5–51)
HDLC SERPL-MCNC: 43 MG/DL
HGB BLD-MCNC: 13.8 G/DL (ref 13–17.7)
IMM GRANULOCYTES # BLD AUTO: 0 X10E3/UL (ref 0–0.1)
IMM GRANULOCYTES NFR BLD AUTO: 1 %
LDLC SERPL CALC-MCNC: 134 MG/DL (ref 0–99)
LH SERPL-ACNC: 7.6 MIU/ML (ref 1.7–8.6)
LYMPHOCYTES # BLD AUTO: 2.3 X10E3/UL (ref 0.7–3.1)
LYMPHOCYTES NFR BLD AUTO: 27 %
MCH RBC QN AUTO: 29.6 PG (ref 26.6–33)
MCHC RBC AUTO-ENTMCNC: 33.3 G/DL (ref 31.5–35.7)
MCV RBC AUTO: 89 FL (ref 79–97)
MONOCYTES # BLD AUTO: 0.6 X10E3/UL (ref 0.1–0.9)
MONOCYTES NFR BLD AUTO: 7 %
NEUTROPHILS # BLD AUTO: 5.4 X10E3/UL (ref 1.4–7)
NEUTROPHILS NFR BLD AUTO: 61 %
PLATELET # BLD AUTO: 310 X10E3/UL (ref 150–450)
POTASSIUM SERPL-SCNC: 4.6 MMOL/L (ref 3.5–5.2)
PROT SERPL-MCNC: 7 G/DL (ref 6–8.5)
PSA SERPL-MCNC: 0.4 NG/ML (ref 0–4)
RBC # BLD AUTO: 4.67 X10E6/UL (ref 4.14–5.8)
SHBG SERPL-SCNC: 20.7 NMOL/L (ref 19.3–76.4)
SODIUM SERPL-SCNC: 139 MMOL/L (ref 134–144)
TESTOST SERPL-MCNC: 83 NG/DL (ref 264–916)
TRIGL SERPL-MCNC: 139 MG/DL (ref 0–149)
TSH SERPL DL<=0.005 MIU/L-ACNC: 2.33 UIU/ML (ref 0.45–4.5)
VIT B12 SERPL-MCNC: 504 PG/ML (ref 232–1245)
VLDLC SERPL CALC-MCNC: 25 MG/DL (ref 5–40)
WBC # BLD AUTO: 8.6 X10E3/UL (ref 3.4–10.8)

## 2023-11-03 ENCOUNTER — ANTICOAGULATION VISIT (OUTPATIENT)
Dept: PHARMACY | Facility: HOSPITAL | Age: 53
End: 2023-11-03
Payer: COMMERCIAL

## 2023-11-03 DIAGNOSIS — I26.99 BILATERAL PULMONARY EMBOLISM: ICD-10-CM

## 2023-11-03 DIAGNOSIS — I26.99 OTHER ACUTE PULMONARY EMBOLISM WITHOUT ACUTE COR PULMONALE: Primary | ICD-10-CM

## 2023-11-03 LAB — INR PPP: 2.4

## 2023-11-03 NOTE — PROGRESS NOTES
Anticoagulation Clinic Progress Note    Anticoagulation Summary  As of 11/3/2023      INR goal:  2.0-3.0   TTR:  65.8% (4.7 y)   INR used for dosin.40 (11/3/2023)   Warfarin maintenance plan:  5 mg every Sat; 10 mg all other days   Weekly warfarin total:  65 mg   No change documented:  Iris Gonzalez   Plan last modified:  Tessie Fatima RPH (10/20/2023)   Next INR check:  11/10/2023   Priority:  High   Target end date:  Indefinite    Indications    Other acute pulmonary embolism without acute cor pulmonale [I26.99]  Bilateral pulmonary embolism [I26.99]                 Anticoagulation Episode Summary       INR check location:      Preferred lab:      [facility]    Comments:  new  tristen 2019 **WEEKLY TRISTEN**          Anticoagulation Care Providers       Provider Role Specialty Phone number    Torri Colmenares APRN Referring Cardiology [phone]    Elsy Baeza MD Responsible Cardiology [phone]            Clinic Interview:  No pertinent clinical findings have been reported.    INR History:      10/13/2023     8:30 AM 10/20/2023    12:00 AM 10/20/2023     8:17 AM 10/27/2023    12:00 AM 10/27/2023     1:36 PM 11/3/2023    12:00 AM 11/3/2023     9:18 AM   Anticoagulation Monitoring   INR 3.40  4.00  2.10  2.40   INR Date 10/13/2023  10/20/2023  10/27/2023  11/3/2023   INR Goal 2.0-3.0  2.0-3.0  2.0-3.0  2.0-3.0   Trend Same  Down  Same  Same   Last Week Total 70 mg  65 mg  55 mg  65 mg   Next Week Total 65 mg  55 mg  65 mg  65 mg   Sun 10 mg  10 mg  10 mg  10 mg   Mon 10 mg  10 mg  10 mg  10 mg   Tue 10 mg  10 mg  10 mg  10 mg   Wed 10 mg (10/18)  10 mg  10 mg  10 mg   Thu 10 mg  10 mg  10 mg  10 mg   Fri 5 mg (10/13)  Hold (10/20)  10 mg  10 mg   Sat 10 mg  5 mg  5 mg  5 mg   Historical INR  4.00      2.10      2.40        Visit Report    Report Report         This result is from an external source.       Plan:  1. INR is therapeutic  today- see above in Anticoagulation Summary.    Kameron Melendez to continue their warfarin regimen- see above in Anticoagulation Summary.  2. Follow up in 1 week  3. Pt has agreed to only be called if INR out of range. They have been instructed to call if any changes in medications, doses, concerns, etc. Patient expresses understanding and has no further questions at this time.    Iris Gonzalez

## 2023-11-10 ENCOUNTER — ANTICOAGULATION VISIT (OUTPATIENT)
Dept: PHARMACY | Facility: HOSPITAL | Age: 53
End: 2023-11-10
Payer: COMMERCIAL

## 2023-11-10 DIAGNOSIS — I26.99 OTHER ACUTE PULMONARY EMBOLISM WITHOUT ACUTE COR PULMONALE: Primary | ICD-10-CM

## 2023-11-10 DIAGNOSIS — I26.99 BILATERAL PULMONARY EMBOLISM: ICD-10-CM

## 2023-11-10 LAB — INR PPP: 2.5

## 2023-11-10 NOTE — PROGRESS NOTES
Anticoagulation Clinic Progress Note    Anticoagulation Summary  As of 11/10/2023      INR goal:  2.0-3.0   TTR:  66.0% (4.7 y)   INR used for dosin.50 (11/10/2023)   Warfarin maintenance plan:  5 mg every Sat; 10 mg all other days   Weekly warfarin total:  65 mg   No change documented:  Kaylee De Leon, Pharmacy Technician   Plan last modified:  Tessie Fatima RPH (10/20/2023)   Next INR check:  2023   Priority:  High   Target end date:  Indefinite    Indications    Other acute pulmonary embolism without acute cor pulmonale [I26.99]  Bilateral pulmonary embolism [I26.99]                 Anticoagulation Episode Summary       INR check location:      Preferred lab:      [facility]    Comments:  new  frankie 2019 **WEEKLY FRANKIE**          Anticoagulation Care Providers       Provider Role Specialty Phone number    Torri Colmenares APRN Referring Cardiology [phone]    Elsy Baeza MD Responsible Cardiology [phone]            Clinic Interview:  No pertinent clinical findings have been reported.    INR History:      10/20/2023     8:17 AM 10/27/2023    12:00 AM 10/27/2023     1:36 PM 11/3/2023    12:00 AM 11/3/2023     9:18 AM 11/10/2023    12:00 AM 11/10/2023     8:07 AM   Anticoagulation Monitoring   INR 4.00  2.10  2.40  2.50   INR Date 10/20/2023  10/27/2023  11/3/2023  11/10/2023   INR Goal 2.0-3.0  2.0-3.0  2.0-3.0  2.0-3.0   Trend Down  Same  Same  Same   Last Week Total 65 mg  55 mg  65 mg  65 mg   Next Week Total 55 mg  65 mg  65 mg  65 mg   Sun 10 mg  10 mg  10 mg  10 mg   Mon 10 mg  10 mg  10 mg  10 mg   Tue 10 mg  10 mg  10 mg  10 mg   Wed 10 mg  10 mg  10 mg  10 mg   Thu 10 mg  10 mg  10 mg  10 mg   Fri Hold (10/20)  10 mg  10 mg  10 mg   Sat 5 mg  5 mg  5 mg  5 mg   Historical INR  2.10      2.40      2.50        Visit Report  Report Report           This result is from an external source.       Plan:  1. INR is therapeutic today-  see above in Anticoagulation Summary.    Kameron Melendez to continue their warfarin regimen- see above in Anticoagulation Summary.  2. Follow up in 1 week  3. Pt has agreed to only be called if INR out of range. They have been instructed to call if any changes in medications, doses, concerns, etc. Patient expresses understanding and has no further questions at this time.    Kaylee De Leon, Pharmacy Technician

## 2023-11-14 RX ORDER — ATORVASTATIN CALCIUM 20 MG/1
20 TABLET, FILM COATED ORAL NIGHTLY
Qty: 90 TABLET | Refills: 3 | Status: SHIPPED | OUTPATIENT
Start: 2023-11-14

## 2023-11-17 ENCOUNTER — ANTICOAGULATION VISIT (OUTPATIENT)
Dept: PHARMACY | Facility: HOSPITAL | Age: 53
End: 2023-11-17
Payer: COMMERCIAL

## 2023-11-17 DIAGNOSIS — I26.99 BILATERAL PULMONARY EMBOLISM: ICD-10-CM

## 2023-11-17 DIAGNOSIS — I26.99 OTHER ACUTE PULMONARY EMBOLISM WITHOUT ACUTE COR PULMONALE: Primary | ICD-10-CM

## 2023-11-17 LAB — INR PPP: 2.7

## 2023-11-17 NOTE — PROGRESS NOTES
Anticoagulation Clinic Progress Note    Anticoagulation Summary  As of 2023      INR goal:  2.0-3.0   TTR:  66.1% (4.7 y)   INR used for dosin.70 (2023)   Warfarin maintenance plan:  5 mg every Sat; 10 mg all other days   Weekly warfarin total:  65 mg   No change documented:  Iris Gonzalez   Plan last modified:  Tessie Fatima RPH (10/20/2023)   Next INR check:  2023   Priority:  High   Target end date:  Indefinite    Indications    Other acute pulmonary embolism without acute cor pulmonale [I26.99]  Bilateral pulmonary embolism [I26.99]                 Anticoagulation Episode Summary       INR check location:      Preferred lab:      [facility]    Comments:  new  tristen 2019 **WEEKLY TRISTEN**          Anticoagulation Care Providers       Provider Role Specialty Phone number    Torri Colmenares APRN Referring Cardiology [phone]    Elsy Baeza MD Responsible Cardiology [phone]            Clinic Interview:  No pertinent clinical findings have been reported.    INR History:      10/27/2023     1:36 PM 11/3/2023    12:00 AM 11/3/2023     9:18 AM 11/10/2023    12:00 AM 11/10/2023     8:07 AM 2023    12:00 AM 2023     9:35 AM   Anticoagulation Monitoring   INR 2.10  2.40  2.50  2.70   INR Date 10/27/2023  11/3/2023  11/10/2023  2023   INR Goal 2.0-3.0  2.0-3.0  2.0-3.0  2.0-3.0   Trend Same  Same  Same  Same   Last Week Total 55 mg  65 mg  65 mg  65 mg   Next Week Total 65 mg  65 mg  65 mg  65 mg   Sun 10 mg  10 mg  10 mg  10 mg   Mon 10 mg  10 mg  10 mg  10 mg   Tue 10 mg  10 mg  10 mg  10 mg   Wed 10 mg  10 mg  10 mg  10 mg   Thu 10 mg  10 mg  10 mg  10 mg   Fri 10 mg  10 mg  10 mg  10 mg   Sat 5 mg  5 mg  5 mg  5 mg   Historical INR  2.40      2.50      2.70        Visit Report Report             This result is from an external source.       Plan:  1. INR is therapeutic today- see above in  Anticoagulation Summary.    Kameron Melendez to continue their warfarin regimen- see above in Anticoagulation Summary.  2. Follow up in 1 week  3. Pt has agreed to only be called if INR out of range. They have been instructed to call if any changes in medications, doses, concerns, etc. Patient expresses understanding and has no further questions at this time.    Iris Gonzalez

## 2023-12-01 ENCOUNTER — ANTICOAGULATION VISIT (OUTPATIENT)
Dept: PHARMACY | Facility: HOSPITAL | Age: 53
End: 2023-12-01
Payer: COMMERCIAL

## 2023-12-01 DIAGNOSIS — I26.99 OTHER ACUTE PULMONARY EMBOLISM WITHOUT ACUTE COR PULMONALE: Primary | ICD-10-CM

## 2023-12-01 DIAGNOSIS — I26.99 BILATERAL PULMONARY EMBOLISM: ICD-10-CM

## 2023-12-01 LAB — INR PPP: 2.1

## 2023-12-01 NOTE — PROGRESS NOTES
Anticoagulation Clinic Progress Note    Anticoagulation Summary  As of 2023      INR goal:  2.0-3.0   TTR:  66.4% (4.8 y)   INR used for dosin.10 (2023)   Warfarin maintenance plan:  5 mg every Sat; 10 mg all other days   Weekly warfarin total:  65 mg   No change documented:  Iris Gonzalez   Plan last modified:  Tessie Fatima RPH (10/20/2023)   Next INR check:  2023   Priority:  High   Target end date:  Indefinite    Indications    Other acute pulmonary embolism without acute cor pulmonale [I26.99]  Bilateral pulmonary embolism [I26.99]                 Anticoagulation Episode Summary       INR check location:      Preferred lab:      [facility]    Comments:  new  tristen 2019 **WEEKLY TRISTEN**          Anticoagulation Care Providers       Provider Role Specialty Phone number    Torri Colmenares APRN Referring Cardiology [phone]    Elsy Baeza MD Responsible Cardiology [phone]            Clinic Interview:  No pertinent clinical findings have been reported.    INR History:      11/3/2023     9:18 AM 11/10/2023    12:00 AM 11/10/2023     8:07 AM 2023    12:00 AM 2023     9:35 AM 2023    12:00 AM 2023     9:37 AM   Anticoagulation Monitoring   INR 2.40  2.50  2.70  2.10   INR Date 11/3/2023  11/10/2023  2023  2023   INR Goal 2.0-3.0  2.0-3.0  2.0-3.0  2.0-3.0   Trend Same  Same  Same  Same   Last Week Total 65 mg  65 mg  65 mg  65 mg   Next Week Total 65 mg  65 mg  65 mg  65 mg   Sun 10 mg  10 mg  10 mg  10 mg   Mon 10 mg  10 mg  10 mg  10 mg   Tue 10 mg  10 mg  10 mg  10 mg   Wed 10 mg  10 mg  10 mg  10 mg   Thu 10 mg  10 mg  10 mg  10 mg   Fri 10 mg  10 mg  10 mg  10 mg   Sat 5 mg  5 mg  5 mg  5 mg   Historical INR  2.50      2.70      2.10            This result is from an external source.       Plan:  1. INR is therapeutic today- see above in Anticoagulation Summary.    Kameron Melendez  to continue their warfarin regimen- see above in Anticoagulation Summary.  2. Follow up in 1 week  3. Pt has agreed to only be called if INR out of range. They have been instructed to call if any changes in medications, doses, concerns, etc. Patient expresses understanding and has no further questions at this time.    Iris Gonzalez

## 2023-12-08 ENCOUNTER — ANTICOAGULATION VISIT (OUTPATIENT)
Dept: PHARMACY | Facility: HOSPITAL | Age: 53
End: 2023-12-08
Payer: COMMERCIAL

## 2023-12-08 DIAGNOSIS — M72.2 PLANTAR FASCIITIS OF LEFT FOOT: ICD-10-CM

## 2023-12-08 DIAGNOSIS — I26.99 OTHER ACUTE PULMONARY EMBOLISM WITHOUT ACUTE COR PULMONALE: Primary | ICD-10-CM

## 2023-12-08 DIAGNOSIS — I26.99 BILATERAL PULMONARY EMBOLISM: ICD-10-CM

## 2023-12-08 LAB — INR PPP: 2.4

## 2023-12-08 RX ORDER — METHYLPREDNISOLONE 4 MG/1
TABLET ORAL
Qty: 21 EACH | Refills: 1 | OUTPATIENT
Start: 2023-12-08

## 2023-12-08 NOTE — PROGRESS NOTES
Anticoagulation Clinic Progress Note    Anticoagulation Summary  As of 2023      INR goal:  2.0-3.0   TTR:  66.5% (4.8 y)   INR used for dosin.40 (2023)   Warfarin maintenance plan:  5 mg every Sat; 10 mg all other days   Weekly warfarin total:  65 mg   No change documented:  Kaylee De Leon, Pharmacy Technician   Plan last modified:  Tessie Fatima RPH (10/20/2023)   Next INR check:  12/15/2023   Priority:  High   Target end date:  Indefinite    Indications    Other acute pulmonary embolism without acute cor pulmonale [I26.99]  Bilateral pulmonary embolism [I26.99]                 Anticoagulation Episode Summary       INR check location:      Preferred lab:      Send INR reminders to:   [facility]  POOL    Comments:  new  frankie 2019 **WEEKLY FRANKIE**          Anticoagulation Care Providers       Provider Role Specialty Phone number    Torri Colmenares APRN Referring Cardiology [phone]    Elsy Baeza MD Responsible Cardiology [phone]            Clinic Interview:  No pertinent clinical findings have been reported.    INR History:      11/10/2023     8:07 AM 2023    12:00 AM 2023     9:35 AM 2023    12:00 AM 2023     9:37 AM 2023    12:00 AM 2023     1:09 PM   Anticoagulation Monitoring   INR 2.50  2.70  2.10  2.40   INR Date 11/10/2023  2023  2023  2023   INR Goal 2.0-3.0  2.0-3.0  2.0-3.0  2.0-3.0   Trend Same  Same  Same  Same   Last Week Total 65 mg  65 mg  65 mg  65 mg   Next Week Total 65 mg  65 mg  65 mg  65 mg   Sun 10 mg  10 mg  10 mg  10 mg   Mon 10 mg  10 mg  10 mg  10 mg   Tue 10 mg  10 mg  10 mg  10 mg   Wed 10 mg  10 mg  10 mg  10 mg   Thu 10 mg  10 mg  10 mg  10 mg   Fri 10 mg  10 mg  10 mg  10 mg   Sat 5 mg  5 mg  5 mg  5 mg   Historical INR  2.70      2.10      2.40            This result is from an external source.       Plan:  1. INR is therapeutic today- see above in Anticoagulation Summary.     Kameron Melendez to continue their warfarin regimen- see above in Anticoagulation Summary.  2. Follow up in 1 week  3. Pt has agreed to only be called if INR out of range. They have been instructed to call if any changes in medications, doses, concerns, etc. Patient expresses understanding and has no further questions at this time.    Kaylee De Leon, Pharmacy Technician

## 2023-12-21 ENCOUNTER — APPOINTMENT (OUTPATIENT)
Dept: ULTRASOUND IMAGING | Facility: HOSPITAL | Age: 53
End: 2023-12-21
Payer: COMMERCIAL

## 2023-12-21 ENCOUNTER — HOSPITAL ENCOUNTER (OUTPATIENT)
Facility: HOSPITAL | Age: 53
Setting detail: OBSERVATION
Discharge: HOME | End: 2023-12-23
Attending: EMERGENCY MEDICINE
Payer: COMMERCIAL

## 2023-12-21 ENCOUNTER — APPOINTMENT (OUTPATIENT)
Dept: GENERAL RADIOLOGY | Facility: HOSPITAL | Age: 53
End: 2023-12-21
Payer: COMMERCIAL

## 2023-12-21 ENCOUNTER — APPOINTMENT (OUTPATIENT)
Dept: CT IMAGING | Facility: HOSPITAL | Age: 53
End: 2023-12-21
Payer: COMMERCIAL

## 2023-12-21 DIAGNOSIS — E66.01 MORBID OBESITY WITH BODY MASS INDEX OF 60.0-69.9 IN ADULT: ICD-10-CM

## 2023-12-21 DIAGNOSIS — E66.01 MORBID OBESITY: ICD-10-CM

## 2023-12-21 DIAGNOSIS — J18.9 COMMUNITY ACQUIRED PNEUMONIA, UNSPECIFIED LATERALITY: Primary | ICD-10-CM

## 2023-12-21 DIAGNOSIS — R09.02 HYPOXIA: ICD-10-CM

## 2023-12-21 DIAGNOSIS — K80.20 CALCULUS OF GALLBLADDER WITHOUT CHOLECYSTITIS WITHOUT OBSTRUCTION: ICD-10-CM

## 2023-12-21 DIAGNOSIS — I89.0 LYMPHEDEMA: ICD-10-CM

## 2023-12-21 DIAGNOSIS — E66.01 MORBID (SEVERE) OBESITY DUE TO EXCESS CALORIES: ICD-10-CM

## 2023-12-21 DIAGNOSIS — Z86.711 HISTORY OF PULMONARY EMBOLISM: ICD-10-CM

## 2023-12-21 PROBLEM — E11.9 TYPE 2 DIABETES MELLITUS: Status: ACTIVE | Noted: 2023-12-21

## 2023-12-21 PROBLEM — K80.50 CHOLEDOCHOLITHIASIS: Status: ACTIVE | Noted: 2023-12-21

## 2023-12-21 PROBLEM — E87.1 HYPONATREMIA: Status: ACTIVE | Noted: 2023-12-21

## 2023-12-21 PROBLEM — R10.11 RUQ PAIN: Status: ACTIVE | Noted: 2023-12-21

## 2023-12-21 LAB
ALBUMIN SERPL-MCNC: 4 G/DL (ref 3.5–5.2)
ALBUMIN/GLOB SERPL: 1.3 G/DL
ALP SERPL-CCNC: 121 U/L (ref 39–117)
ALT SERPL W P-5'-P-CCNC: 13 U/L (ref 1–41)
ANION GAP SERPL CALCULATED.3IONS-SCNC: 10.6 MMOL/L (ref 5–15)
AST SERPL-CCNC: 17 U/L (ref 1–40)
B PARAPERT DNA SPEC QL NAA+PROBE: NOT DETECTED
B PERT DNA SPEC QL NAA+PROBE: NOT DETECTED
BASOPHILS # BLD AUTO: 0.04 10*3/MM3 (ref 0–0.2)
BASOPHILS NFR BLD AUTO: 0.3 % (ref 0–1.5)
BILIRUB SERPL-MCNC: 1 MG/DL (ref 0–1.2)
BUN SERPL-MCNC: 14 MG/DL (ref 6–20)
BUN/CREAT SERPL: 11.3 (ref 7–25)
C PNEUM DNA NPH QL NAA+NON-PROBE: NOT DETECTED
CALCIUM SPEC-SCNC: 9.2 MG/DL (ref 8.6–10.5)
CHLORIDE SERPL-SCNC: 98 MMOL/L (ref 98–107)
CO2 SERPL-SCNC: 24.4 MMOL/L (ref 22–29)
CREAT SERPL-MCNC: 1.24 MG/DL (ref 0.76–1.27)
D-LACTATE SERPL-SCNC: 0.6 MMOL/L (ref 0.5–2)
DEPRECATED RDW RBC AUTO: 44.8 FL (ref 37–54)
EGFRCR SERPLBLD CKD-EPI 2021: 69.5 ML/MIN/1.73
EOSINOPHIL # BLD AUTO: 0.07 10*3/MM3 (ref 0–0.4)
EOSINOPHIL NFR BLD AUTO: 0.5 % (ref 0.3–6.2)
ERYTHROCYTE [DISTWIDTH] IN BLOOD BY AUTOMATED COUNT: 14.2 % (ref 12.3–15.4)
FLUAV SUBTYP SPEC NAA+PROBE: NOT DETECTED
FLUBV RNA ISLT QL NAA+PROBE: NOT DETECTED
GEN 5 2HR TROPONIN T REFLEX: 18 NG/L
GLOBULIN UR ELPH-MCNC: 3.2 GM/DL
GLUCOSE BLDC GLUCOMTR-MCNC: 119 MG/DL (ref 70–130)
GLUCOSE SERPL-MCNC: 151 MG/DL (ref 65–99)
HADV DNA SPEC NAA+PROBE: NOT DETECTED
HBA1C MFR BLD: 5.8 % (ref 4.8–5.6)
HCOV 229E RNA SPEC QL NAA+PROBE: NOT DETECTED
HCOV HKU1 RNA SPEC QL NAA+PROBE: NOT DETECTED
HCOV NL63 RNA SPEC QL NAA+PROBE: NOT DETECTED
HCOV OC43 RNA SPEC QL NAA+PROBE: NOT DETECTED
HCT VFR BLD AUTO: 42 % (ref 37.5–51)
HGB BLD-MCNC: 13.9 G/DL (ref 13–17.7)
HMPV RNA NPH QL NAA+NON-PROBE: NOT DETECTED
HOLD SPECIMEN: NORMAL
HOLD SPECIMEN: NORMAL
HPIV1 RNA ISLT QL NAA+PROBE: NOT DETECTED
HPIV2 RNA SPEC QL NAA+PROBE: NOT DETECTED
HPIV3 RNA NPH QL NAA+PROBE: NOT DETECTED
HPIV4 P GENE NPH QL NAA+PROBE: NOT DETECTED
IMM GRANULOCYTES # BLD AUTO: 0.08 10*3/MM3 (ref 0–0.05)
IMM GRANULOCYTES NFR BLD AUTO: 0.5 % (ref 0–0.5)
INR PPP: 1.83 (ref 0.9–1.1)
LIPASE SERPL-CCNC: 16 U/L (ref 13–60)
LYMPHOCYTES # BLD AUTO: 0.87 10*3/MM3 (ref 0.7–3.1)
LYMPHOCYTES NFR BLD AUTO: 5.7 % (ref 19.6–45.3)
M PNEUMO IGG SER IA-ACNC: NOT DETECTED
MCH RBC QN AUTO: 28.7 PG (ref 26.6–33)
MCHC RBC AUTO-ENTMCNC: 33.1 G/DL (ref 31.5–35.7)
MCV RBC AUTO: 86.6 FL (ref 79–97)
MONOCYTES # BLD AUTO: 1.25 10*3/MM3 (ref 0.1–0.9)
MONOCYTES NFR BLD AUTO: 8.2 % (ref 5–12)
MRSA DNA SPEC QL NAA+PROBE: NORMAL
NEUTROPHILS NFR BLD AUTO: 13.01 10*3/MM3 (ref 1.7–7)
NEUTROPHILS NFR BLD AUTO: 84.8 % (ref 42.7–76)
NRBC BLD AUTO-RTO: 0 /100 WBC (ref 0–0.2)
NT-PROBNP SERPL-MCNC: 1704 PG/ML (ref 0–900)
PLATELET # BLD AUTO: 247 10*3/MM3 (ref 140–450)
PMV BLD AUTO: 9.9 FL (ref 6–12)
POTASSIUM SERPL-SCNC: 3.8 MMOL/L (ref 3.5–5.2)
PROCALCITONIN SERPL-MCNC: 0.4 NG/ML (ref 0–0.25)
PROT SERPL-MCNC: 7.2 G/DL (ref 6–8.5)
PROTHROMBIN TIME: 21.5 SECONDS (ref 11.7–14.2)
RBC # BLD AUTO: 4.85 10*6/MM3 (ref 4.14–5.8)
RHINOVIRUS RNA SPEC NAA+PROBE: NOT DETECTED
RSV RNA NPH QL NAA+NON-PROBE: NOT DETECTED
SARS-COV-2 RNA NPH QL NAA+NON-PROBE: NOT DETECTED
SODIUM SERPL-SCNC: 133 MMOL/L (ref 136–145)
TROPONIN T DELTA: 0 NG/L
TROPONIN T SERPL HS-MCNC: 18 NG/L
WBC NRBC COR # BLD AUTO: 15.32 10*3/MM3 (ref 3.4–10.8)
WHOLE BLOOD HOLD COAG: NORMAL
WHOLE BLOOD HOLD SPECIMEN: NORMAL

## 2023-12-21 PROCEDURE — 82948 REAGENT STRIP/BLOOD GLUCOSE: CPT

## 2023-12-21 PROCEDURE — G0378 HOSPITAL OBSERVATION PER HR: HCPCS

## 2023-12-21 PROCEDURE — 96376 TX/PRO/DX INJ SAME DRUG ADON: CPT

## 2023-12-21 PROCEDURE — 93005 ELECTROCARDIOGRAM TRACING: CPT | Performed by: EMERGENCY MEDICINE

## 2023-12-21 PROCEDURE — 36415 COLL VENOUS BLD VENIPUNCTURE: CPT

## 2023-12-21 PROCEDURE — 99285 EMERGENCY DEPT VISIT HI MDM: CPT

## 2023-12-21 PROCEDURE — 96365 THER/PROPH/DIAG IV INF INIT: CPT

## 2023-12-21 PROCEDURE — 83605 ASSAY OF LACTIC ACID: CPT | Performed by: PHYSICIAN ASSISTANT

## 2023-12-21 PROCEDURE — 0202U NFCT DS 22 TRGT SARS-COV-2: CPT | Performed by: PHYSICIAN ASSISTANT

## 2023-12-21 PROCEDURE — 71045 X-RAY EXAM CHEST 1 VIEW: CPT

## 2023-12-21 PROCEDURE — 93010 ELECTROCARDIOGRAM REPORT: CPT | Performed by: INTERNAL MEDICINE

## 2023-12-21 PROCEDURE — 76705 ECHO EXAM OF ABDOMEN: CPT

## 2023-12-21 PROCEDURE — 85025 COMPLETE CBC W/AUTO DIFF WBC: CPT | Performed by: EMERGENCY MEDICINE

## 2023-12-21 PROCEDURE — 93005 ELECTROCARDIOGRAM TRACING: CPT

## 2023-12-21 PROCEDURE — 96375 TX/PRO/DX INJ NEW DRUG ADDON: CPT

## 2023-12-21 PROCEDURE — 25010000002 PIPERACILLIN SOD-TAZOBACTAM PER 1 G: Performed by: STUDENT IN AN ORGANIZED HEALTH CARE EDUCATION/TRAINING PROGRAM

## 2023-12-21 PROCEDURE — 80053 COMPREHEN METABOLIC PANEL: CPT | Performed by: EMERGENCY MEDICINE

## 2023-12-21 PROCEDURE — 87641 MR-STAPH DNA AMP PROBE: CPT | Performed by: STUDENT IN AN ORGANIZED HEALTH CARE EDUCATION/TRAINING PROGRAM

## 2023-12-21 PROCEDURE — 84484 ASSAY OF TROPONIN QUANT: CPT | Performed by: EMERGENCY MEDICINE

## 2023-12-21 PROCEDURE — 85610 PROTHROMBIN TIME: CPT | Performed by: PHYSICIAN ASSISTANT

## 2023-12-21 PROCEDURE — 25010000002 ONDANSETRON PER 1 MG: Performed by: PHYSICIAN ASSISTANT

## 2023-12-21 PROCEDURE — 83690 ASSAY OF LIPASE: CPT | Performed by: PHYSICIAN ASSISTANT

## 2023-12-21 PROCEDURE — 83036 HEMOGLOBIN GLYCOSYLATED A1C: CPT | Performed by: STUDENT IN AN ORGANIZED HEALTH CARE EDUCATION/TRAINING PROGRAM

## 2023-12-21 PROCEDURE — 25010000002 CEFTRIAXONE PER 250 MG: Performed by: PHYSICIAN ASSISTANT

## 2023-12-21 PROCEDURE — 71275 CT ANGIOGRAPHY CHEST: CPT

## 2023-12-21 PROCEDURE — 25010000002 MORPHINE PER 10 MG: Performed by: EMERGENCY MEDICINE

## 2023-12-21 PROCEDURE — 83880 ASSAY OF NATRIURETIC PEPTIDE: CPT | Performed by: PHYSICIAN ASSISTANT

## 2023-12-21 PROCEDURE — 87040 BLOOD CULTURE FOR BACTERIA: CPT | Performed by: PHYSICIAN ASSISTANT

## 2023-12-21 PROCEDURE — 84145 PROCALCITONIN (PCT): CPT | Performed by: STUDENT IN AN ORGANIZED HEALTH CARE EDUCATION/TRAINING PROGRAM

## 2023-12-21 PROCEDURE — 25510000001 IOPAMIDOL PER 1 ML: Performed by: EMERGENCY MEDICINE

## 2023-12-21 PROCEDURE — 25810000003 SODIUM CHLORIDE 0.9 % SOLUTION: Performed by: STUDENT IN AN ORGANIZED HEALTH CARE EDUCATION/TRAINING PROGRAM

## 2023-12-21 RX ORDER — NITROGLYCERIN 0.4 MG/1
0.4 TABLET SUBLINGUAL
Status: DISCONTINUED | OUTPATIENT
Start: 2023-12-21 | End: 2023-12-23 | Stop reason: HOSPADM

## 2023-12-21 RX ORDER — PANTOPRAZOLE SODIUM 40 MG/1
40 TABLET, DELAYED RELEASE ORAL
Status: DISCONTINUED | OUTPATIENT
Start: 2023-12-22 | End: 2023-12-23 | Stop reason: HOSPADM

## 2023-12-21 RX ORDER — TOPIRAMATE 50 MG/1
50 TABLET, FILM COATED ORAL DAILY
Status: DISCONTINUED | OUTPATIENT
Start: 2023-12-21 | End: 2023-12-23 | Stop reason: HOSPADM

## 2023-12-21 RX ORDER — AMOXICILLIN 250 MG
2 CAPSULE ORAL 2 TIMES DAILY
Status: DISCONTINUED | OUTPATIENT
Start: 2023-12-21 | End: 2023-12-23 | Stop reason: HOSPADM

## 2023-12-21 RX ORDER — ASPIRIN 325 MG
325 TABLET ORAL ONCE
Status: COMPLETED | OUTPATIENT
Start: 2023-12-21 | End: 2023-12-21

## 2023-12-21 RX ORDER — WARFARIN SODIUM 5 MG/1
10 TABLET ORAL
COMMUNITY

## 2023-12-21 RX ORDER — SODIUM CHLORIDE 0.9 % (FLUSH) 0.9 %
10 SYRINGE (ML) INJECTION AS NEEDED
Status: DISCONTINUED | OUTPATIENT
Start: 2023-12-21 | End: 2023-12-23 | Stop reason: HOSPADM

## 2023-12-21 RX ORDER — DEXTROSE MONOHYDRATE 25 G/50ML
25 INJECTION, SOLUTION INTRAVENOUS
Status: DISCONTINUED | OUTPATIENT
Start: 2023-12-21 | End: 2023-12-23 | Stop reason: HOSPADM

## 2023-12-21 RX ORDER — ATORVASTATIN CALCIUM 20 MG/1
20 TABLET, FILM COATED ORAL NIGHTLY
Status: DISCONTINUED | OUTPATIENT
Start: 2023-12-21 | End: 2023-12-23 | Stop reason: HOSPADM

## 2023-12-21 RX ORDER — BISACODYL 10 MG
10 SUPPOSITORY, RECTAL RECTAL DAILY PRN
Status: DISCONTINUED | OUTPATIENT
Start: 2023-12-21 | End: 2023-12-23 | Stop reason: HOSPADM

## 2023-12-21 RX ORDER — SODIUM CHLORIDE 9 MG/ML
75 INJECTION, SOLUTION INTRAVENOUS CONTINUOUS
Status: DISCONTINUED | OUTPATIENT
Start: 2023-12-21 | End: 2023-12-23 | Stop reason: HOSPADM

## 2023-12-21 RX ORDER — BISACODYL 5 MG/1
5 TABLET, DELAYED RELEASE ORAL DAILY PRN
Status: DISCONTINUED | OUTPATIENT
Start: 2023-12-21 | End: 2023-12-23 | Stop reason: HOSPADM

## 2023-12-21 RX ORDER — WARFARIN SODIUM 6 MG/1
12 TABLET ORAL
Status: COMPLETED | OUTPATIENT
Start: 2023-12-21 | End: 2023-12-21

## 2023-12-21 RX ORDER — IBUPROFEN 600 MG/1
1 TABLET ORAL
Status: DISCONTINUED | OUTPATIENT
Start: 2023-12-21 | End: 2023-12-23 | Stop reason: HOSPADM

## 2023-12-21 RX ORDER — NICOTINE POLACRILEX 4 MG
15 LOZENGE BUCCAL
Status: DISCONTINUED | OUTPATIENT
Start: 2023-12-21 | End: 2023-12-23 | Stop reason: HOSPADM

## 2023-12-21 RX ORDER — ONDANSETRON 2 MG/ML
4 INJECTION INTRAMUSCULAR; INTRAVENOUS ONCE
Status: COMPLETED | OUTPATIENT
Start: 2023-12-21 | End: 2023-12-21

## 2023-12-21 RX ORDER — MORPHINE SULFATE 2 MG/ML
4 INJECTION, SOLUTION INTRAMUSCULAR; INTRAVENOUS ONCE
Status: COMPLETED | OUTPATIENT
Start: 2023-12-21 | End: 2023-12-21

## 2023-12-21 RX ORDER — SODIUM CHLORIDE 0.9 % (FLUSH) 0.9 %
10 SYRINGE (ML) INJECTION EVERY 12 HOURS SCHEDULED
Status: DISCONTINUED | OUTPATIENT
Start: 2023-12-21 | End: 2023-12-23 | Stop reason: HOSPADM

## 2023-12-21 RX ORDER — INSULIN LISPRO 100 [IU]/ML
2-7 INJECTION, SOLUTION INTRAVENOUS; SUBCUTANEOUS
Status: DISCONTINUED | OUTPATIENT
Start: 2023-12-21 | End: 2023-12-23 | Stop reason: HOSPADM

## 2023-12-21 RX ORDER — ENOXAPARIN SODIUM 100 MG/ML
60 INJECTION SUBCUTANEOUS EVERY 12 HOURS SCHEDULED
Status: DISCONTINUED | OUTPATIENT
Start: 2023-12-21 | End: 2023-12-21

## 2023-12-21 RX ORDER — SODIUM CHLORIDE 9 MG/ML
40 INJECTION, SOLUTION INTRAVENOUS AS NEEDED
Status: DISCONTINUED | OUTPATIENT
Start: 2023-12-21 | End: 2023-12-23 | Stop reason: HOSPADM

## 2023-12-21 RX ORDER — POLYETHYLENE GLYCOL 3350 17 G/17G
17 POWDER, FOR SOLUTION ORAL DAILY PRN
Status: DISCONTINUED | OUTPATIENT
Start: 2023-12-21 | End: 2023-12-23 | Stop reason: HOSPADM

## 2023-12-21 RX ORDER — ONDANSETRON 2 MG/ML
4 INJECTION INTRAMUSCULAR; INTRAVENOUS EVERY 6 HOURS PRN
Status: DISCONTINUED | OUTPATIENT
Start: 2023-12-21 | End: 2023-12-23 | Stop reason: HOSPADM

## 2023-12-21 RX ADMIN — ASPIRIN 325 MG: 325 TABLET ORAL at 09:20

## 2023-12-21 RX ADMIN — CEFTRIAXONE 2000 MG: 2 INJECTION, POWDER, FOR SOLUTION INTRAMUSCULAR; INTRAVENOUS at 15:51

## 2023-12-21 RX ADMIN — MORPHINE SULFATE 4 MG: 2 INJECTION, SOLUTION INTRAMUSCULAR; INTRAVENOUS at 15:57

## 2023-12-21 RX ADMIN — Medication 10 ML: at 20:33

## 2023-12-21 RX ADMIN — IOPAMIDOL 95 ML: 755 INJECTION, SOLUTION INTRAVENOUS at 10:48

## 2023-12-21 RX ADMIN — ONDANSETRON 4 MG: 2 INJECTION INTRAMUSCULAR; INTRAVENOUS at 11:04

## 2023-12-21 RX ADMIN — SODIUM CHLORIDE 75 ML/HR: 9 INJECTION, SOLUTION INTRAVENOUS at 20:33

## 2023-12-21 RX ADMIN — TOPIRAMATE 50 MG: 50 TABLET, FILM COATED ORAL at 20:35

## 2023-12-21 RX ADMIN — MORPHINE SULFATE 4 MG: 2 INJECTION, SOLUTION INTRAMUSCULAR; INTRAVENOUS at 11:50

## 2023-12-21 RX ADMIN — WARFARIN 12 MG: 6 TABLET ORAL at 20:39

## 2023-12-21 RX ADMIN — PIPERACILLIN SODIUM AND TAZOBACTAM SODIUM 3.38 G: 3; .375 INJECTION, SOLUTION INTRAVENOUS at 20:35

## 2023-12-21 RX ADMIN — ATORVASTATIN CALCIUM 20 MG: 20 TABLET, FILM COATED ORAL at 20:39

## 2023-12-21 NOTE — ED PROVIDER NOTES
MD ATTESTATION NOTE    The AMANDA and I have discussed this patient's history, physical exam, and treatment plan.  I have reviewed the documentation and personally had a face to face interaction with the patient. I affirm the documentation and agree with the treatment and plan.  The attached note describes my personal findings.    I provided a substantive portion of the care of this patient. I personally performed the physical exam, in its entirety.    Independent Historians: Patient    A complete HPI/ROS/PMH/PSH/SH/FH are unobtainable due to: None    Chronic or social conditions impacting patient care (social determinants of health): None    Kameron Melendez is a 53 y.o. male with h/o DVT/PE with factor V, HTN, lymphedema who presents to the ED c/o acute upper ab pain, chest pain, n/v, and shortness of breath x 3 days getting worse.  Pt currently c/o right upper quadrant and right lower chest wall sharp pain.  Pt is compliant with anticoagulation.          On exam:  GENERAL: cooperative and conversant although short splinting breaths due to pain, morbidly obese, alert, no acute distress  SKIN: Warm, dry, no pallor, no hypoxia  HENT: Normocephalic, atraumatic  EYES: no scleral icterus  CV: regular rhythm, regular rate  RESPIRATORY: normal effort, diminished at the bases with no wheezing nor rhonchi  ABDOMEN: soft, nontender, obese, nondistended  MUSCULOSKELETAL: nonpitting edema ble  NEURO: alert, moves all extremities, follows commands                                                             Labs  Recent Results (from the past 24 hour(s))   POC Glucose Once    Collection Time: 12/22/23 11:28 AM    Specimen: Blood   Result Value Ref Range    Glucose 100 70 - 130 mg/dL   POC Glucose Once    Collection Time: 12/22/23  4:26 PM    Specimen: Blood   Result Value Ref Range    Glucose 85 70 - 130 mg/dL   Potassium    Collection Time: 12/22/23  7:32 PM    Specimen: Blood   Result Value Ref Range    Potassium 4.0 3.5 - 5.2  mmol/L   POC Glucose Once    Collection Time: 12/22/23  9:25 PM    Specimen: Blood   Result Value Ref Range    Glucose 90 70 - 130 mg/dL   Basic Metabolic Panel    Collection Time: 12/23/23  5:31 AM    Specimen: Blood   Result Value Ref Range    Glucose 92 65 - 99 mg/dL    BUN 13 6 - 20 mg/dL    Creatinine 0.97 0.76 - 1.27 mg/dL    Sodium 134 (L) 136 - 145 mmol/L    Potassium 4.2 3.5 - 5.2 mmol/L    Chloride 103 98 - 107 mmol/L    CO2 19.8 (L) 22.0 - 29.0 mmol/L    Calcium 8.5 (L) 8.6 - 10.5 mg/dL    BUN/Creatinine Ratio 13.4 7.0 - 25.0    Anion Gap 11.2 5.0 - 15.0 mmol/L    eGFR 93.3 >60.0 mL/min/1.73   Protime-INR    Collection Time: 12/23/23  5:31 AM    Specimen: Blood   Result Value Ref Range    Protime 29.3 (H) 11.7 - 14.2 Seconds    INR 2.71 (H) 0.90 - 1.10   CBC Auto Differential    Collection Time: 12/23/23  5:31 AM    Specimen: Blood   Result Value Ref Range    WBC 10.32 3.40 - 10.80 10*3/mm3    RBC 4.28 4.14 - 5.80 10*6/mm3    Hemoglobin 12.7 (L) 13.0 - 17.7 g/dL    Hematocrit 38.0 37.5 - 51.0 %    MCV 88.8 79.0 - 97.0 fL    MCH 29.7 26.6 - 33.0 pg    MCHC 33.4 31.5 - 35.7 g/dL    RDW 14.3 12.3 - 15.4 %    RDW-SD 45.6 37.0 - 54.0 fl    MPV 10.5 6.0 - 12.0 fL    Platelets 233 140 - 450 10*3/mm3    Neutrophil % 75.3 42.7 - 76.0 %    Lymphocyte % 11.2 (L) 19.6 - 45.3 %    Monocyte % 9.8 5.0 - 12.0 %    Eosinophil % 2.7 0.3 - 6.2 %    Basophil % 0.5 0.0 - 1.5 %    Immature Grans % 0.5 0.0 - 0.5 %    Neutrophils, Absolute 7.77 (H) 1.70 - 7.00 10*3/mm3    Lymphocytes, Absolute 1.16 0.70 - 3.10 10*3/mm3    Monocytes, Absolute 1.01 (H) 0.10 - 0.90 10*3/mm3    Eosinophils, Absolute 0.28 0.00 - 0.40 10*3/mm3    Basophils, Absolute 0.05 0.00 - 0.20 10*3/mm3    Immature Grans, Absolute 0.05 0.00 - 0.05 10*3/mm3    nRBC 0.0 0.0 - 0.2 /100 WBC   POC Glucose Once    Collection Time: 12/23/23  6:07 AM    Specimen: Blood   Result Value Ref Range    Glucose 95 70 - 130 mg/dL       Radiology  No Radiology Exams Resulted  Within Past 24 Hours    Medical Decision Making:  ED Course as of 12/23/23 0837   u Dec 21, 2023   0945 Patient presents with 3-day history of nausea, vomiting, epigastric and lower chest pain.  Differential diagnoses include but not limited to ACS, PE, gastritis, cholecystitis. [EE]   1000 WBC(!): 15.32 [EE]   1048 INR(!): 1.83  Patient complains of pleuritic chest pain and is subtherapeutic on his INR.  We will obtain a CTA to rule out PE. [EE]   1141 CT imaging of the chest independently interpreted myself shows no evidence of PE.  Questionable left upper lobe infiltrate.  Haziness around the gallbladder.  We will add an ultrasound of the gallbladder for further evaluation. [EE]   1212 Troponin T Delta: 0 [EE]   1212 proBNP(!): 1,704.0 [EE]   1239 COVID19: Not Detected [EE]   1428 Updated patient on workup.  Patient's sats dropped to 89% at rest.  He continues to be tachycardic.  Patient does have pneumonia on CT.  Plan to admit for further evaluation and possible surgical consultation. [EE]   1501 I discussed the patient with LEROY Fraser.  She agrees to admit. [EE]      ED Course User Index  [EE] Eris Robbins PA       MDM: This patient presents with chest pain that is more atypical in nature, and most likely secondary to less emergent causes than his main concern which is PE as patient is compliant with his anticoagulation. The differential diagnosis includes emergent causes like ACS, PE, pericarditis, myocarditis, thoracic aortic dissection, pneumothorax, pulmonary effusion, and pneumonia. It also includes more benign causes like bronchitis, gerd, esophageal spasm, anxiety/panic, pleurisy, costochondritis/chest wall pain, and biliary colic. I have a low suspicion for ACS given nature of pain and risk factors (HEART score). Also thought low on the list of possibilities are , pericarditis / myocarditis (no preceeding uri), thoracic aortic dissection (lack of risk factors and grossly equal radial pulses  on exam), pneumothorax (clear to auscultation bilaterally on exam although diminished in the bases), pneumonia or other acute infectious process (lack of infectious symptoms like cough although he is short of breath). Plan to order CXR, serum labs including screening troponin, and EKG while monitoring patient and providing pain control as needed. Will reassess.    Procedures:  Procedures        PPE: I followed hospital protocols for proper PPE based on patient presentation including use of N95 mask for suspected infectious respiratory conditions.  Proper hand hygiene was performed both before and after the patient encounter.          Diagnosis  Final diagnoses:   Community acquired pneumonia, unspecified laterality   Hypoxia   History of pulmonary embolism   Morbid obesity   Calculus of gallbladder without cholecystitis without obstruction       Note Disclaimer: At [facility], we believe that sharing information builds trust and better relationships. You are receiving this note because you recently visited [facility]. It is possible you will see health information before a provider has talked with you about it. This kind of information can be easy to misunderstand. To help you fully understand what it means for your health, we urge you to discuss this note with your provider.       Shannan Tovar MD  12/23/23 1661

## 2023-12-21 NOTE — ED NOTES
Nursing report ED to floor  Kameron Melendez  53 y.o.  male    HPI :   Chief Complaint   Patient presents with    Chest Pain    Shortness of Breath       Admitting doctor:   Freida Bran MD    Admitting diagnosis:   The primary encounter diagnosis was Community acquired pneumonia, unspecified laterality. Diagnoses of Hypoxia, History of pulmonary embolism, Morbid obesity, and Calculus of gallbladder without cholecystitis without obstruction were also pertinent to this visit.    Code status:   Current Code Status       Date Active Code Status Order ID Comments User Context       Prior            Allergies:   Patient has no known allergies.    Isolation:   No active isolations    Intake and Output  No intake or output data in the 24 hours ending 12/21/23 1510    Weight:       12/21/23  0847   Weight: (!) 228 kg (503 lb)       Most recent vitals:   Vitals:    12/21/23 0950 12/21/23 1101 12/21/23 1201 12/21/23 1403   BP: 94/66 176/88 152/84 135/73   Pulse: 90 99 107 113   Resp: 18 18 18 20   Temp:       TempSrc:       SpO2: 93% 96% 90% 93%   Weight:       Height:           Active LDAs/IV Access:   Lines, Drains & Airways       Active LDAs       Name Placement date Placement time Site Days    Peripheral IV 12/21/23 0923 Left Forearm 12/21/23  0923  Forearm  less than 1                    Labs (abnormal labs have a star):   Labs Reviewed   COMPREHENSIVE METABOLIC PANEL - Abnormal; Notable for the following components:       Result Value    Glucose 151 (*)     Sodium 133 (*)     Alkaline Phosphatase 121 (*)     All other components within normal limits    Narrative:     GFR Normal >60  Chronic Kidney Disease <60  Kidney Failure <15     CBC WITH AUTO DIFFERENTIAL - Abnormal; Notable for the following components:    WBC 15.32 (*)     Neutrophil % 84.8 (*)     Lymphocyte % 5.7 (*)     Neutrophils, Absolute 13.01 (*)     Monocytes, Absolute 1.25 (*)     Immature Grans, Absolute 0.08 (*)     All other components within normal  limits   PROTIME-INR - Abnormal; Notable for the following components:    Protime 21.5 (*)     INR 1.83 (*)     All other components within normal limits   BNP (IN-HOUSE) - Abnormal; Notable for the following components:    proBNP 1,704.0 (*)     All other components within normal limits    Narrative:     This assay is used as an aid in the diagnosis of individuals suspected of having heart failure. It can be used as an aid in the diagnosis of acute decompensated heart failure (ADHF) in patients presenting with signs and symptoms of ADHF to the emergency department (ED). In addition, NT-proBNP of <300 pg/mL indicates ADHF is not likely.    Age Range Result Interpretation  NT-proBNP Concentration (pg/mL:      <50             Positive            >450                   Gray                 300-450                    Negative             <300    50-75           Positive            >900                  Gray                300-900                  Negative            <300      >75             Positive            >1800                  Gray                300-1800                  Negative            <300   RESPIRATORY PANEL PCR W/ COVID-19 (SARS-COV-2), NP SWAB IN UTM/VTP, 2 HR TAT - Normal    Narrative:     In the setting of a positive respiratory panel with a viral infection PLUS a negative procalcitonin without other underlying concern for bacterial infection, consider observing off antibiotics or discontinuation of antibiotics and continue supportive care. If the respiratory panel is positive for atypical bacterial infection (Bordetella pertussis, Chlamydophila pneumoniae, or Mycoplasma pneumoniae), consider antibiotic de-escalation to target atypical bacterial infection.   TROPONIN - Normal    Narrative:     High Sensitive Troponin T Reference Range:  <14.0 ng/L- Negative Female for AMI  <22.0 ng/L- Negative Male for AMI  >=14 - Abnormal Female indicating possible myocardial injury.  >=22 - Abnormal Male indicating  possible myocardial injury.   Clinicians would have to utilize clinical acumen, EKG, Troponin, and serial changes to determine if it is an Acute Myocardial Infarction or myocardial injury due to an underlying chronic condition.        HIGH SENSITIVITIY TROPONIN T 2HR - Normal    Narrative:     High Sensitive Troponin T Reference Range:  <14.0 ng/L- Negative Female for AMI  <22.0 ng/L- Negative Male for AMI  >=14 - Abnormal Female indicating possible myocardial injury.  >=22 - Abnormal Male indicating possible myocardial injury.   Clinicians would have to utilize clinical acumen, EKG, Troponin, and serial changes to determine if it is an Acute Myocardial Infarction or myocardial injury due to an underlying chronic condition.        LIPASE - Normal   BLOOD CULTURE   BLOOD CULTURE   RAINBOW DRAW    Narrative:     The following orders were created for panel order [place] Draw.  Procedure                               Abnormality         Status                     ---------                               -----------         ------                     Green Top (Gel)[[id number]]                                  Final result               Lavender Top[[id number]]                                     Final result               Gold Top - SST[[id number]]                                   Final result               Light Blue Top[[id number]]                                   Final result                 Please view results for these tests on the individual orders.   URINALYSIS W/ MICROSCOPIC IF INDICATED (NO CULTURE)   LACTIC ACID, PLASMA   CBC AND DIFFERENTIAL    Narrative:     The following orders were created for panel order CBC & Differential.  Procedure                               Abnormality         Status                     ---------                               -----------         ------                     CBC Auto Differential[[id number]]        Abnormal            Final result                 Please view results for  these tests on the individual orders.   GREEN TOP   LAVENDER TOP   GOLD TOP - SST   LIGHT BLUE TOP       EKG:   ECG 12 Lead ED Triage Standing Order; Chest Pain   Preliminary Result   HEART RATE= 89  bpm   RR Interval= 674  ms   IA Interval= 225  ms   P Horizontal Axis= 32  deg   P Front Axis= 51  deg   QRSD Interval= 97  ms   QT Interval= 380  ms   QTcB= 463  ms   QRS Axis= -40  deg   T Wave Axis= 31  deg   - ABNORMAL ECG -   Sinus rhythm   Prolonged IA interval   Left anterior fascicular block   Left ventricular hypertrophy   Anterior Q waves, possibly due to LVH   Electronically Signed By:    Date and Time of Study: 2023-12-21 08:43:15          Meds given in ED:   Medications   sodium chloride 0.9 % flush 10 mL (has no administration in time range)   cefTRIAXone (ROCEPHIN) 2,000 mg in sodium chloride 0.9 % 100 mL IVPB-VTB (has no administration in time range)   aspirin tablet 325 mg (325 mg Oral Given 12/21/23 0920)   ondansetron (ZOFRAN) injection 4 mg (4 mg Intravenous Given 12/21/23 1104)   iopamidol (ISOVUE-370) 76 % injection 100 mL (95 mL Intravenous Given by Other 12/21/23 1048)   morphine injection 4 mg (4 mg Intravenous Given 12/21/23 1150)       Imaging results:  US Gallbladder    Result Date: 12/21/2023  1. Somewhat limited study due to patient's body habitus. 2. Cholelithiasis. No gallbladder wall thickening or pericholecystic fluid is seen. 3. Poor visualization of the pancreas. 4. Limited visualization of the right kidney with mildly prominent right renal pelvis.   This report was finalized on 12/21/2023 1:14 PM by Dr. Monroe Roberson M.D on Workstation: [id number]      XR Chest 1 View    Result Date: 12/21/2023  Low lung volumes. Right lower lobe likely calcified granuloma.. No pleural effusion or pneumothorax.  Normal size cardiomediastinal silhouette.  No focal osseous abnormality.   This report was finalized on 12/21/2023 9:37 AM by Dr. Austin Mcguire M.D on Workstation: [id number]        Ambulatory status:   - assistx1    Social issues:   Social History     Socioeconomic History    Marital status:    Tobacco Use    Smoking status: Light Smoker     Types: Cigars, Pipe     Start date: 1/1/2006    Smokeless tobacco: Never    Tobacco comments:     occasional - < 1 a month   Vaping Use    Vaping Use: Never used   Substance and Sexual Activity    Alcohol use: No    Drug use: No    Sexual activity: Defer       NIH Stroke Scale:       Lena Bran RN  12/21/23 15:10 EST

## 2023-12-21 NOTE — PROGRESS NOTES
Clinical Pharmacy Services: Medication History    Kameron Melendez is a 53 y.o. male presenting to [facility] for   Chief Complaint   Patient presents with    Chest Pain    Shortness of Breath       He  has a past medical history of DVT (deep venous thrombosis), Factor V Leiden, Hypertension, Kidney stone, Lymphedema, Lymphedema of left lower extremity, Obesity, ARNOLDO (obstructive sleep apnea), Pulmonary embolism, Pulmonary hypertension, and Right ventricular enlargement.    Allergies as of 12/21/2023    (No Known Allergies)       Medication information was obtained from: Patient   Pharmacy and Phone Number:     Prior to Admission Medications       Prescriptions Last Dose Informant Patient Reported? Taking?    acetaminophen (TYLENOL) 500 MG tablet  Self Yes Yes    Take 1 tablet by mouth Every 6 (Six) Hours As Needed for Mild Pain.    furosemide (LASIX) 80 MG tablet  Self No Yes    Take 1 tablet by mouth Daily As Needed (leg swelling).    metOLazone (ZAROXOLYN) 5 MG tablet  Self No Yes    1 po weekly if >10 lbs weight gain take one.  Continue furosemide    Patient taking differently:  Take 1 tablet by mouth As Needed. 1 po weekly if >10 lbs weight gain take one.  Continue furosemide    naltrexone (DEPADE) 50 MG tablet  Self No Yes    Take 1 tablet by mouth Daily.    omeprazole (priLOSEC) 40 MG capsule  Self No Yes    Take 1 capsule by mouth Daily.    Semaglutide-Weight Management 2.4 MG/0.75ML solution auto-injector  Self No Yes    Inject 2.4 mg under the skin into the appropriate area as directed 1 (One) Time Per Week.    spironolactone (Aldactone) 25 MG tablet  Self No Yes    Take 1 tablet by mouth Daily.    Patient taking differently:  Take 1 tablet by mouth As Needed.    topiramate (TOPAMAX) 50 MG tablet  Self No Yes    Take 1 tablet by mouth 2 (Two) Times a Day.    Patient taking differently:  Take 1 tablet by mouth Daily.    warfarin (COUMADIN) 5 MG tablet  Self Yes Yes    Take 2 tablets by mouth.  Mon, Tues, Wed, Thurs, Fri, & Sun    warfarin (Jantoven) 5 MG tablet  Self No Yes    Take as directed by medication management clinic    Patient taking differently:  Take 1 tablet by mouth 1 (One) Time Per Week. Saturday    atorvastatin (LIPITOR) 20 MG tablet   No No    Take 1 tablet by mouth Every Night.    cephalexin (KEFLEX) 500 MG capsule   No No    Take 1 capsule by mouth 4 (Four) Times a Day.              Medication notes: Patient stated he only takes spironlactione and lasix on the weekends only as needed. Confirmed pt is only taking topiramate daily instead of twice a day. Pt also is prescribed atorvastatin but stated he has not started taking yet.     This medication list is complete to the best of my knowledge as of 12/21/2023    Please call if questions.    Julito Burgess  Medication History Technician   [phone]    12/21/2023 14:40 EST

## 2023-12-21 NOTE — ED PROVIDER NOTES
EMERGENCY DEPARTMENT ENCOUNTER    Room Number:  04/04  Date of encounter:  12/21/2023  PCP: Hansel Patel DO  Historian: Patient  Chronic or social conditions impacting care (social determinants of health): None    HPI:  Chief Complaint: Chest/abdominal pain  A complete HPI/ROS/PMH/PSH/SH/FH are unobtainable due to: Nothing    Context: Kameron Melendez is a 53 y.o. male who presents to the ED c/o 3-day history of upper abdominal/chest pain, shortness of breath.  Patient states initially symptoms started 3 days ago with nausea, vomiting.  He had some epigastric abdominal pain without any precipitating or alleviating factors.  The nausea and vomiting has continued through today.  While laying down this morning he developed a sharp pain in his right upper abdomen/right lower chest.  He became short of breath.  Patient does have a history of DVT and PE.  He is anticoagulated.  He denies any history of coronary artery disease.    Review of prior external notes (non-ED):   I reviewed family medicine office visit from 10/27/2023.  Patient being followed for dyslipidemia, lymphedema.    Review of prior external test results outside of this encounter:  I reviewed a CMP from 10/27/2023.  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    Knee arthropathy 12/11/2015    Morbid obesity with body mass index of 60.0-69.9 in adult 12/11/2015    ARNOLDO (obstructive sleep apnea) 12/11/2015    Severe edema 12/11/2015    History of pulmonary embolism 01/23/2019    Other acute pulmonary embolism without acute cor pulmonale 02/01/2019    Intractable back pain 11/25/2019    Heterozygous factor V Leiden mutation  11/25/2019    Cellulitis of left lower extremity 11/02/2021    Hypokalemia 11/02/2021    CAROL (acute kidney injury) 11/02/2021    Sepsis with cutaneous manifestations 11/02/2021    Hyperglycemia 11/02/2021    Paroxysmal atrial fibrillation 11/02/2021    Athscl heart 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 11/16/2021    Long term (current) use of anticoagulants 11/16/2021    Lymphedema, not elsewhere classified 11/16/2021    Nicotine dependence, other tobacco product, uncomplicated 11/16/2021    Personal history of other venous thrombosis and embolism 11/16/2021    Typical atrial flutter 11/16/2021    Venous insufficiency (chronic) (peripheral) 11/16/2021    Cellulitis of right lower extremity 05/06/2022    Wound cellulitis 05/25/2022     Resolved Ambulatory Problems     Diagnosis Date Noted    Right ventricular enlargement 08/29/2017     Past Medical History:   Diagnosis Date    DVT (deep venous thrombosis)     Factor V Leiden     Hypertension     Kidney stone     Lymphedema     Lymphedema of left lower extremity     Obesity     Pulmonary embolism          PAST SURGICAL HISTORY  Past Surgical History:   Procedure Laterality Date    CARDIAC CATHETERIZATION Bilateral 7/18/2017    Procedure: Pulmonary angiography- [facility] ;  Surgeon: Cedric Coulter MD;  Location:  [facility];  Service:     CARDIAC CATHETERIZATION N/A 7/18/2017    Procedure: Right Heart Cath;  Surgeon: Cedric Coulter MD;  Location:  [facility];  Service:     THROMBECTOMY           FAMILY HISTORY  Family History   Problem Relation Age of Onset    Heart disease Mother     Heart failure Mother     Bradycardia Mother     No Known Problems Father     Atrial fibrillation Half-Brother     Prostate cancer Half-Brother     No Known Problems Maternal Grandmother     No Known Problems Maternal Grandfather     No Known Problems Paternal Grandmother     No Known Problems Paternal Grandfather          SOCIAL HISTORY  Social History      Socioeconomic History    Marital status:    Tobacco Use    Smoking status: Light Smoker     Types: Cigars, Pipe     Start date: 1/1/2006    Smokeless tobacco: Never    Tobacco comments:     occasional - < 1 a month   Vaping Use    Vaping Use: Never used   Substance and Sexual Activity    Alcohol use: No    Drug use: No    Sexual activity: Defer         ALLERGIES  Patient has no known allergies.        REVIEW OF SYSTEMS  All systems reviewed and negative except for those discussed in HPI.       PHYSICAL EXAM    I have reviewed the triage vital signs and nursing notes.    ED Triage Vitals   Temp Heart Rate Resp BP SpO2   12/21/23 0834 12/21/23 0834 12/21/23 0834 12/21/23 0847 12/21/23 0834   96.7 °F (35.9 °C) 90 16 154/92 97 %      Temp src Heart Rate Source Patient Position BP Location FiO2 (%)   12/21/23 0834 12/21/23 0834 -- -- --   Tympanic Monitor          Physical Exam  GENERAL: Alert, oriented, morbidly obese, not distressed  HENT: head atraumatic, no nuchal rigidity  EYES: no scleral icterus, EOMI  CV: regular rhythm, regular rate, no murmur  RESPIRATORY: Tachypneic, lungs clear  ABDOMEN: soft, obese, mild epigastric tenderness  MUSCULOSKELETAL: no deformity, FROM, 3+ pedal edema  NEURO: alert, moves all extremities, follows commands  SKIN: warm, dry        LAB RESULTS  Recent Results (from the past 24 hour(s))   ECG 12 Lead ED Triage Standing Order; Chest Pain    Collection Time: 12/21/23  8:43 AM   Result Value Ref Range    QT Interval 380 ms    QTC Interval 463 ms   Comprehensive Metabolic Panel    Collection Time: 12/21/23  9:22 AM    Specimen: Blood   Result Value Ref Range    Glucose 151 (H) 65 - 99 mg/dL    BUN 14 6 - 20 mg/dL    Creatinine 1.24 0.76 - 1.27 mg/dL    Sodium 133 (L) 136 - 145 mmol/L    Potassium 3.8 3.5 - 5.2 mmol/L    Chloride 98 98 - 107 mmol/L    CO2 24.4 22.0 - 29.0 mmol/L    Calcium 9.2 8.6 - 10.5 mg/dL    Total Protein 7.2 6.0 - 8.5 g/dL    Albumin 4.0 3.5 - 5.2 g/dL     ALT (SGPT) 13 1 - 41 U/L    AST (SGOT) 17 1 - 40 U/L    Alkaline Phosphatase 121 (H) 39 - 117 U/L    Total Bilirubin 1.0 0.0 - 1.2 mg/dL    Globulin 3.2 gm/dL    A/G Ratio 1.3 g/dL    BUN/Creatinine Ratio 11.3 7.0 - 25.0    Anion Gap 10.6 5.0 - 15.0 mmol/L    eGFR 69.5 >60.0 mL/min/1.73   High Sensitivity Troponin T    Collection Time: 12/21/23  9:22 AM    Specimen: Blood   Result Value Ref Range    HS Troponin T 18 <22 ng/L   Green Top (Gel)    Collection Time: 12/21/23  9:22 AM   Result Value Ref Range    Extra Tube Hold for add-ons.    Lavender Top    Collection Time: 12/21/23  9:22 AM   Result Value Ref Range    Extra Tube hold for add-on    Gold Top - SST    Collection Time: 12/21/23  9:22 AM   Result Value Ref Range    Extra Tube Hold for add-ons.    Light Blue Top    Collection Time: 12/21/23  9:22 AM   Result Value Ref Range    Extra Tube Hold for add-ons.    CBC Auto Differential    Collection Time: 12/21/23  9:22 AM    Specimen: Blood   Result Value Ref Range    WBC 15.32 (H) 3.40 - 10.80 10*3/mm3    RBC 4.85 4.14 - 5.80 10*6/mm3    Hemoglobin 13.9 13.0 - 17.7 g/dL    Hematocrit 42.0 37.5 - 51.0 %    MCV 86.6 79.0 - 97.0 fL    MCH 28.7 26.6 - 33.0 pg    MCHC 33.1 31.5 - 35.7 g/dL    RDW 14.2 12.3 - 15.4 %    RDW-SD 44.8 37.0 - 54.0 fl    MPV 9.9 6.0 - 12.0 fL    Platelets 247 140 - 450 10*3/mm3    Neutrophil % 84.8 (H) 42.7 - 76.0 %    Lymphocyte % 5.7 (L) 19.6 - 45.3 %    Monocyte % 8.2 5.0 - 12.0 %    Eosinophil % 0.5 0.3 - 6.2 %    Basophil % 0.3 0.0 - 1.5 %    Immature Grans % 0.5 0.0 - 0.5 %    Neutrophils, Absolute 13.01 (H) 1.70 - 7.00 10*3/mm3    Lymphocytes, Absolute 0.87 0.70 - 3.10 10*3/mm3    Monocytes, Absolute 1.25 (H) 0.10 - 0.90 10*3/mm3    Eosinophils, Absolute 0.07 0.00 - 0.40 10*3/mm3    Basophils, Absolute 0.04 0.00 - 0.20 10*3/mm3    Immature Grans, Absolute 0.08 (H) 0.00 - 0.05 10*3/mm3    nRBC 0.0 0.0 - 0.2 /100 WBC   Protime-INR    Collection Time: 12/21/23  9:22 AM    Specimen:  Blood   Result Value Ref Range    Protime 21.5 (H) 11.7 - 14.2 Seconds    INR 1.83 (H) 0.90 - 1.10   Lipase    Collection Time: 12/21/23  9:22 AM    Specimen: Blood   Result Value Ref Range    Lipase 16 13 - 60 U/L   High Sensitivity Troponin T 2Hr    Collection Time: 12/21/23 11:24 AM    Specimen: Blood   Result Value Ref Range    HS Troponin T 18 <22 ng/L    Troponin T Delta 0 >=-4 - <+4 ng/L   BNP    Collection Time: 12/21/23 11:24 AM    Specimen: Blood   Result Value Ref Range    proBNP 1,704.0 (H) 0.0 - 900.0 pg/mL   Respiratory Panel PCR w/COVID-19(SARS-CoV-2) [facility], NP Swab in [place], 2 HR TAT - Swab, Nasopharynx    Collection Time: 12/21/23 11:31 AM    Specimen: Nasopharynx; Swab   Result Value Ref Range    ADENOVIRUS, PCR Not Detected Not Detected    Coronavirus 229E Not Detected Not Detected    Coronavirus HKU1 Not Detected Not Detected    Coronavirus NL63 Not Detected Not Detected    Coronavirus OC43 Not Detected Not Detected    COVID19 Not Detected Not Detected - Ref. Range    Human Metapneumovirus Not Detected Not Detected    Human Rhinovirus/Enterovirus Not Detected Not Detected    Influenza A PCR Not Detected Not Detected    Influenza B PCR Not Detected Not Detected    Parainfluenza Virus 1 Not Detected Not Detected    Parainfluenza Virus 2 Not Detected Not Detected    Parainfluenza Virus 3 Not Detected Not Detected    Parainfluenza Virus 4 Not Detected Not Detected    RSV, PCR Not Detected Not Detected    Bordetella pertussis pcr Not Detected Not Detected    Bordetella parapertussis PCR Not Detected Not Detected    Chlamydophila pneumoniae PCR Not Detected Not Detected    Mycoplasma pneumo by PCR Not Detected Not Detected       Ordered the above labs and independently reviewed the results.        RADIOLOGY  US Gallbladder    Result Date: 12/21/2023  US GALLBLADDER-12/21/2023  HISTORY: Right upper quadrant abdominal pain.  The gallbladder appears slightly incompletely  distended. At least 2 echogenic gallstones are seen with posterior shadowing largest measuring up to 1.9 cm. No gallbladder wall thickening or pericholecystic fluid is seen. Patient did have some diffuse abdominal pain and a definite sonographic Tinoco sign was difficult to assess. Common bile duct is not dilated measuring 3.7 mm. Liver appears grossly normal. Pancreas is not well seen. Right kidney is also not optimally visualized but measures up to approximately 12.7 cm. The right renal pelvis appears mildly prominent in size.      1. Somewhat limited study due to patient's body habitus. 2. Cholelithiasis. No gallbladder wall thickening or pericholecystic fluid is seen. 3. Poor visualization of the pancreas. 4. Limited visualization of the right kidney with mildly prominent right renal pelvis.   This report was finalized on 12/21/2023 1:14 PM by Dr. Monroe Roberson M.D on Workstation: [id number]      CT Angiogram Chest    Result Date: 12/21/2023  CT ANGIOGRAM OF THE CHEST. MULTIPLE CORONAL, SAGITTAL, AND 3-D RECONSTRUCTIONS.  HISTORY: 53-year-old male with chest pain and shortness of breath.  TECHNIQUE: Radiation dose reduction techniques were utilized, including automated exposure control and exposure modulation based on body size. CT angiogram of the chest was performed following the administration of IV contrast. Multiple coronal, sagittal, and 3-D reconstruction images were obtained. Compared with prior 10/29/2021.  FINDINGS: 1. There is no evidence for pulmonary thromboemboli. 2. There is a small subpleural groundglass opacity at the left upper lobe, image 52. A small developing pneumonia is suspected. The lungs are underexpanded with crowded lung markings. There is a mosaic pattern of density at the lower lobes likely secondary to patchy air trapping. Cannot entirely exclude atypical pneumonia at the lower lobes. There are no effusions or lymphadenopathy. 3. At the visualized upper abdomen, there is a tiny  sliver of perihepatic fluid and there is haziness surrounding the gallbladder. Characterization is limited due to the patient's body habitus and timing of contrast enhancement. Cannot exclude cholecystitis. Consider evaluation with a gallbladder ultrasound.      XR Chest 1 View    Result Date: 12/21/2023  XR CHEST 1 VW-  INDICATION: Chest pain  COMPARISON: 11/2/2021 and 10/29/2021      Low lung volumes. Right lower lobe likely calcified granuloma.. No pleural effusion or pneumothorax.  Normal size cardiomediastinal silhouette.  No focal osseous abnormality.   This report was finalized on 12/21/2023 9:37 AM by Dr. Austin Mcguire M.D on Workstation: [id number]       I ordered the above noted radiological studies. Reviewed by me and discussed with radiologist.  See dictation for official radiology interpretation.      MEDICATIONS GIVEN IN ER    Medications   sodium chloride 0.9 % flush 10 mL (has no administration in time range)   cefTRIAXone (ROCEPHIN) 2,000 mg in sodium chloride 0.9 % 100 mL IVPB-VTB (has no administration in time range)   aspirin tablet 325 mg (325 mg Oral Given 12/21/23 0920)   ondansetron (ZOFRAN) injection 4 mg (4 mg Intravenous Given 12/21/23 1104)   iopamidol (ISOVUE-370) 76 % injection 100 mL (95 mL Intravenous Given by Other 12/21/23 1048)   morphine injection 4 mg (4 mg Intravenous Given 12/21/23 1150)         ADDITIONAL ORDERS CONSIDERED BUT NOT ORDERED:  Nothing      PROGRESS, DATA ANALYSIS, CONSULTS, AND MEDICAL DECISION MAKING    All labs have been independently interpreted by myself.  All radiology studies have been independently interpreted by myself and discussed with radiologist dictating the report.   EKG's independently interpreted by myself.  Discussion below represents my analysis of pertinent findings related to patient's condition, differential diagnosis, treatment plan and final disposition.    I have discussed case with Dr. Tovar, emergency room physician.  She has  performed her own bedside examination and agrees with treatment plan.    ED Course as of 12/21/23 1508   u Dec 21, 2023   0940 Patient presents with 3-day history of nausea, vomiting, epigastric and lower chest pain.  Differential diagnoses include but not limited to ACS, PE, gastritis, cholecystitis. [EE]   1000 WBC(!): 15.32 [EE]   1048 INR(!): 1.83  Patient complains of pleuritic chest pain and is subtherapeutic on his INR.  We will obtain a CTA to rule out PE. [EE]   1141 CT imaging of the chest independently interpreted myself shows no evidence of PE.  Questionable left upper lobe infiltrate.  Haziness around the gallbladder.  We will add an ultrasound of the gallbladder for further evaluation. [EE]   1212 Troponin T Delta: 0 [EE]   1212 proBNP(!): 1,704.0 [EE]   1239 COVID19: Not Detected [EE]   1428 Updated patient on workup.  Patient's sats dropped to 89% at rest.  He continues to be tachycardic.  Patient does have pneumonia on CT.  Plan to admit for further evaluation and possible surgical consultation. [EE]   1501 I discussed the patient with LEROY Fraser.  She agrees to admit. [EE]      ED Course User Index  [EE] Eris Robbins PA       AS OF 15:08 EST VITALS:    BP - 135/73  HR - 113  TEMP - 96.7 °F (35.9 °C) (Tympanic)  O2 SATS - 93%        DIAGNOSIS  Final diagnoses:   Community acquired pneumonia, unspecified laterality   Hypoxia   History of pulmonary embolism   Morbid obesity   Calculus of gallbladder without cholecystitis without obstruction         DISPOSITION  Admitted      Dictated utilizing Dragon dictation     Eris Robbins PA  12/21/23 1507

## 2023-12-21 NOTE — ED NOTES
Upper abd/lower chest pain started a couple days ago.  He had some nausea and vomiting.  Today he became soa and pain became more severe

## 2023-12-21 NOTE — H&P
Patient Name:  Kameron Melendez  YOB: 1970  MRN:  [id number]  Admit Date:  12/21/2023  Patient Care Team:  Hansel Patel DO as PCP - General (Family Medicine)  Kim Sotelo RPH as Pharmacist      Subjective   History Present Illness     Chief Complaint   Patient presents with    Chest Pain    Shortness of Breath       Mr. Melendez is a 53 y.o. male with a history of DVT, factor V Leiden, HTN, lymphedema, and obesity that presents to [facility] complaining of 3-day history of shortness of breath and abdominal pain.  The patient reportedly had onset of nausea and vomiting 3 days ago.  This was associated with some epigastric pain.  He has continued to have the symptoms through today.  This morning, he developed sharp right upper abdomen/right lower side chest pain.  The patient also endorsed worsening shortness of breath.  Given this, he came to the hospital for further evaluation.  In the ER, the patient did require 2 L oxygen via nasal cannula.  Viral respiratory panel was checked and negative.  CT angiogram of the chest did not reveal any pulmonary emboli.  It was notable for a small developing pneumonia in the left upper lobe, and possible atypical pneumonia at the lower lobes.  Haziness was suspected around the gallbladder, so dedicated ultrasound was obtained for further evaluation.  Ultrasound revealed cholelithiasis.  Given his suspected pneumonia, the patient was admitted for further workup and management.    Personal History     Past Medical History:   Diagnosis Date    DVT (deep venous thrombosis)     RLE    Factor V Leiden     Hypertension     Kidney stone     Lymphedema     Lymphedema of left lower extremity     Obesity     ARNOLDO (obstructive sleep apnea)     Pulmonary embolism     bilateral    Pulmonary hypertension     Right ventricular enlargement      Past Surgical History:   Procedure Laterality Date    CARDIAC CATHETERIZATION Bilateral 7/18/2017    Procedure:  Pulmonary angiography- [facility] ;  Surgeon: Cedric Coulter MD;  Location:  [facility];  Service:     CARDIAC CATHETERIZATION N/A 7/18/2017    Procedure: Right Heart Cath;  Surgeon: Cedric Coulter MD;  Location:  [facility];  Service:     THROMBECTOMY       Family History   Problem Relation Age of Onset    Heart disease Mother     Heart failure Mother     Bradycardia Mother     No Known Problems Father     Atrial fibrillation Half-Brother     Prostate cancer Half-Brother     No Known Problems Maternal Grandmother     No Known Problems Maternal Grandfather     No Known Problems Paternal Grandmother     No Known Problems Paternal Grandfather      Social History     Tobacco Use    Smoking status: Light Smoker     Types: Cigars, Pipe     Start date: 1/1/2006    Smokeless tobacco: Never    Tobacco comments:     occasional - < 1 a month   Vaping Use    Vaping Use: Never used   Substance Use Topics    Alcohol use: No    Drug use: No     No current facility-administered medications on file prior to encounter.     Current Outpatient Medications on File Prior to Encounter   Medication Sig Dispense Refill    acetaminophen (TYLENOL) 500 MG tablet Take 1 tablet by mouth Every 6 (Six) Hours As Needed for Mild Pain.      furosemide (LASIX) 80 MG tablet Take 1 tablet by mouth Daily As Needed (leg swelling). 90 tablet 3    metOLazone (ZAROXOLYN) 5 MG tablet 1 po weekly if >10 lbs weight gain take one.  Continue furosemide (Patient taking differently: Take 1 tablet by mouth As Needed. 1 po weekly if >10 lbs weight gain take one.  Continue furosemide) 12 tablet 1    naltrexone (DEPADE) 50 MG tablet Take 1 tablet by mouth Daily. 30 tablet 5    omeprazole (priLOSEC) 40 MG capsule Take 1 capsule by mouth Daily. 90 capsule 3    Semaglutide-Weight Management 2.4 MG/0.75ML solution auto-injector Inject 2.4 mg under the skin into the appropriate area as directed 1 (One) Time Per Week. 9 mL 3    spironolactone  (Aldactone) 25 MG tablet Take 1 tablet by mouth Daily. (Patient taking differently: Take 1 tablet by mouth As Needed.) 180 tablet 3    topiramate (TOPAMAX) 50 MG tablet Take 1 tablet by mouth 2 (Two) Times a Day. (Patient taking differently: Take 1 tablet by mouth Daily.) 180 tablet 3    warfarin (COUMADIN) 5 MG tablet Take 2 tablets by mouth. Mon, Tues, Wed, Thurs, Fri, & Sun      warfarin (Jantoven) 5 MG tablet Take as directed by medication management clinic (Patient taking differently: Take 1 tablet by mouth 1 (One) Time Per Week. Saturday) 180 tablet 1    atorvastatin (LIPITOR) 20 MG tablet Take 1 tablet by mouth Every Night. 90 tablet 3    cephalexin (KEFLEX) 500 MG capsule Take 1 capsule by mouth 4 (Four) Times a Day. 40 capsule 2     No Known Allergies    Objective    Objective     Vital Signs  Temp:  [96.7 °F (35.9 °C)-99.9 °F (37.7 °C)] 99.9 °F (37.7 °C)  Heart Rate:  [] 113  Resp:  [16-20] 16  BP: ()/(66-92) 146/92  SpO2:  [90 %-97 %] 93 %  on  Flow (L/min):  [2] 2;   Device (Oxygen Therapy):  system;nasal cannula  Body mass index is 63.14 kg/m².    Physical Exam  General: Alert, no acute distress.  Somewhat uncomfortable appearing.  Dyspneic.  ENT: No conjunctival injection or scleral icterus. Moist mucous membranes. No JVD.   Neuro: Eyes open and moving in all directions, strength normal in all extremities, no focal deficits.   Lungs: Diminished breath sounds bilaterally.  No wheeze or crackles appreciated.    Heart: RRR, no murmurs.   Abdomen: Obese.  Soft, nondistended.  Normal bowel sounds.  Significant tenderness to palpation in the right upper quadrant.  No rebound or guarding.  Ext: Warm and well-perfused.  Chronic venous stasis changes.  3+ pitting edema of bilateral lower extremities.  Skin: No rashes or lesions. IV site without swelling or erythema.     Results Review:  I reviewed the patient's new clinical results.  I reviewed the patient's new imaging results and agree with  the interpretation.  I reviewed the patient's other test results and agree with the interpretation  I personally viewed and interpreted the patient's EKG/Telemetry data  Discussed with ED provider.    Lab Results (last 24 hours)       Procedure Component Value Units Date/Time    CBC & Differential [[id number]]  (Abnormal) Collected: 12/21/23 0922    Specimen: Blood Updated: 12/21/23 0933    Narrative:      The following orders were created for panel order CBC & Differential.  Procedure                               Abnormality         Status                     ---------                               -----------         ------                     CBC Auto Differential[[id number]]        Abnormal            Final result                 Please view results for these tests on the individual orders.    Comprehensive Metabolic Panel [[id number]]  (Abnormal) Collected: 12/21/23 0922    Specimen: Blood Updated: 12/21/23 0953     Glucose 151 mg/dL      BUN 14 mg/dL      Creatinine 1.24 mg/dL      Sodium 133 mmol/L      Potassium 3.8 mmol/L      Chloride 98 mmol/L      CO2 24.4 mmol/L      Calcium 9.2 mg/dL      Total Protein 7.2 g/dL      Albumin 4.0 g/dL      ALT (SGPT) 13 U/L      AST (SGOT) 17 U/L      Alkaline Phosphatase 121 U/L      Total Bilirubin 1.0 mg/dL      Globulin 3.2 gm/dL      A/G Ratio 1.3 g/dL      BUN/Creatinine Ratio 11.3     Anion Gap 10.6 mmol/L      eGFR 69.5 mL/min/1.73     Narrative:      GFR Normal >60  Chronic Kidney Disease <60  Kidney Failure <15      High Sensitivity Troponin T [[id number]]  (Normal) Collected: 12/21/23 0922    Specimen: Blood Updated: 12/21/23 0953     HS Troponin T 18 ng/L     Narrative:      High Sensitive Troponin T Reference Range:  <14.0 ng/L- Negative Female for AMI  <22.0 ng/L- Negative Male for AMI  >=14 - Abnormal Female indicating possible myocardial injury.  >=22 - Abnormal Male indicating possible myocardial injury.   Clinicians would have to utilize clinical acumen,  EKG, Troponin, and serial changes to determine if it is an Acute Myocardial Infarction or myocardial injury due to an underlying chronic condition.         CBC Auto Differential [[id number]]  (Abnormal) Collected: 12/21/23 0922    Specimen: Blood Updated: 12/21/23 0933     WBC 15.32 10*3/mm3      RBC 4.85 10*6/mm3      Hemoglobin 13.9 g/dL      Hematocrit 42.0 %      MCV 86.6 fL      MCH 28.7 pg      MCHC 33.1 g/dL      RDW 14.2 %      RDW-SD 44.8 fl      MPV 9.9 fL      Platelets 247 10*3/mm3      Neutrophil % 84.8 %      Lymphocyte % 5.7 %      Monocyte % 8.2 %      Eosinophil % 0.5 %      Basophil % 0.3 %      Immature Grans % 0.5 %      Neutrophils, Absolute 13.01 10*3/mm3      Lymphocytes, Absolute 0.87 10*3/mm3      Monocytes, Absolute 1.25 10*3/mm3      Eosinophils, Absolute 0.07 10*3/mm3      Basophils, Absolute 0.04 10*3/mm3      Immature Grans, Absolute 0.08 10*3/mm3      nRBC 0.0 /100 WBC     Protime-INR [[id number]]  (Abnormal) Collected: 12/21/23 0922    Specimen: Blood Updated: 12/21/23 1003     Protime 21.5 Seconds      INR 1.83    Lipase [[id number]]  (Normal) Collected: 12/21/23 0922    Specimen: Blood Updated: 12/21/23 1052     Lipase 16 U/L     High Sensitivity Troponin T 2Hr [[id number]]  (Normal) Collected: 12/21/23 1124    Specimen: Blood Updated: 12/21/23 1152     HS Troponin T 18 ng/L      Troponin T Delta 0 ng/L     Narrative:      High Sensitive Troponin T Reference Range:  <14.0 ng/L- Negative Female for AMI  <22.0 ng/L- Negative Male for AMI  >=14 - Abnormal Female indicating possible myocardial injury.  >=22 - Abnormal Male indicating possible myocardial injury.   Clinicians would have to utilize clinical acumen, EKG, Troponin, and serial changes to determine if it is an Acute Myocardial Infarction or myocardial injury due to an underlying chronic condition.         BNP [[id number]]  (Abnormal) Collected: 12/21/23 1124    Specimen: Blood Updated: 12/21/23 1152     proBNP 1,704.0 pg/mL      Narrative:      This assay is used as an aid in the diagnosis of individuals suspected of having heart failure. It can be used as an aid in the diagnosis of acute decompensated heart failure (ADHF) in patients presenting with signs and symptoms of ADHF to the emergency department (ED). In addition, NT-proBNP of <300 pg/mL indicates ADHF is not likely.    Age Range Result Interpretation  NT-proBNP Concentration (pg/mL:      <50             Positive            >450                   Gray                 300-450                    Negative             <300    50-75           Positive            >900                  Gray                300-900                  Negative            <300      >75             Positive            >1800                  Gray                300-1800                  Negative            <300    Respiratory Panel PCR w/COVID-19(SARS-CoV-2) [facility] In-House, NP Swab in UTM/VTM, 2 HR TAT - Swab, Nasopharynx [[id number]]  (Normal) Collected: 12/21/23 1131    Specimen: Swab from Nasopharynx Updated: 12/21/23 1233     ADENOVIRUS, PCR Not Detected     Coronavirus 229E Not Detected     Coronavirus HKU1 Not Detected     Coronavirus NL63 Not Detected     Coronavirus OC43 Not Detected     COVID19 Not Detected     Human Metapneumovirus Not Detected     Human Rhinovirus/Enterovirus Not Detected     Influenza A PCR Not Detected     Influenza B PCR Not Detected     Parainfluenza Virus 1 Not Detected     Parainfluenza Virus 2 Not Detected     Parainfluenza Virus 3 Not Detected     Parainfluenza Virus 4 Not Detected     RSV, PCR Not Detected     Bordetella pertussis pcr Not Detected     Bordetella parapertussis PCR Not Detected     Chlamydophila pneumoniae PCR Not Detected     Mycoplasma pneumo by PCR Not Detected    Narrative:      In the setting of a positive respiratory panel with a viral infection PLUS a negative procalcitonin without other underlying concern for bacterial infection,  consider observing off antibiotics or discontinuation of antibiotics and continue supportive care. If the respiratory panel is positive for atypical bacterial infection (Bordetella pertussis, Chlamydophila pneumoniae, or Mycoplasma pneumoniae), consider antibiotic de-escalation to target atypical bacterial infection.    Lactic Acid, Plasma [[id number]]  (Normal) Collected: 12/21/23 1509    Specimen: Blood from Arm, Left Updated: 12/21/23 1534     Lactate 0.6 mmol/L     Blood Culture - Blood, Arm, Left [[id number]] Collected: 12/21/23 1509    Specimen: Blood from Arm, Left Updated: 12/21/23 1513    Blood Culture - Blood, Hand, Right [[id number]] Collected: 12/21/23 1542    Specimen: Blood from Hand, Right Updated: 12/21/23 1546            Imaging Results (Last 24 Hours)       Procedure Component Value Units Date/Time    US Gallbladder [[id number]] Collected: 12/21/23 1308     Updated: 12/21/23 1317    Narrative:      US GALLBLADDER-12/21/2023     HISTORY: Right upper quadrant abdominal pain.     The gallbladder appears slightly incompletely distended. At least 2  echogenic gallstones are seen with posterior shadowing largest measuring  up to 1.9 cm. No gallbladder wall thickening or pericholecystic fluid is  seen. Patient did have some diffuse abdominal pain and a definite  sonographic Tinoco sign was difficult to assess. Common bile duct is not  dilated measuring 3.7 mm. Liver appears grossly normal. Pancreas is not  well seen. Right kidney is also not optimally visualized but measures up  to approximately 12.7 cm. The right renal pelvis appears mildly  prominent in size.       Impression:      1. Somewhat limited study due to patient's body habitus.  2. Cholelithiasis. No gallbladder wall thickening or pericholecystic  fluid is seen.  3. Poor visualization of the pancreas.  4. Limited visualization of the right kidney with mildly prominent right  renal pelvis.        This report was finalized on 12/21/2023 1:14 PM by  Dr. Monroe Roberson M.D on Workstation: [id number]       CT Angiogram Chest [[id number]] Collected: 12/21/23 1125     Updated: 12/21/23 1126    Narrative:      CT ANGIOGRAM OF THE CHEST. MULTIPLE CORONAL, SAGITTAL, AND 3-D  RECONSTRUCTIONS.     HISTORY: 53-year-old male with chest pain and shortness of breath.     TECHNIQUE: Radiation dose reduction techniques were utilized, including  automated exposure control and exposure modulation based on body size.   CT angiogram of the chest was performed following the administration of  IV contrast. Multiple coronal, sagittal, and 3-D reconstruction images  were obtained. Compared with prior 10/29/2021.     FINDINGS:  1. There is no evidence for pulmonary thromboemboli.  2. There is a small subpleural groundglass opacity at the left upper  lobe, image 52. A small developing pneumonia is suspected. The lungs are  underexpanded with crowded lung markings. There is a mosaic pattern of  density at the lower lobes likely secondary to patchy air trapping.  Cannot entirely exclude atypical pneumonia at the lower lobes. There are  no effusions or lymphadenopathy.  3. At the visualized upper abdomen, there is a tiny sliver of  perihepatic fluid and there is haziness surrounding the gallbladder.  Characterization is limited due to the patient's body habitus and timing  of contrast enhancement. Cannot exclude cholecystitis. Consider  evaluation with a gallbladder ultrasound.       XR Chest 1 View [[id number]] Collected: 12/21/23 0935     Updated: 12/21/23 0940    Narrative:      XR CHEST 1 VW-     INDICATION: Chest pain     COMPARISON: 11/2/2021 and 10/29/2021       Impression:      Low lung volumes. Right lower lobe likely calcified  granuloma.. No pleural effusion or pneumothorax.  Normal size  cardiomediastinal silhouette.  No focal osseous abnormality.       This report was finalized on 12/21/2023 9:37 AM by Dr. Austin Mcguire M.D on Workstation: [id number]               Results  for orders placed during the hospital encounter of 11/01/21    Adult Transthoracic Echo Complete W/ Cont if Necessary Per Protocol    Interpretation Summary  · The left ventricular cavity is mild to moderately dilated.  · Left ventricular ejection fraction appears to be 61 - 65%. Left ventricular systolic function is normal.  · The right ventricular cavity is mildly dilated. Normal right ventricular systolic function noted.  · The left atrial cavity is mild to moderately dilated.  · There is no evidence of pericardial effusion    ECG 12 Lead ED Triage Standing Order; Chest Pain   Preliminary Result   HEART RATE= 89  bpm   RR Interval= 674  ms   HI Interval= 225  ms   P Horizontal Axis= 32  deg   P Front Axis= 51  deg   QRSD Interval= 97  ms   QT Interval= 380  ms   QTcB= 463  ms   QRS Axis= -40  deg   T Wave Axis= 31  deg   - ABNORMAL ECG -   Sinus rhythm   Prolonged HI interval   Left anterior fascicular block   Left ventricular hypertrophy   Anterior Q waves, possibly due to LVH   Electronically Signed By:    Date and Time of Study: 2023-12-21 08:43:15        Assessment/Plan   Assessment & Plan   Active Hospital Problems    Diagnosis  POA    **CAP (community acquired pneumonia) [J18.9]  Yes    Type 2 diabetes mellitus [E11.9]  Unknown    Hyponatremia [E87.1]  Unknown    Choledocholithiasis [K80.50]  Unknown    RUQ pain [R10.11]  Unknown    Long term (current) use of anticoagulants [Z79.01]  Not Applicable    Personal history of other venous thrombosis and embolism [Z86.718]  Not Applicable    Heterozygous factor V Leiden mutation [D68.51]  Yes    Lymphedema of both lower extremities [I89.0]  Yes    Obesity, morbid, BMI 50 or higher [E66.01]  Yes    ARNOLDO (obstructive sleep apnea) [G47.33]  Yes      Resolved Hospital Problems   No resolved problems to display.       53 y.o. male admitted with CAP (community acquired pneumonia).    Pneumonia  Acute hypoxic respiratory failure  Leukocytosis  -CT angiogram chest with no  PE, small developing left upper lobe pneumonia, possible bilateral lower lobe atypical pneumonia  -Troponin normal, lactic normal  -WBC elevated at 15.32 K  -Viral respiratory panel negative  -Check strep pneumo, Legionella, MRSA nares, procalcitonin  -Blood cultures obtained and pending  -Oxygen supplementation as needed, goal saturations above 90%  -Zosyn to cover for possible GI and lung source at this time, however if surgery has low concern for developing cholecystitis, will narrow to ceftriaxone    Cholelithiasis  -Ultrasound gallbladder with cholelithiasis, no gallbladder wall thickening or pericholecystic fluid  -Right upper quadrant pain is the patient's main issue at this point   -Will ask surgery to evaluate the patient given that he could have a developing cholecystitis  -Cover with Zosyn for now to cover GI and pneumonia source  -Clear liquid diet    Hyponatremia  -Sodium low at 133 on admission, baseline is normal  -Patient endorsed poor oral intake, could be hypovolemic  -Gentle IV fluids with normal saline at 75 cc/hour  -Monitor with daily BMP    Type 2 diabetes mellitus  -Most recent A1c 6.9, will recheck with morning labs  -SSI while admitted, Accu-Cheks per protocol    DVT/PE  -INR subtherapeutic on arrival at 1.83  -Consult pharmacy to dose warfarin    Lymphedema  -Lasix, spironolactone, and metolazone as needed at home  -Patient with significant edema on bilateral lower extremities, but states this is actually improved from baseline; typically takes diuretics on the weekend if needed  -Monitor clinically for now, can diurese as needed    Obstructive sleep apnea  -Patient with known history of ARNOLDO, does not use CPAP  -States that he does not like the CPAP and does not plan to use one  -Supplemental oxygen as needed to maintain saturations above 90%      Warfarin (home med) for DVT prophylaxis.  Full code.  Discussed with patient and ED provider.      Freida Bran MD  [facility]  12/21/23  18:44 EST

## 2023-12-22 LAB
ANION GAP SERPL CALCULATED.3IONS-SCNC: 11.7 MMOL/L (ref 5–15)
BACTERIA UR QL AUTO: ABNORMAL /HPF
BASOPHILS # BLD AUTO: 0.03 10*3/MM3 (ref 0–0.2)
BASOPHILS NFR BLD AUTO: 0.2 % (ref 0–1.5)
BILIRUB UR QL STRIP: NEGATIVE
BUN SERPL-MCNC: 15 MG/DL (ref 6–20)
BUN/CREAT SERPL: 12.5 (ref 7–25)
CALCIUM SPEC-SCNC: 8.8 MG/DL (ref 8.6–10.5)
CHLORIDE SERPL-SCNC: 101 MMOL/L (ref 98–107)
CLARITY UR: CLEAR
CO2 SERPL-SCNC: 21.3 MMOL/L (ref 22–29)
COLOR UR: ABNORMAL
CREAT SERPL-MCNC: 1.2 MG/DL (ref 0.76–1.27)
DEPRECATED RDW RBC AUTO: 42.9 FL (ref 37–54)
EGFRCR SERPLBLD CKD-EPI 2021: 72.3 ML/MIN/1.73
EOSINOPHIL # BLD AUTO: 0.09 10*3/MM3 (ref 0–0.4)
EOSINOPHIL NFR BLD AUTO: 0.7 % (ref 0.3–6.2)
ERYTHROCYTE [DISTWIDTH] IN BLOOD BY AUTOMATED COUNT: 14.1 % (ref 12.3–15.4)
GLUCOSE BLDC GLUCOMTR-MCNC: 100 MG/DL (ref 70–130)
GLUCOSE BLDC GLUCOMTR-MCNC: 85 MG/DL (ref 70–130)
GLUCOSE BLDC GLUCOMTR-MCNC: 90 MG/DL (ref 70–130)
GLUCOSE SERPL-MCNC: 95 MG/DL (ref 65–99)
GLUCOSE UR STRIP-MCNC: NEGATIVE MG/DL
HBA1C MFR BLD: 6 % (ref 4.8–5.6)
HCT VFR BLD AUTO: 36.7 % (ref 37.5–51)
HGB BLD-MCNC: 12.1 G/DL (ref 13–17.7)
HGB UR QL STRIP.AUTO: ABNORMAL
HYALINE CASTS UR QL AUTO: ABNORMAL /LPF
IMM GRANULOCYTES # BLD AUTO: 0.1 10*3/MM3 (ref 0–0.05)
IMM GRANULOCYTES NFR BLD AUTO: 0.8 % (ref 0–0.5)
INR PPP: 2.23 (ref 0.9–1.1)
KETONES UR QL STRIP: NEGATIVE
L PNEUMO1 AG UR QL IA: NEGATIVE
LEUKOCYTE ESTERASE UR QL STRIP.AUTO: ABNORMAL
LYMPHOCYTES # BLD AUTO: 1.35 10*3/MM3 (ref 0.7–3.1)
LYMPHOCYTES NFR BLD AUTO: 10.4 % (ref 19.6–45.3)
MCH RBC QN AUTO: 28.2 PG (ref 26.6–33)
MCHC RBC AUTO-ENTMCNC: 33 G/DL (ref 31.5–35.7)
MCV RBC AUTO: 85.5 FL (ref 79–97)
MONOCYTES # BLD AUTO: 1.37 10*3/MM3 (ref 0.1–0.9)
MONOCYTES NFR BLD AUTO: 10.5 % (ref 5–12)
NEUTROPHILS NFR BLD AUTO: 10.09 10*3/MM3 (ref 1.7–7)
NEUTROPHILS NFR BLD AUTO: 77.4 % (ref 42.7–76)
NITRITE UR QL STRIP: NEGATIVE
NRBC BLD AUTO-RTO: 0 /100 WBC (ref 0–0.2)
PH UR STRIP.AUTO: 5.5 [PH] (ref 5–8)
PLATELET # BLD AUTO: 229 10*3/MM3 (ref 140–450)
PMV BLD AUTO: 10.5 FL (ref 6–12)
POTASSIUM SERPL-SCNC: 3.5 MMOL/L (ref 3.5–5.2)
POTASSIUM SERPL-SCNC: 4 MMOL/L (ref 3.5–5.2)
PROT UR QL STRIP: ABNORMAL
PROTHROMBIN TIME: 25.1 SECONDS (ref 11.7–14.2)
QT INTERVAL: 380 MS
QTC INTERVAL: 463 MS
RBC # BLD AUTO: 4.29 10*6/MM3 (ref 4.14–5.8)
RBC # UR STRIP: ABNORMAL /HPF
REF LAB TEST METHOD: ABNORMAL
S PNEUM AG SPEC QL LA: NEGATIVE
SODIUM SERPL-SCNC: 134 MMOL/L (ref 136–145)
SP GR UR STRIP: >=1.03 (ref 1–1.03)
SQUAMOUS #/AREA URNS HPF: ABNORMAL /HPF
UROBILINOGEN UR QL STRIP: ABNORMAL
WBC # UR STRIP: ABNORMAL /HPF
WBC NRBC COR # BLD AUTO: 13.03 10*3/MM3 (ref 3.4–10.8)

## 2023-12-22 PROCEDURE — 83036 HEMOGLOBIN GLYCOSYLATED A1C: CPT | Performed by: STUDENT IN AN ORGANIZED HEALTH CARE EDUCATION/TRAINING PROGRAM

## 2023-12-22 PROCEDURE — 80048 BASIC METABOLIC PNL TOTAL CA: CPT | Performed by: STUDENT IN AN ORGANIZED HEALTH CARE EDUCATION/TRAINING PROGRAM

## 2023-12-22 PROCEDURE — 85025 COMPLETE CBC W/AUTO DIFF WBC: CPT | Performed by: STUDENT IN AN ORGANIZED HEALTH CARE EDUCATION/TRAINING PROGRAM

## 2023-12-22 PROCEDURE — 82948 REAGENT STRIP/BLOOD GLUCOSE: CPT

## 2023-12-22 PROCEDURE — 87449 NOS EACH ORGANISM AG IA: CPT | Performed by: STUDENT IN AN ORGANIZED HEALTH CARE EDUCATION/TRAINING PROGRAM

## 2023-12-22 PROCEDURE — 84132 ASSAY OF SERUM POTASSIUM: CPT | Performed by: HOSPITALIST

## 2023-12-22 PROCEDURE — 81001 URINALYSIS AUTO W/SCOPE: CPT | Performed by: PHYSICIAN ASSISTANT

## 2023-12-22 PROCEDURE — 99203 OFFICE O/P NEW LOW 30 MIN: CPT | Performed by: SURGERY

## 2023-12-22 PROCEDURE — 25010000002 PIPERACILLIN SOD-TAZOBACTAM PER 1 G: Performed by: STUDENT IN AN ORGANIZED HEALTH CARE EDUCATION/TRAINING PROGRAM

## 2023-12-22 PROCEDURE — G0378 HOSPITAL OBSERVATION PER HR: HCPCS

## 2023-12-22 PROCEDURE — 85610 PROTHROMBIN TIME: CPT | Performed by: STUDENT IN AN ORGANIZED HEALTH CARE EDUCATION/TRAINING PROGRAM

## 2023-12-22 PROCEDURE — 87899 AGENT NOS ASSAY W/OPTIC: CPT | Performed by: STUDENT IN AN ORGANIZED HEALTH CARE EDUCATION/TRAINING PROGRAM

## 2023-12-22 RX ORDER — SIMETHICONE 80 MG
80 TABLET,CHEWABLE ORAL 4 TIMES DAILY PRN
Status: DISCONTINUED | OUTPATIENT
Start: 2023-12-22 | End: 2023-12-23 | Stop reason: HOSPADM

## 2023-12-22 RX ORDER — WARFARIN SODIUM 10 MG/1
10 TABLET ORAL
Status: COMPLETED | OUTPATIENT
Start: 2023-12-22 | End: 2023-12-22

## 2023-12-22 RX ORDER — POTASSIUM CHLORIDE 750 MG/1
40 TABLET, FILM COATED, EXTENDED RELEASE ORAL EVERY 4 HOURS
Status: COMPLETED | OUTPATIENT
Start: 2023-12-22 | End: 2023-12-22

## 2023-12-22 RX ADMIN — PIPERACILLIN SODIUM AND TAZOBACTAM SODIUM 4.5 G: 4; .5 INJECTION, SOLUTION INTRAVENOUS at 03:00

## 2023-12-22 RX ADMIN — PANTOPRAZOLE SODIUM 40 MG: 40 TABLET, DELAYED RELEASE ORAL at 05:55

## 2023-12-22 RX ADMIN — Medication 10 ML: at 19:26

## 2023-12-22 RX ADMIN — Medication 10 ML: at 09:04

## 2023-12-22 RX ADMIN — POTASSIUM CHLORIDE 40 MEQ: 750 TABLET, EXTENDED RELEASE ORAL at 16:56

## 2023-12-22 RX ADMIN — POTASSIUM CHLORIDE 40 MEQ: 750 TABLET, EXTENDED RELEASE ORAL at 11:39

## 2023-12-22 RX ADMIN — SIMETHICONE 80 MG: 80 TABLET, CHEWABLE ORAL at 21:58

## 2023-12-22 RX ADMIN — TOPIRAMATE 50 MG: 50 TABLET, FILM COATED ORAL at 09:04

## 2023-12-22 RX ADMIN — PIPERACILLIN SODIUM AND TAZOBACTAM SODIUM 4.5 G: 4; .5 INJECTION, SOLUTION INTRAVENOUS at 17:14

## 2023-12-22 RX ADMIN — PIPERACILLIN SODIUM AND TAZOBACTAM SODIUM 4.5 G: 4; .5 INJECTION, SOLUTION INTRAVENOUS at 09:04

## 2023-12-22 RX ADMIN — SENNOSIDES AND DOCUSATE SODIUM 2 TABLET: 50; 8.6 TABLET ORAL at 09:04

## 2023-12-22 RX ADMIN — WARFARIN 10 MG: 10 TABLET ORAL at 16:56

## 2023-12-22 RX ADMIN — ATORVASTATIN CALCIUM 20 MG: 20 TABLET, FILM COATED ORAL at 19:25

## 2023-12-22 NOTE — PROGRESS NOTES
[facility] Clinical Pharmacy Services: Warfarin Dosing/Monitoring Consult    Kameron Melendez is a 53 y.o. male, estimated creatinine clearance is 135.5 mL/min (by C-G formula based on SCr of 1.24 mg/dL). weighing (!) 223 kg (491 lb 12.8 oz).    Results from last 7 days   Lab Units 12/21/23  0922   INR  1.83*   HEMOGLOBIN g/dL 13.9   HEMATOCRIT % 42.0   PLATELETS 10*3/mm3 247     Prior to admission anticoagulation: 5 mg on Sat; 10mg all other days    Hospital Anticoagulation:  Consulting provider: Dr. Bran  Start date: 12/21  Indication: DVT/PE (active thrombosis)  Target INR: 2 - 3  Expected duration: tbd   Bridge Therapy: No      Potential food or drug interactions: none at this time    Education complete?/Date: Yes; Follows with [facility]    Assessment/Plan:  Dose: INR subtherapeutic - will load with 12mg x once tonight- will reassess after AM labs tomorrow  Monitor for any signs or symptoms of bleeding  Follow up daily INRs and dose adjustments    Pharmacy will continue to follow until discharge or discontinuation of warfarin.     Dianna Amos [facility]  Clinical Pharmacist

## 2023-12-22 NOTE — CONSULTS
CONSULT NOTE    Patient Identification:  Kameron Melendez  53 y.o.  male  1970  [id number]            Requesting physician: Hospitalist    Reason for Consultation: ARNOLDO hypoxemia pneumonia    CC:     History of Present Illness:  53-year-old gentleman history of DVT factor V Leiden morbid obesity ARNOLDO diagnosed many years ago.  Patient reports being intolerant to CPAP and currently not considering any evaluation or treatment of ARNOLDO.  Patient presented with 3-day history of shortness of breath and epigastric/right lower chest pain.  Further evaluation with CT angiogram revealed some scattered groundglass changes and possible cholelithiasis.  Currently requiring 2 L oxygen nasal cannula.  He tells me his pain currently has resolved.  Denies any shortness of breath at rest.  He tells me that he was diagnosed with ARNOLDO but he is not at all interested in any further evaluation or trying CPAP.      Review of Systems  As above rest is negative  Past Medical History:  Past Medical History:   Diagnosis Date    DVT (deep venous thrombosis)     RLE    Factor V Leiden     Hypertension     Kidney stone     Lymphedema     Lymphedema of left lower extremity     Obesity     ARNOLDO (obstructive sleep apnea)     Pulmonary embolism     bilateral    Pulmonary hypertension     Right ventricular enlargement        Past Surgical History:  Past Surgical History:   Procedure Laterality Date    CARDIAC CATHETERIZATION Bilateral 7/18/2017    Procedure: Pulmonary angiography- Inari ;  Surgeon: Cedric Coulter MD;  Location: [facility] CATH INVASIVE LOCATION;  Service:     CARDIAC CATHETERIZATION N/A 7/18/2017    Procedure: Right Heart Cath;  Surgeon: Cedric Coulter MD;  Location: [facility] CATH INVASIVE LOCATION;  Service:     THROMBECTOMY          Home Meds:  Medications Prior to Admission   Medication Sig Dispense Refill Last Dose    acetaminophen (TYLENOL) 500 MG tablet Take 1 tablet by mouth Every 6 (Six) Hours As Needed for Mild Pain.       furosemide  (LASIX) 80 MG tablet Take 1 tablet by mouth Daily As Needed (leg swelling). 90 tablet 3     metOLazone (ZAROXOLYN) 5 MG tablet 1 po weekly if >10 lbs weight gain take one.  Continue furosemide (Patient taking differently: Take 1 tablet by mouth As Needed. 1 po weekly if >10 lbs weight gain take one.  Continue furosemide) 12 tablet 1     naltrexone (DEPADE) 50 MG tablet Take 1 tablet by mouth Daily. 30 tablet 5     omeprazole (priLOSEC) 40 MG capsule Take 1 capsule by mouth Daily. 90 capsule 3     Semaglutide-Weight Management 2.4 MG/0.75ML solution auto-injector Inject 2.4 mg under the skin into the appropriate area as directed 1 (One) Time Per Week. 9 mL 3     spironolactone (Aldactone) 25 MG tablet Take 1 tablet by mouth Daily. (Patient taking differently: Take 1 tablet by mouth As Needed.) 180 tablet 3     topiramate (TOPAMAX) 50 MG tablet Take 1 tablet by mouth 2 (Two) Times a Day. (Patient taking differently: Take 1 tablet by mouth Daily.) 180 tablet 3     warfarin (COUMADIN) 5 MG tablet Take 2 tablets by mouth. Mon, Tues, Wed, Thurs, Fri, & Sun       warfarin (Jantoven) 5 MG tablet Take as directed by medication management clinic (Patient taking differently: Take 1 tablet by mouth 1 (One) Time Per Week. Saturday) 180 tablet 1     atorvastatin (LIPITOR) 20 MG tablet Take 1 tablet by mouth Every Night. 90 tablet 3     cephalexin (KEFLEX) 500 MG capsule Take 1 capsule by mouth 4 (Four) Times a Day. 40 capsule 2        Allergies:  No Known Allergies    Social History:   Social History     Socioeconomic History    Marital status:    Tobacco Use    Smoking status: Light Smoker     Types: Cigars, Pipe     Start date: 1/1/2006    Smokeless tobacco: Never    Tobacco comments:     occasional - < 1 a month   Vaping Use    Vaping Use: Never used   Substance and Sexual Activity    Alcohol use: No    Drug use: No    Sexual activity: Defer       Family History:  Family History   Problem Relation Age of Onset    Heart  "disease Mother     Heart failure Mother     Bradycardia Mother     No Known Problems Father     Atrial fibrillation Half-Brother     Prostate cancer Half-Brother     No Known Problems Maternal Grandmother     No Known Problems Maternal Grandfather     No Known Problems Paternal Grandmother     No Known Problems Paternal Grandfather        Physical Exam:  /86 (BP Location: Right arm, Patient Position: Lying)   Pulse 88   Temp 98.8 °F (37.1 °C) (Oral)   Resp 17   Ht 188 cm (74\")   Wt (!) 223 kg (491 lb 12.8 oz)   SpO2 93%   BMI 63.14 kg/m²  Body mass index is 63.14 kg/m². 93% (!) 223 kg (491 lb 12.8 oz)  Physical Exam  Patient is examined using the personal protective equipment as per guidelines from infection control for this particular patient as enacted.  Hand hygiene was performed before and after patient interaction.  Well-developed normal body habitus  Eyes normal conjunctivae and pupils reactive to light  ENT Mallampati between 3 and 4 normal nasal exam  Neck midline trachea no thyromegaly  Chest diminished breath sounds with rhonchi bilaterally in the bases   CVS regular rate and rhythm 2+ lower extremity edema  Abdomen soft nontender no hepatosplenomegaly  CNS intact normal sensory exam  Skin no rashes no nodules  Psych oriented to time place and person normal memory  Musculoskeletal no cyanosis no clubbing normal range of motion        LABS:  Lab Results   Component Value Date    CALCIUM 8.8 12/22/2023    PHOS 3.2 11/08/2021     Results from last 7 days   Lab Units 12/22/23  0706 12/21/23  1124 12/21/23  0922   SODIUM mmol/L 134*  --  133*   POTASSIUM mmol/L 3.5  --  3.8   CHLORIDE mmol/L 101  --  98   CO2 mmol/L 21.3*  --  24.4   BUN mg/dL 15  --  14   CREATININE mg/dL 1.20  --  1.24   GLUCOSE mg/dL 95  --  151*   CALCIUM mg/dL 8.8  --  9.2   WBC 10*3/mm3 13.03*  --  15.32*   HEMOGLOBIN g/dL 12.1*  --  13.9   PLATELETS 10*3/mm3 229  --  247   ALT (SGPT) U/L  --   --  13   AST (SGOT) U/L  --   " --  17   PROBNP pg/mL  --  1,704.0*  --    PROCALCITONIN ng/mL  --  0.40*  --      Lab Results   Component Value Date    TROPONINT 18 12/21/2023     Results from last 7 days   Lab Units 12/21/23  1124 12/21/23  0922   HSTROP T ng/L 18 18         Results from last 7 days   Lab Units 12/21/23  1509 12/21/23  1124   PROCALCITONIN ng/mL  --  0.40*   LACTATE mmol/L 0.6  --          Results from last 7 days   Lab Units 12/21/23  1131   ADENOVIRUS DETECTION BY PCR  Not Detected   CORONAVIRUS 229E  Not Detected   CORONAVIRUS HKU1  Not Detected   CORONAVIRUS NL63  Not Detected   CORONAVIRUS OC43  Not Detected   HUMAN METAPNEUMOVIRUS  Not Detected   HUMAN RHINOVIRUS/ENTEROVIRUS  Not Detected   INFLUENZA B PCR  Not Detected   PARAINFLUENZA 1  Not Detected   PARAINFLUENZA VIRUS 2  Not Detected   PARAINFLUENZA VIRUS 3  Not Detected   PARAINFLUENZA VIRUS 4  Not Detected   BORDETELLA PERTUSSIS PCR  Not Detected   BORDETELLA PARAPERTUSSIS PCR  Not Detected   CHLAMYDOPHILA PNEUMONIAE PCR  Not Detected   MYCOPLAMA PNEUMO PCR  Not Detected   RSV, PCR  Not Detected     Results from last 7 days   Lab Units 12/22/23  0706 12/21/23  0922   INR  2.23* 1.83*         Lab Results   Component Value Date    TSH 2.330 10/27/2023     Estimated Creatinine Clearance: 140 mL/min (by C-G formula based on SCr of 1.2 mg/dL).  Results from last 7 days   Lab Units 12/22/23  0113   NITRITE UA  Negative   WBC UA /HPF 11-20*   BACTERIA UA /HPF None Seen   SQUAM EPITHEL UA /HPF 0-2        Imaging: I personally visualized the images of scans/x-rays performed within last 3 days.      Assessment:  CAP (community acquired pneumonia)  Cholelithiasis  Acute hypoxemia  ARNOLDO noncompliant with CPAP  Morbid obesity  Diabetes mellitus  History of DVT/P on Coumadin  Chronic lymphedema      Recommendations:  At this time we have a gentleman with complicated medical history with CT chest  showing vague groundglass infiltrate more prominent left upper lobe but patchy.   Patient currently on Zosyn to cover for pneumonia and possible gallbladder source.  Procalcitonin minimally elevated.  Continue current antibiotics de-escalate based on culture  Wean down oxygen maintain sats above 90%  Reeducated patient extensively on ARNOLDO.  At this time he refuses CPAP treatment or any further evaluation down the road.  Patient fully understands risk of untreated ARNOLDO including heart attack strokes and even death.  Long-term weight loss will be beneficial  Mobilize ambulate            Gaston Marr MD  12/22/2023  13:31 EST      Much of this encounter note is an electronic transcription/translation of spoken language to printed text using Dragon Software.

## 2023-12-22 NOTE — NURSING NOTE
"Cwon consult received.  Patient here as obs patient for CAP. We were asked to assess wound to right gluteal fold, POA.    Patient currently on a bariatric bed and easily was able to turn to his right side to allow me to assess wound. Wound measures approximately 1.0 cm x 2.0 cm x 0.2 cm with a clean, moist base. Wound is stable and without any outward s/s of infection. Overall patient's skin looks really good despite his morbid obesity, multiple co-morbidities and limited mobility.  He is incontinent at times, he reports. Etiology of wound most likely r/t a combination of pressure, friction and moisture. I cleansed wound with NS and applied a small amt of zinc ointment to wound base and covered with a large 6\" x  6\" optifoam dressing for added protection.  He is agreeable to treatment and appreciative of visit.    Please call with any questions.  Wound care order is in Epic.  "

## 2023-12-22 NOTE — CASE MANAGEMENT/SOCIAL WORK
Discharge Planning Assessment  [facility]     Patient Name: Kameron Melendez  MRN: [id number]  Today's Date: 12/22/2023    Admit Date: 12/21/2023    Plan: Plan at present is home with spouse.  Referral sent to [facility] in case needed - eval pending.   Discharge Needs Assessment       Row Name 12/22/23 1326       Living Environment    People in Home spouse;child(nikki), adult    Current Living Arrangements home    Primary Care Provided by self;spouse/significant other    Provides Primary Care For no one, unable/limited ability to care for self    Family Caregiver if Needed spouse;child(nikki), adult       Resource/Environmental Concerns    Resource/Environmental Concerns none       Transition Planning    Patient/Family Anticipates Transition to home with family;home with help/services    Patient/Family Anticipated Services at Transition home health care    Transportation Anticipated family or friend will provide;car, drives self       Discharge Needs Assessment    Readmission Within the Last 30 Days no previous admission in last 30 days    Equipment Currently Used at Home walker, rolling;lift device    Concerns to be Addressed discharge planning    Provided Post Acute Provider List? Yes    Post Acute Provider List Home Health    Provided Post Acute Provider Quality & Resource List? Yes    Post Acute Provider Quality and Resource List Home Health    Delivered To Patient    Method of Delivery In person                   Discharge Plan       Row Name 12/22/23 0773       Plan    Plan Plan at present is home with spouse.  Referral sent to [facility] in case needed - eval pending.    Plan Comments S/W pt at bedside.  Facesheet info confirmed.  Pt lives in a 2 story house with his wife.  Their adult son and pt's wife's parents are also living with them.  Pt's bedroom is on the 2nd fllor but pt has been sleeping in a recliner/ lift chair on the 1st floor for the past 3 years. He also has a walker at home.  He does not have  home O2.  Pt states he is able to drive.  He has used [facility] in the past and would want them again if needed - referral sent to [facility] intake.  No hx of SNF.  Pt states he plans to return home upon DC.  CCP will continue to follow and assist as needed. ............Charlotte TY/ MARIO                  Continued Care and Services - Admitted Since 12/21/2023       Home Medical Care       Service Provider Request Status Selected Services Address Phone Fax Patient Preferred    [facility] Pending - Request Sent N/A [address] [address] [phone] --                     Demographic Summary       Row Name 12/22/23 1323       General Information    Admission Type observation    Arrived From home    Referral Source admission list    Reason for Consult discharge planning    Preferred Language English                   Functional Status       Row Name 12/22/23 1324       Functional Status    Usual Activity Tolerance fair    Current Activity Tolerance fair       Functional Status, IADL    Medications independent    Meal Preparation independent;assistive person    Housekeeping independent;assistive person    Laundry independent;assistive person    Shopping independent;assistive person       Employment/    Employment Status disabled                               Charlotte Wolf RN

## 2023-12-22 NOTE — DISCHARGE PLACEMENT REQUEST
"Kameron Covarrubias (53 y.o. Male)       Date of Birth   1970    Social Security Number       Address   [address] [place] [address]    Home Phone   [phone]    MRN   [id number]       [facility]    Marital Status                               Admission Date   12/21/23    Admission Type   Emergency    Admitting Provider   Freida Bran MD    Attending Provider   Herrera Cruz MD    Department, Room/Bed   [address], [address]       Discharge Date       Discharge Disposition       Discharge Destination                                 Attending Provider: Herrera Cruz MD    Allergies: No Known Allergies    Isolation: None   Infection: None   Code Status: CPR    Ht: 188 cm (74\")   Wt: 223 kg (491 lb 12.8 oz)    Admission Cmt: None   Principal Problem: CAP (community acquired pneumonia) [J18.9]                   Active Insurance as of 12/21/2023       Primary Coverage       Payor Plan Insurance Group Employer/Plan Group    [employer] PPO [id number]       Payor Plan Address Payor Plan Phone Number Payor Plan Fax Number Effective Dates    [address] [phone]  5/1/2019 - None Entered    [place] [address]         Subscriber Name Subscriber Birth Date Member ID       KAMERON COVARRUBIAS 1970 [id number]                     Emergency Contacts        (Rel.) Home Phone Work Phone Mobile Phone    Yaya Covarrubias (Spouse) [phone] -- [phone]                "

## 2023-12-22 NOTE — PROGRESS NOTES
Name: Kameron Melendez ADMIT: 2023   : 1970  PCP: Hansel Patel DO    MRN: [id number] LOS: 0 days   AGE/SEX: 53 y.o. male  ROOM: [facility]     Subjective   Subjective   Currently denies complaint. He stated he Angel abdominal pain especially when he takes a deep breath. No shortness of breath or cough. Denies any pain after meals. She denies any problems coughing after eating     Objective   Objective   Vital Signs  Temp:  [97.52 °F (36.4 °C)-99.9 °F (37.7 °C)] 98.8 °F (37.1 °C)  Heart Rate:  [] 88  Resp:  [16-20] 17  BP: (128-148)/(73-92) 148/86  SpO2:  [92 %-95 %] 93 %  on  Flow (L/min):  [2-5] 5;   Device (Oxygen Therapy): nasal cannula  Body mass index is 63.14 kg/m².    Physical Exam  Constitutional:       General: He is not in acute distress.     Appearance: He is obese. He is not ill-appearing or toxic-appearing.   HENT:      Head: Normocephalic and atraumatic.   Cardiovascular:      Rate and Rhythm: Normal rate and regular rhythm.   Pulmonary:      Effort: Pulmonary effort is normal. No respiratory distress.      Breath sounds: Decreased breath sounds present. No wheezing or rhonchi.   Abdominal:      General: Bowel sounds are normal.      Palpations: Abdomen is soft.      Tenderness: There is no abdominal tenderness. There is no guarding or rebound.   Skin:     General: Skin is warm and dry.   Neurological:      General: No focal deficit present.      Mental Status: He is alert and oriented to person, place, and time.   Psychiatric:         Mood and Affect: Mood normal.         Behavior: Behavior normal.     Results Review  I reviewed the patient's new clinical results.  Results from last 7 days   Lab Units 23  0706 23  0922   WBC 10*3/mm3 13.03* 15.32*   HEMOGLOBIN g/dL 12.1* 13.9   PLATELETS 10*3/mm3 229 247     Results from last 7 days   Lab Units 23  0706 23  0922   SODIUM mmol/L 134* 133*   POTASSIUM mmol/L 3.5 3.8   CHLORIDE mmol/L 101 98   CO2 mmol/L  21.3* 24.4   BUN mg/dL 15 14   CREATININE mg/dL 1.20 1.24   GLUCOSE mg/dL 95 151*     Lab Results   Component Value Date    ANIONGAP 11.7 12/22/2023     Estimated Creatinine Clearance: 140 mL/min (by C-G formula based on SCr of 1.2 mg/dL).   Lab Results   Component Value Date    EGFR 72.3 12/22/2023     Results from last 7 days   Lab Units 12/21/23  0922   ALBUMIN g/dL 4.0   BILIRUBIN mg/dL 1.0   ALK PHOS U/L 121*   AST (SGOT) U/L 17   ALT (SGPT) U/L 13     Results from last 7 days   Lab Units 12/22/23  0706 12/21/23  0922   CALCIUM mg/dL 8.8 9.2   ALBUMIN g/dL  --  4.0     Results from last 7 days   Lab Units 12/21/23  1509 12/21/23  1124   PROCALCITONIN ng/mL  --  0.40*   LACTATE mmol/L 0.6  --      Hemoglobin A1C   Date/Time Value Ref Range Status   12/22/2023 0706 6.00 (H) 4.80 - 5.60 % Final   12/21/2023 1917 5.80 (H) 4.80 - 5.60 % Final     Glucose   Date/Time Value Ref Range Status   12/22/2023 1128 100 70 - 130 mg/dL Final   12/21/2023 2134 119 70 - 130 mg/dL Final       US Gallbladder    Result Date: 12/21/2023  1. Somewhat limited study due to patient's body habitus. 2. Cholelithiasis. No gallbladder wall thickening or pericholecystic fluid is seen. 3. Poor visualization of the pancreas. 4. Limited visualization of the right kidney with mildly prominent right renal pelvis.   This report was finalized on 12/21/2023 1:14 PM by Dr. Monroe Roberson M.D on Workstation: [id number]      XR Chest 1 View    Result Date: 12/21/2023  Low lung volumes. Right lower lobe likely calcified granuloma.. No pleural effusion or pneumothorax.  Normal size cardiomediastinal silhouette.  No focal osseous abnormality.   This report was finalized on 12/21/2023 9:37 AM by Dr. Austin Mcguire M.D on Workstation: [id number]       Scheduled Meds  atorvastatin, 20 mg, Oral, Nightly  insulin lispro, 2-7 Units, Subcutaneous, 4x Daily AC & at Bedtime  pantoprazole, 40 mg, Oral, Q AM  piperacillin-tazobactam, 4.5 g, Intravenous,  Q8H  potassium chloride ER, 40 mEq, Oral, Q4H  senna-docusate sodium, 2 tablet, Oral, BID  sodium chloride, 10 mL, Intravenous, Q12H  topiramate, 50 mg, Oral, Daily    Continuous Infusions  Pharmacy to dose warfarin,   sodium chloride, 75 mL/hr, Last Rate: 75 mL/hr (12/21/23 2033)    PRN Meds    senna-docusate sodium **AND** polyethylene glycol **AND** bisacodyl **AND** bisacodyl    Calcium Replacement - Follow Nurse / BPA Driven Protocol    dextrose    dextrose    glucagon (human recombinant)    Magnesium Low Dose Replacement - Follow Nurse / BPA Driven Protocol    nitroglycerin    ondansetron    Pharmacy to dose warfarin    Phosphorus Replacement - Follow Nurse / BPA Driven Protocol    Potassium Replacement - Follow Nurse / BPA Driven Protocol    sodium chloride    sodium chloride    sodium chloride    Pharmacy to dose warfarin,   sodium chloride, 75 mL/hr, Last Rate: 75 mL/hr (12/21/23 2033)    Diet  Diet: Liquid Diets; Clear Liquid; Fluid Consistency: Thin (IDDSI 0)    I have personally reviewed:  [x]  Medications  [x]  Laboratory   [x]  Microbiology   [x]  Radiology   [x]  EKG/Telemetry sinus on telemetry  []  Cardiology/Vascular   []  Pathology   []  Records      Assessment/Plan     Active Hospital Problems    Diagnosis  POA    **CAP (community acquired pneumonia) [J18.9]  Yes    Type 2 diabetes mellitus [E11.9]  Unknown    Hyponatremia [E87.1]  Unknown    Choledocholithiasis [K80.50]  Unknown    RUQ pain [R10.11]  Unknown    Long term (current) use of anticoagulants [Z79.01]  Not Applicable    Personal history of other venous thrombosis and embolism [Z86.718]  Not Applicable    Heterozygous factor V Leiden mutation [D68.51]  Yes    Lymphedema of both lower extremities [I89.0]  Yes    Obesity, morbid, BMI 50 or higher [E66.01]  Yes    ARNOLDO (obstructive sleep apnea) [G47.33]  Yes      Resolved Hospital Problems   No resolved problems to display.     53 y.o. male with a history of DVT, factor V Leiden, HTN,  lymphedema, and obesity that presents to [facility] complaining of 3-day history of shortness of breath nausea, vomiting, and abdominal pain.    Pneumonia (small developing left upper lobe, possible bilateral lower lobe)  Acute hypoxic respiratory failure  Zosyn covering lung and GI source  Leukocytosis improved. Procalcitonin was 0.40.  Respiratory panel negative, strep and Legionella antigens negative  Flow (L/min):  [2-5] 5      Cholelithiasis  Mostly complained of right upper quadrant pain  Surgery to see  Gallbladder ultrasound noted  Diet: Liquid Diets; Clear Liquid; Fluid Consistency: Thin (IDDSI 0) currently     Hyponatremia  Mild, monitoring is stable  Continue IV fluids      Type 2 diabetes mellitus  A1c 6.00%  Continue correctional insulin  Glucoses controlled     h/o DVT/PE  FVL  Pharmacy dosing warfarin (if surgery will hold)     Lymphedema  Lasix, spironolactone, and metolazone as needed at home  Patient with significant edema on bilateral lower extremities, but states this is actually improved from baseline; typically takes diuretics on the weekend if needed  Monitor clinically for now, can diurese as needed     Obstructive sleep apnea  Obesity Body mass index is 63.14 kg/m².   Patient with known history of ARNOLDO, does not use CPAP  States that he does not like the CPAP and does not plan to use one  Supplemental oxygen as needed to maintain saturations above 90%    DVT prophylaxis  Warfarin (home med)    Discharge  TBD  Expected discharge date/ time has not been documented.    Discussed with patient and nursing staff    Herrera Cruz MD  [facility]  12/22/23

## 2023-12-22 NOTE — PLAN OF CARE
Goal Outcome Evaluation:  Plan of Care Reviewed With: patient        Progress: no change  Outcome Evaluation: SOB with exertion, Oxygen 2-5lpm via nc.  History of sleep apnea, declined CPAP, SaO2 76% during sleep, O2 increased to 5lpm during sleep to maintain O2 greater than 90%.  IV ABX and IV fluids.

## 2023-12-22 NOTE — PROGRESS NOTES
[facility] Clinical Pharmacy Services: Warfarin Dosing/Monitoring Consult    Kameron Melendez is a 53 y.o. male, estimated creatinine clearance is 140 mL/min (by C-G formula based on SCr of 1.2 mg/dL). weighing (!) 223 kg (491 lb 12.8 oz).    Results from last 7 days   Lab Units 12/22/23  0706 12/21/23  0922   INR  2.23* 1.83*   HEMOGLOBIN g/dL 12.1* 13.9   HEMATOCRIT % 36.7* 42.0   PLATELETS 10*3/mm3 229 247     Prior to admission anticoagulation: 5 mg on Saturday 10 mg all other days    Hospital Anticoagulation:  Consulting provider: Dr Bran  Start date: [facility] med  Indication: history of DVT/PE  Target INR: 2 - 3  Expected duration: TBD   Bridge Therapy: No      Potential food or drug interactions:   Zosyn, protonix, on clear liquid diet currently      Education complete?/Date: Yes; follows with [facility] clinic    Assessment/Plan:  Dose: Will give 10 mg dose today  Monitor for any signs or symptoms of bleeding  Follow up daily INRs and dose adjustments    Pharmacy will continue to follow until discharge or discontinuation of warfarin.     Laci Zepeda [facility]  Clinical Pharmacist

## 2023-12-23 ENCOUNTER — READMISSION MANAGEMENT (OUTPATIENT)
Dept: CALL CENTER | Facility: HOSPITAL | Age: 53
End: 2023-12-23
Payer: COMMERCIAL

## 2023-12-23 VITALS
DIASTOLIC BLOOD PRESSURE: 75 MMHG | OXYGEN SATURATION: 97 % | HEART RATE: 81 BPM | SYSTOLIC BLOOD PRESSURE: 151 MMHG | RESPIRATION RATE: 18 BRPM | WEIGHT: 315 LBS | TEMPERATURE: 98.2 F | HEIGHT: 74 IN | BODY MASS INDEX: 40.43 KG/M2

## 2023-12-23 LAB
ANION GAP SERPL CALCULATED.3IONS-SCNC: 11.2 MMOL/L (ref 5–15)
BASOPHILS # BLD AUTO: 0.05 10*3/MM3 (ref 0–0.2)
BASOPHILS NFR BLD AUTO: 0.5 % (ref 0–1.5)
BUN SERPL-MCNC: 13 MG/DL (ref 6–20)
BUN/CREAT SERPL: 13.4 (ref 7–25)
CALCIUM SPEC-SCNC: 8.5 MG/DL (ref 8.6–10.5)
CHLORIDE SERPL-SCNC: 103 MMOL/L (ref 98–107)
CO2 SERPL-SCNC: 19.8 MMOL/L (ref 22–29)
CREAT SERPL-MCNC: 0.97 MG/DL (ref 0.76–1.27)
DEPRECATED RDW RBC AUTO: 45.6 FL (ref 37–54)
EGFRCR SERPLBLD CKD-EPI 2021: 93.3 ML/MIN/1.73
EOSINOPHIL # BLD AUTO: 0.28 10*3/MM3 (ref 0–0.4)
EOSINOPHIL NFR BLD AUTO: 2.7 % (ref 0.3–6.2)
ERYTHROCYTE [DISTWIDTH] IN BLOOD BY AUTOMATED COUNT: 14.3 % (ref 12.3–15.4)
GLUCOSE BLDC GLUCOMTR-MCNC: 102 MG/DL (ref 70–130)
GLUCOSE BLDC GLUCOMTR-MCNC: 95 MG/DL (ref 70–130)
GLUCOSE SERPL-MCNC: 92 MG/DL (ref 65–99)
HCT VFR BLD AUTO: 38 % (ref 37.5–51)
HGB BLD-MCNC: 12.7 G/DL (ref 13–17.7)
IMM GRANULOCYTES # BLD AUTO: 0.05 10*3/MM3 (ref 0–0.05)
IMM GRANULOCYTES NFR BLD AUTO: 0.5 % (ref 0–0.5)
INR PPP: 2.71 (ref 0.9–1.1)
LYMPHOCYTES # BLD AUTO: 1.16 10*3/MM3 (ref 0.7–3.1)
LYMPHOCYTES NFR BLD AUTO: 11.2 % (ref 19.6–45.3)
MCH RBC QN AUTO: 29.7 PG (ref 26.6–33)
MCHC RBC AUTO-ENTMCNC: 33.4 G/DL (ref 31.5–35.7)
MCV RBC AUTO: 88.8 FL (ref 79–97)
MONOCYTES # BLD AUTO: 1.01 10*3/MM3 (ref 0.1–0.9)
MONOCYTES NFR BLD AUTO: 9.8 % (ref 5–12)
NEUTROPHILS NFR BLD AUTO: 7.77 10*3/MM3 (ref 1.7–7)
NEUTROPHILS NFR BLD AUTO: 75.3 % (ref 42.7–76)
NRBC BLD AUTO-RTO: 0 /100 WBC (ref 0–0.2)
PLATELET # BLD AUTO: 233 10*3/MM3 (ref 140–450)
PMV BLD AUTO: 10.5 FL (ref 6–12)
POTASSIUM SERPL-SCNC: 4.2 MMOL/L (ref 3.5–5.2)
PROTHROMBIN TIME: 29.3 SECONDS (ref 11.7–14.2)
RBC # BLD AUTO: 4.28 10*6/MM3 (ref 4.14–5.8)
SODIUM SERPL-SCNC: 134 MMOL/L (ref 136–145)
WBC NRBC COR # BLD AUTO: 10.32 10*3/MM3 (ref 3.4–10.8)

## 2023-12-23 PROCEDURE — 82948 REAGENT STRIP/BLOOD GLUCOSE: CPT

## 2023-12-23 PROCEDURE — G0378 HOSPITAL OBSERVATION PER HR: HCPCS

## 2023-12-23 PROCEDURE — 97530 THERAPEUTIC ACTIVITIES: CPT

## 2023-12-23 PROCEDURE — 97162 PT EVAL MOD COMPLEX 30 MIN: CPT

## 2023-12-23 PROCEDURE — 85025 COMPLETE CBC W/AUTO DIFF WBC: CPT | Performed by: STUDENT IN AN ORGANIZED HEALTH CARE EDUCATION/TRAINING PROGRAM

## 2023-12-23 PROCEDURE — 85610 PROTHROMBIN TIME: CPT | Performed by: STUDENT IN AN ORGANIZED HEALTH CARE EDUCATION/TRAINING PROGRAM

## 2023-12-23 PROCEDURE — 99233 SBSQ HOSP IP/OBS HIGH 50: CPT | Performed by: SURGERY

## 2023-12-23 PROCEDURE — 25010000002 PIPERACILLIN SOD-TAZOBACTAM PER 1 G: Performed by: STUDENT IN AN ORGANIZED HEALTH CARE EDUCATION/TRAINING PROGRAM

## 2023-12-23 PROCEDURE — 80048 BASIC METABOLIC PNL TOTAL CA: CPT | Performed by: STUDENT IN AN ORGANIZED HEALTH CARE EDUCATION/TRAINING PROGRAM

## 2023-12-23 RX ORDER — TOPIRAMATE 50 MG/1
50 TABLET, FILM COATED ORAL DAILY
Start: 2023-12-23

## 2023-12-23 RX ORDER — WARFARIN SODIUM 4 MG/1
4 TABLET ORAL
Status: DISCONTINUED | OUTPATIENT
Start: 2023-12-23 | End: 2023-12-23 | Stop reason: HOSPADM

## 2023-12-23 RX ORDER — AMOXICILLIN AND CLAVULANATE POTASSIUM 875; 125 MG/1; MG/1
1 TABLET, FILM COATED ORAL 2 TIMES DAILY
Qty: 10 TABLET | Refills: 0 | Status: SHIPPED | OUTPATIENT
Start: 2023-12-23 | End: 2023-12-28

## 2023-12-23 RX ORDER — SPIRONOLACTONE 25 MG/1
25 TABLET ORAL AS NEEDED
Start: 2023-12-23

## 2023-12-23 RX ADMIN — PIPERACILLIN SODIUM AND TAZOBACTAM SODIUM 4.5 G: 4; .5 INJECTION, SOLUTION INTRAVENOUS at 09:15

## 2023-12-23 RX ADMIN — Medication 10 ML: at 09:16

## 2023-12-23 RX ADMIN — SIMETHICONE 80 MG: 80 TABLET, CHEWABLE ORAL at 04:33

## 2023-12-23 RX ADMIN — TOPIRAMATE 50 MG: 50 TABLET, FILM COATED ORAL at 09:15

## 2023-12-23 RX ADMIN — PIPERACILLIN SODIUM AND TAZOBACTAM SODIUM 4.5 G: 4; .5 INJECTION, SOLUTION INTRAVENOUS at 02:55

## 2023-12-23 RX ADMIN — PANTOPRAZOLE SODIUM 40 MG: 40 TABLET, DELAYED RELEASE ORAL at 05:58

## 2023-12-23 NOTE — PLAN OF CARE
Goal Outcome Evaluation:  Plan of Care Reviewed With: patient        Progress: improving  Outcome Evaluation: Pt is a 54 y/o M admitted to [facility] on 12/21/23 with CAP (community acquired pneumonia). PMHx consisting of CAP, type 2 DM, hyponatremia, choledocholithiasis, hx of DVT and embolism, lymphedema of bilat. LE, morbid obesity. Pt agreeable to PT evaluation, supine with HOB elevated at time of arrival. Pt was on 2L O2 NC, but states does not use O2 at home. Pt states independent mostly at PLOF, but does require assist to don/doff shoes and bathing. Pt lives at home with spouse and son who are able to assist if needed. He does have 5 stairs at home with railing on L side when ascending, which he states he has moderate difficulty using. He uses rwx at baseline, and sleeps in recliner on main floor in home due to inability to get to bedroom on top floor. Pt completed supine > sit EOB with min-CGAx2, slight assist with moving L LE. Pt able to sit EOB with no LOB and no SOB for about 7 min with CGAx2. Pt required max Ax2 to get back into bed, likely due to height of bed and mattress. Pt used [employer] [place] bar to assist with scooting up into bed with supervision. Recommend home with assist at time of d/c. He was left supine in bed with all needs met and call light within reach.      Anticipated Discharge Disposition (PT): home with assist

## 2023-12-23 NOTE — DISCHARGE SUMMARY
[facility]    Date of Discharge:  12/23/2023    PCP: Hansel Patel DO    Discharge Diagnosis:   Active Hospital Problems    Diagnosis  POA    **CAP (community acquired pneumonia) [J18.9]  Yes    Type 2 diabetes mellitus [E11.9]  Unknown    Hyponatremia [E87.1]  Unknown    Choledocholithiasis [K80.50]  Unknown    RUQ pain [R10.11]  Unknown    Long term (current) use of anticoagulants [Z79.01]  Not Applicable    Personal history of other venous thrombosis and embolism [Z86.718]  Not Applicable    Heterozygous factor V Leiden mutation [D68.51]  Yes    Lymphedema of both lower extremities [I89.0]  Yes    Obesity, morbid, BMI 50 or higher [E66.01]  Yes    ARNOLDO (obstructive sleep apnea) [G47.33]  Yes      Resolved Hospital Problems   No resolved problems to display.          Consults       Date and Time Order Name Status Description    12/22/2023 12:48 PM Inpatient Pulmonology Consult Completed     12/21/2023  6:40 PM Inpatient General Surgery Consult Completed     12/21/2023  2:27 PM [facility] (on-call MD unless specified) Details            Hospital Course  53 y.o. male with a history of morbid obesity (BMI 63.14 kg/m²), DVT, factor V Leiden, HTN, lymphedema, and obesity that presents to [facility] complaining of 3-day history of shortness of breath nausea, vomiting, and abdominal pain. Imaging showed small developing left upper lobe and possible bilateral lower lobe pneumonias in addition to cholelithiasis. His complaint was mostly right upper quadrant pain. He was started on antibiotics to cover for GI and pulmonary sources. Initial procalcitonin was 0.40. Respiratory panel negative. Strep and Legionella antigens were negative.     He is currently on a couple liters of oxygen. He is on 4 L at night. He has a history of obstructive sleep apnea. Pulmonology educated the patient extensively. He refused CPAP and states he cannot tolerate it. He also  refused any further evaluation and pulmonology discussed with him the risk of untreated ARNOLDO. He is agreeable to going home on oxygen.    Surgery discussed with him cholecystectomy but he adamantly was against it. He would definitely be high risk due to his history of clotting disorder, obesity, and heart disease. Since he did not want to proceed with cholecystectomy General surgery recommended low-fat diet.    A1c was 6.0%. INR is therapeutic. He is followed by the [facility] anticoagulation clinic. He has a history of lymphedema and is on diuretics as needed at home. He has significant edema however he states this is better than baseline.    I discussed the patient's findings and my recommendations with patient and nursing staff and Dr. Marr (OK for MA). His abdominal pain has resolved and he very much would like to go home today    Temp:  [98.1 °F (36.7 °C)-98.6 °F (37 °C)] 98.4 °F (36.9 °C)  Heart Rate:  [82-86] 82  Resp:  [18] 18  BP: (142-160)/(74-83) 151/78  Body mass index is 63.14 kg/m².    Physical Exam  Constitutional:       General: He is not in acute distress.     Appearance: He is obese. He is not toxic-appearing.   Cardiovascular:      Rate and Rhythm: Normal rate and regular rhythm.   Pulmonary:      Effort: No respiratory distress.      Breath sounds: No wheezing or rhonchi.   Abdominal:      Palpations: Abdomen is soft.      Tenderness: There is no abdominal tenderness.   Musculoskeletal:         General: Swelling present.   Neurological:      General: No focal deficit present.      Mental Status: He is oriented to person, place, and time.   Psychiatric:         Mood and Affect: Mood normal.         Behavior: Behavior normal.       Disposition: Home or Self Care       Discharge Medications        New Medications        Instructions Start Date   amoxicillin-clavulanate 875-125 MG per tablet  Commonly known as: AUGMENTIN   1 tablet, Oral, 2 Times Daily             Changes to  Medications        Instructions Start Date   metOLazone 5 MG tablet  Commonly known as: ZAROXOLYN  What changed:   how much to take  how to take this  when to take this  reasons to take this   1 po weekly if >10 lbs weight gain take one.  Continue furosemide      spironolactone 25 MG tablet  Commonly known as: Aldactone  What changed:   when to take this  reasons to take this   25 mg, Oral, As Needed      warfarin 5 MG tablet  Commonly known as: COUMADIN  What changed: Another medication with the same name was changed. Make sure you understand how and when to take each.   10 mg, Oral, Mon, Tues, Wed, Thurs, Fri, & Sun      warfarin 5 MG tablet  Commonly known as: Jantoven  What changed:   how much to take  how to take this  when to take this  additional instructions   Take as directed by medication management clinic             Continue These Medications        Instructions Start Date   acetaminophen 500 MG tablet  Commonly known as: TYLENOL   500 mg, Oral, Every 6 Hours PRN      atorvastatin 20 MG tablet  Commonly known as: LIPITOR   20 mg, Oral, Nightly      furosemide 80 MG tablet  Commonly known as: LASIX   80 mg, Oral, Daily PRN      naltrexone 50 MG tablet  Commonly known as: DEPADE   50 mg, Oral, Daily      omeprazole 40 MG capsule  Commonly known as: priLOSEC   40 mg, Oral, Daily      Semaglutide-Weight Management 2.4 MG/0.75ML solution auto-injector   2.4 mg, Subcutaneous, Weekly      topiramate 50 MG tablet  Commonly known as: TOPAMAX   50 mg, Oral, Daily             Stop These Medications      cephalexin 500 MG capsule  Commonly known as: KEFLEX             Diet Instructions       Diet: Regular/House Diet, Gastrointestinal Diets, Cardiac Diets; Healthy Heart (2-3 Na+); Fat-Restricted      Discharge Diet:  Regular/House Diet  Gastrointestinal Diets  Cardiac Diets       Cardiac Diet: Healthy Heart (2-3 Na+)    Gastrointestinal Diet: Fat-Restricted    Texture: Regular Texture (IDDSI 7)    Fluid Consistency:  Thin (IDDSI 0)           Activity Instructions       Activity as Tolerated             Additional Instructions for the Follow-ups that You Need to Schedule       Ambulatory Referral to Home Health (Hospital)   As directed      Face to Face Visit Date: 12/23/2023   Follow-up provider for Plan of Care?: I treated the patient in an acute care facility and will not continue treatment after discharge.   Follow-up provider: HANSEL WARNER [[id number]]   Reason/Clinical Findings: obesity   Describe mobility limitations that make leaving home difficult: obesit   Nursing/Therapeutic Services Requested: Physical Therapy Skilled Nursing   Skilled nursing orders: O2 instruction   Frequency: 1 Week 1        Call MD for problems / concerns.   As directed             Follow-up Information       Hansel Warner DO .    Specialties: Family Medicine, Urgent Care, Emergency Medicine  Contact information:  [address]  [place] [address]  [phone]                            Future Appointments   Date Time Provider Department Center   2/1/2024  9:00 AM Hansel Warner DO [facility]     Pending Labs       Order Current Status    Blood Culture - Blood, Arm, Left Preliminary result    Blood Culture - Blood, Hand, Right Preliminary result           Herrera Cruz MD  [facility]  12/23/23    Discharge time spent greater than 30 minutes.

## 2023-12-23 NOTE — CONSULTS
General Surgery Consultation    Consulting Physician: Kalina Peck MD    Reason for consultation: possible Cholecystitis    CC: right upper quadrant abdominal pain    HPI:   The patient is a very pleasant 53 y.o. male who presented to the hospital with acute onset of right upper quadrant abdominal pain followed by shortness of breath last night. He states that Monday he was queasy, by Wednesday he vomited and developed pain, then on Thursday he was having difficulty breathing because of the pain. He denies fevers or chills. He has had some mild pain like this before after meals. Today, he reports the pain has resolved completely. He has been able to tolerate liquids without pain.    Past Medical History:    Past Medical History:   Diagnosis Date    DVT (deep venous thrombosis)     RLE    Factor V Leiden     Hypertension     Kidney stone     Lymphedema     Lymphedema of left lower extremity     Obesity     ARNOLDO (obstructive sleep apnea)     Pulmonary embolism     bilateral    Pulmonary hypertension     Right ventricular enlargement        Past Surgical History:    Past Surgical History:   Procedure Laterality Date    CARDIAC CATHETERIZATION Bilateral 7/18/2017    Procedure: Pulmonary angiography- [facility] ;  Surgeon: Cedric Coulter MD;  Location:  [facility];  Service:     CARDIAC CATHETERIZATION N/A 7/18/2017    Procedure: Right Heart Cath;  Surgeon: Cedric Coulter MD;  Location:  [facility];  Service:     THROMBECTOMY         Medications:  Medications Prior to Admission   Medication Sig Dispense Refill Last Dose    acetaminophen (TYLENOL) 500 MG tablet Take 1 tablet by mouth Every 6 (Six) Hours As Needed for Mild Pain.       furosemide (LASIX) 80 MG tablet Take 1 tablet by mouth Daily As Needed (leg swelling). 90 tablet 3     metOLazone (ZAROXOLYN) 5 MG tablet 1 po weekly if >10 lbs weight gain take one.  Continue furosemide (Patient taking differently: Take 1 tablet by mouth As Needed. 1  po weekly if >10 lbs weight gain take one.  Continue furosemide) 12 tablet 1     naltrexone (DEPADE) 50 MG tablet Take 1 tablet by mouth Daily. 30 tablet 5     omeprazole (priLOSEC) 40 MG capsule Take 1 capsule by mouth Daily. 90 capsule 3     Semaglutide-Weight Management 2.4 MG/0.75ML solution auto-injector Inject 2.4 mg under the skin into the appropriate area as directed 1 (One) Time Per Week. 9 mL 3     spironolactone (Aldactone) 25 MG tablet Take 1 tablet by mouth Daily. (Patient taking differently: Take 1 tablet by mouth As Needed.) 180 tablet 3     topiramate (TOPAMAX) 50 MG tablet Take 1 tablet by mouth 2 (Two) Times a Day. (Patient taking differently: Take 1 tablet by mouth Daily.) 180 tablet 3     warfarin (COUMADIN) 5 MG tablet Take 2 tablets by mouth. Mon, Tues, Wed, Thurs, Fri, & Sun       warfarin (Jantoven) 5 MG tablet Take as directed by medication management clinic (Patient taking differently: Take 1 tablet by mouth 1 (One) Time Per Week. Saturday) 180 tablet 1     atorvastatin (LIPITOR) 20 MG tablet Take 1 tablet by mouth Every Night. 90 tablet 3     cephalexin (KEFLEX) 500 MG capsule Take 1 capsule by mouth 4 (Four) Times a Day. 40 capsule 2        Allergies: No Known Allergies    Social History:     Social History     Socioeconomic History    Marital status:    Tobacco Use    Smoking status: Light Smoker     Types: Cigars, Pipe     Start date: 1/1/2006    Smokeless tobacco: Never    Tobacco comments:     occasional - < 1 a month   Vaping Use    Vaping Use: Never used   Substance and Sexual Activity    Alcohol use: No    Drug use: No    Sexual activity: Defer       Family History:     Family History   Problem Relation Age of Onset    Heart disease Mother     Heart failure Mother     Bradycardia Mother     No Known Problems Father     Atrial fibrillation Half-Brother     Prostate cancer Half-Brother     No Known Problems Maternal Grandmother     No Known Problems Maternal Grandfather     No  "Known Problems Paternal Grandmother     No Known Problems Paternal Grandfather        Review of Systems:  Constitutional: denies any weight changes, fatigue, or weakness  Eyes: denies blurred/double vision or scleral icterus  Cardiovascular: denies chest pain, palpitations, or edemas  Respiratory: denies cough, sputum, or dyspnea  Gastrointestinal:  reports abdominal pain and nausea, denies diarrhea   Genitourinary: denies dysuria or hematuria  Endocrine: denies cold intolerance, lethargy, or flushing  Hematologic: denies excessive bruising or bleeding  Musculoskeletal: denies weakness, joint swelling, joint pain, or stiffness  Neurologic: denies seizures, CVA, paresthesia, or peripheral neuropathy  Skin: denies change in nevi, rashes, masses, or jaundice     All other systems reviewed and were negative.    Physical Exam:   Vitals:    12/22/23 1700   BP:    Pulse:    Resp:    Temp:    SpO2: 94%     Height: 74\"  Weight: 223 kg  BMI: 63  GENERAL: awake and alert, no acute distress, obese, oriented to person, place, and time  HEENT: normocephalic, atraumatic, no scleral icterus, moist mucous membranes  NECK: Supple, there is no thyromegaly or lymphadenopathy  RESPIRATORY: clear to auscultation, no wheezes, rales or rhonchi, symmetric air entry  CARDIOVASCULAR: regular rate and rhythm    GASTROINTESTINAL: obese, soft, nontender  MUSCULOSKELETAL: no cyanosis, clubbing, or edema   NEUROLOGIC: alert and oriented, normal speech, cranial nerves 2-12 grossly intact, no focal deficits   SKIN: Moist, warm, no rashes, no jaundice      Diagnostic work-up:     Pertinent labs I personally reviewed:   Results from last 7 days   Lab Units 12/22/23  0706 12/21/23  0922   WBC 10*3/mm3 13.03* 15.32*   HEMOGLOBIN g/dL 12.1* 13.9   HEMATOCRIT % 36.7* 42.0   PLATELETS 10*3/mm3 229 247     Results from last 7 days   Lab Units 12/22/23  1932 12/22/23  0706 12/21/23  0922   SODIUM mmol/L  --  134* 133*   POTASSIUM mmol/L 4.0 3.5 3.8 "   CHLORIDE mmol/L  --  101 98   CO2 mmol/L  --  21.3* 24.4   BUN mg/dL  --  15 14   CREATININE mg/dL  --  1.20 1.24   CALCIUM mg/dL  --  8.8 9.2   BILIRUBIN mg/dL  --   --  1.0   ALK PHOS U/L  --   --  121*   ALT (SGPT) U/L  --   --  13   AST (SGOT) U/L  --   --  17   GLUCOSE mg/dL  --  95 151*       Imaging:  I personally reviewed the CTA chest and gallbladder ultrasound. The ct shows a distended gallbladder and questionable surrounding inflammation. The ultrasound shows cholelithiasis without wall thickening or pericholecystic fluid.    Assessment and plan:   The patient is a 53 y.o. male with biliary colic versus acute cholecystitis that has resolved with antibiotics.  I explained that typically we recommend a cholecystectomy for acute cholecystitis. He adamantly did not want to undergo surgery. He would definitely be a high risk candidate for general anesthesia given history of factor v leiden and blood clots, obesity and heart disease. Since he does not want to proceed with surgery, I recommended advancing to a low fat diet to ensure his pain does not recur. I recommended a lower fat/bland diet going forward.       Kalina Peck MD  General, Robotic, and Endoscopic Surgery  [facility]     [address], [address]  [place], [place], [address]  P: [phone]  F: [phone]

## 2023-12-23 NOTE — PLAN OF CARE
Goal Outcome Evaluation:  Plan of Care Reviewed With: patient        Progress: improving  Outcome Evaluation: Pt VSS. pt discharge education completed and IV discontinued.

## 2023-12-23 NOTE — PROGRESS NOTES
"      [facility]         Dr Feldman Sayied   LOS: 0 days   Patient Care Team:  Hansel Patel DO as PCP - General (Family Medicine)  Kim Sotelo RPH as Pharmacist    Chief Complaint: ARNOLDO with community-acquired pneumonia multiple issues as listed below    Interval History: Resting comfortably refuses to use CPAP/noninvasive ventilation.  No overnight issues.  On 2 L nasal cannula oxygen.    REVIEW OF SYSTEMS:   CARDIOVASCULAR: No chest pain, chest pressure or chest discomfort. No palpitations or edema.   RESPIRATORY: No shortness of breath, cough or sputum.   GASTROINTESTINAL: No anorexia, nausea, vomiting or diarrhea. No abdominal pain or blood.   HEMATOLOGIC: No bleeding or bruising.     Ventilator/Non-Invasive Ventilation Settings (From admission, onward)      None              Vital Signs  Temp:  [98.1 °F (36.7 °C)-98.6 °F (37 °C)] 98.4 °F (36.9 °C)  Heart Rate:  [82-86] 82  Resp:  [18] 18  BP: (142-160)/(74-83) 151/78    Intake/Output Summary (Last 24 hours) at 12/23/2023 1051  Last data filed at 12/23/2023 0300  Gross per 24 hour   Intake --   Output 1000 ml   Net -1000 ml     Flowsheet Rows      Flowsheet Row First Filed Value   Admission Height 188 cm (74\") Documented at 12/21/2023 0847   Admission Weight 228 kg (503 lb) Documented at 12/21/2023 0847            Physical Exam:  Patient is examined using the personal protective equipment as per guidelines from infection control for this particular patient as enacted.  Hand hygiene was performed before and after patient interaction.   General Appearance:    Alert, cooperative, in no acute distress.  Following simple commands  ENT Mallampati between 3 and 4 no nasal congestion  Neck midline trachea, no thyromegaly   Lungs:   Diminished breath sounds bilaterally     Heart:    Regular rhythm and normal rate, normal S1 and S2, no            murmur, no gallop, no rub, no click   Chest Wall:    No abnormalities observed   Abdomen:     Normal bowel " sounds, no masses, no organomegaly, soft        nontender, nondistended, no guarding, no rebound                tenderness   Extremities:   Moves all extremities well, 2+ lymphedema edema, no cyanosis, no             redness  CNS no focal neurological deficits normal sensory exam  Skin no rashes no nodules  Musculoskeletal no cyanosis no clubbing normal range of motion     Results Review:        Results from last 7 days   Lab Units 12/23/23  0531 12/22/23  1932 12/22/23  0706 12/21/23  0922   SODIUM mmol/L 134*  --  134* 133*   POTASSIUM mmol/L 4.2 4.0 3.5 3.8   CHLORIDE mmol/L 103  --  101 98   CO2 mmol/L 19.8*  --  21.3* 24.4   BUN mg/dL 13  --  15 14   CREATININE mg/dL 0.97  --  1.20 1.24   GLUCOSE mg/dL 92  --  95 151*   CALCIUM mg/dL 8.5*  --  8.8 9.2     Results from last 7 days   Lab Units 12/21/23  1124 12/21/23  0922   HSTROP T ng/L 18 18     Results from last 7 days   Lab Units 12/23/23  0531 12/22/23  0706 12/21/23  0922   WBC 10*3/mm3 10.32 13.03* 15.32*   HEMOGLOBIN g/dL 12.7* 12.1* 13.9   HEMATOCRIT % 38.0 36.7* 42.0   PLATELETS 10*3/mm3 233 229 247     Results from last 7 days   Lab Units 12/23/23  0531 12/22/23  0706 12/21/23  0922   INR  2.71* 2.23* 1.83*                       I reviewed the patient's new clinical results.  I personally viewed and interpreted the patient's chest x-ray.        Medication Review:   atorvastatin, 20 mg, Oral, Nightly  insulin lispro, 2-7 Units, Subcutaneous, 4x Daily AC & at Bedtime  pantoprazole, 40 mg, Oral, Q AM  piperacillin-tazobactam, 4.5 g, Intravenous, Q8H  senna-docusate sodium, 2 tablet, Oral, BID  sodium chloride, 10 mL, Intravenous, Q12H  topiramate, 50 mg, Oral, Daily  warfarin, 4 mg, Oral, Once        Pharmacy to dose warfarin,   sodium chloride, 75 mL/hr, Last Rate: 75 mL/hr (12/21/23 2033)        ASSESSMENT:   CAP (community acquired pneumonia)  Cholelithiasis  Acute hypoxemia  ARNOLDO noncompliant with CPAP  Morbid obesity  Diabetes mellitus  History of  DVT/P on Coumadin  Chronic lymphedema    PLAN:  Continue current antibiotics to cover for possible pneumonia and gallbladder source.  De-escalate based on culture  Wean down oxygen maintain sats of 90%  Mobilize ambulate  Patient refuses to wear CPAP  Long-term weight loss will be beneficial      Gaston Marr MD  12/23/23  10:51 EST

## 2023-12-23 NOTE — PROGRESS NOTES
"General Surgery Progress Note      S: Doing well.  Tolerating diet without abdominal pain.  Wanting to go home.  Today, patient did mention he is on semaglutide for weight loss.    O:/75 (BP Location: Right arm, Patient Position: Lying)   Pulse 81   Temp 98.2 °F (36.8 °C) (Oral)   Resp 18   Ht 188 cm (74\")   Wt (!) 223 kg (491 lb 12.8 oz)   SpO2 97%   BMI 63.14 kg/m²     Intake & Output (last day)         12/22 0701 12/23 0700 12/23 0701 12/24 0700    P.O.      I.V. (mL/kg)      IV Piggyback      Total Intake(mL/kg)      Urine (mL/kg/hr) 1000 (0.2)     Stool 0     Total Output 1000     Net -1000           Stool Unmeasured Occurrence 0 x             GENERAL: awake, alert, no apparent distress  HEENT: normochephalic, atraumatic, moist mucus membranes, clear sclera   CHEST: clear to auscultation, no wheezes, no increased work of breathing  CARDIAC: regular rate and rhythm    ABDOMEN: Soft, obese, nontender  EXTREMITIES: no cyanosis, clubbing or edema    SKIN: Warm and moist, no rashes    LABS REVIEWED:   Results from last 7 days   Lab Units 12/23/23  0531 12/22/23  0706 12/21/23  0922   WBC 10*3/mm3 10.32 13.03* 15.32*   HEMOGLOBIN g/dL 12.7* 12.1* 13.9   HEMATOCRIT % 38.0 36.7* 42.0   PLATELETS 10*3/mm3 233 229 247     Results from last 7 days   Lab Units 12/23/23  0531 12/22/23  1932 12/22/23  0706 12/21/23  0922   SODIUM mmol/L 134*  --  134* 133*   POTASSIUM mmol/L 4.2 4.0 3.5 3.8   CHLORIDE mmol/L 103  --  101 98   CO2 mmol/L 19.8*  --  21.3* 24.4   BUN mg/dL 13  --  15 14   CREATININE mg/dL 0.97  --  1.20 1.24   CALCIUM mg/dL 8.5*  --  8.8 9.2   BILIRUBIN mg/dL  --   --   --  1.0   ALK PHOS U/L  --   --   --  121*   ALT (SGPT) U/L  --   --   --  13   AST (SGOT) U/L  --   --   --  17   GLUCOSE mg/dL 92  --  95 151*     Results from last 7 days   Lab Units 12/23/23  0531 12/22/23  0706 12/21/23  0922   INR  2.71* 2.23* 1.83*       IMAGING:  No new imaging    A/P: 53 y.o. male with symptomatic " cholelithiasis and morbid obesity.  Okay for discharge from my standpoint.  Complete antibiotics for pneumonia.  I also recommended the patient talk to his primary care about adding ursodiol to prevent further gallstone formation while he is on Wegovy.  Follow-up with me as needed.      TOBIN DUARTE MD  General, Robotic and Endoscopic Surgery  [facility]    [address], [address]  [place], [place], [address]  P: [phone]  F: [phone]

## 2023-12-23 NOTE — PLAN OF CARE
Goal Outcome Evaluation:           Progress: no change  Outcome Evaluation: pt on 2L NC during day, on 4L NC during while asleep. C/o gas- gasX ordered. Dressing intact to right gluteal fold.

## 2023-12-23 NOTE — PROGRESS NOTES
[facility] Clinical Pharmacy Services: Warfarin Dosing/Monitoring Consult    Kameron Melendez is a 53 y.o. male, estimated creatinine clearance is 173.2 mL/min (by C-G formula based on SCr of 0.97 mg/dL). weighing (!) 223 kg (491 lb 12.8 oz).    Results from last 7 days   Lab Units 12/23/23  0531 12/22/23  0706 12/21/23  0922   INR  2.71* 2.23* 1.83*   HEMOGLOBIN g/dL 12.7* 12.1* 13.9   HEMATOCRIT % 38.0 36.7* 42.0   PLATELETS 10*3/mm3 233 229 247     Prior to admission anticoagulation: 5 mg on Saturday 10 mg all other days    Hospital Anticoagulation:  Consulting provider: Dr Bran  Start date: [facility] [place] med  Indication: history of DVT/PE  Target INR: 2 - 3  Expected duration: TBD   Bridge Therapy: No      Potential food or drug interactions:   Zosyn, protonix, on clear liquid diet currently      Education complete?/Date: Yes; follows with [facility] clinic    Assessment/Plan:  INR was sub therapeutic 2 days ago so 12mg was given, followed by 10mg yesterday (normal dose), INR increased pretty quickly over 2 days from the higher doses. Normally would receive 5mg today however I feel that INR may be supra therapeutic tomorrow so will only give 4mg today  to avoid having to hold the dose tomorrow.     Warfarin 4mg X 1 today  Monitor for any signs or symptoms of bleeding  Follow up daily INRs and dose adjustments    Pharmacy will continue to follow until discharge or discontinuation of warfarin.     Jamar Marx [facility]  Clinical Pharmacist

## 2023-12-23 NOTE — THERAPY EVALUATION
Patient Name: Kameron Melendez  : 1970    MRN: [id number]                              Today's Date: 2023       Admit Date: 2023    Visit Dx:     ICD-10-CM ICD-9-CM   1. Community acquired pneumonia, unspecified laterality  J18.9 486   2. Hypoxia  R09.02 799.02   3. History of pulmonary embolism  Z86.711 V12.55   4. Morbid obesity  E66.01 278.01   5. Calculus of gallbladder without cholecystitis without obstruction  K80.20 574.20   6. Morbid (severe) obesity due to excess calories  E66.01 278.01   7. Lymphedema  I89.0 457.1   8. Morbid obesity with body mass index of 60.0-69.9 in adult  E66.01 278.01    Z68.44 V85.44     Patient Active Problem List   Diagnosis    Bilateral pulmonary embolism    Pulmonary hypertension    Lymphedema of both lower extremities    Obesity, morbid, BMI 50 or higher    Dyslipidemia    Hyperuricemia    Hypogonadal obesity    Knee arthropathy    Morbid obesity with body mass index of 60.0-69.9 in adult    ARNOLDO (obstructive sleep apnea)    Severe edema    History of pulmonary embolism    Other acute pulmonary embolism without acute cor pulmonale    Intractable back pain    Heterozygous factor V Leiden mutation    Cellulitis of left lower extremity    Hypokalemia    CAROL (acute kidney injury)    Sepsis with cutaneous manifestations    Hyperglycemia    Paroxysmal atrial fibrillation    Athscl heart 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    Long term (current) use of anticoagulants    Lymphedema, not elsewhere classified    Nicotine dependence, other tobacco product, uncomplicated    Personal history of other venous thrombosis and embolism    Typical atrial flutter    Venous insufficiency (chronic) (peripheral)    Cellulitis of right lower extremity    Wound cellulitis    CAP (community acquired pneumonia)    Type 2 diabetes mellitus    Hyponatremia    Choledocholithiasis    RUQ pain     Past Medical History:   Diagnosis Date    DVT (deep venous thrombosis)     RLE     Factor V Leiden     Hypertension     Kidney stone     Lymphedema     Lymphedema of left lower extremity     Obesity     ARNOLDO (obstructive sleep apnea)     Pulmonary embolism     bilateral    Pulmonary hypertension     Right ventricular enlargement      Past Surgical History:   Procedure Laterality Date    CARDIAC CATHETERIZATION Bilateral 7/18/2017    Procedure: Pulmonary angiography- [place] ;  Surgeon: Cedric Coulter MD;  [facility];  Service:     CARDIAC CATHETERIZATION N/A 7/18/2017    Procedure: Right Heart Cath;  Surgeon: Cedric Coulter MD;  Location:  [facility];  Service:     THROMBECTOMY        General Information       Row Name 12/23/23 1236          Physical Therapy Time and Intention    Document Type evaluation  -     Mode of Treatment individual therapy;physical therapy  -       Row Name 12/23/23 1236          General Information    Patient Profile Reviewed yes  -DR     Prior Level of Function independent:;min assist:;ADL's;bed mobility;dressing;bathing  -     Existing Precautions/Restrictions fall  -     Barriers to Rehab none identified  -       Row Name 12/23/23 1236          Living Environment    People in Home child(nikki), adult;spouse  -       Row Name 12/23/23 1236          Cognition    Orientation Status (Cognition) oriented x 4  -       Row Name 12/23/23 1236          Safety Issues, Functional Mobility    Impairments Affecting Function (Mobility) strength;range of motion (ROM);endurance/activity tolerance  -               User Key  (r) = Recorded By, (t) = Taken By, (c) = Cosigned By      Initials Name Provider Type    Roddy Vance, PT Physical Therapist                   Mobility       Row Name 12/23/23 1238          Bed Mobility    Bed Mobility supine-sit;sit-supine  -     Supine-Sit [place] (Bed Mobility) minimum assist (75% patient effort);2 person assist;verbal cues  -     Sit-Supine [place] (Bed Mobility) maximum  assist (25% patient effort);2 person assist;verbal cues  -     Assistive Device (Bed Mobility) bed rails;overhead trapeze;head of bed elevated  -       Row Name 12/23/23 1238          Bed-Chair Transfer    Bed-Chair [place] (Transfers) not tested  -       Row Name 12/23/23 1238          Sit-Stand Transfer    Sit-Stand [place] (Transfers) not tested  -       Row Name 12/23/23 1238          Gait/Stairs (Locomotion)    [place] Level (Gait) not tested  -               User Key  (r) = Recorded By, (t) = Taken By, (c) = Cosigned By      Initials Name Provider Type    Roddy Vance, PT Physical Therapist                   Obj/Interventions       Row Name 12/23/23 1239          Strength Comprehensive (MMT)    General Manual Muscle Testing (MMT) Assessment lower extremity strength deficits identified  -     Comment, General Manual Muscle Testing (MMT) Assessment L LE mild difficulty independently moving when long sitting.  -       Row Name 12/23/23 1239          Balance    Balance Assessment sitting static balance  -     Static Sitting Balance contact guard;standby assist  -     Position, Sitting Balance unsupported;sitting edge of bed  -     Comment, Balance Pt sat EOB for 7 min with CGA-SBAx2, no LOB and no SOB.  -               User Key  (r) = Recorded By, (t) = Taken By, (c) = Cosigned By      Initials Name Provider Type    Roddy Vance, PT Physical Therapist                   Goals/Plan       Row Name 12/23/23 1250          Bed Mobility Goal 1 (PT)    Activity/Assistive Device (Bed Mobility Goal 1, PT) sit to supine  -     [place] Level/Cues Needed (Bed Mobility Goal 1, PT) minimum assist (75% or more patient effort);verbal cues required  -     Time Frame (Bed Mobility Goal 1, PT) 3 days;short term goal (STG)  -       Row Name 12/23/23 1250          Transfer Goal 1 (PT)    Activity/Assistive Device (Transfer Goal 1, PT) sit-to-stand/stand-to-sit  -      [place] Level/Cues Needed (Transfer Goal 1, PT) verbal cues required;minimum assist (75% or more patient effort)  -DR     Time Frame (Transfer Goal 1, PT) 3 days;short term goal (STG)  -DR       Row Name 12/23/23 1250          Gait Training Goal 1 (PT)    Activity/Assistive Device (Gait Training Goal 1, PT) gait (walking locomotion);assistive device use;walker, rolling  -DR     [place] Level (Gait Training Goal 1, PT) verbal cues required;minimum assist (75% or more patient effort);contact guard required  -DR     Distance (Gait Training Goal 1, PT) 50  -DR     Time Frame (Gait Training Goal 1, PT) short term goal (STG);3 days  -DR               User Key  (r) = Recorded By, (t) = Taken By, (c) = Cosigned By      Initials Name Provider Type    Roddy Vance, PT Physical Therapist                   Clinical Impression       Row Name 12/23/23 1242          Pain    Pretreatment Pain Rating 0/10 - no pain  -DR     Posttreatment Pain Rating 0/10 - no pain  -DR       Row Name 12/23/23 1242          Plan of Care Review    Plan of Care Reviewed With patient  -DR     Progress improving  -DR     Outcome Evaluation Pt is a 54 y/o M admitted [facility] on [facility]23 with CAP (community acquired pneumonia). PMHx consisting of CAP, type 2 DM, hyponatremia, choledocholithiasis, hx of DVT and embolism, lymphedema of bilat. LE, morbid obesity. Pt agreeable to PT evaluation, supine with HOB elevated at time of arrival. Pt was on 2L O2 NC, but states does not use O2 at home. Pt states independent mostly at PLOF, but does require assist to don/doff shoes and bathing. Pt lives at home with spouse and son who are able to assist if needed. He does have 5 stairs at home with railing on L side when ascending, which he states he has moderate difficulty using. He uses rwx at baseline, and sleeps in recliner on main floor in home due to inability to get to bedroom on top floor. Pt completed supine > sit EOB with min-CGAx2, slight  assist with moving L LE. Pt able to sit EOB with no LOB and no SOB for about 7 min with CGAx2. Pt required max Ax2 to get back into bed, likely due to height of bed and mattress. Pt used trapeze OH bar to assist with scooting up into bed with supervision. Recommend home with assist at time of d/c. He was left supine in bed with all needs met and call light within reach.  -       Row Name 12/23/23 1242          Therapy Assessment/Plan (PT)    Rehab Potential (PT) good, to achieve stated therapy goals  -     Criteria for Skilled Interventions Met (PT) yes  -     Therapy Frequency (PT) 5 times/wk  -       Row Name 12/23/23 1242          Positioning and Restraints    Pre-Treatment Position in bed  -DR     Post Treatment Position bed  -DR     In Bed notified nsg;supine;fowlers;call light within reach;encouraged to call for assist;exit alarm on  -DR               User Key  (r) = Recorded By, (t) = Taken By, (c) = Cosigned By      Initials Name Provider Type    Roddy Vance, PT Physical Therapist                   Outcome Measures       Row Name 12/23/23 1251 12/23/23 0815       How much help from another person do you currently need...    Turning from your back to your side while in flat bed without using bedrails? 3  -DR 2  -TL    Moving from lying on back to sitting on the side of a flat bed without bedrails? 2  -DR 2  -TL    Moving to and from a bed to a chair (including a wheelchair)? 2  -DR 2  -TL    Standing up from a chair using your arms (e.g., wheelchair, bedside chair)? 3  -DR 3  -TL    Climbing 3-5 steps with a railing? 2  -DR 2  -TL    To walk in hospital room? 3  -DR 3  -TL    AM-PAC 6 Clicks Score (PT) 15  -DR 14  -TL    Highest Level of Mobility Goal 4 --> Transfer to chair/commode  -DR 4 --> Transfer to chair/commode  -TL      Row Name 12/23/23 1251          Functional Assessment    Outcome Measure Options AM-PAC 6 Clicks Basic Mobility (PT)  -DR               User Key  (r) = Recorded By,  (t) = Taken By, (c) = Cosigned By      Initials Name Provider Type    Cedric Mckeon RN Registered Nurse    Roddy Vance, PT Physical Therapist                                 Physical Therapy Education       Title: PT OT SLP Therapies (Done)       Topic: Physical Therapy (Done)       Point: Mobility training (Done)       Learning Progress Summary             Patient Acceptance, E,TB, VU by  at 12/23/2023 1251                         Point: Home exercise program (Done)       Learning Progress Summary             Patient Acceptance, E,TB, VU by  at 12/23/2023 1251                         Point: Body mechanics (Done)       Learning Progress Summary             Patient Acceptance, E,TB, VU by  at 12/23/2023 1251                         Point: Precautions (Done)       Learning Progress Summary             Patient Acceptance, E,TB, VU by  at 12/23/2023 1251                                         User Key       Initials Effective Dates Name Provider Type Discipline     05/24/23 -  Roddy Narayan, PT Physical Therapist PT                  PT Recommendation and Plan     Plan of Care Reviewed With: patient  Progress: improving  Outcome Evaluation: Pt is a 52 y/o M admitted to [facility] on 12/21/23 with CAP (community acquired pneumonia). PMHx consisting of CAP, type 2 DM, hyponatremia, choledocholithiasis, hx of DVT and embolism, lymphedema of bilat. LE, morbid obesity. Pt agreeable to PT evaluation, supine with HOB elevated at time of arrival. Pt was on 2L O2 NC, but states does not use O2 at home. Pt states independent mostly at PLOF, but does require assist to don/doff shoes and bathing. Pt lives at home with spouse and son who are able to assist if needed. He does have 5 stairs at home with railing on L side when ascending, which he states he has moderate difficulty using. He uses rwx at baseline, and sleeps in recliner on main floor in home due to inability to get to bedroom on top floor. Pt completed  supine > sit EOB with min-CGAx2, slight assist with moving L LE. Pt able to sit EOB with no LOB and no SOB for about 7 min with CGAx2. Pt required max Ax2 to get back into bed, likely due to height of bed and mattress. Pt used trapeze [place] bar to assist with scooting up into bed with supervision. Recommend home with assist at time of d/c. He was left supine in bed with all needs met and call light within reach.     Time Calculation:         PT Charges       Row Name 12/23/23 1252             Time Calculation    Start Time 0930  -DR      Stop Time 0957  -DR      Time Calculation (min) 27 min  -DR      PT Received On 12/23/23  -DR      PT - Next Appointment 12/26/23  -DR      PT Goal Re-Cert Due Date 12/30/23  -DR         Time Calculation- PT    Total Timed Code Minutes- PT 23 minute(s)  -DR         Timed Charges    96749 - PT Therapeutic Activity Minutes 23  -DR         Untimed Charges    PT Eval/Re-eval Minutes 4  -DR         Total Minutes    Timed Charges Total Minutes 23  -DR      Untimed Charges Total Minutes 4  -DR       Total Minutes 27  -DR                User Key  (r) = Recorded By, (t) = Taken By, (c) = Cosigned By      Initials Name Provider Type    Roddy Vance, PT Physical Therapist                  Therapy Charges for Today       Code Description Service Date Service Provider Modifiers Qty    17037701265 HC PT THERAPEUTIC ACT EA 15 MIN 12/23/2023 Roddy Narayan, PT GP 2    42716338945 HC PT EVAL MOD COMPLEXITY 3 12/23/2023 Roddy Narayan, PT GP 1            PT G-Codes  Outcome Measure Options: AM-PAC 6 Clicks Basic Mobility (PT)  AM-PAC 6 Clicks Score (PT): 15  PT Discharge Summary  Anticipated Discharge Disposition (PT): home with assist    Roddy Narayan PT  12/23/2023

## 2023-12-23 NOTE — OUTREACH NOTE
Prep Survey      Flowsheet Row Responses   [facility] patient discharged from? [place]   Is LACE score < 7 ? No   Eligibility [facility]   Date of Admission 12/21/23   Date of Discharge 12/23/23   Discharge Disposition Home or Self Care   Discharge diagnosis CAP (community acquired pneumonia) (   Does the patient have one of the following disease processes/diagnoses(primary or secondary)? Pneumonia   Does the patient have Home health ordered? No   Is there a DME ordered? No   Prep survey completed? Yes            MOHINDER RHODES - Registered Nurse

## 2023-12-26 ENCOUNTER — TRANSITIONAL CARE MANAGEMENT TELEPHONE ENCOUNTER (OUTPATIENT)
Dept: CALL CENTER | Facility: HOSPITAL | Age: 53
End: 2023-12-26
Payer: COMMERCIAL

## 2023-12-26 LAB
BACTERIA SPEC AEROBE CULT: NORMAL
BACTERIA SPEC AEROBE CULT: NORMAL

## 2023-12-26 NOTE — OUTREACH NOTE
Call Center TCM Note      Flowsheet Row Responses   [facility] patient discharged from? [place]   Does the patient have one of the following disease processes/diagnoses(primary or secondary)? Pneumonia   TCM attempt successful? Yes  [verbal release for Aneitha]   Call start time 1513   Call end time 1523   Discharge diagnosis CAP (community acquired pneumonia) (   Meds reviewed with patient/caregiver? Yes   Is the patient having any side effects they believe may be caused by any medication additions or changes? No   Does the patient have all medications ordered at discharge? Yes   Is the patient taking all medications as directed (includes completed medication regime)? Yes   Comments No appts available with PCP--offered an appt with alternate provider and declined as prefers PCP, will send a message to determine if PCP can accommodate scheduling request   Does the patient have an appointment with their PCP within 7-14 days of discharge? No appointments available   Nursing Interventions Routed TCM call to PCP office   Has all DME been delivered? No   Pulse Ox monitoring Intermittent   Pulse Ox device source Patient   O2 Sat: education provided Sat levels   Psychosocial issues? No   Did the patient receive a copy of their discharge instructions? Yes   Nursing interventions Reviewed instructions with patient   What is the patient's perception of their health status since discharge? Improving  [Pt denies, maintaining O2 in low 90s on room air and still with productive cough noted.]   Nursing Interventions Nurse provided patient education   Is the patient/caregiver able to teach back signs and symptoms of worsening condition: Fever/chills, Shortness of breath, Chest pain  [reviewed]   Is the patient/caregiver able to teach back importance of completing antibiotic course of treatment? Yes   TCM call completed? Yes   Call end time 1523            Jacinda Lawson RN    12/26/2023, 15:38 EST

## 2023-12-29 ENCOUNTER — ANTICOAGULATION VISIT (OUTPATIENT)
Dept: PHARMACY | Facility: HOSPITAL | Age: 53
End: 2023-12-29
Payer: COMMERCIAL

## 2023-12-29 DIAGNOSIS — I26.99 OTHER ACUTE PULMONARY EMBOLISM WITHOUT ACUTE COR PULMONALE: Primary | ICD-10-CM

## 2023-12-29 DIAGNOSIS — I26.99 BILATERAL PULMONARY EMBOLISM: ICD-10-CM

## 2023-12-29 LAB — INR PPP: 2

## 2023-12-29 NOTE — PROGRESS NOTES
Anticoagulation Clinic Progress Note    Anticoagulation Summary  As of 2023      INR goal:  2.0-3.0   TTR:  66.7% (4.8 y)   INR used for dosin.00 (2023)   Warfarin maintenance plan:  5 mg every Sat; 10 mg all other days   Weekly warfarin total:  65 mg   No change documented:  Tessie Fatima RPH   Plan last modified:  Tessie Fatima RPH (10/20/2023)   Next INR check:  2024   Priority:  High   Target end date:  Indefinite    Indications    Other acute pulmonary embolism without acute cor pulmonale [I26.99]  Bilateral pulmonary embolism [I26.99]                 Anticoagulation Episode Summary       INR check location:      Preferred lab:      Send INR reminders to:   [facility]    Comments:  new  frankie 2019 **WEEKLY FRANKIE**          Anticoagulation Care Providers       Provider Role Specialty Phone number    Torri Colmenares APRN Referring Cardiology [phone]    Elsy Baeza MD Responsible Cardiology [phone]            Clinic Interview:  Patient Findings     Positives:  Change in medications, Hospital admission    Negatives:  Signs/symptoms of thrombosis, Signs/symptoms of bleeding,   Laboratory test error suspected, Change in health, Change in alcohol use,   Change in activity, Upcoming invasive procedure, Emergency department   visit, Upcoming dental procedure, Missed doses, Extra doses, Change in   diet/appetite, Bruising, Other complaints    Comments:  Admitted  to  for PNA- prescribed augmentin x 5 days        Clinical Outcomes     Negatives:  Major bleeding event, Thromboembolic event,   Anticoagulation-related hospital admission, Anticoagulation-related ED   visit, Anticoagulation-related fatality    Comments:  Admitted  to  for PNA- prescribed augmentin x 5 days          INR History:      2023    12:00 AM 2023     1:09 PM 2023     9:22 AM 2023     7:06 AM 2023     5:31 AM 2023    12:00 AM  12/29/2023     1:37 PM   Anticoagulation Monitoring   INR  2.40     2.00   INR Date  12/8/2023 12/29/2023   INR Goal  2.0-3.0     2.0-3.0   Trend  Same     Same   Last Week Total  65 mg     65 mg   Next Week Total  65 mg     65 mg   Sun  10 mg     10 mg   Mon  10 mg     10 mg   Tue  10 mg     10 mg   Wed  10 mg     10 mg   Thu  10 mg     10 mg   Fri  10 mg     10 mg   Sat  5 mg     5 mg   Historical INR 2.40      1.83  2.23  2.71  2.00            This result is from an external source.       Plan:  1. INR is Therapeutic today- see above in Anticoagulation Summary.   Will instruct Kameron Melendez to Continue their warfarin regimen- see above in Anticoagulation Summary.  2. Follow up in 1 weeks  3. They have been instructed to call if any changes in medications, doses, concerns, etc. Patient expresses understanding and has no further questions at this time.    Tessie Fatima [facility]

## 2024-01-04 ENCOUNTER — OFFICE VISIT (OUTPATIENT)
Dept: FAMILY MEDICINE CLINIC | Facility: CLINIC | Age: 54
End: 2024-01-04
Payer: COMMERCIAL

## 2024-01-04 VITALS
SYSTOLIC BLOOD PRESSURE: 120 MMHG | OXYGEN SATURATION: 98 % | WEIGHT: 315 LBS | HEART RATE: 69 BPM | DIASTOLIC BLOOD PRESSURE: 78 MMHG | BODY MASS INDEX: 40.43 KG/M2 | HEIGHT: 74 IN

## 2024-01-04 DIAGNOSIS — J18.9 COMMUNITY ACQUIRED PNEUMONIA OF LEFT UPPER LOBE OF LUNG: Primary | ICD-10-CM

## 2024-01-04 DIAGNOSIS — R10.11 RUQ ABDOMINAL PAIN: ICD-10-CM

## 2024-01-04 DIAGNOSIS — K80.20 GALLSTONES: ICD-10-CM

## 2024-01-04 DIAGNOSIS — I89.0 LYMPHEDEMA: ICD-10-CM

## 2024-01-04 DIAGNOSIS — E66.01 MORBID (SEVERE) OBESITY DUE TO EXCESS CALORIES: ICD-10-CM

## 2024-01-04 RX ORDER — URSODIOL 300 MG/1
300 CAPSULE ORAL 2 TIMES DAILY
Qty: 180 CAPSULE | Refills: 1 | Status: SHIPPED | OUTPATIENT
Start: 2024-01-04

## 2024-01-04 NOTE — PROGRESS NOTES
Subjective   Kameron Melendez is a 53 y.o. male. Presents today for   Chief Complaint   Patient presents with    Hospital Follow Up Visit     [facility]     Abdominal Pain    Pneumonia     Date of Discharge:  12/23/2023     PCP: Hansel Patel DO     Discharge Diagnosis:         Active Hospital Problems     Diagnosis   POA    **CAP (community acquired pneumonia) [J18.9]   Yes    Type 2 diabetes mellitus [E11.9]   Unknown    Hyponatremia [E87.1]   Unknown    Choledocholithiasis [K80.50]   Unknown    RUQ pain [R10.11]   Unknown    Long term (current) use of anticoagulants [Z79.01]   Not Applicable    Personal history of other venous thrombosis and embolism [Z86.718]   Not Applicable    Heterozygous factor V Leiden mutation [D68.51]   Yes    Lymphedema of both lower extremities [I89.0]   Yes    Obesity, morbid, BMI 50 or higher [E66.01]   Yes    ARNOLDO (obstructive sleep apnea) [G47.33]   Yes       Resolved Hospital Problems   No resolved problems to display.         Consults         Date and Time Order Name Status Description     12/22/2023 12:48 PM Inpatient Pulmonology Consult Completed       12/21/2023  6:40 PM Inpatient General Surgery Consult Completed       12/21/2023  2:27 PM [facility] (on-call MD unless specified) Details                 Hospital Course  53 y.o. male with a history of morbid obesity (BMI 63.14 kg/m²), DVT, factor V Leiden, HTN, lymphedema, and obesity that presents to [facility] complaining of 3-day history of shortness of breath nausea, vomiting, and abdominal pain. Imaging showed small developing left upper lobe and possible bilateral lower lobe pneumonias in addition to cholelithiasis. His complaint was mostly right upper quadrant pain. He was started on antibiotics to cover for GI and pulmonary sources. Initial procalcitonin was 0.40. Respiratory panel negative. Strep and Legionella antigens were negative.      He is currently on a couple liters of oxygen. He is on 4 L at night. He  has a history of obstructive sleep apnea. Pulmonology educated the patient extensively. He refused CPAP and states he cannot tolerate it. He also refused any further evaluation and pulmonology discussed with him the risk of untreated ARNOLDO. He is agreeable to going home on oxygen.     Surgery discussed with him cholecystectomy but he adamantly was against it. He would definitely be high risk due to his history of clotting disorder, obesity, and heart disease. Since he did not want to proceed with cholecystectomy General surgery recommended low-fat diet.     A1c was 6.0%. INR is therapeutic. He is followed by the [facility] anticoagulation clinic. He has a history of lymphedema and is on diuretics as needed at home. He has significant edema however he states this is better than baseline.     I discussed the patient's findings and my recommendations with patient and nursing staff and Dr. Marr (OKed for MI). His abdominal pain has resolved and he very much would like to go home today    HPI:  Adam 54 y/o reports 2 days prior to admission was dry heaving and severe nauseated;   on day of admission had sharp pain, could not lay flat or take a deep breath and felt like past PE,  no PE fortunately;  did get very dyspneic and on oxygen during admission, but dyspnea resolved;  reports abd pain resolved;  was offered cholecystectomy, but declined;   Reports all kelly nresolved and feels much better;   sent out on augmentin, competed    Abdominal Pain  This is a new problem. The onset quality is sudden. The problem has been resolved. The pain is located in the RUQ. The quality of the pain is aching and sharp. Pertinent negatives include no fever.   Pneumonia  There is no cough, difficulty breathing, shortness of breath or sputum production. This is a new problem. The problem has been resolved. Pertinent negatives include no appetite change, chest pain, dyspnea on exertion, fever, PND, postnasal drip or sore  throat. Relieved by: abx. He reports significant improvement on treatment. His past medical history is significant for pneumonia.     Review of Systems   Constitutional:  Negative for appetite change and fever.   HENT:  Negative for postnasal drip and sore throat.    Respiratory:  Negative for cough, sputum production and shortness of breath.    Cardiovascular:  Negative for chest pain, dyspnea on exertion and PND.   Gastrointestinal:  Positive for abdominal pain.       Patient Active Problem List   Diagnosis    Bilateral pulmonary embolism    Pulmonary hypertension    Lymphedema of both lower extremities    Obesity, morbid, BMI 50 or higher    Dyslipidemia    Hyperuricemia    Hypogonadal obesity    Knee arthropathy    Morbid obesity with body mass index of 60.0-69.9 in adult    ARNOLDO (obstructive sleep apnea)    Severe edema    History of pulmonary embolism    Other acute pulmonary embolism without acute cor pulmonale    Intractable back pain    Heterozygous factor V Leiden mutation    Cellulitis of left lower extremity    Hypokalemia    CAROL (acute kidney injury)    Sepsis with cutaneous manifestations    Hyperglycemia    Paroxysmal atrial fibrillation    Athscl heart 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    Long term (current) use of anticoagulants    Lymphedema, not elsewhere classified    Nicotine dependence, other tobacco product, uncomplicated    Personal history of other venous thrombosis and embolism    Typical atrial flutter    Venous insufficiency (chronic) (peripheral)    Cellulitis of right lower extremity    Wound cellulitis    CAP (community acquired pneumonia)    Type 2 diabetes mellitus    Hyponatremia    Choledocholithiasis    RUQ pain       Social History     Socioeconomic History    Marital status:    Tobacco Use    Smoking status: Light Smoker     Types: Cigars, Pipe     Start date: 1/1/2006    Smokeless tobacco: Never    Tobacco comments:     occasional - < 1 a month   Vaping Use  "   Vaping Use: Never used   Substance and Sexual Activity    Alcohol use: No    Drug use: No    Sexual activity: Defer       No Known Allergies    Current Outpatient Medications on File Prior to Visit   Medication Sig Dispense Refill    acetaminophen (TYLENOL) 500 MG tablet Take 1 tablet by mouth Every 6 (Six) Hours As Needed for Mild Pain.      atorvastatin (LIPITOR) 20 MG tablet Take 1 tablet by mouth Every Night. 90 tablet 3    furosemide (LASIX) 80 MG tablet Take 1 tablet by mouth Daily As Needed (leg swelling). 90 tablet 3    metOLazone (ZAROXOLYN) 5 MG tablet 1 po weekly if >10 lbs weight gain take one.  Continue furosemide (Patient taking differently: Take 1 tablet by mouth As Needed. 1 po weekly if >10 lbs weight gain take one.  Continue furosemide) 12 tablet 1    naltrexone (DEPADE) 50 MG tablet Take 1 tablet by mouth Daily. 30 tablet 5    omeprazole (priLOSEC) 40 MG capsule Take 1 capsule by mouth Daily. 90 capsule 3    spironolactone (Aldactone) 25 MG tablet Take 1 tablet by mouth As Needed (swelling).      topiramate (TOPAMAX) 50 MG tablet Take 1 tablet by mouth Daily.      warfarin (COUMADIN) 5 MG tablet Take 2 tablets by mouth. Mon, Tues, Wed, Thurs, Fri, & Sun      warfarin (Jantoven) 5 MG tablet Take as directed by medication management clinic (Patient taking differently: Take 1 tablet by mouth 1 (One) Time Per Week. Saturday) 180 tablet 1    [DISCONTINUED] Semaglutide-Weight Management 2.4 MG/0.75ML solution auto-injector Inject 2.4 mg under the skin into the appropriate area as directed 1 (One) Time Per Week. 9 mL 3     No current facility-administered medications on file prior to visit.       Objective   Vitals:    01/04/24 0958 01/04/24 1025   BP: 142/90 120/78   Pulse: 69    SpO2: 98%    Weight: (!) 223 kg (491 lb)    Height: 188 cm (74\")      Body mass index is 63.04 kg/m².    Physical Exam  Vitals and nursing note reviewed.   Constitutional:       Appearance: He is well-developed.   HENT:      " Head: Normocephalic and atraumatic.   Neck:      Thyroid: No thyromegaly.      Vascular: No JVD.   Cardiovascular:      Rate and Rhythm: Normal rate and regular rhythm.      Heart sounds: Normal heart sounds. No murmur heard.     No friction rub. No gallop.   Pulmonary:      Effort: Pulmonary effort is normal. No respiratory distress.      Breath sounds: Normal breath sounds. No wheezing or rales.   Abdominal:      General: Bowel sounds are normal. There is no distension.      Palpations: Abdomen is soft.      Tenderness: There is no abdominal tenderness. There is no guarding or rebound.   Musculoskeletal:      Cervical back: Neck supple.   Skin:     General: Skin is warm and dry.   Neurological:      Mental Status: He is alert.   Psychiatric:         Behavior: Behavior normal.     Component      Latest Ref Rng 12/22/2023 12/29/2023   WBC      3.40 - 10.80 10*3/mm3 13.03 (H)     RBC      4.14 - 5.80 10*6/mm3 4.29     Hemoglobin      13.0 - 17.7 g/dL 12.1 (L)     Hematocrit      37.5 - 51.0 % 36.7 (L)     MCV      79.0 - 97.0 fL 85.5     MCH      26.6 - 33.0 pg 28.2     MCHC      31.5 - 35.7 g/dL 33.0     RDW      12.3 - 15.4 % 14.1     RDW-SD      37.0 - 54.0 fl 42.9     MPV      6.0 - 12.0 fL 10.5     Platelets      140 - 450 10*3/mm3 229     Neutrophil Rel %      42.7 - 76.0 % 77.4 (H)     Lymphocyte Rel %      19.6 - 45.3 % 10.4 (L)     Monocyte Rel %      5.0 - 12.0 % 10.5     Eosinophil Rel %      0.3 - 6.2 % 0.7     Basophil Rel %      0.0 - 1.5 % 0.2     Immature Granulocyte Rel %      0.0 - 0.5 % 0.8 (H)     Neutrophils Absolute      1.70 - 7.00 10*3/mm3 10.09 (H)     Lymphocytes Absolute      0.70 - 3.10 10*3/mm3 1.35     Monocytes Absolute      0.10 - 0.90 10*3/mm3 1.37 (H)     Eosinophils Absolute      0.00 - 0.40 10*3/mm3 0.09     Basophils Absolute      0.00 - 0.20 10*3/mm3 0.03     Immature Grans, Absolute      0.00 - 0.05 10*3/mm3 0.10 (H)     nRBC      0.0 - 0.2 /100 WBC 0.0     Glucose      65 - 99  mg/dL 95     BUN      6 - 20 mg/dL 15     Creatinine      0.76 - 1.27 mg/dL 1.20     Sodium      136 - 145 mmol/L 134 (L)     Potassium      3.5 - 5.2 mmol/L 3.5     Chloride      98 - 107 mmol/L 101     CO2      22.0 - 29.0 mmol/L 21.3 (L)     Calcium      8.6 - 10.5 mg/dL 8.8     BUN/Creatinine Ratio      7.0 - 25.0  12.5     Anion Gap      5.0 - 15.0 mmol/L 11.7     eGFR      >60.0 mL/min/1.73 72.3     Hemoglobin A1C      4.80 - 5.60 % 6.00 (H)     INR  2.00 (E)      Legend:  (H) High  (L) Low  (E) External lab result  Study Result    Narrative & Impression   US GALLBLADDER-12/21/2023     HISTORY: Right upper quadrant abdominal pain.     The gallbladder appears slightly incompletely distended. At least 2  echogenic gallstones are seen with posterior shadowing largest measuring  up to 1.9 cm. No gallbladder wall thickening or pericholecystic fluid is  seen. Patient did have some diffuse abdominal pain and a definite  sonographic Tinoco sign was difficult to assess. Common bile duct is not  dilated measuring 3.7 mm. Liver appears grossly normal. Pancreas is not  well seen. Right kidney is also not optimally visualized but measures up  to approximately 12.7 cm. The right renal pelvis appears mildly  prominent in size.     IMPRESSION:  1. Somewhat limited study due to patient's body habitus.  2. Cholelithiasis. No gallbladder wall thickening or pericholecystic  fluid is seen.  3. Poor visualization of the pancreas.  4. Limited visualization of the right kidney with mildly prominent right  renal pelvis.        This report was finalized on 12/21/2023 1:14 PM by Dr. Monroe Roberson M.D on Workstation: [id number]     Study Result    Narrative & Impression   CT ANGIOGRAM OF THE CHEST. MULTIPLE CORONAL, SAGITTAL, AND 3-D  RECONSTRUCTIONS.     HISTORY: 53-year-old male with chest pain and shortness of breath.     TECHNIQUE: Radiation dose reduction techniques were utilized, including  automated exposure control and  exposure modulation based on body size.   CT angiogram of the chest was performed following the administration of  IV contrast. Multiple coronal, sagittal, and 3-D reconstruction images  were obtained. Compared with prior 10/29/2021.     FINDINGS:  1. There is no evidence for pulmonary thromboemboli.     2. There is a small subpleural groundglass opacity at the left upper  lobe, image 52. A small developing pneumonia is suspected. The lungs are  underexpanded with crowded lung markings. There is a mosaic pattern of  density at the lower lobes likely secondary to patchy air trapping.  Cannot entirely exclude atypical pneumonia at the lower lobes. There are  no effusions or lymphadenopathy.     3. At the visualized upper abdomen, there is a tiny sliver of  perihepatic fluid and there is haziness surrounding the gallbladder.  Characterization is limited due to the patient's body habitus and timing  of contrast enhancement. Cannot exclude cholecystitis. Consider  evaluation with a gallbladder ultrasound.     This report was finalized on 12/22/2023 1:42 PM by Dr. Carli Gallegos M.D  on Workstation: [id number]     Study Result    Narrative & Impression   XR CHEST 1 VW-     INDICATION: Chest pain     COMPARISON: 11/2/2021 and 10/29/2021     IMPRESSION:  Low lung volumes. Right lower lobe likely calcified  granuloma.. No pleural effusion or pneumothorax.  Normal size  cardiomediastinal silhouette.  No focal osseous abnormality.       This report was finalized on 12/21/2023 9:37 AM by Dr. Austin Mcguire M.D on Workstation: [id number]        Assessment & Plan   Diagnoses and all orders for this visit:    1. Community acquired pneumonia of left upper lobe of lung (Primary)    2. Gallstones  -     ursodiol (ACTIGALL) 300 MG capsule; Take 1 capsule by mouth 2 (Two) Times a Day.  Dispense: 180 capsule; Refill: 1    3. RUQ abdominal pain    4. Lymphedema  -     Semaglutide-Weight Management 2.4 MG/0.75ML solution auto-injector;  Inject 2.4 mg under the skin into the appropriate area as directed 1 (One) Time Per Week.  Dispense: 9 mL; Refill: 3    5. Morbid (severe) obesity due to excess calories  -     Semaglutide-Weight Management 2.4 MG/0.75ML solution auto-injector; Inject 2.4 mg under the skin into the appropriate area as directed 1 (One) Time Per Week.  Dispense: 9 mL; Refill: 3      Cap doing better - more atypical;  ? Aspiration as upper lobe and was retchign prior  RUQ abd pain - resolved now;  may be more 2ndary to when severe nausea with vomiting;   no further pain at all, is following low fat diet more now.  He is working on weight loss and on glp1, tolerating when not sick.   He discussed with GS about ursodiol and discussed can develop gallstones with rapid weight loss, will start.    Continue glp1 for weight loss;  down 13 lbs from last ov.     I completed FMLA for him today.          -Follow up: 3 months and prn

## 2024-01-12 ENCOUNTER — ANTICOAGULATION VISIT (OUTPATIENT)
Dept: PHARMACY | Facility: HOSPITAL | Age: 54
End: 2024-01-12
Payer: COMMERCIAL

## 2024-01-12 DIAGNOSIS — I26.99 BILATERAL PULMONARY EMBOLISM: ICD-10-CM

## 2024-01-12 DIAGNOSIS — I26.99 OTHER ACUTE PULMONARY EMBOLISM WITHOUT ACUTE COR PULMONALE: Primary | ICD-10-CM

## 2024-01-12 LAB — INR PPP: 2.2

## 2024-01-12 NOTE — PROGRESS NOTES
Anticoagulation Clinic Progress Note    Anticoagulation Summary  As of 2024      INR goal:  2.0-3.0   TTR:  66.9% (4.9 y)   INR used for dosin.20 (2024)   Warfarin maintenance plan:  5 mg every Sat; 10 mg all other days   Weekly warfarin total:  65 mg   No change documented:  Iris Gonzalez   Plan last modified:  Tessie Fatima RPH (10/20/2023)   Next INR check:  2024   Priority:  High   Target end date:  Indefinite    Indications    Other acute pulmonary embolism without acute cor pulmonale [I26.99]  Bilateral pulmonary embolism [I26.99]                 Anticoagulation Episode Summary       INR check location:      Preferred lab:      Send INR reminders to:   [facility]  POOL    Comments:  new  tristen 2019 **WEEKLY TRISTEN**          Anticoagulation Care Providers       Provider Role Specialty Phone number    Torri Colmenares APRN Referring Cardiology [phone]    Elsy Baeza MD Responsible Cardiology [phone]            Clinic Interview:  No pertinent clinical findings have been reported.    INR History:      2023     9:22 AM 2023     7:06 AM 2023     5:31 AM 2023    12:00 AM 2023     1:37 PM 2024    12:00 AM 2024     9:21 AM   Anticoagulation Monitoring   INR     2.00  2.20   INR Date     2023   INR Goal     2.0-3.0  2.0-3.0   Trend     Same  Same   Last Week Total     65 mg  65 mg   Next Week Total     65 mg  65 mg   Sun     10 mg  10 mg   Mon     10 mg  10 mg   Tue     10 mg  10 mg   Wed     10 mg  10 mg   Thu     10 mg  10 mg   Fri     10 mg  10 mg   Sat     5 mg  5 mg   Historical INR 1.83  2.23  2.71  2.00      2.20            This result is from an external source.       Plan:  1. INR is therapeutic today- see above in Anticoagulation Summary.    Kameron Melendez to continue their warfarin regimen- see above in Anticoagulation Summary.  2. Follow up in 1 week  3. Pt has agreed to only be  called if INR out of range. They have been instructed to call if any changes in medications, doses, concerns, etc. Patient expresses understanding and has no further questions at this time.    Iris Gonzalez

## 2024-01-21 LAB — INR PPP: 1.7

## 2024-01-22 ENCOUNTER — ANTICOAGULATION VISIT (OUTPATIENT)
Dept: PHARMACY | Facility: HOSPITAL | Age: 54
End: 2024-01-22
Payer: COMMERCIAL

## 2024-01-22 DIAGNOSIS — I26.99 BILATERAL PULMONARY EMBOLISM: ICD-10-CM

## 2024-01-22 DIAGNOSIS — I26.99 OTHER ACUTE PULMONARY EMBOLISM WITHOUT ACUTE COR PULMONALE: Primary | ICD-10-CM

## 2024-01-22 NOTE — PROGRESS NOTES
Anticoagulation Clinic Progress Note    Anticoagulation Summary  As of 2024      INR goal:  2.0-3.0   TTR:  66.8% (4.9 y)   INR used for dosin.70 (2024)   Warfarin maintenance plan:  5 mg every Sat; 10 mg all other days   Weekly warfarin total:  65 mg   Plan last modified:  Tessie Fatima RPH (10/20/2023)   Next INR check:  2024   Priority:  High   Target end date:  Indefinite    Indications    Other acute pulmonary embolism without acute cor pulmonale [I26.99]  Bilateral pulmonary embolism [I26.99]                 Anticoagulation Episode Summary       INR check location:      Preferred lab:      Send INR reminders to:   [facility]  POOL    Comments:  new  frankie 2019 **WEEKLY FRANKIE**          Anticoagulation Care Providers       Provider Role Specialty Phone number    Torri Colmenares, FADY Referring Cardiology [phone]    Elsy Baeza MD Responsible Cardiology [phone]            Clinic Interview:  Patient Findings     Negatives:  Signs/symptoms of thrombosis, Signs/symptoms of bleeding,   Laboratory test error suspected, Change in health, Change in alcohol use,   Change in activity, Upcoming invasive procedure, Emergency department   visit, Upcoming dental procedure, Missed doses, Extra doses, Change in   medications, Change in diet/appetite, Hospital admission, Bruising, Other   complaints      Clinical Outcomes     Negatives:  Major bleeding event, Thromboembolic event,   Anticoagulation-related hospital admission, Anticoagulation-related ED   visit, Anticoagulation-related fatality        INR History:      2023     5:31 AM 2023    12:00 AM 2023     1:37 PM 2024    12:00 AM 2024     9:21 AM 2024    12:00 AM 2024    10:06 AM   Anticoagulation Monitoring   INR   2.00  2.20  1.70   INR Date   2023   INR Goal   2.0-3.0  2.0-3.0  2.0-3.0   Trend   Same  Same  Same   Last Week Total   65 mg  65 mg   65 mg   Next Week Total   65 mg  65 mg  70 mg   Sun   10 mg  10 mg  -   Mon   10 mg  10 mg  15 mg (1/22)   Tue   10 mg  10 mg  10 mg   Wed   10 mg  10 mg  10 mg   Thu   10 mg  10 mg  10 mg   Fri   10 mg  10 mg  10 mg   Sat   5 mg  5 mg  5 mg   Historical INR 2.71  2.00      2.20      1.70            This result is from an external source.       Plan:  1. INR is Subtherapeutic today- see above in Anticoagulation Summary.   Will instruct Kameron Melendez to Continue their warfarin regimen- BOOST today with 15 mg then resume regimen - See above in Anticoagulation Summary.  2. Follow up in 1 weeks  3. They have been instructed to call if any changes in medications, doses, concerns, etc. Patient expresses understanding and has no further questions at this time.    Carli Sheldon [facility]

## 2024-01-26 ENCOUNTER — ANTICOAGULATION VISIT (OUTPATIENT)
Dept: PHARMACY | Facility: HOSPITAL | Age: 54
End: 2024-01-26
Payer: COMMERCIAL

## 2024-01-26 DIAGNOSIS — I26.99 BILATERAL PULMONARY EMBOLISM: ICD-10-CM

## 2024-01-26 DIAGNOSIS — I26.99 OTHER ACUTE PULMONARY EMBOLISM WITHOUT ACUTE COR PULMONALE: Primary | ICD-10-CM

## 2024-01-26 LAB — INR PPP: 2.3

## 2024-01-26 NOTE — PROGRESS NOTES
Anticoagulation Clinic Progress Note    Anticoagulation Summary  As of 2024      INR goal:  2.0-3.0   TTR:  66.8% (4.9 y)   INR used for dosin.30 (2024)   Warfarin maintenance plan:  5 mg every Sat; 10 mg all other days   Weekly warfarin total:  65 mg   No change documented:  Tessie Fatima RPH   Plan last modified:  Tessie Fatima RPH (10/20/2023)   Next INR check:  2024   Priority:  High   Target end date:  Indefinite    Indications    Other acute pulmonary embolism without acute cor pulmonale [I26.99]  Bilateral pulmonary embolism [I26.99]                 Anticoagulation Episode Summary       INR check location:      Preferred lab:      [facility]    Comments:  new  frankie 2019 **WEEKLY FRANKIE**          Anticoagulation Care Providers       Provider Role Specialty Phone number    Torri Colmenares APRN Referring Cardiology [phone]    Elsy Baeza MD Responsible Cardiology [phone]            Clinic Interview:  No pertinent clinical findings have been reported.    INR History:      2023     1:37 PM 2024    12:00 AM 2024     9:21 AM 2024    12:00 AM 2024    10:06 AM 2024    12:00 AM 2024     8:30 AM   Anticoagulation Monitoring   INR 2.00  2.20  1.70  2.30   INR Date 2023   INR Goal 2.0-3.0  2.0-3.0  2.0-3.0  2.0-3.0   Trend Same  Same  Same  Same   Last Week Total 65 mg  65 mg  65 mg  70 mg   Next Week Total 65 mg  65 mg  70 mg  65 mg   Sun 10 mg  10 mg  -  10 mg   Mon 10 mg  10 mg  15 mg ()  10 mg   Tue 10 mg  10 mg  10 mg  10 mg   Wed 10 mg  10 mg  10 mg  10 mg   Thu 10 mg  10 mg  10 mg  10 mg   Fri 10 mg  10 mg  10 mg  10 mg   Sat 5 mg  5 mg  5 mg  5 mg   Historical INR  2.20      1.70      2.30            This result is from an external source.       Plan:  1. INR is therapeutic today- see above in Anticoagulation Summary.    Kameron Melendez to  continue their warfarin regimen- see above in Anticoagulation Summary.  2. Follow up in 1 week  3. LVM with instructions per request. They have been instructed to call if any changes in medications, doses, concerns, etc. Patient expresses understanding and has no further questions at this time.    Tessie Fatima, formerly [facility]

## 2024-02-12 ENCOUNTER — ANTICOAGULATION VISIT (OUTPATIENT)
Dept: PHARMACY | Facility: HOSPITAL | Age: 54
End: 2024-02-12
Payer: COMMERCIAL

## 2024-02-12 DIAGNOSIS — I26.99 BILATERAL PULMONARY EMBOLISM: ICD-10-CM

## 2024-02-12 DIAGNOSIS — I26.99 OTHER ACUTE PULMONARY EMBOLISM WITHOUT ACUTE COR PULMONALE: Primary | ICD-10-CM

## 2024-02-12 LAB — INR PPP: 1.9

## 2024-02-16 ENCOUNTER — ANTICOAGULATION VISIT (OUTPATIENT)
Dept: PHARMACY | Facility: HOSPITAL | Age: 54
End: 2024-02-16
Payer: COMMERCIAL

## 2024-02-16 DIAGNOSIS — I26.99 OTHER ACUTE PULMONARY EMBOLISM WITHOUT ACUTE COR PULMONALE: Primary | ICD-10-CM

## 2024-02-16 DIAGNOSIS — I26.99 BILATERAL PULMONARY EMBOLISM: ICD-10-CM

## 2024-02-16 LAB — INR PPP: 2.4

## 2024-03-01 ENCOUNTER — ANTICOAGULATION VISIT (OUTPATIENT)
Dept: PHARMACY | Facility: HOSPITAL | Age: 54
End: 2024-03-01
Payer: COMMERCIAL

## 2024-03-01 DIAGNOSIS — I26.99 BILATERAL PULMONARY EMBOLISM: ICD-10-CM

## 2024-03-01 DIAGNOSIS — I26.99 OTHER ACUTE PULMONARY EMBOLISM WITHOUT ACUTE COR PULMONALE: Primary | ICD-10-CM

## 2024-03-01 LAB — INR PPP: 3.2

## 2024-03-01 NOTE — PROGRESS NOTES
Anticoagulation Clinic Progress Note    Anticoagulation Summary  As of 3/1/2024      INR goal:  2.0-3.0   TTR:  66.9% (5 y)   INR used for dosing:  3.20 (3/1/2024)   Warfarin maintenance plan:  10 mg every day   Weekly warfarin total:  70 mg   Plan last modified:  Marissa Jones RPH (3/1/2024)   Next INR check:  3/15/2024   Priority:  High   Target end date:  Indefinite    Indications    Other acute pulmonary embolism without acute cor pulmonale [I26.99]  Bilateral pulmonary embolism [I26.99]                 Anticoagulation Episode Summary       INR check location:      Preferred lab:      Send INR reminders to:   [facility]  PO[facility]    Comments:  new  tristen March 2019 **WEEKLY TRISTEN**          Anticoagulation Care Providers       Provider Role Specialty Phone number    Torri Colmenares, APRN Referring Cardiology [phone]    Elsy Baeza MD Responsible Cardiology [phone]            Clinic Interview:  Patient Findings     Positives:  Extra doses: Patient reports they take 10 mg daily and have been for several weeks.    Negatives:  Signs/symptoms of thrombosis, Signs/symptoms of bleeding,   Laboratory test error suspected, Change in health, Change in alcohol use,   Change in activity, Upcoming invasive procedure, Emergency department   visit, Upcoming dental procedure, Missed doses, Change in medications,   Change in diet/appetite, Hospital admission, Bruising, Other complaints      Clinical Outcomes     Negatives:  Major bleeding event, Thromboembolic event,   Anticoagulation-related hospital admission, Anticoagulation-related ED   visit, Anticoagulation-related fatality        INR History:      1/26/2024     8:30 AM 2/12/2024    12:00 AM 2/12/2024     9:20 AM 2/16/2024    12:00 AM 2/16/2024     9:02 AM 3/1/2024    12:00 AM 3/1/2024     8:09 AM   Anticoagulation Monitoring   INR 2.30  1.90  2.40  3.20   INR Date 1/26/2024  2/12/2024  2/16/2024  3/1/2024   INR Goal 2.0-3.0  2.0-3.0  2.0-3.0   2.0-3.0   Trend Same  Same  Same  Up   Last Week Total 70 mg  65 mg  65 mg  70 mg   Next Week Total 65 mg  65 mg  65 mg  65 mg   Sun 10 mg  10 mg  10 mg  10 mg   Mon 10 mg  10 mg  10 mg  10 mg   Tue 10 mg  10 mg  10 mg  10 mg   Wed 10 mg  10 mg  10 mg  10 mg   Thu 10 mg  10 mg  10 mg  10 mg   Fri 10 mg  10 mg  10 mg  5 mg (3/1); Otherwise 10 mg   Sat 5 mg  5 mg  5 mg  10 mg   Historical INR  1.90      2.40      3.20            This result is from an external source.       Plan:  1. INR is Supratherapeutic today- see above in Anticoagulation Summary.   Will instruct Kameron Melendez to Continue their warfarin regimen as reported (10 mg daily), but reduce dose tonight to 5 mg- see above in Anticoagulation Summary.  2. Follow up in 2 weeks.  3. They have been instructed to call if any changes in medications, doses, concerns, etc. Patient expresses understanding and has no further questions at this time.    Marissa Jones [facility]

## 2024-03-01 NOTE — PROGRESS NOTES
I have supervised and reviewed the notes, assessments, and/or procedures performed. I concur with the documentation of this patient encounter.    Ramo Morocho, PharmD

## 2024-03-08 ENCOUNTER — ANTICOAGULATION VISIT (OUTPATIENT)
Dept: PHARMACY | Facility: HOSPITAL | Age: 54
End: 2024-03-08
Payer: COMMERCIAL

## 2024-03-08 DIAGNOSIS — I26.99 OTHER ACUTE PULMONARY EMBOLISM WITHOUT ACUTE COR PULMONALE: Primary | ICD-10-CM

## 2024-03-08 DIAGNOSIS — I26.99 BILATERAL PULMONARY EMBOLISM: ICD-10-CM

## 2024-03-08 LAB — INR PPP: 2.2

## 2024-03-08 NOTE — PROGRESS NOTES
Anticoagulation Clinic Progress Note    Anticoagulation Summary  As of 3/8/2024      INR goal:  2.0-3.0   TTR:  67.0% (5 y)   INR used for dosin.20 (3/8/2024)   Warfarin maintenance plan:  10 mg every day   Weekly warfarin total:  70 mg   No change documented:  Tessie Fatima RPH   Plan last modified:  Marissa Jones RPH (3/1/2024)   Next INR check:  3/15/2024   Priority:  High   Target end date:  Indefinite    Indications    Other acute pulmonary embolism without acute cor pulmonale [I26.99]  Bilateral pulmonary embolism [I26.99]                 Anticoagulation Episode Summary       INR check location:      Preferred lab:      Send INR reminders to:  [facility] [place]  POOL    Comments:  new  frankie 2019 **WEEKLY FARNKIE**          Anticoagulation Care Providers       Provider Role Specialty Phone number    Torri Colmenares APRN Referring Cardiology [phone]    Elsy Baeza MD Responsible Cardiology [phone]            Clinic Interview:  No pertinent clinical findings have been reported.    INR History:      2024     9:20 AM 2024    12:00 AM 2024     9:02 AM 3/1/2024    12:00 AM 3/1/2024     8:09 AM 3/8/2024    12:00 AM 3/8/2024     8:52 AM   Anticoagulation Monitoring   INR 1.90  2.40  3.20  2.20   INR Date 2024  2024  3/1/2024  3/8/2024   INR Goal 2.0-3.0  2.0-3.0  2.0-3.0  2.0-3.0   Trend Same  Same  Up  Same   Last Week Total 65 mg  65 mg  70 mg  65 mg   Next Week Total 65 mg  65 mg  65 mg  70 mg   Sun 10 mg  10 mg  10 mg  10 mg   Mon 10 mg  10 mg  10 mg  10 mg   Tue 10 mg  10 mg  10 mg  10 mg   Wed 10 mg  10 mg  10 mg  10 mg   Thu 10 mg  10 mg  10 mg  10 mg   Fri 10 mg  10 mg  5 mg (3/1); Otherwise 10 mg  10 mg   Sat 5 mg  5 mg  10 mg  10 mg   Historical INR  2.40      3.20      2.20            This result is from an external source.       Plan:  1. INR is therapeutic today- see above in Anticoagulation Summary.    Kameron Melendez to continue their  warfarin regimen- see above in Anticoagulation Summary.  Resume 10 mg daily and recheck in 1 week   2. Follow up in 1 week  3.  They have been instructed to call if any changes in medications, doses, concerns, etc. Patient expresses understanding and has no further questions at this time.    Tessie Fatima [facility]

## 2024-03-15 ENCOUNTER — ANTICOAGULATION VISIT (OUTPATIENT)
Dept: PHARMACY | Facility: HOSPITAL | Age: 54
End: 2024-03-15
Payer: COMMERCIAL

## 2024-03-15 DIAGNOSIS — I26.99 BILATERAL PULMONARY EMBOLISM: ICD-10-CM

## 2024-03-15 DIAGNOSIS — I26.99 OTHER ACUTE PULMONARY EMBOLISM WITHOUT ACUTE COR PULMONALE: Primary | ICD-10-CM

## 2024-03-15 LAB — INR PPP: 2

## 2024-03-15 NOTE — PROGRESS NOTES
Anticoagulation Clinic Progress Note    Anticoagulation Summary  As of 3/15/2024      INR goal:  2.0-3.0   TTR:  67.1% (5 y)   INR used for dosin.00 (3/15/2024)   Warfarin maintenance plan:  10 mg every day   Weekly warfarin total:  70 mg   No change documented:  Kaylee De Leon, Pharmacy Technician   Plan last modified:  Marissa Jones RPH (3/1/2024)   Next INR check:  3/22/2024   Priority:  High   Target end date:  Indefinite    Indications    Other acute pulmonary embolism without acute cor pulmonale [I26.99]  Bilateral pulmonary embolism [I26.99]                 Anticoagulation Episode Summary       INR check location:      Preferred lab:      Send INR reminders to:  [facility]  POOL    Comments:  new  tristen 2019 **WEEKLY TRISTEN**          Anticoagulation Care Providers       Provider Role Specialty Phone number    Torri Colmenares APRN Referring Cardiology [phone]    Elsy Baeza MD Responsible Cardiology [phone]            Clinic Interview:  No pertinent clinical findings have been reported.    INR History:      2024     9:02 AM 3/1/2024    12:00 AM 3/1/2024     8:09 AM 3/8/2024    12:00 AM 3/8/2024     8:52 AM 3/15/2024    12:00 AM 3/15/2024     8:55 AM   Anticoagulation Monitoring   INR 2.40  3.20  2.20  2.00   INR Date 2024  3/1/2024  3/8/2024  3/15/2024   INR Goal 2.0-3.0  2.0-3.0  2.0-3.0  2.0-3.0   Trend Same  Up  Same  Same   Last Week Total 65 mg  70 mg  65 mg  70 mg   Next Week Total 65 mg  65 mg  70 mg  70 mg   Sun 10 mg  10 mg  10 mg  10 mg   Mon 10 mg  10 mg  10 mg  10 mg   Tue 10 mg  10 mg  10 mg  10 mg   Wed 10 mg  10 mg  10 mg  10 mg   Thu 10 mg  10 mg  10 mg  10 mg   Fri 10 mg  5 mg (3/1); Otherwise 10 mg  10 mg  10 mg   Sat 5 mg  10 mg  10 mg  10 mg   Historical INR  3.20      2.20      2.00            This result is from an external source.       Plan:  1. INR is therapeutic today- see above in Anticoagulation Summary.    Kameron Melendez to  continue their warfarin regimen- see above in Anticoagulation Summary.  2. Follow up in 1 week  3. Pt has agreed to only be called if INR out of range. They have been instructed to call if any changes in medications, doses, concerns, etc. Patient expresses understanding and has no further questions at this time.    Kaylee De Leon, Pharmacy Technician

## 2024-04-01 ENCOUNTER — ANTICOAGULATION VISIT (OUTPATIENT)
Dept: PHARMACY | Facility: HOSPITAL | Age: 54
End: 2024-04-01
Payer: COMMERCIAL

## 2024-04-01 DIAGNOSIS — I26.99 OTHER ACUTE PULMONARY EMBOLISM WITHOUT ACUTE COR PULMONALE: Primary | ICD-10-CM

## 2024-04-01 DIAGNOSIS — I26.99 BILATERAL PULMONARY EMBOLISM: ICD-10-CM

## 2024-04-01 LAB — INR PPP: 1.9

## 2024-04-01 NOTE — PROGRESS NOTES
Anticoagulation Clinic Progress Note    Anticoagulation Summary  As of 2024      INR goal:  2.0-3.0   TTR:  66.5% (5.1 y)   INR used for dosin.90 (2024)   Warfarin maintenance plan:  10 mg every day   Weekly warfarin total:  70 mg   No change documented:  Tessie Fatima RPH   Plan last modified:  Marissa Jones RPH (3/1/2024)   Next INR check:  2024   Priority:  High   Target end date:  Indefinite    Indications    Other acute pulmonary embolism without acute cor pulmonale [I26.99]  Bilateral pulmonary embolism [I26.99]                 Anticoagulation Episode Summary       INR check location:      Preferred lab:      Send INR reminders to:   [facility]  PO[facility]    Comments:  new  frankie 2019 **WEEKLY FRANKIE**          Anticoagulation Care Providers       Provider Role Specialty Phone number    Torri Colmenares APRN Referring Cardiology [phone]    Elsy Baeza MD Responsible Cardiology [phone]            Clinic Interview:  Patient Findings     Negatives:  Signs/symptoms of thrombosis, Signs/symptoms of bleeding,   Laboratory test error suspected, Change in health, Change in alcohol use,   Change in activity, Upcoming invasive procedure, Emergency department   visit, Upcoming dental procedure, Missed doses, Extra doses, Change in   medications, Change in diet/appetite, Hospital admission, Bruising, Other   complaints      Clinical Outcomes     Negatives:  Major bleeding event, Thromboembolic event,   Anticoagulation-related hospital admission, Anticoagulation-related ED   visit, Anticoagulation-related fatality        INR History:      3/1/2024     8:09 AM 3/8/2024    12:00 AM 3/8/2024     8:52 AM 3/15/2024    12:00 AM 3/15/2024     8:55 AM 2024    12:00 AM 2024     8:50 AM   Anticoagulation Monitoring   INR 3.20  2.20  2.00  1.90   INR Date 3/1/2024  3/8/2024  3/15/2024  2024   INR Goal 2.0-3.0  2.0-3.0  2.0-3.0  2.0-3.0   Trend Up  Same  Same  Same    Last Week Total 70 mg  65 mg  70 mg  70 mg   Next Week Total 65 mg  70 mg  70 mg  70 mg   Sun 10 mg  10 mg  10 mg  10 mg   Mon 10 mg  10 mg  10 mg  10 mg   Tue 10 mg  10 mg  10 mg  10 mg   Wed 10 mg  10 mg  10 mg  10 mg   Thu 10 mg  10 mg  10 mg  10 mg   Fri 5 mg (3/1); Otherwise 10 mg  10 mg  10 mg  10 mg   Sat 10 mg  10 mg  10 mg  10 mg   Historical INR  2.20      2.00      1.90            This result is from an external source.       Plan:  1. INR is Subtherapeutic today- see above in Anticoagulation Summary.   Will instruct Kameron Melendez to Continue their warfarin regimen as INR is very close to goal range- see above in Anticoagulation Summary.  2. Follow up in 1 weeks  3. They have been instructed to call if any changes in medications, doses, concerns, etc. Patient expresses understanding and has no further questions at this time.    Tessie Fatima [facility]   Hearing loss  bilateral -- wears aids  OA (osteoarthritis)  first carpometacarpal joint of right hand  Overactive bladder

## 2024-04-05 ENCOUNTER — ANTICOAGULATION VISIT (OUTPATIENT)
Dept: PHARMACY | Facility: HOSPITAL | Age: 54
End: 2024-04-05
Payer: COMMERCIAL

## 2024-04-05 DIAGNOSIS — I26.99 OTHER ACUTE PULMONARY EMBOLISM WITHOUT ACUTE COR PULMONALE: Primary | ICD-10-CM

## 2024-04-05 DIAGNOSIS — I26.99 BILATERAL PULMONARY EMBOLISM: ICD-10-CM

## 2024-04-05 LAB — INR PPP: 1.8

## 2024-04-05 NOTE — PROGRESS NOTES
Anticoagulation Clinic Progress Note    Anticoagulation Summary  As of 2024      INR goal:  2.0-3.0   TTR:  66.3% (5.1 y)   INR used for dosin.80 (2024)   Warfarin maintenance plan:  15 mg every Fri; 10 mg all other days   Weekly warfarin total:  75 mg   Plan last modified:  Tessie Fatima RPH (2024)   Next INR check:  2024   Priority:  High   Target end date:  Indefinite    Indications    Other acute pulmonary embolism without acute cor pulmonale [I26.99]  Bilateral pulmonary embolism [I26.99]                 Anticoagulation Episode Summary       INR check location:      Preferred lab:      Send INR reminders to:   [facility]  PO[facility]    Comments:  new  frankie 2019 **WEEKLY FRANKIE**          Anticoagulation Care Providers       Provider Role Specialty Phone number    Torri Colmenares, APRN Referring Cardiology [phone]    Elsy Baeza MD Responsible Cardiology [phone]            Clinic Interview:  Patient Findings     Negatives:  Signs/symptoms of thrombosis, Signs/symptoms of bleeding,   Laboratory test error suspected, Change in health, Change in alcohol use,   Change in activity, Upcoming invasive procedure, Emergency department   visit, Upcoming dental procedure, Missed doses, Extra doses, Change in   medications, Change in diet/appetite, Hospital admission, Bruising, Other   complaints      Clinical Outcomes     Negatives:  Major bleeding event, Thromboembolic event,   Anticoagulation-related hospital admission, Anticoagulation-related ED   visit, Anticoagulation-related fatality        INR History:      3/8/2024     8:52 AM 3/15/2024    12:00 AM 3/15/2024     8:55 AM 2024    12:00 AM 2024     8:50 AM 2024    12:00 AM 2024     8:52 AM   Anticoagulation Monitoring   INR 2.20  2.00  1.90  1.80   INR Date 3/8/2024  3/15/2024  2024  2024   INR Goal 2.0-3.0  2.0-3.0  2.0-3.0  2.0-3.0   Trend Same  Same  Same  Up   Last Week Total 65 mg   70 mg  70 mg  70 mg   Next Week Total 70 mg  70 mg  70 mg  75 mg   Sun 10 mg  10 mg  10 mg  10 mg   Mon 10 mg  10 mg  10 mg  10 mg   Tue 10 mg  10 mg  10 mg  10 mg   Wed 10 mg  10 mg  10 mg  10 mg   Thu 10 mg  10 mg  10 mg  10 mg   Fri 10 mg  10 mg  10 mg  15 mg   Sat 10 mg  10 mg  10 mg  10 mg   Historical INR  2.00      1.90      1.80            This result is from an external source.       Plan:  1. INR is Subtherapeutic today- see above in Anticoagulation Summary.   Will instruct Kameron Melendez to Increase their warfarin regimen- see above in Anticoagulation Summary.  Increase to 15 mg on fri and 10 mg MARK, fatemeh 1 week   2. Follow up in 1 weeks  3. They have been instructed to call if any changes in medications, doses, concerns, etc. Patient expresses understanding and has no further questions at this time.    Tessie Fatima [facility]

## 2024-04-12 ENCOUNTER — ANTICOAGULATION VISIT (OUTPATIENT)
Dept: PHARMACY | Facility: HOSPITAL | Age: 54
End: 2024-04-12
Payer: COMMERCIAL

## 2024-04-12 DIAGNOSIS — I26.99 BILATERAL PULMONARY EMBOLISM: ICD-10-CM

## 2024-04-12 DIAGNOSIS — I26.99 OTHER ACUTE PULMONARY EMBOLISM WITHOUT ACUTE COR PULMONALE: Primary | ICD-10-CM

## 2024-04-12 LAB — INR PPP: 1.9

## 2024-04-12 NOTE — PROGRESS NOTES
Anticoagulation Clinic Progress Note    Anticoagulation Summary  As of 2024      INR goal:  2.0-3.0   TTR:  66.1% (5.1 y)   INR used for dosin.90 (2024)   Warfarin maintenance plan:  15 mg every Fri; 10 mg all other days   Weekly warfarin total:  75 mg   Plan last modified:  Tessie Fatima RPH (2024)   Next INR check:  2024   Priority:  High   Target end date:  Indefinite    Indications    Other acute pulmonary embolism without acute cor pulmonale [I26.99]  Bilateral pulmonary embolism [I26.99]                 Anticoagulation Episode Summary       INR check location:      Preferred lab:      Send INR reminders to:  [facility] [place]  POOL    Comments:  new  frankie 2019 **WEEKLY FRANKIE**          Anticoagulation Care Providers       Provider Role Specialty Phone number    Torri Colmenares, APRN Referring Cardiology [phone]    Elsy Baeza MD Responsible Cardiology [phone]            Drug interactions: has remained unchanged.  Diet: has remained unchanged.    Clinic Interview:  No pertinent clinical findings have been reported.    INR History:      3/15/2024     8:55 AM 2024    12:00 AM 2024     8:50 AM 2024    12:00 AM 2024     8:52 AM 2024    12:00 AM 2024    10:36 AM   Anticoagulation Monitoring   INR 2.00  1.90  1.80  1.90   INR Date 3/15/2024  2024  2024  2024   INR Goal 2.0-3.0  2.0-3.0  2.0-3.0  2.0-3.0   Trend Same  Same  Up  Same   Last Week Total 70 mg  70 mg  70 mg  75 mg   Next Week Total 70 mg  70 mg  75 mg  85 mg   Sun 10 mg  10 mg  10 mg  10 mg   Mon 10 mg  10 mg  10 mg  10 mg   Tue 10 mg  10 mg  10 mg  10 mg   Wed 10 mg  10 mg  10 mg  15 mg ()   Thu 10 mg  10 mg  10 mg  10 mg   Fri 10 mg  10 mg  15 mg  20 mg ()   Sat 10 mg  10 mg  10 mg  10 mg   Historical INR  1.90      1.80      1.90            This result is from an external source.       Plan:  1. INR is Subtherapeutic today- see above in  Anticoagulation Summary.   verified pt has been taking 15mg F, 10mg AOD, Pt feels as if he needs 2 days of 15mg (Wed and Fri) for his level to be in range. Im not so sure that he would still be in range w that high of dose. Going to boost 20mg today then trial of 15mg Wed (and possibly Fri?) Can make that decision on Friday morning 4/19  when he will mateus INR. Used teachback method with these directions.  see above in Anticoagulation Summary.  2. Follow up in 1 weeks  3. Pt has agreed to only be called if INR out of range. They have been instructed to call if any changes in medications, doses, concerns, etc. Patient expresses understanding and has no further questions at this time.    Roslyn Jett [facility]

## 2024-04-16 ENCOUNTER — OFFICE VISIT (OUTPATIENT)
Dept: FAMILY MEDICINE CLINIC | Facility: CLINIC | Age: 54
End: 2024-04-16
Payer: COMMERCIAL

## 2024-04-16 VITALS
HEIGHT: 74 IN | BODY MASS INDEX: 40.43 KG/M2 | SYSTOLIC BLOOD PRESSURE: 130 MMHG | HEART RATE: 69 BPM | OXYGEN SATURATION: 96 % | DIASTOLIC BLOOD PRESSURE: 82 MMHG | WEIGHT: 315 LBS

## 2024-04-16 DIAGNOSIS — I89.0 LYMPHEDEMA: ICD-10-CM

## 2024-04-16 DIAGNOSIS — R63.2 BINGE EATING: ICD-10-CM

## 2024-04-16 DIAGNOSIS — G89.29 CHRONIC PAIN OF RIGHT KNEE: ICD-10-CM

## 2024-04-16 DIAGNOSIS — M25.561 CHRONIC PAIN OF RIGHT KNEE: ICD-10-CM

## 2024-04-16 DIAGNOSIS — E11.9 TYPE 2 DIABETES MELLITUS WITHOUT COMPLICATION, WITHOUT LONG-TERM CURRENT USE OF INSULIN: Primary | ICD-10-CM

## 2024-04-16 DIAGNOSIS — E66.01 MORBID (SEVERE) OBESITY DUE TO EXCESS CALORIES: ICD-10-CM

## 2024-04-16 PROCEDURE — 99214 OFFICE O/P EST MOD 30 MIN: CPT | Performed by: FAMILY MEDICINE

## 2024-04-16 PROCEDURE — 96372 THER/PROPH/DIAG INJ SC/IM: CPT | Performed by: FAMILY MEDICINE

## 2024-04-16 RX ORDER — TRIAMCINOLONE ACETONIDE 40 MG/ML
80 INJECTION, SUSPENSION INTRA-ARTICULAR; INTRAMUSCULAR ONCE
Status: COMPLETED | OUTPATIENT
Start: 2024-04-16 | End: 2024-04-16

## 2024-04-16 RX ORDER — KETOROLAC TROMETHAMINE 30 MG/ML
60 INJECTION, SOLUTION INTRAMUSCULAR; INTRAVENOUS ONCE
Status: COMPLETED | OUTPATIENT
Start: 2024-04-16 | End: 2024-04-16

## 2024-04-16 RX ORDER — LISDEXAMFETAMINE DIMESYLATE CAPSULES 50 MG/1
50 CAPSULE ORAL EVERY MORNING
Qty: 30 CAPSULE | Refills: 0 | Status: SHIPPED | OUTPATIENT
Start: 2024-04-16

## 2024-04-16 RX ADMIN — KETOROLAC TROMETHAMINE 60 MG: 30 INJECTION, SOLUTION INTRAMUSCULAR; INTRAVENOUS at 11:55

## 2024-04-16 RX ADMIN — TRIAMCINOLONE ACETONIDE 80 MG: 40 INJECTION, SUSPENSION INTRA-ARTICULAR; INTRAMUSCULAR at 11:55

## 2024-04-16 NOTE — PROGRESS NOTES
Subjective   Kameron Melendez is a 54 y.o. male. Presents today for   Chief Complaint   Patient presents with    Weight Check    Diabetes    Edema       History of Present Illness  Patient 53 y/o male with diabetes and severe lymphedema with recurrent infections/hospitalizations.   He is on wegovy for weight loss and clothes looser and so feels dropping.   Weight up some, but usually water weight goes up with lymphedema as has to hold diuretics through week as cannot leave work station to go to bathroom.   He reports right > left knee is severely hurting, has OA;   having hard time ambulating.   Limited on what can take as on warfarin due to clotting disorder.  Unfortunately, insurance denying wegovy 2.4mg, has 1 month left unfortunately.  Was on ozempic, but changed so could go up on dose some to help with further weight loss.     Review of Systems   Respiratory:  Negative for shortness of breath.    Cardiovascular:  Positive for leg swelling. Negative for chest pain.   Musculoskeletal:  Positive for arthralgias and gait problem.       Patient Active Problem List   Diagnosis    Bilateral pulmonary embolism    Pulmonary hypertension    Lymphedema of both lower extremities    Obesity, morbid, BMI 50 or higher    Dyslipidemia    Hyperuricemia    Hypogonadal obesity    Knee arthropathy    Morbid obesity with body mass index of 60.0-69.9 in adult    ARNOLDO (obstructive sleep apnea)    Severe edema    History of pulmonary embolism    Other acute pulmonary embolism without acute cor pulmonale    Intractable back pain    Heterozygous factor V Leiden mutation    Cellulitis of left lower extremity    Hypokalemia    CAROL (acute kidney injury)    Sepsis with cutaneous manifestations    Hyperglycemia    Paroxysmal atrial fibrillation    Athscl heart 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    Long term (current) use of anticoagulants    Lymphedema, not elsewhere classified    Nicotine dependence, other tobacco product,  uncomplicated    Personal history of other venous thrombosis and embolism    Typical atrial flutter    Venous insufficiency (chronic) (peripheral)    Cellulitis of right lower extremity    Wound cellulitis    CAP (community acquired pneumonia)    Type 2 diabetes mellitus    Hyponatremia    Choledocholithiasis    RUQ pain       Social History     Socioeconomic History    Marital status:    Tobacco Use    Smoking status: Light Smoker     Types: Cigars, Pipe     Start date: 1/1/2006    Smokeless tobacco: Never    Tobacco comments:     occasional - < 1 a month   Vaping Use    Vaping status: Never Used   Substance and Sexual Activity    Alcohol use: No    Drug use: No    Sexual activity: Defer       No Known Allergies    Current Outpatient Medications on File Prior to Visit   Medication Sig Dispense Refill    acetaminophen (TYLENOL) 500 MG tablet Take 1 tablet by mouth Every 6 (Six) Hours As Needed for Mild Pain.      atorvastatin (LIPITOR) 20 MG tablet Take 1 tablet by mouth Every Night. 90 tablet 3    furosemide (LASIX) 80 MG tablet Take 1 tablet by mouth Daily As Needed (leg swelling). 90 tablet 3    metOLazone (ZAROXOLYN) 5 MG tablet 1 po weekly if >10 lbs weight gain take one.  Continue furosemide (Patient taking differently: Take 1 tablet by mouth As Needed. 1 po weekly if >10 lbs weight gain take one.  Continue furosemide) 12 tablet 1    naltrexone (DEPADE) 50 MG tablet Take 1 tablet by mouth Daily. 30 tablet 5    omeprazole (priLOSEC) 40 MG capsule Take 1 capsule by mouth Daily. 90 capsule 3    Semaglutide-Weight Management 2.4 MG/0.75ML solution auto-injector Inject 2.4 mg under the skin into the appropriate area as directed 1 (One) Time Per Week. 9 mL 3    spironolactone (Aldactone) 25 MG tablet Take 1 tablet by mouth As Needed (swelling).      topiramate (TOPAMAX) 50 MG tablet Take 1 tablet by mouth Daily.      ursodiol (ACTIGALL) 300 MG capsule Take 1 capsule by mouth 2 (Two) Times a Day. 180 capsule 1  "   warfarin (COUMADIN) 5 MG tablet Take 2 tablets by mouth. Mon, Tues, Wed, Thurs, Fri, & Sun      warfarin (Jantoven) 5 MG tablet Take as directed by medication management clinic (Patient taking differently: Take 1 tablet by mouth 1 (One) Time Per Week. Saturday) 180 tablet 1     No current facility-administered medications on file prior to visit.       Objective   Vitals:    04/16/24 1110   BP: 130/82   Pulse: 69   SpO2: 96%   Weight: (!) 230 kg (506 lb)   Height: 188 cm (74\")     4/16/2024 229.52 kg Abnormally high  506 lb Abnormally high    1/4/2024 222.716 kg Abnormally high  491 lb Abnormally high    12/21/2023 223.079 kg Abnormally high   228.159 kg Abnormally high  491 lb 12.8 oz Abnormally high   503 lb Abnormally high    10/27/2023 228.976 kg Abnormally high   504 lb 12.8 oz Abnormally high     2/23/2023 219.995 kg Abnormally high  485 lb Abnormally high    7/18/2022 251.746 kg Abnormally high  555 lb Abnormally high    7/12/2022 251.746 kg Abnormally high  555 lb Abnormally high    7/1/2022 251.746 kg Abnormally high  555 lb Abnormally high      Body mass index is 64.97 kg/m².    Physical Exam  Vitals and nursing note reviewed.   Constitutional:       Appearance: He is well-developed.   Musculoskeletal:      Cervical back: Neck supple.      Right lower leg: Edema present.      Left lower leg: Edema present.   Lymphadenopathy:      Comments: Massive b/l lower ext lymphedema   Skin:     General: Skin is warm and dry.   Neurological:      Mental Status: He is alert.   Psychiatric:         Behavior: Behavior normal.       Contains abnormal data Hemoglobin A1c  Order: [id number]  Status: Final result       Visible to patient: Yes (seen)       Next appt: None    Specimen Information: Blood   0 Result Notes       1  Topic         Component  Ref Range & Units 3 mo ago  (12/22/23) 3 mo ago  (12/21/23) 1 yr ago  (5/7/22) 2 yr ago  (11/3/21)   Hemoglobin A1C  4.80 - 5.60 % 6.00 High  5.80 High  6.90 High  6.44 High "    [facility]ing [facility]  [facility]        Assessment & Plan   Diagnoses and all orders for this visit:    1. Type 2 diabetes mellitus without complication, without long-term current use of insulin (Primary)  -     Semaglutide, 2 MG/DOSE, (OZEMPIC) 8 MG/3ML solution pen-injector; Inject 2 mg under the skin into the appropriate area as directed 1 (One) Time Per Week.  Dispense: 2 mL; Refill: 5    2. Binge eating  -     lisdexamfetamine (Vyvanse) 50 MG capsule; Take 1 capsule by mouth Every Morning  Dispense: 30 capsule; Refill: 0    3. Morbid (severe) obesity due to excess calories  -     lisdexamfetamine (Vyvanse) 50 MG capsule; Take 1 capsule by mouth Every Morning  Dispense: 30 capsule; Refill: 0    4. Lymphedema  -     Ambulatory Referral to Lymphedema Clinic    5. Chronic pain of right knee  -     Ibuprofen 3 %, Gabapentin 10 %, Baclofen 2 %, lidocaine 4 %, Ketamine HCl 10 %; Apply 1-2 g topically to the appropriate area as directed 3 (Three) to 4 (Four) times daily.  Dispense: 90 g; Refill: 5  -     triamcinolone acetonide (KENALOG-40) injection 80 mg  -     ketorolac (TORADOL) injection 60 mg    Will add vyvanse for binge eating, continue naltrexone, topamax and will order ozempic as on forumlary and on before with diabetes he is good candidate and given his weight cannot afford to stop.  Lymphedema - massive, weight up from last check and likely due to lymphedema as reports by end of weekend has space between legs and can ambulate easier;  d/w lymphedema clinic, has held off due to not being able to leave work.  I will put in referral and ask scheduling to see if any clinic has hours later than 5pm.    Will give IM steroid and Tordol today for knee;  will also send for compounded pain cream;  he cannot take oral nsaids being on warfarin.  I asked him to check with insurance to see if bariatric surgery was added as a benefit this year as really needs to drop the weight.                    -Follow up: 3 months and prn

## 2024-04-16 NOTE — PATIENT INSTRUCTIONS
Calorie Counting for Weight Loss  Calories are units of energy. Your body needs a certain number of calories from food to keep going throughout the day. When you eat or drink more calories than your body needs, your body stores the extra calories mostly as fat. When you eat or drink fewer calories than your body needs, your body burns fat to get the energy it needs.  Calorie counting means keeping track of how many calories you eat and drink each day. Calorie counting can be helpful if you need to lose weight. If you eat fewer calories than your body needs, you should lose weight. Ask your health care provider what a healthy weight is for you.  For calorie counting to work, you will need to eat the right number of calories each day to lose a healthy amount of weight per week. A dietitian can help you figure out how many calories you need in a day and will suggest ways to reach your calorie goal.  A healthy amount of weight to lose each week is usually 1-2 lb (0.5-0.9 kg). This usually means that your daily calorie intake should be reduced by 500-750 calories.  Eating 1,200-1,500 calories a day can help most women lose weight.  Eating 1,500-1,800 calories a day can help most men lose weight.  What do I need to know about calorie counting?  Work with your health care provider or dietitian to determine how many calories you should get each day. To meet your daily calorie goal, you will need to:  Find out how many calories are in each food that you would like to eat. Try to do this before you eat.  Decide how much of the food you plan to eat.  Keep a food log. Do this by writing down what you ate and how many calories it had.  To successfully lose weight, it is important to balance calorie counting with a healthy lifestyle that includes regular activity.  Where do I find calorie information?  The number of calories in a food can be found on a Nutrition Facts label. If a food does not have a Nutrition Facts label, try to  look up the calories online or ask your dietitian for help.  Remember that calories are listed per serving. If you choose to have more than one serving of a food, you will have to multiply the calories per serving by the number of servings you plan to eat. For example, the label on a package of bread might say that a serving size is 1 slice and that there are 90 calories in a serving. If you eat 1 slice, you will have eaten 90 calories. If you eat 2 slices, you will have eaten 180 calories.  How do I keep a food log?  After each time that you eat, record the following in your food log as soon as possible:  What you ate. Be sure to include toppings, sauces, and other extras on the food.  How much you ate. This can be measured in cups, ounces, or number of items.  How many calories were in each food and drink.  The total number of calories in the food you ate.  Keep your food log near you, such as in a pocket-sized notebook or on an sandra or website on your mobile phone. Some programs will calculate calories for you and show you how many calories you have left to meet your daily goal.  What are some portion-control tips?  Know how many calories are in a serving. This will help you know how many servings you can have of a certain food.  Use a measuring cup to measure serving sizes. You could also try weighing out portions on a kitchen scale. With time, you will be able to estimate serving sizes for some foods.  Take time to put servings of different foods on your favorite plates or in your favorite bowls and cups so you know what a serving looks like.  Try not to eat straight from a food's packaging, such as from a bag or box. Eating straight from the package makes it hard to see how much you are eating and can lead to overeating. Put the amount you would like to eat in a cup or on a plate to make sure you are eating the right portion.  Use smaller plates, glasses, and bowls for smaller portions and to prevent  overeating.  Try not to multitask. For example, avoid watching TV or using your computer while eating. If it is time to eat, sit down at a table and enjoy your food. This will help you recognize when you are full. It will also help you be more mindful of what and how much you are eating.  What are tips for following this plan?  Reading food labels  Check the calorie count compared with the serving size. The serving size may be smaller than what you are used to eating.  Check the source of the calories. Try to choose foods that are high in protein, fiber, and vitamins, and low in saturated fat, trans fat, and sodium.  Shopping  Read nutrition labels while you shop. This will help you make healthy decisions about which foods to buy.  Pay attention to nutrition labels for low-fat or fat-free foods. These foods sometimes have the same number of calories or more calories than the full-fat versions. They also often have added sugar, starch, or salt to make up for flavor that was removed with the fat.  Make a grocery list of lower-calorie foods and stick to it.  Cooking  Try to cook your favorite foods in a healthier way. For example, try baking instead of frying.  Use low-fat dairy products.  Meal planning  Use more fruits and vegetables. One-half of your plate should be fruits and vegetables.  Include lean proteins, such as chicken, turkey, and fish.  Lifestyle  Each week, aim to do one of the followin minutes of moderate exercise, such as walking.  75 minutes of vigorous exercise, such as running.  General information  Know how many calories are in the foods you eat most often. This will help you calculate calorie counts faster.  Find a way of tracking calories that works for you. Get creative. Try different apps or programs if writing down calories does not work for you.  What foods should I eat?    Eat nutritious foods. It is better to have a nutritious, high-calorie food, such as an avocado, than a food with  few nutrients, such as a bag of potato chips.  Use your calories on foods and drinks that will fill you up and will not leave you hungry soon after eating.  Examples of foods that fill you up are nuts and nut butters, vegetables, lean proteins, and high-fiber foods such as whole grains. High-fiber foods are foods with more than 5 g of fiber per serving.  Pay attention to calories in drinks. Low-calorie drinks include water and unsweetened drinks.  The items listed above may not be a complete list of foods and beverages you can eat. Contact a dietitian for more information.  What foods should I limit?  Limit foods or drinks that are not good sources of vitamins, minerals, or protein or that are high in unhealthy fats. These include:  Candy.  Other sweets.  Sodas, specialty coffee drinks, alcohol, and juice.  The items listed above may not be a complete list of foods and beverages you should avoid. Contact a dietitian for more information.  How do I count calories when eating out?  Pay attention to portions. Often, portions are much larger when eating out. Try these tips to keep portions smaller:  Consider sharing a meal instead of getting your own.  If you get your own meal, eat only half of it. Before you start eating, ask for a container and put half of your meal into it.  When available, consider ordering smaller portions from the menu instead of full portions.  Pay attention to your food and drink choices. Knowing the way food is cooked and what is included with the meal can help you eat fewer calories.  If calories are listed on the menu, choose the lower-calorie options.  Choose dishes that include vegetables, fruits, whole grains, low-fat dairy products, and lean proteins.  Choose items that are boiled, broiled, grilled, or steamed. Avoid items that are buttered, battered, fried, or served with cream sauce. Items labeled as crispy are usually fried, unless stated otherwise.  Choose water, low-fat milk,  unsweetened iced tea, or other drinks without added sugar. If you want an alcoholic beverage, choose a lower-calorie option, such as a glass of wine or light beer.  Ask for dressings, sauces, and syrups on the side. These are usually high in calories, so you should limit the amount you eat.  If you want a salad, choose a garden salad and ask for grilled meats. Avoid extra toppings such as jasso, cheese, or fried items. Ask for the dressing on the side, or ask for olive oil and vinegar or lemon to use as dressing.  Estimate how many servings of a food you are given. Knowing serving sizes will help you be aware of how much food you are eating at restaurants.  Where to find more information  Centers for Disease Control and Prevention: www.cdc.gov  [place] Department of Agriculture: myplate.gov  Summary  Calorie counting means keeping track of how many calories you eat and drink each day. If you eat fewer calories than your body needs, you should lose weight.  A healthy amount of weight to lose per week is usually 1-2 lb (0.5-0.9 kg). This usually means reducing your daily calorie intake by 500-750 calories.  The number of calories in a food can be found on a Nutrition Facts label. If a food does not have a Nutrition Facts label, try to look up the calories online or ask your dietitian for help.  Use smaller plates, glasses, and bowls for smaller portions and to prevent overeating.  Use your calories on foods and drinks that will fill you up and not leave you hungry shortly after a meal.  This information is not intended to replace advice given to you by your health care provider. Make sure you discuss any questions you have with your health care provider.  Document Revised: 01/28/2021 Document Reviewed: 01/28/2021  Elsevier Patient Education © 2022 [employer]    Exercising to Lose Weight  Getting regular exercise is important for everyone. It is especially important if you are overweight. Being overweight increases  your risk of heart disease, stroke, diabetes, high blood pressure, and several types of cancer. Exercising, and reducing the calories you consume, can help you lose weight and improve fitness and health.  Exercise can be moderate or vigorous intensity. To lose weight, most people need to do a certain amount of moderate or vigorous-intensity exercise each week.  How can exercise affect me?  You lose weight when you exercise enough to burn more calories than you eat. Exercise also reduces body fat and builds muscle. The more muscle you have, the more calories you burn. Exercise also:  Improves mood.  Reduces stress and tension.  Improves your overall fitness, flexibility, and endurance.  Increases bone strength.  Moderate-intensity exercise  Moderate-intensity exercise is any activity that gets you moving enough to burn at least three times more energy (calories) than if you were sitting.  Examples of moderate exercise include:  Walking a mile in 15 minutes.  Doing light yard work.  Biking at an easy pace.  Most people should get at least 150 minutes of moderate-intensity exercise a week to maintain their body weight.  Vigorous-intensity exercise  Vigorous-intensity exercise is any activity that gets you moving enough to burn at least six times more calories than if you were sitting. When you exercise at this intensity, you should be working hard enough that you are not able to carry on a conversation.  Examples of vigorous exercise include:  Running.  Playing a team sport, such as football, basketball, and soccer.  Jumping rope.  Most people should get at least 75 minutes a week of vigorous exercise to maintain their body weight.  What actions can I take to lose weight?  The amount of exercise you need to lose weight depends on:  Your age.  The type of exercise.  Any health conditions you have.  Your overall physical ability.  Talk to your health care provider about how much exercise you need and what types of  activities are safe for you.  Nutrition    Make changes to your diet as told by your health care provider or diet and nutrition specialist (dietitian). This may include:  Eating fewer calories.  Eating more protein.  Eating less unhealthy fats.  Eating a diet that includes fresh fruits and vegetables, whole grains, low-fat dairy products, and lean protein.  Avoiding foods with added fat, salt, and sugar.  Drink plenty of water while you exercise to prevent dehydration or heat stroke.  Activity  Choose an activity that you enjoy and set realistic goals. Your health care provider can help you make an exercise plan that works for you.  Exercise at a moderate or vigorous intensity most days of the week.  The intensity of exercise may vary from person to person. You can tell how intense a workout is for you by paying attention to your breathing and heartbeat. Most people will notice their breathing and heartbeat get faster with more intense exercise.  Do resistance training twice each week, such as:  Push-ups.  Sit-ups.  Lifting weights.  Using resistance bands.  Getting short amounts of exercise can be just as helpful as long, structured periods of exercise. If you have trouble finding time to exercise, try doing these things as part of your daily routine:  Get up, stretch, and walk around every 30 minutes throughout the day.  Go for a walk during your lunch break.  Park your car farther away from your destination.  If you take public transportation, get off one stop early and walk the rest of the way.  Make phone calls while standing up and walking around.  Take the stairs instead of elevators or escalators.  Wear comfortable clothes and shoes with good support.  Do not exercise so much that you hurt yourself, feel dizzy, or get very short of breath.  Where to find more information  [place] Department of Health and Human Services: www.hhs.gov  Centers for Disease Control and Prevention: www.cdc.gov  Contact a health care  provider:  Before starting a new exercise program.  If you have questions or concerns about your weight.  If you have a medical problem that keeps you from exercising.  Get help right away if:  You have any of the following while exercising:  Injury.  Dizziness.  Difficulty breathing or shortness of breath that does not go away when you stop exercising.  Chest pain.  Rapid heartbeat.  These symptoms may represent a serious problem that is an emergency. Do not wait to see if the symptoms will go away. Get medical help right away. Call your local emergency services (911 in the [place] Do not drive yourself to the hospital.  Summary  Getting regular exercise is especially important if you are overweight.  Being overweight increases your risk of heart disease, stroke, diabetes, high blood pressure, and several types of cancer.  Losing weight happens when you burn more calories than you eat.  Reducing the amount of calories you eat, and getting regular moderate or vigorous exercise each week, helps you lose weight.  This information is not intended to replace advice given to you by your health care provider. Make sure you discuss any questions you have with your health care provider.  Document Revised: 02/13/2022 Document Reviewed: 02/13/2022  Elsevier Patient Education © 2022 [employer]

## 2024-04-19 ENCOUNTER — ANTICOAGULATION VISIT (OUTPATIENT)
Dept: PHARMACY | Facility: HOSPITAL | Age: 54
End: 2024-04-19
Payer: COMMERCIAL

## 2024-04-19 DIAGNOSIS — I26.99 OTHER ACUTE PULMONARY EMBOLISM WITHOUT ACUTE COR PULMONALE: Primary | ICD-10-CM

## 2024-04-19 DIAGNOSIS — I26.99 BILATERAL PULMONARY EMBOLISM: ICD-10-CM

## 2024-04-19 LAB — INR PPP: 2.2

## 2024-04-19 NOTE — PROGRESS NOTES
Anticoagulation Clinic Progress Note    Anticoagulation Summary  As of 2024      INR goal:  2.0-3.0   TTR:  66.1% (5.1 y)   INR used for dosin.20 (2024)   Warfarin maintenance plan:  15 mg every Wed, Fri; 10 mg all other days   Weekly warfarin total:  80 mg   Plan last modified:  Tessie Fatima RPH (2024)   Next INR check:  2024   Priority:  High   Target end date:  Indefinite    Indications    Other acute pulmonary embolism without acute cor pulmonale [I26.99]  Bilateral pulmonary embolism [I26.99]                 Anticoagulation Episode Summary       INR check location:      Preferred lab:      Send INR reminders to:  [facility]  POOL    Comments:  new  frankie 2019 **WEEKLY FRANKIE**          Anticoagulation Care Providers       Provider Role Specialty Phone number    Torri Colmenares, FADY Referring Cardiology [phone]    Elsy Baeza MD Responsible Cardiology [phone]            Clinic Interview:  No pertinent clinical findings have been reported.    INR History:      2024     8:50 AM 2024    12:00 AM 2024     8:52 AM 2024    12:00 AM 2024    10:36 AM 2024    12:00 AM 2024     8:26 AM   Anticoagulation Monitoring   INR 1.90  1.80  1.90  2.20   INR Date 2024   INR Goal 2.0-3.0  2.0-3.0  2.0-3.0  2.0-3.0   Trend Same  Up  Same  Up   Last Week Total 70 mg  70 mg  75 mg  85 mg   Next Week Total 70 mg  75 mg  85 mg  80 mg   Sun 10 mg  10 mg  10 mg  10 mg   Mon 10 mg  10 mg  10 mg  10 mg   Tue 10 mg  10 mg  10 mg  10 mg   Wed 10 mg  10 mg  15 mg ()  15 mg   Thu 10 mg  10 mg  10 mg  10 mg   Fri 10 mg  15 mg  20 mg ()  15 mg   Sat 10 mg  10 mg  10 mg  10 mg   Historical INR  1.80      1.90      2.20            This result is from an external source.       Plan:  1. INR is therapeutic today- see above in Anticoagulation Summary.    Kameron Melendez to continue their warfarin regimen- see  above in Anticoagulation Summary.15 mg wed/fri and 10 mg AOD per pt request, rck 1 week   2. Follow up in 1 week  3. Pt has agreed to only be called if INR out of range. They have been instructed to call if any changes in medications, doses, concerns, etc. Patient expresses understanding and has no further questions at this time.    Tessie Fatima, [facility]

## 2024-04-26 ENCOUNTER — ANTICOAGULATION VISIT (OUTPATIENT)
Dept: PHARMACY | Facility: HOSPITAL | Age: 54
End: 2024-04-26
Payer: COMMERCIAL

## 2024-04-26 DIAGNOSIS — I26.99 OTHER ACUTE PULMONARY EMBOLISM WITHOUT ACUTE COR PULMONALE: Primary | ICD-10-CM

## 2024-04-26 DIAGNOSIS — I26.99 BILATERAL PULMONARY EMBOLISM: ICD-10-CM

## 2024-04-26 LAB — INR PPP: 2

## 2024-04-26 NOTE — PROGRESS NOTES
Anticoagulation Clinic Progress Note    Anticoagulation Summary  As of 2024      INR goal:  2.0-3.0   TTR:  66.2% (5.2 y)   INR used for dosin.00 (2024)   Warfarin maintenance plan:  15 mg every Wed, Fri; 10 mg all other days   Weekly warfarin total:  80 mg   No change documented:  Iris Gonzalez   Plan last modified:  Tessie Fatima RPH (2024)   Next INR check:  5/3/2024   Priority:  High   Target end date:  Indefinite    Indications    Other acute pulmonary embolism without acute cor pulmonale [I26.99]  Bilateral pulmonary embolism [I26.99]                 Anticoagulation Episode Summary       INR check location:      Preferred lab:      Send INR reminders to:   [facility]  POOL    Comments:  new  tristen 2019 **WEEKLY TRISTEN**          Anticoagulation Care Providers       Provider Role Specialty Phone number    Torri Colmenares APRN Referring Cardiology [phone]    Elsy Baeza MD Responsible Cardiology [phone]            Clinic Interview:  No pertinent clinical findings have been reported.    INR History:      2024     8:52 AM 2024    12:00 AM 2024    10:36 AM 2024    12:00 AM 2024     8:26 AM 2024    12:00 AM 2024     9:33 AM   Anticoagulation Monitoring   INR 1.80  1.90  2.20  2.00   INR Date 2024   INR Goal 2.0-3.0  2.0-3.0  2.0-3.0  2.0-3.0   Trend Up  Same  Up  Same   Last Week Total 70 mg  75 mg  85 mg  80 mg   Next Week Total 75 mg  85 mg  80 mg  80 mg   Sun 10 mg  10 mg  10 mg  10 mg   Mon 10 mg  10 mg  10 mg  10 mg   Tue 10 mg  10 mg  10 mg  10 mg   Wed 10 mg  15 mg ()  15 mg  15 mg   Thu 10 mg  10 mg  10 mg  10 mg   Fri 15 mg  20 mg ()  15 mg  15 mg   Sat 10 mg  10 mg  10 mg  10 mg   Historical INR  1.90      2.20      2.00            This result is from an external source.       Plan:  1. INR is therapeutic today- see above in Anticoagulation Summary.    Kameron  SHERRIE Melendez to continue their warfarin regimen- see above in Anticoagulation Summary.  2. Follow up in 1 week  3. Unable to reach patient LVM to call the [facility].    Iris Gonzalez

## 2024-05-07 ENCOUNTER — HOSPITAL ENCOUNTER (OUTPATIENT)
Dept: OCCUPATIONAL THERAPY | Facility: HOSPITAL | Age: 54
Setting detail: THERAPIES SERIES
Discharge: HOME | End: 2024-05-07
Payer: COMMERCIAL

## 2024-05-07 DIAGNOSIS — E66.01 MORBID OBESITY WITH BODY MASS INDEX OF 60.0-69.9 IN ADULT: ICD-10-CM

## 2024-05-07 DIAGNOSIS — I89.0 LYMPHEDEMA OF BOTH LOWER EXTREMITIES: Primary | ICD-10-CM

## 2024-05-07 DIAGNOSIS — E66.01 OBESITY, MORBID, BMI 50 OR HIGHER: ICD-10-CM

## 2024-05-07 PROCEDURE — 97166 OT EVAL MOD COMPLEX 45 MIN: CPT

## 2024-05-07 PROCEDURE — 97535 SELF CARE MNGMENT TRAINING: CPT

## 2024-05-10 ENCOUNTER — ANTICOAGULATION VISIT (OUTPATIENT)
Dept: PHARMACY | Facility: HOSPITAL | Age: 54
End: 2024-05-10
Payer: COMMERCIAL

## 2024-05-10 DIAGNOSIS — I26.99 BILATERAL PULMONARY EMBOLISM: ICD-10-CM

## 2024-05-10 DIAGNOSIS — I26.99 OTHER ACUTE PULMONARY EMBOLISM WITHOUT ACUTE COR PULMONALE: Primary | ICD-10-CM

## 2024-05-10 LAB — INR PPP: 1.6

## 2024-05-24 ENCOUNTER — TELEPHONE (OUTPATIENT)
Dept: CARDIOLOGY | Facility: CLINIC | Age: 54
End: 2024-05-24

## 2024-05-24 ENCOUNTER — ANTICOAGULATION VISIT (OUTPATIENT)
Dept: PHARMACY | Facility: HOSPITAL | Age: 54
End: 2024-05-24
Payer: COMMERCIAL

## 2024-05-24 DIAGNOSIS — I26.99 OTHER ACUTE PULMONARY EMBOLISM WITHOUT ACUTE COR PULMONALE: Primary | ICD-10-CM

## 2024-05-24 DIAGNOSIS — I26.99 BILATERAL PULMONARY EMBOLISM: ICD-10-CM

## 2024-05-24 LAB — INR PPP: 2.3

## 2024-05-24 RX ORDER — WARFARIN SODIUM 5 MG/1
TABLET ORAL
Qty: 65 TABLET | Refills: 0 | Status: SHIPPED | OUTPATIENT
Start: 2024-05-24

## 2024-05-24 NOTE — TELEPHONE ENCOUNTER
Caller: Kameron Melendez    Relationship to patient: Self    Best call back number: [phone]     Chief complaint: MEDICATION REFILL    Type of visit: TBD    Requested date: WITHIN 30 DAYS FOR MEDS     If rescheduling, when is the original appointment: NA     Additional notes:PT LAST SEEN IN FEBRUARY OF 2021 BUT HAD REFILL SENT IN ON 05.24.23 AND PT STATES HE WAS TOLD TO REACH OUT FOR AN APPT -

## 2024-05-24 NOTE — PROGRESS NOTES
Anticoagulation Clinic Progress Note    Anticoagulation Summary  As of 2024      INR goal:  2.0-3.0   TTR:  65.6% (5.2 y)   INR used for dosin.30 (2024)   Warfarin maintenance plan:  15 mg every Wed, Fri; 10 mg all other days   Weekly warfarin total:  80 mg   No change documented:  Tessie Fatima RPH   Plan last modified:  Tessie Fatima RPH (2024)   Next INR check:  2024   Priority:  High   Target end date:  Indefinite    Indications    Other acute pulmonary embolism without acute cor pulmonale [I26.99]  Bilateral pulmonary embolism [I26.99]                 Anticoagulation Episode Summary       INR check location:      Preferred lab:      Send INR reminders to:   [facility]  POOL    Comments:  new  frankie 2019 **WEEKLY RFANKIE**          Anticoagulation Care Providers       Provider Role Specialty Phone number    Torri Colmenares APRN Referring Cardiology [phone]    Elsy Baeza MD Responsible Cardiology [phone]            Clinic Interview:  No pertinent clinical findings have been reported.    INR History:      2024     8:26 AM 2024    12:00 AM 2024     9:33 AM 5/10/2024    12:00 AM 5/10/2024     8:37 AM 2024    12:00 AM 2024     1:18 PM   Anticoagulation Monitoring   INR 2.20  2.00  1.60  2.30   INR Date 2024  2024  5/10/2024  2024   INR Goal 2.0-3.0  2.0-3.0  2.0-3.0  2.0-3.0   Trend Up  Same  Same  Same   Last Week Total 85 mg  80 mg  80 mg  80 mg   Next Week Total 80 mg  80 mg  85 mg  80 mg   Sun 10 mg  10 mg  10 mg  10 mg   Mon 10 mg  10 mg  10 mg  10 mg   Tue 10 mg  10 mg  10 mg  10 mg   Wed 15 mg  15 mg  15 mg  15 mg   Thu 10 mg  10 mg  10 mg  10 mg   Fri 15 mg  15 mg  15 mg  15 mg   Sat 10 mg  10 mg  15 mg ()  10 mg   Historical INR  2.00      1.60      2.30            This result is from an external source.       Plan:  1. INR is therapeutic today- see above in Anticoagulation Summary.    Kameron BALDERAS  Al to continue their warfarin regimen- see above in Anticoagulation Summary.  2. Follow up in 1 week  3. They have been instructed to call if any changes in medications, doses, concerns, etc. Patient expresses understanding and has no further questions at this time.    Tessie Fatima [facility]

## 2024-05-31 ENCOUNTER — ANTICOAGULATION VISIT (OUTPATIENT)
Dept: PHARMACY | Facility: HOSPITAL | Age: 54
End: 2024-05-31
Payer: COMMERCIAL

## 2024-05-31 DIAGNOSIS — I26.99 BILATERAL PULMONARY EMBOLISM: ICD-10-CM

## 2024-05-31 DIAGNOSIS — I26.99 OTHER ACUTE PULMONARY EMBOLISM WITHOUT ACUTE COR PULMONALE: Primary | ICD-10-CM

## 2024-05-31 LAB — INR PPP: 2.5

## 2024-05-31 NOTE — PROGRESS NOTES
Anticoagulation Clinic Progress Note    Anticoagulation Summary  As of 2024      INR goal:  2.0-3.0   TTR:  65.7% (5.3 y)   INR used for dosin.50 (2024)   Warfarin maintenance plan:  15 mg every Wed, Fri; 10 mg all other days   Weekly warfarin total:  80 mg   No change documented:  Iris Gonzalez   Plan last modified:  Tessie Fatima RPH (2024)   Next INR check:  2024   Priority:  High   Target end date:  Indefinite    Indications    Other acute pulmonary embolism without acute cor pulmonale [I26.99]  Bilateral pulmonary embolism [I26.99]                 Anticoagulation Episode Summary       INR check location:      Preferred lab:      Send INR reminders to:   [facility]  POOL    Comments:  new  tristen 2019 **WEEKLY TRISTEN**          Anticoagulation Care Providers       Provider Role Specialty Phone number    Torri Colmenares APRN Referring Cardiology [phone]    Elsy Baeza MD Responsible Cardiology [phone]            Clinic Interview:  No pertinent clinical findings have been reported.    INR History:      2024     9:33 AM 5/10/2024    12:00 AM 5/10/2024     8:37 AM 2024    12:00 AM 2024     1:18 PM 2024    12:00 AM 2024    10:33 AM   Anticoagulation Monitoring   INR 2.00  1.60  2.30  2.50   INR Date 2024  5/10/2024  2024  2024   INR Goal 2.0-3.0  2.0-3.0  2.0-3.0  2.0-3.0   Trend Same  Same  Same  Same   Last Week Total 80 mg  80 mg  80 mg  80 mg   Next Week Total 80 mg  85 mg  80 mg  80 mg   Sun 10 mg  10 mg  10 mg  10 mg   Mon 10 mg  10 mg  10 mg  10 mg   Tue 10 mg  10 mg  10 mg  10 mg   Wed 15 mg  15 mg  15 mg  15 mg   Thu 10 mg  10 mg  10 mg  10 mg   Fri 15 mg  15 mg  15 mg  15 mg   Sat 10 mg  15 mg ()  10 mg  10 mg   Historical INR  1.60      2.30      2.50            This result is from an external source.       Plan:  1. INR is therapeutic today- see above in Anticoagulation Summary.    Kameron  SHERRIE Melendez to continue their warfarin regimen- see above in Anticoagulation Summary.  2. Follow up in 1 week  3. Pt has agreed to only be called if INR out of range. They have been instructed to call if any changes in medications, doses, concerns, etc. Patient expresses understanding and has no further questions at this time.    Iris Gonzalez

## 2024-06-05 ENCOUNTER — TELEPHONE (OUTPATIENT)
Dept: FAMILY MEDICINE CLINIC | Facility: CLINIC | Age: 54
End: 2024-06-05
Payer: COMMERCIAL

## 2024-06-05 NOTE — TELEPHONE ENCOUNTER
Patient's wife came in asking for the status of patient's FMLA paperwork    None found up front    Patient asking for a call on status please  [phone]

## 2024-06-06 NOTE — TELEPHONE ENCOUNTER
Hub to relay    FMLA paperwork is scanned in chart. If they need copies I can print them and put up front for .     I left this message on vm

## 2024-06-07 ENCOUNTER — ANTICOAGULATION VISIT (OUTPATIENT)
Dept: PHARMACY | Facility: HOSPITAL | Age: 54
End: 2024-06-07
Payer: COMMERCIAL

## 2024-06-07 DIAGNOSIS — I26.99 OTHER ACUTE PULMONARY EMBOLISM WITHOUT ACUTE COR PULMONALE: Primary | ICD-10-CM

## 2024-06-07 DIAGNOSIS — I26.99 BILATERAL PULMONARY EMBOLISM: ICD-10-CM

## 2024-06-07 LAB — INR PPP: 2.3

## 2024-06-07 NOTE — PROGRESS NOTES
Anticoagulation Clinic Progress Note    Anticoagulation Summary  As of 2024      INR goal:  2.0-3.0   TTR:  65.8% (5.3 y)   INR used for dosin.30 (2024)   Warfarin maintenance plan:  15 mg every Wed, Fri; 10 mg all other days   Weekly warfarin total:  80 mg   No change documented:  Kaylee De Leon, Pharmacy Technician   Plan last modified:  Tessie Fatima RPH (2024)   Next INR check:  2024   Priority:  High   Target end date:  Indefinite    Indications    Other acute pulmonary embolism without acute cor pulmonale [I26.99]  Bilateral pulmonary embolism [I26.99]                 Anticoagulation Episode Summary       INR check location:      Preferred lab:      Send INR reminders to:  [facility]  POOL    Comments:  new  frankie 2019 **WEEKLY FRANKIE**          Anticoagulation Care Providers       Provider Role Specialty Phone number    Torri Colmenares APRN Referring Cardiology [phone]    Elsy Baeza MD Responsible Cardiology [phone]            Clinic Interview:  No pertinent clinical findings have been reported.    INR History:      5/10/2024     8:37 AM 2024    12:00 AM 2024     1:18 PM 2024    12:00 AM 2024    10:33 AM 2024    12:00 AM 2024     8:25 AM   Anticoagulation Monitoring   INR 1.60  2.30  2.50  2.30   INR Date 5/10/2024  2024  2024  2024   INR Goal 2.0-3.0  2.0-3.0  2.0-3.0  2.0-3.0   Trend Same  Same  Same  Same   Last Week Total 80 mg  80 mg  80 mg  80 mg   Next Week Total 85 mg  80 mg  80 mg  80 mg   Sun 10 mg  10 mg  10 mg  10 mg   Mon 10 mg  10 mg  10 mg  10 mg   Tue 10 mg  10 mg  10 mg  10 mg   Wed 15 mg  15 mg  15 mg  15 mg   Thu 10 mg  10 mg  10 mg  10 mg   Fri 15 mg  15 mg  15 mg  15 mg   Sat 15 mg ()  10 mg  10 mg  10 mg   Historical INR  2.30      2.50      2.30            This result is from an external source.       Plan:  1. INR is therapeutic today- see above in Anticoagulation Summary.     Kameron Melendez to continue their warfarin regimen- see above in Anticoagulation Summary.  2. Follow up in 1 week  3. Pt has agreed to only be called if INR out of range. They have been instructed to call if any changes in medications, doses, concerns, etc. Patient expresses understanding and has no further questions at this time.    Kaylee De Leon, Pharmacy Technician

## 2024-06-14 ENCOUNTER — ANTICOAGULATION VISIT (OUTPATIENT)
Dept: PHARMACY | Facility: HOSPITAL | Age: 54
End: 2024-06-14
Payer: COMMERCIAL

## 2024-06-14 ENCOUNTER — OFFICE VISIT (OUTPATIENT)
Age: 54
End: 2024-06-14
Payer: COMMERCIAL

## 2024-06-14 VITALS
SYSTOLIC BLOOD PRESSURE: 140 MMHG | DIASTOLIC BLOOD PRESSURE: 70 MMHG | HEART RATE: 71 BPM | BODY MASS INDEX: 64.97 KG/M2 | HEIGHT: 74 IN

## 2024-06-14 DIAGNOSIS — E78.5 DYSLIPIDEMIA: ICD-10-CM

## 2024-06-14 DIAGNOSIS — Z86.711 HISTORY OF PULMONARY EMBOLISM: ICD-10-CM

## 2024-06-14 DIAGNOSIS — I87.2 VENOUS INSUFFICIENCY (CHRONIC) (PERIPHERAL): ICD-10-CM

## 2024-06-14 DIAGNOSIS — E11.69 TYPE 2 DIABETES MELLITUS WITH OTHER SPECIFIED COMPLICATION, WITHOUT LONG-TERM CURRENT USE OF INSULIN: ICD-10-CM

## 2024-06-14 DIAGNOSIS — D68.51 HETEROZYGOUS FACTOR V LEIDEN MUTATION: ICD-10-CM

## 2024-06-14 DIAGNOSIS — G47.33 OSA (OBSTRUCTIVE SLEEP APNEA): ICD-10-CM

## 2024-06-14 DIAGNOSIS — Z79.01 LONG TERM (CURRENT) USE OF ANTICOAGULANTS: ICD-10-CM

## 2024-06-14 DIAGNOSIS — I26.99 OTHER ACUTE PULMONARY EMBOLISM WITHOUT ACUTE COR PULMONALE: Primary | ICD-10-CM

## 2024-06-14 DIAGNOSIS — I48.3 TYPICAL ATRIAL FLUTTER: ICD-10-CM

## 2024-06-14 DIAGNOSIS — I26.99 BILATERAL PULMONARY EMBOLISM: ICD-10-CM

## 2024-06-14 DIAGNOSIS — I48.0 PAROXYSMAL ATRIAL FIBRILLATION: Primary | ICD-10-CM

## 2024-06-14 PROBLEM — I25.10 ATHSCL HEART DISEASE OF NATIVE CORONARY ARTERY W/O ANG PCTRS: Status: RESOLVED | Noted: 2021-11-16 | Resolved: 2024-06-14

## 2024-06-14 LAB — INR PPP: 2.7

## 2024-06-14 RX ORDER — CEPHALEXIN 500 MG/1
1 CAPSULE ORAL EVERY 6 HOURS
COMMUNITY
Start: 2024-05-24

## 2024-06-14 NOTE — PROGRESS NOTES
Subjective:     Encounter Date:06/14/2024      Patient ID: Kameron Melendez is a 54 y.o. male.    Chief Complaint:  History of Present Illness    This is a 54-year-old with a history of bilateral pulmonary embolism status post thrombectomy in 7/2017, pulmonary hypertension, morbid obesity, hyperlipidemia, obstructive sleep apnea, recurrent pulmonary embolism in 1/2019, who presents for hospital follow-up.     I initially saw the patient in 7/2017 when he was admitted and 7/2017 with bilateral pulmonary embolism associated with significant right ventricular strain.  The patient reported that he had been on a long car trip prior to developing his symptoms.  His findings included a normal troponin and an elevated BNP.  His echocardiogram however showed a severely dilated right ventricle with severe dysfunction.  During that admission he was taken to the cardiac catheterization laboratory by Dr. Coulter and underwent successful left pulmonary artery thrombectomy and infusion of TPA.  Following the procedure he was placed on warfarin (due to his high BMI) with a heparin bridge.  And follow-up he was seen by Dr. Coulter on 11/6/2017 at which time he was doing well.  A repeat echocardiogram and bilateral lower extremity venous Dopplers were performed.  The lower extremity ultrasound showed no residual clot.  His echocardiogram showed a moderately dilated left ventricle with mildly dilated right ventricle, normal left ventricular function with an EF of 66%, and no significant valvular abnormalities.  At that time was suggested that since this was a provoked venous thromboembolism he could stop anticoagulation after a year.  He returned to the office for routine follow-up in 8/2018 with JESSIE Sanchez which time he was continuing to do well and was recommended that he could stop his anticoagulation.  He ended up stopping this in October.     He presented back to the hospital on 1/23/2019 with progressively worsening dyspnea.   His workup in the emergency room room revealed an elevated BNP and a normal troponin.  A CT angiogram of the chest showed acute bilateral pulmonary embolism involving the lower lobes and the proximal branch of the right middle lobe with evidence of right ventricular strain.  He was started on heparin drip following his admission.  An echocardiogram was performed during that admission that showed normal left ventricular systolic function and wall motion, EF 60%, grade 1 diastolic dysfunction, mild tricuspid regurgitation, normal right ventricular size and function, and right ventricular systolic pressure of 48.7 mmHg.  A lower extremity in the venous Doppler ultrasound showed right lower extremity DVTs of the popliteal and gastrocnemius/soleal veins.  Due to the lack of right ventricular strain and his relatively low thrombus burden I recommended that we conservatively manage him at this time around with anticoagulation.  Deferred any invasive treatment as long as he continued to improve.  Fortunately the patient did so and we ended up starting him back on warfarin.  He was evaluated by hematology during this admission and his workup did reveal evidence of heterozygous mutation for factor V Leiden.  He was finally discharged on 2/1/2019 in good condition off of any oxygen during the day.  He did have evidence of obstructive sleep apnea and the patient was to follow-up with a sleep physician as an outpatient.     His last seen in the office by FADY Quintana in 3/2020 for routine follow-up.  At that time he was not having any new significant issues.  It was recommended that he establish care with a primary care physician.    He has since establish care with Dr. Patel.  Sleep study and treatment of sleep apnea had been discussed repeatedly with the patient and he continued to decline including during his last office visit with me in 2/2021 because of prior issues with tolerance.    Patient was hospitalized in  11/2021 with lower extremity cellulitis and new onset atrial fibrillation and flutter.  He was started on metoprolol with rate control.  He was continued on anticoagulation with warfarin.  During that admission he developed issues with bradycardia and pauses up to 15 seconds.  This appeared to occur while he was asleep and hypoxic.  Evaluation and treatment of sleep apnea was again discussed.  He was open to outpatient evaluation at that time.  Unfortunately he was not a candidate for cardioversion or ablation because of his weight.    This is his first follow-up with us since that admission.  He has been doing fairly well.  He has continued to struggle with his weight.  He feels like this became much more of an issue during the pandemic.  He denies any shortness of breath out of the ordinary.  Denies any chest pain, palpitations, orthopnea, near-syncope or syncope.  His lower extremity swelling is largely stable.  He is trying to get into the lymphedema clinic to help manage his lower extremity lymphedema.  He has been using GLP-1 inhibitors to aid with weight loss.    He did not end up undergoing evaluation for sleep apnea since he was intolerant to his CPAP in the past.  He is thinking about the inspire device but understands that he will need to lose some weight before considering that as an option.     Review of Systems   Constitutional: Negative for malaise/fatigue.   HENT:  Negative for hearing loss, hoarse voice, nosebleeds and sore throat.    Eyes:  Negative for pain.   Cardiovascular:  Positive for leg swelling. Negative for chest pain, claudication, cyanosis, dyspnea on exertion, irregular heartbeat, near-syncope, orthopnea, palpitations, paroxysmal nocturnal dyspnea and syncope.   Respiratory:  Negative for shortness of breath and snoring.    Endocrine: Negative for cold intolerance, heat intolerance, polydipsia, polyphagia and polyuria.   Skin:  Negative for itching and rash.   Musculoskeletal:   Negative for arthritis, falls, joint pain, joint swelling, muscle cramps, muscle weakness and myalgias.   Gastrointestinal:  Negative for constipation, diarrhea, dysphagia, heartburn, hematemesis, hematochezia, melena, nausea and vomiting.   Genitourinary:  Negative for frequency, hematuria and hesitancy.   Neurological:  Negative for excessive daytime sleepiness, dizziness, headaches, light-headedness, numbness and weakness.   Psychiatric/Behavioral:  Negative for depression. The patient is not nervous/anxious.          Current Outpatient Medications:     acetaminophen (TYLENOL) 500 MG tablet, Take 1 tablet by mouth Every 6 (Six) Hours As Needed for Mild Pain., Disp: , Rfl:     atorvastatin (LIPITOR) 20 MG tablet, Take 1 tablet by mouth Every Night., Disp: 90 tablet, Rfl: 3    cephalexin (KEFLEX) 500 MG capsule, Take 1 capsule by mouth Every 6 (Six) Hours., Disp: , Rfl:     furosemide (LASIX) 80 MG tablet, Take 1 tablet by mouth Daily As Needed (leg swelling)., Disp: 90 tablet, Rfl: 3    Ibuprofen 3 %, Gabapentin 10 %, Baclofen 2 %, lidocaine 4 %, Ketamine HCl 10 %, Apply 1-2 g topically to the appropriate area as directed 3 (Three) to 4 (Four) times daily., Disp: 90 g, Rfl: 5    omeprazole (priLOSEC) 40 MG capsule, Take 1 capsule by mouth Daily., Disp: 90 capsule, Rfl: 3    Semaglutide, 2 MG/DOSE, (OZEMPIC) 8 MG/3ML solution pen-injector, Inject 2 mg under the skin into the appropriate area as directed 1 (One) Time Per Week., Disp: 2 mL, Rfl: 5    spironolactone (Aldactone) 25 MG tablet, Take 1 tablet by mouth As Needed (swelling)., Disp: , Rfl:     ursodiol (ACTIGALL) 300 MG capsule, Take 1 capsule by mouth 2 (Two) Times a Day., Disp: 180 capsule, Rfl: 1    warfarin (Jantoven) 5 MG tablet, Take three tablets by mouth on wed and fri and take two tablets by mouth all other days or as directed, Disp: 65 tablet, Rfl: 0    Past Medical History:   Diagnosis Date    DVT (deep venous thrombosis)     RLE    Factor V  "Leiden     Hypertension     Kidney stone     Lymphedema     Lymphedema of left lower extremity     Obesity     ARNOLDO (obstructive sleep apnea)     Pulmonary embolism     bilateral    Pulmonary hypertension     Right ventricular enlargement        Past Surgical History:   Procedure Laterality Date    CARDIAC CATHETERIZATION Bilateral 7/18/2017    Procedure: Pulmonary angiography- [facility] ;  Surgeon: Cedric Coulter MD;  [facility]:  [facility];  Service:     CARDIAC CATHETERIZATION N/A 7/18/2017    Procedure: Right Heart Cath;  Surgeon: Cedric Coulter MD;  Location:  [facility];  Service:     THROMBECTOMY         Family History   Problem Relation Age of Onset    Heart disease Mother     Heart failure Mother     Bradycardia Mother     No Known Problems Father     Atrial fibrillation Half-Brother     Prostate cancer Half-Brother     No Known Problems Maternal Grandmother     No Known Problems Maternal Grandfather     No Known Problems Paternal Grandmother     No Known Problems Paternal Grandfather        Social History     Tobacco Use    Smoking status: Former     Types: Cigars, Pipe     Start date: 1/1/2006    Smokeless tobacco: Never    Tobacco comments:     occasional - < 1 a month   Vaping Use    Vaping status: Never Used   Substance Use Topics    Alcohol use: Yes     Comment: social    Drug use: No         ECG 12 Lead    Date/Time: 6/14/2024 2:36 PM  Performed by: Elsy Baeza MD    Authorized by: Elsy Baeza MD  Comparison: compared with previous ECG   Similar to previous ECG  Rhythm: sinus rhythm             Objective:     Visit Vitals  /70   Pulse 71   Ht 188 cm (74\")   BMI 64.97 kg/m²         Constitutional:       Appearance: Normal appearance. Well-developed.   HENT:      Head: Normocephalic and atraumatic.   Neck:      Vascular: No carotid bruit or JVD.   Pulmonary:      Effort: Pulmonary effort is normal.      Breath sounds: Normal breath sounds.   Cardiovascular:      " Normal rate. Regular rhythm.      No gallop.    Pulses:     Radial: 2+ bilaterally.  Edema:     Peripheral edema present.  Abdominal:      Palpations: Abdomen is soft.   Skin:     General: Skin is warm and dry.   Neurological:      Mental Status: Alert and oriented to person, place, and time.             Assessment:          Diagnosis Plan   1. Paroxysmal atrial fibrillation        2. Typical atrial flutter        3. Dyslipidemia        4. Venous insufficiency (chronic) (peripheral)        5. History of pulmonary embolism        6. Heterozygous factor V Leiden mutation        7. Long term (current) use of anticoagulants        8. Type 2 diabetes mellitus with other specified complication, without long-term current use of insulin        9. ARNOLDO (obstructive sleep apnea)               Plan:         1.  Paroxysmal atrial fibrillation and flutter.  In sinus rhythm today.  Does not appear that he has had any recurrent atrial fibrillation or flutter in the last year.  Toprol discontinued previously because of issues with bradycardia and significant pauses.  Will monitor for recurrence for now.  He remains on anticoagulation with warfarin.  2.  History of recurrent pulmonary embolism.  Remains on chronic anticoagulation with warfarin which is managed by the medication management clinic.  3.  Factor V Leiden mutation.  4.  Bilateral lower extremity lymphedema.  Patient is awaiting an evaluation by the lymphedema clinic.  5.  Diabetes mellitus type 2  6.  Obstructive sleep apnea.  Intolerant of CPAP.  7.  Hyperlipidemia.  On atorvastatin which is managed by Dr. Patel.  8.  Morbid obesity    Will plan on seeing the patient back again in 1 year or sooner if further issues arise.

## 2024-06-14 NOTE — PROGRESS NOTES
Anticoagulation Clinic Progress Note    Anticoagulation Summary  As of 2024      INR goal:  2.0-3.0   TTR:  65.9% (5.3 y)   INR used for dosin.70 (2024)   Warfarin maintenance plan:  15 mg every Wed, Fri; 10 mg all other days   Weekly warfarin total:  80 mg   No change documented:  Kaylee De Leon, Pharmacy Technician   Plan last modified:  Tessie Fatima RPH (2024)   Next INR check:  2024   Priority:  High   Target end date:  Indefinite    Indications    Other acute pulmonary embolism without acute cor pulmonale [I26.99]  Bilateral pulmonary embolism [I26.99]                 Anticoagulation Episode Summary       INR check location:      Preferred lab:      Send INR reminders to:  [facility]  POOL    Comments:  new  frankie 2019 **WEEKLY FRANKIE**          Anticoagulation Care Providers       Provider Role Specialty Phone number    Torri Colmenares APRN Referring Cardiology [phone]    Elsy Baeza MD Responsible Cardiology [phone]            Clinic Interview:  No pertinent clinical findings have been reported.    INR History:      2024     1:18 PM 2024    12:00 AM 2024    10:33 AM 2024    12:00 AM 2024     8:25 AM 2024    12:00 AM 2024     8:53 AM   Anticoagulation Monitoring   INR 2.30  2.50  2.30  2.70   INR Date 2024   INR Goal 2.0-3.0  2.0-3.0  2.0-3.0  2.0-3.0   Trend Same  Same  Same  Same   Last Week Total 80 mg  80 mg  80 mg  80 mg   Next Week Total 80 mg  80 mg  80 mg  80 mg   Sun 10 mg  10 mg  10 mg  10 mg   Mon 10 mg  10 mg  10 mg  10 mg   Tue 10 mg  10 mg  10 mg  10 mg   Wed 15 mg  15 mg  15 mg  15 mg   Thu 10 mg  10 mg  10 mg  10 mg   Fri 15 mg  15 mg  15 mg  15 mg   Sat 10 mg  10 mg  10 mg  10 mg   Historical INR  2.50      2.30      2.70            This result is from an external source.       Plan:  1. INR is therapeutic today- see above in Anticoagulation Summary.     Kameron Melendez to continue their warfarin regimen- see above in Anticoagulation Summary.  2. Follow up in 1 week  3. Pt has agreed to only be called if INR out of range. They have been instructed to call if any changes in medications, doses, concerns, etc. Patient expresses understanding and has no further questions at this time.    Kaylee De Leon, Pharmacy Technician

## 2024-06-14 NOTE — LETTER
June 14, 2024       No Recipients    Patient: Kameron Melendez   YOB: 1970   Date of Visit: 6/14/2024       Dear Hansel Patel, DO    Kameron Melendez was in my office today. Below is a copy of my note.    If you have questions, please do not hesitate to call me. I look forward to following Kameron along with you.         Sincerely,        Elsy Baeza MD        CC:   No Recipients        Subjective:     Encounter Date:06/14/2024      Patient ID: Kameron Melendez is a 54 y.o. male.    Chief Complaint:  History of Present Illness    This is a 54-year-old with a history of bilateral pulmonary embolism status post thrombectomy in 7/2017, pulmonary hypertension, morbid obesity, hyperlipidemia, obstructive sleep apnea, recurrent pulmonary embolism in 1/2019, who presents for hospital follow-up.     I initially saw the patient in 7/2017 when he was admitted and 7/2017 with bilateral pulmonary embolism associated with significant right ventricular strain.  The patient reported that he had been on a long car trip prior to developing his symptoms.  His findings included a normal troponin and an elevated BNP.  His echocardiogram however showed a severely dilated right ventricle with severe dysfunction.  During that admission he was taken to the cardiac catheterization laboratory by Dr. Coulter and underwent successful left pulmonary artery thrombectomy and infusion of TPA.  Following the procedure he was placed on warfarin (due to his high BMI) with a heparin bridge.  And follow-up he was seen by Dr. Coulter on 11/6/2017 at which time he was doing well.  A repeat echocardiogram and bilateral lower extremity venous Dopplers were performed.  The lower extremity ultrasound showed no residual clot.  His echocardiogram showed a moderately dilated left ventricle with mildly dilated right ventricle, normal left ventricular function with an EF of 66%, and no significant valvular abnormalities.  At that time was suggested that  since this was a provoked venous thromboembolism he could stop anticoagulation after a year.  He returned to the office for routine follow-up in 8/2018 with JESSIE Sanchez which time he was continuing to do well and was recommended that he could stop his anticoagulation.  He ended up stopping this in October.     He presented back to the hospital on 1/23/2019 with progressively worsening dyspnea.  His workup in the emergency room room revealed an elevated BNP and a normal troponin.  A CT angiogram of the chest showed acute bilateral pulmonary embolism involving the lower lobes and the proximal branch of the right middle lobe with evidence of right ventricular strain.  He was started on heparin drip following his admission.  An echocardiogram was performed during that admission that showed normal left ventricular systolic function and wall motion, EF 60%, grade 1 diastolic dysfunction, mild tricuspid regurgitation, normal right ventricular size and function, and right ventricular systolic pressure of 48.7 mmHg.  A lower extremity in the venous Doppler ultrasound showed right lower extremity DVTs of the popliteal and gastrocnemius/soleal veins.  Due to the lack of right ventricular strain and his relatively low thrombus burden I recommended that we conservatively manage him at this time around with anticoagulation.  Deferred any invasive treatment as long as he continued to improve.  Fortunately the patient did so and we ended up starting him back on warfarin.  He was evaluated by hematology during this admission and his workup did reveal evidence of heterozygous mutation for factor V Leiden.  He was finally discharged on 2/1/2019 in good condition off of any oxygen during the day.  He did have evidence of obstructive sleep apnea and the patient was to follow-up with a sleep physician as an outpatient.     His last seen in the office by FADY Quintana in 3/2020 for routine follow-up.  At that time he was not  having any new significant issues.  It was recommended that he establish care with a primary care physician.    He has since establish care with Dr. Patel.  Sleep study and treatment of sleep apnea had been discussed repeatedly with the patient and he continued to decline including during his last office visit with me in 2/2021 because of prior issues with tolerance.    Patient was hospitalized in 11/2021 with lower extremity cellulitis and new onset atrial fibrillation and flutter.  He was started on metoprolol with rate control.  He was continued on anticoagulation with warfarin.  During that admission he developed issues with bradycardia and pauses up to 15 seconds.  This appeared to occur while he was asleep and hypoxic.  Evaluation and treatment of sleep apnea was again discussed.  He was open to outpatient evaluation at that time.  Unfortunately he was not a candidate for cardioversion or ablation because of his weight.    This is his first follow-up with us since that admission.       ROS      Current Outpatient Medications:   •  acetaminophen (TYLENOL) 500 MG tablet, Take 1 tablet by mouth Every 6 (Six) Hours As Needed for Mild Pain., Disp: , Rfl:   •  atorvastatin (LIPITOR) 20 MG tablet, Take 1 tablet by mouth Every Night., Disp: 90 tablet, Rfl: 3  •  cephalexin (KEFLEX) 500 MG capsule, Take 1 capsule by mouth Every 6 (Six) Hours., Disp: , Rfl:   •  furosemide (LASIX) 80 MG tablet, Take 1 tablet by mouth Daily As Needed (leg swelling)., Disp: 90 tablet, Rfl: 3  •  Ibuprofen 3 %, Gabapentin 10 %, Baclofen 2 %, lidocaine 4 %, Ketamine HCl 10 %, Apply 1-2 g topically to the appropriate area as directed 3 (Three) to 4 (Four) times daily., Disp: 90 g, Rfl: 5  •  omeprazole (priLOSEC) 40 MG capsule, Take 1 capsule by mouth Daily., Disp: 90 capsule, Rfl: 3  •  Semaglutide, 2 MG/DOSE, (OZEMPIC) 8 MG/3ML solution pen-injector, Inject 2 mg under the skin into the appropriate area as directed 1 (One) Time Per Week.,  Disp: 2 mL, Rfl: 5  •  spironolactone (Aldactone) 25 MG tablet, Take 1 tablet by mouth As Needed (swelling)., Disp: , Rfl:   •  ursodiol (ACTIGALL) 300 MG capsule, Take 1 capsule by mouth 2 (Two) Times a Day., Disp: 180 capsule, Rfl: 1  •  warfarin (Jantoven) 5 MG tablet, Take three tablets by mouth on wed and fri and take two tablets by mouth all other days or as directed, Disp: 65 tablet, Rfl: 0    Past Medical History:   Diagnosis Date   • DVT (deep venous thrombosis)     RLE   • Factor V Leiden    • Hypertension    • Kidney stone    • Lymphedema    • Lymphedema of left lower extremity    • Obesity    • ARNOLDO (obstructive sleep apnea)    • Pulmonary embolism     bilateral   • Pulmonary hypertension    • Right ventricular enlargement        Past Surgical History:   Procedure Laterality Date   • CARDIAC CATHETERIZATION Bilateral 7/18/2017    Procedure: Pulmonary angiography- [place] ;  Surgeon: Cedric Coulter MD;  Location: [facility] INVASIVE LOCATION;  Service:    • CARDIAC CATHETERIZATION N/A 7/18/2017    Procedure: Right Heart Cath;  Surgeon: Cedric Coulter MD;  Location:  [facility] CATH INVASIVE LOCATION;  Service:    • THROMBECTOMY         Family History   Problem Relation Age of Onset   • Heart disease Mother    • Heart failure Mother    • Bradycardia Mother    • No Known Problems Father    • Atrial fibrillation Half-Brother    • Prostate cancer Half-Brother    • No Known Problems Maternal Grandmother    • No Known Problems Maternal Grandfather    • No Known Problems Paternal Grandmother    • No Known Problems Paternal Grandfather        Social History     Tobacco Use   • Smoking status: Former     Types: Cigars, Pipe     Start date: 1/1/2006   • Smokeless tobacco: Never   • Tobacco comments:     occasional - < 1 a month   Vaping Use   • Vaping status: Never Used   Substance Use Topics   • Alcohol use: Yes     Comment: social   • Drug use: No       Procedures       Objective:     Visit Vitals  /70   Pulse 71   Ht 188  "cm (74\")   BMI 64.97 kg/m²         Physical Exam    Lab Review:       Assessment:          Diagnosis Plan   1. Paroxysmal atrial fibrillation        2. Typical atrial flutter        3. Dyslipidemia        4. Venous insufficiency (chronic) (peripheral)        5. History of pulmonary embolism        6. Heterozygous factor V Leiden mutation        7. Long term (current) use of anticoagulants        8. Type 2 diabetes mellitus with other specified complication, without long-term current use of insulin        9. ARNOLDO (obstructive sleep apnea)               Plan:                "

## 2024-06-21 ENCOUNTER — ANTICOAGULATION VISIT (OUTPATIENT)
Dept: PHARMACY | Facility: HOSPITAL | Age: 54
End: 2024-06-21
Payer: COMMERCIAL

## 2024-06-21 LAB — INR PPP: 2.3

## 2024-06-21 NOTE — PROGRESS NOTES
Anticoagulation Clinic Progress Note    Anticoagulation Summary  As of 2024      INR goal:  2.0-3.0   TTR:  66.1% (5.3 y)   INR used for dosin.30 (2024)   Warfarin maintenance plan:  15 mg every Wed, Fri; 10 mg all other days   Weekly warfarin total:  80 mg   No change documented:  Iris Gonzalez   Plan last modified:  Tessie Fatima RPH (2024)   Next INR check:  2024   Priority:  High   Target end date:  Indefinite    Indications    Other acute pulmonary embolism without acute cor pulmonale (Resolved) [I26.99]  Bilateral pulmonary embolism (Resolved) [I26.99]                 Anticoagulation Episode Summary       INR check location:      Preferred lab:      Send INR reminders to:  [facility]  POOL    Comments:  new  tristen 2019 **WEEKLY TRISTEN**          Anticoagulation Care Providers       Provider Role Specialty Phone number    Torri Colmenares APRN Referring Cardiology [phone]    Elsy Baeza MD Responsible Cardiology [phone]            Clinic Interview:  No pertinent clinical findings have been reported.    INR History:      2024    10:33 AM 2024    12:00 AM 2024     8:25 AM 2024    12:00 AM 2024     8:53 AM 2024    12:00 AM 2024    11:46 AM   Anticoagulation Monitoring   INR 2.50  2.30  2.70  2.30   INR Date 2024   INR Goal 2.0-3.0  2.0-3.0  2.0-3.0  2.0-3.0   Trend Same  Same  Same  Same   Last Week Total 80 mg  80 mg  80 mg  80 mg   Next Week Total 80 mg  80 mg  80 mg  80 mg   Sun 10 mg  10 mg  10 mg  10 mg   Mon 10 mg  10 mg  10 mg  10 mg   Tue 10 mg  10 mg  10 mg  10 mg   Wed 15 mg  15 mg  15 mg  15 mg   Thu 10 mg  10 mg  10 mg  10 mg   Fri 15 mg  15 mg  15 mg  15 mg   Sat 10 mg  10 mg  10 mg  10 mg   Historical INR  2.30      2.70      2.30        Visit Report    Report Report         This result is from an external source.       Plan:  1. INR is therapeutic today-  see above in Anticoagulation Summary.    Kameron Melendez to continue their warfarin regimen- see above in Anticoagulation Summary.  2. Follow up in 1 week  3. Pt has agreed to only be called if INR out of range. They have been instructed to call if any changes in medications, doses, concerns, etc. Patient expresses understanding and has no further questions at this time.    Iris Gonzalez

## 2024-06-28 ENCOUNTER — ANTICOAGULATION VISIT (OUTPATIENT)
Dept: PHARMACY | Facility: HOSPITAL | Age: 54
End: 2024-06-28
Payer: COMMERCIAL

## 2024-06-28 LAB — INR PPP: 2.2

## 2024-06-28 NOTE — PROGRESS NOTES
Anticoagulation Clinic Progress Note    Anticoagulation Summary  As of 2024      INR goal:  2.0-3.0   TTR:  66.2% (5.3 y)   INR used for dosin.20 (2024)   Warfarin maintenance plan:  15 mg every Wed, Fri; 10 mg all other days   Weekly warfarin total:  80 mg   No change documented:  Iris Gonzalez   Plan last modified:  Tessie Fatima RPH (2024)   Next INR check:  2024   Priority:  High   Target end date:  Indefinite    Indications    Other acute pulmonary embolism without acute cor pulmonale (Resolved) [I26.99]  Bilateral pulmonary embolism (Resolved) [I26.99]                 Anticoagulation Episode Summary       INR check location:      Preferred lab:      Send INR reminders to:  [facility]  POOL    Comments:  new  tristen 2019 **WEEKLY TRISTEN**          Anticoagulation Care Providers       Provider Role Specialty Phone number    Torri Colmenares APRN Referring Cardiology [phone]    Elsy Baeza MD Responsible Cardiology [phone]            Clinic Interview:  No pertinent clinical findings have been reported.    INR History:      2024     8:25 AM 2024    12:00 AM 2024     8:53 AM 2024    12:00 AM 2024    11:46 AM 2024    12:00 AM 2024     1:22 PM   Anticoagulation Monitoring   INR 2.30  2.70  2.30  2.20   INR Date 2024   INR Goal 2.0-3.0  2.0-3.0  2.0-3.0  2.0-3.0   Trend Same  Same  Same  Same   Last Week Total 80 mg  80 mg  80 mg  80 mg   Next Week Total 80 mg  80 mg  80 mg  80 mg   Sun 10 mg  10 mg  10 mg  10 mg   Mon 10 mg  10 mg  10 mg  10 mg   Tue 10 mg  10 mg  10 mg  10 mg   Wed 15 mg  15 mg  15 mg  15 mg   Thu 10 mg  10 mg  10 mg  10 mg   Fri 15 mg  15 mg  15 mg  15 mg   Sat 10 mg  10 mg  10 mg  10 mg   Historical INR  2.70      2.30      2.20        Visit Report  Report Report           This result is from an external source.       Plan:  1. INR is therapeutic today-  see above in Anticoagulation Summary.    Kameron Melendez to continue their warfarin regimen- see above in Anticoagulation Summary.  2. Follow up in 1 week  3. Pt has agreed to only be called if INR out of range. They have been instructed to call if any changes in medications, doses, concerns, etc. Patient expresses understanding and has no further questions at this time.    Iris Gonzalez

## 2024-07-05 ENCOUNTER — ANTICOAGULATION VISIT (OUTPATIENT)
Dept: PHARMACY | Facility: HOSPITAL | Age: 54
End: 2024-07-05
Payer: COMMERCIAL

## 2024-07-05 LAB — INR PPP: 2.3

## 2024-07-05 NOTE — PROGRESS NOTES
Anticoagulation Clinic Progress Note    Anticoagulation Summary  As of 2024      INR goal:  2.0-3.0   TTR:  66.3% (5.4 y)   INR used for dosin.30 (2024)   Warfarin maintenance plan:  15 mg every Wed, Fri; 10 mg all other days   Weekly warfarin total:  80 mg   No change documented:  Iris Gonzalez   Plan last modified:  Tessie Fatima RPH (2024)   Next INR check:  2024   Priority:  High   Target end date:  Indefinite    Indications    Other acute pulmonary embolism without acute cor pulmonale (Resolved) [I26.99]  Bilateral pulmonary embolism (Resolved) [I26.99]                 Anticoagulation Episode Summary       INR check location:      Preferred lab:      Send INR reminders to:  [facility]  POOL    Comments:  new  tristen 2019 **WEEKLY TRISTEN**          Anticoagulation Care Providers       Provider Role Specialty Phone number    Torri Colmenares APRN Referring Cardiology [phone]    Elsy Baeza MD Responsible Cardiology [phone]            Clinic Interview:  No pertinent clinical findings have been reported.    INR History:      2024     8:53 AM 2024    12:00 AM 2024    11:46 AM 2024    12:00 AM 2024     1:22 PM 2024    12:00 AM 2024    11:52 AM   Anticoagulation Monitoring   INR 2.70  2.30  2.20  2.30   INR Date 2024   INR Goal 2.0-3.0  2.0-3.0  2.0-3.0  2.0-3.0   Trend Same  Same  Same  Same   Last Week Total 80 mg  80 mg  80 mg  80 mg   Next Week Total 80 mg  80 mg  80 mg  80 mg   Sun 10 mg  10 mg  10 mg  10 mg   Mon 10 mg  10 mg  10 mg  10 mg   Tue 10 mg  10 mg  10 mg  10 mg   Wed 15 mg  15 mg  15 mg  15 mg   Thu 10 mg  10 mg  10 mg  10 mg   Fri 15 mg  15 mg  15 mg  15 mg   Sat 10 mg  10 mg  10 mg  10 mg   Historical INR  2.30      2.20      2.30        Visit Report Report             This result is from an external source.       Plan:  1. INR is therapeutic today- see  above in Anticoagulation Summary.    Kameron Melendez to continue their warfarin regimen- see above in Anticoagulation Summary.  2. Follow up in 1 week  3. Pt has agreed to only be called if INR out of range. They have been instructed to call if any changes in medications, doses, concerns, etc. Patient expresses understanding and has no further questions at this time.    Iris Gonzalez

## 2024-07-12 ENCOUNTER — ANTICOAGULATION VISIT (OUTPATIENT)
Dept: PHARMACY | Facility: HOSPITAL | Age: 54
End: 2024-07-12
Payer: COMMERCIAL

## 2024-07-12 DIAGNOSIS — K21.9 GASTROESOPHAGEAL REFLUX DISEASE, UNSPECIFIED WHETHER ESOPHAGITIS PRESENT: ICD-10-CM

## 2024-07-12 LAB — INR PPP: 3.3

## 2024-07-12 RX ORDER — CEPHALEXIN 500 MG/1
500 CAPSULE ORAL EVERY 6 HOURS
Qty: 14 CAPSULE | Refills: 0 | Status: SHIPPED | OUTPATIENT
Start: 2024-07-12

## 2024-07-12 RX ORDER — OMEPRAZOLE 40 MG/1
40 CAPSULE, DELAYED RELEASE ORAL DAILY
Qty: 90 CAPSULE | Refills: 3 | Status: SHIPPED | OUTPATIENT
Start: 2024-07-12

## 2024-07-12 NOTE — PROGRESS NOTES
Anticoagulation Clinic Progress Note    Anticoagulation Summary  As of 7/12/2024      INR goal:  2.0-3.0   TTR:  66.3% (5.4 y)   INR used for dosing:  3.30 (7/12/2024)   Warfarin maintenance plan:  15 mg every Wed, Fri; 10 mg all other days   Weekly warfarin total:  80 mg   Plan last modified:  Tessie Fatima RPH (4/19/2024)   Next INR check:  7/19/2024   Priority:  High   Target end date:  Indefinite    Indications    Other acute pulmonary embolism without acute cor pulmonale (Resolved) [I26.99]  Bilateral pulmonary embolism (Resolved) [I26.99]                 Anticoagulation Episode Summary       INR check location:      Preferred lab:      Send INR reminders to:   [facility]  POOL    Comments:  new  frankie March 2019 **WEEKLY FRANKIE**          Anticoagulation Care Providers       Provider Role Specialty Phone number    Torri Colmenares APRN Referring Cardiology [phone]    Elsy Baeza MD Responsible Cardiology [phone]            Clinic Interview:  Patient Findings     Negatives:  Signs/symptoms of thrombosis, Signs/symptoms of bleeding,   Laboratory test error suspected, Change in health, Change in alcohol use,   Change in activity, Upcoming invasive procedure, Emergency department   visit, Upcoming dental procedure, Missed doses, Extra doses, Change in   medications, Change in diet/appetite, Hospital admission, Bruising, Other   complaints      Clinical Outcomes     Negatives:  Major bleeding event, Thromboembolic event,   Anticoagulation-related hospital admission, Anticoagulation-related ED   visit, Anticoagulation-related fatality        INR History:      6/21/2024    11:46 AM 6/28/2024    12:00 AM 6/28/2024     1:22 PM 7/5/2024    12:00 AM 7/5/2024    11:52 AM 7/12/2024    12:00 AM 7/12/2024    11:39 AM   Anticoagulation Monitoring   INR 2.30  2.20  2.30  3.30   INR Date 6/21/2024 6/28/2024 7/5/2024 7/12/2024   INR Goal 2.0-3.0  2.0-3.0  2.0-3.0  2.0-3.0   Trend Same  Same   Same  Same   Last Week Total 80 mg  80 mg  80 mg  80 mg   Next Week Total 80 mg  80 mg  80 mg  75 mg   Sun 10 mg  10 mg  10 mg  10 mg   Mon 10 mg  10 mg  10 mg  10 mg   Tue 10 mg  10 mg  10 mg  10 mg   Wed 15 mg  15 mg  15 mg  15 mg   Thu 10 mg  10 mg  10 mg  10 mg   Fri 15 mg  15 mg  15 mg  10 mg (7/12)   Sat 10 mg  10 mg  10 mg  10 mg   Historical INR  2.20      2.30      3.30            This result is from an external source.       Plan:  1. INR is Supratherapeutic today- see above in Anticoagulation Summary.   Will instruct Kameron Melendez to Change their warfarin regimen- see above in Anticoagulation Summary.  2. Follow up in 1 weeks  3. They have been instructed to call if any changes in medications, doses, concerns, etc. Patient expresses understanding and has no further questions at this time.    Tonja Kaye, PharmD

## 2024-07-19 ENCOUNTER — ANTICOAGULATION VISIT (OUTPATIENT)
Dept: PHARMACY | Facility: HOSPITAL | Age: 54
End: 2024-07-19
Payer: COMMERCIAL

## 2024-07-19 LAB — INR PPP: 2.5

## 2024-07-19 NOTE — PROGRESS NOTES
Anticoagulation Clinic Progress Note    Anticoagulation Summary  As of 2024      INR goal:  2.0-3.0   TTR:  66.3% (5.4 y)   INR used for dosin.50 (2024)   Warfarin maintenance plan:  15 mg every Wed, Fri; 10 mg all other days   Weekly warfarin total:  80 mg   Plan last modified:  Tessie Fatima RPH (2024)   Next INR check:  2024   Priority:  High   Target end date:  Indefinite    Indications    Other acute pulmonary embolism without acute cor pulmonale (Resolved) [I26.99]  Bilateral pulmonary embolism (Resolved) [I26.99]                 Anticoagulation Episode Summary       INR check location:      Preferred lab:      [facility]    Comments:  new  frankie 2019 **WEEKLY FRANKIE**          Anticoagulation Care Providers       Provider Role Specialty Phone number    Torri Colmenares APRN Referring Cardiology [phone]    Elsy Baeza MD Responsible Cardiology [phone]            Clinic Interview:  No pertinent clinical findings have been reported.    INR History:      2024     1:22 PM 2024    12:00 AM 2024    11:52 AM 2024    12:00 AM 2024    11:39 AM 2024    12:00 AM 2024     8:03 AM   Anticoagulation Monitoring   INR 2.20  2.30  3.30  2.50   INR Date 2024   INR Goal 2.0-3.0  2.0-3.0  2.0-3.0  2.0-3.0   Trend Same  Same  Same  Same   Last Week Total 80 mg  80 mg  80 mg  75 mg   Next Week Total 80 mg  80 mg  75 mg  80 mg   Sun 10 mg  10 mg  10 mg  10 mg   Mon 10 mg  10 mg  10 mg  10 mg   Tue 10 mg  10 mg  10 mg  10 mg   Wed 15 mg  15 mg  15 mg  15 mg   Thu 10 mg  10 mg  10 mg  10 mg   Fri 15 mg  15 mg  10 mg ()  15 mg   Sat 10 mg  10 mg  10 mg  10 mg   Historical INR  2.30      3.30      2.50            This result is from an external source.       Plan:  1. INR is therapeutic today- see above in Anticoagulation Summary.    Kameron Melendez to continue their  warfarin regimen- see above in Anticoagulation Summary.  2. Follow up in 1 week  3. They have been instructed to call if any changes in medications, doses, concerns, etc. Patient expresses understanding and has no further questions at this time.    Tessie Fatima, [facility]

## 2024-07-26 ENCOUNTER — ANTICOAGULATION VISIT (OUTPATIENT)
Dept: PHARMACY | Facility: HOSPITAL | Age: 54
End: 2024-07-26
Payer: COMMERCIAL

## 2024-07-26 LAB — INR PPP: 3.2

## 2024-07-26 NOTE — PROGRESS NOTES
Anticoagulation Clinic Progress Note    Anticoagulation Summary  As of 7/26/2024      INR goal:  2.0-3.0   TTR:  66.3% (5.4 y)   INR used for dosing:  3.20 (7/26/2024)   Warfarin maintenance plan:  15 mg every Wed; 10 mg all other days   Weekly warfarin total:  75 mg   Plan last modified:  Ramo Morocho, PharmD (7/26/2024)   Next INR check:  8/2/2024   Priority:  High   Target end date:  Indefinite    Indications    Other acute pulmonary embolism without acute cor pulmonale (Resolved) [I26.99]  Bilateral pulmonary embolism (Resolved) [I26.99]                 Anticoagulation Episode Summary       INR check location:      Preferred lab:      Send INR reminders to:   [facility]  POOL    Comments:  new  tristen March 2019 **WEEKLY TRISTEN**          Anticoagulation Care Providers       Provider Role Specialty Phone number    Torri Colmenares APRN Referring Cardiology [phone]    Elsy Baeza MD Responsible Cardiology [phone]            Clinic Interview:  Patient Findings     Negatives:  Signs/symptoms of thrombosis, Signs/symptoms of bleeding,   Laboratory test error suspected, Change in health, Change in alcohol use,   Change in activity, Upcoming invasive procedure, Emergency department   visit, Upcoming dental procedure, Missed doses, Extra doses, Change in   medications, Change in diet/appetite, Hospital admission, Bruising, Other   complaints      Clinical Outcomes     Negatives:  Major bleeding event, Thromboembolic event,   Anticoagulation-related hospital admission, Anticoagulation-related ED   visit, Anticoagulation-related fatality        INR History:      7/5/2024    11:52 AM 7/12/2024    12:00 AM 7/12/2024    11:39 AM 7/19/2024    12:00 AM 7/19/2024     8:03 AM 7/26/2024    12:00 AM 7/26/2024     9:48 AM   Anticoagulation Monitoring   INR 2.30  3.30  2.50  3.20   INR Date 7/5/2024 7/12/2024 7/19/2024 7/26/2024   INR Goal 2.0-3.0  2.0-3.0  2.0-3.0  2.0-3.0   Trend Same  Same  Same   Down   Last Week Total 80 mg  80 mg  75 mg  80 mg   Next Week Total 80 mg  75 mg  80 mg  75 mg   Sun 10 mg  10 mg  10 mg  10 mg   Mon 10 mg  10 mg  10 mg  10 mg   Tue 10 mg  10 mg  10 mg  10 mg   Wed 15 mg  15 mg  15 mg  15 mg   Thu 10 mg  10 mg  10 mg  10 mg   Fri 15 mg  10 mg (7/12)  15 mg  10 mg   Sat 10 mg  10 mg  10 mg  10 mg   Historical INR  3.30      2.50      3.20            This result is from an external source.       Plan:  1. INR is Supratherapeutic today- see above in Anticoagulation Summary.   Will instruct Kameron Melendez to Change their warfarin regimen to 15 mg Wed, 10 mg all other days- see above in Anticoagulation Summary.  2. Follow up in 1 week.  3. They have been instructed to call if any changes in medications, doses, concerns, etc. Patient expresses understanding and has no further questions at this time.    Ramo Morocho, PharmD

## 2024-08-08 ENCOUNTER — OFFICE VISIT (OUTPATIENT)
Dept: FAMILY MEDICINE CLINIC | Facility: CLINIC | Age: 54
End: 2024-08-08
Payer: COMMERCIAL

## 2024-08-08 VITALS
SYSTOLIC BLOOD PRESSURE: 120 MMHG | HEIGHT: 74 IN | BODY MASS INDEX: 40.43 KG/M2 | WEIGHT: 315 LBS | DIASTOLIC BLOOD PRESSURE: 78 MMHG | HEART RATE: 67 BPM | OXYGEN SATURATION: 94 %

## 2024-08-08 DIAGNOSIS — Z00.00 WELLNESS EXAMINATION: Primary | ICD-10-CM

## 2024-08-08 DIAGNOSIS — E78.5 DYSLIPIDEMIA: ICD-10-CM

## 2024-08-08 DIAGNOSIS — E66.01 MORBID (SEVERE) OBESITY DUE TO EXCESS CALORIES: ICD-10-CM

## 2024-08-08 DIAGNOSIS — I87.2 VENOUS INSUFFICIENCY (CHRONIC) (PERIPHERAL): ICD-10-CM

## 2024-08-08 DIAGNOSIS — I89.0 LYMPHEDEMA: ICD-10-CM

## 2024-08-08 DIAGNOSIS — E11.9 TYPE 2 DIABETES MELLITUS WITHOUT COMPLICATION, WITHOUT LONG-TERM CURRENT USE OF INSULIN: ICD-10-CM

## 2024-08-08 DIAGNOSIS — G47.33 OSA (OBSTRUCTIVE SLEEP APNEA): ICD-10-CM

## 2024-08-08 DIAGNOSIS — R53.83 OTHER FATIGUE: ICD-10-CM

## 2024-08-08 DIAGNOSIS — D68.51 FACTOR V LEIDEN MUTATION: ICD-10-CM

## 2024-08-08 DIAGNOSIS — E11.9 ENCOUNTER FOR DIABETIC FOOT EXAM: ICD-10-CM

## 2024-08-08 PROCEDURE — 99396 PREV VISIT EST AGE 40-64: CPT | Performed by: FAMILY MEDICINE

## 2024-08-08 NOTE — PROGRESS NOTES
Subjective   Kameron Melendez is a 54 y.o. male. Presents today for   Chief Complaint   Patient presents with    Annual Exam    Diabetes    Hyperlipidemia    Weight Check    Edema       History of Present Illness  Patient 55 y/o male with dm2, hld, severe lymphedema/venous insuff, on AC for factor V leiden mutation/recurrent dvt and ARNOLDO (intolerant cpap) here fo rwellness exam;   Did go to lymphedema clinic, obtained approval from work to go, however received 1 email from [employer] on obtaining sized wraps, but nothing since.   His weight is up, but legs much bigger and feels a lot of lymphedema weight;  It hurts to walk and feels very sluggisgh trying to do anything so activity down.  Can only take diuretics on weekends as he cannot get up and down at work often enough to go to bathroom.  Bariatric surgery is not on his plan, so not an option;  Is on ozempic, but weight has plateau and would like go off medication(s).     Review of Systems   Constitutional:  Positive for fatigue.   Respiratory:  Positive for shortness of breath.    Cardiovascular:  Positive for leg swelling. Negative for chest pain and palpitations.   Gastrointestinal:  Negative for abdominal pain.       Patient Active Problem List   Diagnosis    Pulmonary hypertension    Lymphedema of both lower extremities    Obesity, morbid, BMI 50 or higher    Dyslipidemia    Hyperuricemia    Hypogonadal obesity    Knee arthropathy    Morbid obesity with body mass index of 60.0-69.9 in adult    ARNOLDO (obstructive sleep apnea)    Severe edema    History of pulmonary embolism    Intractable back pain    Heterozygous factor V Leiden mutation    Cellulitis of left lower extremity    Hypokalemia    CAROL (acute kidney injury)    Sepsis with cutaneous manifestations    Hyperglycemia    Paroxysmal atrial fibrillation    Long term (current) use of anticoagulants    Lymphedema, not elsewhere classified    Nicotine dependence, other tobacco product, uncomplicated     Personal history of other venous thrombosis and embolism    Typical atrial flutter    Venous insufficiency (chronic) (peripheral)    Cellulitis of right lower extremity    Wound cellulitis    CAP (community acquired pneumonia)    Type 2 diabetes mellitus    Hyponatremia    Choledocholithiasis    RUQ pain       Social History     Socioeconomic History    Marital status:    Tobacco Use    Smoking status: Former     Types: Cigars, Pipe     Start date: 1/1/2006    Smokeless tobacco: Never    Tobacco comments:     occasional - < 1 a month   Vaping Use    Vaping status: Never Used   Substance and Sexual Activity    Alcohol use: Yes     Comment: social    Drug use: No    Sexual activity: Defer       No Known Allergies    Current Outpatient Medications on File Prior to Visit   Medication Sig Dispense Refill    acetaminophen (TYLENOL) 500 MG tablet Take 1 tablet by mouth Every 6 (Six) Hours As Needed for Mild Pain.      atorvastatin (LIPITOR) 20 MG tablet Take 1 tablet by mouth Every Night. 90 tablet 3    furosemide (LASIX) 80 MG tablet Take 1 tablet by mouth Daily As Needed (leg swelling). 90 tablet 3    Ibuprofen 3 %, Gabapentin 10 %, Baclofen 2 %, lidocaine 4 %, Ketamine HCl 10 % Apply 1-2 g topically to the appropriate area as directed 3 (Three) to 4 (Four) times daily. 90 g 5    omeprazole (priLOSEC) 40 MG capsule TAKE 1 CAPSULE BY MOUTH EVERY DAY 90 capsule 3    spironolactone (Aldactone) 25 MG tablet Take 1 tablet by mouth As Needed (swelling).      ursodiol (ACTIGALL) 300 MG capsule Take 1 capsule by mouth 2 (Two) Times a Day. 180 capsule 1    warfarin (Jantoven) 5 MG tablet Take three tablets by mouth on wed and fri and take two tablets by mouth all other days or as directed 65 tablet 0    [DISCONTINUED] Semaglutide, 2 MG/DOSE, (OZEMPIC) 8 MG/3ML solution pen-injector Inject 2 mg under the skin into the appropriate area as directed 1 (One) Time Per Week. 2 mL 5    [DISCONTINUED] cephalexin (KEFLEX) 500 MG  "capsule Take 1 capsule by mouth Every 6 (Six) Hours. (Patient not taking: Reported on 8/8/2024) 14 capsule 0     No current facility-administered medications on file prior to visit.       Objective   Vitals:    08/08/24 1011 08/08/24 1100   BP: 146/72 120/78   Pulse: 67    SpO2: 94%    Weight: (!) 232 kg (512 lb)    Height: 188 cm (74\")      Body mass index is 65.74 kg/m².    Physical Exam  Vitals and nursing note reviewed.   Constitutional:       Appearance: He is well-developed.   HENT:      Head: Normocephalic and atraumatic.   Neck:      Thyroid: No thyromegaly.      Vascular: No JVD.   Cardiovascular:      Rate and Rhythm: Normal rate and regular rhythm.      Heart sounds: Normal heart sounds. No murmur heard.     No friction rub. No gallop.   Pulmonary:      Effort: Pulmonary effort is normal. No respiratory distress.      Breath sounds: Normal breath sounds. No wheezing or rales.   Abdominal:      General: Bowel sounds are normal. There is no distension.      Palpations: Abdomen is soft.      Tenderness: There is no abdominal tenderness. There is no guarding or rebound.   Musculoskeletal:      Cervical back: Neck supple.      Right lower leg: Edema present.      Left lower leg: Edema present.   Feet:      Comments: Diabetic foot exam and monofilament completed, see scanned report.        Skin:     General: Skin is warm and dry.   Neurological:      Mental Status: He is alert.      Gait: Gait normal.   Psychiatric:         Behavior: Behavior normal.         Assessment & Plan   Diagnoses and all orders for this visit:    1. Wellness examination (Primary)    2. Type 2 diabetes mellitus without complication, without long-term current use of insulin  -     Microalbumin / Creatinine Urine Ratio - Urine, Clean Catch  -     Lipid Panel  -     Comprehensive Metabolic Panel  -     Hemoglobin A1c    3. Morbid (severe) obesity due to excess calories    4. Lymphedema    5. Dyslipidemia  -     Lipid Panel  -     " Comprehensive Metabolic Panel    6. Venous insufficiency (chronic) (peripheral)    7. Factor V Leiden mutation    8. ARNOLDO (obstructive sleep apnea)    9. Other fatigue  -     Comprehensive Metabolic Panel  -     CBC (No Diff)  -     TSH  -     Vitamin B12    10. Encounter for diabetic foot exam    Counseled at length on diet and exercise  Counseled on lymphedema, would medically benefit from wraps and will send all orders again.   He develops recurrent wounds with infeciton and high risk for limb loss;  Check labs as due for above  He is stopping ozempic after next dose;  counseled on trying mounjaro but wants to try on own without medication.    Could consider farxiga or jardiance as may help BS and swelling.          [employer]  [phone] fax  [phone] phone        -Follow up: 6 to 8 weeks and prn

## 2024-08-09 ENCOUNTER — ANTICOAGULATION VISIT (OUTPATIENT)
Dept: PHARMACY | Facility: HOSPITAL | Age: 54
End: 2024-08-09
Payer: COMMERCIAL

## 2024-08-09 LAB
ALBUMIN SERPL-MCNC: 3.9 G/DL (ref 3.8–4.9)
ALBUMIN/CREAT UR: 46 MG/G CREAT (ref 0–29)
ALP SERPL-CCNC: 136 IU/L (ref 44–121)
ALT SERPL-CCNC: 11 IU/L (ref 0–44)
AST SERPL-CCNC: 15 IU/L (ref 0–40)
BILIRUB SERPL-MCNC: 0.5 MG/DL (ref 0–1.2)
BUN SERPL-MCNC: 13 MG/DL (ref 6–24)
BUN/CREAT SERPL: 13 (ref 9–20)
CALCIUM SERPL-MCNC: 9.2 MG/DL (ref 8.7–10.2)
CHLORIDE SERPL-SCNC: 105 MMOL/L (ref 96–106)
CHOLEST SERPL-MCNC: 138 MG/DL (ref 100–199)
CO2 SERPL-SCNC: 21 MMOL/L (ref 20–29)
CREAT SERPL-MCNC: 1 MG/DL (ref 0.76–1.27)
CREAT UR-MCNC: 129.7 MG/DL
EGFRCR SERPLBLD CKD-EPI 2021: 89 ML/MIN/1.73
ERYTHROCYTE [DISTWIDTH] IN BLOOD BY AUTOMATED COUNT: 14.7 % (ref 11.6–15.4)
GLOBULIN SER CALC-MCNC: 3.1 G/DL (ref 1.5–4.5)
GLUCOSE SERPL-MCNC: 89 MG/DL (ref 70–99)
HBA1C MFR BLD: 5.8 % (ref 4.8–5.6)
HCT VFR BLD AUTO: 43.3 % (ref 37.5–51)
HDLC SERPL-MCNC: 39 MG/DL
HGB BLD-MCNC: 14 G/DL (ref 13–17.7)
INR PPP: 3.1
LDLC SERPL CALC-MCNC: 78 MG/DL (ref 0–99)
MCH RBC QN AUTO: 29.7 PG (ref 26.6–33)
MCHC RBC AUTO-ENTMCNC: 32.3 G/DL (ref 31.5–35.7)
MCV RBC AUTO: 92 FL (ref 79–97)
MICROALBUMIN UR-MCNC: 59.9 UG/ML
PLATELET # BLD AUTO: 262 X10E3/UL (ref 150–450)
POTASSIUM SERPL-SCNC: 4.8 MMOL/L (ref 3.5–5.2)
PROT SERPL-MCNC: 7 G/DL (ref 6–8.5)
RBC # BLD AUTO: 4.72 X10E6/UL (ref 4.14–5.8)
SODIUM SERPL-SCNC: 138 MMOL/L (ref 134–144)
TRIGL SERPL-MCNC: 118 MG/DL (ref 0–149)
TSH SERPL DL<=0.005 MIU/L-ACNC: 1.76 UIU/ML (ref 0.45–4.5)
VIT B12 SERPL-MCNC: 479 PG/ML (ref 232–1245)
VLDLC SERPL CALC-MCNC: 21 MG/DL (ref 5–40)
WBC # BLD AUTO: 7.8 X10E3/UL (ref 3.4–10.8)

## 2024-08-09 NOTE — PROGRESS NOTES
Anticoagulation Clinic Progress Note    Anticoagulation Summary  As of 8/9/2024      INR goal:  2.0-3.0   TTR:  65.9% (5.4 y)   INR used for dosing:  3.10 (8/9/2024)   Warfarin maintenance plan:  15 mg every Wed; 10 mg all other days   Weekly warfarin total:  75 mg   Plan last modified:  Tessie Fatima RPH (8/9/2024)   Next INR check:  8/16/2024   Priority:  High   Target end date:  Indefinite    Indications    Other acute pulmonary embolism without acute cor pulmonale (Resolved) [I26.99]  Bilateral pulmonary embolism (Resolved) [I26.99]                 Anticoagulation Episode Summary       INR check location:      Preferred lab:      Send INR reminders to:   [facility]  [facility]    Comments:  new  frankie March 2019 **WEEKLY FRANKIE**          Anticoagulation Care Providers       Provider Role Specialty Phone number    Torri Colmenares APRN Referring Cardiology [phone]    Elsy Baeza MD Responsible Cardiology [phone]            Clinic Interview:  Patient Findings     Negatives:  Signs/symptoms of thrombosis, Signs/symptoms of bleeding,   Laboratory test error suspected, Change in health, Change in alcohol use,   Change in activity, Upcoming invasive procedure, Emergency department   visit, Upcoming dental procedure, Missed doses, Extra doses, Change in   medications, Change in diet/appetite, Hospital admission, Bruising, Other   complaints      Clinical Outcomes     Negatives:  Major bleeding event, Thromboembolic event,   Anticoagulation-related hospital admission, Anticoagulation-related ED   visit, Anticoagulation-related fatality        INR History:      7/12/2024    11:39 AM 7/19/2024    12:00 AM 7/19/2024     8:03 AM 7/26/2024    12:00 AM 7/26/2024     9:48 AM 8/9/2024    12:00 AM 8/9/2024     9:15 AM   Anticoagulation Monitoring   INR 3.30  2.50  3.20  3.10   INR Date 7/12/2024 7/19/2024 7/26/2024 8/9/2024   INR Goal 2.0-3.0  2.0-3.0  2.0-3.0  2.0-3.0   Trend Same  Same  Down   Same   Last Week Total 80 mg  75 mg  80 mg  75 mg   Next Week Total 75 mg  80 mg  75 mg  75 mg   Sun 10 mg  10 mg  10 mg  10 mg   Mon 10 mg  10 mg  10 mg  10 mg   Tue 10 mg  10 mg  10 mg  10 mg   Wed 15 mg  15 mg  15 mg  15 mg   Thu 10 mg  10 mg  10 mg  10 mg   Fri 10 mg (7/12)  15 mg  10 mg  10 mg   Sat 10 mg  10 mg  10 mg  10 mg   Historical INR  2.50      3.20      3.10            This result is from an external source.       Plan:  1. INR is Supratherapeutic today- see above in Anticoagulation Summary.   Will instruct Kameron Melendez to Change their warfarin regimen as INR is very close to goal- see above in Anticoagulation Summary.  Continue with 15 wed, 10 mg fatemeh FLORES 1 week   2. Follow up in 1 weeks  3. They have been instructed to call if any changes in medications, doses, concerns, etc. Patient expresses understanding and has no further questions at this time.    Tessie Fatima [facility]

## 2024-08-11 NOTE — PROGRESS NOTES
Mail copy of results to patient.  Cholesterol well controlled  Blood sugar controlled  Blood counts normal

## 2024-08-15 RX ORDER — METHYLPREDNISOLONE 4 MG/1
TABLET ORAL
Qty: 21 TABLET | Refills: 0 | Status: SHIPPED | OUTPATIENT
Start: 2024-08-15

## 2024-08-16 ENCOUNTER — ANTICOAGULATION VISIT (OUTPATIENT)
Dept: PHARMACY | Facility: HOSPITAL | Age: 54
End: 2024-08-16
Payer: COMMERCIAL

## 2024-08-16 LAB — INR PPP: 2.7

## 2024-08-16 NOTE — PROGRESS NOTES
Anticoagulation Clinic Progress Note    Anticoagulation Summary  As of 2024      INR goal:  2.0-3.0   TTR:  65.9% (5.5 y)   INR used for dosin.70 (2024)   Warfarin maintenance plan:  15 mg every Wed; 10 mg all other days   Weekly warfarin total:  75 mg   No change documented:  Ramo Morocho, PharmD   Plan last modified:  Tessie Fatima RPH (2024)   Next INR check:  2024   Priority:  High   Target end date:  Indefinite    Indications    Other acute pulmonary embolism without acute cor pulmonale (Resolved) [I26.99]  Bilateral pulmonary embolism (Resolved) [I26.99]                 Anticoagulation Episode Summary       INR check location:      Preferred lab:      Send INR reminders to:  [facility]  POOL    Comments:  new  frankie 2019 **WEEKLY FRANKIE**          Anticoagulation Care Providers       Provider Role Specialty Phone number    Torri Colmenares APRN Referring Cardiology [phone]    Elsy Baeza MD Responsible Cardiology [phone]            Clinic Interview:  Patient Findings     Negatives:  Signs/symptoms of thrombosis, Signs/symptoms of bleeding,   Laboratory test error suspected, Change in health, Change in alcohol use,   Change in activity, Upcoming invasive procedure, Emergency department   visit, Upcoming dental procedure, Missed doses, Extra doses, Change in   medications, Change in diet/appetite, Hospital admission, Bruising, Other   complaints      Clinical Outcomes     Negatives:  Major bleeding event, Thromboembolic event,   Anticoagulation-related hospital admission, Anticoagulation-related ED   visit, Anticoagulation-related fatality        INR History:      2024     8:03 AM 2024    12:00 AM 2024     9:48 AM 2024    12:00 AM 2024     9:15 AM 2024    12:00 AM 2024     7:55 AM   Anticoagulation Monitoring   INR 2.50  3.20  3.10  2.70   INR Date 2024   INR Goal 2.0-3.0   2.0-3.0  2.0-3.0  2.0-3.0   Trend Same  Down  Same  Same   Last Week Total 75 mg  80 mg  75 mg  75 mg   Next Week Total 80 mg  75 mg  75 mg  75 mg   Sun 10 mg  10 mg  10 mg  10 mg   Mon 10 mg  10 mg  10 mg  10 mg   Tue 10 mg  10 mg  10 mg  10 mg   Wed 15 mg  15 mg  15 mg  15 mg   Thu 10 mg  10 mg  10 mg  10 mg   Fri 15 mg  10 mg  10 mg  10 mg   Sat 10 mg  10 mg  10 mg  10 mg   Historical INR  3.20      3.10      2.70            This result is from an external source.       Plan:  1. INR is Therapeutic today- see above in Anticoagulation Summary.   Will instruct Kameron Melendez to Continue their warfarin regimen- see above in Anticoagulation Summary.  2. Follow up in 1 week.  3. They have been instructed to call if any changes in medications, doses, concerns, etc. Patient expresses understanding and has no further questions at this time.    Ramo Morocho, PharmD

## 2024-08-22 RX ORDER — WARFARIN SODIUM 5 MG/1
TABLET ORAL
Qty: 65 TABLET | Refills: 5 | Status: SHIPPED | OUTPATIENT
Start: 2024-08-22 | End: 2024-08-26 | Stop reason: SDUPTHER

## 2024-08-23 ENCOUNTER — ANTICOAGULATION VISIT (OUTPATIENT)
Dept: PHARMACY | Facility: HOSPITAL | Age: 54
End: 2024-08-23
Payer: COMMERCIAL

## 2024-08-23 LAB — INR PPP: 2.9

## 2024-08-23 NOTE — PROGRESS NOTES
Anticoagulation Clinic Progress Note    Anticoagulation Summary  As of 2024      INR goal:  2.0-3.0   TTR:  66.0% (5.5 y)   INR used for dosin.90 (2024)   Warfarin maintenance plan:  15 mg every Wed; 10 mg all other days   Weekly warfarin total:  75 mg   No change documented:  Iris Gonzalez   Plan last modified:  Tessie Fatima RPH (2024)   Next INR check:  2024   Priority:  High   Target end date:  Indefinite    Indications    Other acute pulmonary embolism without acute cor pulmonale (Resolved) [I26.99]  Bilateral pulmonary embolism (Resolved) [I26.99]                 Anticoagulation Episode Summary       INR check location:      Preferred lab:      Send INR reminders to:  [facility]  POOL    Comments:  new  tristen 2019 **WEEKLY TRISTEN**          Anticoagulation Care Providers       Provider Role Specialty Phone number    Torri Colmenares APRN Referring Cardiology [phone]    Elsy Baeza MD Responsible Cardiology [phone]            Clinic Interview:  No pertinent clinical findings have been reported.    INR History:      2024     9:48 AM 2024    12:00 AM 2024     9:15 AM 2024    12:00 AM 2024     7:55 AM 2024    12:00 AM 2024     9:10 AM   Anticoagulation Monitoring   INR 3.20  3.10  2.70  2.90   INR Date 2024   INR Goal 2.0-3.0  2.0-3.0  2.0-3.0  2.0-3.0   Trend Down  Same  Same  Same   Last Week Total 80 mg  75 mg  75 mg  75 mg   Next Week Total 75 mg  75 mg  75 mg  75 mg   Sun 10 mg  10 mg  10 mg  10 mg   Mon 10 mg  10 mg  10 mg  10 mg   Tue 10 mg  10 mg  10 mg  10 mg   Wed 15 mg  15 mg  15 mg  15 mg   Thu 10 mg  10 mg  10 mg  10 mg   Fri 10 mg  10 mg  10 mg  10 mg   Sat 10 mg  10 mg  10 mg  10 mg   Historical INR  3.10      2.70      2.90            This result is from an external source.       Plan:  1. INR is therapeutic today- see above in Anticoagulation Summary.     Kameron Melendez to continue their warfarin regimen- see above in Anticoagulation Summary.  2. Follow up in 1 week  3. Pt has agreed to only be called if INR out of range. They have been instructed to call if any changes in medications, doses, concerns, etc. Patient expresses understanding and has no further questions at this time.    Iris Gonzalez

## 2024-08-26 ENCOUNTER — ANTICOAGULATION VISIT (OUTPATIENT)
Dept: PHARMACY | Facility: HOSPITAL | Age: 54
End: 2024-08-26
Payer: COMMERCIAL

## 2024-08-26 RX ORDER — WARFARIN SODIUM 5 MG/1
TABLET ORAL
Qty: 195 TABLET | Refills: 1 | Status: SHIPPED | OUTPATIENT
Start: 2024-08-26

## 2024-08-30 ENCOUNTER — ANTICOAGULATION VISIT (OUTPATIENT)
Dept: PHARMACY | Facility: HOSPITAL | Age: 54
End: 2024-08-30
Payer: COMMERCIAL

## 2024-08-30 LAB — INR PPP: 3.1

## 2024-08-30 NOTE — PROGRESS NOTES
Anticoagulation Clinic Progress Note    Anticoagulation Summary  As of 8/30/2024      INR goal:  2.0-3.0   TTR:  66.0% (5.5 y)   INR used for dosing:  3.10 (8/30/2024)   Warfarin maintenance plan:  15 mg every Wed; 10 mg all other days   Weekly warfarin total:  75 mg   No change documented:  Tessie Fatima RPH   Plan last modified:  Tessie Fatima RPH (8/9/2024)   Next INR check:  9/6/2024   Priority:  High   Target end date:  Indefinite    Indications    Other acute pulmonary embolism without acute cor pulmonale (Resolved) [I26.99]  Bilateral pulmonary embolism (Resolved) [I26.99]                 Anticoagulation Episode Summary       INR check location:      Preferred lab:      Send INR reminders to:   [facility]  POOL    Comments:  new  frankie March 2019 **WEEKLY FRANKIE**          Anticoagulation Care Providers       Provider Role Specialty Phone number    Torri Colmenares APRN Referring Cardiology [phone]    Elsy Baeza MD Responsible Cardiology [phone]            Clinic Interview:  Patient Findings     Negatives:  Signs/symptoms of thrombosis, Signs/symptoms of bleeding,   Laboratory test error suspected, Change in health, Change in alcohol use,   Change in activity, Upcoming invasive procedure, Emergency department   visit, Upcoming dental procedure, Missed doses, Extra doses, Change in   medications, Change in diet/appetite, Hospital admission, Bruising, Other   complaints      Clinical Outcomes     Negatives:  Major bleeding event, Thromboembolic event,   Anticoagulation-related hospital admission, Anticoagulation-related ED   visit, Anticoagulation-related fatality        INR History:      8/16/2024    12:00 AM 8/16/2024     7:55 AM 8/23/2024    12:00 AM 8/23/2024     9:10 AM 8/26/2024     8:18 AM 8/30/2024    12:00 AM 8/30/2024     9:00 AM   Anticoagulation Monitoring   INR  2.70  2.90   3.10   INR Date  8/16/2024 8/23/2024 8/30/2024   INR Goal  2.0-3.0  2.0-3.0 2.0-3.0   2.0-3.0   Trend  Same  Same   Same   Last Week Total  75 mg  75 mg   75 mg   Next Week Total  75 mg  75 mg   75 mg   Sun  10 mg  10 mg   10 mg   Mon  10 mg  10 mg   10 mg   Tue  10 mg  10 mg   10 mg   Wed  15 mg  15 mg   15 mg   Thu  10 mg  10 mg   10 mg   Fri  10 mg  10 mg   10 mg   Sat  10 mg  10 mg   10 mg   Historical INR 2.70      2.90       3.10            This result is from an external source.       Plan:  1. INR is Supratherapeutic today- see above in Anticoagulation Summary.   Will instruct Kameron Melendez to Continue their warfarin regimen- see above in Anticoagulation Summary.  INR is very close to goal range. LVM to continue 15 mg on wed, 10 mg AOD and recheck in 1 week   2. Follow up in 1 weeks  3. They have been instructed to call if any changes in medications, doses, concerns, etc. Patient expresses understanding and has no further questions at this time.    Tessie Fatima [facility]

## 2024-09-06 ENCOUNTER — ANTICOAGULATION VISIT (OUTPATIENT)
Dept: PHARMACY | Facility: HOSPITAL | Age: 54
End: 2024-09-06
Payer: COMMERCIAL

## 2024-09-06 LAB — INR PPP: 2.3

## 2024-09-06 NOTE — PROGRESS NOTES
Anticoagulation Clinic Progress Note    Anticoagulation Summary  As of 2024      INR goal:  2.0-3.0   TTR:  66.0% (5.5 y)   INR used for dosin.30 (2024)   Warfarin maintenance plan:  15 mg every Wed; 10 mg all other days   Weekly warfarin total:  75 mg   No change documented:  Ramo Morocho, PharmD   Plan last modified:  Tessie Fatima RPH (2024)   Next INR check:  2024   Priority:  High   Target end date:  Indefinite    Indications    Other acute pulmonary embolism without acute cor pulmonale (Resolved) [I26.99]  Bilateral pulmonary embolism (Resolved) [I26.99]                 Anticoagulation Episode Summary       INR check location:      Preferred lab:      Send INR reminders to:  [facility]  POOL    Comments:  new  frankie 2019 **WEEKLY FRANKIE**          Anticoagulation Care Providers       Provider Role Specialty Phone number    Torri Colmenares APRN Referring Cardiology [phone]    Elsy Baeza MD Responsible Cardiology [phone]            Clinic Interview:  No pertinent clinical findings have been reported.    INR History:      2024    12:00 AM 2024     9:10 AM 2024     8:18 AM 2024    12:00 AM 2024     9:00 AM 2024    12:00 AM 2024     9:41 AM   Anticoagulation Monitoring   INR  2.90   3.10  2.30   INR Date  2024   INR Goal  2.0-3.0 2.0-3.0  2.0-3.0  2.0-3.0   Trend  Same   Same  Same   Last Week Total  75 mg   75 mg  75 mg   Next Week Total  75 mg   75 mg  75 mg   Sun  10 mg   10 mg  10 mg   Mon  10 mg   10 mg  10 mg   Tue  10 mg   10 mg  10 mg   Wed  15 mg   15 mg  15 mg   Thu  10 mg   10 mg  10 mg   Fri  10 mg   10 mg  10 mg   Sat  10 mg   10 mg  10 mg   Historical INR 2.90       3.10      2.30            This result is from an external source.       Plan:  1. INR is therapeutic today- see above in Anticoagulation Summary.    Kameron Melendez to continue their warfarin regimen- see above in  Anticoagulation Summary.  2. Follow up in 1 week.   3. Pt has agreed to only be called if INR out of range. Left secure voicemail with instructions. They have been instructed to call if any changes in medications, doses, concerns, etc.     Ramo Morocho, MurrayD

## 2024-09-13 ENCOUNTER — ANTICOAGULATION VISIT (OUTPATIENT)
Dept: PHARMACY | Facility: HOSPITAL | Age: 54
End: 2024-09-13
Payer: COMMERCIAL

## 2024-09-13 LAB — INR PPP: 3.2

## 2024-09-13 NOTE — PROGRESS NOTES
Anticoagulation Clinic Progress Note    Anticoagulation Summary  As of 9/13/2024      INR goal:  2.0-3.0   TTR:  66.1% (5.5 y)   INR used for dosing:  3.20 (9/13/2024)   Warfarin maintenance plan:  15 mg every Wed; 10 mg all other days   Weekly warfarin total:  75 mg   Plan last modified:  Tessie Fatima RPH (8/9/2024)   Next INR check:  9/20/2024   Priority:  High   Target end date:  Indefinite    Indications    Other acute pulmonary embolism without acute cor pulmonale (Resolved) [I26.99]  Bilateral pulmonary embolism (Resolved) [I26.99]                 Anticoagulation Episode Summary       INR check location:      Preferred lab:      Send INR reminders to:   [facility]    Comments:  new  frankie March 2019 **WEEKLY FRANKIE**          Anticoagulation Care Providers       Provider Role Specialty Phone number    Torri Colmenares APRN Referring Cardiology [phone]    Elsy Baeza MD Responsible Cardiology [phone]            Clinic Interview:  Patient Findings     Negatives:  Signs/symptoms of thrombosis, Signs/symptoms of bleeding,   Laboratory test error suspected, Change in health, Change in alcohol use,   Change in activity, Upcoming invasive procedure, Emergency department   visit, Upcoming dental procedure, Missed doses, Extra doses, Change in   medications, Change in diet/appetite, Hospital admission, Bruising, Other   complaints      Clinical Outcomes     Negatives:  Major bleeding event, Thromboembolic event,   Anticoagulation-related hospital admission, Anticoagulation-related ED   visit, Anticoagulation-related fatality        INR History:      8/26/2024     8:18 AM 8/30/2024    12:00 AM 8/30/2024     9:00 AM 9/6/2024    12:00 AM 9/6/2024     9:41 AM 9/13/2024    12:00 AM 9/13/2024     8:34 AM   Anticoagulation Monitoring   INR   3.10  2.30  3.20   INR Date   8/30/2024 9/6/2024 9/13/2024   INR Goal 2.0-3.0  2.0-3.0  2.0-3.0  2.0-3.0   Trend   Same  Same  Same   Last Week  Total   75 mg  75 mg  75 mg   Next Week Total   75 mg  75 mg  70 mg   Sun   10 mg  10 mg  10 mg   Mon   10 mg  10 mg  10 mg   Tue   10 mg  10 mg  10 mg   Wed   15 mg  15 mg  15 mg   Thu   10 mg  10 mg  10 mg   Fri   10 mg  10 mg  5 mg (9/13)   Sat   10 mg  10 mg  10 mg   Historical INR  3.10      2.30      3.20            This result is from an external source.       Plan:  1. INR is Supratherapeutic today- see above in Anticoagulation Summary.   Will instruct Kameron Melendez to Change their warfarin regimen- see above in Anticoagulation Summary.  LVM to partial to 5 mg today then resume 15 mg on wed, 10 mg AOD per request and recheck in 1 week   2. Follow up in 1 weeks  3. They have been instructed to call if any changes in medications, doses, concerns, etc. Patient expresses understanding and has no further questions at this time.    Tessie Fatima [facility]

## 2024-09-16 ENCOUNTER — HOSPITAL ENCOUNTER (OUTPATIENT)
Dept: OCCUPATIONAL THERAPY | Facility: HOSPITAL | Age: 54
Setting detail: THERAPIES SERIES
Discharge: HOME | End: 2024-09-16
Payer: COMMERCIAL

## 2024-09-16 DIAGNOSIS — I89.0 LYMPHEDEMA OF BOTH LOWER EXTREMITIES: Primary | ICD-10-CM

## 2024-09-16 PROCEDURE — 97140 MANUAL THERAPY 1/> REGIONS: CPT

## 2024-09-17 ENCOUNTER — HOSPITAL ENCOUNTER (OUTPATIENT)
Dept: OCCUPATIONAL THERAPY | Facility: HOSPITAL | Age: 54
Setting detail: THERAPIES SERIES
Discharge: HOME | End: 2024-09-17
Payer: COMMERCIAL

## 2024-09-17 DIAGNOSIS — I89.0 LYMPHEDEMA OF BOTH LOWER EXTREMITIES: Primary | ICD-10-CM

## 2024-09-17 PROCEDURE — 97140 MANUAL THERAPY 1/> REGIONS: CPT

## 2024-09-17 PROCEDURE — 97535 SELF CARE MNGMENT TRAINING: CPT

## 2024-09-20 ENCOUNTER — APPOINTMENT (OUTPATIENT)
Dept: OCCUPATIONAL THERAPY | Facility: HOSPITAL | Age: 54
End: 2024-09-20
Payer: COMMERCIAL

## 2024-09-20 ENCOUNTER — ANTICOAGULATION VISIT (OUTPATIENT)
Dept: PHARMACY | Facility: HOSPITAL | Age: 54
End: 2024-09-20
Payer: COMMERCIAL

## 2024-09-20 LAB — INR PPP: 2.5

## 2024-09-23 ENCOUNTER — HOSPITAL ENCOUNTER (OUTPATIENT)
Dept: OCCUPATIONAL THERAPY | Facility: HOSPITAL | Age: 54
Setting detail: THERAPIES SERIES
Discharge: HOME | End: 2024-09-23
Payer: COMMERCIAL

## 2024-09-23 DIAGNOSIS — I89.0 LYMPHEDEMA OF BOTH LOWER EXTREMITIES: Primary | ICD-10-CM

## 2024-09-23 PROCEDURE — 97140 MANUAL THERAPY 1/> REGIONS: CPT

## 2024-09-24 ENCOUNTER — HOSPITAL ENCOUNTER (OUTPATIENT)
Dept: OCCUPATIONAL THERAPY | Facility: HOSPITAL | Age: 54
Setting detail: THERAPIES SERIES
Discharge: HOME | End: 2024-09-24
Payer: COMMERCIAL

## 2024-09-24 DIAGNOSIS — I89.0 LYMPHEDEMA OF BOTH LOWER EXTREMITIES: Primary | ICD-10-CM

## 2024-09-24 PROCEDURE — 97535 SELF CARE MNGMENT TRAINING: CPT

## 2024-09-24 PROCEDURE — 97140 MANUAL THERAPY 1/> REGIONS: CPT

## 2024-09-25 ENCOUNTER — HOSPITAL ENCOUNTER (OUTPATIENT)
Dept: OCCUPATIONAL THERAPY | Facility: HOSPITAL | Age: 54
Setting detail: THERAPIES SERIES
Discharge: HOME | End: 2024-09-25
Payer: COMMERCIAL

## 2024-09-25 DIAGNOSIS — I89.0 LYMPHEDEMA OF BOTH LOWER EXTREMITIES: Primary | ICD-10-CM

## 2024-09-25 PROCEDURE — 97140 MANUAL THERAPY 1/> REGIONS: CPT

## 2024-09-26 ENCOUNTER — OFFICE VISIT (OUTPATIENT)
Dept: FAMILY MEDICINE CLINIC | Facility: CLINIC | Age: 54
End: 2024-09-26
Payer: COMMERCIAL

## 2024-09-26 VITALS
WEIGHT: 315 LBS | OXYGEN SATURATION: 96 % | DIASTOLIC BLOOD PRESSURE: 78 MMHG | SYSTOLIC BLOOD PRESSURE: 116 MMHG | HEIGHT: 74 IN | HEART RATE: 72 BPM | BODY MASS INDEX: 40.43 KG/M2

## 2024-09-26 DIAGNOSIS — R07.81 PLEURITIC CHEST PAIN: ICD-10-CM

## 2024-09-26 DIAGNOSIS — E66.01 MORBID (SEVERE) OBESITY DUE TO EXCESS CALORIES: ICD-10-CM

## 2024-09-26 DIAGNOSIS — I89.0 LYMPHEDEMA: Primary | ICD-10-CM

## 2024-09-26 DIAGNOSIS — J18.9 COMMUNITY ACQUIRED PNEUMONIA OF RIGHT LOWER LOBE OF LUNG: ICD-10-CM

## 2024-09-26 DIAGNOSIS — M72.2 PLANTAR FASCIITIS OF LEFT FOOT: ICD-10-CM

## 2024-09-26 DIAGNOSIS — E11.9 TYPE 2 DIABETES MELLITUS WITHOUT COMPLICATION, WITHOUT LONG-TERM CURRENT USE OF INSULIN: ICD-10-CM

## 2024-09-26 PROCEDURE — 99214 OFFICE O/P EST MOD 30 MIN: CPT | Performed by: FAMILY MEDICINE

## 2024-09-26 RX ORDER — METOLAZONE 5 MG/1
5 TABLET ORAL WEEKLY
Qty: 4 TABLET | Refills: 3 | Status: SHIPPED | OUTPATIENT
Start: 2024-09-26

## 2024-09-26 RX ORDER — METHYLPREDNISOLONE 4 MG
TABLET, DOSE PACK ORAL
Qty: 21 TABLET | Refills: 0 | Status: SHIPPED | OUTPATIENT
Start: 2024-09-26

## 2024-09-26 RX ORDER — DEXTROMETHORPHAN HYDROBROMIDE AND PROMETHAZINE HYDROCHLORIDE 15; 6.25 MG/5ML; MG/5ML
5 SYRUP ORAL 4 TIMES DAILY PRN
Qty: 180 ML | Refills: 0 | Status: SHIPPED | OUTPATIENT
Start: 2024-09-26

## 2024-09-26 RX ORDER — POTASSIUM CHLORIDE 1500 MG/1
20 TABLET, EXTENDED RELEASE ORAL WEEKLY
Qty: 4 TABLET | Refills: 3 | Status: SHIPPED | OUTPATIENT
Start: 2024-09-26

## 2024-09-26 RX ORDER — CEPHALEXIN 500 MG/1
500 CAPSULE ORAL 3 TIMES DAILY
Qty: 30 CAPSULE | Refills: 0 | Status: SHIPPED | OUTPATIENT
Start: 2024-09-26

## 2024-09-27 ENCOUNTER — APPOINTMENT (OUTPATIENT)
Dept: OCCUPATIONAL THERAPY | Facility: HOSPITAL | Age: 54
End: 2024-09-27
Payer: COMMERCIAL

## 2024-09-30 ENCOUNTER — HOSPITAL ENCOUNTER (OUTPATIENT)
Dept: OCCUPATIONAL THERAPY | Facility: HOSPITAL | Age: 54
Setting detail: THERAPIES SERIES
Discharge: HOME | End: 2024-09-30
Payer: COMMERCIAL

## 2024-09-30 DIAGNOSIS — I89.0 LYMPHEDEMA OF BOTH LOWER EXTREMITIES: Primary | ICD-10-CM

## 2024-09-30 PROCEDURE — 97140 MANUAL THERAPY 1/> REGIONS: CPT

## 2024-09-30 NOTE — THERAPY TREATMENT NOTE
Outpatient Occupational Therapy Lymphedema Treatment Note  [facility]     Patient Name: Kameron Melendez  : 1970  MRN: [id number]  Today's Date: 2024      Visit Date: 2024    Patient Active Problem List   Diagnosis    Pulmonary hypertension    Lymphedema of both lower extremities    Obesity, morbid, BMI 50 or higher    Dyslipidemia    Hyperuricemia    Hypogonadal obesity    Knee arthropathy    Morbid obesity with body mass index of 60.0-69.9 in adult    ARNOLDO (obstructive sleep apnea)    Severe edema    History of pulmonary embolism    Intractable back pain    Heterozygous factor V Leiden mutation    Cellulitis of left lower extremity    Hypokalemia    CAROL (acute kidney injury)    Sepsis with cutaneous manifestations    Hyperglycemia    Paroxysmal atrial fibrillation    Long term (current) use of anticoagulants    Lymphedema, not elsewhere classified    Nicotine dependence, other tobacco product, uncomplicated    Personal history of other venous thrombosis and embolism    Typical atrial flutter    Venous insufficiency (chronic) (peripheral)    Cellulitis of right lower extremity    Wound cellulitis    CAP (community acquired pneumonia)    Type 2 diabetes mellitus    Hyponatremia    Choledocholithiasis    RUQ pain        Past Medical History:   Diagnosis Date    DVT (deep venous thrombosis)     RLE    Factor V Leiden     Hypertension     Kidney stone     Lymphedema     Lymphedema of left lower extremity     Obesity     ARNOLDO (obstructive sleep apnea)     Pulmonary embolism     bilateral    Pulmonary hypertension     Right ventricular enlargement         Past Surgical History:   Procedure Laterality Date    CARDIAC CATHETERIZATION Bilateral 2017    Procedure: Pulmonary angiography- Inari ;  Surgeon: Cedric Coulter MD;  [facility]  [facility];  Service:     CARDIAC CATHETERIZATION N/A 2017    Procedure: Right Heart Cath;  Surgeon: Cedric Coulter MD;  Location:  [facility];  Service:     THROMBECTOMY           Visit Dx:      ICD-10-CM ICD-9-CM   1. Lymphedema of both lower extremities  I89.0 457.1        Lymphedema       Row Name 09/30/24 0700             Subjective Pain    Able to rate subjective pain? yes  -CW      Pre-Treatment Pain Level 3  -CW      Post-Treatment Pain Level 3  -CW         Subjective    Subjective Comments L foot pain improved.  -CW         Skin Changes/Observations    Location/Assessment Lower Extremity  -CW      Lower Extremity Conditions left:;inflamed  -CW      Lower Extremity Color/Pigment bilateral:;hyperpigmented  -CW      Lower Extremity Skin Details L lower leg with thickened skin on the lower leg . Large lobe on left thigh  -CW      Skin Observations Comment Skin not as red LLE  -CW         Manual Lymphatic Drainage    Manual Lymphatic Drainage initial sequence;opened regional lymph nodes;opened anastamoses;extremity treatment  -CW      Initial Sequence short neck;abdomen  -CW      Abdomen superficial  -CW      Opened Regional Lymph Nodes axillary;inguinal  -CW      Axillary right;left  -CW      Inguinal right;left  -CW      Opened Anastamoses inguino-axillary  -CW      Inguino-Axillary right;left  -CW      Extremity Treatment MLD to full limb  -CW      MLD to Full Limb BLE's  -CW         Compression/Skin Care    Compression Garment Comments Applied liner and LLE Reduction kit lower leg. Adjusted the velcro straps and reviewed how to apply.  -CW                User Key  (r) = Recorded By, (t) = Taken By, (c) = Cosigned By      Initials Name Provider Type    Becky Amato OTR Occupational Therapist                             OT Assessment/Plan       Row Name 09/30/24 1044          OT Assessment    Assessment Comments Pt. reports L foot pain and LE pain 3/10. Overall L foot pain decreased. Pt. was able to wear the velcro compression during the day when able without discomfort. Skin dry and less red LLE, intact with some mild  skin irritation.  MLD sequence completed with pt. supine. Applied the velcro compression L lower leg with moderate compression as tolerated. Pt. did not want to wear L thigh Reduction kit due to going to work. Pt. can remove or loosen or tighten LLE Reduction kit at night if needed for comfort. Encouraged BLE elevation, HEP and to add LLE thigh Reduction kit piece at night.  -CW        OT Plan    OT Plan Comments Plan to cont. CDT and progress to indep. lymph. management.  -CW               User Key  (r) = Recorded By, (t) = Taken By, (c) = Cosigned By      Initials Name Provider Type    Becky Amato OTR Occupational Therapist                        Manual Rx (Last 36 Hours)       Manual Treatments       Row Name 09/30/24 1049             Total Minutes    27394 - OT Manual Therapy Minutes 60  -CW                User Key  (r) = Recorded By, (t) = Taken By, (c) = Cosigned By      Initials Name Provider Type    Becky Amato OTR Occupational Therapist                      Therapy Education  Education Details: Reviewed wearing schedule for velcro compression.  Encouraged continued HEP and BLE elevation.  Given: Edema management, Symptoms/condition management  Program: Reinforced  How Provided: Verbal, Demonstration  Provided to: Patient  Level of Understanding: Verbalized                Time Calculation:   OT Start Time: 0725  OT Stop Time: 0825  OT Time Calculation (min): 60 min  Total Timed Code Minutes- OT: 60 minute(s)  Timed Charges  97556 - OT Manual Therapy Minutes: 60  Total Minutes  Timed Charges Total Minutes: 60   Total Minutes: 60     Therapy Charges for Today       Code Description Service Date Service Provider Modifiers Qty    27593995714 HC OT MANUAL THERAPY EA 15 MIN 9/30/2024 Becky Brown OTR GO 4                        ZULMA Morales  9/30/2024

## 2024-10-01 ENCOUNTER — HOSPITAL ENCOUNTER (OUTPATIENT)
Dept: OCCUPATIONAL THERAPY | Facility: HOSPITAL | Age: 54
Setting detail: THERAPIES SERIES
Discharge: HOME | End: 2024-10-01
Payer: COMMERCIAL

## 2024-10-01 DIAGNOSIS — I89.0 LYMPHEDEMA OF BOTH LOWER EXTREMITIES: Primary | ICD-10-CM

## 2024-10-01 PROCEDURE — 97140 MANUAL THERAPY 1/> REGIONS: CPT

## 2024-10-01 NOTE — THERAPY TREATMENT NOTE
Outpatient Occupational Therapy Lymphedema Treatment Note  [facility]     Patient Name: Kameron Melendez  : 1970  MRN: [id number]  Today's Date: 10/1/2024      Visit Date: 10/01/2024    Patient Active Problem List   Diagnosis    Pulmonary hypertension    Lymphedema of both lower extremities    Obesity, morbid, BMI 50 or higher    Dyslipidemia    Hyperuricemia    Hypogonadal obesity    Knee arthropathy    Morbid obesity with body mass index of 60.0-69.9 in adult    ARNOLDO (obstructive sleep apnea)    Severe edema    History of pulmonary embolism    Intractable back pain    Heterozygous factor V Leiden mutation    Cellulitis of left lower extremity    Hypokalemia    CAROL (acute kidney injury)    Sepsis with cutaneous manifestations    Hyperglycemia    Paroxysmal atrial fibrillation    Long term (current) use of anticoagulants    Lymphedema, not elsewhere classified    Nicotine dependence, other tobacco product, uncomplicated    Personal history of other venous thrombosis and embolism    Typical atrial flutter    Venous insufficiency (chronic) (peripheral)    Cellulitis of right lower extremity    Wound cellulitis    CAP (community acquired pneumonia)    Type 2 diabetes mellitus    Hyponatremia    Choledocholithiasis    RUQ pain        Past Medical History:   Diagnosis Date    DVT (deep venous thrombosis)     RLE    Factor V Leiden     Hypertension     Kidney stone     Lymphedema     Lymphedema of left lower extremity     Obesity     ARNOLDO (obstructive sleep apnea)     Pulmonary embolism     bilateral    Pulmonary hypertension     Right ventricular enlargement         Past Surgical History:   Procedure Laterality Date    CARDIAC CATHETERIZATION Bilateral 2017    Procedure: Pulmonary angiography- Inari ;  Surgeon: Cedric Coulter MD;  [facility]  [facility];  Service:     CARDIAC CATHETERIZATION N/A 2017    Procedure: Right Heart Cath;  Surgeon: Cedric Coulter MD;  Location:  [facility];  Service:     THROMBECTOMY           Visit Dx:      ICD-10-CM ICD-9-CM   1. Lymphedema of both lower extremities  I89.0 457.1        Lymphedema       Row Name 10/01/24 0700             Subjective Pain    Able to rate subjective pain? yes  -CW      Pre-Treatment Pain Level 4  -CW      Post-Treatment Pain Level 4  -CW         Subjective    Subjective Comments L foot pain improved overall.  -CW         Skin Changes/Observations    Location/Assessment Lower Extremity  -CW      Lower Extremity Conditions left:;inflamed  -CW      Lower Extremity Color/Pigment bilateral:;hyperpigmented  -CW      Lower Extremity Skin Details L lower leg with thickened skin on the lower leg . Large lobe on left thigh  -CW      Skin Observations Comment Skin not as red LLE  -CW         Manual Lymphatic Drainage    Manual Lymphatic Drainage initial sequence;opened regional lymph nodes;opened anastamoses;extremity treatment  -CW      Initial Sequence short neck;abdomen  -CW      Abdomen superficial  -CW      Opened Regional Lymph Nodes axillary;inguinal  -CW      Axillary right;left  -CW      Inguinal right;left  -CW      Opened Anastamoses inguino-axillary  -CW      Inguino-Axillary right;left  -CW      Extremity Treatment MLD to full limb  -CW      MLD to Full Limb BLE's  -CW         Compression/Skin Care    Compression Garment Comments Applied liner and LLE Reduction kit lower leg. Adjusted the velcro straps and reviewed how to apply.  -CW                User Key  (r) = Recorded By, (t) = Taken By, (c) = Cosigned By      Initials Name Provider Type    Becky Amato OTR Occupational Therapist                             OT Assessment/Plan       Row Name 10/01/24 1054          OT Assessment    Assessment Comments Pt. reports L LE pain 4/10 today. Pt. was able to wear the velcro compression during the  day when able without discomfort. Skin dry and less red LLE. No weeping or open wounds noted.  MLD sequence completed with pt.  supine. Applied the velcro compression with moderate compression as tolerated L lower leg. Edema decreased L mid calf to ankle per observation. Pt. can  tighten or loosen top Reduction kit strap layer if needed for comfort. Plan to re-measure tomorrow and will fit pt. for RLE lower leg Reduction  kit as able.  -CW        OT Plan    OT Plan Comments Plan to cont. CDT and progress to indep. lymph. management.  -CW               User Key  (r) = Recorded By, (t) = Taken By, (c) = Cosigned By      Initials Name Provider Type    Becky Amato OTR Occupational Therapist                        Manual Rx (Last 36 Hours)       Manual Treatments       Row Name 10/01/24 1059             Total Minutes    72413 - OT Manual Therapy Minutes 60  -CW                User Key  (r) = Recorded By, (t) = Taken By, (c) = Cosigned By      Initials Name Provider Type    Becky Amato OTR Occupational Therapist                      Therapy Education  Education Details: Reviewed wearing schedule for velcro compression. Encouraged continued HEP and BLE elevation.  Given: Edema management, Symptoms/condition management, Bandaging/dressing change  Program: Reinforced  How Provided: Verbal, Demonstration  Provided to: Patient  Level of Understanding: Verbalized                Time Calculation:   OT Start Time: 0730  OT Stop Time: 0830  OT Time Calculation (min): 60 min  Total Timed Code Minutes- OT: 60 minute(s)  Timed Charges  00448 - OT Manual Therapy Minutes: 60  Total Minutes  Timed Charges Total Minutes: 60   Total Minutes: 60     Therapy Charges for Today       Code Description Service Date Service Provider Modifiers Qty    86928520015 HC OT MANUAL THERAPY EA 15 MIN 10/1/2024 Becky Brown OTR GO 4                        ZULMA Morales  10/1/2024

## 2024-10-02 ENCOUNTER — HOSPITAL ENCOUNTER (OUTPATIENT)
Dept: OCCUPATIONAL THERAPY | Facility: HOSPITAL | Age: 54
Setting detail: THERAPIES SERIES
Discharge: HOME | End: 2024-10-02
Payer: COMMERCIAL

## 2024-10-02 DIAGNOSIS — I89.0 LYMPHEDEMA OF BOTH LOWER EXTREMITIES: Primary | ICD-10-CM

## 2024-10-02 PROCEDURE — 97140 MANUAL THERAPY 1/> REGIONS: CPT

## 2024-10-02 NOTE — THERAPY PROGRESS REPORT/RE-CERT
Outpatient Occupational Therapy Lymphedema Progress Note  [facility]     Patient Name: Kameron Melendez  : 1970  MRN: [id number]  Today's Date: 10/2/2024      Visit Date: 10/02/2024    Patient Active Problem List   Diagnosis    Pulmonary hypertension    Lymphedema of both lower extremities    Obesity, morbid, BMI 50 or higher    Dyslipidemia    Hyperuricemia    Hypogonadal obesity    Knee arthropathy    Morbid obesity with body mass index of 60.0-69.9 in adult    ARNOLDO (obstructive sleep apnea)    Severe edema    History of pulmonary embolism    Intractable back pain    Heterozygous factor V Leiden mutation    Cellulitis of left lower extremity    Hypokalemia    CAROL (acute kidney injury)    Sepsis with cutaneous manifestations    Hyperglycemia    Paroxysmal atrial fibrillation    Long term (current) use of anticoagulants    Lymphedema, not elsewhere classified    Nicotine dependence, other tobacco product, uncomplicated    Personal history of other venous thrombosis and embolism    Typical atrial flutter    Venous insufficiency (chronic) (peripheral)    Cellulitis of right lower extremity    Wound cellulitis    CAP (community acquired pneumonia)    Type 2 diabetes mellitus    Hyponatremia    Choledocholithiasis    RUQ pain        Past Medical History:   Diagnosis Date    DVT (deep venous thrombosis)     RLE    Factor V Leiden     Hypertension     Kidney stone     Lymphedema     Lymphedema of left lower extremity     Obesity     ARNOLDO (obstructive sleep apnea)     Pulmonary embolism     bilateral    Pulmonary hypertension     Right ventricular enlargement         Past Surgical History:   Procedure Laterality Date    CARDIAC CATHETERIZATION Bilateral 2017    Procedure: Pulmonary angiography- Inari ;  Surgeon: Cedric Coulter MD;  [facility]  [facility];  Service:     CARDIAC CATHETERIZATION N/A 2017    Procedure: Right Heart Cath;  Surgeon: Cedric Coulter MD;  Location:  [facility];  Service:     THROMBECTOMY           Visit Dx:      ICD-10-CM ICD-9-CM   1. Lymphedema of both lower extremities  I89.0 457.1        Lymphedema       Row Name 10/02/24 0700             Subjective Pain    Able to rate subjective pain? yes  -CW      Pre-Treatment Pain Level 6  -CW      Post-Treatment Pain Level 6  -CW         Subjective    Subjective Comments Reports general LE pain 6/10.  -CW         Skin Changes/Observations    Location/Assessment Lower Extremity  -CW      Lower Extremity Conditions left:;inflamed  -CW      Lower Extremity Color/Pigment bilateral:;hyperpigmented  -CW      Lower Extremity Skin Details L lower leg with thickened skin on the lower leg . Large lobe on left thigh  -CW      Skin Observations Comment Skin not as red LLE  -CW         Lymphedema Measurements    Measurement Type(s) Circumferential  -CW      Circumferential Areas Lower extremities  -CW         BLE Circumferential (cm)    Measurement Location 1 30 cm above knee  -CW      Measurement Location 2 20 cm above knee  -CW      Left 2 125 cm  -CW      Right 2 93 cm  -CW      Measurement Location 3 10 cm above knee  -CW      Left 3 78.4 cm  -CW      Right 3 86 cm  -CW      Measurement Location 4 Knee  -CW      Left 4 67 cm  -CW      Right 4 67 cm  -CW      Measurement Location 5 10 cm below-knee  -CW      Left 5 62 cm  -CW      Right 5 62 cm  -CW      Measurement Location 6 20 cm below-knee  -CW      Left 6 51 cm  -CW      Right 6 46.5 cm  -CW      Measurement Location 7 30 cm below-knee  -CW      Left 7 42 cm  -CW      Right 7 37 cm  -CW      Measurement Location 8 ankle  -CW      Left 8 38 cm  -CW      Right 8 39.9 cm  -CW      Measurement Location 9 Midfoot  -CW      Left 9 28 cm  -CW      Right 9 28.9 cm  -CW      LLE Circumferential Total 491.4 cm  -CW      RLE Circumferential Total 460.3 cm  -CW         Manual Lymphatic Drainage    Manual Lymphatic Drainage initial sequence;opened regional lymph nodes;opened  anastamoses;extremity treatment  -CW      Initial Sequence short neck;abdomen  -CW      Abdomen superficial  -CW      Opened Regional Lymph Nodes axillary;inguinal  -CW      Axillary right;left  -CW      Inguinal right;left  -CW      Opened Anastamoses inguino-axillary  -CW      Inguino-Axillary right;left  -CW      Extremity Treatment MLD to full limb  -CW      MLD to Full Limb BLE's  -CW         Compression/Skin Care    Compression Garment Comments Applied liner and LLE Reduction kit lower leg. Adjusted the velcro straps and reviewed how to apply. Applied L thigh piece. Fit pt. for Reduction kit R lower leg kit. Trimmed to proper sizing.  -CW                User Key  (r) = Recorded By, (t) = Taken By, (c) = Cosigned By      Initials Name Provider Type    Becky Amato OTR Occupational Therapist                             OT Assessment/Plan       Row Name 10/02/24 0941          OT Assessment    Assessment Comments Pt. reports general LE pain 6/10 and low back pain.  Pt. was able to wear the velcro compression bandages LLE during the day. Skin intact, dry with less redness.  MLD sequence completed with pt. supine. Measurements taken. See flow sheet. Total circumference .3 cm. and .4 cm. (15.0 cm. R and 14.4 cm L net decrease). Pt. is progressing with decreased edema BLE's.  Applied the velcro compression with moderate compression as tolerated. Fit pt. for L thigh Reduction kit and R lower leg Reduction kit. Trimmed RLE lower leg velcro compression to proper size.  Reviewed progress and plan of care with pt. including appropriate modifications as needed. Encouraged cont. HEP and LE elevation.  -CW        OT Plan    OT Plan Comments Plan to cont. CDT and progress to independent lymphedema management.  -CW               User Key  (r) = Recorded By, (t) = Taken By, (c) = Cosigned By      Initials Name Provider Type    Becky Amato OTR Occupational Therapist                        Manual Rx (Last  "36 Hours)       Manual Treatments       Row Name 10/02/24 0947             Total Minutes    67788 - OT Manual Therapy Minutes 62  -CW                User Key  (r) = Recorded By, (t) = Taken By, (c) = Cosigned By      Initials Name Provider Type    Becky Amato OTR Occupational Therapist                   OT Goals       Row Name 10/02/24 0900          OT Short Term Goals    STG Date to Achieve 09/30/24  -CW     STG 1 Patient will demonstrate proper awareness of “What is Lymphedema?” and \"Healthy Habits\" for improved prevention, management, care of symptoms and ease of transition to self-care of condition.  -CW     STG 1 Progress Ongoing;Progressing  -CW     STG 2 Patient independent and compliant with self-wrapping techniques of compression bandages and/or velcro products with assistance of caregiver as needed for improved self-management of condition.  -CW     STG 2 Progress Progressing  -CW     STG 3 Patient will demonstrate decreased net edema of bilateral lower extremities >/= 10-20cm  for decrease in edema, symptoms, decreased risk of infection and improved skin care/transition to self-care of condition.  -CW     STG 3 Progress Met  -CW        Long Term Goals    LTG Date to Achieve 10/14/24  -CW     LTG 1 Patient will demonstrate decreased net edema of bilateral lower extremities >/= 20-50 cm  for decrease in edema, symptoms, decreased risk of infection and improved skin care/transition to self-care of condition.  -CW     LTG 1 Progress Ongoing  -CW     LTG 2 Patient independent and compliant with initial home exercise program focused on diaphragmatic breathing, range of motion, flexibility to decrease edema and improve lymphatic flow for decreased edema and decreased risk of infection.  -CW     LTG 2 Progress Progressing  -CW     LTG 3 Patient to improve LLIS by 10 points by discharge.  -CW     LTG 3 Progress Ongoing  -CW     LTG 4 Patient independent and compliant with use and care of compression garments " and/or Velcro products with assistance of a caregiver as needed to promote self-care independence.  -CW     LTG 4 Progress Ongoing  -CW               User Key  (r) = Recorded By, (t) = Taken By, (c) = Cosigned By      Initials Name Provider Type    Becky Amato OTR Occupational Therapist                    Therapy Education  Education Details: Reviewed how to apply the velcro compression RLE lower leg and LLE whole leg with thigh piece. Encouraged continued HEP and BLE elevation.  Given: Edema management, Symptoms/condition management  Program: Reinforced, New  How Provided: Verbal, Demonstration  Provided to: Patient  Level of Understanding: Verbalized                Time Calculation:   OT Start Time: 0728  OT Stop Time: 0830  OT Time Calculation (min): 62 min  Total Timed Code Minutes- OT: 62 minute(s)  Timed Charges  15348 - OT Manual Therapy Minutes: 62  Total Minutes  Timed Charges Total Minutes: 62   Total Minutes: 62     Therapy Charges for Today       Code Description Service Date Service Provider Modifiers Qty    28896748048 HC OT MANUAL THERAPY EA 15 MIN 10/2/2024 Becky Brown OTR GO 4                        ZULMA Morales  10/2/2024

## 2024-10-04 ENCOUNTER — APPOINTMENT (OUTPATIENT)
Dept: OCCUPATIONAL THERAPY | Facility: HOSPITAL | Age: 54
End: 2024-10-04
Payer: COMMERCIAL

## 2024-10-04 ENCOUNTER — ANTICOAGULATION VISIT (OUTPATIENT)
Dept: PHARMACY | Facility: HOSPITAL | Age: 54
End: 2024-10-04
Payer: COMMERCIAL

## 2024-10-04 LAB — INR PPP: 4.4

## 2024-10-04 NOTE — PROGRESS NOTES
Anticoagulation Clinic Progress Note    Anticoagulation Summary  As of 10/4/2024      INR goal:  2.0-3.0   TTR:  65.8% (5.6 y)   INR used for dosin.40 (10/4/2024)   Warfarin maintenance plan:  10 mg every day   Weekly warfarin total:  70 mg   Plan last modified:  Tessie Fatima RPH (10/4/2024)   Next INR check:  10/11/2024   Priority:  High   Target end date:  Indefinite    Indications    Other acute pulmonary embolism without acute cor pulmonale (Resolved) [I26.99]  Bilateral pulmonary embolism (Resolved) [I26.99]                 Anticoagulation Episode Summary       INR check location:      Preferred lab:      Send INR reminders to:   [facility]    Comments:  new  frankie 2019 **WEEKLY FRANKIE**          Anticoagulation Care Providers       Provider Role Specialty Phone number    Torri Colmenares, APRN Referring Cardiology [phone]    Elsy aBeza MD Responsible Cardiology [phone]            Clinic Interview:  Patient Findings     Positives:  Change in diet/appetite    Negatives:  Signs/symptoms of thrombosis, Signs/symptoms of bleeding,   Laboratory test error suspected, Change in health, Change in alcohol use,   Change in activity, Upcoming invasive procedure, Emergency department   visit, Upcoming dental procedure, Missed doses, Extra doses, Change in   medications, Hospital admission, Bruising, Other complaints    Comments:  Decrease in appetite       Clinical Outcomes     Negatives:  Major bleeding event, Thromboembolic event,   Anticoagulation-related hospital admission, Anticoagulation-related ED   visit, Anticoagulation-related fatality    Comments:  Decrease in appetite         INR History:      2024     9:41 AM 2024    12:00 AM 2024     8:34 AM 2024    12:00 AM 2024     8:18 AM 10/4/2024    12:00 AM 10/4/2024     8:12 AM   Anticoagulation Monitoring   INR 2.30  3.20  2.50  4.40   INR Date 2024  2024  2024  10/4/2024   INR  Goal 2.0-3.0  2.0-3.0  2.0-3.0  2.0-3.0   Trend Same  Same  Same  Down   Last Week Total 75 mg  75 mg  70 mg  75 mg   Next Week Total 75 mg  70 mg  75 mg  60 mg   Sun 10 mg  10 mg  10 mg  10 mg   Mon 10 mg  10 mg  10 mg  10 mg   Tue 10 mg  10 mg  10 mg  10 mg   Wed 15 mg  15 mg  15 mg  10 mg   Thu 10 mg  10 mg  10 mg  10 mg   Fri 10 mg  5 mg (9/13)  10 mg  Hold (10/4)   Sat 10 mg  10 mg  10 mg  10 mg   Historical INR  3.20      2.50      4.40            This result is from an external source.       Plan:  1. INR is Supratherapeutic today- see above in Anticoagulation Summary.   Will instruct Kameron Melendez to Change their warfarin regimen- see above in Anticoagulation Summary.  Hold and decrease to 10 mg daily   2. Follow up in 1 week  3. They have been instructed to call if any changes in medications, doses, concerns, etc. Patient expresses understanding and has no further questions at this time.    Tessie Fatima [facility]

## 2024-10-07 ENCOUNTER — HOSPITAL ENCOUNTER (OUTPATIENT)
Dept: OCCUPATIONAL THERAPY | Facility: HOSPITAL | Age: 54
Setting detail: THERAPIES SERIES
Discharge: HOME | End: 2024-10-07
Payer: COMMERCIAL

## 2024-10-07 DIAGNOSIS — I89.0 LYMPHEDEMA OF BOTH LOWER EXTREMITIES: Primary | ICD-10-CM

## 2024-10-07 PROCEDURE — 97140 MANUAL THERAPY 1/> REGIONS: CPT

## 2024-10-07 NOTE — THERAPY TREATMENT NOTE
Outpatient Occupational Therapy Lymphedema Treatment Note  [facility]     Patient Name: Kameron Melendez  : 1970  MRN: [id number]  Today's Date: 10/7/2024      Visit Date: 10/07/2024    Patient Active Problem List   Diagnosis    Pulmonary hypertension    Lymphedema of both lower extremities    Obesity, morbid, BMI 50 or higher    Dyslipidemia    Hyperuricemia    Hypogonadal obesity    Knee arthropathy    Morbid obesity with body mass index of 60.0-69.9 in adult    ARNOLDO (obstructive sleep apnea)    Severe edema    History of pulmonary embolism    Intractable back pain    Heterozygous factor V Leiden mutation    Cellulitis of left lower extremity    Hypokalemia    CAROL (acute kidney injury)    Sepsis with cutaneous manifestations    Hyperglycemia    Paroxysmal atrial fibrillation    Long term (current) use of anticoagulants    Lymphedema, not elsewhere classified    Nicotine dependence, other tobacco product, uncomplicated    Personal history of other venous thrombosis and embolism    Typical atrial flutter    Venous insufficiency (chronic) (peripheral)    Cellulitis of right lower extremity    Wound cellulitis    CAP (community acquired pneumonia)    Type 2 diabetes mellitus    Hyponatremia    Choledocholithiasis    RUQ pain        Past Medical History:   Diagnosis Date    DVT (deep venous thrombosis)     RLE    Factor V Leiden     Hypertension     Kidney stone     Lymphedema     Lymphedema of left lower extremity     Obesity     ARNOLDO (obstructive sleep apnea)     Pulmonary embolism     bilateral    Pulmonary hypertension     Right ventricular enlargement         Past Surgical History:   Procedure Laterality Date    CARDIAC CATHETERIZATION Bilateral 2017    Procedure: Pulmonary angiography- In[facility] ;  Surgeon: Cedric Coulter MD;  [facility]  [facility];  Service:     CARDIAC CATHETERIZATION N/A 2017    Procedure: Right Heart Cath;  Surgeon: Cedric Coulter MD;  Location:  [facility];  Service:     THROMBECTOMY           Visit Dx:      ICD-10-CM ICD-9-CM   1. Lymphedema of both lower extremities  I89.0 457.1        Lymphedema       Row Name 10/07/24 0700             Subjective Pain    Able to rate subjective pain? yes  -CW      Pre-Treatment Pain Level 1  -CW      Post-Treatment Pain Level 1  -CW         Subjective    Subjective Comments Pt. reports slighty less pain today.  -CW         Skin Changes/Observations    Location/Assessment Lower Extremity  -CW      Lower Extremity Conditions left:;inflamed  -CW      Lower Extremity Color/Pigment bilateral:;hyperpigmented  -CW      Lower Extremity Skin Details L lower leg with thickened skin on the lower leg . Large lobe on left thigh  -CW      Skin Observations Comment Skin not as red LLE  -CW         Manual Lymphatic Drainage    Manual Lymphatic Drainage initial sequence;opened regional lymph nodes;opened anastamoses;extremity treatment  -CW      Initial Sequence short neck;abdomen  -CW      Abdomen superficial  -CW      Opened Regional Lymph Nodes axillary;inguinal  -CW      Axillary right;left  -CW      Inguinal right;left  -CW      Opened Anastamoses inguino-axillary  -CW      Inguino-Axillary right;left  -CW      Extremity Treatment MLD to full limb  -CW      MLD to Full Limb BLE's  -CW         Compression/Skin Care    Compression Garment Comments Applied liner and BLE Reduction kit lower leg. Adjusted the velcro straps and reviewed how to apply.  -CW                User Key  (r) = Recorded By, (t) = Taken By, (c) = Cosigned By      Initials Name Provider Type    Becky Amato OTR Occupational Therapist                             OT Assessment/Plan       Row Name 10/07/24 1107          OT Assessment    Assessment Comments Pt. presents 53 y/o male with severe significant increased edema BLE's/elephantiasis with lobules and multiple skin folds. Hx cellulites LLE 2x and pt. reports a flare up RLE. Skin is firm/fibrotic BLE lower legs  and is intact/dry with heaviness and increased pain. MLD competed with pt. supine. Pt. was able ot wear the R lower leg Reduction kit Friday and remove due to LE cramping. Reviewed how to apply the Circaid Reduction kit for lower legs and checked fit.  Pt has tried conservative tx and CDT for >25 days including compression bandages, manual lymph drainage, elevation, compression stockings, HEP/therap. ex. and various compression methods to manage his edema. Even though pt. has tried these conservative methods, pt.'s symptoms will most likely still persist over time and may get worse. This may lead to more advanced skin changes and other edema related symptoms including increased fibrosis, edema, sore/ulcers, wounds, and infections. Pt. may benefit from a custom compression pump with to help address LE lymphedema.  -CW        OT Plan    OT Plan Comments Plan to cont. CDT and progress to indep. lymph. management.  -CW               User Key  (r) = Recorded By, (t) = Taken By, (c) = Cosigned By      Initials Name Provider Type    Becky Amato OTR Occupational Therapist                        Manual Rx (Last 36 Hours)       Manual Treatments       Row Name 10/07/24 1118             Total Minutes    59672 - OT Manual Therapy Minutes 60  -CW                User Key  (r) = Recorded By, (t) = Taken By, (c) = Cosigned By      Initials Name Provider Type    Becky Amato OTR Occupational Therapist                      Therapy Education  Given: Edema management, Symptoms/condition management  Program: Reinforced  How Provided: Verbal, Demonstration  Provided to: Patient  Level of Understanding: Verbalized                Time Calculation:   OT Start Time: 0730  OT Stop Time: 0830  OT Time Calculation (min): 60 min  Total Timed Code Minutes- OT: 60 minute(s)  Timed Charges  65502 - OT Manual Therapy Minutes: 60  Total Minutes  Timed Charges Total Minutes: 60   Total Minutes: 60     Therapy Charges for Today       Code  Description Service Date Service Provider Modifiers Qty    32940901565 HC OT MANUAL THERAPY EA 15 MIN 10/7/2024 Becky Brown OTR GO 4                        ZULMA Morales  10/7/2024

## 2024-10-08 ENCOUNTER — HOSPITAL ENCOUNTER (OUTPATIENT)
Dept: OCCUPATIONAL THERAPY | Facility: HOSPITAL | Age: 54
Setting detail: THERAPIES SERIES
Discharge: HOME | End: 2024-10-08
Payer: COMMERCIAL

## 2024-10-08 DIAGNOSIS — I89.0 LYMPHEDEMA OF BOTH LOWER EXTREMITIES: Primary | ICD-10-CM

## 2024-10-08 PROCEDURE — 97140 MANUAL THERAPY 1/> REGIONS: CPT

## 2024-10-08 NOTE — THERAPY TREATMENT NOTE
Outpatient Occupational Therapy Lymphedema Treatment Note  [facility]     Patient Name: Kameron Melendez  : 1970  MRN: [id number]  Today's Date: 10/8/2024      Visit Date: 10/08/2024    Patient Active Problem List   Diagnosis    Pulmonary hypertension    Lymphedema of both lower extremities    Obesity, morbid, BMI 50 or higher    Dyslipidemia    Hyperuricemia    Hypogonadal obesity    Knee arthropathy    Morbid obesity with body mass index of 60.0-69.9 in adult    ARNOLDO (obstructive sleep apnea)    Severe edema    History of pulmonary embolism    Intractable back pain    Heterozygous factor V Leiden mutation    Cellulitis of left lower extremity    Hypokalemia    CAROL (acute kidney injury)    Sepsis with cutaneous manifestations    Hyperglycemia    Paroxysmal atrial fibrillation    Long term (current) use of anticoagulants    Lymphedema, not elsewhere classified    Nicotine dependence, other tobacco product, uncomplicated    Personal history of other venous thrombosis and embolism    Typical atrial flutter    Venous insufficiency (chronic) (peripheral)    Cellulitis of right lower extremity    Wound cellulitis    CAP (community acquired pneumonia)    Type 2 diabetes mellitus    Hyponatremia    Choledocholithiasis    RUQ pain        Past Medical History:   Diagnosis Date    DVT (deep venous thrombosis)     RLE    Factor V Leiden     Hypertension     Kidney stone     Lymphedema     Lymphedema of left lower extremity     Obesity     ARNOLDO (obstructive sleep apnea)     Pulmonary embolism     bilateral    Pulmonary hypertension     Right ventricular enlargement         Past Surgical History:   Procedure Laterality Date    CARDIAC CATHETERIZATION Bilateral 2017    Procedure: Pulmonary angiography- [facility] ;  Surgeon: Cedric Coulter MD;  Location:  [facility];  Service:     CARDIAC CATHETERIZATION N/A 2017    Procedure: Right Heart Cath;  Surgeon: Cedric Coulter MD;  Location:  [facility];  Service:     THROMBECTOMY           Visit Dx:      ICD-10-CM ICD-9-CM   1. Lymphedema of both lower extremities  I89.0 457.1        Lymphedema       Row Name 10/08/24 0700             Subjective Pain    Able to rate subjective pain? yes  -CW      Pre-Treatment Pain Level 5  -CW      Post-Treatment Pain Level 5  -CW         Subjective    Subjective Comments LE general pain 5/10.  -CW         Skin Changes/Observations    Location/Assessment Lower Extremity  -CW      Lower Extremity Conditions left:;inflamed  -CW      Lower Extremity Color/Pigment bilateral:;hyperpigmented  -CW      Lower Extremity Skin Details L lower leg with thickened skin on the lower leg . Large lobe on left thigh  -CW      Skin Observations Comment Skin not as red LLE  -CW         Manual Lymphatic Drainage    Manual Lymphatic Drainage initial sequence;opened regional lymph nodes;opened anastamoses;extremity treatment  -CW      Initial Sequence short neck;abdomen  -CW      Abdomen superficial  -CW      Opened Regional Lymph Nodes axillary;inguinal  -CW      Axillary right;left  -CW      Inguinal right;left  -CW      Opened Anastamoses inguino-axillary  -CW      Inguino-Axillary right;left  -CW      Extremity Treatment MLD to full limb  -CW      MLD to Full Limb BLE's  -CW         Compression/Skin Care    Compression Garment Comments Applied liner and BLE Reduction kit lower leg. Adjusted the velcro straps and reviewed how to apply.  -CW                User Key  (r) = Recorded By, (t) = Taken By, (c) = Cosigned By      Initials Name Provider Type    Becky Amato OTR Occupational Therapist                             OT Assessment/Plan       Row Name 10/08/24 0915          OT Assessment    Assessment Comments Pt. reports LE pain  5/10 at present. Pt. was able to wear the velcro compression B lower leg during the day and off at night. Skin dry and intact with no open wounds. Skin slightly softer L lower leg and knee.  MLD sequence  completed with pt. supine. Applied the Circaid lower leg velcro compression with moderate compression as tolerated.. Pt. can tighten  or loosen top velcro strap if needed for comfort. Encouraged cont. HEP and BLE elevation. Recommended pt. to wear the lower leg and L thigh velcro compression at night as tolerated. Discussed wearing schedule with compression and the compression pump. Pt. was measured for a [employer] pump yesterday with [employer] rep present.  -CW        OT Plan    OT Plan Comments Plan to cont. CDT and progress to indep. lymph. management.  -CW               User Key  (r) = Recorded By, (t) = Taken By, (c) = Cosigned By      Initials Name Provider Type    Becky Amato OTR Occupational Therapist                        Manual Rx (Last 36 Hours)       Manual Treatments       Row Name 10/08/24 0922             Total Minutes    77006 - OT Manual Therapy Minutes 65  -CW                User Key  (r) = Recorded By, (t) = Taken By, (c) = Cosigned By      Initials Name Provider Type    Becky Amato OTR Occupational Therapist                      Therapy Education  Given: Edema management, Symptoms/condition management  Program: Reinforced, Progressed  How Provided: Verbal, Demonstration  Provided to: Patient  Level of Understanding: Verbalized                Time Calculation:   OT Start Time: 0725  OT Stop Time: 0830  OT Time Calculation (min): 65 min  Total Timed Code Minutes- OT: 65 minute(s)  Timed Charges  32209 - OT Manual Therapy Minutes: 65  Total Minutes  Timed Charges Total Minutes: 65   Total Minutes: 65     Therapy Charges for Today       Code Description Service Date Service Provider Modifiers Qty    [id number]  OT MANUAL THERAPY EA 15 MIN 10/8/2024 Becky Brown OTR GO 4                        ZULMA Morales  10/8/2024

## 2024-10-09 ENCOUNTER — HOSPITAL ENCOUNTER (OUTPATIENT)
Dept: OCCUPATIONAL THERAPY | Facility: HOSPITAL | Age: 54
Setting detail: THERAPIES SERIES
Discharge: HOME | End: 2024-10-09
Payer: COMMERCIAL

## 2024-10-09 DIAGNOSIS — I89.0 LYMPHEDEMA OF BOTH LOWER EXTREMITIES: Primary | ICD-10-CM

## 2024-10-09 PROCEDURE — 97140 MANUAL THERAPY 1/> REGIONS: CPT

## 2024-10-09 NOTE — THERAPY PROGRESS REPORT/RE-CERT
Outpatient Occupational Therapy Lymphedema Progress Note  [facility]     Patient Name: Kameron Melendez  : 1970  MRN: [id number]  Today's Date: 10/9/2024      Visit Date: 10/09/2024    Patient Active Problem List   Diagnosis    Pulmonary hypertension    Lymphedema of both lower extremities    Obesity, morbid, BMI 50 or higher    Dyslipidemia    Hyperuricemia    Hypogonadal obesity    Knee arthropathy    Morbid obesity with body mass index of 60.0-69.9 in adult    ARNOLDO (obstructive sleep apnea)    Severe edema    History of pulmonary embolism    Intractable back pain    Heterozygous factor V Leiden mutation    Cellulitis of left lower extremity    Hypokalemia    CAROL (acute kidney injury)    Sepsis with cutaneous manifestations    Hyperglycemia    Paroxysmal atrial fibrillation    Long term (current) use of anticoagulants    Lymphedema, not elsewhere classified    Nicotine dependence, other tobacco product, uncomplicated    Personal history of other venous thrombosis and embolism    Typical atrial flutter    Venous insufficiency (chronic) (peripheral)    Cellulitis of right lower extremity    Wound cellulitis    CAP (community acquired pneumonia)    Type 2 diabetes mellitus    Hyponatremia    Choledocholithiasis    RUQ pain        Past Medical History:   Diagnosis Date    DVT (deep venous thrombosis)     RLE    Factor V Leiden     Hypertension     Kidney stone     Lymphedema     Lymphedema of left lower extremity     Obesity     ARNOLDO (obstructive sleep apnea)     Pulmonary embolism     bilateral    Pulmonary hypertension     Right ventricular enlargement         Past Surgical History:   Procedure Laterality Date    CARDIAC CATHETERIZATION Bilateral 2017    Procedure: Pulmonary angiography- [facility] ;  Surgeon: Cedric Coulter MD;  [facility]  [facility];  Service:     CARDIAC CATHETERIZATION N/A 2017    Procedure: Right Heart Cath;  Surgeon: Cedric Coulter MD;  [facility]:  [facility];  Service:     THROMBECTOMY           Visit Dx:      ICD-10-CM ICD-9-CM   1. Lymphedema of both lower extremities  I89.0 457.1        Lymphedema       Row Name 10/09/24 0700             Subjective Pain    Able to rate subjective pain? yes  -CW      Pre-Treatment Pain Level 5  -CW      Post-Treatment Pain Level 5  -CW         Subjective    Subjective Comments BLE general pain  and L foot pain  -CW         Skin Changes/Observations    Location/Assessment Lower Extremity  -CW      Lower Extremity Conditions left:;inflamed  -CW      Lower Extremity Color/Pigment bilateral:;hyperpigmented  -CW      Lower Extremity Skin Details L lower leg with thickened skin on the lower leg . Large lobe on left thigh  -CW      Skin Observations Comment Skin not as red LLE. small rash/dots noted R lateral aspect lower leg.  -CW         Lymphedema Measurements    Measurement Type(s) Circumferential  -CW      Circumferential Areas Lower extremities  -CW         BLE Circumferential (cm)    Measurement Location 1 30 cm above knee  -CW      Measurement Location 2 20 cm above knee  -CW      Left 2 125 cm  -CW      Right 2 93 cm  -CW      Measurement Location 3 10 cm above knee  -CW      Left 3 76.8 cm  -CW      Right 3 86 cm  -CW      Measurement Location 4 Knee  -CW      Left 4 66 cm  -CW      Right 4 67 cm  -CW      Measurement Location 5 10 cm below-knee  -CW      Left 5 61 cm  -CW      Right 5 61.5 cm  -CW      Measurement Location 6 20 cm below-knee  -CW      Left 6 51 cm  -CW      Right 6 45.5 cm  -CW      Measurement Location 7 30 cm below-knee  -CW      Left 7 41 cm  -CW      Right 7 37 cm  -CW      Measurement Location 8 ankle  -CW      Left 8 38 cm  -CW      Right 8 38.9 cm  -CW      Measurement Location 9 Midfoot  -CW      Left 9 27 cm  -CW      Right 9 27.9 cm  -CW      LLE Circumferential Total 485.8 cm  -CW      RLE Circumferential Total 456.8 cm  -CW         Manual Lymphatic Drainage    Manual Lymphatic Drainage initial  sequence;opened regional lymph nodes;opened anastamoses;extremity treatment  -CW      Initial Sequence short neck;abdomen  -CW      Abdomen superficial  -CW      Opened Regional Lymph Nodes axillary;inguinal  -CW      Axillary right;left  -CW      Inguinal right;left  -CW      Opened Anastamoses inguino-axillary  -CW      Inguino-Axillary right;left  -CW      Extremity Treatment MLD to full limb  -CW      MLD to Full Limb BLE's  -CW         Compression/Skin Care    Compression Garment Comments Applied liner and BLE Reduction kit lower leg. Adjusted the velcro straps and reviewed how to apply.  -CW                User Key  (r) = Recorded By, (t) = Taken By, (c) = Cosigned By      Initials Name Provider Type    Becky Amato, OTR Occupational Therapist                             OT Assessment/Plan       Row Name 10/09/24 1498          OT Assessment    Assessment Comments Pt. reports pain 5/10 with L foot pain. Pt. was able to wear the velcro compression during the day and off at night without discomfort. His spouse assisted to apply the L thigh Reduction kit. Skin intact, dry and slightly softer L medial calf. Small rash/dots noted R lateral lower calf. MLD sequence completed with pt. supine. Measurements taken. See flow sheet. Total circumference .8 cm. and .8 cm. (18.5 cm. R and 20.0 cm L net decrease). Pt. is progressing well with decreased edema BLE's.  Applied the velcro compression with moderate compression. Reviewed progress and plan of care with pt. including appropriate modifications as needed. Encouraged cont. HEP and LE elevation.  -CW        OT Plan    OT Plan Comments Plan to cont. CDT and progress to independent lymphedema management.  -CW               User Key  (r) = Recorded By, (t) = Taken By, (c) = Cosigned By      Initials Name Provider Type    Becky Amato, OTR Occupational Therapist                        Manual Rx (Last 36 Hours)       Manual Treatments       Row Name  "10/09/24 1127             Total Minutes    03740 - OT Manual Therapy Minutes 63  -CW                User Key  (r) = Recorded By, (t) = Taken By, (c) = Cosigned By      Initials Name Provider Type    Becky Amato OTR Occupational Therapist                   OT Goals       Row Name 10/09/24 1100          OT Short Term Goals    STG Date to Achieve 09/30/24  -CW     STG 1 Patient will demonstrate proper awareness of “What is Lymphedema?” and \"Healthy Habits\" for improved prevention, management, care of symptoms and ease of transition to self-care of condition.  -CW     STG 1 Progress Ongoing;Progressing  -CW     STG 2 Patient independent and compliant with velcro compression/ self-wrapping techniques of compression bandages and/or velcro products with assistance of caregiver as needed for improved self-management of condition.  -CW     STG 2 Progress Progressing;Goal Revised  -CW     STG 3 Patient will demonstrate decreased net edema of bilateral lower extremities >/= 10-20cm  for decrease in edema, symptoms, decreased risk of infection and improved skin care/transition to self-care of condition.  -CW     STG 3 Progress Met  -        Long Term Goals    LTG Date to Achieve 10/14/24  -CW     LTG 1 Patient will demonstrate decreased net edema of bilateral lower extremities >/= 20-50 cm  for decrease in edema, symptoms, decreased risk of infection and improved skin care/transition to self-care of condition.  -CW     LTG 1 Progress Ongoing;Progressing  -CW     LTG 2 Patient independent and compliant with initial home exercise program focused on diaphragmatic breathing, range of motion, flexibility to decrease edema and improve lymphatic flow for decreased edema and decreased risk of infection.  -CW     LTG 2 Progress Progressing  -CW     LTG 3 Patient to improve LLIS by 10 points by discharge.  -CW     LTG 3 Progress Ongoing  -CW     LTG 4 Patient independent and compliant with use and care of compression garments " and/or Velcro products with assistance of a caregiver as needed to promote self-care independence.  -CW     LTG 4 Progress Ongoing  -CW               User Key  (r) = Recorded By, (t) = Taken By, (c) = Cosigned By      Initials Name Provider Type    Becky Amato OTR Occupational Therapist                    Therapy Education  Given: Edema management, Symptoms/condition management  Program: Reinforced  How Provided: Verbal, Demonstration  Provided to: Patient  Level of Understanding: Verbalized                Time Calculation:   OT Start Time: 0725  OT Stop Time: 0828  OT Time Calculation (min): 63 min  Total Timed Code Minutes- OT: 63 minute(s)  Timed Charges  38157 - OT Manual Therapy Minutes: 63  Total Minutes  Timed Charges Total Minutes: 63   Total Minutes: 63     Therapy Charges for Today       Code Description Service Date Service Provider Modifiers Qty    [id number]  OT MANUAL THERAPY EA 15 MIN 10/9/2024 Becky Brown OTR GO 4                        ZULMA Morales  10/9/2024

## 2024-10-11 ENCOUNTER — APPOINTMENT (OUTPATIENT)
Dept: OCCUPATIONAL THERAPY | Facility: HOSPITAL | Age: 54
End: 2024-10-11
Payer: COMMERCIAL

## 2024-10-14 ENCOUNTER — HOSPITAL ENCOUNTER (OUTPATIENT)
Dept: OCCUPATIONAL THERAPY | Facility: HOSPITAL | Age: 54
Setting detail: THERAPIES SERIES
Discharge: HOME | End: 2024-10-14
Payer: COMMERCIAL

## 2024-10-14 DIAGNOSIS — I89.0 LYMPHEDEMA OF BOTH LOWER EXTREMITIES: Primary | ICD-10-CM

## 2024-10-14 PROCEDURE — 97140 MANUAL THERAPY 1/> REGIONS: CPT

## 2024-10-14 NOTE — THERAPY TREATMENT NOTE
Outpatient Occupational Therapy Lymphedema Treatment Note  [facility]     Patient Name: Kameron Melendez  : 1970  MRN: [id number]  Today's Date: 10/14/2024      Visit Date: 10/14/2024    Patient Active Problem List   Diagnosis    Pulmonary hypertension    Lymphedema of both lower extremities    Obesity, morbid, BMI 50 or higher    Dyslipidemia    Hyperuricemia    Hypogonadal obesity    Knee arthropathy    Morbid obesity with body mass index of 60.0-69.9 in adult    ARNOLDO (obstructive sleep apnea)    Severe edema    History of pulmonary embolism    Intractable back pain    Heterozygous factor V Leiden mutation    Cellulitis of left lower extremity    Hypokalemia    CAROL (acute kidney injury)    Sepsis with cutaneous manifestations    Hyperglycemia    Paroxysmal atrial fibrillation    Long term (current) use of anticoagulants    Lymphedema, not elsewhere classified    Nicotine dependence, other tobacco product, uncomplicated    Personal history of other venous thrombosis and embolism    Typical atrial flutter    Venous insufficiency (chronic) (peripheral)    Cellulitis of right lower extremity    Wound cellulitis    CAP (community acquired pneumonia)    Type 2 diabetes mellitus    Hyponatremia    Choledocholithiasis    RUQ pain        Past Medical History:   Diagnosis Date    DVT (deep venous thrombosis)     RLE    Factor V Leiden     Hypertension     Kidney stone     Lymphedema     Lymphedema of left lower extremity     Obesity     ARNOLDO (obstructive sleep apnea)     Pulmonary embolism     bilateral    Pulmonary hypertension     Right ventricular enlargement         Past Surgical History:   Procedure Laterality Date    CARDIAC CATHETERIZATION Bilateral 2017    Procedure: Pulmonary angiography- In[facility] ;  Surgeon: Cedric Coulter MD;  [facility];  Service:     CARDIAC CATHETERIZATION N/A 2017    Procedure: Right Heart Cath;  Surgeon: Cedric Coulter MD;  Location:  [facility];  Service:     THROMBECTOMY           Visit Dx:      ICD-10-CM ICD-9-CM   1. Lymphedema of both lower extremities  I89.0 457.1        Lymphedema       Row Name 10/14/24 0700             Subjective Pain    Able to rate subjective pain? yes  -CW      Pre-Treatment Pain Level 4  -CW      Post-Treatment Pain Level 4  -CW         Subjective    Subjective Comments Pt. reports BLE general pain 4/10.  -CW         Skin Changes/Observations    Location/Assessment Lower Extremity  -CW      Lower Extremity Conditions left:;inflamed  -CW      Lower Extremity Color/Pigment bilateral:;hyperpigmented  -CW      Lower Extremity Skin Details L lower leg with thickened skin on the lower leg . Large lobe on left thigh  -CW      Skin Observations Comment Skin not as red LLE. small rash/dots noted R lateral aspect lower leg.  -CW         Manual Lymphatic Drainage    Manual Lymphatic Drainage initial sequence;opened regional lymph nodes;opened anastamoses;extremity treatment  -CW      Initial Sequence short neck;abdomen  -CW      Abdomen superficial  -CW      Opened Regional Lymph Nodes axillary;inguinal  -CW      Axillary right;left  -CW      Inguinal right;left  -CW      Opened Anastamoses inguino-axillary  -CW      Inguino-Axillary right;left  -CW      Extremity Treatment MLD to full limb  -CW      MLD to Full Limb BLE's  -CW         Compression/Skin Care    Compression Garment Comments Applied liner and BLE Reduction kit lower leg. Adjusted the velcro straps and reviewed how to apply.  -CW                User Key  (r) = Recorded By, (t) = Taken By, (c) = Cosigned By      Initials Name Provider Type    Becky Amato OTR Occupational Therapist                             OT Assessment/Plan       Row Name 10/14/24 1044          OT Assessment    Assessment Comments Pt. reports LE pain 4/10 today. Pt. was able to wear the velcro compression during the day and off at night. Pt. left the velcro compression off yesterday. Skin dry  and intact with no open wounds. MLD sequence completed with pt. supine. Applied the velcro Reduction kit lower legs with moderate compression as tolerated. Encouraged pt to tighten the velcro compression as needed if he feels that it is too loose. Reviewed wearing schedule for velcro compression and advised pt. to wear the L thigh reduction kit at night.  -CW        OT Plan    OT Plan Comments Plan to cont. CDT and progress to indep. lymph. management  -CW               User Key  (r) = Recorded By, (t) = Taken By, (c) = Cosigned By      Initials Name Provider Type    Becky Amato OTR Occupational Therapist                        Manual Rx (Last 36 Hours)       Manual Treatments       Row Name 10/14/24 1348             Total Minutes    23433 - OT Manual Therapy Minutes 60  -CW                User Key  (r) = Recorded By, (t) = Taken By, (c) = Cosigned By      Initials Name Provider Type    Becky Amato OTR Occupational Therapist                      Therapy Education  Given: Edema management, Symptoms/condition management  Program: Reinforced  How Provided: Verbal, Demonstration  Provided to: Patient  Level of Understanding: Verbalized                Time Calculation:   OT Start Time: 0730  OT Stop Time: 0830  OT Time Calculation (min): 60 min  Total Timed Code Minutes- OT: 60 minute(s)  Timed Charges  83279 - OT Manual Therapy Minutes: 60  Total Minutes  Timed Charges Total Minutes: 60   Total Minutes: 60     Therapy Charges for Today       Code Description Service Date Service Provider Modifiers Qty    [id number]  OT MANUAL THERAPY EA 15 MIN 10/14/2024 Becky Brown OTR GO 4                        ZULMA Morales  10/14/2024

## 2024-10-15 ENCOUNTER — HOSPITAL ENCOUNTER (OUTPATIENT)
Dept: OCCUPATIONAL THERAPY | Facility: HOSPITAL | Age: 54
Setting detail: THERAPIES SERIES
Discharge: HOME | End: 2024-10-15
Payer: COMMERCIAL

## 2024-10-15 DIAGNOSIS — I89.0 LYMPHEDEMA OF BOTH LOWER EXTREMITIES: Primary | ICD-10-CM

## 2024-10-15 PROCEDURE — 97140 MANUAL THERAPY 1/> REGIONS: CPT

## 2024-10-15 NOTE — THERAPY TREATMENT NOTE
Outpatient Occupational Therapy Lymphedema Treatment Note  [facility]     Patient Name: Kameron Melendez  : 1970  MRN: [id number]  Today's Date: 10/15/2024      Visit Date: 10/15/2024    Patient Active Problem List   Diagnosis    Pulmonary hypertension    Lymphedema of both lower extremities    Obesity, morbid, BMI 50 or higher    Dyslipidemia    Hyperuricemia    Hypogonadal obesity    Knee arthropathy    Morbid obesity with body mass index of 60.0-69.9 in adult    ARNOLDO (obstructive sleep apnea)    Severe edema    History of pulmonary embolism    Intractable back pain    Heterozygous factor V Leiden mutation    Cellulitis of left lower extremity    Hypokalemia    CAROL (acute kidney injury)    Sepsis with cutaneous manifestations    Hyperglycemia    Paroxysmal atrial fibrillation    Long term (current) use of anticoagulants    Lymphedema, not elsewhere classified    Nicotine dependence, other tobacco product, uncomplicated    Personal history of other venous thrombosis and embolism    Typical atrial flutter    Venous insufficiency (chronic) (peripheral)    Cellulitis of right lower extremity    Wound cellulitis    CAP (community acquired pneumonia)    Type 2 diabetes mellitus    Hyponatremia    Choledocholithiasis    RUQ pain        Past Medical History:   Diagnosis Date    DVT (deep venous thrombosis)     RLE    Factor V Leiden     Hypertension     Kidney stone     Lymphedema     Lymphedema of left lower extremity     Obesity     ARNOLDO (obstructive sleep apnea)     Pulmonary embolism     bilateral    Pulmonary hypertension     Right ventricular enlargement         Past Surgical History:   Procedure Laterality Date    CARDIAC CATHETERIZATION Bilateral 2017    Procedure: Pulmonary angiography- Inari ;  Surgeon: Cedric Coulter MD;  [facility]:  [facility];  Service:     CARDIAC CATHETERIZATION N/A 2017    Procedure: Right Heart Cath;  Surgeon: Cedric Coulter MD;  Location:  [facility];  Service:     THROMBECTOMY           Visit Dx:      ICD-10-CM ICD-9-CM   1. Lymphedema of both lower extremities  I89.0 457.1        Lymphedema       Row Name 10/15/24 0700             Subjective Pain    Able to rate subjective pain? yes  -CW      Pre-Treatment Pain Level 6  -CW      Post-Treatment Pain Level 6  -CW         Subjective    Subjective Comments R knee and LE pain 6/10 today  -CW         Skin Changes/Observations    Location/Assessment Lower Extremity  -CW      Lower Extremity Conditions left:;inflamed  -CW      Lower Extremity Color/Pigment bilateral:;hyperpigmented  -CW      Lower Extremity Skin Details L lower leg with thickened skin on the lower leg . Large lobe on left thigh  -CW      Skin Observations Comment Skin not as red LLE. small rash/dots noted R lateral aspect lower leg.  -CW         Manual Lymphatic Drainage    Manual Lymphatic Drainage initial sequence;opened regional lymph nodes;opened anastamoses;extremity treatment  -CW      Initial Sequence short neck;abdomen  -CW      Abdomen superficial  -CW      Opened Regional Lymph Nodes axillary;inguinal  -CW      Axillary right;left  -CW      Inguinal right;left  -CW      Opened Anastamoses inguino-axillary  -CW      Inguino-Axillary right;left  -CW      Extremity Treatment MLD to full limb  -CW      MLD to Full Limb BLE's  -CW         Compression/Skin Care    Compression Garment Comments Applied liner and BLE Reduction kit lower leg. Adjusted the velcro straps and reviewed how to apply.  -CW                User Key  (r) = Recorded By, (t) = Taken By, (c) = Cosigned By      Initials Name Provider Type    Becky Amato OTR Occupational Therapist                             OT Assessment/Plan       Row Name 10/15/24 1110          OT Assessment    Assessment Comments Pt. reports BLE pain and R knee pain 6/10.  Pt. was able to wear the velcro compression during the day and applied the L thigh compression in  the evening without. Skin  dry and intact with open wounds. MLD sequence completed with pt. supine. Applied the velcro compression B lower legs and liner with moderate compression as tolerated. Reviewed skin care and LE positioning. Pt. can adjust the velco compression lower legs during the day as needed for comfort. Reviewed wearing schedule for  velcro compression and timing to apply the compression pump. Insurance pending for BLE custom compression pump.  -CW        OT Plan    OT Plan Comments Plan to cont. CDT and progress to indep. lymph. management.  -CW               User Key  (r) = Recorded By, (t) = Taken By, (c) = Cosigned By      Initials Name Provider Type    Becky Amato OTR Occupational Therapist                        Manual Rx (Last 36 Hours)       Manual Treatments       Row Name 10/15/24 1123             Total Minutes    91108 - OT Manual Therapy Minutes 60  -CW                User Key  (r) = Recorded By, (t) = Taken By, (c) = Cosigned By      Initials Name Provider Type    Becky Amato OTR Occupational Therapist                      Therapy Education  Given: Symptoms/condition management, Edema management  Program: Reinforced  How Provided: Verbal, Demonstration  Provided to: Patient  Level of Understanding: Verbalized                Time Calculation:   OT Start Time: 0730  OT Stop Time: 0830  OT Time Calculation (min): 60 min  Total Timed Code Minutes- OT: 60 minute(s)  Timed Charges  21044 - OT Manual Therapy Minutes: 60  Total Minutes  Timed Charges Total Minutes: 60   Total Minutes: 60     Therapy Charges for Today       Code Description Service Date Service Provider Modifiers Qty    [id number]  OT MANUAL THERAPY EA 15 MIN 10/15/2024 Becky Brown OTR GO 4                        ZULMA Morales  10/15/2024

## 2024-10-16 ENCOUNTER — HOSPITAL ENCOUNTER (OUTPATIENT)
Dept: OCCUPATIONAL THERAPY | Facility: HOSPITAL | Age: 54
Discharge: HOME | End: 2024-10-16
Payer: COMMERCIAL

## 2024-10-16 DIAGNOSIS — I89.0 LYMPHEDEMA OF BOTH LOWER EXTREMITIES: Primary | ICD-10-CM

## 2024-10-16 PROCEDURE — 97140 MANUAL THERAPY 1/> REGIONS: CPT

## 2024-10-16 NOTE — THERAPY PROGRESS REPORT/RE-CERT
Outpatient Occupational Therapy Lymphedema Progress Note  [facility]     Patient Name: Kameron Melendez  : 1970  MRN: [id number]  Today's Date: 10/16/2024      Visit Date: 10/16/2024    Patient Active Problem List   Diagnosis    Pulmonary hypertension    Lymphedema of both lower extremities    Obesity, morbid, BMI 50 or higher    Dyslipidemia    Hyperuricemia    Hypogonadal obesity    Knee arthropathy    Morbid obesity with body mass index of 60.0-69.9 in adult    ARNOLDO (obstructive sleep apnea)    Severe edema    History of pulmonary embolism    Intractable back pain    Heterozygous factor V Leiden mutation    Cellulitis of left lower extremity    Hypokalemia    CAROL (acute kidney injury)    Sepsis with cutaneous manifestations    Hyperglycemia    Paroxysmal atrial fibrillation    Long term (current) use of anticoagulants    Lymphedema, not elsewhere classified    Nicotine dependence, other tobacco product, uncomplicated    Personal history of other venous thrombosis and embolism    Typical atrial flutter    Venous insufficiency (chronic) (peripheral)    Cellulitis of right lower extremity    Wound cellulitis    CAP (community acquired pneumonia)    Type 2 diabetes mellitus    Hyponatremia    Choledocholithiasis    RUQ pain        Past Medical History:   Diagnosis Date    DVT (deep venous thrombosis)     RLE    Factor V Leiden     Hypertension     Kidney stone     Lymphedema     Lymphedema of left lower extremity     Obesity     ARNOLDO (obstructive sleep apnea)     Pulmonary embolism     bilateral    Pulmonary hypertension     Right ventricular enlargement         Past Surgical History:   Procedure Laterality Date    CARDIAC CATHETERIZATION Bilateral 2017    Procedure: Pulmonary angiography- Inari ;  Surgeon: Cedric Coulter MD;  [facility]  [facility];  Service:     CARDIAC CATHETERIZATION N/A 2017    Procedure: Right Heart Cath;  Surgeon: Cedric Coulter MD;  [facility]:  [facility];  Service:     THROMBECTOMY           Visit Dx:      ICD-10-CM ICD-9-CM   1. Lymphedema of both lower extremities  I89.0 457.1        Lymphedema       Row Name 10/16/24 0700             Subjective Pain    Able to rate subjective pain? yes  -CW      Pre-Treatment Pain Level 6  -CW      Post-Treatment Pain Level 6  -CW         Subjective    Subjective Comments More tenderness B lower legs.  -CW         Skin Changes/Observations    Location/Assessment Lower Extremity  -CW      Lower Extremity Conditions left:;inflamed  -CW      Lower Extremity Color/Pigment bilateral:;hyperpigmented  -CW      Lower Extremity Skin Details L lower leg with thickened skin on the lower leg . Large lobe on left thigh  -CW      Skin Observations Comment Skin pink/redish B lower legs.  -CW         Lymphedema Measurements    Measurement Type(s) Circumferential  -CW      Circumferential Areas Lower extremities  -CW         BLE Circumferential (cm)    Measurement Location 1 30 cm above knee  -CW      Measurement Location 2 20 cm above knee  -CW      Left 2 125 cm  -CW      Right 2 93 cm  -CW      Measurement Location 3 10 cm above knee  -CW      Left 3 76.4 cm  -CW      Right 3 85 cm  -CW      Measurement Location 4 Knee  -CW      Left 4 66 cm  -CW      Right 4 67 cm  -CW      Measurement Location 5 10 cm below-knee  -CW      Left 5 61 cm  -CW      Right 5 61.4 cm  -CW      Measurement Location 6 20 cm below-knee  -CW      Left 6 51 cm  -CW      Right 6 49 cm  -CW      Measurement Location 7 30 cm below-knee  -CW      Left 7 41 cm  -CW      Right 7 39 cm  -CW      Measurement Location 8 ankle  -CW      Left 8 38 cm  -CW      Right 8 38.9 cm  -CW      Measurement Location 9 Midfoot  -CW      Left 9 27 cm  -CW      Right 9 27.8 cm  -CW      LLE Circumferential Total 485.4 cm  -CW      RLE Circumferential Total 461.1 cm  -CW         Manual Lymphatic Drainage    Manual Lymphatic Drainage initial sequence;opened regional lymph nodes;opened  anastamoses;extremity treatment  -CW      Initial Sequence short neck;abdomen  -CW      Abdomen superficial  -CW      Opened Regional Lymph Nodes axillary;inguinal  -CW      Axillary right;left  -CW      Inguinal right;left  -CW      Opened Anastamoses inguino-axillary  -CW      Inguino-Axillary right;left  -CW      Extremity Treatment MLD to full limb  -CW      MLD to Full Limb Focus on LLE  -CW         Compression/Skin Care    Compression Garment Comments Applied liner and LLE Reduction kit lower leg. Adjusted the velcro straps and reviewed how to apply.  -CW                User Key  (r) = Recorded By, (t) = Taken By, (c) = Cosigned By      Initials Name Provider Type    CW Becky Brown, OTR Occupational Therapist                             OT Assessment/Plan       Row Name 10/16/24 1351          OT Assessment    Assessment Comments Pt. reports increased pain today with tenderness 6/10. Pt. was able to wear his compression bandages some, but had increased discomfort. Pt. started taking antibiotics. Skin intact and dry with mild redness, but doesn't seem to be more inflammed. MLD sequence completed with pt. supine. Measurements taken. See flow sheet. Total circumference .4 cm. and .4 cm. ( 13.9  cm. R and 20.4 cm L  net decrease). Edema about the same LLE and slightly increased RLE. Applied the Reduction kit L LE today. Pt. to call his MD if he experiences any increased redness, fever or flu like symptoms. Reviewed progress and plan of care with pt. including appropriate modifications as needed. Encouraged cont. HEP and LE elevation.  -CW        OT Plan    OT Plan Comments Plan to cont. CDT and progress to independent lymphedema management.  -CW               User Key  (r) = Recorded By, (t) = Taken By, (c) = Cosigned By      Initials Name Provider Type    Becky Amato, OTR Occupational Therapist                        Manual Rx (Last 36 Hours)       Manual Treatments       Row Name 10/16/24  "1358             Total Minutes    97305 - OT Manual Therapy Minutes 60  -CW                User Key  (r) = Recorded By, (t) = Taken By, (c) = Cosigned By      Initials Name Provider Type    Becky Amato OTR Occupational Therapist                   OT Goals       Row Name 10/16/24 1300          OT Short Term Goals    STG Date to Achieve 10/30/24  -CW     STG 1 Patient will demonstrate proper awareness of “What is Lymphedema?” and \"Healthy Habits\" for improved prevention, management, care of symptoms and ease of transition to self-care of condition.  -CW     STG 1 Progress Ongoing;Progressing  -CW     STG 2 Patient independent and compliant with velcro compression/ self-wrapping techniques of compression bandages and/or velcro products with assistance of caregiver as needed for improved self-management of condition.  -CW     STG 2 Progress Progressing;Goal Revised  -CW     STG 3 Patient will demonstrate decreased net edema of bilateral lower extremities >/= 10-20cm  for decrease in edema, symptoms, decreased risk of infection and improved skin care/transition to self-care of condition.  -CW     STG 3 Progress Met  -        Long Term Goals    LTG Date to Achieve 11/13/24  -CW     LTG 1 Patient will demonstrate decreased net edema of bilateral lower extremities >/= 20-50 cm  for decrease in edema, symptoms, decreased risk of infection and improved skin care/transition to self-care of condition.  -CW     LTG 1 Progress Ongoing;Progressing  -CW     LTG 2 Patient independent and compliant with initial home exercise program focused on diaphragmatic breathing, range of motion, flexibility to decrease edema and improve lymphatic flow for decreased edema and decreased risk of infection.  -CW     LTG 2 Progress Progressing  -CW     LTG 3 Patient to improve LLIS by 10 points by discharge.  -CW     LTG 3 Progress Ongoing  -CW     LTG 4 Patient independent and compliant with use and care of compression garments and/or " Velcro products with assistance of a caregiver as needed to promote self-care independence.  -CW     LTG 4 Progress Ongoing  -CW               User Key  (r) = Recorded By, (t) = Taken By, (c) = Cosigned By      Initials Name Provider Type    Becky Amato OTR Occupational Therapist                    Therapy Education  Given: Edema management, Symptoms/condition management  Program: Reinforced  How Provided: Verbal, Demonstration  Provided to: Patient  Level of Understanding: Verbalized                Time Calculation:   OT Start Time: 0730  OT Stop Time: 0830  OT Time Calculation (min): 60 min  Total Timed Code Minutes- OT: 60 minute(s)  Timed Charges  40502 - OT Manual Therapy Minutes: 60  Total Minutes  Timed Charges Total Minutes: 60   Total Minutes: 60     Therapy Charges for Today       Code Description Service Date Service Provider Modifiers Qty    [id number]  OT MANUAL THERAPY EA 15 MIN 10/16/2024 Becky Brown OTR GO 4                        ZULMA Morales  10/16/2024

## 2024-10-18 ENCOUNTER — ANTICOAGULATION VISIT (OUTPATIENT)
Dept: PHARMACY | Facility: HOSPITAL | Age: 54
End: 2024-10-18
Payer: COMMERCIAL

## 2024-10-18 ENCOUNTER — APPOINTMENT (OUTPATIENT)
Dept: OCCUPATIONAL THERAPY | Facility: HOSPITAL | Age: 54
End: 2024-10-18
Payer: COMMERCIAL

## 2024-10-18 LAB — INR PPP: 3

## 2024-10-18 NOTE — PROGRESS NOTES
Anticoagulation Clinic Progress Note    Anticoagulation Summary  As of 10/18/2024      INR goal:  2.0-3.0   TTR:  65.4% (5.6 y)   INR used for dosing:  3.00 (10/18/2024)   Warfarin maintenance plan:  10 mg every day   Weekly warfarin total:  70 mg   No change documented:  Tessie Fatima RPH   Plan last modified:  Tessie Fatima RPH (10/4/2024)   Next INR check:  11/1/2024   Priority:  High   Target end date:  Indefinite    Indications    Other acute pulmonary embolism without acute cor pulmonale (Resolved) [I26.99]  Bilateral pulmonary embolism (Resolved) [I26.99]                 Anticoagulation Episode Summary       INR check location:  --    Preferred lab:  --    Send INR reminders to:   [facility]    Comments:  new  frankie March 2019 **WEEKLY FRANKIE**          Anticoagulation Care Providers       Provider Role Specialty Phone number    Torri Colmenares APRN Referring Cardiology [phone]    Elsy Baeza MD Responsible Cardiology [phone]            Clinic Interview:  No pertinent clinical findings have been reported.    INR History:      9/13/2024     8:34 AM 9/20/2024    12:00 AM 9/20/2024     8:18 AM 10/4/2024    12:00 AM 10/4/2024     8:12 AM 10/18/2024    12:00 AM 10/18/2024     8:30 AM   Anticoagulation Monitoring   INR 3.20  2.50  4.40  3.00   INR Date 9/13/2024  9/20/2024  10/4/2024  10/18/2024   INR Goal 2.0-3.0  2.0-3.0  2.0-3.0  2.0-3.0   Trend Same  Same  Down  Same   Last Week Total 75 mg  70 mg  75 mg  70 mg   Next Week Total 70 mg  75 mg  60 mg  70 mg   Sun 10 mg  10 mg  10 mg  10 mg   Mon 10 mg  10 mg  10 mg  10 mg   Tue 10 mg  10 mg  10 mg  10 mg   Wed 15 mg  15 mg  10 mg  10 mg   Thu 10 mg  10 mg  10 mg  10 mg   Fri 5 mg (9/13)  10 mg  Hold (10/4)  10 mg   Sat 10 mg  10 mg  10 mg  10 mg   Historical INR  2.50      4.40      3.00            This result is from an external source.       Plan:  1. INR is therapeutic today- see above in Anticoagulation Summary.     Kameron Melendez to continue their warfarin regimen- see above in Anticoagulation Summary.  2. Follow up in 1 week  3. Pt has agreed to only be called if INR out of range. They have been instructed to call if any changes in medications, doses, concerns, etc. Patient expresses understanding and has no further questions at this time.    Tessie Fatima [facility]

## 2024-10-21 ENCOUNTER — HOSPITAL ENCOUNTER (OUTPATIENT)
Dept: OCCUPATIONAL THERAPY | Facility: HOSPITAL | Age: 54
Setting detail: THERAPIES SERIES
Discharge: HOME | End: 2024-10-21
Payer: COMMERCIAL

## 2024-10-21 DIAGNOSIS — I89.0 LYMPHEDEMA OF BOTH LOWER EXTREMITIES: Primary | ICD-10-CM

## 2024-10-21 PROCEDURE — 97140 MANUAL THERAPY 1/> REGIONS: CPT

## 2024-10-21 NOTE — THERAPY TREATMENT NOTE
Outpatient Occupational Therapy Lymphedema Treatment Note  [facility]     Patient Name: Kameron Melendez  : 1970  MRN: [id number]  Today's Date: 10/21/2024      Visit Date: 10/21/2024    Patient Active Problem List   Diagnosis    Pulmonary hypertension    Lymphedema of both lower extremities    Obesity, morbid, BMI 50 or higher    Dyslipidemia    Hyperuricemia    Hypogonadal obesity    Knee arthropathy    Morbid obesity with body mass index of 60.0-69.9 in adult    ARNOLDO (obstructive sleep apnea)    Severe edema    History of pulmonary embolism    Intractable back pain    Heterozygous factor V Leiden mutation    Cellulitis of left lower extremity    Hypokalemia    CAROL (acute kidney injury)    Sepsis with cutaneous manifestations    Hyperglycemia    Paroxysmal atrial fibrillation    Long term (current) use of anticoagulants    Lymphedema, not elsewhere classified    Nicotine dependence, other tobacco product, uncomplicated    Personal history of other venous thrombosis and embolism    Typical atrial flutter    Venous insufficiency (chronic) (peripheral)    Cellulitis of right lower extremity    Wound cellulitis    CAP (community acquired pneumonia)    Type 2 diabetes mellitus    Hyponatremia    Choledocholithiasis    RUQ pain        Past Medical History:   Diagnosis Date    DVT (deep venous thrombosis)     RLE    Factor V Leiden     Hypertension     Kidney stone     Lymphedema     Lymphedema of left lower extremity     Obesity     ARNOLDO (obstructive sleep apnea)     Pulmonary embolism     bilateral    Pulmonary hypertension     Right ventricular enlargement         Past Surgical History:   Procedure Laterality Date    CARDIAC CATHETERIZATION Bilateral 2017    Procedure: Pulmonary angiography- Inari ;  Surgeon: Cedric Coulter MD;  [facility]  [facility];  Service:     CARDIAC CATHETERIZATION N/A 2017    Procedure: Right Heart Cath;  Surgeon: Cedric Coulter MD;  Location:  [facility];  Service:     THROMBECTOMY           Visit Dx:      ICD-10-CM ICD-9-CM   1. Lymphedema of both lower extremities  I89.0 457.1        Lymphedema       Row Name 10/21/24 0700             Subjective Pain    Able to rate subjective pain? yes  -CW      Pre-Treatment Pain Level 3  -CW      Post-Treatment Pain Level 3  -CW         Subjective    Subjective Comments General pain 3/10 with less tenderness.  -CW         Skin Changes/Observations    Location/Assessment Lower Extremity  -CW      Lower Extremity Conditions left:;inflamed  -CW      Lower Extremity Color/Pigment bilateral:;hyperpigmented  -CW      Lower Extremity Skin Details L lower leg with thickened skin on the lower leg . Large lobe on left thigh  -CW      Skin Observations Comment Skin not as pink/redish B lower legs  -CW         Manual Lymphatic Drainage    Manual Lymphatic Drainage initial sequence;opened regional lymph nodes;opened anastamoses;extremity treatment  -CW      Initial Sequence short neck;abdomen  -CW      Abdomen superficial  -CW      Opened Regional Lymph Nodes axillary;inguinal  -CW      Axillary right;left  -CW      Inguinal right;left  -CW      Opened Anastamoses inguino-axillary  -CW      Inguino-Axillary right;left  -CW      Extremity Treatment MLD to full limb  -CW      MLD to Full Limb BLE's  -CW         Compression/Skin Care    Compression Garment Comments Applied liner and LLE Reduction kit lower leg. Adjusted the velcro straps and reviewed how to apply.  -CW                User Key  (r) = Recorded By, (t) = Taken By, (c) = Cosigned By      Initials Name Provider Type    Becky Amato OTR Occupational Therapist                             OT Assessment/Plan       Row Name 10/21/24 0932          OT Assessment    Assessment Comments Pt. reports less LE pain today with less tenderness 3/10.  Pt. was able to wear the velcro compression during the day and added L thigh Reduction kit for night. Skin dry with less redness. MLD  sequence completed with pt. supine. Applied the velcro compression with moderate compression as tolerated LLE. Pt. can tighten or loosen Reduction kit velcro straps as needed for comfort. Reviewed skin care and  wearing schedule for velcro compression. Overall pt. feels he is doing better with less redness or tenderness BLE's. Will f/u with [employer] regarding compression pump.  -CW        OT Plan    OT Plan Comments Plan to cont. CDT and progress to indep. lymph. management.  -CW               User Key  (r) = Recorded By, (t) = Taken By, (c) = Cosigned By      Initials Name Provider Type    Becky Amato OTR Occupational Therapist                        Manual Rx (Last 36 Hours)       Manual Treatments       Row Name 10/21/24 1004             Total Minutes    55135 - OT Manual Therapy Minutes 60  -CW                User Key  (r) = Recorded By, (t) = Taken By, (c) = Cosigned By      Initials Name Provider Type    Becky Amato OTR Occupational Therapist                      Therapy Education  Education Details: Recommended cont. HEP and LE elevation.  Given: Symptoms/condition management, Edema management  Program: Reinforced  How Provided: Verbal, Demonstration  Provided to: Patient  Level of Understanding: Verbalized                Time Calculation:   OT Start Time: 0730  OT Stop Time: 0830  OT Time Calculation (min): 60 min  Total Timed Code Minutes- OT: 60 minute(s)  Timed Charges  59245 - OT Manual Therapy Minutes: 60  Total Minutes  Timed Charges Total Minutes: 60   Total Minutes: 60     Therapy Charges for Today       Code Description Service Date Service Provider Modifiers Qty    86605787641 HC OT MANUAL THERAPY EA 15 MIN 10/21/2024 Becky Brown OTR GO 4                        ZULMA Morales  10/21/2024

## 2024-10-22 ENCOUNTER — HOSPITAL ENCOUNTER (OUTPATIENT)
Dept: OCCUPATIONAL THERAPY | Facility: HOSPITAL | Age: 54
Setting detail: THERAPIES SERIES
Discharge: HOME | End: 2024-10-22
Payer: COMMERCIAL

## 2024-10-22 DIAGNOSIS — I89.0 LYMPHEDEMA OF BOTH LOWER EXTREMITIES: Primary | ICD-10-CM

## 2024-10-22 PROCEDURE — 97140 MANUAL THERAPY 1/> REGIONS: CPT

## 2024-10-22 NOTE — THERAPY TREATMENT NOTE
Outpatient Occupational Therapy Lymphedema Treatment Note  [facility]     Patient Name: Kameron Melendez  : 1970  MRN: [id number]  Today's Date: 10/22/2024      Visit Date: 10/22/2024    Patient Active Problem List   Diagnosis    Pulmonary hypertension    Lymphedema of both lower extremities    Obesity, morbid, BMI 50 or higher    Dyslipidemia    Hyperuricemia    Hypogonadal obesity    Knee arthropathy    Morbid obesity with body mass index of 60.0-69.9 in adult    ARNOLDO (obstructive sleep apnea)    Severe edema    History of pulmonary embolism    Intractable back pain    Heterozygous factor V Leiden mutation    Cellulitis of left lower extremity    Hypokalemia    CAROL (acute kidney injury)    Sepsis with cutaneous manifestations    Hyperglycemia    Paroxysmal atrial fibrillation    Long term (current) use of anticoagulants    Lymphedema, not elsewhere classified    Nicotine dependence, other tobacco product, uncomplicated    Personal history of other venous thrombosis and embolism    Typical atrial flutter    Venous insufficiency (chronic) (peripheral)    Cellulitis of right lower extremity    Wound cellulitis    CAP (community acquired pneumonia)    Type 2 diabetes mellitus    Hyponatremia    Choledocholithiasis    RUQ pain        Past Medical History:   Diagnosis Date    DVT (deep venous thrombosis)     RLE    Factor V Leiden     Hypertension     Kidney stone     Lymphedema     Lymphedema of left lower extremity     Obesity     ARNOLDO (obstructive sleep apnea)     Pulmonary embolism     bilateral    Pulmonary hypertension     Right ventricular enlargement         Past Surgical History:   Procedure Laterality Date    CARDIAC CATHETERIZATION Bilateral 2017    Procedure: Pulmonary angiography- Inari ;  Surgeon: Cedric Coulter MD;  [facility]  [facility];  Service:     CARDIAC CATHETERIZATION N/A 2017    Procedure: Right Heart Cath;  Surgeon: Cedric Coulter MD;  [facility]:  [facility];  Service:     THROMBECTOMY           Visit Dx:      ICD-10-CM ICD-9-CM   1. Lymphedema of both lower extremities  I89.0 457.1        Lymphedema       Row Name 10/22/24 0700             Subjective Pain    Able to rate subjective pain? yes  -CW      Pre-Treatment Pain Level 5  -CW      Post-Treatment Pain Level 5  -CW         Subjective    Subjective Comments Pt. reports LE pain 5/10 today.  -CW         Skin Changes/Observations    Location/Assessment Lower Extremity  -CW      Lower Extremity Conditions left:;inflamed  -CW      Lower Extremity Color/Pigment bilateral:;hyperpigmented  -CW      Skin Observations Comment Skin not as pink/redish B lower legs  -CW         Manual Lymphatic Drainage    Manual Lymphatic Drainage initial sequence;opened regional lymph nodes;opened anastamoses;extremity treatment  -CW      Initial Sequence short neck;abdomen  -CW      Abdomen superficial  -CW      Opened Regional Lymph Nodes axillary;inguinal  -CW      Axillary right;left  -CW      Inguinal right;left  -CW      Opened Anastamoses inguino-axillary  -CW      Inguino-Axillary right;left  -CW      Extremity Treatment MLD to full limb  -CW      MLD to Full Limb BLE's  -CW         Compression/Skin Care    Compression Garment Comments Applied liner and LLE/RLE Reduction kit lower leg. Adjusted the velcro straps and reviewed how to apply.  -CW                User Key  (r) = Recorded By, (t) = Taken By, (c) = Cosigned By      Initials Name Provider Type    CW Becky Brown OTR Occupational Therapist                             OT Assessment/Plan       Row Name 10/22/24 1201          OT Assessment    Assessment Comments Pt. report LE pain 4/10 at present. Pt. was able to wear the lower leg velcro compression during the day and night. Was not able to wear the L thigh piece at night. Skin dry and intact with no increasd rashes or skin irritation. MLD sequence completed with pt. supine. Applied the velcro compresion B lower leg  with moderate compression as tolerated. Discussed LE positioning with elevation and options to help lift his leg  for positioning. Discussed compression bike shorts as potential thigh compression to wear during the day when pt. is at work.  -CW        OT Plan    OT Plan Comments Plan to cont. CDT and progress to indep. lymph. management.  -CW               User Key  (r) = Recorded By, (t) = Taken By, (c) = Cosigned By      Initials Name Provider Type    Becky Amato OTR Occupational Therapist                        Manual Rx (Last 36 Hours)       Manual Treatments       Row Name 10/22/24 1207             Total Minutes    55368 - OT Manual Therapy Minutes 60  -CW                User Key  (r) = Recorded By, (t) = Taken By, (c) = Cosigned By      Initials Name Provider Type    Becky Amato OTR Occupational Therapist                      Therapy Education  Given: Edema management, Bandaging/dressing change, Posture/body mechanics  Program: Reinforced, New  How Provided: Verbal, Demonstration  Provided to: Patient  Level of Understanding: Verbalized                Time Calculation:   OT Start Time: 0730  OT Stop Time: 0830  OT Time Calculation (min): 60 min  Total Timed Code Minutes- OT: 60 minute(s)  Timed Charges  95663 - OT Manual Therapy Minutes: 60  Total Minutes  Timed Charges Total Minutes: 60   Total Minutes: 60     Therapy Charges for Today       Code Description Service Date Service Provider Modifiers Qty    [id number]  OT MANUAL THERAPY EA 15 MIN 10/22/2024 Becky Brown OTR GO 4                        ZULMA Morales  10/22/2024

## 2024-10-23 ENCOUNTER — APPOINTMENT (OUTPATIENT)
Dept: OCCUPATIONAL THERAPY | Facility: HOSPITAL | Age: 54
End: 2024-10-23
Payer: COMMERCIAL

## 2024-10-23 ENCOUNTER — TELEPHONE (OUTPATIENT)
Dept: FAMILY MEDICINE CLINIC | Facility: CLINIC | Age: 54
End: 2024-10-23
Payer: COMMERCIAL

## 2024-10-23 DIAGNOSIS — R07.81 PLEURITIC CHEST PAIN: ICD-10-CM

## 2024-10-23 DIAGNOSIS — M72.2 PLANTAR FASCIITIS OF LEFT FOOT: ICD-10-CM

## 2024-10-23 RX ORDER — METHYLPREDNISOLONE 4 MG
TABLET, DOSE PACK ORAL
Qty: 21 TABLET | Refills: 0 | Status: SHIPPED | OUTPATIENT
Start: 2024-10-23

## 2024-10-23 NOTE — TELEPHONE ENCOUNTER
PLEASE ADVISE.     Pt has not been told a response yet.     Left foot is acting up again. I have to be up on it quite a bit this weekend. Would it be possible for Dr. Patel to call me in another Pred Pack, please?

## 2024-10-25 ENCOUNTER — HOSPITAL ENCOUNTER (OUTPATIENT)
Dept: OCCUPATIONAL THERAPY | Facility: HOSPITAL | Age: 54
Setting detail: THERAPIES SERIES
Discharge: HOME | End: 2024-10-25
Payer: COMMERCIAL

## 2024-10-25 ENCOUNTER — ANTICOAGULATION VISIT (OUTPATIENT)
Dept: PHARMACY | Facility: HOSPITAL | Age: 54
End: 2024-10-25
Payer: COMMERCIAL

## 2024-10-25 DIAGNOSIS — I89.0 LYMPHEDEMA OF BOTH LOWER EXTREMITIES: Primary | ICD-10-CM

## 2024-10-25 LAB — INR PPP: 2.2

## 2024-10-25 PROCEDURE — 97140 MANUAL THERAPY 1/> REGIONS: CPT

## 2024-10-25 NOTE — THERAPY PROGRESS REPORT/RE-CERT
Outpatient Occupational Therapy Lymphedema Progress Note  [facility]     Patient Name: Kameron Melendez  : 1970  MRN: [id number]  Today's Date: 10/25/2024      Visit Date: 10/25/2024    Patient Active Problem List   Diagnosis    Pulmonary hypertension    Lymphedema of both lower extremities    Obesity, morbid, BMI 50 or higher    Dyslipidemia    Hyperuricemia    Hypogonadal obesity    Knee arthropathy    Morbid obesity with body mass index of 60.0-69.9 in adult    ARNOLDO (obstructive sleep apnea)    Severe edema    History of pulmonary embolism    Intractable back pain    Heterozygous factor V Leiden mutation    Cellulitis of left lower extremity    Hypokalemia    CAROL (acute kidney injury)    Sepsis with cutaneous manifestations    Hyperglycemia    Paroxysmal atrial fibrillation    Long term (current) use of anticoagulants    Lymphedema, not elsewhere classified    Nicotine dependence, other tobacco product, uncomplicated    Personal history of other venous thrombosis and embolism    Typical atrial flutter    Venous insufficiency (chronic) (peripheral)    Cellulitis of right lower extremity    Wound cellulitis    CAP (community acquired pneumonia)    Type 2 diabetes mellitus    Hyponatremia    Choledocholithiasis    RUQ pain        Past Medical History:   Diagnosis Date    DVT (deep venous thrombosis)     RLE    Factor V Leiden     Hypertension     Kidney stone     Lymphedema     Lymphedema of left lower extremity     Obesity     ARNOLDO (obstructive sleep apnea)     Pulmonary embolism     bilateral    Pulmonary hypertension     Right ventricular enlargement         Past Surgical History:   Procedure Laterality Date    CARDIAC CATHETERIZATION Bilateral 2017    Procedure: Pulmonary angiography- Inari ;  Surgeon: Cedric Coulter MD;  [facility]  [facility];  Service:     CARDIAC CATHETERIZATION N/A 2017    Procedure: Right Heart Cath;  Surgeon: Cedric Coulter MD;  [facility]  [facility];  Service:     THROMBECTOMY           Visit Dx:      ICD-10-CM ICD-9-CM   1. Lymphedema of both lower extremities  I89.0 457.1        Lymphedema       Row Name 10/25/24 0700             Subjective Pain    Able to rate subjective pain? yes  -CW      Pre-Treatment Pain Level 4  -CW      Post-Treatment Pain Level 4  -CW         Subjective    Subjective Comments Pt. reports general pain 4/10. Some soreness L foot.  -CW         Skin Changes/Observations    Location/Assessment Lower Extremity  -CW      Lower Extremity Conditions left:;inflamed  -CW      Lower Extremity Color/Pigment bilateral:;hyperpigmented  -CW      Skin Observations Comment Skin not as pink/redish B lower legs  -CW         Lymphedema Measurements    Measurement Type(s) Circumferential  -CW      Circumferential Areas Lower extremities  -CW         BLE Circumferential (cm)    Measurement Location 1 30 cm above knee  -CW      Measurement Location 2 20 cm above knee  -CW      Left 2 125 cm  -CW      Right 2 94 cm  -CW      Measurement Location 3 10 cm above knee  -CW      Left 3 76.5 cm  -CW      Right 3 85 cm  -CW      Measurement Location 4 knee  -CW      Left 4 66.5 cm  -CW      Right 4 71 cm  -CW      Measurement Location 5 10 cm below-knee  -CW      Left 5 61 cm  -CW      Right 5 67.5 cm  -CW      Measurement Location 6 20 cm below-knee  -CW      Left 6 51 cm  -CW      Right 6 49 cm  -CW      Measurement Location 7 30 cm below-knee  -CW      Left 7 41 cm  -CW      Right 7 39 cm  -CW      Measurement Location 8 ankle  -CW      Left 8 38 cm  -CW      Right 8 39 cm  -CW      Measurement Location 9 Midfoot  -CW      Left 9 27 cm  -CW      Right 9 27.8 cm  -CW      LLE Circumferential Total 486 cm  -CW      RLE Circumferential Total 472.3 cm  -CW         Manual Lymphatic Drainage    Manual Lymphatic Drainage initial sequence;opened regional lymph nodes;opened anastamoses;extremity treatment  -CW      Initial Sequence short neck;abdomen  -CW       Abdomen superficial  -CW      Opened Regional Lymph Nodes axillary;inguinal  -CW      Axillary right;left  -CW      Inguinal right;left  -CW      Opened Anastamoses inguino-axillary  -CW      Inguino-Axillary right;left  -CW      Extremity Treatment MLD to full limb  -CW      MLD to Full Limb BLE's  -CW         Compression/Skin Care    Compression Garment Comments Applied liner and LLE/RLE Reduction kit lower leg. Adjusted the velcro straps and reviewed how to apply. Discussed options for night compression.  -CW                User Key  (r) = Recorded By, (t) = Taken By, (c) = Cosigned By      Initials Name Provider Type    Becky Amato OTR Occupational Therapist                             OT Assessment/Plan       Row Name 10/25/24 0856          OT Assessment    Assessment Comments Pt. reports LE pain 4/10 with some L foot pain at times. Pt. was able to wear his velcro compression during the day and off at night. Encouraged pt. to wear L thigh piece in the evening or night because of difficulty wearing it at work. Skin intact and not as red. Skin firm dry with no wounds, rashes or increased skin irritation. MLD sequence completed with pt. supine. Measurements taken. See flow sheet. Total circumference .3 cm. and   cm. (3.0 cm. R and 19.8 cm L net decrease). Pt. was not as consistent with the compression due to feeling ill from the flu shot.   Applied the velcro with moderate compression as tolerated. Reviewed progress and plan of care with pt. including appropriate modifications as needed. Encouraged cont. HEP and LE elevation along with deep abdominal breathing. Will fit pt. for R thigh piece next Tues. during clinic day with [facility] rep.  -CW        OT Plan    OT Plan Comments Plan to cont. CDT and progress to independent lymphedema management.  -CW               User Key  (r) = Recorded By, (t) = Taken By, (c) = Cosigned By      Initials Name Provider Type    Becky Amato OTR Occupational  "Therapist                        Manual Rx (Last 36 Hours)       Manual Treatments       Row Name 10/25/24 0906             Total Minutes    07922 - OT Manual Therapy Minutes 60  -CW                User Key  (r) = Recorded By, (t) = Taken By, (c) = Cosigned By      Initials Name Provider Type    Becky Amato OTR Occupational Therapist                   OT Goals       Row Name 10/25/24 0800          OT Short Term Goals    STG Date to Achieve 10/30/24  -CW     STG 1 Patient will demonstrate proper awareness of “What is Lymphedema?” and \"Healthy Habits\" for improved prevention, management, care of symptoms and ease of transition to self-care of condition.  -CW     STG 1 Progress Ongoing;Progressing  -CW     STG 2 Patient independent and compliant with velcro compression/ self-wrapping techniques of compression bandages and/or velcro products with assistance of caregiver as needed for improved self-management of condition.  -CW     STG 2 Progress Progressing;Ongoing  -CW     STG 3 Patient will demonstrate decreased net edema of bilateral lower extremities >/= 10-20cm  for decrease in edema, symptoms, decreased risk of infection and improved skin care/transition to self-care of condition.  -CW     STG 3 Progress Met;Ongoing  -CW        Long Term Goals    LTG Date to Achieve 11/13/24  -CW     LTG 1 Patient will demonstrate decreased net edema of bilateral lower extremities >/= 20-50 cm  for decrease in edema, symptoms, decreased risk of infection and improved skin care/transition to self-care of condition.  -CW     LTG 1 Progress Ongoing;Progressing  -CW     LTG 2 Patient independent and compliant with initial home exercise program focused on diaphragmatic breathing, range of motion, flexibility to decrease edema and improve lymphatic flow for decreased edema and decreased risk of infection.  -CW     LTG 2 Progress Met  -CW     LTG 3 Patient to improve LLIS by 10 points by discharge.  -CW     LTG 3 Progress Ongoing  " -CW     LTG 4 Patient independent and compliant with use and care of compression garments and/or Velcro products with assistance of a caregiver as needed to promote self-care independence.  -CW     LTG 4 Progress Ongoing  -CW               User Key  (r) = Recorded By, (t) = Taken By, (c) = Cosigned By      Initials Name Provider Type    Becky Amato OTR Occupational Therapist                    Therapy Education  Given: Edema management, Symptoms/condition management  Program: Reinforced  How Provided: Verbal, Demonstration  Provided to: Patient  Level of Understanding: Verbalized                Time Calculation:   OT Start Time: 0730  OT Stop Time: 0830  OT Time Calculation (min): 60 min  Total Timed Code Minutes- OT: 60 minute(s)  Timed Charges  33268 - OT Manual Therapy Minutes: 60  Total Minutes  Timed Charges Total Minutes: 60   Total Minutes: 60     Therapy Charges for Today       Code Description Service Date Service Provider Modifiers Qty    [id number]  OT MANUAL THERAPY EA 15 MIN 10/25/2024 Becky Brown OTR GO 4                        ZULMA Morales  10/25/2024

## 2024-10-25 NOTE — PROGRESS NOTES
Anticoagulation Clinic Progress Note    Anticoagulation Summary  As of 10/25/2024      INR goal:  2.0-3.0   TTR:  65.5% (5.7 y)   INR used for dosin.20 (10/25/2024)   Warfarin maintenance plan:  10 mg every day   Weekly warfarin total:  70 mg   No change documented:  Iris Gonzalez   Plan last modified:  Tessie Fatima RPH (10/4/2024)   Next INR check:  2024   Priority:  High   Target end date:  Indefinite    Indications    Other acute pulmonary embolism without acute cor pulmonale (Resolved) [I26.99]  Bilateral pulmonary embolism (Resolved) [I26.99]                 Anticoagulation Episode Summary       INR check location:  --    Preferred lab:  --    Send INR reminders to:   [facility]  POOL    Comments:  new  tristen 2019 **WEEKLY TRISTEN**          Anticoagulation Care Providers       Provider Role Specialty Phone number    Torri Colmenares APRN Referring Cardiology [phone]    Elsy Baeza MD Responsible Cardiology [phone]            Clinic Interview:  No pertinent clinical findings have been reported.    INR History:      2024     8:18 AM 10/4/2024    12:00 AM 10/4/2024     8:12 AM 10/18/2024    12:00 AM 10/18/2024     8:30 AM 10/25/2024    12:00 AM 10/25/2024     8:51 AM   Anticoagulation Monitoring   INR 2.50  4.40  3.00  2.20   INR Date 2024  10/4/2024  10/18/2024  10/25/2024   INR Goal 2.0-3.0  2.0-3.0  2.0-3.0  2.0-3.0   Trend Same  Down  Same  Same   Last Week Total 70 mg  75 mg  70 mg  70 mg   Next Week Total 75 mg  60 mg  70 mg  70 mg   Sun 10 mg  10 mg  10 mg  10 mg   Mon 10 mg  10 mg  10 mg  10 mg   Tue 10 mg  10 mg  10 mg  10 mg   Wed 15 mg  10 mg  10 mg  10 mg   Thu 10 mg  10 mg  10 mg  10 mg   Fri 10 mg  Hold (10/4)  10 mg  10 mg   Sat 10 mg  10 mg  10 mg  10 mg   Historical INR  4.40      3.00      2.20            This result is from an external source.       Plan:  1. INR is therapeutic today- see above in Anticoagulation Summary.     Kameron Melendez to continue their warfarin regimen- see above in Anticoagulation Summary.  2. Follow up in 1 week  3. Pt has agreed to only be called if INR out of range. They have been instructed to call if any changes in medications, doses, concerns, etc. Patient expresses understanding and has no further questions at this time.    Iris Gonzalez

## 2024-10-28 ENCOUNTER — HOSPITAL ENCOUNTER (OUTPATIENT)
Dept: OCCUPATIONAL THERAPY | Facility: HOSPITAL | Age: 54
Setting detail: THERAPIES SERIES
Discharge: HOME | End: 2024-10-28
Payer: COMMERCIAL

## 2024-10-28 DIAGNOSIS — I89.0 LYMPHEDEMA OF BOTH LOWER EXTREMITIES: Primary | ICD-10-CM

## 2024-10-28 PROCEDURE — 97140 MANUAL THERAPY 1/> REGIONS: CPT

## 2024-10-28 NOTE — THERAPY TREATMENT NOTE
Outpatient Occupational Therapy Lymphedema Treatment Note  [facility]     Patient Name: Kameron Melendez  : 1970  MRN: [id number]  Today's Date: 10/28/2024      Visit Date: 10/28/2024    Patient Active Problem List   Diagnosis    Pulmonary hypertension    Lymphedema of both lower extremities    Obesity, morbid, BMI 50 or higher    Dyslipidemia    Hyperuricemia    Hypogonadal obesity    Knee arthropathy    Morbid obesity with body mass index of 60.0-69.9 in adult    ARNOLDO (obstructive sleep apnea)    Severe edema    History of pulmonary embolism    Intractable back pain    Heterozygous factor V Leiden mutation    Cellulitis of left lower extremity    Hypokalemia    CAROL (acute kidney injury)    Sepsis with cutaneous manifestations    Hyperglycemia    Paroxysmal atrial fibrillation    Long term (current) use of anticoagulants    Lymphedema, not elsewhere classified    Nicotine dependence, other tobacco product, uncomplicated    Personal history of other venous thrombosis and embolism    Typical atrial flutter    Venous insufficiency (chronic) (peripheral)    Cellulitis of right lower extremity    Wound cellulitis    CAP (community acquired pneumonia)    Type 2 diabetes mellitus    Hyponatremia    Choledocholithiasis    RUQ pain        Past Medical History:   Diagnosis Date    DVT (deep venous thrombosis)     RLE    Factor V Leiden     Hypertension     Kidney stone     Lymphedema     Lymphedema of left lower extremity     Obesity     ARNOLDO (obstructive sleep apnea)     Pulmonary embolism     bilateral    Pulmonary hypertension     Right ventricular enlargement         Past Surgical History:   Procedure Laterality Date    CARDIAC CATHETERIZATION Bilateral 2017    Procedure: Pulmonary angiography- [facility] ;  Surgeon: Cedric Coulter MD;  [facility];  Service:     CARDIAC CATHETERIZATION N/A 2017    Procedure: Right Heart Cath;  Surgeon: Cedric Coulter MD;  Location:  [facility];  Service:     THROMBECTOMY           Visit Dx:      ICD-10-CM ICD-9-CM   1. Lymphedema of both lower extremities  I89.0 457.1        Lymphedema       Row Name 10/28/24 0700             Subjective Pain    Able to rate subjective pain? yes  -CW      Pre-Treatment Pain Level 2  -CW      Post-Treatment Pain Level 2  -CW         Subjective    Subjective Comments LE pain 2/10.  -CW         Skin Changes/Observations    Location/Assessment Lower Extremity  -CW      Lower Extremity Conditions left:;inflamed  -CW      Lower Extremity Color/Pigment bilateral:;hyperpigmented  -CW      Skin Observations Comment Skin not as pink/redish B lower legs  -CW         Manual Lymphatic Drainage    Manual Lymphatic Drainage initial sequence;opened regional lymph nodes;opened anastamoses;extremity treatment  -CW      Initial Sequence short neck;abdomen  -CW      Abdomen superficial  -CW      Opened Regional Lymph Nodes axillary;inguinal  -CW      Axillary right;left  -CW      Inguinal right;left  -CW      Opened Anastamoses inguino-axillary  -CW      Inguino-Axillary right;left  -CW      Extremity Treatment MLD to full limb  -CW      MLD to Full Limb BLE's  -CW         Compression/Skin Care    Compression Garment Comments Applied liner and LLE Reduction kit lower leg. Adjusted the velcro straps and reviewed how to apply. Discussed options for night compression.  -CW                User Key  (r) = Recorded By, (t) = Taken By, (c) = Cosigned By      Initials Name Provider Type    CW Becky Brown OTR Occupational Therapist                             OT Assessment/Plan       Row Name 10/28/24 0956          OT Assessment    Assessment Comments Pt. reports decreased LE pain 2/10 at present. Pt. took his diuretics and noticed some muscle cramping and reports his edems felt decreased. Pt. was able to wear the velcro compression during the day. Skin dry and intact with no wounds increased skin irritation. MLD sequence completed with  pt. supine. Applied the vlecro compression LLE  with moderate compression as tolerated. Pt. preferred not to apply the velcro compression RLE due to difficulty driving. Edema decreased B mid calf to ankle per observation. Pt. can loosen adjust the velcro straps as needed for comfort. Reviewed  precautions and wearing schedule for the velcro compression. Pt. reports he walked more over the weekend.  -CW        OT Plan    OT Plan Comments Plan to cont. CDT and progress to indep. lymph. management.  -CW               User Key  (r) = Recorded By, (t) = Taken By, (c) = Cosigned By      Initials Name Provider Type    Becky Amato OTR Occupational Therapist                        Manual Rx (Last 36 Hours)       Manual Treatments       Row Name 10/28/24 1002             Total Minutes    12324 - OT Manual Therapy Minutes 61  -CW                User Key  (r) = Recorded By, (t) = Taken By, (c) = Cosigned By      Initials Name Provider Type    Becky Amato OTR Occupational Therapist                      Therapy Education  Given: Edema management, Symptoms/condition management  Program: Reinforced  How Provided: Verbal, Demonstration  Provided to: Patient  Level of Understanding: Verbalized                Time Calculation:   OT Start Time: 0724  OT Stop Time: 0825  OT Time Calculation (min): 61 min  Total Timed Code Minutes- OT: 61 minute(s)  Timed Charges  43970 - OT Manual Therapy Minutes: 61  Total Minutes  Timed Charges Total Minutes: 61   Total Minutes: 61     Therapy Charges for Today       Code Description Service Date Service Provider Modifiers Qty    60267899654 HC OT MANUAL THERAPY EA 15 MIN 10/28/2024 Becky Brown OTR GO 4                        ZULMA Morales  10/28/2024

## 2024-10-29 ENCOUNTER — HOSPITAL ENCOUNTER (OUTPATIENT)
Dept: OCCUPATIONAL THERAPY | Facility: HOSPITAL | Age: 54
Setting detail: THERAPIES SERIES
Discharge: HOME | End: 2024-10-29
Payer: COMMERCIAL

## 2024-10-29 DIAGNOSIS — I89.0 LYMPHEDEMA OF BOTH LOWER EXTREMITIES: Primary | ICD-10-CM

## 2024-10-29 PROCEDURE — 97140 MANUAL THERAPY 1/> REGIONS: CPT

## 2024-10-29 NOTE — THERAPY TREATMENT NOTE
Outpatient Occupational Therapy Lymphedema Treatment Note  [facility]     Patient Name: Kameron Melendez  : 1970  MRN: [id number]  Today's Date: 10/29/2024      Visit Date: 10/29/2024    Patient Active Problem List   Diagnosis    Pulmonary hypertension    Lymphedema of both lower extremities    Obesity, morbid, BMI 50 or higher    Dyslipidemia    Hyperuricemia    Hypogonadal obesity    Knee arthropathy    Morbid obesity with body mass index of 60.0-69.9 in adult    ARNOLDO (obstructive sleep apnea)    Severe edema    History of pulmonary embolism    Intractable back pain    Heterozygous factor V Leiden mutation    Cellulitis of left lower extremity    Hypokalemia    CAROL (acute kidney injury)    Sepsis with cutaneous manifestations    Hyperglycemia    Paroxysmal atrial fibrillation    Long term (current) use of anticoagulants    Lymphedema, not elsewhere classified    Nicotine dependence, other tobacco product, uncomplicated    Personal history of other venous thrombosis and embolism    Typical atrial flutter    Venous insufficiency (chronic) (peripheral)    Cellulitis of right lower extremity    Wound cellulitis    CAP (community acquired pneumonia)    Type 2 diabetes mellitus    Hyponatremia    Choledocholithiasis    RUQ pain        Past Medical History:   Diagnosis Date    DVT (deep venous thrombosis)     RLE    Factor V Leiden     Hypertension     Kidney stone     Lymphedema     Lymphedema of left lower extremity     Obesity     ARNOLDO (obstructive sleep apnea)     Pulmonary embolism     bilateral    Pulmonary hypertension     Right ventricular enlargement         Past Surgical History:   Procedure Laterality Date    CARDIAC CATHETERIZATION Bilateral 2017    Procedure: Pulmonary angiography- [facility] ;  Surgeon: Cedric Coulter MD;  [facility]  [facility];  Service:     CARDIAC CATHETERIZATION N/A 2017    Procedure: Right Heart Cath;  Surgeon: Cedric Coulter MD;  [facility]  [facility];  Service:     THROMBECTOMY           Visit Dx:      ICD-10-CM ICD-9-CM   1. Lymphedema of both lower extremities  I89.0 457.1        Lymphedema       Row Name 10/29/24 1100             Subjective Pain    Able to rate subjective pain? yes  -CW      Pre-Treatment Pain Level 3  -CW      Post-Treatment Pain Level 3  -CW         Subjective    Subjective Comments Pt. reports LE general pain 3/10  -CW         Skin Changes/Observations    Location/Assessment Lower Extremity  -CW      Lower Extremity Conditions left:;inflamed  -CW      Lower Extremity Color/Pigment bilateral:;hyperpigmented  -CW      Skin Observations Comment Skin not as pink/redish B lower legs  -CW         Manual Lymphatic Drainage    Manual Lymphatic Drainage initial sequence;opened regional lymph nodes;opened anastamoses;extremity treatment  -CW      Initial Sequence short neck;abdomen  -CW      Abdomen superficial  -CW      Opened Regional Lymph Nodes axillary;inguinal  -CW      Axillary right;left  -CW      Inguinal right;left  -CW      Opened Anastamoses inguino-axillary  -CW      Inguino-Axillary right;left  -CW      Extremity Treatment MLD to full limb  -CW      MLD to Full Limb BLE's  -CW         Compression/Skin Care    Compression Garment Comments Applied liner and BLE Reduction kit lower leg. Adjusted the velcro straps and reviewed how to apply. Fit pt. for RLE thigh Reduction kit with Medi Rep present. Applied L thigh Reduction kit and adjusted the velcro straps for better fit.  -CW                User Key  (r) = Recorded By, (t) = Taken By, (c) = Cosigned By      Initials Name Provider Type    Becky Amato OTR Occupational Therapist                             OT Assessment/Plan       Row Name 10/29/24 1150          OT Assessment    Assessment Comments Pt. reports LE pain 3/10 at present. Pt. was able to wear the velcro compression during the day. Skin dry and intact with no wounds or increased skin irritation. MLD sequence  completed with pt. supine. Applied the BLE Reduction kit lower leg with moderate compression as tolerated. Fit pt. for R thigh Reduction kit with Medi rep present and trimmed for better fit. Applied L thigh Reduction kit and adjusted the velcro staps.  Encouraged pt. to wear B thigh Reduction kits at night. Reviewed wearing schedule for velcro compression.  -CW        OT Plan    OT Plan Comments Plan to cont. CDT and progress to indep. lymph. management.  -CW               User Key  (r) = Recorded By, (t) = Taken By, (c) = Cosigned By      Initials Name Provider Type    Becky Amato OTR Occupational Therapist                        Manual Rx (Last 36 Hours)       Manual Treatments       Row Name 10/29/24 1201             Total Minutes    94136 - OT Manual Therapy Minutes 62  -CW                User Key  (r) = Recorded By, (t) = Taken By, (c) = Cosigned By      Initials Name Provider Type    Becky Amato OTR Occupational Therapist                      Therapy Education  Given: Edema management, Symptoms/condition management  Program: Reinforced, Progressed  How Provided: Verbal, Demonstration  Provided to: Patient  Level of Understanding: Verbalized                Time Calculation:   OT Start Time: 0725  OT Stop Time: 0827  OT Time Calculation (min): 62 min  Total Timed Code Minutes- OT: 62 minute(s)  Timed Charges  97106 - OT Manual Therapy Minutes: 62  Total Minutes  Timed Charges Total Minutes: 62   Total Minutes: 62     Therapy Charges for Today       Code Description Service Date Service Provider Modifiers Qty    [id number]  OT MANUAL THERAPY EA 15 MIN 10/29/2024 Becky Brown OTR GO 4                        ZULMA Morales  10/29/2024

## 2024-10-30 ENCOUNTER — HOSPITAL ENCOUNTER (OUTPATIENT)
Dept: OCCUPATIONAL THERAPY | Facility: HOSPITAL | Age: 54
Setting detail: THERAPIES SERIES
Discharge: HOME | End: 2024-10-30
Payer: COMMERCIAL

## 2024-10-30 DIAGNOSIS — I89.0 LYMPHEDEMA OF BOTH LOWER EXTREMITIES: Primary | ICD-10-CM

## 2024-10-30 PROCEDURE — 97140 MANUAL THERAPY 1/> REGIONS: CPT

## 2024-10-30 NOTE — THERAPY PROGRESS REPORT/RE-CERT
Outpatient Occupational Therapy Lymphedema Progress Note  [facility]     Patient Name: Kameron Melendez  : 1970  MRN: [id number]  Today's Date: 10/30/2024      Visit Date: 10/30/2024    Patient Active Problem List   Diagnosis    Pulmonary hypertension    Lymphedema of both lower extremities    Obesity, morbid, BMI 50 or higher    Dyslipidemia    Hyperuricemia    Hypogonadal obesity    Knee arthropathy    Morbid obesity with body mass index of 60.0-69.9 in adult    ARNOLDO (obstructive sleep apnea)    Severe edema    History of pulmonary embolism    Intractable back pain    Heterozygous factor V Leiden mutation    Cellulitis of left lower extremity    Hypokalemia    CAROL (acute kidney injury)    Sepsis with cutaneous manifestations    Hyperglycemia    Paroxysmal atrial fibrillation    Long term (current) use of anticoagulants    Lymphedema, not elsewhere classified    Nicotine dependence, other tobacco product, uncomplicated    Personal history of other venous thrombosis and embolism    Typical atrial flutter    Venous insufficiency (chronic) (peripheral)    Cellulitis of right lower extremity    Wound cellulitis    CAP (community acquired pneumonia)    Type 2 diabetes mellitus    Hyponatremia    Choledocholithiasis    RUQ pain        Past Medical History:   Diagnosis Date    DVT (deep venous thrombosis)     RLE    Factor V Leiden     Hypertension     Kidney stone     Lymphedema     Lymphedema of left lower extremity     Obesity     ARNOLDO (obstructive sleep apnea)     Pulmonary embolism     bilateral    Pulmonary hypertension     Right ventricular enlargement         Past Surgical History:   Procedure Laterality Date    CARDIAC CATHETERIZATION Bilateral 2017    Procedure: Pulmonary angiography- [facility] ;  Surgeon: Cedric Coulter MD;  [facility]  [facility];  Service:     CARDIAC CATHETERIZATION N/A 2017    Procedure: Right Heart Cath;  Surgeon: Cedirc Coulter MD;  [facility]  [facility];  Service:     THROMBECTOMY           Visit Dx:      ICD-10-CM ICD-9-CM   1. Lymphedema of both lower extremities  I89.0 457.1        Lymphedema       Row Name 10/30/24 0700             Subjective Pain    Able to rate subjective pain? yes  -CW      Pre-Treatment Pain Level 3  -CW      Post-Treatment Pain Level 3  -CW         Subjective    Subjective Comments Pt. reports general LE pain 3/10 and some L foot pain.  -CW         Skin Changes/Observations    Location/Assessment Lower Extremity  -CW      Lower Extremity Conditions left:;inflamed  -CW      Lower Extremity Color/Pigment bilateral:;hyperpigmented  -CW      Skin Observations Comment Skin not as pink/redish B lower legs  -CW         Lymphedema Measurements    Measurement Type(s) Circumferential  -CW      Circumferential Areas Lower extremities  -CW         BLE Circumferential (cm)    Measurement Location 1 30 cm above knee  -CW      Measurement Location 2 20 cm above knee  -CW      Left 2 125 cm  -CW      Right 2 93.5 cm  -CW      Measurement Location 3 10 cm above knee  -CW      Left 3 76.3 cm  -CW      Right 3 83.5 cm  -CW      Measurement Location 4 knee  -CW      Left 4 64 cm  -CW      Right 4 68.5 cm  -CW      Measurement Location 5 10 cm below-knee  -CW      Left 5 61 cm  -CW      Right 5 64 cm  -CW      Measurement Location 6 20 cm below-knee  -CW      Left 6 49.5 cm  -CW      Right 6 45 cm  -CW      Measurement Location 7 30 cm below-knee  -CW      Left 7 41 cm  -CW      Right 7 39 cm  -CW      Measurement Location 8 ankle  -CW      Left 8 39 cm  -CW      Right 8 39 cm  -CW      Measurement Location 9 Midfoot  -CW      Left 9 27.5 cm  -CW      Right 9 27.5 cm  -CW      LLE Circumferential Total 483.3 cm  -CW      RLE Circumferential Total 460 cm  -CW         Manual Lymphatic Drainage    Manual Lymphatic Drainage initial sequence;opened regional lymph nodes;opened anastamoses;extremity treatment  -CW      Initial Sequence short neck;abdomen   "-CW      Abdomen superficial  -CW      Opened Regional Lymph Nodes axillary;inguinal  -CW      Axillary right;left  -CW      Inguinal right;left  -CW      Opened Anastamoses inguino-axillary  -CW      Inguino-Axillary right;left  -CW      Extremity Treatment MLD to full limb  -CW      MLD to Full Limb BLE's  -CW         Compression/Skin Care    Compression Garment Comments Applied liner and BLE Reduction kit lower leg. Adjusted the velcro straps and reviewed how to apply. Fit pt. for RLE thigh Reduction kit with Medi Rep present. Applied L thigh Reduction kit and adjusted the velcro straps for better fit.  -CW                User Key  (r) = Recorded By, (t) = Taken By, (c) = Cosigned By      Initials Name Provider Type    Becky Amato OTR Occupational Therapist                                    Manual Rx (Last 36 Hours)       Manual Treatments       Row Name 10/30/24 0952             Total Minutes    66308 - OT Manual Therapy Minutes 60  -CW                User Key  (r) = Recorded By, (t) = Taken By, (c) = Cosigned By      Initials Name Provider Type    Becky Amato OTR Occupational Therapist                   OT Goals       Row Name 10/30/24 0900          OT Short Term Goals    STG Date to Achieve 10/30/24  -CW     STG 1 Patient will demonstrate proper awareness of “What is Lymphedema?” and \"Healthy Habits\" for improved prevention, management, care of symptoms and ease of transition to self-care of condition.  -CW     STG 1 Progress Ongoing;Progressing  -CW     STG 2 Patient independent and compliant with velcro compression/ self-wrapping techniques of compression bandages and/or velcro products with assistance of caregiver as needed for improved self-management of condition.  -CW     STG 2 Progress Met  -CW     STG 3 Patient will demonstrate decreased net edema of bilateral lower extremities >/= 10-20cm  for decrease in edema, symptoms, decreased risk of infection and improved skin care/transition to " self-care of condition.  -CW     STG 3 Progress Ongoing  -CW        Long Term Goals    LTG Date to Achieve 11/13/24  -CW     LTG 1 Patient will demonstrate decreased net edema of bilateral lower extremities >/= 15-20 cm  for decrease in edema, symptoms, decreased risk of infection and improved skin care/transition to self-care of condition.  -CW     LTG 1 Progress Ongoing;Progressing;Goal Revised  -CW     LTG 2 Patient independent and compliant with initial home exercise program focused on diaphragmatic breathing, range of motion, flexibility to decrease edema and improve lymphatic flow for decreased edema and decreased risk of infection.  -CW     LTG 2 Progress Met  -CW     LTG 3 Patient to improve LLIS by 10 points by discharge.  -CW     LTG 3 Progress Ongoing  -CW     LTG 4 Patient independent and compliant with use and care of compression garments and/or Velcro products with assistance of a caregiver as needed to promote self-care independence.  -CW     LTG 4 Progress Ongoing  -CW               User Key  (r) = Recorded By, (t) = Taken By, (c) = Cosigned By      Initials Name Provider Type    Becky Amato OTR Occupational Therapist                    Therapy Education  Education Details: self MLD  Given: Edema management, Symptoms/condition management, Bandaging/dressing change  Program: New  How Provided: Verbal, Demonstration  Provided to: Patient  Level of Understanding: Verbalized                Time Calculation:   OT Start Time: 0726  OT Stop Time: 0826  OT Time Calculation (min): 60 min  Total Timed Code Minutes- OT: 60 minute(s)  Timed Charges  53011 - OT Manual Therapy Minutes: 60  Total Minutes  Timed Charges Total Minutes: 60   Total Minutes: 60     Therapy Charges for Today       Code Description Service Date Service Provider Modifiers Qty    [id number]  OT MANUAL THERAPY EA 15 MIN 10/30/2024 Becky Brown OTR GO 4                        ZULMA Morales  10/30/2024

## 2024-11-01 ENCOUNTER — ANTICOAGULATION VISIT (OUTPATIENT)
Dept: PHARMACY | Facility: HOSPITAL | Age: 54
End: 2024-11-01
Payer: COMMERCIAL

## 2024-11-01 ENCOUNTER — HOSPITAL ENCOUNTER (OUTPATIENT)
Dept: OCCUPATIONAL THERAPY | Facility: HOSPITAL | Age: 54
Discharge: HOME | End: 2024-11-01
Payer: COMMERCIAL

## 2024-11-01 DIAGNOSIS — I89.0 LYMPHEDEMA OF BOTH LOWER EXTREMITIES: Primary | ICD-10-CM

## 2024-11-01 LAB — INR PPP: 2.5

## 2024-11-01 PROCEDURE — 97140 MANUAL THERAPY 1/> REGIONS: CPT

## 2024-11-01 NOTE — THERAPY DISCHARGE NOTE
Outpatient Occupational Therapy Lymphedema Treatment Note/Discharge Summary  [facility]     Patient Name: Kameron Melendez  : 1970  MRN: [id number]  Today's Date: 2024      Visit Date: 2024    Patient Active Problem List   Diagnosis    Pulmonary hypertension    Lymphedema of both lower extremities    Obesity, morbid, BMI 50 or higher    Dyslipidemia    Hyperuricemia    Hypogonadal obesity    Knee arthropathy    Morbid obesity with body mass index of 60.0-69.9 in adult    ARNOLDO (obstructive sleep apnea)    Severe edema    History of pulmonary embolism    Intractable back pain    Heterozygous factor V Leiden mutation    Cellulitis of left lower extremity    Hypokalemia    CAROL (acute kidney injury)    Sepsis with cutaneous manifestations    Hyperglycemia    Paroxysmal atrial fibrillation    Long term (current) use of anticoagulants    Lymphedema, not elsewhere classified    Nicotine dependence, other tobacco product, uncomplicated    Personal history of other venous thrombosis and embolism    Typical atrial flutter    Venous insufficiency (chronic) (peripheral)    Cellulitis of right lower extremity    Wound cellulitis    CAP (community acquired pneumonia)    Type 2 diabetes mellitus    Hyponatremia    Choledocholithiasis    RUQ pain        Past Medical History:   Diagnosis Date    DVT (deep venous thrombosis)     RLE    Factor V Leiden     Hypertension     Kidney stone     Lymphedema     Lymphedema of left lower extremity     Obesity     ARNOLDO (obstructive sleep apnea)     Pulmonary embolism     bilateral    Pulmonary hypertension     Right ventricular enlargement         Past Surgical History:   Procedure Laterality Date    CARDIAC CATHETERIZATION Bilateral 2017    Procedure: Pulmonary angiography- In[facility] ;  Surgeon: Cedric Coulter MD;  [facility]:  [facility];  Service:     CARDIAC CATHETERIZATION N/A 2017    Procedure: Right Heart Cath;  Surgeon: Cedric Coulter MD;  Location:   "[facility];  Service:     THROMBECTOMY           Visit Dx:      ICD-10-CM ICD-9-CM   1. Lymphedema of both lower extremities  I89.0 457.1        Lymphedema       Row Name 11/01/24 0700             Subjective Pain    Able to rate subjective pain? yes  -CW      Pre-Treatment Pain Level 3  -CW      Post-Treatment Pain Level 3  -CW         Subjective    Subjective Comments General pain 3/10 BLE's  -CW         LLIS - Physical Concerns    The amount of pain associated with my lymphedema is: 1  -CW      The amount of limb heaviness associated with my lymphedema is: 2  -CW      The amount of skin tightness associated with my lymphedema is: 1  -CW      The size of my swollen limb(s) seems: 2  -CW      Lymphedema affects the movement of my swollen limb(s): 2  -CW      The strength in my swollen limb(s) is: 2  -CW         LLIS - Psychosocial Concerns    Lymphedema affects my body image (i.e., \"how I think I look\"). 3  -CW      Lymphedema affects my socializing with others. 2  -CW      Lymphedema affects my intimate relations with spouse or partner (rate 0 if not applicable 3  -CW      Lymphedema \"gets me down\" (i.e., depression, frustration, or anger) 2  -CW      I must rely on others for help due to my lymphedema. 3  -CW      I know what to do to manage my lymphedema 0  -CW         LLIS - Functional Concerns    Lymphedema affects my ability to perform self-care activities (i.e. eating, dressing, hygiene) 1  -CW      Lymphedema affects my ability to perform routine home or work-related activities. 1  -CW      Lymphedema affects my performance of preferred leisure activities. 1  -CW      Lymphedema affects proper fit of clothing/shoes 2  -CW      Lymphedema affects my sleep 1  -CW         Skin Changes/Observations    Location/Assessment Lower Extremity  -CW      Lower Extremity Conditions left:;inflamed  -CW      Lower Extremity Color/Pigment bilateral:;hyperpigmented  -CW      Skin Observations Comment Skin not " as pink/redish B lower legs  -CW         Manual Lymphatic Drainage    Manual Lymphatic Drainage initial sequence;opened regional lymph nodes;opened anastamoses;extremity treatment  -CW      Initial Sequence short neck;abdomen  -CW      Abdomen superficial  -CW      Opened Regional Lymph Nodes axillary;inguinal  -CW      Axillary right;left  -CW      Inguinal right;left  -CW      Opened Anastamoses inguino-axillary  -CW      Inguino-Axillary right;left  -CW      Extremity Treatment MLD to full limb  -CW      MLD to Full Limb BLE's  -CW         Compression/Skin Care    Compression Garment Comments Applied liner and LLE Reduction kit lower leg. Adjusted the velcro straps and reviewed how to apply.  -CW         Lymphedema Life Impact Scale Totals    A.  Total Q1 - Q17 (Do not include Q18) 29  -CW      B.  Total number of questions answered (Q1-Q17) 17  -CW      C. Divide A by B 1.71  -CW      D. Multiple C by 25 42.75  -CW                User Key  (r) = Recorded By, (t) = Taken By, (c) = Cosigned By      Initials Name Provider Type    CW Becky Brown OTGILA Occupational Therapist                             OT Assessment/Plan       Row Name 11/01/24 1022          OT Assessment    Assessment Comments Instructed D/c information and recommendations with pt. including wearing schedule for inelastic velcro compression, skin care, HEP, proximal self MLD (with assist as needed), precautions, infection information, 18 Steps for Prevention handout, care of velcro compression, how to apply inelastic compression, and overall lymphedema management.  Instructed/reviewed how to apply the velcro compression, adjust the velcro straps with the appropriate tension. Insurance pending for a custom compression pump through [employer]. Encouraged cont. LE elevation and HEP with modifications as appropriate.  -CW        OT Plan    OT Plan Comments D/c  -CW               User Key  (r) = Recorded By, (t) = Taken By, (c) = Cosigned By      Initials  "Name Provider Type    Becky Amato OTR Occupational Therapist                            Manual Rx (Last 36 Hours)       Manual Treatments       Row Name 11/01/24 1134             Total Minutes    85591 - OT Manual Therapy Minutes 60  -CW                User Key  (r) = Recorded By, (t) = Taken By, (c) = Cosigned By      Initials Name Provider Type    Becky Amato OTR Occupational Therapist                     OT Goals       Row Name 11/01/24 1000          OT Short Term Goals    STG Date to Achieve 10/30/24  -CW     STG 1 Patient will demonstrate proper awareness of “What is Lymphedema?” and \"Healthy Habits\" for improved prevention, management, care of symptoms and ease of transition to self-care of condition.  -CW     STG 1 Progress Met  -CW     STG 2 Patient independent and compliant with velcro compression/ self-wrapping techniques of compression bandages and/or velcro products with assistance of caregiver as needed for improved self-management of condition.  -CW     STG 2 Progress Met  -CW     STG 3 Patient will demonstrate decreased net edema of bilateral lower extremities >/= 10-20cm  for decrease in edema, symptoms, decreased risk of infection and improved skin care/transition to self-care of condition.  -CW     STG 3 Progress Met  -CW        Long Term Goals    LTG Date to Achieve 11/13/24  -CW     LTG 1 Patient will demonstrate decreased net edema of bilateral lower extremities >/= 15-20 cm  for decrease in edema, symptoms, decreased risk of infection and improved skin care/transition to self-care of condition.  -CW     LTG 1 Progress Met  -CW     LTG 2 Patient independent and compliant with initial home exercise program focused on diaphragmatic breathing, range of motion, flexibility to decrease edema and improve lymphatic flow for decreased edema and decreased risk of infection.  -CW     LTG 2 Progress Met  -CW     LTG 3 Patient to improve LLIS by 10 points by discharge.  -CW     LTG 3 Progress " Met  -CW     LTG 4 Patient independent and compliant with use and care of compression garments and/or Velcro products with assistance of a caregiver as needed to promote self-care independence.  -CW     LTG 4 Progress Met  -CW               User Key  (r) = Recorded By, (t) = Taken By, (c) = Cosigned By      Initials Name Provider Type    Becky Amato OTR Occupational Therapist                    Therapy Education  Education Details: Instructed D/c information and recommendations with pt. including wearing schedule for inelastic velcro compression, skin care, HEP, self MLD (with assist as needed), precautions, infection information, 18 Steps for Prevention handout, how to apply inelastic compression, and overall lymphedema management. Reviewed self MLD proximal.  Given: Symptoms/condition management, Edema management  Program: New, Reinforced  How Provided: Verbal, Demonstration, Written  Provided to: Patient  Level of Understanding: Verbalized                Time Calculation:   OT Start Time: 0730  OT Stop Time: 0830  OT Time Calculation (min): 60 min  Total Timed Code Minutes- OT: 60 minute(s)  Timed Charges  04585 - OT Manual Therapy Minutes: 60  Total Minutes  Timed Charges Total Minutes: 60   Total Minutes: 60     Therapy Charges for Today       Code Description Service Date Service Provider Modifiers Qty    [id number] HC OT MANUAL THERAPY EA 15 MIN 11/1/2024 Becky Brown OTR GO 4                    OP OT Discharge Summary  Date of Discharge: 11/01/24  Reason for Discharge: All goals achieved  Outcomes Achieved: Refer to plan of care for updates on goals achieved  Discharge Destination: Home with home program, Home with caregiver assist  Discharge Instructions: Recommend pt. to wear the Circaid Reduction kit velcro compression or  bandages during the day and bandages or inelastic velcro compression at night along with AFW and thigh pieces. Pt. prefers to wear the lower leg Reduction kits during the day  when at work.  Instructed HEP, skin care, proximal self MLD, edema prevention, and overall lymphedema management with modifications as appropriate. Pt. will call if they have any questions. His spouse is able to assist at home as needed. Insurance pending for custom compression pump through [employer].      Becky Brown, OTR  11/1/2024

## 2024-11-01 NOTE — PROGRESS NOTES
Anticoagulation Clinic Progress Note    Anticoagulation Summary  As of 2024      INR goal:  2.0-3.0   TTR:  65.6% (5.7 y)   INR used for dosin.50 (2024)   Warfarin maintenance plan:  10 mg every day   Weekly warfarin total:  70 mg   No change documented:  Iris Gonzalez   Plan last modified:  Tessie Fatima RPH (10/4/2024)   Next INR check:  2024   Priority:  High   Target end date:  Indefinite    Indications    Other acute pulmonary embolism without acute cor pulmonale (Resolved) [I26.99]  Bilateral pulmonary embolism (Resolved) [I26.99]                 Anticoagulation Episode Summary       INR check location:  --    Preferred lab:  --    [facility]    Comments:  new  tristen 2019 **WEEKLY TRISTEN**          Anticoagulation Care Providers       Provider Role Specialty Phone number    Torri Colmenares APRN Referring Cardiology [phone]    Elsy Baeza MD Responsible Cardiology [phone]            Clinic Interview:  No pertinent clinical findings have been reported.    INR History:      10/4/2024     8:12 AM 10/18/2024    12:00 AM 10/18/2024     8:30 AM 10/25/2024    12:00 AM 10/25/2024     8:51 AM 2024    12:00 AM 2024     8:30 AM   Anticoagulation Monitoring   INR 4.40  3.00  2.20  2.50   INR Date 10/4/2024  10/18/2024  10/25/2024  2024   INR Goal 2.0-3.0  2.0-3.0  2.0-3.0  2.0-3.0   Trend Down  Same  Same  Same   Last Week Total 75 mg  70 mg  70 mg  70 mg   Next Week Total 60 mg  70 mg  70 mg  70 mg   Sun 10 mg  10 mg  10 mg  10 mg   Mon 10 mg  10 mg  10 mg  10 mg   Tue 10 mg  10 mg  10 mg  10 mg   Wed 10 mg  10 mg  10 mg  10 mg   Thu 10 mg  10 mg  10 mg  10 mg   Fri Hold (10/4)  10 mg  10 mg  10 mg   Sat 10 mg  10 mg  10 mg  10 mg   Historical INR  3.00      2.20      2.50            This result is from an external source.       Plan:  1. INR is therapeutic today- see above in Anticoagulation Summary.     Kameron Melendez to continue their warfarin regimen- see above in Anticoagulation Summary.  2. Follow up in 1 week  3. Pt has agreed to only be called if INR out of range. They have been instructed to call if any changes in medications, doses, concerns, etc. Patient expresses understanding and has no further questions at this time.    Iris Gonzalez

## 2024-11-08 ENCOUNTER — ANTICOAGULATION VISIT (OUTPATIENT)
Dept: PHARMACY | Facility: HOSPITAL | Age: 54
End: 2024-11-08
Payer: COMMERCIAL

## 2024-11-08 ENCOUNTER — TELEPHONE (OUTPATIENT)
Dept: FAMILY MEDICINE CLINIC | Facility: CLINIC | Age: 54
End: 2024-11-08
Payer: COMMERCIAL

## 2024-11-08 LAB — INR PPP: 2.7

## 2024-11-08 NOTE — PROGRESS NOTES
Anticoagulation Clinic Progress Note    Anticoagulation Summary  As of 2024      INR goal:  2.0-3.0   TTR:  65.7% (5.7 y)   INR used for dosin.70 (2024)   Warfarin maintenance plan:  10 mg every day   Weekly warfarin total:  70 mg   No change documented:  Iris Gonzalez   Plan last modified:  Tessie Fatima RPH (10/4/2024)   Next INR check:  11/15/2024   Priority:  High   Target end date:  Indefinite    Indications    Other acute pulmonary embolism without acute cor pulmonale (Resolved) [I26.99]  Bilateral pulmonary embolism (Resolved) [I26.99]                 Anticoagulation Episode Summary       INR check location:  --    Preferred lab:  --    [facility]    Comments:  new  tristen 2019 **WEEKLY TRISTEN**          Anticoagulation Care Providers       Provider Role Specialty Phone number    Torri Colmenares APRN Referring Cardiology [phone]    Elsy Baeza MD Responsible Cardiology [phone]            Clinic Interview:  No pertinent clinical findings have been reported.    INR History:      10/18/2024     8:30 AM 10/25/2024    12:00 AM 10/25/2024     8:51 AM 2024    12:00 AM 2024     8:30 AM 2024    12:00 AM 2024     9:54 AM   Anticoagulation Monitoring   INR 3.00  2.20  2.50  2.70   INR Date 10/18/2024  10/25/2024  2024  2024   INR Goal 2.0-3.0  2.0-3.0  2.0-3.0  2.0-3.0   Trend Same  Same  Same  Same   Last Week Total 70 mg  70 mg  70 mg  70 mg   Next Week Total 70 mg  70 mg  70 mg  70 mg   Sun 10 mg  10 mg  10 mg  10 mg   Mon 10 mg  10 mg  10 mg  10 mg   Tue 10 mg  10 mg  10 mg  10 mg   Wed 10 mg  10 mg  10 mg  10 mg   Thu 10 mg  10 mg  10 mg  10 mg   Fri 10 mg  10 mg  10 mg  10 mg   Sat 10 mg  10 mg  10 mg  10 mg   Historical INR  2.20      2.50      2.70            This result is from an external source.       Plan:  1. INR is therapeutic today- see above in Anticoagulation Summary.    Kameron BALDERAS  Al to continue their warfarin regimen- see above in Anticoagulation Summary.  2. Follow up in 1 week  3. Pt has agreed to only be called if INR out of range. They have been instructed to call if any changes in medications, doses, concerns, etc. Patient expresses understanding and has no further questions at this time.    Iris Gonzalez

## 2024-11-08 NOTE — TELEPHONE ENCOUNTER
Patient got a call stating his case has been closed due to [employer] receiving blank [facility] paperwork. Looked into the chart and show the paperwork has been scanned in and was faxed but was indeed blank. Pt requesting call from [facility] when these are sent in. Please advise.

## 2024-11-15 ENCOUNTER — ANTICOAGULATION VISIT (OUTPATIENT)
Dept: PHARMACY | Facility: HOSPITAL | Age: 54
End: 2024-11-15
Payer: COMMERCIAL

## 2024-11-15 LAB — INR PPP: 2.5

## 2024-11-15 NOTE — PROGRESS NOTES
Anticoagulation Clinic Progress Note    Anticoagulation Summary  As of 11/15/2024      INR goal:  2.0-3.0   TTR:  65.8% (5.7 y)   INR used for dosin.50 (11/15/2024)   Warfarin maintenance plan:  10 mg every day   Weekly warfarin total:  70 mg   No change documented:  Iris Gonzalez   Plan last modified:  Tessie Fatima RPH (10/4/2024)   Next INR check:  2024   Priority:  High   Target end date:  Indefinite    Indications    Other acute pulmonary embolism without acute cor pulmonale (Resolved) [I26.99]  Bilateral pulmonary embolism (Resolved) [I26.99]                 Anticoagulation Episode Summary       INR check location:  --    Preferred lab:  --    [facility]    Comments:  new  tristen 2019 **WEEKLY TRISTEN**          Anticoagulation Care Providers       Provider Role Specialty Phone number    Torri Colmenares APRN Referring Cardiology [phone]    Elsy Baeza MD Responsible Cardiology [phone]            Clinic Interview:  No pertinent clinical findings have been reported.    INR History:      10/25/2024     8:51 AM 2024    12:00 AM 2024     8:30 AM 2024    12:00 AM 2024     9:54 AM 11/15/2024    12:00 AM 11/15/2024    10:12 AM   Anticoagulation Monitoring   INR 2.20  2.50  2.70  2.50   INR Date 10/25/2024  2024  2024  11/15/2024   INR Goal 2.0-3.0  2.0-3.0  2.0-3.0  2.0-3.0   Trend Same  Same  Same  Same   Last Week Total 70 mg  70 mg  70 mg  70 mg   Next Week Total 70 mg  70 mg  70 mg  70 mg   Sun 10 mg  10 mg  10 mg  10 mg   Mon 10 mg  10 mg  10 mg  10 mg   Tue 10 mg  10 mg  10 mg  10 mg   Wed 10 mg  10 mg  10 mg  10 mg   Thu 10 mg  10 mg  10 mg  10 mg   Fri 10 mg  10 mg  10 mg  10 mg   Sat 10 mg  10 mg  10 mg  10 mg   Historical INR  2.50      2.70      2.50            This result is from an external source.       Plan:  1. INR is therapeutic today- see above in Anticoagulation Summary.    Kameron  SHERRIE Melendez to continue their warfarin regimen- see above in Anticoagulation Summary.  2. Follow up in 1 week  3. Pt has agreed to only be called if INR out of range. They have been instructed to call if any changes in medications, doses, concerns, etc. Patient expresses understanding and has no further questions at this time.    Iris Gonzalez

## 2024-11-18 RX ORDER — ATORVASTATIN CALCIUM 20 MG/1
20 TABLET, FILM COATED ORAL
Qty: 90 TABLET | Refills: 3 | Status: SHIPPED | OUTPATIENT
Start: 2024-11-18

## 2024-11-19 ENCOUNTER — TELEPHONE (OUTPATIENT)
Dept: FAMILY MEDICINE CLINIC | Facility: CLINIC | Age: 54
End: 2024-11-19
Payer: COMMERCIAL

## 2024-11-20 DIAGNOSIS — G89.29 CHRONIC PAIN OF RIGHT KNEE: ICD-10-CM

## 2024-11-20 DIAGNOSIS — M25.561 CHRONIC PAIN OF RIGHT KNEE: ICD-10-CM

## 2024-11-30 LAB — INR PPP: 2

## 2024-12-02 ENCOUNTER — ANTICOAGULATION VISIT (OUTPATIENT)
Dept: PHARMACY | Facility: HOSPITAL | Age: 54
End: 2024-12-02
Payer: COMMERCIAL

## 2024-12-02 NOTE — PROGRESS NOTES
Anticoagulation Clinic Progress Note    Anticoagulation Summary  As of 2024      INR goal:  2.0-3.0   TTR:  66.1% (5.8 y)   INR used for dosin.00 (2024)   Warfarin maintenance plan:  10 mg every day   Weekly warfarin total:  70 mg   No change documented:  Iris Gonzalez   Plan last modified:  Tessie Fatima RPH (10/4/2024)   Next INR check:  2024   Priority:  High   Target end date:  Indefinite    Indications    Other acute pulmonary embolism without acute cor pulmonale (Resolved) [I26.99]  Bilateral pulmonary embolism (Resolved) [I26.99]                 Anticoagulation Episode Summary       INR check location:  --    Preferred lab:  --    Send INR reminders to:   [facility]  POOL    Comments:  new  tristen 2019 **WEEKLY TRISTEN**          Anticoagulation Care Providers       Provider Role Specialty Phone number    Torri Colmenares APRN Referring Cardiology [phone]    Elsy Baeza MD Responsible Cardiology [phone]            Clinic Interview:  No pertinent clinical findings have been reported.    INR History:      2024     8:30 AM 2024    12:00 AM 2024     9:54 AM 11/15/2024    12:00 AM 11/15/2024    10:12 AM 2024    12:00 AM 2024    11:56 AM   Anticoagulation Monitoring   INR 2.50  2.70  2.50  2.00   INR Date 2024  2024  11/15/2024  2024   INR Goal 2.0-3.0  2.0-3.0  2.0-3.0  2.0-3.0   Trend Same  Same  Same  Same   Last Week Total 70 mg  70 mg  70 mg  70 mg   Next Week Total 70 mg  70 mg  70 mg  70 mg   Sun 10 mg  10 mg  10 mg  -   Mon 10 mg  10 mg  10 mg  10 mg   Tue 10 mg  10 mg  10 mg  10 mg   Wed 10 mg  10 mg  10 mg  10 mg   Thu 10 mg  10 mg  10 mg  10 mg   Fri 10 mg  10 mg  10 mg  10 mg   Sat 10 mg  10 mg  10 mg  -   Historical INR  2.70      2.50      2.00            This result is from an external source.       Plan:  1. INR is therapeutic today- see above in Anticoagulation Summary.    Kameron Melendez  to continue their warfarin regimen- see above in Anticoagulation Summary.  2. Follow up in 1 week  3. Pt has agreed to only be called if INR out of range. They have been instructed to call if any changes in medications, doses, concerns, etc. Patient expresses understanding and has no further questions at this time.    Iris Gonzalez

## 2024-12-06 ENCOUNTER — ANTICOAGULATION VISIT (OUTPATIENT)
Dept: PHARMACY | Facility: HOSPITAL | Age: 54
End: 2024-12-06
Payer: COMMERCIAL

## 2024-12-06 LAB — INR PPP: 2.1

## 2024-12-06 NOTE — PROGRESS NOTES
Anticoagulation Clinic Progress Note    Anticoagulation Summary  As of 2024      INR goal:  2.0-3.0   TTR:  66.2% (5.8 y)   INR used for dosin.10 (2024)   Warfarin maintenance plan:  10 mg every day   Weekly warfarin total:  70 mg   No change documented:  Iris Gonzalez   Plan last modified:  Tessie Fatima RPH (10/4/2024)   Next INR check:  2024   Priority:  High   Target end date:  Indefinite    Indications    Other acute pulmonary embolism without acute cor pulmonale (Resolved) [I26.99]  Bilateral pulmonary embolism (Resolved) [I26.99]                 Anticoagulation Episode Summary       INR check location:  --    Preferred lab:  --    Send INR reminders to:   [facility]  POOL    Comments:  new  tristen 2019 **WEEKLY TRISTEN**          Anticoagulation Care Providers       Provider Role Specialty Phone number    Torri Colmenares APRN Referring Cardiology [phone]    Elsy Baeza MD Responsible Cardiology [phone]            Clinic Interview:  No pertinent clinical findings have been reported.    INR History:      2024     9:54 AM 11/15/2024    12:00 AM 11/15/2024    10:12 AM 2024    12:00 AM 2024    11:56 AM 2024    12:00 AM 2024    10:04 AM   Anticoagulation Monitoring   INR 2.70  2.50  2.00  2.10   INR Date 2024  11/15/2024  2024  2024   INR Goal 2.0-3.0  2.0-3.0  2.0-3.0  2.0-3.0   Trend Same  Same  Same  Same   Last Week Total 70 mg  70 mg  70 mg  70 mg   Next Week Total 70 mg  70 mg  70 mg  70 mg   Sun 10 mg  10 mg  -  10 mg   Mon 10 mg  10 mg  10 mg  10 mg   Tue 10 mg  10 mg  10 mg  10 mg   Wed 10 mg  10 mg  10 mg  10 mg   Thu 10 mg  10 mg  10 mg  10 mg   Fri 10 mg  10 mg  10 mg  10 mg   Sat 10 mg  10 mg  -  10 mg   Historical INR  2.50      2.00      2.10            This result is from an external source.       Plan:  1. INR is therapeutic today- see above in Anticoagulation Summary.    Kameron Melendez  to continue their warfarin regimen- see above in Anticoagulation Summary.  2. Follow up in 1 week  3. Pt has agreed to only be called if INR out of range. They have been instructed to call if any changes in medications, doses, concerns, etc. Patient expresses understanding and has no further questions at this time.    Iris Gonzalez

## 2024-12-12 ENCOUNTER — TELEPHONE (OUTPATIENT)
Dept: FAMILY MEDICINE CLINIC | Facility: CLINIC | Age: 54
End: 2024-12-12

## 2024-12-12 NOTE — TELEPHONE ENCOUNTER
Patient has something come up and is unable to make his appt at 9 this morning. Please advise where to schedule

## 2024-12-19 ENCOUNTER — OFFICE VISIT (OUTPATIENT)
Dept: FAMILY MEDICINE CLINIC | Facility: CLINIC | Age: 54
End: 2024-12-19
Payer: COMMERCIAL

## 2024-12-19 VITALS
DIASTOLIC BLOOD PRESSURE: 80 MMHG | SYSTOLIC BLOOD PRESSURE: 132 MMHG | HEIGHT: 74 IN | WEIGHT: 315 LBS | HEART RATE: 68 BPM | BODY MASS INDEX: 40.43 KG/M2 | OXYGEN SATURATION: 96 %

## 2024-12-19 DIAGNOSIS — M72.2 PLANTAR FASCIITIS OF LEFT FOOT: ICD-10-CM

## 2024-12-19 DIAGNOSIS — R26.9 ABNORMAL GAIT: ICD-10-CM

## 2024-12-19 DIAGNOSIS — M54.50 CHRONIC BILATERAL LOW BACK PAIN WITHOUT SCIATICA: ICD-10-CM

## 2024-12-19 DIAGNOSIS — I89.0 LYMPHEDEMA: ICD-10-CM

## 2024-12-19 DIAGNOSIS — I87.2 VENOUS INSUFFICIENCY (CHRONIC) (PERIPHERAL): ICD-10-CM

## 2024-12-19 DIAGNOSIS — E11.9 TYPE 2 DIABETES MELLITUS WITHOUT COMPLICATION, WITHOUT LONG-TERM CURRENT USE OF INSULIN: Primary | ICD-10-CM

## 2024-12-19 DIAGNOSIS — G47.33 OSA (OBSTRUCTIVE SLEEP APNEA): ICD-10-CM

## 2024-12-19 DIAGNOSIS — M15.0 PRIMARY OSTEOARTHRITIS INVOLVING MULTIPLE JOINTS: ICD-10-CM

## 2024-12-19 DIAGNOSIS — G89.29 CHRONIC BILATERAL LOW BACK PAIN WITHOUT SCIATICA: ICD-10-CM

## 2024-12-19 DIAGNOSIS — I48.0 PAF (PAROXYSMAL ATRIAL FIBRILLATION): ICD-10-CM

## 2024-12-19 DIAGNOSIS — D68.51 FACTOR V LEIDEN MUTATION: ICD-10-CM

## 2024-12-19 DIAGNOSIS — E78.5 DYSLIPIDEMIA: ICD-10-CM

## 2024-12-19 DIAGNOSIS — E66.01 MORBID OBESITY: ICD-10-CM

## 2024-12-19 DIAGNOSIS — L03.119 RECURRENT CELLULITIS OF LOWER EXTREMITY: ICD-10-CM

## 2024-12-19 PROCEDURE — 99214 OFFICE O/P EST MOD 30 MIN: CPT | Performed by: FAMILY MEDICINE

## 2024-12-19 PROCEDURE — 96372 THER/PROPH/DIAG INJ SC/IM: CPT | Performed by: FAMILY MEDICINE

## 2024-12-19 RX ORDER — CEPHALEXIN 500 MG/1
500 CAPSULE ORAL 4 TIMES DAILY
Qty: 40 CAPSULE | Refills: 2 | Status: SHIPPED | OUTPATIENT
Start: 2024-12-19

## 2024-12-19 RX ORDER — TRIAMCINOLONE ACETONIDE 40 MG/ML
80 INJECTION, SUSPENSION INTRA-ARTICULAR; INTRAMUSCULAR ONCE
Status: COMPLETED | OUTPATIENT
Start: 2024-12-19 | End: 2024-12-19

## 2024-12-19 RX ADMIN — TRIAMCINOLONE ACETONIDE 80 MG: 40 INJECTION, SUSPENSION INTRA-ARTICULAR; INTRAMUSCULAR at 09:21

## 2024-12-19 NOTE — PROGRESS NOTES
Subjective   Kameron Melendez is a 54 y.o. male. Presents today for   Chief Complaint   Patient presents with    Diabetes    Hyperlipidemia       History of Present Illness  Patient 55 y/o male with dm2 (diet controlled), untx ARNOLDO (intolerant cpap), PAF, hx of recurrent PE (factor V Leiden mutation), on anticoagulation, hld and lymphedema;   He reports that from now on all weight loss has to go through [facility] an online provider.  He does have nutritionist and coaching.   They still do not cover bariatric surgery.   He also has severe foot pain again and would like IM steroid.    Also mobility is deteriorating and cannot go anywhere due to abnormal gait, pain and not able to push a manual wc.  He would like scooter.     Review of Systems   Respiratory:  Negative for shortness of breath.    Cardiovascular:  Positive for leg swelling. Negative for chest pain and palpitations.   Musculoskeletal:  Positive for arthralgias, back pain, gait problem and myalgias.       Patient Active Problem List   Diagnosis    Pulmonary hypertension    Lymphedema of both lower extremities    Obesity, morbid, BMI 50 or higher    Dyslipidemia    Hyperuricemia    Hypogonadal obesity    Knee arthropathy    Morbid obesity with body mass index of 60.0-69.9 in adult    ARNOLDO (obstructive sleep apnea)    Severe edema    History of pulmonary embolism    Intractable back pain    Heterozygous factor V Leiden mutation    Cellulitis of left lower extremity    Hypokalemia    CAROL (acute kidney injury)    Sepsis with cutaneous manifestations    Hyperglycemia    Paroxysmal atrial fibrillation    Long term (current) use of anticoagulants    Lymphedema, not elsewhere classified    Nicotine dependence, other tobacco product, uncomplicated    Personal history of other venous thrombosis and embolism    Typical atrial flutter    Venous insufficiency (chronic) (peripheral)    Cellulitis of right lower extremity    Wound cellulitis    CAP (community acquired pneumonia)     Type 2 diabetes mellitus    Hyponatremia    Choledocholithiasis    RUQ pain       Social History     Socioeconomic History    Marital status:    Tobacco Use    Smoking status: Former     Types: Cigars, Pipe     Start date: 1/1/2006     Passive exposure: Past    Smokeless tobacco: Never    Tobacco comments:     occasional - < 1 a month   Vaping Use    Vaping status: Never Used   Substance and Sexual Activity    Alcohol use: Yes     Comment: social    Drug use: No    Sexual activity: Defer       No Known Allergies    Current Outpatient Medications on File Prior to Visit   Medication Sig Dispense Refill    acetaminophen (TYLENOL) 500 MG tablet Take 1 tablet by mouth Every 6 (Six) Hours As Needed for Mild Pain.      atorvastatin (LIPITOR) 20 MG tablet TAKE 1 TABLET BY MOUTH EVERY DAY AT NIGHT 90 tablet 3    furosemide (LASIX) 80 MG tablet Take 1 tablet by mouth Daily As Needed (leg swelling). 90 tablet 3    Ibuprofen 3 %, Gabapentin 10 %, Baclofen 2 %, lidocaine 4 %, Ketamine HCl 10 % Apply 1-2 g topically to the appropriate area as directed 3 (Three) to 4 (Four) times daily. 90 g 5    metOLazone (ZAROXOLYN) 5 MG tablet Take 1 tablet by mouth 1 (One) Time Per Week. 4 tablet 3    omeprazole (priLOSEC) 40 MG capsule TAKE 1 CAPSULE BY MOUTH EVERY DAY 90 capsule 3    potassium chloride (K-TAB) 20 MEQ tablet controlled-release ER tablet Take 1 tablet by mouth 1 (One) Time Per Week. With metolazone 4 tablet 3    spironolactone (Aldactone) 25 MG tablet Take 1 tablet by mouth As Needed (swelling).      ursodiol (ACTIGALL) 300 MG capsule Take 1 capsule by mouth 2 (Two) Times a Day. 180 capsule 1    warfarin (Jantoven) 5 MG tablet TAKE 3 TABLETS ON WEDNESDAY AND 2 TABLETS   ALL OTHER DAYS OR AS  DIRECTED 195 tablet 1    [DISCONTINUED] cephalexin (KEFLEX) 500 MG capsule Take 1 capsule by mouth 3 (Three) Times a Day. (Patient not taking: Reported on 12/19/2024) 30 capsule 0    [DISCONTINUED] methylPREDNISolone (MEDROL) 4  "MG dose pack Take as directed on package instructions. 21 tablet 0    [DISCONTINUED] promethazine-dextromethorphan (PROMETHAZINE-DM) 6.25-15 MG/5ML syrup Take 5 mL by mouth 4 (Four) Times a Day As Needed for Cough. (Patient not taking: Reported on 12/19/2024) 180 mL 0     No current facility-administered medications on file prior to visit.       Objective   Vitals:    12/19/24 0812   BP: 132/80   Pulse: 68   SpO2: 96%   Weight: (!) 246 kg (542 lb)   Height: 188 cm (74\")     Body mass index is 69.59 kg/m².    Physical Exam  Vitals and nursing note reviewed.   Constitutional:       Appearance: He is well-developed.   Musculoskeletal:      Cervical back: Neck supple.      Right lower leg: Edema present.      Left lower leg: Edema present.   Skin:     General: Skin is warm and dry.   Neurological:      Mental Status: He is alert.   Psychiatric:         Behavior: Behavior normal.       Component      Latest Ref Rng 8/8/2024   Glucose      70 - 99 mg/dL 89    BUN      6 - 24 mg/dL 13    Creatinine      0.76 - 1.27 mg/dL 1.00    EGFR Result      >59 mL/min/1.73 89    BUN/Creatinine Ratio      9 - 20  13    Sodium      134 - 144 mmol/L 138    Potassium      3.5 - 5.2 mmol/L 4.8    Chloride      96 - 106 mmol/L 105    CO2      20 - 29 mmol/L 21    Calcium      8.7 - 10.2 mg/dL 9.2    Total Protein      6.0 - 8.5 g/dL 7.0    Albumin      3.8 - 4.9 g/dL 3.9    Globulin      1.5 - 4.5 g/dL 3.1    Total Bilirubin      0.0 - 1.2 mg/dL 0.5    Alkaline Phosphatase      44 - 121 IU/L 136 (H)    AST (SGOT)      0 - 40 IU/L 15    ALT (SGPT)      0 - 44 IU/L 11    WBC      3.4 - 10.8 x10E3/uL 7.8    RBC      4.14 - 5.80 x10E6/uL 4.72    Hemoglobin      13.0 - 17.7 g/dL 14.0    Hematocrit      37.5 - 51.0 % 43.3    MCV      79 - 97 fL 92    MCH      26.6 - 33.0 pg 29.7    MCHC      31.5 - 35.7 g/dL 32.3    RDW      11.6 - 15.4 % 14.7    Platelets      150 - 450 x10E3/uL 262    Total Cholesterol      100 - 199 mg/dL 138  "   Triglycerides      0 - 149 mg/dL 118    HDL Cholesterol      >39 mg/dL 39 (L)    VLDL Cholesterol Rashad      5 - 40 mg/dL 21    LDL Cholesterol       0 - 99 mg/dL 78    Creatinine, Urine      Not Estab. mg/dL 129.7    Microalbumin, Urine      Not Estab. ug/mL 59.9    Microalbumin/Creatinine Ratio      0 - 29 mg/g creat 46 (H)    Hemoglobin A1C      4.8 - 5.6 % 5.8 (H)    TSH Baseline      0.450 - 4.500 uIU/mL 1.760    Vitamin B-12      232 - 1,245 pg/mL 479       Legend:  (H) High  (L) Low  Assessment & Plan   Diagnoses and all orders for this visit:    1. Type 2 diabetes mellitus without complication, without long-term current use of insulin (Primary)    2. PAF (paroxysmal atrial fibrillation)    3. Morbid obesity    4. Lymphedema    5. Dyslipidemia    6. BERT (obstructive sleep apnea)    7. Factor V Leiden mutation    8. Venous insufficiency (chronic) (peripheral)    9. Plantar fasciitis of left foot  -     triamcinolone acetonide (KENALOG-40) injection 80 mg    10. Recurrent cellulitis of lower extremity  -     cephalexin (KEFLEX) 500 MG capsule; Take 1 capsule by mouth 4 (Four) Times a Day.  Dispense: 40 capsule; Refill: 2    11. Abnormal gait    12. Primary osteoarthritis involving multiple joints    13. Chronic bilateral low back pain without sciatica    Would recommend mounjaro for him for dm2, may lose weight;  he will discuss with [facility] and see after Jan 1 if ocvered  Paf/dvt/vte/facor V leiden - continue warfarin, mgmt per cardiology  -HLD - continue statin, recheck lipids well controlled  Bert - intolerant cpap;  bariatric surgery not covered  Ok IM for foot pain  A1c, lipid profile and cmp next ov    FACE to FACE:  The patient has a mobility limitation that significantly impairs his ability to participate in one or more mobility-related activities of daily living (MRADLs) including going to appointments, traveling to work and grooming.   It places the patient at reasonably determined heightened risk of  morbidity or mortality secondary to the attempts to perform an MRADL; and he is not able to complete the MRADL within a reasonable time frame.    The patient's mobility limitation cannot be sufficiently and safely resolved by the use of an appropriately fitted cane or walker due lack of strength and coordination as exhibited on physical exam.    The patient does not have sufficient upper extremity function to self-propel an optimally-configured manual wheelchair in the home to perform MRADLs during a typical day.        Limitations of strength, endurance, range of motion, and coordination, with presence of pain further limit even use of lightweight wheel chair.    He would medically benefit to use a scooter, is able to use the tiller, has family available to assist with transfers and the house would accommodate a scooter.                    -Follow up: 3 months and prn

## 2024-12-19 NOTE — LETTER
December 19, 2024     Patient: Kameron Melendez   YOB: 1970   Date of Visit: 12/19/2024       To Whom It May Concern:    It is my medical opinion that Kameron Melendez should be excused from jury duty due to medical reasons.  He has severe mobility issues and would not be to travel for jury duty.     Sincerely,          Hansel Patel, DO    CC: No Recipients

## 2024-12-27 ENCOUNTER — ANTICOAGULATION VISIT (OUTPATIENT)
Dept: PHARMACY | Facility: HOSPITAL | Age: 54
End: 2024-12-27
Payer: COMMERCIAL

## 2024-12-27 LAB — INR PPP: 1.7

## 2024-12-27 NOTE — PROGRESS NOTES
Anticoagulation Clinic Progress Note    Anticoagulation Summary  As of 2024      INR goal:  2.0-3.0   TTR:  65.8% (5.8 y)   INR used for dosin.70 (2024)   Warfarin maintenance plan:  10 mg every day   Weekly warfarin total:  70 mg   No change documented:  Ramo Morocho, PharmD   Plan last modified:  Tessie Fatima RPH (10/4/2024)   Next INR check:  1/3/2025   Priority:  High   Target end date:  Indefinite    Indications    Other acute pulmonary embolism without acute cor pulmonale (Resolved) [I26.99]  Bilateral pulmonary embolism (Resolved) [I26.99]                 Anticoagulation Episode Summary       INR check location:  --    Preferred lab:  --    Send INR reminders to:   [facility]  [facility]    Comments:  new  frankie 2019 **WEEKLY FRANKIE**          Anticoagulation Care Providers       Provider Role Specialty Phone number    Torri Colmenares APRN Referring Cardiology [phone]    Elsy Baeza MD Responsible Cardiology [phone]            Clinic Interview:  Unsuccessful reaching by phone after multiple attempts. Per chart review, he was prescribed 10-day course of cephalexin for cellulitis on 24.    INR History:      11/15/2024    10:12 AM 2024    12:00 AM 2024    11:56 AM 2024    12:00 AM 2024    10:04 AM 2024    12:00 AM 2024     8:11 AM   Anticoagulation Monitoring   INR 2.50  2.00  2.10  1.70   INR Date 11/15/2024  2024  2024  2024   INR Goal 2.0-3.0  2.0-3.0  2.0-3.0  2.0-3.0   Trend Same  Same  Same  Same   Last Week Total 70 mg  70 mg  70 mg  70 mg   Next Week Total 70 mg  70 mg  70 mg  70 mg   Sun 10 mg  -  10 mg  10 mg   Mon 10 mg  10 mg  10 mg  10 mg   Tue 10 mg  10 mg  10 mg  10 mg   Wed 10 mg  10 mg  10 mg  10 mg   Thu 10 mg  10 mg  10 mg  10 mg   Fri 10 mg  10 mg  10 mg  10 mg   Sat 10 mg  -  10 mg  10 mg   Historical INR  2.00      2.10      1.70            This result is from an external source.        Plan:  1. INR is subtherapeutic today- see above in Anticoagulation Summary.    Kameron Melendez to take warfarin 15 mg today, then resume 10 mg daily - see above in Anticoagulation Summary.  2. Follow up in 1 week.  3. Unsuccessful reaching by phone after multiple attempts. Left HIPAA-compliant voicemail with instructions. They have been instructed to call if any changes in medications, doses, concerns, etc.     Ramo Morocho, MurrayD

## 2025-01-03 ENCOUNTER — ANTICOAGULATION VISIT (OUTPATIENT)
Dept: PHARMACY | Facility: HOSPITAL | Age: 55
End: 2025-01-03
Payer: COMMERCIAL

## 2025-01-03 LAB — INR PPP: 1.6

## 2025-01-03 NOTE — PROGRESS NOTES
Anticoagulation Clinic Progress Note    Anticoagulation Summary  As of 1/3/2025      INR goal:  2.0-3.0   TTR:  65.6% (5.9 y)   INR used for dosin.60 (1/3/2025)   Warfarin maintenance plan:  10 mg every day   Weekly warfarin total:  70 mg   Plan last modified:  Tessie Fatima RPH (10/4/2024)   Next INR check:  1/10/2025   Priority:  High   Target end date:  Indefinite    Indications    Other acute pulmonary embolism without acute cor pulmonale (Resolved) [I26.99]  Bilateral pulmonary embolism (Resolved) [I26.99]                 Anticoagulation Episode Summary       INR check location:  --    Preferred lab:  --    Send INR reminders to:   [facility]  [facility]    Comments:  new  frankie 2019 **WEEKLY FRANKIE**          Anticoagulation Care Providers       Provider Role Specialty Phone number    Torri Colmenares APRN Referring Cardiology [phone]    Elsy Baeza MD Responsible Cardiology [phone]            Clinic Interview:  Patient Findings     Negatives:  Signs/symptoms of thrombosis, Signs/symptoms of bleeding,   Laboratory test error suspected, Change in health, Change in alcohol use,   Change in activity, Upcoming invasive procedure, Emergency department   visit, Upcoming dental procedure, Missed doses, Extra doses, Change in   medications, Change in diet/appetite, Hospital admission, Bruising, Other   complaints      Clinical Outcomes     Negatives:  Major bleeding event, Thromboembolic event,   Anticoagulation-related hospital admission, Anticoagulation-related ED   visit, Anticoagulation-related fatality        INR History:      2024    11:56 AM 2024    12:00 AM 2024    10:04 AM 2024    12:00 AM 2024     8:11 AM 1/3/2025    12:00 AM 1/3/2025    10:39 AM   Anticoagulation Monitoring   INR 2.00  2.10  1.70  1.60   INR Date 2024  2024  2024  1/3/2025   INR Goal 2.0-3.0  2.0-3.0  2.0-3.0  2.0-3.0   Trend Same  Same  Same  Same   Last  Week Total 70 mg  70 mg  70 mg  75 mg   Next Week Total 70 mg  70 mg  70 mg  75 mg   Sun -  10 mg  10 mg  10 mg   Mon 10 mg  10 mg  10 mg  10 mg   Tue 10 mg  10 mg  10 mg  10 mg   Wed 10 mg  10 mg  10 mg  10 mg   Thu 10 mg  10 mg  10 mg  10 mg   Fri 10 mg  10 mg  10 mg  15 mg (1/3)   Sat -  10 mg  10 mg  10 mg   Historical INR  2.10      1.70      1.60            This result is from an external source.       Plan:  1. INR is Subtherapeutic today- see above in Anticoagulation Summary.   Will instruct Kameron SHERRIE Al to Increase their warfarin regimen- see above in Anticoagulation Summary.  Patient ate multiple servings of broccoli that he does not plan to continue.  Recommended 15 mg today and 10 mg MARK, fatemeh 1 week   2. Follow up in 1 weeks  3. They have been instructed to call if any changes in medications, doses, concerns, etc. Patient expresses understanding and has no further questions at this time.    Tessie Fatima RP

## 2025-01-13 ENCOUNTER — ANTICOAGULATION VISIT (OUTPATIENT)
Dept: PHARMACY | Facility: HOSPITAL | Age: 55
End: 2025-01-13
Payer: COMMERCIAL

## 2025-01-13 LAB — INR PPP: 1.6

## 2025-01-13 NOTE — PROGRESS NOTES
Anticoagulation Clinic Progress Note    Anticoagulation Summary  As of 2025      INR goal:  2.0-3.0   TTR:  65.2% (5.9 y)   INR used for dosin.60 (2025)   Warfarin maintenance plan:  15 mg every Mon, Fri; 10 mg all other days   Weekly warfarin total:  80 mg   Plan last modified:  Tessie Fatima RPH (2025)   Next INR check:  2025   Priority:  High   Target end date:  Indefinite    Indications    Other acute pulmonary embolism without acute cor pulmonale (Resolved) [I26.99]  Bilateral pulmonary embolism (Resolved) [I26.99]                 Anticoagulation Episode Summary       INR check location:  --    Preferred lab:  --    Send INR reminders to:   [facility]  POOL    Comments:  new  frankie 2019 **WEEKLY FRANKIE**          Anticoagulation Care Providers       Provider Role Specialty Phone number    Torri Colmenares APRN Referring Cardiology [phone]    Elsy Baeza MD Responsible Cardiology [phone]            Clinic Interview:  Patient Findings     Negatives:  Signs/symptoms of thrombosis, Signs/symptoms of bleeding,   Laboratory test error suspected, Change in health, Change in alcohol use,   Change in activity, Upcoming invasive procedure, Emergency department   visit, Upcoming dental procedure, Missed doses, Extra doses, Change in   medications, Change in diet/appetite, Hospital admission, Bruising, Other   complaints      Clinical Outcomes     Negatives:  Major bleeding event, Thromboembolic event,   Anticoagulation-related hospital admission, Anticoagulation-related ED   visit, Anticoagulation-related fatality        INR History:      2024    10:04 AM 2024    12:00 AM 2024     8:11 AM 1/3/2025    12:00 AM 1/3/2025    10:39 AM 2025    12:00 AM 2025    10:51 AM   Anticoagulation Monitoring   INR 2.10  1.70  1.60  1.60   INR Date 2024  2024  1/3/2025  2025   INR Goal 2.0-3.0  2.0-3.0  2.0-3.0  2.0-3.0   Trend Same   Same  Same  Up   Last Week Total 70 mg  70 mg  75 mg  70 mg   Next Week Total 70 mg  70 mg  75 mg  80 mg   Sun 10 mg  10 mg  10 mg  10 mg   Mon 10 mg  10 mg  10 mg  15 mg   Tue 10 mg  10 mg  10 mg  10 mg   Wed 10 mg  10 mg  10 mg  10 mg   Thu 10 mg  10 mg  10 mg  10 mg   Fri 10 mg  10 mg  15 mg (1/3)  15 mg   Sat 10 mg  10 mg  10 mg  10 mg   Historical INR  1.70      1.60      1.60            This result is from an external source.       Plan:  1. INR is Subtherapeutic today- see above in Anticoagulation Summary.   Will instruct Kameron Melendez to Increase their warfarin regimen- see above in Anticoagulation Summary.  Increase to 15 mg on mon and fri and 10 mg MARK, fatemeh 1 week   2. Follow up in 1 weeks  3. They have been instructed to call if any changes in medications, doses, concerns, etc. Patient expresses understanding and has no further questions at this time.    Tessie Fatima RP

## 2025-01-16 DIAGNOSIS — I89.0 LYMPHEDEMA: ICD-10-CM

## 2025-01-16 RX ORDER — METOLAZONE 5 MG/1
5 TABLET ORAL WEEKLY
Qty: 4 TABLET | Refills: 3 | Status: SHIPPED | OUTPATIENT
Start: 2025-01-16

## 2025-01-17 ENCOUNTER — ANTICOAGULATION VISIT (OUTPATIENT)
Dept: PHARMACY | Facility: HOSPITAL | Age: 55
End: 2025-01-17
Payer: COMMERCIAL

## 2025-01-17 LAB — INR PPP: 1.8

## 2025-01-17 NOTE — PROGRESS NOTES
Anticoagulation Clinic Progress Note    Anticoagulation Summary  As of 2025      INR goal:  2.0-3.0   TTR:  65.1% (5.9 y)   INR used for dosin.80 (2025)   Warfarin maintenance plan:  15 mg every Mon, Fri; 10 mg all other days   Weekly warfarin total:  80 mg   Plan last modified:  Tessie Fatima RPH (2025)   Next INR check:  2025   Priority:  High   Target end date:  Indefinite    Indications    Other acute pulmonary embolism without acute cor pulmonale (Resolved) [I26.99]  Bilateral pulmonary embolism (Resolved) [I26.99]                 Anticoagulation Episode Summary       INR check location:  --    Preferred lab:  --    Send INR reminders to:   [facility]  POOL    Comments:  new  frankie 2019 **WEEKLY FRANKIE**          Anticoagulation Care Providers       Provider Role Specialty Phone number    Torri Colmenares APRN Referring Cardiology [phone]    Elsy Baeza MD Responsible Cardiology [phone]            Clinic Interview:  Patient Findings     Negatives:  Signs/symptoms of thrombosis, Signs/symptoms of bleeding,   Laboratory test error suspected, Change in health, Change in alcohol use,   Change in activity, Upcoming invasive procedure, Emergency department   visit, Upcoming dental procedure, Missed doses, Extra doses, Change in   Medications include addition of once weekly metolazone 5 mg for lymphadema, Change in diet/appetite, Hospital admission, Bruising, Other   complaints      Clinical Outcomes     Negatives:  Major bleeding event, Thromboembolic event,   Anticoagulation-related hospital admission, Anticoagulation-related ED   visit, Anticoagulation-related fatality        INR History:      2024     8:11 AM 1/3/2025    12:00 AM 1/3/2025    10:39 AM 2025    12:00 AM 2025    10:51 AM 2025    12:00 AM 2025     8:15 AM   Anticoagulation Monitoring   INR 1.70  1.60  1.60  1.80   INR Date 2024  1/3/2025  2025   1/17/2025   INR Goal 2.0-3.0  2.0-3.0  2.0-3.0  2.0-3.0   Trend Same  Same  Up  Same   Last Week Total 70 mg  75 mg  70 mg  70 mg   Next Week Total 70 mg  75 mg  80 mg  85 mg   Sun 10 mg  10 mg  10 mg  10 mg   Mon 10 mg  10 mg  15 mg  15 mg   Tue 10 mg  10 mg  10 mg  10 mg   Wed 10 mg  10 mg  10 mg  10 mg   Thu 10 mg  10 mg  10 mg  10 mg   Fri 10 mg  15 mg (1/3)  15 mg  20 mg (1/17)   Sat 10 mg  10 mg  10 mg  10 mg   Historical INR  1.60      1.60      1.80            This result is from an external source.       Plan:  1. INR is Subtherapeutic today- see above in Anticoagulation Summary.   Will instruct Kameron BALDERAS Al to take 20 mg tonight, 1/17/25, and then continue their warfarin regimen- see above in Anticoagulation Summary.  2. Follow up in 1 week  3. They have been instructed to call if any changes in medications, doses, concerns, etc. Patient expresses understanding and has no further questions at this time.    Marissa Jones [facility]   Detail Level: Zone

## 2025-01-24 ENCOUNTER — ANTICOAGULATION VISIT (OUTPATIENT)
Dept: PHARMACY | Facility: HOSPITAL | Age: 55
End: 2025-01-24
Payer: COMMERCIAL

## 2025-01-24 LAB — INR PPP: 2.2

## 2025-01-24 NOTE — PROGRESS NOTES
Anticoagulation Clinic Progress Note    Anticoagulation Summary  As of 2025      INR goal:  2.0-3.0   TTR:  65.1% (5.9 y)   INR used for dosin.20 (2025)   Warfarin maintenance plan:  15 mg every Mon, Fri; 10 mg all other days   Weekly warfarin total:  80 mg   No change documented:  Tessie Fatima RPH   Plan last modified:  Tessie Fatima RPH (2025)   Next INR check:  2025   Priority:  High   Target end date:  Indefinite    Indications    Other acute pulmonary embolism without acute cor pulmonale (Resolved) [I26.99]  Bilateral pulmonary embolism (Resolved) [I26.99]                 Anticoagulation Episode Summary       INR check location:  --    Preferred lab:  --    Send INR reminders to:   [facility]  PO[facility]    Comments:  new  frankie 2019 **WEEKLY FRANKIE**          Anticoagulation Care Providers       Provider Role Specialty Phone number    Torri Colmenares APRN Referring Cardiology [phone]    Elsy Baeza MD Responsible Cardiology [phone]            Clinic Interview:  No pertinent clinical findings have been reported.    INR History:      1/3/2025    10:39 AM 2025    12:00 AM 2025    10:51 AM 2025    12:00 AM 2025     8:15 AM 2025    12:00 AM 2025     9:22 AM   Anticoagulation Monitoring   INR 1.60  1.60  1.80  2.20   INR Date 1/3/2025  2025  2025  2025   INR Goal 2.0-3.0  2.0-3.0  2.0-3.0  2.0-3.0   Trend Same  Up  Same  Same   Last Week Total 75 mg  70 mg  70 mg  85 mg   Next Week Total 75 mg  80 mg  85 mg  80 mg   Sun 10 mg  10 mg  10 mg  10 mg   Mon 10 mg  15 mg  15 mg  15 mg   Tue 10 mg  10 mg  10 mg  10 mg   Wed 10 mg  10 mg  10 mg  10 mg   Thu 10 mg  10 mg  10 mg  10 mg   Fri 15 mg (1/3)  15 mg  20 mg ()  15 mg   Sat 10 mg  10 mg  10 mg  10 mg   Historical INR  1.60      1.80      2.20            This result is from an external source.       Plan:  1. INR is therapeutic today- see above in  Anticoagulation Summary.    Kameron Melendez to continue their warfarin regimen- see above in Anticoagulation Summary.  2. Follow up in 1 week  3. They have been instructed to call if any changes in medications, doses, concerns, etc. Patient expresses understanding and has no further questions at this time.    Tessie Fatima [facility]

## 2025-01-31 ENCOUNTER — ANTICOAGULATION VISIT (OUTPATIENT)
Dept: PHARMACY | Facility: HOSPITAL | Age: 55
End: 2025-01-31
Payer: COMMERCIAL

## 2025-01-31 LAB — INR PPP: 1.9

## 2025-01-31 NOTE — PROGRESS NOTES
Anticoagulation Clinic Progress Note    Anticoagulation Summary  As of 2025      INR goal:  2.0-3.0   TTR:  65.1% (5.9 y)   INR used for dosin.90 (2025)   Warfarin maintenance plan:  15 mg every Mon, Wed, Fri; 10 mg all other days   Weekly warfarin total:  85 mg   Plan last modified:  Tessie Fatima RPH (2025)   Next INR check:  2025   Priority:  High   Target end date:  Indefinite    Indications    Other acute pulmonary embolism without acute cor pulmonale (Resolved) [I26.99]  Bilateral pulmonary embolism (Resolved) [I26.99]                 Anticoagulation Episode Summary       INR check location:  --    Preferred lab:  --    Send INR reminders to:   [facility]  POOL    Comments:  new  frankie 2019 **WEEKLY FRANKIE**          Anticoagulation Care Providers       Provider Role Specialty Phone number    Torri Colmenares APRN Referring Cardiology [phone]    Elsy Baeza MD Responsible Cardiology [phone]            Clinic Interview:  Patient Findings     Negatives:  Signs/symptoms of thrombosis, Signs/symptoms of bleeding,   Laboratory test error suspected, Change in health, Change in alcohol use,   Change in activity, Upcoming invasive procedure, Emergency department   visit, Upcoming dental procedure, Missed doses, Extra doses, Change in   medications, Change in diet/appetite, Hospital admission, Bruising, Other   complaints      Clinical Outcomes     Negatives:  Major bleeding event, Thromboembolic event,   Anticoagulation-related hospital admission, Anticoagulation-related ED   visit, Anticoagulation-related fatality        INR History:      2025    10:51 AM 2025    12:00 AM 2025     8:15 AM 2025    12:00 AM 2025     9:22 AM 2025    12:00 AM 2025     9:38 AM   Anticoagulation Monitoring   INR 1.60  1.80  2.20  1.90   INR Date 2025   INR Goal 2.0-3.0  2.0-3.0  2.0-3.0  2.0-3.0   Trend  Up  Same  Same  Up   Last Week Total 70 mg  70 mg  85 mg  80 mg   Next Week Total 80 mg  85 mg  80 mg  85 mg   Sun 10 mg  10 mg  10 mg  10 mg   Mon 15 mg  15 mg  15 mg  15 mg   Tue 10 mg  10 mg  10 mg  10 mg   Wed 10 mg  10 mg  10 mg  15 mg   Thu 10 mg  10 mg  10 mg  10 mg   Fri 15 mg  20 mg (1/17)  15 mg  15 mg   Sat 10 mg  10 mg  10 mg  10 mg   Historical INR  1.80      2.20      1.90            This result is from an external source.       Plan:  1. INR is Subtherapeutic today- see above in Anticoagulation Summary.   Will instruct Kameron Melendez to Increase their warfarin regimen- see above in Anticoagulation Summary.      increase to 15 mg MWF, 10 mg AOD., rck 1 week   2. Follow up in 1 weeks  3. They have been instructed to call if any changes in medications, doses, concerns, etc. Patient expresses understanding and has no further questions at this time.    Tessie Fatima [facility]

## 2025-02-07 ENCOUNTER — ANTICOAGULATION VISIT (OUTPATIENT)
Dept: PHARMACY | Facility: HOSPITAL | Age: 55
End: 2025-02-07
Payer: COMMERCIAL

## 2025-02-07 LAB — INR PPP: 2.5

## 2025-02-07 NOTE — PROGRESS NOTES
Anticoagulation Clinic Progress Note    Anticoagulation Summary  As of 2025      INR goal:  2.0-3.0   TTR:  65.1% (5.9 y)   INR used for dosin.50 (2025)   Warfarin maintenance plan:  15 mg every Mon, Wed, Fri; 10 mg all other days   Weekly warfarin total:  85 mg   No change documented:  Tessie Fatima RPH   Plan last modified:  Tessie Fatima RPH (2025)   Next INR check:  2025   Priority:  High   Target end date:  Indefinite    Indications    Other acute pulmonary embolism without acute cor pulmonale (Resolved) [I26.99]  Bilateral pulmonary embolism (Resolved) [I26.99]                 Anticoagulation Episode Summary       INR check location:  --    Preferred lab:  --    [facility]    Comments:  new  frankie 2019 **WEEKLY FRANKIE**          Anticoagulation Care Providers       Provider Role Specialty Phone number    Torri Colmenares APRN Referring Cardiology [phone]    Elsy Baeza MD Responsible Cardiology [phone]            Clinic Interview:  No pertinent clinical findings have been reported.    INR History:      2025     8:15 AM 2025    12:00 AM 2025     9:22 AM 2025    12:00 AM 2025     9:38 AM 2025    12:00 AM 2025     8:09 AM   Anticoagulation Monitoring   INR 1.80  2.20  1.90  2.50   INR Date 2025   INR Goal 2.0-3.0  2.0-3.0  2.0-3.0  2.0-3.0   Trend Same  Same  Up  Same   Last Week Total 70 mg  85 mg  80 mg  85 mg   Next Week Total 85 mg  80 mg  85 mg  85 mg   Sun 10 mg  10 mg  10 mg  10 mg   Mon 15 mg  15 mg  15 mg  15 mg   Tue 10 mg  10 mg  10 mg  10 mg   Wed 10 mg  10 mg  15 mg  15 mg   Thu 10 mg  10 mg  10 mg  10 mg   Fri 20 mg ()  15 mg  15 mg  15 mg   Sat 10 mg  10 mg  10 mg  10 mg   Historical INR  2.20      1.90      2.50            This result is from an external source.       Plan:  1. INR is therapeutic today- see above in  Anticoagulation Summary.    Kameron Melendez to continue their warfarin regimen- see above in Anticoagulation Summary.  2. Follow up in 1 week  3. Pt has agreed to only be called if INR out of range. They have been instructed to call if any changes in medications, doses, concerns, etc. Patient expresses understanding and has no further questions at this time.    Tessie Fatima, [facility]

## 2025-02-14 ENCOUNTER — ANTICOAGULATION VISIT (OUTPATIENT)
Dept: PHARMACY | Facility: HOSPITAL | Age: 55
End: 2025-02-14
Payer: COMMERCIAL

## 2025-02-14 LAB — INR PPP: 1.8

## 2025-02-14 NOTE — PROGRESS NOTES
Anticoagulation Clinic Progress Note    Anticoagulation Summary  As of 2025      INR goal:  2.0-3.0   TTR:  65.2% (6 y)   INR used for dosin.80 (2025)   Warfarin maintenance plan:  15 mg every Mon, Wed, Fri; 10 mg all other days   Weekly warfarin total:  85 mg   Plan last modified:  Tessie Fatima RPH (2025)   Next INR check:  2025   Priority:  High   Target end date:  Indefinite    Indications    Other acute pulmonary embolism without acute cor pulmonale (Resolved) [I26.99]  Bilateral pulmonary embolism (Resolved) [I26.99]                 Anticoagulation Episode Summary       INR check location:  --    Preferred lab:  --    Send INR reminders to:   [facility]  POOL    Comments:  new  frankie 2019 **WEEKLY FRANKIE**          Anticoagulation Care Providers       Provider Role Specialty Phone number    Torri Colmenares APRN Referring Cardiology [phone]    Elsy Baeza MD Responsible Cardiology [phone]            Clinic Interview:  Patient Findings     Positives:  Missed doses    Negatives:  Signs/symptoms of thrombosis, Signs/symptoms of bleeding,   Laboratory test error suspected, Change in health, Change in alcohol use,   Change in activity, Upcoming invasive procedure, Emergency department   visit, Upcoming dental procedure, Extra doses, Change in medications,   Change in diet/appetite, Hospital admission, Bruising, Other complaints      Clinical Outcomes     Negatives:  Major bleeding event, Thromboembolic event,   Anticoagulation-related hospital admission, Anticoagulation-related ED   visit, Anticoagulation-related fatality        INR History:      2025     9:22 AM 2025    12:00 AM 2025     9:38 AM 2025    12:00 AM 2025     8:09 AM 2025    12:00 AM 2025     9:17 AM   Anticoagulation Monitoring   INR 2.20  1.90  2.50  1.80   INR Date 2025   INR Goal 2.0-3.0  2.0-3.0  2.0-3.0  2.0-3.0    Trend Same  Up  Same  Same   Last Week Total 85 mg  80 mg  85 mg  75 mg   Next Week Total 80 mg  85 mg  85 mg  90 mg   Sun 10 mg  10 mg  10 mg  10 mg   Mon 15 mg  15 mg  15 mg  15 mg   Tue 10 mg  10 mg  10 mg  10 mg   Wed 10 mg  15 mg  15 mg  15 mg   Thu 10 mg  10 mg  10 mg  10 mg   Fri 15 mg  15 mg  15 mg  20 mg (2/14)   Sat 10 mg  10 mg  10 mg  10 mg   Historical INR  1.90      2.50      1.80            This result is from an external source.       Plan:  1. INR is Subtherapeutic today- see above in Anticoagulation Summary.   Will instruct Kameron Melendez to Increase their warfarin regimen- see above in Anticoagulation Summary.      Missed 1 dose, boost to 20 mg then resume 15 mg MWF, 10 mg AOD, rck 1 week   2. Follow up in 1 weeks  3. They have been instructed to call if any changes in medications, doses, concerns, etc. Patient expresses understanding and has no further questions at this time.    Tessie Fatima [facility]

## 2025-02-28 ENCOUNTER — ANTICOAGULATION VISIT (OUTPATIENT)
Dept: PHARMACY | Facility: HOSPITAL | Age: 55
End: 2025-02-28
Payer: COMMERCIAL

## 2025-02-28 LAB — INR PPP: 1.9

## 2025-02-28 NOTE — PROGRESS NOTES
Anticoagulation Clinic Progress Note    Anticoagulation Summary  As of 2025      INR goal:  2.0-3.0   TTR:  64.7% (6 y)   INR used for dosin.90 (2025)   Warfarin maintenance plan:  10 mg every Sun, Tue, Thu; 15 mg all other days   Weekly warfarin total:  90 mg   Plan last modified:  Tessie Fatima RPH (2025)   Next INR check:  3/7/2025   Priority:  High   Target end date:  Indefinite    Indications    Other acute pulmonary embolism without acute cor pulmonale (Resolved) [I26.99]  Bilateral pulmonary embolism (Resolved) [I26.99]                 Anticoagulation Episode Summary       INR check location:  --    Preferred lab:  --    Send INR reminders to:   [facility]  POOL    Comments:  new  frankie 2019 **WEEKLY FRANKIE**          Anticoagulation Care Providers       Provider Role Specialty Phone number    Torri Colmenares APRN Referring Cardiology [phone]    Elsy Baeza MD Responsible Cardiology [phone]            Clinic Interview:  Patient Findings     Negatives:  Signs/symptoms of thrombosis, Signs/symptoms of bleeding,   Laboratory test error suspected, Change in health, Change in alcohol use,   Change in activity, Upcoming invasive procedure, Emergency department   visit, Upcoming dental procedure, Missed doses, Extra doses, Change in   medications, Change in diet/appetite, Hospital admission, Bruising, Other   complaints      Clinical Outcomes     Negatives:  Major bleeding event, Thromboembolic event,   Anticoagulation-related hospital admission, Anticoagulation-related ED   visit, Anticoagulation-related fatality        INR History:      2025     9:38 AM 2025    12:00 AM 2025     8:09 AM 2025    12:00 AM 2025     9:17 AM 2025    12:00 AM 2025     8:16 AM   Anticoagulation Monitoring   INR 1.90  2.50  1.80  1.90   INR Date 2025   INR Goal 2.0-3.0  2.0-3.0  2.0-3.0  2.0-3.0   Trend Up   Same  Same  Up   Last Week Total 80 mg  85 mg  75 mg  85 mg   Next Week Total 85 mg  85 mg  90 mg  90 mg   Sun 10 mg  10 mg  10 mg  10 mg   Mon 15 mg  15 mg  15 mg  15 mg   Tue 10 mg  10 mg  10 mg  10 mg   Wed 15 mg  15 mg  15 mg  15 mg   Thu 10 mg  10 mg  10 mg  10 mg   Fri 15 mg  15 mg  20 mg (2/14)  15 mg   Sat 10 mg  10 mg  10 mg  15 mg   Historical INR  2.50      1.80      1.90            This result is from an external source.       Plan:  1. INR is Subtherapeutic today- see above in Anticoagulation Summary.   Will instruct Kameron Melendez to Increase their warfarin regimen- see above in Anticoagulation Summary.  Left a voicemail with instruction per patient request. Increase to 10 mg on sun, tues, thurs and 15 mg fatemeh FLORES 1 week   2. Follow up in 1 weeks  3. They have been instructed to call if any changes in medications, doses, concerns, etc. Patient expresses understanding and has no further questions at this time.    Tessie Fatima RP

## 2025-03-07 ENCOUNTER — ANTICOAGULATION VISIT (OUTPATIENT)
Dept: PHARMACY | Facility: HOSPITAL | Age: 55
End: 2025-03-07
Payer: COMMERCIAL

## 2025-03-07 LAB — INR PPP: 2.1

## 2025-03-07 NOTE — PROGRESS NOTES
Anticoagulation Clinic Progress Note    Anticoagulation Summary  As of 3/7/2025      INR goal:  2.0-3.0   TTR:  64.7% (6 y)   INR used for dosin.10 (3/7/2025)   Warfarin maintenance plan:  10 mg every Sun, Tue, Thu; 15 mg all other days   Weekly warfarin total:  90 mg   No change documented:  Tessie Fatima RPH   Plan last modified:  Tessie Fatima RPH (2025)   Next INR check:  3/14/2025   Priority:  High   Target end date:  Indefinite    Indications    Other acute pulmonary embolism without acute cor pulmonale (Resolved) [I26.99]  Bilateral pulmonary embolism (Resolved) [I26.99]                 Anticoagulation Episode Summary       INR check location:  --    Preferred lab:  --    [facility]    Comments:  new  frankie 2019 **WEEKLY FRANKIE**          Anticoagulation Care Providers       Provider Role Specialty Phone number    Torri Colmenares APRN Referring Cardiology [phone]    Elsy Baeza MD Responsible Cardiology [phone]            Clinic Interview:  No pertinent clinical findings have been reported.    INR History:      2025     8:09 AM 2025    12:00 AM 2025     9:17 AM 2025    12:00 AM 2025     8:16 AM 3/7/2025    12:00 AM 3/7/2025     8:21 AM   Anticoagulation Monitoring   INR 2.50  1.80  1.90  2.10   INR Date 2025  2025  2025  3/7/2025   INR Goal 2.0-3.0  2.0-3.0  2.0-3.0  2.0-3.0   Trend Same  Same  Up  Same   Last Week Total 85 mg  75 mg  85 mg  90 mg   Next Week Total 85 mg  90 mg  90 mg  90 mg   Sun 10 mg  10 mg  10 mg  10 mg   Mon 15 mg  15 mg  15 mg  15 mg   Tue 10 mg  10 mg  10 mg  10 mg   Wed 15 mg  15 mg  15 mg  15 mg   Thu 10 mg  10 mg  10 mg  10 mg   Fri 15 mg  20 mg ()  15 mg  15 mg   Sat 10 mg  10 mg  15 mg  15 mg   Historical INR  1.80      1.90      2.10            This result is from an external source.       Plan:  1. INR is therapeutic today- see above in Anticoagulation  Summary.    Kameron Melendez to continue their warfarin regimen- see above in Anticoagulation Summary.  2. Follow up in 1 week  3. Pt has agreed to only be called if INR out of range. They have been instructed to call if any changes in medications, doses, concerns, etc. Patient expresses understanding and has no further questions at this time.    Tessie Fatima, [facility]

## 2025-03-14 ENCOUNTER — ANTICOAGULATION VISIT (OUTPATIENT)
Dept: PHARMACY | Facility: HOSPITAL | Age: 55
End: 2025-03-14
Payer: COMMERCIAL

## 2025-03-14 LAB — INR PPP: 1.4

## 2025-03-14 NOTE — PROGRESS NOTES
Anticoagulation Clinic Progress Note    Anticoagulation Summary  As of 3/14/2025      INR goal:  2.0-3.0   TTR:  64.5% (6 y)   INR used for dosin.40 (3/14/2025)   Warfarin maintenance plan:  10 mg every Sun, Tue, Thu; 15 mg all other days   Weekly warfarin total:  90 mg   No change documented:  Marissa Jones RPH   Plan last modified:  Tessie Fatima RPH (2025)   Next INR check:  3/21/2025   Priority:  High   Target end date:  Indefinite    Indications    Other acute pulmonary embolism without acute cor pulmonale (Resolved) [I26.99]  Bilateral pulmonary embolism (Resolved) [I26.99]                 Anticoagulation Episode Summary       INR check location:  --    Preferred lab:  --    Send INR reminders to:   [facility]  POOL    Comments:  new  frankie 2019 **WEEKLY FRANKIE**          Anticoagulation Care Providers       Provider Role Specialty Phone number    Torri Colmenares APRN Referring Cardiology [phone]    Elsy Baeza MD Responsible Cardiology [phone]            Clinic Interview:  Patient Findings     Positives:  Missed doses, Extra doses    Negatives:  Signs/symptoms of thrombosis, Signs/symptoms of bleeding,   Laboratory test error suspected, Change in health, Change in alcohol use,   Change in activity, Upcoming invasive procedure, Emergency department   visit, Upcoming dental procedure, Change in medications, Change in   diet/appetite, Hospital admission, Bruising, Other complaints    Comments:  Patient reports 1 missed dose (15 mg) on 3/12/25. He took a   boosted dose (15 mg) on 3/13/25 to account for missed dose. Additionally,   he has not officially made change to 10 mg Sun//; 15 mg AOD yet, so   will have him trial that now given subtherapeutic INR trend.      Clinical Outcomes     Negatives:  Major bleeding event, Thromboembolic event,   Anticoagulation-related hospital admission, Anticoagulation-related ED   visit, Anticoagulation-related fatality     Comments:  Patient reports 1 missed dose (15 mg) on 3/12/25. He took a   boosted dose (15 mg) on 3/13/25 to account for missed dose. Additionally,   he has not officially made change to 10 mg Sun/T/Th; 15 mg AOD yet, so   will have him trial that now given subtherapeutic INR trend.        INR History:      2/14/2025     9:17 AM 2/28/2025    12:00 AM 2/28/2025     8:16 AM 3/7/2025    12:00 AM 3/7/2025     8:21 AM 3/14/2025    12:00 AM 3/14/2025     8:41 AM   Anticoagulation Monitoring   INR 1.80  1.90  2.10  1.40   INR Date 2/14/2025  2/28/2025  3/7/2025  3/14/2025   INR Goal 2.0-3.0  2.0-3.0  2.0-3.0  2.0-3.0   Trend Same  Up  Same  Same   Last Week Total 75 mg  85 mg  90 mg  75 mg   Next Week Total 90 mg  90 mg  90 mg  90 mg   Sun 10 mg  10 mg  10 mg  10 mg   Mon 15 mg  15 mg  15 mg  15 mg   Tue 10 mg  10 mg  10 mg  10 mg   Wed 15 mg  15 mg  15 mg  15 mg   Thu 10 mg  10 mg  10 mg  10 mg   Fri 20 mg (2/14)  15 mg  15 mg  15 mg   Sat 10 mg  15 mg  15 mg  15 mg   Historical INR  1.90      2.10      1.40            This result is from an external source.       Plan:  1. INR is Subtherapeutic today- see above in Anticoagulation Summary.   Will instruct Kameron Melendez to continue their warfarin regimen- see above in Anticoagulation Summary.  2. Follow up in 1 week  3. They have been instructed to call if any changes in medications, doses, concerns, etc. Patient expresses understanding and has no further questions at this time.    Marissa Jones RPH

## 2025-03-21 ENCOUNTER — ANTICOAGULATION VISIT (OUTPATIENT)
Dept: PHARMACY | Facility: HOSPITAL | Age: 55
End: 2025-03-21
Payer: COMMERCIAL

## 2025-03-21 LAB — INR PPP: 1.9

## 2025-03-21 NOTE — PROGRESS NOTES
Anticoagulation Clinic Progress Note    Anticoagulation Summary  As of 3/21/2025      INR goal:  2.0-3.0   TTR:  64.3% (6.1 y)   INR used for dosin.90 (3/21/2025)   Warfarin maintenance plan:  10 mg every Sun, Tue, Thu; 15 mg all other days   Weekly warfarin total:  90 mg   Plan last modified:  Tessie Fatima RPH (2025)   Next INR check:  3/28/2025   Priority:  High   Target end date:  Indefinite    Indications    Other acute pulmonary embolism without acute cor pulmonale (Resolved) [I26.99]  Bilateral pulmonary embolism (Resolved) [I26.99]                 Anticoagulation Episode Summary       INR check location:  --    Preferred lab:  --    Send INR reminders to:   [facility]  POOL    Comments:  new  frankie 2019 **WEEKLY FRANKIE**          Anticoagulation Care Providers       Provider Role Specialty Phone number    Torri Colmenares APRN Referring Cardiology [phone]    Elsy Baeza MD Responsible Cardiology [phone]            Clinic Interview:  Patient Findings     Positives:  Missed doses    Negatives:  Signs/symptoms of thrombosis, Signs/symptoms of bleeding,   Laboratory test error suspected, Change in health, Change in alcohol use,   Change in activity, Upcoming invasive procedure, Emergency department   visit, Upcoming dental procedure, Extra doses, Change in medications,   Change in diet/appetite, Hospital admission, Bruising, Other complaints      Clinical Outcomes     Negatives:  Major bleeding event, Thromboembolic event,   Anticoagulation-related hospital admission, Anticoagulation-related ED   visit, Anticoagulation-related fatality        INR History:      2025     8:16 AM 3/7/2025    12:00 AM 3/7/2025     8:21 AM 3/14/2025    12:00 AM 3/14/2025     8:41 AM 3/21/2025    12:00 AM 3/21/2025     2:42 PM   Anticoagulation Monitoring   INR 1.90  2.10  1.40  1.90   INR Date 2025  3/7/2025  3/14/2025  3/21/2025   INR Goal 2.0-3.0  2.0-3.0  2.0-3.0  2.0-3.0    Trend Up  Same  Same  Same   Last Week Total 85 mg  90 mg  75 mg  80 mg   Next Week Total 90 mg  90 mg  90 mg  90 mg   Sun 10 mg  10 mg  10 mg  10 mg   Mon 15 mg  15 mg  15 mg  15 mg   Tue 10 mg  10 mg  10 mg  10 mg   Wed 15 mg  15 mg  15 mg  15 mg   Thu 10 mg  10 mg  10 mg  10 mg   Fri 15 mg  15 mg  15 mg  15 mg   Sat 15 mg  15 mg  15 mg  15 mg   Historical INR  2.10      1.40      1.90            This result is from an external source.       Plan:  1. INR is Subtherapeutic today- see above in Anticoagulation Summary.   Will instruct Kameron Melendez to Continue their warfarin regimen- see above in Anticoagulation Summary.      missed dose on Monday. He took 15 mg on Tuesday. Continue 10 mg on sun, tues, thurs and 15 mg AOD, rck 1 week   2. Follow up in 1 weeks  3. They have been instructed to call if any changes in medications, doses, concerns, etc. Patient expresses understanding and has no further questions at this time.    Tessie Fatima RP

## 2025-04-04 ENCOUNTER — ANTICOAGULATION VISIT (OUTPATIENT)
Dept: PHARMACY | Facility: HOSPITAL | Age: 55
End: 2025-04-04
Payer: COMMERCIAL

## 2025-04-04 LAB — INR PPP: 2.2

## 2025-04-04 NOTE — PROGRESS NOTES
Anticoagulation Clinic Progress Note    Anticoagulation Summary  As of 2025      INR goal:  2.0-3.0   TTR:  64.3% (6.1 y)   INR used for dosin.20 (2025)   Warfarin maintenance plan:  10 mg every Sun, Tue, Thu; 15 mg all other days   Weekly warfarin total:  90 mg   No change documented:  Tessie Fatima RPH   Plan last modified:  Tessie Fatima RPH (2025)   Next INR check:  2025   Priority:  High   Target end date:  Indefinite    Indications    Other acute pulmonary embolism without acute cor pulmonale (Resolved) [I26.99]  Bilateral pulmonary embolism (Resolved) [I26.99]                 Anticoagulation Episode Summary       INR check location:  --    Preferred lab:  --    Send INR reminders to:   [facility]  POOL    Comments:  new  frankie 2019 **WEEKLY FRANKIE**          Anticoagulation Care Providers       Provider Role Specialty Phone number    Torri Colmenares APRN Referring Cardiology [phone]    Elsy Baeza MD Responsible Cardiology [phone]            Clinic Interview:  No pertinent clinical findings have been reported.    INR History:      3/7/2025     8:21 AM 3/14/2025    12:00 AM 3/14/2025     8:41 AM 3/21/2025    12:00 AM 3/21/2025     2:42 PM 2025    12:00 AM 2025     8:18 AM   Anticoagulation Monitoring   INR 2.10  1.40  1.90  2.20   INR Date 3/7/2025  3/14/2025  3/21/2025  2025   INR Goal 2.0-3.0  2.0-3.0  2.0-3.0  2.0-3.0   Trend Same  Same  Same  Same   Last Week Total 90 mg  75 mg  80 mg  90 mg   Next Week Total 90 mg  90 mg  90 mg  90 mg   Sun 10 mg  10 mg  10 mg  10 mg   Mon 15 mg  15 mg  15 mg  15 mg   Tue 10 mg  10 mg  10 mg  10 mg   Wed 15 mg  15 mg  15 mg  15 mg   Thu 10 mg  10 mg  10 mg  10 mg   Fri 15 mg  15 mg  15 mg  15 mg   Sat 15 mg  15 mg  15 mg  15 mg   Historical INR  1.40      1.90      2.20            This result is from an external source.       Plan:  1. INR is therapeutic today- see above in Anticoagulation  Summary.    Kameron Melendez to continue their warfarin regimen- see above in Anticoagulation Summary.  2. Follow up in 1 week  3. Pt has agreed to only be called if INR out of range. They have been instructed to call if any changes in medications, doses, concerns, etc. Patient expresses understanding and has no further questions at this time.    Tessie Fatima, [facility]

## 2025-04-07 RX ORDER — WARFARIN SODIUM 5 MG/1
TABLET ORAL
Qty: 235 TABLET | Refills: 1 | Status: SHIPPED | OUTPATIENT
Start: 2025-04-07

## 2025-04-18 ENCOUNTER — ANTICOAGULATION VISIT (OUTPATIENT)
Dept: PHARMACY | Facility: HOSPITAL | Age: 55
End: 2025-04-18
Payer: COMMERCIAL

## 2025-04-18 ENCOUNTER — OFFICE VISIT (OUTPATIENT)
Dept: FAMILY MEDICINE CLINIC | Facility: CLINIC | Age: 55
End: 2025-04-18
Payer: COMMERCIAL

## 2025-04-18 VITALS
HEART RATE: 73 BPM | HEIGHT: 74 IN | WEIGHT: 315 LBS | SYSTOLIC BLOOD PRESSURE: 126 MMHG | BODY MASS INDEX: 40.43 KG/M2 | DIASTOLIC BLOOD PRESSURE: 76 MMHG | OXYGEN SATURATION: 96 %

## 2025-04-18 DIAGNOSIS — E66.01 MORBID OBESITY WITH BODY MASS INDEX OF 60.0-69.9 IN ADULT: ICD-10-CM

## 2025-04-18 DIAGNOSIS — I89.0 LYMPHEDEMA OF BOTH LOWER EXTREMITIES: ICD-10-CM

## 2025-04-18 DIAGNOSIS — E78.5 DYSLIPIDEMIA: ICD-10-CM

## 2025-04-18 DIAGNOSIS — G47.33 OSA (OBSTRUCTIVE SLEEP APNEA): ICD-10-CM

## 2025-04-18 DIAGNOSIS — E79.0 HYPERURICEMIA: ICD-10-CM

## 2025-04-18 DIAGNOSIS — Z79.899 DRUG THERAPY: ICD-10-CM

## 2025-04-18 DIAGNOSIS — Z23 NEED FOR PNEUMOCOCCAL VACCINATION: ICD-10-CM

## 2025-04-18 DIAGNOSIS — E11.9 TYPE 2 DIABETES MELLITUS WITHOUT COMPLICATION, WITHOUT LONG-TERM CURRENT USE OF INSULIN: Primary | ICD-10-CM

## 2025-04-18 DIAGNOSIS — M72.2 PLANTAR FASCIITIS, BILATERAL: ICD-10-CM

## 2025-04-18 DIAGNOSIS — D68.51 HETEROZYGOUS FACTOR V LEIDEN MUTATION: ICD-10-CM

## 2025-04-18 DIAGNOSIS — D68.51 FACTOR V LEIDEN MUTATION: ICD-10-CM

## 2025-04-18 LAB — INR PPP: 3

## 2025-04-18 RX ORDER — TRIAMCINOLONE ACETONIDE 40 MG/ML
80 INJECTION, SUSPENSION INTRA-ARTICULAR; INTRAMUSCULAR ONCE
Status: COMPLETED | OUTPATIENT
Start: 2025-04-18 | End: 2025-04-18

## 2025-04-18 RX ADMIN — TRIAMCINOLONE ACETONIDE 80 MG: 40 INJECTION, SUSPENSION INTRA-ARTICULAR; INTRAMUSCULAR at 10:07

## 2025-04-18 NOTE — PROGRESS NOTES
Subjective   Kameron Melendez is a 55 y.o. male. Presents today for   Chief Complaint   Patient presents with    Diabetes    Hyperlipidemia    Weight Check    Leg Swelling     Bilateral     Atrial Fibrillation         History of Present Illness  Patient 56 y/o male with dm2, hld, PAF, factor V leiden (hx of DVT/VTE) and severe lymphedema;   Reports insurance got him dietician, health  and see APRN for weight loss;  Wante dtry contrave, but he tried in past, did not lose.  APRN gave then try metformin, but dyspepsia and diarrhea;  He did not lose any weight and it is increasing.  So stopped taking.  Relates told could not give him injectable as had questionable GB hx;   He does not like the program as feels not getting anything out of it.  APRN suggested ER metformin.  He did not tolerate Ozempic, though willing try again.  The PAF only 1 episode, Normal sinus since.   Exercise challenging given lymphedema and knees.   Seeing podiatry for plantar fasciitis, getting shots, reports did not work and double dose did not help;   Doing inserts, but still no relief;  Given another compound cream, but no relief;  Reports barley can stand on feet due to pain.  Would like IM shot.   History of Present Illness    Review of Systems   Respiratory:  Negative for shortness of breath.    Cardiovascular:  Positive for leg swelling. Negative for chest pain and palpitations.   Gastrointestinal:  Negative for abdominal pain.   Musculoskeletal:  Positive for arthralgias and gait problem.       Patient Active Problem List   Diagnosis    Pulmonary hypertension    Lymphedema of both lower extremities    Obesity, morbid, BMI 50 or higher    Dyslipidemia    Hyperuricemia    Hypogonadal obesity    Knee arthropathy    Morbid obesity with body mass index of 60.0-69.9 in adult    ARNOLDO (obstructive sleep apnea)    Severe edema    History of pulmonary embolism    Intractable back pain    Heterozygous factor V Leiden mutation    Cellulitis of  "left lower extremity    Hypokalemia    CAROL (acute kidney injury)    Sepsis with cutaneous manifestations    Hyperglycemia    Paroxysmal atrial fibrillation    Long term (current) use of anticoagulants    Lymphedema, not elsewhere classified    Nicotine dependence, other tobacco product, uncomplicated    Personal history of other venous thrombosis and embolism    Typical atrial flutter    Venous insufficiency (chronic) (peripheral)    Cellulitis of right lower extremity    Wound cellulitis    CAP (community acquired pneumonia)    Type 2 diabetes mellitus    Hyponatremia    Choledocholithiasis    RUQ pain       Social History     Socioeconomic History    Marital status:    Tobacco Use    Smoking status: Former     Types: Cigars, Pipe     Start date: 1/1/2006     Quit date: 1/1/2022     Years since quitting: 3.2     Passive exposure: Past    Smokeless tobacco: Never    Tobacco comments:     occasional - < 1 a month   Vaping Use    Vaping status: Never Used   Substance and Sexual Activity    Alcohol use: Yes     Comment: social    Drug use: No    Sexual activity: Defer       No Known Allergies      Objective   Vitals:    04/18/25 0914   BP: 126/76   Pulse: 73   SpO2: 96%   Weight: (!) 251 kg (553 lb)   Height: 188 cm (74\")     Body mass index is 71 kg/m².    Physical Exam  Vitals and nursing note reviewed.   Constitutional:       Appearance: He is well-developed.   Musculoskeletal:      Cervical back: Neck supple.   Skin:     General: Skin is warm and dry.   Neurological:      Mental Status: He is alert.   Psychiatric:         Behavior: Behavior normal.         Results       Assessment & Plan   Diagnoses and all orders for this visit:    1. Type 2 diabetes mellitus without complication, without long-term current use of insulin (Primary)  -     Microalbumin / Creatinine Urine Ratio - Urine, Clean Catch  -     Lipid Panel  -     Comprehensive Metabolic Panel  -     Hemoglobin A1c  -     TSH  -     Tirzepatide 2.5 " MG/0.5ML solution auto-injector; Inject 2.5 mg under the skin into the appropriate area as directed 1 (One) Time Per Week. Call for dose increases if tolerates  Dispense: 2 mL; Refill: 3    2. Heterozygous factor V Leiden mutation    3. Morbid obesity with body mass index of 60.0-69.9 in adult    4. Hyperuricemia  -     Uric Acid    5. Lymphedema of both lower extremities    6. Need for pneumococcal vaccination  -     Pneumococcal Conjugate Vaccine 20-Valent (PCV20)    7. Dyslipidemia  -     Lipid Panel  -     Comprehensive Metabolic Panel    8. Factor V Leiden mutation    9. ARNOLDO (obstructive sleep apnea)    10. Drug therapy  -     CBC (No Diff)    11. Plantar fasciitis, bilateral  -     triamcinolone acetonide (KENALOG-40) injection 80 mg    Dm2- will try mounjaro; due labs  Cardiology manages warfarin;  needs for life  ARNOLDO - intolerant CPAP  -due check uric acid;    -HLD - continue statin, recheck lipids.  -wants IM steroid, but just had pneumococcal;  He would like IM anyway         Assessment & Plan            -Follow up: 2 months and prn     ________________________________________  Hansel Patel DO, MS    Current Outpatient Medications on File Prior to Visit   Medication Sig Dispense Refill    acetaminophen (TYLENOL) 500 MG tablet Take 1 tablet by mouth Every 6 (Six) Hours As Needed for Mild Pain.      atorvastatin (LIPITOR) 20 MG tablet TAKE 1 TABLET BY MOUTH EVERY DAY AT NIGHT 90 tablet 3    furosemide (LASIX) 80 MG tablet Take 1 tablet by mouth Daily As Needed (leg swelling). 90 tablet 3    Ibuprofen 3 %, Gabapentin 10 %, Baclofen 2 %, lidocaine 4 %, Ketamine HCl 10 % Apply 1-2 g topically to the appropriate area as directed 3 (Three) to 4 (Four) times daily. 90 g 5    metOLazone (ZAROXOLYN) 5 MG tablet TAKE 1 TABLET BY MOUTH 1 (ONE) TIME PER WEEK. 4 tablet 3    omeprazole (priLOSEC) 40 MG capsule TAKE 1 CAPSULE BY MOUTH EVERY DAY 90 capsule 3    potassium chloride (K-TAB) 20 MEQ tablet  controlled-release ER tablet Take 1 tablet by mouth 1 (One) Time Per Week. With metolazone 4 tablet 3    spironolactone (Aldactone) 25 MG tablet Take 1 tablet by mouth As Needed (swelling).      warfarin (Jantoven) 5 MG tablet TAKE TWO TABLETS BY MOUTH ON SUN, TUES, THURS AND TAKE THREE TABLETS BY MOUTH ALL OTHER DAYS OR AS DIRECTED 235 tablet 1    [DISCONTINUED] cephalexin (KEFLEX) 500 MG capsule Take 1 capsule by mouth 4 (Four) Times a Day. (Patient not taking: Reported on 4/18/2025) 40 capsule 2    [DISCONTINUED] ursodiol (ACTIGALL) 300 MG capsule Take 1 capsule by mouth 2 (Two) Times a Day. (Patient not taking: Reported on 4/18/2025) 180 capsule 1     No current facility-administered medications on file prior to visit.

## 2025-04-18 NOTE — PROGRESS NOTES
Anticoagulation Clinic Progress Note    Anticoagulation Summary  As of 4/18/2025      INR goal:  2.0-3.0   TTR:  64.6% (6.1 y)   INR used for dosing:  3.00 (4/18/2025)   Warfarin maintenance plan:  10 mg every Sun, Tue, Thu; 15 mg all other days   Weekly warfarin total:  90 mg   No change documented:  Gary Jane, Pharmacy Technician   Plan last modified:  Tessie Fatima RPH (2/28/2025)   Next INR check:  5/2/2025   Priority:  High   Target end date:  Indefinite    Indications    Other acute pulmonary embolism without acute cor pulmonale (Resolved) [I26.99]  Bilateral pulmonary embolism (Resolved) [I26.99]                 Anticoagulation Episode Summary       INR check location:  --    Preferred lab:  --    Send INR reminders to:   [facility]    Comments:  new  tristen March 2019 **WEEKLY TRISTEN**          Anticoagulation Care Providers       Provider Role Specialty Phone number    Torri Colmenares, APRN Referring Cardiology [phone]    Elsy Baeza MD Responsible Cardiology [phone]            Clinic Interview:  No pertinent clinical findings have been reported.    INR History:      3/14/2025     8:41 AM 3/21/2025    12:00 AM 3/21/2025     2:42 PM 4/4/2025    12:00 AM 4/4/2025     8:18 AM 4/18/2025    12:00 AM 4/18/2025     9:59 AM   Anticoagulation Monitoring   INR 1.40  1.90  2.20  3.00   INR Date 3/14/2025  3/21/2025  4/4/2025  4/18/2025   INR Goal 2.0-3.0  2.0-3.0  2.0-3.0  2.0-3.0   Trend Same  Same  Same  Same   Last Week Total 75 mg  80 mg  90 mg  90 mg   Next Week Total 90 mg  90 mg  90 mg  90 mg   Sun 10 mg  10 mg  10 mg  10 mg   Mon 15 mg  15 mg  15 mg  15 mg   Tue 10 mg  10 mg  10 mg  10 mg   Wed 15 mg  15 mg  15 mg  15 mg   Thu 10 mg  10 mg  10 mg  10 mg   Fri 15 mg  15 mg  15 mg  15 mg   Sat 15 mg  15 mg  15 mg  15 mg   Historical INR  1.90      2.20      3.00        Visit Report      Report Report       This result is from an external source.       Plan:  1. INR is  therapeutic today- see above in Anticoagulation Summary.    Kameron Melendez to continue their warfarin regimen- see above in Anticoagulation Summary.  2. Follow up in 2 weeks  3. Pt has agreed to only be called if INR out of range. They have been instructed to call if any changes in medications, doses, concerns, etc. Patient expresses understanding and has no further questions at this time.    Gary Jane, Pharmacy Technician

## 2025-04-19 LAB
ALBUMIN SERPL-MCNC: 4.2 G/DL (ref 3.5–5.2)
ALBUMIN/GLOB SERPL: 1.3 G/DL
ALP SERPL-CCNC: 131 U/L (ref 39–117)
ALT SERPL-CCNC: 18 U/L (ref 1–41)
AST SERPL-CCNC: 17 U/L (ref 1–40)
BILIRUB SERPL-MCNC: 0.7 MG/DL (ref 0–1.2)
BUN SERPL-MCNC: 26 MG/DL (ref 6–20)
BUN/CREAT SERPL: 19.5 (ref 7–25)
CALCIUM SERPL-MCNC: 10 MG/DL (ref 8.6–10.5)
CHLORIDE SERPL-SCNC: 100 MMOL/L (ref 98–107)
CHOLEST SERPL-MCNC: 167 MG/DL (ref 0–200)
CO2 SERPL-SCNC: 24.8 MMOL/L (ref 22–29)
CREAT SERPL-MCNC: 1.33 MG/DL (ref 0.76–1.27)
EGFRCR SERPLBLD CKD-EPI 2021: 63.1 ML/MIN/1.73
ERYTHROCYTE [DISTWIDTH] IN BLOOD BY AUTOMATED COUNT: 14.5 % (ref 12.3–15.4)
GLOBULIN SER CALC-MCNC: 3.2 GM/DL
GLUCOSE SERPL-MCNC: 116 MG/DL (ref 65–99)
HBA1C MFR BLD: 7.1 % (ref 4.8–5.6)
HCT VFR BLD AUTO: 45.5 % (ref 37.5–51)
HDLC SERPL-MCNC: 48 MG/DL (ref 40–60)
HGB BLD-MCNC: 14.9 G/DL (ref 13–17.7)
LDLC SERPL CALC-MCNC: 103 MG/DL (ref 0–100)
MCH RBC QN AUTO: 29.4 PG (ref 26.6–33)
MCHC RBC AUTO-ENTMCNC: 32.7 G/DL (ref 31.5–35.7)
MCV RBC AUTO: 89.9 FL (ref 79–97)
PLATELET # BLD AUTO: 248 10*3/MM3 (ref 140–450)
POTASSIUM SERPL-SCNC: 4.7 MMOL/L (ref 3.5–5.2)
PROT SERPL-MCNC: 7.4 G/DL (ref 6–8.5)
RBC # BLD AUTO: 5.06 10*6/MM3 (ref 4.14–5.8)
SODIUM SERPL-SCNC: 137 MMOL/L (ref 136–145)
TRIGL SERPL-MCNC: 84 MG/DL (ref 0–150)
TSH SERPL DL<=0.005 MIU/L-ACNC: 2.04 UIU/ML (ref 0.27–4.2)
UNABLE TO VOID: NORMAL
URATE SERPL-MCNC: 10.2 MG/DL (ref 3.4–7)
VLDLC SERPL CALC-MCNC: 16 MG/DL (ref 5–40)
WBC # BLD AUTO: 9.23 10*3/MM3 (ref 3.4–10.8)

## 2025-04-25 ENCOUNTER — ANTICOAGULATION VISIT (OUTPATIENT)
Dept: PHARMACY | Facility: HOSPITAL | Age: 55
End: 2025-04-25
Payer: COMMERCIAL

## 2025-04-25 DIAGNOSIS — E11.9 TYPE 2 DIABETES MELLITUS WITHOUT COMPLICATION, WITHOUT LONG-TERM CURRENT USE OF INSULIN: ICD-10-CM

## 2025-04-25 LAB — INR PPP: 2.3

## 2025-04-25 RX ORDER — TIRZEPATIDE 2.5 MG/.5ML
2.5 INJECTION, SOLUTION SUBCUTANEOUS WEEKLY
Qty: 2 ML | Refills: 5 | Status: SHIPPED | OUTPATIENT
Start: 2025-04-25

## 2025-04-25 NOTE — PROGRESS NOTES
Anticoagulation Clinic Progress Note    Anticoagulation Summary  As of 2025      INR goal:  2.0-3.0   TTR:  64.7% (6.2 y)   INR used for dosin.30 (2025)   Warfarin maintenance plan:  10 mg every Sun, Tue, Thu; 15 mg all other days   Weekly warfarin total:  90 mg   No change documented:  Iris Gonzalez   Plan last modified:  Tessie Fatima RPH (2025)   Next INR check:  2025   Priority:  High   Target end date:  Indefinite    Indications    Other acute pulmonary embolism without acute cor pulmonale (Resolved) [I26.99]  Bilateral pulmonary embolism (Resolved) [I26.99]                 Anticoagulation Episode Summary       INR check location:  --    Preferred lab:  --    Send INR reminders to:   [facility]  [facility]    Comments:  new  tristen 2019 **WEEKLY TRISTEN**          Anticoagulation Care Providers       Provider Role Specialty Phone number    Torri Colmenares APRN Referring Cardiology [phone]    Elsy Baeza MD Responsible Cardiology [phone]            Clinic Interview:  No pertinent clinical findings have been reported.    INR History:      3/21/2025     2:42 PM 2025    12:00 AM 2025     8:18 AM 2025    12:00 AM 2025     9:59 AM 2025    12:00 AM 2025     8:52 AM   Anticoagulation Monitoring   INR 1.90  2.20  3.00  2.30   INR Date 3/21/2025  2025  2025  2025   INR Goal 2.0-3.0  2.0-3.0  2.0-3.0  2.0-3.0   Trend Same  Same  Same  Same   Last Week Total 80 mg  90 mg  90 mg  90 mg   Next Week Total 90 mg  90 mg  90 mg  90 mg   Sun 10 mg  10 mg  10 mg  10 mg   Mon 15 mg  15 mg  15 mg  15 mg   Tue 10 mg  10 mg  10 mg  10 mg   Wed 15 mg  15 mg  15 mg  15 mg   Thu 10 mg  10 mg  10 mg  10 mg   Fri 15 mg  15 mg  15 mg  15 mg   Sat 15 mg  15 mg  15 mg  15 mg   Historical INR  2.20      3.00      2.30        Visit Report    Report Report         This result is from an external source.       Plan:  1. INR is therapeutic  today- see above in Anticoagulation Summary.    Kameron Melendez to continue their warfarin regimen- see above in Anticoagulation Summary.  2. Follow up in 1 week  3. Pt has agreed to only be called if INR out of range. They have been instructed to call if any changes in medications, doses, concerns, etc. Patient expresses understanding and has no further questions at this time.    Iris Gonzalez     No

## 2025-05-02 ENCOUNTER — ANTICOAGULATION VISIT (OUTPATIENT)
Dept: PHARMACY | Facility: HOSPITAL | Age: 55
End: 2025-05-02
Payer: COMMERCIAL

## 2025-05-02 LAB — INR PPP: 1.9

## 2025-05-02 NOTE — PROGRESS NOTES
Anticoagulation Clinic Progress Note    Anticoagulation Summary  As of 2025      INR goal:  2.0-3.0   TTR:  64.7% (6.2 y)   INR used for dosin.90 (2025)   Warfarin maintenance plan:  10 mg every Sun, e, Thu; 15 mg all other days   Weekly warfarin total:  90 mg   No change documented:  Tessie Fatima RPH   Plan last modified:  Tessie Fatima RPH (2025)   Next INR check:  2025   Priority:  High   Target end date:  Indefinite    Indications    Other acute pulmonary embolism without acute cor pulmonale (Resolved) [I26.99]  Bilateral pulmonary embolism (Resolved) [I26.99]                 Anticoagulation Episode Summary       INR check location:  --    Preferred lab:  --    Send INR reminders to:   [facility]  [facility]    Comments:  new  frankie 2019 **WEEKLY FRANKIE**          Anticoagulation Care Providers       Provider Role Specialty Phone number    Torri Colmenares APRN Referring Cardiology [phone]    Elsy Baeza MD Responsible Cardiology [phone]            Clinic Interview:  Patient Findings     Negatives:  Signs/symptoms of thrombosis, Signs/symptoms of bleeding,   Laboratory test error suspected, Change in health, Change in alcohol use,   Change in activity, Upcoming invasive procedure, Emergency department   visit, Upcoming dental procedure, Missed doses, Extra doses, Change in   medications, Change in diet/appetite, Hospital admission, Bruising, Other   complaints      Clinical Outcomes     Negatives:  Major bleeding event, Thromboembolic event,   Anticoagulation-related hospital admission, Anticoagulation-related ED   visit, Anticoagulation-related fatality        INR History:      2025     8:18 AM 2025    12:00 AM 2025     9:59 AM 2025    12:00 AM 2025     8:52 AM 2025    12:00 AM 2025    12:49 PM   Anticoagulation Monitoring   INR 2.20  3.00  2.30  1.90   INR Date 2025   INR Goal  2.0-3.0  2.0-3.0  2.0-3.0  2.0-3.0   Trend Same  Same  Same  Same   Last Week Total 90 mg  90 mg  90 mg  90 mg   Next Week Total 90 mg  90 mg  90 mg  90 mg   Sun 10 mg  10 mg  10 mg  10 mg   Mon 15 mg  15 mg  15 mg  15 mg   Tue 10 mg  10 mg  10 mg  10 mg   Wed 15 mg  15 mg  15 mg  15 mg   Thu 10 mg  10 mg  10 mg  10 mg   Fri 15 mg  15 mg  15 mg  15 mg   Sat 15 mg  15 mg  15 mg  15 mg   Historical INR  3.00      2.30      1.90        Visit Report  Report Report           This result is from an external source.       Plan:  1. INR is Subtherapeutic today- see above in Anticoagulation Summary.   Will instruct Kameron Melendez to Increase their warfarin regimen- see above in Anticoagulation Summary.  Left voicemail (per patient request) to continue 10 mg on sun, tues, thurs and 15 mg AOD and recheck in 1 week as INR is close to goal range   2. Follow up in 1 weeks  3. They have been instructed to call if any changes in medications, doses, concerns, etc. Patient expresses understanding and has no further questions at this time.    Tessie Fatima [facility]

## 2025-05-06 RX ORDER — METHYLPREDNISOLONE 4 MG/1
TABLET ORAL
Qty: 21 EACH | Refills: 0 | Status: SHIPPED | OUTPATIENT
Start: 2025-05-06

## 2025-05-09 ENCOUNTER — ANTICOAGULATION VISIT (OUTPATIENT)
Dept: PHARMACY | Facility: HOSPITAL | Age: 55
End: 2025-05-09
Payer: COMMERCIAL

## 2025-05-09 LAB — INR PPP: 2.3

## 2025-05-09 NOTE — PROGRESS NOTES
Anticoagulation Clinic Progress Note    Anticoagulation Summary  As of 2025      INR goal:  2.0-3.0   TTR:  64.7% (6.2 y)   INR used for dosin.30 (2025)   Warfarin maintenance plan:  10 mg every Sun, Tue, Thu; 15 mg all other days   Weekly warfarin total:  90 mg   No change documented:  Tessie Fatima RPH   Plan last modified:  Tessie Fatima RPH (2025)   Next INR check:  2025   Priority:  High   Target end date:  Indefinite    Indications    Other acute pulmonary embolism without acute cor pulmonale (Resolved) [I26.99]  Bilateral pulmonary embolism (Resolved) [I26.99]                 Anticoagulation Episode Summary       INR check location:  --    Preferred lab:  --    Send INR reminders to:   [facility]  [facility]    Comments:  new  frankie 2019 **WEEKLY FRANKIE**          Anticoagulation Care Providers       Provider Role Specialty Phone number    Torri Colmenares APRN Referring Cardiology [phone]    Elsy Baeza MD Responsible Cardiology [phone]            Clinic Interview:  No pertinent clinical findings have been reported.    INR History:      2025     9:59 AM 2025    12:00 AM 2025     8:52 AM 2025    12:00 AM 2025    12:49 PM 2025    12:00 AM 2025     8:06 AM   Anticoagulation Monitoring   INR 3.00  2.30  1.90  2.30   INR Date 2025   INR Goal 2.0-3.0  2.0-3.0  2.0-3.0  2.0-3.0   Trend Same  Same  Same  Same   Last Week Total 90 mg  90 mg  90 mg  90 mg   Next Week Total 90 mg  90 mg  90 mg  90 mg   Sun 10 mg  10 mg  10 mg  10 mg   Mon 15 mg  15 mg  15 mg  15 mg   Tue 10 mg  10 mg  10 mg  10 mg   Wed 15 mg  15 mg  15 mg  15 mg   Thu 10 mg  10 mg  10 mg  10 mg   Fri 15 mg  15 mg  15 mg  15 mg   Sat 15 mg  15 mg  15 mg  15 mg   Historical INR  2.30      1.90      2.30        Visit Report Report             This result is from an external source.       Plan:  1. INR is therapeutic today- see  above in Anticoagulation Summary.    Kameron Melendez to continue their warfarin regimen- see above in Anticoagulation Summary.  2. Follow up in 1 week  3. Pt has agreed to only be called if INR out of range. They have been instructed to call if any changes in medications, doses, concerns, etc. Patient expresses understanding and has no further questions at this time.    Tessie Fatima, [facility]

## 2025-05-16 ENCOUNTER — ANTICOAGULATION VISIT (OUTPATIENT)
Dept: PHARMACY | Facility: HOSPITAL | Age: 55
End: 2025-05-16
Payer: COMMERCIAL

## 2025-05-16 LAB — INR PPP: 3

## 2025-05-16 NOTE — PROGRESS NOTES
Anticoagulation Clinic Progress Note    Anticoagulation Summary  As of 5/16/2025      INR goal:  2.0-3.0   TTR:  64.9% (6.2 y)   INR used for dosing:  3.00 (5/16/2025)   Warfarin maintenance plan:  10 mg every Sun, Tue, Thu; 15 mg all other days   Weekly warfarin total:  90 mg   No change documented:  Iris Gonzalez   Plan last modified:  Tessie Fatima RPH (2/28/2025)   Next INR check:  5/23/2025   Priority:  High   Target end date:  Indefinite    Indications    Other acute pulmonary embolism without acute cor pulmonale (Resolved) [I26.99]  Bilateral pulmonary embolism (Resolved) [I26.99]                 Anticoagulation Episode Summary       INR check location:  --    Preferred lab:  --    Send INR reminders to:   [facility]  PO[facility]    Comments:  new  tristen March 2019 **WEEKLY TRISTEN**          Anticoagulation Care Providers       Provider Role Specialty Phone number    Torri Colmenares APRN Referring Cardiology [phone]    Elsy Baeza MD Responsible Cardiology [phone]            Clinic Interview:  No pertinent clinical findings have been reported.    INR History:      4/25/2025     8:52 AM 5/2/2025    12:00 AM 5/2/2025    12:49 PM 5/9/2025    12:00 AM 5/9/2025     8:06 AM 5/16/2025    12:00 AM 5/16/2025    11:01 AM   Anticoagulation Monitoring   INR 2.30  1.90  2.30  3.00   INR Date 4/25/2025 5/2/2025 5/9/2025 5/16/2025   INR Goal 2.0-3.0  2.0-3.0  2.0-3.0  2.0-3.0   Trend Same  Same  Same  Same   Last Week Total 90 mg  90 mg  90 mg  90 mg   Next Week Total 90 mg  90 mg  90 mg  90 mg   Sun 10 mg  10 mg  10 mg  10 mg   Mon 15 mg  15 mg  15 mg  15 mg   Tue 10 mg  10 mg  10 mg  10 mg   Wed 15 mg  15 mg  15 mg  15 mg   Thu 10 mg  10 mg  10 mg  10 mg   Fri 15 mg  15 mg  15 mg  15 mg   Sat 15 mg  15 mg  15 mg  15 mg   Historical INR  1.90      2.30      3.00            This result is from an external source.       Plan:  1. INR is therapeutic today- see above in Anticoagulation  Summary.    Kameron Melendez to continue their warfarin regimen- see above in Anticoagulation Summary.  2. Follow up in 1 week  3. Pt has agreed to only be called if INR out of range. They have been instructed to call if any changes in medications, doses, concerns, etc. Patient expresses understanding and has no further questions at this time.    Iris Gonzalez

## 2025-05-23 ENCOUNTER — ANTICOAGULATION VISIT (OUTPATIENT)
Dept: PHARMACY | Facility: HOSPITAL | Age: 55
End: 2025-05-23
Payer: COMMERCIAL

## 2025-05-23 LAB — INR PPP: 4.2

## 2025-05-23 NOTE — PROGRESS NOTES
Anticoagulation Clinic Progress Note    Anticoagulation Summary  As of 2025      INR goal:  2.0-3.0   TTR:  64.7% (6.2 y)   INR used for dosin.20 (2025)   Warfarin maintenance plan:  15 mg every Mon, Wed, Fri; 10 mg all other days   Weekly warfarin total:  85 mg   Plan last modified:  Marissa Jones RPH (2025)   Next INR check:  2025   Priority:  High   Target end date:  Indefinite    Indications    Other acute pulmonary embolism without acute cor pulmonale (Resolved) [I26.99]  Bilateral pulmonary embolism (Resolved) [I26.99]                 Anticoagulation Episode Summary       INR check location:  --    Preferred lab:  --    Send INR reminders to:   [facility]  POOL    Comments:  new  tristen 2019 **WEEKLY TRISTEN**          Anticoagulation Care Providers       Provider Role Specialty Phone number    Torri Colmenares, APRN Referring Cardiology [phone]    Elsy Baeza MD Responsible Cardiology [phone]            Clinic Interview:  Patient Findings     Positives:  Change in medications    Negatives:  Signs/symptoms of thrombosis, Signs/symptoms of bleeding,   Laboratory test error suspected, Change in health, Change in alcohol use,   Change in activity, Upcoming invasive procedure, Emergency department   visit, Upcoming dental procedure, Missed doses, Extra doses, Change in   diet/appetite, Hospital admission, Bruising, Other complaints    Comments:  Patient reported new medications of allopurinol, farxiga and   Mounjaro (titration to 5 mg next week).       Clinical Outcomes     Negatives:  Major bleeding event, Thromboembolic event,   Anticoagulation-related hospital admission, Anticoagulation-related ED   visit, Anticoagulation-related fatality    Comments:  Patient reported new medications of allopurinol, farxiga and   Mounjaro (titration to 5 mg next week).         INR History:      2025    12:49 PM 2025    12:00 AM 2025     8:06 AM 2025     12:00 AM 5/16/2025    11:01 AM 5/23/2025    12:00 AM 5/23/2025     8:19 AM   Anticoagulation Monitoring   INR 1.90  2.30  3.00  4.20   INR Date 5/2/2025 5/9/2025 5/16/2025 5/23/2025   INR Goal 2.0-3.0  2.0-3.0  2.0-3.0  2.0-3.0   Trend Same  Same  Same  Down   Last Week Total 90 mg  90 mg  90 mg  90 mg   Next Week Total 90 mg  90 mg  90 mg  70 mg   Sun 10 mg  10 mg  10 mg  10 mg   Mon 15 mg  15 mg  15 mg  15 mg   Tue 10 mg  10 mg  10 mg  10 mg   Wed 15 mg  15 mg  15 mg  15 mg   Thu 10 mg  10 mg  10 mg  10 mg   Fri 15 mg  15 mg  15 mg  Hold (5/23)   Sat 15 mg  15 mg  15 mg  10 mg   Historical INR  2.30      3.00      4.20            This result is from an external source.       Plan:  1. INR is Supratherapeutic today- see above in Anticoagulation Summary.   Will instruct Kameron Melendez to Change their warfarin regimen- see above in Anticoagulation Summary.  2. Follow up in 1 week  3. They have been instructed to call if any changes in medications, doses, concerns, etc. Patient expresses understanding and has no further questions at this time.    Marissa Jones [facility]

## 2025-05-30 ENCOUNTER — ANTICOAGULATION VISIT (OUTPATIENT)
Dept: PHARMACY | Facility: HOSPITAL | Age: 55
End: 2025-05-30
Payer: COMMERCIAL

## 2025-05-30 LAB — INR PPP: 3.9

## 2025-05-30 NOTE — PROGRESS NOTES
Anticoagulation Clinic Progress Note    Anticoagulation Summary  As of 5/30/2025      INR goal:  2.0-3.0   TTR:  64.5% (6.3 y)   INR used for dosing:  3.90 (5/30/2025)   Warfarin maintenance plan:  15 mg every Mon, Wed, Fri; 10 mg all other days   Weekly warfarin total:  85 mg   Plan last modified:  Tessie Fatima RPH (5/30/2025)   Next INR check:  6/6/2025   Priority:  High   Target end date:  Indefinite    Indications    Other acute pulmonary embolism without acute cor pulmonale (Resolved) [I26.99]  Bilateral pulmonary embolism (Resolved) [I26.99]                 Anticoagulation Episode Summary       INR check location:  --    Preferred lab:  --    Send INR reminders to:   [facility]    Comments:  new  frankie March 2019 **WEEKLY FRANKIE**          Anticoagulation Care Providers       Provider Role Specialty Phone number    Torri Colmenares, APRN Referring Cardiology [phone]    Elsy Baeza MD Responsible Cardiology [phone]            Clinic Interview:  Patient Findings     Positives:  Change in health, Change in medications, Change in   diet/appetite    Negatives:  Signs/symptoms of thrombosis, Signs/symptoms of bleeding,   Laboratory test error suspected, Change in alcohol use, Change in   activity, Upcoming invasive procedure, Emergency department visit,   Upcoming dental procedure, Missed doses, Extra doses, Hospital admission,   Bruising, Other complaints      Clinical Outcomes     Negatives:  Major bleeding event, Thromboembolic event,   Anticoagulation-related hospital admission, Anticoagulation-related ED   visit, Anticoagulation-related fatality        INR History:      5/9/2025     8:06 AM 5/16/2025    12:00 AM 5/16/2025    11:01 AM 5/23/2025    12:00 AM 5/23/2025     8:19 AM 5/30/2025    12:00 AM 5/30/2025     8:56 AM   Anticoagulation Monitoring   INR 2.30  3.00  4.20  3.90   INR Date 5/9/2025 5/16/2025 5/23/2025 5/30/2025   INR Goal 2.0-3.0  2.0-3.0  2.0-3.0   2.0-3.0   Trend Same  Same  Down  Same   Last Week Total 90 mg  90 mg  90 mg  70 mg   Next Week Total 90 mg  90 mg  70 mg  65 mg   Sun 10 mg  10 mg  10 mg  10 mg   Mon 15 mg  15 mg  15 mg  15 mg   Tue 10 mg  10 mg  10 mg  10 mg   Wed 15 mg  15 mg  15 mg  10 mg (6/4)   Thu 10 mg  10 mg  10 mg  10 mg   Fri 15 mg  15 mg  Hold (5/23)  Hold (5/30)   Sat 15 mg  15 mg  10 mg  10 mg   Historical INR  3.00      4.20      3.90            This result is from an external source.       Plan:  1. INR is Supratherapeutic today- see above in Anticoagulation Summary.   Will instruct Kameron Melendez to Change their warfarin regimen- see above in Anticoagulation Summary.  pt reports being sick, reports BLE edema, and decreased appetite, he was prescribed keflex x 7 days. Hold then decrease to 15 mg MF, 10 mg AOD for this week, rck on Friday   2. Follow up in 2 weeks  3. They have been instructed to call if any changes in medications, doses, concerns, etc. Patient expresses understanding and has no further questions at this time.    Tessie Fatima [facility]

## 2025-06-02 ENCOUNTER — APPOINTMENT (OUTPATIENT)
Dept: GENERAL RADIOLOGY | Facility: HOSPITAL | Age: 55
DRG: 603 | End: 2025-06-02
Payer: COMMERCIAL

## 2025-06-02 ENCOUNTER — APPOINTMENT (OUTPATIENT)
Dept: CT IMAGING | Facility: HOSPITAL | Age: 55
DRG: 603 | End: 2025-06-02
Payer: COMMERCIAL

## 2025-06-02 ENCOUNTER — TELEPHONE (OUTPATIENT)
Dept: FAMILY MEDICINE CLINIC | Facility: CLINIC | Age: 55
End: 2025-06-02
Payer: COMMERCIAL

## 2025-06-02 ENCOUNTER — HOSPITAL ENCOUNTER (INPATIENT)
Facility: HOSPITAL | Age: 55
LOS: 5 days | Discharge: HOME | DRG: 603 | End: 2025-06-07
Attending: EMERGENCY MEDICINE | Admitting: INTERNAL MEDICINE
Payer: COMMERCIAL

## 2025-06-02 DIAGNOSIS — R79.89 ELEVATED PROCALCITONIN: ICD-10-CM

## 2025-06-02 DIAGNOSIS — R79.89 ELEVATED TROPONIN: ICD-10-CM

## 2025-06-02 DIAGNOSIS — Z78.9 FAILURE OF OUTPATIENT TREATMENT: ICD-10-CM

## 2025-06-02 DIAGNOSIS — L03.90 WOUND CELLULITIS: ICD-10-CM

## 2025-06-02 DIAGNOSIS — L03.115 CELLULITIS OF RIGHT LOWER EXTREMITY: ICD-10-CM

## 2025-06-02 DIAGNOSIS — R73.9 HYPERGLYCEMIA: ICD-10-CM

## 2025-06-02 DIAGNOSIS — I89.0 LYMPHEDEMA, NOT ELSEWHERE CLASSIFIED: ICD-10-CM

## 2025-06-02 DIAGNOSIS — R79.89 ELEVATED BRAIN NATRIURETIC PEPTIDE (BNP) LEVEL: ICD-10-CM

## 2025-06-02 DIAGNOSIS — L03.116 LEFT LEG CELLULITIS: Primary | ICD-10-CM

## 2025-06-02 DIAGNOSIS — L03.116 CELLULITIS OF LEFT LOWER EXTREMITY: ICD-10-CM

## 2025-06-02 DIAGNOSIS — I89.0 LYMPHEDEMA: ICD-10-CM

## 2025-06-02 DIAGNOSIS — B34.8 RHINOVIRUS INFECTION: ICD-10-CM

## 2025-06-02 LAB
ALBUMIN SERPL-MCNC: 3.1 G/DL (ref 3.5–5.2)
ALBUMIN/GLOB SERPL: 0.8 G/DL
ALP SERPL-CCNC: 112 U/L (ref 39–117)
ALT SERPL W P-5'-P-CCNC: 16 U/L (ref 1–41)
ANION GAP SERPL CALCULATED.3IONS-SCNC: 10.2 MMOL/L (ref 5–15)
APTT PPP: 38.6 SECONDS (ref 22.7–35.4)
AST SERPL-CCNC: 18 U/L (ref 1–40)
B PARAPERT DNA SPEC QL NAA+PROBE: NOT DETECTED
B PERT DNA SPEC QL NAA+PROBE: NOT DETECTED
BASOPHILS # BLD MANUAL: 0.1 10*3/MM3 (ref 0–0.2)
BASOPHILS NFR BLD MANUAL: 1 % (ref 0–1.5)
BILIRUB SERPL-MCNC: 0.4 MG/DL (ref 0–1.2)
BUN SERPL-MCNC: 21 MG/DL (ref 6–20)
BUN/CREAT SERPL: 16.7 (ref 7–25)
BURR CELLS BLD QL SMEAR: NORMAL
C PNEUM DNA NPH QL NAA+NON-PROBE: NOT DETECTED
CALCIUM SPEC-SCNC: 9 MG/DL (ref 8.6–10.5)
CHLORIDE SERPL-SCNC: 105 MMOL/L (ref 98–107)
CO2 SERPL-SCNC: 20.8 MMOL/L (ref 22–29)
CREAT SERPL-MCNC: 1.26 MG/DL (ref 0.76–1.27)
D-LACTATE SERPL-SCNC: 1.9 MMOL/L (ref 0.5–2)
DEPRECATED RDW RBC AUTO: 48.5 FL (ref 37–54)
EGFRCR SERPLBLD CKD-EPI 2021: 67.4 ML/MIN/1.73
EOSINOPHIL # BLD MANUAL: 0.31 10*3/MM3 (ref 0–0.4)
EOSINOPHIL NFR BLD MANUAL: 3 % (ref 0.3–6.2)
ERYTHROCYTE [DISTWIDTH] IN BLOOD BY AUTOMATED COUNT: 14.4 % (ref 12.3–15.4)
FLUAV SUBTYP SPEC NAA+PROBE: NOT DETECTED
FLUBV RNA ISLT QL NAA+PROBE: NOT DETECTED
GEN 5 1HR TROPONIN T REFLEX: 74 NG/L
GLOBULIN UR ELPH-MCNC: 3.7 GM/DL
GLUCOSE SERPL-MCNC: 131 MG/DL (ref 65–99)
HADV DNA SPEC NAA+PROBE: NOT DETECTED
HCOV 229E RNA SPEC QL NAA+PROBE: NOT DETECTED
HCOV HKU1 RNA SPEC QL NAA+PROBE: NOT DETECTED
HCOV NL63 RNA SPEC QL NAA+PROBE: NOT DETECTED
HCOV OC43 RNA SPEC QL NAA+PROBE: NOT DETECTED
HCT VFR BLD AUTO: 39.6 % (ref 37.5–51)
HGB BLD-MCNC: 12.9 G/DL (ref 13–17.7)
HMPV RNA NPH QL NAA+NON-PROBE: NOT DETECTED
HPIV1 RNA ISLT QL NAA+PROBE: NOT DETECTED
HPIV2 RNA SPEC QL NAA+PROBE: NOT DETECTED
HPIV3 RNA NPH QL NAA+PROBE: NOT DETECTED
HPIV4 P GENE NPH QL NAA+PROBE: NOT DETECTED
INR PPP: 2.55 (ref 0.9–1.1)
LYMPHOCYTES # BLD MANUAL: 2.29 10*3/MM3 (ref 0.7–3.1)
LYMPHOCYTES NFR BLD MANUAL: 7 % (ref 5–12)
M PNEUMO IGG SER IA-ACNC: NOT DETECTED
MAGNESIUM SERPL-MCNC: 1.6 MG/DL (ref 1.6–2.6)
MCH RBC QN AUTO: 30.1 PG (ref 26.6–33)
MCHC RBC AUTO-ENTMCNC: 32.6 G/DL (ref 31.5–35.7)
MCV RBC AUTO: 92.3 FL (ref 79–97)
MONOCYTES # BLD: 0.73 10*3/MM3 (ref 0.1–0.9)
NEUTROPHILS # BLD AUTO: 6.97 10*3/MM3 (ref 1.7–7)
NEUTROPHILS NFR BLD MANUAL: 67 % (ref 42.7–76)
NRBC BLD AUTO-RTO: 0 /100 WBC (ref 0–0.2)
NT-PROBNP SERPL-MCNC: 2859 PG/ML (ref 0–900)
PLAT MORPH BLD: NORMAL
PLATELET # BLD AUTO: 355 10*3/MM3 (ref 140–450)
PMV BLD AUTO: 9.3 FL (ref 6–12)
POIKILOCYTOSIS BLD QL SMEAR: NORMAL
POLYCHROMASIA BLD QL SMEAR: NORMAL
POTASSIUM SERPL-SCNC: 3.6 MMOL/L (ref 3.5–5.2)
PROCALCITONIN SERPL-MCNC: 1.55 NG/ML (ref 0–0.25)
PROT SERPL-MCNC: 6.8 G/DL (ref 6–8.5)
PROTHROMBIN TIME: 27.7 SECONDS (ref 11.7–14.2)
RBC # BLD AUTO: 4.29 10*6/MM3 (ref 4.14–5.8)
RHINOVIRUS RNA SPEC NAA+PROBE: DETECTED
RSV RNA NPH QL NAA+NON-PROBE: NOT DETECTED
SARS-COV-2 RNA NPH QL NAA+NON-PROBE: NOT DETECTED
SODIUM SERPL-SCNC: 136 MMOL/L (ref 136–145)
SPHEROCYTES BLD QL SMEAR: NORMAL
TROPONIN T % DELTA: -4
TROPONIN T NUMERIC DELTA: -3 NG/L
TROPONIN T SERPL HS-MCNC: 77 NG/L
VARIANT LYMPHS NFR BLD MANUAL: 22 % (ref 19.6–45.3)
WBC MORPH BLD: NORMAL
WBC NRBC COR # BLD AUTO: 10.41 10*3/MM3 (ref 3.4–10.8)

## 2025-06-02 PROCEDURE — 83605 ASSAY OF LACTIC ACID: CPT | Performed by: EMERGENCY MEDICINE

## 2025-06-02 PROCEDURE — 93010 ELECTROCARDIOGRAM REPORT: CPT | Performed by: INTERNAL MEDICINE

## 2025-06-02 PROCEDURE — 83735 ASSAY OF MAGNESIUM: CPT | Performed by: EMERGENCY MEDICINE

## 2025-06-02 PROCEDURE — 71275 CT ANGIOGRAPHY CHEST: CPT

## 2025-06-02 PROCEDURE — 25510000001 IOPAMIDOL PER 1 ML: Performed by: EMERGENCY MEDICINE

## 2025-06-02 PROCEDURE — 84145 PROCALCITONIN (PCT): CPT | Performed by: EMERGENCY MEDICINE

## 2025-06-02 PROCEDURE — 87040 BLOOD CULTURE FOR BACTERIA: CPT | Performed by: EMERGENCY MEDICINE

## 2025-06-02 PROCEDURE — 85610 PROTHROMBIN TIME: CPT | Performed by: EMERGENCY MEDICINE

## 2025-06-02 PROCEDURE — 25810000003 SODIUM CHLORIDE 0.9 % SOLUTION: Performed by: EMERGENCY MEDICINE

## 2025-06-02 PROCEDURE — 25010000002 VANCOMYCIN 10 G RECONSTITUTED SOLUTION: Performed by: EMERGENCY MEDICINE

## 2025-06-02 PROCEDURE — 80053 COMPREHEN METABOLIC PANEL: CPT | Performed by: EMERGENCY MEDICINE

## 2025-06-02 PROCEDURE — 99285 EMERGENCY DEPT VISIT HI MDM: CPT

## 2025-06-02 PROCEDURE — 36415 COLL VENOUS BLD VENIPUNCTURE: CPT

## 2025-06-02 PROCEDURE — 0202U NFCT DS 22 TRGT SARS-COV-2: CPT | Performed by: EMERGENCY MEDICINE

## 2025-06-02 PROCEDURE — 71045 X-RAY EXAM CHEST 1 VIEW: CPT

## 2025-06-02 PROCEDURE — 84484 ASSAY OF TROPONIN QUANT: CPT | Performed by: EMERGENCY MEDICINE

## 2025-06-02 PROCEDURE — 85025 COMPLETE CBC W/AUTO DIFF WBC: CPT | Performed by: EMERGENCY MEDICINE

## 2025-06-02 PROCEDURE — 93005 ELECTROCARDIOGRAM TRACING: CPT | Performed by: EMERGENCY MEDICINE

## 2025-06-02 PROCEDURE — 85730 THROMBOPLASTIN TIME PARTIAL: CPT | Performed by: EMERGENCY MEDICINE

## 2025-06-02 PROCEDURE — 83880 ASSAY OF NATRIURETIC PEPTIDE: CPT | Performed by: EMERGENCY MEDICINE

## 2025-06-02 PROCEDURE — 85007 BL SMEAR W/DIFF WBC COUNT: CPT | Performed by: EMERGENCY MEDICINE

## 2025-06-02 RX ORDER — DEXTROSE MONOHYDRATE 25 G/50ML
25 INJECTION, SOLUTION INTRAVENOUS
Status: DISCONTINUED | OUTPATIENT
Start: 2025-06-02 | End: 2025-06-07 | Stop reason: HOSPADM

## 2025-06-02 RX ORDER — SODIUM CHLORIDE 9 MG/ML
40 INJECTION, SOLUTION INTRAVENOUS AS NEEDED
Status: DISCONTINUED | OUTPATIENT
Start: 2025-06-02 | End: 2025-06-07 | Stop reason: HOSPADM

## 2025-06-02 RX ORDER — NITROGLYCERIN 0.4 MG/1
0.4 TABLET SUBLINGUAL
Status: DISCONTINUED | OUTPATIENT
Start: 2025-06-02 | End: 2025-06-07 | Stop reason: HOSPADM

## 2025-06-02 RX ORDER — INSULIN LISPRO 100 [IU]/ML
2-9 INJECTION, SOLUTION INTRAVENOUS; SUBCUTANEOUS
Status: DISCONTINUED | OUTPATIENT
Start: 2025-06-03 | End: 2025-06-07 | Stop reason: HOSPADM

## 2025-06-02 RX ORDER — IBUPROFEN 600 MG/1
1 TABLET ORAL
Status: DISCONTINUED | OUTPATIENT
Start: 2025-06-02 | End: 2025-06-07 | Stop reason: HOSPADM

## 2025-06-02 RX ORDER — ACETAMINOPHEN 325 MG/1
650 TABLET ORAL EVERY 4 HOURS PRN
Status: DISCONTINUED | OUTPATIENT
Start: 2025-06-02 | End: 2025-06-07 | Stop reason: HOSPADM

## 2025-06-02 RX ORDER — NICOTINE POLACRILEX 4 MG
15 LOZENGE BUCCAL
Status: DISCONTINUED | OUTPATIENT
Start: 2025-06-02 | End: 2025-06-07 | Stop reason: HOSPADM

## 2025-06-02 RX ORDER — SODIUM CHLORIDE 0.9 % (FLUSH) 0.9 %
10 SYRINGE (ML) INJECTION AS NEEDED
Status: DISCONTINUED | OUTPATIENT
Start: 2025-06-02 | End: 2025-06-07 | Stop reason: HOSPADM

## 2025-06-02 RX ORDER — AMOXICILLIN 250 MG
2 CAPSULE ORAL 2 TIMES DAILY PRN
Status: DISCONTINUED | OUTPATIENT
Start: 2025-06-02 | End: 2025-06-07 | Stop reason: HOSPADM

## 2025-06-02 RX ORDER — HYDROCODONE BITARTRATE AND ACETAMINOPHEN 5; 325 MG/1; MG/1
1 TABLET ORAL ONCE
Refills: 0 | Status: COMPLETED | OUTPATIENT
Start: 2025-06-02 | End: 2025-06-02

## 2025-06-02 RX ORDER — ACETAMINOPHEN 650 MG/1
650 SUPPOSITORY RECTAL EVERY 4 HOURS PRN
Status: DISCONTINUED | OUTPATIENT
Start: 2025-06-02 | End: 2025-06-07 | Stop reason: HOSPADM

## 2025-06-02 RX ORDER — SODIUM CHLORIDE 0.9 % (FLUSH) 0.9 %
10 SYRINGE (ML) INJECTION EVERY 12 HOURS SCHEDULED
Status: DISCONTINUED | OUTPATIENT
Start: 2025-06-02 | End: 2025-06-07 | Stop reason: HOSPADM

## 2025-06-02 RX ORDER — ONDANSETRON 2 MG/ML
4 INJECTION INTRAMUSCULAR; INTRAVENOUS EVERY 6 HOURS PRN
Status: DISCONTINUED | OUTPATIENT
Start: 2025-06-02 | End: 2025-06-07 | Stop reason: HOSPADM

## 2025-06-02 RX ORDER — BISACODYL 5 MG/1
5 TABLET, DELAYED RELEASE ORAL DAILY PRN
Status: DISCONTINUED | OUTPATIENT
Start: 2025-06-02 | End: 2025-06-07 | Stop reason: HOSPADM

## 2025-06-02 RX ORDER — WARFARIN SODIUM 7.5 MG/1
15 TABLET ORAL
Status: DISCONTINUED | OUTPATIENT
Start: 2025-06-02 | End: 2025-06-07 | Stop reason: HOSPADM

## 2025-06-02 RX ORDER — WARFARIN SODIUM 10 MG/1
10 TABLET ORAL
Status: DISCONTINUED | OUTPATIENT
Start: 2025-06-03 | End: 2025-06-07 | Stop reason: HOSPADM

## 2025-06-02 RX ORDER — ACETAMINOPHEN 160 MG/5ML
650 SOLUTION ORAL EVERY 4 HOURS PRN
Status: DISCONTINUED | OUTPATIENT
Start: 2025-06-02 | End: 2025-06-07 | Stop reason: HOSPADM

## 2025-06-02 RX ORDER — VANCOMYCIN/0.9 % SOD CHLORIDE 1.5G/250ML
1500 PLASTIC BAG, INJECTION (ML) INTRAVENOUS EVERY 12 HOURS
Status: DISCONTINUED | OUTPATIENT
Start: 2025-06-03 | End: 2025-06-03

## 2025-06-02 RX ORDER — HYDROCODONE BITARTRATE AND ACETAMINOPHEN 5; 325 MG/1; MG/1
1 TABLET ORAL EVERY 6 HOURS PRN
Refills: 0 | Status: DISCONTINUED | OUTPATIENT
Start: 2025-06-02 | End: 2025-06-07 | Stop reason: HOSPADM

## 2025-06-02 RX ORDER — BISACODYL 10 MG
10 SUPPOSITORY, RECTAL RECTAL DAILY PRN
Status: DISCONTINUED | OUTPATIENT
Start: 2025-06-02 | End: 2025-06-07 | Stop reason: HOSPADM

## 2025-06-02 RX ORDER — ONDANSETRON 4 MG/1
4 TABLET, ORALLY DISINTEGRATING ORAL EVERY 6 HOURS PRN
Status: DISCONTINUED | OUTPATIENT
Start: 2025-06-02 | End: 2025-06-07 | Stop reason: HOSPADM

## 2025-06-02 RX ORDER — IOPAMIDOL 755 MG/ML
100 INJECTION, SOLUTION INTRAVASCULAR
Status: COMPLETED | OUTPATIENT
Start: 2025-06-02 | End: 2025-06-02

## 2025-06-02 RX ORDER — POLYETHYLENE GLYCOL 3350 17 G/17G
17 POWDER, FOR SOLUTION ORAL DAILY PRN
Status: DISCONTINUED | OUTPATIENT
Start: 2025-06-02 | End: 2025-06-07 | Stop reason: HOSPADM

## 2025-06-02 RX ADMIN — IOPAMIDOL 95 ML: 755 INJECTION, SOLUTION INTRAVENOUS at 22:17

## 2025-06-02 RX ADMIN — VANCOMYCIN HYDROCHLORIDE 3000 MG: 10 INJECTION, POWDER, LYOPHILIZED, FOR SOLUTION INTRAVENOUS at 20:57

## 2025-06-02 RX ADMIN — HYDROCODONE BITARTRATE AND ACETAMINOPHEN 1 TABLET: 5; 325 TABLET ORAL at 23:04

## 2025-06-02 NOTE — ED PROVIDER NOTES
EMERGENCY DEPARTMENT ENCOUNTER  Room Number:  14/14  Date of encounter:  6/2/2025  PCP: Hansel Patel DO  Patient Care Team:  Hansel Patel DO as PCP - General (Family Medicine)  Kim Sotelo RPH as Pharmacist     HPI:  Context: Kameron Melendez is a 55 y.o. male who presents to the ED c/o chief complaint of shortness of breath and infection in his left leg.  Patient reports he has been having symptoms for just over a week.  Patient planes of dyspnea on exertion, no dyspnea at rest.  Patient does have productive cough, no runny nose or congestion.  Patient denies any chest pain.  Patient reports he had fever a week ago, no fever since.  No vomiting or diarrhea.  Patient reports that he has had a spot on his leg for about a week, has a history of cellulitis, began Keflex at home 5 days ago, reports redness and warmth of his left leg has worsened, began having purulent drainage from it several days ago.  Patient reports history of cellulitis in the past, no history of MRSA, patient is diabetic.    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    Knee arthropathy 12/11/2015    Morbid obesity with body mass index of 60.0-69.9 in adult 12/11/2015    ARNOLDO (obstructive sleep apnea) 12/11/2015    Severe edema 12/11/2015    History of pulmonary embolism 01/23/2019    Intractable back pain 11/25/2019    Heterozygous factor V Leiden mutation 11/25/2019    Cellulitis of left lower extremity 11/02/2021    Hypokalemia 11/02/2021    CAROL (acute kidney injury) 11/02/2021    Sepsis with cutaneous manifestations 11/02/2021    Hyperglycemia 11/02/2021    Paroxysmal atrial fibrillation 11/02/2021    Long term (current) use of  anticoagulants 11/16/2021    Lymphedema, not elsewhere classified 11/16/2021    Nicotine dependence, other tobacco product, uncomplicated 11/16/2021    Personal history of other venous thrombosis and embolism 11/16/2021    Typical atrial flutter 11/16/2021    Venous insufficiency (chronic) (peripheral) 11/16/2021    Cellulitis of right lower extremity 05/06/2022    Wound cellulitis 05/25/2022    CAP (community acquired pneumonia) 12/21/2023    Type 2 diabetes mellitus 12/21/2023    Hyponatremia 12/21/2023    Choledocholithiasis 12/21/2023    RUQ pain 12/21/2023     Resolved Ambulatory Problems     Diagnosis Date Noted    Bilateral pulmonary embolism 07/17/2017    Right ventricular enlargement 08/29/2017    Other acute pulmonary embolism without acute cor pulmonale 02/01/2019    Athscl heart 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 11/16/2021     Past Medical History:   Diagnosis Date    DVT (deep venous thrombosis)     Factor V Leiden     Hypertension     Kidney stone     Lymphedema     Lymphedema of left lower extremity     Obesity     Pulmonary embolism        PAST SURGICAL HISTORY  Past Surgical History:   Procedure Laterality Date    CARDIAC CATHETERIZATION Bilateral 7/18/2017    Procedure: Pulmonary angiography- In[facility] ;  Surgeon: Cedric Coulter MD;  Location:  [facility];  Service:     CARDIAC CATHETERIZATION N/A 7/18/2017    Procedure: Right Heart Cath;  Surgeon: Cedric Coulter MD;  Location:  [facility];  Service:     THROMBECTOMY         FAMILY HISTORY  Family History   Problem Relation Age of Onset    Heart disease Mother     Heart failure Mother     Bradycardia Mother     No Known Problems Father     Atrial fibrillation Half-Brother     Prostate cancer Half-Brother     No Known Problems Maternal Grandmother     No Known Problems Maternal Grandfather     No Known Problems Paternal Grandmother     No Known Problems Paternal Grandfather        SOCIAL HISTORY  Social History      Socioeconomic History    Marital status:    Tobacco Use    Smoking status: Former     Types: Cigars, Pipe     Start date: 1/1/2006     Quit date: 1/1/2022     Years since quitting: 3.4     Passive exposure: Past    Smokeless tobacco: Never    Tobacco comments:     occasional - < 1 a month   Vaping Use    Vaping status: Never Used   Substance and Sexual Activity    Alcohol use: Yes     Comment: social    Drug use: No    Sexual activity: Defer       ALLERGIES  Patient has no known allergies.    The patient's allergies have been reviewed    PHYSICAL EXAM  I have reviewed the triage vital signs and nursing notes.  ED Triage Vitals [06/02/25 1944]   Temp Heart Rate Resp BP SpO2   97.7 °F (36.5 °C) 106 23 -- 95 %      Temp src Heart Rate Source Patient Position BP Location FiO2 (%)   Oral -- -- -- --       General: No acute distress.  Super morbidly obese.  HENT: NCAT, PERRL, Nares patent.  Eyes: no scleral icterus.  Neck: trachea midline, no ROM limitations.  CV: regular rhythm, regular rate.  Respiratory: normal effort, CTAB.  Abdomen: soft, nondistended, NTTP, no rebound tenderness, no guarding or rigidity.  Musculoskeletal: no deformity.  Neuro: alert, moves all extremities, follows commands.  Skin: warm, dry.  Lymphedema bilateral lower extremities.  Patient has area of erythema with warmth and induration with trace purulent drainage on left lower leg along the posterior of his distal thigh.    LAB RESULTS  Recent Results (from the past 24 hours)   ECG 12 Lead Dyspnea    Collection Time: 06/02/25  7:59 PM   Result Value Ref Range    QT Interval 388 ms    QTC Interval 459 ms   Protime-INR    Collection Time: 06/02/25  8:25 PM    Specimen: Hand, Right; Blood   Result Value Ref Range    Protime 27.7 (H) 11.7 - 14.2 Seconds    INR 2.55 (H) 0.90 - 1.10   aPTT    Collection Time: 06/02/25  8:25 PM    Specimen: Hand, Right; Blood   Result Value Ref Range    PTT 38.6 (H) 22.7 - 35.4 seconds   Comprehensive  Metabolic Panel    Collection Time: 06/02/25  8:27 PM    Specimen: Arm, Left; Blood   Result Value Ref Range    Glucose 131 (H) 65 - 99 mg/dL    BUN 21.0 (H) 6.0 - 20.0 mg/dL    Creatinine 1.26 0.76 - 1.27 mg/dL    Sodium 136 136 - 145 mmol/L    Potassium 3.6 3.5 - 5.2 mmol/L    Chloride 105 98 - 107 mmol/L    CO2 20.8 (L) 22.0 - 29.0 mmol/L    Calcium 9.0 8.6 - 10.5 mg/dL    Total Protein 6.8 6.0 - 8.5 g/dL    Albumin 3.1 (L) 3.5 - 5.2 g/dL    ALT (SGPT) 16 1 - 41 U/L    AST (SGOT) 18 1 - 40 U/L    Alkaline Phosphatase 112 39 - 117 U/L    Total Bilirubin 0.4 0.0 - 1.2 mg/dL    Globulin 3.7 gm/dL    A/G Ratio 0.8 g/dL    BUN/Creatinine Ratio 16.7 7.0 - 25.0    Anion Gap 10.2 5.0 - 15.0 mmol/L    eGFR 67.4 >60.0 mL/min/1.73   High Sensitivity Troponin T    Collection Time: 06/02/25  8:27 PM    Specimen: Arm, Left; Blood   Result Value Ref Range    HS Troponin T 77 (C) <22 ng/L   BNP    Collection Time: 06/02/25  8:27 PM    Specimen: Arm, Left; Blood   Result Value Ref Range    proBNP 2,859.0 (H) 0.0 - 900.0 pg/mL   Magnesium    Collection Time: 06/02/25  8:27 PM    Specimen: Arm, Left; Blood   Result Value Ref Range    Magnesium 1.6 1.6 - 2.6 mg/dL   Lactic Acid, Plasma    Collection Time: 06/02/25  8:27 PM    Specimen: Arm, Left; Blood   Result Value Ref Range    Lactate 1.9 0.5 - 2.0 mmol/L   Procalcitonin    Collection Time: 06/02/25  8:27 PM    Specimen: Arm, Left; Blood   Result Value Ref Range    Procalcitonin 1.55 (H) 0.00 - 0.25 ng/mL   CBC Auto Differential    Collection Time: 06/02/25  8:27 PM    Specimen: Arm, Left; Blood   Result Value Ref Range    WBC 10.41 3.40 - 10.80 10*3/mm3    RBC 4.29 4.14 - 5.80 10*6/mm3    Hemoglobin 12.9 (L) 13.0 - 17.7 g/dL    Hematocrit 39.6 37.5 - 51.0 %    MCV 92.3 79.0 - 97.0 fL    MCH 30.1 26.6 - 33.0 pg    MCHC 32.6 31.5 - 35.7 g/dL    RDW 14.4 12.3 - 15.4 %    RDW-SD 48.5 37.0 - 54.0 fl    MPV 9.3 6.0 - 12.0 fL    Platelets 355 140 - 450 10*3/mm3    nRBC 0.0 0.0 - 0.2  /100 WBC   Manual Differential    Collection Time: 06/02/25  8:27 PM    Specimen: Arm, Left; Blood   Result Value Ref Range    Neutrophil % 67.0 42.7 - 76.0 %    Lymphocyte % 22.0 19.6 - 45.3 %    Monocyte % 7.0 5.0 - 12.0 %    Eosinophil % 3.0 0.3 - 6.2 %    Basophil % 1.0 0.0 - 1.5 %    Neutrophils Absolute 6.97 1.70 - 7.00 10*3/mm3    Lymphocytes Absolute 2.29 0.70 - 3.10 10*3/mm3    Monocytes Absolute 0.73 0.10 - 0.90 10*3/mm3    Eosinophils Absolute 0.31 0.00 - 0.40 10*3/mm3    Basophils Absolute 0.10 0.00 - 0.20 10*3/mm3    Crenated RBC's Mod/2+ None Seen    Poikilocytes Slight/1+ None Seen    Polychromasia Mod/2+ None Seen    Spherocytes Slight/1+ None Seen    WBC Morphology Normal Normal    Platelet Morphology Normal Normal   Respiratory Panel PCR w/COVID-19(SARS-CoV-2) [facility] In-House, NP Swab in UTM/VTM, 2 HR TAT - Swab, Nasopharynx    Collection Time: 06/02/25  8:49 PM    Specimen: Nasopharynx; Swab   Result Value Ref Range    ADENOVIRUS, PCR Not Detected Not Detected    Coronavirus 229E Not Detected Not Detected    Coronavirus HKU1 Not Detected Not Detected    Coronavirus NL63 Not Detected Not Detected    Coronavirus OC43 Not Detected Not Detected    COVID19 Not Detected Not Detected - Ref. Range    Human Metapneumovirus Not Detected Not Detected    Human Rhinovirus/Enterovirus Detected (A) Not Detected    Influenza A PCR Not Detected Not Detected    Influenza B PCR Not Detected Not Detected    Parainfluenza Virus 1 Not Detected Not Detected    Parainfluenza Virus 2 Not Detected Not Detected    Parainfluenza Virus 3 Not Detected Not Detected    Parainfluenza Virus 4 Not Detected Not Detected    RSV, PCR Not Detected Not Detected    Bordetella pertussis pcr Not Detected Not Detected    Bordetella parapertussis PCR Not Detected Not Detected    Chlamydophila pneumoniae PCR Not Detected Not Detected    Mycoplasma pneumo by PCR Not Detected Not Detected   High Sensitivity Troponin T 1Hr     Collection Time: 06/02/25  9:38 PM    Specimen: Arm, Left; Blood   Result Value Ref Range    HS Troponin T 74 (C) <22 ng/L    Troponin T Numeric Delta -3 ng/L    Troponin T % Delta -4 Abnormal if >/= 20%       I ordered the above labs and reviewed the results.    RADIOLOGY  XR Chest 1 View  Result Date: 6/2/2025   XR CHEST 1 VW-  HISTORY: Shortness of air.  COMPARISON: CT angiogram of chest 12/21/2023, AP chest 12/21/2023.  FINDINGS: The heart and mediastinal structures appear normal. Lungs are clear and there is no evidence for pulmonary edema or pleural effusion or infiltrate.      No evidence for active disease in the chest.    This report was finalized on 6/2/2025 8:59 PM by Chalino Wynne M.D on Workstation: [id number]        I ordered the above noted radiological studies. I reviewed the images and results. I agree with the radiologist interpretation.    PROCEDURES  Procedures    MEDICATIONS GIVEN IN ER  Medications   vancomycin 3000 mg/500 mL 0.9% NS IVPB ([facility]) (3,000 mg Intravenous New Bag 6/2/25 2057)   iopamidol (ISOVUE-370) 76 % injection 100 mL (95 mL Intravenous Given 6/2/25 2217)       PROGRESS, DATA ANALYSIS, CONSULTS, AND MEDICAL DECISION MAKING  A complete history and physical exam have been performed.  All available laboratory and imaging results have been reviewed by myself prior to disposition.    MDM    After the initial H&P, I discussed pertinent information from history and physical exam with patient/family.  Discussed differential diagnosis.  Discussed plan for ED evaluation/workup/treatment.  All questions answered.  Patient/family is agreeable with plan.  ED Course as of 06/02/25 2232 Mon Jun 02, 2025 1945 My differential diagnosis for dyspnea includes but is not limited to:  Asthma, COPD, pneumonia, pulmonary embolus, acute respiratory distress syndrome, pneumothorax, pleural effusion, pulmonary fibrosis, congestive heart failure, myocardial infarction, DKA, uremia, acidosis,  sepsis, anemia, drug related, hyperventilation, CNS disease     [JG]   2017 EKG independently viewed and contemporaneously interpreted by ED physician. Time: 7:59 PM.  Rate 84.  Interpretation: Normal sinus rhythm, left axis deviation, Q waves in high lateral leads, no acute ST changes. [JG]   2119 Reviewed chest x-ray in PACS, no pulmonary infiltrates per my read. [JG]   2120 Troponin elevated at 77, EKG shows no sign of ischemia.  Prior troponins normal.  BNP elevated 2859.  No pulmonary edema seen on x-ray.  Obtaining CT angiogram to evaluate for pulmonary embolism and other intrathoracic pathology. [JG]   2214 Repeat troponin flat. [JG]   2222 I reviewed CT angiogram in PACS, no obvious pulmonary embolism but evaluation extremely limited secondary to poor contrast timing. [JG]   2231 Phone call with LEROY Boyd.  Discussed the patient, relevant history, exam, diagnostics, ED findings/progress, and concerns. They agree to admit the patient to telemetry. Care assumed by the admitting physician at this time.     [JG]      ED Course User Index  [JG] Alon Gutierrez MD       AS OF 22:32 EDT VITALS:    BP - 113/60  HR - 67  TEMP - 97.7 °F (36.5 °C) (Oral)  O2 SATS - 95%    DIAGNOSIS  Final diagnoses:   Left leg cellulitis   Failure of outpatient treatment   Elevated procalcitonin   Lymphedema   Rhinovirus infection   Elevated troponin   Elevated brain natriuretic peptide (BNP) level   Hyperglycemia         DISPOSITION  ADMISSION    Discussed treatment plan and reason for admission with pt/family and admitting physician.  Pt/family voiced understanding of the plan for admission for further testing/treatment as needed.        Alon Gutierrez MD  06/02/25 [id number]

## 2025-06-02 NOTE — TELEPHONE ENCOUNTER
Patient needs a letter from Dr Patel stating he needs a higher/wider chair so that he is not putting so much pressure on his left foot/leg.     Fax to [phone]  Attn: Hallie HAYES person    Patient [phone] OK to leave message

## 2025-06-03 LAB
ANION GAP SERPL CALCULATED.3IONS-SCNC: 12.2 MMOL/L (ref 5–15)
BASOPHILS # BLD AUTO: 0.05 10*3/MM3 (ref 0–0.2)
BASOPHILS NFR BLD AUTO: 0.6 % (ref 0–1.5)
BUN SERPL-MCNC: 19 MG/DL (ref 6–20)
BUN/CREAT SERPL: 17.6 (ref 7–25)
CALCIUM SPEC-SCNC: 8.9 MG/DL (ref 8.6–10.5)
CHLORIDE SERPL-SCNC: 105 MMOL/L (ref 98–107)
CO2 SERPL-SCNC: 21.8 MMOL/L (ref 22–29)
CREAT SERPL-MCNC: 1.08 MG/DL (ref 0.76–1.27)
CRP SERPL-MCNC: 1.71 MG/DL (ref 0–0.5)
DEPRECATED RDW RBC AUTO: 47.5 FL (ref 37–54)
EGFRCR SERPLBLD CKD-EPI 2021: 81 ML/MIN/1.73
EOSINOPHIL # BLD AUTO: 0.2 10*3/MM3 (ref 0–0.4)
EOSINOPHIL NFR BLD AUTO: 2.3 % (ref 0.3–6.2)
ERYTHROCYTE [DISTWIDTH] IN BLOOD BY AUTOMATED COUNT: 14.3 % (ref 12.3–15.4)
GLUCOSE BLDC GLUCOMTR-MCNC: 113 MG/DL (ref 70–130)
GLUCOSE BLDC GLUCOMTR-MCNC: 134 MG/DL (ref 70–130)
GLUCOSE BLDC GLUCOMTR-MCNC: 144 MG/DL (ref 70–130)
GLUCOSE BLDC GLUCOMTR-MCNC: 153 MG/DL (ref 70–130)
GLUCOSE SERPL-MCNC: 115 MG/DL (ref 65–99)
HBA1C MFR BLD: 7.1 % (ref 4.8–5.6)
HCT VFR BLD AUTO: 40.6 % (ref 37.5–51)
HGB BLD-MCNC: 13.2 G/DL (ref 13–17.7)
IMM GRANULOCYTES # BLD AUTO: 0.4 10*3/MM3 (ref 0–0.05)
IMM GRANULOCYTES NFR BLD AUTO: 4.5 % (ref 0–0.5)
INR PPP: 2.66 (ref 0.9–1.1)
LYMPHOCYTES # BLD AUTO: 1.65 10*3/MM3 (ref 0.7–3.1)
LYMPHOCYTES NFR BLD AUTO: 18.7 % (ref 19.6–45.3)
MCH RBC QN AUTO: 29.7 PG (ref 26.6–33)
MCHC RBC AUTO-ENTMCNC: 32.5 G/DL (ref 31.5–35.7)
MCV RBC AUTO: 91.4 FL (ref 79–97)
MONOCYTES # BLD AUTO: 0.75 10*3/MM3 (ref 0.1–0.9)
MONOCYTES NFR BLD AUTO: 8.5 % (ref 5–12)
MRSA DNA SPEC QL NAA+PROBE: NORMAL
NEUTROPHILS NFR BLD AUTO: 5.78 10*3/MM3 (ref 1.7–7)
NEUTROPHILS NFR BLD AUTO: 65.4 % (ref 42.7–76)
NRBC BLD AUTO-RTO: 0 /100 WBC (ref 0–0.2)
PLATELET # BLD AUTO: 330 10*3/MM3 (ref 140–450)
PMV BLD AUTO: 9.3 FL (ref 6–12)
POTASSIUM SERPL-SCNC: 3.9 MMOL/L (ref 3.5–5.2)
PROTHROMBIN TIME: 28.7 SECONDS (ref 11.7–14.2)
RBC # BLD AUTO: 4.44 10*6/MM3 (ref 4.14–5.8)
SODIUM SERPL-SCNC: 139 MMOL/L (ref 136–145)
TROPONIN T SERPL HS-MCNC: 60 NG/L
WBC NRBC COR # BLD AUTO: 8.83 10*3/MM3 (ref 3.4–10.8)

## 2025-06-03 PROCEDURE — 99222 1ST HOSP IP/OBS MODERATE 55: CPT | Performed by: SURGERY

## 2025-06-03 PROCEDURE — 85610 PROTHROMBIN TIME: CPT | Performed by: INTERNAL MEDICINE

## 2025-06-03 PROCEDURE — 86140 C-REACTIVE PROTEIN: CPT | Performed by: INTERNAL MEDICINE

## 2025-06-03 PROCEDURE — 85025 COMPLETE CBC W/AUTO DIFF WBC: CPT | Performed by: INTERNAL MEDICINE

## 2025-06-03 PROCEDURE — 87641 MR-STAPH DNA AMP PROBE: CPT | Performed by: INTERNAL MEDICINE

## 2025-06-03 PROCEDURE — 25010000002 VANCOMYCIN 10 G RECONSTITUTED SOLUTION: Performed by: INTERNAL MEDICINE

## 2025-06-03 PROCEDURE — 99223 1ST HOSP IP/OBS HIGH 75: CPT | Performed by: INTERNAL MEDICINE

## 2025-06-03 PROCEDURE — 25810000003 SODIUM CHLORIDE 0.9 % SOLUTION: Performed by: INTERNAL MEDICINE

## 2025-06-03 PROCEDURE — 99222 1ST HOSP IP/OBS MODERATE 55: CPT | Performed by: INTERNAL MEDICINE

## 2025-06-03 PROCEDURE — 82948 REAGENT STRIP/BLOOD GLUCOSE: CPT

## 2025-06-03 PROCEDURE — 83036 HEMOGLOBIN GLYCOSYLATED A1C: CPT | Performed by: INTERNAL MEDICINE

## 2025-06-03 PROCEDURE — 80048 BASIC METABOLIC PNL TOTAL CA: CPT | Performed by: INTERNAL MEDICINE

## 2025-06-03 PROCEDURE — 25010000002 CEFTRIAXONE PER 250 MG: Performed by: INTERNAL MEDICINE

## 2025-06-03 PROCEDURE — 84484 ASSAY OF TROPONIN QUANT: CPT | Performed by: INTERNAL MEDICINE

## 2025-06-03 RX ORDER — SODIUM HYPOCHLORITE 1.25 MG/ML
SOLUTION TOPICAL 2 TIMES DAILY
Status: DISCONTINUED | OUTPATIENT
Start: 2025-06-03 | End: 2025-06-07 | Stop reason: HOSPADM

## 2025-06-03 RX ORDER — POTASSIUM CHLORIDE 1500 MG/1
40 TABLET, EXTENDED RELEASE ORAL ONCE
Status: COMPLETED | OUTPATIENT
Start: 2025-06-03 | End: 2025-06-03

## 2025-06-03 RX ADMIN — ZINC OXIDE 1 APPLICATION: 200 OINTMENT TOPICAL at 18:21

## 2025-06-03 RX ADMIN — CEFTRIAXONE 2000 MG: 2 INJECTION, POWDER, FOR SOLUTION INTRAMUSCULAR; INTRAVENOUS at 23:53

## 2025-06-03 RX ADMIN — SODIUM HYPOCHLORITE: 1.25 SOLUTION TOPICAL at 18:21

## 2025-06-03 RX ADMIN — VANCOMYCIN HYDROCHLORIDE 1750 MG: 10 INJECTION, POWDER, LYOPHILIZED, FOR SOLUTION INTRAVENOUS at 20:33

## 2025-06-03 RX ADMIN — POTASSIUM CHLORIDE 40 MEQ: 1500 TABLET, EXTENDED RELEASE ORAL at 01:55

## 2025-06-03 RX ADMIN — CEFTRIAXONE 2000 MG: 2 INJECTION, POWDER, FOR SOLUTION INTRAMUSCULAR; INTRAVENOUS at 01:54

## 2025-06-03 RX ADMIN — Medication 10 ML: at 08:27

## 2025-06-03 RX ADMIN — ZINC OXIDE 1 APPLICATION: 200 OINTMENT TOPICAL at 20:32

## 2025-06-03 RX ADMIN — SODIUM HYPOCHLORITE: 1.25 SOLUTION TOPICAL at 20:32

## 2025-06-03 RX ADMIN — WARFARIN SODIUM 15 MG: 7.5 TABLET ORAL at 01:55

## 2025-06-03 RX ADMIN — Medication 10 ML: at 20:33

## 2025-06-03 RX ADMIN — Medication 10 ML: at 01:59

## 2025-06-03 RX ADMIN — VANCOMYCIN HYDROCHLORIDE 1500 MG: 10 INJECTION, POWDER, LYOPHILIZED, FOR SOLUTION INTRAVENOUS at 08:24

## 2025-06-03 RX ADMIN — WARFARIN SODIUM 10 MG: 10 TABLET ORAL at 18:34

## 2025-06-03 NOTE — CONSULTS
"Referring Provider: Gerardo Drake MD  [address]  [place] [address]    Reason for Consultation: \"Cellulitis\"    History of present illness:  Kameron Melendez is a 55 y.o. with PMH super obesity BMI 70 and factor V Leiden mutation on warfarin who I am asked to evaluate and give opinion for \"cellulitis\". History is obtained from the patient and review of the old medical records which I summarize/synthesize as follows: He says that around 5/24 he developed chills and pain in the left lower extremity.  This feels similar to episodes of cellulitis he had in the past.  He actually gives some cephalexin at home which he can begin when he starts have an outbreak.  This is prescribed by his PCP.  He said he last had to do this about a year ago.  He initially had some improvement with the therapy.  However on 5/27 he bumped the area on his bathtub and noticed a significant amount of blood and pus drained out.  He says that since that time it has been worsening.    He also reports some shortness of breath worse with exertion.    In the emergency room he was afebrile but slightly tachycardic.  His blood pressure was normal.  He was initially on room air but later required supplemental oxygen currently at 2 L.  Labs were notable for WBC 10, procalcitonin 1.5, creatinine 1.2, and RPP + rhinovirus/enterovirus.  His chest x-ray was clear and CTA of the chest was negative for PE.    He was started on empiric vancomycin and ceftriaxone.  ID has been asked to evaluate.    Wound care evaluated the patient earlier today and has provided recommendations.    Past Medical History:   Diagnosis Date    DVT (deep venous thrombosis)     RLE    Factor V Leiden     Hypertension     Kidney stone     Lymphedema     Lymphedema of left lower extremity     Obesity     ARNOLDO (obstructive sleep apnea)     Pulmonary embolism     bilateral    Pulmonary hypertension     Right ventricular enlargement        Past Surgical History: "   Procedure Laterality Date    CARDIAC CATHETERIZATION Bilateral 7/18/2017    Procedure: Pulmonary angiography- [facility] ;  Surgeon: Cedric Coulter MD;  Location:  [facility];  Service:     CARDIAC CATHETERIZATION N/A 7/18/2017    Procedure: Right Heart Cath;  Surgeon: Cedric Coulter MD;  Location:  [facility];  Service:     THROMBECTOMY         Social History:  Lives in [place], [place]    Worked as a  and now works in IT    Antibiotic allergies and intolerances:  None    Medications:    Current Facility-Administered Medications:     acetaminophen (TYLENOL) tablet 650 mg, 650 mg, Oral, Q4H PRN **OR** acetaminophen (TYLENOL) 160 MG/5ML oral solution 650 mg, 650 mg, Oral, Q4H PRN **OR** acetaminophen (TYLENOL) suppository 650 mg, 650 mg, Rectal, Q4H PRN, Gerardo Drake MD    sennosides-docusate (PERICOLACE) 8.6-50 MG per tablet 2 tablet, 2 tablet, Oral, BID PRN **AND** polyethylene glycol (MIRALAX) packet 17 g, 17 g, Oral, Daily PRN **AND** bisacodyl (DULCOLAX) EC tablet 5 mg, 5 mg, Oral, Daily PRN **AND** bisacodyl (DULCOLAX) suppository 10 mg, 10 mg, Rectal, Daily PRN, Gerardo Drake MD    Calcium Replacement - Follow Nurse / BPA Driven Protocol, , Not Applicable, PRN, Gerardo Drake MD    cefTRIAXone (ROCEPHIN) 2,000 mg in sodium chloride 0.9 % 100 mL MBP, 2,000 mg, Intravenous, Q24H, Gerardo Drake MD, Stopped at 06/03/25 0224    dextrose (D50W) (25 g/50 mL) IV injection 25 g, 25 g, Intravenous, Q15 Min PRN, Gerardo Drake MD    dextrose (GLUTOSE) oral gel 15 g, 15 g, Oral, Q15 Min PRN, Gerardo Drake MD    glucagon (GLUCAGEN) injection 1 mg, 1 mg, Intramuscular, Q15 Min PRN, Gerardo Drake MD    HYDROcodone-acetaminophen (NORCO) 5-325 MG per tablet 1 tablet, 1 tablet, Oral, Q6H PRN, Gerardo Drake MD    hydrocortisone-bacitracin-zinc oxide-nystatin (MAGIC BARRIER) ointment 1 Application, 1 Application, Topical, BID, Julian,  MD Randall    insulin lispro (HUMALOG/ADMELOG) injection 2-9 Units, 2-9 Units, Subcutaneous, 4x Daily AC & at Bedtime, Gerardo Drake MD    Magnesium Standard Dose Replacement - Follow Nurse / BPA Driven Protocol, , Not Applicable, Vidal SALES Alan David, MD    nitroglycerin (NITROSTAT) SL tablet 0.4 mg, 0.4 mg, Sublingual, Q5 Min PRN, Gerardo Drake MD    ondansetron ODT (ZOFRAN-ODT) disintegrating tablet 4 mg, 4 mg, Oral, Q6H PRN **OR** ondansetron (ZOFRAN) injection 4 mg, 4 mg, Intravenous, Q6H PRN, Gerardo Drake MD    Pharmacy to dose vancomycin, , Not Applicable, Continuous PRN, Gerardo Drake MD    Pharmacy to dose warfarin, , Not Applicable, Continuous PRN, Gerardo Drake MD    Phosphorus Replacement - Follow Nurse / BPA Driven Protocol, , Not Applicable, PRNVidal Alan David, MD    Potassium Replacement - Follow Nurse / BPA Driven Protocol, , Not Applicable, PRN, Gerardo Drake MD    sodium chloride 0.9 % flush 10 mL, 10 mL, Intravenous, Q12H, Gerardo Drake MD, 10 mL at 06/03/25 0827    sodium chloride 0.9 % flush 10 mL, 10 mL, Intravenous, PRN, Gerardo Drake MD    sodium chloride 0.9 % infusion 40 mL, 40 mL, Intravenous, PRN, Gerardo Drake MD    vancomycin 1750 mg/500 mL 0.9% NS IVPB ([facility]), 1,750 mg, Intravenous, Q12H, Randall Jordan MD    warfarin (COUMADIN) tablet 10 mg, 10 mg, Oral, Once per day on Sunday Tuesday Wednesday Thursday Saturday, Gerardo Drake MD    warfarin (COUMADIN) tablet 15 mg, 15 mg, Oral, Once per day on Monday Friday, Gerardo Drake MD, 15 mg at 06/03/25 0155    Current Outpatient Medications:     acetaminophen (TYLENOL) 500 MG tablet, Take 1 tablet by mouth Every 6 (Six) Hours As Needed for Mild Pain., Disp: , Rfl:     allopurinol (Zyloprim) 100 MG tablet, Take 1 tablet by mouth Daily., Disp: 30 tablet, Rfl: 1    atorvastatin (LIPITOR) 20 MG tablet, TAKE 1 TABLET BY MOUTH EVERY DAY AT NIGHT, Disp: 90  tablet, Rfl: 3    dapagliflozin Propanediol (Farxiga) 10 MG tablet, Take 10 mg by mouth Daily., Disp: 30 tablet, Rfl: 5    furosemide (LASIX) 80 MG tablet, Take 1 tablet by mouth Daily As Needed (leg swelling)., Disp: 90 tablet, Rfl: 3    methylPREDNISolone (MEDROL) 4 MG dose pack, Take as directed on package instructions., Disp: 21 each, Rfl: 0    metOLazone (ZAROXOLYN) 5 MG tablet, TAKE 1 TABLET BY MOUTH 1 (ONE) TIME PER WEEK., Disp: 4 tablet, Rfl: 3    omeprazole (priLOSEC) 40 MG capsule, TAKE 1 CAPSULE BY MOUTH EVERY DAY, Disp: 90 capsule, Rfl: 3    potassium chloride (K-TAB) 20 MEQ tablet controlled-release ER tablet, Take 1 tablet by mouth 1 (One) Time Per Week. With metolazone, Disp: 4 tablet, Rfl: 3    spironolactone (Aldactone) 25 MG tablet, Take 1 tablet by mouth As Needed (swelling)., Disp: , Rfl:     Tirzepatide 5 MG/0.5ML solution auto-injector, Inject 5 mg under the skin into the appropriate area as directed 1 (One) Time Per Week. Call for increased dose, Disp: 3 mL, Rfl: 1    warfarin (Jantoven) 5 MG tablet, TAKE TWO TABLETS BY MOUTH ON SUN, TUES, THURS AND TAKE THREE TABLETS BY MOUTH ALL OTHER DAYS OR AS DIRECTED, Disp: 235 tablet, Rfl: 1    Ibuprofen 3 %, Gabapentin 10 %, Baclofen 2 %, lidocaine 4 %, Ketamine HCl 10 %, Apply 1-2 g topically to the appropriate area as directed 3 (Three) to 4 (Four) times daily., Disp: 90 g, Rfl: 5      Objective   Vital Signs   Temp:  [97.7 °F (36.5 °C)-97.9 °F (36.6 °C)] 97.9 °F (36.6 °C)  Heart Rate:  [] 64  Resp:  [16-23] 16  BP: (105-140)/(45-73) 105/45    Physical Exam:   General: awake, alert, NAD, very nice, sitting up in bed  Eyes: no scleral icterus  Cardiovascular: NR  Respiratory: normal work of breathing on RA  GI: Abdomen is soft  :  no Vera catheter  Skin: Erythema, induration, and open, draining wound of the left thigh  Neurological: Alert and oriented x 3  Psychiatric: Normal mood and affect   Vasc: PIV in left upper extremity w/o  erythema    Labs:     Lab Results   Component Value Date    WBC 8.83 06/03/2025    HGB 13.2 06/03/2025    HCT 40.6 06/03/2025    MCV 91.4 06/03/2025     06/03/2025       Lab Results   Component Value Date    GLUCOSE 115 (H) 06/03/2025    BUN 19.0 06/03/2025    CREATININE 1.08 06/03/2025    EGFRIFNONA 116 11/10/2021    EGFRIFAFRI 134 11/10/2021    BCR 17.6 06/03/2025    CO2 21.8 (L) 06/03/2025    CALCIUM 8.9 06/03/2025    ALBUMIN 3.1 (L) 06/02/2025    AST 18 06/02/2025    ALT 16 06/02/2025     Lab Results   Component Value Date    HGBA1C 7.10 (H) 04/18/2025     Lab Results   Component Value Date    CRP 1.39 (H) 06/07/2022     Lab Results   Component Value Date    INR 2.66 (H) 06/03/2025    INR 2.55 (H) 06/02/2025    INR 3.90 05/30/2025    PROTIME 28.7 (H) 06/03/2025    PROTIME 27.7 (H) 06/02/2025    PROTIME 29.3 (H) 12/23/2023       Microbiology:   6/2 RPP: + Human rhinovirus/enterovirus  6/2 BCx: Pending  6/3 MRSA nares: Negative    Radiology:  6/2 CTA chest negative for PE; negative for pneumonia    CXR personally reviewed and is negative for pneumonia      Isolation:  Droplet    ASSESSMENT/PLAN:  Human rhinovirus/enterovirus infection  Left lower extremity wound infection  Factor V Leiden mutation  On warfarin  Super obesity BMI 70 complicating above  Uncontrolled DM2 complicating above    Continue supportive care for human rhinovirus/enterovirus infection.    For LLE wound and cellulitis, I will obtain a wound culture.  Given the amount of drainage she has had present, I will ask general surgery to evaluate regarding further evaluation with imaging and/or debridement to provide source control.    Continue empiric vancomycin and ceftriaxone while awaiting the above results.    While the patient is on vancomycin, vancomycin levels will be ordered and reviewed with dose adjustments made as needed. The patient will have a daily creatinine level checked while on vancomycin as part of monitoring this medication.      ID will follow.

## 2025-06-03 NOTE — PROGRESS NOTES
[facility] Clinical Pharmacy Services: Warfarin Dosing/Monitoring Consult    Kameron Melendez is a 55 y.o. male, estimated creatinine clearance is 162.9 mL/min (by C-G formula based on SCr of 1.08 mg/dL). weighing (!) 249 kg (550 lb).    Results from last 7 days   Lab Units 06/03/25 0622 06/02/25 2027 06/02/25 2025 05/30/25  0000   INR  2.66*  --  2.55* 3.90   APTT seconds  --   --  38.6*  --    HEMOGLOBIN g/dL 13.2 12.9*  --   --    HEMATOCRIT % 40.6 39.6  --   --    PLATELETS 10*3/mm3 330 355  --   --      Prior to admission anticoagulation: warfarin 15 mg daily on MF, 10 mg AOD, managed by [facility]    [facility] Anticoagulation:  Consulting provider: Dr. Drake  Start date: 6/2, but pt on warfarin prior to admission  Indication: history of DVT/PE  Target INR: 2 - 3  Expected duration: unknown   Bridge Therapy: No      Potential food or drug interactions: ceftriaxone may enhance the anticoagulant effect of warfarin     Education complete?/Date: N/A; home medication    Assessment/Plan:  INR of 2.66 is within goal range, continue home regimen  Dose: warfarin 15 mg daily on MF, 10 mg AOD  Monitor for any signs or symptoms of bleeding  Follow up daily INRs and dose adjustments    Pharmacy will continue to follow until discharge or discontinuation of warfarin.     Tatiana Flores, PharmD, BCPS, [facility]  Clinical Pharmacy Specialist, Emergency Medicine   Phone: [phone]

## 2025-06-03 NOTE — PROGRESS NOTES
"[facility] Clinical Pharmacy Services: Vancomycin Pharmacokinetic Initial Consult Note    Kameron Melendez is a 55 y.o. male who is on day 1 of pharmacy to dose vancomycin.    Indication: Skin and Soft Tissue  Consulting Provider: Dr. Drake  Planned Duration of Therapy: 7 days  Loading Dose Ordered or Given: 3000 mg on 6/2 at 2042  MRSA PCR performed: 6/2; Result: ordered  Culture/Source: 6/2 blood cx in process  Target: -600 mg/L.hr   Pertinent Vanc Dosing History: none  Other Antimicrobials: ceftriaxone    Vitals/Labs  Ht: 188 cm (74\"); Wt: (!) 249 kg (550 lb)  Temp Readings from Last 1 Encounters:   06/02/25 97.7 °F (36.5 °C) (Oral)    Estimated Creatinine Clearance: 139.6 mL/min (by C-G formula based on SCr of 1.26 mg/dL).     Results from last 7 days   Lab Units 06/02/25 2027   CREATININE mg/dL 1.26   WBC 10*3/mm3 10.41     Assessment/Plan:    Vancomycin Dose: 1500 mg (10 mg/kg using AdjBW) IV every 12 hours  Predictive AUC level for the dose ordered is 579 mg/L.hr, which is within the target of 400-600 mg/L.hr  Vanc Trough has been ordered for 6/4 at 0800     Pharmacy will follow patient's kidney function and will adjust doses and obtain levels as necessary. Thank you for involving pharmacy in this patient's care. Please contact pharmacy with any questions or concerns.                           Faiza Burton [facility]  Clinical Pharmacist    "

## 2025-06-03 NOTE — PROGRESS NOTES
"[facility] Clinical Pharmacy Services: Vancomycin Pharmacokinetic Initial Consult Note    Kameron Melendez is a 55 y.o. male who is on day 2 of pharmacy to dose vancomycin.    Indication: Skin and Soft Tissue  Consulting Provider: Dr. Drake  Planned Duration of Therapy: 7 days  Loading Dose Ordered or Given: 3000 mg on 6/2 at 2042  MRSA PCR performed: 6/2; Result: negative  Culture/Source: 6/2 blood cx in process  Target: -600 mg/L.hr   Pertinent Vanc Dosing History: none  Other Antimicrobials: ceftriaxone    Vitals/Labs  Ht: 188 cm (74\"); Wt: (!) 249 kg (550 lb)  Temp Readings from Last 1 Encounters:   06/03/25 97.9 °F (36.6 °C) (Oral)    Estimated Creatinine Clearance: 162.9 mL/min (by C-G formula based on SCr of 1.08 mg/dL).     Results from last 7 days   Lab Units 06/03/25  0622 06/02/25 2027   CREATININE mg/dL 1.08 1.26   WBC 10*3/mm3 8.83 10.41     Assessment/Plan:    Vancomycin Dose: increased to 1750 mg IV every 12 hours given improvement in renal function overnight  Predictive AUC level for the dose ordered is 441mg/L.hr, which is within the target of 400-600 mg/L.hr  Vanc Trough has been ordered for 6/4 at 0800     Pharmacy will follow patient's kidney function and will adjust doses and obtain levels as necessary. Thank you for involving pharmacy in this patient's care. Please contact pharmacy with any questions or concerns.                           Tatiana Flores, PharmD, BCPS, [facility]  Clinical Pharmacy Specialist, Emergency Medicine   Phone: [phone]  "

## 2025-06-03 NOTE — PROGRESS NOTES
[facility] Clinical Pharmacy Services: Warfarin Dosing/Monitoring Consult    Kameron Melendez is a 55 y.o. male, estimated creatinine clearance is 139.6 mL/min (by C-G formula based on SCr of 1.26 mg/dL). weighing (!) 249 kg (550 lb).    Results from last 7 days   Lab Units 06/02/25 2027 06/02/25 2025 05/30/25  0000   INR   --  2.55* 3.90   APTT seconds  --  38.6*  --    HEMOGLOBIN g/dL 12.9*  --   --    HEMATOCRIT % 39.6  --   --    PLATELETS 10*3/mm3 355  --   --      Prior to admission anticoagulation: warfarin 15 mg daily on MF, 10 mg AOD. Last dose 6/1     Hospital Anticoagulation:  Consulting provider: Dr. Drake  Start date: 6/2  Indication: history of DVT/PE  Target INR: 2 - 3  Expected duration: unknown   Bridge Therapy: No      Potential food or drug interactions: ceftriaxone may enhance the anticoagulant effect of warfarin     Education complete?/Date: N/A; home medication    Assessment/Plan:  INR of 2.55 is within goal range, continue home regimen  Dose: warfarin 15 mg daily on MF, 10 mg AOD  Monitor for any signs or symptoms of bleeding  Follow up daily INRs and dose adjustments    Pharmacy will continue to follow until discharge or discontinuation of warfarin.     Faiza Burton [facility]  Clinical Pharmacist

## 2025-06-03 NOTE — PAYOR COMM NOTE
"Christiana Covarrubias (55 y.o. Male)      SEE FOR INPATIENT:  REF#  [id number]    FAX:  [phone],   [phone]    [facility]: NPI [id number]  [facility]# [id number]    AICHA LYNN RN, [facility]     Date of Birth   1970    Social Security Number       Address   [address] [place] [address]    Home Phone   [phone]    MRN   [id number]       [facility]    Marital Status                               Admission Date   6/2/2025    Admission Type   Emergency    Admitting Provider   Gerardo Drake MD    Attending Provider   Randall Jordan MD    Department, Room/Bed   [facility] EMERGENCY DEPARTMENT, 34/34       Discharge Date       Discharge Disposition       Discharge Destination                                 Attending Provider: Randall Jordan MD    Allergies: No Known Allergies    Isolation: Droplet   Infection: Rhinovirus  (06/02/25)   Code Status: CPR    Ht: 188 cm (74\")   Wt: 249 kg (550 lb)    Admission Cmt: None   Principal Problem: Left leg cellulitis [L03.116]                   Active Insurance as of 6/2/2025       Primary Coverage       Payor Plan Insurance Group Employer/Plan Group    [employer]HEM BLUE CROSS ANTHEM BLUE CROSS BLUE SHIELD PPO [id number]       Payor Plan Address Payor Plan Phone Number Payor Plan Fax Number Effective Dates    [address] [phone]  5/1/2019 - None Entered    [place] [address]         Subscriber Name Subscriber Birth Date Member ID       CHRISTIANA COVARRUBIAS 1970 [id number]                     Emergency Contacts        (Rel.) Home Phone Work Phone Mobile Phone    Yaya Covarrubias (Spouse) [phone] -- [phone]                 History & Physical        Gerardo Drake MD at 06/02/25 8165              Patient Name:  Christiana Covarrubias  YOB: 1970  MRN:  [id number]  Admit Date:  6/2/2025  Patient Care Team:  Hansel Patel DO as PCP - General (Family " Medicine)  Kim Sotelo [facility] as Pharmacist      Subjective  History Present Illness     Chief Complaint   Patient presents with    Shortness of Breath    Edema    Wound Check       Mr. Melendez is a 55 y.o. with a history of morbid obesity, factor V Leiden with previous VTE on coumadin, HTN, lymphedema, and obesity   He presented with a few symptoms. Cough and dyspnea x 1 week. Also with recent wound to LLE and then had increasing erythema. Had been on keflex for the past week but without improvement. Came to [facility] ER. +rhinovirus. Did have elevated troponin and BNP. Had CTPE. Evidence for cellulitis. Given IV vanc in the ER.    History of Present Illness  Review of Systems   Constitutional:  Positive for activity change, chills and fever.   HENT: Negative.     Eyes: Negative.    Respiratory:  Positive for cough. Negative for chest tightness.    Cardiovascular: Negative.  Negative for chest pain.   Gastrointestinal: Negative.    Endocrine: Negative.    Genitourinary: Negative.    Musculoskeletal: Negative.    Skin:  Positive for rash and wound.   Allergic/Immunologic: Negative.    Neurological: Negative.    Hematological: Negative.    Psychiatric/Behavioral: Negative.          Personal History     Past Medical History:   Diagnosis Date    DVT (deep venous thrombosis)     RLE    Factor V Leiden     Hypertension     Kidney stone     Lymphedema     Lymphedema of left lower extremity     Obesity     ARNOLDO (obstructive sleep apnea)     Pulmonary embolism     bilateral    Pulmonary hypertension     Right ventricular enlargement      Past Surgical History:   Procedure Laterality Date    CARDIAC CATHETERIZATION Bilateral 7/18/2017    Procedure: Pulmonary angiography- In[facility] ;  Surgeon: Cedric Coulter MD;  Location:  [facility];  Service:     CARDIAC CATHETERIZATION N/A 7/18/2017    Procedure: Right Heart Cath;  Surgeon: Cedric Coulter MD;  Location:  [facility];  Service:     THROMBECTOMY       Family  History   Problem Relation Age of Onset    Heart disease Mother     Heart failure Mother     Bradycardia Mother     No Known Problems Father     Atrial fibrillation Half-Brother     Prostate cancer Half-Brother     No Known Problems Maternal Grandmother     No Known Problems Maternal Grandfather     No Known Problems Paternal Grandmother     No Known Problems Paternal Grandfather      Social History     Tobacco Use    Smoking status: Former     Types: Cigars, Pipe     Start date: 1/1/2006     Quit date: 1/1/2022     Years since quitting: 3.4     Passive exposure: Past    Smokeless tobacco: Never    Tobacco comments:     occasional - < 1 a month   Vaping Use    Vaping status: Never Used   Substance Use Topics    Alcohol use: Yes     Comment: social    Drug use: No     (Not in a hospital admission)  Home meds reviewed   Allergies:  No Known Allergies    Objective   Objective     Vital Signs  Temp:  [97.7 °F (36.5 °C)] 97.7 °F (36.5 °C)  Heart Rate:  [] 76  Resp:  [18-23] 18  BP: (113-135)/(60-68) 122/68  SpO2:  [94 %-97 %] 97 %  on   ;   Device (Oxygen Therapy): room air  Body mass index is 70.62 kg/m².    Physical Exam  Vitals and nursing note reviewed.   Constitutional:       General: He is not in acute distress.     Appearance: He is well-developed. He is obese. He is ill-appearing. He is not diaphoretic.   HENT:      Head: Normocephalic and atraumatic.   Eyes:      General: No scleral icterus.     Conjunctiva/sclera: Conjunctivae normal.   Neck:      Vascular: No JVD.   Cardiovascular:      Rate and Rhythm: Normal rate.      Heart sounds: No murmur heard.  Pulmonary:      Effort: Pulmonary effort is normal. No respiratory distress.      Breath sounds: Normal breath sounds.   Abdominal:      General: Bowel sounds are normal.      Palpations: Abdomen is soft.      Tenderness: There is no abdominal tenderness.   Musculoskeletal:         General: Normal range of motion.      Cervical back: Normal range of motion  and neck supple.      Right lower leg: Edema present.      Left lower leg: Edema present.   Skin:     General: Skin is warm and dry.      Findings: Erythema (to LLE) present.   Neurological:      Mental Status: He is alert and oriented to person, place, and time.      Cranial Nerves: No cranial nerve deficit.      Motor: No abnormal muscle tone.   Psychiatric:         Behavior: Behavior normal.         Thought Content: Thought content normal.     Lymphedema with cellulitis and wound on LLE    Results Review:  I reviewed the patient's new clinical results.  Discussed with ED provider.    Lab Results (last 24 hours)       Procedure Component Value Units Date/Time    Protime-INR [[id number]]  (Abnormal) Collected: 06/02/25 2025    Specimen: Blood from Hand, Right Updated: 06/02/25 2121     Protime 27.7 Seconds      INR 2.55    aPTT [[id number]]  (Abnormal) Collected: 06/02/25 2025    Specimen: Blood from Hand, Right Updated: 06/02/25 2121     PTT 38.6 seconds     Blood Culture - Blood, Hand, Right [[id number]] Collected: 06/02/25 2025    Specimen: Blood from Hand, Right Updated: 06/02/25 2034    CBC & Differential [[id number]]  (Abnormal) Collected: 06/02/25 2027    Specimen: Blood from Arm, Left Updated: 06/02/25 2048    Narrative:      The following orders were created for panel order CBC & Differential.  Procedure                               Abnormality         Status                     ---------                               -----------         ------                     CBC Auto Differential[[id number]]        Abnormal            Final result                 Please view results for these tests on the individual orders.    Comprehensive Metabolic Panel [[id number]]  (Abnormal) Collected: 06/02/25 2027    Specimen: Blood from Arm, Left Updated: 06/02/25 2107     Glucose 131 mg/dL      BUN 21.0 mg/dL      Creatinine 1.26 mg/dL      Sodium 136 mmol/L      Potassium 3.6 mmol/L      Chloride 105 mmol/L      CO2 20.8 mmol/L       Calcium 9.0 mg/dL      Total Protein 6.8 g/dL      Albumin 3.1 g/dL      ALT (SGPT) 16 U/L      AST (SGOT) 18 U/L      Alkaline Phosphatase 112 U/L      Total Bilirubin 0.4 mg/dL      Globulin 3.7 gm/dL      A/G Ratio 0.8 g/dL      BUN/Creatinine Ratio 16.7     Anion Gap 10.2 mmol/L      eGFR 67.4 mL/min/1.73     Narrative:      GFR Categories in Chronic Kidney Disease (CKD)              GFR Category          GFR (mL/min/1.73)    Interpretation  G1                    90 or greater        Normal or high (1)  G2                    60-89                Mild decrease (1)  G3a                   45-59                Mild to moderate decrease  G3b                   30-44                Moderate to severe decrease  G4                    15-29                Severe decrease  G5                    14 or less           Kidney failure    (1)In the absence of evidence of kidney disease, neither GFR category G1 or G2 fulfill the criteria for CKD.    eGFR calculation 2021 CKD-EPI creatinine equation, which does not include race as a factor    High Sensitivity Troponin T [[id number]]  (Abnormal) Collected: 06/02/25 2027    Specimen: Blood from Arm, Left Updated: 06/02/25 2111     HS Troponin T 77 ng/L     Narrative:      High Sensitive Troponin T Reference Range:  <14.0 ng/L- Negative Female for AMI  <22.0 ng/L- Negative Male for AMI  >=14 - Abnormal Female indicating possible myocardial injury.  >=22 - Abnormal Male indicating possible myocardial injury.   Clinicians would have to utilize clinical acumen, EKG, Troponin, and serial changes to determine if it is an Acute Myocardial Infarction or myocardial injury due to an underlying chronic condition.         BNP [[id number]]  (Abnormal) Collected: 06/02/25 2027    Specimen: Blood from Arm, Left Updated: 06/02/25 2109     proBNP 2,859.0 pg/mL     Narrative:      This assay is used as an aid in the diagnosis of individuals suspected of having heart failure. It can be used as an  "aid in the diagnosis of acute decompensated heart failure (ADHF) in patients presenting with signs and symptoms of ADHF to the emergency department (ED). In addition, NT-proBNP of <300 pg/mL indicates ADHF is not likely.    Age Range Result Interpretation  NT-proBNP Concentration (pg/mL:      <50             Positive            >450                   Gray                 300-450                    Negative             <300    50-75           Positive            >900                  Gray                300-900                  Negative            <300      >75             Positive            >1800                  Gray                300-1800                  Negative            <300    Magnesium [[id number]]  (Normal) Collected: 06/02/25 2027    Specimen: Blood from Arm, Left Updated: 06/02/25 2107     Magnesium 1.6 mg/dL     Blood Culture - Blood, Arm, Left [[id number]] Collected: 06/02/25 2027    Specimen: Blood from Arm, Left Updated: 06/02/25 2035    Lactic Acid, Plasma [[id number]]  (Normal) Collected: 06/02/25 2027    Specimen: Blood from Arm, Left Updated: 06/02/25 2102     Lactate 1.9 mmol/L     Procalcitonin [[id number]]  (Abnormal) Collected: 06/02/25 2027    Specimen: Blood from Arm, Left Updated: 06/02/25 2109     Procalcitonin 1.55 ng/mL     Narrative:      As a Marker for Sepsis (Non-Neonates):    1. <0.5 ng/mL represents a low risk of severe sepsis and/or septic shock.  2. >2 ng/mL represents a high risk of severe sepsis and/or septic shock.    As a Marker for Lower Respiratory Tract Infections that require antibiotic therapy:    PCT on Admission    Antibiotic Therapy       6-12 Hrs later    >0.5                Strongly Recommended  >0.25 - <0.5        Recommended   0.1 - 0.25          Discouraged              Remeasure/reassess PCT  <0.1                Strongly Discouraged     Remeasure/reassess PCT    As 28 day mortality risk marker: \"Change in Procalcitonin Result\" (>80% or <=80%) if Day 0 (or Day 1) " and Day 4 values are available. Refer to http://www.[facility]-pct-calculator.com    Change in PCT <=80%  A decrease of PCT levels below or equal to 80% defines a positive change in PCT test result representing a higher risk for 28-day all-cause mortality of patients diagnosed with severe sepsis for septic shock.    Change in PCT >80%  A decrease of PCT levels of more than 80% defines a negative change in PCT result representing a lower risk for 28-day all-cause mortality of patients diagnosed with severe sepsis or septic shock.       CBC Auto Differential [[id number]]  (Abnormal) Collected: 06/02/25 2027    Specimen: Blood from Arm, Left Updated: 06/02/25 2048     WBC 10.41 10*3/mm3      RBC 4.29 10*6/mm3      Hemoglobin 12.9 g/dL      Hematocrit 39.6 %      MCV 92.3 fL      MCH 30.1 pg      MCHC 32.6 g/dL      RDW 14.4 %      RDW-SD 48.5 fl      MPV 9.3 fL      Platelets 355 10*3/mm3      nRBC 0.0 /100 WBC     Manual Differential [[id number]] Collected: 06/02/25 2027    Specimen: Blood from Arm, Left Updated: 06/02/25 2122     Neutrophil % 67.0 %      Lymphocyte % 22.0 %      Monocyte % 7.0 %      Eosinophil % 3.0 %      Basophil % 1.0 %      Neutrophils Absolute 6.97 10*3/mm3      Lymphocytes Absolute 2.29 10*3/mm3      Monocytes Absolute 0.73 10*3/mm3      Eosinophils Absolute 0.31 10*3/mm3      Basophils Absolute 0.10 10*3/mm3      Crenated RBC's Mod/2+     Poikilocytes Slight/1+     Polychromasia Mod/2+     Spherocytes Slight/1+     WBC Morphology Normal     Platelet Morphology Normal    Respiratory Panel PCR w/COVID-19(SARS-CoV-2) [facility] In-House, NP Swab in [facility], 2 HR TAT - Swab, Nasopharynx [[id number]]  (Abnormal) Collected: 06/02/25 2049    Specimen: Swab from Nasopharynx Updated: 06/02/25 2147     ADENOVIRUS, PCR Not Detected     Coronavirus 229E Not Detected     Coronavirus HKU1 Not Detected     Coronavirus NL63 Not Detected     Coronavirus OC43 Not Detected     COVID19 Not Detected      Human Metapneumovirus Not Detected     Human Rhinovirus/Enterovirus Detected     Influenza A PCR Not Detected     Influenza B PCR Not Detected     Parainfluenza Virus 1 Not Detected     Parainfluenza Virus 2 Not Detected     Parainfluenza Virus 3 Not Detected     Parainfluenza Virus 4 Not Detected     RSV, PCR Not Detected     Bordetella pertussis pcr Not Detected     Bordetella parapertussis PCR Not Detected     Chlamydophila pneumoniae PCR Not Detected     Mycoplasma pneumo by PCR Not Detected    Narrative:      In the setting of a positive respiratory panel with a viral infection PLUS a negative procalcitonin without other underlying concern for bacterial infection, consider observing off antibiotics or discontinuation of antibiotics and continue supportive care. If the respiratory panel is positive for atypical bacterial infection (Bordetella pertussis, Chlamydophila pneumoniae, or Mycoplasma pneumoniae), consider antibiotic de-escalation to target atypical bacterial infection.    High Sensitivity Troponin T 1Hr [[id number]]  (Abnormal) Collected: 06/02/25 2138    Specimen: Blood from Arm, Left Updated: 06/02/25 2214     HS Troponin T 74 ng/L      Troponin T Numeric Delta -3 ng/L      Troponin T % Delta -4    Narrative:      High Sensitive Troponin T Reference Range:  <14.0 ng/L- Negative Female for AMI  <22.0 ng/L- Negative Male for AMI  >=14 - Abnormal Female indicating possible myocardial injury.  >=22 - Abnormal Male indicating possible myocardial injury.   Clinicians would have to utilize clinical acumen, EKG, Troponin, and serial changes to determine if it is an Acute Myocardial Infarction or myocardial injury due to an underlying chronic condition.                 Imaging Results (Last 24 Hours)       Procedure Component Value Units Date/Time    CT Angiogram Chest Pulmonary Embolism [[id number]] Collected: 06/02/25 2306     Updated: 06/02/25 2314    Narrative:      CTA CHEST WITH IV CONTRAST     HISTORY:  Short of breath, tachycardic, history of thromboembolism, rule  out PE; L03.116-Cellulitis of left lower limb; Z78.9-Other specified  health status; R79.89-Other specified abnormal findings of blood  chemistry; I89.0-Lymphedema, not elsewhere classified; B34.8-Other viral  infections of unspecified site; R79.89-Other specified abnormal findings  of blood chemistry; R79.89-Other specified abnormal find     COMPARISON: CTA chest 12/21/2023     TECHNIQUE: CT angiography was performed of the chest with axial images  as well as coronal and sagittal reformatted MIP images provided  following administration of IV contrast. 3-D surface rendered reformats  were obtained of the pulmonary arteries and aorta. Radiation dose  reduction techniques were utilized, including automated exposure  control, and exposure modulation based on body size.     FINDINGS:     There is mild bilateral dependent atelectasis, but there is no  convincing evidence of acute pulmonary infiltrate, pleural effusion,  pneumothorax or suspicious nodule.     Thoracic aorta is normal in caliber.  There are coronary atherosclerotic  vascular calcifications.  There is no suspicious mediastinal adenopathy  or other mass.     Images of the upper abdomen show no acute abnormality, though there are  nonobstructing left renal calculi.  There is no acute bony abnormality.     Bolus timing is marginal, and there is no convincing evidence of  pulmonary embolism, though assessment is limited.       Impression:         Bolus timing is marginal, and there is no convincing evidence of  pulmonary embolism, though assessment is limited.     No acute pulmonary parenchymal abnormality is seen.           This report was finalized on 6/2/2025 11:11 PM by Dr. Cedric Tiwari M.D on Workstation: [id number]       XR Chest 1 View [[id number]] Collected: 06/02/25 2050     Updated: 06/02/25 2102    Narrative:         XR CHEST 1 VW-     HISTORY: Shortness of air.     COMPARISON: CT  angiogram of chest 12/21/2023, AP chest 12/21/2023.     FINDINGS: The heart and mediastinal structures appear normal. Lungs are  clear and there is no evidence for pulmonary edema or pleural effusion  or infiltrate.       Impression:      No evidence for active disease in the chest.           This report was finalized on 6/2/2025 8:59 PM by Chalino Wynne M.D on  Workstation: [id number]               Results for orders placed during the hospital encounter of 11/01/21    Adult Transthoracic Echo Complete W/ Cont if Necessary Per Protocol    Interpretation Summary  · The left ventricular cavity is mild to moderately dilated.  · Left ventricular ejection fraction appears to be 61 - 65%. Left ventricular systolic function is normal.  · The right ventricular cavity is mildly dilated. Normal right ventricular systolic function noted.  · The left atrial cavity is mild to moderately dilated.  · There is no evidence of pericardial effusion      ECG 12 Lead Dyspnea   Preliminary Result   HEART RATE=84  bpm   RR Fnedeidk=324  ms   MO Rpeqbxyv=916  ms   P Horizontal Axis=0  deg   P Front Axis=31  deg   QRSD Interval=99  ms   QT Rzrldyvt=204  ms   YVdA=395  ms   QRS Axis=-10  deg   T Wave Axis=67  deg   - BORDERLINE ECG -   Sinus rhythm   Probable lateral infarct, old   Date and Time of Study:2025-06-02 19:59:06           Assessment/Plan     Active Hospital Problems    Diagnosis  POA    **Left leg cellulitis [L03.116]  Yes    Rhinovirus [B34.8]  Yes    Type 2 diabetes mellitus [E11.9]  Yes    Wound cellulitis [L03.90]  Yes    Venous insufficiency (chronic) (peripheral) [I87.2]  Yes    Cellulitis of left lower extremity [L03.116]  Yes    Heterozygous factor V Leiden mutation [D68.51]  Yes    History of pulmonary embolism [Z86.711]  Yes    Lymphedema of both lower extremities [I89.0]  Yes    Obesity, morbid, BMI 50 or higher [E66.01]  Yes    ARNOLDO (obstructive sleep apnea) [G47.33]  Yes     Mr. Melendez is a 55 y.o. with a  history of morbid obesity, factor V Leiden with previous VTE on coumadin, HTN, lymphedema, and obesity   He presented with a few symptoms. Cough and dyspnea x 1 week. Also with recent wound to LLE and then had increasing erythema. Had been on keflex for the past week but without improvement. Came to [facility] ER. +rhinovirus. Did have elevated troponin and BNP. Had CTPE. Evidence for cellulitis. Given IV vanc in the ER.    Will have on vanc and ceftriaxone, follow blood cultures. Wound consultation for LE wound  Pharmacy to dose vanc and coumadin, monitor labs  Cardiology consult for elevated troponin. Denies chest pain don't know that will necessary need workup but will see their thoughts. CTPE bolus timing was poor but in setting of therapeutic INR I don't know that we need to repeat.   Resume most home meds when med rec complete. Sliding scale insulin.   Supportive care for rhinovirus  Can use home machine for ARNOLDO  I discussed the patients findings and my recommendations with patient and consulting provider.    VTE Prophylaxis - Warfarin (home med).         Gerardo Drake MD  [facility]  06/02/25  23:28 EDT      Electronically signed by Gerardo Drake MD at 06/02/25 2334          Emergency Department Notes            Alon Gutierrez MD at 06/02/25 1945           EMERGENCY DEPARTMENT ENCOUNTER  Room Number:  14/14  Date of encounter:  6/2/2025  PCP: Hansel Patel DO  Patient Care Team:  Hansel Patel DO as PCP - General (Family Medicine)  Kim Sotelo RPH as Pharmacist     HPI:  Context: Kameron Melendez is a 55 y.o. male who presents to the ED c/o chief complaint of shortness of breath and infection in his left leg.  Patient reports he has been having symptoms for just over a week.  Patient planes of dyspnea on exertion, no dyspnea at rest.  Patient does have productive cough, no runny nose or congestion.  Patient denies any chest pain.  Patient reports he had fever a week  ago, no fever since.  No vomiting or diarrhea.  Patient reports that he has had a spot on his leg for about a week, has a history of cellulitis, began Keflex at home 5 days ago, reports redness and warmth of his left leg has worsened, began having purulent drainage from it several days ago.  Patient reports history of cellulitis in the past, no history of MRSA, patient is diabetic.    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    Knee arthropathy 12/11/2015    Morbid obesity with body mass index of 60.0-69.9 in adult 12/11/2015    ARNOLDO (obstructive sleep apnea) 12/11/2015    Severe edema 12/11/2015    History of pulmonary embolism 01/23/2019    Intractable back pain 11/25/2019    Heterozygous factor V Leiden mutation 11/25/2019    Cellulitis of left lower extremity 11/02/2021    Hypokalemia 11/02/2021    CAROL (acute kidney injury) 11/02/2021    Sepsis with cutaneous manifestations 11/02/2021    Hyperglycemia 11/02/2021    Paroxysmal atrial fibrillation 11/02/2021    Long term (current) use of anticoagulants 11/16/2021    Lymphedema, not elsewhere classified 11/16/2021    Nicotine dependence, other tobacco product, uncomplicated 11/16/2021    Personal history of other venous thrombosis and embolism 11/16/2021    Typical atrial flutter 11/16/2021    Venous insufficiency (chronic) (peripheral) 11/16/2021    Cellulitis of right lower extremity 05/06/2022    Wound cellulitis 05/25/2022    CAP (community acquired pneumonia) 12/21/2023    Type 2 diabetes mellitus 12/21/2023    Hyponatremia 12/21/2023    Choledocholithiasis 12/21/2023    RUQ pain 12/21/2023     Resolved Ambulatory Problems     Diagnosis Date Noted    Bilateral  pulmonary embolism 07/17/2017    Right ventricular enlargement 08/29/2017    Other acute pulmonary embolism without acute cor pulmonale 02/01/2019    Athscl heart 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 11/16/2021     Past Medical History:   Diagnosis Date    DVT (deep venous thrombosis)     Factor V Leiden     Hypertension     Kidney stone     Lymphedema     Lymphedema of left lower extremity     Obesity     Pulmonary embolism        PAST SURGICAL HISTORY  Past Surgical History:   Procedure Laterality Date    CARDIAC CATHETERIZATION Bilateral 7/18/2017    Procedure: Pulmonary angiography- [facility] ;  Surgeon: Cedric Coulter MD;  Location:  [facility];  Service:     CARDIAC CATHETERIZATION N/A 7/18/2017    Procedure: Right Heart Cath;  Surgeon: Cedric Coulter MD;  Location:  [facility];  Service:     THROMBECTOMY         FAMILY HISTORY  Family History   Problem Relation Age of Onset    Heart disease Mother     Heart failure Mother     Bradycardia Mother     No Known Problems Father     Atrial fibrillation Half-Brother     Prostate cancer Half-Brother     No Known Problems Maternal Grandmother     No Known Problems Maternal Grandfather     No Known Problems Paternal Grandmother     No Known Problems Paternal Grandfather        SOCIAL HISTORY  Social History     Socioeconomic History    Marital status:    Tobacco Use    Smoking status: Former     Types: Cigars, Pipe     Start date: 1/1/2006     Quit date: 1/1/2022     Years since quitting: 3.4     Passive exposure: Past    Smokeless tobacco: Never    Tobacco comments:     occasional - < 1 a month   Vaping Use    Vaping status: Never Used   Substance and Sexual Activity    Alcohol use: Yes     Comment: social    Drug use: No    Sexual activity: Defer       ALLERGIES  Patient has no known allergies.    The patient's allergies have been reviewed    PHYSICAL EXAM  I have reviewed the triage vital signs and nursing notes.  ED Triage  Vitals [06/02/25 1944]   Temp Heart Rate Resp BP SpO2   97.7 °F (36.5 °C) 106 23 -- 95 %      Temp src Heart Rate Source Patient Position BP Location FiO2 (%)   Oral -- -- -- --       General: No acute distress.  Super morbidly obese.  HENT: NCAT, PERRL, Nares patent.  Eyes: no scleral icterus.  Neck: trachea midline, no ROM limitations.  CV: regular rhythm, regular rate.  Respiratory: normal effort, CTAB.  Abdomen: soft, nondistended, NTTP, no rebound tenderness, no guarding or rigidity.  Musculoskeletal: no deformity.  Neuro: alert, moves all extremities, follows commands.  Skin: warm, dry.  Lymphedema bilateral lower extremities.  Patient has area of erythema with warmth and induration with trace purulent drainage on left lower leg along the posterior of his distal thigh.    LAB RESULTS  Recent Results (from the past 24 hours)   ECG 12 Lead Dyspnea    Collection Time: 06/02/25  7:59 PM   Result Value Ref Range    QT Interval 388 ms    QTC Interval 459 ms   Protime-INR    Collection Time: 06/02/25  8:25 PM    Specimen: Hand, Right; Blood   Result Value Ref Range    Protime 27.7 (H) 11.7 - 14.2 Seconds    INR 2.55 (H) 0.90 - 1.10   aPTT    Collection Time: 06/02/25  8:25 PM    Specimen: Hand, Right; Blood   Result Value Ref Range    PTT 38.6 (H) 22.7 - 35.4 seconds   Comprehensive Metabolic Panel    Collection Time: 06/02/25  8:27 PM    Specimen: Arm, Left; Blood   Result Value Ref Range    Glucose 131 (H) 65 - 99 mg/dL    BUN 21.0 (H) 6.0 - 20.0 mg/dL    Creatinine 1.26 0.76 - 1.27 mg/dL    Sodium 136 136 - 145 mmol/L    Potassium 3.6 3.5 - 5.2 mmol/L    Chloride 105 98 - 107 mmol/L    CO2 20.8 (L) 22.0 - 29.0 mmol/L    Calcium 9.0 8.6 - 10.5 mg/dL    Total Protein 6.8 6.0 - 8.5 g/dL    Albumin 3.1 (L) 3.5 - 5.2 g/dL    ALT (SGPT) 16 1 - 41 U/L    AST (SGOT) 18 1 - 40 U/L    Alkaline Phosphatase 112 39 - 117 U/L    Total Bilirubin 0.4 0.0 - 1.2 mg/dL    Globulin 3.7 gm/dL    A/G Ratio 0.8 g/dL    BUN/Creatinine  Ratio 16.7 7.0 - 25.0    Anion Gap 10.2 5.0 - 15.0 mmol/L    eGFR 67.4 >60.0 mL/min/1.73   High Sensitivity Troponin T    Collection Time: 06/02/25  8:27 PM    Specimen: Arm, Left; Blood   Result Value Ref Range    HS Troponin T 77 (C) <22 ng/L   BNP    Collection Time: 06/02/25  8:27 PM    Specimen: Arm, Left; Blood   Result Value Ref Range    proBNP 2,859.0 (H) 0.0 - 900.0 pg/mL   Magnesium    Collection Time: 06/02/25  8:27 PM    Specimen: Arm, Left; Blood   Result Value Ref Range    Magnesium 1.6 1.6 - 2.6 mg/dL   Lactic Acid, Plasma    Collection Time: 06/02/25  8:27 PM    Specimen: Arm, Left; Blood   Result Value Ref Range    Lactate 1.9 0.5 - 2.0 mmol/L   Procalcitonin    Collection Time: 06/02/25  8:27 PM    Specimen: Arm, Left; Blood   Result Value Ref Range    Procalcitonin 1.55 (H) 0.00 - 0.25 ng/mL   CBC Auto Differential    Collection Time: 06/02/25  8:27 PM    Specimen: Arm, Left; Blood   Result Value Ref Range    WBC 10.41 3.40 - 10.80 10*3/mm3    RBC 4.29 4.14 - 5.80 10*6/mm3    Hemoglobin 12.9 (L) 13.0 - 17.7 g/dL    Hematocrit 39.6 37.5 - 51.0 %    MCV 92.3 79.0 - 97.0 fL    MCH 30.1 26.6 - 33.0 pg    MCHC 32.6 31.5 - 35.7 g/dL    RDW 14.4 12.3 - 15.4 %    RDW-SD 48.5 37.0 - 54.0 fl    MPV 9.3 6.0 - 12.0 fL    Platelets 355 140 - 450 10*3/mm3    nRBC 0.0 0.0 - 0.2 /100 WBC   Manual Differential    Collection Time: 06/02/25  8:27 PM    Specimen: Arm, Left; Blood   Result Value Ref Range    Neutrophil % 67.0 42.7 - 76.0 %    Lymphocyte % 22.0 19.6 - 45.3 %    Monocyte % 7.0 5.0 - 12.0 %    Eosinophil % 3.0 0.3 - 6.2 %    Basophil % 1.0 0.0 - 1.5 %    Neutrophils Absolute 6.97 1.70 - 7.00 10*3/mm3    Lymphocytes Absolute 2.29 0.70 - 3.10 10*3/mm3    Monocytes Absolute 0.73 0.10 - 0.90 10*3/mm3    Eosinophils Absolute 0.31 0.00 - 0.40 10*3/mm3    Basophils Absolute 0.10 0.00 - 0.20 10*3/mm3    Crenated RBC's Mod/2+ None Seen    Poikilocytes Slight/1+ None Seen    Polychromasia Mod/2+ None Seen     Spherocytes Slight/1+ None Seen    WBC Morphology Normal Normal    Platelet Morphology Normal Normal   Respiratory Panel PCR w/COVID-19(SARS-CoV-2) [facility], NP Swab in UTM/VTM, 2 HR TAT - Swab, Nasopharynx    Collection Time: 06/02/25  8:49 PM    Specimen: Nasopharynx; Swab   Result Value Ref Range    ADENOVIRUS, PCR Not Detected Not Detected    Coronavirus 229E Not Detected Not Detected    Coronavirus HKU1 Not Detected Not Detected    Coronavirus NL63 Not Detected Not Detected    Coronavirus OC43 Not Detected Not Detected    COVID19 Not Detected Not Detected - Ref. Range    Human Metapneumovirus Not Detected Not Detected    Human Rhinovirus/Enterovirus Detected (A) Not Detected    Influenza A PCR Not Detected Not Detected    Influenza B PCR Not Detected Not Detected    Parainfluenza Virus 1 Not Detected Not Detected    Parainfluenza Virus 2 Not Detected Not Detected    Parainfluenza Virus 3 Not Detected Not Detected    Parainfluenza Virus 4 Not Detected Not Detected    RSV, PCR Not Detected Not Detected    Bordetella pertussis pcr Not Detected Not Detected    Bordetella parapertussis PCR Not Detected Not Detected    Chlamydophila pneumoniae PCR Not Detected Not Detected    Mycoplasma pneumo by PCR Not Detected Not Detected   High Sensitivity Troponin T 1Hr    Collection Time: 06/02/25  9:38 PM    Specimen: Arm, Left; Blood   Result Value Ref Range    HS Troponin T 74 (C) <22 ng/L    Troponin T Numeric Delta -3 ng/L    Troponin T % Delta -4 Abnormal if >/= 20%       I ordered the above labs and reviewed the results.    RADIOLOGY  XR Chest 1 View  Result Date: 6/2/2025   XR CHEST 1 VW-  HISTORY: Shortness of air.  COMPARISON: CT angiogram of chest 12/21/2023, AP chest 12/21/2023.  FINDINGS: The heart and mediastinal structures appear normal. Lungs are clear and there is no evidence for pulmonary edema or pleural effusion or infiltrate.      No evidence for active disease in the chest.    This  report was finalized on 6/2/2025 8:59 PM by Chalino Wynne M.D on Workstation: [id number]        I ordered the above noted radiological studies. I reviewed the images and results. I agree with the radiologist interpretation.    PROCEDURES  Procedures    MEDICATIONS GIVEN IN ER  Medications   vancomycin 3000 mg/500 mL 0.9% NS IVPB ([facility]) (3,000 mg Intravenous New Bag 6/2/25 2057)   iopamidol (ISOVUE-370) 76 % injection 100 mL (95 mL Intravenous Given 6/2/25 2217)       PROGRESS, DATA ANALYSIS, CONSULTS, AND MEDICAL DECISION MAKING  A complete history and physical exam have been performed.  All available laboratory and imaging results have been reviewed by myself prior to disposition.    MDM    After the initial H&P, I discussed pertinent information from history and physical exam with patient/family.  Discussed differential diagnosis.  Discussed plan for ED evaluation/workup/treatment.  All questions answered.  Patient/family is agreeable with plan.  ED Course as of 06/02/25 2232 Mon Jun 02, 2025   1945 My differential diagnosis for dyspnea includes but is not limited to:  Asthma, COPD, pneumonia, pulmonary embolus, acute respiratory distress syndrome, pneumothorax, pleural effusion, pulmonary fibrosis, congestive heart failure, myocardial infarction, DKA, uremia, acidosis, sepsis, anemia, drug related, hyperventilation, CNS disease     [JG]   2017 EKG independently viewed and contemporaneously interpreted by ED physician. Time: 7:59 PM.  Rate 84.  Interpretation: Normal sinus rhythm, left axis deviation, Q waves in high lateral leads, no acute ST changes. [JG]   2119 Reviewed chest x-ray in PACS, no pulmonary infiltrates per my read. [JG]   2120 Troponin elevated at 77, EKG shows no sign of ischemia.  Prior troponins normal.  BNP elevated 2859.  No pulmonary edema seen on x-ray.  Obtaining CT angiogram to evaluate for pulmonary embolism and other intrathoracic pathology. [JG]   2214 Repeat troponin flat. [JG]    2222 I reviewed CT angiogram in PACS, no obvious pulmonary embolism but evaluation extremely limited secondary to poor contrast timing. [JG]   2231 Phone call with LEROY Boyd.  Discussed the patient, relevant history, exam, diagnostics, ED findings/progress, and concerns. They agree to admit the patient to telemetry. Care assumed by the admitting physician at this time.     [JG]      ED Course User Index  [JG] Alon Gutierrez MD       AS OF 22:32 EDT VITALS:    BP - 113/60  HR - 67  TEMP - 97.7 °F (36.5 °C) (Oral)  O2 SATS - 95%    DIAGNOSIS  Final diagnoses:   Left leg cellulitis   Failure of outpatient treatment   Elevated procalcitonin   Lymphedema   Rhinovirus infection   Elevated troponin   Elevated brain natriuretic peptide (BNP) level   Hyperglycemia         DISPOSITION  ADMISSION    Discussed treatment plan and reason for admission with pt/family and admitting physician.  Pt/family voiced understanding of the plan for admission for further testing/treatment as needed.        Alon Gutierrez MD  06/02/25 2232      Electronically signed by Alon Gutierrez MD at 06/02/25 2232       Vital Signs (last 2 days)       Date/Time Temp Temp src Pulse Resp BP Patient Position SpO2    06/03/25 1200 98 (36.7) Oral 73 18 120/50 -- 95    06/03/25 08:22:41 97.9 (36.6) Oral 64 16 105/45 Lying 98    06/03/25 0402 -- -- 72 -- -- -- 99    06/03/25 0401 -- -- 70 18 140/73 -- 96    06/03/25 0332 -- -- -- -- -- -- 98    06/03/25 0331 -- -- 58 -- 124/67 -- 91    06/03/25 0301 -- -- 63 18 124/62 Lying 95    06/03/25 0231 -- -- 64 -- 119/56 -- 98    06/03/25 0201 -- -- 76 -- 122/70 -- 99    06/03/25 0131 -- -- 70 18 137/55 -- 92    06/03/25 0101 -- -- 54 -- 124/55 -- --    06/03/25 0059 -- -- -- -- -- -- 96 06/03/25 0031 -- -- 62 18 126/52 -- 97    06/03/25 0002 -- -- 71 -- -- -- 93 06/03/25 0001 -- -- -- -- 122/65 -- --    06/02/25 2332 -- -- 65 -- -- -- 96 06/02/25 2331 -- -- -- -- 126/64 -- --    06/02/25  2301 -- -- 76 18 122/68 Lying 97    06/02/25 2201 -- -- 67 18 113/60 Lying 95    06/02/25 20:51:39 -- -- 73 18 122/65 Lying 95    06/02/25 2006 -- -- 83 -- 135/63 -- 94    06/02/25 1944 97.7 (36.5) Oral 106 23 -- -- 95          Oxygen Therapy (last 2 days)       Date/Time SpO2 Device (Oxygen Therapy) Flow (L/min) (Oxygen Therapy) Oxygen Concentration (%) ETCO2 (mmHg)    06/03/25 1200 95 -- -- -- --    06/03/25 08:22:41 98 nasal cannula 2 -- --    06/03/25 0402 99 -- -- -- --    06/03/25 0401 96 nasal cannula 4 -- --    06/03/25 0332 98 -- -- -- --    06/03/25 0331 91 -- -- -- --    06/03/25 0301 95 nasal cannula 4 -- --    06/03/25 0231 98 -- -- -- --    06/03/25 0201 99 -- -- -- --    06/03/25 0131 92 nasal cannula 4 -- --    06/03/25 0059 96 -- -- -- --    06/03/25 0031 97 nasal cannula 2 -- --    06/03/25 0002 93 -- -- -- --    06/02/25 2332 96 -- -- -- --    06/02/25 2301 97 room air -- -- --    06/02/25 2201 95 room air -- -- --    06/02/25 20:51:39 95 room air -- -- --    06/02/25 20:14:33 -- room air -- -- --    06/02/25 2006 94 -- -- -- --    06/02/25 1944 95 room air -- -- --          Intake & Output (last 2 days)         06/01 0701 06/02 0700 06/02 0701  06/03 0700 06/03 0701 06/04 0700    IV Piggyback  600 500    Total Intake(mL/kg)  600 (2.4) 500 (2)    Net  +600 +500                 Lines, Drains & Airways       Active LDAs       Name Placement date Placement time Site Days    Peripheral IV 06/02/25 2048 20 G Anterior;Left;Upper Arm 06/02/25 2048  Arm  less than 1             Current Facility-Administered Medications   Medication Dose Route Frequency Provider Last Rate Last Admin    acetaminophen (TYLENOL) tablet 650 mg  650 mg Oral Q4H PRN Gerardo Drake MD        Or    acetaminophen (TYLENOL) 160 MG/5ML oral solution 650 mg  650 mg Oral Q4H PRN Gerardo Drake MD        Or    acetaminophen (TYLENOL) suppository 650 mg  650 mg Rectal Q4H PRN Gerardo Drake MD         sennosides-docusate (PERICOLACE) 8.6-50 MG per tablet 2 tablet  2 tablet Oral BID PRN Gerardo Drake MD        And    polyethylene glycol (MIRALAX) packet 17 g  17 g Oral Daily PRN Gerardo Drake MD        And    bisacodyl (DULCOLAX) EC tablet 5 mg  5 mg Oral Daily PRN Greardo Drake MD        And    bisacodyl (DULCOLAX) suppository 10 mg  10 mg Rectal Daily PRN Gerardo Drake MD        Calcium Replacement - Follow Nurse / BPA Driven Protocol   Not Applicable Gerardo Andersen MD        cefTRIAXone (ROCEPHIN) 2,000 mg in sodium chloride 0.9 % 100 mL MBP  2,000 mg Intravenous Q24H Gerardo Drake MD   Stopped at 06/03/25 0224    dextrose (D50W) (25 g/50 mL) IV injection 25 g  25 g Intravenous Q15 Min PRGerardo Woods MD        dextrose (GLUTOSE) oral gel 15 g  15 g Oral Q15 Min PRGerardo Woods MD        glucagon (GLUCAGEN) injection 1 mg  1 mg Intramuscular Q15 Min PRGerardo Woods MD        HYDROcodone-acetaminophen (NORCO) 5-325 MG per tablet 1 tablet  1 tablet Oral Q6H PRGerardo Woods MD        hydrocortisone-bacitracin-zinc oxide-nystatin (MAGIC BARRIER) ointment 1 Application  1 Application Topical BID Randall Jordan MD        insulin lispro (HUMALOG/ADMELOG) injection 2-9 Units  2-9 Units Subcutaneous 4x Daily AC & at Bedtime Gerardo Drake MD        Magnesium Standard Dose Replacement - Follow Nurse / BPA Driven Protocol   Not Applicable Gerardo Andersen MD        nitroglycerin (NITROSTAT) SL tablet 0.4 mg  0.4 mg Sublingual Q5 Min PRGerardo Woods MD        ondansetron ODT (ZOFRAN-ODT) disintegrating tablet 4 mg  4 mg Oral Q6H PRGerardo Woods MD        Or    ondansetron (ZOFRAN) injection 4 mg  4 mg Intravenous Q6H PRGerardo Woods MD        Pharmacy to dose vancomycin   Not Applicable Continuous PRGerardo Woods MD        Pharmacy to dose warfarin   Not Applicable Continuous PRBISMARK Drake  Gerardo Sheehan MD        Phosphorus Replacement - Follow Nurse / BPA Driven Protocol   Not Applicable Gerardo Andersen MD        Potassium Replacement - Follow Nurse / BPA Driven Protocol   Not Applicable Gerardo Andersen MD        sodium chloride 0.9 % flush 10 mL  10 mL Intravenous Q12H Gerardo Drake MD   10 mL at 06/03/25 0827    sodium chloride 0.9 % flush 10 mL  10 mL Intravenous PRN Gerardo Drake MD        sodium chloride 0.9 % infusion 40 mL  40 mL Intravenous PRGerardo Woods MD        sodium hypochlorite (DAKIN'S 1/4 STRENGTH) 0.125 % topical solution 0.125% solution   Topical BID Joan Ontiveros MD        vancomycin 1750 mg/500 mL 0.9% NS IVPB ([facility])  1,750 mg Intravenous Q12H Randall Jordan MD        warfarin (COUMADIN) tablet 10 mg  10 mg Oral Once per day on Sunday Tuesday Wednesday Thursday Saturday Gerardo Drake MD        warfarin (COUMADIN) tablet 15 mg  15 mg Oral Once per day on Monday Friday Gerardo Drake MD   15 mg at 06/03/25 0155     Current Outpatient Medications   Medication Sig Dispense Refill    acetaminophen (TYLENOL) 500 MG tablet Take 1 tablet by mouth Every 6 (Six) Hours As Needed for Mild Pain.      allopurinol (Zyloprim) 100 MG tablet Take 1 tablet by mouth Daily. 30 tablet 1    atorvastatin (LIPITOR) 20 MG tablet TAKE 1 TABLET BY MOUTH EVERY DAY AT NIGHT 90 tablet 3    dapagliflozin Propanediol (Farxiga) 10 MG tablet Take 10 mg by mouth Daily. 30 tablet 5    furosemide (LASIX) 80 MG tablet Take 1 tablet by mouth Daily As Needed (leg swelling). 90 tablet 3    methylPREDNISolone (MEDROL) 4 MG dose pack Take as directed on package instructions. 21 each 0    metOLazone (ZAROXOLYN) 5 MG tablet TAKE 1 TABLET BY MOUTH 1 (ONE) TIME PER WEEK. 4 tablet 3    omeprazole (priLOSEC) 40 MG capsule TAKE 1 CAPSULE BY MOUTH EVERY DAY 90 capsule 3    potassium chloride (K-TAB) 20 MEQ tablet controlled-release ER tablet Take 1 tablet by mouth 1  (One) Time Per Week. With metolazone 4 tablet 3    spironolactone (Aldactone) 25 MG tablet Take 1 tablet by mouth As Needed (swelling).      Tirzepatide 5 MG/0.5ML solution auto-injector Inject 5 mg under the skin into the appropriate area as directed 1 (One) Time Per Week. Call for increased dose 3 mL 1    warfarin (Jantoven) 5 MG tablet TAKE TWO TABLETS BY MOUTH ON SUN, TUES, THURS AND TAKE THREE TABLETS BY MOUTH ALL OTHER DAYS OR AS DIRECTED 235 tablet 1    Ibuprofen 3 %, Gabapentin 10 %, Baclofen 2 %, lidocaine 4 %, Ketamine HCl 10 % Apply 1-2 g topically to the appropriate area as directed 3 (Three) to 4 (Four) times daily. 90 g 5     Lab Results (all)       Procedure Component Value Units Date/Time    C-reactive Protein [[id number]]  (Abnormal) Collected: 06/03/25 0622    Specimen: Blood from Arm, Right Updated: 06/03/25 1334     C-Reactive Protein 1.71 mg/dL     POC Glucose Once [[id number]]  (Abnormal) Collected: 06/03/25 1127    Specimen: Blood Updated: 06/03/25 1132     Glucose 134 mg/dL     Hemoglobin A1c [[id number]]  (Abnormal) Collected: 06/03/25 0622    Specimen: Blood from Arm, Right Updated: 06/03/25 1047     Hemoglobin A1C 7.10 %     Narrative:      Hemoglobin A1C Ranges:    Increased Risk for Diabetes  5.7% to 6.4%  Diabetes                     >= 6.5%  Diabetic Goal                < 7.0%    High Sensitivity Troponin T [[id number]]  (Abnormal) Collected: 06/03/25 0622    Specimen: Blood from Arm, Right Updated: 06/03/25 0728     HS Troponin T 60 ng/L     Narrative:      High Sensitive Troponin T Reference Range:  <14.0 ng/L- Negative Female for AMI  <22.0 ng/L- Negative Male for AMI  >=14 - Abnormal Female indicating possible myocardial injury.  >=22 - Abnormal Male indicating possible myocardial injury.   Clinicians would have to utilize clinical acumen, EKG, Troponin, and serial changes to determine if it is an Acute Myocardial Infarction or myocardial injury due to an underlying chronic  condition.         Basic Metabolic Panel [[id number]]  (Abnormal) Collected: 06/03/25 0622    Specimen: Blood from Arm, Right Updated: 06/03/25 0657     Glucose 115 mg/dL      BUN 19.0 mg/dL      Creatinine 1.08 mg/dL      Sodium 139 mmol/L      Potassium 3.9 mmol/L      Chloride 105 mmol/L      CO2 21.8 mmol/L      Calcium 8.9 mg/dL      BUN/Creatinine Ratio 17.6     Anion Gap 12.2 mmol/L      eGFR 81.0 mL/min/1.73     Narrative:      GFR Categories in Chronic Kidney Disease (CKD)              GFR Category          GFR (mL/min/1.73)    Interpretation  G1                    90 or greater        Normal or high (1)  G2                    60-89                Mild decrease (1)  G3a                   45-59                Mild to moderate decrease  G3b                   30-44                Moderate to severe decrease  G4                    15-29                Severe decrease  G5                    14 or less           Kidney failure    (1)In the absence of evidence of kidney disease, neither GFR category G1 or G2 fulfill the criteria for CKD.    eGFR calculation 2021 CKD-EPI creatinine equation, which does not include race as a factor    Protime-INR [[id number]]  (Abnormal) Collected: 06/03/25 0622    Specimen: Blood from Arm, Right Updated: 06/03/25 0645     Protime 28.7 Seconds      INR 2.66    CBC Auto Differential [[id number]]  (Abnormal) Collected: 06/03/25 0622    Specimen: Blood from Arm, Right Updated: 06/03/25 0639     WBC 8.83 10*3/mm3      RBC 4.44 10*6/mm3      Hemoglobin 13.2 g/dL      Hematocrit 40.6 %      MCV 91.4 fL      MCH 29.7 pg      MCHC 32.5 g/dL      RDW 14.3 %      RDW-SD 47.5 fl      MPV 9.3 fL      Platelets 330 10*3/mm3      Neutrophil % 65.4 %      Lymphocyte % 18.7 %      Monocyte % 8.5 %      Eosinophil % 2.3 %      Basophil % 0.6 %      Immature Grans % 4.5 %      Neutrophils, Absolute 5.78 10*3/mm3      Lymphocytes, Absolute 1.65 10*3/mm3      Monocytes, Absolute 0.75 10*3/mm3       Eosinophils, Absolute 0.20 10*3/mm3      Basophils, Absolute 0.05 10*3/mm3      Immature Grans, Absolute 0.40 10*3/mm3      nRBC 0.0 /100 WBC     MRSA Screen, PCR (Inpatient) - Swab, Nares [[id number]]  (Normal) Collected: 06/03/25 0200    Specimen: Swab from Nares Updated: 06/03/25 0547     MRSA PCR No MRSA Detected    Narrative:      The negative predictive value of this diagnostic test is high and should only be used to consider de-escalating anti-MRSA therapy. A positive result may indicate colonization with MRSA and must be correlated clinically.    High Sensitivity Troponin T 1Hr [[id number]]  (Abnormal) Collected: 06/02/25 2138    Specimen: Blood from Arm, Left Updated: 06/02/25 2214     HS Troponin T 74 ng/L      Troponin T Numeric Delta -3 ng/L      Troponin T % Delta -4    Narrative:      High Sensitive Troponin T Reference Range:  <14.0 ng/L- Negative Female for AMI  <22.0 ng/L- Negative Male for AMI  >=14 - Abnormal Female indicating possible myocardial injury.  >=22 - Abnormal Male indicating possible myocardial injury.   Clinicians would have to utilize clinical acumen, EKG, Troponin, and serial changes to determine if it is an Acute Myocardial Infarction or myocardial injury due to an underlying chronic condition.         Respiratory Panel PCR w/COVID-19(SARS-CoV-2) [facility], NP Swab in UTM/VTM, 2 HR TAT - Swab, Nasopharynx [[id number]]  (Abnormal) Collected: 06/02/25 2049    Specimen: Swab from Nasopharynx Updated: 06/02/25 2147     ADENOVIRUS, PCR Not Detected     Coronavirus 229E Not Detected     Coronavirus HKU1 Not Detected     Coronavirus NL63 Not Detected     Coronavirus OC43 Not Detected     COVID19 Not Detected     Human Metapneumovirus Not Detected     Human Rhinovirus/Enterovirus Detected     Influenza A PCR Not Detected     Influenza B PCR Not Detected     Parainfluenza Virus 1 Not Detected     Parainfluenza Virus 2 Not Detected     Parainfluenza Virus 3 Not Detected      Parainfluenza Virus 4 Not Detected     RSV, PCR Not Detected     Bordetella pertussis pcr Not Detected     Bordetella parapertussis PCR Not Detected     Chlamydophila pneumoniae PCR Not Detected     Mycoplasma pneumo by PCR Not Detected    Narrative:      In the setting of a positive respiratory panel with a viral infection PLUS a negative procalcitonin without other underlying concern for bacterial infection, consider observing off antibiotics or discontinuation of antibiotics and continue supportive care. If the respiratory panel is positive for atypical bacterial infection (Bordetella pertussis, Chlamydophila pneumoniae, or Mycoplasma pneumoniae), consider antibiotic de-escalation to target atypical bacterial infection.    Manual Differential [[id number]] Collected: 06/02/25 2027    Specimen: Blood from Arm, Left Updated: 06/02/25 2122     Neutrophil % 67.0 %      Lymphocyte % 22.0 %      Monocyte % 7.0 %      Eosinophil % 3.0 %      Basophil % 1.0 %      Neutrophils Absolute 6.97 10*3/mm3      Lymphocytes Absolute 2.29 10*3/mm3      Monocytes Absolute 0.73 10*3/mm3      Eosinophils Absolute 0.31 10*3/mm3      Basophils Absolute 0.10 10*3/mm3      Crenated RBC's Mod/2+     Poikilocytes Slight/1+     Polychromasia Mod/2+     Spherocytes Slight/1+     WBC Morphology Normal     Platelet Morphology Normal    Protime-INR [[id number]]  (Abnormal) Collected: 06/02/25 2025    Specimen: Blood from Hand, Right Updated: 06/02/25 2121     Protime 27.7 Seconds      INR 2.55    aPTT [[id number]]  (Abnormal) Collected: 06/02/25 2025    Specimen: Blood from Hand, Right Updated: 06/02/25 2121     PTT 38.6 seconds     High Sensitivity Troponin T [[id number]]  (Abnormal) Collected: 06/02/25 2027    Specimen: Blood from Arm, Left Updated: 06/02/25 2111     HS Troponin T 77 ng/L     Narrative:      High Sensitive Troponin T Reference Range:  <14.0 ng/L- Negative Female for AMI  <22.0 ng/L- Negative Male for AMI  >=14 - Abnormal  Female indicating possible myocardial injury.  >=22 - Abnormal Male indicating possible myocardial injury.   Clinicians would have to utilize clinical acumen, EKG, Troponin, and serial changes to determine if it is an Acute Myocardial Infarction or myocardial injury due to an underlying chronic condition.         BNP [[id number]]  (Abnormal) Collected: 06/02/25 2027    Specimen: Blood from Arm, Left Updated: 06/02/25 2109     proBNP 2,859.0 pg/mL     Narrative:      This assay is used as an aid in the diagnosis of individuals suspected of having heart failure. It can be used as an aid in the diagnosis of acute decompensated heart failure (ADHF) in patients presenting with signs and symptoms of ADHF to the emergency department (ED). In addition, NT-proBNP of <300 pg/mL indicates ADHF is not likely.    Age Range Result Interpretation  NT-proBNP Concentration (pg/mL:      <50             Positive            >450                   Gray                 300-450                    Negative             <300    50-75           Positive            >900                  Gray                300-900                  Negative            <300      >75             Positive            >1800                  Gray                300-1800                  Negative            <300    Procalcitonin [[id number]]  (Abnormal) Collected: 06/02/25 2027    Specimen: Blood from Arm, Left Updated: 06/02/25 2109     Procalcitonin 1.55 ng/mL     Narrative:      As a Marker for Sepsis (Non-Neonates):    1. <0.5 ng/mL represents a low risk of severe sepsis and/or septic shock.  2. >2 ng/mL represents a high risk of severe sepsis and/or septic shock.    As a Marker for Lower Respiratory Tract Infections that require antibiotic therapy:    PCT on Admission    Antibiotic Therapy       6-12 Hrs later    >0.5                Strongly Recommended  >0.25 - <0.5        Recommended   0.1 - 0.25          Discouraged              Remeasure/reassess PCT  <0.1       "          Strongly Discouraged     Remeasure/reassess PCT    As 28 day mortality risk marker: \"Change in Procalcitonin Result\" (>80% or <=80%) if Day 0 (or Day 1) and Day 4 values are available. Refer to [email or web address]    Change in PCT <=80%  A decrease of PCT levels below or equal to 80% defines a positive change in PCT test result representing a higher risk for 28-day all-cause mortality of patients diagnosed with severe sepsis for septic shock.    Change in PCT >80%  A decrease of PCT levels of more than 80% defines a negative change in PCT result representing a lower risk for 28-day all-cause mortality of patients diagnosed with severe sepsis or septic shock.       Comprehensive Metabolic Panel [[id number]]  (Abnormal) Collected: 06/02/25 2027    Specimen: Blood from Arm, Left Updated: 06/02/25 2107     Glucose 131 mg/dL      BUN 21.0 mg/dL      Creatinine 1.26 mg/dL      Sodium 136 mmol/L      Potassium 3.6 mmol/L      Chloride 105 mmol/L      CO2 20.8 mmol/L      Calcium 9.0 mg/dL      Total Protein 6.8 g/dL      Albumin 3.1 g/dL      ALT (SGPT) 16 U/L      AST (SGOT) 18 U/L      Alkaline Phosphatase 112 U/L      Total Bilirubin 0.4 mg/dL      Globulin 3.7 gm/dL      A/G Ratio 0.8 g/dL      BUN/Creatinine Ratio 16.7     Anion Gap 10.2 mmol/L      eGFR 67.4 mL/min/1.73     Narrative:      GFR Categories in Chronic Kidney Disease (CKD)              GFR Category          GFR (mL/min/1.73)    Interpretation  G1                    90 or greater        Normal or high (1)  G2                    60-89                Mild decrease (1)  G3a                   45-59                Mild to moderate decrease  G3b                   30-44                Moderate to severe decrease  G4                    15-29                Severe decrease  G5                    14 or less           Kidney failure    (1)In the absence of evidence of kidney disease, neither GFR category G1 or G2 fulfill the criteria for " CKD.    eGFR calculation 2021 CKD-EPI creatinine equation, which does not include race as a factor    Magnesium [[id number]]  (Normal) Collected: 06/02/25 2027    Specimen: Blood from Arm, Left Updated: 06/02/25 2107     Magnesium 1.6 mg/dL     Lactic Acid, Plasma [[id number]]  (Normal) Collected: 06/02/25 2027    Specimen: Blood from Arm, Left Updated: 06/02/25 2102     Lactate 1.9 mmol/L     CBC & Differential [[id number]]  (Abnormal) Collected: 06/02/25 2027    Specimen: Blood from Arm, Left Updated: 06/02/25 2048    Narrative:      The following orders were created for panel order CBC & Differential.  Procedure                               Abnormality         Status                     ---------                               -----------         ------                     CBC Auto Differential[[id number]]        Abnormal            Final result                 Please view results for these tests on the individual orders.    CBC Auto Differential [[id number]]  (Abnormal) Collected: 06/02/25 2027    Specimen: Blood from Arm, Left Updated: 06/02/25 2048     WBC 10.41 10*3/mm3      RBC 4.29 10*6/mm3      Hemoglobin 12.9 g/dL      Hematocrit 39.6 %      MCV 92.3 fL      MCH 30.1 pg      MCHC 32.6 g/dL      RDW 14.4 %      RDW-SD 48.5 fl      MPV 9.3 fL      Platelets 355 10*3/mm3      nRBC 0.0 /100 WBC     Blood Culture - Blood, Arm, Left [[id number]] Collected: 06/02/25 2027    Specimen: Blood from Arm, Left Updated: 06/02/25 2035    Blood Culture - Blood, Hand, Right [[id number]] Collected: 06/02/25 2025    Specimen: Blood from Hand, Right Updated: 06/02/25 2034          Imaging Results (All)       Procedure Component Value Units Date/Time    CT Angiogram Chest Pulmonary Embolism [[id number]] Collected: 06/02/25 2306     Updated: 06/02/25 2314    Narrative:      CTA CHEST WITH IV CONTRAST     HISTORY: Short of breath, tachycardic, history of thromboembolism, rule  out PE; L03.116-Cellulitis of left lower limb; Z78.9-Other  specified  health status; R79.89-Other specified abnormal findings of blood  chemistry; I89.0-Lymphedema, not elsewhere classified; B34.8-Other viral  infections of unspecified site; R79.89-Other specified abnormal findings  of blood chemistry; R79.89-Other specified abnormal find     COMPARISON: CTA chest 12/21/2023     TECHNIQUE: CT angiography was performed of the chest with axial images  as well as coronal and sagittal reformatted MIP images provided  following administration of IV contrast. 3-D surface rendered reformats  were obtained of the pulmonary arteries and aorta. Radiation dose  reduction techniques were utilized, including automated exposure  control, and exposure modulation based on body size.     FINDINGS:     There is mild bilateral dependent atelectasis, but there is no  convincing evidence of acute pulmonary infiltrate, pleural effusion,  pneumothorax or suspicious nodule.     Thoracic aorta is normal in caliber.  There are coronary atherosclerotic  vascular calcifications.  There is no suspicious mediastinal adenopathy  or other mass.     Images of the upper abdomen show no acute abnormality, though there are  nonobstructing left renal calculi.  There is no acute bony abnormality.     Bolus timing is marginal, and there is no convincing evidence of  pulmonary embolism, though assessment is limited.       Impression:         Bolus timing is marginal, and there is no convincing evidence of  pulmonary embolism, though assessment is limited.     No acute pulmonary parenchymal abnormality is seen.           This report was finalized on 6/2/2025 11:11 PM by Dr. Cedric Tiwari M.D on Workstation: [id number]       XR Chest 1 View [[id number]] Collected: 06/02/25 2050     Updated: 06/02/25 2102    Narrative:         XR CHEST 1 VW-     HISTORY: Shortness of air.     COMPARISON: CT angiogram of chest 12/21/2023, AP chest 12/21/2023.     FINDINGS: The heart and mediastinal structures appear normal.  Lungs are  clear and there is no evidence for pulmonary edema or pleural effusion  or infiltrate.       Impression:      No evidence for active disease in the chest.           This report was finalized on 6/2/2025 8:59 PM by Chalino Wynne M.D on  Workstation: [id number]             ECG/EMG Results (all)       Procedure Component Value Units Date/Time    ECG 12 Lead Dyspnea [[id number]] Collected: 06/02/25 1959     Updated: 06/02/25 2001     QT Interval 388 ms      QTC Interval 459 ms     Narrative:      HEART RATE=84  bpm  RR Lbrcttba=885  ms  NV Jspwhjat=052  ms  P Horizontal Axis=0  deg  P Front Axis=31  deg  QRSD Interval=99  ms  QT Duozdfgj=752  ms  KKdD=459  ms  QRS Axis=-10  deg  T Wave Axis=67  deg  - BORDERLINE ECG -  Sinus rhythm  Probable lateral infarct, old  Date and Time of Study:2025-06-02 19:59:06          Physician Progress Notes (all)    No notes of this type exist for this encounter.          Consult Notes (all)        Joan Ontiveros MD at 06/03/25 1256        Consult Orders    1. Inpatient General Surgery Consult [[id number]] ordered by Randall Jordan MD at 06/03/25 1014                 General Surgery Consultation    Consulting Physician: Joan Ontiveros MD  Referring Physician: Dr. Jordan    Reason for consultation: Draining wound on pannus    CC: Draining wound on pannus    HPI:   The patient is a very pleasant 55 y.o. male that presented to the hospital with a draining, erythematous skin lesion of his left lower pannus that started a few days ago.  He bumped the area on his bathtub and noticed expanding erythema over the next few days thereafter.  He tried taking leftover Keflex at his house, but noted no improvement in his skin erythema.  Then overnight the skin started draining malodorous, brown fluid so he came to the hospital for evaluation.  Our wound/ostomy nurses have evaluated the wound and recommended surgical consultation for possible debridement due to the possibility  of an underlying abscess.  The patient struggles with significant lymphedema and morbid obesity (BMI 70, weight 550 pounds).  No imaging workup has been done thus far.    Past Medical History:  Hypertension  Factor V Leiden  History of DVT right lower extremity and pulmonary embolism on anticoagulation  Morbid obesity  Lymphedema of left lower extremity  History of kidney stones    Past Surgical History:  Cardiac catheterization    Medications:  Reviewed in epic and include warfarin 10-15 mg daily    Allergies: No known drug allergies    Social History: , former cigar smoker, no regular alcohol use currently    Family History: Mother with history of coronary artery disease and congestive heart failure, half brother with history of prostate cancer    Review of Systems:  Constitutional: denies any weight changes, fatigue, or weakness  Eyes: denies blurred/double vision or scleral icterus  Cardiovascular: denies chest pain, palpitations, or edemas  Respiratory: denies cough, sputum, or dyspnea  Gastrointestinal:  denies abdominal pain, nausea, vomiting, melena, or hematochezia  Genitourinary: denies dysuria or hematuria  Endocrine: denies cold intolerance, lethargy, or flushing  Hematologic: denies excessive bruising or bleeding  Musculoskeletal: denies weakness, joint swelling, joint pain, or stiffness  Neurologic: denies seizures, CVA, paresthesia, or peripheral neuropathy  Skin: Positive for erythema, pain, swelling, and drainage from the left lower pannus     All other systems reviewed and were negative.    Physical Exam:   Vitals:    06/03/25 0822   BP: 105/45   Pulse: 64   Resp: 16   Temp: 97.9 °F (36.6 °C)   SpO2: 98%   Height: 188 cm  Weight: 249 kg (550 pound)  BMI: 70.6  GENERAL: awake and alert, no acute distress, oriented to person, place, and time  HEENT: normocephalic, atraumatic, no scleral icterus, moist mucous membranes  NECK: Supple, there is no thyromegaly or lymphadenopathy  RESPIRATORY:  clear to auscultation, no wheezes, rales or rhonchi, symmetric air entry  CARDIOVASCULAR: regular rate and rhythm    GASTROINTESTINAL: Soft, nontender, nondistended, morbidly obese  MUSCULOSKELETAL: no cyanosis, clubbing, or edema   NEUROLOGIC: alert and oriented, normal speech, cranial nerves 2-12 grossly intact, no focal deficits   SKIN: He has blanching erythema and skin induration of the entire left lower pannus and upper inner thigh where there is an open wound measuring about 2 cm in diameter with brown discharge on the Aquacel Ag rope, the inner perimeter of the wound is difficult to see due to his body habitus but appears to show evidence of black necrotic discoloration of the subcutaneous fat    Diagnostic workup:   Pertinent labs:   Results from last 7 days   Lab Units 06/03/25 0622 06/02/25 2027   WBC 10*3/mm3 8.83 10.41   HEMOGLOBIN g/dL 13.2 12.9*   HEMATOCRIT % 40.6 39.6   PLATELETS 10*3/mm3 330 355     Results from last 7 days   Lab Units 06/03/25 0622 06/02/25 2027   SODIUM mmol/L 139 136   POTASSIUM mmol/L 3.9 3.6   CHLORIDE mmol/L 105 105   CO2 mmol/L 21.8* 20.8*   BUN mg/dL 19.0 21.0*   CREATININE mg/dL 1.08 1.26   CALCIUM mg/dL 8.9 9.0   BILIRUBIN mg/dL  --  0.4   ALK PHOS U/L  --  112   ALT (SGPT) U/L  --  16   AST (SGOT) U/L  --  18   GLUCOSE mg/dL 115* 131*     Results from last 7 days   Lab Units 06/03/25 0622 06/02/25 2025   INR  2.66* 2.55*   APTT seconds  --  38.6*     IMAGING:  None    Assessment and plan:     The patient is a 55 y.o. male with a draining subcutaneous wound of the left lower pannus concerning for soft tissue abscess with surrounding cellulitis    I have recommended to the patient we proceed to the operating room for surgical debridement and drainage of what appears to be a soft tissue abscess of the lower pannus.  I am concerned about the visualized black soft tissues concerning for necrotizing soft tissue infection.  At this time, he shows no evidence for  "necrotizing fasciitis and I suspect this is simply a severe cellulitis with underlying abscess.  I discussed with him the risks, benefits, and alternatives to surgery.  He has declined undergoing any sort of surgery as he is \"leery\" if any surgical intervention and would like to try less invasive maneuvers first with IV antibiotics to treat the cellulitis.  I will continue to follow along for now and we will recheck the wound in the morning.  I have ordered Dakin's wet-to-dry dressing changes with 4 x 4 gauze packed into the wound twice daily.  I will defer antibiotic management to the infectious disease team.    Joan Ontiveros MD  General, Robotic, and Endoscopic Surgery  [facility]    [address], [address]  [place] [address]  P: [phone]  F: [phone]       Electronically signed by Joan Ontiveros MD at 06/03/25 1304       Prateek Begum MD at 06/03/25 1018        Consult Orders    1. Inpatient Infectious Diseases Consult [[id number]] ordered by Randall Jordan MD at 06/03/25 1013                 Referring Provider: Gerardo Drake MD  [address] [facility] [address]  [place] [address]    Reason for Consultation: \"Cellulitis\"    History of present illness:  Kameron Melendez is a 55 y.o. with PMH super obesity BMI 70 and factor V Leiden mutation on warfarin who I am asked to evaluate and give opinion for \"cellulitis\". History is obtained from the patient and review of the old medical records which I summarize/synthesize as follows: He says that around 5/24 he developed chills and pain in the left lower extremity.  This feels similar to episodes of cellulitis he had in the past.  He actually gives some cephalexin at home which he can begin when he starts have an outbreak.  This is prescribed by his PCP.  He said he last had to do this about a year ago.  He initially had some improvement with the therapy.  However on 5/27 he bumped the area on his bathtub and " noticed a significant amount of blood and pus drained out.  He says that since that time it has been worsening.    He also reports some shortness of breath worse with exertion.    In the emergency room he was afebrile but slightly tachycardic.  His blood pressure was normal.  He was initially on room air but later required supplemental oxygen currently at 2 L.  Labs were notable for WBC 10, procalcitonin 1.5, creatinine 1.2, and RPP + rhinovirus/enterovirus.  His chest x-ray was clear and CTA of the chest was negative for PE.    He was started on empiric vancomycin and ceftriaxone.  ID has been asked to evaluate.    Wound care evaluated the patient earlier today and has provided recommendations.    Past Medical History:   Diagnosis Date    DVT (deep venous thrombosis)     RLE    Factor V Leiden     Hypertension     Kidney stone     Lymphedema     Lymphedema of left lower extremity     Obesity     ARNOLDO (obstructive sleep apnea)     Pulmonary embolism     bilateral    Pulmonary hypertension     Right ventricular enlargement        Past Surgical History:   Procedure Laterality Date    CARDIAC CATHETERIZATION Bilateral 7/18/2017    Procedure: Pulmonary angiography- [place] ;  Surgeon: Cedric Coulter MD;  Location:  [facility];  Service:     CARDIAC CATHETERIZATION N/A 7/18/2017    Procedure: Right Heart Cath;  Surgeon: Cedric Coulter MD;  Location:  [facility];  Service:     THROMBECTOMY         Social History:  Lives in [place], [place]    Worked as a  and now works in IT    Antibiotic allergies and intolerances:  None    Medications:    Current Facility-Administered Medications:     acetaminophen (TYLENOL) tablet 650 mg, 650 mg, Oral, Q4H PRN **OR** acetaminophen (TYLENOL) 160 MG/5ML oral solution 650 mg, 650 mg, Oral, Q4H PRN **OR** acetaminophen (TYLENOL) suppository 650 mg, 650 mg, Rectal, Q4H PRN, Gerardo Drake MD    sennosides-docusate (PERICOLACE) 8.6-50 MG per tablet 2  tablet, 2 tablet, Oral, BID PRN **AND** polyethylene glycol (MIRALAX) packet 17 g, 17 g, Oral, Daily PRN **AND** bisacodyl (DULCOLAX) EC tablet 5 mg, 5 mg, Oral, Daily PRN **AND** bisacodyl (DULCOLAX) suppository 10 mg, 10 mg, Rectal, Daily PRN, Gerardo Drake MD    Calcium Replacement - Follow Nurse / BPA Driven Protocol, , Not Applicable, PRN, Gerardo Drake MD    cefTRIAXone (ROCEPHIN) 2,000 mg in sodium chloride 0.9 % 100 mL MBP, 2,000 mg, Intravenous, Q24H, Gerardo Drake MD, Stopped at 06/03/25 0224    dextrose (D50W) (25 g/50 mL) IV injection 25 g, 25 g, Intravenous, Q15 Min PRN, Gerardo Drake MD    dextrose (GLUTOSE) oral gel 15 g, 15 g, Oral, Q15 Min PRN, Gerardo Drake MD    glucagon (GLUCAGEN) injection 1 mg, 1 mg, Intramuscular, Q15 Min PRN, Gerardo Drake MD    HYDROcodone-acetaminophen (NORCO) 5-325 MG per tablet 1 tablet, 1 tablet, Oral, Q6H PRN, Gerardo Drake MD    hydrocortisone-bacitracin-zinc oxide-nystatin (MAGIC BARRIER) ointment 1 Application, 1 Application, Topical, BID, Randall Jordan MD    insulin lispro (HUMALOG/ADMELOG) injection 2-9 Units, 2-9 Units, Subcutaneous, 4x Daily AC & at Bedtime, Gerardo Drake MD    Magnesium Standard Dose Replacement - Follow Nurse / BPA Driven Protocol, , Not Applicable, PRN, Gerardo Drake MD    nitroglycerin (NITROSTAT) SL tablet 0.4 mg, 0.4 mg, Sublingual, Q5 Min PRN, Gerardo Drake MD    ondansetron ODT (ZOFRAN-ODT) disintegrating tablet 4 mg, 4 mg, Oral, Q6H PRN **OR** ondansetron (ZOFRAN) injection 4 mg, 4 mg, Intravenous, Q6H PRN, Gerardo Drake MD    Pharmacy to dose vancomycin, , Not Applicable, Continuous [facility] [employer] Alan David, MD    Pharmacy to dose warfarin, , Not Applicable, Continuous [facility] [employer] Alan David, MD    Phosphorus Replacement - Follow Nurse / BPA Driven Protocol, , Not Applicable, [employer] Alan David, MD    Potassium Replacement - Follow Nurse / BPA  Driven Protocol, , Not Applicable, PRN, Gerardo Drake MD    sodium chloride 0.9 % flush 10 mL, 10 mL, Intravenous, Q12H, Gerardo Drake MD, 10 mL at 06/03/25 0827    sodium chloride 0.9 % flush 10 mL, 10 mL, Intravenous, PRN, Gerardo Drake MD    sodium chloride 0.9 % infusion 40 mL, 40 mL, Intravenous, PRN, Gerardo Drake MD    vancomycin 1750 mg/500 mL 0.9% NS IVPB ([facility]), 1,750 mg, Intravenous, Q12H, Randall Jordan MD    warfarin (COUMADIN) tablet 10 mg, 10 mg, Oral, Once per day on Sunday Tuesday Wednesday Thursday Saturday, Gerardo Drake MD    warfarin (COUMADIN) tablet 15 mg, 15 mg, Oral, Once per day on Monday Friday, Gerardo Drake MD, 15 mg at 06/03/25 0155    Current Outpatient Medications:     acetaminophen (TYLENOL) 500 MG tablet, Take 1 tablet by mouth Every 6 (Six) Hours As Needed for Mild Pain., Disp: , Rfl:     allopurinol (Zyloprim) 100 MG tablet, Take 1 tablet by mouth Daily., Disp: 30 tablet, Rfl: 1    atorvastatin (LIPITOR) 20 MG tablet, TAKE 1 TABLET BY MOUTH EVERY DAY AT NIGHT, Disp: 90 tablet, Rfl: 3    dapagliflozin Propanediol (Farxiga) 10 MG tablet, Take 10 mg by mouth Daily., Disp: 30 tablet, Rfl: 5    furosemide (LASIX) 80 MG tablet, Take 1 tablet by mouth Daily As Needed (leg swelling)., Disp: 90 tablet, Rfl: 3    methylPREDNISolone (MEDROL) 4 MG dose pack, Take as directed on package instructions., Disp: 21 each, Rfl: 0    metOLazone (ZAROXOLYN) 5 MG tablet, TAKE 1 TABLET BY MOUTH 1 (ONE) TIME PER WEEK., Disp: 4 tablet, Rfl: 3    omeprazole (priLOSEC) 40 MG capsule, TAKE 1 CAPSULE BY MOUTH EVERY DAY, Disp: 90 capsule, Rfl: 3    potassium chloride (K-TAB) 20 MEQ tablet controlled-release ER tablet, Take 1 tablet by mouth 1 (One) Time Per Week. With metolazone, Disp: 4 tablet, Rfl: 3    spironolactone (Aldactone) 25 MG tablet, Take 1 tablet by mouth As Needed (swelling)., Disp: , Rfl:     Tirzepatide 5 MG/0.5ML solution auto-injector, Inject 5 mg  under the skin into the appropriate area as directed 1 (One) Time Per Week. Call for increased dose, Disp: 3 mL, Rfl: 1    warfarin (Jantoven) 5 MG tablet, TAKE TWO TABLETS BY MOUTH ON SUN, TUES, THURS AND TAKE THREE TABLETS BY MOUTH ALL OTHER DAYS OR AS DIRECTED, Disp: 235 tablet, Rfl: 1    Ibuprofen 3 %, Gabapentin 10 %, Baclofen 2 %, lidocaine 4 %, Ketamine HCl 10 %, Apply 1-2 g topically to the appropriate area as directed 3 (Three) to 4 (Four) times daily., Disp: 90 g, Rfl: 5      Objective   Vital Signs   Temp:  [97.7 °F (36.5 °C)-97.9 °F (36.6 °C)] 97.9 °F (36.6 °C)  Heart Rate:  [] 64  Resp:  [16-23] 16  BP: (105-140)/(45-73) 105/45    Physical Exam:   General: awake, alert, NAD, very nice, sitting up in bed  Eyes: no scleral icterus  Cardiovascular: NR  Respiratory: normal work of breathing on RA  GI: Abdomen is soft  :  no Vera catheter  Skin: Erythema, induration, and open, draining wound of the left thigh  Neurological: Alert and oriented x 3  Psychiatric: Normal mood and affect   Vasc: PIV in left upper extremity w/o erythema    Labs:     Lab Results   Component Value Date    WBC 8.83 06/03/2025    HGB 13.2 06/03/2025    HCT 40.6 06/03/2025    MCV 91.4 06/03/2025     06/03/2025       Lab Results   Component Value Date    GLUCOSE 115 (H) 06/03/2025    BUN 19.0 06/03/2025    CREATININE 1.08 06/03/2025    EGFRIFNONA 116 11/10/2021    EGFRIFAFRI 134 11/10/2021    BCR 17.6 06/03/2025    CO2 21.8 (L) 06/03/2025    CALCIUM 8.9 06/03/2025    ALBUMIN 3.1 (L) 06/02/2025    AST 18 06/02/2025    ALT 16 06/02/2025     Lab Results   Component Value Date    HGBA1C 7.10 (H) 04/18/2025     Lab Results   Component Value Date    CRP 1.39 (H) 06/07/2022     Lab Results   Component Value Date    INR 2.66 (H) 06/03/2025    INR 2.55 (H) 06/02/2025    INR 3.90 05/30/2025    PROTIME 28.7 (H) 06/03/2025    PROTIME 27.7 (H) 06/02/2025    PROTIME 29.3 (H) 12/23/2023       Microbiology:   6/2 RPP: + Human  rhinovirus/enterovirus   BCx: Pending  6/3 MRSA nares: Negative    Radiology:   CTA chest negative for PE; negative for pneumonia    CXR personally reviewed and is negative for pneumonia      Isolation:  Droplet    ASSESSMENT/PLAN:  Human rhinovirus/enterovirus infection  Left lower extremity wound infection  Factor V Leiden mutation  On warfarin  Super obesity BMI 70 complicating above  Uncontrolled DM2 complicating above    Continue supportive care for human rhinovirus/enterovirus infection.    For LLE wound and cellulitis, I will obtain a wound culture.  Given the amount of drainage she has had present, I will ask general surgery to evaluate regarding further evaluation with imaging and/or debridement to provide source control.    Continue empiric vancomycin and ceftriaxone while awaiting the above results.    While the patient is on vancomycin, vancomycin levels will be ordered and reviewed with dose adjustments made as needed. The patient will have a daily creatinine level checked while on vancomycin as part of monitoring this medication.     ID will follow.         Electronically signed by Prateek Begum MD at 25 1049       Elsy Baeza MD at 25 0653        Consult Orders    1. Inpatient Cardiology Consult [[id number]] ordered by Gerardo Drake MD at 25 2335                    [facility] Consult    Patient Name: Kameron Melendez  Age/Sex: 55 y.o. male  : 1970  MRN: [id number]    Date of Admission: 2025  Date of Encounter Visit: 25  Encounter Provider: Elsy Baeza MD  Referring Provider: Gerardo Drake MD  Place of Service: [facility] CARDIOLOGY  Patient Care Team:  Hansel Patel DO as PCP - General (Family Medicine)  Kim Sotelo RPH as Pharmacist    Subjective:     Consulted for: Abnormal troponin    Chief Complaint: Weakness, cellulitis    History of Present Illness:  Kameron Melendez  is a 55 y.o. male with prior bilateral pulmonary embolism status post thrombectomy in 7/2017, pulmonary hypertension, morbid obesity, hyperlipidemia, struct of sleep apnea, recurrent pulmonary embolism in 1/2019, who presented to the emergency room with complaints of weakness with exertion and lower extremity cellulitis.    The patient reports that he has had issues with left lower extremity cellulitis in the past.  He noticed some increasing redness and his left scrotal area.  He tried taking Keflex at home that he has available to take as needed.  This did not seem to help.  He began developing issues with weakness and fatigue with exertion associated with shortness of breath.  Due to the symptoms he opted to come to the emergency room for evaluation.    Following his arrival EKG showed sinus rhythm with no significant changes.  Vital signs were unremarkable.  CBC showed normal white blood count.  Procalcitonin was elevated at 1.55.  Lactic acid was within normal limits.  proBNP was elevated.  High sensitive troponin was elevated at 77 with a repeat at 74 to 73 and this morning at 60.  INR was therapeutic.  Respiratory viral panel showed evidence of rhinovirus.  CMP was unremarkable.  A CT angiogram of the chest had poor bolus timing but there was no convincing evidence of any pulmonary embolism.  For further evaluation and started on IV vancomycin.    He denies any chest discomfort.  He reports that the feeling of shortness of breath mainly occurs with the weakness with exertion.  Denies any shortness of breath at rest.      Past Medical History:  Past Medical History:   Diagnosis Date    DVT (deep venous thrombosis)     RLE    Factor V Leiden     Hypertension     Kidney stone     Lymphedema     Lymphedema of left lower extremity     Obesity     ARNOLDO (obstructive sleep apnea)     Pulmonary embolism     bilateral    Pulmonary hypertension     Right ventricular enlargement        Past Surgical History:   Procedure  Laterality Date    CARDIAC CATHETERIZATION Bilateral 7/18/2017    Procedure: Pulmonary angiography- [place] ;  Surgeon: Cedric Coulter MD;  Location:  [facility];  Service:     CARDIAC CATHETERIZATION N/A 7/18/2017    Procedure: Right Heart Cath;  Surgeon: Cedric Coulter MD;  Location:  [facility];  Service:     THROMBECTOMY         Home Medications:   (Not in a hospital admission)      Allergies:  No Known Allergies    Past Social History:  Social History     Socioeconomic History    Marital status:    Tobacco Use    Smoking status: Former     Types: Cigars, Pipe     Start date: 1/1/2006     Quit date: 1/1/2022     Years since quitting: 3.4     Passive exposure: Past    Smokeless tobacco: Never    Tobacco comments:     occasional - < 1 a month   Vaping Use    Vaping status: Never Used   Substance and Sexual Activity    Alcohol use: Yes     Comment: social    Drug use: No    Sexual activity: Defer       Past Family History:  Family History   Problem Relation Age of Onset    Heart disease Mother     Heart failure Mother     Bradycardia Mother     No Known Problems Father     Atrial fibrillation Half-Brother     Prostate cancer Half-Brother     No Known Problems Maternal Grandmother     No Known Problems Maternal Grandfather     No Known Problems Paternal Grandmother     No Known Problems Paternal Grandfather        Review of Systems:   All systems reviewed. Pertinent positives identified in HPI. All other systems are negative.    Objective:   Temp:  [97.7 °F (36.5 °C)] 97.7 °F (36.5 °C)  Heart Rate:  [] 72  Resp:  [18-23] 18  BP: (113-140)/(52-73) 140/73     Intake/Output Summary (Last 24 hours) at 6/3/2025 0654  Last data filed at 6/3/2025 0224  Gross per 24 hour   Intake 600 ml   Output --   Net 600 ml     Body mass index is 70.62 kg/m².      06/02/25 1944   Weight: (!) 249 kg (550 lb)     Weight change:     Physical Exam:   General Appearance:    Alert, cooperative, in no  "acute distress   Head:    Normocephalic, without obvious abnormality, atraumatic   Eyes:            Lids and lashes normal, conjunctivae and sclerae normal, no   icterus, no pallor, corneas clear, PERRLA   Ears:    Ears appear intact with no abnormalities noted   Neck:   No adenopathy, supple, trachea midline, no thyromegaly, no   carotid bruit, no JVD   Lungs:     Clear to auscultation,respirations regular, even and unlabored    Heart:    Regular rhythm and normal rate, normal S1 and S2, no murmur, no gallop, no rub, no click   Chest Wall:    No abnormalities observed   Abdomen:     Normal bowel sounds, no masses, no organomegaly, soft        non-tender, non-distended, no guarding, no rebound  tenderness   Extremities:   Moves all extremities well, no edema, no cyanosis, no redness   Pulses:   Pulses palpable and equal bilaterally. Normal radial, carotid, femoral, dorsalis pedis and posterior tibial pulses bilaterally. Normal abdominal aorta   Skin:  Psychiatric:   No bleeding, bruising or rash    Alert and oriented x 3, normal mood and affect       Lab Review:   Results from last 7 days   Lab Units 06/02/25 2027   SODIUM mmol/L 136   POTASSIUM mmol/L 3.6   CHLORIDE mmol/L 105   CO2 mmol/L 20.8*   BUN mg/dL 21.0*   CREATININE mg/dL 1.26   GLUCOSE mg/dL 131*   CALCIUM mg/dL 9.0   AST (SGOT) U/L 18   ALT (SGPT) U/L 16     Results from last 7 days   Lab Units 06/02/25 2138 06/02/25 2027   HSTROP T ng/L 74* 77*     Results from last 7 days   Lab Units 06/03/25 0622 06/02/25 2027   WBC 10*3/mm3 8.83 10.41   HEMOGLOBIN g/dL 13.2 12.9*   HEMATOCRIT % 40.6 39.6   PLATELETS 10*3/mm3 330 355     Results from last 7 days   Lab Units 06/03/25 0622 06/02/25 2025 05/30/25  0000   INR  2.66* 2.55* 3.90   APTT seconds  --  38.6*  --      Results from last 7 days   Lab Units 06/02/25 2027   MAGNESIUM mg/dL 1.6           Invalid input(s): \"LDLCALC\"  Results from last 7 days   Lab Units 06/02/25  2027   PROBNP pg/mL 2,859.0* "         Imaging:  Imaging Results (Most Recent)       Procedure Component Value Units Date/Time    CT Angiogram Chest Pulmonary Embolism [[id number]] Collected: 06/02/25 2306     Updated: 06/02/25 2314    Narrative:      CTA CHEST WITH IV CONTRAST     HISTORY: Short of breath, tachycardic, history of thromboembolism, rule  out PE; L03.116-Cellulitis of left lower limb; Z78.9-Other specified  health status; R79.89-Other specified abnormal findings of blood  chemistry; I89.0-Lymphedema, not elsewhere classified; B34.8-Other viral  infections of unspecified site; R79.89-Other specified abnormal findings  of blood chemistry; R79.89-Other specified abnormal find     COMPARISON: CTA chest 12/21/2023     TECHNIQUE: CT angiography was performed of the chest with axial images  as well as coronal and sagittal reformatted MIP images provided  following administration of IV contrast. 3-D surface rendered reformats  were obtained of the pulmonary arteries and aorta. Radiation dose  reduction techniques were utilized, including automated exposure  control, and exposure modulation based on body size.     FINDINGS:     There is mild bilateral dependent atelectasis, but there is no  convincing evidence of acute pulmonary infiltrate, pleural effusion,  pneumothorax or suspicious nodule.     Thoracic aorta is normal in caliber.  There are coronary atherosclerotic  vascular calcifications.  There is no suspicious mediastinal adenopathy  or other mass.     Images of the upper abdomen show no acute abnormality, though there are  nonobstructing left renal calculi.  There is no acute bony abnormality.     Bolus timing is marginal, and there is no convincing evidence of  pulmonary embolism, though assessment is limited.       Impression:         Bolus timing is marginal, and there is no convincing evidence of  pulmonary embolism, though assessment is limited.     No acute pulmonary parenchymal abnormality is seen.           This report was  finalized on 6/2/2025 11:11 PM by Dr. Cedric Tiwari M.D on Workstation: [id number]       XR Chest 1 View [[id number]] Collected: 06/02/25 2050     Updated: 06/02/25 2102    Narrative:         XR CHEST 1 VW-     HISTORY: Shortness of air.     COMPARISON: CT angiogram of chest 12/21/2023, AP chest 12/21/2023.     FINDINGS: The heart and mediastinal structures appear normal. Lungs are  clear and there is no evidence for pulmonary edema or pleural effusion  or infiltrate.       Impression:      No evidence for active disease in the chest.           This report was finalized on 6/2/2025 8:59 PM by Chalino Wynne M.D on  Workstation: [id number]               I personally viewed and interpreted the patient's EKG    Assessment/Plan:     1.  Abnormal troponin.  Mildly elevated but relatively flat to downtrending.  EKG without any associated changes.  He is not having any symptoms suggestive of angina.  CT of the chest on an optimal study did not show any evidence of significant pulmonary embolism.  His INR has been therapeutic.  2.  Cellulitis.  Appears to be involving his left groin and scrotum.  Now on IV antibiotics.  Evidence of open wound apparently on scrotum that wound care is managing.  3.  Rhinovirus infection.  Likely responsible for his symptoms of weakness, cough and shortness of breath.  4.  History of recurrent pulmonary embolism.  On chronic anticoagulation with warfarin.  5.  Factor V Leiden  6.  Hypertension.  Well-controlled  7.  Lymphedema  7.  Morbid obesity.    - Although troponins are mildly elevated nothing else to indicate evidence of ACS.  His troponins are downtrending.    -Unfortunately due to his body habitus we have limited options on further cardiac workup.  Fortunately without any other abnormal findings outside of mildly elevated troponin I do not see any indication for further cardiac workup.  Recommend just monitoring for now.  -Continue warfarin for anticoagulation.       Thank  you for allowing me to participate in the care of Kameron Melendez. Feel free to contact me directly with any further questions or concerns.    Elsy Baeza MD  [facility]  06/03/25  06:54 EDT          Electronically signed by Elsy Baeza MD at 06/03/25 3619

## 2025-06-03 NOTE — NURSING NOTE
Diltiazem drip decreased to 10 mg/hr  @2033  Decreased to 5 mg/hr @ 2046    Heart rate maintaining 64-67 at this time; Pt sleeping well at this time. Reason for Visit: WOC Team consult for posterior pannus wound POA. Pt reports he rubbed it on the bath tub and the wound formed. His wife has been treating with antibiotic ointment. Pt is morbidly obese with BMI 70.6 I have ordered him a bariatric bed with LUZ MARINA mattress from [employer], confirmation #[id number].     The wound is located on the underside of his mid-pannus and has a deep pocket with malodorous brown drainage (see assessment below). I am concerned he may have an abscess that needs to be drained. I have notified Dr Jordan regarding possible need for imaging of the area and surgical evaluation.      06/03/25 0731   Wound 06/03/25 0426 Left posterior thigh Pressure Injury   Placement Date/Time: 06/03/25 0426   Present on Original Admission: Yes  Side: Left  Orientation: posterior  Location: thigh  Primary Wound Type: Pressure Injury   Base moist;red  (Deep abscess/ wound located on underside of mid-pannus.)   Periwound intact;dry;redness  (cobblestone appearance to pannus skin)   Periwound Temperature warm   Wound Length (cm) 2.5 cm   Wound Width (cm) 1 cm   Wound Depth (cm) 4.5 cm   Wound Surface Area (cm^2) 1.96 cm^2   Wound Volume (cm^3) 5.89 cm^3   Drainage Characteristics/Odor malodorous;brown   Drainage Amount moderate   Care, Wound cleansed with;sterile normal saline   Dressing Care dressing applied;hydrofiber;silver impregnated;silicone border foam  (Packed with long continuous piece of Opticel ag and covered with Optifoam dressing.)   Skin Interventions   Pressure Reduction Devices pressure-redistributing mattress utilized  (I have ordered pt a bariatric LUZ MARINA mattress from [facility] [facility])     Treatment Plan/Recommendations: Transfer pt to bariatric LUZ MARINA mattress once it arrives. Begin Opticel ag and Optifoam dressing changes daily to mid-pannus wound. Magic Barrier cream has been ordered for skin folds. Recommend surgical evaluation of pannus wound. Wound care and prevention standing orders placed into EPIC.  Discussed with RN and attending MD.     Wound Team Follow up Plan: Follow up later this week to check on status of wound.      none

## 2025-06-03 NOTE — CONSULTS
General Surgery Consultation    Consulting Physician: Joan Ontiveros MD  Referring Physician: Dr. Jordan    Reason for consultation: Draining wound on pannus    CC: Draining wound on pannus    HPI:   The patient is a very pleasant 55 y.o. male that presented to the hospital with a draining, erythematous skin lesion of his left lower pannus that started a few days ago.  He bumped the area on his bathtub and noticed expanding erythema over the next few days thereafter.  He tried taking leftover Keflex at his house, but noted no improvement in his skin erythema.  Then overnight the skin started draining malodorous, brown fluid so he came to the hospital for evaluation.  Our wound/ostomy nurses have evaluated the wound and recommended surgical consultation for possible debridement due to the possibility of an underlying abscess.  The patient struggles with significant lymphedema and morbid obesity (BMI 70, weight 550 pounds).  No imaging workup has been done thus far.    Past Medical History:  Hypertension  Factor V Leiden  History of DVT right lower extremity and pulmonary embolism on anticoagulation  Morbid obesity  Lymphedema of left lower extremity  History of kidney stones    Past Surgical History:  Cardiac catheterization    Medications:  Reviewed in epic and include warfarin 10-15 mg daily    Allergies: No known drug allergies    Social History: , former cigar smoker, no regular alcohol use currently    Family History: Mother with history of coronary artery disease and congestive heart failure, half brother with history of prostate cancer    Review of Systems:  Constitutional: denies any weight changes, fatigue, or weakness  Eyes: denies blurred/double vision or scleral icterus  Cardiovascular: denies chest pain, palpitations, or edemas  Respiratory: denies cough, sputum, or dyspnea  Gastrointestinal:  denies abdominal pain, nausea, vomiting, melena, or hematochezia  Genitourinary: denies dysuria or  hematuria  Endocrine: denies cold intolerance, lethargy, or flushing  Hematologic: denies excessive bruising or bleeding  Musculoskeletal: denies weakness, joint swelling, joint pain, or stiffness  Neurologic: denies seizures, CVA, paresthesia, or peripheral neuropathy  Skin: Positive for erythema, pain, swelling, and drainage from the left lower pannus     All other systems reviewed and were negative.    Physical Exam:   Vitals:    06/03/25 0822   BP: 105/45   Pulse: 64   Resp: 16   Temp: 97.9 °F (36.6 °C)   SpO2: 98%   Height: 188 cm  Weight: 249 kg (550 pound)  BMI: 70.6  GENERAL: awake and alert, no acute distress, oriented to person, place, and time  HEENT: normocephalic, atraumatic, no scleral icterus, moist mucous membranes  NECK: Supple, there is no thyromegaly or lymphadenopathy  RESPIRATORY: clear to auscultation, no wheezes, rales or rhonchi, symmetric air entry  CARDIOVASCULAR: regular rate and rhythm    GASTROINTESTINAL: Soft, nontender, nondistended, morbidly obese  MUSCULOSKELETAL: no cyanosis, clubbing, or edema   NEUROLOGIC: alert and oriented, normal speech, cranial nerves 2-12 grossly intact, no focal deficits   SKIN: He has blanching erythema and skin induration of the entire left lower pannus and upper inner thigh where there is an open wound measuring about 2 cm in diameter with brown discharge on the Aquacel Ag rope, the inner perimeter of the wound is difficult to see due to his body habitus but appears to show evidence of black necrotic discoloration of the subcutaneous fat    Diagnostic workup:   Pertinent labs:   Results from last 7 days   Lab Units 06/03/25 0622 06/02/25 2027   WBC 10*3/mm3 8.83 10.41   HEMOGLOBIN g/dL 13.2 12.9*   HEMATOCRIT % 40.6 39.6   PLATELETS 10*3/mm3 330 355     Results from last 7 days   Lab Units 06/03/25 0622 06/02/25 2027   SODIUM mmol/L 139 136   POTASSIUM mmol/L 3.9 3.6   CHLORIDE mmol/L 105 105   CO2 mmol/L 21.8* 20.8*   BUN mg/dL 19.0 21.0*  "  CREATININE mg/dL 1.08 1.26   CALCIUM mg/dL 8.9 9.0   BILIRUBIN mg/dL  --  0.4   ALK PHOS U/L  --  112   ALT (SGPT) U/L  --  16   AST (SGOT) U/L  --  18   GLUCOSE mg/dL 115* 131*     Results from last 7 days   Lab Units 06/03/25  0622 06/02/25 2025   INR  2.66* 2.55*   APTT seconds  --  38.6*     IMAGING:  None    Assessment and plan:     The patient is a 55 y.o. male with a draining subcutaneous wound of the left lower pannus concerning for soft tissue abscess with surrounding cellulitis    I have recommended to the patient we proceed to the operating room for surgical debridement and drainage of what appears to be a soft tissue abscess of the lower pannus.  I am concerned about the visualized black soft tissues concerning for necrotizing soft tissue infection.  At this time, he shows no evidence for necrotizing fasciitis and I suspect this is simply a severe cellulitis with underlying abscess.  I discussed with him the risks, benefits, and alternatives to surgery.  He has declined undergoing any sort of surgery as he is \"leery\" if any surgical intervention and would like to try less invasive maneuvers first with IV antibiotics to treat the cellulitis.  I will continue to follow along for now and we will recheck the wound in the morning.  I have ordered Roblesin's wet-to-dry dressing changes with 4 x 4 gauze packed into the wound twice daily.  I will defer antibiotic management to the infectious disease team.    Joan Ontiveros MD  General, Robotic, and Endoscopic Surgery  [facility]    [address], [address]  [place] [id number]  P: [phone]  F: [phone]     "

## 2025-06-03 NOTE — ED NOTES
Nursing report ED to floor  Kameron Melendez  55 y.o.  male    HPI :  HPI  Stated Reason for Visit: Patient ambulatory with a walker via PV reporting increased lower extremity edema for about a week, patient has a history of cellulitis and just completed a 4 day course of keflex. He has a wound on his posterior left leg which is draining and malodorous. Patient reports increasind shortness of breath with exertion.  History Obtained From: patient    Chief Complaint  Chief Complaint   Patient presents with    Shortness of Breath    Edema    Wound Check       Admitting doctor:   Gerardo Drake MD    Admitting diagnosis:   The primary encounter diagnosis was Left leg cellulitis. Diagnoses of Failure of outpatient treatment, Elevated procalcitonin, Lymphedema, Rhinovirus infection, Elevated troponin, Elevated brain natriuretic peptide (BNP) level, and Hyperglycemia were also pertinent to this visit.    Code status:   Current Code Status       Date Active Code Status Order ID Comments User Context       6/2/2025 2325 CPR (Attempt to Resuscitate) [id number]  Gerardo Drake MD ED        Question Answer    Code Status (Patient has no pulse and is not breathing) CPR (Attempt to Resuscitate)    Medical Interventions (Patient has pulse or is breathing) Full Support                    Allergies:   Patient has no known allergies.    Isolation:   Droplet    Intake and Output    Intake/Output Summary (Last 24 hours) at 6/3/2025 1527  Last data filed at 6/3/2025 1328  Gross per 24 hour   Intake 1100 ml   Output --   Net 1100 ml       Weight:       06/02/25  1944   Weight: (!) 249 kg (550 lb)       Most recent vitals:   Vitals:    06/03/25 0401 06/03/25 0402 06/03/25 0822 06/03/25 1200   BP: 140/73  105/45 120/50   BP Location:       Patient Position:   Lying    Pulse: 70 72 64 73   Resp: 18  16 18   Temp:   97.9 °F (36.6 °C) 98 °F (36.7 °C)   TempSrc:   Oral Oral   SpO2: 96% 99% 98% 95%   Weight:       Height:            Active LDAs/IV Access:   Lines, Drains & Airways       Active LDAs       Name Placement date Placement time Site Days    Peripheral IV 06/02/25 2048 20 G Anterior;Left;Upper Arm 06/02/25 2048  Arm  less than 1                    Labs (abnormal labs have a star):   Labs Reviewed   RESPIRATORY PANEL PCR W/ COVID-19 (SARS-COV-2), NP SWAB IN UTM/VTP, 2 HR TAT - Abnormal; Notable for the following components:       Result Value    Human Rhinovirus/Enterovirus Detected (*)     All other components within normal limits    Narrative:     In the setting of a positive respiratory panel with a viral infection PLUS a negative procalcitonin without other underlying concern for bacterial infection, consider observing off antibiotics or discontinuation of antibiotics and continue supportive care. If the respiratory panel is positive for atypical bacterial infection (Bordetella pertussis, Chlamydophila pneumoniae, or Mycoplasma pneumoniae), consider antibiotic de-escalation to target atypical bacterial infection.   COMPREHENSIVE METABOLIC PANEL - Abnormal; Notable for the following components:    Glucose 131 (*)     BUN 21.0 (*)     CO2 20.8 (*)     Albumin 3.1 (*)     All other components within normal limits    Narrative:     GFR Categories in Chronic Kidney Disease (CKD)              GFR Category          GFR (mL/min/1.73)    Interpretation  G1                    90 or greater        Normal or high (1)  G2                    60-89                Mild decrease (1)  G3a                   45-59                Mild to moderate decrease  G3b                   30-44                Moderate to severe decrease  G4                    15-29                Severe decrease  G5                    14 or less           Kidney failure    (1)In the absence of evidence of kidney disease, neither GFR category G1 or G2 fulfill the criteria for CKD.    eGFR calculation 2021 CKD-EPI creatinine equation, which does not include race as a factor    TROPONIN - Abnormal; Notable for the following components:    HS Troponin T 77 (*)     All other components within normal limits    Narrative:     High Sensitive Troponin T Reference Range:  <14.0 ng/L- Negative Female for AMI  <22.0 ng/L- Negative Male for AMI  >=14 - Abnormal Female indicating possible myocardial injury.  >=22 - Abnormal Male indicating possible myocardial injury.   Clinicians would have to utilize clinical acumen, EKG, Troponin, and serial changes to determine if it is an Acute Myocardial Infarction or myocardial injury due to an underlying chronic condition.        BNP (IN-HOUSE) - Abnormal; Notable for the following components:    proBNP 2,859.0 (*)     All other components within normal limits    Narrative:     This assay is used as an aid in the diagnosis of individuals suspected of having heart failure. It can be used as an aid in the diagnosis of acute decompensated heart failure (ADHF) in patients presenting with signs and symptoms of ADHF to the emergency department (ED). In addition, NT-proBNP of <300 pg/mL indicates ADHF is not likely.    Age Range Result Interpretation  NT-proBNP Concentration (pg/mL:      <50             Positive            >450                   Gray                 300-450                    Negative             <300    50-75           Positive            >900                  Gray                300-900                  Negative            <300      >75             Positive            >1800                  Gray                300-1800                  Negative            <300   PROTIME-INR - Abnormal; Notable for the following components:    Protime 27.7 (*)     INR 2.55 (*)     All other components within normal limits   APTT - Abnormal; Notable for the following components:    PTT 38.6 (*)     All other components within normal limits   PROCALCITONIN - Abnormal; Notable for the following components:    Procalcitonin 1.55 (*)     All other components within  "normal limits    Narrative:     As a Marker for Sepsis (Non-Neonates):    1. <0.5 ng/mL represents a low risk of severe sepsis and/or septic shock.  2. >2 ng/mL represents a high risk of severe sepsis and/or septic shock.    As a Marker for Lower Respiratory Tract Infections that require antibiotic therapy:    PCT on Admission    Antibiotic Therapy       6-12 Hrs later    >0.5                Strongly Recommended  >0.25 - <0.5        Recommended   0.1 - 0.25          Discouraged              Remeasure/reassess PCT  <0.1                Strongly Discouraged     Remeasure/reassess PCT    As 28 day mortality risk marker: \"Change in Procalcitonin Result\" (>80% or <=80%) if Day 0 (or Day 1) and Day 4 values are available. Refer to [email or web address]    Change in PCT <=80%  A decrease of PCT levels below or equal to 80% defines a positive change in PCT test result representing a higher risk for 28-day all-cause mortality of patients diagnosed with severe sepsis for septic shock.    Change in PCT >80%  A decrease of PCT levels of more than 80% defines a negative change in PCT result representing a lower risk for 28-day all-cause mortality of patients diagnosed with severe sepsis or septic shock.      CBC WITH AUTO DIFFERENTIAL - Abnormal; Notable for the following components:    Hemoglobin 12.9 (*)     All other components within normal limits   HIGH SENSITIVITIY TROPONIN T 1HR - Abnormal; Notable for the following components:    HS Troponin T 74 (*)     All other components within normal limits    Narrative:     High Sensitive Troponin T Reference Range:  <14.0 ng/L- Negative Female for AMI  <22.0 ng/L- Negative Male for AMI  >=14 - Abnormal Female indicating possible myocardial injury.  >=22 - Abnormal Male indicating possible myocardial injury.   Clinicians would have to utilize clinical acumen, EKG, Troponin, and serial changes to determine if it is an Acute Myocardial Infarction or myocardial injury " due to an underlying chronic condition.        BASIC METABOLIC PANEL - Abnormal; Notable for the following components:    Glucose 115 (*)     CO2 21.8 (*)     All other components within normal limits    Narrative:     GFR Categories in Chronic Kidney Disease (CKD)              GFR Category          GFR (mL/min/1.73)    Interpretation  G1                    90 or greater        Normal or high (1)  G2                    60-89                Mild decrease (1)  G3a                   45-59                Mild to moderate decrease  G3b                   30-44                Moderate to severe decrease  G4                    15-29                Severe decrease  G5                    14 or less           Kidney failure    (1)In the absence of evidence of kidney disease, neither GFR category G1 or G2 fulfill the criteria for CKD.    eGFR calculation 2021 CKD-EPI creatinine equation, which does not include race as a factor   CBC WITH AUTO DIFFERENTIAL - Abnormal; Notable for the following components:    Lymphocyte % 18.7 (*)     Immature Grans % 4.5 (*)     Immature Grans, Absolute 0.40 (*)     All other components within normal limits   PROTIME-INR - Abnormal; Notable for the following components:    Protime 28.7 (*)     INR 2.66 (*)     All other components within normal limits   TROPONIN - Abnormal; Notable for the following components:    HS Troponin T 60 (*)     All other components within normal limits    Narrative:     High Sensitive Troponin T Reference Range:  <14.0 ng/L- Negative Female for AMI  <22.0 ng/L- Negative Male for AMI  >=14 - Abnormal Female indicating possible myocardial injury.  >=22 - Abnormal Male indicating possible myocardial injury.   Clinicians would have to utilize clinical acumen, EKG, Troponin, and serial changes to determine if it is an Acute Myocardial Infarction or myocardial injury due to an underlying chronic condition.        C-REACTIVE PROTEIN - Abnormal; Notable for the following  components:    C-Reactive Protein 1.71 (*)     All other components within normal limits   HEMOGLOBIN A1C - Abnormal; Notable for the following components:    Hemoglobin A1C 7.10 (*)     All other components within normal limits    Narrative:     Hemoglobin A1C Ranges:    Increased Risk for Diabetes  5.7% to 6.4%  Diabetes                     >= 6.5%  Diabetic Goal                < 7.0%   POCT GLUCOSE FINGERSTICK - Abnormal; Notable for the following components:    Glucose 134 (*)     All other components within normal limits   MRSA SCREEN, PCR - Normal    Narrative:     The negative predictive value of this diagnostic test is high and should only be used to consider de-escalating anti-MRSA therapy. A positive result may indicate colonization with MRSA and must be correlated clinically.   MAGNESIUM - Normal   LACTIC ACID, PLASMA - Normal   BLOOD CULTURE   BLOOD CULTURE   MANUAL DIFFERENTIAL   POCT GLUCOSE FINGERSTICK   POCT GLUCOSE FINGERSTICK   POCT GLUCOSE FINGERSTICK   POCT GLUCOSE FINGERSTICK   POCT GLUCOSE FINGERSTICK   POCT GLUCOSE FINGERSTICK   CBC AND DIFFERENTIAL    Narrative:     The following orders were created for panel order CBC & Differential.  Procedure                               Abnormality         Status                     ---------                               -----------         ------                     CBC Auto Differential[[id number]]        Abnormal            Final result                 Please view results for these tests on the individual orders.       EKG:   ECG 12 Lead Dyspnea   Preliminary Result   HEART RATE=84  bpm   RR Jinfqzpp=936  ms   IN Zhbdbarb=309  ms   P Horizontal Axis=0  deg   P Front Axis=31  deg   QRSD Interval=99  ms   QT Kefdjlmm=487  ms   UUwM=032  ms   QRS Axis=-10  deg   T Wave Axis=67  deg   - BORDERLINE ECG -   Sinus rhythm   Probable lateral infarct, old   Date and Time of Study:2025-06-02 19:59:06          Meds given in ED:   Medications   nitroglycerin  (NITROSTAT) SL tablet 0.4 mg (has no administration in time range)   sodium chloride 0.9 % flush 10 mL (10 mL Intravenous Given 6/3/25 9848)   sodium chloride 0.9 % flush 10 mL (has no administration in time range)   sodium chloride 0.9 % infusion 40 mL (has no administration in time range)   sennosides-docusate (PERICOLACE) 8.6-50 MG per tablet 2 tablet (has no administration in time range)     And   polyethylene glycol (MIRALAX) packet 17 g (has no administration in time range)     And   bisacodyl (DULCOLAX) EC tablet 5 mg (has no administration in time range)     And   bisacodyl (DULCOLAX) suppository 10 mg (has no administration in time range)   Potassium Replacement - Follow Nurse / BPA Driven Protocol (has no administration in time range)   Magnesium Standard Dose Replacement - Follow Nurse / BPA Driven Protocol (has no administration in time range)   Phosphorus Replacement - Follow Nurse / BPA Driven Protocol (has no administration in time range)   Calcium Replacement - Follow Nurse / BPA Driven Protocol (has no administration in time range)   ondansetron ODT (ZOFRAN-ODT) disintegrating tablet 4 mg (has no administration in time range)     Or   ondansetron (ZOFRAN) injection 4 mg (has no administration in time range)   acetaminophen (TYLENOL) tablet 650 mg (has no administration in time range)     Or   acetaminophen (TYLENOL) 160 MG/5ML oral solution 650 mg (has no administration in time range)     Or   acetaminophen (TYLENOL) suppository 650 mg (has no administration in time range)   HYDROcodone-acetaminophen (NORCO) 5-325 MG per tablet 1 tablet (has no administration in time range)   Pharmacy to dose warfarin (has no administration in time range)   Pharmacy to dose vancomycin (has no administration in time range)   cefTRIAXone (ROCEPHIN) 2,000 mg in sodium chloride 0.9 % 100 mL MBP (0 mg Intravenous Stopped 6/3/25 6364)   warfarin (COUMADIN) tablet 15 mg (15 mg Oral Given 6/3/25 0155)   dextrose (GLUTOSE)  oral gel 15 g (has no administration in time range)   dextrose (D50W) (25 g/50 mL) IV injection 25 g (has no administration in time range)   glucagon (GLUCAGEN) injection 1 mg (has no administration in time range)   insulin lispro (HUMALOG/ADMELOG) injection 2-9 Units ( Subcutaneous Not Given 6/3/25 1130)   warfarin (COUMADIN) tablet 10 mg (has no administration in time range)   hydrocortisone-bacitracin-zinc oxide-nystatin (MAGIC BARRIER) ointment 1 Application (has no administration in time range)   vancomycin 1750 mg/500 mL 0.9% NS IVPB ([facility]) (has no administration in time range)   sodium hypochlorite (DAKIN'S 1/4 STRENGTH) 0.125 % topical solution 0.125% solution (has no administration in time range)   vancomycin 3000 mg/500 mL 0.9% NS IVPB ([facility]) (0 mg Intravenous Stopped 6/3/25 0034)   iopamidol (ISOVUE-370) 76 % injection 100 mL (95 mL Intravenous Given 6/2/25 2217)   HYDROcodone-acetaminophen (NORCO) 5-325 MG per tablet 1 tablet (1 tablet Oral Given 6/2/25 2304)   potassium chloride (KLOR-CON M20) CR tablet 40 mEq (40 mEq Oral Given 6/3/25 0155)       Imaging results:  CT Angiogram Chest Pulmonary Embolism  Result Date: 6/2/2025   Bolus timing is marginal, and there is no convincing evidence of pulmonary embolism, though assessment is limited.  No acute pulmonary parenchymal abnormality is seen.    This report was finalized on 6/2/2025 11:11 PM by Dr. Cedric Tiwari M.D on Workstation: [id number]      XR Chest 1 View  Result Date: 6/2/2025  No evidence for active disease in the chest.    This report was finalized on 6/2/2025 8:59 PM by Chalino Wynne M.D on Workstation: [id number]        Ambulatory status:   - up with assist    Social issues:   Social History     Socioeconomic History    Marital status:    Tobacco Use    Smoking status: Former     Types: Cigars, Pipe     Start date: 1/1/2006     Quit date: 1/1/2022     Years since quitting: 3.4     Passive exposure: Past    Smokeless tobacco:  Never    Tobacco comments:     occasional - < 1 a month   Vaping Use    Vaping status: Never Used   Substance and Sexual Activity    Alcohol use: Yes     Comment: social    Drug use: No    Sexual activity: Defer       Peripheral Neurovascular  Peripheral Neurovascular (Adult)  Peripheral Neurovascular WDL: WDL    Neuro Cognitive  Neuro Cognitive (Adult)  Cognitive/Neuro/Behavioral WDL: WDL  Sedation Group  POSS (Pasero Opioid-Induced Sed Scale): 1 - Awake and alert    Learning  Learning Assessment  Learning Readiness and Ability: no barriers identified  Education Provided  Person Taught: patient  Teaching Method: verbal instruction  Teaching Focus: symptom/problem overview, risk factors/triggers  Education Outcome Evaluation: verbalizes understanding    Respiratory  Respiratory WDL  Respiratory WDL: WDL  Rhythm/Pattern, Respiratory: shortness of breath    Abdominal Pain  Abdominal Pain CPG Interventions  Coping Interventions: emotional support provided, questions answered, safe, supportive environment facilitated  Safety Interventions  Safety Precautions/Falls Reduction: assistive device/personal items within reach  All Alarms: none present    Pain Assessments  Pain (Adult)  (0-10) Pain Rating: Rest: 5  (0-10) Pain Rating: Activity: 5  Pain Location: extremity  Pain Side/Orientation: left, lower  Pain Description: constant  Response to Pain Interventions: interventions effective per patient    NIH Stroke Scale       Roxanne Sanchez RN  06/03/25 15:27 EDT

## 2025-06-03 NOTE — H&P
Patient Name:  Kameron Melendez  YOB: 1970  MRN:  [id number]  Admit Date:  6/2/2025  Patient Care Team:  Hansel Patel DO as PCP - General (Family Medicine)  Kim Sotelo RPH as Pharmacist      Subjective   History Present Illness     Chief Complaint   Patient presents with    Shortness of Breath    Edema    Wound Check       Mr. Melendez is a 55 y.o. with a history of morbid obesity, factor V Leiden with previous VTE on coumadin, HTN, lymphedema, and obesity   He presented with a few symptoms. Cough and dyspnea x 1 week. Also with recent wound to LLE and then had increasing erythema. Had been on keflex for the past week but without improvement. Came to [facility] ER. +rhinovirus. Did have elevated troponin and BNP. Had CTPE. Evidence for cellulitis. Given IV vanc in the ER.    History of Present Illness  Review of Systems   Constitutional:  Positive for activity change, chills and fever.   HENT: Negative.     Eyes: Negative.    Respiratory:  Positive for cough. Negative for chest tightness.    Cardiovascular: Negative.  Negative for chest pain.   Gastrointestinal: Negative.    Endocrine: Negative.    Genitourinary: Negative.    Musculoskeletal: Negative.    Skin:  Positive for rash and wound.   Allergic/Immunologic: Negative.    Neurological: Negative.    Hematological: Negative.    Psychiatric/Behavioral: Negative.          Personal History     Past Medical History:   Diagnosis Date    DVT (deep venous thrombosis)     RLE    Factor V Leiden     Hypertension     Kidney stone     Lymphedema     Lymphedema of left lower extremity     Obesity     ARNOLDO (obstructive sleep apnea)     Pulmonary embolism     bilateral    Pulmonary hypertension     Right ventricular enlargement      Past Surgical History:   Procedure Laterality Date    CARDIAC CATHETERIZATION Bilateral 7/18/2017    Procedure: Pulmonary angiography- In[facility] ;  Surgeon: Cedric Coulter MD;  Location: [facility] INVASIVE LOCATION;  Service:      CARDIAC CATHETERIZATION N/A 7/18/2017    Procedure: Right Heart Cath;  Surgeon: Cedric Coulter MD;  Location:  [facility] CATH INVASIVE LOCATION;  Service:     THROMBECTOMY       Family History   Problem Relation Age of Onset    Heart disease Mother     Heart failure Mother     Bradycardia Mother     No Known Problems Father     Atrial fibrillation Half-Brother     Prostate cancer Half-Brother     No Known Problems Maternal Grandmother     No Known Problems Maternal Grandfather     No Known Problems Paternal Grandmother     No Known Problems Paternal Grandfather      Social History     Tobacco Use    Smoking status: Former     Types: Cigars, Pipe     Start date: 1/1/2006     Quit date: 1/1/2022     Years since quitting: 3.4     Passive exposure: Past    Smokeless tobacco: Never    Tobacco comments:     occasional - < 1 a month   Vaping Use    Vaping status: Never Used   Substance Use Topics    Alcohol use: Yes     Comment: social    Drug use: No     (Not in a hospital admission)  Home meds reviewed   Allergies:  No Known Allergies    Objective    Objective     Vital Signs  Temp:  [97.7 °F (36.5 °C)] 97.7 °F (36.5 °C)  Heart Rate:  [] 76  Resp:  [18-23] 18  BP: (113-135)/(60-68) 122/68  SpO2:  [94 %-97 %] 97 %  on   ;   Device (Oxygen Therapy): room air  Body mass index is 70.62 kg/m².    Physical Exam  Vitals and nursing note reviewed.   Constitutional:       General: He is not in acute distress.     Appearance: He is well-developed. He is obese. He is ill-appearing. He is not diaphoretic.   HENT:      Head: Normocephalic and atraumatic.   Eyes:      General: No scleral icterus.     Conjunctiva/sclera: Conjunctivae normal.   Neck:      Vascular: No JVD.   Cardiovascular:      Rate and Rhythm: Normal rate.      Heart sounds: No murmur heard.  Pulmonary:      Effort: Pulmonary effort is normal. No respiratory distress.      Breath sounds: Normal breath sounds.   Abdominal:      General: Bowel sounds are normal.       Palpations: Abdomen is soft.      Tenderness: There is no abdominal tenderness.   Musculoskeletal:         General: Normal range of motion.      Cervical back: Normal range of motion and neck supple.      Right lower leg: Edema present.      Left lower leg: Edema present.   Skin:     General: Skin is warm and dry.      Findings: Erythema (to LLE) present.   Neurological:      Mental Status: He is alert and oriented to person, place, and time.      Cranial Nerves: No cranial nerve deficit.      Motor: No abnormal muscle tone.   Psychiatric:         Behavior: Behavior normal.         Thought Content: Thought content normal.     Lymphedema with cellulitis and wound on LLE    Results Review:  I reviewed the patient's new clinical results.  Discussed with ED provider.    Lab Results (last 24 hours)       Procedure Component Value Units Date/Time    Protime-INR [[id number]]  (Abnormal) Collected: 06/02/25 2025    Specimen: Blood from Hand, Right Updated: 06/02/25 2121     Protime 27.7 Seconds      INR 2.55    aPTT [[id number]]  (Abnormal) Collected: 06/02/25 2025    Specimen: Blood from Hand, Right Updated: 06/02/25 2121     PTT 38.6 seconds     Blood Culture - Blood, Hand, Right [[id number]] Collected: 06/02/25 2025    Specimen: Blood from Hand, Right Updated: 06/02/25 2034    CBC & Differential [[id number]]  (Abnormal) Collected: 06/02/25 2027    Specimen: Blood from Arm, Left Updated: 06/02/25 2048    Narrative:      The following orders were created for panel order CBC & Differential.  Procedure                               Abnormality         Status                     ---------                               -----------         ------                     CBC Auto Differential[[id number]]        Abnormal            Final result                 Please view results for these tests on the individual orders.    Comprehensive Metabolic Panel [[id number]]  (Abnormal) Collected: 06/02/25 2027    Specimen: Blood from Arm, Left  Updated: 06/02/25 2107     Glucose 131 mg/dL      BUN 21.0 mg/dL      Creatinine 1.26 mg/dL      Sodium 136 mmol/L      Potassium 3.6 mmol/L      Chloride 105 mmol/L      CO2 20.8 mmol/L      Calcium 9.0 mg/dL      Total Protein 6.8 g/dL      Albumin 3.1 g/dL      ALT (SGPT) 16 U/L      AST (SGOT) 18 U/L      Alkaline Phosphatase 112 U/L      Total Bilirubin 0.4 mg/dL      Globulin 3.7 gm/dL      A/G Ratio 0.8 g/dL      BUN/Creatinine Ratio 16.7     Anion Gap 10.2 mmol/L      eGFR 67.4 mL/min/1.73     Narrative:      GFR Categories in Chronic Kidney Disease (CKD)              GFR Category          GFR (mL/min/1.73)    Interpretation  G1                    90 or greater        Normal or high (1)  G2                    60-89                Mild decrease (1)  G3a                   45-59                Mild to moderate decrease  G3b                   30-44                Moderate to severe decrease  G4                    15-29                Severe decrease  G5                    14 or less           Kidney failure    (1)In the absence of evidence of kidney disease, neither GFR category G1 or G2 fulfill the criteria for CKD.    eGFR calculation 2021 CKD-EPI creatinine equation, which does not include race as a factor    High Sensitivity Troponin T [[id number]]  (Abnormal) Collected: 06/02/25 2027    Specimen: Blood from Arm, Left Updated: 06/02/25 2111     HS Troponin T 77 ng/L     Narrative:      High Sensitive Troponin T Reference Range:  <14.0 ng/L- Negative Female for AMI  <22.0 ng/L- Negative Male for AMI  >=14 - Abnormal Female indicating possible myocardial injury.  >=22 - Abnormal Male indicating possible myocardial injury.   Clinicians would have to utilize clinical acumen, EKG, Troponin, and serial changes to determine if it is an Acute Myocardial Infarction or myocardial injury due to an underlying chronic condition.         BNP [[id number]]  (Abnormal) Collected: 06/02/25 2027    Specimen: Blood from Arm, Left  Updated: 06/02/25 2109     proBNP 2,859.0 pg/mL     Narrative:      This assay is used as an aid in the diagnosis of individuals suspected of having heart failure. It can be used as an aid in the diagnosis of acute decompensated heart failure (ADHF) in patients presenting with signs and symptoms of ADHF to the emergency department (ED). In addition, NT-proBNP of <300 pg/mL indicates ADHF is not likely.    Age Range Result Interpretation  NT-proBNP Concentration (pg/mL:      <50             Positive            >450                   Gray                 300-450                    Negative             <300    50-75           Positive            >900                  Gray                300-900                  Negative            <300      >75             Positive            >1800                  Gray                300-1800                  Negative            <300    Magnesium [[id number]]  (Normal) Collected: 06/02/25 2027    Specimen: Blood from Arm, Left Updated: 06/02/25 2107     Magnesium 1.6 mg/dL     Blood Culture - Blood, Arm, Left [[id number]] Collected: 06/02/25 2027    Specimen: Blood from Arm, Left Updated: 06/02/25 2035    Lactic Acid, Plasma [[id number]]  (Normal) Collected: 06/02/25 2027    Specimen: Blood from Arm, Left Updated: 06/02/25 2102     Lactate 1.9 mmol/L     Procalcitonin [[id number]]  (Abnormal) Collected: 06/02/25 2027    Specimen: Blood from Arm, Left Updated: 06/02/25 2109     Procalcitonin 1.55 ng/mL     Narrative:      As a Marker for Sepsis (Non-Neonates):    1. <0.5 ng/mL represents a low risk of severe sepsis and/or septic shock.  2. >2 ng/mL represents a high risk of severe sepsis and/or septic shock.    As a Marker for Lower Respiratory Tract Infections that require antibiotic therapy:    PCT on Admission    Antibiotic Therapy       6-12 Hrs later    >0.5                Strongly Recommended  >0.25 - <0.5        Recommended   0.1 - 0.25          Discouraged               "Remeasure/reassess PCT  <0.1                Strongly Discouraged     Remeasure/reassess PCT    As 28 day mortality risk marker: \"Change in Procalcitonin Result\" (>80% or <=80%) if Day 0 (or Day 1) and Day 4 values are available. Refer to http://www.[facility]-pct-calculator.com    Change in PCT <=80%  A decrease of PCT levels below or equal to 80% defines a positive change in PCT test result representing a higher risk for 28-day all-cause mortality of patients diagnosed with severe sepsis for septic shock.    Change in PCT >80%  A decrease of PCT levels of more than 80% defines a negative change in PCT result representing a lower risk for 28-day all-cause mortality of patients diagnosed with severe sepsis or septic shock.       CBC Auto Differential [[id number]]  (Abnormal) Collected: 06/02/25 2027    Specimen: Blood from Arm, Left Updated: 06/02/25 2048     WBC 10.41 10*3/mm3      RBC 4.29 10*6/mm3      Hemoglobin 12.9 g/dL      Hematocrit 39.6 %      MCV 92.3 fL      MCH 30.1 pg      MCHC 32.6 g/dL      RDW 14.4 %      RDW-SD 48.5 fl      MPV 9.3 fL      Platelets 355 10*3/mm3      nRBC 0.0 /100 WBC     Manual Differential [[id number]] Collected: 06/02/25 2027    Specimen: Blood from Arm, Left Updated: 06/02/25 2122     Neutrophil % 67.0 %      Lymphocyte % 22.0 %      Monocyte % 7.0 %      Eosinophil % 3.0 %      Basophil % 1.0 %      Neutrophils Absolute 6.97 10*3/mm3      Lymphocytes Absolute 2.29 10*3/mm3      Monocytes Absolute 0.73 10*3/mm3      Eosinophils Absolute 0.31 10*3/mm3      Basophils Absolute 0.10 10*3/mm3      Crenated RBC's Mod/2+     Poikilocytes Slight/1+     Polychromasia Mod/2+     Spherocytes Slight/1+     WBC Morphology Normal     Platelet Morphology Normal    Respiratory Panel PCR w/COVID-19(SARS-CoV-2) [facility] In-House, NP Swab in UTM/VTM, 2 HR TAT - Swab, Nasopharynx [[id number]]  (Abnormal) Collected: 06/02/25 2049    Specimen: Swab from Nasopharynx Updated: 06/02/25 2147     " ADENOVIRUS, PCR Not Detected     Coronavirus 229E Not Detected     Coronavirus HKU1 Not Detected     Coronavirus NL63 Not Detected     Coronavirus OC43 Not Detected     COVID19 Not Detected     Human Metapneumovirus Not Detected     Human Rhinovirus/Enterovirus Detected     Influenza A PCR Not Detected     Influenza B PCR Not Detected     Parainfluenza Virus 1 Not Detected     Parainfluenza Virus 2 Not Detected     Parainfluenza Virus 3 Not Detected     Parainfluenza Virus 4 Not Detected     RSV, PCR Not Detected     Bordetella pertussis pcr Not Detected     Bordetella parapertussis PCR Not Detected     Chlamydophila pneumoniae PCR Not Detected     Mycoplasma pneumo by PCR Not Detected    Narrative:      In the setting of a positive respiratory panel with a viral infection PLUS a negative procalcitonin without other underlying concern for bacterial infection, consider observing off antibiotics or discontinuation of antibiotics and continue supportive care. If the respiratory panel is positive for atypical bacterial infection (Bordetella pertussis, Chlamydophila pneumoniae, or Mycoplasma pneumoniae), consider antibiotic de-escalation to target atypical bacterial infection.    High Sensitivity Troponin T 1Hr [[id number]]  (Abnormal) Collected: 06/02/25 2138    Specimen: Blood from Arm, Left Updated: 06/02/25 2214     HS Troponin T 74 ng/L      Troponin T Numeric Delta -3 ng/L      Troponin T % Delta -4    Narrative:      High Sensitive Troponin T Reference Range:  <14.0 ng/L- Negative Female for AMI  <22.0 ng/L- Negative Male for AMI  >=14 - Abnormal Female indicating possible myocardial injury.  >=22 - Abnormal Male indicating possible myocardial injury.   Clinicians would have to utilize clinical acumen, EKG, Troponin, and serial changes to determine if it is an Acute Myocardial Infarction or myocardial injury due to an underlying chronic condition.                 Imaging Results (Last 24 Hours)       Procedure  Component Value Units Date/Time    CT Angiogram Chest Pulmonary Embolism [[id number]] Collected: 06/02/25 2306     Updated: 06/02/25 2314    Narrative:      CTA CHEST WITH IV CONTRAST     HISTORY: Short of breath, tachycardic, history of thromboembolism, rule  out PE; L03.116-Cellulitis of left lower limb; Z78.9-Other specified  health status; R79.89-Other specified abnormal findings of blood  chemistry; I89.0-Lymphedema, not elsewhere classified; B34.8-Other viral  infections of unspecified site; R79.89-Other specified abnormal findings  of blood chemistry; R79.89-Other specified abnormal find     COMPARISON: CTA chest 12/21/2023     TECHNIQUE: CT angiography was performed of the chest with axial images  as well as coronal and sagittal reformatted MIP images provided  following administration of IV contrast. 3-D surface rendered reformats  were obtained of the pulmonary arteries and aorta. Radiation dose  reduction techniques were utilized, including automated exposure  control, and exposure modulation based on body size.     FINDINGS:     There is mild bilateral dependent atelectasis, but there is no  convincing evidence of acute pulmonary infiltrate, pleural effusion,  pneumothorax or suspicious nodule.     Thoracic aorta is normal in caliber.  There are coronary atherosclerotic  vascular calcifications.  There is no suspicious mediastinal adenopathy  or other mass.     Images of the upper abdomen show no acute abnormality, though there are  nonobstructing left renal calculi.  There is no acute bony abnormality.     Bolus timing is marginal, and there is no convincing evidence of  pulmonary embolism, though assessment is limited.       Impression:         Bolus timing is marginal, and there is no convincing evidence of  pulmonary embolism, though assessment is limited.     No acute pulmonary parenchymal abnormality is seen.           This report was finalized on 6/2/2025 11:11 PM by Dr. Cedirc Tiwari M.D on  Workstation: [id number]       XR Chest 1 View [[id number]] Collected: 06/02/25 2050     Updated: 06/02/25 2102    Narrative:         XR CHEST 1 VW-     HISTORY: Shortness of air.     COMPARISON: CT angiogram of chest 12/21/2023, AP chest 12/21/2023.     FINDINGS: The heart and mediastinal structures appear normal. Lungs are  clear and there is no evidence for pulmonary edema or pleural effusion  or infiltrate.       Impression:      No evidence for active disease in the chest.           This report was finalized on 6/2/2025 8:59 PM by Chalino Wynne M.D on  Workstation: [id number]               Results for orders placed during the hospital encounter of 11/01/21    Adult Transthoracic Echo Complete W/ Cont if Necessary Per Protocol    Interpretation Summary  · The left ventricular cavity is mild to moderately dilated.  · Left ventricular ejection fraction appears to be 61 - 65%. Left ventricular systolic function is normal.  · The right ventricular cavity is mildly dilated. Normal right ventricular systolic function noted.  · The left atrial cavity is mild to moderately dilated.  · There is no evidence of pericardial effusion      ECG 12 Lead Dyspnea   Preliminary Result   HEART RATE=84  bpm   RR Veolplca=635  ms   ME Fndhqxsp=375  ms   P Horizontal Axis=0  deg   P Front Axis=31  deg   QRSD Interval=99  ms   QT Agbxwjsx=508  ms   JPuT=794  ms   QRS Axis=-10  deg   T Wave Axis=67  deg   - BORDERLINE ECG -   Sinus rhythm   Probable lateral infarct, old   Date and Time of Study:2025-06-02 19:59:06           Assessment/Plan     Active Hospital Problems    Diagnosis  POA    **Left leg cellulitis [L03.116]  Yes    Rhinovirus [B34.8]  Yes    Type 2 diabetes mellitus [E11.9]  Yes    Wound cellulitis [L03.90]  Yes    Venous insufficiency (chronic) (peripheral) [I87.2]  Yes    Cellulitis of left lower extremity [L03.116]  Yes    Heterozygous factor V Leiden mutation [D68.51]  Yes    History of pulmonary embolism  [Z86.711]  Yes    Lymphedema of both lower extremities [I89.0]  Yes    Obesity, morbid, BMI 50 or higher [E66.01]  Yes    ARNOLDO (obstructive sleep apnea) [G47.33]  Yes     Mr. Melendez is a 55 y.o. with a history of morbid obesity, factor V Leiden with previous VTE on coumadin, HTN, lymphedema, and obesity   He presented with a few symptoms. Cough and dyspnea x 1 week. Also with recent wound to LLE and then had increasing erythema. Had been on keflex for the past week but without improvement. Came to [facility] ER. +rhinovirus. Did have elevated troponin and BNP. Had CTPE. Evidence for cellulitis. Given IV vanc in the ER.    Will have on vanc and ceftriaxone, follow blood cultures. Wound consultation for LE wound  Pharmacy to dose vanc and coumadin, monitor labs  Cardiology consult for elevated troponin. Denies chest pain don't know that will necessary need workup but will see their thoughts. CTPE bolus timing was poor but in setting of therapeutic INR I don't know that we need to repeat.   Resume most home meds when med rec complete. Sliding scale insulin.   Supportive care for rhinovirus  Can use home machine for ARNOLDO  I discussed the patients findings and my recommendations with patient and consulting provider.    VTE Prophylaxis - Warfarin (home med).         Gerardo Drake MD  [facility]  06/02/25  23:28 EDT

## 2025-06-03 NOTE — CONSULTS
[facility] Consult    Patient Name: Kameron Melendez  Age/Sex: 55 y.o. male  : 1970  MRN: [id number]    Date of Admission: 2025  Date of Encounter Visit: 25  Encounter Provider: Elsy Baeza MD  Referring Provider: Gerardo Drake MD  Place of Service: [facility] CARDIOLOGY  Patient Care Team:  Hansel Patel DO as PCP - General (Family Medicine)  Kim Sotelo RPH as Pharmacist    Subjective:     Consulted for: Abnormal troponin    Chief Complaint: Weakness, cellulitis    History of Present Illness:  Kameron Melendez is a 55 y.o. male with prior bilateral pulmonary embolism status post thrombectomy in 2017, pulmonary hypertension, morbid obesity, hyperlipidemia, struct of sleep apnea, recurrent pulmonary embolism in 2019, who presented to the emergency room with complaints of weakness with exertion and lower extremity cellulitis.    The patient reports that he has had issues with left lower extremity cellulitis in the past.  He noticed some increasing redness and his left scrotal area.  He tried taking Keflex at home that he has available to take as needed.  This did not seem to help.  He began developing issues with weakness and fatigue with exertion associated with shortness of breath.  Due to the symptoms he opted to come to the emergency room for evaluation.    Following his arrival EKG showed sinus rhythm with no significant changes.  Vital signs were unremarkable.  CBC showed normal white blood count.  Procalcitonin was elevated at 1.55.  Lactic acid was within normal limits.  proBNP was elevated.  High sensitive troponin was elevated at 77 with a repeat at 74 to 73 and this morning at 60.  INR was therapeutic.  Respiratory viral panel showed evidence of rhinovirus.  CMP was unremarkable.  A CT angiogram of the chest had poor bolus timing but there was no convincing evidence of any pulmonary embolism.  For further evaluation  and started on IV vancomycin.    He denies any chest discomfort.  He reports that the feeling of shortness of breath mainly occurs with the weakness with exertion.  Denies any shortness of breath at rest.      Past Medical History:  Past Medical History:   Diagnosis Date    DVT (deep venous thrombosis)     RLE    Factor V Leiden     Hypertension     Kidney stone     Lymphedema     Lymphedema of left lower extremity     Obesity     ARNOLDO (obstructive sleep apnea)     Pulmonary embolism     bilateral    Pulmonary hypertension     Right ventricular enlargement        Past Surgical History:   Procedure Laterality Date    CARDIAC CATHETERIZATION Bilateral 7/18/2017    Procedure: Pulmonary angiography- [facility] ;  Surgeon: Cedric Coulter MD;  Location:  [facility];  Service:     CARDIAC CATHETERIZATION N/A 7/18/2017    Procedure: Right Heart Cath;  Surgeon: Cedric Coulter MD;  Location:  [facility];  Service:     THROMBECTOMY         Home Medications:   (Not in a hospital admission)      Allergies:  No Known Allergies    Past Social History:  Social History     Socioeconomic History    Marital status:    Tobacco Use    Smoking status: Former     Types: Cigars, Pipe     Start date: 1/1/2006     Quit date: 1/1/2022     Years since quitting: 3.4     Passive exposure: Past    Smokeless tobacco: Never    Tobacco comments:     occasional - < 1 a month   Vaping Use    Vaping status: Never Used   Substance and Sexual Activity    Alcohol use: Yes     Comment: social    Drug use: No    Sexual activity: Defer       Past Family History:  Family History   Problem Relation Age of Onset    Heart disease Mother     Heart failure Mother     Bradycardia Mother     No Known Problems Father     Atrial fibrillation Half-Brother     Prostate cancer Half-Brother     No Known Problems Maternal Grandmother     No Known Problems Maternal Grandfather     No Known Problems Paternal Grandmother     No Known Problems  Paternal Grandfather        Review of Systems:   All systems reviewed. Pertinent positives identified in HPI. All other systems are negative.    Objective:   Temp:  [97.7 °F (36.5 °C)] 97.7 °F (36.5 °C)  Heart Rate:  [] 72  Resp:  [18-23] 18  BP: (113-140)/(52-73) 140/73     Intake/Output Summary (Last 24 hours) at 6/3/2025 0654  Last data filed at 6/3/2025 0224  Gross per 24 hour   Intake 600 ml   Output --   Net 600 ml     Body mass index is 70.62 kg/m².      06/02/25 1944   Weight: (!) 249 kg (550 lb)     Weight change:     Physical Exam:   General Appearance:    Alert, cooperative, in no acute distress   Head:    Normocephalic, without obvious abnormality, atraumatic   Eyes:            Lids and lashes normal, conjunctivae and sclerae normal, no   icterus, no pallor, corneas clear, PERRLA   Ears:    Ears appear intact with no abnormalities noted   Neck:   No adenopathy, supple, trachea midline, no thyromegaly, no   carotid bruit, no JVD   Lungs:     Clear to auscultation,respirations regular, even and unlabored    Heart:    Regular rhythm and normal rate, normal S1 and S2, no murmur, no gallop, no rub, no click   Chest Wall:    No abnormalities observed   Abdomen:     Normal bowel sounds, no masses, no organomegaly, soft        non-tender, non-distended, no guarding, no rebound  tenderness   Extremities:   Moves all extremities well, no edema, no cyanosis, no redness   Pulses:   Pulses palpable and equal bilaterally. Normal radial, carotid, femoral, dorsalis pedis and posterior tibial pulses bilaterally. Normal abdominal aorta   Skin:  Psychiatric:   No bleeding, bruising or rash    Alert and oriented x 3, normal mood and affect       Lab Review:   Results from last 7 days   Lab Units 06/02/25 2027   SODIUM mmol/L 136   POTASSIUM mmol/L 3.6   CHLORIDE mmol/L 105   CO2 mmol/L 20.8*   BUN mg/dL 21.0*   CREATININE mg/dL 1.26   GLUCOSE mg/dL 131*   CALCIUM mg/dL 9.0   AST (SGOT) U/L 18   ALT (SGPT) U/L 16  "    Results from last 7 days   Lab Units 06/02/25 2138 06/02/25 2027   HSTROP T ng/L 74* 77*     Results from last 7 days   Lab Units 06/03/25 0622 06/02/25 2027   WBC 10*3/mm3 8.83 10.41   HEMOGLOBIN g/dL 13.2 12.9*   HEMATOCRIT % 40.6 39.6   PLATELETS 10*3/mm3 330 355     Results from last 7 days   Lab Units 06/03/25 0622 06/02/25 2025 05/30/25  0000   INR  2.66* 2.55* 3.90   APTT seconds  --  38.6*  --      Results from last 7 days   Lab Units 06/02/25 2027   MAGNESIUM mg/dL 1.6           Invalid input(s): \"LDLCALC\"  Results from last 7 days   Lab Units 06/02/25 2027   PROBNP pg/mL 2,859.0*         Imaging:  Imaging Results (Most Recent)       Procedure Component Value Units Date/Time    CT Angiogram Chest Pulmonary Embolism [[id number]] Collected: 06/02/25 2306     Updated: 06/02/25 2314    Narrative:      CTA CHEST WITH IV CONTRAST     HISTORY: Short of breath, tachycardic, history of thromboembolism, rule  out PE; L03.116-Cellulitis of left lower limb; Z78.9-Other specified  health status; R79.89-Other specified abnormal findings of blood  chemistry; I89.0-Lymphedema, not elsewhere classified; B34.8-Other viral  infections of unspecified site; R79.89-Other specified abnormal findings  of blood chemistry; R79.89-Other specified abnormal find     COMPARISON: CTA chest 12/21/2023     TECHNIQUE: CT angiography was performed of the chest with axial images  as well as coronal and sagittal reformatted MIP images provided  following administration of IV contrast. 3-D surface rendered reformats  were obtained of the pulmonary arteries and aorta. Radiation dose  reduction techniques were utilized, including automated exposure  control, and exposure modulation based on body size.     FINDINGS:     There is mild bilateral dependent atelectasis, but there is no  convincing evidence of acute pulmonary infiltrate, pleural effusion,  pneumothorax or suspicious nodule.     Thoracic aorta is normal in caliber.  There are " coronary atherosclerotic  vascular calcifications.  There is no suspicious mediastinal adenopathy  or other mass.     Images of the upper abdomen show no acute abnormality, though there are  nonobstructing left renal calculi.  There is no acute bony abnormality.     Bolus timing is marginal, and there is no convincing evidence of  pulmonary embolism, though assessment is limited.       Impression:         Bolus timing is marginal, and there is no convincing evidence of  pulmonary embolism, though assessment is limited.     No acute pulmonary parenchymal abnormality is seen.           This report was finalized on 6/2/2025 11:11 PM by Dr. Cedric Tiwari M.D on Workstation: [id number]       XR Chest 1 View [[id number]] Collected: 06/02/25 2050     Updated: 06/02/25 2102    Narrative:         XR CHEST 1 VW-     HISTORY: Shortness of air.     COMPARISON: CT angiogram of chest 12/21/2023, AP chest 12/21/2023.     FINDINGS: The heart and mediastinal structures appear normal. Lungs are  clear and there is no evidence for pulmonary edema or pleural effusion  or infiltrate.       Impression:      No evidence for active disease in the chest.           This report was finalized on 6/2/2025 8:59 PM by Chalino Wynne M.D on  Workstation: [id number]               I personally viewed and interpreted the patient's EKG    Assessment/Plan:     1.  Abnormal troponin.  Mildly elevated but relatively flat to downtrending.  EKG without any associated changes.  He is not having any symptoms suggestive of angina.  CT of the chest on an optimal study did not show any evidence of significant pulmonary embolism.  His INR has been therapeutic.  2.  Cellulitis.  Appears to be involving his left groin and scrotum.  Now on IV antibiotics.  Evidence of open wound apparently on scrotum that wound care is managing.  3.  Rhinovirus infection.  Likely responsible for his symptoms of weakness, cough and shortness of breath.  4.  History of  recurrent pulmonary embolism.  On chronic anticoagulation with warfarin.  5.  Factor V Leiden  6.  Hypertension.  Well-controlled  7.  Lymphedema  7.  Morbid obesity.    - Although troponins are mildly elevated nothing else to indicate evidence of ACS.  His troponins are downtrending.    -Unfortunately due to his body habitus we have limited options on further cardiac workup.  Fortunately without any other abnormal findings outside of mildly elevated troponin I do not see any indication for further cardiac workup.  Recommend just monitoring for now.  -Continue warfarin for anticoagulation.       Thank you for allowing me to participate in the care of Kameron Melendez. Feel free to contact me directly with any further questions or concerns.    Elsy Baeza MD  [facility]  06/03/25  06:54 EDT

## 2025-06-03 NOTE — PROGRESS NOTES
Name: Kameron Melendez ADMIT: 2025   : 1970  PCP: Hansel Patel DO    MRN: [id number] LOS: 1 days   AGE/SEX: 55 y.o. male  ROOM: /     Subjective   Subjective   Patient is seen at bedside, lying in bed.       Objective   Objective   Vital Signs  Temp:  [97.7 °F (36.5 °C)-98 °F (36.7 °C)] 98 °F (36.7 °C)  Heart Rate:  [] 73  Resp:  [16-23] 18  BP: (105-140)/(45-73) 120/50  SpO2:  [91 %-99 %] 95 %  on  Flow (L/min) (Oxygen Therapy):  [2-4] 2;   Device (Oxygen Therapy): nasal cannula  Body mass index is 70.62 kg/m².  Physical Exam  General, awake and alert.  Head and ENT, normocephalic and atraumatic.  Lungs, symmetric expansion, equal air entry bilaterally.  Heart, regular rate and rhythm.  Abdomen, soft and nontender.  Draining wound on the pannus  Extremities, no clubbing or cyanosis.  Bilateral lower extreme chronic appearing lymphedema  Neuro, no focal deficits.  Skin: Warm and no rash.  Psych, normal mood and affect.  Musculoskeletal, joint examination is grossly normal.      Results Review     I reviewed the patient's new clinical results.  Results from last 7 days   Lab Units 25   WBC 10*3/mm3 8.83 10.41   HEMOGLOBIN g/dL 13.2 12.9*   PLATELETS 10*3/mm3 330 355     Results from last 7 days   Lab Units 25   SODIUM mmol/L 139 136   POTASSIUM mmol/L 3.9 3.6   CHLORIDE mmol/L 105 105   CO2 mmol/L 21.8* 20.8*   BUN mg/dL 19.0 21.0*   CREATININE mg/dL 1.08 1.26   GLUCOSE mg/dL 115* 131*   EGFR mL/min/1.73 81.0 67.4     Results from last 7 days   Lab Units 25   ALBUMIN g/dL 3.1*   BILIRUBIN mg/dL 0.4   ALK PHOS U/L 112   AST (SGOT) U/L 18   ALT (SGPT) U/L 16     Results from last 7 days   Lab Units 25  0625   CALCIUM mg/dL 8.9 9.0   ALBUMIN g/dL  --  3.1*   MAGNESIUM mg/dL  --  1.6     Results from last 7 days   Lab Units 25   PROCALCITONIN ng/mL 1.55*   LACTATE mmol/L 1.9     Hemoglobin A1C    Date/Time Value Ref Range Status   06/03/2025 0622 7.10 (H) 4.80 - 5.60 % Final     Glucose   Date/Time Value Ref Range Status   06/03/2025 1726 113 70 - 130 mg/dL Final   06/03/2025 1127 134 (H) 70 - 130 mg/dL Final       CT Angiogram Chest Pulmonary Embolism  Result Date: 6/2/2025   Bolus timing is marginal, and there is no convincing evidence of pulmonary embolism, though assessment is limited.  No acute pulmonary parenchymal abnormality is seen.    This report was finalized on 6/2/2025 11:11 PM by Dr. Cedric Tiwari M.D on Workstation: [id number]      XR Chest 1 View  Result Date: 6/2/2025  No evidence for active disease in the chest.    This report was finalized on 6/2/2025 8:59 PM by Chalino Wynne M.D on Workstation: [id number]        I have personally reviewed all medications:  Scheduled Medications  cefTRIAXone, 2,000 mg, Intravenous, Q24H  hydrocortisone-bacitracin-zinc oxide-nystatin, 1 Application, Topical, BID  insulin lispro, 2-9 Units, Subcutaneous, 4x Daily AC & at Bedtime  sodium chloride, 10 mL, Intravenous, Q12H  sodium hypochlorite, , Topical, BID  vancomycin, 1,750 mg, Intravenous, Q12H  warfarin, 10 mg, Oral, Once per day on Sunday Tuesday Wednesday Thursday Saturday  warfarin, 15 mg, Oral, Once per day on Monday Friday    Infusions  Pharmacy to dose vancomycin,   Pharmacy to dose warfarin,     Diet  Diet: Cardiac, Diabetic; Healthy Heart (2-3 Na+); Consistent Carbohydrate; Fluid Consistency: Thin (IDDSI 0)    I have personally reviewed:  [x]  Laboratory   [x]  Microbiology   [x]  Radiology   [x]  EKG/Telemetry  [x]  Cardiology/Vascular   []  Pathology    []  Records       Assessment/Plan     Active Hospital Problems    Diagnosis  POA    **Left leg cellulitis [L03.116]  Yes    Rhinovirus [B34.8]  Yes    Type 2 diabetes mellitus [E11.9]  Yes    Wound cellulitis [L03.90]  Yes    Venous insufficiency (chronic) (peripheral) [I87.2]  Yes    Cellulitis of left lower extremity [L03.116]   Yes    Heterozygous factor V Leiden mutation [D68.51]  Yes    History of pulmonary embolism [Z86.711]  Yes    Lymphedema of both lower extremities [I89.0]  Yes    Obesity, morbid, BMI 50 or higher [E66.01]  Yes    ARNOLDO (obstructive sleep apnea) [G47.33]  Yes      Resolved Hospital Problems   No resolved problems to display.       55 y.o. male admitted with Left leg cellulitis.    Assessment and plan  1.  Subcutaneous wound on the left lower pannus, concerning for soft tissue abscess and cellulitis, surgery consulted today.  Follow management maintenance.  Continue IV antibiotics.    2.  Left leg cellulitis, antibiotic management per ID recommendations.    3.  Factor V Leiden mutation, on Coumadin.    4.  Super morbid obesity, BMI noted to be above 70, patient would benefit from weight loss.    5.  Diabetes mellitus, continue Accu-Cheks and sliding scale insulin coverage.    6.  Human rhinovirus/enterovirus, supportive management.    7.  CODE STATUS full code.  Further plans will based on the hospital course.      Randall Jordan MD  [facility]  06/03/25  18:10 EDT

## 2025-06-04 ENCOUNTER — APPOINTMENT (OUTPATIENT)
Dept: CARDIOLOGY | Facility: HOSPITAL | Age: 55
DRG: 603 | End: 2025-06-04
Payer: COMMERCIAL

## 2025-06-04 LAB
ANION GAP SERPL CALCULATED.3IONS-SCNC: 9.9 MMOL/L (ref 5–15)
AORTIC ARCH: 3.3 CM
AORTIC DIMENSIONLESS INDEX: 0.61 (DI)
ASCENDING AORTA: 3.6 CM
AV MEAN PRESS GRAD SYS DOP V1V2: 7 MMHG
AV VMAX SYS DOP: 181.3 CM/SEC
BH CV ECHO MEAS - ACS: 2.18 CM
BH CV ECHO MEAS - AO MAX PG: 13.2 MMHG
BH CV ECHO MEAS - AO ROOT AREA (BSA CORRECTED): 1.1 CM2
BH CV ECHO MEAS - AO ROOT DIAM: 3.7 CM
BH CV ECHO MEAS - AO V2 VTI: 38 CM
BH CV ECHO MEAS - AVA(I,D): 2.2 CM2
BH CV ECHO MEAS - EDV(CUBED): 173.3 ML
BH CV ECHO MEAS - EDV(MOD-SP2): 231 ML
BH CV ECHO MEAS - EDV(MOD-SP4): 235 ML
BH CV ECHO MEAS - EF(MOD-SP2): 59.7 %
BH CV ECHO MEAS - EF(MOD-SP4): 57 %
BH CV ECHO MEAS - ESV(CUBED): 60.5 ML
BH CV ECHO MEAS - ESV(MOD-SP2): 93 ML
BH CV ECHO MEAS - ESV(MOD-SP4): 101 ML
BH CV ECHO MEAS - FS: 29.6 %
BH CV ECHO MEAS - IVS/LVPW: 0.96 CM
BH CV ECHO MEAS - IVSD: 1.2 CM
BH CV ECHO MEAS - LAT PEAK E' VEL: 11.1 CM/SEC
BH CV ECHO MEAS - LV DIASTOLIC VOL/BSA (35-75): 70.3 CM2
BH CV ECHO MEAS - LV MASS(C)D: 285.9 GRAMS
BH CV ECHO MEAS - LV MAX PG: 4.7 MMHG
BH CV ECHO MEAS - LV MEAN PG: 2.7 MMHG
BH CV ECHO MEAS - LV SYSTOLIC VOL/BSA (12-30): 30.2 CM2
BH CV ECHO MEAS - LV V1 MAX: 108.8 CM/SEC
BH CV ECHO MEAS - LV V1 VTI: 23.2 CM
BH CV ECHO MEAS - LVIDD: 5.6 CM
BH CV ECHO MEAS - LVIDS: 3.9 CM
BH CV ECHO MEAS - LVOT AREA: 3.6 CM2
BH CV ECHO MEAS - LVOT DIAM: 2.14 CM
BH CV ECHO MEAS - LVPWD: 1.25 CM
BH CV ECHO MEAS - MED PEAK E' VEL: 7.4 CM/SEC
BH CV ECHO MEAS - MV A DUR: 0.13 SEC
BH CV ECHO MEAS - MV A MAX VEL: 98.8 CM/SEC
BH CV ECHO MEAS - MV DEC SLOPE: 314.2 CM/SEC2
BH CV ECHO MEAS - MV DEC TIME: 0.28 SEC
BH CV ECHO MEAS - MV E MAX VEL: 91.4 CM/SEC
BH CV ECHO MEAS - MV E/A: 0.92
BH CV ECHO MEAS - MV MAX PG: 3 MMHG
BH CV ECHO MEAS - MV MEAN PG: 1.65 MMHG
BH CV ECHO MEAS - MV P1/2T: 76.8 MSEC
BH CV ECHO MEAS - MV V2 VTI: 29.3 CM
BH CV ECHO MEAS - MVA(P1/2T): 2.9 CM2
BH CV ECHO MEAS - MVA(VTI): 2.8 CM2
BH CV ECHO MEAS - PA ACC TIME: 0.09 SEC
BH CV ECHO MEAS - PA V2 MAX: 115.4 CM/SEC
BH CV ECHO MEAS - PULM A REVS DUR: 0.15 SEC
BH CV ECHO MEAS - PULM A REVS VEL: 27.2 CM/SEC
BH CV ECHO MEAS - PULM DIAS VEL: 35.5 CM/SEC
BH CV ECHO MEAS - PULM S/D: 1.12
BH CV ECHO MEAS - PULM SYS VEL: 39.7 CM/SEC
BH CV ECHO MEAS - RV MAX PG: 4.5 MMHG
BH CV ECHO MEAS - RV V1 MAX: 106.4 CM/SEC
BH CV ECHO MEAS - RV V1 VTI: 21.6 CM
BH CV ECHO MEAS - SV(LVOT): 83.5 ML
BH CV ECHO MEAS - SV(MOD-SP2): 138 ML
BH CV ECHO MEAS - SV(MOD-SP4): 134 ML
BH CV ECHO MEAS - SVI(LVOT): 25 ML/M2
BH CV ECHO MEAS - SVI(MOD-SP2): 41.3 ML/M2
BH CV ECHO MEAS - SVI(MOD-SP4): 40.1 ML/M2
BH CV ECHO MEAS - TAPSE (>1.6): 2.6 CM
BH CV ECHO MEASUREMENTS AVERAGE E/E' RATIO: 9.88
BH CV XLRA - TDI S': 16.8 CM/SEC
BUN SERPL-MCNC: 16 MG/DL (ref 6–20)
BUN/CREAT SERPL: 14.5 (ref 7–25)
CALCIUM SPEC-SCNC: 8.6 MG/DL (ref 8.6–10.5)
CHLORIDE SERPL-SCNC: 105 MMOL/L (ref 98–107)
CO2 SERPL-SCNC: 20.1 MMOL/L (ref 22–29)
CREAT SERPL-MCNC: 1.1 MG/DL (ref 0.76–1.27)
DEPRECATED RDW RBC AUTO: 47.3 FL (ref 37–54)
EGFRCR SERPLBLD CKD-EPI 2021: 79.3 ML/MIN/1.73
ERYTHROCYTE [DISTWIDTH] IN BLOOD BY AUTOMATED COUNT: 14 % (ref 12.3–15.4)
GLUCOSE BLDC GLUCOMTR-MCNC: 110 MG/DL (ref 70–130)
GLUCOSE BLDC GLUCOMTR-MCNC: 115 MG/DL (ref 70–130)
GLUCOSE BLDC GLUCOMTR-MCNC: 126 MG/DL (ref 70–130)
GLUCOSE BLDC GLUCOMTR-MCNC: 131 MG/DL (ref 70–130)
GLUCOSE SERPL-MCNC: 117 MG/DL (ref 65–99)
HCT VFR BLD AUTO: 37.3 % (ref 37.5–51)
HGB BLD-MCNC: 12.3 G/DL (ref 13–17.7)
INR PPP: 2.77 (ref 0.9–1.1)
LEFT ATRIUM VOLUME INDEX: 20.4 ML/M2
LV EF BIPLANE MOD: 58.2 %
MCH RBC QN AUTO: 30.6 PG (ref 26.6–33)
MCHC RBC AUTO-ENTMCNC: 33 G/DL (ref 31.5–35.7)
MCV RBC AUTO: 92.8 FL (ref 79–97)
PLATELET # BLD AUTO: 353 10*3/MM3 (ref 140–450)
PMV BLD AUTO: 9.4 FL (ref 6–12)
POTASSIUM SERPL-SCNC: 4.3 MMOL/L (ref 3.5–5.2)
PROTHROMBIN TIME: 29.6 SECONDS (ref 11.7–14.2)
QT INTERVAL: 388 MS
QTC INTERVAL: 459 MS
RBC # BLD AUTO: 4.02 10*6/MM3 (ref 4.14–5.8)
SINUS: 3.3 CM
SODIUM SERPL-SCNC: 135 MMOL/L (ref 136–145)
STJ: 3.2 CM
VANCOMYCIN TROUGH SERPL-MCNC: 19.1 MCG/ML (ref 5–20)
WBC NRBC COR # BLD AUTO: 8.18 10*3/MM3 (ref 3.4–10.8)

## 2025-06-04 PROCEDURE — 25010000002 VANCOMYCIN HCL 1.25 G RECONSTITUTED SOLUTION 1 EACH VIAL: Performed by: INTERNAL MEDICINE

## 2025-06-04 PROCEDURE — 87070 CULTURE OTHR SPECIMN AEROBIC: CPT | Performed by: INTERNAL MEDICINE

## 2025-06-04 PROCEDURE — 25810000003 SODIUM CHLORIDE 0.9 % SOLUTION: Performed by: INTERNAL MEDICINE

## 2025-06-04 PROCEDURE — 87077 CULTURE AEROBIC IDENTIFY: CPT | Performed by: INTERNAL MEDICINE

## 2025-06-04 PROCEDURE — 80202 ASSAY OF VANCOMYCIN: CPT | Performed by: INTERNAL MEDICINE

## 2025-06-04 PROCEDURE — 36415 COLL VENOUS BLD VENIPUNCTURE: CPT | Performed by: INTERNAL MEDICINE

## 2025-06-04 PROCEDURE — 80048 BASIC METABOLIC PNL TOTAL CA: CPT | Performed by: INTERNAL MEDICINE

## 2025-06-04 PROCEDURE — 25010000002 VANCOMYCIN 10 G RECONSTITUTED SOLUTION: Performed by: INTERNAL MEDICINE

## 2025-06-04 PROCEDURE — 99233 SBSQ HOSP IP/OBS HIGH 50: CPT | Performed by: INTERNAL MEDICINE

## 2025-06-04 PROCEDURE — 85610 PROTHROMBIN TIME: CPT | Performed by: INTERNAL MEDICINE

## 2025-06-04 PROCEDURE — 82948 REAGENT STRIP/BLOOD GLUCOSE: CPT

## 2025-06-04 PROCEDURE — 93306 TTE W/DOPPLER COMPLETE: CPT

## 2025-06-04 PROCEDURE — 93306 TTE W/DOPPLER COMPLETE: CPT | Performed by: INTERNAL MEDICINE

## 2025-06-04 PROCEDURE — 87205 SMEAR GRAM STAIN: CPT | Performed by: INTERNAL MEDICINE

## 2025-06-04 PROCEDURE — 99232 SBSQ HOSP IP/OBS MODERATE 35: CPT | Performed by: INTERNAL MEDICINE

## 2025-06-04 PROCEDURE — 25510000001 PERFLUTREN 6.52 MG/ML SUSPENSION 2 ML VIAL: Performed by: INTERNAL MEDICINE

## 2025-06-04 PROCEDURE — 87186 SC STD MICRODIL/AGAR DIL: CPT | Performed by: INTERNAL MEDICINE

## 2025-06-04 PROCEDURE — 85027 COMPLETE CBC AUTOMATED: CPT | Performed by: INTERNAL MEDICINE

## 2025-06-04 PROCEDURE — 99232 SBSQ HOSP IP/OBS MODERATE 35: CPT

## 2025-06-04 PROCEDURE — 25810000003 SODIUM CHLORIDE 0.9 % SOLUTION 250 ML FLEX CONT: Performed by: INTERNAL MEDICINE

## 2025-06-04 RX ORDER — ECHINACEA PURPUREA EXTRACT 125 MG
1 TABLET ORAL
Status: DISCONTINUED | OUTPATIENT
Start: 2025-06-04 | End: 2025-06-07 | Stop reason: HOSPADM

## 2025-06-04 RX ADMIN — PERFLUTREN 3 ML: 6.52 INJECTION, SUSPENSION INTRAVENOUS at 16:17

## 2025-06-04 RX ADMIN — Medication 10 ML: at 22:03

## 2025-06-04 RX ADMIN — WARFARIN SODIUM 10 MG: 10 TABLET ORAL at 18:37

## 2025-06-04 RX ADMIN — VANCOMYCIN HYDROCHLORIDE 1250 MG: 1.25 INJECTION, POWDER, LYOPHILIZED, FOR SOLUTION INTRAVENOUS at 21:30

## 2025-06-04 RX ADMIN — Medication 10 ML: at 09:15

## 2025-06-04 RX ADMIN — VANCOMYCIN HYDROCHLORIDE 1750 MG: 10 INJECTION, POWDER, LYOPHILIZED, FOR SOLUTION INTRAVENOUS at 10:24

## 2025-06-04 RX ADMIN — SODIUM HYPOCHLORITE: 1.25 SOLUTION TOPICAL at 21:43

## 2025-06-04 RX ADMIN — ZINC OXIDE 1 APPLICATION: 200 OINTMENT TOPICAL at 09:14

## 2025-06-04 RX ADMIN — ZINC OXIDE 1 APPLICATION: 200 OINTMENT TOPICAL at 21:43

## 2025-06-04 RX ADMIN — SODIUM HYPOCHLORITE: 1.25 SOLUTION TOPICAL at 09:15

## 2025-06-04 NOTE — PLAN OF CARE
Problem: Adult Inpatient Plan of Care  Goal: Plan of Care Review  Outcome: Progressing  Flowsheets (Taken 6/4/2025 0406)  Progress: no change  Plan of Care Reviewed With: patient   Goal Outcome Evaluation:  Plan of Care Reviewed With: patient        Progress: no change                Pt oxygen has been dropping in the night into the 70's but quickly recovers. 5L nasal cannula applied, call out to md.Pt on room air while awake. Pt refuses the to wear a CPAP or BiPap and would only wear nasal cannula. LLE/pannus wound care completed. Pt ambulated to bathroom with assist of 1 with walker. General surgery would like to do surgery on wound but pt only wants to do IV antibiotics at this time.

## 2025-06-04 NOTE — PROGRESS NOTES
"[facility] Services: Vancomycin Pharmacokinetic Initial Consult Note    Kameron Melendez is a 55 y.o. male who is on day 2 of pharmacy to dose vancomycin.    Indication: Skin and Soft Tissue  Consulting Provider: Dr. Drake  Planned Duration of Therapy: 7 days  Loading Dose Ordered or Given: 3000 mg on 6/2 at 2042  MRSA PCR performed: 6/2; Result: negative  Culture/Source: 6/2 blood cx in process  Target: -600 mg/L.hr   Pertinent Vanc Dosing History: none  Other Antimicrobials: ceftriaxone    Vitals/Labs  Ht: 188 cm (74\"); Wt: (!) 249 kg (550 lb)  Temp Readings from Last 1 Encounters:   06/04/25 98.1 °F (36.7 °C) (Oral)    Estimated Creatinine Clearance: 159.9 mL/min (by C-G formula based on SCr of 1.1 mg/dL).     Results from last 7 days   Lab Units 06/04/25  0256 06/03/25  0622 06/02/25 2027   CREATININE mg/dL 1.10 1.08 1.26   WBC 10*3/mm3 8.18 8.83 10.41     Assessment/Plan:    1750 q12 for  so decrease to 1250 q12 for     Vancomycin Dose: decrease to 1250 mg IV every 12 hours   Predictive AUC level for the dose ordered is 481 mg/L.hr, which is within the target of 400-600 mg/L.hr  Vanc trough 6/7 0830     Pharmacy will follow patient's kidney function and will adjust doses and obtain levels as necessary. Thank you for involving pharmacy in this patient's care. Please contact pharmacy with any questions or concerns.                           Cristine Young, PharmD, BCPS  6/4/2025 14:39 EDT     "

## 2025-06-04 NOTE — NURSING NOTE
CWON note: general surgery has recommended surgical debridement, pt is refusing, wanting a more conservative approach. Surgery to follow back up with pt today or tomorrow. They have placed new wound care orders. Will defer care and management to general surgery. Will follow along prn.

## 2025-06-04 NOTE — CONSULTS
"Diabetes Education  Assessment/Teaching    Patient Name:  Kameron Melendez  YOB: 1970  MRN: [id number]  Admit Date:  6/2/2025      Assessment Date:  6/4/2025  Flowsheet Row Most Recent Value   General Information     Referral From: Other -primary RN. Meet with 56 y/o at bedside on CVI to assess needs for DM ed. Pt reports he requested ed. His dtr has DM and suggested it to pt.   Height 188 cm (74\")   Height Method Stated   Weight 249 kg (550 lb)   Weight Method Stated   Diabetes History    What type of diabetes do you have? Type 2   Length of Diabetes Diagnosis Newly diagnosed <6 months. A1c last year was 5.8- now 7.1   Have you had diabetes education/teaching in the past? no   Do you test your blood sugar at home? no   Do you have any diabetes complications? circulation problems   Education Preferences    Barriers to Learning -- no learning barriers evident at this time.   Assessment Topics    Taking Medication - Assessment Needs education -pt reports taking Mounjaro outpt.   Problem Solving - Assessment Needs education   Reducing Risk - Assessment Needs education   Healthy Coping - Assessment Competent   Monitoring - Assessment Needs education   DM Goals    Contact Plan Follow-up medical care with PCP            Flowsheet Row Most Recent Value   DM Education Needs    Meter Needs meter   Medication Other injectables, Mounjaro, Ozempic   Problem Solving Hypoglycemia, Signs, Treatment   Reducing Risks A1C testing, goal a1c   Healthy Coping Appropriate   Discharge Plan Home, Follow-up with PCP   Motivation Engaged   Teaching Method Explanation, Discussion, Handouts   Patient Response Verbalized understanding        Other Comments:  Addendum. Per our [facility] pharmacist, the least expensive BG meter supply will be [employer] or [employer]. Call pt to inform him. No answer. LM on pt mobile to this effect.   Electronically signed by:  Debbie Davis, RN, BSN, [facility] [facility]  06/04/25 17:10 EDT  "

## 2025-06-04 NOTE — PROGRESS NOTES
Name: Kameron Melendez ADMIT: 2025   : 1970  PCP: Hansel Patel DO    MRN: [id number] LOS: 2 days   AGE/SEX: 55 y.o. male  ROOM: [facility]     Subjective   Subjective   Patient is seen at bedside, no new complaints.       Objective   Objective   Vital Signs  Temp:  [97.8 °F (36.6 °C)-98.2 °F (36.8 °C)] 98.1 °F (36.7 °C)  Heart Rate:  [63-76] 76  Resp:  [20] 20  BP: (117-150)/(50-72) 150/72  SpO2:  [91 %-96 %] 91 %  on  Flow (L/min) (Oxygen Therapy):  [4-5] 5;   Device (Oxygen Therapy): humidified;nasal cannula  Body mass index is 70.62 kg/m².  Physical Exam  General, awake and alert.  Head and ENT, normocephalic and atraumatic.  Lungs, symmetric expansion, equal air entry bilaterally.  Heart, regular rate and rhythm.  Abdomen, soft and nontender.  Draining wound on the pannus  Extremities, no clubbing or cyanosis.  Bilateral lower extreme chronic appearing lymphedema  Neuro, no focal deficits.  Skin: Warm and no rash.  Psych, normal mood and affect.  Musculoskeletal, joint examination is grossly normal.    Copied text material from yesterday's note has been reviewed for appropriate changes and remains accurate as of 25.      Results Review     I reviewed the patient's new clinical results.  Results from last 7 days   Lab Units 25  0256 25   WBC 10*3/mm3 8.18 8.83 10.41   HEMOGLOBIN g/dL 12.3* 13.2 12.9*   PLATELETS 10*3/mm3 353 330 355     Results from last 7 days   Lab Units 25  0256 25   SODIUM mmol/L 135* 139 136   POTASSIUM mmol/L 4.3 3.9 3.6   CHLORIDE mmol/L 105 105 105   CO2 mmol/L 20.1* 21.8* 20.8*   BUN mg/dL 16.0 19.0 21.0*   CREATININE mg/dL 1.10 1.08 1.26   GLUCOSE mg/dL 117* 115* 131*   EGFR mL/min/1.73 79.3 81.0 67.4     Results from last 7 days   Lab Units 25   ALBUMIN g/dL 3.1*   BILIRUBIN mg/dL 0.4   ALK PHOS U/L 112   AST (SGOT) U/L 18   ALT (SGPT) U/L 16     Results from last 7 days   Lab Units  06/04/25  0256 06/03/25  0622 06/02/25 2027   CALCIUM mg/dL 8.6 8.9 9.0   ALBUMIN g/dL  --   --  3.1*   MAGNESIUM mg/dL  --   --  1.6     Results from last 7 days   Lab Units 06/02/25 2027   PROCALCITONIN ng/mL 1.55*   LACTATE mmol/L 1.9     Hemoglobin A1C   Date/Time Value Ref Range Status   06/03/2025 0622 7.10 (H) 4.80 - 5.60 % Final     Glucose   Date/Time Value Ref Range Status   06/04/2025 1556 115 70 - 130 mg/dL Final   06/04/2025 1053 131 (H) 70 - 130 mg/dL Final   06/04/2025 0628 110 70 - 130 mg/dL Final   06/03/2025 2024 144 (H) 70 - 130 mg/dL Final   06/03/2025 1829 153 (H) 70 - 130 mg/dL Final   06/03/2025 1726 113 70 - 130 mg/dL Final   06/03/2025 1127 134 (H) 70 - 130 mg/dL Final       CT Angiogram Chest Pulmonary Embolism  Result Date: 6/2/2025   Bolus timing is marginal, and there is no convincing evidence of pulmonary embolism, though assessment is limited.  No acute pulmonary parenchymal abnormality is seen.    This report was finalized on 6/2/2025 11:11 PM by Dr. Cedric Tiwari M.D on Workstation: [id number]      XR Chest 1 View  Result Date: 6/2/2025  No evidence for active disease in the chest.    This report was finalized on 6/2/2025 8:59 PM by Chalino Wynne M.D on Workstation: [id number]        I have personally reviewed all medications:  Scheduled Medications  cefTRIAXone, 2,000 mg, Intravenous, Q24H  hydrocortisone-bacitracin-zinc oxide-nystatin, 1 Application, Topical, BID  insulin lispro, 2-9 Units, Subcutaneous, 4x Daily AC & at Bedtime  sodium chloride, 10 mL, Intravenous, Q12H  sodium hypochlorite, , Topical, BID  vancomycin, 1,250 mg, Intravenous, Q12H  warfarin, 10 mg, Oral, Once per day on Sunday Tuesday Wednesday Thursday Saturday  warfarin, 15 mg, Oral, Once per day on Monday Friday    Infusions  Pharmacy to dose vancomycin,   Pharmacy to dose warfarin,     Diet  Diet: Cardiac, Diabetic; Healthy Heart (2-3 Na+); Consistent Carbohydrate; Fluid Consistency: Thin (IDDSI  0)    I have personally reviewed:  [x]  Laboratory   [x]  Microbiology   [x]  Radiology   [x]  EKG/Telemetry  [x]  Cardiology/Vascular   []  Pathology    []  Records       Assessment/Plan     Active Hospital Problems    Diagnosis  POA    **Left leg cellulitis [L03.116]  Yes    Rhinovirus [B34.8]  Yes    Type 2 diabetes mellitus [E11.9]  Yes    Wound cellulitis [L03.90]  Yes    Venous insufficiency (chronic) (peripheral) [I87.2]  Yes    Cellulitis of left lower extremity [L03.116]  Yes    Heterozygous factor V Leiden mutation [D68.51]  Yes    History of pulmonary embolism [Z86.711]  Yes    Lymphedema of both lower extremities [I89.0]  Yes    Obesity, morbid, BMI 50 or higher [E66.01]  Yes    ARNOLDO (obstructive sleep apnea) [G47.33]  Yes      Resolved Hospital Problems   No resolved problems to display.       55 y.o. male admitted with Left leg cellulitis.    Assessment and plan  1.  Subcutaneous wound on the left lower pannus, concerning for soft tissue abscess and cellulitis, surgery consulted.  Follow management maintenance.  Continue IV antibiotics.  Patient requested conservative management first.     2.  Left leg cellulitis, antibiotic management per ID recommendations.     3.  Factor V Leiden mutation, on Coumadin.     4.  Super morbid obesity, BMI noted to be above 70, patient would benefit from weight loss.     5.  Diabetes mellitus, continue Accu-Cheks and sliding scale insulin coverage.     6.  Human rhinovirus/enterovirus, supportive management.     7.  CODE STATUS full code.  Further plans will based on the hospital course.     Expected Discharge Date: 6/8/2025; Expected Discharge Time:       Randall Jordan MD  [facility]  06/04/25  17:51 EDT

## 2025-06-04 NOTE — PROGRESS NOTES
Acute Care General Surgery Progress Note    Patient: Kameron Melendez  YOB: 1970  MRN: [id number]      Assessment  Kameron Melendez is a 55 y.o. male with a LLE wound with abscess, surrounding cellulitis. No changes in wound appearance today from yesterday. Re-packed wound with Dakins WTD dressing, he tolerated well. There is currently no leukocytosis, and he is afebrile with normal vitals.     Plan  As per Dr. Ontiveros recommendation yesterday, discussed with patient today about benefits of proceeding with surgical debridement, he would like to hold off at this time and continue with BID Dakins WTD dressings and re-evaluate tomorrow  Antibiotics per ID      Subjective  Denies any fever or chills. Denies any pain from abscess site.     Objective  Vitals  Temp 98.2  HR 75  RR 20  /54  SpO2 96       Physical Exam  Constitutional: alert, oriented, in no acute distress  Respiratory: Normal work of breathing, Symmetric excursion  Cardiovascular: Well pefused, no jugular venous distention evident   Skin: LLE wound - small open wound about 2cm wide and approximately 2-3 cm deep. The inner area of the wound is dark with purulent drainage. Surrounding area with cellulitis. Malodorous. No surrounding fluctuation or crepitus on palpation. The area is non-tender.       Laboratory Results  Results from last 7 days   Lab Units 06/04/25 0256 06/03/25 0622 06/02/25 2027   WBC 10*3/mm3 8.18 8.83 10.41   HEMOGLOBIN g/dL 12.3* 13.2 12.9*   HEMATOCRIT % 37.3* 40.6 39.6   PLATELETS 10*3/mm3 353 330 355     Results from last 7 days   Lab Units 06/04/25  0256 06/03/25 0622 06/02/25 2027   SODIUM mmol/L 135* 139 136   POTASSIUM mmol/L 4.3 3.9 3.6   CHLORIDE mmol/L 105 105 105   CO2 mmol/L 20.1* 21.8* 20.8*   BUN mg/dL 16.0 19.0 21.0*   CREATININE mg/dL 1.10 1.08 1.26   CALCIUM mg/dL 8.6 8.9 9.0   BILIRUBIN mg/dL  --   --  0.4   ALK PHOS U/L  --   --  112   ALT (SGPT) U/L  --   --  16   AST (SGOT) U/L  --   --  18    GLUCOSE mg/dL 117* 115* 131*     I have personally reviewed 6/4 labs       FADY Cruz  Acute Care General Surgery  [facility]    [address], [address]  [place], [address]  P: [phone]  F: [phone]

## 2025-06-04 NOTE — PROGRESS NOTES
ID progress note    CC: Follow-up left thigh/pannus wound infection    Subjective: No acute events.  No fever.  He feels like his area of concern is slightly better.  He was seen by general surgery yesterday who recommended operative debridement but the patient declined and first wanted to try conservative measures.    Medications:    Current Facility-Administered Medications:     acetaminophen (TYLENOL) tablet 650 mg, 650 mg, Oral, Q4H PRN **OR** acetaminophen (TYLENOL) 160 MG/5ML oral solution 650 mg, 650 mg, Oral, Q4H PRN **OR** acetaminophen (TYLENOL) suppository 650 mg, 650 mg, Rectal, Q4H PRN, Gerardo Drake MD    sennosides-docusate (PERICOLACE) 8.6-50 MG per tablet 2 tablet, 2 tablet, Oral, BID PRN **AND** polyethylene glycol (MIRALAX) packet 17 g, 17 g, Oral, Daily PRN **AND** bisacodyl (DULCOLAX) EC tablet 5 mg, 5 mg, Oral, Daily PRN **AND** bisacodyl (DULCOLAX) suppository 10 mg, 10 mg, Rectal, Daily PRN, Greardo Drake MD    Calcium Replacement - Follow Nurse / BPA Driven Protocol, , Not Applicable, PRN, Gerardo Drake MD    cefTRIAXone (ROCEPHIN) 2,000 mg in sodium chloride 0.9 % 100 mL MBP, 2,000 mg, Intravenous, Q24H, Gerardo Drake MD, Last Rate: 200 mL/hr at 06/03/25 2353, 2,000 mg at 06/03/25 2353    dextrose (D50W) (25 g/50 mL) IV injection 25 g, 25 g, Intravenous, Q15 Min PRN, Gerardo Drake MD    dextrose (GLUTOSE) oral gel 15 g, 15 g, Oral, Q15 Min PRN, Gerardo Drake MD    glucagon (GLUCAGEN) injection 1 mg, 1 mg, Intramuscular, Q15 Min PRN, Gerardo Drake MD    HYDROcodone-acetaminophen (NORCO) 5-325 MG per tablet 1 tablet, 1 tablet, Oral, Q6H PRN, Gerardo Drake MD    hydrocortisone-bacitracin-zinc oxide-nystatin (MAGIC BARRIER) ointment 1 Application, 1 Application, Topical, BID, Randall Jordan MD, 1 Application at 06/04/25 0914    insulin lispro (HUMALOG/ADMELOG) injection 2-9 Units, 2-9 Units, Subcutaneous, 4x Daily AC & at Bedtime,  Gerardo Drake MD    Magnesium Standard Dose Replacement - Follow Nurse / BPA Driven Protocol, , Not Applicable, PRVidal SOFIA Alan David, MD    nitroglycerin (NITROSTAT) SL tablet 0.4 mg, 0.4 mg, Sublingual, Q5 Min PRVidal SOFIA Alan David, MD    ondansetron ODT (ZOFRAN-ODT) disintegrating tablet 4 mg, 4 mg, Oral, Q6H PRN **OR** ondansetron (ZOFRAN) injection 4 mg, 4 mg, Intravenous, Q6H PRN, Gerardo Drake MD    Pharmacy to dose vancomycin, , Not Applicable, Continuous PRN, Gerardo Drake MD    Pharmacy to dose warfarin, , Not Applicable, Continuous PRVidal SOFIA Alan David, MD    Phosphorus Replacement - Follow Nurse / BPA Driven Protocol, , Not Applicable, PRNVidal Alan David, MD    Potassium Replacement - Follow Nurse / BPA Driven Protocol, , Not Applicable, PRVidal SOFIA Alan David, MD    sodium chloride 0.9 % flush 10 mL, 10 mL, Intravenous, Q12H, Gerardo Drake MD, 10 mL at 06/04/25 0915    sodium chloride 0.9 % flush 10 mL, 10 mL, Intravenous, PRN, Gerardo Drake MD    sodium chloride 0.9 % infusion 40 mL, 40 mL, Intravenous, PRNVidal Alan David, MD    sodium hypochlorite (DAKIN'S 1/4 STRENGTH) 0.125 % topical solution 0.125% solution, , Topical, BID, Joan Ontiveros MD, Given at 06/04/25 0915    vancomycin 1750 mg/500 mL 0.9% NS IVPB ([facility]), 1,750 mg, Intravenous, Q12H, Randall Jordan MD, 1,750 mg at 06/03/25 2033    warfarin (COUMADIN) tablet 10 mg, 10 mg, Oral, Once per day on Sunday Tuesday Wednesday Thursday Saturday, Gerardo Drake MD, 10 mg at 06/03/25 1834    warfarin (COUMADIN) tablet 15 mg, 15 mg, Oral, Once per day on Monday Friday, Gerardo Drake MD, 15 mg at 06/03/25 0155      Objective   Vital Signs   Temp:  [97.8 °F (36.6 °C)-98.2 °F (36.8 °C)] 98.2 °F (36.8 °C)  Heart Rate:  [63-75] 75  Resp:  [18-20] 20  BP: (117-140)/(50-69) 131/54    Physical Exam:   General: awake, alert, NAD, very nice, sitting up in bed  Eyes: no scleral  icterus  Cardiovascular: NR  Respiratory: normal work of breathing on ambient air  GI: Abdomen is soft  :  no Vera catheter  Skin: Erythema, induration, and open, draining wound of the left thigh are stable  Neurological: Alert and oriented x 3  Psychiatric: Normal mood and affect     Labs:   CBC, BMP, CRP, and blood cultures reviewed today  Lab Results   Component Value Date    WBC 8.18 06/04/2025    HGB 12.3 (L) 06/04/2025    HCT 37.3 (L) 06/04/2025    MCV 92.8 06/04/2025     06/04/2025       Lab Results   Component Value Date    GLUCOSE 117 (H) 06/04/2025    BUN 16.0 06/04/2025    CREATININE 1.10 06/04/2025    EGFRIFNONA 116 11/10/2021    EGFRIFAFRI 134 11/10/2021    BCR 14.5 06/04/2025    CO2 20.1 (L) 06/04/2025    CALCIUM 8.6 06/04/2025    ALBUMIN 3.1 (L) 06/02/2025    AST 18 06/02/2025    ALT 16 06/02/2025     Lab Results   Component Value Date    HGBA1C 7.10 (H) 06/03/2025     Lab Results   Component Value Date    CRP 1.71 (H) 06/03/2025     Lab Results   Component Value Date    INR 2.77 (H) 06/04/2025    INR 2.66 (H) 06/03/2025    INR 2.55 (H) 06/02/2025    PROTIME 29.6 (H) 06/04/2025    PROTIME 28.7 (H) 06/03/2025    PROTIME 27.7 (H) 06/02/2025       Microbiology:   6/2 RPP: + Human rhinovirus/enterovirus  6/2 BCx: NGTD  6/3 MRSA nares: Negative    Prior radiology:  6/2 CTA chest negative for PE; negative for pneumonia    CXR personally reviewed and is negative for pneumonia    EKG personally reviewed and shows a QTc interval of 459 ms    Isolation:  Droplet    ASSESSMENT/PLAN:  Human rhinovirus/enterovirus infection  Left lower extremity wound infection  Factor V Leiden mutation  On warfarin  Super obesity BMI 70 complicating above  Uncontrolled DM2 complicating above    Continue supportive care for human rhinovirus/enterovirus infection.    For LLE wound and cellulitis, continue empiric vancomycin and ceftriaxone.  General surgery saw the patient and recommended I&D but patient requested an  attempt at conservative measures first.    While the patient is on vancomycin, vancomycin levels will be ordered and reviewed with dose adjustments made as needed. The patient will have a daily creatinine level checked while on vancomycin as part of monitoring this medication.     ID will follow.

## 2025-06-04 NOTE — PROGRESS NOTES
Discharge Planning Assessment  Psychiatric     Patient Name: Kameron Melendez  MRN: [id number]  Today's Date: 6/4/2025    Admit Date: 6/2/2025    Plan: Return home with spouse   Discharge Needs Assessment       Row Name 06/04/25 1033       Living Environment    People in Home child(nikki), adult;spouse    Name(s) of People in Home Dorota Javier    Current Living Arrangements home    Potentially Unsafe Housing Conditions none    Primary Care Provided by self    Provides Primary Care For no one, unable/limited ability to care for self    Family Caregiver if Needed spouse    Family Caregiver Names Wife Yaya [phone]    Quality of Family Relationships helpful    Able to Return to Prior Arrangements yes       Resource/Environmental Concerns    Resource/Environmental Concerns none    Transportation Concerns none       Transition Planning    Patient/Family Anticipates Transition to home with family    Patient/Family Anticipated Services at Transition none    Transportation Anticipated family or friend will provide       Discharge Needs Assessment    Readmission Within the Last 30 Days no previous admission in last 30 days    Equipment Currently Used at Home walker, rolling;shower chair;power chair,(recliner lift)    Concerns to be Addressed discharge planning    Anticipated Changes Related to Illness none    Equipment Needed After Discharge none                   Discharge Plan       Row Name 06/04/25 1038       Plan    Plan Return home with spouse    Patient/Family in Agreement with Plan yes    Plan Comments Spoke with patient at bedside.  He lives with wife Yaya [phone] in a house with 4 KHUSHI.  Bedroom is on the 2nd floor, but he sleeps on the main floor in a recliner.  He uses a walker, has a shower chair.  He has used [facility] in the past and has never been to SNF.  PCP is Dr. Hansel Patel and pharmacy is [facility].  Patient drives -drove himself to the hospital.  Wife drives but had recent TKR and  has not been released for driving at present.  Talked about HH following if needs dressing changes.  He will consider.  He plans to return home at [place].  CCP will follow.  Desirae NAVARRO                    Expected Discharge Date and Time       Expected Discharge Date Expected Discharge Time    Jun 8, 2025            Demographic Summary       Row Name 06/04/25 1032       General Information    Admission Type inpatient    Arrived From home    Referral Source admission list    Reason for Consult discharge planning    Preferred Language English                   Functional Status       Row Name 06/04/25 1032       Functional Status    Usual Activity Tolerance fair    Current Activity Tolerance fair       Functional Status, IADL    Medications independent    Meal Preparation independent;assistive person  Wife    Housekeeping assistive person    Laundry assistive person    Shopping assistive person       Mental Status    General Appearance WDL WDL       Mental Status Summary    Recent Changes in Mental Status/Cognitive Functioning no changes                                   Becky S. [place], RN

## 2025-06-04 NOTE — PROGRESS NOTES
"Nutrition Services    Patient Name:  Kameron Melendez  YOB: 1970  MRN: [id number]  Admit Date:  6/2/2025  Assessment Date:  06/04/25    NUTRITION EVALUATION      Reason for Encounter Pressure Injury and/or Non-Healing Wound   Diagnosis/Problem Admission Diagnosis:  Lymphedema [I89.0]  Hyperglycemia [R73.9]  Elevated troponin [R79.89]  Rhinovirus infection [B34.8]  Elevated brain natriuretic peptide (BNP) level [R79.89]  Left leg cellulitis [L03.116]  Elevated procalcitonin [R79.89]  Failure of outpatient treatment [Z78.9]    Problem List:    Left leg cellulitis    Lymphedema of both lower extremities    Obesity, morbid, BMI 50 or higher    ARNOLDO (obstructive sleep apnea)    History of pulmonary embolism    Heterozygous factor V Leiden mutation    Cellulitis of left lower extremity    Venous insufficiency (chronic) (peripheral)    Wound cellulitis    Type 2 diabetes mellitus    Rhinovirus     Narrative LLE wound with abscess, surrounding cellulitis - patient holding off on debridement for now.  Abx.  Noted to have a pressure injury to L thigh per chart review.         PO Diet Diet: Cardiac, Diabetic; Healthy Heart (2-3 Na+); Consistent Carbohydrate; Fluid Consistency: Thin (IDDSI 0)   Allergies NKFA   Supplements n/a   PO Intake % 100% x 1 meal       Chewing/Swallowing Difficulty no issues identified at this time       Medications reviewed   Labs  reviewed       Physical Findings alert, obese, oriented, on oxygen therapy     Edema lower extremity , 4+ (severe)    GI Function WDL   Skin Status excoriation, pressure injury: L thigh    Lines/Drains none   I/O reviewed        Height  Weight  BMI  Weight Trend     Height: 188 cm (74\")  Weight: (!) 249 kg (550 lb) (06/04/25 1617)  Body mass index is 70.62 kg/m².  Gain         NFPE Not indicated at this time       Nutrition Problem (PES) Problem: Increased Nutrient Needs  Etiology: Medical Diagnosis - pressure injury   Signs/Symptoms: Report/Observation     "   Intervention/Plan Adding Arturo BID to help promote wound healing.    RD to follow up per protocol.     Results from last 7 days   Lab Units 06/04/25 0256 06/03/25 0622 06/02/25 2027   SODIUM mmol/L 135* 139 136   POTASSIUM mmol/L 4.3 3.9 3.6   CHLORIDE mmol/L 105 105 105   CO2 mmol/L 20.1* 21.8* 20.8*   BUN mg/dL 16.0 19.0 21.0*   CREATININE mg/dL 1.10 1.08 1.26   CALCIUM mg/dL 8.6 8.9 9.0   BILIRUBIN mg/dL  --   --  0.4   ALK PHOS U/L  --   --  112   ALT (SGPT) U/L  --   --  16   AST (SGOT) U/L  --   --  18   GLUCOSE mg/dL 117* 115* 131*     Results from last 7 days   Lab Units 06/04/25 0256 06/03/25 0622 06/02/25 2027   MAGNESIUM mg/dL  --   --  1.6   HEMOGLOBIN g/dL 12.3*   < > 12.9*   HEMATOCRIT % 37.3*   < > 39.6    < > = values in this interval not displayed.     Lab Results   Component Value Date    HGBA1C 7.10 (H) 06/03/2025     Wt Readings from Last 10 Encounters:   06/04/25 (!) 249 kg (550 lb)   04/18/25 (!) 251 kg (553 lb)   12/19/24 (!) 246 kg (542 lb)   09/26/24 (!) 236 kg (520 lb)   08/08/24 (!) 232 kg (512 lb)   04/16/24 (!) 230 kg (506 lb)   01/04/24 (!) 223 kg (491 lb)   12/21/23 (!) 223 kg (491 lb 12.8 oz)   10/27/23 (!) 229 kg (504 lb 12.8 oz)   02/23/23 (!) 220 kg (485 lb)       Electronically signed by:  Jacqui Polanco RD  06/04/25 17:08 EDT

## 2025-06-04 NOTE — PROGRESS NOTES
"[facility] Follow Up    Chief Complaint: Follow up abnormal troponin    Interval History: No chest pain or shortness of breath.    Objective:     Objective:  Temp:  [97.8 °F (36.6 °C)-98.2 °F (36.8 °C)] 98.2 °F (36.8 °C)  Heart Rate:  [63-75] 75  Resp:  [16-20] 20  BP: (105-140)/(45-69) 131/54     Intake/Output Summary (Last 24 hours) at 6/4/2025 0743  Last data filed at 6/3/2025 1328  Gross per 24 hour   Intake 500 ml   Output --   Net 500 ml     Body mass index is 70.62 kg/m².      06/02/25 1944   Weight: (!) 249 kg (550 lb)     Weight change:       Physical Exam:   General : Alert, cooperative, in no acute distress.  Neuro: Alert,cooperative and oriented.  Lungs: CTAB. Normal respiratory effort and rate.  CV: Regular rate and rhythm, normal S1 and S2, no murmurs, gallops or rubs.  ABD: Soft, nontender, nondistended. Positive bowel sounds.  Extr: No edema or cyanosis, moves all extremities.    Lab Review:   Results from last 7 days   Lab Units 06/04/25 0256 06/03/25 0622 06/02/25 2027   SODIUM mmol/L 135* 139 136   POTASSIUM mmol/L 4.3 3.9 3.6   CHLORIDE mmol/L 105 105 105   CO2 mmol/L 20.1* 21.8* 20.8*   BUN mg/dL 16.0 19.0 21.0*   CREATININE mg/dL 1.10 1.08 1.26   GLUCOSE mg/dL 117* 115* 131*   CALCIUM mg/dL 8.6 8.9 9.0   AST (SGOT) U/L  --   --  18   ALT (SGPT) U/L  --   --  16     Results from last 7 days   Lab Units 06/03/25 0622 06/02/25 2138 06/02/25 2027   HSTROP T ng/L 60* 74* 77*     Results from last 7 days   Lab Units 06/04/25 0256 06/03/25 0622   WBC 10*3/mm3 8.18 8.83   HEMOGLOBIN g/dL 12.3* 13.2   HEMATOCRIT % 37.3* 40.6   PLATELETS 10*3/mm3 353 330     Results from last 7 days   Lab Units 06/04/25  0256 06/03/25  0622 06/02/25 2025   INR  2.77* 2.66* 2.55*   APTT seconds  --   --  38.6*     Results from last 7 days   Lab Units 06/02/25 2027   MAGNESIUM mg/dL 1.6           Invalid input(s): \"LDLCALC\"  Results from last 7 days   Lab Units 06/02/25 2027   PROBNP pg/mL " 2,859.0*         I reviewed the patient's new clinical results.  I personally viewed and interpreted the patient's EKG  Current Medications:   Scheduled Meds:cefTRIAXone, 2,000 mg, Intravenous, Q24H  hydrocortisone-bacitracin-zinc oxide-nystatin, 1 Application, Topical, BID  insulin lispro, 2-9 Units, Subcutaneous, 4x Daily AC & at Bedtime  sodium chloride, 10 mL, Intravenous, Q12H  sodium hypochlorite, , Topical, BID  vancomycin, 1,750 mg, Intravenous, Q12H  warfarin, 10 mg, Oral, Once per day on Sunday Tuesday Wednesday Thursday Saturday  warfarin, 15 mg, Oral, Once per day on Monday Friday      Continuous Infusions:Pharmacy to dose vancomycin,   Pharmacy to dose warfarin,         Allergies:  No Known Allergies    Assessment/Plan:     1.  Abnormal troponin.  Mildly elevated but relatively flat to downtrending.  EKG without any associated changes.  He is not having any symptoms suggestive of angina.  CT of the chest on an optimal study did not show any evidence of significant pulmonary embolism.  His INR has been therapeutic.  2.  Cellulitis.  Appears to be involving his pannus.  Now on IV antibiotics.  Evidence of open wound apparently on scrotum that wound care is managing.  Surgery has evaluated the patient and recommends surgical debridement but the patient wants to hold off for the moment.  Remains on vancomycin per ID.  3.  Rhinovirus infection.  Likely responsible for his symptoms of weakness, cough and shortness of breath.  4.  History of recurrent pulmonary embolism.  On chronic anticoagulation with warfarin.  5.  Factor V Leiden  6.  Hypertension.  Well-controlled  7.  Lymphedema  7.  Morbid obesity.    -Since there is a possibility of surgical debridement we will proceed with an echocardiogram for further evaluation of his elevated troponins.    -Unfortunately do not have much option for further cardiac workup beyond echocardiogram.   However fortunately he remains asymptomatic without any concerning  cardiac symptoms.    Elsy Baeza MD  06/04/25  07:43 EDT

## 2025-06-05 LAB
ANION GAP SERPL CALCULATED.3IONS-SCNC: 6.6 MMOL/L (ref 5–15)
BUN SERPL-MCNC: 14 MG/DL (ref 6–20)
BUN/CREAT SERPL: 13.7 (ref 7–25)
CALCIUM SPEC-SCNC: 8.3 MG/DL (ref 8.6–10.5)
CHLORIDE SERPL-SCNC: 105 MMOL/L (ref 98–107)
CO2 SERPL-SCNC: 22.4 MMOL/L (ref 22–29)
CREAT SERPL-MCNC: 1.02 MG/DL (ref 0.76–1.27)
DEPRECATED RDW RBC AUTO: 50.2 FL (ref 37–54)
EGFRCR SERPLBLD CKD-EPI 2021: 86.8 ML/MIN/1.73
ERYTHROCYTE [DISTWIDTH] IN BLOOD BY AUTOMATED COUNT: 14.6 % (ref 12.3–15.4)
GLUCOSE BLDC GLUCOMTR-MCNC: 119 MG/DL (ref 70–130)
GLUCOSE BLDC GLUCOMTR-MCNC: 127 MG/DL (ref 70–130)
GLUCOSE BLDC GLUCOMTR-MCNC: 127 MG/DL (ref 70–130)
GLUCOSE BLDC GLUCOMTR-MCNC: 128 MG/DL (ref 70–130)
GLUCOSE SERPL-MCNC: 133 MG/DL (ref 65–99)
HCT VFR BLD AUTO: 36.6 % (ref 37.5–51)
HGB BLD-MCNC: 11.7 G/DL (ref 13–17.7)
INR PPP: 2.67 (ref 0.9–1.1)
MCH RBC QN AUTO: 29.9 PG (ref 26.6–33)
MCHC RBC AUTO-ENTMCNC: 32 G/DL (ref 31.5–35.7)
MCV RBC AUTO: 93.6 FL (ref 79–97)
PLATELET # BLD AUTO: 331 10*3/MM3 (ref 140–450)
PMV BLD AUTO: 9.6 FL (ref 6–12)
POTASSIUM SERPL-SCNC: 4.2 MMOL/L (ref 3.5–5.2)
PROTHROMBIN TIME: 28.7 SECONDS (ref 11.7–14.2)
RBC # BLD AUTO: 3.91 10*6/MM3 (ref 4.14–5.8)
SODIUM SERPL-SCNC: 134 MMOL/L (ref 136–145)
WBC NRBC COR # BLD AUTO: 8.17 10*3/MM3 (ref 3.4–10.8)

## 2025-06-05 PROCEDURE — 80048 BASIC METABOLIC PNL TOTAL CA: CPT | Performed by: INTERNAL MEDICINE

## 2025-06-05 PROCEDURE — 85610 PROTHROMBIN TIME: CPT | Performed by: INTERNAL MEDICINE

## 2025-06-05 PROCEDURE — 82948 REAGENT STRIP/BLOOD GLUCOSE: CPT

## 2025-06-05 PROCEDURE — 85027 COMPLETE CBC AUTOMATED: CPT | Performed by: INTERNAL MEDICINE

## 2025-06-05 PROCEDURE — 25010000002 CEFTRIAXONE PER 250 MG: Performed by: INTERNAL MEDICINE

## 2025-06-05 PROCEDURE — 99232 SBSQ HOSP IP/OBS MODERATE 35: CPT | Performed by: INTERNAL MEDICINE

## 2025-06-05 PROCEDURE — 36415 COLL VENOUS BLD VENIPUNCTURE: CPT | Performed by: INTERNAL MEDICINE

## 2025-06-05 PROCEDURE — 25010000002 VANCOMYCIN HCL 1.25 G RECONSTITUTED SOLUTION 1 EACH VIAL: Performed by: INTERNAL MEDICINE

## 2025-06-05 PROCEDURE — 99232 SBSQ HOSP IP/OBS MODERATE 35: CPT

## 2025-06-05 PROCEDURE — 25810000003 SODIUM CHLORIDE 0.9 % SOLUTION 250 ML FLEX CONT: Performed by: INTERNAL MEDICINE

## 2025-06-05 RX ADMIN — SALINE NASAL SPRAY 1 SPRAY: 1.5 SOLUTION NASAL at 05:53

## 2025-06-05 RX ADMIN — ZINC OXIDE 1 APPLICATION: 200 OINTMENT TOPICAL at 10:03

## 2025-06-05 RX ADMIN — Medication 10 ML: at 10:03

## 2025-06-05 RX ADMIN — VANCOMYCIN HYDROCHLORIDE 1250 MG: 1.25 INJECTION, POWDER, LYOPHILIZED, FOR SOLUTION INTRAVENOUS at 10:02

## 2025-06-05 RX ADMIN — VANCOMYCIN HYDROCHLORIDE 1250 MG: 1.25 INJECTION, POWDER, LYOPHILIZED, FOR SOLUTION INTRAVENOUS at 20:55

## 2025-06-05 RX ADMIN — ZINC OXIDE 1 APPLICATION: 200 OINTMENT TOPICAL at 20:55

## 2025-06-05 RX ADMIN — SODIUM HYPOCHLORITE: 1.25 SOLUTION TOPICAL at 10:03

## 2025-06-05 RX ADMIN — SODIUM HYPOCHLORITE: 1.25 SOLUTION TOPICAL at 20:55

## 2025-06-05 RX ADMIN — Medication 10 ML: at 20:56

## 2025-06-05 RX ADMIN — WARFARIN SODIUM 10 MG: 10 TABLET ORAL at 18:36

## 2025-06-05 RX ADMIN — CEFTRIAXONE 2000 MG: 2 INJECTION, POWDER, FOR SOLUTION INTRAMUSCULAR; INTRAVENOUS at 00:08

## 2025-06-05 NOTE — PROGRESS NOTES
Acute Care General Surgery Progress Note    Patient: Kameron Melendez  YOB: 1970  MRN: [id number]      Assessment  Kameron Melendez is a 55 y.o. male with a left lower extremity wound with abscess, and surrounding cellulitis.  Changed dressing this morning, moderate amount of purulent drainage, appears slightly improved today, no evidence of necrosis, surrounding cellulitis.  Blood cultures with no growth at 2 days.  Wound cultures with no growth so far, currently pending.  Infectious disease following along, currently receiving IV vancomycin and ceftriaxone, plans to switch to Bactrim and cephalexin.  No leukocytosis, he is afebrile with normal vitals.     Plan  No plans for surgical intervention at this time, recommend continuing twice daily Dakin's wet-to-dry dressing changes,   okay for discharge from a general surgery standpoint on antibiotics per infectious disease recommendations, recommend home health and wound care follow-up for daily twice daily dressing changes, will need to follow-up with Dr. Ontiveros in 2 weeks      Subjective  Denies any pain or tenderness from wound/leg site.  Denies fever or chills    Objective  Vitals  Temp 97.8  RR 17  /66        Physical Exam  Constitutional: Alert, oriented, in no acute distress  Respiratory: Normal work of breathing, Symmetric excursion  Cardiovascular: Well pefused, no jugular venous distention evident   Skin: LLE wound - purulent drainage, malodorous, some pink granulation tissue, no evidence of necrotic tissue, surrounding cellulitis, non-tender to palpation      Laboratory Results  Results from last 7 days   Lab Units 06/05/25 0312 06/04/25 0256 06/03/25 0622 06/02/25 2027   WBC 10*3/mm3 8.17 8.18 8.83 10.41   HEMOGLOBIN g/dL 11.7* 12.3* 13.2 12.9*   HEMATOCRIT % 36.6* 37.3* 40.6 39.6   PLATELETS 10*3/mm3 331 353 330 355     Results from last 7 days   Lab Units 06/05/25 0312 06/04/25  0256 06/03/25 0622 06/02/25 2027   SODIUM mmol/L  134* 135* 139 136   POTASSIUM mmol/L 4.2 4.3 3.9 3.6   CHLORIDE mmol/L 105 105 105 105   CO2 mmol/L 22.4 20.1* 21.8* 20.8*   BUN mg/dL 14.0 16.0 19.0 21.0*   CREATININE mg/dL 1.02 1.10 1.08 1.26   CALCIUM mg/dL 8.3* 8.6 8.9 9.0   BILIRUBIN mg/dL  --   --   --  0.4   ALK PHOS U/L  --   --   --  112   ALT (SGPT) U/L  --   --   --  16   AST (SGOT) U/L  --   --   --  18   GLUCOSE mg/dL 133* 117* 115* 131*     I have personally reviewed 6/5 labs      FADY Cruz  Acute Care General Surgery  [facility]    [address], [address]  [place], [place], [address]  P: [phone]  F: [phone]

## 2025-06-05 NOTE — PROGRESS NOTES
[facility] Follow Up    Chief Complaint: Follow up abnormal troponin    Interval History: No chest pain or shortness of breath.    Objective:     Objective:  Temp:  [97.8 °F (36.6 °C)-98.2 °F (36.8 °C)] 97.8 °F (36.6 °C)  Heart Rate:  [76] 76  Resp:  [16-20] 16  BP: (130-150)/(45-72) 130/45     Intake/Output Summary (Last 24 hours) at 6/5/2025 0732  Last data filed at 6/4/2025 1100  Gross per 24 hour   Intake 240 ml   Output --   Net 240 ml     Body mass index is 70.62 kg/m².      06/02/25  1944 06/04/25  1617   Weight: (!) 249 kg (550 lb) (!) 249 kg (550 lb)     Weight change:       Physical Exam:   General : Alert, cooperative, in no acute distress.  Neuro: Alert,cooperative and oriented.  Lungs: CTAB. Normal respiratory effort and rate.  CV: Regular rate and rhythm, normal S1 and S2, no murmurs, gallops or rubs.  ABD: Soft, nontender, nondistended. Positive bowel sounds.  Extr: No edema or cyanosis, moves all extremities.    Lab Review:   Results from last 7 days   Lab Units 06/05/25  0312 06/04/25  0256 06/03/25 0622 06/02/25 2027   SODIUM mmol/L 134* 135*   < > 136   POTASSIUM mmol/L 4.2 4.3   < > 3.6   CHLORIDE mmol/L 105 105   < > 105   CO2 mmol/L 22.4 20.1*   < > 20.8*   BUN mg/dL 14.0 16.0   < > 21.0*   CREATININE mg/dL 1.02 1.10   < > 1.26   GLUCOSE mg/dL 133* 117*   < > 131*   CALCIUM mg/dL 8.3* 8.6   < > 9.0   AST (SGOT) U/L  --   --   --  18   ALT (SGPT) U/L  --   --   --  16    < > = values in this interval not displayed.     Results from last 7 days   Lab Units 06/03/25  0622 06/02/25 2138 06/02/25 2027   HSTROP T ng/L 60* 74* 77*     Results from last 7 days   Lab Units 06/05/25  0312 06/04/25  0256   WBC 10*3/mm3 8.17 8.18   HEMOGLOBIN g/dL 11.7* 12.3*   HEMATOCRIT % 36.6* 37.3*   PLATELETS 10*3/mm3 331 353     Results from last 7 days   Lab Units 06/05/25  0312 06/04/25  0256 06/03/25  0622 06/02/25 2025   INR  2.67* 2.77*   < > 2.55*   APTT seconds  --   --   --  38.6*  "   < > = values in this interval not displayed.     Results from last 7 days   Lab Units 06/02/25 2027   MAGNESIUM mg/dL 1.6           Invalid input(s): \"LDLCALC\"  Results from last 7 days   Lab Units 06/02/25 2027   PROBNP pg/mL 2,859.0*         I reviewed the patient's new clinical results.  I personally viewed and interpreted the patient's EKG  Current Medications:   Scheduled Meds:cefTRIAXone, 2,000 mg, Intravenous, Q24H  hydrocortisone-bacitracin-zinc oxide-nystatin, 1 Application, Topical, BID  insulin lispro, 2-9 Units, Subcutaneous, 4x Daily AC & at Bedtime  sodium chloride, 10 mL, Intravenous, Q12H  sodium hypochlorite, , Topical, BID  vancomycin, 1,250 mg, Intravenous, Q12H  warfarin, 10 mg, Oral, Once per day on Sunday Tuesday Wednesday Thursday Saturday  warfarin, 15 mg, Oral, Once per day on Monday Friday      Continuous Infusions:Pharmacy to dose vancomycin,   Pharmacy to dose warfarin,         Allergies:  No Known Allergies    Assessment/Plan:     1.  Abnormal troponin.  Mildly elevated but relatively flat to downtrending.  EKG without any associated changes.  He is not having any symptoms suggestive of angina.  CT of the chest on an optimal study did not show any evidence of significant pulmonary embolism.  His INR has been therapeutic.  Echocardiogram reviewed.  Normal left ventricular systolic function.  Report indicates some possible apical septal hypokinesis but on my review I think his wall motion is grossly normal.  2.  Cellulitis.  Appears to be involving his pannus.  Now on IV antibiotics.  Per surgery there appears to be enough improvement that they feel they can hold off on any surgical intervention at this time.  Antibiotics per ID as well as local wound care.  3.  Rhinovirus infection.  Likely responsible for his symptoms of weakness, cough and shortness of breath.  4.  History of recurrent pulmonary embolism.  On chronic anticoagulation with warfarin.  5.  Factor V Leiden  6.  " Hypertension.  Well-controlled  7.  Lymphedema  7.  Morbid obesity.    -Remains stable and asymptomatic from a cardiac standpoint.  As above echocardiogram report and images reviewed and overall appears to be unremarkable.  - Nothing further to add from a cardiac standpoint at this time.  Will sign off and see again as an inpatient as needed.  He will keep his scheduled office appointment with us on 6/20/2025.    Elsy Baeza MD  06/05/25  07:32 EDT

## 2025-06-05 NOTE — DISCHARGE PLACEMENT REQUEST
"Kameron Covarrubias (55 y.o. Male)       Date of Birth   1970    Social Security Number       Address   [address] [place] [address]    Home Phone   [phone]    MRN   [id number]       [facility]    Marital Status                               Admission Date   6/2/2025    Admission Type   Emergency    Admitting Provider   Gerardo Drake MD    Attending Provider   Randall Jordan MD    Department, Room/Bed   [facility], 3110/1       Discharge Date       Discharge Disposition       Discharge Destination                                 Attending Provider: Randall Jordan MD    Allergies: No Known Allergies    Isolation: Droplet   Infection: Rhinovirus  (06/02/25)   Code Status: CPR    Ht: 188 cm (74\")   Wt: 249 kg (550 lb)    Admission Cmt: None   Principal Problem: Left leg cellulitis [L03.116]                   Active Insurance as of 6/2/2025       Primary Coverage       Payor Plan Insurance Group Employer/Plan Group    [employer] BLUE CROSS [employer] BLUE CROSS BLUE SHIELD PPO [id number]       Payor Plan Address Payor Plan Phone Number Payor Plan Fax Number Effective Dates    [address] [phone]  5/1/2019 - None Entered    [place] [address]         Subscriber Name Subscriber Birth Date Member ID       KAMERON COVARRUBIAS 1970 [id number]                     Emergency Contacts        (Rel.) Home Phone Work Phone Mobile Phone    AlYaya (Spouse) [phone] -- [phone]                "

## 2025-06-05 NOTE — PLAN OF CARE
Goal Outcome Evaluation:            Pt tolerated medications well. No complaints of pain overnight. Remains SB on monior, low of 27. 5L O2 overnight. Awake and resting in bed at this with call light in reach.

## 2025-06-05 NOTE — PROGRESS NOTES
Name: Kameron Melendez ADMIT: 2025   : 1970  PCP: Hansel Patel DO    MRN: [id number] LOS: 3 days   AGE/SEX: 55 y.o. male  ROOM: [facility]     Subjective   Subjective   Patient is seen at bedside, no new complaints.       Objective   Objective   Vital Signs  Temp:  [97.8 °F (36.6 °C)-98.2 °F (36.8 °C)] 97.8 °F (36.6 °C)  Resp:  [16-18] 17  BP: (130-136)/(45-66) 131/66     on   ;   Device (Oxygen Therapy): room air  Body mass index is 70.62 kg/m².  Physical Exam  General, awake and alert.  Head and ENT, normocephalic and atraumatic.  Lungs, symmetric expansion, equal air entry bilaterally.  Heart, regular rate and rhythm.  Abdomen, soft and nontender.  Draining wound on the pannus  Extremities, no clubbing or cyanosis.  Bilateral lower extreme chronic appearing lymphedema  Neuro, no focal deficits.  Skin: Warm and no rash.  Psych, normal mood and affect.  Musculoskeletal, joint examination is grossly normal.     Copied text material from yesterday's note has been reviewed for appropriate changes and remains accurate as of 25.        Results Review     I reviewed the patient's new clinical results.  Results from last 7 days   Lab Units 25   WBC 10*3/mm3 8.17 8.18 8.83 10.41   HEMOGLOBIN g/dL 11.7* 12.3* 13.2 12.9*   PLATELETS 10*3/mm3 331 353 330 355     Results from last 7 days   Lab Units 25  0256 25   SODIUM mmol/L 134* 135* 139 136   POTASSIUM mmol/L 4.2 4.3 3.9 3.6   CHLORIDE mmol/L 105 105 105 105   CO2 mmol/L 22.4 20.1* 21.8* 20.8*   BUN mg/dL 14.0 16.0 19.0 21.0*   CREATININE mg/dL 1.02 1.10 1.08 1.26   GLUCOSE mg/dL 133* 117* 115* 131*   EGFR mL/min/1.73 86.8 79.3 81.0 67.4     Results from last 7 days   Lab Units 25  2027   ALBUMIN g/dL 3.1*   BILIRUBIN mg/dL 0.4   ALK PHOS U/L 112   AST (SGOT) U/L 18   ALT (SGPT) U/L 16     Results from last 7 days   Lab Units 25  1952  06/04/25  0256 06/03/25  0622 06/02/25 2027   CALCIUM mg/dL 8.3* 8.6 8.9 9.0   ALBUMIN g/dL  --   --   --  3.1*   MAGNESIUM mg/dL  --   --   --  1.6     Results from last 7 days   Lab Units 06/02/25 2027   PROCALCITONIN ng/mL 1.55*   LACTATE mmol/L 1.9     Hemoglobin A1C   Date/Time Value Ref Range Status   06/03/2025 0622 7.10 (H) 4.80 - 5.60 % Final     Glucose   Date/Time Value Ref Range Status   06/05/2025 1551 128 70 - 130 mg/dL Final   06/05/2025 1135 127 70 - 130 mg/dL Final   06/05/2025 0715 119 70 - 130 mg/dL Final   06/04/2025 2041 126 70 - 130 mg/dL Final   06/04/2025 1556 115 70 - 130 mg/dL Final   06/04/2025 1053 131 (H) 70 - 130 mg/dL Final   06/04/2025 0628 110 70 - 130 mg/dL Final       No radiology results for the last day    I have personally reviewed all medications:  Scheduled Medications  cefTRIAXone, 2,000 mg, Intravenous, Q24H  hydrocortisone-bacitracin-zinc oxide-nystatin, 1 Application, Topical, BID  insulin lispro, 2-9 Units, Subcutaneous, 4x Daily AC & at Bedtime  sodium chloride, 10 mL, Intravenous, Q12H  sodium hypochlorite, , Topical, BID  vancomycin, 1,250 mg, Intravenous, Q12H  warfarin, 10 mg, Oral, Once per day on Sunday Tuesday Wednesday Thursday Saturday  warfarin, 15 mg, Oral, Once per day on Monday Friday    Infusions  Pharmacy to dose vancomycin,   Pharmacy to dose warfarin,     Diet  Diet: Cardiac, Diabetic; Healthy Heart (2-3 Na+); Consistent Carbohydrate; Fluid Consistency: Thin (IDDSI 0)    I have personally reviewed:  [x]  Laboratory   [x]  Microbiology   [x]  Radiology   [x]  EKG/Telemetry  [x]  Cardiology/Vascular   []  Pathology    []  Records       Assessment/Plan     Active Hospital Problems    Diagnosis  POA    **Left leg cellulitis [L03.116]  Yes    Rhinovirus [B34.8]  Yes    Type 2 diabetes mellitus [E11.9]  Yes    Wound cellulitis [L03.90]  Yes    Venous insufficiency (chronic) (peripheral) [I87.2]  Yes    Cellulitis of left lower extremity [L03.116]  Yes     Heterozygous factor V Leiden mutation [D68.51]  Yes    History of pulmonary embolism [Z86.711]  Yes    Lymphedema of both lower extremities [I89.0]  Yes    Obesity, morbid, BMI 50 or higher [E66.01]  Yes    ARNOLDO (obstructive sleep apnea) [G47.33]  Yes      Resolved Hospital Problems   No resolved problems to display.       55 y.o. male admitted with Left leg cellulitis.    Assessment and plan  1.  Subcutaneous wound on the left lower pannus, concerning for soft tissue abscess and cellulitis, surgery consulted.  Follow management maintenance.  Continue IV antibiotics.  Patient requested conservative management first.     2.  Left leg cellulitis, antibiotic management per ID recommendations.  Plans for possible transition to oral antibiotics noted for hopefully tomorrow.     3.  Factor V Leiden mutation, on Coumadin.     4.  Super morbid obesity, BMI noted to be above 70, patient would benefit from weight loss.     5.  Diabetes mellitus, continue Accu-Cheks and sliding scale insulin coverage.     6.  Human rhinovirus/enterovirus, supportive management.     7.  CODE STATUS full code.  Further plans will based on the hospital course.  Expected Discharge Date: 6/8/2025; Expected Discharge Time:       Randall Jordan MD  [facility]  06/05/25  17:54 EDT

## 2025-06-05 NOTE — PROGRESS NOTES
Continued Stay Note  [facility]     Patient Name: Kameron Melendez  MRN: [id number]  Today's Date: 6/5/2025    Admit Date: 6/2/2025    Plan: Return home with spouse - referral to [facility] pending   Discharge Plan       Row Name 06/05/25 1438       Plan    Plan Return home with spouse - referral to [facility] pending    Patient/Family in Agreement with Plan yes    Plan Comments Spoke with patient at bedside. Given list of HH agencies from patient choice.  He requests referral to [facility] - email to Isabella.  Or [facility] - left message for Natividad.  He has been on O2 @ night here at hospital.  States he does not need O2 @ home.   Has INR machine at home and does his own checks.  CCP following.  Desirae NAVARRO                    Expected Discharge Date and Time       Expected Discharge Date Expected Discharge Time    Jun 8, 2025               Becky S. [place], RN

## 2025-06-05 NOTE — PROGRESS NOTES
ID progress note    CC: Follow-up left thigh/pannus wound infection    Subjective: No acute events.  No fever.  He feels like he is trending for the better.  He is tolerating Vanco and ceftriaxone without any side effects.  He continues to perform local wound care.      Medications:    Current Facility-Administered Medications:     acetaminophen (TYLENOL) tablet 650 mg, 650 mg, Oral, Q4H PRN **OR** acetaminophen (TYLENOL) 160 MG/5ML oral solution 650 mg, 650 mg, Oral, Q4H PRN **OR** acetaminophen (TYLENOL) suppository 650 mg, 650 mg, Rectal, Q4H PRN, Gerardo Drake MD    sennosides-docusate (PERICOLACE) 8.6-50 MG per tablet 2 tablet, 2 tablet, Oral, BID PRN **AND** polyethylene glycol (MIRALAX) packet 17 g, 17 g, Oral, Daily PRN **AND** bisacodyl (DULCOLAX) EC tablet 5 mg, 5 mg, Oral, Daily PRN **AND** bisacodyl (DULCOLAX) suppository 10 mg, 10 mg, Rectal, Daily PRN, Gerardo Drake MD    Calcium Replacement - Follow Nurse / BPA Driven Protocol, , Not Applicable, PRN, Gerardo Drake MD    cefTRIAXone (ROCEPHIN) 2,000 mg in sodium chloride 0.9 % 100 mL MBP, 2,000 mg, Intravenous, Q24H, Gerardo Drake MD, Last Rate: 200 mL/hr at 06/05/25 0008, 2,000 mg at 06/05/25 0008    dextrose (D50W) (25 g/50 mL) IV injection 25 g, 25 g, Intravenous, Q15 Min PRN, Gerardo Drake MD    dextrose (GLUTOSE) oral gel 15 g, 15 g, Oral, Q15 Min PRN, Gerardo Drake MD    glucagon (GLUCAGEN) injection 1 mg, 1 mg, Intramuscular, Q15 Min PRN, Gerardo Drake MD    HYDROcodone-acetaminophen (NORCO) 5-325 MG per tablet 1 tablet, 1 tablet, Oral, Q6H PRN, Gerardo Drake MD    hydrocortisone-bacitracin-zinc oxide-nystatin (MAGIC BARRIER) ointment 1 Application, 1 Application, Topical, BID, Randall Jordan MD, 1 Application at 06/04/25 7054    insulin lispro (HUMALOG/ADMELOG) injection 2-9 Units, 2-9 Units, Subcutaneous, 4x Daily AC & at Bedtime, Gerardo Drake MD    Magnesium Standard Dose  Replacement - Follow Nurse / BPA Driven Protocol, , Not Applicable, Vidal SALES Alan David, MD    nitroglycerin (NITROSTAT) SL tablet 0.4 mg, 0.4 mg, Sublingual, Q5 Min PRNVidal Alan David, MD    ondansetron ODT (ZOFRAN-ODT) disintegrating tablet 4 mg, 4 mg, Oral, Q6H PRN **OR** ondansetron (ZOFRAN) injection 4 mg, 4 mg, Intravenous, Q6H PRN, Gerardo Drake MD    Pharmacy to dose vancomycin, , Not Applicable, Continuous PRNVidal Alan David, MD    Pharmacy to dose warfarin, , Not Applicable, Continuous PRN, Gerardo Drake MD    Phosphorus Replacement - Follow Nurse / BPA Driven Protocol, , Not Applicable, PRVidal SOFIA Alan David, MD    Potassium Replacement - Follow Nurse / BPA Driven Protocol, , Not Applicable, PRVidal SOFIA Alan David, MD    sodium chloride 0.9 % flush 10 mL, 10 mL, Intravenous, Q12H, Gerardo Drake MD, 10 mL at 06/04/25 2203    sodium chloride 0.9 % flush 10 mL, 10 mL, Intravenous, PRN, Gerardo Drake MD    sodium chloride 0.9 % infusion 40 mL, 40 mL, Intravenous, PRN, Gerardo Drake MD    sodium chloride nasal spray 1 spray, 1 spray, Each Nare, Q30 Min PRN, Randall Jordan MD, 1 spray at 06/05/25 0553    sodium hypochlorite (DAKIN'S 1/4 STRENGTH) 0.125 % topical solution 0.125% solution, , Topical, BID, Joan Ontiveros MD, Given at 06/04/25 2143    Vancomycin HCl 1,250 mg in sodium chloride 0.9 % 250 mL VTB, 1,250 mg, Intravenous, Q12H, Randall Jordan MD, Last Rate: 200 mL/hr at 06/04/25 2130, 1,250 mg at 06/04/25 2130    warfarin (COUMADIN) tablet 10 mg, 10 mg, Oral, Once per day on Sunday Tuesday Wednesday Thursday Saturday, Gerardo Drake MD, 10 mg at 06/04/25 1837    warfarin (COUMADIN) tablet 15 mg, 15 mg, Oral, Once per day on Monday Friday, Gerardo Drake MD, 15 mg at 06/03/25 0155      Objective   Vital Signs   Temp:  [97.8 °F (36.6 °C)-98.2 °F (36.8 °C)] 97.8 °F (36.6 °C)  Heart Rate:  [76] 76  Resp:  [16-20] 17  BP:  (130-150)/(45-72) 131/66    Physical Exam:   General: awake, alert, NAD, very nice, sitting up in bed eating breakfast  Eyes: no scleral icterus  Cardiovascular: NR  Respiratory: normal work of breathing on RA without wheezing  GI: Abdomen is soft  :  no Vera catheter  Skin: Erythema, induration, and open, draining wound of the left thigh are improving  Neurological: Alert and oriented x 3  Psychiatric: Normal mood and affect     Labs:   CBC, BMP, and wound and blood cultures reviewed today  Lab Results   Component Value Date    WBC 8.17 06/05/2025    HGB 11.7 (L) 06/05/2025    HCT 36.6 (L) 06/05/2025    MCV 93.6 06/05/2025     06/05/2025       Lab Results   Component Value Date    GLUCOSE 133 (H) 06/05/2025    BUN 14.0 06/05/2025    CREATININE 1.02 06/05/2025    EGFRIFNONA 116 11/10/2021    EGFRIFAFRI 134 11/10/2021    BCR 13.7 06/05/2025    CO2 22.4 06/05/2025    CALCIUM 8.3 (L) 06/05/2025    ALBUMIN 3.1 (L) 06/02/2025    AST 18 06/02/2025    ALT 16 06/02/2025     Lab Results   Component Value Date    HGBA1C 7.10 (H) 06/03/2025     Lab Results   Component Value Date    CRP 1.71 (H) 06/03/2025     Lab Results   Component Value Date    INR 2.67 (H) 06/05/2025    INR 2.77 (H) 06/04/2025    INR 2.66 (H) 06/03/2025    PROTIME 28.7 (H) 06/05/2025    PROTIME 29.6 (H) 06/04/2025    PROTIME 28.7 (H) 06/03/2025       Microbiology:   6/2 RPP: + Human rhinovirus/enterovirus  6/2 BCx: NGTD  6/3 MRSA nares: Negative  6/4 left lower extremity wound Cx: Pending    Prior radiology:  6/2 CTA chest negative for PE; negative for pneumonia    CXR personally reviewed and is negative for pneumonia    EKG personally reviewed and shows a QTc interval of 459 ms    Isolation:  Droplet    ASSESSMENT/PLAN:  Human rhinovirus/enterovirus infection  Left lower extremity wound infection  Factor V Leiden mutation  On warfarin  Super obesity BMI 70 complicating above  Uncontrolled DM2 complicating above    Continue supportive care for  human rhinovirus/enterovirus infection.    For LLE wound and cellulitis, continue empiric vancomycin and ceftriaxone.  General surgery saw the patient and recommended I&D but patient requested an attempt at conservative measures first.  Continue local wound care.    While the patient is on vancomycin, vancomycin levels will be ordered and reviewed with dose adjustments made as needed. The patient will have a daily creatinine level checked while on vancomycin as part of monitoring this medication.     If he continues to improve then I will likely switch him to oral antibiotics tomorrow.  Most likely Bactrim DS 2 tabs twice daily and cephalexin 1 g p.o. every 8 hours.    ID will follow.

## 2025-06-05 NOTE — PAYOR COMM NOTE
"Christiana Covarrubias (55 y.o. Male)        PLEASE SEE ATTACHED FOR CONTINUED STAY REVIEW.     AUTH#[id number]     PLEASE CALL  OR  [phone]    THANK YOU    HENOK AARON LPN [facility]   Date of Birth   1970    Social Security Number       Address   [address] [facility] [address]    Home Phone   [phone]    MRN   [id number]       Jainism   Oriental orthodox    Marital Status                               Admission Date   6/2/2025    Admission Type   Emergency    Admitting Provider   Gerardo Drake MD    Attending Provider   Randall Jordan MD    Department, Room/Bed   [facility], 3110/1       Discharge Date       Discharge Disposition       Discharge Destination                                 Attending Provider: Randall Jordan MD    Allergies: No Known Allergies    Isolation: Droplet   Infection: Rhinovirus  (06/02/25)   Code Status: CPR    Ht: 188 cm (74\")   Wt: 249 kg (550 lb)    Admission Cmt: None   Principal Problem: Left leg cellulitis [L03.116]                   Active Insurance as of 6/2/2025       Primary Coverage       Payor Plan Insurance Group Employer/Plan Group    [employer]UE [employer]OSS [employer] CROSS BLUE SHIELD PPO [id number]       Payor Plan Address Payor Plan Phone Number Payor Plan Fax Number Effective Dates    [address] [phone]  5/1/2019 - None Entered    [facility] [id number]         Subscriber Name Subscriber Birth Date Member ID       CHRISTIANA COVARRUBIAS 1970 [id number]                     Emergency Contacts        (Rel.) Home Phone Work Phone Mobile Phone    Yaya Covarrubias (Spouse) [phone] -- [phone]              [place]: [facility] [id number]  Tax ID [id number]  Oxygen Therapy (last day)       Date/Time SpO2 Device (Oxygen Therapy) Flow (L/min) (Oxygen Therapy) Oxygen Concentration (%) ETCO2 (mmHg)    06/04/25 2130 -- room air -- -- --    06/04/25 1246 91 -- -- -- --    06/04/25 0400 -- " humidified;nasal cannula 5 -- --    06/04/25 0227 -- humidified;nasal cannula 4 -- --          Intake & Output (last day)         06/04 0701 06/05 0700 06/05 0701 06/06 0700    P.O. 240     IV Piggyback      Total Intake(mL/kg) 240 (1)     Net +240           Urine Unmeasured Occurrence 4 x     Stool Unmeasured Occurrence 1 x           Lines, Drains & Airways       Active LDAs       Name Placement date Placement time Site Days    Peripheral IV 06/04/25 1100 22 G Distal;Left;Posterior Forearm 06/04/25  1100  Forearm  less than 1                  Medication Administration Report for Kameron Melendez as of 6/4/25 through 6/5/25     Legend:    Given Hold Not Given Due Canceled Entry Other Actions    Time Time (Time) Time Time-Action         Discontinued     Completed     Future     MAR Hold     Linked             Medications 06/04/25 06/05/25      acetaminophen (TYLENOL) tablet 650 mg  Dose: 650 mg  Freq: Every 4 Hours PRN Route: PO  PRN Reason: Mild Pain  Start: 06/02/25 2324     Admin Instructions:   If given for fever, use fever parameter: fever greater than 100.4 °F  Based on patient request - if ordered for moderate or severe pain, provider allows for administration of a medication prescribed for a lower pain scale.    Do not exceed 4 grams of acetaminophen in a 24 hr period. Max dose of 2gm for AST/ALT greater than 120 units/L.    If given for pain, use the following pain scale:   Mild Pain = Pain Score of 1-3, CPOT 1-2  Moderate Pain = Pain Score of 4-6, CPOT 3-4  Severe Pain = Pain Score of 7-10, CPOT 5-8          Or   acetaminophen (TYLENOL) 160 MG/5ML oral solution 650 mg  Dose: 650 mg  Freq: Every 4 Hours PRN Route: PO  PRN Reason: Mild Pain  Start: 06/02/25 2324     Admin Instructions:   If given for fever, use fever parameter: fever greater than 100.4 °F  Based on patient request - if ordered for moderate or severe pain, provider allows for administration of a medication prescribed for a lower pain  scale.    Do not exceed 4 grams of acetaminophen in a 24 hr period. Max dose of 2gm for AST/ALT greater than 120 units/L.    If given for pain, use the following pain scale:   Mild Pain = Pain Score of 1-3, CPOT 1-2  Moderate Pain = Pain Score of 4-6, CPOT 3-4  Severe Pain = Pain Score of 7-10, CPOT 5-8          Or   acetaminophen (TYLENOL) suppository 650 mg  Dose: 650 mg  Freq: Every 4 Hours PRN Route: RE  PRN Reason: Mild Pain  Start: 06/02/25 2324     Admin Instructions:   If given for fever, use fever parameter: fever greater than 100.4 °F  Based on patient request - if ordered for moderate or severe pain, provider allows for administration of a medication prescribed for a lower pain scale.    Do not exceed 4 grams of acetaminophen in a 24 hr period. Max dose of 2gm for AST/ALT greater than 120 units/L.    If given for pain, use the following pain scale:   Mild Pain = Pain Score of 1-3, CPOT 1-2  Moderate Pain = Pain Score of 4-6, CPOT 3-4  Severe Pain = Pain Score of 7-10, CPOT 5-8           sennosides-docusate (PERICOLACE) 8.6-50 MG per tablet 2 tablet  Dose: 2 tablet  Freq: 2 Times Daily PRN Route: PO  PRN Reason: Constipation  Start: 06/02/25 2324     Admin Instructions:   Start bowel management regimen if patient has not had a bowel movement after 12 hours.          And   polyethylene glycol (MIRALAX) packet 17 g  Dose: 17 g  Freq: Daily PRN Route: PO  PRN Reason: Constipation  PRN Comment: Use if senna-docusate is ineffective  Start: 06/02/25 2324     Admin Instructions:   Use if no bowel movement after 12 hours. Mix in 6-8 ounces of water.  Use 4-8 ounces of water, tea, or juice for each 17 gram dose.          And   bisacodyl (DULCOLAX) EC tablet 5 mg  Dose: 5 mg  Freq: Daily PRN Route: PO  PRN Reason: Constipation  PRN Comment: Use if polyethylene glycol is ineffective  Start: 06/02/25 2324     Admin Instructions:   Use if no bowel movement after 12 hours.  Swallow whole. Do not crush, split, or chew  "tablet.          And   bisacodyl (DULCOLAX) suppository 10 mg  Dose: 10 mg  Freq: Daily PRN Route: RE  PRN Reason: Constipation  PRN Comment: Use if bisacodyl oral is ineffective  Start: 06/02/25 2324     Admin Instructions:   Use if no bowel movement after 12 hours.  Hold for diarrhea          Calcium Replacement - Follow Nurse / BPA Driven Protocol  Freq: As Needed Route: XX  PRN Reason: Other  Start: 06/02/25 2324     Admin Instructions:   Open Order & Select \"[facility]HS Electrolyte Replacement Protocol Algorithm\" to View Details          dextrose (D50W) (25 g/50 mL) IV injection 25 g  Dose: 25 g  Freq: Every 15 Minutes PRN Route: IV  PRN Reason: Low Blood Sugar  PRN Comment: Blood Sugar Less Than 70  Start: 06/02/25 2334     Admin Instructions:   Blood sugar less than 70; patient has IV access - Unresponsive, NPO or Unable To Safely Swallow          dextrose (GLUTOSE) oral gel 15 g  Dose: 15 g  Freq: Every 15 Minutes PRN Route: PO  PRN Reason: Low Blood Sugar  PRN Comment: Blood sugar less than 70  Start: 06/02/25 2334     Admin Instructions:   BS<70, Patient Alert, Is not NPO, Can safely swallow.          glucagon (GLUCAGEN) injection 1 mg  Dose: 1 mg  Freq: Every 15 Minutes PRN Route: IM  PRN Reason: Low Blood Sugar  PRN Comment: Blood Glucose Less Than 70  Start: 06/02/25 2334     Admin Instructions:   Blood Glucose Less Than 70 - Patient Without IV Access - Unresponsive, NPO or Unable To Safely Swallow  Reconstitute powder for injection by adding 1 mL of -supplied sterile diluent or sterile water for injection to a vial containing 1 mg of the drug, to provide solutions containing 1 mg/mL. Shake vial gently to dissolve.          HYDROcodone-acetaminophen (NORCO) 5-325 MG per tablet 1 tablet  Dose: 1 tablet  Freq: Every 6 Hours PRN Route: PO  PRN Reason: Moderate Pain  Start: 06/02/25 2325 End: 06/07/25 2324     Admin Instructions:   Based on patient request - if ordered for moderate or severe pain, " "provider allows for administration of a medication prescribed for a lower pain scale.  [QUINN]    Do not exceed 4 grams of acetaminophen in a 24 hr period. Max dose of 2gm for AST/ALT greater than 120 units/L        If given for pain, use the following pain scale:   Mild Pain = Pain Score of 1-3, CPOT 1-2  Moderate Pain = Pain Score of 4-6, CPOT 3-4  Severe Pain = Pain Score of 7-10, CPOT 5-8          Magnesium Standard Dose Replacement - Follow Nurse / BPA Driven Protocol  Freq: As Needed Route: XX  PRN Reason: Other  Start: 06/02/25 2324     Admin Instructions:   Open Order & Select \"BHS Electrolyte Replacement Protocol Algorithm\" to View Details          nitroglycerin (NITROSTAT) SL tablet 0.4 mg  Dose: 0.4 mg  Freq: Every 5 Minutes PRN Route: SL  PRN Reason: Chest Pain  Start: 06/02/25 2324     Admin Instructions:   If Pain Unrelieved After 3 Doses Notify MD  May administer up to 3 doses per episode. Hold if SBP less than 100.           ondansetron ODT (ZOFRAN-ODT) disintegrating tablet 4 mg  Dose: 4 mg  Freq: Every 6 Hours PRN Route: PO  PRN Reasons: Nausea,Vomiting  Start: 06/02/25 2324     Admin Instructions:   If BOTH ondansetron (ZOFRAN) and promethazine (PHENERGAN) are ordered use ondansetron first and THEN promethazine IF ondansetron is ineffective.  Place on tongue and allow to dissolve.          Or   ondansetron (ZOFRAN) injection 4 mg  Dose: 4 mg  Freq: Every 6 Hours PRN Route: IV  PRN Reasons: Nausea,Vomiting  Start: 06/02/25 2324     Admin Instructions:   If BOTH ondansetron (ZOFRAN) and promethazine (PHENERGAN) are ordered use ondansetron first and THEN promethazine IF ondansetron is ineffective.          Pharmacy to dose vancomycin  Freq: Continuous PRN Route: XX  PRN Reason: Consult  Indications of Use: SKIN AND SOFT TISSUE INFECTION  Start: 06/02/25 2325 End: 06/09/25 2324          Pharmacy to dose warfarin  Freq: Continuous PRN Route: XX  PRN Reason: Consult  Indications of Use: history of " "DVT/PE  Start: 25     Admin Instructions:   Group 2 (Pink) Hazardous Drug - Reproductive Risk Only - See Handling Guide     Order specific questions:   Target INR 2 - 3          Phosphorus Replacement - Follow Nurse / BPA Driven Protocol  Freq: As Needed Route: XX  PRN Reason: Other  Start: 25     Admin Instructions:   Open Order & Select \"BHS Electrolyte Replacement Protocol Algorithm\" to View Details          Potassium Replacement - Follow Nurse / BPA Driven Protocol  Freq: As Needed Route: XX  PRN Reason: Other  Start: 25     Admin Instructions:   Open Order & Select \"BHS Electrolyte Replacement Protocol Algorithm\" to View Details          sodium chloride 0.9 % flush 10 mL  Dose: 10 mL  Freq: As Needed Route: IV  PRN Reason: Line Care  Start: 25          sodium chloride 0.9 % infusion 40 mL  Dose: 40 mL  Freq: As Needed Route: IV  PRN Reason: Line Care  Start: 25     Admin Instructions:   Following administration of an IV intermittent medication, flush line with 40mL NS at 100mL/hr.          sodium chloride nasal spray 1 spray  Dose: 1 spray  Freq: Every 30 Minutes PRN Route: EACH NARE  PRN Reason: Congestion  Start: 25 1131     Admin Instructions:   ([facility])       4753-Given                          Blood Administration Record (From admission, onward)      None          Operative/Procedure Notes (last 24 hours)  Notes from 25 0845 through 25 0845   No notes of this type exist for this encounter.          Physician Progress Notes (last 24 hours)        Randall Jordan MD at 25 1751              Name: aKmeron Melendez ADMIT: 2025   : 1970  PCP: Hansel Patel DO    MRN: [id number] LOS: 2 days   AGE/SEX: 55 y.o. male  ROOM: [facility]     Subjective   Subjective   Patient is seen at bedside, no new complaints.      Objective   Objective   Vital Signs  Temp:  [97.8 °F (36.6 °C)-98.2 °F (36.8 °C)] 98.1 °F (36.7 °C)  Heart Rate:  " [63-76] 76  Resp:  [20] 20  BP: (117-150)/(50-72) 150/72  SpO2:  [91 %-96 %] 91 %  on  Flow (L/min) (Oxygen Therapy):  [4-5] 5;   Device (Oxygen Therapy): humidified;nasal cannula  Body mass index is 70.62 kg/m².  Physical Exam  General, awake and alert.  Head and ENT, normocephalic and atraumatic.  Lungs, symmetric expansion, equal air entry bilaterally.  Heart, regular rate and rhythm.  Abdomen, soft and nontender.  Draining wound on the pannus  Extremities, no clubbing or cyanosis.  Bilateral lower extreme chronic appearing lymphedema  Neuro, no focal deficits.  Skin: Warm and no rash.  Psych, normal mood and affect.  Musculoskeletal, joint examination is grossly normal.    Copied text material from yesterday's note has been reviewed for appropriate changes and remains accurate as of 6/4/25.      Results Review     I reviewed the patient's new clinical results.  Results from last 7 days   Lab Units 06/04/25 0256 06/03/25 0622 06/02/25 2027   WBC 10*3/mm3 8.18 8.83 10.41   HEMOGLOBIN g/dL 12.3* 13.2 12.9*   PLATELETS 10*3/mm3 353 330 355     Results from last 7 days   Lab Units 06/04/25 0256 06/03/25 0622 06/02/25 2027   SODIUM mmol/L 135* 139 136   POTASSIUM mmol/L 4.3 3.9 3.6   CHLORIDE mmol/L 105 105 105   CO2 mmol/L 20.1* 21.8* 20.8*   BUN mg/dL 16.0 19.0 21.0*   CREATININE mg/dL 1.10 1.08 1.26   GLUCOSE mg/dL 117* 115* 131*   EGFR mL/min/1.73 79.3 81.0 67.4     Results from last 7 days   Lab Units 06/02/25 2027   ALBUMIN g/dL 3.1*   BILIRUBIN mg/dL 0.4   ALK PHOS U/L 112   AST (SGOT) U/L 18   ALT (SGPT) U/L 16     Results from last 7 days   Lab Units 06/04/25 0256 06/03/25 0622 06/02/25 2027   CALCIUM mg/dL 8.6 8.9 9.0   ALBUMIN g/dL  --   --  3.1*   MAGNESIUM mg/dL  --   --  1.6     Results from last 7 days   Lab Units 06/02/25 2027   PROCALCITONIN ng/mL 1.55*   LACTATE mmol/L 1.9     Hemoglobin A1C   Date/Time Value Ref Range Status   06/03/2025 0622 7.10 (H) 4.80 - 5.60 % Final     Glucose    Date/Time Value Ref Range Status   06/04/2025 1556 115 70 - 130 mg/dL Final   06/04/2025 1053 131 (H) 70 - 130 mg/dL Final   06/04/2025 0628 110 70 - 130 mg/dL Final   06/03/2025 2024 144 (H) 70 - 130 mg/dL Final   06/03/2025 1829 153 (H) 70 - 130 mg/dL Final   06/03/2025 1726 113 70 - 130 mg/dL Final   06/03/2025 1127 134 (H) 70 - 130 mg/dL Final       CT Angiogram Chest Pulmonary Embolism  Result Date: 6/2/2025   Bolus timing is marginal, and there is no convincing evidence of pulmonary embolism, though assessment is limited.  No acute pulmonary parenchymal abnormality is seen.    This report was finalized on 6/2/2025 11:11 PM by Dr. Cedric Tiwari M.D on Workstation: [id number]      XR Chest 1 View  Result Date: 6/2/2025  No evidence for active disease in the chest.    This report was finalized on 6/2/2025 8:59 PM by Chalino Wynne M.D on Workstation: [id number]        I have personally reviewed all medications:  Scheduled Medications  cefTRIAXone, 2,000 mg, Intravenous, Q24H  hydrocortisone-bacitracin-zinc oxide-nystatin, 1 Application, Topical, BID  insulin lispro, 2-9 Units, Subcutaneous, 4x Daily AC & at Bedtime  sodium chloride, 10 mL, Intravenous, Q12H  sodium hypochlorite, , Topical, BID  vancomycin, 1,250 mg, Intravenous, Q12H  warfarin, 10 mg, Oral, Once per day on Sunday Tuesday Wednesday Thursday Saturday  warfarin, 15 mg, Oral, Once per day on Monday Friday    Infusions  Pharmacy to dose vancomycin,   Pharmacy to dose warfarin,     Diet  Diet: Cardiac, Diabetic; Healthy Heart (2-3 Na+); Consistent Carbohydrate; Fluid Consistency: Thin (IDDSI 0)    I have personally reviewed:  [x]  Laboratory   [x]  Microbiology   [x]  Radiology   [x]  EKG/Telemetry  [x]  Cardiology/Vascular   []  Pathology    []  Records      Assessment/Plan     Active Hospital Problems    Diagnosis  POA    **Left leg cellulitis [L03.116]  Yes    Rhinovirus [B34.8]  Yes    Type 2 diabetes mellitus [E11.9]  Yes    Wound  cellulitis [L03.90]  Yes    Venous insufficiency (chronic) (peripheral) [I87.2]  Yes    Cellulitis of left lower extremity [L03.116]  Yes    Heterozygous factor V Leiden mutation [D68.51]  Yes    History of pulmonary embolism [Z86.711]  Yes    Lymphedema of both lower extremities [I89.0]  Yes    Obesity, morbid, BMI 50 or higher [E66.01]  Yes    ARNOLDO (obstructive sleep apnea) [G47.33]  Yes      Resolved Hospital Problems   No resolved problems to display.       55 y.o. male admitted with Left leg cellulitis.    Assessment and plan  1.  Subcutaneous wound on the left lower pannus, concerning for soft tissue abscess and cellulitis, surgery consulted.  Follow management maintenance.  Continue IV antibiotics.  Patient requested conservative management first.     2.  Left leg cellulitis, antibiotic management per ID recommendations.     3.  Factor V Leiden mutation, on Coumadin.     4.  Super morbid obesity, BMI noted to be above 70, patient would benefit from weight loss.     5.  Diabetes mellitus, continue Accu-Cheks and sliding scale insulin coverage.     6.  Human rhinovirus/enterovirus, supportive management.     7.  CODE STATUS full code.  Further plans will based on the hospital course.     Expected Discharge Date: 6/8/2025; Expected Discharge Time:       Randall Jordan MD  [facility]  06/04/25  17:51 EDT    Electronically signed by Rnadall Jordan MD at 06/04/25 1752       Valentina Cai APRN at 06/04/25 0936       Attestation signed by Joan Ontiveros MD at 06/04/25 1117      I have reviewed this documentation and agree.  His cellulitis looks slightly better today and he is tolerating Dakin's wet-to-dry dressing changes twice daily.  I remove the gauze and there was a small amount of purulent yellow/brown exudate that came off the edges of the wound on the gauze.  The perimeter of the wound looks about the same if not a little better compared to last night and I think it would  be fine to continue with local wound care and avoid any aggressive surgical intervention for now.  I do not suspect requires any imaging, as the presumed soft tissue abscess is draining adequately through the open wound.  Once a definitive antibiotic plan is in place from infectious disease, he could be discharged to home with home health to assist him with wound care.                      Acute Care General Surgery Progress Note    Patient: Kameron Melendez  YOB: 1970  MRN: [id number]      Assessment  Kameron Melendez is a 55 y.o. male with a LLE wound with abscess, surrounding cellulitis. No changes in wound appearance today from yesterday. Re-packed wound with Dakins WTD dressing, he tolerated well. There is currently no leukocytosis, and he is afebrile with normal vitals.     Plan  As per Dr. Ontiveros recommendation yesterday, discussed with patient today about benefits of proceeding with surgical debridement, he would like to hold off at this time and continue with BID Dakins WTD dressings and re-evaluate tomorrow  Antibiotics per ID      Subjective  Denies any fever or chills. Denies any pain from abscess site.     Objective  Vitals  Temp 98.2  HR 75  RR 20  /54  SpO2 96       Physical Exam  Constitutional: alert, oriented, in no acute distress  Respiratory: Normal work of breathing, Symmetric excursion  Cardiovascular: Well pefused, no jugular venous distention evident   Skin: LLE wound - small open wound about 2cm wide and approximately 2-3 cm deep. The inner area of the wound is dark with purulent drainage. Surrounding area with cellulitis. Malodorous. No surrounding fluctuation or crepitus on palpation. The area is non-tender.       Laboratory Results  Results from last 7 days   Lab Units 06/04/25  0256 06/03/25  0622 06/02/25 2027   WBC 10*3/mm3 8.18 8.83 10.41   HEMOGLOBIN g/dL 12.3* 13.2 12.9*   HEMATOCRIT % 37.3* 40.6 39.6   PLATELETS 10*3/mm3 353 330 355     Results from last 7  days   Lab Units 06/04/25  0256 06/03/25  0622 06/02/25 2027   SODIUM mmol/L 135* 139 136   POTASSIUM mmol/L 4.3 3.9 3.6   CHLORIDE mmol/L 105 105 105   CO2 mmol/L 20.1* 21.8* 20.8*   BUN mg/dL 16.0 19.0 21.0*   CREATININE mg/dL 1.10 1.08 1.26   CALCIUM mg/dL 8.6 8.9 9.0   BILIRUBIN mg/dL  --   --  0.4   ALK PHOS U/L  --   --  112   ALT (SGPT) U/L  --   --  16   AST (SGOT) U/L  --   --  18   GLUCOSE mg/dL 117* 115* 131*     I have personally reviewed 6/4 labs       FADY Cruz  Acute [facility] General Surgery  [facility]    [address], [address]  [place], [place], [address]  P: [phone]  F: [phone]      Electronically signed by Joan Ontiveros MD at 06/04/25 1115       Prateek Begum MD at 06/04/25 5809          ID progress note    CC: Follow-up left thigh/pannus wound infection    Subjective: No acute events.  No fever.  He feels like his area of concern is slightly better.  He was seen by general surgery yesterday who recommended operative debridement but the patient declined and first wanted to try conservative measures.    Medications:    Current Facility-Administered Medications:     acetaminophen (TYLENOL) tablet 650 mg, 650 mg, Oral, Q4H PRN **OR** acetaminophen (TYLENOL) 160 MG/5ML oral solution 650 mg, 650 mg, Oral, Q4H PRN **OR** acetaminophen (TYLENOL) suppository 650 mg, 650 mg, Rectal, Q4H PRN, Gerardo Drake MD    sennosides-docusate (PERICOLACE) 8.6-50 MG per tablet 2 tablet, 2 tablet, Oral, BID PRN **AND** polyethylene glycol (MIRALAX) packet 17 g, 17 g, Oral, Daily PRN **AND** bisacodyl (DULCOLAX) EC tablet 5 mg, 5 mg, Oral, Daily PRN **AND** bisacodyl (DULCOLAX) suppository 10 mg, 10 mg, Rectal, Daily PRN, Gerardo Drake MD    Calcium Replacement - Follow Nurse / BPA Driven Protocol, , Not Applicable, PRN, Gerardo Drake MD    cefTRIAXone (ROCEPHIN) 2,000 mg in sodium chloride 0.9 % 100 mL MBP, 2,000 mg, Intravenous, Q24H, Vidal  Gerardo Sheehan MD, Last Rate: 200 mL/hr at 06/03/25 2353, 2,000 mg at 06/03/25 2353    dextrose (D50W) (25 g/50 mL) IV injection 25 g, 25 g, Intravenous, Q15 Min PRN, Gerardo Drake MD    dextrose (GLUTOSE) oral gel 15 g, 15 g, Oral, Q15 Min PRN, Gerardo Drake MD    glucagon (GLUCAGEN) injection 1 mg, 1 mg, Intramuscular, Q15 Min PRN, Gerardo Drake MD    HYDROcodone-acetaminophen (NORCO) 5-325 MG per tablet 1 tablet, 1 tablet, Oral, Q6H PRN, Gerardo Drake MD    hydrocortisone-bacitracin-zinc oxide-nystatin (MAGIC BARRIER) ointment 1 Application, 1 Application, Topical, BID, Randall Jordan MD, 1 Application at 06/04/25 0914    insulin lispro (HUMALOG/ADMELOG) injection 2-9 Units, 2-9 Units, Subcutaneous, 4x Daily AC & at Bedtime, Gerardo Drake MD    Magnesium Standard Dose Replacement - Follow Nurse / BPA Driven Protocol, , Not Applicable, PRVidal SOFIA Alan David, MD    nitroglycerin (NITROSTAT) SL tablet 0.4 mg, 0.4 mg, Sublingual, Q5 Min PRNVidal Alan David, MD    ondansetron ODT (ZOFRAN-ODT) disintegrating tablet 4 mg, 4 mg, Oral, Q6H PRN **OR** ondansetron (ZOFRAN) injection 4 mg, 4 mg, Intravenous, Q6H PRNVidal Alan David, MD    Pharmacy to dose vancomycin, , Not Applicable, Continuous PRVidal SOFIA Alan David, MD    Pharmacy to dose warfarin, , Not Applicable, Continuous PRVidal SOFIA Alan David, MD    Phosphorus Replacement - Follow Nurse / BPA Driven Protocol, , Not Applicable, PRVidal SOFIA Alan David, MD    Potassium Replacement - Follow Nurse / BPA Driven Protocol, , Not Applicable, Vidal SALES Alan David, MD    sodium chloride 0.9 % flush 10 mL, 10 mL, Intravenous, Q12H, Gerardo Drake MD, 10 mL at 06/04/25 0915    sodium chloride 0.9 % flush 10 mL, 10 mL, Intravenous, PRN, Gerardo Drake MD    sodium chloride 0.9 % infusion 40 mL, 40 mL, Intravenous, PRN, Gerardo Drake MD    sodium hypochlorite (DAKIN'S 1/4 STRENGTH) 0.125 % topical  solution 0.125% solution, , Topical, BID, Joan Ontiveros MD, Given at 06/04/25 0915    vancomycin 1750 mg/500 mL 0.9% NS IVPB ([facility]), 1,750 mg, Intravenous, Q12H, Randall Jordan MD, 1,750 mg at 06/03/25 2033    warfarin (COUMADIN) tablet 10 mg, 10 mg, Oral, Once per day on Sunday Tuesday Wednesday Thursday Saturday, Gerardo Drake MD, 10 mg at 06/03/25 1834    warfarin (COUMADIN) tablet 15 mg, 15 mg, Oral, Once per day on Monday Friday, Gerardo Drake MD, 15 mg at 06/03/25 0155      Objective   Vital Signs   Temp:  [97.8 °F (36.6 °C)-98.2 °F (36.8 °C)] 98.2 °F (36.8 °C)  Heart Rate:  [63-75] 75  Resp:  [18-20] 20  BP: (117-140)/(50-69) 131/54    Physical Exam:   General: awake, alert, NAD, very nice, sitting up in bed  Eyes: no scleral icterus  Cardiovascular: NR  Respiratory: normal work of breathing on ambient air  GI: Abdomen is soft  :  no Vera catheter  Skin: Erythema, induration, and open, draining wound of the left thigh are stable  Neurological: Alert and oriented x 3  Psychiatric: Normal mood and affect     Labs:   CBC, BMP, CRP, and blood cultures reviewed today  Lab Results   Component Value Date    WBC 8.18 06/04/2025    HGB 12.3 (L) 06/04/2025    HCT 37.3 (L) 06/04/2025    MCV 92.8 06/04/2025     06/04/2025       Lab Results   Component Value Date    GLUCOSE 117 (H) 06/04/2025    BUN 16.0 06/04/2025    CREATININE 1.10 06/04/2025    EGFRIFNONA 116 11/10/2021    EGFRIFAFRI 134 11/10/2021    BCR 14.5 06/04/2025    CO2 20.1 (L) 06/04/2025    CALCIUM 8.6 06/04/2025    ALBUMIN 3.1 (L) 06/02/2025    AST 18 06/02/2025    ALT 16 06/02/2025     Lab Results   Component Value Date    HGBA1C 7.10 (H) 06/03/2025     Lab Results   Component Value Date    CRP 1.71 (H) 06/03/2025     Lab Results   Component Value Date    INR 2.77 (H) 06/04/2025    INR 2.66 (H) 06/03/2025    INR 2.55 (H) 06/02/2025    PROTIME 29.6 (H) 06/04/2025    PROTIME 28.7 (H) 06/03/2025    PROTIME 27.7 (H) 06/02/2025        Microbiology:   6/2 RPP: + Human rhinovirus/enterovirus  6/2 BCx: NGTD  6/3 MRSA nares: Negative    Prior radiology:  6/2 CTA chest negative for PE; negative for pneumonia    CXR personally reviewed and is negative for pneumonia    EKG personally reviewed and shows a QTc interval of 459 ms    Isolation:  Droplet    ASSESSMENT/PLAN:  Human rhinovirus/enterovirus infection  Left lower extremity wound infection  Factor V Leiden mutation  On warfarin  Super obesity BMI 70 complicating above  Uncontrolled DM2 complicating above    Continue supportive care for human rhinovirus/enterovirus infection.    For LLE wound and cellulitis, continue empiric vancomycin and ceftriaxone.  General surgery saw the patient and recommended I&D but patient requested an attempt at conservative measures first.    While the patient is on vancomycin, vancomycin levels will be ordered and reviewed with dose adjustments made as needed. The patient will have a daily creatinine level checked while on vancomycin as part of monitoring this medication.     ID will follow.          Electronically signed by Prateek Begum MD at 06/04/25 0932       Consult Notes (last 24 hours)  Notes from 06/04/25 0845 through 06/05/25 0845   No notes of this type exist for this encounter.

## 2025-06-06 LAB
ANION GAP SERPL CALCULATED.3IONS-SCNC: 9 MMOL/L (ref 5–15)
BASOPHILS # BLD AUTO: 0.04 10*3/MM3 (ref 0–0.2)
BASOPHILS NFR BLD AUTO: 0.5 % (ref 0–1.5)
BUN SERPL-MCNC: 14 MG/DL (ref 6–20)
BUN/CREAT SERPL: 14.9 (ref 7–25)
CALCIUM SPEC-SCNC: 8.4 MG/DL (ref 8.6–10.5)
CHLORIDE SERPL-SCNC: 107 MMOL/L (ref 98–107)
CO2 SERPL-SCNC: 23 MMOL/L (ref 22–29)
CREAT SERPL-MCNC: 0.94 MG/DL (ref 0.76–1.27)
DEPRECATED RDW RBC AUTO: 48.6 FL (ref 37–54)
EGFRCR SERPLBLD CKD-EPI 2021: 95.7 ML/MIN/1.73
EOSINOPHIL # BLD AUTO: 0.19 10*3/MM3 (ref 0–0.4)
EOSINOPHIL NFR BLD AUTO: 2.4 % (ref 0.3–6.2)
ERYTHROCYTE [DISTWIDTH] IN BLOOD BY AUTOMATED COUNT: 14.2 % (ref 12.3–15.4)
GLUCOSE BLDC GLUCOMTR-MCNC: 108 MG/DL (ref 70–130)
GLUCOSE BLDC GLUCOMTR-MCNC: 121 MG/DL (ref 70–130)
GLUCOSE BLDC GLUCOMTR-MCNC: 130 MG/DL (ref 70–130)
GLUCOSE BLDC GLUCOMTR-MCNC: 132 MG/DL (ref 70–130)
GLUCOSE SERPL-MCNC: 146 MG/DL (ref 65–99)
HCT VFR BLD AUTO: 36.3 % (ref 37.5–51)
HGB BLD-MCNC: 11.8 G/DL (ref 13–17.7)
IMM GRANULOCYTES # BLD AUTO: 0.1 10*3/MM3 (ref 0–0.05)
IMM GRANULOCYTES NFR BLD AUTO: 1.2 % (ref 0–0.5)
INR PPP: 2.5 (ref 0.9–1.1)
LYMPHOCYTES # BLD AUTO: 1.69 10*3/MM3 (ref 0.7–3.1)
LYMPHOCYTES NFR BLD AUTO: 21.1 % (ref 19.6–45.3)
MCH RBC QN AUTO: 30.6 PG (ref 26.6–33)
MCHC RBC AUTO-ENTMCNC: 32.5 G/DL (ref 31.5–35.7)
MCV RBC AUTO: 94.3 FL (ref 79–97)
MONOCYTES # BLD AUTO: 0.59 10*3/MM3 (ref 0.1–0.9)
MONOCYTES NFR BLD AUTO: 7.4 % (ref 5–12)
NEUTROPHILS NFR BLD AUTO: 5.41 10*3/MM3 (ref 1.7–7)
NEUTROPHILS NFR BLD AUTO: 67.4 % (ref 42.7–76)
NRBC BLD AUTO-RTO: 0 /100 WBC (ref 0–0.2)
PLATELET # BLD AUTO: 334 10*3/MM3 (ref 140–450)
PMV BLD AUTO: 9.2 FL (ref 6–12)
POTASSIUM SERPL-SCNC: 4.3 MMOL/L (ref 3.5–5.2)
PROTHROMBIN TIME: 27.2 SECONDS (ref 11.7–14.2)
RBC # BLD AUTO: 3.85 10*6/MM3 (ref 4.14–5.8)
SODIUM SERPL-SCNC: 139 MMOL/L (ref 136–145)
VANCOMYCIN TROUGH SERPL-MCNC: 22.5 MCG/ML (ref 5–20)
WBC NRBC COR # BLD AUTO: 8.02 10*3/MM3 (ref 3.4–10.8)

## 2025-06-06 PROCEDURE — 93010 ELECTROCARDIOGRAM REPORT: CPT | Performed by: STUDENT IN AN ORGANIZED HEALTH CARE EDUCATION/TRAINING PROGRAM

## 2025-06-06 PROCEDURE — 85025 COMPLETE CBC W/AUTO DIFF WBC: CPT | Performed by: INTERNAL MEDICINE

## 2025-06-06 PROCEDURE — 80048 BASIC METABOLIC PNL TOTAL CA: CPT | Performed by: INTERNAL MEDICINE

## 2025-06-06 PROCEDURE — 80202 ASSAY OF VANCOMYCIN: CPT | Performed by: INTERNAL MEDICINE

## 2025-06-06 PROCEDURE — 82948 REAGENT STRIP/BLOOD GLUCOSE: CPT

## 2025-06-06 PROCEDURE — 25010000002 CEFTRIAXONE PER 250 MG: Performed by: INTERNAL MEDICINE

## 2025-06-06 PROCEDURE — 99232 SBSQ HOSP IP/OBS MODERATE 35: CPT | Performed by: SURGERY

## 2025-06-06 PROCEDURE — 36415 COLL VENOUS BLD VENIPUNCTURE: CPT | Performed by: INTERNAL MEDICINE

## 2025-06-06 PROCEDURE — 99232 SBSQ HOSP IP/OBS MODERATE 35: CPT | Performed by: INTERNAL MEDICINE

## 2025-06-06 PROCEDURE — 93005 ELECTROCARDIOGRAM TRACING: CPT

## 2025-06-06 PROCEDURE — 85610 PROTHROMBIN TIME: CPT | Performed by: INTERNAL MEDICINE

## 2025-06-06 RX ORDER — NALOXONE HCL 0.4 MG/ML
0.1 VIAL (ML) INJECTION
Status: CANCELLED | OUTPATIENT
Start: 2025-06-06

## 2025-06-06 RX ORDER — HYDROMORPHONE HCL/0.9% NACL/PF 10 MG/50ML
PATIENT CONTROLLED ANALGESIA SYRINGE INTRAVENOUS CONTINUOUS
Refills: 0 | Status: CANCELLED | OUTPATIENT
Start: 2025-06-06 | End: 2025-06-09

## 2025-06-06 RX ORDER — SODIUM CHLORIDE 9 MG/ML
30 INJECTION, SOLUTION INTRAVENOUS CONTINUOUS PRN
Status: CANCELLED | OUTPATIENT
Start: 2025-06-06

## 2025-06-06 RX ADMIN — WARFARIN SODIUM 15 MG: 7.5 TABLET ORAL at 21:15

## 2025-06-06 RX ADMIN — ZINC OXIDE 1 APPLICATION: 200 OINTMENT TOPICAL at 21:15

## 2025-06-06 RX ADMIN — SODIUM HYPOCHLORITE: 1.25 SOLUTION TOPICAL at 21:16

## 2025-06-06 RX ADMIN — Medication 10 ML: at 21:16

## 2025-06-06 RX ADMIN — CEFTRIAXONE 2000 MG: 2 INJECTION, POWDER, FOR SOLUTION INTRAMUSCULAR; INTRAVENOUS at 00:43

## 2025-06-06 RX ADMIN — Medication 10 ML: at 10:07

## 2025-06-06 RX ADMIN — ZINC OXIDE 1 APPLICATION: 200 OINTMENT TOPICAL at 10:06

## 2025-06-06 RX ADMIN — SODIUM HYPOCHLORITE: 1.25 SOLUTION TOPICAL at 10:07

## 2025-06-06 NOTE — PROGRESS NOTES
Continued Stay Note  [facility]     Patient Name: Kameron Melendez  MRN: [id number]  Today's Date: 6/6/2025    Admit Date: 6/2/2025    Plan: Return home with spouse, still looking for  agency   Discharge Plan       Row Name 06/06/25 1523       Plan    Plan Return home with spouse, still looking for  agency    Patient/Family in Agreement with Plan yes    Plan Comments [facility] unable to accept, [facility], and [facility] also declined.  Referral to [facility] pending - they would not be able to see patient until next week.  Spoke with patient and he is aware.  Waiting decision from ID if will need IV antibiotics.  CCP following.  Desirae NAVAROR                         Expected Discharge Date and Time       Expected Discharge Date Expected Discharge Time    Jun 8, 2025               Becky S. [place], RN

## 2025-06-06 NOTE — PROGRESS NOTES
Start PACC Note    Home Health Referral    Evaluated patient on Home Care and services available. Patient offered choice of available HHC and agreeable to SN services with [facility].    Isolation Precautions: Droplet    START PATIENT REGISTRATION INFORMATION  Order Information  Order Signing Physician: Randall Jordan MD  Service Ordered RN?: Yes  Service Ordered PT?: No  Service Ordered OT?: No  Service Ordered ST?: No  Service Ordered MSW?: No  Service Ordered HHA?: No  Following Physician: Dr Joan Ontiveros  Following Physician Phone: [phone]  Overseeing Physician: Hansel Patel DO  (Required for Residents Only)  Agreeable to Follow ? Yes  Date/Time of Call 06/06/25 16:33 EDT, Spoke with: per Dr Ontiveros order in epic, surgery to follow for the healing of the pannus wound.    Care Coordination  Same Day SOC?: No  Primary Care Physician: Hansel Patel DO  Primary Care Physician Phone: [phone]  Primary Care Physician Address: [address] [place] [address]  Visit Instructions: N/A  Service Discharge Location Type: Home  Service Facility Name: N/A  Service Floor Facility: N/A  Service Room No: N/A    Demographics  Patient Last Name: Al  Patient First Name: Kameron  Language/Communication Barrier: none  Service Address: [address] [place] [address]  Service City: [place]  Service State: [place]  Service Zip: [address]  [facility] Home Phone: [phone]  Other Phone Numbers:   Telephone Information:   Mobile [phone]     Emergency Contact:   Extended Emergency Contact Information  Primary Emergency Contact: Yaya Melendez  Address: [address] [place] [address] of [facility]  Home Phone: [phone]  Mobile Phone: [phone]  Relation: Spouse    Admission Information  Admit Date: 6/2/2025  Patient Status at Discharge: Inpatient  Admitting Diagnosis: Lymphedema [I89.0]  Hyperglycemia [R73.9]  Elevated troponin [R79.89]  Rhinovirus infection  [B34.8]  Elevated brain natriuretic peptide (BNP) level [R79.89]  Left leg cellulitis [L03.116]  Elevated procalcitonin [R79.89]  Failure of outpatient treatment [Z78.9]    Caregiver Information  Caregiver First Name:   Caregiver Last Name:   Caregiver Relationship to Patient:   Caregiver Phone Number:   Caregiver Notes:     HITECH  Hi-Tech List  HIGHTECH: HI TECH - WOUND CARE  Orders: Dakins wet to dry dressing changes BID to open wound of left pannus with 4x4 gauze, cover with Mepilex   Wound following physician: Joan Ontiveros  Last time wound care completed?: 6/7/25  Supplies sent home?: Yes  How many days are covered with supplies?: 4    Teachable Caregiver  Teachable Caregiver First Name: Wilyberenice  Teachable Caregiver Last Name: Al  Teachable Caregiver Relationship to Patient: wife  Teachable Caregiver Phone Number: [phone]  Teachable Caregiver Notes: N/A  Teachable Caregiver available for Start of Care visit?: Yes  Teachable Caregiver agreeable to provide skilled High Tech care per physician's orders?: Yes      END PATIENT REGISTRATION INFORMATION    Start PACC Summary    Additional Comments: N/A    END PACC Summary    Discharge Date: Pending    Referral Source:  [facility]    Signed By: Ivory Romo RN, 6/6/2025, 16:33 EDT     Date/Time: 06/06/25 16:33 EDT    End PACC Note

## 2025-06-06 NOTE — PROGRESS NOTES
"General Surgery  Progress Note    CC: Follow-up open wound and cellulitis of left leg/pannus    S: He continues to tolerate dressing changes twice daily with Dakin's wet-to-dry 4 x 4 gauze.  The cellulitis of his left leg continues to improve day by day and he remains afebrile.    ROS: Positive for left leg/pannus as cellulitis    O:/59 (BP Location: Right arm, Patient Position: Lying)   Pulse 72   Temp 98.3 °F (36.8 °C) (Oral)   Resp 16   Ht 188 cm (74\")   Wt (!) 249 kg (550 lb)   SpO2 92%   BMI 70.62 kg/m²     GENERAL: alert, well appearing, and in no distress  HEENT: normocephalic, atraumatic, moist mucous membranes, clear sclerae   CHEST: clear to auscultation, no wheezes, rales or rhonchi, symmetric air entry  CARDIAC: regular rate and rhythm    ABDOMEN: Soft, nontender, nondistended, morbidly obese with large pannus  EXTREMITIES: Left upper inner thigh skin shows an open wound with rafaela induration of the surrounding soft tissues but improving erythema of the skin, the packing was removed and replaced with a clean 4 x 4 gauze saturated in Dakin solution  SKIN: Warm and moist, no rashes    LABS  Results from last 7 days   Lab Units 06/06/25 0314 06/05/25 0312 06/04/25 0256 06/03/25 0622 06/02/25 2027   WBC 10*3/mm3 8.02 8.17 8.18   < > 10.41   HEMOGLOBIN g/dL 11.8* 11.7* 12.3*   < > 12.9*   HEMATOCRIT % 36.3* 36.6* 37.3*   < > 39.6   PLATELETS 10*3/mm3 334 331 353   < > 355   MONOCYTES % %  --   --   --   --  7.0   EOSINOPHIL % %  --   --   --   --  3.0    < > = values in this interval not displayed.     Results from last 7 days   Lab Units 06/06/25 0314 06/05/25 0312 06/04/25  0256 06/03/25 0622 06/02/25 2027   SODIUM mmol/L 139 134* 135*   < > 136   POTASSIUM mmol/L 4.3 4.2 4.3   < > 3.6   CHLORIDE mmol/L 107 105 105   < > 105   CO2 mmol/L 23.0 22.4 20.1*   < > 20.8*   BUN mg/dL 14.0 14.0 16.0   < > 21.0*   CREATININE mg/dL 0.94 1.02 1.10   < > 1.26   CALCIUM mg/dL 8.4* 8.3* 8.6   < > " 9.0   BILIRUBIN mg/dL  --   --   --   --  0.4   ALK PHOS U/L  --   --   --   --  112   ALT (SGPT) U/L  --   --   --   --  16   AST (SGOT) U/L  --   --   --   --  18   GLUCOSE mg/dL 146* 133* 117*   < > 131*    < > = values in this interval not displayed.     Results from last 7 days   Lab Units 06/06/25  0314 06/05/25  0312 06/04/25  0256 06/03/25  0622 06/02/25 2025   INR  2.50* 2.67* 2.77*   < > 2.55*   APTT seconds  --   --   --   --  38.6*    < > = values in this interval not displayed.         A/P: 55 y.o. male with cellulitis and soft tissue abscess of the left upper inner thigh/pannus    His cellulitis continues to improve with local wound care to the open wound.  Continue twice daily Dakin's wet-to-dry dressing changes.  I changed the dressing personally today and he took a video on his cell phone to show his wife who will be assuming care of the wound at home.  He already has a follow-up appointment with the wound care clinic in 10 days and does not need to see me as an outpatient as long as he is being seen by wound care.  Antibiotic plan to be determined tomorrow based on wound culture sensitivities per the infectious disease team.  I will sign off, please call back with questions or concerns.    Joan Ontiveros MD  General, Robotic, and Endoscopic Surgery  [facility]    [address], [address]  [place] [address]  P: [phone]  F: [phone]

## 2025-06-06 NOTE — PROGRESS NOTES
Name: Kameron Melendez ADMIT: 2025   : 1970  PCP: Hansel Patel DO    MRN: [id number] LOS: 4 days   AGE/SEX: 55 y.o. male  ROOM: [facility]     Subjective   Subjective   Patient appears comfortable and in no apparent distress.  Tolerating intake.  Voices no concerns.  Understands possibility for home with home health and family versus home with family to provide dressing changes pending care tenders approval.    Denies chest pain or trouble breathing.       Objective   Objective   Vital Signs  Temp:  [97.5 °F (36.4 °C)-98.4 °F (36.9 °C)] 98.3 °F (36.8 °C)  Heart Rate:  [62-73] 72  Resp:  [16] 16  BP: (128-139)/(51-59) 139/59  SpO2:  [91 %-95 %] 92 %  on   ;   Device (Oxygen Therapy): room air  Body mass index is 70.62 kg/m².    Physical Exam  Constitutional:       General: He is not in acute distress.     Appearance: He is obese. He is not toxic-appearing.   Cardiovascular:      Rate and Rhythm: Normal rate.      Heart sounds: Normal heart sounds.   Pulmonary:      Effort: Pulmonary effort is normal.      Breath sounds: Normal breath sounds.   Abdominal:      General: Bowel sounds are normal.      Palpations: Abdomen is soft.      Comments: obese   Musculoskeletal:      Right lower leg: No edema.      Left lower leg: No edema.   Skin:     General: Skin is warm and dry.      Findings: Erythema (mild proximaly wound on pannus) present.   Neurological:      General: No focal deficit present.      Mental Status: He is alert and oriented to person, place, and time.       Results Review     I reviewed the patient's new clinical results.  Results from last 7 days   Lab Units 25  03125  0312 25  0256 25  06   WBC 10*3/mm3 8.02 8.17 8.18 8.83   HEMOGLOBIN g/dL 11.8* 11.7* 12.3* 13.2   PLATELETS 10*3/mm3 334 331 353 330     Results from last 7 days   Lab Units 25  0314 25  0312 25  0256 25  0622   SODIUM mmol/L 139 134* 135* 139   POTASSIUM mmol/L 4.3 4.2 4.3  3.9   CHLORIDE mmol/L 107 105 105 105   CO2 mmol/L 23.0 22.4 20.1* 21.8*   BUN mg/dL 14.0 14.0 16.0 19.0   CREATININE mg/dL 0.94 1.02 1.10 1.08   GLUCOSE mg/dL 146* 133* 117* 115*   EGFR mL/min/1.73 95.7 86.8 79.3 81.0     Results from last 7 days   Lab Units 06/02/25 2027   ALBUMIN g/dL 3.1*   BILIRUBIN mg/dL 0.4   ALK PHOS U/L 112   AST (SGOT) U/L 18   ALT (SGPT) U/L 16     Results from last 7 days   Lab Units 06/06/25  0314 06/05/25  0312 06/04/25  0256 06/03/25 0622 06/02/25 2027   CALCIUM mg/dL 8.4* 8.3* 8.6 8.9 9.0   ALBUMIN g/dL  --   --   --   --  3.1*   MAGNESIUM mg/dL  --   --   --   --  1.6     Results from last 7 days   Lab Units 06/02/25 2027   PROCALCITONIN ng/mL 1.55*   LACTATE mmol/L 1.9     Glucose   Date/Time Value Ref Range Status   06/06/2025 1103 132 (H) 70 - 130 mg/dL Final   06/06/2025 0547 121 70 - 130 mg/dL Final   06/05/2025 2022 127 70 - 130 mg/dL Final   06/05/2025 1551 128 70 - 130 mg/dL Final   06/05/2025 1135 127 70 - 130 mg/dL Final   06/05/2025 0715 119 70 - 130 mg/dL Final   06/04/2025 2041 126 70 - 130 mg/dL Final       No radiology results for the last day    I have personally reviewed all medications:  Scheduled Medications  cefTRIAXone, 2,000 mg, Intravenous, Q24H  hydrocortisone-bacitracin-zinc oxide-nystatin, 1 Application, Topical, BID  insulin lispro, 2-9 Units, Subcutaneous, 4x Daily AC & at Bedtime  sodium chloride, 10 mL, Intravenous, Q12H  sodium hypochlorite, , Topical, BID  warfarin, 10 mg, Oral, Once per day on Sunday Tuesday Wednesday Thursday Saturday  warfarin, 15 mg, Oral, Once per day on Monday Friday    Infusions  Pharmacy to dose warfarin,     Diet  Diet: Cardiac, Diabetic; Healthy Heart (2-3 Na+); Consistent Carbohydrate; Fluid Consistency: Thin (IDDSI 0)    I have personally reviewed:  [x]  Laboratory   [x]  Microbiology   []  Radiology   [x]  EKG/Telemetry  []  Cardiology/Vascular   []  Pathology    []  Records       Assessment/Plan     Active Hospital  Problems    Diagnosis  POA    **Left leg cellulitis [L03.116]  Yes    Rhinovirus [B34.8]  Yes    Type 2 diabetes mellitus [E11.9]  Yes    Wound cellulitis [L03.90]  Yes    Venous insufficiency (chronic) (peripheral) [I87.2]  Yes    Cellulitis of left lower extremity [L03.116]  Yes    Heterozygous factor V Leiden mutation [D68.51]  Yes    History of pulmonary embolism [Z86.711]  Yes    Lymphedema of both lower extremities [I89.0]  Yes    Obesity, morbid, BMI 50 or higher [E66.01]  Yes    ARNOLDO (obstructive sleep apnea) [G47.33]  Yes      Resolved Hospital Problems   No resolved problems to display.       55 y.o. male admitted with Left leg cellulitis.       Assessment and plan  1.  Subcutaneous wound on the left lower pannus, concerning for soft tissue abscess and cellulitis, surgery consulted & signed off noting improvement with local wound care to open wound with BID Dakin's wet-to-dry dressing changes.  Follow management maintenance.  Continue IV antibiotic.  Patient requested conservative management first.  Plans for follow-up with wound care clinic soon after discharge.     2.  Left leg cellulitis, antibiotic management per ID recommendations noted wound growing GNR & stopped vancomycin.  Ceftriaxone continued.  Plans for possible transition to oral antibiotics noted for hopefully tomorrow on 6/7/2025.     3.  Factor V Leiden mutation, on Coumadin.  Patient checks INR at home with machine.  INR 2.5 today.     4.  Super morbid obesity, BMI noted to be above 70, patient would benefit from weight loss.     5.  Diabetes mellitus, continue Accu-Cheks and sliding scale insulin coverage.  Glucose trends acceptable.     6.  Human rhinovirus/enterovirus, supportive management.  Tolerating room air while awake without complaints of shortness of breath or respiratory distress.    7.  Concerns for nocturnal hypoxia here & plan to order overnight pulse oximetry for possibility of supplemental oxygen at night at discharge.   Patient recently stated no interest CPAP.    Warfarin (home med) for DVT prophylaxis.  Full code.  Discussed with patient and nursing staff.  Anticipate discharge home with home health vs home with family & pending  approval with hopeful discharge on 6/7/2025 with ID recommendation regarding antibiotic pending culture      FADY Mehta  [facility]  06/06/25  15:26 EDT

## 2025-06-06 NOTE — PROGRESS NOTES
ID progress note    CC: Follow-up left thigh/pannus wound infection due to GNR    Subjective: No acute events.  No fever.  He said his leg continues to improve.  He says his overnight nurse cleaned it quite well and he is pleased with the results.  He is tolerating Vanco and ceftriaxone without any side effects.  Wound culture growing GNR      Medications:    Current Facility-Administered Medications:     acetaminophen (TYLENOL) tablet 650 mg, 650 mg, Oral, Q4H PRN **OR** acetaminophen (TYLENOL) 160 MG/5ML oral solution 650 mg, 650 mg, Oral, Q4H PRN **OR** acetaminophen (TYLENOL) suppository 650 mg, 650 mg, Rectal, Q4H PRN, Gerardo Drake MD    sennosides-docusate (PERICOLACE) 8.6-50 MG per tablet 2 tablet, 2 tablet, Oral, BID PRN **AND** polyethylene glycol (MIRALAX) packet 17 g, 17 g, Oral, Daily PRN **AND** bisacodyl (DULCOLAX) EC tablet 5 mg, 5 mg, Oral, Daily PRN **AND** bisacodyl (DULCOLAX) suppository 10 mg, 10 mg, Rectal, Daily PRN, Gerardo Drake MD    Calcium Replacement - Follow Nurse / BPA Driven Protocol, , Not Applicable, PRN, Gerardo Drake MD    cefTRIAXone (ROCEPHIN) 2,000 mg in sodium chloride 0.9 % 100 mL MBP, 2,000 mg, Intravenous, Q24H, Gerardo Drake MD, Last Rate: 200 mL/hr at 06/06/25 0043, 2,000 mg at 06/06/25 0043    dextrose (D50W) (25 g/50 mL) IV injection 25 g, 25 g, Intravenous, Q15 Min PRN, Gerardo Drake MD    dextrose (GLUTOSE) oral gel 15 g, 15 g, Oral, Q15 Min PRN, Gerardo Drake MD    glucagon (GLUCAGEN) injection 1 mg, 1 mg, Intramuscular, Q15 Min PRN, Gerardo Drake MD    HYDROcodone-acetaminophen (NORCO) 5-325 MG per tablet 1 tablet, 1 tablet, Oral, Q6H PRN, Gerardo Drake MD    hydrocortisone-bacitracin-zinc oxide-nystatin (MAGIC BARRIER) ointment 1 Application, 1 Application, Topical, BID, Randall Jordan MD, 1 Application at 06/06/25 1006    insulin lispro (HUMALOG/ADMELOG) injection 2-9 Units, 2-9 Units, Subcutaneous, 4x  Daily AC & at Bedtime, Gerardo Drake MD    Magnesium Standard Dose Replacement - Follow Nurse / BPA Driven Protocol, , Not Applicable, PRN, Gerardo Drake MD    nitroglycerin (NITROSTAT) SL tablet 0.4 mg, 0.4 mg, Sublingual, Q5 Min PRN, Gerardo Drake MD    ondansetron ODT (ZOFRAN-ODT) disintegrating tablet 4 mg, 4 mg, Oral, Q6H PRN **OR** ondansetron (ZOFRAN) injection 4 mg, 4 mg, Intravenous, Q6H PRN, Gerardo Drake MD    Pharmacy to dose warfarin, , Not Applicable, Continuous PRN, Gerardo Drake MD    Phosphorus Replacement - Follow Nurse / BPA Driven Protocol, , Not Applicable, PRN, Gerardo Drake MD    Potassium Replacement - Follow Nurse / BPA Driven Protocol, , Not Applicable, Vidal SALES Alan David, MD    sodium chloride 0.9 % flush 10 mL, 10 mL, Intravenous, Q12H, Gerardo Drake MD, 10 mL at 06/06/25 1007    sodium chloride 0.9 % flush 10 mL, 10 mL, Intravenous, PRN, Gerardo Drake MD    sodium chloride 0.9 % infusion 40 mL, 40 mL, Intravenous, PRN, Gerardo Drake MD    sodium chloride nasal spray 1 spray, 1 spray, Each Nare, Q30 Min PRN, Randall Jordan MD, 1 spray at 06/05/25 0553    sodium hypochlorite (DAKIN'S 1/4 STRENGTH) 0.125 % topical solution 0.125% solution, , Topical, BID, Joan Ontiveros MD, Given at 06/06/25 1007    warfarin (COUMADIN) tablet 10 mg, 10 mg, Oral, Once per day on Sunday Tuesday Wednesday Thursday Saturday, Gerardo Drake MD, 10 mg at 06/05/25 1836    warfarin (COUMADIN) tablet 15 mg, 15 mg, Oral, Once per day on Monday Friday, Gerardo Drake MD, 15 mg at 06/03/25 0155      Objective   Vital Signs   Temp:  [97.5 °F (36.4 °C)-98.4 °F (36.9 °C)] 98.4 °F (36.9 °C)  Heart Rate:  [62-73] 62  Resp:  [16] 16  BP: (128-133)/(51-59) 133/51    Physical Exam:   General: awake, alert, NAD, very nice, sitting up in bed about to eat lunch  Eyes: no scleral icterus  Cardiovascular: Normal rate  Respiratory: normal work  of breathing on ambient air  GI: Abdomen is soft, nontender  :  no Vera catheter  Skin: Erythema, induration, and open, draining wound of the left thigh continue to improve  Neurological: Alert and oriented x 3  Psychiatric: Normal mood and affect     Labs:   CBC, BMP, and wound and blood cultures reviewed today  Lab Results   Component Value Date    WBC 8.02 06/06/2025    HGB 11.8 (L) 06/06/2025    HCT 36.3 (L) 06/06/2025    MCV 94.3 06/06/2025     06/06/2025       Lab Results   Component Value Date    GLUCOSE 146 (H) 06/06/2025    BUN 14.0 06/06/2025    CREATININE 0.94 06/06/2025    EGFRIFNONA 116 11/10/2021    EGFRIFAFRI 134 11/10/2021    BCR 14.9 06/06/2025    CO2 23.0 06/06/2025    CALCIUM 8.4 (L) 06/06/2025    ALBUMIN 3.1 (L) 06/02/2025    AST 18 06/02/2025    ALT 16 06/02/2025     Lab Results   Component Value Date    HGBA1C 7.10 (H) 06/03/2025     Lab Results   Component Value Date    CRP 1.71 (H) 06/03/2025     Lab Results   Component Value Date    INR 2.50 (H) 06/06/2025    INR 2.67 (H) 06/05/2025    INR 2.77 (H) 06/04/2025    PROTIME 27.2 (H) 06/06/2025    PROTIME 28.7 (H) 06/05/2025    PROTIME 29.6 (H) 06/04/2025       Microbiology:   6/2 RPP: + Human rhinovirus/enterovirus  6/2 BCx: Negative so far  6/3 MRSA nares: Negative  6/4 left lower extremity wound Cx: Scant growth GNR    Prior radiology:  6/2 CTA chest negative for PE; negative for pneumonia    CXR personally reviewed and is negative for pneumonia    EKG QTc interval of 459 ms    Isolation:  Droplet    ASSESSMENT/PLAN:  Human rhinovirus/enterovirus infection  Left lower extremity wound infection due to GNR  Factor V Leiden mutation  On warfarin  Super obesity BMI 70 complicating above  Uncontrolled DM2 complicating above    His leg today looks the best I have seen during his entire hospital stay.  He is afebrile with a normal WBC.  His wound culture is growing scant amounts of the GNR.  Stop vancomycin.  Continue ceftriaxone 2 g IV  every 24 hours.  Unfortunately we will not have the final ID and susceptibility until tomorrow.  Patient understands that it would be best to remain in the hospital 1 more day to get the final answer so that we can be unable to best plan possible to give him the best chance of success.    Continue local wound care as outlined by general surgery.    Continue supportive care for human rhinovirus/enterovirus infection.    Case and plan D/W hospitalist.    I anticipate final recommendations tomorrow.

## 2025-06-06 NOTE — PLAN OF CARE
Problem: Adult Inpatient Plan of Care  Goal: Plan of Care Review  Outcome: Progressing  Flowsheets (Taken 6/6/2025 2798)  Progress: improving  Outcome Evaluation: Patient is alert and oriented x 4, saturating well on room air, with episodes of bradycardia but assymptomatic. Fall risk prevention protocol maintained.  Plan of Care Reviewed With: patient   Goal Outcome Evaluation:  Plan of Care Reviewed With: patient        Progress: improving  Outcome Evaluation: Patient is alert and oriented x 4, saturating well on room air, with episodes of bradycardia but assymptomatic. Fall risk prevention protocol maintained.

## 2025-06-07 ENCOUNTER — READMISSION MANAGEMENT (OUTPATIENT)
Dept: CALL CENTER | Facility: HOSPITAL | Age: 55
End: 2025-06-07
Payer: COMMERCIAL

## 2025-06-07 VITALS
BODY MASS INDEX: 40.43 KG/M2 | DIASTOLIC BLOOD PRESSURE: 52 MMHG | WEIGHT: 315 LBS | HEIGHT: 74 IN | RESPIRATION RATE: 14 BRPM | HEART RATE: 71 BPM | OXYGEN SATURATION: 92 % | TEMPERATURE: 98.2 F | SYSTOLIC BLOOD PRESSURE: 146 MMHG

## 2025-06-07 LAB
ANION GAP SERPL CALCULATED.3IONS-SCNC: 7 MMOL/L (ref 5–15)
BACTERIA SPEC AEROBE CULT: NORMAL
BACTERIA SPEC AEROBE CULT: NORMAL
BUN SERPL-MCNC: 13 MG/DL (ref 6–20)
BUN/CREAT SERPL: 12.6 (ref 7–25)
CALCIUM SPEC-SCNC: 8.6 MG/DL (ref 8.6–10.5)
CHLORIDE SERPL-SCNC: 108 MMOL/L (ref 98–107)
CO2 SERPL-SCNC: 24 MMOL/L (ref 22–29)
CREAT SERPL-MCNC: 1.03 MG/DL (ref 0.76–1.27)
EGFRCR SERPLBLD CKD-EPI 2021: 85.8 ML/MIN/1.73
GLUCOSE BLDC GLUCOMTR-MCNC: 126 MG/DL (ref 70–130)
GLUCOSE BLDC GLUCOMTR-MCNC: 126 MG/DL (ref 70–130)
GLUCOSE SERPL-MCNC: 123 MG/DL (ref 65–99)
INR PPP: 2.24 (ref 0.9–1.1)
MAGNESIUM SERPL-MCNC: 1.9 MG/DL (ref 1.6–2.6)
POTASSIUM SERPL-SCNC: 4.4 MMOL/L (ref 3.5–5.2)
PROTHROMBIN TIME: 24.9 SECONDS (ref 11.7–14.2)
SODIUM SERPL-SCNC: 139 MMOL/L (ref 136–145)

## 2025-06-07 PROCEDURE — 83735 ASSAY OF MAGNESIUM: CPT

## 2025-06-07 PROCEDURE — 80048 BASIC METABOLIC PNL TOTAL CA: CPT | Performed by: INTERNAL MEDICINE

## 2025-06-07 PROCEDURE — 82948 REAGENT STRIP/BLOOD GLUCOSE: CPT

## 2025-06-07 PROCEDURE — 85610 PROTHROMBIN TIME: CPT | Performed by: INTERNAL MEDICINE

## 2025-06-07 PROCEDURE — 99232 SBSQ HOSP IP/OBS MODERATE 35: CPT

## 2025-06-07 PROCEDURE — 25010000002 CEFTRIAXONE PER 250 MG: Performed by: INTERNAL MEDICINE

## 2025-06-07 PROCEDURE — 36415 COLL VENOUS BLD VENIPUNCTURE: CPT | Performed by: INTERNAL MEDICINE

## 2025-06-07 PROCEDURE — 99233 SBSQ HOSP IP/OBS HIGH 50: CPT | Performed by: STUDENT IN AN ORGANIZED HEALTH CARE EDUCATION/TRAINING PROGRAM

## 2025-06-07 RX ORDER — SODIUM HYPOCHLORITE 1.25 MG/ML
SOLUTION TOPICAL
Qty: 473 ML | Refills: 2 | Status: SHIPPED | OUTPATIENT
Start: 2025-06-07

## 2025-06-07 RX ORDER — CEPHALEXIN 500 MG/1
1000 CAPSULE ORAL EVERY 6 HOURS SCHEDULED
Status: DISCONTINUED | OUTPATIENT
Start: 2025-06-07 | End: 2025-06-07 | Stop reason: HOSPADM

## 2025-06-07 RX ORDER — CEPHALEXIN 500 MG/1
1000 CAPSULE ORAL EVERY 6 HOURS SCHEDULED
Qty: 56 CAPSULE | Refills: 0 | Status: SHIPPED | OUTPATIENT
Start: 2025-06-07 | End: 2025-06-09 | Stop reason: ALTCHOICE

## 2025-06-07 RX ADMIN — SODIUM HYPOCHLORITE: 1.25 SOLUTION TOPICAL at 09:35

## 2025-06-07 RX ADMIN — CEPHALEXIN 1000 MG: 500 CAPSULE ORAL at 14:48

## 2025-06-07 RX ADMIN — ZINC OXIDE 1 APPLICATION: 200 OINTMENT TOPICAL at 09:35

## 2025-06-07 RX ADMIN — CEFTRIAXONE 2000 MG: 2 INJECTION, POWDER, FOR SOLUTION INTRAMUSCULAR; INTRAVENOUS at 00:06

## 2025-06-07 RX ADMIN — Medication 10 ML: at 09:35

## 2025-06-07 NOTE — PROGRESS NOTES
LOS: 5 days   Patient Care Team:  Hansel Patel DO as PCP - General (Family Medicine)  Kim Sotelo RPH as Pharmacist    Chief Complaint: re-consult pause     Interval History: He is resting in bed. He reports his sleep was very interrupted overnight.     Vital Signs:  Temp:  [97.8 °F (36.6 °C)-98.4 °F (36.9 °C)] 98.2 °F (36.8 °C)  Heart Rate:  [67-84] 71  Resp:  [14-20] 14  BP: (136-149)/(52-70) 146/52    Intake/Output Summary (Last 24 hours) at 6/7/2025 1030  Last data filed at 6/7/2025 0916  Gross per 24 hour   Intake 1152 ml   Output --   Net 1152 ml        Physical Exam  Vitals reviewed.   Constitutional:       General: He is not in acute distress.  HENT:      Head: Normocephalic.      Nose: Nose normal.   Eyes:      Extraocular Movements: Extraocular movements intact.      Pupils: Pupils are equal, round, and reactive to light.   Cardiovascular:      Rate and Rhythm: Normal rate and regular rhythm.      Pulses: Normal pulses.      Heart sounds: Normal heart sounds. Heart sounds not distant. No murmur heard.     No friction rub. No gallop. No S3 or S4 sounds.   Pulmonary:      Effort: Pulmonary effort is normal.      Breath sounds: Normal breath sounds.   Abdominal:      General: Abdomen is flat. Bowel sounds are normal.      Palpations: Abdomen is soft.      Tenderness: There is no abdominal tenderness.   Skin:     General: Skin is warm and dry.   Neurological:      General: No focal deficit present.      Mental Status: He is alert and oriented to person, place, and time. Mental status is at baseline.   Psychiatric:         Mood and Affect: Mood normal.         Behavior: Behavior normal.         Results Review:    Results from last 7 days   Lab Units 06/07/25  0533   SODIUM mmol/L 139   POTASSIUM mmol/L 4.4   CHLORIDE mmol/L 108*   CO2 mmol/L 24.0   BUN mg/dL 13.0   CREATININE mg/dL 1.03   GLUCOSE mg/dL 123*   CALCIUM mg/dL 8.6     Results from last 7 days   Lab Units 06/03/25  0622 06/02/25  6286  "06/02/25 2027   HSTROP T ng/L 60* 74* 77*     Results from last 7 days   Lab Units 06/06/25  0314   WBC 10*3/mm3 8.02   HEMOGLOBIN g/dL 11.8*   HEMATOCRIT % 36.3*   PLATELETS 10*3/mm3 334     Results from last 7 days   Lab Units 06/07/25  0533 06/06/25  0314 06/05/25  0312 06/03/25  0622 06/02/25 2025   INR  2.24* 2.50* 2.67*   < > 2.55*   APTT seconds  --   --   --   --  38.6*    < > = values in this interval not displayed.         Results from last 7 days   Lab Units 06/07/25  0003   MAGNESIUM mg/dL 1.9           I reviewed the patient's new clinical results.        Assessment & Plan:  Abnormal troponin  Mildly elevated but flat to down-trending.   EKG unchanged.   No symptoms of angina.   CT of the chest did not show evidence of significant PE.   Echocardiogram with normal left ventricular systolic function, normal wall motion.   Bradycardia/Pauses   He was noted to have bradycardia and pauses during sleep last night. This is not a new finding for Mr. Melendez. It is almost certainly related to his untreated sleep apnea. During the pause noted above his oxygen saturation was 82%. He is \"very aware\" of his sleep apnea diagnosis as this is a longstanding diagnosis for him. He has been unable to tolerate CPAP/BiPAP. He has spoken with his sleep medicine team about the Inspire device but his BMI is currently too high.   He is not interested in further assessment at this time.   Cellulitis  On antibiotics per ID, local wound care.   Rhinovirus infection   Likely contributing to his weakness, cough, and shortness of breath.   History of recurrent pulmonary embolism  On chronic anticoagulation with warfarin.   Factor V Leiden  Hypertension   Lymphedema   Morbid obesity     Discussed with Dr. Clayton, cardiology will sign off, please call with any questions or concerns.     FADY Pacheco  06/07/25  10:30 EDT    "

## 2025-06-07 NOTE — OUTREACH NOTE
Prep Survey      Flowsheet Row Responses   [facility] patient discharged from? [place]   Is LACE score < 7 ? No   Eligibility [facility]   Date of Admission 06/03/25   Date of Discharge 06/07/25   Discharge Disposition [place] [facility]   Discharge diagnosis Left leg cellulitis   Does the patient have one of the following disease processes/diagnoses(primary or secondary)? Other   Does the patient have Home health ordered? Yes   What is the Home health agency?  referral pending with  [facility]   Is there a DME ordered? No   Comments regarding appointments decision pending from ID on IV antibiotics   Prep survey completed? Yes            Ivy SUÁREZ - Registered Nurse

## 2025-06-07 NOTE — SIGNIFICANT NOTE
06/07/25 1250   OTHER   Discipline physical therapist   Therapy Assessment/Plan (PT)   Criteria for Skilled Interventions Met (PT) no problems identified which require skilled intervention  (pt is being discharged home today with family, no acute PT needs, will sign off)

## 2025-06-07 NOTE — NURSING NOTE
"Patient's O2 frequently drops to 70s/80% while sleeping. Patient is a mouth breather and refuses to place nasal cannula in mouth while sleeping. Educated patient that it would be more beneficial to switch cannula from nose to mouth but patient refused. Patient educated on sleep apnea and patient states \"I'm fully aware of what's happening\". Patient aware that O2 and HR get dangerously low while sleeping.     Around 2157, prior to this RN taking over care, patient had 7.41 second pause. On call Cardiologist notified and no new orders.   "

## 2025-06-07 NOTE — DISCHARGE SUMMARY
NAME: Kameron Melendez ADMIT: 2025   : 1970  PCP: Hansel Patel DO    MRN: [id number] LOS: 5 days   AGE/SEX: 55 y.o. male  ROOM: 3110/1     Date of Admission:  2025  Date of Discharge:  2025    PCP: Hansel Patel DO    CHIEF COMPLAINT  Shortness of Breath, Edema, and Wound Check      DISCHARGE DIAGNOSIS  Active Hospital Problems    Diagnosis  POA    **Left leg cellulitis [L03.116]  Yes    Rhinovirus [B34.8]  Yes    Type 2 diabetes mellitus [E11.9]  Yes    Wound cellulitis [L03.90]  Yes    Venous insufficiency (chronic) (peripheral) [I87.2]  Yes    Cellulitis of left lower extremity [L03.116]  Yes    Heterozygous factor V Leiden mutation [D68.51]  Yes    History of pulmonary embolism [Z86.711]  Yes    Lymphedema of both lower extremities [I89.0]  Yes    Obesity, morbid, BMI 50 or higher [E66.01]  Yes    ARNOLDO (obstructive sleep apnea) [G47.33]  Yes      Resolved Hospital Problems   No resolved problems to display.       SECONDARY DIAGNOSES  Past Medical History:   Diagnosis Date    DVT (deep venous thrombosis)     RLE    Factor V Leiden     Hypertension     Kidney stone     Lymphedema     Lymphedema of left lower extremity     Obesity     ARNOLDO (obstructive sleep apnea)     Pulmonary embolism     bilateral    Pulmonary hypertension     Right ventricular enlargement        CONSULTS   Cardiology  ID  Surgery    HOSPITAL COURSE  Mr. Melendez is a 55 y.o. with a history of morbid obesity, factor V Leiden with previous VTE on coumadin, HTN, lymphedema, ARNOLDO, and obesity   He presented with a few symptoms. Cough and dyspnea x 1 week. Also with recent wound to LLE and then had increasing erythema. Had been on keflex for the past week but without improvement. Came to [facility] ER. +rhinovirus. Did have elevated troponin and BNP. Had CTPE. Evidence for cellulitis and abscess of his pannus. Given IV abx.    He was seen by ID and Surgery. He had wound care with improvement of the abscess and cellulitis.  Wound with Ecoli growth sensitive to keflex and ID recommended finishing course of high dose keflex. He will continue wound care and follow up with the wound care center. He feels well and wishes for discharge today with outpatient follow up.     DIAGNOSTICS    Collected Updated Procedure Result Status    06/07/2025 0533 06/07/2025 0610 Protime-INR [[id number]]   (Abnormal)   Blood    Final result Component Value Units   Protime 24.9 High  Seconds   INR 2.24 High            06/07/2025 0533 06/07/2025 0617 Basic Metabolic Panel [[id number]]    (Abnormal)   Blood    Final result Component Value Units   Glucose 123 High  mg/dL   BUN 13.0 mg/dL   Creatinine 1.03 mg/dL   Sodium 139 mmol/L   Potassium 4.4 mmol/L   Chloride 108 High  mmol/L   CO2 24.0 mmol/L   Calcium 8.6 mg/dL   BUN/Creatinine Ratio 12.6    Anion Gap 7.0 mmol/L   eGFR 85.8 mL/min/1.73          06/07/2025 0003 06/07/2025 0027 Magnesium [[id number]]   Blood    Final result Component Value Units   Magnesium 1.9 mg/dL          06/06/2025 0821 06/06/2025 0917 Vancomycin, Trough [[id number]]    (Abnormal)   Blood    Final result Component Value Units   Vancomycin Trough 22.50 High  mcg/mL          06/06/2025 0314 06/06/2025 0344 Protime-INR [[id number]]   (Abnormal)   Blood    Final result Component Value Units   Protime 27.2 High  Seconds   INR 2.50 High            06/06/2025 0314 06/06/2025 0355 Basic Metabolic Panel [[id number]]    (Abnormal)   Blood    Final result Component Value Units   Glucose 146 High  mg/dL   BUN 14.0 mg/dL   Creatinine 0.94 mg/dL   Sodium 139 mmol/L   Potassium 4.3 mmol/L   Chloride 107 mmol/L   CO2 23.0 mmol/L   Calcium 8.4 Low  mg/dL   BUN/Creatinine Ratio 14.9    Anion Gap 9.0 mmol/L   eGFR 95.7 mL/min/1.73          06/06/2025 0314 06/06/2025 0335 CBC Auto Differential [[id number]]   (Abnormal)   Blood    Final result Component Value Units   WBC 8.02 10*3/mm3   RBC 3.85 Low  10*6/mm3   Hemoglobin 11.8 Low  g/dL   Hematocrit 36.3 Low  %  "  MCV 94.3 fL   MCH 30.6 pg   MCHC 32.5 g/dL   RDW 14.2 %   RDW-SD 48.6 fl   MPV 9.2 fL   Platelets 334 10*3/mm3   Neutrophil % 67.4 %   Lymphocyte % 21.1 %          PHYSICAL EXAM  Objective:  Vital signs: (most recent): Blood pressure 146/52, pulse 71, temperature 98.2 °F (36.8 °C), temperature source Oral, resp. rate 14, height 188 cm (74\"), weight (!) 249 kg (550 lb), SpO2 92%.                Alert  nad  No resp distress  Obese  Chronic LE edema  Improved erythema    CONDITION ON DISCHARGE  Stable.      DISCHARGE DISPOSITION   Home or Self Care      DISCHARGE MEDICATIONS       Your medication list        START taking these medications        Instructions Last Dose Given Next Dose Due   cephalexin 500 MG capsule  Commonly known as: KEFLEX      Take 2 capsules by mouth Every 6 (Six) Hours for 28 doses. Indications: Infection of the Skin and/or Soft Tissue       sodium hypochlorite 0.125 % solution topical solution 0.125%  Commonly known as: DAKIN'S 1/4 STRENGTH      Apply to 4x4 gauze twice daily for wet to dry dressing changes to lower left pannus              CONTINUE taking these medications        Instructions Last Dose Given Next Dose Due   acetaminophen 500 MG tablet  Commonly known as: TYLENOL      Take 1 tablet by mouth Every 6 (Six) Hours As Needed for Mild Pain.       allopurinol 100 MG tablet  Commonly known as: Zyloprim      Take 1 tablet by mouth Daily.       atorvastatin 20 MG tablet  Commonly known as: LIPITOR      TAKE 1 TABLET BY MOUTH EVERY DAY AT NIGHT       dapagliflozin Propanediol 10 MG tablet  Commonly known as: Farxiga      Take 10 mg by mouth Daily.       furosemide 80 MG tablet  Commonly known as: LASIX      Take 1 tablet by mouth Daily As Needed (leg swelling).       Ibuprofen 3 %, Gabapentin 10 %, Baclofen 2 %, lidocaine 4 %, Ketamine HCl 10 %      Apply 1-2 g topically to the appropriate area as directed 3 (Three) to 4 (Four) times daily.       metOLazone 5 MG tablet  Commonly known as: " ZAROXOLYN      TAKE 1 TABLET BY MOUTH 1 (ONE) TIME PER WEEK.       omeprazole 40 MG capsule  Commonly known as: priLOSEC      TAKE 1 CAPSULE BY MOUTH EVERY DAY       potassium chloride ER 20 MEQ tablet controlled-release ER tablet  Commonly known as: K-TAB      Take 1 tablet by mouth 1 (One) Time Per Week. With metolazone       spironolactone 25 MG tablet  Commonly known as: Aldactone      Take 1 tablet by mouth As Needed (swelling).       Tirzepatide 5 MG/0.5ML solution auto-injector      Inject 5 mg under the skin into the appropriate area as directed 1 (One) Time Per Week. Call for increased dose       warfarin 5 MG tablet  Commonly known as: Jantoven      TAKE TWO TABLETS BY MOUTH ON SUN, TUES, THURS AND TAKE THREE TABLETS BY MOUTH ALL OTHER DAYS OR AS DIRECTED              STOP taking these medications      methylPREDNISolone 4 MG dose pack  Commonly known as: MEDROL                  Where to Get Your Medications        These medications were sent to [place] [id number]      Hours: Monday to Friday 7 AM to 6 PM, Saturday & Sunday 8 AM to 4:30 PM (Closed 12 PM to 12:30 PM) Phone: [phone]   cephalexin 500 MG capsule  sodium hypochlorite 0.125 % solution topical solution 0.125%          Future Appointments   Date Time Provider Department Center   6/16/2025  9:15 AM Artis Henning MD  [facility]   6/20/2025  8:45 AM Hansel Patel DO [facility]   6/20/2025  2:30 PM Paz Dangelo APRN [id number]     Additional Instructions for the Follow-ups that You Need to Schedule       Ambulatory Referral to Home Health   As directed      Face to Face Visit Date: 6/5/2025   Follow-up provider for Plan of Care?: I will be treating the patient on an ongoing basis.  Please send me the Plan of Care for signature.   Follow-up provider: ENMA COLBERT [[id number]]   Reason/Clinical Findings: Left inner thigh/upper pannus open wound requiring  dressing changes   Describe mobility limitations that make leaving home difficult: Morbid obesity   Nursing/Therapeutic Services Requested: Skilled Nursing   Skilled nursing orders: Wound care dressing/changes   Instructions: BID Dakins wet to dry dressing changes to open wound of left inner thigh/pannus with a single 4x4, cover with Mepilex   Frequency: 1 Week 1        Ambulatory Referral to Wound Clinic   As directed      Discharge Follow-up with Specialty: INR check next week, PCP in 1-2 weeks, wound care clinic in 1-2 weeks   As directed      Specialty: INR check next week, PCP in 1-2 weeks, wound care clinic in 1-2 weeks        Discharge Follow-up with Specialty: Sleep medicine as outpatient   As directed      Specialty: Sleep medicine as outpatient               Follow-up Information       [facility] .    Specialty: Wound Care  Contact information:  [address] [facility] [address] [phone]             Hansel Patel DO .    Specialties: Family Medicine, Urgent Care, Emergency Medicine  Contact information:  [address]  [place] [id number] [phone]                             TEST  RESULTS PENDING AT DISCHARGE  Pending Labs       Order Current Status    Blood Culture - Blood, Arm, Left Preliminary result    Blood Culture - Blood, Hand, Right Preliminary result    Wound Culture - Wound, Leg, Left Preliminary result               Gerardo Drake MD  [facility]  06/07/25  12:02 EDT      Time: greater than 32 minutes on discharge  It was a pleasure taking care of this patient while in the hospital.

## 2025-06-07 NOTE — NURSING NOTE
"Patient discharge home. Insists on driving home himself. He \"drove myself here\" and is parked in ED. Patient does need assistance getting out of bed and into bed but is standby with a walker in patient room.   "

## 2025-06-07 NOTE — PROGRESS NOTES
[facility] Clinical Pharmacy Services: Warfarin Dosing/Monitoring Consult    Kameron Melendez is a 55 y.o. male, estimated creatinine clearance is 170.8 mL/min (by C-G formula based on SCr of 1.03 mg/dL). weighing (!) 249 kg (550 lb).    Results from last 7 days   Lab Units 06/07/25  0533 06/06/25  0314 06/05/25  0312 06/04/25  0256 06/03/25  0622 06/02/25 2027 06/02/25 2025   INR  2.24* 2.50* 2.67* 2.77* 2.66*  --  2.55*   APTT seconds  --   --   --   --   --   --  38.6*   HEMOGLOBIN g/dL  --  11.8* 11.7* 12.3* 13.2 12.9*  --    HEMATOCRIT %  --  36.3* 36.6* 37.3* 40.6 39.6  --    PLATELETS 10*3/mm3  --  334 331 353 330 355  --      Prior to admission anticoagulation: warfarin 15 mg daily on MF, 10 mg AOD, managed by [facility]    [facility] Anticoagulation:  Consulting provider: Dr. Drake  Start date: 6/2, but pt on warfarin prior to admission  Indication: history of DVT/PE  Target INR: 2 - 3  Expected duration: unknown   Bridge Therapy: No      Potential food or drug interactions: ceftriaxone may enhance the anticoagulant effect of warfarin     Education complete?/Date: N/A; home medication    Assessment/Plan:  INR  within goal range, continue home regimen  Dose: warfarin 15 mg daily on MF, 10 mg AOD  Monitor for any signs or symptoms of bleeding  Follow up daily INRs and dose adjustments    Pharmacy will continue to follow until discharge or discontinuation of warfarin.     Abelardo Galvez, PharmD

## 2025-06-07 NOTE — PLAN OF CARE
Goal Outcome Evaluation:  Plan of Care Reviewed With: patient        Progress: no change  Outcome Evaluation: 7.4 sec pause - see RN note. desats to 70s while sleeping - see RN note. otherwise vss. no c/o pain. dressing changed. up with assist to brp. possible discharge today. resting well throughout shift.       Problem: Adult Inpatient Plan of Care  Goal: Plan of Care Review  Outcome: Progressing  Flowsheets (Taken 6/7/2025 0502)  Progress: no change  Outcome Evaluation: 7.4 sec pause - see RN note. desats to 70s while sleeping - see RN note. otherwise vss. no c/o pain. dressing changed. up with assist to brp. possible discharge today. resting well throughout shift.  Plan of Care Reviewed With: patient  Goal: Patient-Specific Goal (Individualized)  Outcome: Progressing  Goal: Absence of Hospital-Acquired Illness or Injury  Outcome: Progressing  Intervention: Identify and Manage Fall Risk  Recent Flowsheet Documentation  Taken 6/7/2025 0400 by Reba Lehman, RN  Safety Promotion/Fall Prevention: safety round/check completed  Taken 6/7/2025 0200 by Reba Lehman RN  Safety Promotion/Fall Prevention: safety round/check completed  Taken 6/7/2025 0006 by Reba Lehman, RN  Safety Promotion/Fall Prevention: safety round/check completed  Intervention: Prevent Skin Injury  Recent Flowsheet Documentation  Taken 6/7/2025 0006 by Reba Lehman, RN  Body Position: position changed independently  Intervention: Prevent and Manage VTE (Venous Thromboembolism) Risk  Recent Flowsheet Documentation  Taken 6/7/2025 0006 by Reba Lehman, RN  VTE Prevention/Management:   bilateral   SCDs (sequential compression devices) off   patient refused intervention  Goal: Optimal Comfort and Wellbeing  Outcome: Progressing  Intervention: Provide Person-Centered Care  Recent Flowsheet Documentation  Taken 6/7/2025 0006 by Reba Lehman, RN  Trust Relationship/Rapport:   care explained   choices provided   emotional  support provided   empathic listening provided  Goal: Readiness for Transition of Care  Outcome: Progressing     Problem: Fall Injury Risk  Goal: Absence of Fall and Fall-Related Injury  Outcome: Progressing  Intervention: Identify and Manage Contributors  Recent Flowsheet Documentation  Taken 6/7/2025 0006 by Reba Lehman, RN  Medication Review/Management: medications reviewed  Intervention: Promote Injury-Free Environment  Recent Flowsheet Documentation  Taken 6/7/2025 0400 by Reba Lehman, RN  Safety Promotion/Fall Prevention: safety round/check completed  Taken 6/7/2025 0200 by Reba Lehman, RN  Safety Promotion/Fall Prevention: safety round/check completed  Taken 6/7/2025 0006 by Reba Lehman, RN  Safety Promotion/Fall Prevention: safety round/check completed     Problem: Skin Injury Risk Increased  Goal: Skin Health and Integrity  Outcome: Progressing  Intervention: Optimize Skin Protection  Recent Flowsheet Documentation  Taken 6/7/2025 0006 by Reba Lehman, RN  Pressure Reduction Techniques: frequent weight shift encouraged  Pressure Reduction Devices: positioning supports utilized

## 2025-06-07 NOTE — PROGRESS NOTES
LOS: 5 days     Chief Complaint: Left thigh/pannus wound infection    Interval History: Patient reports he is tolerating antibiotics well today.  No acute complaints.    Vital Signs  Temp:  [97.8 °F (36.6 °C)-98.4 °F (36.9 °C)] 98.2 °F (36.8 °C)  Heart Rate:  [67-84] 71  Resp:  [14-20] 14  BP: (136-149)/(52-70) 146/52    Physical Exam:  General: In no acute distress.  Obese.  HEENT: Oropharynx clear, moist mucous membranes  Respiratory: Normal work of breathing  Skin: Left thigh wound with dressing in place  Access: Peripheral IV.    Antibiotics:  Anti-Infectives (From admission, onward)      Ordered     Dose/Rate Route Frequency Start Stop    06/04/25 1426  Vancomycin HCl 1,250 mg in sodium chloride 0.9 % 250 mL VTB  Status:  Discontinued        Ordering Provider: Randall Jordan MD    1,250 mg  200 mL/hr over 75 Minutes Intravenous Every 12 Hours 06/04/25 2100 06/06/25 1004    06/03/25 1001  vancomycin 1750 mg/500 mL 0.9% NS IVPB ([facility])  Status:  Discontinued        Ordering Provider: Randall Jordan MD    1,750 mg  over 105 Minutes Intravenous Every 12 Hours 06/03/25 2100 06/04/25 1426    06/02/25 2355  vancomycin IVPB 1500 mg in 0.9% NaCl (Premix) 500 mL  Status:  Discontinued        Ordering Provider: Gerardo Drake MD    1,500 mg  333.3 mL/hr over 90 Minutes Intravenous Every 12 Hours 06/03/25 0900 06/03/25 1001    06/02/25 2326  cefTRIAXone (ROCEPHIN) 2,000 mg in sodium chloride 0.9 % 100 mL MBP        Ordering Provider: Gerardo Drake MD    2,000 mg  200 mL/hr over 30 Minutes Intravenous Every 24 Hours 06/03/25 0000 06/09/25 2359    06/02/25 2326  Pharmacy to dose vancomycin  Status:  Discontinued        Ordering Provider: Gerardo Drake MD     Not Applicable Continuous PRN 06/02/25 2325 06/06/25 1004    06/02/25 2026  vancomycin 3000 mg/500 mL 0.9% NS IVPB ([facility])        Ordering Provider: Alon Gutierrez MD    20 mg/kg × 249 kg  over 180 Minutes Intravenous Once 06/02/25 2048  06/03/25 0034             Results Review:     I reviewed the patient's new clinical results.    Lab Results   Component Value Date    WBC 8.02 06/06/2025    HGB 11.8 (L) 06/06/2025    HCT 36.3 (L) 06/06/2025    MCV 94.3 06/06/2025     06/06/2025     Lab Results   Component Value Date    GLUCOSE 123 (H) 06/07/2025    BUN 13.0 06/07/2025    CREATININE 1.03 06/07/2025    EGFRIFNONA 116 11/10/2021    EGFRIFAFRI 134 11/10/2021    BCR 12.6 06/07/2025    CO2 24.0 06/07/2025    CALCIUM 8.6 06/07/2025    ALBUMIN 3.1 (L) 06/02/2025    AST 18 06/02/2025    ALT 16 06/02/2025       Lab Results   Component Value Date    VANCOTROUGH 22.50 (H) 06/06/2025        Microbiology:  Microbiology Results (last 10 days)       Procedure Component Value - Date/Time    Wound Culture - Wound, Leg, Left [[id number]]  (Abnormal) Collected: 06/04/25 2143    Lab Status: Preliminary result Specimen: Wound from Leg, Left Updated: 06/06/25 0642     Wound Culture Scant growth (1+) Gram Negative Bacilli     Gram Stain No WBCs seen      No organisms seen    MRSA Screen, PCR (Inpatient) - Swab, Nares [[id number]]  (Normal) Collected: 06/03/25 0200    Lab Status: Final result Specimen: Swab from Nares Updated: 06/03/25 0547     MRSA PCR No MRSA Detected    Narrative:      The negative predictive value of this diagnostic test is high and should only be used to consider de-escalating anti-MRSA therapy. A positive result may indicate colonization with MRSA and must be correlated clinically.    Respiratory Panel PCR w/COVID-19(SARS-CoV-2) [facility]/SAN[facility]A/PAD/COR/NIECY In-House, NP Swab in UTM/VTM, 2 HR TAT - Swab, Nasopharynx [[id number]]  (Abnormal) Collected: 06/02/25 2049    Lab Status: Final result Specimen: Swab from Nasopharynx Updated: 06/02/25 2147     ADENOVIRUS, PCR Not Detected     Coronavirus 229E Not Detected     Coronavirus HKU1 Not Detected     Coronavirus NL63 Not Detected     Coronavirus OC43 Not Detected     COVID19 Not Detected     Human  Metapneumovirus Not Detected     Human Rhinovirus/Enterovirus Detected     Influenza A PCR Not Detected     Influenza B PCR Not Detected     Parainfluenza Virus 1 Not Detected     Parainfluenza Virus 2 Not Detected     Parainfluenza Virus 3 Not Detected     Parainfluenza Virus 4 Not Detected     RSV, PCR Not Detected     Bordetella pertussis pcr Not Detected     Bordetella parapertussis PCR Not Detected     Chlamydophila pneumoniae PCR Not Detected     Mycoplasma pneumo by PCR Not Detected    Narrative:      In the setting of a positive respiratory panel with a viral infection PLUS a negative procalcitonin without other underlying concern for bacterial infection, consider observing off antibiotics or discontinuation of antibiotics and continue supportive care. If the respiratory panel is positive for atypical bacterial infection (Bordetella pertussis, Chlamydophila pneumoniae, or Mycoplasma pneumoniae), consider antibiotic de-escalation to target atypical bacterial infection.    Blood Culture - Blood, Arm, Left [[id number]]  (Normal) Collected: 06/02/25 2027    Lab Status: Preliminary result Specimen: Blood from Arm, Left Updated: 06/06/25 2045     Blood Culture No growth at 4 days    Blood Culture - Blood, Hand, Right [[id number]]  (Normal) Collected: 06/02/25 2025    Lab Status: Preliminary result Specimen: Blood from Hand, Right Updated: 06/06/25 2045     Blood Culture No growth at 4 days    Narrative:      Less than seven (7) mL's of blood was collected.  Insufficient quantity may yield false negative results.               Isolation:   Droplet    Assessment    Human rhinovirus/enterovirus infection  Left lower extremity wound infection due to GNR  Factor V Leiden mutation  On warfarin  Super obesity BMI 70 complicating above  Uncontrolled DM2 complicating above  (All new to me today)     Plan to follow-up final culture today.  Continue ceftriaxone 2 g daily for now.  Will de-escalate to oral therapy once  susceptibilities available.    Addendum:  Susceptibilities returned and E. coli was isolated that is pan susceptible.  Plan to stop ceftriaxone and transition to oral Keflex 1 g 4 times daily adjusted for obesity.  Plan for 7 more days of therapy.    ID will sign off.  -----------------------------------------------------------------------------------------------  The above decisions regarding prescribing and/or discontinuation of antimicrobials incorporate elements of engaging in complex medical decision-making associated with antimicrobial therapy including consideration of patient-specific resistance patterns, consideration of local resistance patterns, the potential to develop resistant strains or new infections while on antimicrobial therapy, patient's recent antibiotic exposure or lack thereof, interactions between antibiotics and other medications, consideration of patient's other co-morbidities in prescribing antibiotic therapy, public health considerations such as risk of transmission of infection or lack thereof, and public health considerations to minimize development of antimicrobial resistance in the community.     The patient was provided education re: his or her specific antimicrobial selection including indications, benefits, and risks.

## 2025-06-08 LAB
BACTERIA SPEC AEROBE CULT: ABNORMAL
GRAM STN SPEC: ABNORMAL
GRAM STN SPEC: ABNORMAL
QT INTERVAL: 419 MS
QTC INTERVAL: 450 MS

## 2025-06-09 ENCOUNTER — TRANSITIONAL CARE MANAGEMENT TELEPHONE ENCOUNTER (OUTPATIENT)
Dept: CALL CENTER | Facility: HOSPITAL | Age: 55
End: 2025-06-09
Payer: COMMERCIAL

## 2025-06-09 RX ORDER — CEFPODOXIME PROXETIL 200 MG/1
400 TABLET, FILM COATED ORAL EVERY 12 HOURS
Qty: 28 TABLET | Refills: 0 | Status: SHIPPED | OUTPATIENT
Start: 2025-06-09 | End: 2025-06-16

## 2025-06-09 NOTE — PAYOR COMM NOTE
"Christiana Covarrubias (55 y.o. Male)        PLEASE SEE ATTACHED DC SUMMARY    REF#[id number]     THANK YOU    HENOK AARON TIKI [facility]   Date of Birth   1970    Social Security Number       Address   [address] [place] [id number]    Home Phone   [phone]    MRN   [id number]       [facility]    Marital Status                               Admission Date   2025    Admission Type   Emergency    Admitting Provider   Gerardo Drake MD    Attending Provider       Department, Room/Bed   [facility],        Discharge Date   2025    Discharge Disposition   Home or Self Care    Discharge Destination                                 Attending Provider: (none)   Allergies: No Known Allergies    Isolation: None   Infection: Rhinovirus  (25)   Code Status: Prior    Ht: 188 cm (74\")   Wt: 249 kg (550 lb)    Admission Cmt: None   Principal Problem: Left leg cellulitis [L03.116]                   Active Insurance as of 2025       Primary Coverage       Payor Plan Insurance Group Employer/Plan Group    [id number] PPO [id number]       Payor Plan Address Payor Plan Phone Number Payor Plan Fax Number Effective Dates    [address] [phone]  2019 - None Entered    [facility] [id number]         Subscriber Name Subscriber Birth Date Member ID       CHRISTIANA COVARRUBIAS 1970 [id number]                     Emergency Contacts        (Rel.) Home Phone Work Phone Mobile Phone    Yaya Covarrubias (Spouse) [phone] -- [phone]              [place]: [facility] [id number]  Tax ID [id number]     Discharge Summary        Gerardo Drake MD at 25 1202                 NAME: Christiana Covarrubias ADMIT: 2025   : 1970  PCP: Hansel Patel DO    MRN: [id number] LOS: 5 days   AGE/SEX: 55 y.o. male  ROOM:      Date of Admission:  2025  Date of Discharge:  2025    PCP: Jorge" Hansel URIOSTEGUI DO    CHIEF COMPLAINT  Shortness of Breath, Edema, and Wound Check      DISCHARGE DIAGNOSIS  Active Hospital Problems    Diagnosis  POA    **Left leg cellulitis [L03.116]  Yes    Rhinovirus [B34.8]  Yes    Type 2 diabetes mellitus [E11.9]  Yes    Wound cellulitis [L03.90]  Yes    Venous insufficiency (chronic) (peripheral) [I87.2]  Yes    Cellulitis of left lower extremity [L03.116]  Yes    Heterozygous factor V Leiden mutation [D68.51]  Yes    History of pulmonary embolism [Z86.711]  Yes    Lymphedema of both lower extremities [I89.0]  Yes    Obesity, morbid, BMI 50 or higher [E66.01]  Yes    ARNOLDO (obstructive sleep apnea) [G47.33]  Yes      Resolved Hospital Problems   No resolved problems to display.       SECONDARY DIAGNOSES  Past Medical History:   Diagnosis Date    DVT (deep venous thrombosis)     RLE    Factor V Leiden     Hypertension     Kidney stone     Lymphedema     Lymphedema of left lower extremity     Obesity     ARNOLDO (obstructive sleep apnea)     Pulmonary embolism     bilateral    Pulmonary hypertension     Right ventricular enlargement        CONSULTS   Cardiology  ID  Surgery    HOSPITAL COURSE  Mr. Melendez is a 55 y.o. with a history of morbid obesity, factor V Leiden with previous VTE on coumadin, HTN, lymphedema, ARNOLDO, and obesity   He presented with a few symptoms. Cough and dyspnea x 1 week. Also with recent wound to LLE and then had increasing erythema. Had been on keflex for the past week but without improvement. Came to [facility] ER. +rhinovirus. Did have elevated troponin and BNP. Had CTPE. Evidence for cellulitis and abscess of his pannus. Given IV abx.    He was seen by ID and Surgery. He had wound care with improvement of the abscess and cellulitis. Wound with Ecoli growth sensitive to keflex and ID recommended finishing course of high dose keflex. He will continue wound care and follow up with the wound care center. He feels well and wishes for discharge today with outpatient follow  up.     DIAGNOSTICS    Collected Updated Procedure Result Status    06/07/2025 0533 06/07/2025 0610 Protime-INR [[id number]]   (Abnormal)   Blood    Final result Component Value Units   Protime 24.9 High  Seconds   INR 2.24 High            06/07/2025 0533 06/07/2025 0617 Basic Metabolic Panel [[id number]]    (Abnormal)   Blood    Final result Component Value Units   Glucose 123 High  mg/dL   BUN 13.0 mg/dL   Creatinine 1.03 mg/dL   Sodium 139 mmol/L   Potassium 4.4 mmol/L   Chloride 108 High  mmol/L   CO2 24.0 mmol/L   Calcium 8.6 mg/dL   BUN/Creatinine Ratio 12.6    Anion Gap 7.0 mmol/L   eGFR 85.8 mL/min/1.73          06/07/2025 0003 06/07/2025 0027 Magnesium [[id number]]   Blood    Final result Component Value Units   Magnesium 1.9 mg/dL          06/06/2025 0821 06/06/2025 0917 Vancomycin, Trough [[id number]]    (Abnormal)   Blood    Final result Component Value Units   Vancomycin Trough 22.50 High  mcg/mL          06/06/2025 0314 06/06/2025 0344 Protime-INR [[id number]]   (Abnormal)   Blood    Final result Component Value Units   Protime 27.2 High  Seconds   INR 2.50 High            06/06/2025 0314 06/06/2025 0355 Basic Metabolic Panel [[id number]]    (Abnormal)   Blood    Final result Component Value Units   Glucose 146 High  mg/dL   BUN 14.0 mg/dL   Creatinine 0.94 mg/dL   Sodium 139 mmol/L   Potassium 4.3 mmol/L   Chloride 107 mmol/L   CO2 23.0 mmol/L   Calcium 8.4 Low  mg/dL   BUN/Creatinine Ratio 14.9    Anion Gap 9.0 mmol/L   eGFR 95.7 mL/min/1.73          06/06/2025 0314 06/06/2025 0335 CBC Auto Differential [[id number]]   (Abnormal)   Blood    Final result Component Value Units   WBC 8.02 10*3/mm3   RBC 3.85 Low  10*6/mm3   Hemoglobin 11.8 Low  g/dL   Hematocrit 36.3 Low  %   MCV 94.3 fL   MCH 30.6 pg   MCHC 32.5 g/dL   RDW 14.2 %   RDW-SD 48.6 fl   MPV 9.2 fL   Platelets 334 10*3/mm3   Neutrophil % 67.4 %   Lymphocyte % 21.1 %          PHYSICAL EXAM  Objective:  Vital signs: (most recent): Blood pressure  "146/52, pulse 71, temperature 98.2 °F (36.8 °C), temperature source Oral, resp. rate 14, height 188 cm (74\"), weight (!) 249 kg (550 lb), SpO2 92%.                Alert  nad  No resp distress  Obese  Chronic LE edema  Improved erythema    CONDITION ON DISCHARGE  Stable.      DISCHARGE DISPOSITION   Home or Self Care      DISCHARGE MEDICATIONS       Your medication list        START taking these medications        Instructions Last Dose Given Next Dose Due   cephalexin 500 MG capsule  Commonly known as: KEFLEX      Take 2 capsules by mouth Every 6 (Six) Hours for 28 doses. Indications: Infection of the Skin and/or Soft Tissue       sodium hypochlorite 0.125 % solution topical solution 0.125%  Commonly known as: DAKIN'S 1/4 STRENGTH      Apply to 4x4 gauze twice daily for wet to dry dressing changes to lower left pannus              CONTINUE taking these medications        Instructions Last Dose Given Next Dose Due   acetaminophen 500 MG tablet  Commonly known as: TYLENOL      Take 1 tablet by mouth Every 6 (Six) Hours As Needed for Mild Pain.       allopurinol 100 MG tablet  Commonly known as: Zyloprim      Take 1 tablet by mouth Daily.       atorvastatin 20 MG tablet  Commonly known as: LIPITOR      TAKE 1 TABLET BY MOUTH EVERY DAY AT NIGHT       dapagliflozin Propanediol 10 MG tablet  Commonly known as: Farxiga      Take 10 mg by mouth Daily.       furosemide 80 MG tablet  Commonly known as: LASIX      Take 1 tablet by mouth Daily As Needed (leg swelling).       Ibuprofen 3 %, Gabapentin 10 %, Baclofen 2 %, lidocaine 4 %, Ketamine HCl 10 %      Apply 1-2 g topically to the appropriate area as directed 3 (Three) to 4 (Four) times daily.       metOLazone 5 MG tablet  Commonly known as: ZAROXOLYN      TAKE 1 TABLET BY MOUTH 1 (ONE) TIME PER WEEK.       omeprazole 40 MG capsule  Commonly known as: priLOSEC      TAKE 1 CAPSULE BY MOUTH EVERY DAY       potassium chloride ER 20 MEQ tablet controlled-release ER " tablet  Commonly known as: K-TAB      Take 1 tablet by mouth 1 (One) Time Per Week. With metolazone       spironolactone 25 MG tablet  Commonly known as: Aldactone      Take 1 tablet by mouth As Needed (swelling).       Tirzepatide 5 MG/0.5ML solution auto-injector      Inject 5 mg under the skin into the appropriate area as directed 1 (One) Time Per Week. Call for increased dose       warfarin 5 MG tablet  Commonly known as: Jantoven      TAKE TWO TABLETS BY MOUTH ON SUN, TUES, THURS AND TAKE THREE TABLETS BY MOUTH ALL OTHER DAYS OR AS DIRECTED              STOP taking these medications      methylPREDNISolone 4 MG dose pack  Commonly known as: MEDROL                  Where to Get Your Medications        These medications were sent to [place] [address]      Hours: Monday to Friday 7 AM to 6 PM, Saturday & Sunday 8 AM to 4:30 PM (Closed 12 PM to 12:30 PM) Phone: [phone]   cephalexin 500 MG capsule  sodium hypochlorite 0.125 % solution topical solution 0.125%          Future Appointments   Date Time Provider Department Center   6/16/2025  9:15 AM Artis Henning MD  [facility]   6/20/2025  8:45 AM Hansel Patel DO [facility]  [facility]   6/20/2025  2:30 PM Paz Dangelo APRN [facility]  [id number]     Additional Instructions for the Follow-ups that You Need to Schedule       Ambulatory Referral to Home Health   As directed      Face to Face Visit Date: 6/5/2025   Follow-up provider for Plan of Care?: I will be treating the patient on an ongoing basis.  Please send me the Plan of Care for signature.   Follow-up provider: ENMA COLBERT [[id number]]   Reason/Clinical Findings: Left inner thigh/upper pannus open wound requiring dressing changes   Describe mobility limitations that make leaving home difficult: Morbid obesity   Nursing/Therapeutic Services Requested: Skilled Nursing   Skilled nursing orders: Wound care dressing/changes   Instructions: BID  Dakins wet to dry dressing changes to open wound of left inner thigh/pannus with a single 4x4, cover with Mepilex   Frequency: 1 Week 1        Ambulatory Referral to Wound Clinic   As directed      Discharge Follow-up with Specialty: INR check next week, PCP in 1-2 weeks, wound care clinic in 1-2 weeks   As directed      Specialty: INR check next week, PCP in 1-2 weeks, wound care clinic in 1-2 weeks        Discharge Follow-up with Specialty: Sleep medicine as outpatient   As directed      Specialty: Sleep medicine as outpatient               Follow-up Information       [facility] .    Specialty: Wound Care  Contact information:  [address] [facility] [address] [phone]             Hansel Patel DO .    Specialties: Family Medicine, Urgent Care, Emergency Medicine  Contact information:  [address]  [address] [id number] [phone]                             TEST  RESULTS PENDING AT DISCHARGE  Pending Labs       Order Current Status    Blood Culture - Blood, Arm, Left Preliminary result    Blood Culture - Blood, Hand, Right Preliminary result    Wound Culture - Wound, Leg, Left Preliminary result               Gerardo Drake MD  [facility]  06/07/25  12:02 EDT      Time: greater than 32 minutes on discharge  It was a pleasure taking care of this patient while in the hospital.       Electronically signed by Gerardo Drake MD at 06/07/25 1207       Discharge Order (From admission, onward)       Start     Ordered    06/07/25 1159  Discharge patient  Once        Expected Discharge Date: 06/07/25   Discharge Disposition: Home or Self Care   Physician of Record for Attribution - Please select from Treatment Team: GERARDO DRAKE [[id number]]   Review needed by CMO to determine Physician of Record: No      Question Answer Comment   Physician of Record for Attribution - Please select from Treatment Team GERARDO DRAKE     Review needed by CMO to determine Physician of Record No        06/07/25 1157

## 2025-06-09 NOTE — PROGRESS NOTES
I called the patient to discuss with him the fact that the day after discharge his culture ended up showing a rare growth of a Proteus species.  This was resistant to cephalexin.  Thankfully the patient has continued to improve.  I suspect this is due to E. coli being the main pathogen and also spontaneous drainage.  Nevertheless, I think it makes the most sense to stop cephalexin and start cefpodoxime 400 mg p.o. twice daily x 7 days.  I sent in the prescription to his pharmacy.

## 2025-06-09 NOTE — PROGRESS NOTES
Case Management Discharge Note      Final Note: DC'd home with spouse 6/7 with BH following         Selected Continued Care - Discharged on 6/7/2025 Admission date: 6/2/2025 - Discharge disposition: Home or Self Care          Home Medical Care Coordination complete.      Service Provider Services Address Phone Fax Patient Preferred    [facility] [address], [place] [address] [phone] --                      Transportation Services  Private: Car    Final Discharge Disposition Code: 06 - home with home health care

## 2025-06-09 NOTE — OUTREACH NOTE
Call Center TCM Note      Flowsheet Row Responses   [facility] [place] patient discharged from? [place]   Does the patient have one of the following disease processes/diagnoses(primary or secondary)? Other   TCM attempt successful? Yes   Call start time 0926   Call end time 0929   Discharge diagnosis Left leg cellulitis   Meds reviewed with patient/caregiver? Yes   Is the patient having any side effects they believe may be caused by any medication additions or changes? No   Does the patient have all medications ordered at discharge? Yes   Is the patient taking all medications as directed (includes completed medication regime)? Yes   Comments Patient has previously scheduled office visit with PCP 6/20. Patient confirms he is ok with this appt.   Does the patient have an appointment with their PCP within 7-14 days of discharge? Yes   What is the Home health agency?  [facility]   Has home health visited the patient within 72 hours of discharge? Call prior to 72 hours   Home health comments HH has reached out   Psychosocial issues? No   Did the patient receive a copy of their discharge instructions? Yes   Nursing interventions Reviewed instructions with patient   What is the patient's perception of their health status since discharge? Improving   Is the patient/caregiver able to teach back signs and symptoms related to disease process for when to call PCP? Yes   Is the patient/caregiver able to teach back signs and symptoms related to disease process for when to call 911? Yes   Is the patient/caregiver able to teach back the hierarchy of who to call/visit for symptoms/problems? PCP, Specialist, Home health nurse, Urgent Care, ED, 911 Yes   TCM call completed? Yes   Wrap up additional comments Patient reports improving. HH is following. PCP appt 6/20.   Call end time 0929   Would this patient benefit from a Referral to Amb Social Work? No   Is the patient interested in additional calls from an ambulatory ? No             JOHN RAMOS - Registered Nurse    6/9/2025, 09:29 EDT

## 2025-06-12 ENCOUNTER — TELEPHONE (OUTPATIENT)
Dept: FAMILY MEDICINE CLINIC | Facility: CLINIC | Age: 55
End: 2025-06-12
Payer: COMMERCIAL

## 2025-06-13 ENCOUNTER — ANTICOAGULATION VISIT (OUTPATIENT)
Dept: PHARMACY | Facility: HOSPITAL | Age: 55
End: 2025-06-13
Payer: COMMERCIAL

## 2025-06-13 LAB — INR PPP: 1.9

## 2025-06-13 NOTE — PROGRESS NOTES
Anticoagulation Clinic Progress Note    Anticoagulation Summary  As of 2025      INR goal:  2.0-3.0   TTR:  64.4% (6.3 y)   INR used for dosin.90 (2025)   Warfarin maintenance plan:  15 mg every Mon, Wed, Fri; 10 mg all other days   Weekly warfarin total:  85 mg   No change documented:  Ramo Morocho, PharmD   Plan last modified:  Tessie Fatima RPH (2025)   Next INR check:  2025   Priority:  High   Target end date:  Indefinite    Indications    Other acute pulmonary embolism without acute cor pulmonale (Resolved) [I26.99]  Bilateral pulmonary embolism (Resolved) [I26.99]                 Anticoagulation Episode Summary       INR check location:  --    Preferred lab:  --    Send INR reminders to:   [facility]  POOL    Comments:  new  frankie 2019 **WEEKLY FRANKIE**          Anticoagulation Care Providers       Provider Role Specialty Phone number    Torri Colmenares APRN Referring Cardiology [phone]    Elsy Baeza MD Responsible Cardiology [phone]            Clinic Interview:  Patient Findings     Positives:  Change in health, Change in medications, Hospital admission    Negatives:  Signs/symptoms of thrombosis, Signs/symptoms of bleeding,   Laboratory test error suspected, Change in alcohol use, Change in   activity, Upcoming invasive procedure, Emergency department visit,   Upcoming dental procedure, Missed doses, Extra doses, Change in   diet/appetite, Bruising, Other complaints    Comments:  Hospital admission 25 - 25 for cellulitis. Initially   discharged on cephalexin; however, after 4 days of therapy, this was   changed after to cefpodoxime x 7 additional days, of which he has ~3 days   remaining. Upon returning home from hospital, he continue warfarin at dose   of 15 mg MF, 10 mg all other days.       Clinical Outcomes     Negatives:  Major bleeding event, Thromboembolic event,   Anticoagulation-related hospital admission,  Anticoagulation-related ED   visit, Anticoagulation-related fatality    Comments:  Hospital admission 6/2/25 - 6/7/25 for cellulitis. Initially   discharged on cephalexin; however, after 4 days of therapy, this was   changed after to cefpodoxime x 7 additional days, of which he has ~3 days   remaining. Upon returning home from hospital, he continue warfarin at dose   of 15 mg MF, 10 mg all other days.         INR History:      6/3/2025     6:22 AM 6/4/2025     2:56 AM 6/5/2025     3:12 AM 6/6/2025     3:14 AM 6/7/2025     5:33 AM 6/13/2025    12:00 AM 6/13/2025     9:33 AM   Anticoagulation Monitoring   INR       1.90   INR Date       6/13/2025   INR Goal       2.0-3.0   Trend       Same   Last Week Total       80 mg   Next Week Total       85 mg   Sun       10 mg   Mon       15 mg   Tue       10 mg   Wed       15 mg   Thu       10 mg   Fri       15 mg   Sat       10 mg   Historical INR 2.66  2.77  2.67  2.50  2.24  1.90            This result is from an external source.       Plan:  1. INR is Subtherapeutic today- see above in Anticoagulation Summary.   Will instruct Kameron Melendez to Increase their warfarin regimen to 15 mg MWF, 10 mg all other days - see above in Anticoagulation Summary.  2. Follow up in 1 week.  3. They have been instructed to call if any changes in medications, doses, concerns, etc. Patient expresses understanding and has no further questions at this time.    Ramo Morocho, PharmD

## 2025-06-16 ENCOUNTER — TELEPHONE (OUTPATIENT)
Dept: FAMILY MEDICINE CLINIC | Facility: CLINIC | Age: 55
End: 2025-06-16
Payer: COMMERCIAL

## 2025-06-16 ENCOUNTER — OFFICE VISIT (OUTPATIENT)
Dept: WOUND CARE | Facility: HOSPITAL | Age: 55
End: 2025-06-16
Payer: COMMERCIAL

## 2025-06-16 PROCEDURE — G0463 HOSPITAL OUTPT CLINIC VISIT: HCPCS

## 2025-06-18 ENCOUNTER — READMISSION MANAGEMENT (OUTPATIENT)
Dept: CALL CENTER | Facility: HOSPITAL | Age: 55
End: 2025-06-18
Payer: COMMERCIAL

## 2025-06-18 NOTE — OUTREACH NOTE
Medical Week 2 Survey      Flowsheet Row Responses   [facility] patient discharged from? [place]   Does the patient have one of the following disease processes/diagnoses(primary or secondary)? Other   Week 2 attempt successful? Yes   Call start time 1901   Discharge diagnosis Left leg cellulitis   Call end time 1902   Person spoke with today (if not patient) and relationship Patient   Meds reviewed with patient/caregiver? Yes   Does the patient have a primary care provider?  Yes   Comments regarding PCP Wound care weekly.   What is the Home health agency?  [facility]   Has home health visited the patient within 72 hours of discharge? Yes   Psychosocial issues? No   Did the patient receive a copy of their discharge instructions? Yes   Nursing interventions Reviewed instructions with patient   What is the patient's perception of their health status since discharge? Improving   Is the patient/caregiver able to teach back signs and symptoms related to disease process for when to call PCP? Yes   Is the patient/caregiver able to teach back signs and symptoms related to disease process for when to call 911? Yes   Is the patient/caregiver able to teach back the hierarchy of who to call/visit for symptoms/problems? PCP, Specialist, Home health nurse, Urgent Care, ED, 911 Yes   Week 2 Call Completed? Yes   Graduated Yes   Wrap up additional comments patient reports doing well. No concerns or questions noted.   Call end time 1902            Lina JACKSON - Registered Nurse

## 2025-06-20 ENCOUNTER — ANTICOAGULATION VISIT (OUTPATIENT)
Dept: PHARMACY | Facility: HOSPITAL | Age: 55
End: 2025-06-20
Payer: COMMERCIAL

## 2025-06-20 ENCOUNTER — OFFICE VISIT (OUTPATIENT)
Age: 55
End: 2025-06-20
Payer: COMMERCIAL

## 2025-06-20 ENCOUNTER — OFFICE VISIT (OUTPATIENT)
Dept: FAMILY MEDICINE CLINIC | Facility: CLINIC | Age: 55
End: 2025-06-20
Payer: COMMERCIAL

## 2025-06-20 VITALS
DIASTOLIC BLOOD PRESSURE: 68 MMHG | WEIGHT: 315 LBS | BODY MASS INDEX: 40.43 KG/M2 | HEIGHT: 74 IN | OXYGEN SATURATION: 96 % | HEART RATE: 86 BPM | SYSTOLIC BLOOD PRESSURE: 118 MMHG

## 2025-06-20 VITALS
SYSTOLIC BLOOD PRESSURE: 134 MMHG | WEIGHT: 315 LBS | DIASTOLIC BLOOD PRESSURE: 80 MMHG | HEIGHT: 74 IN | BODY MASS INDEX: 40.43 KG/M2 | HEART RATE: 79 BPM

## 2025-06-20 DIAGNOSIS — I89.0 LYMPHEDEMA OF BOTH LOWER EXTREMITIES: ICD-10-CM

## 2025-06-20 DIAGNOSIS — L03.90 WOUND CELLULITIS: ICD-10-CM

## 2025-06-20 DIAGNOSIS — I48.3 TYPICAL ATRIAL FLUTTER: ICD-10-CM

## 2025-06-20 DIAGNOSIS — Z86.711 HISTORY OF PULMONARY EMBOLISM: ICD-10-CM

## 2025-06-20 DIAGNOSIS — I87.2 VENOUS INSUFFICIENCY (CHRONIC) (PERIPHERAL): ICD-10-CM

## 2025-06-20 DIAGNOSIS — E78.5 DYSLIPIDEMIA: ICD-10-CM

## 2025-06-20 DIAGNOSIS — I87.2 VENOUS INSUFFICIENCY (CHRONIC) (PERIPHERAL): Primary | ICD-10-CM

## 2025-06-20 DIAGNOSIS — Z79.01 LONG TERM (CURRENT) USE OF ANTICOAGULANTS: ICD-10-CM

## 2025-06-20 DIAGNOSIS — I48.0 PAROXYSMAL ATRIAL FIBRILLATION: ICD-10-CM

## 2025-06-20 DIAGNOSIS — E11.65 TYPE 2 DIABETES MELLITUS WITH HYPERGLYCEMIA, WITHOUT LONG-TERM CURRENT USE OF INSULIN: ICD-10-CM

## 2025-06-20 DIAGNOSIS — L03.116 CELLULITIS OF LEFT LOWER EXTREMITY: Primary | ICD-10-CM

## 2025-06-20 LAB — INR PPP: 1.7

## 2025-06-20 PROCEDURE — 99214 OFFICE O/P EST MOD 30 MIN: CPT | Performed by: FAMILY MEDICINE

## 2025-06-20 RX ORDER — AVOBENZONE, HOMOSALATE, OCTISALATE, OCTOCRYLENE 30; 40; 45; 26 MG/ML; MG/ML; MG/ML; MG/ML
CREAM TOPICAL
Qty: 100 EACH | Refills: 3 | Status: SHIPPED | OUTPATIENT
Start: 2025-06-20

## 2025-06-20 RX ORDER — BLOOD-GLUCOSE METER
KIT MISCELLANEOUS
Qty: 1 EACH | Refills: 0 | Status: SHIPPED | OUTPATIENT
Start: 2025-06-20

## 2025-06-20 RX ORDER — GLUCOSAMINE HCL/CHONDROITIN SU 500-400 MG
CAPSULE ORAL
Qty: 100 EACH | Refills: 3 | Status: SHIPPED | OUTPATIENT
Start: 2025-06-20

## 2025-06-20 RX ORDER — BLOOD SUGAR DIAGNOSTIC
STRIP MISCELLANEOUS
Qty: 100 EACH | Refills: 3 | Status: SHIPPED | OUTPATIENT
Start: 2025-06-20

## 2025-06-20 RX ORDER — TIRZEPATIDE 7.5 MG/.5ML
7.5 INJECTION, SOLUTION SUBCUTANEOUS WEEKLY
Qty: 2 ML | Refills: 5 | Status: SHIPPED | OUTPATIENT
Start: 2025-06-20

## 2025-06-20 RX ORDER — DAPAGLIFLOZIN 10 MG/1
10 TABLET, FILM COATED ORAL DAILY
Qty: 90 TABLET | Refills: 3 | Status: SHIPPED | OUTPATIENT
Start: 2025-06-20

## 2025-06-20 NOTE — PROGRESS NOTES
Subjective   Kameron Melendez is a 55 y.o. male. Presents today for   Chief Complaint   Patient presents with    Weight Check    Hospital Follow Up Visit     Faith     Cellulitis    Abscess     Right lower leg         History of Present Illness  History of Present Illness  The patient is a 55-year-old male here for a hospital follow-up. He was admitted on 06/02/2025 and released on 06/07/2025 with discharge diagnoses of left leg cellulitis, rhinovirus, type 2 diabetes, wound cellulitis, venous insufficiency, chronic conditions, heterozygous factor V Leiden mutation with recurrent DVT, VTE on lifelong anticoagulation, severe lymphedema of both lower extremities, untreated ARNOLDO, and type 2 diabetes. He has had a history of recurrent cellulitis. He presented to the ER with a complaint of cough and dyspnea for 1 week prior to admission and then a wound development of the left lower extremity that had increasing erythema. He was on Keflex for a week prior, but there was no improvement. He tested positive for rhinovirus. He was seen by infectious disease and surgery. The wound per notes grew E. coli sensitive to Keflex and ID recommended finishing high dose Keflex and to follow up with wound care.    He reports that the wound appears to be healing, with the opening size reducing. However, according to wound care, the wound remains approximately 6.5 mm deep. He has discontinued Keflex and completed a course of another antibiotic prescribed by Dr. Begum. The redness around the wound has subsided, but slight swelling persists. The wound did not extend to the lower part of his leg. It abscessed and emitted a foul odor before rupturing and draining significantly.    He reports no chest pain or shortness of breath but experienced severe weakness for over a week. He is currently on Mounjaro for his diabetes and reports good tolerance. His weight was recorded as 560 to 562 pounds on 05/09/2025. He does not monitor his blood  sugar levels at home. He has discontinued ibuprofen.  Review of Systems   Respiratory:  Positive for shortness of breath.    Cardiovascular:  Negative for chest pain and palpitations.   Skin:  Positive for wound.   Neurological:  Positive for weakness.       Patient Active Problem List   Diagnosis    Pulmonary hypertension    Lymphedema of both lower extremities    Obesity, morbid, BMI 50 or higher    Dyslipidemia    Hyperuricemia    Hypogonadal obesity    Knee arthropathy    Morbid obesity with body mass index of 60.0-69.9 in adult    ARNOLDO (obstructive sleep apnea)    Severe edema    History of pulmonary embolism    Intractable back pain    Heterozygous factor V Leiden mutation    Cellulitis of left lower extremity    Hypokalemia    CAROL (acute kidney injury)    Sepsis with cutaneous manifestations    Hyperglycemia    Paroxysmal atrial fibrillation    Long term (current) use of anticoagulants    Lymphedema, not elsewhere classified    Nicotine dependence, other tobacco product, uncomplicated    Personal history of other venous thrombosis and embolism    Typical atrial flutter    Venous insufficiency (chronic) (peripheral)    Cellulitis of right lower extremity    Wound cellulitis    CAP (community acquired pneumonia)    Type 2 diabetes mellitus    Hyponatremia    Choledocholithiasis    RUQ pain    Left leg cellulitis    Rhinovirus       Social History     Socioeconomic History    Marital status:    Tobacco Use    Smoking status: Former     Types: Cigars, Pipe     Start date: 1/1/2006     Quit date: 1/1/2022     Years since quitting: 3.4     Passive exposure: Past    Smokeless tobacco: Never    Tobacco comments:     occasional - < 1 a month   Vaping Use    Vaping status: Never Used   Substance and Sexual Activity    Alcohol use: Yes     Comment: social    Drug use: No    Sexual activity: Defer       No Known Allergies      Objective   Vitals:    06/20/25 0858   BP: 118/68   Pulse: 86   SpO2: 96%   Weight: (!)  "242 kg (533 lb)   Height: 188 cm (74\")     Body mass index is 68.43 kg/m².    Physical Exam  Vitals and nursing note reviewed.   Constitutional:       Appearance: He is well-developed.   Musculoskeletal:      Cervical back: Neck supple.      Right lower leg: Edema present.      Left lower leg: Edema present.   Skin:     General: Skin is warm and dry.   Neurological:      Mental Status: He is alert.   Psychiatric:         Behavior: Behavior normal.       Physical Exam  Cardiovascular: Regular rate and rhythm, no murmurs, rubs, or gallops  Other: Weight is 533 today, was 550 at the hospital admit.    Results  Labs   - INR: 06/13/2025, 1.9   - Basic Metabolic Profile: 06/07/2025, Glucose 123, BUN 13, creatinine 1.03, sodium 139, potassium 4.4, chloride 108, CO2 24, calcium 8.6   - Magnesium Level: 06/07/2025, 1.9   - CBC: 06/06/2025, White blood cell count 8.02, hemoglobin 11.8, hematocrit 36.3, platelets 334   - A1c: 06/03/2025, 7.1%   - C-reactive protein: 06/03/2025, 1.71   - High Sensitivity Troponin T: 06/02/2025, 74 and few hours prior 77   - Procalcitonin: 06/02/2025, 1.55   - Plasma Lactic Acid: 06/02/2025, 1.9   - B Natriuretic Peptide: 06/02/2025, 2859    Imaging   - Chest X-ray: Negative    Diagnostic Testing   - Wound Culture: 06/04/2025, Scant growth E. coli, rare growth Proteus skin growth, normal skin mahesh. E. coli was pan susceptible Proteus was resistant to Augmentin, ampicillin, Bactrim, cefazolin, intermediate to ceftriaxone, resistant to cefuroxime, resistant to tetracycline   - PCR MRSA Screen: 06/02/2025, Negative   - Respiratory Panel: 06/02/2025, Positive for human rhinovirus   - Blood Culture: 06/02/2025, Negative   - Second Blood Culture: Negative at 5 days     Assessment & Plan   Diagnoses and all orders for this visit:    1. Cellulitis of left lower extremity (Primary)    2. Lymphedema of both lower extremities    3. Venous insufficiency (chronic) (peripheral)    4. Wound cellulitis    5. " Type 2 diabetes mellitus with hyperglycemia, without long-term current use of insulin  -     Mounjaro 7.5 MG/0.5ML solution auto-injector; Inject 7.5 mg under the skin into the appropriate area as directed 1 (One) Time Per Week.  Dispense: 2 mL; Refill: 5  -     dapagliflozin Propanediol (Farxiga) 10 MG tablet; Take 10 mg by mouth Daily.  Dispense: 90 tablet; Refill: 3  -     glucose monitor monitoring kit; Check once daily dx non-iddm  Dispense: 1 each; Refill: 0  -     Lancets misc; Check once daily dx non-iddm  Dispense: 100 each; Refill: 3  -     Glucose Blood (Blood Glucose Test) strip; Check once daily  Dispense: 100 each; Refill: 3  -     Alcohol Swabs (Alcohol Pads) 70 % pads; Check once daily  Dispense: 100 each; Refill: 3           Assessment & Plan  1. Post-hospitalization follow-up.  - Admitted on 06/02/2025 and discharged on 06/07/2025 with diagnoses of left leg cellulitis, rhinovirus, type 2 diabetes, wound cellulitis, venous insufficiency, chronic conditions, heterozygous factor V Leiden mutation with recurrent DVT/VTE on lifelong anticoagulation, severe lymphedema of both lower extremities, untreated ARNOLDO, and type 2 diabetes.  - Presented to the ER with cough and dyspnea for 1 week prior to admission, followed by the development of a wound on the left lower extremity with increasing erythema.  - Wound culture grew E. coli sensitive to Keflex; ID recommended finishing high-dose Keflex and following up with wound care.  - Advised to continue current medication regimen and follow up with wound care as scheduled.    2. Type 2 diabetes mellitus.  - Currently on Mounjaro and tolerating it well.  - A1c on 06/03/2025 was 7.1%; significant weight loss since starting Mounjaro.  - Will increase dose of mounjaro to 7.5mg to see if can further help with blood sugar control and weight loss.     - Prescription for a glucometer provided to monitor blood sugar levels once a week in the morning and as needed when  feeling unwell.    3. Medication management.  - Requested a 90-day supply of allopurinol and atorvastatin instead of the usual 30-day supply.  - Refills for these medications sent to pharmacy.    Follow-up  - Follow up in 2 months.          -Follow up: 2 months and prn     ________________________________________  Hansel Patel DO, MS    Current Outpatient Medications on File Prior to Visit   Medication Sig Dispense Refill    acetaminophen (TYLENOL) 500 MG tablet Take 1 tablet by mouth Every 6 (Six) Hours As Needed for Mild Pain.      allopurinol (Zyloprim) 100 MG tablet Take 1 tablet by mouth Daily. 30 tablet 1    atorvastatin (LIPITOR) 20 MG tablet TAKE 1 TABLET BY MOUTH EVERY DAY AT NIGHT 90 tablet 3    furosemide (LASIX) 80 MG tablet Take 1 tablet by mouth Daily As Needed (leg swelling). 90 tablet 3    Ibuprofen 3 %, Gabapentin 10 %, Baclofen 2 %, lidocaine 4 %, Ketamine HCl 10 % Apply 1-2 g topically to the appropriate area as directed 3 (Three) to 4 (Four) times daily. 90 g 5    metOLazone (ZAROXOLYN) 5 MG tablet TAKE 1 TABLET BY MOUTH 1 (ONE) TIME PER WEEK. 4 tablet 3    omeprazole (priLOSEC) 40 MG capsule TAKE 1 CAPSULE BY MOUTH EVERY DAY 90 capsule 3    potassium chloride (K-TAB) 20 MEQ tablet controlled-release ER tablet Take 1 tablet by mouth 1 (One) Time Per Week. With metolazone 4 tablet 3    sodium hypochlorite (DAKIN'S 1/4 STRENGTH) 0.125 % solution topical solution 0.125% Apply to 4x4 gauze twice daily for wet to dry dressing changes to lower left pannus 473 mL 2    spironolactone (Aldactone) 25 MG tablet Take 1 tablet by mouth As Needed (swelling).      warfarin (Jantoven) 5 MG tablet TAKE TWO TABLETS BY MOUTH ON SUN, TUES, THURS AND TAKE THREE TABLETS BY MOUTH ALL OTHER DAYS OR AS DIRECTED 235 tablet 1    [DISCONTINUED] dapagliflozin Propanediol (Farxiga) 10 MG tablet Take 10 mg by mouth Daily. 30 tablet 5    [DISCONTINUED] Tirzepatide 5 MG/0.5ML solution auto-injector Inject 5 mg under the  skin into the appropriate area as directed 1 (One) Time Per Week. Call for increased dose 3 mL 1     No current facility-administered medications on file prior to visit.

## 2025-06-20 NOTE — PROGRESS NOTES
Anticoagulation Clinic Progress Note    Anticoagulation Summary  As of 2025      INR goal:  2.0-3.0   TTR:  64.2% (6.3 y)   INR used for dosin.70 (2025)   Warfarin maintenance plan:  15 mg every Mon, Wed, Fri; 10 mg all other days   Weekly warfarin total:  85 mg   Plan last modified:  Tessie Fatima RPH (2025)   Next INR check:  2025   Priority:  High   Target end date:  Indefinite    Indications    Other acute pulmonary embolism without acute cor pulmonale (Resolved) [I26.99]  Bilateral pulmonary embolism (Resolved) [I26.99]                 Anticoagulation Episode Summary       INR check location:  --    Preferred lab:  --    Send INR reminders to:  [facility]  POOL    Comments:  new  rfankie 2019 **WEEKLY FRANKIE**          Anticoagulation Care Providers       Provider Role Specialty Phone number    Torri Colmenares APRN Referring Cardiology [phone]    Elsy Baeza MD Responsible Cardiology [phone]            Clinic Interview:  Patient Findings     Positives:  Missed doses    Negatives:  Signs/symptoms of thrombosis, Signs/symptoms of bleeding,   Laboratory test error suspected, Change in health, Change in alcohol use,   Change in activity, Upcoming invasive procedure, Emergency department   visit, Upcoming dental procedure, Extra doses, Change in medications,   Change in diet/appetite, Hospital admission, Bruising, Other complaints      Clinical Outcomes     Negatives:  Major bleeding event, Thromboembolic event,   Anticoagulation-related hospital admission, Anticoagulation-related ED   visit, Anticoagulation-related fatality        INR History:      2025     3:12 AM 2025     3:14 AM 2025     5:33 AM 2025    12:00 AM 2025     9:33 AM 2025    12:00 AM 2025     9:45 AM   Anticoagulation Monitoring   INR     1.90  1.70   INR Date     2025   INR Goal     2.0-3.0  2.0-3.0   Trend     Same  Same   Last Week  Total     80 mg  75 mg   Next Week Total     85 mg  90 mg   Sun     10 mg  10 mg   Mon     15 mg  15 mg   Tue     10 mg  10 mg   Wed     15 mg  15 mg   Thu     10 mg  10 mg   Fri     15 mg  20 mg (6/20)   Sat     10 mg  10 mg   Historical INR 2.67  2.50  2.24  1.90      1.70        Visit Report      Report Report       This result is from an external source.       Plan:  1. INR is Subtherapeutic today- see above in Anticoagulation Summary.   Will instruct Kameron Melendez to Increase their warfarin regimen- see above in Anticoagulation Summary.  missed dose on Wednesday, boosted to 15 mg on Thursday, boosting to 20 mg today, resume 15 mg MWF, 10 mg AOD, rck 1 week   2. Follow up in 1 weeks  3. They have been instructed to call if any changes in medications, doses, concerns, etc. Patient expresses understanding and has no further questions at this time.    Tessie Fatima [facility]

## 2025-06-23 ENCOUNTER — OFFICE VISIT (OUTPATIENT)
Dept: WOUND CARE | Facility: HOSPITAL | Age: 55
End: 2025-06-23
Payer: COMMERCIAL

## 2025-06-27 ENCOUNTER — ANTICOAGULATION VISIT (OUTPATIENT)
Dept: PHARMACY | Facility: HOSPITAL | Age: 55
End: 2025-06-27
Payer: COMMERCIAL

## 2025-06-27 LAB — INR PPP: 2.2

## 2025-06-27 NOTE — PROGRESS NOTES
Anticoagulation Clinic Progress Note    Anticoagulation Summary  As of 2025      INR goal:  2.0-3.0   TTR:  64.1% (6.3 y)   INR used for dosin.20 (2025)   Warfarin maintenance plan:  15 mg every Mon, Wed, Fri; 10 mg all other days   Weekly warfarin total:  85 mg   No change documented:  Tessie Fatima RPH   Plan last modified:  Tessie Fatima RPH (2025)   Next INR check:  2025   Priority:  High   Target end date:  Indefinite    Indications    Other acute pulmonary embolism without acute cor pulmonale (Resolved) [I26.99]  Bilateral pulmonary embolism (Resolved) [I26.99]                 Anticoagulation Episode Summary       INR check location:  --    Preferred lab:  --    Send INR reminders to:   [facility]  POOL    Comments:  new  frankie 2019 **WEEKLY FRANKIE**          Anticoagulation Care Providers       Provider Role Specialty Phone number    Torri Colmenares APRN Referring Cardiology [phone]    Elsy Baeza MD Responsible Cardiology [phone]            Clinic Interview:  No pertinent clinical findings have been reported.    INR History:      2025     5:33 AM 2025    12:00 AM 2025     9:33 AM 2025    12:00 AM 2025     9:45 AM 2025    12:00 AM 2025     8:18 AM   Anticoagulation Monitoring   INR   1.90  1.70  2.20   INR Date   2025   INR Goal   2.0-3.0  2.0-3.0  2.0-3.0   Trend   Same  Same  Same   Last Week Total   80 mg  75 mg  90 mg   Next Week Total   85 mg  90 mg  85 mg   Sun   10 mg  10 mg  10 mg   Mon   15 mg  15 mg  15 mg   Tue   10 mg  10 mg  10 mg   Wed   15 mg  15 mg  15 mg   Thu   10 mg  10 mg  10 mg   Fri   15 mg  20 mg ()  15 mg   Sat   10 mg  10 mg  10 mg   Historical INR 2.24  1.90      1.70      2.20        Visit Report    Report    Report Report    Report         This result is from an external source.       Plan:  1. INR is therapeutic today- see above in Anticoagulation  Summary.    Kameron Melendez to continue their warfarin regimen- see above in Anticoagulation Summary.  2. Follow up in 1 week  3. Pt has agreed to only be called if INR out of range. They have been instructed to call if any changes in medications, doses, concerns, etc. Patient expresses understanding and has no further questions at this time.    Tessie Fatima, [facility]

## 2025-06-29 RX ORDER — ALLOPURINOL 100 MG/1
100 TABLET ORAL DAILY
Qty: 30 TABLET | Refills: 5 | Status: SHIPPED | OUTPATIENT
Start: 2025-06-29

## 2025-06-30 ENCOUNTER — OFFICE VISIT (OUTPATIENT)
Dept: WOUND CARE | Facility: HOSPITAL | Age: 55
End: 2025-06-30
Payer: COMMERCIAL

## 2025-06-30 PROCEDURE — G0463 HOSPITAL OUTPT CLINIC VISIT: HCPCS

## 2025-07-04 LAB — INR PPP: 3.1

## 2025-07-07 ENCOUNTER — ANTICOAGULATION VISIT (OUTPATIENT)
Dept: PHARMACY | Facility: HOSPITAL | Age: 55
End: 2025-07-07
Payer: COMMERCIAL

## 2025-07-07 NOTE — PROGRESS NOTES
Anticoagulation Clinic Progress Note    Anticoagulation Summary  As of 7/7/2025      INR goal:  2.0-3.0   TTR:  64.2% (6.3 y)   INR used for dosing:  3.10 (7/4/2025)   Warfarin maintenance plan:  15 mg every Mon, Wed, Fri; 10 mg all other days   Weekly warfarin total:  85 mg   No change documented:  Tessie Fatima RPH   Plan last modified:  Tessie Fatima RPH (5/30/2025)   Next INR check:  7/11/2025   Priority:  High   Target end date:  Indefinite    Indications    Other acute pulmonary embolism without acute cor pulmonale (Resolved) [I26.99]  Bilateral pulmonary embolism (Resolved) [I26.99]                 Anticoagulation Episode Summary       INR check location:  --    Preferred lab:  --    Send INR reminders to:   S[facility]  POOL    Comments:  new  frankie March 2019 **WEEKLY FRANKIE**          Anticoagulation Care Providers       Provider Role Specialty Phone number    Torri Colmenares APRN Referring Cardiology [phone]    Elsy Baeza MD Responsible Cardiology [phone]            Clinic Interview:  Patient Findings     Negatives:  Signs/symptoms of thrombosis, Signs/symptoms of bleeding,   Laboratory test error suspected, Change in health, Change in alcohol use,   Change in activity, Upcoming invasive procedure, Emergency department   visit, Upcoming dental procedure, Missed doses, Extra doses, Change in   medications, Change in diet/appetite, Hospital admission, Bruising, Other   complaints      Clinical Outcomes     Negatives:  Major bleeding event, Thromboembolic event,   Anticoagulation-related hospital admission, Anticoagulation-related ED   visit, Anticoagulation-related fatality        INR History:      6/13/2025     9:33 AM 6/20/2025    12:00 AM 6/20/2025     9:45 AM 6/27/2025    12:00 AM 6/27/2025     8:18 AM 7/4/2025    12:00 AM 7/7/2025     8:14 AM   Anticoagulation Monitoring   INR 1.90  1.70  2.20  3.10   INR Date 6/13/2025 6/20/2025 6/27/2025 7/4/2025   INR Goal  2.0-3.0  2.0-3.0  2.0-3.0  2.0-3.0   Trend Same  Same  Same  Same   Last Week Total 80 mg  75 mg  90 mg  85 mg   Next Week Total 85 mg  90 mg  85 mg  85 mg   Sun 10 mg  10 mg  10 mg  -   Mon 15 mg  15 mg  15 mg  15 mg   Tue 10 mg  10 mg  10 mg  10 mg   Wed 15 mg  15 mg  15 mg  15 mg   Thu 10 mg  10 mg  10 mg  10 mg   Fri 15 mg  20 mg (6/20)  15 mg  -   Sat 10 mg  10 mg  10 mg  -   Historical INR  1.70      2.20      3.10        Visit Report  Report    Report Report    Report           This result is from an external source.       Plan:  1. INR is Supratherapeutic today- see above in Anticoagulation Summary.   Will instruct Kameron Melendez to Continue their warfarin regimen as the INR is very close to goal range- see above in Anticoagulation Summary.  2. Follow up in 1 weeks  3. They have been instructed to call if any changes in medications, doses, concerns, etc. Patient expresses understanding and has no further questions at this time.    Tessie Fatima [facility]

## 2025-07-10 ENCOUNTER — OFFICE VISIT (OUTPATIENT)
Dept: WOUND CARE | Facility: HOSPITAL | Age: 55
End: 2025-07-10
Payer: COMMERCIAL

## 2025-07-10 PROCEDURE — G0463 HOSPITAL OUTPT CLINIC VISIT: HCPCS

## 2025-07-10 NOTE — PROGRESS NOTES
Emile Patel. I am seeing this patient in the wound care center for the wound on the left leg. He has a tunnel that is around 5cm that has been very slow to heal. I have ordered a CT scan to evaluate for abscess or deep fluid collection. He said that you were filling out his FMLA/short term disability paperwork. He asked if I would reach out to you to fill you in on the plan. I plan to follow up with him again next week after the CT and then we will know more as far as next steps.

## 2025-07-10 NOTE — PROGRESS NOTES
Anticoagulation Clinic Progress Note    Anticoagulation Summary  As of 8/10/2022    INR goal:  2.0-3.0   TTR:  67.9 % (3.5 y)   INR used for dosin.80 (8/10/2022)   Warfarin maintenance plan:  15 mg every Sun; 10 mg all other days   Weekly warfarin total:  75 mg   Plan last modified:  Ramo Morocho, PharmD (2022)   Next INR check:  2022   Priority:  High   Target end date:  Indefinite    Indications    Other acute pulmonary embolism without acute cor pulmonale (HCC) [I26.99]  History of bilateral pulmonary embolism (CMS/HCC) [I26.99]             Anticoagulation Episode Summary     INR check location:      Preferred lab:      Send INR reminders to:  [facility]  POOL    Comments:  new  tristen 2019 **WEEKLY TRISTEN**      Anticoagulation Care Providers     Provider Role Specialty Phone number    Torri Colmenares APRN Referring Cardiology [phone]    Elsy Baeza MD Responsible Cardiology [phone]          Clinic Interview:  Unsuccessful reaching after several attempts. He left a voicemail requesting that we leave a voicemail with instructions.     INR History:  Anticoagulation Monitoring 2022 2022 8/10/2022   INR 5.20 2.40 2.80   INR Date 2022 2022 8/10/2022   INR Goal 2.0-3.0 2.0-3.0 2.0-3.0   Trend Same Same Down   Last Week Total 70 mg 50 mg 75 mg   Next Week Total 55 mg 80 mg 75 mg   Sun 10 mg () 15 mg 15 mg   Mon 10 mg () - 10 mg   Tue - 10 mg 10 mg   Wed - 10 mg (8/3) 10 mg   Thu - 10 mg 10 mg   Fri Hold () 10 mg () 10 mg   Sat Hold () 10 mg () 10 mg   Visit Report - - -   Some recent data might be hidden       Plan:  1. INR is Therapeutic today- see above in Anticoagulation Summary.   Will instruct Kameron Melendez to Continue their warfarin regimen as documented last week (Advised to continue 15 mg Sun, 10 mg AOD, but to contact clinic if this dose is not what he has taken over the past week.) - see above in Anticoagulation  \"Have you been to the ER, urgent care clinic since your last visit?  Hospitalized since your last visit?\"    NO    “Have you seen or consulted any other health care providers outside our system since your last visit?”    NO      Click Here for Release of Records Request   Summary.  2. Follow up in 1 week  3. Left HIPAA-friendly voicemail with instructions per patient request. They have been instructed to call if any changes in medications, doses, concerns, etc.     Murray IveyD

## 2025-07-11 ENCOUNTER — ANTICOAGULATION VISIT (OUTPATIENT)
Dept: PHARMACY | Facility: HOSPITAL | Age: 55
End: 2025-07-11
Payer: COMMERCIAL

## 2025-07-11 ENCOUNTER — TRANSCRIBE ORDERS (OUTPATIENT)
Dept: ADMINISTRATIVE | Facility: HOSPITAL | Age: 55
End: 2025-07-11
Payer: COMMERCIAL

## 2025-07-11 DIAGNOSIS — L97.122 NON-PRESSURE CHRONIC ULCER OF LEFT THIGH WITH FAT LAYER EXPOSED: Primary | ICD-10-CM

## 2025-07-11 LAB — INR PPP: 2.9

## 2025-07-11 NOTE — PROGRESS NOTES
Anticoagulation Clinic Progress Note    Anticoagulation Summary  As of 2025      INR goal:  2.0-3.0   TTR:  64.2% (6.3 y)   INR used for dosin.90 (2025)   Warfarin maintenance plan:  15 mg every Mon, Wed, Fri; 10 mg all other days   Weekly warfarin total:  85 mg   No change documented:  Tessie Fatima RPH   Plan last modified:  Tessie Fatima RPH (2025)   Next INR check:  2025   Priority:  High   Target end date:  Indefinite    Indications    Other acute pulmonary embolism without acute cor pulmonale (Resolved) [I26.99]  Bilateral pulmonary embolism (Resolved) [I26.99]                 Anticoagulation Episode Summary       INR check location:  --    Preferred lab:  --    Send INR reminders to:   [facility]  PO[facility]    Comments:  new  frankie 2019 **WEEKLY FRANKIE**          Anticoagulation Care Providers       Provider Role Specialty Phone number    Torri Colmenares APRN Referring Cardiology [phone]    Elsy Baeza MD Responsible Cardiology [phone]            Clinic Interview:  No pertinent clinical findings have been reported.    INR History:      2025     9:45 AM 2025    12:00 AM 2025     8:18 AM 2025    12:00 AM 2025     8:14 AM 2025    12:00 AM 2025     8:06 AM   Anticoagulation Monitoring   INR 1.70  2.20  3.10  2.90   INR Date 2025   INR Goal 2.0-3.0  2.0-3.0  2.0-3.0  2.0-3.0   Trend Same  Same  Same  Same   Last Week Total 75 mg  90 mg  85 mg  85 mg   Next Week Total 90 mg  85 mg  85 mg  85 mg   Sun 10 mg  10 mg  -  10 mg   Mon 15 mg  15 mg  15 mg  15 mg   Tue 10 mg  10 mg  10 mg  10 mg   Wed 15 mg  15 mg  15 mg  15 mg   Thu 10 mg  10 mg  10 mg  10 mg   Fri 20 mg ()  15 mg  -  15 mg   Sat 10 mg  10 mg  -  10 mg   Historical INR  2.20      3.10      2.90        Visit Report Report    Report             This result is from an external source.       Plan:  1. INR is therapeutic  today- see above in Anticoagulation Summary.    Kameron Melendez to continue their warfarin regimen- see above in Anticoagulation Summary.  2. Follow up in 1 week  3. Pt has agreed to only be called if INR out of range. They have been instructed to call if any changes in medications, doses, concerns, etc. Patient expresses understanding and has no further questions at this time.    Tessie Fatima, [facility]

## 2025-07-18 ENCOUNTER — ANTICOAGULATION VISIT (OUTPATIENT)
Dept: PHARMACY | Facility: HOSPITAL | Age: 55
End: 2025-07-18
Payer: COMMERCIAL

## 2025-07-18 LAB — INR PPP: 3.3

## 2025-07-18 NOTE — PROGRESS NOTES
Anticoagulation Clinic Progress Note    Anticoagulation Summary  As of 7/18/2025      INR goal:  2.0-3.0   TTR:  64.0% (6.4 y)   INR used for dosing:  3.30 (7/18/2025)   Warfarin maintenance plan:  15 mg every Mon, Wed, Fri; 10 mg all other days   Weekly warfarin total:  85 mg   Plan last modified:  Tessie Fatima RPH (5/30/2025)   Next INR check:  7/25/2025   Priority:  High   Target end date:  Indefinite    Indications    Other acute pulmonary embolism without acute cor pulmonale (Resolved) [I26.99]  Bilateral pulmonary embolism (Resolved) [I26.99]                 Anticoagulation Episode Summary       INR check location:  --    Preferred lab:  --    Send INR reminders to:  [facility]  POOL    Comments:  new  frankie March 2019 **WEEKLY FRANKIE**          Anticoagulation Care Providers       Provider Role Specialty Phone number    Torri Colmenares APRN Referring Cardiology [phone]    Elsy Baeza MD Responsible Cardiology [phone]            Clinic Interview:  Patient Findings     Negatives:  Signs/symptoms of thrombosis, Signs/symptoms of bleeding,   Laboratory test error suspected, Change in health, Change in alcohol use,   Change in activity, Upcoming invasive procedure, Emergency department   visit, Upcoming dental procedure, Missed doses, Extra doses, Change in   medications, Change in diet/appetite, Hospital admission, Bruising, Other   complaints      Clinical Outcomes     Negatives:  Major bleeding event, Thromboembolic event,   Anticoagulation-related hospital admission, Anticoagulation-related ED   visit, Anticoagulation-related fatality        INR History:      6/27/2025     8:18 AM 7/4/2025    12:00 AM 7/7/2025     8:14 AM 7/11/2025    12:00 AM 7/11/2025     8:06 AM 7/18/2025    12:00 AM 7/18/2025    10:56 AM   Anticoagulation Monitoring   INR 2.20  3.10  2.90  3.30   INR Date 6/27/2025 7/4/2025 7/11/2025 7/18/2025   INR Goal 2.0-3.0  2.0-3.0  2.0-3.0  2.0-3.0   Trend  Same  Same  Same  Same   Last Week Total 90 mg  85 mg  85 mg  85 mg   Next Week Total 85 mg  85 mg  85 mg  80 mg   Sun 10 mg  -  10 mg  10 mg   Mon 15 mg  15 mg  15 mg  15 mg   Tue 10 mg  10 mg  10 mg  10 mg   Wed 15 mg  15 mg  15 mg  15 mg   Thu 10 mg  10 mg  10 mg  10 mg   Fri 15 mg  -  15 mg  10 mg (7/18)   Sat 10 mg  -  10 mg  10 mg   Historical INR  3.10      2.90      3.30            This result is from an external source.       Plan:  1. INR is Supratherapeutic today- see above in Anticoagulation Summary.   Will instruct Kameron Melendez to Change their warfarin regimen- see above in Anticoagulation Summary.       No changes, partial to 10 mg then resume 15 mg MWF, 10 mg AOD, rck 1 week  2. Follow up in 1 weeks  3. They have been instructed to call if any changes in medications, doses, concerns, etc. Patient expresses understanding and has no further questions at this time.    Tessie Fatima [facility]

## 2025-07-21 ENCOUNTER — OFFICE VISIT (OUTPATIENT)
Dept: WOUND CARE | Facility: HOSPITAL | Age: 55
End: 2025-07-21
Payer: COMMERCIAL

## 2025-07-21 PROCEDURE — G0463 HOSPITAL OUTPT CLINIC VISIT: HCPCS

## 2025-07-25 ENCOUNTER — ANTICOAGULATION VISIT (OUTPATIENT)
Dept: PHARMACY | Facility: HOSPITAL | Age: 55
End: 2025-07-25
Payer: COMMERCIAL

## 2025-07-25 LAB — INR PPP: 2.9

## 2025-07-25 NOTE — PROGRESS NOTES
Anticoagulation Clinic Progress Note    Anticoagulation Summary  As of 2025      INR goal:  2.0-3.0   TTR:  63.9% (6.4 y)   INR used for dosin.90 (2025)   Warfarin maintenance plan:  15 mg every Mon, Fri; 10 mg all other days   Weekly warfarin total:  80 mg   Plan last modified:  Tessie Fatima RPH (2025)   Next INR check:  2025   Priority:  High   Target end date:  Indefinite    Indications    Other acute pulmonary embolism without acute cor pulmonale (Resolved) [I26.99]  Bilateral pulmonary embolism (Resolved) [I26.99]                 Anticoagulation Episode Summary       INR check location:  --    Preferred lab:  --    Send INR reminders to:   [facility]  PO[facility]    Comments:  new  frankie 2019 **WEEKLY FRANKIE**          Anticoagulation Care Providers       Provider Role Specialty Phone number    Torri Colmenares APRN Referring Cardiology [phone]    Elsy Baeza MD Responsible Cardiology [phone]            Clinic Interview:  Patient Findings     Negatives:  Signs/symptoms of thrombosis, Signs/symptoms of bleeding,   Laboratory test error suspected, Change in health, Change in alcohol use,   Change in activity, Upcoming invasive procedure, Emergency department   visit, Upcoming dental procedure, Missed doses, Extra doses, Change in   medications, Change in diet/appetite, Hospital admission, Bruising, Other   complaints      Clinical Outcomes     Negatives:  Major bleeding event, Thromboembolic event,   Anticoagulation-related hospital admission, Anticoagulation-related ED   visit, Anticoagulation-related fatality        INR History:      2025     8:14 AM 2025    12:00 AM 2025     8:06 AM 2025    12:00 AM 2025    10:56 AM 2025    12:00 AM 2025    11:20 AM   Anticoagulation Monitoring   INR 3.10  2.90  3.30  2.90   INR Date 2025   INR Goal 2.0-3.0  2.0-3.0  2.0-3.0  2.0-3.0   Trend Same   Same  Same  Down   Last Week Total 85 mg  85 mg  85 mg  80 mg   Next Week Total 85 mg  85 mg  80 mg  80 mg   Sun -  10 mg  10 mg  10 mg   Mon 15 mg  15 mg  15 mg  15 mg   Tue 10 mg  10 mg  10 mg  10 mg   Wed 15 mg  15 mg  15 mg  10 mg   Thu 10 mg  10 mg  10 mg  10 mg   Fri -  15 mg  10 mg (7/18)  15 mg   Sat -  10 mg  10 mg  10 mg   Historical INR  2.90      3.30      2.90            This result is from an external source.       Plan:  1. INR is Therapeutic today- see above in Anticoagulation Summary.   Will instruct Kameron Melendez to change their warfarin regimen- see above in Anticoagulation Summary.  No changes, reduce to 15 mg MF, 10 mg AOD to prevent INR from becoming supratherapeutic again, rck 1 week   2. Follow up in 1 weeks  3. They have been instructed to call if any changes in medications, doses, concerns, etc. Patient expresses understanding and has no further questions at this time.    Tessie Fatima RP

## 2025-08-01 ENCOUNTER — ANTICOAGULATION VISIT (OUTPATIENT)
Dept: PHARMACY | Facility: HOSPITAL | Age: 55
End: 2025-08-01
Payer: COMMERCIAL

## 2025-08-01 LAB — INR PPP: 3.4

## 2025-08-01 NOTE — PROGRESS NOTES
Anticoagulation Clinic Progress Note    Anticoagulation Summary  As of 8/1/2025      INR goal:  2.0-3.0   TTR:  63.8% (6.4 y)   INR used for dosing:  3.40 (8/1/2025)   Warfarin maintenance plan:  15 mg every Mon, Fri; 10 mg all other days   Weekly warfarin total:  80 mg   Plan last modified:  Tessie Fatima RPH (7/25/2025)   Next INR check:  8/8/2025   Priority:  High   Target end date:  Indefinite    Indications    Other acute pulmonary embolism without acute cor pulmonale (Resolved) [I26.99]  Bilateral pulmonary embolism (Resolved) [I26.99]                 Anticoagulation Episode Summary       INR check location:  --    Preferred lab:  --    Send INR reminders to:   [facility]  PO[facility]    Comments:  new  frankie March 2019 **WEEKLY FRANKIE**          Anticoagulation Care Providers       Provider Role Specialty Phone number    Torri Colmenares APRN Referring Cardiology [phone]    Elsy Baeza MD Responsible Cardiology [phone]            Clinic Interview:  Patient Findings     Positives:  Change in medications, Change in diet/appetite    Negatives:  Signs/symptoms of thrombosis, Signs/symptoms of bleeding,   Laboratory test error suspected, Change in health, Change in alcohol use,   Change in activity, Upcoming invasive procedure, Emergency department   visit, Upcoming dental procedure, Missed doses, Extra doses, Hospital   admission, Bruising, Other complaints    Comments:  Patient reports that his dose of mounjaro was increased this   past wk and decreased appetite.       Clinical Outcomes     Negatives:  Major bleeding event, Thromboembolic event,   Anticoagulation-related hospital admission, Anticoagulation-related ED   visit, Anticoagulation-related fatality    Comments:  Patient reports that his dose of mounjaro was increased this   past wk and decreased appetite.         INR History:      7/11/2025     8:06 AM 7/18/2025    12:00 AM 7/18/2025    10:56 AM 7/25/2025    12:00 AM  7/25/2025    11:20 AM 8/1/2025    12:00 AM 8/1/2025     8:10 AM   Anticoagulation Monitoring   INR 2.90  3.30  2.90  3.40   INR Date 7/11/2025 7/18/2025 7/25/2025 8/1/2025   INR Goal 2.0-3.0  2.0-3.0  2.0-3.0  2.0-3.0   Trend Same  Same  Down  Same   Last Week Total 85 mg  85 mg  80 mg  80 mg   Next Week Total 85 mg  80 mg  80 mg  75 mg   Sun 10 mg  10 mg  10 mg  10 mg   Mon 15 mg  15 mg  15 mg  15 mg   Tue 10 mg  10 mg  10 mg  10 mg   Wed 15 mg  15 mg  10 mg  10 mg   Thu 10 mg  10 mg  10 mg  10 mg   Fri 15 mg  10 mg (7/18)  15 mg  10 mg (8/1)   Sat 10 mg  10 mg  10 mg  10 mg   Historical INR  3.30      2.90      3.40            This result is from an external source.       Plan:  1. INR is Supratherapeutic today- see above in Anticoagulation Summary.   Will instruct Kameron Melendez to take 10 mg today, 8/1/25, and then continue their warfarin regimen- see above in Anticoagulation Summary.  2. Follow up in 1 week  3. They have been instructed to call if any changes in medications, doses, concerns, etc. Patient expresses understanding and has no further questions at this time.    Marissa Jones [facility]

## 2025-08-04 RX ORDER — ALLOPURINOL 100 MG/1
100 TABLET ORAL DAILY
Qty: 90 TABLET | Refills: 0 | Status: SHIPPED | OUTPATIENT
Start: 2025-08-04

## 2025-08-07 ENCOUNTER — TELEPHONE (OUTPATIENT)
Dept: FAMILY MEDICINE CLINIC | Facility: CLINIC | Age: 55
End: 2025-08-07
Payer: COMMERCIAL

## 2025-08-07 DIAGNOSIS — E11.65 TYPE 2 DIABETES MELLITUS WITH HYPERGLYCEMIA, WITHOUT LONG-TERM CURRENT USE OF INSULIN: ICD-10-CM

## 2025-08-07 RX ORDER — DAPAGLIFLOZIN 10 MG/1
10 TABLET, FILM COATED ORAL DAILY
Qty: 90 TABLET | Refills: 3 | Status: SHIPPED | OUTPATIENT
Start: 2025-08-07

## 2025-08-08 ENCOUNTER — ANTICOAGULATION VISIT (OUTPATIENT)
Dept: PHARMACY | Facility: HOSPITAL | Age: 55
End: 2025-08-08
Payer: COMMERCIAL

## 2025-08-08 LAB — INR PPP: 5.2

## 2025-08-12 ENCOUNTER — TRANSCRIBE ORDERS (OUTPATIENT)
Dept: ADMINISTRATIVE | Facility: HOSPITAL | Age: 55
End: 2025-08-12
Payer: COMMERCIAL

## 2025-08-12 DIAGNOSIS — L97.122 NON-PRESSURE CHRONIC ULCER OF LEFT THIGH WITH FAT LAYER EXPOSED: Primary | ICD-10-CM

## 2025-08-13 ENCOUNTER — HOSPITAL ENCOUNTER (OUTPATIENT)
Dept: CT IMAGING | Facility: HOSPITAL | Age: 55
Discharge: HOME | End: 2025-08-13
Admitting: NURSE PRACTITIONER
Payer: COMMERCIAL

## 2025-08-13 DIAGNOSIS — L97.122 NON-PRESSURE CHRONIC ULCER OF LEFT THIGH WITH FAT LAYER EXPOSED: ICD-10-CM

## 2025-08-13 PROCEDURE — 73701 CT LOWER EXTREMITY W/DYE: CPT

## 2025-08-13 PROCEDURE — 25510000001 IOPAMIDOL 61 % SOLUTION: Performed by: NURSE PRACTITIONER

## 2025-08-13 RX ORDER — IOPAMIDOL 612 MG/ML
100 INJECTION, SOLUTION INTRAVASCULAR
Status: COMPLETED | OUTPATIENT
Start: 2025-08-13 | End: 2025-08-13

## 2025-08-13 RX ADMIN — IOPAMIDOL 95 ML: 612 INJECTION, SOLUTION INTRAVENOUS at 16:35

## 2025-08-15 ENCOUNTER — OFFICE VISIT (OUTPATIENT)
Dept: WOUND CARE | Facility: HOSPITAL | Age: 55
End: 2025-08-15
Payer: COMMERCIAL

## 2025-08-15 ENCOUNTER — ANTICOAGULATION VISIT (OUTPATIENT)
Dept: PHARMACY | Facility: HOSPITAL | Age: 55
End: 2025-08-15
Payer: COMMERCIAL

## 2025-08-15 LAB — INR PPP: 3.3

## 2025-08-15 PROCEDURE — 97602 WOUND(S) CARE NON-SELECTIVE: CPT

## 2025-08-22 ENCOUNTER — ANTICOAGULATION VISIT (OUTPATIENT)
Dept: PHARMACY | Facility: HOSPITAL | Age: 55
End: 2025-08-22
Payer: COMMERCIAL

## 2025-08-22 LAB — INR PPP: 4

## 2025-08-27 ENCOUNTER — OFFICE VISIT (OUTPATIENT)
Dept: FAMILY MEDICINE CLINIC | Facility: CLINIC | Age: 55
End: 2025-08-27
Payer: COMMERCIAL

## 2025-08-27 VITALS
SYSTOLIC BLOOD PRESSURE: 126 MMHG | DIASTOLIC BLOOD PRESSURE: 78 MMHG | HEIGHT: 74 IN | HEART RATE: 74 BPM | BODY MASS INDEX: 40.43 KG/M2 | WEIGHT: 315 LBS | OXYGEN SATURATION: 96 %

## 2025-08-27 DIAGNOSIS — Z91.81 AT HIGH RISK FOR FALLS: ICD-10-CM

## 2025-08-27 DIAGNOSIS — E11.9 ENCOUNTER FOR DIABETIC FOOT EXAM: ICD-10-CM

## 2025-08-27 DIAGNOSIS — R26.9 ABNORMAL GAIT: ICD-10-CM

## 2025-08-27 DIAGNOSIS — R26.2 DIFFICULTY WALKING: ICD-10-CM

## 2025-08-27 DIAGNOSIS — M17.11 PRIMARY OSTEOARTHRITIS OF RIGHT KNEE: ICD-10-CM

## 2025-08-27 DIAGNOSIS — M62.81 MUSCLE WEAKNESS (GENERALIZED): ICD-10-CM

## 2025-08-27 DIAGNOSIS — E66.01 MORBID OBESITY: ICD-10-CM

## 2025-08-27 DIAGNOSIS — E11.9 TYPE 2 DIABETES MELLITUS WITHOUT COMPLICATION, WITHOUT LONG-TERM CURRENT USE OF INSULIN: Primary | ICD-10-CM

## 2025-08-27 DIAGNOSIS — G89.29 CHRONIC PAIN OF RIGHT ANKLE: ICD-10-CM

## 2025-08-27 DIAGNOSIS — M25.571 CHRONIC PAIN OF RIGHT ANKLE: ICD-10-CM

## 2025-08-27 DIAGNOSIS — I89.0 LYMPHEDEMA OF BOTH LOWER EXTREMITIES: ICD-10-CM

## 2025-08-27 RX ORDER — TRAMADOL HYDROCHLORIDE 50 MG/1
50 TABLET ORAL EVERY 8 HOURS PRN
Qty: 90 TABLET | Refills: 2 | Status: SHIPPED | OUTPATIENT
Start: 2025-08-27

## 2025-08-29 ENCOUNTER — ANTICOAGULATION VISIT (OUTPATIENT)
Dept: PHARMACY | Facility: HOSPITAL | Age: 55
End: 2025-08-29
Payer: COMMERCIAL

## 2025-08-29 LAB — INR PPP: 2.3

## (undated) DEVICE — SYS PANNUS RETENT LF

## (undated) DEVICE — Device

## (undated) DEVICE — GW AMPLTZ SUPERSTIFF SHT/TPR STR .035IN 260CM

## (undated) DEVICE — CATH PULM WEDGE PRESS 7F

## (undated) DEVICE — INTRO SHEATH DRYSEAL FLEX 22F 7.3TO7.3MM 33CM

## (undated) DEVICE — DIL VESL STD .038 10F 20CM

## (undated) DEVICE — VSI MICRO-INTRODUCER KITS ARE INTENDED FOR USE IN PERCUTANEOUS INTRODUCTION OF UP TO A 0.018 INCH OR 0.038 INCH GUIDEWIRE OR CATHETER INTO THE VASCULAR SYSTEM FOLLOWING A SMALL GAUGE NEEDLE STICK.: Brand: VSI MICRO-INTRODUCER KIT

## (undated) DEVICE — HI-TORQUE SUPRA CORE .035 PERIPHERAL GUIDE WIRE .035 X 300 CM: Brand: HI-TORQUE SUPRA CORE

## (undated) DEVICE — DIL VESL 12F.038 20CM

## (undated) DEVICE — RADIFOCUS GLIDEWIRE ADVANTAGE GUIDEWIRE: Brand: GLIDEWIRE ADVANTAGE

## (undated) DEVICE — DIL VESL 14F.038 20CM

## (undated) DEVICE — CATH PULM GPC PE 4SD/PRT 6.7F 100CM

## (undated) DEVICE — DIL VESL 16F .038 20CM

## (undated) DEVICE — PREF GUIDING SHEATH:MULTI-SHRT: Brand: PREFACE